# Patient Record
Sex: MALE | Race: WHITE | NOT HISPANIC OR LATINO | Employment: OTHER | URBAN - METROPOLITAN AREA
[De-identification: names, ages, dates, MRNs, and addresses within clinical notes are randomized per-mention and may not be internally consistent; named-entity substitution may affect disease eponyms.]

---

## 2022-01-19 PROCEDURE — U0004 COV-19 TEST NON-CDC HGH THRU: HCPCS | Performed by: FAMILY MEDICINE

## 2022-11-08 ENCOUNTER — HOSPITAL ENCOUNTER (INPATIENT)
Facility: HOSPITAL | Age: 77
LOS: 2 days | Discharge: HOME | End: 2022-11-12
Attending: EMERGENCY MEDICINE | Admitting: STUDENT IN AN ORGANIZED HEALTH CARE EDUCATION/TRAINING PROGRAM

## 2022-11-08 DIAGNOSIS — R42 DIZZINESS: ICD-10-CM

## 2022-11-08 DIAGNOSIS — D50.9 MICROCYTIC ANEMIA: Primary | ICD-10-CM

## 2022-11-08 LAB
ABO GROUP BLD: NORMAL
ALBUMIN SERPL-MCNC: 4.1 G/DL (ref 3.5–5.2)
ALBUMIN/GLOB SERPL: 1.3 G/DL
ALP SERPL-CCNC: 90 U/L (ref 39–117)
ALT SERPL W P-5'-P-CCNC: 16 U/L (ref 1–41)
ANION GAP SERPL CALCULATED.3IONS-SCNC: 11 MMOL/L (ref 5–15)
ANISOCYTOSIS BLD QL: NORMAL
AST SERPL-CCNC: 20 U/L (ref 1–40)
BASOPHILS # BLD AUTO: 0 10*3/MM3 (ref 0–0.2)
BASOPHILS NFR BLD AUTO: 0.6 % (ref 0–1.5)
BILIRUB SERPL-MCNC: 0.4 MG/DL (ref 0–1.2)
BLD GP AB SCN SERPL QL: NEGATIVE
BUN SERPL-MCNC: 17 MG/DL (ref 8–23)
BUN/CREAT SERPL: 20.2 (ref 7–25)
CALCIUM SPEC-SCNC: 8.6 MG/DL (ref 8.6–10.5)
CHLORIDE SERPL-SCNC: 105 MMOL/L (ref 98–107)
CO2 SERPL-SCNC: 24 MMOL/L (ref 22–29)
CREAT SERPL-MCNC: 0.84 MG/DL (ref 0.76–1.27)
DEPRECATED RDW RBC AUTO: 46.8 FL (ref 37–54)
EGFRCR SERPLBLD CKD-EPI 2021: 91.5 ML/MIN/1.73
EOSINOPHIL # BLD AUTO: 0.1 10*3/MM3 (ref 0–0.4)
EOSINOPHIL NFR BLD AUTO: 1.5 % (ref 0.3–6.2)
ERYTHROCYTE [DISTWIDTH] IN BLOOD BY AUTOMATED COUNT: 20.5 % (ref 12.3–15.4)
GLOBULIN UR ELPH-MCNC: 3.1 GM/DL
GLUCOSE SERPL-MCNC: 169 MG/DL (ref 65–99)
HCT VFR BLD AUTO: 25.1 % (ref 37.5–51)
HGB BLD-MCNC: 7.1 G/DL (ref 13–17.7)
HOLD SPECIMEN: NORMAL
HYPOCHROMIA BLD QL: NORMAL
IRON 24H UR-MRATE: 14 MCG/DL (ref 59–158)
IRON SATN MFR SERPL: 3 % (ref 20–50)
LYMPHOCYTES # BLD AUTO: 1.3 10*3/MM3 (ref 0.7–3.1)
LYMPHOCYTES NFR BLD AUTO: 23.1 % (ref 19.6–45.3)
MCH RBC QN AUTO: 17.3 PG (ref 26.6–33)
MCHC RBC AUTO-ENTMCNC: 28.4 G/DL (ref 31.5–35.7)
MCV RBC AUTO: 60.7 FL (ref 79–97)
MICROCYTES BLD QL: NORMAL
MONOCYTES # BLD AUTO: 0.6 10*3/MM3 (ref 0.1–0.9)
MONOCYTES NFR BLD AUTO: 11.4 % (ref 5–12)
NEUTROPHILS NFR BLD AUTO: 3.5 10*3/MM3 (ref 1.7–7)
NEUTROPHILS NFR BLD AUTO: 63.4 % (ref 42.7–76)
NRBC BLD AUTO-RTO: 0 /100 WBC (ref 0–0.2)
PLAT MORPH BLD: NORMAL
PLATELET # BLD AUTO: 223 10*3/MM3 (ref 140–450)
PMV BLD AUTO: 8.1 FL (ref 6–12)
POIKILOCYTOSIS BLD QL SMEAR: NORMAL
POTASSIUM SERPL-SCNC: 4.1 MMOL/L (ref 3.5–5.2)
PROT SERPL-MCNC: 7.2 G/DL (ref 6–8.5)
RBC # BLD AUTO: 4.13 10*6/MM3 (ref 4.14–5.8)
RH BLD: POSITIVE
SODIUM SERPL-SCNC: 140 MMOL/L (ref 136–145)
T&S EXPIRATION DATE: NORMAL
TIBC SERPL-MCNC: 551 MCG/DL (ref 298–536)
TRANSFERRIN SERPL-MCNC: 370 MG/DL (ref 200–360)
WBC MORPH BLD: NORMAL
WBC NRBC COR # BLD: 5.6 10*3/MM3 (ref 3.4–10.8)
WHOLE BLOOD HOLD COAG: NORMAL

## 2022-11-08 PROCEDURE — 86900 BLOOD TYPING SEROLOGIC ABO: CPT | Performed by: NURSE PRACTITIONER

## 2022-11-08 PROCEDURE — 86901 BLOOD TYPING SEROLOGIC RH(D): CPT | Performed by: NURSE PRACTITIONER

## 2022-11-08 PROCEDURE — 84466 ASSAY OF TRANSFERRIN: CPT | Performed by: NURSE PRACTITIONER

## 2022-11-08 PROCEDURE — 86901 BLOOD TYPING SEROLOGIC RH(D): CPT

## 2022-11-08 PROCEDURE — 82378 CARCINOEMBRYONIC ANTIGEN: CPT | Performed by: INTERNAL MEDICINE

## 2022-11-08 PROCEDURE — 80053 COMPREHEN METABOLIC PANEL: CPT | Performed by: NURSE PRACTITIONER

## 2022-11-08 PROCEDURE — 99285 EMERGENCY DEPT VISIT HI MDM: CPT

## 2022-11-08 PROCEDURE — 83540 ASSAY OF IRON: CPT | Performed by: NURSE PRACTITIONER

## 2022-11-08 PROCEDURE — 86850 RBC ANTIBODY SCREEN: CPT | Performed by: NURSE PRACTITIONER

## 2022-11-08 PROCEDURE — 86923 COMPATIBILITY TEST ELECTRIC: CPT

## 2022-11-08 PROCEDURE — 86900 BLOOD TYPING SEROLOGIC ABO: CPT

## 2022-11-08 PROCEDURE — 85025 COMPLETE CBC W/AUTO DIFF WBC: CPT | Performed by: NURSE PRACTITIONER

## 2022-11-08 PROCEDURE — 82728 ASSAY OF FERRITIN: CPT | Performed by: STUDENT IN AN ORGANIZED HEALTH CARE EDUCATION/TRAINING PROGRAM

## 2022-11-08 PROCEDURE — 85007 BL SMEAR W/DIFF WBC COUNT: CPT | Performed by: NURSE PRACTITIONER

## 2022-11-08 RX ORDER — SODIUM CHLORIDE 0.9 % (FLUSH) 0.9 %
10 SYRINGE (ML) INJECTION AS NEEDED
Status: DISCONTINUED | OUTPATIENT
Start: 2022-11-08 | End: 2022-11-12 | Stop reason: HOSPADM

## 2022-11-09 PROBLEM — E11.65 TYPE 2 DIABETES MELLITUS WITH HYPERGLYCEMIA, WITHOUT LONG-TERM CURRENT USE OF INSULIN: Status: ACTIVE | Noted: 2022-11-09

## 2022-11-09 LAB
ANION GAP SERPL CALCULATED.3IONS-SCNC: 11 MMOL/L (ref 5–15)
BUN SERPL-MCNC: 15 MG/DL (ref 8–23)
BUN/CREAT SERPL: 17.6 (ref 7–25)
CALCIUM SPEC-SCNC: 8.3 MG/DL (ref 8.6–10.5)
CHLORIDE SERPL-SCNC: 104 MMOL/L (ref 98–107)
CO2 SERPL-SCNC: 24 MMOL/L (ref 22–29)
CREAT SERPL-MCNC: 0.85 MG/DL (ref 0.76–1.27)
DEPRECATED RDW RBC AUTO: 49 FL (ref 37–54)
EGFRCR SERPLBLD CKD-EPI 2021: 91.2 ML/MIN/1.73
ERYTHROCYTE [DISTWIDTH] IN BLOOD BY AUTOMATED COUNT: 21.8 % (ref 12.3–15.4)
FERRITIN SERPL-MCNC: 7.92 NG/ML (ref 30–400)
GLUCOSE BLDC GLUCOMTR-MCNC: 160 MG/DL (ref 70–105)
GLUCOSE BLDC GLUCOMTR-MCNC: 164 MG/DL (ref 70–105)
GLUCOSE BLDC GLUCOMTR-MCNC: 192 MG/DL (ref 70–105)
GLUCOSE SERPL-MCNC: 185 MG/DL (ref 65–99)
HCT VFR BLD AUTO: 25.5 % (ref 37.5–51)
HGB BLD-MCNC: 7.2 G/DL (ref 13–17.7)
MCH RBC QN AUTO: 17.8 PG (ref 26.6–33)
MCHC RBC AUTO-ENTMCNC: 28.1 G/DL (ref 31.5–35.7)
MCV RBC AUTO: 63.3 FL (ref 79–97)
PLATELET # BLD AUTO: 195 10*3/MM3 (ref 140–450)
PMV BLD AUTO: 8.4 FL (ref 6–12)
POTASSIUM SERPL-SCNC: 4 MMOL/L (ref 3.5–5.2)
RBC # BLD AUTO: 4.03 10*6/MM3 (ref 4.14–5.8)
SODIUM SERPL-SCNC: 139 MMOL/L (ref 136–145)
WBC NRBC COR # BLD: 5 10*3/MM3 (ref 3.4–10.8)

## 2022-11-09 PROCEDURE — 82962 GLUCOSE BLOOD TEST: CPT

## 2022-11-09 PROCEDURE — 85027 COMPLETE CBC AUTOMATED: CPT | Performed by: STUDENT IN AN ORGANIZED HEALTH CARE EDUCATION/TRAINING PROGRAM

## 2022-11-09 PROCEDURE — 99204 OFFICE O/P NEW MOD 45 MIN: CPT | Performed by: INTERNAL MEDICINE

## 2022-11-09 PROCEDURE — 25010000002 NA FERRIC GLUC CPLX PER 12.5 MG: Performed by: STUDENT IN AN ORGANIZED HEALTH CARE EDUCATION/TRAINING PROGRAM

## 2022-11-09 PROCEDURE — G0378 HOSPITAL OBSERVATION PER HR: HCPCS

## 2022-11-09 PROCEDURE — 80048 BASIC METABOLIC PNL TOTAL CA: CPT | Performed by: STUDENT IN AN ORGANIZED HEALTH CARE EDUCATION/TRAINING PROGRAM

## 2022-11-09 PROCEDURE — 63710000001 INSULIN LISPRO (HUMAN) PER 5 UNITS: Performed by: STUDENT IN AN ORGANIZED HEALTH CARE EDUCATION/TRAINING PROGRAM

## 2022-11-09 PROCEDURE — 36415 COLL VENOUS BLD VENIPUNCTURE: CPT | Performed by: STUDENT IN AN ORGANIZED HEALTH CARE EDUCATION/TRAINING PROGRAM

## 2022-11-09 PROCEDURE — P9016 RBC LEUKOCYTES REDUCED: HCPCS

## 2022-11-09 PROCEDURE — 36430 TRANSFUSION BLD/BLD COMPNT: CPT

## 2022-11-09 PROCEDURE — 86900 BLOOD TYPING SEROLOGIC ABO: CPT

## 2022-11-09 RX ORDER — PIOGLITAZONEHYDROCHLORIDE 45 MG/1
45 TABLET ORAL DAILY
COMMUNITY
Start: 2022-07-08

## 2022-11-09 RX ORDER — ONDANSETRON 4 MG/1
4 TABLET, FILM COATED ORAL EVERY 6 HOURS PRN
Status: DISCONTINUED | OUTPATIENT
Start: 2022-11-09 | End: 2022-11-12 | Stop reason: HOSPADM

## 2022-11-09 RX ORDER — OXYMETAZOLINE HYDROCHLORIDE 0.05 G/100ML
2 SPRAY NASAL 2 TIMES DAILY
Status: DISPENSED | OUTPATIENT
Start: 2022-11-09 | End: 2022-11-12

## 2022-11-09 RX ORDER — DEXTROSE MONOHYDRATE 25 G/50ML
25 INJECTION, SOLUTION INTRAVENOUS
Status: DISCONTINUED | OUTPATIENT
Start: 2022-11-09 | End: 2022-11-12 | Stop reason: HOSPADM

## 2022-11-09 RX ORDER — ONDANSETRON 2 MG/ML
4 INJECTION INTRAMUSCULAR; INTRAVENOUS EVERY 6 HOURS PRN
Status: DISCONTINUED | OUTPATIENT
Start: 2022-11-09 | End: 2022-11-12 | Stop reason: HOSPADM

## 2022-11-09 RX ORDER — OLANZAPINE 10 MG/2ML
1 INJECTION, POWDER, LYOPHILIZED, FOR SOLUTION INTRAMUSCULAR
Status: DISCONTINUED | OUTPATIENT
Start: 2022-11-09 | End: 2022-11-12 | Stop reason: HOSPADM

## 2022-11-09 RX ORDER — NITROGLYCERIN 0.4 MG/1
0.4 TABLET SUBLINGUAL
Status: DISCONTINUED | OUTPATIENT
Start: 2022-11-09 | End: 2022-11-12 | Stop reason: HOSPADM

## 2022-11-09 RX ORDER — PRAVASTATIN SODIUM 40 MG
1 TABLET ORAL NIGHTLY
COMMUNITY
Start: 2022-07-08 | End: 2023-03-13 | Stop reason: HOSPADM

## 2022-11-09 RX ORDER — INSULIN LISPRO 100 [IU]/ML
2-7 INJECTION, SOLUTION INTRAVENOUS; SUBCUTANEOUS
Status: DISCONTINUED | OUTPATIENT
Start: 2022-11-09 | End: 2022-11-12 | Stop reason: HOSPADM

## 2022-11-09 RX ORDER — SODIUM CHLORIDE 0.9 % (FLUSH) 0.9 %
10 SYRINGE (ML) INJECTION EVERY 12 HOURS SCHEDULED
Status: DISCONTINUED | OUTPATIENT
Start: 2022-11-09 | End: 2022-11-12 | Stop reason: HOSPADM

## 2022-11-09 RX ORDER — NICOTINE POLACRILEX 4 MG
15 LOZENGE BUCCAL
Status: DISCONTINUED | OUTPATIENT
Start: 2022-11-09 | End: 2022-11-12 | Stop reason: HOSPADM

## 2022-11-09 RX ORDER — SODIUM CHLORIDE 0.9 % (FLUSH) 0.9 %
10 SYRINGE (ML) INJECTION AS NEEDED
Status: DISCONTINUED | OUTPATIENT
Start: 2022-11-09 | End: 2022-11-12 | Stop reason: HOSPADM

## 2022-11-09 RX ORDER — GLIMEPIRIDE 4 MG/1
1 TABLET ORAL 2 TIMES DAILY
COMMUNITY
Start: 2022-07-08

## 2022-11-09 RX ORDER — LOSARTAN POTASSIUM 100 MG/1
100 TABLET ORAL DAILY
COMMUNITY
Start: 2022-07-08 | End: 2023-03-13 | Stop reason: HOSPADM

## 2022-11-09 RX ADMIN — Medication 2 SPRAY: at 09:43

## 2022-11-09 RX ADMIN — INSULIN LISPRO 2 UNITS: 100 INJECTION, SOLUTION INTRAVENOUS; SUBCUTANEOUS at 12:02

## 2022-11-09 RX ADMIN — INSULIN LISPRO 2 UNITS: 100 INJECTION, SOLUTION INTRAVENOUS; SUBCUTANEOUS at 16:15

## 2022-11-09 RX ADMIN — Medication 2 SPRAY: at 06:55

## 2022-11-09 RX ADMIN — SODIUM CHLORIDE 125 MG: 9 INJECTION, SOLUTION INTRAVENOUS at 09:42

## 2022-11-09 RX ADMIN — Medication 10 ML: at 22:56

## 2022-11-09 RX ADMIN — Medication 10 ML: at 09:43

## 2022-11-09 RX ADMIN — INSULIN LISPRO 2 UNITS: 100 INJECTION, SOLUTION INTRAVENOUS; SUBCUTANEOUS at 07:39

## 2022-11-09 NOTE — H&P
[facility] Medicine Services      Patient Name: Vlad Guerrero  : 1945  MRN: [id number]  Primary Care Physician:  Provider, No Known  Date of admission: 2022      Subjective      Chief Complaint: Abnormal labs    History of Present Illness: Vlad Guerrero is a 76 y.o. male who presented to [facility] on 2022 complaining of abnormal labs.  Patient was sent in by PCP when labs obtained due to dizziness showed a hgb of <7.  Patient denies chest pain, dyspnea, melena, hematochezia,  Hematemesis.  He has never had a colonoscopy.  Presented to [facility] ED due to PCP's instructions.    In the ER, vitals 98.2, HR 80, RR 16, /62, 95 RA.  Labs notable for glucose 169, a1c 7.2, hgb 7.1, iron 14.        Review of Systems   Constitutional: Negative for chills, fever and malaise/fatigue.   HENT: Negative for ear discharge, ear pain and hearing loss.    Eyes: Negative for discharge, double vision and pain.   Cardiovascular: Negative for chest pain, dyspnea on exertion and leg swelling.   Respiratory: Negative for cough and shortness of breath.    Endocrine: Negative for polydipsia, polyphagia and polyuria.   Skin: Negative for dry skin, flushing and itching.   Musculoskeletal: Negative for arthritis, back pain and falls.   Gastrointestinal: Negative for abdominal pain, constipation and diarrhea.   Genitourinary: Negative for dysuria, flank pain and frequency.   Neurological: Positive for light-headedness. Negative for headaches and weakness.   Psychiatric/Behavioral: Negative for altered mental status, depression and hallucinations.        Personal History     PMHx: DM  PSHx: Denies  All: penicillins  Meds: denies  Social: denies illicit drug use  Family History: family history is not on file. Otherwise pertinent FHx was reviewed and not pertinent to current issue.    Social History:  reports that he has never smoked. He has never used smokeless tobacco. He reports that he does not  currently use alcohol. He reports that he does not use drugs.    Home Medications:  Prior to Admission Medications     None            Allergies:  Allergies   Allergen Reactions   • Penicillins Rash       Objective      Vitals:   Temp:  [98.2 °F (36.8 °C)] 98.2 °F (36.8 °C)  Heart Rate:  [80-90] 80  Resp:  [16] 16  BP: (120-150)/(52-77) 120/62    Physical Exam  Constitutional:       General: He is not in acute distress.     Appearance: Normal appearance. He is not toxic-appearing.   HENT:      Head: Normocephalic and atraumatic.      Nose: Nose normal. No congestion.      Mouth/Throat:      Pharynx: Oropharynx is clear. No oropharyngeal exudate.   Eyes:      General: No scleral icterus.  Cardiovascular:      Rate and Rhythm: Normal rate and regular rhythm.      Heart sounds: No murmur heard.    No friction rub. No gallop.   Pulmonary:      Effort: No respiratory distress.      Breath sounds: No wheezing or rales.   Abdominal:      General: There is no distension.      Tenderness: There is no abdominal tenderness. There is no guarding.   Musculoskeletal:         General: No swelling or deformity.      Cervical back: Normal range of motion. No rigidity.      Right lower leg: No edema.      Left lower leg: No edema.   Skin:     Coloration: Skin is not jaundiced or pale.      Findings: No bruising or lesion.   Neurological:      General: No focal deficit present.      Mental Status: He is alert and oriented to person, place, and time.      Motor: No weakness.   Psychiatric:         Mood and Affect: Mood normal.         Behavior: Behavior normal.         Thought Content: Thought content normal.         Judgment: Judgment normal.          Result Review    Result Review:  I have personally reviewed the results from the time of this admission to 11/8/2022 23:34 EST and agree with these findings:  [x]  Laboratory  []  Microbiology  []  Radiology  []  EKG/Telemetry   []  Cardiology/Vascular   []  Pathology  []  Old  records  []  Other:        Assessment & Plan        Active Hospital Problems:  There are no active hospital problems to display for this patient.    Plan:     #Iron deficiency anemia    - Due to symptoms of dizzines, PCP obtain labs which showed hgb <7.0    - at [facility], hgb was 6.8 and 7.1    - FOBT negative    - type and screen    - iron low, ferritin pending    - no overt bleeding    - As no overt bleeding, will refrain from GI consult as no emergent scope needed    - heme/onc to establish care    - 1u prbc ordered    - IV iron ordered    - ppi daily    #DM    - a1c 7.1    - iss    #DVT prophylaxis    -SCDs    CODE STATUS:       Admission Status:  I believe this patient meets observation status.    I discussed the patient's findings and my recommendations with patient.    This patient has been examined wearing appropriate Personal Protective Equipment and discussed with Patient. 11/08/22      Signature: Electronically signed by Moreno Pimentel DO, 11/09/22, 12:12 AM EST.

## 2022-11-09 NOTE — CASE MANAGEMENT/SOCIAL WORK
Discharge Planning Assessment   Az     Patient Name: Vlad Guerrero  MRN: [id number]  Today's Date: 11/9/2022    Admit Date: 11/8/2022    Plan: Home   Discharge Needs Assessment     Row Name 11/09/22 1831       Living Environment    People in Home other relative(s)    Name(s) of People in Home Pt states he lives in an apartment,and his Aunt Felicity Huddleston lives below him    Current Living Arrangements apartment    Primary Care Provided by self    Provides Primary Care For no one    Family Caregiver if Needed other relative(s)    Family Caregiver Names Aunt Felicity Huddleston    Quality of Family Relationships supportive    Able to Return to Prior Arrangements yes       Resource/Environmental Concerns    Resource/Environmental Concerns none    Transportation Concerns none       Transition Planning    Patient/Family Anticipates Transition to home    Patient/Family Anticipated Services at Transition none    Transportation Anticipated family or friend will provide  Pt states Josephine Huddleston will pick him up at dc       Discharge Needs Assessment    Equipment Currently Used at Home other (see comments);glucometer  Colostomy supplies via mail order    Concerns to be Addressed denies needs/concerns at this time    Anticipated Changes Related to Illness none    Equipment Needed After Discharge none               Discharge Plan     Row Name 11/09/22 6814       Plan    Plan Home    Patient/Family in Agreement with Plan yes    Plan Comments  spoke with patient. He reports he sees Batool Lin NP in [place], [place] (updated in [facility]) and confirms pharmacy.He denies problems affording medications. He states he rents from his Aunt, and she lives below him.He reports she is able to help him at dc if needed and will pick him up at dc. He currently denies dc needs. DC Barriers: IV iron, monitoring hgb.              Continued Care and Services - Admitted Since 11/8/2022    Coordination has not been started for this  encounter.       Expected Discharge Date and Time     Expected Discharge Date Expected Discharge Time    Nov 10, 2022          Demographic Summary     Row Name 11/09/22 1831       General Information    Admission Type observation    Arrived From home    Required Notices Provided Observation Status Notice    Referral Source admission list    Reason for Consult discharge planning    Preferred Language English       Contact Information    Permission Granted to Share Info With                Functional Status     Row Name 11/09/22 1831       Functional Status    Usual Activity Tolerance good    Current Activity Tolerance moderate       Functional Status, IADL    Medications independent    Meal Preparation independent    Housekeeping independent    Laundry independent    Shopping independent                   Patient Forms     Row Name 11/09/22 1838       Patient Forms    Important Message from Medicare ([facility]) --  ALEXANDER 11/9/22 per registration    Patient Observation Letter Delivered  ALEXANDER 11/9/22 per registration              Leisa Agrawal RN, [facility]  Office: [phone]  Fax: [phone]  [email or web address]      I met with patient in room wearing PPE: mask and goggles.     Maintained distance greater than six feet and spent </=15 minutes in the room      Leisa Agrawal RN

## 2022-11-09 NOTE — ED PROVIDER NOTES
Subjective   History of Present Illness  Patient is a 76-year-old gentleman who states he went to see his primary care provider today and had blood work drawn because he had had some episodes of dizziness.  He states that his doctor called him at 9:00 tonight and told him that his hemoglobin was low and that he should come directly to the hospital.  Patient is alert oriented nontoxic in no acute distress he denies pain.  He states that he is not always dizzy he just has episodes of dizziness he denies any melena or hematochezia.  He states he has no history of anemia        Review of Systems   Constitutional: Negative for chills, fatigue and fever.   HENT: Negative for congestion, tinnitus and trouble swallowing.    Eyes: Negative for photophobia, discharge and redness.   Respiratory: Negative for cough and shortness of breath.    Cardiovascular: Negative for chest pain and palpitations.   Gastrointestinal: Negative for abdominal pain, diarrhea, nausea and vomiting.   Genitourinary: Negative for dysuria, frequency and urgency.   Musculoskeletal: Negative for back pain, joint swelling and myalgias.   Skin: Negative for rash.   Neurological: Negative for dizziness and headaches.   Psychiatric/Behavioral: Negative for confusion.   All other systems reviewed and are negative.      History reviewed. No pertinent past medical history.    Allergies   Allergen Reactions   • Penicillins Rash       History reviewed. No pertinent surgical history.    History reviewed. No pertinent family history.    Social History     Socioeconomic History   • Marital status: Single   Tobacco Use   • Smoking status: Never   • Smokeless tobacco: Never   Substance and Sexual Activity   • Alcohol use: Not Currently   • Drug use: Never   • Sexual activity: Defer           Objective   Physical Exam  Vitals reviewed.   Constitutional:       Appearance: He is well-developed.   HENT:      Head: Normocephalic and atraumatic.      Right Ear: External  "ear normal.      Left Ear: External ear normal.      Nose: Nose normal.      Mouth/Throat:      Mouth: Mucous membranes are moist.   Eyes:      Conjunctiva/sclera: Conjunctivae normal.      Pupils: Pupils are equal, round, and reactive to light.   Cardiovascular:      Rate and Rhythm: Normal rate and regular rhythm.      Heart sounds: Normal heart sounds.   Pulmonary:      Effort: Pulmonary effort is normal.      Breath sounds: Normal breath sounds.   Abdominal:      General: Bowel sounds are normal.      Palpations: Abdomen is soft.   Musculoskeletal:         General: Normal range of motion.      Cervical back: Normal range of motion and neck supple.   Skin:     General: Skin is warm and dry.      Capillary Refill: Capillary refill takes 2 to 3 seconds.      Coloration: Skin is pale.      Findings: Wound: .vs.   Neurological:      General: No focal deficit present.      Mental Status: He is alert and oriented to person, place, and time.      GCS: GCS eye subscore is 4. GCS verbal subscore is 5. GCS motor subscore is 6.   Psychiatric:         Mood and Affect: Mood normal.         Behavior: Behavior normal.         Procedures           ED Course            /62   Pulse 80   Temp 98.2 °F (36.8 °C) (Oral)   Resp 16   Ht 182.9 cm (72\")   Wt 90.6 kg (199 lb 11.8 oz)   SpO2 95%   BMI 27.09 kg/m²   Labs Reviewed   COMPREHENSIVE METABOLIC PANEL - Abnormal; Notable for the following components:       Result Value    Glucose 169 (*)     All other components within normal limits    Narrative:     GFR Normal >60  Chronic Kidney Disease <60  Kidney Failure <15    The GFR formula is only valid for adults with stable renal function between ages 18 and 70.   CBC WITH AUTO DIFFERENTIAL - Abnormal; Notable for the following components:    RBC 4.13 (*)     Hemoglobin 7.1 (*)     Hematocrit 25.1 (*)     MCV 60.7 (*)     MCH 17.3 (*)     MCHC 28.4 (*)     RDW 20.5 (*)     All other components within normal limits    " Narrative:     Appended report. These results have been appended to a previously verified report.   IRON PROFILE - Abnormal; Notable for the following components:    Iron 14 (*)     Iron Saturation 3 (*)     Transferrin 370 (*)     TIBC 551 (*)     All other components within normal limits   SCAN SLIDE   TYPE AND SCREEN   BB ARMBAND CHECK   PREPARE RBC   CBC AND DIFFERENTIAL    Narrative:     The following orders were created for panel order CBC & Differential.  Procedure                               Abnormality         Status                     ---------                               -----------         ------                     CBC Auto Differential[[id number]]        Abnormal            Final result               Scan Slide[[id number]]                                       Final result                 Please view results for these tests on the individual orders.   EXTRA TUBES    Narrative:     The following orders were created for panel order Extra Tubes.  Procedure                               Abnormality         Status                     ---------                               -----------         ------                     Gold Top - SST[[id number]]                                   In process                 Light Blue Top[[id number]]                                   In process                   Please view results for these tests on the individual orders.   GOLD TOP - SST   LIGHT BLUE TOP     Medications   sodium chloride 0.9 % flush 10 mL (has no administration in time range)     No radiology results for the last day                                  MDM  Number of Diagnoses or Management Options  Dizziness  Microcytic anemia  Diagnosis management comments: Patient had IV established and work was obtained.  Patient was found to have a hemoglobin of 7.1 was found to be microcytic iron panel was drawn as well.  Patient was typed and screened and given 1 unit of blood.  Patient was discussed with Dr. Pimentel  agreed to admit the patient patient remained stable he was agreeable to admission           Amount and/or Complexity of Data Reviewed  Clinical lab tests: reviewed    Risk of Complications, Morbidity, and/or Mortality  Presenting problems: high  Diagnostic procedures: high  Management options: high    Patient Progress  Patient progress: improved      Final diagnoses:   Microcytic anemia   Dizziness       ED Disposition  ED Disposition     ED Disposition   Decision to Admit    Condition   --    Comment   Level of Care: Telemetry [5]   Admitting Physician: WHITNEY YA [[id number]]   Attending Physician: WHITNEY YA [[id number]]               No follow-up provider specified.       Medication List      No changes were made to your prescriptions during this visit.          Emma Alegre, APRN  11/08/22 4616

## 2022-11-09 NOTE — PROGRESS NOTES
[facility] Medicine Services Daily Progress Note    Patient Name: Vlad Guerrero  : 1945  MRN: [id number]  Primary Care Physician:  Provider, No Known  Date of admission: 2022      Subjective      Chief Complaint: Abnormal labs.      Patient Reports:      2022.  Patient was seen and examined.  Patient complained of weakness.    Review of Systems   Constitutional: Positive for malaise/fatigue.   HENT: Negative.    Eyes: Negative.    Cardiovascular: Negative.    Respiratory: Negative.    Endocrine: Negative.    Hematologic/Lymphatic: Negative.    Skin: Negative.    Musculoskeletal: Negative.    Gastrointestinal: Negative.    Genitourinary: Negative.    Neurological: Positive for weakness.   Psychiatric/Behavioral: Negative.    Allergic/Immunologic: Negative.             Objective      Vitals:   Temp:  [97.6 °F (36.4 °C)-98.2 °F (36.8 °C)] 97.7 °F (36.5 °C)  Heart Rate:  [75-90] 87  Resp:  [14-20] 14  BP: (103-150)/(52-83) 130/62    Physical Exam  Vitals reviewed.   Constitutional:       General: He is not in acute distress.  HENT:      Head: Normocephalic.      Nose: Nose normal.      Mouth/Throat:      Mouth: Mucous membranes are dry.      Pharynx: Oropharynx is clear.   Eyes:      Extraocular Movements: Extraocular movements intact.      Conjunctiva/sclera: Conjunctivae normal.      Pupils: Pupils are equal, round, and reactive to light.   Cardiovascular:      Pulses: Normal pulses.      Heart sounds: No murmur heard.    No friction rub. No gallop.      Comments: S1 and S2 present.  No tachycardia.  Pulmonary:      Effort: Pulmonary effort is normal.      Breath sounds: No stridor. No wheezing or rales.   Chest:      Chest wall: No tenderness.   Abdominal:      General: Bowel sounds are normal. There is no distension.      Palpations: Abdomen is soft.      Tenderness: There is no abdominal tenderness. There is no right CVA tenderness or guarding.      Comments: Colostomy bag  in place.   Musculoskeletal:         General: No swelling, tenderness, deformity or signs of injury.      Cervical back: Normal range of motion. No rigidity.      Right lower leg: No edema.      Left lower leg: No edema.   Skin:     General: Skin is warm and dry.      Capillary Refill: Capillary refill takes less than 2 seconds.      Coloration: Skin is not jaundiced.      Findings: No bruising, erythema, lesion or rash.   Neurological:      Mental Status: He is alert.      Comments: No facial asymmetry noted.  Gait and station not tested.   Psychiatric:      Comments: No agitation.                 Result Review    Result Review:  I have personally reviewed the results from the time of this admission to 11/9/2022 17:46 EST and agree with these findings:  []  Laboratory  []  Microbiology  []  Radiology  []  EKG/Telemetry   []  Cardiology/Vascular   []  Pathology  []  Old records  []  Other:  Most notable findings include:           Assessment & Plan    From previous notes and with minor updates.      Brief Patient Summary:    Patient is a 76 y.o. male who presented to [facility] on 11/8/2022 complaining of abnormal labs.  Patient was sent in by PCP when labs obtained due to dizziness showed a hgb of <7.  Patient denies chest pain, dyspnea, melena, hematochezia,  Hematemesis.  He has never had a colonoscopy.  Presented to [facility] ED due to PCP's instructions.  Patient was seen in the emergency room and in the ER, vitals 98.2, HR 80, RR 16, /62, 95 RA.  Labs notable for glucose 169, a1c 7.2, hgb 7.1, iron 14.           ferric gluconate, 125 mg, Intravenous, Daily  insulin lispro, 2-7 Units, Subcutaneous, TID With Meals  oxymetazoline, 2 spray, Each Nare, BID  sodium chloride, 10 mL, Intravenous, Q12H             Active Hospital Problems:  Active Hospital Problems    Diagnosis    • **Microcytic anemia    • Type 2 diabetes mellitus with hyperglycemia, without long-term current use of insulin (HCC)      Plan:        -Continue appropriate patient's home medications for other chronic medical conditions.  -Continue the present level of care.  -Patient and family agreed with the plan of care.  -Follow hematology/oncology recommendations for anemia.  -Complete a GI referral as outpatient upon discharge.  -Treated diabetes mellitus with insulin therapy.  -Monitor H&H.  -Treat anemia with transfusion of packed red blood cells.        DVT prophylaxis:  Mechanical DVT prophylaxis orders are present.    CODE STATUS:    Code Status (Patient has no pulse and is not breathing): CPR (Attempt to Resuscitate)  Medical Interventions (Patient has pulse or is breathing): Full Support      Disposition: This wonderful patient can discharge in the next 24 to 48 hours.    This patient has been examined wearing appropriate Personal Protective Equipment and discussed with hospital infection control department, [facility], infectious disease specialist and pulmonologist. 11/09/22      Electronically signed by Praveen Oates MD, FACP, 11/09/22, 17:46 EST.      [facility] Hospitalist Team

## 2022-11-09 NOTE — CONSULTS
Hematology/Oncology Inpatient Consultation    Patient name: Vlad Guerrero  : 1945  MRN: [id number]  Referring Provider: Dr. Pimentel  Reason for Consultation: Microcytic anemia    Chief complaint: Abnormal labs    History of present illness:    Vlad Guerrero is a 76 y.o. male who presented to [facility] on 2022 with complaints of abnormal labs at the instruction of his PCP.  Patient had lab work obtained by his PCP due to complaints of dizziness that resulted in a hemoglobin of less than 7.  The patient denied chest pain, dyspnea, melena, hematochezia, hematic emesis.  Patient denied any prior colonoscopy.  The patient denied any signs of overt bleeding.  Patient denied any history of anemia.    Evaluation in the ED: CMP unremarkable with the exception of a glucose of 169, WBC 5.60, hemoglobin 7.1, MCV 60.7, platelets 223,000.    Iron studies showed: Iron 14, iron sat 3, TIBC 551, ferritin 7.92.    22  Hematology/Oncology was consulted for microcytic anemia.  Iron studies consistent with iron deficiency anemia.  FOBT negative.  Patient received 1 unit packed red blood cells for hemoglobin of 7.1.    He/She  has no past medical history on file.    PCP: Provider, No Known    History:  History reviewed. No pertinent past medical history., History reviewed. No pertinent surgical history., History reviewed. No pertinent family history.,   Social History     Tobacco Use   • Smoking status: Never   • Smokeless tobacco: Never   Substance Use Topics   • Alcohol use: Not Currently   • Drug use: Never   , (Not in a hospital admission)  , Scheduled Meds:  ferric gluconate, 125 mg, Intravenous, Daily  insulin lispro, 2-7 Units, Subcutaneous, TID With Meals  oxymetazoline, 2 spray, Each Nare, BID  sodium chloride, 10 mL, Intravenous, Q12H    , Continuous Infusions:   , PRN Meds:  •  dextrose  •  dextrose  •  glucagon (human recombinant)  •  nitroglycerin  •  ondansetron **OR** ondansetron  •   "[COMPLETED] Insert peripheral IV **AND** sodium chloride  •  sodium chloride   Allergies:  Penicillins    Subjective     ROS:  Review of Systems   Constitutional: Positive for fatigue. Negative for fever.   HENT: Negative for congestion and nosebleeds.    Eyes: Negative for pain.   Respiratory: Negative for cough and shortness of breath.    Cardiovascular: Negative for chest pain.   Gastrointestinal: Negative for abdominal pain, blood in stool, diarrhea, nausea and vomiting.   Endocrine: Negative for cold intolerance and heat intolerance.   Genitourinary: Negative for difficulty urinating.   Musculoskeletal: Negative for arthralgias.   Skin: Negative for rash.   Neurological: Negative for dizziness and headaches.   Hematological: Does not bruise/bleed easily.   Psychiatric/Behavioral: Negative for behavioral problems.        Objective   Vital Signs:   /74   Pulse 77   Temp 97.6 °F (36.4 °C) (Oral)   Resp 16   Ht 182.9 cm (72\")   Wt 90.6 kg (199 lb 11.8 oz)   SpO2 98%   BMI 27.09 kg/m²     Physical Exam: (performed by MD)  Physical Exam  Constitutional:       Appearance: Normal appearance.   HENT:      Head: Normocephalic and atraumatic.   Eyes:      Pupils: Pupils are equal, round, and reactive to light.   Cardiovascular:      Rate and Rhythm: Normal rate and regular rhythm.      Pulses: Normal pulses.      Heart sounds: No murmur heard.  Pulmonary:      Effort: Pulmonary effort is normal.      Breath sounds: Normal breath sounds.   Abdominal:      General: There is no distension.      Palpations: Abdomen is soft. There is no mass.      Tenderness: There is no abdominal tenderness.   Musculoskeletal:         General: Normal range of motion.      Cervical back: Normal range of motion.   Skin:     General: Skin is warm.   Neurological:      General: No focal deficit present.      Mental Status: He is alert.   Psychiatric:         Mood and Affect: Mood normal.         Results Review:  Lab Results (last 48 " hours)     Procedure Component Value Units Date/Time    POC Glucose Once [[id number]]  (Abnormal) Collected: 11/09/22 0732    Specimen: Blood Updated: 11/09/22 0733     Glucose 192 mg/dL      Comment: Serial Number: [id number]:  [id number]       Basic Metabolic Panel [[id number]]  (Abnormal) Collected: 11/09/22 0604    Specimen: Blood Updated: 11/09/22 0720     Glucose 185 mg/dL      BUN 15 mg/dL      Creatinine 0.85 mg/dL      Sodium 139 mmol/L      Potassium 4.0 mmol/L      Chloride 104 mmol/L      CO2 24.0 mmol/L      Calcium 8.3 mg/dL      BUN/Creatinine Ratio 17.6     Anion Gap 11.0 mmol/L      eGFR 91.2 mL/min/1.73      Comment: National Kidney Foundation and American Society of Nephrology (ASN) Task Force recommended calculation based on the Chronic Kidney Disease Epidemiology Collaboration (CKD-EPI) equation refit without adjustment for race.       Narrative:      GFR Normal >60  Chronic Kidney Disease <60  Kidney Failure <15    The GFR formula is only valid for adults with stable renal function between ages 18 and 70.    CBC (No Diff) [[id number]]  (Abnormal) Collected: 11/09/22 0604    Specimen: Blood Updated: 11/09/22 0708     WBC 5.00 10*3/mm3      RBC 4.03 10*6/mm3      Hemoglobin 7.2 g/dL      Hematocrit 25.5 %      MCV 63.3 fL      MCH 17.8 pg      MCHC 28.1 g/dL      RDW 21.8 %      RDW-SD 49.0 fl      MPV 8.4 fL      Platelets 195 10*3/mm3     Ferritin [[id number]]  (Abnormal) Collected: 11/08/22 2233    Specimen: Blood Updated: 11/09/22 0037     Ferritin 7.92 ng/mL     Narrative:      Results may be falsely decreased if patient taking Biotin.      Extra Tubes [[id number]] Collected: 11/08/22 2233    Specimen: Blood, Venous Line Updated: 11/08/22 2346    Narrative:      The following orders were created for panel order Extra Tubes.  Procedure                               Abnormality         Status                     ---------                               -----------         ------                      Gold Top - SST[[id number]]                                   Final result               Light Blue Top[[id number]]                                   Final result                 Please view results for these tests on the individual orders.    Gold Top - SST [[id number]] Collected: 11/08/22 2233    Specimen: Blood Updated: 11/08/22 2346     Extra Tube Hold for add-ons.     Comment: Auto resulted.       Light Blue Top [[id number]] Collected: 11/08/22 2233    Specimen: Blood Updated: 11/08/22 2346     Extra Tube Hold for add-ons.     Comment: Auto resulted       CBC & Differential [[id number]]  (Abnormal) Collected: 11/08/22 2233    Specimen: Blood Updated: 11/08/22 2331    Narrative:      The following orders were created for panel order CBC & Differential.  Procedure                               Abnormality         Status                     ---------                               -----------         ------                     CBC Auto Differential[[id number]]        Abnormal            Final result               Scan Slide[[id number]]                                       Final result                 Please view results for these tests on the individual orders.    CBC Auto Differential [[id number]]  (Abnormal) Collected: 11/08/22 2233    Specimen: Blood Updated: 11/08/22 2331     WBC 5.60 10*3/mm3      RBC 4.13 10*6/mm3      Hemoglobin 7.1 g/dL      Hematocrit 25.1 %      MCV 60.7 fL      MCH 17.3 pg      MCHC 28.4 g/dL      RDW 20.5 %      RDW-SD 46.8 fl      MPV 8.1 fL      Platelets 223 10*3/mm3      Neutrophil % 63.4 %      Lymphocyte % 23.1 %      Monocyte % 11.4 %      Eosinophil % 1.5 %      Basophil % 0.6 %      Neutrophils, Absolute 3.50 10*3/mm3      Lymphocytes, Absolute 1.30 10*3/mm3      Monocytes, Absolute 0.60 10*3/mm3      Eosinophils, Absolute 0.10 10*3/mm3      Basophils, Absolute 0.00 10*3/mm3      nRBC 0.0 /100 WBC     Narrative:      Appended report. These results have been appended to a  previously verified report.    Scan Slide [[id number]] Collected: 11/08/22 2233    Specimen: Blood Updated: 11/08/22 2331     Anisocytosis Slight/1+     Hypochromia Slight/1+     Microcytes Slight/1+     Poikilocytes Slight/1+     WBC Morphology Normal     Platelet Morphology Normal    Iron Profile [[id number]]  (Abnormal) Collected: 11/08/22 2233    Specimen: Blood Updated: 11/08/22 2326     Iron 14 mcg/dL      Iron Saturation 3 %      Transferrin 370 mg/dL      TIBC 551 mcg/dL     Comprehensive Metabolic Panel [[id number]]  (Abnormal) Collected: 11/08/22 2233    Specimen: Blood Updated: 11/08/22 2305     Glucose 169 mg/dL      BUN 17 mg/dL      Creatinine 0.84 mg/dL      Sodium 140 mmol/L      Potassium 4.1 mmol/L      Chloride 105 mmol/L      CO2 24.0 mmol/L      Calcium 8.6 mg/dL      Total Protein 7.2 g/dL      Albumin 4.10 g/dL      ALT (SGPT) 16 U/L      AST (SGOT) 20 U/L      Alkaline Phosphatase 90 U/L      Total Bilirubin 0.4 mg/dL      Globulin 3.1 gm/dL      A/G Ratio 1.3 g/dL      BUN/Creatinine Ratio 20.2     Anion Gap 11.0 mmol/L      eGFR 91.5 mL/min/1.73      Comment: National Kidney Foundation and American Society of Nephrology (ASN) Task Force recommended calculation based on the Chronic Kidney Disease Epidemiology Collaboration (CKD-EPI) equation refit without adjustment for race.       Narrative:      GFR Normal >60  Chronic Kidney Disease <60  Kidney Failure <15    The GFR formula is only valid for adults with stable renal function between ages 18 and 70.           Pending Results: none    Imaging Reviewed:   No radiology results for the last 7 days         Assessment & Plan   ASSESSMENT    Microcytic anemia: Iron deficiency.    No history of anemia  No overt signs of bleeding and FOBT negative.    Patient initiated on PPI and IV iron by primary team.    Has received 1 unit packed red blood cells.    PLAN  1. Continue to monitor CBC and transfuse PRBC as needed for hemoglobin 7.0 or  below  2. Continue IV iron replacement, Ferrlecit 125 mg IV has been ordered for 3 doses  3. Will need outpatient GI referral and endoscopy-suspect slow GI blood loss  4. When discharged can f/u in office in 2-3 weeks for repeat iron studies/cbc    Electronically signed by ANEUDY Esteban, 11/09/22, 7:51 AM EST.    Patient seen and examined agree with assessment plan    76-year-old male with severe iron deficiency anemia.  He has microcytic anemia with elevated TIBC, low iron saturation and low ferritin which goes along with iron deficiency.  No clear source of bleeding.  No signs and symptoms of active bleeding.  This could be a chronic GI blood loss.  FOBT has been negative.  Patient will benefit from EGD and colonoscopy.  This can be done outpatient.  Patient is getting IV Ferrlecit.  Can be discharged if CBC is stable by tomorrow.  I will recheck his iron studies in the next few weeks to see if he needs any more iron infusion.  He can be started on oral iron for discharge.    Thank you for this consult. We will be happy to follow along with you.       Consulting MD below provided more than 50% of the total visit time for this encounter    Electronically signed by Winston Staples MD, 11/09/22, 3:28 PM EST.

## 2022-11-10 ENCOUNTER — INPATIENT HOSPITAL (OUTPATIENT)
Dept: URBAN - METROPOLITAN AREA HOSPITAL 84 | Facility: HOSPITAL | Age: 77
End: 2022-11-10
Payer: MEDICARE

## 2022-11-10 DIAGNOSIS — D50.9 IRON DEFICIENCY ANEMIA, UNSPECIFIED: ICD-10-CM

## 2022-11-10 DIAGNOSIS — R63.4 ABNORMAL WEIGHT LOSS: ICD-10-CM

## 2022-11-10 DIAGNOSIS — E11.9 TYPE 2 DIABETES MELLITUS WITHOUT COMPLICATIONS: ICD-10-CM

## 2022-11-10 LAB
BASOPHILS # BLD AUTO: 0 10*3/MM3 (ref 0–0.2)
BASOPHILS NFR BLD AUTO: 0.4 % (ref 0–1.5)
BH BB BLOOD EXPIRATION DATE: NORMAL
BH BB BLOOD TYPE BARCODE: 6200
BH BB DISPENSE STATUS: NORMAL
BH BB PRODUCT CODE: NORMAL
BH BB UNIT NUMBER: NORMAL
CROSSMATCH INTERPRETATION: NORMAL
DEPRECATED RDW RBC AUTO: 46.4 FL (ref 37–54)
EOSINOPHIL # BLD AUTO: 0.1 10*3/MM3 (ref 0–0.4)
EOSINOPHIL NFR BLD AUTO: 2.3 % (ref 0.3–6.2)
ERYTHROCYTE [DISTWIDTH] IN BLOOD BY AUTOMATED COUNT: 20.9 % (ref 12.3–15.4)
GLUCOSE BLDC GLUCOMTR-MCNC: 161 MG/DL (ref 70–105)
GLUCOSE BLDC GLUCOMTR-MCNC: 189 MG/DL (ref 70–105)
HCT VFR BLD AUTO: 25.6 % (ref 37.5–51)
HGB BLD-MCNC: 7.2 G/DL (ref 13–17.7)
LYMPHOCYTES # BLD AUTO: 1.2 10*3/MM3 (ref 0.7–3.1)
LYMPHOCYTES NFR BLD AUTO: 22.3 % (ref 19.6–45.3)
MCH RBC QN AUTO: 17.9 PG (ref 26.6–33)
MCHC RBC AUTO-ENTMCNC: 28.3 G/DL (ref 31.5–35.7)
MCV RBC AUTO: 63.1 FL (ref 79–97)
MONOCYTES # BLD AUTO: 0.6 10*3/MM3 (ref 0.1–0.9)
MONOCYTES NFR BLD AUTO: 11 % (ref 5–12)
NEUTROPHILS NFR BLD AUTO: 3.3 10*3/MM3 (ref 1.7–7)
NEUTROPHILS NFR BLD AUTO: 64 % (ref 42.7–76)
NRBC BLD AUTO-RTO: 0.1 /100 WBC (ref 0–0.2)
PLATELET # BLD AUTO: 194 10*3/MM3 (ref 140–450)
PMV BLD AUTO: 8.3 FL (ref 6–12)
RBC # BLD AUTO: 4.05 10*6/MM3 (ref 4.14–5.8)
UNIT  ABO: NORMAL
UNIT  RH: NORMAL
WBC NRBC COR # BLD: 5.2 10*3/MM3 (ref 3.4–10.8)

## 2022-11-10 PROCEDURE — 99222 1ST HOSP IP/OBS MODERATE 55: CPT | Performed by: NURSE PRACTITIONER

## 2022-11-10 PROCEDURE — 82962 GLUCOSE BLOOD TEST: CPT

## 2022-11-10 PROCEDURE — G0378 HOSPITAL OBSERVATION PER HR: HCPCS

## 2022-11-10 PROCEDURE — 63710000001 INSULIN LISPRO (HUMAN) PER 5 UNITS: Performed by: STUDENT IN AN ORGANIZED HEALTH CARE EDUCATION/TRAINING PROGRAM

## 2022-11-10 PROCEDURE — 99214 OFFICE O/P EST MOD 30 MIN: CPT | Performed by: INTERNAL MEDICINE

## 2022-11-10 PROCEDURE — 85025 COMPLETE CBC W/AUTO DIFF WBC: CPT | Performed by: NURSE PRACTITIONER

## 2022-11-10 PROCEDURE — 25010000002 NA FERRIC GLUC CPLX PER 12.5 MG: Performed by: STUDENT IN AN ORGANIZED HEALTH CARE EDUCATION/TRAINING PROGRAM

## 2022-11-10 RX ORDER — SODIUM, POTASSIUM,MAG SULFATES 17.5-3.13G
1 SOLUTION, RECONSTITUTED, ORAL ORAL EVERY 12 HOURS
Status: COMPLETED | OUTPATIENT
Start: 2022-11-10 | End: 2022-11-11

## 2022-11-10 RX ORDER — BISACODYL 5 MG/1
20 TABLET, DELAYED RELEASE ORAL ONCE
Status: COMPLETED | OUTPATIENT
Start: 2022-11-10 | End: 2022-11-10

## 2022-11-10 RX ADMIN — SODIUM CHLORIDE 125 MG: 9 INJECTION, SOLUTION INTRAVENOUS at 12:15

## 2022-11-10 RX ADMIN — INSULIN LISPRO 2 UNITS: 100 INJECTION, SOLUTION INTRAVENOUS; SUBCUTANEOUS at 17:48

## 2022-11-10 RX ADMIN — Medication 10 ML: at 20:54

## 2022-11-10 RX ADMIN — Medication 10 ML: at 08:25

## 2022-11-10 RX ADMIN — BISACODYL 20 MG: 5 TABLET, COATED ORAL at 20:54

## 2022-11-10 RX ADMIN — INSULIN LISPRO 2 UNITS: 100 INJECTION, SOLUTION INTRAVENOUS; SUBCUTANEOUS at 12:15

## 2022-11-10 RX ADMIN — INSULIN LISPRO 2 UNITS: 100 INJECTION, SOLUTION INTRAVENOUS; SUBCUTANEOUS at 08:25

## 2022-11-10 RX ADMIN — SODIUM SULFATE, POTASSIUM SULFATE, MAGNESIUM SULFATE 1 BOTTLE: 17.5; 3.13; 1.6 SOLUTION, CONCENTRATE ORAL at 15:50

## 2022-11-10 RX ADMIN — Medication 2 SPRAY: at 08:25

## 2022-11-10 NOTE — PROGRESS NOTES
Hematology/Oncology Inpatient Progress Note    PATIENT NAME: Vlad Guerrero  : 1945  MRN: [id number]    CHIEF COMPLAINT: Abnormal labs    HISTORY OF PRESENT ILLNESS:    Vlad Guerrero is a 76 y.o. male who presented to [facility] on 2022 with complaints of abnormal labs at the instruction of his PCP.  Patient had lab work obtained by his PCP due to complaints of dizziness that resulted in a hemoglobin of less than 7.  The patient denied chest pain, dyspnea, melena, hematochezia, hematic emesis.  Patient denied any prior colonoscopy.  The patient denied any signs of overt bleeding.  Patient denied any history of anemia.     Evaluation in the ED: CMP unremarkable with the exception of a glucose of 169, WBC 5.60, hemoglobin 7.1, MCV 60.7, platelets 223,000.    Iron studies showed: Iron 14, iron sat 3, TIBC 551, ferritin 7.92.     22  Hematology/Oncology was consulted for microcytic anemia.  Iron studies consistent with iron deficiency anemia.  FOBT negative.  Patient received 1 unit packed red blood cells for hemoglobin of 7.1.     He/She  has no past medical history on file.     PCP: Provider, No Known    Subjective   Patient is doing well no new symptoms or concerns.  No bleeding      MEDICATIONS:    Scheduled Meds:  bisacodyl, 20 mg, Oral, Once  ferric gluconate, 125 mg, Intravenous, Daily  insulin lispro, 2-7 Units, Subcutaneous, TID With Meals  oxymetazoline, 2 spray, Each Nare, BID  sodium chloride, 10 mL, Intravenous, Q12H  sodium-potassium-magnesium sulfates, 1 bottle, Oral, Q12H       Continuous Infusions:      PRN Meds:  •  dextrose  •  dextrose  •  glucagon (human recombinant)  •  nitroglycerin  •  ondansetron **OR** ondansetron  •  [COMPLETED] Insert peripheral IV **AND** sodium chloride  •  sodium chloride     ALLERGIES:    Allergies   Allergen Reactions   • Penicillins Rash       Objective    VITALS:   /78   Pulse 81   Temp 98.5 °F (36.9 °C) (Oral)   Resp 16   Ht  "182.9 cm (72\")   Wt 83 kg (182 lb 15.7 oz)   SpO2 96%   BMI 24.82 kg/m²     PHYSICAL EXAM: (performed by MD)  Physical Exam  Constitutional:       Appearance: Normal appearance.   HENT:      Head: Normocephalic and atraumatic.   Eyes:      Pupils: Pupils are equal, round, and reactive to light.   Cardiovascular:      Rate and Rhythm: Normal rate and regular rhythm.      Pulses: Normal pulses.      Heart sounds: No murmur heard.  Pulmonary:      Effort: Pulmonary effort is normal.      Breath sounds: Normal breath sounds.   Abdominal:      General: There is no distension.      Palpations: Abdomen is soft. There is no mass.      Tenderness: There is no abdominal tenderness.   Musculoskeletal:         General: Normal range of motion.      Cervical back: Normal range of motion and neck supple.   Skin:     General: Skin is warm.   Neurological:      General: No focal deficit present.      Mental Status: He is alert.   Psychiatric:         Mood and Affect: Mood normal.           RECENT LABS:  Lab Results (last 24 hours)     Procedure Component Value Units Date/Time    POC Glucose Once [[id number]]  (Abnormal) Collected: 11/10/22 1054    Specimen: Blood Updated: 11/10/22 1055     Glucose 189 mg/dL      Comment: Serial Number: [id number]:  [id number]       POC Glucose Once [[id number]]  (Abnormal) Collected: 11/10/22 0724    Specimen: Blood Updated: 11/10/22 0726     Glucose 161 mg/dL      Comment: Serial Number: [id number]:  [id number]       CBC & Differential [[id number]]  (Abnormal) Collected: 11/10/22 0423    Specimen: Blood Updated: 11/10/22 0511    Narrative:      The following orders were created for panel order CBC & Differential.  Procedure                               Abnormality         Status                     ---------                               -----------         ------                     CBC Auto Differential[[id number]]        Abnormal            Final result               Scan " Slide[[id number]]                                                                    Please view results for these tests on the individual orders.    CBC Auto Differential [[id number]]  (Abnormal) Collected: 11/10/22 0423    Specimen: Blood Updated: 11/10/22 0511     WBC 5.20 10*3/mm3      RBC 4.05 10*6/mm3      Hemoglobin 7.2 g/dL      Hematocrit 25.6 %      MCV 63.1 fL      Comment: Parameter reviewed in the past 30 days on 11.8.22 .         MCH 17.9 pg      MCHC 28.3 g/dL      RDW 20.9 %      RDW-SD 46.4 fl      MPV 8.3 fL      Platelets 194 10*3/mm3      Neutrophil % 64.0 %      Lymphocyte % 22.3 %      Monocyte % 11.0 %      Eosinophil % 2.3 %      Basophil % 0.4 %      Neutrophils, Absolute 3.30 10*3/mm3      Lymphocytes, Absolute 1.20 10*3/mm3      Monocytes, Absolute 0.60 10*3/mm3      Eosinophils, Absolute 0.10 10*3/mm3      Basophils, Absolute 0.00 10*3/mm3      nRBC 0.1 /100 WBC           PENDING RESULTS:     IMAGING REVIEWED:  No radiology results for the last day    Assessment & Plan   ASSESSMENT:  Microcytic anemia: Iron deficiency.    No history of anemia  No overt signs of bleeding and FOBT negative.    Patient initiated on PPI and IV iron by primary team.    Has received 1 unit packed red blood cells.  GI consult with plan for EGD and colonoscopy tomorrow    PLAN:  1. Continue to monitor CBC and transfuse packed red blood cells as needed for hemoglobin 7.0 or below  2. Continue IV iron replacement, Ferrlecit 125 mg IV has been ordered for 3 doses  3. When discharged can follow-up in the office in 2 to 3 weeks for repeat iron studies to determine if any further supplementation is needed  4. Can transition to oral iron at discharge          Patient seen and examined agree with assessment and plan    77-year-old male with severe iron deficiency anemia getting IV iron infusion with Ferrlecit  Symptomatically doing better, GI has been consulted plan for endoscopy colonoscopy tomorrow.  Will need to be on  oral iron when ready for discharge.  No transfusion requirement right now, consider transfusion for hemoglobin less than 7 no active signs of bleeding  Follow-up for EGD colonoscopy results      Consulting MD below provided more than 50% of the total visit time for this encounter    Electronically signed by Winston Staples MD, 11/10/22, 5:39 PM EST.

## 2022-11-10 NOTE — CONSULTS
GI CONSULT  NOTE:    Referring Provider:  Dr. Avilez    Chief complaint: Anemia    Subjective .     History of present illness: Vlad Guerrero is a 77 y.o. male with history of DMII who presents with complaints of abnormal labs.  The patient reportedly had labs at his primary care provider's office earlier this week that showed a hemoglobin less than 7.  He was instructed to present to the ER for further evaluation and treatment.  The patient reports that he moves his bowels regularly every other day.  Has not seen any bright red blood per rectum or melena.  He denies any abdominal pain.  No nausea/vomiting, heartburn, or dysphagia.  Denies any NSAID use.  He does report a weight loss of approximately 40 pounds over the past 5 months.      Endo History:  No previous endoscopy.    Past Medical History:  History reviewed. No pertinent past medical history.    Past Surgical History:  History reviewed. No pertinent surgical history.    Social History:  Social History     Tobacco Use   • Smoking status: Never   • Smokeless tobacco: Never   Substance Use Topics   • Alcohol use: Not Currently   • Drug use: Never       Family History:  History reviewed. No pertinent family history.    Medications:  Medications Prior to Admission   Medication Sig Dispense Refill Last Dose   • empagliflozin (Jardiance) 25 MG tablet tablet Take 1 tablet by mouth Daily.      • glimepiride (AMARYL) 4 MG tablet Take 1 tablet by mouth 2 (Two) Times a Day.      • losartan (COZAAR) 100 MG tablet Take 1 tablet by mouth Daily.      • metFORMIN (GLUCOPHAGE) 1000 MG tablet Take 2 tablets by mouth Daily With Dinner.      • pioglitazone (ACTOS) 45 MG tablet Take 1 tablet by mouth Daily.      • pravastatin (PRAVACHOL) 40 MG tablet Take 1 tablet by mouth Every Night.      • UNKNOWN TO PATIENT Inject  under the skin into the appropriate area as directed Every 7 (Seven) Days. Per patient - GLP-1 RA - like medication that he injects SQ once weekly on Friday     "      Scheduled Meds:bisacodyl, 20 mg, Oral, Once  ferric gluconate, 125 mg, Intravenous, Daily  insulin lispro, 2-7 Units, Subcutaneous, TID With Meals  oxymetazoline, 2 spray, Each Nare, BID  sodium chloride, 10 mL, Intravenous, Q12H  sodium-potassium-magnesium sulfates, 1 bottle, Oral, Q12H      Continuous Infusions:   PRN Meds:.•  dextrose  •  dextrose  •  glucagon (human recombinant)  •  nitroglycerin  •  ondansetron **OR** ondansetron  •  [COMPLETED] Insert peripheral IV **AND** sodium chloride  •  sodium chloride    ALLERGIES:  Penicillins    ROS:  The following systems were reviewed and negative;   Constitution:  No fevers, chills, no unintentional weight loss  Skin: no rash, no jaundice  Eyes:  No blurry vision, no eye pain  HENT:  No change in hearing or smell  Resp:  No dyspnea or cough  CV:  No chest pain or palpitations  :  No dysuria, hematuria  Musculoskeletal:  No leg cramps or arthralgias  Neuro:  No tremor, no numbness  Psych:  No depression or confusion    Objective     Vital Signs:   Vitals:    11/09/22 2036 11/09/22 2300 11/10/22 0447 11/10/22 0725   BP: 151/76 147/84 115/61 132/74   BP Location: Left arm Left arm Left arm Left arm   Patient Position: Lying Lying Lying Lying   Pulse: 77 71 78 74   Resp: 16 16 16 16   Temp: 98.2 °F (36.8 °C) 98.1 °F (36.7 °C) 98.4 °F (36.9 °C) 98.5 °F (36.9 °C)   TempSrc: Oral Oral Oral Oral   SpO2: 99% 96% 98% 97%   Weight: 83 kg (182 lb 15.7 oz)      Height: 182.9 cm (72\")          Physical Exam:       General Appearance:    Awake and alert, in no acute distress   Head:    Normocephalic, without obvious abnormality, atraumatic   Throat:   No oral lesions, no thrush, oral mucosa moist   Lungs:     Respirations regular, even and unlabored   Chest Wall:    No abnormalities observed   Abdomen:     Soft, non-tender, no rebound or guarding, non-distended   Rectal:     Deferred   Extremities:   Moves all extremities, no edema, no cyanosis   Pulses:   Pulses palpable " and equal bilaterally   Skin:   No rash, no jaundice, normal palpation   Lymph nodes:   No cervical, supraclavicular or submandibular palpable adenopathy   Neurologic:   Cranial nerves 2 - 12 grossly intact, no asterixis       Results Review:   I reviewed the patient's labs and imaging.  CBC    Results from last 7 days   Lab Units 11/10/22  0423 11/09/22  0604 11/08/22  2233 11/08/22  1428   WBC 10*3/mm3 5.20 5.00 5.60 5.6   HEMOGLOBIN g/dL 7.2* 7.2* 7.1* 6.8*   PLATELETS 10*3/mm3 194 195 223 217     CMP   Results from last 7 days   Lab Units 11/09/22  0604 11/08/22  2233   SODIUM mmol/L 139 140   POTASSIUM mmol/L 4.0 4.1   CHLORIDE mmol/L 104 105   CO2 mmol/L 24.0 24.0   BUN mg/dL 15 17   CREATININE mg/dL 0.85 0.84   GLUCOSE mg/dL 185* 169*   ALBUMIN g/dL  --  4.10   BILIRUBIN mg/dL  --  0.4   ALK PHOS U/L  --  90   AST (SGOT) U/L  --  20   ALT (SGPT) U/L  --  16     Cr Clearance Estimated Creatinine Clearance: 88.2 mL/min (by C-G formula based on SCr of 0.85 mg/dL).  Coag     HbA1C   Lab Results   Component Value Date    HGBA1C 7.2 (H) 11/08/2022    HGBA1C 7.9 (H) 07/08/2022    HGBA1C 7.8 (H) 11/15/2021     Blood Glucose   Glucose   Date/Time Value Ref Range Status   11/10/2022 1054 189 (H) 70 - 105 mg/dL Final     Comment:     Serial Number: [id number]:  [id number]   11/10/2022 0724 161 (H) 70 - 105 mg/dL Final     Comment:     Serial Number: [id number]:  [id number]   11/09/2022 1613 160 (H) 70 - 105 mg/dL Final     Comment:     Serial Number: [id number]:  [id number]   11/09/2022 1154 164 (H) 70 - 105 mg/dL Final     Comment:     Serial Number: [id number]:  [id number]   11/09/2022 0732 192 (H) 70 - 105 mg/dL Final     Comment:     Serial Number: [id number]:  [id number]     Infection     UA      Radiology(recent) No radiology results for the last day       ASSESSMENT:  -Iron deficiency anemia  -Unintentional weight loss  -DMII    PLAN:  Patient is a 77-year-old male with history of  DMII who presents with complaints of abnormal labs.  Labs were reportedly drawn at his primary care provider's office earlier this week and showed a hemoglobin less than 7.    Patient denies any overt GI bleeding.  We will proceed with EGD/colonoscopy for further evaluation.  Clear liquid diet.  N.p.o. following completion of bowel prep.  Agree with IV iron.  Start PPI.  Further recommendations to follow endoscopy.  Supportive care      I discussed the patients findings and my recommendations with the patient.  I will discuss case with Dr. Julien and change the plan accordingly    We appreciate the referral.    Electronically signed by ANEUDY Espinoza, 11/10/22, 11:13 AM EST.

## 2022-11-10 NOTE — PLAN OF CARE
Problem: Adult Inpatient Plan of Care  Goal: Plan of Care Review  Outcome: Ongoing, Progressing  Flowsheets (Taken 11/10/2022 0236)  Outcome Evaluation: Pt admitted to unit from ED during this shift. Pt admitted for microcytic anemia. Hematology consulted. Pt received 1 U PRBC in ED. Pt on tele with no abnormalities noted at this time. Pt currently resting abed. No C/O voiced during this shift. Will continue to monitor.  Goal: Patient-Specific Goal (Individualized)  Outcome: Ongoing, Progressing  Goal: Absence of Hospital-Acquired Illness or Injury  Outcome: Ongoing, Progressing  Intervention: Identify and Manage Fall Risk  Recent Flowsheet Documentation  Taken 11/10/2022 0216 by Tucker Purdy RN  Safety Promotion/Fall Prevention: safety round/check completed  Taken 11/10/2022 0004 by Tucker Purdy RN  Safety Promotion/Fall Prevention: safety round/check completed  Taken 11/9/2022 2229 by Tucker Purdy RN  Safety Promotion/Fall Prevention: safety round/check completed  Taken 11/9/2022 2110 by Tucker Purdy RN  Safety Promotion/Fall Prevention:   safety round/check completed   nonskid shoes/slippers when out of bed   fall prevention program maintained   clutter free environment maintained   assistive device/personal items within reach  Intervention: Prevent Skin Injury  Recent Flowsheet Documentation  Taken 11/9/2022 2110 by Tucker Purdy RN  Body Position: supine  Intervention: Prevent and Manage VTE (Venous Thromboembolism) Risk  Recent Flowsheet Documentation  Taken 11/9/2022 2110 by Tucker Purdy RN  Activity Management: activity adjusted per tolerance  Intervention: Prevent Infection  Recent Flowsheet Documentation  Taken 11/10/2022 0216 by Tucker Purdy RN  Infection Prevention:   hand hygiene promoted   personal protective equipment utilized   rest/sleep promoted   single patient room provided  Taken 11/10/2022 0004 by Tucker Purdy RN  Infection Prevention:   hand hygiene promoted    personal protective equipment utilized   rest/sleep promoted   single patient room provided  Taken 11/9/2022 2229 by Tucker Purdy RN  Infection Prevention:   hand hygiene promoted   personal protective equipment utilized   rest/sleep promoted   single patient room provided  Taken 11/9/2022 2110 by Tucker Purdy RN  Infection Prevention:   hand hygiene promoted   personal protective equipment utilized   rest/sleep promoted   single patient room provided  Goal: Optimal Comfort and Wellbeing  Outcome: Ongoing, Progressing  Intervention: Provide Person-Centered Care  Recent Flowsheet Documentation  Taken 11/9/2022 2110 by Tucker Purdy RN  Trust Relationship/Rapport:   care explained   choices provided   questions answered   thoughts/feelings acknowledged   questions encouraged   reassurance provided  Goal: Readiness for Transition of Care  Outcome: Ongoing, Progressing  Intervention: Mutually Develop Transition Plan  Recent Flowsheet Documentation  Taken 11/9/2022 2110 by Tucker Purdy RN  Equipment Currently Used at Home: none     Problem: Fall Injury Risk  Goal: Absence of Fall and Fall-Related Injury  Outcome: Ongoing, Progressing  Intervention: Identify and Manage Contributors  Recent Flowsheet Documentation  Taken 11/10/2022 0216 by Tucker Purdy RN  Medication Review/Management: medications reviewed  Taken 11/10/2022 0004 by Tucker Purdy RN  Medication Review/Management: medications reviewed  Taken 11/9/2022 2229 by Tucker Purdy RN  Medication Review/Management: medications reviewed  Taken 11/9/2022 2110 by Tucker Purdy RN  Medication Review/Management: medications reviewed  Intervention: Promote Injury-Free Environment  Recent Flowsheet Documentation  Taken 11/10/2022 0216 by Tucker Purdy RN  Safety Promotion/Fall Prevention: safety round/check completed  Taken 11/10/2022 0004 by Tucker Purdy RN  Safety Promotion/Fall Prevention: safety round/check completed  Taken 11/9/2022  2229 by Tucker Purdy, RN  Safety Promotion/Fall Prevention: safety round/check completed  Taken 11/9/2022 2110 by Tucker Purdy, RN  Safety Promotion/Fall Prevention:   safety round/check completed   nonskid shoes/slippers when out of bed   fall prevention program maintained   clutter free environment maintained   assistive device/personal items within reach   Goal Outcome Evaluation:              Outcome Evaluation: Pt admitted to unit from ED during this shift. Pt admitted for microcytic anemia. Hematology consulted. Pt received 1 U PRBC in ED. Pt on tele with no abnormalities noted at this time. Pt currently resting abed. No C/O voiced during this shift. Will continue to monitor.

## 2022-11-10 NOTE — CASE MANAGEMENT/SOCIAL WORK
Continued Stay Note  KRISHAN Bernardo     Patient Name: Vlad Guerrero  MRN: [id number]  Today's Date: 11/10/2022    Admit Date: 11/8/2022    Plan: D/C plan: Anticipate routine home   Discharge Plan     Row Name 11/10/22 1352       Plan    Plan D/C plan: Anticipate routine home    Patient/Family in Agreement with Plan yes    Plan Comments Anticipate home when medically ready, possibly tomorrow per MD. Barrier to d/c: GI consult, Hgb 7.2                   Expected Discharge Date and Time     Expected Discharge Date Expected Discharge Time    Nov 11, 2022         Phone communication or documentation only - no physical contact with patient or family.      Eder Gannon RN

## 2022-11-10 NOTE — NURSING NOTE
Nursing report ED to floor  Vlad Guerrero  76 y.o.  male    HPI:   Chief Complaint   Patient presents with   • Abnormal Lab     Pt reports he was sent by Dr. Holloway for low hemoglobin.  Pt reports his Hbg level was 6.5 that was drawn today. Pt reports some episodes of dizziness but denies any at this time.  Pt reports no obvious bleeding from anywhere and denies any blood thinner use.        Admitting doctor:   Moreno Pimentel DO    Admitting diagnosis:   The primary encounter diagnosis was Microcytic anemia. A diagnosis of Dizziness was also pertinent to this visit.    Code status:   Current Code Status     Date Active Code Status Order ID Comments User Context       11/8/2022 2348 CPR (Attempt to Resuscitate) [id number]  Moreno Pimentel DO ED      Question Answer    Code Status (Patient has no pulse and is not breathing) CPR (Attempt to Resuscitate)    Medical Interventions (Patient has pulse or is breathing) Full Support                Allergies:   Penicillins    Isolation:  No active isolations     Fall Risk:  Fall Risk Assessment was completed, and patient is at high risk for falls.   Predictive Model Details         22 (Low) Factor Value    Calculated 11/9/2022 18:06 Age 76    Risk of Fall Model Musculoskeletal Assessment WDL     Active Peripheral IV Present     Skin Assessment WDL     Financial Class Other     Hemoglobin A1c 7.2 %     Magnesium not on file     Number of Distinct Medication Classes administered 4     Diastolic BP 62     Calcium 8.3 mg/dL     Drug Use No     Peripheral Vascular Assessment WDL     Days after Admission 0.826     Sex Male     Gastrointestinal Assessment WDL     Isaiah Scale 22     Cardiac Assessment WDL     Albumin 4.1 g/dL     Respiratory Rate 14     Creatinine 0.85 mg/dL     Total Bilirubin 0.4 mg/dL     Potassium 4 mmol/L     Chloride 104 mmol/L     ALT 16 U/L         Weight:       11/08/22  2200   Weight: 90.6 kg (199 lb 11.8 oz)       Intake and Output    Intake/Output  Summary (Last 24 hours) at 11/9/2022 1903  Last data filed at 11/9/2022 1600  Gross per 24 hour   Intake 970 ml   Output 400 ml   Net 570 ml       Diet:   Dietary Orders (From admission, onward)     Start     Ordered    11/09/22 0011  Diet Regular  Diet Effective Now        Question:  Diet / Texture / Consistency  Answer:  Regular    11/09/22 0010                 Most recent vitals:   Vitals:    11/09/22 1258 11/09/22 1334 11/09/22 1342 11/09/22 1600   BP:    130/62   BP Location:       Patient Position:       Pulse: 80 76 80 87   Resp:    14   Temp:    97.7 °F (36.5 °C)   TempSrc:    Oral   SpO2: 100% 100% 100% 100%   Weight:       Height:           Active LDAs/IV Access:   Lines, Drains & Airways     Active LDAs     Name Placement date Placement time Site Days    Peripheral IV 11/08/22 2233 Right Antecubital 11/08/22 2233  Antecubital  less than 1                Skin Condition:   Skin Assessments (last day)     Date/Time Skin WDL Sensory Perception Moisture Activity Mobility Nutrition Friction and Shear Isaiah Score    11/09/22 1600 WDL 4-->no impairment 4-->rarely moist 3-->walks occasionally 4-->no limitation 4-->excellent 3-->no apparent problem 22           Labs (abnormal labs have a star):   Labs Reviewed   COMPREHENSIVE METABOLIC PANEL - Abnormal; Notable for the following components:       Result Value    Glucose 169 (*)     All other components within normal limits    Narrative:     GFR Normal >60  Chronic Kidney Disease <60  Kidney Failure <15    The GFR formula is only valid for adults with stable renal function between ages 18 and 70.   CBC WITH AUTO DIFFERENTIAL - Abnormal; Notable for the following components:    RBC 4.13 (*)     Hemoglobin 7.1 (*)     Hematocrit 25.1 (*)     MCV 60.7 (*)     MCH 17.3 (*)     MCHC 28.4 (*)     RDW 20.5 (*)     All other components within normal limits    Narrative:     Appended report. These results have been appended to a previously verified report.   IRON PROFILE -  Abnormal; Notable for the following components:    Iron 14 (*)     Iron Saturation 3 (*)     Transferrin 370 (*)     TIBC 551 (*)     All other components within normal limits   FERRITIN - Abnormal; Notable for the following components:    Ferritin 7.92 (*)     All other components within normal limits    Narrative:     Results may be falsely decreased if patient taking Biotin.     CBC (NO DIFF) - Abnormal; Notable for the following components:    RBC 4.03 (*)     Hemoglobin 7.2 (*)     Hematocrit 25.5 (*)     MCV 63.3 (*)     MCH 17.8 (*)     MCHC 28.1 (*)     RDW 21.8 (*)     All other components within normal limits   BASIC METABOLIC PANEL - Abnormal; Notable for the following components:    Glucose 185 (*)     Calcium 8.3 (*)     All other components within normal limits    Narrative:     GFR Normal >60  Chronic Kidney Disease <60  Kidney Failure <15    The GFR formula is only valid for adults with stable renal function between ages 18 and 70.   POCT GLUCOSE FINGERSTICK - Abnormal; Notable for the following components:    Glucose 192 (*)     All other components within normal limits   POCT GLUCOSE FINGERSTICK - Abnormal; Notable for the following components:    Glucose 164 (*)     All other components within normal limits   POCT GLUCOSE FINGERSTICK - Abnormal; Notable for the following components:    Glucose 160 (*)     All other components within normal limits   SCAN SLIDE   POCT GLUCOSE FINGERSTICK   POCT GLUCOSE FINGERSTICK   POCT GLUCOSE FINGERSTICK   POCT GLUCOSE FINGERSTICK   POCT GLUCOSE FINGERSTICK   POCT GLUCOSE FINGERSTICK   TYPE AND SCREEN   BB ARMBAND CHECK   PREPARE RBC   CBC AND DIFFERENTIAL    Narrative:     The following orders were created for panel order CBC & Differential.  Procedure                               Abnormality         Status                     ---------                               -----------         ------                     CBC Auto Differential[[id number]]        Abnormal             Final result               Scan Slide[[id number]]                                       Final result                 Please view results for these tests on the individual orders.   EXTRA TUBES    Narrative:     The following orders were created for panel order Extra Tubes.  Procedure                               Abnormality         Status                     ---------                               -----------         ------                     Gold Top - [facility] [[email or web address][id number]]                                   Final result               Light Blue Top[[id number]]                                   Final result                 Please view results for these tests on the individual orders.   [facility] - [facility] [[email or web address]]   LIGHT BLUE TOP       LOC: Person, Place, Time and Situation    Telemetry:  Med/Surg    Cardiac Monitoring Ordered: yes    EKG:   No orders to display       Medications Given in the ED:   Medications   sodium chloride 0.9 % flush 10 mL (has no administration in time range)   sodium chloride 0.9 % flush 10 mL (10 mL Intravenous Given 11/9/22 0943)   sodium chloride 0.9 % flush 10 mL (has no administration in time range)   ondansetron (ZOFRAN) tablet 4 mg (has no administration in time range)     Or   ondansetron (ZOFRAN) injection 4 mg (has no administration in time range)   ferric gluconate (FERRLECIT)125 MG in sodium chloride 0.9 % 100 mL IVPB (0 mg Intravenous Stopped 11/9/22 1159)   nitroglycerin (NITROSTAT) SL tablet 0.4 mg (has no administration in time range)   dextrose (GLUTOSE) oral gel 15 g (has no administration in time range)   dextrose (D50W) (25 g/50 mL) IV injection 25 g (has no administration in time range)   glucagon (human recombinant) (GLUCAGEN DIAGNOSTIC) 1 mg in sterile water (preservative free) 1 mL injection (has no administration in time range)   insulin lispro (ADMELOG) injection 2-7 Units (2 Units Subcutaneous Given 11/9/22 1615)   oxymetazoline (AFRIN) nasal spray 2 spray (2 sprays Each  Davie Given 11/9/22 0943)       Imaging results:  No radiology results for the last day    Social issues:   Social History     Socioeconomic History   • Marital status: Single   Tobacco Use   • Smoking status: Never   • Smokeless tobacco: Never   Substance and Sexual Activity   • Alcohol use: Not Currently   • Drug use: Never   • Sexual activity: Defer       NIH Stroke Scale:  Interval: (not recorded)  1a. Level of Consciousness: (not recorded)  1b. LOC Questions: (not recorded)  1c. LOC Commands: (not recorded)  2. Best Gaze: (not recorded)  3. Visual: (not recorded)  4. Facial Palsy: (not recorded)  5a. Motor Arm, Left: (not recorded)  5b. Motor Arm, Right: (not recorded)  6a. Motor Leg, Left: (not recorded)  6b. Motor Leg, Right: (not recorded)  7. Limb Ataxia: (not recorded)  8. Sensory: (not recorded)  9. Best Language: (not recorded)  10. Dysarthria: (not recorded)  11. Extinction and Inattention (formerly Neglect): (not recorded)    Total (NIH Stroke Scale): (not recorded)     Additional notable assessment information:     Nursing report ED to floor:  Jessica Baez RN   11/09/22 19:03 EST

## 2022-11-10 NOTE — PROGRESS NOTES
[facility] Medicine Services Daily Progress Note    Patient Name: Vlad Guerrero  : 1945  MRN: [id number]  Primary Care Physician:  Batool Lin APRN  Date of admission: 2022      Subjective      Chief Complaint:  weakness      Seen examined bedside patient doing better.  No overt bleeding per any orifice.    ROS   Cardiac no chest pain or palpitations  GI no nausea no vomiting some pulmonary no shortness of breath no cough      Objective      Vitals:   Temp:  [97.7 °F (36.5 °C)-98.5 °F (36.9 °C)] 98.5 °F (36.9 °C)  Heart Rate:  [71-87] 74  Resp:  [14-16] 16  BP: (115-151)/(61-84) 132/74    Physical Exam   Physical Exam   Constitutional:  oriented to person, place, and time. No distress.   HENT:   Head: Normocephalic and atraumatic.   Eyes: Conjunctivae and EOM are normal. Pupils are equal, round, and reactive to light.   Neck: No JVD present. No thyromegaly present.   Cardiovascular: Normal rate, regular rhythm, normal heart sounds and intact distal pulses. Exam reveals no gallop and no friction rub.   No murmur heard.  Pulmonary/Chest: Effort normal and breath sounds normal. No stridor. No respiratory distress.  has no wheezes.  has no rales.  exhibits no tenderness.   Abdominal: Soft. Bowel sounds are normal.  no distension and no mass. There is no tenderness. There is no rebound and no guarding. No hernia.   Musculoskeletal: Normal range of motion.   Lymphadenopathy:     no cervical adenopathy.   Neurological:  alert and oriented to person, place, and time. No cranial nerve deficit or sensory deficit. exhibits normal muscle tone.   Skin: No rash noted.  not diaphoretic.   Psychiatric:  normal mood and affect.   Vitals reviewed.         Result Review    Result Review:  I have personally reviewed the results from the time of this admission to 11/10/2022 12:09 EST and agree with these findings:  [x]  Laboratory  []  Microbiology  [x]  Radiology  [x]  EKG/Telemetry   []   Cardiology/Vascular   []  Pathology  []  Old records          Assessment & Plan      Brief Patient Summary:  Vlad Guerrero is a 77 y.o. male who presents with acute anemia      bisacodyl, 20 mg, Oral, Once  ferric gluconate, 125 mg, Intravenous, Daily  insulin lispro, 2-7 Units, Subcutaneous, TID With Meals  oxymetazoline, 2 spray, Each Nare, BID  sodium chloride, 10 mL, Intravenous, Q12H  sodium-potassium-magnesium sulfates, 1 bottle, Oral, Q12H             Active Hospital Problems:  Active Hospital Problems    Diagnosis    • **Microcytic anemia    • Type 2 diabetes mellitus with hyperglycemia, without long-term current use of insulin ([facility])      Plan:   Anemia  Microcytic hypochromic  Iron deficiency on anemia work-up  Start on IV iron  GI consulted for colonoscopy EGD evaluation  Serial H&H transfuse as needed  Continue PPI    Diabetes  Insulin sliding scale Accu-Cheks  Hold oral home meds for now    Hypertension  Continue Cozaar    Dyslipidemia  Continue pravastatin    DVT PUD prophylaxis    Plan as above    Awaiting med review by pharmacy to complete reconciliation  DVT prophylaxis:  Mechanical DVT prophylaxis orders are present.    CODE STATUS:    Code Status (Patient has no pulse and is not breathing): CPR (Attempt to Resuscitate)  Medical Interventions (Patient has pulse or is breathing): Full Support      Disposition:  I expect patient to be discharged when clinically stable.      Electronically signed by Nadia Avilez MD, 11/10/22, 12:09 EST.  [facility] Hospitalist Team

## 2022-11-10 NOTE — PLAN OF CARE
Problem: Adult Inpatient Plan of Care  Goal: Plan of Care Review  Outcome: Ongoing, Progressing  Flowsheets (Taken 11/10/2022 1804)  Progress: no change  Plan of Care Reviewed With: patient  Goal: Patient-Specific Goal (Individualized)  Outcome: Ongoing, Progressing  Goal: Absence of Hospital-Acquired Illness or Injury  Outcome: Ongoing, Progressing  Intervention: Identify and Manage Fall Risk  Recent Flowsheet Documentation  Taken 11/10/2022 1402 by Coby Arriola LPN  Safety Promotion/Fall Prevention: safety round/check completed  Taken 11/10/2022 1240 by Coby Arriola LPN  Safety Promotion/Fall Prevention: safety round/check completed  Taken 11/10/2022 1040 by Coby Arriola LPN  Safety Promotion/Fall Prevention: safety round/check completed  Taken 11/10/2022 0825 by Coby Arriola LPN  Safety Promotion/Fall Prevention: safety round/check completed  Goal: Optimal Comfort and Wellbeing  Outcome: Ongoing, Progressing  Intervention: Provide Person-Centered Care  Recent Flowsheet Documentation  Taken 11/10/2022 0825 by Coby Arriola LPN  Trust Relationship/Rapport:   care explained   choices provided  Goal: Readiness for Transition of Care  Outcome: Ongoing, Progressing   Goal Outcome Evaluation:  Plan of Care Reviewed With: patient        Progress: no change   Pt started his first does of bowel prep. Pt has rested in bed, pt has had no complaints, will cont to monitor.

## 2022-11-11 ENCOUNTER — INPATIENT HOSPITAL (OUTPATIENT)
Dept: URBAN - METROPOLITAN AREA HOSPITAL 84 | Facility: HOSPITAL | Age: 77
End: 2022-11-11
Payer: MEDICARE

## 2022-11-11 ENCOUNTER — APPOINTMENT (OUTPATIENT)
Dept: CT IMAGING | Facility: HOSPITAL | Age: 77
End: 2022-11-11

## 2022-11-11 ENCOUNTER — ANESTHESIA EVENT (OUTPATIENT)
Dept: GASTROENTEROLOGY | Facility: HOSPITAL | Age: 77
End: 2022-11-11

## 2022-11-11 ENCOUNTER — ANESTHESIA (OUTPATIENT)
Dept: GASTROENTEROLOGY | Facility: HOSPITAL | Age: 77
End: 2022-11-11

## 2022-11-11 DIAGNOSIS — K57.30 DIVERTICULOSIS OF LARGE INTESTINE WITHOUT PERFORATION OR ABS: ICD-10-CM

## 2022-11-11 DIAGNOSIS — D50.9 IRON DEFICIENCY ANEMIA, UNSPECIFIED: ICD-10-CM

## 2022-11-11 DIAGNOSIS — K31.89 OTHER DISEASES OF STOMACH AND DUODENUM: ICD-10-CM

## 2022-11-11 DIAGNOSIS — C18.2 MALIGNANT NEOPLASM OF ASCENDING COLON: ICD-10-CM

## 2022-11-11 DIAGNOSIS — D12.4 BENIGN NEOPLASM OF DESCENDING COLON: ICD-10-CM

## 2022-11-11 DIAGNOSIS — K64.8 OTHER HEMORRHOIDS: ICD-10-CM

## 2022-11-11 DIAGNOSIS — K63.89 OTHER SPECIFIED DISEASES OF INTESTINE: ICD-10-CM

## 2022-11-11 DIAGNOSIS — K63.3 ULCER OF INTESTINE: ICD-10-CM

## 2022-11-11 LAB
ALBUMIN SERPL-MCNC: 3.6 G/DL (ref 3.5–5.2)
ALBUMIN/GLOB SERPL: 1.3 G/DL
ALP SERPL-CCNC: 81 U/L (ref 39–117)
ALT SERPL W P-5'-P-CCNC: 12 U/L (ref 1–41)
ANION GAP SERPL CALCULATED.3IONS-SCNC: 9 MMOL/L (ref 5–15)
ANISOCYTOSIS BLD QL: ABNORMAL
AST SERPL-CCNC: 14 U/L (ref 1–40)
BILIRUB SERPL-MCNC: 0.4 MG/DL (ref 0–1.2)
BUN SERPL-MCNC: 13 MG/DL (ref 8–23)
BUN/CREAT SERPL: 17.6 (ref 7–25)
CALCIUM SPEC-SCNC: 8.5 MG/DL (ref 8.6–10.5)
CEA SERPL-MCNC: 4.27 NG/ML
CHLORIDE SERPL-SCNC: 106 MMOL/L (ref 98–107)
CO2 SERPL-SCNC: 25 MMOL/L (ref 22–29)
CREAT SERPL-MCNC: 0.74 MG/DL (ref 0.76–1.27)
DACRYOCYTES BLD QL SMEAR: ABNORMAL
DEPRECATED RDW RBC AUTO: 49 FL (ref 37–54)
EGFRCR SERPLBLD CKD-EPI 2021: 94.5 ML/MIN/1.73
EOSINOPHIL # BLD MANUAL: 0.11 10*3/MM3 (ref 0–0.4)
EOSINOPHIL NFR BLD MANUAL: 2 % (ref 0.3–6.2)
ERYTHROCYTE [DISTWIDTH] IN BLOOD BY AUTOMATED COUNT: 21.9 % (ref 12.3–15.4)
GIANT PLATELETS: ABNORMAL
GLOBULIN UR ELPH-MCNC: 2.7 GM/DL
GLUCOSE BLDC GLUCOMTR-MCNC: 122 MG/DL (ref 70–105)
GLUCOSE BLDC GLUCOMTR-MCNC: 126 MG/DL (ref 70–105)
GLUCOSE BLDC GLUCOMTR-MCNC: 217 MG/DL (ref 70–105)
GLUCOSE SERPL-MCNC: 101 MG/DL (ref 65–99)
HCT VFR BLD AUTO: 28.9 % (ref 37.5–51)
HGB BLD-MCNC: 8.1 G/DL (ref 13–17.7)
LARGE PLATELETS: ABNORMAL
LYMPHOCYTES # BLD MANUAL: 1.48 10*3/MM3 (ref 0.7–3.1)
LYMPHOCYTES NFR BLD MANUAL: 4 % (ref 5–12)
MCH RBC QN AUTO: 17.9 PG (ref 26.6–33)
MCHC RBC AUTO-ENTMCNC: 28.1 G/DL (ref 31.5–35.7)
MCV RBC AUTO: 63.6 FL (ref 79–97)
METAMYELOCYTES NFR BLD MANUAL: 2 % (ref 0–0)
MICROCYTES BLD QL: ABNORMAL
MONOCYTES # BLD: 0.21 10*3/MM3 (ref 0.1–0.9)
NEUTROPHILS # BLD AUTO: 3.39 10*3/MM3 (ref 1.7–7)
NEUTROPHILS NFR BLD MANUAL: 63 % (ref 42.7–76)
NEUTS BAND NFR BLD MANUAL: 1 % (ref 0–5)
PLATELET # BLD AUTO: 219 10*3/MM3 (ref 140–450)
PMV BLD AUTO: 8.4 FL (ref 6–12)
POIKILOCYTOSIS BLD QL SMEAR: ABNORMAL
POTASSIUM SERPL-SCNC: 3.6 MMOL/L (ref 3.5–5.2)
PROT SERPL-MCNC: 6.3 G/DL (ref 6–8.5)
RBC # BLD AUTO: 4.54 10*6/MM3 (ref 4.14–5.8)
SCAN SLIDE: NORMAL
SMALL PLATELETS BLD QL SMEAR: ADEQUATE
SODIUM SERPL-SCNC: 140 MMOL/L (ref 136–145)
TARGETS BLD QL SMEAR: ABNORMAL
VARIANT LYMPHS NFR BLD MANUAL: 28 % (ref 19.6–45.3)
WBC MORPH BLD: NORMAL
WBC NRBC COR # BLD: 5.3 10*3/MM3 (ref 3.4–10.8)

## 2022-11-11 PROCEDURE — 25010000002 NA FERRIC GLUC CPLX PER 12.5 MG: Performed by: STUDENT IN AN ORGANIZED HEALTH CARE EDUCATION/TRAINING PROGRAM

## 2022-11-11 PROCEDURE — 88342 IMHCHEM/IMCYTCHM 1ST ANTB: CPT | Performed by: INTERNAL MEDICINE

## 2022-11-11 PROCEDURE — 25010000002 PROPOFOL 200 MG/20ML EMULSION: Performed by: ANESTHESIOLOGY

## 2022-11-11 PROCEDURE — 99233 SBSQ HOSP IP/OBS HIGH 50: CPT | Performed by: INTERNAL MEDICINE

## 2022-11-11 PROCEDURE — 74177 CT ABD & PELVIS W/CONTRAST: CPT

## 2022-11-11 PROCEDURE — 45385 COLONOSCOPY W/LESION REMOVAL: CPT | Performed by: INTERNAL MEDICINE

## 2022-11-11 PROCEDURE — 85025 COMPLETE CBC W/AUTO DIFF WBC: CPT | Performed by: NURSE PRACTITIONER

## 2022-11-11 PROCEDURE — 88305 TISSUE EXAM BY PATHOLOGIST: CPT | Performed by: INTERNAL MEDICINE

## 2022-11-11 PROCEDURE — 0 IOPAMIDOL PER 1 ML: Performed by: HOSPITALIST

## 2022-11-11 PROCEDURE — 71260 CT THORAX DX C+: CPT

## 2022-11-11 PROCEDURE — 0DBK8ZX EXCISION OF ASCENDING COLON, VIA NATURAL OR ARTIFICIAL OPENING ENDOSCOPIC, DIAGNOSTIC: ICD-10-PCS | Performed by: INTERNAL MEDICINE

## 2022-11-11 PROCEDURE — 0DBM8ZZ EXCISION OF DESCENDING COLON, VIA NATURAL OR ARTIFICIAL OPENING ENDOSCOPIC: ICD-10-PCS | Performed by: INTERNAL MEDICINE

## 2022-11-11 PROCEDURE — 0DB88ZX EXCISION OF SMALL INTESTINE, VIA NATURAL OR ARTIFICIAL OPENING ENDOSCOPIC, DIAGNOSTIC: ICD-10-PCS | Performed by: INTERNAL MEDICINE

## 2022-11-11 PROCEDURE — 85007 BL SMEAR W/DIFF WBC COUNT: CPT | Performed by: NURSE PRACTITIONER

## 2022-11-11 PROCEDURE — 99222 1ST HOSP IP/OBS MODERATE 55: CPT | Performed by: SURGERY

## 2022-11-11 PROCEDURE — 43239 EGD BIOPSY SINGLE/MULTIPLE: CPT | Performed by: INTERNAL MEDICINE

## 2022-11-11 PROCEDURE — 63710000001 INSULIN LISPRO (HUMAN) PER 5 UNITS: Performed by: STUDENT IN AN ORGANIZED HEALTH CARE EDUCATION/TRAINING PROGRAM

## 2022-11-11 PROCEDURE — 80053 COMPREHEN METABOLIC PANEL: CPT | Performed by: NURSE PRACTITIONER

## 2022-11-11 PROCEDURE — 82962 GLUCOSE BLOOD TEST: CPT

## 2022-11-11 PROCEDURE — 45380 COLONOSCOPY AND BIOPSY: CPT | Mod: 59 | Performed by: INTERNAL MEDICINE

## 2022-11-11 PROCEDURE — 0DBP8ZZ EXCISION OF RECTUM, VIA NATURAL OR ARTIFICIAL OPENING ENDOSCOPIC: ICD-10-PCS | Performed by: INTERNAL MEDICINE

## 2022-11-11 RX ORDER — SODIUM CHLORIDE 0.9 % (FLUSH) 0.9 %
3-10 SYRINGE (ML) INJECTION AS NEEDED
Status: DISCONTINUED | OUTPATIENT
Start: 2022-11-11 | End: 2022-11-12 | Stop reason: HOSPADM

## 2022-11-11 RX ORDER — MEPERIDINE HYDROCHLORIDE 25 MG/ML
12.5 INJECTION INTRAMUSCULAR; INTRAVENOUS; SUBCUTANEOUS
Status: DISCONTINUED | OUTPATIENT
Start: 2022-11-11 | End: 2022-11-11 | Stop reason: HOSPADM

## 2022-11-11 RX ORDER — SODIUM CHLORIDE 9 MG/ML
INJECTION, SOLUTION INTRAVENOUS CONTINUOUS PRN
Status: DISCONTINUED | OUTPATIENT
Start: 2022-11-11 | End: 2022-11-11 | Stop reason: SURG

## 2022-11-11 RX ORDER — LABETALOL HYDROCHLORIDE 5 MG/ML
5 INJECTION, SOLUTION INTRAVENOUS
Status: DISCONTINUED | OUTPATIENT
Start: 2022-11-11 | End: 2022-11-11 | Stop reason: HOSPADM

## 2022-11-11 RX ORDER — SODIUM CHLORIDE 0.9 % (FLUSH) 0.9 %
3 SYRINGE (ML) INJECTION EVERY 12 HOURS SCHEDULED
Status: DISCONTINUED | OUTPATIENT
Start: 2022-11-11 | End: 2022-11-12 | Stop reason: HOSPADM

## 2022-11-11 RX ORDER — IPRATROPIUM BROMIDE AND ALBUTEROL SULFATE 2.5; .5 MG/3ML; MG/3ML
3 SOLUTION RESPIRATORY (INHALATION) ONCE AS NEEDED
Status: DISCONTINUED | OUTPATIENT
Start: 2022-11-11 | End: 2022-11-11 | Stop reason: HOSPADM

## 2022-11-11 RX ORDER — ONDANSETRON 2 MG/ML
4 INJECTION INTRAMUSCULAR; INTRAVENOUS ONCE AS NEEDED
Status: DISCONTINUED | OUTPATIENT
Start: 2022-11-11 | End: 2022-11-11 | Stop reason: HOSPADM

## 2022-11-11 RX ORDER — PROPOFOL 10 MG/ML
INJECTION, EMULSION INTRAVENOUS AS NEEDED
Status: DISCONTINUED | OUTPATIENT
Start: 2022-11-11 | End: 2022-11-11 | Stop reason: SURG

## 2022-11-11 RX ORDER — DIPHENHYDRAMINE HYDROCHLORIDE 50 MG/ML
12.5 INJECTION INTRAMUSCULAR; INTRAVENOUS
Status: DISCONTINUED | OUTPATIENT
Start: 2022-11-11 | End: 2022-11-11 | Stop reason: HOSPADM

## 2022-11-11 RX ORDER — EPHEDRINE SULFATE 5 MG/ML
5 INJECTION INTRAVENOUS ONCE AS NEEDED
Status: DISCONTINUED | OUTPATIENT
Start: 2022-11-11 | End: 2022-11-11 | Stop reason: HOSPADM

## 2022-11-11 RX ADMIN — SODIUM CHLORIDE: 0.9 INJECTION, SOLUTION INTRAVENOUS at 08:49

## 2022-11-11 RX ADMIN — PROPOFOL 50 MG: 10 INJECTION, EMULSION INTRAVENOUS at 09:10

## 2022-11-11 RX ADMIN — PROPOFOL 50 MG: 10 INJECTION, EMULSION INTRAVENOUS at 09:03

## 2022-11-11 RX ADMIN — Medication 2 SPRAY: at 11:56

## 2022-11-11 RX ADMIN — PROPOFOL 100 MG: 10 INJECTION, EMULSION INTRAVENOUS at 08:54

## 2022-11-11 RX ADMIN — Medication 10 ML: at 21:11

## 2022-11-11 RX ADMIN — Medication 3 ML: at 21:11

## 2022-11-11 RX ADMIN — SODIUM CHLORIDE 125 MG: 9 INJECTION, SOLUTION INTRAVENOUS at 11:56

## 2022-11-11 RX ADMIN — Medication 3 ML: at 12:30

## 2022-11-11 RX ADMIN — PROPOFOL 50 MG: 10 INJECTION, EMULSION INTRAVENOUS at 09:01

## 2022-11-11 RX ADMIN — PROPOFOL 50 MG: 10 INJECTION, EMULSION INTRAVENOUS at 09:06

## 2022-11-11 RX ADMIN — SODIUM SULFATE, POTASSIUM SULFATE, MAGNESIUM SULFATE 1 BOTTLE: 17.5; 3.13; 1.6 SOLUTION, CONCENTRATE ORAL at 04:00

## 2022-11-11 RX ADMIN — PROPOFOL 50 MG: 10 INJECTION, EMULSION INTRAVENOUS at 09:09

## 2022-11-11 RX ADMIN — PROPOFOL 50 MG: 10 INJECTION, EMULSION INTRAVENOUS at 08:58

## 2022-11-11 RX ADMIN — Medication 10 ML: at 11:56

## 2022-11-11 RX ADMIN — PROPOFOL 50 MG: 10 INJECTION, EMULSION INTRAVENOUS at 09:16

## 2022-11-11 RX ADMIN — INSULIN LISPRO 3 UNITS: 100 INJECTION, SOLUTION INTRAVENOUS; SUBCUTANEOUS at 18:38

## 2022-11-11 RX ADMIN — IOPAMIDOL 100 ML: 755 INJECTION, SOLUTION INTRAVENOUS at 18:21

## 2022-11-11 NOTE — ANESTHESIA POSTPROCEDURE EVALUATION
Patient: Vlad Guerrero    Procedure Summary     Date: 11/11/22 Room / Location: [facility]    Anesthesia Start: 0849 Anesthesia Stop: 0922    Procedures:       COLONOSCOPY WITH BIOPSY AND POLYPECTOMY (Rectum)      ESOPHAGOGASTRODUODENOSCOPY with biopsy X1 Diagnosis:       Microcytic anemia      (Microcytic anemia [D50.9])    Surgeons: Billy Julien MD Provider: Michael Franco MD    Anesthesia Type: MAC ASA Status: 2          Anesthesia Type: MAC    Vitals  Vitals Value Taken Time   /65 11/11/22 0951   Temp     Pulse 69 11/11/22 0956   Resp 19 11/11/22 0935   SpO2 96 % 11/11/22 0956   Vitals shown include unvalidated device data.        Post Anesthesia Care and Evaluation    Patient location during evaluation: PACU  Patient participation: complete - patient participated  Level of consciousness: awake  Pain scale: See nurse's notes for pain score.  Pain management: adequate    Airway patency: patent  Anesthetic complications: No anesthetic complications  PONV Status: none  Cardiovascular status: acceptable  Respiratory status: acceptable  Hydration status: acceptable    Comments: Patient seen and examined postoperatively; vital signs stable; SpO2 greater than or equal to 90%; cardiopulmonary status stable; nausea/vomiting adequately controlled; pain adequately controlled; no apparent anesthesia complications; patient discharged from anesthesia care when discharge criteria were met

## 2022-11-11 NOTE — OP NOTE
COLONOSCOPY, ESOPHAGOGASTRODUODENOSCOPY Procedure Report    Patient Name:  Vlad Guerrero  YOB: 1945    Date of Surgery:  11/11/2022     Pre-Op Diagnosis:  Microcytic anemia [D50.9]         Procedure/CPT® Codes:      Procedure(s):  COLONOSCOPY  ESOPHAGOGASTRODUODENOSCOPY with biopsy    Staff:  Surgeon(s):  Billy Julien MD      Anesthesia: Monitored Anesthesia Care    Description of Procedure:  A description of the procedure as well as risks, benefits and alternative methods were explained to the patient who voiced understanding and signed the corresponding consent form. A physical exam was performed and vital signs were monitored throughout the procedure.     scope advanced regularization from orifice, esophagus stomach and the second part duodenum.  A rectal exam was performed which was normal. An Olympus colonoscope was placed into the rectum and proceeded under direct visualization through the colon until the cecum and appendiceal orifice were identified. Careful visualization occurred upon slow withdraw of the scope. The scope was then retroflexed and the distal rectum was visualized. The quality of the prep was good. The procedure was not difficult and there were no immediate complications.    Findings:   Esophageal mucosa looks normal upper middle lobe of the esophagus gastric mucosa looks normal fundus body antrum area minimal gastritis was noticed duodenal mucosa looks normal for second part of duodenum.  Small bowel biopsy was performed for iron deficiency anemia.  Just around the ileocecal valve and very proximal part of the ascending colon close to 4 to 5 cm ulcerated fulgurating near circumferential mass was noticed highly concerning for malignancy multiple biopsy was performed.  2 polyp was removed from the descending and rectum area with a cold snare Mehringer 5 and 6 mm.  Prep was suboptimal.  Diverticulosis a moderate-sized hemorrhoid was seen.    Impression:  1.  Large 4-5cm  fulgurating circumferential ulcerated mass in the very proximal part of the ascending colon next to ileocecal valve multiple biopsies were performed.  This is highly concerning for colon malignancy.  2.  2 polyp removed.  3.  Moderate-sized internal/external hemorrhoid with diverticulosis.  4.  Prep was poor.  5.  Upper endoscopy lamination unremarkable.    Recommendations:  Follow the biopsy result.  Dr. Staples is already following the patient we will let him know about the diagnosis.  Surgical consultation will be obtained.  Follow up with GI clinic as needed  Follow up with PCP as scheduled  Follow up with biopsy report  Repeat colonoscopy in 1 year  Benefiber 1 scoop 2x/day   CT scan of the chest abdomen with contrast will be performed for staging of this lesion      Billy Julien MD     Date: 11/11/2022    Time: 09:23 EST

## 2022-11-11 NOTE — CASE MANAGEMENT/SOCIAL WORK
Continued Stay Note  KRISHAN Bernardo     Patient Name: Vlad Guerrero  MRN: [id number]  Today's Date: 11/11/2022    Admit Date: 11/8/2022    Plan: D/C plan: Anticipate routine home   Discharge Plan     Row Name 11/11/22 1136       Plan    Plan D/C plan: Anticipate routine home    Patient/Family in Agreement with Plan yes    Plan Comments Anticipate home when medically ready. Barrier to d/c: New general surgery and hem/onc consults.                   Expected Discharge Date and Time     Expected Discharge Date Expected Discharge Time    Nov 14, 2022         Phone communication or documentation only - no physical contact with patient or family.      Eder Gannon RN

## 2022-11-11 NOTE — PLAN OF CARE
Problem: Adult Inpatient Plan of Care  Goal: Plan of Care Review  Outcome: Ongoing, Progressing  Flowsheets (Taken 11/11/2022 1831)  Progress: no change  Plan of Care Reviewed With: patient  Goal: Patient-Specific Goal (Individualized)  Outcome: Ongoing, Progressing  Goal: Absence of Hospital-Acquired Illness or Injury  Outcome: Ongoing, Progressing  Intervention: Identify and Manage Fall Risk  Recent Flowsheet Documentation  Taken 11/11/2022 1640 by Coby Arriola LPN  Safety Promotion/Fall Prevention: safety round/check completed  Taken 11/11/2022 1440 by Coby Arriola LPN  Safety Promotion/Fall Prevention: safety round/check completed  Taken 11/11/2022 1240 by Coby Arriola LPN  Safety Promotion/Fall Prevention: safety round/check completed  Taken 11/11/2022 1022 by Coby Arriola LPN  Safety Promotion/Fall Prevention: patient off unit  Taken 11/11/2022 0814 by Coby Arriola LPN  Safety Promotion/Fall Prevention: patient off unit  Goal: Optimal Comfort and Wellbeing  Outcome: Ongoing, Progressing  Intervention: Provide Person-Centered Care  Recent Flowsheet Documentation  Taken 11/11/2022 1240 by Coby Arriola LPN  Trust Relationship/Rapport:   care explained   choices provided  Goal: Readiness for Transition of Care  Outcome: Ongoing, Progressing   Goal Outcome Evaluation:  Plan of Care Reviewed With: patient        Progress: no change  Pt down for CT, pt has had no complaints, will cont to monitor.

## 2022-11-11 NOTE — DISCHARGE INSTRUCTIONS
A responsible adult should stay with you and you should rest quietly for the rest of the day.    Do not drink alcohol, drive, operate any heavy machinery or power tools or make any legal/important decisions for the next 24 hours.     Progress your diet as tolerated.  If you begin to experience severe pain, increased shortness of breath, racing heartbeat or a fever above 101 F, seek immediate medical attention.     Follow up with MD as instructed. Call office for results in 3 to 5 days if needed.    Impression:  1.  Large 4-5cm fulgurating circumferential ulcerated mass in the very proximal part of the ascending colon next to ileocecal valve multiple biopsies were performed.  This is highly concerning for colon malignancy.  2.  2 polyp removed.  3.  Moderate-sized internal/external hemorrhoid with diverticulosis.  4.  Prep was poor.  5.  Upper endoscopy lamination unremarkable.     Recommendations:  Follow the biopsy result.  Dr. Staples is already following the patient we will let him know about the diagnosis.  Surgical consultation will be obtained.  Follow up with GI clinic as needed  Follow up with PCP as scheduled  Follow up with biopsy report  Repeat colonoscopy in 1 year  Benefiber 1 scoop 2x/day   CT scan of the chest abdomen with contrast will be performed for staging of this lesion

## 2022-11-11 NOTE — PROGRESS NOTES
[facility] Medicine Services Daily Progress Note    Patient Name: Vlad Guerrero  : 1945  MRN: [id number]  Primary Care Physician:  Batool Lin APRN  Date of admission: 2022      Subjective      Chief Complaint:  weakness      For endoscopy today.    ROS   Cardiac no chest pain or palpitations  GI no nausea no vomiting some pulmonary no shortness of breath no cough      Objective      Vitals:   Temp:  [97.7 °F (36.5 °C)-98.6 °F (37 °C)] 98.1 °F (36.7 °C)  Heart Rate:  [73-84] 74  Resp:  [14-19] 19  BP: (113-159)/(54-78) 128/66    Physical Exam   Physical Exam   Constitutional:  oriented to person, place, and time. No distress.   HENT:   Head: Normocephalic and atraumatic.   Eyes: Conjunctivae and EOM are normal. Pupils are equal, round, and reactive to light.   Neck: No JVD present. No thyromegaly present.   Cardiovascular: Normal rate, regular rhythm, normal heart sounds and intact distal pulses. Exam reveals no gallop and no friction rub.   No murmur heard.  Pulmonary/Chest: Effort normal and breath sounds normal. No stridor. No respiratory distress.  has no wheezes.  has no rales.  exhibits no tenderness.   Abdominal: Soft. Bowel sounds are normal.  no distension and no mass. There is no tenderness. There is no rebound and no guarding. No hernia.   Musculoskeletal: Normal range of motion.   Lymphadenopathy:     no cervical adenopathy.   Neurological:  alert and oriented to person, place, and time. No cranial nerve deficit or sensory deficit. exhibits normal muscle tone.   Skin: No rash noted.  not diaphoretic.   Psychiatric:  normal mood and affect.   Vitals reviewed.         Result Review    Result Review:  I have personally reviewed the results from the time of this admission to 2022 11:26 EST and agree with these findings:  [x]  Laboratory  []  Microbiology  [x]  Radiology  [x]  EKG/Telemetry   []  Cardiology/Vascular   []  Pathology  []  Old  records          Assessment & Plan      Brief Patient Summary:  Vlad Guerrero is a 77 y.o. male who presents with acute anemia      ferric gluconate, 125 mg, Intravenous, Daily  insulin lispro, 2-7 Units, Subcutaneous, TID With Meals  oxymetazoline, 2 spray, Each Nare, BID  sodium chloride, 10 mL, Intravenous, Q12H  sodium chloride, 3 mL, Intravenous, Q12H             Active Hospital Problems:  Active Hospital Problems    Diagnosis    • **Microcytic anemia    • Type 2 diabetes mellitus with hyperglycemia, without long-term current use of insulin (HCC)      Plan:   Anemia  Microcytic hypochromic  Iron deficiency on anemia work-up  Continue IV iron replacement  GI on board patient underwent colonoscopy and EGD and found to have a colon mass  Serial H&H transfuse as needed  Continue PPI    Colon mass  General surgery consult  Heme-onc on board    Diabetes  Insulin sliding scale Accu-Cheks  Hold oral home meds for now    Hypertension  Continue Cozaar    Dyslipidemia  Continue pravastatin    DVT PUD prophylaxis    Plan as above    Awaiting med review by pharmacy to complete reconciliation  DVT prophylaxis:  Mechanical DVT prophylaxis orders are present.    CODE STATUS:    Code Status (Patient has no pulse and is not breathing): CPR (Attempt to Resuscitate)  Medical Interventions (Patient has pulse or is breathing): Full Support      Disposition:  I expect patient to be discharged when clinically stable.      Electronically signed by Nadia Avilez MD, 11/11/22, 11:26 EST.  [facility] Hospitalist Team

## 2022-11-11 NOTE — PROGRESS NOTES
Hematology/Oncology Inpatient Progress Note    PATIENT NAME: Vlad Guerrero  : 1945  MRN: [id number]    CHIEF COMPLAINT: Abnormal labs    HISTORY OF PRESENT ILLNESS:    Vlad Guerrero is a 76 y.o. male who presented to [facility] on 2022 with complaints of abnormal labs at the instruction of his PCP.  Patient had lab work obtained by his PCP due to complaints of dizziness that resulted in a hemoglobin of less than 7.  The patient denied chest pain, dyspnea, melena, hematochezia, hematic emesis.  Patient denied any prior colonoscopy.  The patient denied any signs of overt bleeding.  Patient denied any history of anemia.     Evaluation in the ED: CMP unremarkable with the exception of a glucose of 169, WBC 5.60, hemoglobin 7.1, MCV 60.7, platelets 223,000.    Iron studies showed: Iron 14, iron sat 3, TIBC 551, ferritin 7.92.     22  Hematology/Oncology was consulted for microcytic anemia.  Iron studies consistent with iron deficiency anemia.  FOBT negative.  Patient received 1 unit packed red blood cells for hemoglobin of 7.1.    22 - colonoscopy with large 4-5 cm fulgurating circumferential ulcerated mass in ascending colon.     He/She  has no past medical history on file.     PCP: Provider, No Known    Subjective   S/p endoscopy.       MEDICATIONS:    Scheduled Meds:  insulin lispro, 2-7 Units, Subcutaneous, TID With Meals  oxymetazoline, 2 spray, Each Nare, BID  sodium chloride, 10 mL, Intravenous, Q12H  sodium chloride, 3 mL, Intravenous, Q12H       Continuous Infusions:      PRN Meds:  •  dextrose  •  dextrose  •  glucagon (human recombinant)  •  nitroglycerin  •  ondansetron **OR** ondansetron  •  [COMPLETED] Insert peripheral IV **AND** sodium chloride  •  sodium chloride  •  sodium chloride     ALLERGIES:    Allergies   Allergen Reactions   • Penicillins Rash       Objective    VITALS:   /73 (BP Location: Left arm, Patient Position: Lying)   Pulse 73   Temp 97.9 °F  "(36.6 °C) (Oral)   Resp 20   Ht 182.9 cm (72\")   Wt 83.5 kg (184 lb 1.4 oz)   SpO2 97%   BMI 24.97 kg/m²     PHYSICAL EXAM: (performed by MD)  Physical Exam  Constitutional:       Appearance: Normal appearance.   HENT:      Head: Normocephalic and atraumatic.   Eyes:      Pupils: Pupils are equal, round, and reactive to light.   Cardiovascular:      Rate and Rhythm: Normal rate and regular rhythm.      Pulses: Normal pulses.      Heart sounds: No murmur heard.  Pulmonary:      Effort: Pulmonary effort is normal.      Breath sounds: Normal breath sounds.   Abdominal:      General: There is no distension.      Palpations: Abdomen is soft. There is no mass.      Tenderness: no tenderness   Musculoskeletal:         General: Normal range of motion.      Cervical back: Normal range of motion and neck supple.   Skin:     General: Skin is warm.   Neurological:      General: No focal deficit present.      Mental Status: He is alert.   Psychiatric:         Mood and Affect: Mood normal.           RECENT LABS:  Lab Results (last 24 hours)     Procedure Component Value Units Date/Time    POC Glucose Once [[id number]]  (Abnormal) Collected: 11/11/22 1643    Specimen: Blood Updated: 11/11/22 1644     Glucose 217 mg/dL      Comment: Serial Number: [id number]:  [id number]       CEA [[id number]] Collected: 11/08/22 2233    Specimen: Blood Updated: 11/11/22 1407    Tissue Pathology Exam [[id number]] Collected: 11/11/22 0914    Specimen: Tissue from Small Intestine; Tissue from Large Intestine, Right / Ascending Colon; Tissue from Large Intestine, Left / Descending Colon Updated: 11/11/22 1137    POC Glucose Once [[id number]]  (Abnormal) Collected: 11/11/22 1123    Specimen: Blood Updated: 11/11/22 1125     Glucose 122 mg/dL      Comment: Serial Number: [id number]:  [id number]       POC Glucose Once [[id number]]  (Abnormal) Collected: 11/11/22 0701    Specimen: Blood Updated: 11/11/22 0706     Glucose 126 mg/dL      " Comment: Serial Number: [id number]:  [id number]       Manual Differential [[id number]]  (Abnormal) Collected: 11/11/22 0214    Specimen: Blood from Arm, Left Updated: 11/11/22 0411     Neutrophil % 63.0 %      Lymphocyte % 28.0 %      Monocyte % 4.0 %      Eosinophil % 2.0 %      Bands %  1.0 %      Metamyelocyte % 2.0 %      Neutrophils Absolute 3.39 10*3/mm3      Lymphocytes Absolute 1.48 10*3/mm3      Monocytes Absolute 0.21 10*3/mm3      Eosinophils Absolute 0.11 10*3/mm3      Anisocytosis Mod/2+     Dacrocytes Slight/1+     Microcytes Mod/2+     Poikilocytes Slight/1+     Target Cells Slight/1+     WBC Morphology Normal     Platelet Estimate Adequate     Large Platelets Slight/1+     Giant Platelets Slight/1+    CBC & Differential [[id number]]  (Abnormal) Collected: 11/11/22 0214    Specimen: Blood from Arm, Left Updated: 11/11/22 0411    Narrative:      The following orders were created for panel order CBC & Differential.  Procedure                               Abnormality         Status                     ---------                               -----------         ------                     CBC Auto Differential[[id number]]        Abnormal            Final result               Scan Slide[[id number]]                                       Final result                 Please view results for these tests on the individual orders.    Scan Slide [[id number]] Collected: 11/11/22 0214    Specimen: Blood from Arm, Left Updated: 11/11/22 0411     Scan Slide --     Comment: See Manual Differential Results       CBC Auto Differential [[id number]]  (Abnormal) Collected: 11/11/22 0214    Specimen: Blood from Arm, Left Updated: 11/11/22 0411     WBC 5.30 10*3/mm3      RBC 4.54 10*6/mm3      Hemoglobin 8.1 g/dL      Hematocrit 28.9 %      MCV 63.6 fL      MCH 17.9 pg      MCHC 28.1 g/dL      RDW 21.9 %      RDW-SD 49.0 fl      MPV 8.4 fL      Platelets 219 10*3/mm3     Narrative:      Modified report. Previous result was  Hemogram on 11/11/2022 at 0241 EST.  The previously reported component NRBC is no longer being reported. Previous result was 0.0 /100 WBC (Reference Range: 0.0-0.2 /100 WBC) on 11/11/2022 at 0241 EST.    Comprehensive Metabolic Panel [[id number]]  (Abnormal) Collected: 11/11/22 0214    Specimen: Blood from Arm, Left Updated: 11/11/22 0252     Glucose 101 mg/dL      BUN 13 mg/dL      Creatinine 0.74 mg/dL      Sodium 140 mmol/L      Potassium 3.6 mmol/L      Chloride 106 mmol/L      CO2 25.0 mmol/L      Calcium 8.5 mg/dL      Total Protein 6.3 g/dL      Albumin 3.60 g/dL      ALT (SGPT) 12 U/L      AST (SGOT) 14 U/L      Alkaline Phosphatase 81 U/L      Total Bilirubin 0.4 mg/dL      Globulin 2.7 gm/dL      A/G Ratio 1.3 g/dL      BUN/Creatinine Ratio 17.6     Anion Gap 9.0 mmol/L      eGFR 94.5 mL/min/1.73      Comment: National Kidney Foundation and American Society of Nephrology (ASN) Task Force recommended calculation based on the Chronic Kidney Disease Epidemiology Collaboration (CKD-EPI) equation refit without adjustment for race.       Narrative:      GFR Normal >60  Chronic Kidney Disease <60  Kidney Failure <15    The GFR formula is only valid for adults with stable renal function between ages 18 and 70.          PENDING RESULTS:     IMAGING REVIEWED:  No radiology results for the last day    Assessment & Plan   ASSESSMENT:    77-year-old male with severe iron deficiency anemia getting IV iron infusion with Ferrlecit. EGD findings concerning for ascending colon mass. Biopsy pending    Ascending colon mass  Likely adenoca, biopsy pending   Staging with CT CAP  CEA ordered  Surgery consult likely plan for elective surgery    Microcytic anemia: Iron deficiency.    Secondary to colon cancer  Patient initiated on PPI and IV iron by primary team.     Has received 1 unit packed red blood cells.

## 2022-11-11 NOTE — ANESTHESIA PREPROCEDURE EVALUATION
Anesthesia Evaluation     Patient summary reviewed and Nursing notes reviewed   no history of anesthetic complications:  NPO Solid Status: > 8 hours  NPO Liquid Status: > 8 hours           Airway   Mallampati: II  TM distance: >3 FB  Neck ROM: full  No difficulty expected  Dental    (+) edentulous    Pulmonary - negative pulmonary ROS and normal exam   Cardiovascular - normal exam    (+) hypertension,       Neuro/Psych- negative ROS  GI/Hepatic/Renal/Endo    (+)   diabetes mellitus type 2,     Musculoskeletal (-) negative ROS    Abdominal  - normal exam   Substance History - negative use     OB/GYN negative ob/gyn ROS         Other                        Anesthesia Plan    ASA 2     MAC     intravenous induction     Anesthetic plan, risks, benefits, and alternatives have been provided, discussed and informed consent has been obtained with: patient.        CODE STATUS:    Code Status (Patient has no pulse and is not breathing): CPR (Attempt to Resuscitate)  Medical Interventions (Patient has pulse or is breathing): Full Support

## 2022-11-11 NOTE — CONSULTS
"GENERAL SURGERY CONSULTATION NOTE    Consult requested by: Dr. Julien    Patient Care Team:  Batool Lin APRN as PCP - General (Family Medicine)    Reason for consult: colon mass    Subjective     Patient is a 77 y.o. male presents with symptomatic anemia.  The patient was sent to the hospital by his primary care physician for work-up and treatment of a symptomatic anemia which has been progressively getting worse over the last month or so.  He reports dizziness with \"falling out\".  Upon arrival in the ER, the patient was found to have a hemoglobin of 7.1.  Hematology oncology was consulted for microcytic anemia, and iron studies demonstrated iron deficiency anemia.  His fecal occult blood test was negative, the patient was transfused 1 unit of packed red blood cells and started on iron infusions.  He denies having any abdominal pain, nausea, vomiting, difficulty passing bowel movements, or blood per rectum.  He has had an upper endoscopy in the past with esophageal dilation, he has had an appendectomy as a child, and he has never had a lower endoscopy.  The patient's family history is significant for colon cancer in his father.  The patient was felt to possibly have chronic GI blood loss, and was recommended to undergo EGD and colonoscopy by gastroenterology.  Gastroenterology performed a bowel prep, and this morning performed a EGD and colonoscopy which demonstrated a circumferential 4.5 cm mass in the cecum concerning for colon cancer.  Multiple biopsies were taken.  The patient also had a 2 polyps removed from his descending and rectum area.  General surgery was consulted for possible colonic resection and oncology was informed of the findings as well.  The patient had a CEA drawn, but has not returned yet.  He has not yet had any staging scans performed.     Review of Systems   Constitutional: Negative for appetite change, chills and fever.   HENT: Negative for congestion and sore throat.    Respiratory: " Negative for cough and shortness of breath.    Cardiovascular: Negative for chest pain and palpitations.   Gastrointestinal: Negative for abdominal pain, constipation, diarrhea, nausea, vomiting and GERD.   Genitourinary: Negative for difficulty urinating, dysuria and frequency.   Musculoskeletal: Negative for arthralgias and back pain.   Skin: Negative for rash and skin lesions.   Neurological: Positive for dizziness. Negative for seizures and memory problem.   Hematological: Negative for adenopathy. Does not bruise/bleed easily.   Psychiatric/Behavioral: Negative for sleep disturbance and depressed mood.        History  Past Medical History:   Diagnosis Date   • Diabetes mellitus (HCC)    • Hypertension      Past Surgical History:   Procedure Laterality Date   • CARDIAC CATHETERIZATION       History reviewed. No pertinent family history.  Social History     Tobacco Use   • Smoking status: Never   • Smokeless tobacco: Never   Substance Use Topics   • Alcohol use: Not Currently   • Drug use: Never     Medications Prior to Admission   Medication Sig Dispense Refill Last Dose   • empagliflozin (Jardiance) 25 MG tablet tablet Take 1 tablet by mouth Daily.      • glimepiride (AMARYL) 4 MG tablet Take 1 tablet by mouth 2 (Two) Times a Day.      • losartan (COZAAR) 100 MG tablet Take 1 tablet by mouth Daily.      • metFORMIN (GLUCOPHAGE) 1000 MG tablet Take 2 tablets by mouth Daily With Dinner.      • pioglitazone (ACTOS) 45 MG tablet Take 1 tablet by mouth Daily.      • pravastatin (PRAVACHOL) 40 MG tablet Take 1 tablet by mouth Every Night.      • UNKNOWN TO PATIENT Inject  under the skin into the appropriate area as directed Every 7 (Seven) Days. Per patient - GLP-1 RA - like medication that he injects SQ once weekly on Friday        Allergies:  Penicillins    Objective     Vital Signs  Temp:  [97.7 °F (36.5 °C)-98.6 °F (37 °C)] 97.9 °F (36.6 °C)  Heart Rate:  [73-84] 73  Resp:  [14-20] 20  BP: (113-159)/(54-78)  135/73    Physical Exam  Vitals reviewed.   Constitutional:       General: He is not in acute distress.     Appearance: He is well-developed. He is not ill-appearing.   HENT:      Head: Normocephalic and atraumatic.   Eyes:      Pupils: Pupils are equal, round, and reactive to light.   Cardiovascular:      Rate and Rhythm: Normal rate and regular rhythm.   Pulmonary:      Effort: Pulmonary effort is normal.      Breath sounds: Normal breath sounds.   Abdominal:      General: There is no distension.      Palpations: Abdomen is soft.      Tenderness: There is no abdominal tenderness.      Hernia: No hernia is present.   Musculoskeletal:         General: Normal range of motion.      Cervical back: Normal range of motion.   Lymphadenopathy:      Cervical: No cervical adenopathy.   Skin:     General: Skin is warm and dry.      Findings: No rash.   Neurological:      Mental Status: He is alert and oriented to person, place, and time.         Results Review:   Lab Results (last 24 hours)     Procedure Component Value Units Date/Time    POC Glucose Once [[id number]]  (Abnormal) Collected: 11/11/22 1643    Specimen: Blood Updated: 11/11/22 1644     Glucose 217 mg/dL      Comment: Serial Number: [id number]:  [id number]       CEA [[id number]] Collected: 11/08/22 2233    Specimen: Blood Updated: 11/11/22 1407    Tissue Pathology Exam [[id number]] Collected: 11/11/22 0914    Specimen: Tissue from Small Intestine; Tissue from Large Intestine, Right / Ascending Colon; Tissue from Large Intestine, Left / Descending Colon Updated: 11/11/22 1137    POC Glucose Once [[id number]]  (Abnormal) Collected: 11/11/22 1123    Specimen: Blood Updated: 11/11/22 1125     Glucose 122 mg/dL      Comment: Serial Number: [id number]:  [id number]       POC Glucose Once [[id number]]  (Abnormal) Collected: 11/11/22 0701    Specimen: Blood Updated: 11/11/22 0706     Glucose 126 mg/dL      Comment: Serial Number: [id number]:  [id number]        Manual Differential [[id number]]  (Abnormal) Collected: 11/11/22 0214    Specimen: Blood from Arm, Left Updated: 11/11/22 0411     Neutrophil % 63.0 %      Lymphocyte % 28.0 %      Monocyte % 4.0 %      Eosinophil % 2.0 %      Bands %  1.0 %      Metamyelocyte % 2.0 %      Neutrophils Absolute 3.39 10*3/mm3      Lymphocytes Absolute 1.48 10*3/mm3      Monocytes Absolute 0.21 10*3/mm3      Eosinophils Absolute 0.11 10*3/mm3      Anisocytosis Mod/2+     Dacrocytes Slight/1+     Microcytes Mod/2+     Poikilocytes Slight/1+     Target Cells Slight/1+     WBC Morphology Normal     Platelet Estimate Adequate     Large Platelets Slight/1+     Giant Platelets Slight/1+    CBC & Differential [[id number]]  (Abnormal) Collected: 11/11/22 0214    Specimen: Blood from Arm, Left Updated: 11/11/22 0411    Narrative:      The following orders were created for panel order CBC & Differential.  Procedure                               Abnormality         Status                     ---------                               -----------         ------                     CBC Auto Differential[[id number]]        Abnormal            Final result               Scan Slide[[id number]]                                       Final result                 Please view results for these tests on the individual orders.    Scan Slide [[id number]] Collected: 11/11/22 0214    Specimen: Blood from Arm, Left Updated: 11/11/22 0411     Scan Slide --     Comment: See Manual Differential Results       CBC Auto Differential [[id number]]  (Abnormal) Collected: 11/11/22 0214    Specimen: Blood from Arm, Left Updated: 11/11/22 0411     WBC 5.30 10*3/mm3      RBC 4.54 10*6/mm3      Hemoglobin 8.1 g/dL      Hematocrit 28.9 %      MCV 63.6 fL      MCH 17.9 pg      MCHC 28.1 g/dL      RDW 21.9 %      RDW-SD 49.0 fl      MPV 8.4 fL      Platelets 219 10*3/mm3     Narrative:      Modified report. Previous result was Hemogram on 11/11/2022 at 0241 EST.  The previously reported  component NRBC is no longer being reported. Previous result was 0.0 /100 WBC (Reference Range: 0.0-0.2 /100 WBC) on 11/11/2022 at 0241 EST.    Comprehensive Metabolic Panel [[id number]]  (Abnormal) Collected: 11/11/22 0214    Specimen: Blood from Arm, Left Updated: 11/11/22 0252     Glucose 101 mg/dL      BUN 13 mg/dL      Creatinine 0.74 mg/dL      Sodium 140 mmol/L      Potassium 3.6 mmol/L      Chloride 106 mmol/L      CO2 25.0 mmol/L      Calcium 8.5 mg/dL      Total Protein 6.3 g/dL      Albumin 3.60 g/dL      ALT (SGPT) 12 U/L      AST (SGOT) 14 U/L      Alkaline Phosphatase 81 U/L      Total Bilirubin 0.4 mg/dL      Globulin 2.7 gm/dL      A/G Ratio 1.3 g/dL      BUN/Creatinine Ratio 17.6     Anion Gap 9.0 mmol/L      eGFR 94.5 mL/min/1.73      Comment: National Kidney Foundation and American Society of Nephrology (ASN) Task Force recommended calculation based on the Chronic Kidney Disease Epidemiology Collaboration (CKD-EPI) equation refit without adjustment for race.       Narrative:      GFR Normal >60  Chronic Kidney Disease <60  Kidney Failure <15    The GFR formula is only valid for adults with stable renal function between ages 18 and 70.        No radiology results for the last day      I reviewed the patient's new imaging results and agree with the interpretation.  I reviewed the patient's other test results and agree with the interpretation    Assessment & Plan     Principal Problem:    Microcytic anemia  Active Problems:    Type 2 diabetes mellitus with hyperglycemia, without long-term current use of insulin (HCC)  Colon mass    Discussed with patient that the findings on endoscopy are concerning for colon cancer.  We will await the biopsy results  If his hemoglobin remains stable tomorrow, can consider discharge home with follow-up in the near future for elective colonic resection as the patient has not exhibiting any signs of obstruction.  CT scan of the chest, abdomen, and pelvis were ordered for  staging purposes.  Follow-up on these results.  We discussed the risk, benefits, and alternatives to surgery, the patient understands and agrees to proceed with open right hemicolectomy whenever we can provide this for him.  He understands that he will need to have a bowel prep the day prior to his colon surgery    I discussed the patients findings and my recommendations with the patient.     Percy Davis MD  11/11/22  17:08 EST

## 2022-11-12 ENCOUNTER — READMISSION MANAGEMENT (OUTPATIENT)
Dept: CALL CENTER | Facility: HOSPITAL | Age: 77
End: 2022-11-12

## 2022-11-12 VITALS
HEIGHT: 72 IN | OXYGEN SATURATION: 98 % | SYSTOLIC BLOOD PRESSURE: 131 MMHG | WEIGHT: 184.08 LBS | DIASTOLIC BLOOD PRESSURE: 70 MMHG | TEMPERATURE: 98.1 F | HEART RATE: 75 BPM | BODY MASS INDEX: 24.93 KG/M2 | RESPIRATION RATE: 18 BRPM

## 2022-11-12 LAB
ALBUMIN SERPL-MCNC: 3.5 G/DL (ref 3.5–5.2)
ALBUMIN/GLOB SERPL: 1.3 G/DL
ALP SERPL-CCNC: 85 U/L (ref 39–117)
ALT SERPL W P-5'-P-CCNC: 13 U/L (ref 1–41)
ANION GAP SERPL CALCULATED.3IONS-SCNC: 9 MMOL/L (ref 5–15)
AST SERPL-CCNC: 14 U/L (ref 1–40)
BILIRUB SERPL-MCNC: 0.4 MG/DL (ref 0–1.2)
BUN SERPL-MCNC: 15 MG/DL (ref 8–23)
BUN/CREAT SERPL: 15.5 (ref 7–25)
CALCIUM SPEC-SCNC: 8.4 MG/DL (ref 8.6–10.5)
CHLORIDE SERPL-SCNC: 102 MMOL/L (ref 98–107)
CO2 SERPL-SCNC: 26 MMOL/L (ref 22–29)
CREAT SERPL-MCNC: 0.97 MG/DL (ref 0.76–1.27)
DEPRECATED RDW RBC AUTO: 48.6 FL (ref 37–54)
EGFRCR SERPLBLD CKD-EPI 2021: 81.4 ML/MIN/1.73
ERYTHROCYTE [DISTWIDTH] IN BLOOD BY AUTOMATED COUNT: 22 % (ref 12.3–15.4)
GLOBULIN UR ELPH-MCNC: 2.6 GM/DL
GLUCOSE BLDC GLUCOMTR-MCNC: 197 MG/DL (ref 70–105)
GLUCOSE BLDC GLUCOMTR-MCNC: 244 MG/DL (ref 70–105)
GLUCOSE SERPL-MCNC: 170 MG/DL (ref 65–99)
HCT VFR BLD AUTO: 27.1 % (ref 37.5–51)
HGB BLD-MCNC: 7.7 G/DL (ref 13–17.7)
MCH RBC QN AUTO: 17.9 PG (ref 26.6–33)
MCHC RBC AUTO-ENTMCNC: 28.3 G/DL (ref 31.5–35.7)
MCV RBC AUTO: 63.4 FL (ref 79–97)
PLATELET # BLD AUTO: 224 10*3/MM3 (ref 140–450)
PMV BLD AUTO: 8.8 FL (ref 6–12)
POTASSIUM SERPL-SCNC: 3.6 MMOL/L (ref 3.5–5.2)
PROT SERPL-MCNC: 6.1 G/DL (ref 6–8.5)
RBC # BLD AUTO: 4.27 10*6/MM3 (ref 4.14–5.8)
SODIUM SERPL-SCNC: 137 MMOL/L (ref 136–145)
WBC NRBC COR # BLD: 5.7 10*3/MM3 (ref 3.4–10.8)

## 2022-11-12 PROCEDURE — 80053 COMPREHEN METABOLIC PANEL: CPT | Performed by: HOSPITALIST

## 2022-11-12 PROCEDURE — 85027 COMPLETE CBC AUTOMATED: CPT | Performed by: HOSPITALIST

## 2022-11-12 PROCEDURE — 99232 SBSQ HOSP IP/OBS MODERATE 35: CPT | Performed by: INTERNAL MEDICINE

## 2022-11-12 PROCEDURE — 63710000001 INSULIN LISPRO (HUMAN) PER 5 UNITS: Performed by: STUDENT IN AN ORGANIZED HEALTH CARE EDUCATION/TRAINING PROGRAM

## 2022-11-12 PROCEDURE — 82962 GLUCOSE BLOOD TEST: CPT

## 2022-11-12 RX ORDER — FERROUS SULFATE 325(65) MG
325 TABLET ORAL 2 TIMES DAILY
Qty: 30 TABLET | Refills: 0 | Status: ON HOLD | OUTPATIENT
Start: 2022-11-12 | End: 2022-12-15

## 2022-11-12 RX ADMIN — INSULIN LISPRO 2 UNITS: 100 INJECTION, SOLUTION INTRAVENOUS; SUBCUTANEOUS at 08:39

## 2022-11-12 RX ADMIN — Medication 10 ML: at 08:39

## 2022-11-12 RX ADMIN — INSULIN LISPRO 3 UNITS: 100 INJECTION, SOLUTION INTRAVENOUS; SUBCUTANEOUS at 11:52

## 2022-11-12 RX ADMIN — Medication 3 ML: at 08:39

## 2022-11-12 NOTE — PLAN OF CARE
Goal Outcome Evaluation:            Patient to discharge home and follow up with oncology and surgery for post EGD/Colonoscopy biopsy results

## 2022-11-12 NOTE — PROGRESS NOTES
Hematology/Oncology Inpatient Progress Note    PATIENT NAME: Vlad Guerrero  : 1945  MRN: [id number]    CHIEF COMPLAINT: Abnormal labs    HISTORY OF PRESENT ILLNESS:    Vlad Guerrero is a 76 y.o. male who presented to [facility] on 2022 with complaints of abnormal labs at the instruction of his PCP.  Patient had lab work obtained by his PCP due to complaints of dizziness that resulted in a hemoglobin of less than 7.  The patient denied chest pain, dyspnea, melena, hematochezia, hematic emesis.  Patient denied any prior colonoscopy.  The patient denied any signs of overt bleeding.  Patient denied any history of anemia.     Evaluation in the ED: CMP unremarkable with the exception of a glucose of 169, WBC 5.60, hemoglobin 7.1, MCV 60.7, platelets 223,000.    Iron studies showed: Iron 14, iron sat 3, TIBC 551, ferritin 7.92.     22  Hematology/Oncology was consulted for microcytic anemia.  Iron studies consistent with iron deficiency anemia.  FOBT negative.  Patient received 1 unit packed red blood cells for hemoglobin of 7.1.    22 - colonoscopy with large 4-5 cm fulgurating circumferential ulcerated mass in ascending colon.    2022 -CT chest abdomen pelvis with nonspecific subcarinal lymph node and right hilar lymphoid tissue questionable significance.  No other concerning findings.     He/She  has no past medical history on file.     PCP: Provider, No Known    Subjective   Patient doing well this morning no significant symptoms.      MEDICATIONS:    Scheduled Meds:  insulin lispro, 2-7 Units, Subcutaneous, TID With Meals  sodium chloride, 10 mL, Intravenous, Q12H  sodium chloride, 3 mL, Intravenous, Q12H       Continuous Infusions:      PRN Meds:  •  dextrose  •  dextrose  •  glucagon (human recombinant)  •  nitroglycerin  •  ondansetron **OR** ondansetron  •  [COMPLETED] Insert peripheral IV **AND** sodium chloride  •  sodium chloride  •  sodium chloride     ALLERGIES:   "  Allergies   Allergen Reactions   • Penicillins Rash       Objective    VITALS:   /70 (BP Location: Right arm, Patient Position: Lying)   Pulse 75   Temp 98.1 °F (36.7 °C) (Oral)   Resp 18   Ht 182.9 cm (72\")   Wt 83.5 kg (184 lb 1.4 oz)   SpO2 98%   BMI 24.97 kg/m²     PHYSICAL EXAM: (performed by MD)  Physical Exam  Constitutional:       Appearance: Normal appearance.   HENT:      Head: Normocephalic and atraumatic.   Eyes:      Pupils: Pupils are equal, round, and reactive to light.   Cardiovascular:      Rate and Rhythm: Normal rate and regular rhythm.      Pulses: Normal pulses.      Heart sounds: No murmur heard.  Pulmonary:      Effort: Pulmonary effort is normal.      Breath sounds: Normal breath sounds.   Abdominal:      General: There is no distension.      Palpations: Abdomen is soft. There is no mass.      Tenderness: no tenderness   Musculoskeletal:         General: Normal range of motion.      Cervical back: Normal range of motion and neck supple.   Skin:     General: Skin is warm.   Neurological:      General: No focal deficit present.      Mental Status: He is alert.   Psychiatric:         Mood and Affect: Mood normal.           RECENT LABS:  Lab Results (last 24 hours)     Procedure Component Value Units Date/Time    POC Glucose Once [[id number]]  (Abnormal) Collected: 11/12/22 0747    Specimen: Blood Updated: 11/12/22 0748     Glucose 197 mg/dL      Comment: Serial Number: [id number]:  [id number]       Comprehensive Metabolic Panel [[id number]]  (Abnormal) Collected: 11/12/22 0125    Specimen: Blood Updated: 11/12/22 0148     Glucose 170 mg/dL      BUN 15 mg/dL      Creatinine 0.97 mg/dL      Sodium 137 mmol/L      Potassium 3.6 mmol/L      Chloride 102 mmol/L      CO2 26.0 mmol/L      Calcium 8.4 mg/dL      Total Protein 6.1 g/dL      Albumin 3.50 g/dL      ALT (SGPT) 13 U/L      AST (SGOT) 14 U/L      Alkaline Phosphatase 85 U/L      Total Bilirubin 0.4 mg/dL      Globulin 2.6 " gm/dL      A/G Ratio 1.3 g/dL      BUN/Creatinine Ratio 15.5     Anion Gap 9.0 mmol/L      eGFR 81.4 mL/min/1.73      Comment: National Kidney Foundation and American Society of Nephrology (ASN) Task Force recommended calculation based on the Chronic Kidney Disease Epidemiology Collaboration (CKD-EPI) equation refit without adjustment for race.       Narrative:      GFR Normal >60  Chronic Kidney Disease <60  Kidney Failure <15    The GFR formula is only valid for adults with stable renal function between ages 18 and 70.    CBC (No Diff) [[id number]]  (Abnormal) Collected: 11/12/22 0125    Specimen: Blood Updated: 11/12/22 0145     WBC 5.70 10*3/mm3      RBC 4.27 10*6/mm3      Hemoglobin 7.7 g/dL      Hematocrit 27.1 %      MCV 63.4 fL      Comment: Parameter reviewed in the past 30 days on 111122.          MCH 17.9 pg      MCHC 28.3 g/dL      RDW 22.0 %      RDW-SD 48.6 fl      MPV 8.8 fL      Platelets 224 10*3/mm3     CEA [[id number]] Collected: 11/08/22 2233    Specimen: Blood Updated: 11/11/22 2008     CEA 4.27 ng/mL     Narrative:      CEA Reference Range:    Non Smokers:   Less than 3 ng/mL  Smokers:       Less than 5 ng/mL  Results may be falsely decreased if patient taking Biotin.      POC Glucose Once [[id number]]  (Abnormal) Collected: 11/11/22 1643    Specimen: Blood Updated: 11/11/22 1644     Glucose 217 mg/dL      Comment: Serial Number: [id number]:  [id number]       Tissue Pathology Exam [[id number]] Collected: 11/11/22 0914    Specimen: Tissue from Small Intestine; Tissue from Large Intestine, Right / Ascending Colon; Tissue from Large Intestine, Left / Descending Colon Updated: 11/11/22 1137    POC Glucose Once [[id number]]  (Abnormal) Collected: 11/11/22 1123    Specimen: Blood Updated: 11/11/22 1125     Glucose 122 mg/dL      Comment: Serial Number: [id number]:  [id number]             PENDING RESULTS:     IMAGING REVIEWED:  CT Chest With Contrast Diagnostic    Result Date: 11/11/2022  1.      Nonspecific subcarinal lymph node and some right hilar lymphoid tissue of questionable significance. 2.     Slight prominence of interstitial markings in the basilar areas that could reflect dependent changes or some chronic interstitial lung disease. 3.     Suggested air within biliary system which may relate to recent EGD. 4.     Bilateral parapelvic and renal cysts. There is some perinephric stranding that may relate to the patient's renal function. 5.     Cystic like area involving the head of the pancreas. At some point MR could be helpful to further evaluate this. 6.     Fungating mass within the ascending colon is not well imaged on this exam. 7.     Enlarged prostate. 8.     Degenerative change lumbar spine and sacroiliac joints.  Electronically Signed By-Saad Lerma MD On:11/11/2022 9:26 PM This report was finalized on [id number] by  Saad Lerma MD.    CT Abdomen Pelvis With Contrast    Result Date: 11/11/2022  1.     Nonspecific subcarinal lymph node and some right hilar lymphoid tissue of questionable significance. 2.     Slight prominence of interstitial markings in the basilar areas that could reflect dependent changes or some chronic interstitial lung disease. 3.     Suggested air within biliary system which may relate to recent EGD. 4.     Bilateral parapelvic and renal cysts. There is some perinephric stranding that may relate to the patient's renal function. 5.     Cystic like area involving the head of the pancreas. At some point MR could be helpful to further evaluate this. 6.     Fungating mass within the ascending colon is not well imaged on this exam. 7.     Enlarged prostate. 8.     Degenerative change lumbar spine and sacroiliac joints.  Electronically Signed By-Saad Lerma MD On:11/11/2022 9:26 PM This report was finalized on [id number] by  Saad Lerma MD.      Assessment & Plan   ASSESSMENT:    77-year-old male with severe iron deficiency anemia getting IV iron infusion with  Ferrlecit. EGD findings concerning for ascending colon mass. 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  Does not show any concerning areas for metastatic disease.  Hilar lymph node is likely reactive.  Uncommon site for metastases from colon cancer.  We will discuss on tumor board conference.  If concerning can consider PET/CT.  CEA ordered results pending.  Surgery consult plan for elective surgery      Microcytic anemia: Iron deficiency.    Secondary to colon cancer  Patient initiated on PPI and IV iron by primary team.     Has received 1 unit packed red blood cells.      Patient is okay to be discharged from my standpoint rest of the work-up and treatment will be outpatient.

## 2022-11-12 NOTE — DISCHARGE SUMMARY
[facility] Medicine Services  DISCHARGE SUMMARY    Patient Name: Vlad Guerrero  : 1945  MRN: [id number]    Date of Admission: 2022  Discharge Diagnosis: Anemia  Date of Discharge: 2022  Primary Care Physician: Batool Lin APRN      Presenting Problem:   Dizziness [R42]  Microcytic anemia [D50.9]    Active and Resolved Hospital Problems:  Active Hospital Problems    Diagnosis POA   • **Microcytic anemia [D50.9] Yes   • Type 2 diabetes mellitus with hyperglycemia, without long-term current use of insulin (HCC) [E11.65] Yes      Resolved Hospital Problems   No resolved problems to display.         Hospital Course     Hospital Course:  Chief Complaint: Abnormal labs     History of Present Illness: Vlad Guerrero is a 76 y.o. male who presented to [facility] on 2022 complaining of abnormal labs.  Patient was sent in by PCP when labs obtained due to dizziness showed a hgb of <7.  Patient denies chest pain, dyspnea, melena, hematochezia,  Hematemesis.  He has never had a colonoscopy.  Presented to [facility] ED due to PCP's instructions.     In the ER, vitals 98.2, HR 80, RR 16, /62, 95 RA.  Labs notable for glucose 169, a1c 7.2, hgb 7.1, iron 14.      Plan:     Anemia  Microcytic hypochromic  Iron deficiency on anemia work-up  Patient received IV replacement while here and be discharged on p.o. iron  GI on board patient underwent colonoscopy and EGD and found to have a colon mass  Evaluated by general surgery, who recommended that if hemoglobin stable as it is today patient can be discharged home with outpatient follow-up.     Colon mass  General surgery consult to follow-up outpatient  Heme-onc on board     Diabetes  Insulin sliding scale Accu-Cheks  Restart home meds on discharge    Hypertension  Continue Cozaar     Dyslipidemia  Continue pravastatin     DVT PUD prophylaxis     Plan discharged home stable condition follow-up outpatient with PCP GI and  heme-onc as well as general surgery.      Reasons For Change In Medications and Indications for New Medications:      Day of Discharge     Vital Signs:  Temp:  [97.9 °F (36.6 °C)-98.3 °F (36.8 °C)] 98.1 °F (36.7 °C)  Heart Rate:  [73-80] 75  Resp:  [18-20] 18  BP: (121-142)/(67-73) 131/70    Physical Exam:  Physical Exam   Physical Exam   Constitutional:  oriented to person, place, and time. No distress.   HENT:   Head: Normocephalic and atraumatic.   Eyes: Conjunctivae and EOM are normal. Pupils are equal, round, and reactive to light.   Neck: No JVD present. No thyromegaly present.   Cardiovascular: Normal rate, regular rhythm, normal heart sounds and intact distal pulses. Exam reveals no gallop and no friction rub.   No murmur heard.  Pulmonary/Chest: Effort normal and breath sounds normal. No stridor. No respiratory distress.  has no wheezes.  has no rales.  exhibits no tenderness.   Abdominal: Soft. Bowel sounds are normal.  no distension and no mass. There is no tenderness. There is no rebound and no guarding. No hernia.   Musculoskeletal: Normal range of motion.   Lymphadenopathy:     no cervical adenopathy.   Neurological:  alert and oriented to person, place, and time. No cranial nerve deficit or sensory deficit. exhibits normal muscle tone.   Skin: No rash noted.  not diaphoretic.   Psychiatric:  normal mood and affect.   Vitals reviewed.        Pertinent  and/or Most Recent Results     LAB RESULTS:      Lab 11/12/22  0125 11/11/22  0214 11/10/22  0423 11/09/22  0604 11/08/22  2233   WBC 5.70 5.30 5.20 5.00 5.60   HEMOGLOBIN 7.7* 8.1* 7.2* 7.2* 7.1*   HEMATOCRIT 27.1* 28.9* 25.6* 25.5* 25.1*   PLATELETS 224 219 194 195 223   NEUTROS ABS  --  3.39 3.30  --  3.50   LYMPHS ABS  --   --  1.20  --  1.30   MONOS ABS  --   --  0.60  --  0.60   EOS ABS  --  0.11 0.10  --  0.10   MCV 63.4* 63.6* 63.1* 63.3* 60.7*         Lab 11/12/22  0125 11/11/22  0214 11/09/22  0604 11/08/22  2233 11/08/22  1428   SODIUM 137 140  139 140  --    POTASSIUM 3.6 3.6 4.0 4.1  --    CHLORIDE 102 106 104 105  --    CO2 26.0 25.0 24.0 24.0  --    ANION GAP 9.0 9.0 11.0 11.0  --    BUN 15 13 15 17  --    CREATININE 0.97 0.74* 0.85 0.84  --    EGFR 81.4 94.5 91.2 91.5  --    GLUCOSE 170* 101* 185* 169*  --    CALCIUM 8.4* 8.5* 8.3* 8.6  --    HEMOGLOBIN A1C  --   --   --   --  7.2*         Lab 11/12/22  0125 11/11/22  0214 11/08/22  2233   TOTAL PROTEIN 6.1 6.3 7.2   ALBUMIN 3.50 3.60 4.10   GLOBULIN 2.6 2.7 3.1   ALT (SGPT) 13 12 16   AST (SGOT) 14 14 20   BILIRUBIN 0.4 0.4 0.4   ALK PHOS 85 81 90                 Lab 11/08/22  2233   IRON 14*   IRON SATURATION 3*   TIBC 551*   TRANSFERRIN 370*   FERRITIN 7.92*   ABO TYPING A   RH TYPING Positive   ANTIBODY SCREEN Negative         Brief Urine Lab Results  (Last result in the past 365 days)      Color   Clarity   Blood   Leuk Est   Nitrite   Protein   CREAT   Urine HCG        07/08/22 0826             50             Microbiology Results (last 10 days)     ** No results found for the last 240 hours. **          CT Chest With Contrast Diagnostic    Result Date: 11/11/2022  Impression: 1.     Nonspecific subcarinal lymph node and some right hilar lymphoid tissue of questionable significance. 2.     Slight prominence of interstitial markings in the basilar areas that could reflect dependent changes or some chronic interstitial lung disease. 3.     Suggested air within biliary system which may relate to recent EGD. 4.     Bilateral parapelvic and renal cysts. There is some perinephric stranding that may relate to the patient's renal function. 5.     Cystic like area involving the head of the pancreas. At some point MR could be helpful to further evaluate this. 6.     Fungating mass within the ascending colon is not well imaged on this exam. 7.     Enlarged prostate. 8.     Degenerative change lumbar spine and sacroiliac joints.  Electronically Signed By-Saad Lerma MD On:11/11/2022 9:26 PM This report was  finalized on [id number] by  Saad Lerma MD.    CT Abdomen Pelvis With Contrast    Result Date: 11/11/2022  Impression: 1.     Nonspecific subcarinal lymph node and some right hilar lymphoid tissue of questionable significance. 2.     Slight prominence of interstitial markings in the basilar areas that could reflect dependent changes or some chronic interstitial lung disease. 3.     Suggested air within biliary system which may relate to recent EGD. 4.     Bilateral parapelvic and renal cysts. There is some perinephric stranding that may relate to the patient's renal function. 5.     Cystic like area involving the head of the pancreas. At some point MR could be helpful to further evaluate this. 6.     Fungating mass within the ascending colon is not well imaged on this exam. 7.     Enlarged prostate. 8.     Degenerative change lumbar spine and sacroiliac joints.  Electronically Signed By-Saad Lerma MD On:11/11/2022 9:26 PM This report was finalized on [id number] by  Saad Lerma MD.                  Labs Pending at Discharge:  Pending Labs     Order Current Status    Tissue Pathology Exam In process          Procedures Performed  Procedure(s):  COLONOSCOPY WITH BIOPSY AND POLYPECTOMY  ESOPHAGOGASTRODUODENOSCOPY with biopsy X1  11/11 0902 COLONOSCOPY      Consults:   Consults     Date and Time Order Name Status Description    11/11/2022 12:41 PM Hematology & Oncology Inpatient Consult      11/11/2022 11:06 AM Inpatient General Surgery Consult Completed     11/10/2022  7:57 AM Inpatient Gastroenterology Consult Completed     11/9/2022 12:10 AM Hematology & Oncology Inpatient Consult Completed     11/8/2022 11:13 PM Hospitalist (on-call MD unless specified)              Discharge Details        Discharge Medications      New Medications      Instructions Start Date   ferrous sulfate 325 (65 FE) MG tablet  Commonly known as: FerrouSul   325 mg, Oral, 2 Times Daily         Continue These Medications       Instructions Start Date   glimepiride 4 MG tablet  Commonly known as: AMARYL   1 tablet, Oral, 2 Times Daily      Jardiance 25 MG tablet tablet  Generic drug: empagliflozin   25 mg, Oral, Daily      losartan 100 MG tablet  Commonly known as: COZAAR   100 mg, Oral, Daily      metFORMIN 1000 MG tablet  Commonly known as: GLUCOPHAGE   2,000 mg, Oral, Daily With Dinner      pioglitazone 45 MG tablet  Commonly known as: ACTOS   45 mg, Oral, Daily      pravastatin 40 MG tablet  Commonly known as: PRAVACHOL   1 tablet, Oral, Nightly      UNKNOWN TO PATIENT   Subcutaneous, Every 7 Days, Per patient - GLP-1 RA - like medication that he injects SQ once weekly on Friday             Allergies   Allergen Reactions   • Penicillins Rash         Discharge Disposition: Discharged home stable condition  Home or Self Care    Diet:  Hospital:  Diet Order   Procedures   • Diet Texture; Soft to Chew         Discharge Activity:   Activity Instructions     Activity as Tolerated              CODE STATUS:  Code Status and Medical Interventions:   Ordered at: 11/08/22 5287     Code Status (Patient has no pulse and is not breathing):    CPR (Attempt to Resuscitate)     Medical Interventions (Patient has pulse or is breathing):    Full Support         No future appointments.    Additional Instructions for the Follow-ups that You Need to Schedule     Discharge Follow-up with PCP   As directed       Currently Documented PCP:    Batool Lin APRN    PCP Phone Number:    [phone]     Follow Up Details: one week               Time spent on Discharge including face to face service: 38 minutes      Signature:   Electronically signed by Nadia Avilez MD, 11/12/22, 11:35 AM EST.

## 2022-11-13 DIAGNOSIS — K63.89 MASS OF COLON: Primary | ICD-10-CM

## 2022-11-13 RX ORDER — GABAPENTIN 100 MG/1
600 CAPSULE ORAL 3 TIMES DAILY
Status: CANCELLED | OUTPATIENT
Start: 2022-11-13

## 2022-11-13 RX ORDER — CELECOXIB 100 MG/1
200 CAPSULE ORAL EVERY 12 HOURS SCHEDULED
Status: CANCELLED | OUTPATIENT
Start: 2022-11-13

## 2022-11-13 RX ORDER — ALVIMOPAN 12 MG/1
12 CAPSULE ORAL ONCE
Status: CANCELLED | OUTPATIENT
Start: 2022-11-13 | End: 2022-11-13

## 2022-11-13 RX ORDER — CHLORHEXIDINE GLUCONATE 0.12 MG/ML
15 RINSE ORAL EVERY 12 HOURS SCHEDULED
Status: CANCELLED | OUTPATIENT
Start: 2022-11-13

## 2022-11-13 RX ORDER — METRONIDAZOLE 500 MG/1
500 TABLET ORAL SEE ADMIN INSTRUCTIONS
Qty: 4 TABLET | Refills: 0 | Status: SHIPPED | OUTPATIENT
Start: 2022-11-13 | End: 2022-11-14

## 2022-11-13 RX ORDER — ACETAMINOPHEN 500 MG
1000 TABLET ORAL EVERY 6 HOURS
Status: CANCELLED | OUTPATIENT
Start: 2022-11-13

## 2022-11-13 RX ORDER — ERYTHROMYCIN 500 MG/1
1000 TABLET, COATED ORAL 3 TIMES DAILY
Qty: 6 TABLET | Refills: 0 | Status: ON HOLD | OUTPATIENT
Start: 2022-11-13 | End: 2022-12-15

## 2022-11-13 RX ORDER — SODIUM CHLORIDE 9 MG/ML
100 INJECTION, SOLUTION INTRAVENOUS CONTINUOUS
Status: CANCELLED | OUTPATIENT
Start: 2022-11-13

## 2022-11-13 NOTE — OUTREACH NOTE
Prep Survey    Flowsheet Row Responses   [facility] facility patient discharged from? [facility]   Is LACE score < 7 ? No   Emergency Room discharge w/ pulse ox? No   Eligibility Readm Mgmt   Discharge diagnosis Microcytic anemia    Does the patient have one of the following disease processes/diagnoses(primary or secondary)? Other   Does the patient have Home health ordered? No   Is there a DME ordered? No   Prep survey completed? Yes          ENRIQUE ACEVEDO - Registered Nurse

## 2022-11-14 NOTE — CASE MANAGEMENT/SOCIAL WORK
Case Management Discharge Note      Final Note: Home         Selected Continued Care - Discharged on 11/12/2022 Admission date: 11/8/2022 - Discharge disposition: Home or Self Care            Transportation Services  Private: Car    Final Discharge Disposition Code: 01 - home or self-care

## 2022-11-15 PROBLEM — K63.89 MASS OF COLON: Status: ACTIVE | Noted: 2022-11-15

## 2022-11-15 LAB
LAB AP CASE REPORT: NORMAL
LAB AP DIAGNOSIS COMMENT: NORMAL
PATH REPORT.ADDENDUM SPEC: NORMAL
PATH REPORT.FINAL DX SPEC: NORMAL
PATH REPORT.GROSS SPEC: NORMAL

## 2022-11-16 ENCOUNTER — READMISSION MANAGEMENT (OUTPATIENT)
Dept: CALL CENTER | Facility: HOSPITAL | Age: 77
End: 2022-11-16

## 2022-11-16 NOTE — OUTREACH NOTE
Medical Week 1 Survey    Flowsheet Row Responses   [facility] [place] patient discharged from? [facility]   Does the patient have one of the following disease processes/diagnoses(primary or secondary)? Other   Week 1 attempt successful? Yes   Call start time 1339   Call end time 1341   Discharge diagnosis Microcytic anemia    Meds reviewed with patient/caregiver? Yes   Is the patient having any side effects they believe may be caused by any medication additions or changes? No   Does the patient have all medications ordered at discharge? Yes   Is the patient taking all medications as directed (includes completed medication regime)? Yes   Does the patient have a primary care provider?  Yes   Does the patient have an appointment with their PCP within 7 days of discharge? No   What is preventing the patient from scheduling follow up appointments within 7 days of discharge? Haven't had time   Nursing Interventions Advised patient to make appointment   Has the patient kept scheduled appointments due by today? N/A   Psychosocial issues? No   Did the patient receive a copy of their discharge instructions? Yes   Nursing interventions Reviewed instructions with patient   What is the patient's perception of their health status since discharge? Improving   Is the patient/caregiver able to teach back signs and symptoms related to disease process for when to call PCP? Yes   Is the patient/caregiver able to teach back signs and symptoms related to disease process for when to call 911? Yes   Is the patient/caregiver able to teach back the hierarchy of who to call/visit for symptoms/problems? PCP, Specialist, Home health nurse, Urgent Care, ED, 911 Yes   If the patient is a current smoker, are they able to teach back resources for cessation? Not a smoker   Week 1 call completed? Yes   Graduated/Revoked comments Pt returned to work. He's working 4 hours a day.           ALBERTO MARIO - Registered Nurse

## 2022-12-06 ENCOUNTER — LAB (OUTPATIENT)
Dept: LAB | Facility: HOSPITAL | Age: 77
End: 2022-12-06

## 2022-12-06 ENCOUNTER — HOSPITAL ENCOUNTER (OUTPATIENT)
Dept: CARDIOLOGY | Facility: HOSPITAL | Age: 77
Discharge: HOME | End: 2022-12-06

## 2022-12-06 PROCEDURE — 93010 ELECTROCARDIOGRAM REPORT: CPT | Performed by: INTERNAL MEDICINE

## 2022-12-06 PROCEDURE — 93005 ELECTROCARDIOGRAM TRACING: CPT | Performed by: SURGERY

## 2022-12-06 RX ORDER — SEMAGLUTIDE 1.34 MG/ML
0.25 INJECTION, SOLUTION SUBCUTANEOUS AS NEEDED
COMMUNITY

## 2022-12-06 RX ORDER — POLYETHYLENE GLYCOL 3350 17 G/17G
17 POWDER, FOR SOLUTION ORAL AS NEEDED
COMMUNITY
End: 2023-01-16

## 2022-12-07 DIAGNOSIS — K63.89 MASS OF COLON: Primary | ICD-10-CM

## 2022-12-07 LAB — QT INTERVAL: 382 MS

## 2022-12-07 RX ORDER — CHLORHEXIDINE GLUCONATE 4 G/100ML
SOLUTION TOPICAL
Qty: 1 ML | Refills: 0 | Status: ON HOLD | OUTPATIENT
Start: 2022-12-07 | End: 2022-12-15

## 2022-12-07 RX ORDER — POLYETHYLENE GLYCOL 3350, SODIUM SULFATE ANHYDROUS, SODIUM BICARBONATE, SODIUM CHLORIDE, POTASSIUM CHLORIDE 236; 22.74; 6.74; 5.86; 2.97 G/4L; G/4L; G/4L; G/4L; G/4L
4 POWDER, FOR SOLUTION ORAL ONCE
Qty: 1 ML | Refills: 0 | Status: SHIPPED | OUTPATIENT
Start: 2022-12-07 | End: 2022-12-07

## 2022-12-07 RX ORDER — METRONIDAZOLE 500 MG/1
TABLET ORAL
Qty: 6 TABLET | Refills: 0 | Status: ON HOLD | OUTPATIENT
Start: 2022-12-07 | End: 2022-12-15

## 2022-12-12 ENCOUNTER — LAB (OUTPATIENT)
Dept: LAB | Facility: HOSPITAL | Age: 77
End: 2022-12-12

## 2022-12-12 LAB
ABO GROUP BLD: NORMAL
ALBUMIN SERPL-MCNC: 3.6 G/DL (ref 3.5–5.2)
ALBUMIN/GLOB SERPL: 1.3 G/DL
ALP SERPL-CCNC: 81 U/L (ref 39–117)
ALT SERPL W P-5'-P-CCNC: 12 U/L (ref 1–41)
ANION GAP SERPL CALCULATED.3IONS-SCNC: 8.6 MMOL/L (ref 5–15)
AST SERPL-CCNC: 18 U/L (ref 1–40)
BASOPHILS # BLD AUTO: 0.02 10*3/MM3 (ref 0–0.2)
BASOPHILS NFR BLD AUTO: 0.4 % (ref 0–1.5)
BILIRUB SERPL-MCNC: 0.2 MG/DL (ref 0–1.2)
BLD GP AB SCN SERPL QL: NEGATIVE
BUN SERPL-MCNC: 24 MG/DL (ref 8–23)
BUN/CREAT SERPL: 26.4 (ref 7–25)
CALCIUM SPEC-SCNC: 8.7 MG/DL (ref 8.6–10.5)
CHLORIDE SERPL-SCNC: 105 MMOL/L (ref 98–107)
CO2 SERPL-SCNC: 25.4 MMOL/L (ref 22–29)
CREAT SERPL-MCNC: 0.91 MG/DL (ref 0.76–1.27)
DEPRECATED RDW RBC AUTO: 58.7 FL (ref 37–54)
EGFRCR SERPLBLD CKD-EPI 2021: 87.9 ML/MIN/1.73
EOSINOPHIL # BLD AUTO: 0.12 10*3/MM3 (ref 0–0.4)
EOSINOPHIL NFR BLD AUTO: 2.5 % (ref 0.3–6.2)
ERYTHROCYTE [DISTWIDTH] IN BLOOD BY AUTOMATED COUNT: 22.9 % (ref 12.3–15.4)
GLOBULIN UR ELPH-MCNC: 2.8 GM/DL
GLUCOSE SERPL-MCNC: 156 MG/DL (ref 65–99)
HCT VFR BLD AUTO: 29.1 % (ref 37.5–51)
HGB BLD-MCNC: 7.9 G/DL (ref 13–17.7)
LYMPHOCYTES # BLD AUTO: 1.24 10*3/MM3 (ref 0.7–3.1)
LYMPHOCYTES NFR BLD AUTO: 26 % (ref 19.6–45.3)
MCH RBC QN AUTO: 19.6 PG (ref 26.6–33)
MCHC RBC AUTO-ENTMCNC: 27.1 G/DL (ref 31.5–35.7)
MCV RBC AUTO: 72 FL (ref 79–97)
MONOCYTES # BLD AUTO: 0.55 10*3/MM3 (ref 0.1–0.9)
MONOCYTES NFR BLD AUTO: 11.5 % (ref 5–12)
NEUTROPHILS NFR BLD AUTO: 2.83 10*3/MM3 (ref 1.7–7)
NEUTROPHILS NFR BLD AUTO: 59.4 % (ref 42.7–76)
PLATELET # BLD AUTO: 198 10*3/MM3 (ref 140–450)
PMV BLD AUTO: 9.6 FL (ref 6–12)
POTASSIUM SERPL-SCNC: 4.2 MMOL/L (ref 3.5–5.2)
PROT SERPL-MCNC: 6.4 G/DL (ref 6–8.5)
RBC # BLD AUTO: 4.04 10*6/MM3 (ref 4.14–5.8)
RH BLD: POSITIVE
SODIUM SERPL-SCNC: 139 MMOL/L (ref 136–145)
T&S EXPIRATION DATE: NORMAL
WBC NRBC COR # BLD: 4.77 10*3/MM3 (ref 3.4–10.8)

## 2022-12-12 PROCEDURE — 86900 BLOOD TYPING SEROLOGIC ABO: CPT

## 2022-12-12 PROCEDURE — 86850 RBC ANTIBODY SCREEN: CPT

## 2022-12-12 PROCEDURE — 85025 COMPLETE CBC W/AUTO DIFF WBC: CPT

## 2022-12-12 PROCEDURE — 80053 COMPREHEN METABOLIC PANEL: CPT

## 2022-12-12 PROCEDURE — 86901 BLOOD TYPING SEROLOGIC RH(D): CPT

## 2022-12-15 ENCOUNTER — ANESTHESIA EVENT (OUTPATIENT)
Dept: PERIOP | Facility: HOSPITAL | Age: 77
End: 2022-12-15

## 2022-12-15 ENCOUNTER — HOSPITAL ENCOUNTER (INPATIENT)
Facility: HOSPITAL | Age: 77
LOS: 4 days | Discharge: HOME | End: 2022-12-19
Attending: SURGERY | Admitting: SURGERY

## 2022-12-15 ENCOUNTER — ANESTHESIA (OUTPATIENT)
Dept: PERIOP | Facility: HOSPITAL | Age: 77
End: 2022-12-15

## 2022-12-15 DIAGNOSIS — K63.89 MASS OF COLON: ICD-10-CM

## 2022-12-15 LAB
ALBUMIN SERPL-MCNC: 3.8 G/DL (ref 3.5–5.2)
ALBUMIN/GLOB SERPL: 1.4 G/DL
ALP SERPL-CCNC: 82 U/L (ref 39–117)
ALT SERPL W P-5'-P-CCNC: 17 U/L (ref 1–41)
ANION GAP SERPL CALCULATED.3IONS-SCNC: 10 MMOL/L (ref 5–15)
AST SERPL-CCNC: 23 U/L (ref 1–40)
BASOPHILS # BLD AUTO: 0 10*3/MM3 (ref 0–0.2)
BASOPHILS NFR BLD AUTO: 0.6 % (ref 0–1.5)
BH BB BLOOD EXPIRATION DATE: NORMAL
BH BB BLOOD EXPIRATION DATE: NORMAL
BH BB BLOOD TYPE BARCODE: 6200
BH BB BLOOD TYPE BARCODE: 6200
BH BB DISPENSE STATUS: NORMAL
BH BB DISPENSE STATUS: NORMAL
BH BB PRODUCT CODE: NORMAL
BH BB PRODUCT CODE: NORMAL
BH BB UNIT NUMBER: NORMAL
BH BB UNIT NUMBER: NORMAL
BILIRUB SERPL-MCNC: 0.4 MG/DL (ref 0–1.2)
BUN SERPL-MCNC: 16 MG/DL (ref 8–23)
BUN/CREAT SERPL: 16.7 (ref 7–25)
CALCIUM SPEC-SCNC: 8.8 MG/DL (ref 8.6–10.5)
CHLORIDE SERPL-SCNC: 107 MMOL/L (ref 98–107)
CO2 SERPL-SCNC: 25 MMOL/L (ref 22–29)
CREAT SERPL-MCNC: 0.96 MG/DL (ref 0.76–1.27)
CROSSMATCH INTERPRETATION: NORMAL
CROSSMATCH INTERPRETATION: NORMAL
DEPRECATED RDW RBC AUTO: 69.1 FL (ref 37–54)
EGFRCR SERPLBLD CKD-EPI 2021: 82.4 ML/MIN/1.73
EOSINOPHIL # BLD AUTO: 0.1 10*3/MM3 (ref 0–0.4)
EOSINOPHIL NFR BLD AUTO: 1.7 % (ref 0.3–6.2)
ERYTHROCYTE [DISTWIDTH] IN BLOOD BY AUTOMATED COUNT: 28.2 % (ref 12.3–15.4)
GLOBULIN UR ELPH-MCNC: 2.8 GM/DL
GLUCOSE BLDC GLUCOMTR-MCNC: 197 MG/DL (ref 70–105)
GLUCOSE BLDC GLUCOMTR-MCNC: 224 MG/DL (ref 70–105)
GLUCOSE BLDC GLUCOMTR-MCNC: 238 MG/DL (ref 70–105)
GLUCOSE BLDC GLUCOMTR-MCNC: 243 MG/DL (ref 70–105)
GLUCOSE SERPL-MCNC: 138 MG/DL (ref 65–99)
HCT VFR BLD AUTO: 25.5 % (ref 37.5–51)
HGB BLD-MCNC: 7.8 G/DL (ref 13–17.7)
LYMPHOCYTES # BLD AUTO: 1.2 10*3/MM3 (ref 0.7–3.1)
LYMPHOCYTES NFR BLD AUTO: 27.1 % (ref 19.6–45.3)
MCH RBC QN AUTO: 20.7 PG (ref 26.6–33)
MCHC RBC AUTO-ENTMCNC: 30.5 G/DL (ref 31.5–35.7)
MCV RBC AUTO: 67.9 FL (ref 79–97)
MONOCYTES # BLD AUTO: 0.5 10*3/MM3 (ref 0.1–0.9)
MONOCYTES NFR BLD AUTO: 12.3 % (ref 5–12)
NEUTROPHILS NFR BLD AUTO: 2.5 10*3/MM3 (ref 1.7–7)
NEUTROPHILS NFR BLD AUTO: 58.3 % (ref 42.7–76)
NRBC BLD AUTO-RTO: 0 /100 WBC (ref 0–0.2)
PLATELET # BLD AUTO: 201 10*3/MM3 (ref 140–450)
PMV BLD AUTO: 8.1 FL (ref 6–12)
POTASSIUM SERPL-SCNC: 3.9 MMOL/L (ref 3.5–5.2)
PROT SERPL-MCNC: 6.6 G/DL (ref 6–8.5)
RBC # BLD AUTO: 3.76 10*6/MM3 (ref 4.14–5.8)
SODIUM SERPL-SCNC: 142 MMOL/L (ref 136–145)
UNIT  ABO: NORMAL
UNIT  ABO: NORMAL
UNIT  RH: NORMAL
UNIT  RH: NORMAL
WBC NRBC COR # BLD: 4.3 10*3/MM3 (ref 3.4–10.8)

## 2022-12-15 PROCEDURE — 25010000002 FENTANYL CITRATE (PF) 50 MCG/ML SOLUTION: Performed by: NURSE ANESTHETIST, CERTIFIED REGISTERED

## 2022-12-15 PROCEDURE — 25010000002 HYDROMORPHONE 1 MG/ML SOLUTION: Performed by: NURSE ANESTHETIST, CERTIFIED REGISTERED

## 2022-12-15 PROCEDURE — S0260 H&P FOR SURGERY: HCPCS | Performed by: SURGERY

## 2022-12-15 PROCEDURE — 85025 COMPLETE CBC W/AUTO DIFF WBC: CPT | Performed by: SURGERY

## 2022-12-15 PROCEDURE — 25010000002 PROPOFOL 10 MG/ML EMULSION: Performed by: NURSE ANESTHETIST, CERTIFIED REGISTERED

## 2022-12-15 PROCEDURE — 86923 COMPATIBILITY TEST ELECTRIC: CPT

## 2022-12-15 PROCEDURE — 25010000002 ONDANSETRON PER 1 MG: Performed by: SURGERY

## 2022-12-15 PROCEDURE — 85007 BL SMEAR W/DIFF WBC COUNT: CPT | Performed by: SURGERY

## 2022-12-15 PROCEDURE — 44160 REMOVAL OF COLON: CPT | Performed by: SURGERY

## 2022-12-15 PROCEDURE — 88309 TISSUE EXAM BY PATHOLOGIST: CPT | Performed by: SURGERY

## 2022-12-15 PROCEDURE — 63710000001 INSULIN LISPRO (HUMAN) PER 5 UNITS: Performed by: PHYSICIAN ASSISTANT

## 2022-12-15 PROCEDURE — 83735 ASSAY OF MAGNESIUM: CPT | Performed by: PHYSICIAN ASSISTANT

## 2022-12-15 PROCEDURE — 25010000002 ONDANSETRON PER 1 MG: Performed by: NURSE ANESTHETIST, CERTIFIED REGISTERED

## 2022-12-15 PROCEDURE — 80053 COMPREHEN METABOLIC PANEL: CPT | Performed by: SURGERY

## 2022-12-15 PROCEDURE — 0DTF0ZZ RESECTION OF RIGHT LARGE INTESTINE, OPEN APPROACH: ICD-10-PCS | Performed by: SURGERY

## 2022-12-15 PROCEDURE — 25010000002 ROPIVACAINE PER 1 MG: Performed by: ANESTHESIOLOGY

## 2022-12-15 PROCEDURE — 25010000002 DEXAMETHASONE PER 1 MG: Performed by: NURSE ANESTHETIST, CERTIFIED REGISTERED

## 2022-12-15 PROCEDURE — 82962 GLUCOSE BLOOD TEST: CPT

## 2022-12-15 PROCEDURE — 25010000002 CEFOXITIN PER 1 G: Performed by: SURGERY

## 2022-12-15 DEVICE — PROXIMATE RELOADABLE LINEAR CUTTER WITH SAFETY LOCK-OUT, 75MM
Type: IMPLANTABLE DEVICE | Site: ASCENDING COLON | Status: FUNCTIONAL
Brand: PROXIMATE

## 2022-12-15 DEVICE — PROXIMATE LINEAR CUTTER RELOAD, BLUE, 75MM
Type: IMPLANTABLE DEVICE | Site: ASCENDING COLON | Status: FUNCTIONAL
Brand: PROXIMATE

## 2022-12-15 RX ORDER — PROMETHAZINE HYDROCHLORIDE 12.5 MG/1
12.5 SUPPOSITORY RECTAL EVERY 6 HOURS PRN
Status: DISCONTINUED | OUTPATIENT
Start: 2022-12-15 | End: 2022-12-19 | Stop reason: HOSPADM

## 2022-12-15 RX ORDER — CELECOXIB 200 MG/1
200 CAPSULE ORAL EVERY 12 HOURS SCHEDULED
Status: DISCONTINUED | OUTPATIENT
Start: 2022-12-15 | End: 2022-12-15 | Stop reason: HOSPADM

## 2022-12-15 RX ORDER — POTASSIUM CHLORIDE 20 MEQ/1
40 TABLET, EXTENDED RELEASE ORAL AS NEEDED
Status: DISCONTINUED | OUTPATIENT
Start: 2022-12-15 | End: 2022-12-19 | Stop reason: HOSPADM

## 2022-12-15 RX ORDER — GABAPENTIN 300 MG/1
600 CAPSULE ORAL 3 TIMES DAILY
Status: DISCONTINUED | OUTPATIENT
Start: 2022-12-15 | End: 2022-12-15 | Stop reason: HOSPADM

## 2022-12-15 RX ORDER — DEXTROSE MONOHYDRATE 25 G/50ML
25 INJECTION, SOLUTION INTRAVENOUS
Status: DISCONTINUED | OUTPATIENT
Start: 2022-12-15 | End: 2022-12-19 | Stop reason: HOSPADM

## 2022-12-15 RX ORDER — HYDROCODONE BITARTRATE AND ACETAMINOPHEN 7.5; 325 MG/1; MG/1
1 TABLET ORAL ONCE AS NEEDED
Status: DISCONTINUED | OUTPATIENT
Start: 2022-12-15 | End: 2022-12-15 | Stop reason: HOSPADM

## 2022-12-15 RX ORDER — NICOTINE POLACRILEX 4 MG
15 LOZENGE BUCCAL
Status: DISCONTINUED | OUTPATIENT
Start: 2022-12-15 | End: 2022-12-19 | Stop reason: HOSPADM

## 2022-12-15 RX ORDER — POTASSIUM CHLORIDE 1.5 G/1.77G
40 POWDER, FOR SOLUTION ORAL AS NEEDED
Status: DISCONTINUED | OUTPATIENT
Start: 2022-12-15 | End: 2022-12-19 | Stop reason: HOSPADM

## 2022-12-15 RX ORDER — ACETAMINOPHEN 160 MG/5ML
650 SOLUTION ORAL EVERY 4 HOURS PRN
Status: DISCONTINUED | OUTPATIENT
Start: 2022-12-15 | End: 2022-12-19 | Stop reason: HOSPADM

## 2022-12-15 RX ORDER — HYDROCODONE BITARTRATE AND ACETAMINOPHEN 10; 325 MG/1; MG/1
1 TABLET ORAL EVERY 4 HOURS PRN
Status: DISCONTINUED | OUTPATIENT
Start: 2022-12-15 | End: 2022-12-15 | Stop reason: HOSPADM

## 2022-12-15 RX ORDER — BISACODYL 5 MG/1
5 TABLET, DELAYED RELEASE ORAL DAILY PRN
Status: DISCONTINUED | OUTPATIENT
Start: 2022-12-15 | End: 2022-12-19 | Stop reason: HOSPADM

## 2022-12-15 RX ORDER — OXYCODONE HYDROCHLORIDE 5 MG/1
10 TABLET ORAL EVERY 4 HOURS PRN
Status: DISCONTINUED | OUTPATIENT
Start: 2022-12-15 | End: 2022-12-19 | Stop reason: HOSPADM

## 2022-12-15 RX ORDER — ACETAMINOPHEN 325 MG/1
650 TABLET ORAL EVERY 4 HOURS PRN
Status: DISCONTINUED | OUTPATIENT
Start: 2022-12-15 | End: 2022-12-19 | Stop reason: HOSPADM

## 2022-12-15 RX ORDER — ONDANSETRON 2 MG/ML
INJECTION INTRAMUSCULAR; INTRAVENOUS AS NEEDED
Status: DISCONTINUED | OUTPATIENT
Start: 2022-12-15 | End: 2022-12-15 | Stop reason: SURG

## 2022-12-15 RX ORDER — ACETAMINOPHEN 500 MG
1000 TABLET ORAL EVERY 6 HOURS
Status: COMPLETED | OUTPATIENT
Start: 2022-12-15 | End: 2022-12-17

## 2022-12-15 RX ORDER — LIDOCAINE HYDROCHLORIDE 10 MG/ML
0.5 INJECTION, SOLUTION INFILTRATION; PERINEURAL ONCE AS NEEDED
Status: DISCONTINUED | OUTPATIENT
Start: 2022-12-15 | End: 2022-12-15 | Stop reason: HOSPADM

## 2022-12-15 RX ORDER — DEXTROSE, SODIUM CHLORIDE, AND POTASSIUM CHLORIDE 5; .45; .15 G/100ML; G/100ML; G/100ML
100 INJECTION INTRAVENOUS CONTINUOUS
Status: DISCONTINUED | OUTPATIENT
Start: 2022-12-15 | End: 2022-12-16

## 2022-12-15 RX ORDER — ACETAMINOPHEN 500 MG
1000 TABLET ORAL EVERY 6 HOURS
Status: DISCONTINUED | OUTPATIENT
Start: 2022-12-15 | End: 2022-12-15 | Stop reason: HOSPADM

## 2022-12-15 RX ORDER — FENTANYL CITRATE 50 UG/ML
INJECTION, SOLUTION INTRAMUSCULAR; INTRAVENOUS AS NEEDED
Status: DISCONTINUED | OUTPATIENT
Start: 2022-12-15 | End: 2022-12-15 | Stop reason: SURG

## 2022-12-15 RX ORDER — INSULIN LISPRO 100 [IU]/ML
2-7 INJECTION, SOLUTION INTRAVENOUS; SUBCUTANEOUS
Status: DISCONTINUED | OUTPATIENT
Start: 2022-12-15 | End: 2022-12-19 | Stop reason: HOSPADM

## 2022-12-15 RX ORDER — CHLORHEXIDINE GLUCONATE 0.12 MG/ML
15 RINSE ORAL EVERY 12 HOURS SCHEDULED
Status: DISCONTINUED | OUTPATIENT
Start: 2022-12-15 | End: 2022-12-15 | Stop reason: HOSPADM

## 2022-12-15 RX ORDER — OLANZAPINE 10 MG/2ML
1 INJECTION, POWDER, LYOPHILIZED, FOR SOLUTION INTRAMUSCULAR
Status: DISCONTINUED | OUTPATIENT
Start: 2022-12-15 | End: 2022-12-19 | Stop reason: HOSPADM

## 2022-12-15 RX ORDER — CHOLECALCIFEROL (VITAMIN D3) 125 MCG
5 CAPSULE ORAL NIGHTLY PRN
Status: DISCONTINUED | OUTPATIENT
Start: 2022-12-15 | End: 2022-12-19 | Stop reason: HOSPADM

## 2022-12-15 RX ORDER — PIOGLITAZONEHYDROCHLORIDE 45 MG/1
45 TABLET ORAL DAILY
Status: DISCONTINUED | OUTPATIENT
Start: 2022-12-15 | End: 2022-12-16

## 2022-12-15 RX ORDER — BISACODYL 10 MG
10 SUPPOSITORY, RECTAL RECTAL DAILY PRN
Status: DISCONTINUED | OUTPATIENT
Start: 2022-12-15 | End: 2022-12-19 | Stop reason: HOSPADM

## 2022-12-15 RX ORDER — SODIUM CHLORIDE 9 MG/ML
40 INJECTION, SOLUTION INTRAVENOUS AS NEEDED
Status: DISCONTINUED | OUTPATIENT
Start: 2022-12-15 | End: 2022-12-19 | Stop reason: HOSPADM

## 2022-12-15 RX ORDER — SODIUM CHLORIDE 0.9 % (FLUSH) 0.9 %
10 SYRINGE (ML) INJECTION AS NEEDED
Status: DISCONTINUED | OUTPATIENT
Start: 2022-12-15 | End: 2022-12-19 | Stop reason: HOSPADM

## 2022-12-15 RX ORDER — ALVIMOPAN 12 MG/1
12 CAPSULE ORAL ONCE
Status: COMPLETED | OUTPATIENT
Start: 2022-12-15 | End: 2022-12-15

## 2022-12-15 RX ORDER — PANTOPRAZOLE SODIUM 40 MG/1
40 TABLET, DELAYED RELEASE ORAL
Status: DISCONTINUED | OUTPATIENT
Start: 2022-12-15 | End: 2022-12-19 | Stop reason: HOSPADM

## 2022-12-15 RX ORDER — SODIUM CHLORIDE, SODIUM LACTATE, POTASSIUM CHLORIDE, CALCIUM CHLORIDE 600; 310; 30; 20 MG/100ML; MG/100ML; MG/100ML; MG/100ML
1000 INJECTION, SOLUTION INTRAVENOUS CONTINUOUS
Status: DISCONTINUED | OUTPATIENT
Start: 2022-12-15 | End: 2022-12-15

## 2022-12-15 RX ORDER — LOSARTAN POTASSIUM 50 MG/1
100 TABLET ORAL DAILY
Status: DISCONTINUED | OUTPATIENT
Start: 2022-12-16 | End: 2022-12-19 | Stop reason: HOSPADM

## 2022-12-15 RX ORDER — SODIUM CHLORIDE 0.9 % (FLUSH) 0.9 %
10 SYRINGE (ML) INJECTION AS NEEDED
Status: DISCONTINUED | OUTPATIENT
Start: 2022-12-15 | End: 2022-12-15 | Stop reason: HOSPADM

## 2022-12-15 RX ORDER — SODIUM CHLORIDE 0.9 % (FLUSH) 0.9 %
10 SYRINGE (ML) INJECTION EVERY 12 HOURS SCHEDULED
Status: DISCONTINUED | OUTPATIENT
Start: 2022-12-15 | End: 2022-12-19 | Stop reason: HOSPADM

## 2022-12-15 RX ORDER — POLYETHYLENE GLYCOL 3350 17 G/17G
17 POWDER, FOR SOLUTION ORAL DAILY PRN
Status: DISCONTINUED | OUTPATIENT
Start: 2022-12-15 | End: 2022-12-19 | Stop reason: HOSPADM

## 2022-12-15 RX ORDER — AMOXICILLIN 250 MG
2 CAPSULE ORAL 2 TIMES DAILY
Status: DISCONTINUED | OUTPATIENT
Start: 2022-12-15 | End: 2022-12-19 | Stop reason: HOSPADM

## 2022-12-15 RX ORDER — SODIUM CHLORIDE 9 MG/ML
100 INJECTION, SOLUTION INTRAVENOUS CONTINUOUS
Status: DISCONTINUED | OUTPATIENT
Start: 2022-12-15 | End: 2022-12-15

## 2022-12-15 RX ORDER — OXYCODONE HYDROCHLORIDE 5 MG/1
5 TABLET ORAL EVERY 4 HOURS PRN
Status: DISCONTINUED | OUTPATIENT
Start: 2022-12-15 | End: 2022-12-19 | Stop reason: HOSPADM

## 2022-12-15 RX ORDER — MAGNESIUM SULFATE HEPTAHYDRATE 40 MG/ML
2 INJECTION, SOLUTION INTRAVENOUS AS NEEDED
Status: DISCONTINUED | OUTPATIENT
Start: 2022-12-15 | End: 2022-12-19 | Stop reason: HOSPADM

## 2022-12-15 RX ORDER — ALVIMOPAN 12 MG/1
12 CAPSULE ORAL 2 TIMES DAILY
Status: DISCONTINUED | OUTPATIENT
Start: 2022-12-15 | End: 2022-12-16

## 2022-12-15 RX ORDER — MAGNESIUM SULFATE 1 G/100ML
1 INJECTION INTRAVENOUS AS NEEDED
Status: DISCONTINUED | OUTPATIENT
Start: 2022-12-15 | End: 2022-12-19 | Stop reason: HOSPADM

## 2022-12-15 RX ORDER — GLIPIZIDE 5 MG/1
10 TABLET ORAL
Status: DISCONTINUED | OUTPATIENT
Start: 2022-12-15 | End: 2022-12-19 | Stop reason: HOSPADM

## 2022-12-15 RX ORDER — ROPIVACAINE HYDROCHLORIDE 5 MG/ML
INJECTION, SOLUTION EPIDURAL; INFILTRATION; PERINEURAL
Status: COMPLETED | OUTPATIENT
Start: 2022-12-15 | End: 2022-12-15

## 2022-12-15 RX ORDER — ALUMINA, MAGNESIA, AND SIMETHICONE 2400; 2400; 240 MG/30ML; MG/30ML; MG/30ML
15 SUSPENSION ORAL EVERY 6 HOURS PRN
Status: DISCONTINUED | OUTPATIENT
Start: 2022-12-15 | End: 2022-12-19 | Stop reason: HOSPADM

## 2022-12-15 RX ORDER — ONDANSETRON 4 MG/1
4 TABLET, FILM COATED ORAL EVERY 6 HOURS PRN
Status: DISCONTINUED | OUTPATIENT
Start: 2022-12-15 | End: 2022-12-19 | Stop reason: HOSPADM

## 2022-12-15 RX ORDER — NITROGLYCERIN 0.4 MG/1
0.4 TABLET SUBLINGUAL
Status: DISCONTINUED | OUTPATIENT
Start: 2022-12-15 | End: 2022-12-19 | Stop reason: HOSPADM

## 2022-12-15 RX ORDER — SODIUM CHLORIDE, SODIUM LACTATE, POTASSIUM CHLORIDE, CALCIUM CHLORIDE 600; 310; 30; 20 MG/100ML; MG/100ML; MG/100ML; MG/100ML
INJECTION, SOLUTION INTRAVENOUS CONTINUOUS PRN
Status: DISCONTINUED | OUTPATIENT
Start: 2022-12-15 | End: 2022-12-15 | Stop reason: SURG

## 2022-12-15 RX ORDER — ACETAMINOPHEN 650 MG/1
650 SUPPOSITORY RECTAL EVERY 4 HOURS PRN
Status: DISCONTINUED | OUTPATIENT
Start: 2022-12-15 | End: 2022-12-19 | Stop reason: HOSPADM

## 2022-12-15 RX ORDER — ATORVASTATIN CALCIUM 10 MG/1
10 TABLET, FILM COATED ORAL NIGHTLY
Status: DISCONTINUED | OUTPATIENT
Start: 2022-12-15 | End: 2022-12-19 | Stop reason: HOSPADM

## 2022-12-15 RX ORDER — ONDANSETRON 2 MG/ML
4 INJECTION INTRAMUSCULAR; INTRAVENOUS EVERY 6 HOURS PRN
Status: DISCONTINUED | OUTPATIENT
Start: 2022-12-15 | End: 2022-12-19 | Stop reason: HOSPADM

## 2022-12-15 RX ORDER — DEXAMETHASONE SODIUM PHOSPHATE 4 MG/ML
INJECTION, SOLUTION INTRA-ARTICULAR; INTRALESIONAL; INTRAMUSCULAR; INTRAVENOUS; SOFT TISSUE AS NEEDED
Status: DISCONTINUED | OUTPATIENT
Start: 2022-12-15 | End: 2022-12-15 | Stop reason: SURG

## 2022-12-15 RX ORDER — PROPOFOL 10 MG/ML
VIAL (ML) INTRAVENOUS AS NEEDED
Status: DISCONTINUED | OUTPATIENT
Start: 2022-12-15 | End: 2022-12-15 | Stop reason: SURG

## 2022-12-15 RX ORDER — ROCURONIUM BROMIDE 10 MG/ML
INJECTION, SOLUTION INTRAVENOUS AS NEEDED
Status: DISCONTINUED | OUTPATIENT
Start: 2022-12-15 | End: 2022-12-15 | Stop reason: SURG

## 2022-12-15 RX ORDER — PROMETHAZINE HYDROCHLORIDE 12.5 MG/1
12.5 TABLET ORAL EVERY 6 HOURS PRN
Status: DISCONTINUED | OUTPATIENT
Start: 2022-12-15 | End: 2022-12-19 | Stop reason: HOSPADM

## 2022-12-15 RX ADMIN — ALVIMOPAN 12 MG: 12 CAPSULE ORAL at 06:35

## 2022-12-15 RX ADMIN — ACETAMINOPHEN 1000 MG: 500 TABLET, FILM COATED ORAL at 06:34

## 2022-12-15 RX ADMIN — HYDROMORPHONE HYDROCHLORIDE 0.5 MG: 1 INJECTION, SOLUTION INTRAMUSCULAR; INTRAVENOUS; SUBCUTANEOUS at 13:09

## 2022-12-15 RX ADMIN — FENTANYL CITRATE 50 MCG: 50 INJECTION, SOLUTION INTRAMUSCULAR; INTRAVENOUS at 07:41

## 2022-12-15 RX ADMIN — SODIUM CHLORIDE, POTASSIUM CHLORIDE, SODIUM LACTATE AND CALCIUM CHLORIDE 1000 ML: 600; 310; 30; 20 INJECTION, SOLUTION INTRAVENOUS at 06:33

## 2022-12-15 RX ADMIN — SENNOSIDES AND DOCUSATE SODIUM 2 TABLET: 50; 8.6 TABLET ORAL at 20:06

## 2022-12-15 RX ADMIN — PANTOPRAZOLE SODIUM 40 MG: 40 TABLET, DELAYED RELEASE ORAL at 17:17

## 2022-12-15 RX ADMIN — SODIUM CHLORIDE, SODIUM LACTATE, POTASSIUM CHLORIDE, AND CALCIUM CHLORIDE: .6; .31; .03; .02 INJECTION, SOLUTION INTRAVENOUS at 07:42

## 2022-12-15 RX ADMIN — ROPIVACAINE HYDROCHLORIDE 30 ML: 5 INJECTION EPIDURAL; INFILTRATION; PERINEURAL at 07:41

## 2022-12-15 RX ADMIN — DEXAMETHASONE SODIUM PHOSPHATE 4 MG: 4 INJECTION, SOLUTION INTRAMUSCULAR; INTRAVENOUS at 07:53

## 2022-12-15 RX ADMIN — METFORMIN HYDROCHLORIDE 2000 MG: 500 TABLET ORAL at 17:17

## 2022-12-15 RX ADMIN — ACETAMINOPHEN 1000 MG: 500 TABLET ORAL at 17:20

## 2022-12-15 RX ADMIN — CEFOXITIN 2 G: 2 INJECTION, POWDER, FOR SOLUTION INTRAVENOUS at 07:39

## 2022-12-15 RX ADMIN — SUGAMMADEX 250 MG: 100 INJECTION, SOLUTION INTRAVENOUS at 09:47

## 2022-12-15 RX ADMIN — FENTANYL CITRATE 50 MCG: 50 INJECTION, SOLUTION INTRAMUSCULAR; INTRAVENOUS at 08:07

## 2022-12-15 RX ADMIN — PIOGLITAZONE 45 MG: 45 TABLET ORAL at 17:17

## 2022-12-15 RX ADMIN — LIDOCAINE HYDROCHLORIDE 60 MG: 20 INJECTION, SOLUTION INTRAVENOUS at 07:41

## 2022-12-15 RX ADMIN — PROPOFOL 200 MG: 10 INJECTION, EMULSION INTRAVENOUS at 07:41

## 2022-12-15 RX ADMIN — INSULIN LISPRO 3 UNITS: 100 INJECTION, SOLUTION INTRAVENOUS; SUBCUTANEOUS at 13:09

## 2022-12-15 RX ADMIN — ROCURONIUM BROMIDE 50 MG: 50 INJECTION INTRAVENOUS at 07:41

## 2022-12-15 RX ADMIN — POTASSIUM CHLORIDE, DEXTROSE MONOHYDRATE AND SODIUM CHLORIDE 100 ML/HR: 150; 5; 450 INJECTION, SOLUTION INTRAVENOUS at 12:31

## 2022-12-15 RX ADMIN — SODIUM CHLORIDE, SODIUM LACTATE, POTASSIUM CHLORIDE, AND CALCIUM CHLORIDE: .6; .31; .03; .02 INJECTION, SOLUTION INTRAVENOUS at 08:44

## 2022-12-15 RX ADMIN — CHLORHEXIDINE GLUCONATE 15 ML: 1.2 SOLUTION ORAL at 06:39

## 2022-12-15 RX ADMIN — ONDANSETRON 8 MG: 2 INJECTION INTRAMUSCULAR; INTRAVENOUS at 07:53

## 2022-12-15 RX ADMIN — HYDROMORPHONE HYDROCHLORIDE 0.5 MG: 1 INJECTION, SOLUTION INTRAMUSCULAR; INTRAVENOUS; SUBCUTANEOUS at 11:32

## 2022-12-15 RX ADMIN — POTASSIUM CHLORIDE, DEXTROSE MONOHYDRATE AND SODIUM CHLORIDE 100 ML/HR: 150; 5; 450 INJECTION, SOLUTION INTRAVENOUS at 23:47

## 2022-12-15 RX ADMIN — ALVIMOPAN 12 MG: 12 CAPSULE ORAL at 20:05

## 2022-12-15 RX ADMIN — EMPAGLIFLOZIN 25 MG: 25 TABLET, FILM COATED ORAL at 17:17

## 2022-12-15 RX ADMIN — ROCURONIUM BROMIDE 10 MG: 50 INJECTION INTRAVENOUS at 08:54

## 2022-12-15 RX ADMIN — CELECOXIB 200 MG: 200 CAPSULE ORAL at 06:36

## 2022-12-15 RX ADMIN — ACETAMINOPHEN 1000 MG: 500 TABLET ORAL at 23:47

## 2022-12-15 RX ADMIN — ACETAMINOPHEN 1000 MG: 500 TABLET ORAL at 13:10

## 2022-12-15 RX ADMIN — Medication 10 ML: at 17:25

## 2022-12-15 RX ADMIN — FENTANYL CITRATE 50 MCG: 50 INJECTION, SOLUTION INTRAMUSCULAR; INTRAVENOUS at 07:46

## 2022-12-15 RX ADMIN — ONDANSETRON 4 MG: 2 INJECTION INTRAMUSCULAR; INTRAVENOUS at 20:18

## 2022-12-15 RX ADMIN — MUPIROCIN 2 APPLICATION: 20 OINTMENT TOPICAL at 06:40

## 2022-12-15 RX ADMIN — GABAPENTIN 600 MG: 300 CAPSULE ORAL at 06:37

## 2022-12-15 RX ADMIN — ATORVASTATIN CALCIUM 10 MG: 10 TABLET, FILM COATED ORAL at 20:05

## 2022-12-15 NOTE — ANESTHESIA PROCEDURE NOTES
Peripheral Block      Patient reassessed immediately prior to procedure    Patient location during procedure: OR  Start time: 12/15/2022 7:41 AM  Stop time: 12/15/2022 7:51 AM  Reason for block: procedure for pain, at surgeon's request and post-op pain management  Performed by  Anesthesiologist: Dudley Steven MD  Assisted by: Jim An RN  Preanesthetic Checklist  Completed: patient identified, IV checked, site marked, risks and benefits discussed, surgical consent, monitors and equipment checked, pre-op evaluation and timeout performed  Prep:  Pt Position: supine  Sterile barriers:cap, gloves, sterile barriers, mask and gown  Prep: ChloraPrep  Patient monitoring: blood pressure monitoring, continuous pulse oximetry and EKG  Procedure    Sedation: yes  Performed under: general  Guidance:ultrasound guided    ULTRASOUND INTERPRETATION.  Using ultrasound guidance a 20 G gauge needle was placed in close proximity to the nerve, at which point, under ultrasound guidance anesthetic was injected in the area of the nerve and spread of the anesthesia was seen on ultrasound in close proximity thereto.  There were no abnormalities seen on ultrasound; a digital image was taken; and the patient tolerated the procedure with no complications. Images:still images obtained, printed/placed on chart    Laterality:Bilateral  Block Type:TAP  Injection Technique:single-shot  Needle Type:echogenic  Needle Gauge:20 G  Resistance on Injection: less than 15 psi    Medications Used: ropivacaine (NAROPIN) 0.5 % injection - Injection   30 mL - 12/15/2022 7:41:00 AM      Medications  Preservative Free Saline:30ml  Comment:30 cc 0.25% per side.  Well visualized    Post Assessment  Injection Assessment: negative aspiration for heme, no paresthesia on injection and incremental injection  Patient Tolerance:comfortable throughout block  Complications:no

## 2022-12-15 NOTE — ANESTHESIA PROCEDURE NOTES
Airway  Urgency: elective    Date/Time: 12/15/2022 7:41 AM  Airway not difficult    General Information and Staff    Patient location during procedure: OR    Indications and Patient Condition  Indications for airway management: airway protection    Preoxygenated: yes  MILS maintained throughout  Mask difficulty assessment: 1 - vent by mask    Final Airway Details  Final airway type: endotracheal airway      Successful airway: ETT  Cuffed: yes   Successful intubation technique: direct laryngoscopy  Facilitating devices/methods: intubating stylet  Endotracheal tube insertion site: oral  Blade: Melissa  Blade size: 3  ETT size (mm): 7.5  Cormack-Lehane Classification: grade I - full view of glottis  Placement verified by: chest auscultation   Cuff volume (mL): 5  Measured from: lips  Number of attempts at approach: 1  Assessment: lips, teeth, and gum same as pre-op

## 2022-12-15 NOTE — CONSULTS
"    [facility] Medicine Services - Consult Note    Patient Name: Vlad Guerrero  : 1945  MRN: [id number]  Primary Care Physician:  Batool Lin APRN  Referring Physician: Percy Davis,*  Date of admission: 12/15/2022    Consults    Subjective      Reason for Consult/ Chief Complaint: Medical management    History of Present Illness: Vlad Guerrero is a 77 y.o. 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  Patient is in his room but unable to participate in HPI due to sleeping from anesthesia.  HPI taken from review of records and edited as appropriate.      Patient is a 77 y.o. male presents with symptomatic anemia.  The patient was sent to the hospital by his primary care physician for work-up and treatment of a symptomatic anemia which has been progressively getting worse over the last month or so.  He reports dizziness with \"falling out\".  Upon arrival in the ER, the patient was found to have a hemoglobin of 7.1.  Hematology oncology was consulted for microcytic anemia, and iron studies demonstrated iron deficiency anemia.  His fecal occult blood test was negative, the patient was transfused 1 unit of packed red blood cells and started on iron infusions.  He denies having any abdominal pain, nausea, vomiting, difficulty passing bowel movements, or blood per rectum.  He has had an upper endoscopy in the past with esophageal dilation, he has had an appendectomy as a child, and he has never had a lower endoscopy.  The patient's family history is significant for colon cancer in his father.    The patient was felt to possibly have chronic GI blood loss, and was recommended to undergo EGD and colonoscopy by gastroenterology.  Gastroenterology performed a bowel prep, and this morning performed a EGD and colonoscopy which demonstrated a circumferential 4.5 cm " mass in the cecum concerning for colon cancer.  Multiple biopsies were taken.  The patient also had a 2 polyps removed from his descending and rectum area.  General surgery was consulted for possible colonic resection and oncology was informed of the findings as well.  The patient had a CEA drawn, but has not returned yet.  He has not yet had any staging scans performed.      Review of Systems   Unable to perform ROS: other          Personal History     Past Medical History:   Diagnosis Date   • Anemia    • Cancer (HCC)     colon   • Diabetes mellitus (HCC)    • Hyperlipidemia    • Hypertension        Past Surgical History:   Procedure Laterality Date   • APPENDECTOMY     • CARDIAC CATHETERIZATION     • COLONOSCOPY N/A 11/11/2022    Procedure: COLONOSCOPY WITH BIOPSY AND POLYPECTOMY;  Surgeon: Billy Julien MD;  Location: [facility] ENDOSCOPY;  Service: Gastroenterology;  Laterality: N/A;  Impression:  1.  Large 4-5cm fulgurating circumferential ulcerated mass in the very proximal part of the ascending colon next to ileocecal valve multiple biopsies were performed.  This is highly concerning for colon malignancy.  2.  2 polyp rem   • ENDOSCOPY N/A 11/11/2022    Procedure: ESOPHAGOGASTRODUODENOSCOPY with biopsy X1;  Surgeon: Billy Julien MD;  Location: [facility] ENDOSCOPY;  Service: Gastroenterology;  Laterality: N/A;  5.  Upper endoscopy lamination unremarkable.      • TONSILLECTOMY         Family History: family history is not on file. Otherwise pertinent FHx was reviewed and not pertinent to current issue.    Social History:  reports that he has never smoked. He has never used smokeless tobacco. He reports that he does not currently use alcohol. He reports that he does not use drugs.    Home Medications:   Semaglutide(0.25 or 0.5MG/DOS), UNKNOWN TO PATIENT, empagliflozin, glimepiride, losartan, metFORMIN, pioglitazone, polyethylene glycol, and pravastatin    Allergies:  Allergies   Allergen Reactions   • Penicillins  Rash         Objective      Vitals:  Temp:  [97.9 °F (36.6 °C)-98.5 °F (36.9 °C)] 97.9 °F (36.6 °C)  Heart Rate:  [79-83] 83  Resp:  [11-26] 17  BP: (125-131)/(54-63) 129/59  Flow (L/min):  [6] 6    Physical Exam  Constitutional:       Comments: Patient sleeping well status post anesthesia   HENT:      Head: Normocephalic and atraumatic.   Cardiovascular:      Rate and Rhythm: Normal rate.      Pulses: Normal pulses.   Pulmonary:      Effort: Pulmonary effort is normal. No respiratory distress.      Breath sounds: Normal breath sounds.   Musculoskeletal:      Cervical back: Neck supple.   Skin:     General: Skin is warm and dry.            Result Review    Result Review:  I have personally reviewed the results from the time of this admission to 12/15/2022 10:41 EST and agree with these findings:  [x]  Laboratory  [x]  Microbiology  [x]  Radiology  [x]  EKG/Telemetry   []  Cardiology/Vascular   []  Pathology  []  Old records  []  Other:        Assessment & Plan        Active Hospital Problems:  Active Hospital Problems    Diagnosis    • **Mass of colon      Plan:     Status post right colon resection for cancer (cecum)  -Incisions, drains and pain management per surgery  -hgb 7.8 monitor and transfuse <7.    DM2   -Current glucose 138  -Hold home DM2 meds  -SSI  -Monitor glucose before meals and at bedtime    Hypertension  -Current /59  -Continue home meds  -Monitor BP    Hyperlipidemia  -Continue home statin      Signature: Electronically signed by Zamzam Perkins PA-C, 12/15/22, 3:34 PM EST.

## 2022-12-15 NOTE — H&P
"GENERAL SURGERY CONSULTATION NOTE    Consult requested by: Dr. Julien    Patient Care Team:  Batool Lin APRN as PCP - General (Family Medicine)    Reason for consult: colon mass    Subjective     Patient is a 77 y.o. male presents with symptomatic anemia.  The patient was sent to the hospital by his primary care physician for work-up and treatment of a symptomatic anemia which has been progressively getting worse over the last month or so.  He reports dizziness with \"falling out\".  Upon arrival in the ER, the patient was found to have a hemoglobin of 7.1.  Hematology oncology was consulted for microcytic anemia, and iron studies demonstrated iron deficiency anemia.  His fecal occult blood test was negative, the patient was transfused 1 unit of packed red blood cells and started on iron infusions.  He denies having any abdominal pain, nausea, vomiting, difficulty passing bowel movements, or blood per rectum.  He has had an upper endoscopy in the past with esophageal dilation, he has had an appendectomy as a child, and he has never had a lower endoscopy.  The patient's family history is significant for colon cancer in his father.  The patient was felt to possibly have chronic GI blood loss, and was recommended to undergo EGD and colonoscopy by gastroenterology.  Gastroenterology performed a bowel prep, and this morning performed a EGD and colonoscopy which demonstrated a circumferential 4.5 cm mass in the cecum concerning for colon cancer.  Multiple biopsies were taken.  The patient also had a 2 polyps removed from his descending and rectum area.  General surgery was consulted for possible colonic resection and oncology was informed of the findings as well.  The patient had a CEA drawn, but has not returned yet.  He has not yet had any staging scans performed. .     Review of Systems   Constitutional: Negative for appetite change, chills and fever.   HENT: Negative for congestion and sore throat.    Respiratory: " Negative for cough and shortness of breath.    Cardiovascular: Negative for chest pain and palpitations.   Gastrointestinal: Negative for abdominal pain, constipation, diarrhea, nausea, vomiting and GERD.   Genitourinary: Negative for difficulty urinating, dysuria and frequency.   Musculoskeletal: Negative for arthralgias and back pain.   Skin: Negative for rash and skin lesions.   Neurological: Negative for dizziness, seizures and memory problem.   Hematological: Negative for adenopathy. Does not bruise/bleed easily.   Psychiatric/Behavioral: Negative for sleep disturbance and depressed mood.        History  Past Medical History:   Diagnosis Date   • Anemia    • Cancer (HCC)     colon   • Diabetes mellitus (HCC)    • Hyperlipidemia    • Hypertension      Past Surgical History:   Procedure Laterality Date   • APPENDECTOMY     • CARDIAC CATHETERIZATION     • COLONOSCOPY N/A 11/11/2022    Procedure: COLONOSCOPY WITH BIOPSY AND POLYPECTOMY;  Surgeon: Billy Julien MD;  Location: [facility] ENDOSCOPY;  Service: Gastroenterology;  Laterality: N/A;  Impression:  1.  Large 4-5cm fulgurating circumferential ulcerated mass in the very proximal part of the ascending colon next to ileocecal valve multiple biopsies were performed.  This is highly concerning for colon malignancy.  2.  2 polyp rem   • ENDOSCOPY N/A 11/11/2022    Procedure: ESOPHAGOGASTRODUODENOSCOPY with biopsy X1;  Surgeon: Billy Julien MD;  Location: [facility] ENDOSCOPY;  Service: Gastroenterology;  Laterality: N/A;  5.  Upper endoscopy lamination unremarkable.      • TONSILLECTOMY       History reviewed. No pertinent family history.  Social History     Tobacco Use   • Smoking status: Never   • Smokeless tobacco: Never   Vaping Use   • Vaping Use: Never used   Substance Use Topics   • Alcohol use: Not Currently   • Drug use: Never     Medications Prior to Admission   Medication Sig Dispense Refill Last Dose   • chlorhexidine (HIBICLENS) 4 % external liquid Use as  "directed 1 mL 0 12/15/2022   • empagliflozin (JARDIANCE) 25 MG tablet tablet Take 25 mg by mouth Daily. Dont take preop   12/13/2022   • erythromycin base (E-MYCIN) 500 MG tablet Take 2 tablets by mouth 3 (Three) Times a Day. The day prior to surgery 6 tablet 0 12/14/2022   • ferrous sulfate (FerrouSul) 325 (65 FE) MG tablet Take 1 tablet by mouth 2 (Two) Times a Day. (Patient taking differently: Take 325 mg by mouth 2 (Two) Times a Day. None preop) 30 tablet 0 12/13/2022   • glimepiride (AMARYL) 4 MG tablet Take 1 tablet by mouth 2 (Two) Times a Day. None preop   12/13/2022   • losartan (COZAAR) 100 MG tablet Take 100 mg by mouth Daily. Last dose 12/14 by 0730   12/13/2022   • metFORMIN (GLUCOPHAGE) 1000 MG tablet Take 2,000 mg by mouth Daily With Dinner. Last dose 12/12 12/13/2022   • metroNIDAZOLE (FLAGYL) 500 MG tablet Take 2pills @ 1:00, 2pills @ 2:00, and 2pills @ 10:00 the day before surgery 6 tablet 0 12/14/2022   • pioglitazone (ACTOS) 45 MG tablet Take 45 mg by mouth Daily. None preop   12/13/2022   • polyethylene glycol (MIRALAX) 17 GM/SCOOP powder Take 17 g by mouth As Needed. None preop   12/14/2022   • pravastatin (PRAVACHOL) 40 MG tablet Take 1 tablet by mouth Every Night.   12/13/2022   • Semaglutide,0.25 or 0.5MG/DOS, (Ozempic, 0.25 or 0.5 MG/DOSE,) 2 MG/1.5ML solution pen-injector Inject 0.25 mg under the skin into the appropriate area as directed 1 (One) Time Per Week. friday      • UNKNOWN TO PATIENT Inject  under the skin into the appropriate area as directed Every 7 (Seven) Days. Per patient - GLP-1 RA - like medication that he injects SQ once weekly on Friday        Allergies:  Penicillins    Objective     Vital Signs  /63   Pulse 79   Temp 98.5 °F (36.9 °C) (Oral)   Resp 11   Ht 182.9 cm (72\")   Wt 85.5 kg (188 lb 9.6 oz)   SpO2 98%   BMI 25.58 kg/m²      Physical Exam  Vitals reviewed.   Constitutional:       Appearance: He is well-developed.   HENT:      Head: Normocephalic " and atraumatic.   Eyes:      Pupils: Pupils are equal, round, and reactive to light.   Cardiovascular:      Rate and Rhythm: Normal rate and regular rhythm.   Pulmonary:      Effort: Pulmonary effort is normal.      Breath sounds: Normal breath sounds.   Abdominal:      General: There is no distension.      Palpations: Abdomen is soft.      Tenderness: There is no abdominal tenderness.      Hernia: No hernia is present.   Musculoskeletal:         General: Normal range of motion.      Cervical back: Normal range of motion.   Lymphadenopathy:      Cervical: No cervical adenopathy.   Skin:     General: Skin is warm and dry.      Findings: No rash.   Neurological:      Mental Status: He is alert and oriented to person, place, and time.         Results Review:   Lab Results (last 24 hours)     Procedure Component Value Units Date/Time    Comprehensive Metabolic Panel [[id number]]  (Abnormal) Collected: 12/15/22 0621    Specimen: Blood Updated: 12/15/22 0653     Glucose 138 mg/dL      BUN 16 mg/dL      Creatinine 0.96 mg/dL      Sodium 142 mmol/L      Potassium 3.9 mmol/L      Chloride 107 mmol/L      CO2 25.0 mmol/L      Calcium 8.8 mg/dL      Total Protein 6.6 g/dL      Albumin 3.80 g/dL      ALT (SGPT) 17 U/L      AST (SGOT) 23 U/L      Alkaline Phosphatase 82 U/L      Total Bilirubin 0.4 mg/dL      Globulin 2.8 gm/dL      A/G Ratio 1.4 g/dL      BUN/Creatinine Ratio 16.7     Anion Gap 10.0 mmol/L      eGFR 82.4 mL/min/1.73      Comment: National Kidney Foundation and American Society of Nephrology (ASN) Task Force recommended calculation based on the Chronic Kidney Disease Epidemiology Collaboration (CKD-EPI) equation refit without adjustment for race.       Narrative:      GFR Normal >60  Chronic Kidney Disease <60  Kidney Failure <15    The GFR formula is only valid for adults with stable renal function between ages 18 and 70.    CBC & Differential [[id number]]  (Abnormal) Collected: 12/15/22 0622    Specimen: Blood  Updated: 12/15/22 0649    Narrative:      The following orders were created for panel order CBC & Differential.  Procedure                               Abnormality         Status                     ---------                               -----------         ------                     CBC Auto Differential[[id number]]        Abnormal            Final result               Scan Slide[[id number]]                                                                    Please view results for these tests on the individual orders.    CBC Auto Differential [[id number]]  (Abnormal) Collected: 12/15/22 0622    Specimen: Blood Updated: 12/15/22 0633     WBC 4.30 10*3/mm3      RBC 3.76 10*6/mm3      Hemoglobin 7.8 g/dL      Hematocrit 25.5 %      MCV 67.9 fL      MCH 20.7 pg      MCHC 30.5 g/dL      RDW 28.2 %      RDW-SD 69.1 fl      MPV 8.1 fL      Platelets 201 10*3/mm3      Neutrophil % 58.3 %      Lymphocyte % 27.1 %      Monocyte % 12.3 %      Eosinophil % 1.7 %      Basophil % 0.6 %      Neutrophils, Absolute 2.50 10*3/mm3      Lymphocytes, Absolute 1.20 10*3/mm3      Monocytes, Absolute 0.50 10*3/mm3      Eosinophils, Absolute 0.10 10*3/mm3      Basophils, Absolute 0.00 10*3/mm3      nRBC 0.0 /100 WBC         No radiology results for the last day      I reviewed the patient's new imaging results and agree with the interpretation.  I reviewed the patient's other test results and agree with the interpretation    Assessment & Plan     Principal Problem:    Mass of colon  Right colon cancer  Symptomatic anemia of blood loss due to colon mass    Recommend open right hemicolectomy.  Risk, benefits, and alternatives to surgery were discussed with the patient who understands and agrees to proceed.  Work-up negative for metastatic disease  Patient will be admitted to the hospital postoperatively    I discussed the patients findings and my recommendations with the patient.     Percy Davis MD  12/15/22  07:27 EST

## 2022-12-15 NOTE — PLAN OF CARE
Goal Outcome Evaluation:           Progress: improving  Outcome Evaluation: Patient arrived to unit from PACU. No complaints of pain. abdominal binder in place and continued rodriguez catheter until AM. Safety measures in place. Call light within reach. Will continue to monitor.

## 2022-12-15 NOTE — SIGNIFICANT NOTE
12/15/22 1543   OTHER   Discipline physical therapist   Rehab Time/Intention   Session Not Performed unable to evaluate, medical status change;other (see comments)  (Pt still too lethargic from surgery for PT Eval; will follow-up with pt tomorrow)   Therapy Assessment/Plan (PT)   Criteria for Skilled Interventions Met (PT) yes   Recommendation   PT - Next Appointment 12/16/22

## 2022-12-15 NOTE — OP NOTE
COLON RESECTION RIGHT  Operative Note    Patient Name:  Vlad Guerrero  YOB: 1945    Date of Surgery:  12/15/2022     Indications: Patient is a 77-year-old gentleman with a bleeding mass of the cecum which was found to be cancer.  We discussed the risk, benefits, and alternatives to surgery, the patient understands, and agrees to proceed to the operating room for right hemicolectomy for adenocarcinoma of the colon.    Pre-op Diagnosis:   Adenocarcinoma of the cecum    Post-op Diagnosis:  Same    Procedure/CPT® Codes:      Procedure(s):  COLON RESECTION RIGHT    Staff:  Surgeon(s):  Percy Davis MD    Assistant: Sunshine Acosta was responsible for performing the following activities: Retraction, Suction, Irrigation and Closing and their skilled assistance was necessary for the success of this case.     Anesthesia: General with Block    Estimated Blood Loss: minimal    Implants:    Implant Name Type Inv. Item Serial No.  Lot No. LRB No. Used Action   STPLR LNR CUT PROX 75MM LUCRETIA [id number] - [id number] Implant STPLR LNR CUT PROX 75MM LUCRETIA [id number]  [employer]  DIV OF J [employer] J [id number] N/A 1 Implanted   RELOAD STPLR LNR CUT PROX 75MM LUCRETIA [id number] - [id number] Implant RELOAD STPLR LNR CUT PROX 75MM LUCRETIA [id number]  [employer]  DIV OF J [employer] J [id number] N/A 3 Implanted       Specimen:          Specimens     ID Source Type Tests Collected By Collected At Frozen?    A Large Intestine, Right / Ascending Colon Tissue · TISSUE PATHOLOGY EXAM   Percy Davis MD 12/15/22 6368           Findings: Apple core lesion within the cecum.  No evidence of metastatic disease throughout the abdomen    Complications: None, immediately    Description of Procedure: After obtaining informed consent the preop holding area, patient was brought to the operating room placed in supine position.  SCDs were applied, preoperative antibiotics were administered.  Patient then underwent uncomplicated duction  of general endotracheal anesthesia.  A Tavares catheter was inserted, and the abdomen was prepped and draped in the usual sterile fashion.  After a brief timeout the procedure began.  I began by making a generous vertical midline incision the patient's abdomen opening the abdomen widely along the midline.  I then examined the abdomen in its entirety, and there was no evidence of any palpable metastatic disease within the liver, the small bowel appeared normal caliber without any obstructions, or evidence of metastatic disease within the mesentery.  The cecum did have a firm mass consistent with what was seen on endoscopy.  The remainder of the colon was unremarkable.  I then began by mobilizing the right colon along the white line of Toldt and medialized the colon until the duodenum could be seen at the deep portion of my dissection near the hepatic flexure.  The hepatic flexure was dissected using electrocautery in order to completely mobilize the right colon.  Once this was done I was able to locate the vascular pedicle of the ileocolic vessels.  I selected this is my area of transection given the fact that this was cancer in order to obtain adequate resection.  I verify that there was good blood flow to the small bowel proximal and was able to locate the middle colic artery.  I selected a area of the transverse colon just to the right of the middle colic artery, and selected this to be my distal transection point.  I selected an area of small bowel with an adequate blood supply and selected this is my proximal transection point.  I used 75 mm FABIAN staplers to transect both of these areas.  I then used an Enseal device to perform a high ligation of the ileocolic vessels and divided the remainder of the mesocolon to the right colon taking the lymph nodes and blood vessels with this.  I then passed the specimen off for pathologic review.  After doing this, I turned my attention towards creation of a side-to-side,  functional end-to-end stapled anastomosis of the terminal ileum to the transverse colon.  This went well without any twisting I then closed the mesenteric defect to avoid any internal hernias.  I then returned the anastomosis into the abdomen, in the right upper quadrant.  I then irrigated the abdomen with copious amounts of Irrisept.  We then followed this up with warm normal saline.  After doing this, I then turned my attention towards a clean closure of the abdomen.  We reprepped and draped the patient's abdomen and changed our gowns and gloves.  I then closed the incision using 2 #1 looped PDS sutures to reapproximate the fascia.  The skin was reapproximated using skin staples.  The patient tolerated the procedure well, was awoken, and taken to PACU in satisfactory condition.    Percy Davis MD     Date: 12/15/2022  Time: 09:53 EST    Colon Resection  Operation performed with curative intent Yes   Tumor Location Cecum   Extent of colon and vascular resection Right hemicolectomy - ileocolic, right colic (if present)

## 2022-12-15 NOTE — ANESTHESIA POSTPROCEDURE EVALUATION
Patient: Vlad Guerrero    Procedure Summary     Date: 12/15/22 Room / Location: [facility] OR 02 / [facility] MAIN OR    Anesthesia Start: 0729 Anesthesia Stop: 1000    Procedure: COLON RESECTION RIGHT (Abdomen) Diagnosis:       Mass of colon      (Mass of colon [K63.89])    Surgeons: Percy Davis MD Provider: Dudley Steven MD    Anesthesia Type: general, general with block, ERAS Protocol ASA Status: 3          Anesthesia Type: general, general with block, ERAS Protocol    Vitals  Vitals Value Taken Time   /67 12/15/22 1119   Temp 97.9 °F (36.6 °C) 12/15/22 0953   Pulse 84 12/15/22 1138   Resp 19 12/15/22 1053   SpO2 94 % 12/15/22 1138   Vitals shown include unvalidated device data.        Post Anesthesia Care and Evaluation    Patient location during evaluation: bedside  Patient participation: complete - patient participated  Level of consciousness: awake and alert  Pain score: 1  Pain management: adequate    Airway patency: patent  Anesthetic complications: No anesthetic complications  PONV Status: none  Cardiovascular status: acceptable  Respiratory status: acceptable  Hydration status: acceptable  Post Neuraxial Block status: Motor and sensory function returned to baseline

## 2022-12-15 NOTE — ANESTHESIA PREPROCEDURE EVALUATION
Anesthesia Evaluation     Patient summary reviewed and Nursing notes reviewed   NPO Solid Status: > 6 hours  NPO Liquid Status: > 6 hours           Airway   Mallampati: II  TM distance: >3 FB  Neck ROM: full  No difficulty expected  Dental - normal exam     Pulmonary - negative pulmonary ROS and normal exam    breath sounds clear to auscultation  Cardiovascular - normal exam    ECG reviewed  Rhythm: regular  Rate: normal    (+) hypertension, hyperlipidemia,       Neuro/Psych- negative ROS  GI/Hepatic/Renal/Endo    (+)   diabetes mellitus,     Musculoskeletal (-) negative ROS    Abdominal  - normal exam    Abdomen: soft.  Bowel sounds: normal.   Substance History - negative use     OB/GYN negative ob/gyn ROS         Other - negative ROS                       Anesthesia Plan    ASA 3     general, general with block and ERAS Protocol   total IV anesthesia  (Plan ga ett tap blocks at induction)  intravenous induction     Anesthetic plan, risks, benefits, and alternatives have been provided, discussed and informed consent has been obtained with: patient.    Use of blood products discussed with patient .   Plan discussed with CRNA.        CODE STATUS:

## 2022-12-16 PROBLEM — I10 PRIMARY HYPERTENSION: Status: ACTIVE | Noted: 2022-12-16

## 2022-12-16 PROBLEM — E78.2 MIXED HYPERLIPIDEMIA: Status: ACTIVE | Noted: 2022-12-16

## 2022-12-16 LAB
ANION GAP SERPL CALCULATED.3IONS-SCNC: 15 MMOL/L (ref 5–15)
ANISOCYTOSIS BLD QL: NORMAL
BASOPHILS # BLD AUTO: 0 10*3/MM3 (ref 0–0.2)
BASOPHILS NFR BLD AUTO: 0 % (ref 0–1.5)
BUN SERPL-MCNC: 18 MG/DL (ref 8–23)
BUN/CREAT SERPL: 21.7 (ref 7–25)
C3 FRG RBC-MCNC: NORMAL
CALCIUM SPEC-SCNC: 8.7 MG/DL (ref 8.6–10.5)
CHLORIDE SERPL-SCNC: 101 MMOL/L (ref 98–107)
CO2 SERPL-SCNC: 21 MMOL/L (ref 22–29)
CREAT SERPL-MCNC: 0.83 MG/DL (ref 0.76–1.27)
DEPRECATED RDW RBC AUTO: 70.9 FL (ref 37–54)
EGFRCR SERPLBLD CKD-EPI 2021: 91.3 ML/MIN/1.73
EOSINOPHIL # BLD AUTO: 0 10*3/MM3 (ref 0–0.4)
EOSINOPHIL NFR BLD AUTO: 0 % (ref 0.3–6.2)
ERYTHROCYTE [DISTWIDTH] IN BLOOD BY AUTOMATED COUNT: 28.4 % (ref 12.3–15.4)
GLUCOSE BLDC GLUCOMTR-MCNC: 147 MG/DL (ref 70–105)
GLUCOSE BLDC GLUCOMTR-MCNC: 197 MG/DL (ref 70–105)
GLUCOSE BLDC GLUCOMTR-MCNC: 204 MG/DL (ref 70–105)
GLUCOSE BLDC GLUCOMTR-MCNC: 230 MG/DL (ref 70–105)
GLUCOSE SERPL-MCNC: 268 MG/DL (ref 65–99)
HCT VFR BLD AUTO: 31 % (ref 37.5–51)
HGB BLD-MCNC: 9.4 G/DL (ref 13–17.7)
LAB AP CASE REPORT: NORMAL
LAB AP SYNOPTIC CHECKLIST: NORMAL
LARGE PLATELETS: NORMAL
LYMPHOCYTES # BLD AUTO: 0.4 10*3/MM3 (ref 0.7–3.1)
LYMPHOCYTES NFR BLD AUTO: 3.3 % (ref 19.6–45.3)
MAGNESIUM SERPL-MCNC: 2 MG/DL (ref 1.6–2.4)
MAGNESIUM SERPL-MCNC: 2.2 MG/DL (ref 1.6–2.4)
MCH RBC QN AUTO: 20.9 PG (ref 26.6–33)
MCHC RBC AUTO-ENTMCNC: 30.2 G/DL (ref 31.5–35.7)
MCV RBC AUTO: 69.3 FL (ref 79–97)
MONOCYTES # BLD AUTO: 0.6 10*3/MM3 (ref 0.1–0.9)
MONOCYTES NFR BLD AUTO: 4.6 % (ref 5–12)
NEUTROPHILS NFR BLD AUTO: 11.7 10*3/MM3 (ref 1.7–7)
NEUTROPHILS NFR BLD AUTO: 92.1 % (ref 42.7–76)
NRBC BLD AUTO-RTO: 0 /100 WBC (ref 0–0.2)
PATH REPORT.FINAL DX SPEC: NORMAL
PATH REPORT.GROSS SPEC: NORMAL
PLATELET # BLD AUTO: 222 10*3/MM3 (ref 140–450)
PMV BLD AUTO: 8.4 FL (ref 6–12)
POIKILOCYTOSIS BLD QL SMEAR: NORMAL
POTASSIUM SERPL-SCNC: 4.2 MMOL/L (ref 3.5–5.2)
POTASSIUM SERPL-SCNC: 4.7 MMOL/L (ref 3.5–5.2)
RBC # BLD AUTO: 4.47 10*6/MM3 (ref 4.14–5.8)
SMALL PLATELETS BLD QL SMEAR: ADEQUATE
SODIUM SERPL-SCNC: 137 MMOL/L (ref 136–145)
WBC MORPH BLD: NORMAL
WBC NRBC COR # BLD: 12.8 10*3/MM3 (ref 3.4–10.8)

## 2022-12-16 PROCEDURE — 84132 ASSAY OF SERUM POTASSIUM: CPT | Performed by: PHYSICIAN ASSISTANT

## 2022-12-16 PROCEDURE — 82962 GLUCOSE BLOOD TEST: CPT

## 2022-12-16 PROCEDURE — 99024 POSTOP FOLLOW-UP VISIT: CPT | Performed by: SURGERY

## 2022-12-16 PROCEDURE — 97166 OT EVAL MOD COMPLEX 45 MIN: CPT

## 2022-12-16 PROCEDURE — 97161 PT EVAL LOW COMPLEX 20 MIN: CPT

## 2022-12-16 PROCEDURE — 83735 ASSAY OF MAGNESIUM: CPT | Performed by: PHYSICIAN ASSISTANT

## 2022-12-16 PROCEDURE — 63710000001 INSULIN LISPRO (HUMAN) PER 5 UNITS: Performed by: PHYSICIAN ASSISTANT

## 2022-12-16 RX ORDER — ECHINACEA PURPUREA EXTRACT 125 MG
1 TABLET ORAL AS NEEDED
Status: DISCONTINUED | OUTPATIENT
Start: 2022-12-16 | End: 2022-12-19 | Stop reason: HOSPADM

## 2022-12-16 RX ADMIN — INSULIN LISPRO 2 UNITS: 100 INJECTION, SOLUTION INTRAVENOUS; SUBCUTANEOUS at 12:43

## 2022-12-16 RX ADMIN — ATORVASTATIN CALCIUM 10 MG: 10 TABLET, FILM COATED ORAL at 19:37

## 2022-12-16 RX ADMIN — ACETAMINOPHEN 1000 MG: 500 TABLET ORAL at 06:30

## 2022-12-16 RX ADMIN — POTASSIUM CHLORIDE, DEXTROSE MONOHYDRATE AND SODIUM CHLORIDE 100 ML/HR: 150; 5; 450 INJECTION, SOLUTION INTRAVENOUS at 09:20

## 2022-12-16 RX ADMIN — METFORMIN HYDROCHLORIDE 2000 MG: 500 TABLET ORAL at 19:36

## 2022-12-16 RX ADMIN — PIOGLITAZONE 45 MG: 45 TABLET ORAL at 08:32

## 2022-12-16 RX ADMIN — ACETAMINOPHEN 1000 MG: 500 TABLET ORAL at 19:36

## 2022-12-16 RX ADMIN — ACETAMINOPHEN 1000 MG: 500 TABLET ORAL at 12:43

## 2022-12-16 RX ADMIN — Medication 10 ML: at 19:37

## 2022-12-16 RX ADMIN — Medication 10 ML: at 08:34

## 2022-12-16 RX ADMIN — SENNOSIDES AND DOCUSATE SODIUM 2 TABLET: 50; 8.6 TABLET ORAL at 08:32

## 2022-12-16 RX ADMIN — ACETAMINOPHEN 1000 MG: 500 TABLET ORAL at 23:54

## 2022-12-16 RX ADMIN — INSULIN LISPRO 5 UNITS: 100 INJECTION, SOLUTION INTRAVENOUS; SUBCUTANEOUS at 08:32

## 2022-12-16 RX ADMIN — ALVIMOPAN 12 MG: 12 CAPSULE ORAL at 08:32

## 2022-12-16 RX ADMIN — PANTOPRAZOLE SODIUM 40 MG: 40 TABLET, DELAYED RELEASE ORAL at 05:07

## 2022-12-16 RX ADMIN — SENNOSIDES AND DOCUSATE SODIUM 2 TABLET: 50; 8.6 TABLET ORAL at 19:37

## 2022-12-16 RX ADMIN — LOSARTAN POTASSIUM 100 MG: 50 TABLET, FILM COATED ORAL at 08:43

## 2022-12-16 RX ADMIN — EMPAGLIFLOZIN 25 MG: 25 TABLET, FILM COATED ORAL at 08:32

## 2022-12-16 RX ADMIN — GLIPIZIDE 10 MG: 5 TABLET ORAL at 08:30

## 2022-12-16 NOTE — CASE MANAGEMENT/SOCIAL WORK
Discharge Planning Assessment   Az     Patient Name: Vlad Guerrero  MRN: [id number]  Today's Date: 12/16/2022    Admit Date: 12/15/2022    Plan: home   Discharge Needs Assessment     Row Name 12/16/22 1344       Living Environment    People in Home other relative(s)    Name(s) of People in Home Felicity    Current Living Arrangements home    Primary Care Provided by self    Provides Primary Care For no one    Family Caregiver if Needed other relative(s)    Quality of Family Relationships supportive    Able to Return to Prior Arrangements yes       Resource/Environmental Concerns    Resource/Environmental Concerns none       Transition Planning    Patient/Family Anticipates Transition to home    Patient/Family Anticipated Services at Transition none    Transportation Anticipated family or friend will provide       Discharge Needs Assessment    Readmission Within the Last 30 Days no previous admission in last 30 days    Equipment Currently Used at Home glucometer    Concerns to be Addressed denies needs/concerns at this time;discharge planning               Discharge Plan     Row Name 12/16/22 1344       Plan    Plan home    Patient/Family in Agreement with Plan yes    Plan Comments Denies dc needs at this time and is up ad mike in room. PCP and pharmacy verified.              Continued Care and Services - Admitted Since 12/15/2022    Coordination has not been started for this encounter.       Expected Discharge Date and Time     Expected Discharge Date Expected Discharge Time    Dec 17, 2022          Demographic Summary     Row Name 12/16/22 1343       General Information    Admission Type inpatient    Arrived From PACU/recovery room    Required Notices Provided Important Message from Medicare    Referral Source admission list    Reason for Consult discharge planning    Preferred Language English               Functional Status     Row Name 12/16/22 1343       Functional Status    Usual Activity Tolerance good     Current Activity Tolerance good       Functional Status, IADL    Medications independent    Meal Preparation independent    Housekeeping independent    Laundry independent    Shopping independent       Mental Status    General Appearance WDL WDL       Mental Status Summary    Recent Changes in Mental Status/Cognitive Functioning no changes                         Sunshine Mayes RN

## 2022-12-16 NOTE — PROGRESS NOTES
"    [facility] Daily Progress Note    Patient Name: Vlad Guerrero  : 1945  MRN: [id number]  Primary Care Physician:  Batool Lin APRN  Date of admission: 12/15/2022      Subjective      Chief Complaint: *Medical management     History of Present Illness: Vlad Guerrero is a 77 y.o. male with a past medical history to include DM2, hyperlipidemia and hypertension presented to [facility] for right colon resection and hemicolectomy for carcinoma.  Patient is in his room but unable to participate in HPI due to sleeping from anesthesia.  HPI taken from review of records and edited as appropriate.       Patient is a 77 y.o. male presents with symptomatic anemia.  The patient was sent to the hospital by his primary care physician for work-up and treatment of a symptomatic anemia which has been progressively getting worse over the last month or so.  He reports dizziness with \"falling out\".  Upon arrival in the ER, the patient was found to have a hemoglobin of 7.1.  Hematology oncology was consulted for microcytic anemia, and iron studies demonstrated iron deficiency anemia.  His fecal occult blood test was negative, the patient was transfused 1 unit of packed red blood cells and started on iron infusions.  He denies having any abdominal pain, nausea, vomiting, difficulty passing bowel movements, or blood per rectum.  He has had an upper endoscopy in the past with esophageal dilation, he has had an appendectomy as a child, and he has never had a lower endoscopy.  The patient's family history is significant for colon cancer in his father.     The patient was felt to possibly have chronic GI blood loss, and was recommended to undergo EGD and colonoscopy by gastroenterology.  Gastroenterology performed a bowel prep, and this morning performed a EGD and colonoscopy which demonstrated a circumferential 4.5 cm mass in the cecum concerning for colon cancer.  Multiple biopsies " were taken.  The patient also had a 2 polyps removed from his descending and rectum area.  General surgery was consulted for possible colonic resection and oncology was informed of the findings as well.  The patient had a CEA drawn, but has not returned yet.  He has not yet had any staging scans performed.     12/16/2022 patient seen and examined in bed no acute distress, vital signs stable, the presently on 2 L of oxygen, discussed with RN.  Patient is doing better.  Blood glucose 268    Review of Systems   Constitutional: Positive for malaise/fatigue.   HENT: Negative.    Eyes: Negative.    Cardiovascular: Negative.    Respiratory: Negative.    Endocrine: Negative.    Hematologic/Lymphatic: Negative.    Skin: Negative.    Musculoskeletal: Negative.    Gastrointestinal: Positive for abdominal pain.   Genitourinary: Negative.    Neurological: Positive for weakness.   Psychiatric/Behavioral: Negative.    Allergic/Immunologic: Negative.    All other systems reviewed and are negative.    Objective      Vitals:   Temp:  [97.2 °F (36.2 °C)-98.2 °F (36.8 °C)] 98.2 °F (36.8 °C)  Heart Rate:  [78-91] 81  Resp:  [14-15] 14  BP: (116-171)/(54-87) 116/54  Flow (L/min):  [2] 2    Physical Exam seen and examined in bed in bed no acute distress  HENT:      Head: Normocephalic and atraumatic.   Cardiovascular:      Rate and Rhythm: Normal rate.      Pulses: Normal pulses.   Pulmonary:      Effort: Pulmonary effort is normal. No respiratory distress.      Breath sounds: Normal breath sounds.   Musculoskeletal:      Cervical back: Neck supple.   Skin:     General: Skin is warm and dry.            Result Review    Result Review:  I have personally reviewed the results from the time of this admission to 12/16/2022 13:38 EST and agree with these findings:  []  Laboratory  []  Microbiology  []  Radiology  []  EKG/Telemetry   []  Cardiology/Vascular   []  Pathology  []  Old records  []  Other:  Most notable findings include:   CMP:       Lab 12/15/22  2341 12/15/22  0621 12/12/22  0628   SODIUM 137 142 139   POTASSIUM 4.7 3.9 4.2   CHLORIDE 101 107 105   CO2 21.0* 25.0 25.4   ANION GAP 15.0 10.0 8.6   BUN 18 16 24*   CREATININE 0.83 0.96 0.91   EGFR 91.3 82.4 87.9   GLUCOSE 268* 138* 156*   CALCIUM 8.7 8.8 8.7   MAGNESIUM 2.0  --   --    TOTAL PROTEIN  --  6.6 6.4   ALBUMIN  --  3.80 3.60   GLOBULIN  --  2.8 2.8   ALT (SGPT)  --  17 12   AST (SGOT)  --  23 18   BILIRUBIN  --  0.4 0.2   ALK PHOS  --  82 81     CBC:      Lab 12/15/22  2341 12/15/22  0622 12/12/22  0628   WBC 12.80* 4.30 4.77   HEMOGLOBIN 9.4* 7.8* 7.9*   HEMATOCRIT 31.0* 25.5* 29.1*   PLATELETS 222 201 198   NEUTROS ABS 11.70* 2.50 2.83   LYMPHS ABS 0.40* 1.20 1.24   MONOS ABS 0.60 0.50 0.55   EOS ABS 0.00 0.10 0.12   MCV 69.3* 67.9* 72.0*       Wounds (last 24 hours)     LDA Wound     Row Name 12/16/22 1035 12/16/22 0835 12/15/22 2008       Wound 12/15/22 0800 midline abdomen Incision    Wound - Properties Group Placement Date: 12/15/22  -CM Placement Time: 0800  -CM Orientation: midline  -CM Location: abdomen  -CM Primary Wound Type: Incision  -CM    Dressing Appearance -- -- intact;dried drainage  -ROSALIA    Closure -- NEY  -AH NEY  -ROSALIA    Base -- dressing in place, unable to visualize  -AH dressing in place, unable to visualize  -ROSALIA    Drainage Amount small  -AH moderate  -AH moderate  -ROSALIA    Dressing Care dressing changed  -AH -- dressing reinforced  -ROSALIA    Retired Wound - Properties Group Placement Date: 12/15/22  -CM Placement Time: 0800  -CM Orientation: midline  -CM Location: abdomen  -CM Primary Wound Type: Incision  -CM    Retired Wound - Properties Group Date first assessed: 12/15/22  -CM Time first assessed: 0800  -CM Location: abdomen  -CM Primary Wound Type: Incision  -CM    Row Name 12/15/22 1410             Wound 12/15/22 0800 midline abdomen Incision    Wound - Properties Group Placement Date: 12/15/22  -CM Placement Time: 0800  -CM Orientation: midline  -CM Location:  abdomen  -CM Primary Wound Type: Incision  -CM    Dressing Appearance intact;dried drainage  -      Closure NEY  -AH      Base dressing in place, unable to visualize  -AH      Retired Wound - Properties Group Placement Date: 12/15/22  -CM Placement Time: 0800  -CM Orientation: midline  -CM Location: abdomen  -CM Primary Wound Type: Incision  -CM    Retired Wound - Properties Group Date first assessed: 12/15/22  -CM Time first assessed: 0800  -CM Location: abdomen  -CM Primary Wound Type: Incision  -CM          User Key  (r) = Recorded By, (t) = Taken By, (c) = Cosigned By    Initials Name Provider Type    Maureen Cummins, RN Registered Nurse    Beatriz Galan LPN Licensed Nurse    Tami Mederos LPN Licensed Nurse                  Assessment & Plan      Brief Patient Summary:  Vlad Guerrero is a 77 y.o. male who       acetaminophen, 1,000 mg, Oral, Q6H  atorvastatin, 10 mg, Oral, Nightly  empagliflozin, 25 mg, Oral, Daily  glipizide, 10 mg, Oral, QAM AC  insulin lispro, 2-7 Units, Subcutaneous, TID With Meals  losartan, 100 mg, Oral, Daily  metFORMIN, 2,000 mg, Oral, Daily With Dinner  pantoprazole, 40 mg, Oral, Q AM  pioglitazone, 45 mg, Oral, Daily  senna-docusate sodium, 2 tablet, Oral, BID  sodium chloride, 10 mL, Intravenous, Q12H             Active Hospital Problems:  Active Hospital Problems    Diagnosis    • **Mass of colon      Status post right colon resection for cancer (cecum)  -Incisions, drains and pain management per surgery  -hgb 7.8>9.4 monitor and transfuse <7.     DM2  uncontrolled  -Current glucose 138>268  -Hold home DM2 meds  -SSI  -Monitor glucose before meals and at bedtime     Hypertension  -Current /59  -Continue home meds  -Monitor BP     Hyperlipidemia  -Continue home statin      DVT prophylaxis:  Mechanical DVT prophylaxis orders are present.    CODE STATUS:         Disposition:  I expect patient to be discharged *per clinical course.    This patient has been examined  wearing appropriate Personal Protective Equipment and discussed with rn. 12/16/22      Electronically signed by Troy Angel MD, 12/16/22, 13:38 EST.  [facility]ist Team

## 2022-12-16 NOTE — THERAPY EVALUATION
Patient Name: Vlad Guerrero  : 1945    MRN: [id number]                              Today's Date: 2022       Admit Date: 12/15/2022    Visit Dx:     ICD-10-CM ICD-9-CM   1. Mass of colon  K63.89 569.89     Patient Active Problem List   Diagnosis   • Microcytic anemia   • Type 2 diabetes mellitus with hyperglycemia, without long-term current use of insulin (HCC)   • Mass of colon     Past Medical History:   Diagnosis Date   • Anemia    • Cancer (HCC)     colon   • Diabetes mellitus (HCC)    • Hyperlipidemia    • Hypertension      Past Surgical History:   Procedure Laterality Date   • APPENDECTOMY     • CARDIAC CATHETERIZATION     • COLON RESECTION N/A 12/15/2022    Procedure: COLON RESECTION RIGHT;  Surgeon: Percy Davis MD;  Location: [facility] MAIN OR;  Service: General;  Laterality: N/A;   • COLONOSCOPY N/A 2022    Procedure: COLONOSCOPY WITH BIOPSY AND POLYPECTOMY;  Surgeon: Billy Julien MD;  Location: [facility] ENDOSCOPY;  Service: Gastroenterology;  Laterality: N/A;  Impression:  1.  Large 4-5cm fulgurating circumferential ulcerated mass in the very proximal part of the ascending colon next to ileocecal valve multiple biopsies were performed.  This is highly concerning for colon malignancy.  2.  2 polyp rem   • ENDOSCOPY N/A 2022    Procedure: ESOPHAGOGASTRODUODENOSCOPY with biopsy X1;  Surgeon: Billy Julien MD;  Location: [facility] ENDOSCOPY;  Service: Gastroenterology;  Laterality: N/A;  5.  Upper endoscopy lamination unremarkable.      • TONSILLECTOMY        General Information     Row Name 22 1341          General Information    Prior Level of Function independent:;work;ADL's;home management;driving;shopping;using stairs  pt started work at [employer] 2 mos ago.  -     Existing Precautions/Restrictions lifting;other (see comments)  ABD  -     Barriers to Rehab none identified  -     Row Name 22 6790          Living Environment    People in Home other (see  comments)  rents downstairs of his aunt's house.  -     Row Name 12/16/22 1341          Home Main Entrance    Number of Stairs, Main Entrance none  -     Row Name 12/16/22 1341          Stairs Within Home, Primary    Number of Stairs, Within Home, Primary none  -     Row Name 12/16/22 1341          Cognition    Orientation Status (Cognition) oriented x 4  -     Row Name 12/16/22 1341          Safety Issues, Functional Mobility    Safety Issues Affecting Function (Mobility) safety precaution awareness  -     Impairments Affecting Function (Mobility) balance;pain  -           User Key  (r) = Recorded By, (t) = Taken By, (c) = Cosigned By    Initials Name Provider Type     Mary Bautista, OT Occupational Therapist                 Mobility/ADL's     Row Name 12/16/22 1347          Bed Mobility    Bed Mobility supine-sit  -     Supine-Sit [place] (Bed Mobility) verbal cues  -     Comment, (Bed Mobility) cues for logroll  -     Row Name 12/16/22 1347          Transfers    Transfers sit-stand transfer;toilet transfer;stand-sit transfer  -     Row Name 12/16/22 1347          Sit-Stand Transfer    Sit-Stand [place] (Transfers) independent  -     Row Name 12/16/22 1347          Stand-Sit Transfer    Stand-Sit [place] (Transfers) independent  -     Row Name 12/16/22 1347          Toilet Transfer    [place] Level (Toilet Transfer) set up  -     Row Name 12/16/22 1347          Functional Mobility    Functional Mobility- Comment Pt walked long distance around unit. 300'. One stumble, self-corrected.  -     Row Name 12/16/22 1357          Activities of Daily Living    BADL Assessment/Intervention lower body dressing;toileting;grooming  -     Row Name 12/16/22 1357          Lower Body Dressing Assessment/Training    [place] Level (Lower Body Dressing) don;pants/bottoms;set up  -     Row Name 12/16/22 1357          Toileting Assessment/Training    [place] Level  (Toileting) toileting skills;set up  -[facility] Name 12/16/22 1357          Grooming Assessment/Training    [place] Level (Grooming) grooming skills;modified independence  -           User Key  (r) = Recorded By, (t) = Taken By, (c) = Cosigned By    Initials Name Provider Type    Mary Arias OT Occupational Therapist               Obj/Interventions     [facility] Name 12/16/22 1349          Vision Assessment/Intervention    Visual Impairment/Limitations corrective lenses full-time  -[facility] Name 12/16/22 1349          Range of Motion Comprehensive    General Range of Motion no range of motion deficits identified  -[facility] Name 12/16/22 1349          Strength Comprehensive (MMT)    General Manual Muscle Testing (MMT) Assessment no strength deficits identified  -[facility] Name 12/16/22 1343          Balance    Balance Assessment sitting static balance;sitting dynamic balance;standing static balance;standing dynamic balance  -     Static Sitting Balance independent  -     Dynamic Sitting Balance modified independence  -     Static Standing Balance independent  -     Dynamic Standing Balance standby assist  -           User Key  (r) = Recorded By, (t) = Taken By, (c) = Cosigned By    Initials Name Provider Type     Mary Bautista, CELE Occupational Therapist               Goals/Plan    No documentation.                Clinical Impression     [facility] Name 12/16/22 1357          Pain Assessment    Additional Documentation Pain Scale: FACES Pre/Post-Treatment (Group)  -MH     Row Name 12/16/22 5177          Pain Scale: FACES Pre/Post-Treatment    Pain: FACES Scale, Pretreatment 2-->hurts little bit  -     Posttreatment Pain Rating 2-->hurts little bit  -     Pain Location - abdomen  -[facility] Name 12/16/22 1358          Plan of Care Review    Plan of Care Reviewed With patient  -     Progress improving  -     Outcome Evaluation Pt is 78 y/o M with colon mass who is s/p laprotomy to resect his  colon and take biposies. Pt is very motivated and doing well. He is advised to limit activity for several days and restrict lifting to 10 lbs for two weeks or until cleared by his surgeon. Pt states he is eager to return home and to work. Advised pt to seek return to work clearance form his surgeon. Advised pt on wound care and bowel mgmt. as well as logroll for comfortable bed ingress/egress. No further OT needs identified. Pt is up ad mike to the bathroom, able to toilet & dress himself. Recommend home assist for several days at d/c.  -     Row Name 12/16/22 6230          Therapy Assessment/Plan (OT)    Criteria for Skilled Therapeutic Interventions Met (OT) no  -     Therapy Frequency (OT) evaluation only  -     Row Name 12/16/22 6244          Therapy Plan Review/Discharge Plan (OT)    Anticipated Discharge Disposition (OT) home with assist  -     Row Name 12/16/22 0726          Vital Signs    O2 Delivery Pre Treatment room air  -     O2 Delivery Intra Treatment room air  -     O2 Delivery Post Treatment room air  -     Pre Patient Position Supine  -     Intra Patient Position Standing  -     Post Patient Position Sitting  -     Row Name 12/16/22 4482          Positioning and Restraints    Pre-Treatment Position in bed  -     Post Treatment Position chair  -     In Chair sitting;call light within reach  -           User Key  (r) = Recorded By, (t) = Taken By, (c) = Cosigned By    Initials Name Provider Type     Mary Bautista, OT Occupational Therapist               Outcome Measures     Row Name 12/16/22 2787          How much help from another person do you currently need...    Turning from your back to your side while in flat bed without using bedrails? 4  -AH     Moving from lying on back to sitting on the side of a flat bed without bedrails? 4  -AH     Moving to and from a bed to a chair (including a wheelchair)? 4  -AH     Standing up from a chair using your arms (e.g., wheelchair,  bedside chair)? 4  -     Climbing 3-5 steps with a railing? 4  -     To walk in hospital room? 4  -AH     AM-PAC 6 Clicks Score (PT) 24  -     Highest level of mobility 8 --> Walked 250 feet or more  -           User Key  (r) = Recorded By, (t) = Taken By, (c) = Cosigned By    Initials Name Provider Type     Beatriz Ch LPN Licensed Nurse                Occupational Therapy Education     Title: PT OT SLP Therapies (In Progress)     Topic: Occupational Therapy (In Progress)     Point: ADL training (Done)     Description:   Instruct learner(s) on proper safety adaptation and remediation techniques during self care or transfers.   Instruct in proper use of assistive devices.              Learning Progress Summary           Patient Acceptance, E,TB,D, VU,DU by  at 12/16/2022 1357                   Point: Home exercise program (Not Started)     Description:   Instruct learner(s) on appropriate technique for monitoring, assisting and/or progressing therapeutic exercises/activities.              Learner Progress:  Not documented in this visit.          Point: Precautions (Done)     Description:   Instruct learner(s) on prescribed precautions during self-care and functional transfers.              Learning Progress Summary           Patient Acceptance, E,TB,D, VU,DU by  at 12/16/2022 1357                   Point: Body mechanics (Done)     Description:   Instruct learner(s) on proper positioning and spine alignment during self-care, functional mobility activities and/or exercises.              Learning Progress Summary           Patient Acceptance, E,TB,D, VU,DU by  at 12/16/2022 1357                               User Key     Initials Effective Dates Name Provider Type [facility] 06/16/21 -  Mary Bautista OT Occupational Therapist OT              OT Recommendation and Plan  Therapy Frequency (OT): evaluation only  Plan of Care Review  Plan of Care Reviewed With: patient  Progress:  improving  Outcome Evaluation: Pt is 78 y/o M with colon mass who is s/p laprotomy to resect his colon and take biposies. Pt is very motivated and doing well. He is advised to limit activity for several days and restrict lifting to 10 lbs for two weeks or until cleared by his surgeon. Pt states he is eager to return home and to work. Advised pt to seek return to work clearance form his surgeon. Advised pt on wound care and bowel mgmt. as well as logroll for comfortable bed ingress/egress. No further OT needs identified. Pt is up ad mike to the bathroom, able to toilet & dress himself. Recommend home assist for several days at d/c.     Time Calculation:    Time Calculation- OT     Row Name 12/16/22 1358             Time Calculation-     OT Start Time 1030  -      OT Stop Time 1051  -      OT Time Calculation (min) 21 min  -      Total Timed Code Minutes- OT 0 minute(s)  -      OT Received On 12/16/22  -            User Key  (r) = Recorded By, (t) = Taken By, (c) = Cosigned By    Initials Name Provider Type     Mary Bautista OT Occupational Therapist              Therapy Charges for Today     Code Description Service Date Service Provider Modifiers Qty    [id number]  OT EVAL MOD COMPLEXITY 3 12/16/2022 Mary Bautista OT GO 1               Mary Bautista OT  12/16/2022

## 2022-12-16 NOTE — PLAN OF CARE
Goal Outcome Evaluation:  Plan of Care Reviewed With: patient        Progress: improving  Outcome Evaluation: Patient is stable and no complaints of pain. Patient has ambulated a lot this shift. Passing flatus and had a BM. Urination WDL. Patient's diet has been increased and tolerating well. Patient will likely discharge this weekend, per MD. Safety measures in place. call light within reach and will continue to monitor.

## 2022-12-16 NOTE — PLAN OF CARE
Goal Outcome Evaluation:      Pt has been ambulating around room with minimal assist. FC will be taken out before the end of this shift. VSS and alertx4. Minimal pain this shift. Safety measures in place. Plan of care ongoing.

## 2022-12-16 NOTE — PLAN OF CARE
Goal Outcome Evaluation:  Plan of Care Reviewed With: patient        Progress: improving  Outcome Evaluation: Pt is 78 y/o M with colon mass who is s/p laprotomy to resect his colon and take biposies. Pt is very motivated and doing well. He is advised to limit activity for several days and restrict lifting to 10 lbs for two weeks or until cleared by his surgeon. Pt states he is eager to return home and to work. Advised pt to seek return to work clearance form his surgeon. Advised pt on wound care and bowel mgmt. as well as logroll for comfortable bed ingress/egress. No further OT needs identified. Pt is up ad mike to the bathroom, able to toilet & dress himself. Recommend home assist for several days at d/c.

## 2022-12-16 NOTE — THERAPY EVALUATION
Patient Name: Vlad Guerrero  : 1945    MRN: [id number]                              Today's Date: 2022       Admit Date: 12/15/2022    Visit Dx:     ICD-10-CM ICD-9-CM   1. Mass of colon  K63.89 569.89     Patient Active Problem List   Diagnosis   • Microcytic anemia   • Type 2 diabetes mellitus with hyperglycemia, without long-term current use of insulin (HCC)   • Mass of colon     Past Medical History:   Diagnosis Date   • Anemia    • Cancer (HCC)     colon   • Diabetes mellitus (HCC)    • Hyperlipidemia    • Hypertension      Past Surgical History:   Procedure Laterality Date   • APPENDECTOMY     • CARDIAC CATHETERIZATION     • COLON RESECTION N/A 12/15/2022    Procedure: COLON RESECTION RIGHT;  Surgeon: Percy Davis MD;  Location: [facility] MAIN OR;  Service: General;  Laterality: N/A;   • COLONOSCOPY N/A 2022    Procedure: COLONOSCOPY WITH BIOPSY AND POLYPECTOMY;  Surgeon: Billy Julien MD;  Location: [facility] ENDOSCOPY;  Service: Gastroenterology;  Laterality: N/A;  Impression:  1.  Large 4-5cm fulgurating circumferential ulcerated mass in the very proximal part of the ascending colon next to ileocecal valve multiple biopsies were performed.  This is highly concerning for colon malignancy.  2.  2 polyp rem   • ENDOSCOPY N/A 2022    Procedure: ESOPHAGOGASTRODUODENOSCOPY with biopsy X1;  Surgeon: Billy Julien MD;  Location: [facility] ENDOSCOPY;  Service: Gastroenterology;  Laterality: N/A;  5.  Upper endoscopy lamination unremarkable.      • TONSILLECTOMY        General Information     Row Name 22 1455          Physical Therapy Time and Intention    Document Type evaluation  -BR     Mode of Treatment physical therapy  -BR     Row Name 22 1457          General Information    Patient Profile Reviewed yes  -BR     Prior Level of Function independent:;driving;community mobility;work  -BR     Barriers to Rehab none identified  -BR     Row Name 22 1454          Living  Environment    People in Home other relative(s)  Pt's aunt lives separately from pt on upper level of home.  -BR     Row Name 12/16/22 1455          Home Main Entrance    Number of Stairs, Main Entrance none  -BR     Row Name 12/16/22 1455          Cognition    Orientation Status (Cognition) oriented x 4  -BR     Row Name 12/16/22 1455          Safety Issues, Functional Mobility    Impairments Affecting Function (Mobility) pain  -BR           User Key  (r) = Recorded By, (t) = Taken By, (c) = Cosigned By    Initials Name Provider Type    Cheryl Cheney PT Physical Therapist               Mobility     Row Name 12/16/22 1500          Bed Mobility    Bed Mobility supine-sit  -BR     Supine-Sit [place] (Bed Mobility) supervision  -BR     Comment, (Bed Mobility) cues for logroll technique  -BR     Row Name 12/16/22 1500          Sit-Stand Transfer    Sit-Stand [place] (Transfers) independent  -BR     Row Name 12/16/22 1500          Gait/Stairs (Locomotion)    [place] Level (Gait) contact guard  -BR     Distance in Feet (Gait) 390 feet ( one lap around SIPS)  -BR     Bilateral Gait Deviations forward flexed posture;heel strike decreased  -BR           User Key  (r) = Recorded By, (t) = Taken By, (c) = Cosigned By    Initials Name Provider Type    Cheryl Cheney PT Physical Therapist               Obj/Interventions     Row Name 12/16/22 1501          Range of Motion Comprehensive    General Range of Motion no range of motion deficits identified  -BR     Row Name 12/16/22 1501          Strength Comprehensive (MMT)    General Manual Muscle Testing (MMT) Assessment no strength deficits identified  -BR     Row Name 12/16/22 1501          Balance    Balance Assessment standing static balance  -BR     Static Standing Balance standby assist  -BR     Position/Device Used, Standing Balance unsupported  -BR     Row Name 12/16/22 1501          Sensory Assessment (Somatosensory)    Sensory Assessment  (Somatosensory) sensation intact  -BR           User Key  (r) = Recorded By, (t) = Taken By, (c) = Cosigned By    Initials Name Provider Type    Cheryl Cheney PT Physical Therapist               Goals/Plan    No documentation.                Clinical Impression     Row Name 12/16/22 1502          Pain Scale: FACES Pre/Post-Treatment    Pain: FACES Scale, Pretreatment 2-->hurts little bit  -BR     Posttreatment Pain Rating 2-->hurts little bit  -BR     Pain Location - abdomen  -BR     Row Name 12/16/22 1502          Plan of Care Review    Plan of Care Reviewed With patient  -BR     Outcome Evaluation Plan of Care Reviewed With: patient  Pt presents as a 76 y/o M s/p colon resection on 12/15/22. Procedure was tolerated well. Past medical Hx: DM, anemia. Pt A and O x 4. At baseline, pt works at a [employer] store and used no AD for community mobility. He lives separately from an aunt who lives on upper level of home. Per PT Eval, pt ambulated 390 feet without AD with SBA and was independent with transfers.  PT will complete orders. PT recommendation is Home.  -BR     Row Name 12/16/22 1502          Therapy Assessment/Plan (PT)    Criteria for Skilled Interventions Met (PT) no;does not meet criteria for skilled intervention  -BR     Therapy Frequency (PT) evaluation only  -BR     Row Name 12/16/22 1502          Vital Signs    O2 Delivery Pre Treatment room air  -BR     Row Name 12/16/22 1502          Positioning and Restraints    Pre-Treatment Position in bed  -BR     Post Treatment Position chair  -BR     In Chair notified nsg;call light within reach  -BR           User Key  (r) = Recorded By, (t) = Taken By, (c) = Cosigned By    Initials Name Provider Type    Cheryl Cheney PT Physical Therapist               Outcome Measures     Row Name 12/16/22 1506 12/16/22 0835       How much help from another person do you currently need...    Turning from your back to your side while in flat bed without using  bedrails? 4  -BR 4  -AH    Moving from lying on back to sitting on the side of a flat bed without bedrails? 4  -BR 4  -AH    Moving to and from a bed to a chair (including a wheelchair)? 4  -BR 4  -AH    Standing up from a chair using your arms (e.g., wheelchair, bedside chair)? 4  -BR 4  -AH    Climbing 3-5 steps with a railing? 4  -BR 4  -AH    To walk in hospital room? 4  -BR 4  -AH    AM-PAC 6 Clicks Score (PT) 24  -BR 24  -AH    Highest level of mobility 8 --> Walked 250 feet or more  -BR 8 --> Walked 250 feet or more  -    Row Name 12/16/22 1506          Functional Assessment    Outcome Measure Options AM-PAC 6 Clicks Basic Mobility (PT)  -BR           User Key  (r) = Recorded By, (t) = Taken By, (c) = Cosigned By    Initials Name Provider Type     Beatriz Ch LPN Licensed Nurse     Cheryl Manrique, PT Physical Therapist                             Physical Therapy Education     Title: PT OT SLP Therapies (In Progress)     Topic: Physical Therapy (Done)     Point: Mobility training (Done)     Learning Progress Summary           Patient Acceptance, E,D, VU,DU by MALCOM at 12/16/2022 1506                   Point: Home exercise program (Done)     Learning Progress Summary           Patient Acceptance, E,D, VU,DU by BR at 12/16/2022 1506                   Point: Body mechanics (Done)     Learning Progress Summary           Patient Acceptance, E,D, VU,DU by BR at 12/16/2022 1506                   Point: Precautions (Done)     Learning Progress Summary           Patient Acceptance, E,D, VU,DU by MALCOM at 12/16/2022 1506                               User Key     Initials Effective Dates Name Provider Type [facility] 02/01/22 -  Cheryl Manrique, HÉCTOR Physical Therapist PT              PT Recommendation and Plan     Plan of Care Reviewed With: patient  Outcome Evaluation: Plan of Care Reviewed With: patient  Pt presents as a 78 y/o M s/p colon resection on 12/15/22. Procedure was tolerated well. Past  medical Hx: DM, anemia. Pt A and O x 4. 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 He lives separately from an aunt who lives on upper level of home. Per PT Eval, pt ambulated 390 feet without AD with SBA and was independent with transfers.  PT will complete orders. PT recommendation is Home.     Time Calculation:    PT Charges     Row Name 12/16/22 1507             Time Calculation    Start Time 1320  -BR      Stop Time 1345  -BR      Time Calculation (min) 25 min  -BR      PT Received On 12/16/22  -BR         Time Calculation- PT    Total Timed Code Minutes- PT 0 minute(s)  -BR            User Key  (r) = Recorded By, (t) = Taken By, (c) = Cosigned By    Initials Name Provider Type    Cheryl Cheney PT Physical Therapist              Therapy Charges for Today     Code Description Service Date Service Provider Modifiers Qty    [id number] HC PT EVAL LOW COMPLEXITY 4 12/16/2022 Cheryl Manrique, PT GP 1          PT G-Codes  Outcome Measure Options: AM-PAC 6 Clicks Basic Mobility (PT)  AM-PAC 6 Clicks Score (PT): 24  PT Discharge Summary  Anticipated Discharge Disposition (PT): home    Cheryl Manrique PT  12/16/2022

## 2022-12-16 NOTE — PLAN OF CARE
Goal Outcome Evaluation:  Plan of Care Reviewed With: patient  Pt presents as a 76 y/o M s/p colon resection on 12/15/22. Procedure was tolerated well. Past medical Hx: DM, anemia. Pt A and O x 4. 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 He lives separately from an aunt who lives on upper level of home. Per PT Eval, pt ambulated 390 feet without AD with SBA and was independent with transfers.  PT will complete orders. PT recommendation is Home.

## 2022-12-16 NOTE — PROGRESS NOTES
"Post-op Note  COLON RESECTION RIGHT  12/15/2022    Subjective   Vlad Guerrero is a 77 y.o. 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  Overall, the patient is doing quite well.  He is up and ambulating well without difficulty.  His pain seems well controlled.  He has been tolerating a clear liquid diet without nausea or vomiting.  He has had a bowel movement and is passing some flatus.      Objective   /60 (BP Location: Right arm, Patient Position: Lying)   Pulse 87   Temp 97.9 °F (36.6 °C) (Oral)   Resp 14   Ht 182.9 cm (72\")   Wt 85.5 kg (188 lb 9.6 oz)   SpO2 99%   BMI 25.58 kg/m²   Incision is well approximated with skin staples.  No surrounding erythema, induration, or drainage to suggest infection.  Abdomen is appropriately tender.  Abdominal binder returned in place with clean dressings.      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    Advance diet as tolerated  Continue ambulation  Okay for anticoagulation  No need for further antibiotics  Saline lock IV  Appreciate medicine assistance with multiple medical comorbidities  Dr. Cleveland to see over the weekend.  I would anticipate the patient will likely be able to go home at some point in the next 24 to 48 hours.  If this is the case, he should follow-up with me in my office in 2 weeks for staple removal and to review pathology    Percy Davis MD  12/16/2022  12:08 EST    "

## 2022-12-16 NOTE — CONSULTS
"Nutrition Services    Patient Name: Vlad Guerrero  YOB: 1945  MRN: [id number]  Admission date: 12/15/2022    Comment:  -- Add Boost Glucose Control BID (Provides 380 kcals, 32 g protein if consumed)       PPE Documentation        PPE Worn By Provider mask, gloves and eye protection   PPE Worn By Patient  None     CLINICAL NUTRITION ASSESSMENT      Reason for Assessment 12/16: MST of 3      H&P      Past Medical History:   Diagnosis Date   • Anemia    • Cancer (HCC)     colon   • Diabetes mellitus (HCC)    • Hyperlipidemia    • Hypertension        Past Surgical History:   Procedure Laterality Date   • APPENDECTOMY     • CARDIAC CATHETERIZATION     • COLONOSCOPY N/A 11/11/2022    Procedure: COLONOSCOPY WITH BIOPSY AND POLYPECTOMY;  Surgeon: Billy Julien MD;  Location: [facility] ENDOSCOPY;  Service: Gastroenterology;  Laterality: N/A;  Impression:  1.  Large 4-5cm fulgurating circumferential ulcerated mass in the very proximal part of the ascending colon next to ileocecal valve multiple biopsies were performed.  This is highly concerning for colon malignancy.  2.  2 polyp rem   • ENDOSCOPY N/A 11/11/2022    Procedure: ESOPHAGOGASTRODUODENOSCOPY with biopsy X1;  Surgeon: Billy Julien MD;  Location: [facility] ENDOSCOPY;  Service: Gastroenterology;  Laterality: N/A;  5.  Upper endoscopy lamination unremarkable.      • TONSILLECTOMY          Current Problems   Colon cancer  - colon mass  - S/P right colon resection 12/15    DM    HTN    HLD       Encounter Information        Trending Narrative     12/16: RD visited patient at bedside.  Patient reports appetite good, plans to have soup for lunch, advanced from clear liquids to full liquids after breakfast.  Reports no N/V, no chewing/swallowing issues noted, no questions or concerns for RD at time of RD visit.  Patient willing to get Boost during admission.       Anthropometrics        Current Height, Weight Height: 182.9 cm (72\")  Weight: 85.5 kg (188 lb 9.6 " oz) (12/15/22 0545)       Ideal Body Weight (IBW) 178#   Usual Body Weight (UBW) 220# per patient        Trending Weight Hx     This admission: 12/16: 188#             PTA: No long-term weight history to note     Wt Readings from Last 30 Encounters:   12/15/22 0545 85.5 kg (188 lb 9.6 oz)   12/06/22 1220 88.5 kg (195 lb)   11/11/22 0500 83.5 kg (184 lb 1.4 oz)   11/09/22 2036 83 kg (182 lb 15.7 oz)   11/08/22 2200 90.6 kg (199 lb 11.8 oz)      BMI kg/m2 Body mass index is 25.58 kg/m².       Labs        Pertinent Labs    Results from last 7 days   Lab Units 12/15/22  2341 12/15/22  0621 12/12/22  0628   SODIUM mmol/L 137 142 139   POTASSIUM mmol/L 4.7 3.9 4.2   CHLORIDE mmol/L 101 107 105   CO2 mmol/L 21.0* 25.0 25.4   BUN mg/dL 18 16 24*   CREATININE mg/dL 0.83 0.96 0.91   CALCIUM mg/dL 8.7 8.8 8.7   BILIRUBIN mg/dL  --  0.4 0.2   ALK PHOS U/L  --  82 81   ALT (SGPT) U/L  --  17 12   AST (SGOT) U/L  --  23 18   GLUCOSE mg/dL 268* 138* 156*     Results from last 7 days   Lab Units 12/15/22  2341   MAGNESIUM mg/dL 2.0   HEMOGLOBIN g/dL 9.4*   HEMATOCRIT % 31.0*     COVID19   Date Value Ref Range Status   01/19/2022 Detected (C) Not Detected - Ref. Range Final     Lab Results   Component Value Date    HGBA1C 7.2 (H) 11/08/2022        Medications    Scheduled Medications acetaminophen, 1,000 mg, Oral, Q6H  alvimopan, 12 mg, Oral, BID  atorvastatin, 10 mg, Oral, Nightly  empagliflozin, 25 mg, Oral, Daily  glipizide, 10 mg, Oral, QAM AC  insulin lispro, 2-7 Units, Subcutaneous, TID With Meals  losartan, 100 mg, Oral, Daily  metFORMIN, 2,000 mg, Oral, Daily With Dinner  pantoprazole, 40 mg, Oral, Q AM  pioglitazone, 45 mg, Oral, Daily  senna-docusate sodium, 2 tablet, Oral, BID  sodium chloride, 10 mL, Intravenous, Q12H        Infusions      PRN Medications •  acetaminophen **OR** acetaminophen **OR** acetaminophen  •  aluminum-magnesium hydroxide-simethicone  •  senna-docusate sodium **AND** polyethylene glycol **AND**  bisacodyl **AND** bisacodyl  •  dextrose  •  dextrose  •  glucagon (human recombinant)  •  magnesium sulfate **OR** magnesium sulfate in D5W 1g/100mL (PREMIX)  •  melatonin  •  Morphine  •  nitroglycerin  •  ondansetron **OR** ondansetron  •  oxyCODONE **OR** oxyCODONE  •  potassium chloride  •  potassium chloride  •  promethazine **OR** promethazine  •  sodium chloride  •  sodium chloride  •  sodium chloride     Physical Findings        Trending Physical   Appearance, NFPE 12/16: NFPE completed and not consistent with nutrition diagnosis of malnutrition at this time using AND/ASPEN criteria but continues to be at risk per patient reported weight loss and diet progression after surgery.     --  Edema  1+ legs, ankles      Bowel Function Last BM 12/16 (today)     Tubes No feeding tube      Chewing/Swallowing No known issues      Skin Midline abdominal incision      --  Current Nutrition Orders & Evaluation of Intake       Oral Nutrition     Food Allergies NKFA   Current PO Diet Diet: Liquid Diets, Diabetic Diets; Full Liquid; Consistent Carbohydrate; Texture: Regular Texture (IDDSI 7); Fluid Consistency: Thin (IDDSI 0)   Supplement None ordered    PO Evaluation     Trending % PO Intake 12/16: No documented PO intakes since admission    --  Nutritional Risk Screening        NRS-2002 Score          Nutrition Diagnosis         Nutrition Dx Problem 1 Inadequate energy intake related to clinical course as evidenced by full liquid diet.        Nutrition Dx Problem 2        Intervention Goal         Intervention Goal(s) Diet advancement per MD     Nutrition Intervention        RD Action Monitor for diet advancement      Nutrition Prescription          Diet Prescription Full Liquid, Consistent CHO    Supplement Prescription Boost Glucose Control    --  Monitor/Evaluation        Monitor Per protocol, I&O, PO intake, Supplement intake, Pertinent labs, Weight, Skin status, GI status, Symptoms, POC/GOC       Electronically  signed by:  Rima Razo RD  12/16/22 10:46 EST

## 2022-12-17 LAB
GLUCOSE BLDC GLUCOMTR-MCNC: 118 MG/DL (ref 70–105)
GLUCOSE BLDC GLUCOMTR-MCNC: 137 MG/DL (ref 70–105)
GLUCOSE BLDC GLUCOMTR-MCNC: 141 MG/DL (ref 70–105)
GLUCOSE BLDC GLUCOMTR-MCNC: 166 MG/DL (ref 70–105)

## 2022-12-17 PROCEDURE — 99024 POSTOP FOLLOW-UP VISIT: CPT | Performed by: SURGERY

## 2022-12-17 PROCEDURE — 83735 ASSAY OF MAGNESIUM: CPT | Performed by: PHYSICIAN ASSISTANT

## 2022-12-17 PROCEDURE — 84132 ASSAY OF SERUM POTASSIUM: CPT | Performed by: PHYSICIAN ASSISTANT

## 2022-12-17 PROCEDURE — 82962 GLUCOSE BLOOD TEST: CPT

## 2022-12-17 PROCEDURE — 63710000001 INSULIN LISPRO (HUMAN) PER 5 UNITS: Performed by: PHYSICIAN ASSISTANT

## 2022-12-17 RX ADMIN — EMPAGLIFLOZIN 25 MG: 25 TABLET, FILM COATED ORAL at 09:59

## 2022-12-17 RX ADMIN — LOSARTAN POTASSIUM 100 MG: 50 TABLET, FILM COATED ORAL at 09:59

## 2022-12-17 RX ADMIN — PANTOPRAZOLE SODIUM 40 MG: 40 TABLET, DELAYED RELEASE ORAL at 09:59

## 2022-12-17 RX ADMIN — Medication 10 ML: at 20:29

## 2022-12-17 RX ADMIN — ACETAMINOPHEN 1000 MG: 500 TABLET ORAL at 09:59

## 2022-12-17 RX ADMIN — GLIPIZIDE 10 MG: 5 TABLET ORAL at 09:59

## 2022-12-17 RX ADMIN — Medication 10 ML: at 10:00

## 2022-12-17 RX ADMIN — INSULIN LISPRO 2 UNITS: 100 INJECTION, SOLUTION INTRAVENOUS; SUBCUTANEOUS at 09:58

## 2022-12-17 RX ADMIN — METFORMIN HYDROCHLORIDE 2000 MG: 500 TABLET ORAL at 17:43

## 2022-12-17 NOTE — PLAN OF CARE
Goal Outcome Evaluation:      Patient able to make needs known. Vss. Room air. No complaints this shift. Patient ambulates independently in room.  Personal items and call light with in reach. Plan of care on going.

## 2022-12-17 NOTE — PROGRESS NOTES
"Post-op Note  COLON RESECTION RIGHT  12/15/2022    Subjective   Vlad Guerrero is a 77 y.o. 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      Seems to have had a loose bowel movement.  Pain is controlled.  Tolerating a diet      Objective   /94 (BP Location: Right arm, Patient Position: Lying)   Pulse 88   Temp 97.7 °F (36.5 °C) (Oral)   Resp 14   Ht 182.9 cm (72\")   Wt 85.5 kg (188 lb 9.6 oz)   SpO2 97%   BMI 25.58 kg/m²    abdomen is soft appropriately tender to palpation incision healing without erythema or drainage.      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    Low residue diet  Continue ambulation  Okay for anticoagulation  No need for further antibiotics  Saline lock IV  Encourage a diet and ambulation today.  Possibly home tomorrow    Manuel Cleveland MD  12/17/2022  12:32 EST    "

## 2022-12-17 NOTE — PLAN OF CARE
Goal Outcome Evaluation:  Plan of Care Reviewed With: patient           Outcome Evaluation: Pt. up ad mike and able to make needs known. Denies pain this shift. Personal items and call light within reach. Plan of care ongoing.

## 2022-12-18 LAB
ANION GAP SERPL CALCULATED.3IONS-SCNC: 13 MMOL/L (ref 5–15)
ANISOCYTOSIS BLD QL: NORMAL
BASOPHILS # BLD AUTO: 0 10*3/MM3 (ref 0–0.2)
BASOPHILS NFR BLD AUTO: 0.1 % (ref 0–1.5)
BUN SERPL-MCNC: 37 MG/DL (ref 8–23)
BUN/CREAT SERPL: 37.4 (ref 7–25)
CALCIUM SPEC-SCNC: 8.7 MG/DL (ref 8.6–10.5)
CHLORIDE SERPL-SCNC: 103 MMOL/L (ref 98–107)
CO2 SERPL-SCNC: 25 MMOL/L (ref 22–29)
CREAT SERPL-MCNC: 0.99 MG/DL (ref 0.76–1.27)
DEPRECATED RDW RBC AUTO: 71.8 FL (ref 37–54)
EGFRCR SERPLBLD CKD-EPI 2021: 79.4 ML/MIN/1.73
EOSINOPHIL # BLD AUTO: 0 10*3/MM3 (ref 0–0.4)
EOSINOPHIL NFR BLD AUTO: 0.2 % (ref 0.3–6.2)
ERYTHROCYTE [DISTWIDTH] IN BLOOD BY AUTOMATED COUNT: 28.7 % (ref 12.3–15.4)
GLUCOSE BLDC GLUCOMTR-MCNC: 113 MG/DL (ref 70–105)
GLUCOSE BLDC GLUCOMTR-MCNC: 130 MG/DL (ref 70–105)
GLUCOSE BLDC GLUCOMTR-MCNC: 181 MG/DL (ref 70–105)
GLUCOSE BLDC GLUCOMTR-MCNC: 200 MG/DL (ref 70–105)
GLUCOSE SERPL-MCNC: 182 MG/DL (ref 65–99)
HCT VFR BLD AUTO: 31.4 % (ref 37.5–51)
HGB BLD-MCNC: 9.5 G/DL (ref 13–17.7)
LARGE PLATELETS: NORMAL
LYMPHOCYTES # BLD AUTO: 0.9 10*3/MM3 (ref 0.7–3.1)
LYMPHOCYTES NFR BLD AUTO: 14.7 % (ref 19.6–45.3)
MAGNESIUM SERPL-MCNC: 2.2 MG/DL (ref 1.6–2.4)
MCH RBC QN AUTO: 20.8 PG (ref 26.6–33)
MCHC RBC AUTO-ENTMCNC: 30.4 G/DL (ref 31.5–35.7)
MCV RBC AUTO: 68.4 FL (ref 79–97)
MICROCYTES BLD QL: NORMAL
MONOCYTES # BLD AUTO: 0.6 10*3/MM3 (ref 0.1–0.9)
MONOCYTES NFR BLD AUTO: 9.1 % (ref 5–12)
NEUTROPHILS NFR BLD AUTO: 4.6 10*3/MM3 (ref 1.7–7)
NEUTROPHILS NFR BLD AUTO: 75.9 % (ref 42.7–76)
NRBC BLD AUTO-RTO: 0.1 /100 WBC (ref 0–0.2)
OVALOCYTES BLD QL SMEAR: NORMAL
PLATELET # BLD AUTO: 267 10*3/MM3 (ref 140–450)
PMV BLD AUTO: 8.4 FL (ref 6–12)
POIKILOCYTOSIS BLD QL SMEAR: NORMAL
POTASSIUM SERPL-SCNC: 3.9 MMOL/L (ref 3.5–5.2)
POTASSIUM SERPL-SCNC: 4.1 MMOL/L (ref 3.5–5.2)
RBC # BLD AUTO: 4.58 10*6/MM3 (ref 4.14–5.8)
SCHISTOCYTES BLD QL SMEAR: NORMAL
SMALL PLATELETS BLD QL SMEAR: ADEQUATE
SODIUM SERPL-SCNC: 141 MMOL/L (ref 136–145)
TOXIC GRANULATION: NORMAL
WBC NRBC COR # BLD: 6.1 10*3/MM3 (ref 3.4–10.8)

## 2022-12-18 PROCEDURE — 85007 BL SMEAR W/DIFF WBC COUNT: CPT | Performed by: SURGERY

## 2022-12-18 PROCEDURE — 99024 POSTOP FOLLOW-UP VISIT: CPT | Performed by: SURGERY

## 2022-12-18 PROCEDURE — 82962 GLUCOSE BLOOD TEST: CPT

## 2022-12-18 PROCEDURE — 80048 BASIC METABOLIC PNL TOTAL CA: CPT | Performed by: SURGERY

## 2022-12-18 PROCEDURE — 85025 COMPLETE CBC W/AUTO DIFF WBC: CPT | Performed by: SURGERY

## 2022-12-18 PROCEDURE — 63710000001 INSULIN LISPRO (HUMAN) PER 5 UNITS: Performed by: PHYSICIAN ASSISTANT

## 2022-12-18 RX ADMIN — METFORMIN HYDROCHLORIDE 2000 MG: 500 TABLET ORAL at 18:40

## 2022-12-18 RX ADMIN — LOSARTAN POTASSIUM 100 MG: 50 TABLET, FILM COATED ORAL at 09:29

## 2022-12-18 RX ADMIN — INSULIN LISPRO 2 UNITS: 100 INJECTION, SOLUTION INTRAVENOUS; SUBCUTANEOUS at 13:00

## 2022-12-18 RX ADMIN — Medication 10 ML: at 09:31

## 2022-12-18 RX ADMIN — PANTOPRAZOLE SODIUM 40 MG: 40 TABLET, DELAYED RELEASE ORAL at 09:29

## 2022-12-18 RX ADMIN — GLIPIZIDE 10 MG: 5 TABLET ORAL at 09:29

## 2022-12-18 RX ADMIN — Medication 10 ML: at 22:12

## 2022-12-18 RX ADMIN — EMPAGLIFLOZIN 25 MG: 25 TABLET, FILM COATED ORAL at 09:29

## 2022-12-18 RX ADMIN — ATORVASTATIN CALCIUM 10 MG: 10 TABLET, FILM COATED ORAL at 22:11

## 2022-12-18 NOTE — PROGRESS NOTES
"    [facility] Medicine Services Daily Progress Note    Patient Name: Vlad Guerrero  : 1945  MRN: [id number]  Primary Care Physician:  Batool Lin APRN  Date of admission: 12/15/2022      Subjective      Chief Complaint: *Medical management     History of Present Illness: Vlad Guerrero is a 77 y.o. male with a past medical history to include DM2, hyperlipidemia and hypertension presented to [facility] for right colon resection and hemicolectomy for carcinoma.  Patient is in his room but unable to participate in HPI due to sleeping from anesthesia.  HPI taken from review of records and edited as appropriate.       Patient is a 77 y.o. male presents with symptomatic anemia.  The patient was sent to the hospital by his primary care physician for work-up and treatment of a symptomatic anemia which has been progressively getting worse over the last month or so.  He reports dizziness with \"falling out\".  Upon arrival in the ER, the patient was found to have a hemoglobin of 7.1.  Hematology oncology was consulted for microcytic anemia, and iron studies demonstrated iron deficiency anemia.  His fecal occult blood test was negative, the patient was transfused 1 unit of packed red blood cells and started on iron infusions.  He denies having any abdominal pain, nausea, vomiting, difficulty passing bowel movements, or blood per rectum.  He has had an upper endoscopy in the past with esophageal dilation, he has had an appendectomy as a child, and he has never had a lower endoscopy.  The patient's family history is significant for colon cancer in his father.     The patient was felt to possibly have chronic GI blood loss, and was recommended to undergo EGD and colonoscopy by gastroenterology.  Gastroenterology performed a bowel prep, and this morning performed a EGD and colonoscopy which demonstrated a circumferential 4.5 cm mass in the cecum concerning for colon cancer.  Multiple biopsies " were taken.  The patient also had a 2 polyps removed from his descending and rectum area.  General surgery was consulted for possible colonic resection and oncology was informed of the findings as well.  The patient had a CEA drawn, but has not returned yet.  He has not yet had any staging scans performed.     12/16/2022 patient seen and examined in bed no acute distress, vital signs stable, the presently on 2 L of oxygen, discussed with RN.  Patient is doing better.  Blood glucose 268    12/17/22 seen in bed NAD, sleep most of the day, EVELYN RN, no new complaints.    Review of Systems   Constitutional: Positive for malaise/fatigue.   HENT: Negative.    Eyes: Negative.    Cardiovascular: Negative.    Respiratory: Negative.    Endocrine: Negative.    Hematologic/Lymphatic: Negative.    Skin: Negative.    Musculoskeletal: Negative.    Gastrointestinal: Positive for abdominal pain.   Genitourinary: Negative.    Neurological: Positive for weakness.   Psychiatric/Behavioral: Negative.    Allergic/Immunologic: Negative.    All other systems reviewed and are negative.    Objective      Vitals:   Temp:  [97.6 °F (36.4 °C)-98.1 °F (36.7 °C)] 97.6 °F (36.4 °C)  Heart Rate:  [] 101  Resp:  [14-16] 16  BP: (116-128)/(65-94) 116/65    Physical Exam seen and examined in bed in bed no acute distress  HENT:      Head: Normocephalic and atraumatic.   Cardiovascular:      Rate and Rhythm: Normal rate.      Pulses: Normal pulses.   Pulmonary:      Effort: Pulmonary effort is normal. No respiratory distress.      Breath sounds: Normal breath sounds.   Musculoskeletal:      Cervical back: Neck supple.   Skin:     General: Skin is warm and dry.            Result Review    Result Review:  I have personally reviewed the results from the time of this admission to 12/17/2022 23:08 EST and agree with these findings:  []  Laboratory  []  Microbiology  []  Radiology  []  EKG/Telemetry   []  Cardiology/Vascular   []  Pathology  []  Old  records  []  Other:  Most notable findings include:   CMP:        Lab 12/16/22  2225 12/15/22  2341 12/15/22  0621 12/12/22 0628   SODIUM  --  137 142 139   POTASSIUM 4.2 4.7 3.9 4.2   CHLORIDE  --  101 107 105   CO2  --  21.0* 25.0 25.4   ANION GAP  --  15.0 10.0 8.6   BUN  --  18 16 24*   CREATININE  --  0.83 0.96 0.91   EGFR  --  91.3 82.4 87.9   GLUCOSE  --  268* 138* 156*   CALCIUM  --  8.7 8.8 8.7   MAGNESIUM 2.2 2.0  --   --    TOTAL PROTEIN  --   --  6.6 6.4   ALBUMIN  --   --  3.80 3.60   GLOBULIN  --   --  2.8 2.8   ALT (SGPT)  --   --  17 12   AST (SGOT)  --   --  23 18   BILIRUBIN  --   --  0.4 0.2   ALK PHOS  --   --  82 81     CBC:      Lab 12/15/22  2341 12/15/22  0622 12/12/22  0628   WBC 12.80* 4.30 4.77   HEMOGLOBIN 9.4* 7.8* 7.9*   HEMATOCRIT 31.0* 25.5* 29.1*   PLATELETS 222 201 198   NEUTROS ABS 11.70* 2.50 2.83   LYMPHS ABS 0.40* 1.20 1.24   MONOS ABS 0.60 0.50 0.55   EOS ABS 0.00 0.10 0.12   MCV 69.3* 67.9* 72.0*       Wounds (last 24 hours)     LDA Wound     Row Name 12/17/22 2045 12/17/22 0848 12/17/22 0400       Wound 12/15/22 0800 midline abdomen Incision    Wound - Properties Group Placement Date: 12/15/22  -CM Placement Time: 0800  -CM Orientation: midline  -CM Location: abdomen  -CM Primary Wound Type: Incision  -CM    Dressing Appearance dried drainage;intact  -SM dried drainage;intact  -SC intact;dried drainage  -SM    Closure NEY  -SM NEY  -SC NEY  -SM    Base dressing in place, unable to visualize  -SM dressing in place, unable to visualize  -SC dressing in place, unable to visualize  -SM    Retired Wound - Properties Group Placement Date: 12/15/22  -CM Placement Time: 0800  -CM Orientation: midline  -CM Location: abdomen  -CM Primary Wound Type: Incision  -CM    Retired Wound - Properties Group Date first assessed: 12/15/22  -CM Time first assessed: 0800  -CM Location: abdomen  -CM Primary Wound Type: Incision  -CM    Row Name 12/17/22 0000             Wound 12/15/22 0800 midline  abdomen Incision    Wound - Properties Group Placement Date: 12/15/22  -CM Placement Time: 0800  -CM Orientation: midline  -CM Location: abdomen  -CM Primary Wound Type: Incision  -CM    Dressing Appearance intact;dried drainage  -SM      Closure NEY  -SM      Base dressing in place, unable to visualize  -SM      Retired Wound - Properties Group Placement Date: 12/15/22  -CM Placement Time: 0800  -CM Orientation: midline  -CM Location: abdomen  -CM Primary Wound Type: Incision  -CM    Retired Wound - Properties Group Date first assessed: 12/15/22  -CM Time first assessed: 0800  -CM Location: abdomen  -CM Primary Wound Type: Incision  -CM          User Key  (r) = Recorded By, (t) = Taken By, (c) = Cosigned By    Initials Name Provider Type    Maureen Cummins RN Registered Nurse    Liz Johnson LPN Licensed Nurse    Fiorella Duff LPN Licensed Nurse                  Assessment & Plan      Brief Patient Summary:  Vlad Guerrero is a 77 y.o. male who       atorvastatin, 10 mg, Oral, Nightly  empagliflozin, 25 mg, Oral, Daily  glipizide, 10 mg, Oral, QAM AC  insulin lispro, 2-7 Units, Subcutaneous, TID With Meals  losartan, 100 mg, Oral, Daily  metFORMIN, 2,000 mg, Oral, Daily With Dinner  pantoprazole, 40 mg, Oral, Q AM  senna-docusate sodium, 2 tablet, Oral, BID  sodium chloride, 10 mL, Intravenous, Q12H             Active Hospital Problems:  Active Hospital Problems    Diagnosis    • **Mass of colon    • Primary hypertension    • Mixed hyperlipidemia    • Type 2 diabetes mellitus with hyperglycemia, without long-term current use of insulin (HCC)    • Microcytic anemia      Status post right colon resection for cancer (cecum)  -Incisions, drains and pain management per surgery  -hgb 7.8>9.4 monitor and transfuse <7.     DM2  uncontrolled  -Current glucose 138>268  -Hold home DM2 meds  -SSI  -Monitor glucose before meals and at bedtime     Hypertension  -Current /59  -Continue home meds  -Monitor  BP     Hyperlipidemia  -Continue home statin      DVT prophylaxis:  Mechanical DVT prophylaxis orders are present.    CODE STATUS:         Disposition:  I expect patient to be discharged *per clinical course.    This patient has been examined wearing appropriate Personal Protective Equipment and discussed with rn. 12/17/22      Electronically signed by Troy Angel MD, 12/17/22, 23:08 EST.  [facility]ist Team

## 2022-12-18 NOTE — PLAN OF CARE
Goal Outcome Evaluation:      Patient able to make needs known. VSS, Room air. Patient refused evening medication. Patient has had several loose bms this shift. Call light with in reach. Plan of care on going.

## 2022-12-18 NOTE — PROGRESS NOTES
"Post-op Note  COLON RESECTION RIGHT  12/15/2022    Subjective   Vlad Guerrero is a 77 y.o. 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      Is concerned that yesterday he had some bloody bowel movements.  Today he continues to have bowel movements but does not think they look as bloody as yesterday.  Has been tolerating a diet.  Ambulatory.    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    Low residue diet  Continue ambulation    We will get some lab work today.  If labs look okay possibly discharge home.     Manuel Cleveland MD  12/18/2022  13:36 EST    "

## 2022-12-19 ENCOUNTER — READMISSION MANAGEMENT (OUTPATIENT)
Dept: CALL CENTER | Facility: HOSPITAL | Age: 77
End: 2022-12-19

## 2022-12-19 VITALS
TEMPERATURE: 97.3 F | BODY MASS INDEX: 25.55 KG/M2 | DIASTOLIC BLOOD PRESSURE: 63 MMHG | RESPIRATION RATE: 12 BRPM | WEIGHT: 188.6 LBS | OXYGEN SATURATION: 97 % | HEIGHT: 72 IN | HEART RATE: 100 BPM | SYSTOLIC BLOOD PRESSURE: 112 MMHG

## 2022-12-19 PROBLEM — K63.89 MASS OF COLON: Status: RESOLVED | Noted: 2022-11-15 | Resolved: 2022-12-19

## 2022-12-19 LAB
GLUCOSE BLDC GLUCOMTR-MCNC: 153 MG/DL (ref 70–105)
GLUCOSE BLDC GLUCOMTR-MCNC: 201 MG/DL (ref 70–105)
MAGNESIUM SERPL-MCNC: 2.2 MG/DL (ref 1.6–2.4)
POTASSIUM SERPL-SCNC: 4 MMOL/L (ref 3.5–5.2)

## 2022-12-19 PROCEDURE — 63710000001 INSULIN LISPRO (HUMAN) PER 5 UNITS: Performed by: PHYSICIAN ASSISTANT

## 2022-12-19 PROCEDURE — 82962 GLUCOSE BLOOD TEST: CPT

## 2022-12-19 PROCEDURE — 84132 ASSAY OF SERUM POTASSIUM: CPT | Performed by: PHYSICIAN ASSISTANT

## 2022-12-19 PROCEDURE — 99024 POSTOP FOLLOW-UP VISIT: CPT | Performed by: SURGERY

## 2022-12-19 PROCEDURE — 83735 ASSAY OF MAGNESIUM: CPT | Performed by: PHYSICIAN ASSISTANT

## 2022-12-19 RX ORDER — OXYCODONE HYDROCHLORIDE 5 MG/1
5 TABLET ORAL EVERY 4 HOURS PRN
Qty: 15 TABLET | Refills: 0 | Status: SHIPPED | OUTPATIENT
Start: 2022-12-19 | End: 2022-12-25

## 2022-12-19 RX ADMIN — SENNOSIDES AND DOCUSATE SODIUM 2 TABLET: 50; 8.6 TABLET ORAL at 10:19

## 2022-12-19 RX ADMIN — LOSARTAN POTASSIUM 100 MG: 50 TABLET, FILM COATED ORAL at 10:19

## 2022-12-19 RX ADMIN — Medication 10 ML: at 10:21

## 2022-12-19 RX ADMIN — GLIPIZIDE 10 MG: 5 TABLET ORAL at 10:19

## 2022-12-19 RX ADMIN — INSULIN LISPRO 3 UNITS: 100 INJECTION, SOLUTION INTRAVENOUS; SUBCUTANEOUS at 12:07

## 2022-12-19 RX ADMIN — INSULIN LISPRO 2 UNITS: 100 INJECTION, SOLUTION INTRAVENOUS; SUBCUTANEOUS at 10:19

## 2022-12-19 RX ADMIN — PANTOPRAZOLE SODIUM 40 MG: 40 TABLET, DELAYED RELEASE ORAL at 05:58

## 2022-12-19 RX ADMIN — EMPAGLIFLOZIN 25 MG: 25 TABLET, FILM COATED ORAL at 10:19

## 2022-12-19 NOTE — PROGRESS NOTES
"    [facility] Medicine Services Daily Progress Note    Patient Name: Vlad Guerrero  : 1945  MRN: [id number]  Primary Care Physician:  Batool Lin APRN  Date of admission: 12/15/2022      Subjective      Chief Complaint: *Medical management     History of Present Illness: Vlad Guerrero is a 77 y.o. male with a past medical history to include DM2, hyperlipidemia and hypertension presented to [facility] for right colon resection and hemicolectomy for carcinoma.  Patient is in his room but unable to participate in HPI due to sleeping from anesthesia.  HPI taken from review of records and edited as appropriate.       Patient is a 77 y.o. male presents with symptomatic anemia.  The patient was sent to the hospital by his primary care physician for work-up and treatment of a symptomatic anemia which has been progressively getting worse over the last month or so.  He reports dizziness with \"falling out\".  Upon arrival in the ER, the patient was found to have a hemoglobin of 7.1.  Hematology oncology was consulted for microcytic anemia, and iron studies demonstrated iron deficiency anemia.  His fecal occult blood test was negative, the patient was transfused 1 unit of packed red blood cells and started on iron infusions.  He denies having any abdominal pain, nausea, vomiting, difficulty passing bowel movements, or blood per rectum.  He has had an upper endoscopy in the past with esophageal dilation, he has had an appendectomy as a child, and he has never had a lower endoscopy.  The patient's family history is significant for colon cancer in his father.     The patient was felt to possibly have chronic GI blood loss, and was recommended to undergo EGD and colonoscopy by gastroenterology.  Gastroenterology performed a bowel prep, and this morning performed a EGD and colonoscopy which demonstrated a circumferential 4.5 cm mass in the cecum concerning for colon cancer.  Multiple biopsies " were taken.  The patient also had a 2 polyps removed from his descending and rectum area.  General surgery was consulted for possible colonic resection and oncology was informed of the findings as well.  The patient had a CEA drawn, but has not returned yet.  He has not yet had any staging scans performed.     12/16/2022 patient seen and examined in bed no acute distress, vital signs stable, the presently on 2 L of oxygen, discussed with RN.  Patient is doing better.  Blood glucose 268    12/17/22 seen in bed NAD, sleep most of the day, EVELYN RN, no new complaints.  12./18/22 seen and examined in bed NAD, EVELYN RN no new complaints, vss    Review of Systems   Constitutional: Positive for malaise/fatigue.   HENT: Negative.    Eyes: Negative.    Cardiovascular: Negative.    Respiratory: Negative.    Endocrine: Negative.    Hematologic/Lymphatic: Negative.    Skin: Negative.    Musculoskeletal: Negative.    Gastrointestinal: Positive for abdominal pain.   Genitourinary: Negative.    Neurological: Positive for weakness.   Psychiatric/Behavioral: Negative.    Allergic/Immunologic: Negative.    All other systems reviewed and are negative.    Objective      Vitals:   Temp:  [97.8 °F (36.6 °C)-98.2 °F (36.8 °C)] 97.8 °F (36.6 °C)  Heart Rate:  [86-92] 92  Resp:  [14-16] 16  BP: (128-146)/(60-71) 128/71    Physical Exam seen and examined in bed in bed no acute distress  HENT:      Head: Normocephalic and atraumatic.   Cardiovascular:      Rate and Rhythm: Normal rate.      Pulses: Normal pulses.   Pulmonary:      Effort: Pulmonary effort is normal. No respiratory distress.      Breath sounds: Normal breath sounds.   Musculoskeletal:      Cervical back: Neck supple.   Skin:     General: Skin is warm and dry.            Result Review    Result Review:  I have personally reviewed the results from the time of this admission to 12/18/2022 21:07 EST and agree with these findings:  []  Laboratory  []  Microbiology  []  Radiology  []   EKG/Telemetry   []  Cardiology/Vascular   []  Pathology  []  Old records  []  Other:  Most notable findings include:   CMP:        Lab 12/18/22  1259 12/17/22  2319 12/16/22  2225 12/15/22  2341 12/15/22  0621 12/12/22  0628   SODIUM 141  --   --  137 142 139   POTASSIUM 3.9 4.1 4.2 4.7 3.9 4.2   CHLORIDE 103  --   --  101 107 105   CO2 25.0  --   --  21.0* 25.0 25.4   ANION GAP 13.0  --   --  15.0 10.0 8.6   BUN 37*  --   --  18 16 24*   CREATININE 0.99  --   --  0.83 0.96 0.91   EGFR 79.4  --   --  91.3 82.4 87.9   GLUCOSE 182*  --   --  268* 138* 156*   CALCIUM 8.7  --   --  8.7 8.8 8.7   MAGNESIUM  --  2.2 2.2 2.0  --   --    TOTAL PROTEIN  --   --   --   --  6.6 6.4   ALBUMIN  --   --   --   --  3.80 3.60   GLOBULIN  --   --   --   --  2.8 2.8   ALT (SGPT)  --   --   --   --  17 12   AST (SGOT)  --   --   --   --  23 18   BILIRUBIN  --   --   --   --  0.4 0.2   ALK PHOS  --   --   --   --  82 81     CBC:      Lab 12/18/22  1259 12/15/22  2341 12/15/22  0622 12/12/22  0628   WBC 6.10 12.80* 4.30 4.77   HEMOGLOBIN 9.5* 9.4* 7.8* 7.9*   HEMATOCRIT 31.4* 31.0* 25.5* 29.1*   PLATELETS 267 222 201 198   NEUTROS ABS 4.60 11.70* 2.50 2.83   LYMPHS ABS 0.90 0.40* 1.20 1.24   MONOS ABS 0.60 0.60 0.50 0.55   EOS ABS 0.00 0.00 0.10 0.12   MCV 68.4* 69.3* 67.9* 72.0*       Wounds (last 24 hours)     LDA Wound     Row Name 12/18/22 0848 12/18/22 0429 12/18/22 0020       Wound 12/15/22 0800 midline abdomen Incision    Wound - Properties Group Placement Date: 12/15/22  -CM Placement Time: 0800  -CM Orientation: midline  -CM Location: abdomen  -CM Primary Wound Type: Incision  -CM    Dressing Appearance dried drainage;intact  -SC dried drainage;intact  -SM dried drainage;intact  -SM    Closure NEY  -SC NEY  -SM NEY  -SM    Base dressing in place, unable to visualize  -SC dressing in place, unable to visualize  -SM dressing in place, unable to visualize  -SM    Dressing Care abdominal binder utilized  -SC -- --    Retired Wound  - Properties Group Placement Date: 12/15/22  -CM Placement Time: 0800  -CM Orientation: midline  -CM Location: abdomen  -CM Primary Wound Type: Incision  -CM    Retired Wound - Properties Group Date first assessed: 12/15/22  -CM Time first assessed: 0800  -CM Location: abdomen  -CM Primary Wound Type: Incision  -CM          User Key  (r) = Recorded By, (t) = Taken By, (c) = Cosigned By    Initials Name Provider Type    Maureen Cummins RN Registered Nurse    Liz Johnson LPN Licensed Nurse    Fiorella Duff LPN Licensed Nurse                  Assessment & Plan      Brief Patient Summary:  Vlad Guerrero is a 77 y.o. male who       atorvastatin, 10 mg, Oral, Nightly  empagliflozin, 25 mg, Oral, Daily  glipizide, 10 mg, Oral, QAM AC  insulin lispro, 2-7 Units, Subcutaneous, TID With Meals  losartan, 100 mg, Oral, Daily  metFORMIN, 2,000 mg, Oral, Daily With Dinner  pantoprazole, 40 mg, Oral, Q AM  senna-docusate sodium, 2 tablet, Oral, BID  sodium chloride, 10 mL, Intravenous, Q12H             Active Hospital Problems:  Active Hospital Problems    Diagnosis    • **Mass of colon    • Primary hypertension    • Mixed hyperlipidemia    • Type 2 diabetes mellitus with hyperglycemia, without long-term current use of insulin (HCC)    • Microcytic anemia      Status post right colon resection for cancer (cecum)  -Incisions, drains and pain management per surgery  -hgb 7.8>9.4 monitor and transfuse <7.     DM2  uncontrolled  -Current glucose 138>268  -Hold home DM2 meds  -SSI  -Monitor glucose before meals and at bedtime     Hypertension  -Current /59  -Continue home meds  -Monitor BP     Hyperlipidemia  -Continue home statin      DVT prophylaxis:  Mechanical DVT prophylaxis orders are present.    CODE STATUS:         Disposition:  I expect patient to be discharged *per clinical course.    This patient has been examined wearing appropriate Personal Protective Equipment and discussed with rn.  12/18/22      Electronically signed by Troy Angel MD, 12/18/22, 21:07 EST.  [facility]

## 2022-12-19 NOTE — PROGRESS NOTES
[facility] Medicine Services Daily Progress Note    Patient Name: Vlad Guerrero  : 1945  MRN: [id number]  Primary Care Physician:  Batool Lin APRN  Date of admission: 12/15/2022      Subjective        Brief interim history: 77-year-old male with history of chronic GI blood loss/anemia, S/p EGD/colonoscopy which demonstrated a second/4.5 cm mass in the cecum concerning for colorectal cancer, T2DM, hypertension, and HLD.  He was admitted on 12/15/2022, and underwent right colon resection (12/15) by Dr. Davis.  Hospitalist was consulted for medical management.    2022: Seen and examined in follow-up.  Sitting comfortably in chair in no distress or discomfort.  No complaints or events overnight.      Objective      Vitals:   Temp:  [97.6 °F (36.4 °C)-97.8 °F (36.6 °C)] 97.6 °F (36.4 °C)  Heart Rate:  [89-92] 89  Resp:  [14-16] 14  BP: (128-129)/(70-71) 129/70    Physical Exam  Constitutional:       General: He is not in acute distress.     Appearance: Normal appearance. He is not ill-appearing.   HENT:      Head: Normocephalic.      Right Ear: Tympanic membrane normal.      Nose: Nose normal.      Mouth/Throat:      Mouth: Mucous membranes are moist.   Eyes:      Pupils: Pupils are equal, round, and reactive to light.   Cardiovascular:      Rate and Rhythm: Normal rate.      Pulses: Normal pulses.   Pulmonary:      Effort: Pulmonary effort is normal.   Abdominal:      General: Bowel sounds are normal.      Palpations: Abdomen is soft.      Comments: Abdominal binder with dressing in place   Musculoskeletal:         General: Normal range of motion.      Cervical back: Neck supple.   Skin:     General: Skin is warm.   Neurological:      Mental Status: He is alert. Mental status is at baseline.             Result Review    Result Review:  I have personally reviewed the results from the time of this admission to 2022 09:28 EST and agree with these findings:  []   Laboratory  []  Microbiology  []  Radiology  []  EKG/Telemetry   []  Cardiology/Vascular   []  Pathology  []  Old records  []  Other:  Most notable findings include:     Wounds (last 24 hours)     LDA Wound     Row Name 12/19/22 0830 12/19/22 0420 12/19/22 0010       Wound 12/15/22 0800 midline abdomen Incision    Wound - Properties Group Placement Date: 12/15/22  -CM Placement Time: 0800  -CM Orientation: midline  -CM Location: abdomen  -CM Primary Wound Type: Incision  -CM    Dressing Appearance dry;intact  -AK dry;intact  -JE dry;intact  -JE    Closure NEY  -AK Staples  -JE Staples  -JE    Base dressing in place, unable to visualize  -AK dressing in place, unable to visualize  -JE dressing in place, unable to visualize  -JE    Drainage Amount none  -AK none  -JE none  -JE    Dressing Care border dressing;abdominal binder utilized  -AK abdominal binder utilized  -JE abdominal binder utilized  -JE    Retired Wound - Properties Group Placement Date: 12/15/22  -CM Placement Time: 0800  -CM Orientation: midline  -CM Location: abdomen  -CM Primary Wound Type: Incision  -CM    Retired Wound - Properties Group Date first assessed: 12/15/22  -CM Time first assessed: 0800  -CM Location: abdomen  -CM Primary Wound Type: Incision  -CM    Row Name 12/18/22 2045             Wound 12/15/22 0800 midline abdomen Incision    Wound - Properties Group Placement Date: 12/15/22  -CM Placement Time: 0800  -CM Orientation: midline  -CM Location: abdomen  -CM Primary Wound Type: Incision  -CM    Dressing Appearance dry;intact  -JE      Closure Leah  -JE      Base clean  -JE      Drainage Amount none  -JE      Dressing Care abdominal binder utilized  -JE      Retired Wound - Properties Group Placement Date: 12/15/22  -CM Placement Time: 0800  -CM Orientation: midline  -CM Location: abdomen  -CM Primary Wound Type: Incision  -CM    Retired Wound - Properties Group Date first assessed: 12/15/22  -CM Time first assessed: 0800  -CM  Location: abdomen  -CM Primary Wound Type: Incision  -CM          User Key  (r) = Recorded By, (t) = Taken By, (c) = Cosigned By    Initials Name Provider Type    Maureen Cummins RN Registered Nurse    Nicolle Magaña RN Registered Nurse    Amelia Vences RN Registered Nurse                  Assessment & Plan          atorvastatin, 10 mg, Oral, Nightly  empagliflozin, 25 mg, Oral, Daily  glipizide, 10 mg, Oral, QAM AC  insulin lispro, 2-7 Units, Subcutaneous, TID With Meals  losartan, 100 mg, Oral, Daily  metFORMIN, 2,000 mg, Oral, Daily With Dinner  pantoprazole, 40 mg, Oral, Q AM  senna-docusate sodium, 2 tablet, Oral, BID  sodium chloride, 10 mL, Intravenous, Q12H             Active Hospital Problems:  Active Hospital Problems    Diagnosis    • **Mass of colon    • Primary hypertension    • Mixed hyperlipidemia    • Type 2 diabetes mellitus with hyperglycemia, without long-term current use of insulin (HCC)    • Microcytic anemia      Assessment/plan:    CRCA      -S/p resection, with drainage in place       -managed by general surgery, supportive care, pain control, and monitor H&H closely    T2DM      -Jardiance/metformin/glipizide/SSI    Hypertension     -Losartan    HLD:       -Lipitor      DVT prophylaxis:  Mechanical DVT prophylaxis orders are present.    CODE STATUS:         Disposition:  I expect patient to be discharged     Electronically signed by Adin Boland MD, 12/19/22, 09:28 EST.  [facility]    Assessment/plan:

## 2022-12-19 NOTE — DISCHARGE INSTRUCTIONS
Ok for discharge  Follow-up with me (Dr. Davis) in 2 weeks  Regular diet as tolerated  Ok to shower starting tomorrow. No tub baths, pools, lakes, or streams for 1 week.  Ok to apply ice to incisions  No heavy lifting (greater than 20 lbs) for 6 weeks after surgery  Call my office or present to the ED with: fevers greater than 101.5, redness around the incisions, drainage from the incisions, intractable nausea/vomiting, or pain that is getting worse instead of better

## 2022-12-19 NOTE — PLAN OF CARE
Goal Outcome Evaluation:              Outcome Evaluation: Pt's VSS. Up ad mike. Pt had taken his border dressing off of abdominal incision so this nurse replaced it. No complaints of pain this shift. Able to make needs known. Call light within reach. Plan of care ongoing. Will continue to monitor.

## 2022-12-19 NOTE — PLAN OF CARE
Goal Outcome Evaluation:         Patient doing well, will be discharged home. Patient will need to make follow up with Dr. Davis in 2 weeks.

## 2022-12-19 NOTE — DISCHARGE SUMMARY
GENERAL SURGERY DISCHARGE SUMMARY    Date of Discharge:  12/19/2022    Discharge Diagnosis: Colon cancer    Presenting Problem/History of Present Illness  Active Hospital Problems    Diagnosis  POA   • Primary hypertension [I10]  Yes   • Mixed hyperlipidemia [E78.2]  Yes   • Type 2 diabetes mellitus with hyperglycemia, without long-term current use of insulin (HCC) [E11.65]  Yes   • Microcytic anemia [D50.9]  Yes      Resolved Hospital Problems    Diagnosis Date Resolved POA   • **Mass of colon [K63.89] 12/19/2022 Yes      Hospital Course  Patient is a 77 y.o. male presented with a cecal mass which was found to be colon cancer.  He underwent an uncomplicated right colon resection.  Postoperatively, the patient was admitted to the floor where he was observed.  His pain was controlled, he was able to tolerate a regular diet, and began to have return of bowel function.  There was some concern for bloody bowel movements during his hospital stay, but his hemoglobin remained stable.  On the day of discharge, the patient was afebrile, eating, walking, and his pain was well controlled.      Procedures Performed    Procedure(s):  COLON RESECTION RIGHT  -------------------       Consults:   Consults     Date and Time Order Name Status Description    12/15/2022 11:11 AM Inpatient Hospitalist Consult            Pertinent Test Results: None    Condition on Discharge: Improved    Vital Signs  Temp:  [97.3 °F (36.3 °C)-97.8 °F (36.6 °C)] 97.3 °F (36.3 °C)  Heart Rate:  [] 100  Resp:  [12-16] 12  BP: (112-129)/(63-71) 112/63    Physical Exam:  Physical Exam  Vitals reviewed.   Constitutional:       Appearance: He is well-developed.   HENT:      Head: Normocephalic and atraumatic.   Eyes:      Pupils: Pupils are equal, round, and reactive to light.   Cardiovascular:      Rate and Rhythm: Normal rate and regular rhythm.   Pulmonary:      Effort: Pulmonary effort is normal.      Breath sounds: Normal breath sounds.   Abdominal:       General: There is no distension.      Palpations: Abdomen is soft.      Tenderness: There is no abdominal tenderness.      Hernia: No hernia is present.      Comments: Midline laparotomy incision with staples in place, appears to be well-healed   Musculoskeletal:         General: Normal range of motion.      Cervical back: Normal range of motion.   Lymphadenopathy:      Cervical: No cervical adenopathy.   Skin:     General: Skin is warm and dry.      Findings: No rash.   Neurological:      Mental Status: He is alert and oriented to person, place, and time.         Discharge Disposition  Home or Self Care    Discharge Medications     Discharge Medications      New Medications      Instructions Start Date   oxyCODONE 5 MG immediate release tablet  Commonly known as: ROXICODONE   5 mg, Oral, Every 4 Hours PRN         Continue These Medications      Instructions Start Date   empagliflozin 25 MG tablet tablet  Commonly known as: JARDIANCE   25 mg, Oral, Daily, Dont take preop      glimepiride 4 MG tablet  Commonly known as: AMARYL   1 tablet, Oral, 2 Times Daily, None preop      losartan 100 MG tablet  Commonly known as: COZAAR   100 mg, Oral, Daily, Last dose 12/14 by 0730      metFORMIN 1000 MG tablet  Commonly known as: GLUCOPHAGE   2,000 mg, Oral, Daily With Dinner, Last dose 12/12      Ozempic (0.25 or 0.5 MG/DOSE) 2 MG/1.5ML solution pen-injector  Generic drug: Semaglutide(0.25 or 0.5MG/DOS)   0.25 mg, Subcutaneous, Weekly, friday      pioglitazone 45 MG tablet  Commonly known as: ACTOS   45 mg, Oral, Daily, None preop      polyethylene glycol 17 GM/SCOOP powder  Commonly known as: MIRALAX   17 g, Oral, As Needed, None preop      pravastatin 40 MG tablet  Commonly known as: PRAVACHOL   1 tablet, Oral, Nightly             Discharge Diet:   Diet Instructions     Diet:      Diet Texture / Consistency: Regular          Activity at Discharge:   Activity Instructions     Discharge Activity      1) No driving while  taking narcotics.   2) Return to school / work in light duty after follow-up.  3) May shower today.  4) Do not lift / push / pull more then 20 lbs.          Follow-up Appointments  No future appointments.  Additional Instructions for the Follow-ups that You Need to Schedule     Discharge Follow-up with Specified Provider: Susan; 2 Weeks   As directed      To: Susan    Follow Up: 2 Weeks         Notify Physician or Go To The ED For the Following Conditions   As directed      For redness around the incisions, drainage from the incisions, or fevers greater than 101.5    Order Comments: For redness around the incisions, drainage from the incisions, or fevers greater than 101.5                Test Results Pending at Discharge       Percy Davis MD  12/19/22  17:09 EST

## 2022-12-19 NOTE — CASE MANAGEMENT/SOCIAL WORK
Continued Stay Note  KRISHAN Bernardo     Patient Name: Vlad Guerrero  MRN: [id number]  Today's Date: 12/19/2022    Admit Date: 12/15/2022    Plan: home   Discharge Plan     Row Name 12/19/22 1445       Plan    Plan home    Plan Comments Barriers to dc: potential dc today                   Expected Discharge Date and Time     Expected Discharge Date Expected Discharge Time    Dec 19, 2022             Sunshine Mayes RN

## 2022-12-19 NOTE — PLAN OF CARE
Goal Outcome Evaluation:               Patient doing well, no complaints of pain/nausea. Patient has been able to ambulate in room with no complaints. Patient has call light within reach and is able to make needs known.

## 2022-12-20 ENCOUNTER — READMISSION MANAGEMENT (OUTPATIENT)
Dept: CALL CENTER | Facility: HOSPITAL | Age: 77
End: 2022-12-20

## 2022-12-20 ENCOUNTER — TELEPHONE (OUTPATIENT)
Dept: SURGERY | Facility: CLINIC | Age: 77
End: 2022-12-20

## 2022-12-20 NOTE — OUTREACH NOTE
Prep Survey    Flowsheet Row Responses   [facility] discharged from? [facility]   Is LACE score < 7 ? No   Eligibility Readm Mgmt   Discharge diagnosis Mass of colon   Does the patient have one of the following disease processes/diagnoses(primary or secondary)? General Surgery   Does the patient have Home health ordered? No   Is there a DME ordered? No   Prep survey completed? Yes          NIALL OMER - Registered Nurse

## 2022-12-20 NOTE — CASE MANAGEMENT/SOCIAL WORK
Case Management Discharge Note      Final Note: home         Selected Continued Care - Discharged on 12/19/2022 Admission date: 12/15/2022 - Discharge disposition: Home or Self Care            Transportation Services  Private: Car    Final Discharge Disposition Code: 01 - home or self-care

## 2022-12-20 NOTE — OUTREACH NOTE
General Surgery Week 1 Survey    Flowsheet Row Responses   [facility] facility patient discharged from? [facility]   Does the patient have one of the following disease processes/diagnoses(primary or secondary)? General Surgery   Week 1 attempt successful? No   Unsuccessful attempts Attempt 1  [All numbers attempted-no answer]          TODD H - Registered Nurse

## 2022-12-23 ENCOUNTER — READMISSION MANAGEMENT (OUTPATIENT)
Dept: CALL CENTER | Facility: HOSPITAL | Age: 77
End: 2022-12-23

## 2022-12-23 NOTE — OUTREACH NOTE
General Surgery Week 1 Survey    Flowsheet Row Responses   [facility] facility patient discharged from? [facility]   Does the patient have one of the following disease processes/diagnoses(primary or secondary)? General Surgery   Week 1 attempt successful? No   Unsuccessful attempts Attempt 2          TAYE ACEVEDO - Registered Nurse

## 2022-12-27 ENCOUNTER — READMISSION MANAGEMENT (OUTPATIENT)
Dept: CALL CENTER | Facility: HOSPITAL | Age: 77
End: 2022-12-27

## 2022-12-27 NOTE — OUTREACH NOTE
General Surgery Week 1 Survey    Flowsheet Row Responses   [facility] patient discharged from? [facility]   Does the patient have one of the following disease processes/diagnoses(primary or secondary)? General Surgery   Week 1 attempt successful? Yes   Call start time 1524   Call end time 1529   Discharge diagnosis COLON RESECTION RIGHT   Is patient permission given to speak with other caregiver? Yes   List who call center can speak with CARLY SANCHEZ Son    Person spoke with today (if not patient) and relationship CARLY SANCHEZ Son    Meds reviewed with patient/caregiver? Yes   Does the patient have all medications related to this admission filled (includes all antibiotics, pain medications, etc.) Yes   Is the patient taking all medications as directed (includes completed medication regime)? Yes   Does the patient have a follow up appointment scheduled with their surgeon? Yes   Has the patient kept scheduled appointments due by today? N/A   Has home health visited the patient within 72 hours of discharge? N/A   Psychosocial issues? No   What is the patient's perception of their health status since discharge? Improving   Is the patient/caregiver able to teach back signs and symptoms of incisional infection? Increased redness, swelling or pain at the incisonal site, Increased drainage or bleeding, Pus or odor from incision, Fever   Is the patient/caregiver able to teach back steps to recovery at home? Set small, achievable goals for return to baseline health, Rest and rebuild strength, gradually increase activity, Eat a well-balance diet   If the patient is a current smoker, are they able to teach back resources for cessation? Not a smoker   Is the patient/caregiver able to teach back the hierarchy of who to call/visit for symptoms/problems? PCP, Specialist, Home health nurse, Urgent Care, ED, 911 Yes   Week 1 call completed? Yes          ALE MATIAS - Registered Nurse

## 2022-12-28 ENCOUNTER — OFFICE VISIT (OUTPATIENT)
Dept: SURGERY | Facility: CLINIC | Age: 77
End: 2022-12-28

## 2022-12-28 VITALS
BODY MASS INDEX: 24.22 KG/M2 | WEIGHT: 178.8 LBS | HEART RATE: 198 BPM | OXYGEN SATURATION: 100 % | SYSTOLIC BLOOD PRESSURE: 143 MMHG | HEIGHT: 72 IN | DIASTOLIC BLOOD PRESSURE: 94 MMHG | TEMPERATURE: 97.8 F

## 2022-12-28 DIAGNOSIS — K63.89 MASS OF COLON: Primary | ICD-10-CM

## 2022-12-28 PROCEDURE — 99024 POSTOP FOLLOW-UP VISIT: CPT | Performed by: SURGERY

## 2022-12-28 RX ORDER — DOCUSATE SODIUM 100 MG/1
100 CAPSULE, LIQUID FILLED ORAL 2 TIMES DAILY
Qty: 60 CAPSULE | Refills: 1 | Status: SHIPPED | OUTPATIENT
Start: 2022-12-28 | End: 2023-01-16

## 2022-12-30 ENCOUNTER — TELEPHONE (OUTPATIENT)
Dept: ONCOLOGY | Facility: CLINIC | Age: 77
End: 2022-12-30

## 2022-12-30 NOTE — PROGRESS NOTES
"Post-op Note    Subjective   Vlad Guerrero is a 77 y.o. 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  Overall, he is doing well.  He is tolerating a regular diet without nausea or vomiting and having some issues with constipation.  He is not having any fevers.      Objective   /94 (Cuff Size: Adult)   Pulse (!) 198   Temp 97.8 °F (36.6 °C) (Infrared)   Ht 182.9 cm (72\")   Wt 81.1 kg (178 lb 12.8 oz)   SpO2 100%   BMI 24.25 kg/m²   Incision is well approximated with skin staples and no surrounding erythema, duration, or drainage to suggest infection.  Staples removed today.      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  Overall doing well.    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  Surgical stage is pT3, pN1b, M0 - stage IIIa  Discussed the significance of this finding.  I would imagine that the patient would likely benefit from adjuvant chemotherapy.  Referrals have been made to oncology to discuss adjuvant chemotherapy.  If the patient needs this, then we will proceed to the operating room for insertion of Port-A-Cath  Discussed the risk, benefits, and alternatives to Port-A-Cath insertion.  The patient understands, and agrees to proceed to the operating room for Port-A-Cath insertion.  Risks include bleeding, infection, malposition, malfunction, need for revision, damage to surrounding structures, need for removal, and pneumothorax.  Will prescribe Colace given the fact the patient reports that he has been having some constipation  Encouraged diet that is high in protein  Otherwise, patient will follow-up with me in 1 month    Percy Davis MD  12/30/2022  12:25 EST  "

## 2023-01-04 ENCOUNTER — READMISSION MANAGEMENT (OUTPATIENT)
Dept: CALL CENTER | Facility: HOSPITAL | Age: 78
End: 2023-01-04
Payer: MEDICARE

## 2023-01-05 NOTE — PROGRESS NOTES
HEMATOLOGY ONCOLOGY OUTPATIENT CONSULTATION       Patient name: Vlad Guerrero  : 1945  MRN: [id number]  Primary Care Physician: Batool Lin APRN  Referring Physician: Percy Davis*  Reason For Consult: Stage III colon cancer    Chief Complaint   Patient presents with   • Appointment     Colon cancer     HPI:   History of Present Illness:  Vlad Guerrero is 77 y.o. male who presented to our office on 23 for consultation regarding    2023: Mr. Guerrero dated the beginning of his present illness to sometime at the end of  when he started to feel fatigued.  He was seen at the [facility] and had laboratory exams that revealed microcytic anemia.  He had evidence of iron deficiency and received intravenous iron in the hospital.  He had upper and lower gastrointestinal endoscopies that demonstrated a cecal tumor that measured 4 to 5 cm and was fungating in appearance.  It was circumferential and was next to the ileocecal valve.  The upper gastrointestinal endoscopy revealed no abnormalities.  On this basis he was on December 15, 2022 he was taken to the hospital and underwent a right hemicolectomy without complications.  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  It corresponded to a grade 2 malignancy that invaded through the muscularis propria into the pericolonic tissues.  Macroscopic tumor perforation or lymphovascular space invasion were not present.  Perineural invasion was not present either.  All margins of excision were negative.  All of 36 lymph nodes submitted to were positive for involvement with malignancy.  The disease was [place] staged as [id number].  Loss of expression of mismatch repair enzymes was not documented.  Mr. Guerrero was discharged to continue treatment as outpatient.  At the time of this visit he was recovering well from the surgery.  His incision  had healed completely and he had no drains or suture materials.  He had returned home and was eating well.  He had yet to return to work.  He had been afebrile and free of nausea.  For the most part his weight had been stable.  After reviewing the records a long conversation, of approximately 1 hour, was had with the patient in regards to options of treatment.  The nature of his disease as well as the likelihood of recurrence were described.  The use of adjuvant chemotherapy to increase the rate of cure after surgery was explained.  Side effects were described in detail.  The need for a port was expressed as well.  A treatment plan was placed.    Subjective:  • 01/06/23.  For the first time in the office with the above.    The following portions of the patient's history were reviewed and updated as appropriate: allergies, current medications, past family history, past medical history, past social history, past surgical history and problem list.    Past Medical History:   Diagnosis Date   • Anemia    • Colon cancer 2022    colon   • Diabetes mellitus (HCC)    • Hyperlipidemia    • Hypertension      Past Surgical History:   Procedure Laterality Date   • APPENDECTOMY     • CARDIAC CATHETERIZATION     • COLON RESECTION N/A 12/15/2022    Procedure: COLON RESECTION RIGHT;  Surgeon: Percy Davis MD;  Location: [facility] MAIN OR;  Service: General;  Laterality: N/A;   • COLONOSCOPY N/A 11/11/2022    Procedure: COLONOSCOPY WITH BIOPSY AND POLYPECTOMY;  Surgeon: Billy Julien MD;  Location: [facility] ENDOSCOPY;  Service: Gastroenterology;  Laterality: N/A;  Impression:  1.  Large 4-5cm fulgurating circumferential ulcerated mass in the very proximal part of the ascending colon next to ileocecal valve multiple biopsies were performed.  This is highly concerning for colon malignancy.  2.  2 polyp rem   • ENDOSCOPY N/A 11/11/2022    Procedure: ESOPHAGOGASTRODUODENOSCOPY with biopsy X1;  Surgeon: Billy Julien MD;   Location: [facility] ENDOSCOPY;  Service: Gastroenterology;  Laterality: N/A;  5.  Upper endoscopy lamination unremarkable.      • TONSILLECTOMY         Current Outpatient Medications:   •  docusate sodium (Colace) 100 MG capsule, Take 1 capsule by mouth 2 (Two) Times a Day., Disp: 60 capsule, Rfl: 1  •  empagliflozin (JARDIANCE) 25 MG tablet tablet, Take 25 mg by mouth Daily. Dont take preop, Disp: , Rfl:   •  glimepiride (AMARYL) 4 MG tablet, Take 1 tablet by mouth 2 (Two) Times a Day. None preop, Disp: , Rfl:   •  losartan (COZAAR) 100 MG tablet, Take 100 mg by mouth Daily. Last dose  by 0730, Disp: , Rfl:   •  metFORMIN (GLUCOPHAGE) 1000 MG tablet, Take 2,000 mg by mouth Daily With Dinner. Last dose , Disp: , Rfl:   •  pioglitazone (ACTOS) 45 MG tablet, Take 45 mg by mouth Daily. None preop, Disp: , Rfl:   •  polyethylene glycol (MIRALAX) 17 GM/SCOOP powder, Take 17 g by mouth As Needed. None preop, Disp: , Rfl:   •  pravastatin (PRAVACHOL) 40 MG tablet, Take 1 tablet by mouth Every Night., Disp: , Rfl:   •  Semaglutide,0.25 or 0.5MG/DOS, (Ozempic, 0.25 or 0.5 MG/DOSE,) 2 MG/1.5ML solution pen-injector, Inject 0.25 mg under the skin into the appropriate area as directed 1 (One) Time Per Week. friday, Disp: , Rfl:     Allergies   Allergen Reactions   • Penicillins Rash     Family History   Problem Relation Age of Onset   • Pneumonia Mother 94        SARS-CoV2   • Colon cancer Father 62   • Heart disease Sister      Cancer-related family history includes Colon cancer (age of onset: 62) in his father.    Social History     Tobacco Use   • Smoking status: Former     Packs/day: 1.00     Years: 2.00     Pack years: 2.00     Types: Cigarettes     Start date:      Quit date:      Years since quittin.0   • Smokeless tobacco: Never   Vaping Use   • Vaping Use: Never used   Substance Use Topics   • Alcohol use: Not Currently   • Drug use: Never     Social History     Social History Narrative   • Not on  file      ROS:     Review of Systems   Constitutional: Positive for fatigue. Negative for activity change, appetite change, chills, diaphoresis, fever and unexpected weight change.   HENT: Negative for congestion, dental problem, drooling, ear discharge, ear pain, facial swelling, hearing loss, mouth sores, nosebleeds, postnasal drip, rhinorrhea, sinus pressure, sinus pain, sneezing, sore throat, tinnitus, trouble swallowing and voice change.    Eyes: Negative for photophobia, pain, discharge, redness, itching and visual disturbance.   Respiratory: Negative for apnea, cough, choking, chest tightness, shortness of breath, wheezing and stridor.    Cardiovascular: Negative for chest pain, palpitations and leg swelling.   Gastrointestinal: Negative for abdominal distention, abdominal pain, anal bleeding, blood in stool, constipation, diarrhea, nausea, rectal pain and vomiting.   Endocrine: Negative for cold intolerance, heat intolerance, polydipsia and polyuria.   Genitourinary: Negative for decreased urine volume, difficulty urinating, dysuria, flank pain, frequency, genital sores, hematuria and urgency.   Musculoskeletal: Negative for arthralgias, back pain, gait problem, joint swelling, myalgias, neck pain and neck stiffness.   Skin: Negative for color change, pallor and rash.   Neurological: Negative for dizziness, tremors, seizures, syncope, facial asymmetry, speech difficulty, weakness, light-headedness, numbness and headaches.   Hematological: Negative for adenopathy. Does not bruise/bleed easily.   Psychiatric/Behavioral: Negative for agitation, behavioral problems, confusion, decreased concentration, hallucinations, self-injury, sleep disturbance and suicidal ideas. The patient is not nervous/anxious.      Objective:    Vitals:    01/06/23 0836   BP: 129/70   Pulse: 80   Temp: 97.7 °F (36.5 °C)   TempSrc: Oral   SpO2: 95%   Weight: 81.1 kg (178 lb 12.7 oz)   Height: 182.9 cm (72\")   PainSc: 0-No pain     Body  mass index is 24.25 kg/m².  ECOG  (0) Fully active, able to carry on all predisease performance without restriction    Physical Exam:     Physical Exam  Constitutional:       General: He is not in acute distress.     Appearance: Normal appearance. He is not ill-appearing, toxic-appearing or diaphoretic.   HENT:      Head: Normocephalic and atraumatic.      Right Ear: External ear normal.      Left Ear: External ear normal.      Nose: Nose normal.      Mouth/Throat:      Mouth: Mucous membranes are moist.      Pharynx: Oropharynx is clear.   Eyes:      General: No scleral icterus.        Right eye: No discharge.         Left eye: No discharge.      Conjunctiva/sclera: Conjunctivae normal.      Pupils: Pupils are equal, round, and reactive to light.   Cardiovascular:      Rate and Rhythm: Normal rate and regular rhythm.      Pulses: Normal pulses.      Heart sounds: Normal heart sounds. No murmur heard.    No friction rub. No gallop.   Pulmonary:      Effort: No respiratory distress.      Breath sounds: No stridor. No wheezing, rhonchi or rales.   Chest:      Chest wall: No tenderness.   Abdominal:      General: Abdomen is flat. Bowel sounds are normal. There is no distension.      Palpations: Abdomen is soft. There is no mass.      Tenderness: There is no abdominal tenderness. There is no right CVA tenderness, left CVA tenderness, guarding or rebound.      Comments: A midline surgical incision is healing well.  He no longer has suture material is in place.   Musculoskeletal:         General: No swelling, tenderness, deformity or signs of injury.      Cervical back: No rigidity.      Right lower leg: No edema.      Left lower leg: No edema.   Lymphadenopathy:      Cervical: No cervical adenopathy.   Skin:     General: Skin is warm and dry.      Coloration: Skin is not jaundiced.      Findings: No bruising or rash.   Neurological:      General: No focal deficit present.      Mental Status: He is alert and oriented to  person, place, and time.      Cranial Nerves: No cranial nerve deficit.      Gait: Gait normal.   Psychiatric:         Mood and Affect: Mood normal.         Behavior: Behavior normal.         Thought Content: Thought content normal.         Judgment: Judgment normal.     KATIE Kramer MD performed a physical exam on 1/6/2023 as documented above    Lab Results - Last 18 Months   Lab Units 12/18/22  1259 12/15/22  2341 12/15/22  0622   WBC 10*3/mm3 6.10 12.80* 4.30   HEMOGLOBIN g/dL 9.5* 9.4* 7.8*   HEMATOCRIT % 31.4* 31.0* 25.5*   PLATELETS 10*3/mm3 267 222 201   MCV fL 68.4* 69.3* 67.9*     Lab Results - Last 18 Months   Lab Units 12/19/22  0019 12/18/22  1259 12/17/22  2319 12/16/22  2225 12/15/22  2341 12/15/22  0621 12/12/22  0628 11/12/22  0125   SODIUM mmol/L  --  141  --   --  137 142 139 137   POTASSIUM mmol/L 4.0 3.9 4.1   < > 4.7 3.9 4.2 3.6   CHLORIDE mmol/L  --  103  --   --  101 107 105 102   CO2 mmol/L  --  25.0  --   --  21.0* 25.0 25.4 26.0   BUN mg/dL  --  37*  --   --  18 16 24* 15   CREATININE mg/dL  --  0.99  --   --  0.83 0.96 0.91 0.97   CALCIUM mg/dL  --  8.7  --   --  8.7 8.8 8.7 8.4*   BILIRUBIN mg/dL  --   --   --   --   --  0.4 0.2 0.4   ALK PHOS U/L  --   --   --   --   --  82 81 85   ALT (SGPT) U/L  --   --   --   --   --  17 12 13   AST (SGOT) U/L  --   --   --   --   --  23 18 14   GLUCOSE mg/dL  --  182*  --   --  268* 138* 156* 170*    < > = values in this interval not displayed.     Lab Results   Component Value Date    GLUCOSE 182 (H) 12/18/2022    BUN 37 (H) 12/18/2022    CREATININE 0.99 12/18/2022    EGFRIFNONA 76 11/15/2021    EGFRIFAFRI 87 11/15/2021    BCR 37.4 (H) 12/18/2022    K 4.0 12/19/2022    CO2 25.0 12/18/2022    CALCIUM 8.7 12/18/2022    ALBUMIN 3.80 12/15/2022    AST 23 12/15/2022    ALT 17 12/15/2022     Lab Results   Component Value Date    IRON 14 (L) 11/08/2022    TIBC 551 (H) 11/08/2022    FERRITIN 7.92 (L) 11/08/2022     Lab Results   Component Value Date     CEA 4.27 11/08/2022     Assessment & Plan     Assessment:  1. 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  Adjuvant treatment is indicated.  The duration of treatment has been debated for a long time.  Mr. Guerrero appears to be a good candidate for abbreviated duration of therapy.  He had a moderately differentiated T3 tumor with limited lymph node invasion and mismatch enzyme repair proficiency.  As well he is older than 70 making him higher risk for complications.  In this setting CAPOX  is at least as good as FOLFOX if not superior.  On this basis I discussed at length the reasons for adjuvant chemotherapy the side effects of the chemotherapy and the potential benefit.  Long-term complications of the treatment were discussed as well.  After asking questions he agreed to receive treatment.  2. He is to receive an [id number] by Dr. Davis  3. Treatment plan has been placed.  I reviewed all the records including medical notes from the most recent admissions, operative report, reports of pathology, laboratory exams including blood counts and chemistries and the images and the reports of imaging studies.  Discussed with him.  4. I spent approximately 70 minutes on this visit, reviewing records, interviewing and examining the patient and counseling.  5. He is to see me in approximately 3 weeks.    Plan:  1. As above    Don Kramer MD on 1/6/2023 at 10:05 AM

## 2023-01-05 NOTE — OUTREACH NOTE
General Surgery Week 2 Survey    Flowsheet Row Responses   [facility] patient discharged from? [place]   Does the patient have one of the following disease processes/diagnoses(primary or secondary)? General Surgery   Week 2 attempt successful? Yes   Call start time 1934   Call end time 1939   Discharge diagnosis COLON RESECTION RIGHT   Is the patient taking all medications as directed (includes completed medication regime)? Yes   Does the patient have a follow up appointment scheduled with their surgeon? Yes   Has the patient kept scheduled appointments due by today? Yes   Psychosocial issues? No   What is the patient's perception of their health status since discharge? Improving   Is the patient /caregiver able to teach back basic post-op care? Keep incision areas clean,dry and protected, Practice 'cough and deep breath', Drive as instructed by MD in discharge instructions, Lifting as instructed by MD in discharge instructions   Is the patient/caregiver able to teach back signs and symptoms of incisional infection? Increased redness, swelling or pain at the incisonal site, Increased drainage or bleeding, Incisional warmth, Pus or odor from incision, Fever   Is the patient/caregiver able to teach back steps to recovery at home? Set small, achievable goals for return to baseline health, Rest and rebuild strength, gradually increase activity, Eat a well-balance diet   If the patient is a current smoker, are they able to teach back resources for cessation? Not a smoker   Is the patient/caregiver able to teach back the hierarchy of who to call/visit for symptoms/problems? PCP, Specialist, Home health nurse, Urgent Care, ED, 911 Yes   Additional teach back comments States he is doing everything he was told to do.  Had staples out and will be seeing oncology on Friday.  He is going to start getting his care at the [facility].  Having regular bms with no issues.  Incision sites are healing well.    Week 2 call  completed? Yes   Graduated/Revoked comments Denies questions or needs at this time.          NABEEL RAMIREZ - Licensed Nurse

## 2023-01-06 ENCOUNTER — TELEPHONE (OUTPATIENT)
Dept: ONCOLOGY | Facility: CLINIC | Age: 78
End: 2023-01-06

## 2023-01-06 ENCOUNTER — TELEPHONE (OUTPATIENT)
Dept: ONCOLOGY | Facility: CLINIC | Age: 78
End: 2023-01-06
Payer: MEDICARE

## 2023-01-06 ENCOUNTER — LAB (OUTPATIENT)
Dept: LAB | Facility: HOSPITAL | Age: 78
End: 2023-01-06
Payer: MEDICARE

## 2023-01-06 ENCOUNTER — CONSULT (OUTPATIENT)
Dept: ONCOLOGY | Facility: CLINIC | Age: 78
End: 2023-01-06
Payer: MEDICARE

## 2023-01-06 VITALS
BODY MASS INDEX: 24.22 KG/M2 | TEMPERATURE: 97.7 F | WEIGHT: 178.79 LBS | OXYGEN SATURATION: 95 % | SYSTOLIC BLOOD PRESSURE: 129 MMHG | HEART RATE: 80 BPM | HEIGHT: 72 IN | DIASTOLIC BLOOD PRESSURE: 70 MMHG

## 2023-01-06 DIAGNOSIS — K63.89 MASS OF COLON: Primary | ICD-10-CM

## 2023-01-06 DIAGNOSIS — D50.9 MICROCYTIC ANEMIA: Primary | ICD-10-CM

## 2023-01-06 DIAGNOSIS — D50.9 MICROCYTIC ANEMIA: ICD-10-CM

## 2023-01-06 DIAGNOSIS — C18.2 MALIGNANT NEOPLASM OF ASCENDING COLON: ICD-10-CM

## 2023-01-06 LAB
ALBUMIN SERPL-MCNC: 4 G/DL (ref 3.5–5.2)
ALBUMIN/GLOB SERPL: 1.5 G/DL
ALP SERPL-CCNC: 83 U/L (ref 39–117)
ALT SERPL W P-5'-P-CCNC: 12 U/L (ref 1–41)
ANION GAP SERPL CALCULATED.3IONS-SCNC: 10 MMOL/L (ref 5–15)
AST SERPL-CCNC: 16 U/L (ref 1–40)
BASOPHILS # BLD AUTO: 0.02 10*3/MM3 (ref 0–0.2)
BASOPHILS NFR BLD AUTO: 0.4 % (ref 0–1.5)
BILIRUB SERPL-MCNC: 0.3 MG/DL (ref 0–1.2)
BUN SERPL-MCNC: 20 MG/DL (ref 8–23)
BUN/CREAT SERPL: 20.6 (ref 7–25)
CALCIUM SPEC-SCNC: 9 MG/DL (ref 8.6–10.5)
CEA SERPL-MCNC: 1.7 NG/ML
CHLORIDE SERPL-SCNC: 103 MMOL/L (ref 98–107)
CO2 SERPL-SCNC: 27 MMOL/L (ref 22–29)
CREAT SERPL-MCNC: 0.97 MG/DL (ref 0.76–1.27)
DEPRECATED RDW RBC AUTO: 58.3 FL (ref 37–54)
EGFRCR SERPLBLD CKD-EPI 2021: 81.4 ML/MIN/1.73
EOSINOPHIL # BLD AUTO: 0.04 10*3/MM3 (ref 0–0.4)
EOSINOPHIL NFR BLD AUTO: 0.8 % (ref 0.3–6.2)
ERYTHROCYTE [DISTWIDTH] IN BLOOD BY AUTOMATED COUNT: 22.6 % (ref 12.3–15.4)
FERRITIN SERPL-MCNC: 23.51 NG/ML (ref 30–400)
GLOBULIN UR ELPH-MCNC: 2.7 GM/DL
GLUCOSE SERPL-MCNC: 173 MG/DL (ref 65–99)
HCT VFR BLD AUTO: 31.9 % (ref 37.5–51)
HGB BLD-MCNC: 9.2 G/DL (ref 13–17.7)
IRON 24H UR-MRATE: 15 MCG/DL (ref 59–158)
IRON SATN MFR SERPL: 3 % (ref 20–50)
LYMPHOCYTES # BLD AUTO: 1.24 10*3/MM3 (ref 0.7–3.1)
LYMPHOCYTES NFR BLD AUTO: 23.7 % (ref 19.6–45.3)
MCH RBC QN AUTO: 21.2 PG (ref 26.6–33)
MCHC RBC AUTO-ENTMCNC: 28.8 G/DL (ref 31.5–35.7)
MCV RBC AUTO: 73.7 FL (ref 79–97)
MONOCYTES # BLD AUTO: 0.49 10*3/MM3 (ref 0.1–0.9)
MONOCYTES NFR BLD AUTO: 9.4 % (ref 5–12)
NEUTROPHILS NFR BLD AUTO: 3.44 10*3/MM3 (ref 1.7–7)
NEUTROPHILS NFR BLD AUTO: 65.7 % (ref 42.7–76)
PLATELET # BLD AUTO: 310 10*3/MM3 (ref 140–450)
PMV BLD AUTO: 8.5 FL (ref 6–12)
POTASSIUM SERPL-SCNC: 4.5 MMOL/L (ref 3.5–5.2)
PROT SERPL-MCNC: 6.7 G/DL (ref 6–8.5)
RBC # BLD AUTO: 4.33 10*6/MM3 (ref 4.14–5.8)
SODIUM SERPL-SCNC: 140 MMOL/L (ref 136–145)
TIBC SERPL-MCNC: 548 MCG/DL (ref 298–536)
TRANSFERRIN SERPL-MCNC: 368 MG/DL (ref 200–360)
WBC NRBC COR # BLD: 5.23 10*3/MM3 (ref 3.4–10.8)

## 2023-01-06 PROCEDURE — 82728 ASSAY OF FERRITIN: CPT

## 2023-01-06 PROCEDURE — 84466 ASSAY OF TRANSFERRIN: CPT

## 2023-01-06 PROCEDURE — 36415 COLL VENOUS BLD VENIPUNCTURE: CPT

## 2023-01-06 PROCEDURE — 85025 COMPLETE CBC W/AUTO DIFF WBC: CPT

## 2023-01-06 PROCEDURE — 82378 CARCINOEMBRYONIC ANTIGEN: CPT

## 2023-01-06 PROCEDURE — 99215 OFFICE O/P EST HI 40 MIN: CPT | Performed by: INTERNAL MEDICINE

## 2023-01-06 PROCEDURE — G2212 PROLONG OUTPT/OFFICE VIS: HCPCS | Performed by: INTERNAL MEDICINE

## 2023-01-06 PROCEDURE — 83540 ASSAY OF IRON: CPT

## 2023-01-06 PROCEDURE — 80053 COMPREHEN METABOLIC PANEL: CPT

## 2023-01-06 RX ORDER — CHLORHEXIDINE GLUCONATE 0.12 MG/ML
15 RINSE ORAL EVERY 12 HOURS SCHEDULED
Status: CANCELLED | OUTPATIENT
Start: 2023-01-06

## 2023-01-06 RX ORDER — SODIUM CHLORIDE 9 MG/ML
100 INJECTION, SOLUTION INTRAVENOUS CONTINUOUS
Status: CANCELLED | OUTPATIENT
Start: 2023-01-06

## 2023-01-06 NOTE — TELEPHONE ENCOUNTER
Caller: Vlad Guerrero    Relationship: Self    Best call back number: [phone]    What is the best time to reach you: ASAP    Who are you requesting to speak with (clinical staff, provider,  specific staff member): ANTOINE    What was the call regarding: PT WAS CALLING TO SPEAK TO ANTOINE IN SCHEDULING    Do you require a callback: YES

## 2023-01-09 ENCOUNTER — LAB (OUTPATIENT)
Dept: LAB | Facility: HOSPITAL | Age: 78
End: 2023-01-09
Payer: MEDICARE

## 2023-01-09 ENCOUNTER — OFFICE VISIT (OUTPATIENT)
Dept: ONCOLOGY | Facility: CLINIC | Age: 78
End: 2023-01-09
Payer: MEDICARE

## 2023-01-09 ENCOUNTER — SPECIALTY PHARMACY (OUTPATIENT)
Dept: PHARMACY | Facility: HOSPITAL | Age: 78
End: 2023-01-09
Payer: MEDICARE

## 2023-01-09 ENCOUNTER — DOCUMENTATION (OUTPATIENT)
Dept: ONCOLOGY | Facility: CLINIC | Age: 78
End: 2023-01-09
Payer: MEDICARE

## 2023-01-09 ENCOUNTER — NUTRITION (OUTPATIENT)
Dept: ONCOLOGY | Facility: CLINIC | Age: 78
End: 2023-01-09
Payer: MEDICARE

## 2023-01-09 ENCOUNTER — HOSPITAL ENCOUNTER (OUTPATIENT)
Dept: CARDIOLOGY | Facility: HOSPITAL | Age: 78
Discharge: HOME | End: 2023-01-09
Payer: MEDICARE

## 2023-01-09 ENCOUNTER — CLINICAL SUPPORT (OUTPATIENT)
Dept: ONCOLOGY | Facility: CLINIC | Age: 78
End: 2023-01-09
Payer: MEDICARE

## 2023-01-09 VITALS
HEART RATE: 84 BPM | BODY MASS INDEX: 24.22 KG/M2 | OXYGEN SATURATION: 99 % | DIASTOLIC BLOOD PRESSURE: 67 MMHG | HEIGHT: 72 IN | WEIGHT: 178.79 LBS | SYSTOLIC BLOOD PRESSURE: 122 MMHG | TEMPERATURE: 97.5 F

## 2023-01-09 DIAGNOSIS — Z71.9 ENCOUNTER FOR EDUCATION: ICD-10-CM

## 2023-01-09 DIAGNOSIS — C18.2 MALIGNANT NEOPLASM OF ASCENDING COLON: Primary | ICD-10-CM

## 2023-01-09 LAB
ANION GAP SERPL CALCULATED.3IONS-SCNC: 9.5 MMOL/L (ref 5–15)
BUN SERPL-MCNC: 24 MG/DL (ref 8–23)
BUN/CREAT SERPL: 26.7 (ref 7–25)
CALCIUM SPEC-SCNC: 9 MG/DL (ref 8.6–10.5)
CHLORIDE SERPL-SCNC: 104 MMOL/L (ref 98–107)
CO2 SERPL-SCNC: 25.5 MMOL/L (ref 22–29)
CREAT SERPL-MCNC: 0.9 MG/DL (ref 0.76–1.27)
DEPRECATED RDW RBC AUTO: 53.1 FL (ref 37–54)
EGFRCR SERPLBLD CKD-EPI 2021: 89.1 ML/MIN/1.73
ERYTHROCYTE [DISTWIDTH] IN BLOOD BY AUTOMATED COUNT: 20.4 % (ref 12.3–15.4)
GLUCOSE SERPL-MCNC: 314 MG/DL (ref 65–99)
HCT VFR BLD AUTO: 29.6 % (ref 37.5–51)
HGB BLD-MCNC: 8.2 G/DL (ref 13–17.7)
MCH RBC QN AUTO: 20.2 PG (ref 26.6–33)
MCHC RBC AUTO-ENTMCNC: 27.7 G/DL (ref 31.5–35.7)
MCV RBC AUTO: 73.1 FL (ref 79–97)
PLATELET # BLD AUTO: 283 10*3/MM3 (ref 140–450)
PMV BLD AUTO: 9.1 FL (ref 6–12)
POTASSIUM SERPL-SCNC: 4.5 MMOL/L (ref 3.5–5.2)
QT INTERVAL: 374 MS
RBC # BLD AUTO: 4.05 10*6/MM3 (ref 4.14–5.8)
SODIUM SERPL-SCNC: 139 MMOL/L (ref 136–145)
WBC NRBC COR # BLD: 4.58 10*3/MM3 (ref 3.4–10.8)

## 2023-01-09 PROCEDURE — 99215 OFFICE O/P EST HI 40 MIN: CPT | Performed by: NURSE PRACTITIONER

## 2023-01-09 PROCEDURE — 80048 BASIC METABOLIC PNL TOTAL CA: CPT

## 2023-01-09 PROCEDURE — 93005 ELECTROCARDIOGRAM TRACING: CPT | Performed by: SURGERY

## 2023-01-09 PROCEDURE — 85027 COMPLETE CBC AUTOMATED: CPT

## 2023-01-09 PROCEDURE — 93010 ELECTROCARDIOGRAM REPORT: CPT | Performed by: INTERNAL MEDICINE

## 2023-01-09 RX ORDER — CAPECITABINE 500 MG/1
850 TABLET, FILM COATED ORAL 2 TIMES DAILY
COMMUNITY
End: 2023-01-10 | Stop reason: SDUPTHER

## 2023-01-09 RX ORDER — DIPHENHYDRAMINE HYDROCHLORIDE 50 MG/ML
50 INJECTION INTRAMUSCULAR; INTRAVENOUS AS NEEDED
Status: CANCELLED | OUTPATIENT
Start: 2023-01-16

## 2023-01-09 RX ORDER — ONDANSETRON HYDROCHLORIDE 8 MG/1
8 TABLET, FILM COATED ORAL 3 TIMES DAILY PRN
Qty: 30 TABLET | Refills: 5 | Status: SHIPPED | OUTPATIENT
Start: 2023-01-09

## 2023-01-09 RX ORDER — LIDOCAINE AND PRILOCAINE 25; 25 MG/G; MG/G
1 CREAM TOPICAL ONCE
Qty: 30 G | Refills: 3 | Status: SHIPPED | OUTPATIENT
Start: 2023-01-09 | End: 2023-01-09

## 2023-01-09 RX ORDER — PALONOSETRON 0.05 MG/ML
0.25 INJECTION, SOLUTION INTRAVENOUS ONCE
Status: CANCELLED | OUTPATIENT
Start: 2023-01-16

## 2023-01-09 RX ORDER — DEXTROSE MONOHYDRATE 50 MG/ML
250 INJECTION, SOLUTION INTRAVENOUS ONCE
Status: CANCELLED | OUTPATIENT
Start: 2023-01-16

## 2023-01-09 RX ORDER — FAMOTIDINE 10 MG/ML
20 INJECTION, SOLUTION INTRAVENOUS AS NEEDED
Status: CANCELLED | OUTPATIENT
Start: 2023-01-16

## 2023-01-09 NOTE — PROGRESS NOTES
Addended by: RADHA DUNCAN on: 9/9/2021 10:38 AM     Modules accepted: Orders     OSW met w/ patient prior to APRN for initial infusion teaching.     OSW met w/ patient in conference room prior to his initial infusion teaching w/ APRN. Patient is a , retired , who moved to [facility] approx. 1 1/2 years ago, to care for his mother, until she passed away. Patient was living in [place], but he now resides in [place], [place], with his aunt, and works part time at [employer]. Patient reports all basic needs are met, and he enjoys going on cruises to [place] for fun.     OSW discussed Advanced Care Planning with patient. Patient at first, said he didn't need 'any of that,' but upon further discussion he has 3 sons, but one who he states 'makes all my decisions.' OSW discussed a Healthcare Representative form and discussed with patient. OSW and patient completed a Healthcare Representative appointment, naming his son Vlad \"Prashanth\" Cesar, as the representative - scanned into chart.     OSW discussed various services available here including: OSW, financial counselor, dietician, massage therapy and volunteer chaplaincy program. Also provided a packet of resources including a welcome letter, community, transportation and oncology resources, financial assistance application and advanced directives paperwork.    Patient did mention that [facility]'s [facility] or [facility] He reports he'd like to continue treatments here, but he's not sure how that works. OSW notified patient she'd let Dr. Kramer know, just in case - patient agreeable.     No other needs/concerns noted.     Jennifer Carr, KEMW, CSW, MSW  Oncology MSW  [facility]

## 2023-01-09 NOTE — PROGRESS NOTES
Education for Administration of Chemotherapy and/or Biotherapy     NAME: Vlad Guerrero  : 1945  MRN: [id number]  DATE OF SERVICE: 2023  REASON FOR VISIT: PATIENT EDUCATION    Mr. Vlad Guerrero is here today for education on his upcoming chemotherapy and/or biotherapy recommended for treatment of his disease.     I reviewed treatment options, obtained signed consent, and answered any questions he had regarding the administration of Oxaliplatin, Xeloda.     Vlad Guerrero has already consulted with Dr Kramer for the treatment of Oxaliplatin, Xeloda.  The patient's oncologist has discussed the same treatment options with the patient and answered his questions prior to today's visit.    TREATMENT GOALS:  The goal of the treatment is to:    [x] Curative intent - intent is cure; cure implies patient survival will not be restricted by current cancer diagnosis   [] Control  - intent is to extend survival but not long enough to meet definition of cure for patient with that diagnosis   [] Palliative - means treatment given in a non-curative setting to optimize symptom control, improve quality of life, and improve survival    This treatment has been explained to Vlad Guerrero. Alternative methods of treatment, if any, have been explained to Vlad Guerrero, as have the benefits and risks of each. Based on the physician's explanation of the benefits and risks of this treatment and any alternatives available, the patient agrees the potential benefits outweighs the potential risks involved. I have explained to the patient the most likely complications which might occur from this treatment. The patient understands along with the treatment additional medications may be necessary to lessen the side effects.     SIDE EFFECTS:  Possible side effects may include but are not limited to, any of the following, or a combination of the following:    [x]  Abdominal pain  []  Hypersensitivity reaction [x]  Rash   [x]  Allergic  Reaction []  Hypertension []  Secondary malignancies   [x]  Anemia []  Hypertensive crisis  []  Sexual side effects    []  Anxiety []  Hypertriglyceridemia []  Shortness of breath   [x]  Back pain []  Hypoalbuminemia []  Skin changes   []  Body pain []  Hyponatremia  []  Somnolence   [x]  Blood clots (DVT/PE) []  Immune-mediated reaction []  Sore throat   [x]  Bleeding [x]  Infection  [x]  Swelling   []  Bone pain [x]  Infusion reaction  []  Taste changes   [x]  Bruising []  Injection site reaction  []  Temperature sensitivity   [x]  Cardiovascular events  []  Injection site ulceration [x]  Thrombocytopenia   []  Central neurotoxicity []  Insomnia []  Thyroid changes   []  Chest pain [x]  Itching []  Tinnitus   []  Chills [x]  Joint pain []  Upper respiratory tract infection    []  Confusion []  Kidney damage []  Visual changes   []  Congestive heart failure [x]  Leukopenia []  Vitlligo   [x]  Constipation [x]  LFT imbalances [x]  Vomiting   [x]  Cough []  Liver damage []  Watery eyes   []  Depression [x]  Loss of appetite []  Weakness   [x]  Diarrhea []  Low blood pressure []  Weight gain   []  Dizziness []  Lung damage []  Weight loss   []  Dry skin []  Menopausal symptoms []  Wound healing complication   []  Ecchymosis []  Menstrual irregularities [x]  Numbness/Tingling in hands/feet   [x]  Electrolyte imbalances []  Metallic taste  [x]  Hand/Foot Syndrome   []  Elevated LDH []  Mood changes [x]  Dry skin   [x]  Eye irritation [x]  Mouth sores []  Other   [x]  Fatigue [x]  Muscle aches  []  Other   []  Fertility effects  []  Nephrotic syndrome []  Other   [x]  Fevers []  Nail changes []  Other   []  Fistula formation [x]  Nausea  []  Other   []  Flu-like symptoms []  Neck pain  []  Other   []  Fluid retention [x]  Neutropenia []  Other   []  Forgetfulness []  Nosebleeds []  Other   [x]  GI Motility Disorder []  Pain in arms/legs []  Other   [x]  Hand foot syndrome []  Pericardial effusion  []  Other   []  Hair  loss/discoloration [x]  Peripheral neuropathy []  Other   [x]  Headaches []  Petechiae []  Other   []  Hearing loss/change []  Pharyngitis  []  Other   []  Heart damage []  Photosensitivity  []  Other   []  Hematuria []  Pleural effusion  []  Other   []  Hemorrhage []  Proteinuria  []  Other     VASCULAR ACCESS:  The patient was educated on the possible need for vascular access/port placement.  The patient was advised although uncommon, leakage of an infused medication from the vein or venous access device (port) may lead to skin breakdown and/or other tissue damage.  The patient was advised he may have pain, bleeding, and/or bruising from the insertion of a needle in their vein or venous access device (port).  The patient was further advised despite proper technique, infection with redness and irritation may rarely occur at the site where the needle was inserted.  The patient was advised if complications occur, additional medical treatment is available.    BLOOD COUNT MONITORING:  While receiving treatment, it has been explained to the patient blood counts will be monitored.  This may include but is not limited to a complete blood count (CBC). The patient may develop neutropenia, anemia, or thrombocytopenia. This has been explained and a handout was provided to the patient.     NUTRITION:   It was explained to the patient about nutrition and its importance while undergoing chemotherapy and/or biotherapy. Certain medications will be prescribed during the treatment which may change the way foods taste or smell. These changes may cause poor or no appetite. The patient was advised food is fuel for the body, and if it does not get the fuel it needs, he may become malnourished, which can lead to severe fatigue. It was discussed with the patient about calories and how to add high-calorie foods to his diet.  Protein was also mentioned in regards to how it will help make new cells for the body. Information was given to  Vlad Guerrero regarding good protein sources.   It was also discussed with the patient the importance of  eating and drinking every 2-3 hours while awake. We discussed fluid intake of at least 6 to 8 ounce glasses of liquids per day to stay hydrated. Examples are listed below:   Water  Juice (fruit or vegetable)  Soda Sport Drinks Soup   Milk  Ensure, Boost, Glucerna Ice Cream Popsicles Jello   Milkshakes Pudding  Gatorade Sherbert Yogurt     REPRODUCTION:  Reproductive risks were discussed, including appropriate use of birth control, and protection during sexual relations. The risks of becoming pregnant while receiving chemotherapy and/or biotherapy were reviewed for females.  Males were instructed to use appropriate birth control to prevent conception during treatment.  We also discussed the importance of using reliable barrier methods while participating in intimate activities as this may expose their partners to a potentially harmful drug. The importance of pregnancy prevention was emphasized due to risks of increased chance of birth defects and miscarriages.     Vlad Guerrero was provided handouts on:   1. Home instructions: monitor temp and report 100.4 or higher  2. Complete blood counts and terminology  3. Nutrition during cancer therapy   4. Fluids and dehydration  5. Mouth care  6. Cancer-related fatigue  7. Management of constipation    8. Management of diarrhea   9. Handouts from [employer] on Oxaliplatin, Capecitabine printed, discussed and sent with patient for reference  10. Handouts from [employer] on \"Chemotherapy Safety\" and \"Watching for and Preventing Infections\" printed, discusssed and sent with patient for reference  11. Pink refrigerator sheet with side effect management and numbers to clinic sent with patient  12. Call for signs of infections discussed  13. Call for signs of bleeding discussed  14. Discussed how to contact the clinic and the provider on call after hours,  weekends and holidays    TOPICS EDUCATION PROVIDED EDUCATION REINFORCED COMMENTS   ANEMIA:  role of RBC, cause, s/s, ways to manage, role of transfusion [x] [x]    THROMBOCYTOPENIA:  role of platelet, cause, s/s, ways to prevent bleeding, things to avoid, when to seek help [x] [x]    NEUTROPENIA:  role of WBC, cause, infection precautions, s/s of infection, when to call MD [x] [x]    NUTRITION & APPETITE CHANGES:  importance of maintaining healthy diet & weight, ways to manage to improve intake, dietary consult, exercise regimen [x] [x]    DIARRHEA:  causes, s/s of dehydration, ways to manage, dietary changes, when to call MD [x] [x]    CONSTIPATION:  causes, ways to manage, dietary changes, when to call MD [x] [x]    NAUSEA & VOMITING:  causes, use of antiemetics, dietary changes, when to call MD [x] [x]    MOUTH SORES:  causes, oral care, ways to manage [x] [x]    ALOPECIA:  causes, ways to manage, resources [x] [x]    INFERTILITY & SEXUALITY:  causes, fertility preservation options, sexuality changes, ways to manage, importance of birth control [x] [x]    NERVOUS SYSTEM CHANGES:  causes, s/s, neuropathies, cognitive changes, ways to manage [x] [x]    PAIN:  causes, ways to manage [x] [x]    SKIN & NAIL CHANGES:  cause, s/s, ways to manage [x] [x]    ORGAN TOXICITIES:  cause, s/s, need for diagnostic tests, labs, when to notify MD [x] [x]    SURVIVORSHIP:  distress, distress assessment, secondary malignancies, early/late effects, follow-up, social issues, social support [] []    HOME CARE:  storing of PO chemo, how to manage bodily fluids [x] [x]    MISCELLANEOUS:  drug interactions, administration, vesicants  [x] [x]      PAST MEDICAL HISTORY:  Past Medical History:   Diagnosis Date   • Anemia    • Colon cancer 2022    colon   • Diabetes mellitus (HCC)    • Hyperlipidemia    • Hypertension        PAST SURGICAL HISTORY:  Past Surgical History:   Procedure Laterality Date   • APPENDECTOMY     • CARDIAC  CATHETERIZATION     • COLON RESECTION N/A 12/15/2022    Procedure: COLON RESECTION RIGHT;  Surgeon: Percy Davis MD;  Location: [facility] MAIN OR;  Service: General;  Laterality: N/A;   • COLONOSCOPY N/A 11/11/2022    Procedure: COLONOSCOPY WITH BIOPSY AND POLYPECTOMY;  Surgeon: Billy Julien MD;  Location: [facility] ENDOSCOPY;  Service: Gastroenterology;  Laterality: N/A;  Impression:  1.  Large 4-5cm fulgurating circumferential ulcerated mass in the very proximal part of the ascending colon next to ileocecal valve multiple biopsies were performed.  This is highly concerning for colon malignancy.  2.  2 polyp rem   • ENDOSCOPY N/A 11/11/2022    Procedure: ESOPHAGOGASTRODUODENOSCOPY with biopsy X1;  Surgeon: Billy Julien MD;  Location: [facility] ENDOSCOPY;  Service: Gastroenterology;  Laterality: N/A;  5.  Upper endoscopy lamination unremarkable.      • TONSILLECTOMY         CURRENT MEDICATIONS:    Current Outpatient Medications:   •  docusate sodium (Colace) 100 MG capsule, Take 1 capsule by mouth 2 (Two) Times a Day., Disp: 60 capsule, Rfl: 1  •  empagliflozin (JARDIANCE) 25 MG tablet tablet, Take 25 mg by mouth Daily. Dont take preop, Disp: , Rfl:   •  glimepiride (AMARYL) 4 MG tablet, Take 1 tablet by mouth 2 (Two) Times a Day. None preop, Disp: , Rfl:   •  losartan (COZAAR) 100 MG tablet, Take 100 mg by mouth Daily. Last dose 12/14 by 0730, Disp: , Rfl:   •  metFORMIN (GLUCOPHAGE) 1000 MG tablet, Take 2,000 mg by mouth Daily With Dinner. Last dose 12/12, Disp: , Rfl:   •  pioglitazone (ACTOS) 45 MG tablet, Take 45 mg by mouth Daily. None preop, Disp: , Rfl:   •  polyethylene glycol (MIRALAX) 17 GM/SCOOP powder, Take 17 g by mouth As Needed. None preop, Disp: , Rfl:   •  pravastatin (PRAVACHOL) 40 MG tablet, Take 1 tablet by mouth Every Night., Disp: , Rfl:   •  Semaglutide,0.25 or 0.5MG/DOS, (Ozempic, 0.25 or 0.5 MG/DOSE,) 2 MG/1.5ML solution pen-injector, Inject 0.25 mg under the skin into the  appropriate area as directed 1 (One) Time Per Week. friday, Disp: , Rfl:     ALLERGIES:  Allergies   Allergen Reactions   • Penicillins Rash       FAMILY HISTORY:  Family History   Problem Relation Age of Onset   • Pneumonia Mother 94        SARS-CoV2   • Colon cancer Father 62   • Heart disease Sister        ONCOLOGIC FAMILY HISTORY:  Cancer-related family history includes Colon cancer (age of onset: 62) in his father.    SOCIAL HISTORY:  Social History     Tobacco Use   • Smoking status: Former     Packs/day: 1.00     Years: 2.00     Pack years: 2.00     Types: Cigarettes     Start date:      Quit date:      Years since quittin.0   • Smokeless tobacco: Never   Vaping Use   • Vaping Use: Never used   Substance Use Topics   • Alcohol use: Not Currently   • Drug use: Never       HPI, ROS and PFSH have been reviewed and confirmed on 2023.     REVIEW OF SYSTEMS:  Review of Systems   Constitutional: Negative for chills, fatigue and fever.   HENT: Negative.    Eyes: Negative.    Respiratory: Negative for shortness of breath.    Cardiovascular: Negative for chest pain and palpitations.   Gastrointestinal: Negative for abdominal pain.   Endocrine: Negative.    Genitourinary: Negative.    Musculoskeletal: Negative.    Skin: Negative.    Neurological: Negative for dizziness.   Psychiatric/Behavioral: Negative for behavioral problems.       PHYSICAL EXAMINATION:  Physical Exam  Vitals and nursing note reviewed.   HENT:      Mouth/Throat:      Pharynx: Oropharynx is clear.   Eyes:      Pupils: Pupils are equal, round, and reactive to light.   Pulmonary:      Effort: Pulmonary effort is normal. No respiratory distress.   Musculoskeletal:         General: Normal range of motion.      Cervical back: Normal range of motion.   Skin:     General: Skin is warm and dry.      Findings: No erythema or rash.      Comments: Dry skin   Neurological:      Mental Status: He is alert and oriented to person, place, and time.    Psychiatric:         Behavior: Behavior normal.         LABS:  WBC   Date Value Ref Range Status   01/06/2023 5.23 3.40 - 10.80 10*3/mm3 Final   11/08/2022 5.6 3.7 - 10.3 x10(3)/mcL Final     RBC   Date Value Ref Range Status   01/06/2023 4.33 4.14 - 5.80 10*6/mm3 Final   11/08/2022 3.99 (L) 4.60 - 6.10 x10(6)/mcL Final     Hemoglobin   Date Value Ref Range Status   01/06/2023 9.2 (L) 13.0 - 17.7 g/dL Final   11/08/2022 6.8 (C) 13.7 - 17.5 g/dL Final     Hematocrit   Date Value Ref Range Status   01/06/2023 31.9 (L) 37.5 - 51.0 % Final   11/08/2022 26.6 (L) 40.0 - 51.0 % Final     MCV   Date Value Ref Range Status   01/06/2023 73.7 (L) 79.0 - 97.0 fL Final   11/08/2022 66.7 (L) 80.0 - 100.0 fL Final     MCH   Date Value Ref Range Status   01/06/2023 21.2 (L) 26.6 - 33.0 pg Final   11/08/2022 17.0 (L) 26.0 - 34.0 pg Final     MCHC   Date Value Ref Range Status   01/06/2023 28.8 (L) 31.5 - 35.7 g/dL Final   11/08/2022 25.6 (L) 30.7 - 35.5 g/dL Final     RDW   Date Value Ref Range Status   01/06/2023 22.6 (H) 12.3 - 15.4 % Final   11/08/2022 20.8 (H) <=14.9 % Final     RDW-SD   Date Value Ref Range Status   01/06/2023 58.3 (H) 37.0 - 54.0 fl Final     MPV   Date Value Ref Range Status   01/06/2023 8.5 6.0 - 12.0 fL Final   11/08/2022 10.3 8.8 - 12.5 fL Final     Platelets   Date Value Ref Range Status   01/06/2023 310 140 - 450 10*3/mm3 Final   11/08/2022 217 155 - 369 x10(3)/mcL Final     Neutrophil Rel %   Date Value Ref Range Status   08/10/2018 56.8 % Final     Comment:     Neutrophils equals segs plus bands     Neutrophil %   Date Value Ref Range Status   01/06/2023 65.7 42.7 - 76.0 % Final     Lymphocyte %   Date Value Ref Range Status   01/06/2023 23.7 19.6 - 45.3 % Final     Monocyte %   Date Value Ref Range Status   01/06/2023 9.4 5.0 - 12.0 % Final     Eosinophil %   Date Value Ref Range Status   01/06/2023 0.8 0.3 - 6.2 % Final   08/10/2018 1.3 % Final     Basophil Rel %   Date Value Ref Range Status    08/10/2018 0.4 % Final     Basophil %   Date Value Ref Range Status   01/06/2023 0.4 0.0 - 1.5 % Final     Neutrophils Absolute   Date Value Ref Range Status   08/10/2018 4.3 1.6 - 6.1 x10(3)/mcL Final     Comment:     Neutrophils equals segs plus bands     Neutrophils, Absolute   Date Value Ref Range Status   01/06/2023 3.44 1.70 - 7.00 10*3/mm3 Final     Lymphocytes Absolute   Date Value Ref Range Status   08/10/2018 30.1 % Final   08/10/2018 2.3 1.2 - 3.9 x10(3)/mcL Final     Lymphocytes, Absolute   Date Value Ref Range Status   01/06/2023 1.24 0.70 - 3.10 10*3/mm3 Final     Monocytes Absolute   Date Value Ref Range Status   08/10/2018 0.8 0.3 - 0.9 x10(3)/mcL Final     Monocytes, Absolute   Date Value Ref Range Status   01/06/2023 0.49 0.10 - 0.90 10*3/mm3 Final     Eosinophils Absolute   Date Value Ref Range Status   08/10/2018 0.1 0.0 - 0.5 x10(3)/mcL Final     Eosinophils, Absolute   Date Value Ref Range Status   01/06/2023 0.04 0.00 - 0.40 10*3/mm3 Final     Basophils Absolute   Date Value Ref Range Status   08/10/2018 0.0 0.0 - 0.1 x10(3)/mcL Final     Basophils, Absolute   Date Value Ref Range Status   01/06/2023 0.02 0.00 - 0.20 10*3/mm3 Final     nRBC   Date Value Ref Range Status   12/18/2022 0.1 0.0 - 0.2 /100 WBC Final     Lab Results   Component Value Date    GLUCOSE 173 (H) 01/06/2023    BUN 20 01/06/2023    CREATININE 0.97 01/06/2023    EGFRIFNONA 76 11/15/2021    EGFRIFAFRI 87 11/15/2021    BCR 20.6 01/06/2023    K 4.5 01/06/2023    CO2 27.0 01/06/2023    CALCIUM 9.0 01/06/2023    ALBUMIN 4.0 01/06/2023    AST 16 01/06/2023    ALT 12 01/06/2023       Assessment & Plan   There are no diagnoses linked to this encounter.  ASSESSMENT   1. Encounter for medication management and education of Oxaliplatin, Xeloda  2. Malignant Neoplasm of Ascending Colon    PLAN  • Start Capecitabine, Oxaliplatin 1/16/23  • Zofran 8 mg TID PRN nausea  • EMLA cream to port site 1 hour prior to access  • Notified social  worker, and dietician patient starting new treatment   • RTC Dr Kramer 1/30/23      A total of 60  minutes were spent in education in education of Xeloda and Oxaliplatin, side effects of each drug, management of side effects, all the information listed above discussed in detail, preparation of teaching packet, calendars with treatment plan mapped out so patient will be compliant with Xeloda x 14 days, discussion of adequate hydration, discussion of application of lotion to hands and feet multiple times daily and after bathing, ordering, and documentation.    Electronically signed by ANEUDY Robin, 01/09/23, 1:22 PM EST.

## 2023-01-09 NOTE — PROGRESS NOTES
Nutrition Assessment  Vlad Guerrero    Anthropometrics  Height: 6'  Weight: 178.7#  BMI: 24.2  Weight Hx:   Wt Readings from Last 20 Encounters:   01/09/23 81.1 kg (178 lb 12.7 oz)   01/06/23 81.1 kg (178 lb 12.7 oz)   12/28/22 81.1 kg (178 lb 12.8 oz)   12/15/22 85.5 kg (188 lb 9.6 oz)   11/11/22 83.5 kg (184 lb 1.4 oz)     IBW: 178# (100.3%)  UBW: 200# (89.3%)    Nutrition Questionnaire    Food Allergies: Pt denied allergies at this time    Chewing Problems: Pt denied chewing problems at this time.    Swallowing Problems: Pt denied problems swallowing at this time.    Who does the cooking & shopping: Pt's aunt does most of the cooking and shopping. Pt lives in the basement of his aunt's home. Pt moved in when his mother was alive and he helped to take care of her, she has since passed away. Pt's aunt is a vegetarian and the pt enjoys and appreciates her cooking.    Nausea/Vomiting/Diarrhea: Pt denies n/v/d/c    Appetite: (poor) 1 2 3 4 5 6 7 8 9 10 (excellent): 8    24-Hour Diet Recall:   Breakfast - [employer] in [place]: \"Big Breakfast\" eggs, \"tators\", ham, biscuit, and water  Lunch - hamburger, fries, water (restaurant w/ his son)  Dinner - [employer]: super burger and fries, chocolate dessert  Snacks - nothing  Water > 64 oz/day    Discussion: Met the pt to provide new-pt diet education. We reviewed possible side effects of his treatment and how it may impact his ability to eat and tolerate food. We discussed the importance of weight maintenance, consuming adequate calories and protein, even when appetite is low, taste changes occur, and energy is low, pt verbalized understanding. We reviewed ways to manage side effects with diet, pt verbalized understanding.    Pt w/ type two DM, checks his BG every morning, and said his A1C is ~7%. Pt said he used to drink three large regular Pepsis/day, but has switched to Coke Zero to help his blood sugar. Pt said Diet Pepsi raised his blood sugar, but Coke Zero  does not have the same effect.    All questions and concerns were addressed and answered. I provided my contact information and encouraged him to call or schedule an appointment as needed.    Kaelyn Junior, MS,RD,LD-[place],CD-[place]  Registered Dietitian

## 2023-01-10 RX ORDER — CAPECITABINE 150 MG/1
750 TABLET, FILM COATED ORAL 2 TIMES DAILY
Qty: 140 TABLET | Refills: 4 | Status: SHIPPED | OUTPATIENT
Start: 2023-01-12

## 2023-01-10 RX ORDER — CAPECITABINE 500 MG/1
1000 TABLET, FILM COATED ORAL 2 TIMES DAILY
Qty: 56 TABLET | Refills: 4 | Status: SHIPPED | OUTPATIENT
Start: 2023-01-12

## 2023-01-12 ENCOUNTER — APPOINTMENT (OUTPATIENT)
Dept: GENERAL RADIOLOGY | Facility: HOSPITAL | Age: 78
End: 2023-01-12
Payer: MEDICARE

## 2023-01-12 ENCOUNTER — ANESTHESIA (OUTPATIENT)
Dept: PERIOP | Facility: HOSPITAL | Age: 78
End: 2023-01-12
Payer: MEDICARE

## 2023-01-12 ENCOUNTER — ANESTHESIA EVENT (OUTPATIENT)
Dept: PERIOP | Facility: HOSPITAL | Age: 78
End: 2023-01-12
Payer: MEDICARE

## 2023-01-12 ENCOUNTER — HOSPITAL ENCOUNTER (OUTPATIENT)
Facility: HOSPITAL | Age: 78
Setting detail: HOSPITAL OUTPATIENT SURGERY
Discharge: HOME | End: 2023-01-12
Attending: SURGERY | Admitting: SURGERY
Payer: MEDICARE

## 2023-01-12 ENCOUNTER — SPECIALTY PHARMACY (OUTPATIENT)
Dept: PHARMACY | Facility: HOSPITAL | Age: 78
End: 2023-01-12
Payer: OTHER GOVERNMENT

## 2023-01-12 VITALS
DIASTOLIC BLOOD PRESSURE: 65 MMHG | OXYGEN SATURATION: 100 % | BODY MASS INDEX: 24.54 KG/M2 | SYSTOLIC BLOOD PRESSURE: 118 MMHG | HEIGHT: 72 IN | TEMPERATURE: 97 F | WEIGHT: 181.2 LBS | HEART RATE: 75 BPM | RESPIRATION RATE: 16 BRPM

## 2023-01-12 DIAGNOSIS — C18.2 MALIGNANT NEOPLASM OF ASCENDING COLON: ICD-10-CM

## 2023-01-12 LAB
GLUCOSE BLDC GLUCOMTR-MCNC: 117 MG/DL (ref 70–105)
GLUCOSE BLDC GLUCOMTR-MCNC: 165 MG/DL (ref 70–105)

## 2023-01-12 PROCEDURE — 77001 FLUOROGUIDE FOR VEIN DEVICE: CPT | Performed by: SURGERY

## 2023-01-12 PROCEDURE — 77001 FLUOROGUIDE FOR VEIN DEVICE: CPT

## 2023-01-12 PROCEDURE — 71045 X-RAY EXAM CHEST 1 VIEW: CPT

## 2023-01-12 PROCEDURE — 76000 FLUOROSCOPY <1 HR PHYS/QHP: CPT

## 2023-01-12 PROCEDURE — 25010000002 DEXAMETHASONE PER 1 MG: Performed by: ANESTHESIOLOGY

## 2023-01-12 PROCEDURE — 25010000002 CEFAZOLIN PER 500 MG: Performed by: SURGERY

## 2023-01-12 PROCEDURE — C1788 PORT, INDWELLING, IMP: HCPCS | Performed by: SURGERY

## 2023-01-12 PROCEDURE — 25010000002 HEPARIN (PORCINE) PER 1000 UNITS: Performed by: SURGERY

## 2023-01-12 PROCEDURE — S0260 H&P FOR SURGERY: HCPCS | Performed by: SURGERY

## 2023-01-12 PROCEDURE — 82962 GLUCOSE BLOOD TEST: CPT

## 2023-01-12 PROCEDURE — 25010000002 ONDANSETRON PER 1 MG: Performed by: ANESTHESIOLOGY

## 2023-01-12 PROCEDURE — 76937 US GUIDE VASCULAR ACCESS: CPT | Performed by: SURGERY

## 2023-01-12 PROCEDURE — 25010000002 FENTANYL CITRATE (PF) 100 MCG/2ML SOLUTION: Performed by: ANESTHESIOLOGY

## 2023-01-12 PROCEDURE — 25010000002 PROPOFOL 10 MG/ML EMULSION: Performed by: ANESTHESIOLOGY

## 2023-01-12 PROCEDURE — 36561 INSERT TUNNELED CV CATH: CPT | Performed by: SURGERY

## 2023-01-12 DEVICE — PRT INTRO VASC/INTERV VORTEX FILL/HL DETACH/POLYURET/CATH 8F: Type: IMPLANTABLE DEVICE | Site: SUBCLAVIAN | Status: FUNCTIONAL

## 2023-01-12 RX ORDER — DIPHENHYDRAMINE HYDROCHLORIDE 50 MG/ML
12.5 INJECTION INTRAMUSCULAR; INTRAVENOUS
Status: DISCONTINUED | OUTPATIENT
Start: 2023-01-12 | End: 2023-01-12 | Stop reason: HOSPADM

## 2023-01-12 RX ORDER — FENTANYL CITRATE 50 UG/ML
INJECTION, SOLUTION INTRAMUSCULAR; INTRAVENOUS AS NEEDED
Status: DISCONTINUED | OUTPATIENT
Start: 2023-01-12 | End: 2023-01-12 | Stop reason: SURG

## 2023-01-12 RX ORDER — DEXAMETHASONE SODIUM PHOSPHATE 4 MG/ML
INJECTION, SOLUTION INTRA-ARTICULAR; INTRALESIONAL; INTRAMUSCULAR; INTRAVENOUS; SOFT TISSUE AS NEEDED
Status: DISCONTINUED | OUTPATIENT
Start: 2023-01-12 | End: 2023-01-12 | Stop reason: SURG

## 2023-01-12 RX ORDER — DROPERIDOL 2.5 MG/ML
0.62 INJECTION, SOLUTION INTRAMUSCULAR; INTRAVENOUS ONCE AS NEEDED
Status: DISCONTINUED | OUTPATIENT
Start: 2023-01-12 | End: 2023-01-12 | Stop reason: HOSPADM

## 2023-01-12 RX ORDER — HYDROCODONE BITARTRATE AND ACETAMINOPHEN 10; 325 MG/1; MG/1
1 TABLET ORAL EVERY 4 HOURS PRN
Status: DISCONTINUED | OUTPATIENT
Start: 2023-01-12 | End: 2023-01-12 | Stop reason: HOSPADM

## 2023-01-12 RX ORDER — CHLORHEXIDINE GLUCONATE 0.12 MG/ML
15 RINSE ORAL EVERY 12 HOURS SCHEDULED
Status: DISCONTINUED | OUTPATIENT
Start: 2023-01-12 | End: 2023-01-12 | Stop reason: HOSPADM

## 2023-01-12 RX ORDER — SODIUM CHLORIDE 9 MG/ML
100 INJECTION, SOLUTION INTRAVENOUS CONTINUOUS
Status: DISCONTINUED | OUTPATIENT
Start: 2023-01-12 | End: 2023-01-12 | Stop reason: HOSPADM

## 2023-01-12 RX ORDER — HYDROCODONE BITARTRATE AND ACETAMINOPHEN 5; 325 MG/1; MG/1
1 TABLET ORAL ONCE AS NEEDED
Status: DISCONTINUED | OUTPATIENT
Start: 2023-01-12 | End: 2023-01-12 | Stop reason: HOSPADM

## 2023-01-12 RX ORDER — SODIUM CHLORIDE, SODIUM LACTATE, POTASSIUM CHLORIDE, CALCIUM CHLORIDE 600; 310; 30; 20 MG/100ML; MG/100ML; MG/100ML; MG/100ML
INJECTION, SOLUTION INTRAVENOUS CONTINUOUS PRN
Status: DISCONTINUED | OUTPATIENT
Start: 2023-01-12 | End: 2023-01-12 | Stop reason: SURG

## 2023-01-12 RX ORDER — BUPIVACAINE HYDROCHLORIDE AND EPINEPHRINE 5; 5 MG/ML; UG/ML
INJECTION, SOLUTION EPIDURAL; INTRACAUDAL; PERINEURAL AS NEEDED
Status: DISCONTINUED | OUTPATIENT
Start: 2023-01-12 | End: 2023-01-12 | Stop reason: HOSPADM

## 2023-01-12 RX ORDER — ONDANSETRON 2 MG/ML
4 INJECTION INTRAMUSCULAR; INTRAVENOUS ONCE AS NEEDED
Status: DISCONTINUED | OUTPATIENT
Start: 2023-01-12 | End: 2023-01-12 | Stop reason: HOSPADM

## 2023-01-12 RX ORDER — HYDRALAZINE HYDROCHLORIDE 20 MG/ML
5 INJECTION INTRAMUSCULAR; INTRAVENOUS
Status: DISCONTINUED | OUTPATIENT
Start: 2023-01-12 | End: 2023-01-12 | Stop reason: HOSPADM

## 2023-01-12 RX ORDER — LABETALOL HYDROCHLORIDE 5 MG/ML
5 INJECTION, SOLUTION INTRAVENOUS
Status: DISCONTINUED | OUTPATIENT
Start: 2023-01-12 | End: 2023-01-12 | Stop reason: HOSPADM

## 2023-01-12 RX ORDER — HYDROCODONE BITARTRATE AND ACETAMINOPHEN 5; 325 MG/1; MG/1
1-2 TABLET ORAL EVERY 4 HOURS PRN
Qty: 15 TABLET | Refills: 0 | Status: SHIPPED | OUTPATIENT
Start: 2023-01-12 | End: 2023-01-16

## 2023-01-12 RX ORDER — ONDANSETRON 2 MG/ML
INJECTION INTRAMUSCULAR; INTRAVENOUS AS NEEDED
Status: DISCONTINUED | OUTPATIENT
Start: 2023-01-12 | End: 2023-01-12 | Stop reason: SURG

## 2023-01-12 RX ORDER — ALBUTEROL SULFATE 2.5 MG/3ML
2.5 SOLUTION RESPIRATORY (INHALATION) ONCE AS NEEDED
Status: DISCONTINUED | OUTPATIENT
Start: 2023-01-12 | End: 2023-01-12 | Stop reason: HOSPADM

## 2023-01-12 RX ORDER — PROPOFOL 10 MG/ML
VIAL (ML) INTRAVENOUS AS NEEDED
Status: DISCONTINUED | OUTPATIENT
Start: 2023-01-12 | End: 2023-01-12 | Stop reason: SURG

## 2023-01-12 RX ORDER — LIDOCAINE HYDROCHLORIDE 10 MG/ML
INJECTION, SOLUTION EPIDURAL; INFILTRATION; INTRACAUDAL; PERINEURAL AS NEEDED
Status: DISCONTINUED | OUTPATIENT
Start: 2023-01-12 | End: 2023-01-12 | Stop reason: SURG

## 2023-01-12 RX ORDER — NALOXONE HCL 0.4 MG/ML
0.4 VIAL (ML) INJECTION AS NEEDED
Status: DISCONTINUED | OUTPATIENT
Start: 2023-01-12 | End: 2023-01-12 | Stop reason: HOSPADM

## 2023-01-12 RX ADMIN — FENTANYL CITRATE 50 MCG: 50 INJECTION, SOLUTION INTRAMUSCULAR; INTRAVENOUS at 15:03

## 2023-01-12 RX ADMIN — CEFAZOLIN 2 G: 2 INJECTION, POWDER, FOR SOLUTION INTRAMUSCULAR; INTRAVENOUS at 14:38

## 2023-01-12 RX ADMIN — PROPOFOL 150 MG: 10 INJECTION, EMULSION INTRAVENOUS at 14:39

## 2023-01-12 RX ADMIN — LIDOCAINE HYDROCHLORIDE 50 MG: 10 INJECTION, SOLUTION EPIDURAL; INFILTRATION; INTRACAUDAL; PERINEURAL at 14:39

## 2023-01-12 RX ADMIN — DEXAMETHASONE SODIUM PHOSPHATE 4 MG: 4 INJECTION, SOLUTION INTRAMUSCULAR; INTRAVENOUS at 14:48

## 2023-01-12 RX ADMIN — PROPOFOL 30 MG: 10 INJECTION, EMULSION INTRAVENOUS at 14:56

## 2023-01-12 RX ADMIN — FENTANYL CITRATE 50 MCG: 50 INJECTION, SOLUTION INTRAMUSCULAR; INTRAVENOUS at 14:39

## 2023-01-12 RX ADMIN — SODIUM CHLORIDE, SODIUM LACTATE, POTASSIUM CHLORIDE, AND CALCIUM CHLORIDE: .6; .31; .03; .02 INJECTION, SOLUTION INTRAVENOUS at 14:34

## 2023-01-12 RX ADMIN — ONDANSETRON 4 MG: 2 INJECTION INTRAMUSCULAR; INTRAVENOUS at 15:07

## 2023-01-12 NOTE — PROGRESS NOTES
Specialty Pharmacy Initial Fill Coordination Note     Vlad is a 77 y.o. male contacted today regarding initial fills of capecitabine specialty medication(s).    Reviewed and verified with patient:       Specialty medication(s) and dose(s) confirmed: yes        Delivery Questions    Flowsheet Row Most Recent Value   Delivery method Other (Comment)  [[facility]eline to [facility] on Friday 1/13/23.]   Delivery address correct? Yes   Delivery phone number [phone]   Number of medications in delivery 2   Medication being filled and delivered capecitabine 150mg and 500mg   Doses left of specialty medications new start   Is there any medication that is due not being filled? No   Supplies needed? No supplies needed   Cooler needed? No   Do any medications need mixed or dated? No   Copay form of payment Payment plan already set up   Additional comments NA   Questions or concerns for the pharmacist? No   Explain any questions or concerns for the pharmacist NA   Are any medications first time fills? Yes            Beeline to [facility] on Friday 1/13/23. Will be given medication Monday 1/16/22 during appointment at clinic.     Follow-up: 3 week(s)     Robert Porras, Pharmacy Technician  Specialty Pharmacy Technician

## 2023-01-12 NOTE — ANESTHESIA PREPROCEDURE EVALUATION
Anesthesia Evaluation     Patient summary reviewed and Nursing notes reviewed   NPO Solid Status: > 8 hours  NPO Liquid Status: > 8 hours           Airway   Mallampati: II  TM distance: >3 FB  Neck ROM: full  No difficulty expected  Dental - normal exam     Pulmonary - negative pulmonary ROS and normal exam    breath sounds clear to auscultation  Cardiovascular - normal exam  Exercise tolerance: unable to assess    ECG reviewed  Rhythm: regular  Rate: normal    (+) hypertension, hyperlipidemia,       Neuro/Psych- negative ROS  GI/Hepatic/Renal/Endo    (+)   diabetes mellitus,     Musculoskeletal (-) negative ROS    Abdominal  - normal exam   Substance History - negative use     OB/GYN negative ob/gyn ROS         Other      history of cancer    ROS/Med Hx Other: Colon Ca                  Anesthesia Plan    ASA 3     general     (LMA)  intravenous induction     Anesthetic plan, risks, benefits, and alternatives have been provided, discussed and informed consent has been obtained with: patient.        CODE STATUS:

## 2023-01-12 NOTE — OP NOTE
INSERTION OF PORTACATH  Operative Note    Patient Name:  Vlad Guerrero  YOB: 1945    Date of Surgery:  1/12/2023     Indications:  The patient is a 77 y.o. male who presents for port-a-cath insertion for initiation of chemotherpay. The risks, benefits, and alternative to surgery were discussed with the patient prior to proceeding to the operating room.    Pre-op Diagnosis:   Malignant neoplasm of ascending colon (HCC) [C18.2]    Post-op Diagnosis:  Post-Op Diagnosis Codes:     * Malignant neoplasm of ascending colon (HCC) [C18.2]    Procedure/CPT® Codes:      Procedure(s):  INSERTION OF PORTACATH    Staff:  Surgeon(s):  Percy Davis MD         Anesthesia: General    Estimated Blood Loss: minimal    Implants:    Implant Name Type Inv. Item Serial No.  Lot No. LRB No. Used Action   PRT INTRO VASC/INTERV VORTEX FILL/HL DETACH/POLYURET/CATH 8F - [id number] Implant PRT INTRO VASC/INTERV VORTEX FILL/HL DETACH/POLYURET/CATH 8F  [employer] [id number] N/A 1 Implanted       Specimen:          None    Findings: Normal anatomy, right internal jugular vein approach    Complications: none, immediately    Description of Procedure: After obtaining informed consent in the preop holding area the patient was brought to the operating room and placed in the supine position.  SCDs were applied, and preoperative antibiotics were administered.  The patient then underwent uncomplicated induction of general endotracheal anesthesia.  The arms were tucked in the right neck and chest were prepped and draped in the usual sterile fashion.  After a brief timeout the procedure began.  Using ultrasound guidance the right internal jugular vein was accessed using a finder needle and a wire was passed through the right internal jugular vein towards the heart.  Fluoroscopy was used to demonstrate a wire passing from the right neck down towards the right heart and through to the inferior vena cava.  We then turned  our attention to creation of a subcutaneous pocket for the port to sit.  An incision was made in the right chest and carried down through the subcutaneous tissues using electrocautery.  Subcutaneous fat was then divided using electrocautery to maintain hemostasis.  Once the pocket was of adequate size we turned our attention towards passing her catheter through the internal jugular vein.  Once again prior to dilating, ultrasound guidance was used to confirm location of the wire within the right internal jugular vein.  We then used a dilator and sheath over the wire and removed the dilator and wire leaving the sheath in place.  A tunneling device was used to tunnel the catheter from the neck to the port site.  The catheter was cut at 23 cm, and the catheter was attached to the port using the clear plastic piece.  There was a snap confirming adequate seating of the catheter and clear plastic securing piece on the port.  We then inserted the catheter through the sheath which was then broken away leaving the catheter in the right internal jugular vein.  The entire sheath was removed.  We then aspirated and flushed the catheter without any difficulty. A second view was obtained with fluoroscopy to confirm adequate positioning of the catheter. The port site was then closed in 2 layers, using interrupted 4-0 Vicryl stitches to reapproximate the dermis and 4-0 running Monocryl to reapproximate the epidermis.  The wound was dressed using skin glue.  A single 4-0 Monocryl stitch was applied in the right neck in a subcuticular fashion, and the skin was dressed using skin glue.  Prior to closing these incisions they were infiltrated with local anesthesia.  The patient tolerated the procedure well, was extubated, and taken to PACU in satisfactory condition.  She will have a chest x-ray performed prior to being discharged home to confirm proper placement of her port as well as to look for pneumothorax.    Percy Davis,  MD     Date: 1/12/2023  Time: 15:49 EST

## 2023-01-12 NOTE — ANESTHESIA PROCEDURE NOTES
Airway  Urgency: elective    Date/Time: 1/12/2023 2:40 PM  Airway not difficult    General Information and Staff    Patient location during procedure: OR  Anesthesiologist: Kareem Martinez MD  CRNA/CAA: Tulio Ng CAA    Indications and Patient Condition  Indications for airway management: airway protection    Preoxygenated: yes  MILS not maintained throughout  Mask difficulty assessment: 0 - not attempted    Final Airway Details  Final airway type: supraglottic airway      Successful airway: unique and LMA  Size 4     Number of attempts at approach: 1  Assessment: lips, teeth, and gum same as pre-op and atraumatic intubation

## 2023-01-12 NOTE — ANESTHESIA POSTPROCEDURE EVALUATION
Patient: Vlad Guerrero    Procedure Summary     Date: 01/12/23 Room / Location: [facility] OR 08 / [facility] MAIN OR    Anesthesia Start: 1434 Anesthesia Stop: 1523    Procedure: INSERTION OF PORTACATH (Right) Diagnosis:       Malignant neoplasm of ascending colon (HCC)      (Malignant neoplasm of ascending colon (HCC) [C18.2])    Surgeons: Percy Davis MD Provider: Kareem Martinez MD    Anesthesia Type: general ASA Status: 3          Anesthesia Type: general    Vitals  Vitals Value Taken Time   /61 01/12/23 1624   Temp 98 °F (36.7 °C) 01/12/23 1622   Pulse 74 01/12/23 1625   Resp 11 01/12/23 1622   SpO2 100 % 01/12/23 1625   Vitals shown include unvalidated device data.        Post Anesthesia Care and Evaluation    Patient location during evaluation: PACU  Patient participation: complete - patient participated  Level of consciousness: awake  Pain scale: See nurse's notes for pain score.  Pain management: adequate    Airway patency: patent  Anesthetic complications: No anesthetic complications  PONV Status: none  Cardiovascular status: acceptable  Respiratory status: acceptable  Hydration status: acceptable    Comments: Patient seen and examined postoperatively; vital signs stable; SpO2 greater than or equal to 90%; cardiopulmonary status stable; nausea/vomiting adequately controlled; pain adequately controlled; no apparent anesthesia complications; patient discharged from anesthesia care when discharge criteria were met

## 2023-01-12 NOTE — H&P
GENERAL SURGERY ESTABLISHED PATIENT NOTE    Patient Care Team:  Batool Lin APRN as PCP - General (Family Medicine)  Percy Davis MD as Surgeon (General Surgery)  Don Kramer MD as Consulting Physician (Hematology and Oncology)    Reason for follow-up: Patient needs vascular access    Subjective     Patient is a 77 y.o. male presents with need of more durable vascular access since he is undergoing chemotherapy in the adjuvant setting for colon cancer status post a right hemicolectomy performed on 12/15/2022.  He does have some increased granulation tissue on his midline incision..  He is not having any fevers, redness, drainage, or difficulty eating or stooling.    Review of Systems   Gastrointestinal: Negative for abdominal pain, constipation, nausea and vomiting.   Hematological: Negative for adenopathy. Does not bruise/bleed easily.        History  Past Medical History:   Diagnosis Date   • Anemia    • Colon cancer 2022    colon   • Diabetes mellitus (HCC)    • Hyperlipidemia    • Hypertension      Past Surgical History:   Procedure Laterality Date   • APPENDECTOMY     • CARDIAC CATHETERIZATION     • COLON RESECTION N/A 12/15/2022    Procedure: COLON RESECTION RIGHT;  Surgeon: Percy Davis MD;  Location: [facility] MAIN OR;  Service: General;  Laterality: N/A;   • COLONOSCOPY N/A 11/11/2022    Procedure: COLONOSCOPY WITH BIOPSY AND POLYPECTOMY;  Surgeon: Billy Julien MD;  Location: [facility] ENDOSCOPY;  Service: Gastroenterology;  Laterality: N/A;  Impression:  1.  Large 4-5cm fulgurating circumferential ulcerated mass in the very proximal part of the ascending colon next to ileocecal valve multiple biopsies were performed.  This is highly concerning for colon malignancy.  2.  2 polyp rem   • ENDOSCOPY N/A 11/11/2022    Procedure: ESOPHAGOGASTRODUODENOSCOPY with biopsy X1;  Surgeon: Billy Julien MD;  Location: [facility] ENDOSCOPY;  Service: Gastroenterology;  Laterality: N/A;  5.  Upper  endoscopy lamination unremarkable.      • TONSILLECTOMY       Family History   Problem Relation Age of Onset   • Pneumonia Mother 94        SARS-CoV2   • Colon cancer Father 62   • Heart disease Sister      Social History     Tobacco Use   • Smoking status: Former     Packs/day: 1.00     Years: 2.00     Pack years: 2.00     Types: Cigarettes     Start date:      Quit date:      Years since quittin.0   • Smokeless tobacco: Never   Vaping Use   • Vaping Use: Never used   Substance Use Topics   • Alcohol use: Not Currently   • Drug use: Never     Medications Prior to Admission   Medication Sig Dispense Refill Last Dose   • empagliflozin (JARDIANCE) 25 MG tablet tablet Take 25 mg by mouth Daily. Dont take preop   1/10/2023   • glimepiride (AMARYL) 4 MG tablet Take 1 tablet by mouth 2 (Two) Times a Day. None preop   1/10/2023   • losartan (COZAAR) 100 MG tablet Take 100 mg by mouth Daily.   1/10/2023   • metFORMIN (GLUCOPHAGE) 1000 MG tablet Take 2,000 mg by mouth Daily With Dinner. Last dose 12/12   1/10/2023   • pioglitazone (ACTOS) 45 MG tablet Take 45 mg by mouth Daily. None preop   1/10/2023   • polyethylene glycol (MIRALAX) 17 GM/SCOOP powder Take 17 g by mouth As Needed. None preop   2023   • pravastatin (PRAVACHOL) 40 MG tablet Take 1 tablet by mouth Every Night.   1/10/2023   • Semaglutide,0.25 or 0.5MG/DOS, (Ozempic, 0.25 or 0.5 MG/DOSE,) 2 MG/1.5ML solution pen-injector Inject 0.25 mg under the skin into the appropriate area as directed As Needed. 2022   • capecitabine (XELODA) 150 MG chemo tablet Take 5 tablets by mouth (with 1 other capecitabine Rx) for 1,750 mg total 2 (Two) Times a Day on Days 1-14, then off for 7 days. Total dose is 1750mg in the AM & 1750mg in the PM. 140 tablet 4    • capecitabine (XELODA) 500 MG chemo tablet Take 2 tablets by mouth (with 1 other capecitabine prescription) for 1,750 mg total 2 (Two) Times a Day on Days 1-14, then off for 7 days.  Total  dose is 1750mg in the AM & 1750mg in the PM. 56 tablet 4    • docusate sodium (Colace) 100 MG capsule Take 1 capsule by mouth 2 (Two) Times a Day. 60 capsule 1 1/10/2023   • ondansetron (ZOFRAN) 8 MG tablet Take 1 tablet by mouth 3 (Three) Times a Day As Needed for Nausea or Vomiting. 30 tablet 5 1/10/2023     Allergies:  Penicillins    Objective     Vital Signs  Temp:  [98.7 °F (37.1 °C)] 98.7 °F (37.1 °C)  Heart Rate:  [72] 72  Resp:  [16] 16  BP: (113)/(58) 113/58    Physical Exam  Vitals reviewed.   Constitutional:       Appearance: He is well-developed.   HENT:      Head: Normocephalic and atraumatic.   Eyes:      Pupils: Pupils are equal, round, and reactive to light.   Cardiovascular:      Rate and Rhythm: Normal rate and regular rhythm.   Pulmonary:      Effort: Pulmonary effort is normal.      Breath sounds: Normal breath sounds.   Abdominal:      General: There is no distension.      Palpations: Abdomen is soft.      Tenderness: There is no abdominal tenderness.      Hernia: No hernia is present.      Comments: Midline wound with some heaping granulation tissue.   Musculoskeletal:         General: Normal range of motion.      Cervical back: Normal range of motion.   Lymphadenopathy:      Cervical: No cervical adenopathy.   Skin:     General: Skin is warm and dry.      Findings: No rash.   Neurological:      Mental Status: He is alert and oriented to person, place, and time.         Results Review:   Lab Results (last 24 hours)     Procedure Component Value Units Date/Time    POC Glucose Once [[id number]]  (Abnormal) Collected: 01/12/23 1158    Specimen: Blood Updated: 01/12/23 1203     Glucose 165 mg/dL      Comment: Serial Number: [id number]:  [id number]           No radiology results for the last day      I reviewed the patient's new imaging results and agree with the interpretation.  I reviewed the patient's other test results and agree with the interpretation    Assessment & Plan     Principal  Problem:    Malignant neoplasm of ascending colon (HCC)      Discussed the risk, benefits, and alternatives to Port-A-Cath insertion.  The patient understands, and agrees to proceed to the operating room for Port-A-Cath insertion.  Risks include bleeding, infection, malposition, malfunction, need for revision, damage to surrounding structures, need for removal, and pneumothorax.    I discussed the patients findings and my recommendations with the patient.     Percy Davis MD  01/12/23  14:21 EST

## 2023-01-12 NOTE — DISCHARGE INSTRUCTIONS
Ok for discharge  Follow-up with me for port removal or issues related to the port  Regular diet as tolerated  Ok to shower starting tomorrow. No tub baths, pools, lakes, or streams for 1 week.  Ok to apply ice to incisions  Call my office or present to the ED with: fevers greater than 101.5, redness around the incisions, drainage from the incisions, intractable nausea/vomiting, or pain that is getting worse instead of better

## 2023-01-16 ENCOUNTER — HOSPITAL ENCOUNTER (OUTPATIENT)
Dept: ONCOLOGY | Facility: HOSPITAL | Age: 78
Setting detail: INFUSION SERIES
Discharge: HOME | End: 2023-01-16
Payer: MEDICARE

## 2023-01-16 ENCOUNTER — SPECIALTY PHARMACY (OUTPATIENT)
Dept: PHARMACY | Facility: HOSPITAL | Age: 78
End: 2023-01-16
Payer: MEDICARE

## 2023-01-16 VITALS
BODY MASS INDEX: 24.65 KG/M2 | SYSTOLIC BLOOD PRESSURE: 123 MMHG | DIASTOLIC BLOOD PRESSURE: 70 MMHG | WEIGHT: 182 LBS | OXYGEN SATURATION: 99 % | RESPIRATION RATE: 18 BRPM | TEMPERATURE: 97.7 F | HEIGHT: 72 IN | HEART RATE: 92 BPM

## 2023-01-16 DIAGNOSIS — C18.2 MALIGNANT NEOPLASM OF ASCENDING COLON: Primary | ICD-10-CM

## 2023-01-16 LAB
ALBUMIN SERPL-MCNC: 3.7 G/DL (ref 3.5–5.2)
ALBUMIN/GLOB SERPL: 1.5 G/DL
ALP SERPL-CCNC: 80 U/L (ref 39–117)
ALT SERPL W P-5'-P-CCNC: 12 U/L (ref 1–41)
ANION GAP SERPL CALCULATED.3IONS-SCNC: 9 MMOL/L (ref 5–15)
AST SERPL-CCNC: 14 U/L (ref 1–40)
BASOPHILS # BLD AUTO: 0.01 10*3/MM3 (ref 0–0.2)
BASOPHILS NFR BLD AUTO: 0.2 % (ref 0–1.5)
BILIRUB SERPL-MCNC: 0.3 MG/DL (ref 0–1.2)
BUN SERPL-MCNC: 20 MG/DL (ref 8–23)
BUN/CREAT SERPL: 26.7 (ref 7–25)
CALCIUM SPEC-SCNC: 8.6 MG/DL (ref 8.6–10.5)
CHLORIDE SERPL-SCNC: 103 MMOL/L (ref 98–107)
CO2 SERPL-SCNC: 26 MMOL/L (ref 22–29)
CREAT SERPL-MCNC: 0.75 MG/DL (ref 0.76–1.27)
DEPRECATED RDW RBC AUTO: 56.3 FL (ref 37–54)
EGFRCR SERPLBLD CKD-EPI 2021: 94.1 ML/MIN/1.73
EOSINOPHIL # BLD AUTO: 0.07 10*3/MM3 (ref 0–0.4)
EOSINOPHIL NFR BLD AUTO: 1.6 % (ref 0.3–6.2)
ERYTHROCYTE [DISTWIDTH] IN BLOOD BY AUTOMATED COUNT: 21.7 % (ref 12.3–15.4)
GLOBULIN UR ELPH-MCNC: 2.5 GM/DL
GLUCOSE SERPL-MCNC: 199 MG/DL (ref 65–99)
HCT VFR BLD AUTO: 28.1 % (ref 37.5–51)
HGB BLD-MCNC: 8.2 G/DL (ref 13–17.7)
LYMPHOCYTES # BLD AUTO: 1.01 10*3/MM3 (ref 0.7–3.1)
LYMPHOCYTES NFR BLD AUTO: 23.3 % (ref 19.6–45.3)
MCH RBC QN AUTO: 21.4 PG (ref 26.6–33)
MCHC RBC AUTO-ENTMCNC: 29.2 G/DL (ref 31.5–35.7)
MCV RBC AUTO: 73.4 FL (ref 79–97)
MONOCYTES # BLD AUTO: 0.56 10*3/MM3 (ref 0.1–0.9)
MONOCYTES NFR BLD AUTO: 12.9 % (ref 5–12)
NEUTROPHILS NFR BLD AUTO: 2.68 10*3/MM3 (ref 1.7–7)
NEUTROPHILS NFR BLD AUTO: 62 % (ref 42.7–76)
PLATELET # BLD AUTO: 239 10*3/MM3 (ref 140–450)
PMV BLD AUTO: 8.8 FL (ref 6–12)
POTASSIUM SERPL-SCNC: 4.3 MMOL/L (ref 3.5–5.2)
PROT SERPL-MCNC: 6.2 G/DL (ref 6–8.5)
RBC # BLD AUTO: 3.83 10*6/MM3 (ref 4.14–5.8)
SODIUM SERPL-SCNC: 138 MMOL/L (ref 136–145)
WBC NRBC COR # BLD: 4.33 10*3/MM3 (ref 3.4–10.8)

## 2023-01-16 PROCEDURE — 25010000002 OXALIPLATIN PER 0.5 MG: Performed by: INTERNAL MEDICINE

## 2023-01-16 PROCEDURE — 80053 COMPREHEN METABOLIC PANEL: CPT | Performed by: INTERNAL MEDICINE

## 2023-01-16 PROCEDURE — 25010000002 DEXAMETHASONE SODIUM PHOSPHATE 120 MG/30ML SOLUTION: Performed by: INTERNAL MEDICINE

## 2023-01-16 PROCEDURE — 96415 CHEMO IV INFUSION ADDL HR: CPT

## 2023-01-16 PROCEDURE — 96375 TX/PRO/DX INJ NEW DRUG ADDON: CPT

## 2023-01-16 PROCEDURE — 96413 CHEMO IV INFUSION 1 HR: CPT

## 2023-01-16 PROCEDURE — 25010000002 PALONOSETRON 0.25 MG/5ML SOLUTION PREFILLED SYRINGE: Performed by: INTERNAL MEDICINE

## 2023-01-16 PROCEDURE — 25010000002 HEPARIN LOCK FLUSH PER 10 UNITS: Performed by: INTERNAL MEDICINE

## 2023-01-16 PROCEDURE — 96367 TX/PROPH/DG ADDL SEQ IV INF: CPT

## 2023-01-16 PROCEDURE — 85025 COMPLETE CBC W/AUTO DIFF WBC: CPT | Performed by: INTERNAL MEDICINE

## 2023-01-16 RX ORDER — PALONOSETRON 0.05 MG/ML
0.25 INJECTION, SOLUTION INTRAVENOUS ONCE
Status: COMPLETED | OUTPATIENT
Start: 2023-01-16 | End: 2023-01-16

## 2023-01-16 RX ORDER — SODIUM CHLORIDE 0.9 % (FLUSH) 0.9 %
20 SYRINGE (ML) INJECTION AS NEEDED
Status: DISCONTINUED | OUTPATIENT
Start: 2023-01-16 | End: 2023-01-17 | Stop reason: HOSPADM

## 2023-01-16 RX ORDER — DEXTROSE MONOHYDRATE 50 MG/ML
250 INJECTION, SOLUTION INTRAVENOUS ONCE
Status: COMPLETED | OUTPATIENT
Start: 2023-01-16 | End: 2023-01-16

## 2023-01-16 RX ORDER — HEPARIN SODIUM (PORCINE) LOCK FLUSH IV SOLN 100 UNIT/ML 100 UNIT/ML
500 SOLUTION INTRAVENOUS AS NEEDED
Status: CANCELLED | OUTPATIENT
Start: 2023-01-16

## 2023-01-16 RX ORDER — SODIUM CHLORIDE 0.9 % (FLUSH) 0.9 %
20 SYRINGE (ML) INJECTION AS NEEDED
Status: CANCELLED | OUTPATIENT
Start: 2023-01-16

## 2023-01-16 RX ORDER — HEPARIN SODIUM (PORCINE) LOCK FLUSH IV SOLN 100 UNIT/ML 100 UNIT/ML
500 SOLUTION INTRAVENOUS AS NEEDED
Status: DISCONTINUED | OUTPATIENT
Start: 2023-01-16 | End: 2023-01-17 | Stop reason: HOSPADM

## 2023-01-16 RX ADMIN — OXALIPLATIN 265 MG: 5 INJECTION, SOLUTION INTRAVENOUS at 11:52

## 2023-01-16 RX ADMIN — DEXAMETHASONE SODIUM PHOSPHATE 12 MG: 4 INJECTION, SOLUTION INTRA-ARTICULAR; INTRALESIONAL; INTRAMUSCULAR; INTRAVENOUS; SOFT TISSUE at 11:27

## 2023-01-16 RX ADMIN — PALONOSETRON 0.25 MG: 0.25 INJECTION, SOLUTION INTRAVENOUS at 11:18

## 2023-01-16 RX ADMIN — Medication 10 ML: at 13:58

## 2023-01-16 RX ADMIN — DEXTROSE MONOHYDRATE 250 ML: 50 INJECTION, SOLUTION INTRAVENOUS at 11:16

## 2023-01-16 RX ADMIN — Medication 500 UNITS: at 13:58

## 2023-01-16 NOTE — PROGRESS NOTES
MTM Oral Chemo Education Appointment:     Patient Name/:  Vlad Guerrero  1945    Medication Regimen (including dosing/administration):  Xeloda 1750mg PO BID on days 1-14 then off 7 days  Date to Start Medication: 23    Diagnosis/Indication: colon cancer  Expected duration of therapy: 4 months  Side effects reviewed with patient including: decreased WBC/increased risk for infection , decreased platelets/increased risk for bleed, decreased hemoglobin, fatigue, nausea/vomiting, diarrhea, skin rash/itchy skin, mouth sores/irritation, hand-foot syndrome , changes in kidney function, changes in liver function, peripheral neuropathy and cardiotoxicity, dehydration, DPD deficiency is rare  Preventative/supportive medications: ondansetron, loperamide       Additional counseling:   - Reviewed proper administration: Discussed if the patient is handling their own medications, then they need to wash their hands properly after touching the medication.  If a caregiver is assisting with handling medication, need to wear disposal gloves and wash hands properly afterwards. Patient was counseled to take 30 minutes after a meal two times a day on days 1-14 then off 7 days.   - Reviewed prior storage of medication: Store medication safe away from children and pets, away from light, and at room temperature. If you use a pill box, use a separate pill box from other medication.   - Counseled patient on safe handling of soiled linen and proper flush technique and reviewed proper disposal of medication.   - Reviewed what to do in the event of a missed dose: Do not take an extra dose or two doses at one time. Simply take your next dose at the regularly scheduled time.  - Reviewed expected goals/outcomes, contraindications and safety precautions, including when to seek medical care.  - Counseled that women should not become pregnant and men should not get a partner pregnant while taking; men and women of childbearing age and  potential should use effective contraception during and after therapy.    Provided patient with:   Chemo calendar to help improve adherence., Education sheets about the medication, 24-hour clinic phone number and my contact information and instructions to call should additional questions arise. , Medication action plan    Completed medication reconciliation today to assess for drug interactions.   Reviewed concomitant medications, allergies, labs, comorbidities/medical history, quality of life, and immunization history.   Drug-drug interactions noted: no significant drug interactions noted.   Advised pt to call the clinic if any new medications are started so we can assess for drug-drug interactions     Wrap-up:  Discussed aforementioned material with patient in person, face-to-face, in clinic.   Chemo consents/CCA were signed at today's visit.   Medication availability: patient picked up medication at the end of today's visit  Patient expressed understanding.   Patient demonstrates ability to self-administer medication. No barriers to adherence identified. . All questions and concerns addressed.     Meenakshi Chiu  1/16/2023  12:06 EST

## 2023-01-17 ENCOUNTER — SPECIALTY PHARMACY (OUTPATIENT)
Dept: PHARMACY | Facility: HOSPITAL | Age: 78
End: 2023-01-17
Payer: OTHER GOVERNMENT

## 2023-01-17 NOTE — PROGRESS NOTES
Specialty Pharmacy Note: Capecitabine (baseline labs)        1/16/2023  Day 1   WBC 3.40 - 10.80 10*3/mm3 4.33   Neutrophils Absolute 1.70 - 7.00 10*3/mm3 2.68   Hemoglobin 13.0 - 17.7 g/dL 8.2 (A)   Hematocrit 37.5 - 51.0 % 28.1 (A)   Platelets 140 - 450 10*3/mm3 239   Creatinine 0.76 - 1.27 mg/dL 0.75 (A)   BUN 8 - 23 mg/dL 20   Sodium 136 - 145 mmol/L 138   Potassium 3.5 - 5.2 mmol/L 4.3   Glucose 65 - 99 mg/dL 199 (A)   Calcium 8.6 - 10.5 mg/dL 8.6   Albumin 3.5 - 5.2 g/dL 3.7   Total Protein 6.0 - 8.5 g/dL 6.2   AST (SGOT) 1 - 40 U/L 14   ALT (SGPT) 1 - 41 U/L 12   Alkaline Phosphatase 39 - 117 U/L 80   Total Bilirubin 0.0 - 1.2 mg/dL 0.3       Pharmacy reviewed labs and will continue to follow.      Thank you,  Mary Cervantes, Pharm.D.

## 2023-01-23 ENCOUNTER — LAB (OUTPATIENT)
Dept: LAB | Facility: HOSPITAL | Age: 78
End: 2023-01-23
Payer: MEDICARE

## 2023-01-23 ENCOUNTER — APPOINTMENT (OUTPATIENT)
Dept: ONCOLOGY | Facility: HOSPITAL | Age: 78
End: 2023-01-23
Payer: MEDICARE

## 2023-01-23 DIAGNOSIS — C18.2 MALIGNANT NEOPLASM OF ASCENDING COLON: ICD-10-CM

## 2023-01-23 DIAGNOSIS — C18.2 MALIGNANT NEOPLASM OF ASCENDING COLON: Primary | ICD-10-CM

## 2023-01-23 LAB
BASOPHILS # BLD AUTO: 0.01 10*3/MM3 (ref 0–0.2)
BASOPHILS NFR BLD AUTO: 0.2 % (ref 0–1.5)
DEPRECATED RDW RBC AUTO: 56.1 FL (ref 37–54)
EOSINOPHIL # BLD AUTO: 0.1 10*3/MM3 (ref 0–0.4)
EOSINOPHIL NFR BLD AUTO: 2.1 % (ref 0.3–6.2)
ERYTHROCYTE [DISTWIDTH] IN BLOOD BY AUTOMATED COUNT: 21.5 % (ref 12.3–15.4)
HCT VFR BLD AUTO: 28.6 % (ref 37.5–51)
HGB BLD-MCNC: 8.2 G/DL (ref 13–17.7)
LYMPHOCYTES # BLD AUTO: 1.59 10*3/MM3 (ref 0.7–3.1)
LYMPHOCYTES NFR BLD AUTO: 32.7 % (ref 19.6–45.3)
MCH RBC QN AUTO: 21.4 PG (ref 26.6–33)
MCHC RBC AUTO-ENTMCNC: 28.7 G/DL (ref 31.5–35.7)
MCV RBC AUTO: 74.5 FL (ref 79–97)
MONOCYTES # BLD AUTO: 0.59 10*3/MM3 (ref 0.1–0.9)
MONOCYTES NFR BLD AUTO: 12.1 % (ref 5–12)
NEUTROPHILS NFR BLD AUTO: 2.57 10*3/MM3 (ref 1.7–7)
NEUTROPHILS NFR BLD AUTO: 52.9 % (ref 42.7–76)
PLATELET # BLD AUTO: 208 10*3/MM3 (ref 140–450)
PMV BLD AUTO: 8.3 FL (ref 6–12)
RBC # BLD AUTO: 3.84 10*6/MM3 (ref 4.14–5.8)
WBC NRBC COR # BLD: 4.86 10*3/MM3 (ref 3.4–10.8)

## 2023-01-23 PROCEDURE — 36415 COLL VENOUS BLD VENIPUNCTURE: CPT

## 2023-01-23 PROCEDURE — 85025 COMPLETE CBC W/AUTO DIFF WBC: CPT

## 2023-01-24 ENCOUNTER — SPECIALTY PHARMACY (OUTPATIENT)
Dept: PHARMACY | Facility: HOSPITAL | Age: 78
End: 2023-01-24
Payer: OTHER GOVERNMENT

## 2023-01-24 NOTE — PROGRESS NOTES
MTM Encounter-Re: Adherence and side effects (Capecitabine)    Today's encounter was conducted via telephone.    Medication:  Capecitabine 1750 mg by mouth twice daily with food for days 1-14, then 7 days off  - Reason for outreach: Routine medication check-in , 1 week toxicity check-in.  - Administration: Patient is taking every day at the same time , with food , twice daily and as prescribed .  - Missed doses: Patient reports missing 0 doses since starting medication.   - Self-administration: Patient demonstrates ability to self-administer medication. No barriers to adherence identified.   0 - Diagnosis/Indication: Malignant neoplasm of ascending colon. Progress toward achieving therapeutic goals reviewed.   - Patient denies side effects.    - Medication availability/affordability: Patient has had no issues obtaining medication from pharmacy.   - Questions/concerns about medications: Pt plans on going back to work at [employer] and wanted to know if there were any restrictions when returning to work. Asked Dr. Kramer who said he wanted pt to avoid crowded areas and refrigerated sections given his current treatment of [place]. However, other than that, pt had no restrictions. Attempted to recall pt to relay this information; however, I was unable to leave a voicemail. Of note, Dr. Kramer noted he was happy to write a letter to his employer if needed.          Patient denies starting or stopping any medications.  Assessed medication list for interactions, no significant drug interactions noted.   Advised pt to call the clinic if any new medications are started so we can assess for drug-drug interactions.     All questions addressed. Patient had no additional concerns for MTM office.     Mary Cervantes RPH  1/24/2023  13:26 EST

## 2023-01-25 ENCOUNTER — SPECIALTY PHARMACY (OUTPATIENT)
Dept: PHARMACY | Facility: HOSPITAL | Age: 78
End: 2023-01-25
Payer: OTHER GOVERNMENT

## 2023-01-25 ENCOUNTER — OFFICE VISIT (OUTPATIENT)
Dept: SURGERY | Facility: CLINIC | Age: 78
End: 2023-01-25
Payer: MEDICARE

## 2023-01-25 VITALS
HEIGHT: 72 IN | HEART RATE: 77 BPM | RESPIRATION RATE: 18 BRPM | WEIGHT: 190.6 LBS | OXYGEN SATURATION: 98 % | TEMPERATURE: 98.2 F | SYSTOLIC BLOOD PRESSURE: 137 MMHG | DIASTOLIC BLOOD PRESSURE: 75 MMHG | BODY MASS INDEX: 25.81 KG/M2

## 2023-01-25 DIAGNOSIS — C18.2 MALIGNANT NEOPLASM OF ASCENDING COLON: Primary | ICD-10-CM

## 2023-01-25 PROCEDURE — 99024 POSTOP FOLLOW-UP VISIT: CPT | Performed by: SURGERY

## 2023-01-25 NOTE — PROGRESS NOTES
"Post-op Note    Subjective   Vlad Guerrero is a 77 y.o. 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  Overall, he is doing well.  He has gained some weight since his last visit.  He reports that he is eating well and having normal bowel function.  He is started chemotherapy and is tolerating this well without any difficulties.      Objective   /75 (BP Location: Right arm, Patient Position: Sitting, Cuff Size: Adult)   Pulse 77   Temp 98.2 °F (36.8 °C) (Infrared)   Resp 18   Ht 182.9 cm (72\")   Wt 86.5 kg (190 lb 9.6 oz)   SpO2 98%   BMI 25.85 kg/m²   Incision is well approximated with skin staples and no surrounding erythema, duration, or drainage to suggest infection.  Some hypertrophied granulation tissue at the midportion of his incision which was treated with silver nitrate.      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  Overall doing well.    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  Surgical stage is pT3, pN1b, M0 - stage IIIa  Continue chemotherapy per medical oncology  Follow-up with me for port removal or issues going forward  Okay to resume activities without restriction    Percy Davis MD  1/25/2023  08:59 EST  "

## 2023-01-30 ENCOUNTER — SPECIALTY PHARMACY (OUTPATIENT)
Dept: PHARMACY | Facility: HOSPITAL | Age: 78
End: 2023-01-30
Payer: OTHER GOVERNMENT

## 2023-01-30 ENCOUNTER — LAB (OUTPATIENT)
Dept: LAB | Facility: HOSPITAL | Age: 78
End: 2023-01-30
Payer: MEDICARE

## 2023-01-30 DIAGNOSIS — C18.2 MALIGNANT NEOPLASM OF ASCENDING COLON: ICD-10-CM

## 2023-01-30 LAB
BASOPHILS # BLD AUTO: 0.01 10*3/MM3 (ref 0–0.2)
BASOPHILS NFR BLD AUTO: 0.2 % (ref 0–1.5)
DEPRECATED RDW RBC AUTO: 55.5 FL (ref 37–54)
EOSINOPHIL # BLD AUTO: 0.05 10*3/MM3 (ref 0–0.4)
EOSINOPHIL NFR BLD AUTO: 1 % (ref 0.3–6.2)
ERYTHROCYTE [DISTWIDTH] IN BLOOD BY AUTOMATED COUNT: 22.9 % (ref 12.3–15.4)
HCT VFR BLD AUTO: 28.4 % (ref 37.5–51)
HGB BLD-MCNC: 8.3 G/DL (ref 13–17.7)
LYMPHOCYTES # BLD AUTO: 1.37 10*3/MM3 (ref 0.7–3.1)
LYMPHOCYTES NFR BLD AUTO: 28.7 % (ref 19.6–45.3)
MCH RBC QN AUTO: 22.1 PG (ref 26.6–33)
MCHC RBC AUTO-ENTMCNC: 29.2 G/DL (ref 31.5–35.7)
MCV RBC AUTO: 75.5 FL (ref 79–97)
MONOCYTES # BLD AUTO: 0.42 10*3/MM3 (ref 0.1–0.9)
MONOCYTES NFR BLD AUTO: 8.8 % (ref 5–12)
NEUTROPHILS NFR BLD AUTO: 2.92 10*3/MM3 (ref 1.7–7)
NEUTROPHILS NFR BLD AUTO: 61.3 % (ref 42.7–76)
PLATELET # BLD AUTO: 175 10*3/MM3 (ref 140–450)
PMV BLD AUTO: 8.8 FL (ref 6–12)
RBC # BLD AUTO: 3.76 10*6/MM3 (ref 4.14–5.8)
WBC NRBC COR # BLD: 4.77 10*3/MM3 (ref 3.4–10.8)

## 2023-01-30 PROCEDURE — 36415 COLL VENOUS BLD VENIPUNCTURE: CPT

## 2023-01-30 PROCEDURE — 85025 COMPLETE CBC W/AUTO DIFF WBC: CPT

## 2023-01-30 NOTE — PROGRESS NOTES
MTM Encounter-Re: Adherence and side effects (Xeloda)    Today's encounter was conducted in person, face-to-face.     Medication:  1750mg PO BID on days 1-14 then off 7 days  - Reason for outreach: Routine medication check-in  and two week toxicity and adherence check.  - Administration: Patient is taking every day at the same time , with food  and twice daily.  - Missed doses: Patient reports missing 0 doses since starting medication.   - Self-administration: Patient demonstrates ability to self-administer medication. No barriers to adherence identified.   - Diagnosis/Indication: colon cancer. Progress toward achieving therapeutic goals reviewed.   - Patient denies side effects.    - Medication availability/affordability: Patient has had no issues obtaining medication from pharmacy.   - Questions/concerns about medications: Fredi has no questions or concerns in the clinic this morning.  He said he is tolerating the medication very well and has no issues with it.       Completed medication reconciliation today to assess for drug interactions.   Reviewed allergies, medical history, labs, quality of life, and medication history with patient.   Patient denies starting or stopping any medications.  Assessed medication list for interactions, no significant drug interactions noted.   Advised pt to call the clinic if any new medications are started so we can assess for drug-drug interactions.     Fredi also had labs this am and labs as follow:      1/30/2023   WBC 3.40 - 10.80 10*3/mm3 4.77   Neutrophils Absolute 1.70 - 7.00 10*3/mm3 2.92   Hemoglobin 13.0 - 17.7 g/dL 8.3 (A)   Hematocrit 37.5 - 51.0 % 28.4 (A)   Platelets 140 - 450 10*3/mm3 175     All questions addressed. Patient had no additional concerns for MTM office.     Meenakshi Chiu  1/30/2023  12:22 EST

## 2023-01-30 NOTE — PROGRESS NOTES
Specialty Pharmacy Refill Coordination Note     Vlad is a 77 y.o. male contacted today regarding refills of  Xeloda 150mg and 500mg tablets that are specialty medication(s).    Reviewed and verified with patient:       Specialty medication(s) and dose(s) confirmed: yes    Refill Questions    Flowsheet Row Most Recent Value   Changes to allergies? No   Changes to medications? No   New conditions since last clinic visit No   Unplanned office visit, urgent care, ED, or hospital admission in the last 4 weeks  No   How does patient/caregiver feel medication is working? Excellent   Financial problems or insurance changes  No   Since the previous refill, were any specialty medication doses or scheduled injections missed or delayed?  No   Does this patient require a clinical escalation to a pharmacist? No          Delivery Questions    Flowsheet Row Most Recent Value   Delivery method Other (Comment)  [bee line to [facility]]   Delivery address correct? Yes   Delivery phone number [phone]   Number of medications in delivery 2   Medication being filled and delivered Xeloda   Doses left of specialty medications none--started 7 days off   Is there any medication that is due not being filled? No   Supplies needed? No supplies needed   Do any medications need mixed or dated? No   Copay form of payment Credit card on file   Questions or concerns for the pharmacist? No   Are any medications first time fills? No          Beeline to [facility] on 2/2/23 and patient will  at his next appointment.       Follow-up: 3  week(s)     Meenakshi Chiu

## 2023-02-03 DIAGNOSIS — C18.2 MALIGNANT NEOPLASM OF ASCENDING COLON: Primary | ICD-10-CM

## 2023-02-03 NOTE — PROGRESS NOTES
HEMATOLOGY ONCOLOGY OUTPATIENT CONSULTATION       Patient name: Vlad Guerrero  : 1945  MRN: [id number]  Primary Care Physician: Batool Lin APRN  Referring Physician: Batool Lin APRN  Reason For Consult: Stage III colon cancer    No chief complaint on file.    HPI:   History of Present Illness:  Vlad Guerrero is 77 y.o. male who presented to our office on 23 for consultation regarding    2023: Mr. Guerrero dated the beginning of his present illness to sometime at the end of  when he started to feel fatigued.  He was seen at the [facility] and had laboratory exams that revealed microcytic anemia.  He had evidence of iron deficiency and received intravenous iron in the hospital.  He had upper and lower gastrointestinal endoscopies that demonstrated a cecal tumor that measured 4 to 5 cm and was fungating in appearance.  It was circumferential and was next to the ileocecal valve.  The upper gastrointestinal endoscopy revealed no abnormalities.  On this basis he was on December 15, 2022 he was taken to the hospital and underwent a right hemicolectomy without complications.  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  It corresponded to a grade 2 malignancy that invaded through the muscularis propria into the pericolonic tissues.  Macroscopic tumor perforation or lymphovascular space invasion were not present.  Perineural invasion was not present either.  All margins of excision were negative.  All of 36 lymph nodes submitted to were positive for involvement with malignancy.  The disease was Elzbieta staged as [id number].  Loss of expression of mismatch repair enzymes was not documented.  Mr. Guerrero was discharged to continue treatment as outpatient.  At the time of this visit he was recovering well from the surgery.  His incision had healed completely and he had no drains or suture  materials.  He had returned home and was eating well.  He had yet to return to work.  He had been afebrile and free of nausea.  For the most part his weight had been stable.  After reviewing the records a long conversation, of approximately 1 hour, was had with the patient in regards to options of treatment.  The nature of his disease as well as the likelihood of recurrence were described.  The use of adjuvant chemotherapy to increase the rate of cure after surgery was explained.  Side effects were described in detail.  The need for a port was expressed as well.  A treatment plan was placed.    2/6/2023: Received the first cycle of adjuvant chemotherapy without any side effects. On the day of this visit feeling well and without any new symptoms, except a very mild rash, especially on the forearms, but no oral pain. Had stools of diminished consistency for a few days but now resolved. The exam was rather unremarkable, except for a few very light erythematous macules on the forearms.     Subjective:  • 2/6/2023: Feeling well. Active and without difficulties returning to work. Eating well and no nausea or vomiting. No chest pain or cough and no abdominal pain. Did have some reduction in the consistency of the stools, but now resolved. No edema. Has had some skin rash as above.     The following portions of the patient's history were reviewed and updated as appropriate: allergies, current medications, past family history, past medical history, past social history, past surgical history and problem list.    Past Medical History:   Diagnosis Date   • Anemia    • Colon cancer 2022    colon   • Diabetes mellitus (HCC)    • Hyperlipidemia    • Hypertension      Past Surgical History:   Procedure Laterality Date   • APPENDECTOMY     • CARDIAC CATHETERIZATION     • COLON RESECTION N/A 12/15/2022    Procedure: COLON RESECTION RIGHT;  Surgeon: Percy Davis MD;  Location: [facility] MAIN OR;  Service: General;   Laterality: N/A;   • COLONOSCOPY N/A 11/11/2022    Procedure: COLONOSCOPY WITH BIOPSY AND POLYPECTOMY;  Surgeon: Billy Julien MD;  Location: [facility] ENDOSCOPY;  Service: Gastroenterology;  Laterality: N/A;  Impression:  1.  Large 4-5cm fulgurating circumferential ulcerated mass in the very proximal part of the ascending colon next to ileocecal valve multiple biopsies were performed.  This is highly concerning for colon malignancy.  2.  2 polyp rem   • ENDOSCOPY N/A 11/11/2022    Procedure: ESOPHAGOGASTRODUODENOSCOPY with biopsy X1;  Surgeon: Billy Julien MD;  Location: [facility] ENDOSCOPY;  Service: Gastroenterology;  Laterality: N/A;  5.  Upper endoscopy lamination unremarkable.      • PORTACATH PLACEMENT Right 1/12/2023    Procedure: INSERTION OF PORTACATH;  Surgeon: Percy Davis MD;  Location: [facility] MAIN OR;  Service: General;  Laterality: Right;   • TONSILLECTOMY         Current Outpatient Medications:   •  capecitabine (XELODA) 150 MG chemo tablet, Take 5 tablets by mouth (with 1 other capecitabine Rx) for 1,750 mg total 2 (Two) Times a Day on Days 1-14, then off for 7 days. Total dose is 1750mg in the AM & 1750mg in the PM., Disp: 140 tablet, Rfl: 4  •  capecitabine (XELODA) 500 MG chemo tablet, Take 2 tablets by mouth (with 1 other capecitabine prescription) for 1,750 mg total 2 (Two) Times a Day on Days 1-14, then off for 7 days.  Total dose is 1750mg in the AM & 1750mg in the PM., Disp: 56 tablet, Rfl: 4  •  empagliflozin (JARDIANCE) 25 MG tablet tablet, Take 25 mg by mouth Daily. Dont take preop, Disp: , Rfl:   •  glimepiride (AMARYL) 4 MG tablet, Take 1 tablet by mouth 2 (Two) Times a Day. None preop, Disp: , Rfl:   •  losartan (COZAAR) 100 MG tablet, Take 100 mg by mouth Daily., Disp: , Rfl:   •  metFORMIN (GLUCOPHAGE) 1000 MG tablet, Take 2,000 mg by mouth Daily With Dinner. Last dose 12/12, Disp: , Rfl:   •  ondansetron (ZOFRAN) 8 MG tablet, Take 1 tablet by mouth 3 (Three) Times a Day As  Needed for Nausea or Vomiting., Disp: 30 tablet, Rfl: 5  •  pioglitazone (ACTOS) 45 MG tablet, Take 45 mg by mouth Daily. None preop, Disp: , Rfl:   •  pravastatin (PRAVACHOL) 40 MG tablet, Take 1 tablet by mouth Every Night., Disp: , Rfl:   •  Semaglutide,0.25 or 0.5MG/DOS, (Ozempic, 0.25 or 0.5 MG/DOSE,) 2 MG/1.5ML solution pen-injector, Inject 0.25 mg under the skin into the appropriate area as directed As Needed. friday, Disp: , Rfl:   No current facility-administered medications for this visit.    Facility-Administered Medications Ordered in Other Visits:   •  heparin injection 500 Units, 500 Units, Intravenous, PRN, Don Kramer MD  •  OXALIplatin (ELOXATIN) 265 mg in dextrose (D5W) 5 % 303 mL chemo IVPB, 130 mg/m2 (Treatment Plan Recorded), Intravenous, Once, Don Kramer MD, Last Rate: 160 mL/hr at 23 1146, 265 mg at 23 1146  •  sodium chloride 0.9 % flush 20 mL, 20 mL, Intravenous, PRN, Don Kramer MD    Allergies   Allergen Reactions   • Penicillins Rash     Family History   Problem Relation Age of Onset   • Pneumonia Mother 94        SARS-CoV2   • Colon cancer Father 62   • Heart disease Sister      Cancer-related family history includes Colon cancer (age of onset: 62) in his father.    Social History     Tobacco Use   • Smoking status: Former     Packs/day: 1.00     Years: 2.00     Pack years: 2.00     Types: Cigarettes     Start date:      Quit date:      Years since quittin.1   • Smokeless tobacco: Never   Vaping Use   • Vaping Use: Never used   Substance Use Topics   • Alcohol use: Not Currently   • Drug use: Never     Social History     Social History Narrative   • Not on file      ROS:     Review of Systems   Constitutional: Positive for fatigue. Negative for activity change, appetite change, chills, diaphoresis, fever and unexpected weight change.   HENT: Negative for congestion, dental problem, drooling, ear discharge, ear pain, facial swelling, hearing  "loss, mouth sores, nosebleeds, postnasal drip, rhinorrhea, sinus pressure, sinus pain, sneezing, sore throat, tinnitus, trouble swallowing and voice change.    Eyes: Negative for photophobia, pain, discharge, redness, itching and visual disturbance.   Respiratory: Negative for apnea, cough, choking, chest tightness, shortness of breath, wheezing and stridor.    Cardiovascular: Negative for chest pain, palpitations and leg swelling.   Gastrointestinal: Negative for abdominal distention, abdominal pain, anal bleeding, blood in stool, constipation, diarrhea, nausea, rectal pain and vomiting.   Endocrine: Negative for cold intolerance, heat intolerance, polydipsia and polyuria.   Genitourinary: Negative for decreased urine volume, difficulty urinating, dysuria, flank pain, frequency, genital sores, hematuria and urgency.   Musculoskeletal: Negative for arthralgias, back pain, gait problem, joint swelling, myalgias, neck pain and neck stiffness.   Skin: Negative for color change, pallor and rash.   Neurological: Negative for dizziness, tremors, seizures, syncope, facial asymmetry, speech difficulty, weakness, light-headedness, numbness and headaches.   Hematological: Negative for adenopathy. Does not bruise/bleed easily.   Psychiatric/Behavioral: Negative for agitation, behavioral problems, confusion, decreased concentration, hallucinations, self-injury, sleep disturbance and suicidal ideas. The patient is not nervous/anxious.      Objective:    Vitals:    02/06/23 1027   BP: 132/57   Pulse: 79   Temp: 98.1 °F (36.7 °C)   TempSrc: Oral   SpO2: 96%   Weight: 87.1 kg (192 lb)   Height: 182.9 cm (72\")   PainSc: 0-No pain     Body mass index is 26.04 kg/m².  ECOG  (0) Fully active, able to carry on all predisease performance without restriction    Physical Exam:     Physical Exam  Constitutional:       General: He is not in acute distress.     Appearance: Normal appearance. He is not ill-appearing, toxic-appearing or " diaphoretic.   HENT:      Head: Normocephalic and atraumatic.      Right Ear: External ear normal.      Left Ear: External ear normal.      Nose: Nose normal.      Mouth/Throat:      Mouth: Mucous membranes are moist.      Pharynx: Oropharynx is clear.   Eyes:      General: No scleral icterus.        Right eye: No discharge.         Left eye: No discharge.      Conjunctiva/sclera: Conjunctivae normal.      Pupils: Pupils are equal, round, and reactive to light.   Cardiovascular:      Rate and Rhythm: Normal rate and regular rhythm.      Pulses: Normal pulses.      Heart sounds: Normal heart sounds. No murmur heard.    No friction rub. No gallop.   Pulmonary:      Effort: No respiratory distress.      Breath sounds: No stridor. No wheezing, rhonchi or rales.   Chest:      Chest wall: No tenderness.   Abdominal:      General: Abdomen is flat. Bowel sounds are normal. There is no distension.      Palpations: Abdomen is soft. There is no mass.      Tenderness: There is no abdominal tenderness. There is no right CVA tenderness, left CVA tenderness, guarding or rebound.   Musculoskeletal:         General: No swelling, tenderness, deformity or signs of injury.      Cervical back: No rigidity.      Right lower leg: No edema.      Left lower leg: No edema.   Lymphadenopathy:      Cervical: No cervical adenopathy.   Skin:     General: Skin is warm and dry.      Coloration: Skin is not jaundiced.      Findings: No bruising or rash.   Neurological:      General: No focal deficit present.      Mental Status: He is alert and oriented to person, place, and time.      Cranial Nerves: No cranial nerve deficit.      Gait: Gait normal.   Psychiatric:         Mood and Affect: Mood normal.         Behavior: Behavior normal.         Thought Content: Thought content normal.         Judgment: Judgment normal.     KATIE Kramer MD performed a physical exam on 2/6/2023 as documented above.     Lab Results - Last 18 Months   Lab Units  02/06/23  1035 01/30/23  0801 01/23/23  0855   WBC 10*3/mm3 3.32* 4.77 4.86   HEMOGLOBIN g/dL 8.2* 8.3* 8.2*   HEMATOCRIT % 28.2* 28.4* 28.6*   PLATELETS 10*3/mm3 167 175 208   MCV fL 76.2* 75.5* 74.5*     Lab Results - Last 18 Months   Lab Units 01/16/23  1029 01/09/23  1306 01/06/23  1006 12/15/22  2341 12/15/22  0621   SODIUM mmol/L 138 139 140   < > 142   POTASSIUM mmol/L 4.3 4.5 4.5   < > 3.9   CHLORIDE mmol/L 103 104 103   < > 107   CO2 mmol/L 26.0 25.5 27.0   < > 25.0   BUN mg/dL 20 24* 20   < > 16   CREATININE mg/dL 0.75* 0.90 0.97   < > 0.96   CALCIUM mg/dL 8.6 9.0 9.0   < > 8.8   BILIRUBIN mg/dL 0.3  --  0.3  --  0.4   ALK PHOS U/L 80  --  83  --  82   ALT (SGPT) U/L 12  --  12  --  17   AST (SGOT) U/L 14  --  16  --  23   GLUCOSE mg/dL 199* 314* 173*   < > 138*    < > = values in this interval not displayed.     Lab Results   Component Value Date    GLUCOSE 199 (H) 01/16/2023    BUN 20 01/16/2023    CREATININE 0.75 (L) 01/16/2023    EGFRIFNONA 76 11/15/2021    EGFRIFAFRI 87 11/15/2021    BCR 26.7 (H) 01/16/2023    K 4.3 01/16/2023    CO2 26.0 01/16/2023    CALCIUM 8.6 01/16/2023    ALBUMIN 3.7 01/16/2023    AST 14 01/16/2023    ALT 12 01/16/2023     Lab Results   Component Value Date    IRON 15 (L) 01/06/2023    TIBC 548 (H) 01/06/2023    FERRITIN 23.51 (L) 01/06/2023     Lab Results   Component Value Date    CEA 1.70 01/06/2023     Assessment & Plan     Assessment:  1. 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  Tolerating the treatment with the expected side effects. No complications. Tod continue the same. Discussed with him the plans.   2. Reviewed the laboratory exams including chemistries and blood counts. Discussed with him the results.   3. He will return to see me in approximately 3 weeks.     Plan:  1. As above.     Don Kramer MD on 2/6/2023 at 13:16

## 2023-02-06 ENCOUNTER — OFFICE VISIT (OUTPATIENT)
Dept: ONCOLOGY | Facility: CLINIC | Age: 78
End: 2023-02-06
Payer: MEDICARE

## 2023-02-06 ENCOUNTER — HOSPITAL ENCOUNTER (OUTPATIENT)
Dept: ONCOLOGY | Facility: HOSPITAL | Age: 78
Discharge: HOME | End: 2023-02-06
Admitting: INTERNAL MEDICINE
Payer: MEDICARE

## 2023-02-06 VITALS
DIASTOLIC BLOOD PRESSURE: 57 MMHG | SYSTOLIC BLOOD PRESSURE: 132 MMHG | HEIGHT: 72 IN | OXYGEN SATURATION: 96 % | BODY MASS INDEX: 26.01 KG/M2 | HEART RATE: 79 BPM | WEIGHT: 192 LBS | RESPIRATION RATE: 18 BRPM | TEMPERATURE: 98.1 F

## 2023-02-06 VITALS
TEMPERATURE: 98.1 F | WEIGHT: 192 LBS | BODY MASS INDEX: 26.01 KG/M2 | DIASTOLIC BLOOD PRESSURE: 57 MMHG | HEIGHT: 72 IN | SYSTOLIC BLOOD PRESSURE: 132 MMHG | OXYGEN SATURATION: 96 % | HEART RATE: 79 BPM

## 2023-02-06 DIAGNOSIS — C18.2 MALIGNANT NEOPLASM OF ASCENDING COLON: Primary | ICD-10-CM

## 2023-02-06 LAB
ALBUMIN SERPL-MCNC: 3.7 G/DL (ref 3.5–5.2)
ALBUMIN/GLOB SERPL: 1.5 G/DL
ALP SERPL-CCNC: 76 U/L (ref 39–117)
ALT SERPL W P-5'-P-CCNC: 9 U/L (ref 1–41)
ANION GAP SERPL CALCULATED.3IONS-SCNC: 8 MMOL/L (ref 5–15)
AST SERPL-CCNC: 16 U/L (ref 1–40)
BASOPHILS # BLD AUTO: 0.01 10*3/MM3 (ref 0–0.2)
BASOPHILS NFR BLD AUTO: 0.3 % (ref 0–1.5)
BILIRUB SERPL-MCNC: 0.4 MG/DL (ref 0–1.2)
BUN SERPL-MCNC: 16 MG/DL (ref 8–23)
BUN/CREAT SERPL: 21.1 (ref 7–25)
CALCIUM SPEC-SCNC: 8.6 MG/DL (ref 8.6–10.5)
CHLORIDE SERPL-SCNC: 108 MMOL/L (ref 98–107)
CO2 SERPL-SCNC: 25 MMOL/L (ref 22–29)
CREAT SERPL-MCNC: 0.76 MG/DL (ref 0.76–1.27)
DEPRECATED RDW RBC AUTO: 55.6 FL (ref 37–54)
EGFRCR SERPLBLD CKD-EPI 2021: 93.7 ML/MIN/1.73
EOSINOPHIL # BLD AUTO: 0.05 10*3/MM3 (ref 0–0.4)
EOSINOPHIL NFR BLD AUTO: 1.5 % (ref 0.3–6.2)
ERYTHROCYTE [DISTWIDTH] IN BLOOD BY AUTOMATED COUNT: 24.4 % (ref 12.3–15.4)
GLOBULIN UR ELPH-MCNC: 2.5 GM/DL
GLUCOSE SERPL-MCNC: 89 MG/DL (ref 65–99)
HCT VFR BLD AUTO: 28.2 % (ref 37.5–51)
HGB BLD-MCNC: 8.2 G/DL (ref 13–17.7)
LYMPHOCYTES # BLD AUTO: 1.39 10*3/MM3 (ref 0.7–3.1)
LYMPHOCYTES NFR BLD AUTO: 41.9 % (ref 19.6–45.3)
MCH RBC QN AUTO: 22.2 PG (ref 26.6–33)
MCHC RBC AUTO-ENTMCNC: 29.1 G/DL (ref 31.5–35.7)
MCV RBC AUTO: 76.2 FL (ref 79–97)
MONOCYTES # BLD AUTO: 0.6 10*3/MM3 (ref 0.1–0.9)
MONOCYTES NFR BLD AUTO: 18.1 % (ref 5–12)
NEUTROPHILS NFR BLD AUTO: 1.27 10*3/MM3 (ref 1.7–7)
NEUTROPHILS NFR BLD AUTO: 38.2 % (ref 42.7–76)
PLATELET # BLD AUTO: 167 10*3/MM3 (ref 140–450)
PMV BLD AUTO: 8.6 FL (ref 6–12)
POTASSIUM SERPL-SCNC: 4 MMOL/L (ref 3.5–5.2)
PROT SERPL-MCNC: 6.2 G/DL (ref 6–8.5)
RBC # BLD AUTO: 3.7 10*6/MM3 (ref 4.14–5.8)
SODIUM SERPL-SCNC: 141 MMOL/L (ref 136–145)
WBC NRBC COR # BLD: 3.32 10*3/MM3 (ref 3.4–10.8)

## 2023-02-06 PROCEDURE — 25010000002 PALONOSETRON 0.25 MG/5ML SOLUTION PREFILLED SYRINGE: Performed by: INTERNAL MEDICINE

## 2023-02-06 PROCEDURE — 99214 OFFICE O/P EST MOD 30 MIN: CPT | Performed by: INTERNAL MEDICINE

## 2023-02-06 PROCEDURE — 25010000002 HEPARIN LOCK FLUSH PER 10 UNITS: Performed by: INTERNAL MEDICINE

## 2023-02-06 PROCEDURE — 85025 COMPLETE CBC W/AUTO DIFF WBC: CPT | Performed by: INTERNAL MEDICINE

## 2023-02-06 PROCEDURE — 25010000002 OXALIPLATIN PER 0.5 MG: Performed by: INTERNAL MEDICINE

## 2023-02-06 PROCEDURE — 80053 COMPREHEN METABOLIC PANEL: CPT | Performed by: INTERNAL MEDICINE

## 2023-02-06 PROCEDURE — 96413 CHEMO IV INFUSION 1 HR: CPT

## 2023-02-06 PROCEDURE — 96367 TX/PROPH/DG ADDL SEQ IV INF: CPT

## 2023-02-06 PROCEDURE — 96415 CHEMO IV INFUSION ADDL HR: CPT

## 2023-02-06 PROCEDURE — 96375 TX/PRO/DX INJ NEW DRUG ADDON: CPT

## 2023-02-06 PROCEDURE — 25010000002 DEXAMETHASONE SODIUM PHOSPHATE 120 MG/30ML SOLUTION: Performed by: INTERNAL MEDICINE

## 2023-02-06 RX ORDER — SODIUM CHLORIDE 0.9 % (FLUSH) 0.9 %
20 SYRINGE (ML) INJECTION AS NEEDED
Status: CANCELLED | OUTPATIENT
Start: 2023-02-06

## 2023-02-06 RX ORDER — DEXTROSE MONOHYDRATE 50 MG/ML
250 INJECTION, SOLUTION INTRAVENOUS ONCE
Status: CANCELLED | OUTPATIENT
Start: 2023-02-06

## 2023-02-06 RX ORDER — DEXTROSE MONOHYDRATE 50 MG/ML
250 INJECTION, SOLUTION INTRAVENOUS ONCE
Status: COMPLETED | OUTPATIENT
Start: 2023-02-06 | End: 2023-02-06

## 2023-02-06 RX ORDER — PALONOSETRON 0.05 MG/ML
0.25 INJECTION, SOLUTION INTRAVENOUS ONCE
Status: COMPLETED | OUTPATIENT
Start: 2023-02-06 | End: 2023-02-06

## 2023-02-06 RX ORDER — FAMOTIDINE 10 MG/ML
20 INJECTION, SOLUTION INTRAVENOUS AS NEEDED
Status: CANCELLED | OUTPATIENT
Start: 2023-02-06

## 2023-02-06 RX ORDER — SODIUM CHLORIDE 0.9 % (FLUSH) 0.9 %
20 SYRINGE (ML) INJECTION AS NEEDED
Status: DISCONTINUED | OUTPATIENT
Start: 2023-02-06 | End: 2023-02-07 | Stop reason: HOSPADM

## 2023-02-06 RX ORDER — PALONOSETRON 0.05 MG/ML
0.25 INJECTION, SOLUTION INTRAVENOUS ONCE
Status: CANCELLED | OUTPATIENT
Start: 2023-02-06

## 2023-02-06 RX ORDER — HEPARIN SODIUM (PORCINE) LOCK FLUSH IV SOLN 100 UNIT/ML 100 UNIT/ML
500 SOLUTION INTRAVENOUS AS NEEDED
Status: DISCONTINUED | OUTPATIENT
Start: 2023-02-06 | End: 2023-02-07 | Stop reason: HOSPADM

## 2023-02-06 RX ORDER — DIPHENHYDRAMINE HYDROCHLORIDE 50 MG/ML
50 INJECTION INTRAMUSCULAR; INTRAVENOUS AS NEEDED
Status: CANCELLED | OUTPATIENT
Start: 2023-02-06

## 2023-02-06 RX ORDER — HEPARIN SODIUM (PORCINE) LOCK FLUSH IV SOLN 100 UNIT/ML 100 UNIT/ML
500 SOLUTION INTRAVENOUS AS NEEDED
Status: CANCELLED | OUTPATIENT
Start: 2023-02-06

## 2023-02-06 RX ADMIN — DEXAMETHASONE SODIUM PHOSPHATE 12 MG: 4 INJECTION, SOLUTION INTRA-ARTICULAR; INTRALESIONAL; INTRAMUSCULAR; INTRAVENOUS; SOFT TISSUE at 11:22

## 2023-02-06 RX ADMIN — Medication 500 UNITS: at 13:57

## 2023-02-06 RX ADMIN — OXALIPLATIN 265 MG: 5 INJECTION, SOLUTION INTRAVENOUS at 11:46

## 2023-02-06 RX ADMIN — Medication 20 ML: at 13:57

## 2023-02-06 RX ADMIN — PALONOSETRON 0.25 MG: 0.25 INJECTION, SOLUTION INTRAVENOUS at 11:18

## 2023-02-06 RX ADMIN — DEXTROSE MONOHYDRATE 250 ML: 50 INJECTION, SOLUTION INTRAVENOUS at 11:18

## 2023-02-06 NOTE — PROGRESS NOTES
Pt here for Dr. Kramer f/u appt and C2 chemo.  Pt also on Xeloda and Meenakshi, pharmacist notified that pt here.  Port accessed and labs drawn.  CBC resulted and ANC 1.27.  OK to treat received from Dr. Kramer. Meenakshi, Pharmacist at chairside for xeloda assessment.  Dr. Kramer at chairside for f/u appt.  Tx given per MAR.  Pt d/c home with AVS given.

## 2023-02-07 ENCOUNTER — SPECIALTY PHARMACY (OUTPATIENT)
Dept: PHARMACY | Facility: HOSPITAL | Age: 78
End: 2023-02-07
Payer: OTHER GOVERNMENT

## 2023-02-07 NOTE — PROGRESS NOTES
Specialty Pharmacy Note: Capecitabine        2/6/2023  Day 1   WBC 3.40 - 10.80 10*3/mm3 3.32 (A)   Neutrophils Absolute 1.70 - 7.00 10*3/mm3 1.27 (A)   Hemoglobin 13.0 - 17.7 g/dL 8.2 (A)   Hematocrit 37.5 - 51.0 % 28.2 (A)   Platelets 140 - 450 10*3/mm3 167   Creatinine 0.76 - 1.27 mg/dL 0.76   BUN 8 - 23 mg/dL 16   Sodium 136 - 145 mmol/L 141   Potassium 3.5 - 5.2 mmol/L 4.0   Glucose 65 - 99 mg/dL 89   Calcium 8.6 - 10.5 mg/dL 8.6   Albumin 3.5 - 5.2 g/dL 3.7   Total Protein 6.0 - 8.5 g/dL 6.2   AST (SGOT) 1 - 40 U/L 16   ALT (SGPT) 1 - 41 U/L 9   Alkaline Phosphatase 39 - 117 U/L 76   Total Bilirubin 0.0 - 1.2 mg/dL 0.4       Per provider note, pt to continue treatment. Pharmacy will continue to follow.      Thank you,  Mary Cervantes, Pharm.D.

## 2023-02-13 ENCOUNTER — LAB (OUTPATIENT)
Dept: LAB | Facility: HOSPITAL | Age: 78
End: 2023-02-13
Payer: MEDICARE

## 2023-02-13 DIAGNOSIS — C18.2 MALIGNANT NEOPLASM OF ASCENDING COLON: ICD-10-CM

## 2023-02-13 LAB
BASOPHILS # BLD AUTO: 0.01 10*3/MM3 (ref 0–0.2)
BASOPHILS NFR BLD AUTO: 0.3 % (ref 0–1.5)
DEPRECATED RDW RBC AUTO: 54 FL (ref 37–54)
EOSINOPHIL # BLD AUTO: 0.06 10*3/MM3 (ref 0–0.4)
EOSINOPHIL NFR BLD AUTO: 1.8 % (ref 0.3–6.2)
ERYTHROCYTE [DISTWIDTH] IN BLOOD BY AUTOMATED COUNT: 23.4 % (ref 12.3–15.4)
HCT VFR BLD AUTO: 29.2 % (ref 37.5–51)
HGB BLD-MCNC: 8.7 G/DL (ref 13–17.7)
LYMPHOCYTES # BLD AUTO: 1.48 10*3/MM3 (ref 0.7–3.1)
LYMPHOCYTES NFR BLD AUTO: 43.7 % (ref 19.6–45.3)
MCH RBC QN AUTO: 22.9 PG (ref 26.6–33)
MCHC RBC AUTO-ENTMCNC: 29.8 G/DL (ref 31.5–35.7)
MCV RBC AUTO: 76.8 FL (ref 79–97)
MONOCYTES # BLD AUTO: 0.46 10*3/MM3 (ref 0.1–0.9)
MONOCYTES NFR BLD AUTO: 13.6 % (ref 5–12)
NEUTROPHILS NFR BLD AUTO: 1.38 10*3/MM3 (ref 1.7–7)
NEUTROPHILS NFR BLD AUTO: 40.6 % (ref 42.7–76)
PLATELET # BLD AUTO: 86 10*3/MM3 (ref 140–450)
PMV BLD AUTO: 8.9 FL (ref 6–12)
RBC # BLD AUTO: 3.8 10*6/MM3 (ref 4.14–5.8)
WBC NRBC COR # BLD: 3.39 10*3/MM3 (ref 3.4–10.8)

## 2023-02-13 PROCEDURE — 85025 COMPLETE CBC W/AUTO DIFF WBC: CPT

## 2023-02-13 PROCEDURE — 36415 COLL VENOUS BLD VENIPUNCTURE: CPT

## 2023-02-15 ENCOUNTER — SPECIALTY PHARMACY (OUTPATIENT)
Dept: PHARMACY | Facility: HOSPITAL | Age: 78
End: 2023-02-15
Payer: OTHER GOVERNMENT

## 2023-02-15 ENCOUNTER — TELEPHONE (OUTPATIENT)
Dept: ONCOLOGY | Facility: HOSPITAL | Age: 78
End: 2023-02-15
Payer: OTHER GOVERNMENT

## 2023-02-15 DIAGNOSIS — C18.2 MALIGNANT NEOPLASM OF ASCENDING COLON: Primary | ICD-10-CM

## 2023-02-15 NOTE — TELEPHONE ENCOUNTER
Pt called asking it there is anything he can take for fatigue.  Denies any SOA, nausea, vomiting, diarrhea. Only has fatigue and wants to be able to work. Wants to speak to the physician.

## 2023-02-15 NOTE — PROGRESS NOTES
Specialty Pharmacy Note: Xeloda    Lab review:        2/13/2023   WBC 3.40 - 10.80 10*3/mm3 3.39 (A)   Neutrophils Absolute 1.70 - 7.00 10*3/mm3 1.38 (A)   Hemoglobin 13.0 - 17.7 g/dL 8.7 (A)   Hematocrit 37.5 - 51.0 % 29.2 (A)   Platelets 140 - 450 10*3/mm3 86 (A)   Reviewed labs with ANEUDY Alexander and no chanages to treatment at this time. Labs check on 2/20/23.  He started cycle 2 on 2/6/23.  Pharmacy will follow up after next labs.      Thanks,    Meenakshi MARTEL, PharmD

## 2023-02-16 ENCOUNTER — SPECIALTY PHARMACY (OUTPATIENT)
Dept: PHARMACY | Facility: HOSPITAL | Age: 78
End: 2023-02-16
Payer: OTHER GOVERNMENT

## 2023-02-16 ENCOUNTER — DOCUMENTATION (OUTPATIENT)
Dept: ONCOLOGY | Facility: CLINIC | Age: 78
End: 2023-02-16
Payer: OTHER GOVERNMENT

## 2023-02-16 RX ORDER — DOXYCYCLINE HYCLATE 50 MG/1
324 CAPSULE, GELATIN COATED ORAL
Qty: 30 TABLET | Refills: 3 | Status: SHIPPED | OUTPATIENT
Start: 2023-02-16

## 2023-02-16 NOTE — PROGRESS NOTES
Iron Deficiency    Patient called in and complained of extreme fatigue and wants to know if there is anything he can take for this.     In review of labs, the patient was found to be low iron levels with elevated TIBC in Jan 2023. However, he has not taken any iron.      Start on Ferrous gluconate 324 mg po daily, called to patient pharmacy.    Will check Vitamin B12 and folate levels at appt on Monday.      Electronically signed by ANEUDY Robin, 02/16/23, 11:58 AM EST.

## 2023-02-16 NOTE — TELEPHONE ENCOUNTER
Per ANEUDY White, I contacted Mr. Guerrero regarding his concerns with fatigue. I informed Mr. Guerrero that the fatigue can be caused by his cancer and the current chemotherapy that he is receiving. I advised him to stay hydrated and explained that dehydration can also cause him to experience fatigue. I informed him that ANEUDY White stated that he can take Vitamin B12 or a vitamin B complex to help with the fatigue but we do not want him starting the vitamin B supplements until he has his vitamin B12 and folate level checked. Patient stated that he will be in the office on Monday 2/20/23 for labs; I confirmed and stated that we will check the lab levels at that time. He confirmed. I also informed him that ANEUDY White noticed that his iron levels were low and asked if he is currently or if he has previously taken oral iron. He denied taking oral iron currently nor in the past. I confirmed and stated that Cindy would like for him to start taking oral iron and this should help with his fatigue as well. I informed the patient that he can start taking the oral iron supplement when he picks it up from the pharmacy. I stated that he does not have to wait until after he has labs drawn on Monday. Patient voiced understanding. I advised him to contact our office if he needs anything or if he has any additional questions. He confirmed.

## 2023-02-16 NOTE — PROGRESS NOTES
Specialty Pharmacy Refill Coordination Note     Vlad is a 77 y.o. male contacted today regarding refills of capecitabine specialty medication(s).    Reviewed and verified with patient:       Specialty medication(s) and dose(s) confirmed: yes    Refill Questions    Flowsheet Row Most Recent Value   Changes to allergies? No   Changes to medications? No   New conditions since last clinic visit No   Unplanned office visit, urgent care, ED, or hospital admission in the last 4 weeks  No   How does patient/caregiver feel medication is working? Good   Financial problems or insurance changes  No   Since the previous refill, were any specialty medication doses or scheduled injections missed or delayed?  No   Does this patient require a clinical escalation to a pharmacist? No          Delivery Questions    Flowsheet Row Most Recent Value   Delivery method Other (Comment)  [Beeline to [facility] on Friday 2/17/23.]   Delivery address correct? Yes   Delivery phone number [phone]   Number of medications in delivery 2   Medication being filled and delivered capecitabine 500mg and 150mg   Doses left of specialty medications around 4   Is there any medication that is due not being filled? No   Supplies needed? No supplies needed   Cooler needed? No   Do any medications need mixed or dated? No   Copay form of payment Payment plan already set up   Additional comments NA   Questions or concerns for the pharmacist? No   Explain any questions or concerns for the pharmacist NA   Are any medications first time fills? No          Beeline to [facility] on Friday 2/17/23. Will  medication on Monday 2/20.       Follow-up: 3 week(s)     Robert Porras, Pharmacy Technician  Specialty Pharmacy Technician

## 2023-02-17 ENCOUNTER — SPECIALTY PHARMACY (OUTPATIENT)
Dept: PHARMACY | Facility: HOSPITAL | Age: 78
End: 2023-02-17
Payer: OTHER GOVERNMENT

## 2023-02-17 NOTE — PROGRESS NOTES
Specialty Pharmacy Note: Carol Chu called [facility] office requesting to know if iron was sent in and when he could start vitamin B.  Per documented telephone call to patient yesterday, iron was sent in and he can start now and he should start vitamin B after labs on Monday.  Reviewed this information with Vlad via telephone.  He reports that he is fatigued and that is his only side effect.  He has taken off work for the week because working a 3 hour stretch is very draining.  He would like to get back to work if possible.  I let him know that fatigue is a side effect of his Xeloda and he may notice an improvement during his week off medication.  He plans to come Monday 2/20/23 for labs and also wants to bring in some vitamins he is considering taking to help with fatigue.  Advised to not take supplements until they are reviewed and found to be safe with his treatment.    Thanks,    Meenakshi MARTEL, PharmD

## 2023-02-20 ENCOUNTER — LAB (OUTPATIENT)
Dept: LAB | Facility: HOSPITAL | Age: 78
End: 2023-02-20
Payer: MEDICARE

## 2023-02-20 ENCOUNTER — SPECIALTY PHARMACY (OUTPATIENT)
Dept: PHARMACY | Facility: HOSPITAL | Age: 78
End: 2023-02-20
Payer: OTHER GOVERNMENT

## 2023-02-20 DIAGNOSIS — C18.2 MALIGNANT NEOPLASM OF ASCENDING COLON: ICD-10-CM

## 2023-02-20 LAB
BASOPHILS # BLD AUTO: 0.01 10*3/MM3 (ref 0–0.2)
BASOPHILS NFR BLD AUTO: 0.3 % (ref 0–1.5)
DEPRECATED RDW RBC AUTO: 51.6 FL (ref 37–54)
EOSINOPHIL # BLD AUTO: 0.06 10*3/MM3 (ref 0–0.4)
EOSINOPHIL NFR BLD AUTO: 1.9 % (ref 0.3–6.2)
ERYTHROCYTE [DISTWIDTH] IN BLOOD BY AUTOMATED COUNT: 23.4 % (ref 12.3–15.4)
FOLATE SERPL-MCNC: 14.1 NG/ML (ref 4.78–24.2)
HCT VFR BLD AUTO: 27.3 % (ref 37.5–51)
HGB BLD-MCNC: 8.1 G/DL (ref 13–17.7)
LYMPHOCYTES # BLD AUTO: 1.37 10*3/MM3 (ref 0.7–3.1)
LYMPHOCYTES NFR BLD AUTO: 44.5 % (ref 19.6–45.3)
MCH RBC QN AUTO: 22.9 PG (ref 26.6–33)
MCHC RBC AUTO-ENTMCNC: 29.7 G/DL (ref 31.5–35.7)
MCV RBC AUTO: 77.1 FL (ref 79–97)
MONOCYTES # BLD AUTO: 0.34 10*3/MM3 (ref 0.1–0.9)
MONOCYTES NFR BLD AUTO: 11 % (ref 5–12)
NEUTROPHILS NFR BLD AUTO: 1.3 10*3/MM3 (ref 1.7–7)
NEUTROPHILS NFR BLD AUTO: 42.3 % (ref 42.7–76)
PLATELET # BLD AUTO: 66 10*3/MM3 (ref 140–450)
PMV BLD AUTO: 8.7 FL (ref 6–12)
RBC # BLD AUTO: 3.54 10*6/MM3 (ref 4.14–5.8)
VIT B12 BLD-MCNC: 178 PG/ML (ref 211–946)
WBC NRBC COR # BLD: 3.08 10*3/MM3 (ref 3.4–10.8)

## 2023-02-20 PROCEDURE — 82607 VITAMIN B-12: CPT | Performed by: NURSE PRACTITIONER

## 2023-02-20 PROCEDURE — 82746 ASSAY OF FOLIC ACID SERUM: CPT | Performed by: NURSE PRACTITIONER

## 2023-02-20 PROCEDURE — 85025 COMPLETE CBC W/AUTO DIFF WBC: CPT

## 2023-02-20 PROCEDURE — 36415 COLL VENOUS BLD VENIPUNCTURE: CPT

## 2023-02-20 NOTE — PROGRESS NOTES
MTM Encounter-Re: Adherence and side effects (Capecitabine)    Today's encounter was conducted in person, face-to-face.     Medication:  Capecitabine 1750 mg by mouth two times per day for 14 days, then 7 days off  - Reason for outreach: Routine medication check-in .  - Administration: Patient is taking every day at the same time , with food , twice daily and as prescribed .  - Missed doses: Patient reports missing 0 doses in the last 30 days. (pt did note he forgot to take one, 150 mg tablet with one dose and asked if he could add to another day. Advised pt to not take any additional tablets and reviewed his current dose and directions. Pt verbalized understanding.  - Self-administration: Patient demonstrates ability to self-administer medication. No barriers to adherence identified.   0 - Diagnosis/IndicationMalignant neoplasm of ascending colon: . Progress toward achieving therapeutic goals reviewed.   - Patient denies side effects, aside from fatigue, which is manageable and not bothersome to patient. Advised patient to alert office if this changes. Pt started on ferrous gluconate and oral vitamin b12 per APRN. Reviewed fatigue management with pt.    - Medication availability/affordability: Patient has had no issues obtaining medication from pharmacy.   - Questions/concerns about medications: Pt noticed fatigue this past week. He continues to work and perform ADLs. Discussed resting when needed and how he will likely need to rest more often than before he started medication, but encouraged pt to continue doing his daily activities as this helps combat fatigue. He verbalized understanding. Pt brought in several vitamins he had bought months ago and wanted to know if he should start taking. Reviewed with pt that he is advised to take iron and vitamin b12 supplement, but additional vitamins (multivitamin and glucosamine/chondroitin) was not necessary given he reports nausea associated with vitamins he has taken in  the past.        Completed medication reconciliation today to assess for drug interactions.   Reviewed allergies, medical history, labs, quality of life, and medication history with patient.   Patient has had the following changes to medication list: vitamin b12 and ferrous gluconate. ; patient's chart has been updated to reflect changes. Assessed medication list for interactions, no significant drug interactions noted.   Advised pt to call the clinic if any new medications are started so we can assess for drug-drug interactions.     All questions addressed. Patient had no additional concerns for MTM office.           2/20/2023   WBC 3.40 - 10.80 10*3/mm3 3.08 (A)   Neutrophils Absolute 1.70 - 7.00 10*3/mm3 1.30 (A)   Hemoglobin 13.0 - 17.7 g/dL 8.1 (A)   Hematocrit 37.5 - 51.0 % 27.3 (A)   Platelets 140 - 450 10*3/mm3 66 (A)     Pt platelets continue to decrease. Last week, 86k, this week 66k. Reviewed with Dr. Kramer and pt to hold capecitabine for now until platelet count recovers. Pt currently on week off of medication and starts new cycle on 2/27. Advised pt to not start cycle 3 on 2/27 until after he comes to appt on 2/27 to see if his platelet count has recovered. Pt verbalized understanding.       Mary Cervantes RPH  2/20/2023  13:20 EST

## 2023-02-21 ENCOUNTER — DOCUMENTATION (OUTPATIENT)
Dept: ONCOLOGY | Facility: CLINIC | Age: 78
End: 2023-02-21
Payer: OTHER GOVERNMENT

## 2023-02-21 NOTE — PROGRESS NOTES
Vitamin B12 Deficiency    Patient called in complaining about extreme fatigue.  Labs were checked and he was found to have iron deficiency.  Oral iron started    Also, a vitamin B12 and folate were checked for deficiency.  The patient is found to have a vitamin B12 deficiency with level of 178.        Electronically signed by ANEUDY Robin, 02/21/23, 8:25 AM EST.

## 2023-02-24 NOTE — PROGRESS NOTES
HEMATOLOGY ONCOLOGY OUTPATIENT CONSULTATION       Patient name: Vlad Guerrero  : 1945  MRN: [id number]  Primary Care Physician: Batool Lin APRN  Referring Physician: No ref. provider found  Reason For Consult: Stage III colon cancer    No chief complaint on file.    HPI:   History of Present Illness:  Vlad Guerrero is 77 y.o. male who presented to our office on 23 for consultation regarding    2023: Mr. Guerrero dated the beginning of his present illness to sometime at the end of  when he started to feel fatigued.  He was seen at the [facility] and had laboratory exams that revealed microcytic anemia.  He had evidence of iron deficiency and received intravenous iron in the hospital.  He had upper and lower gastrointestinal endoscopies that demonstrated a cecal tumor that measured 4 to 5 cm and was fungating in appearance.  It was circumferential and was next to the ileocecal valve.  The upper gastrointestinal endoscopy revealed no abnormalities.  On this basis he was on December 15, 2022 he was taken to the hospital and underwent a right hemicolectomy without complications.  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  It corresponded to a grade 2 malignancy that invaded through the muscularis propria into the pericolonic tissues.  Macroscopic tumor perforation or lymphovascular space invasion were not present.  Perineural invasion was not present either.  All margins of excision were negative.  All of 36 lymph nodes submitted to were positive for involvement with malignancy.  The disease was Elzbieta staged as [id number].  Loss of expression of mismatch repair enzymes was not documented.  Mr. Guerrero was discharged to continue treatment as outpatient.  At the time of this visit he was recovering well from the surgery.  His incision had healed completely and he had no drains or  suture materials.  He had returned home and was eating well.  He had yet to return to work.  He had been afebrile and free of nausea.  For the most part his weight had been stable.  After reviewing the records a long conversation, of approximately 1 hour, was had with the patient in regards to options of treatment.  The nature of his disease as well as the likelihood of recurrence were described.  The use of adjuvant chemotherapy to increase the rate of cure after surgery was explained.  Side effects were described in detail.  The need for a port was expressed as well.  A treatment plan was placed.    2/6/2023: Received the first cycle of adjuvant chemotherapy without any side effects. On the day of this visit feeling well and without any new symptoms, except a very mild rash, especially on the forearms, but no oral pain. Had stools of diminished consistency for a few days but now resolved. The exam was rather unremarkable, except for a few very light erythematous macules on the forearms.     2/27/2023: Feeling well and without new symptoms.  As active as before.  Eating well and without unintended weight loss.  Stronger and more energetic since the institution of vitamin B12 and iron.  No chest pains and cough.  No abdominal pain or diarrhea.  On exam no changes.  A decision was made to continue with the same treatment.  Laboratory exams were reviewed.  He was to see me again in approximately 4 weeks from this date with new scans.    Subjective:  • 2/27/2023: Feeling well.  Active.  No fevers.  No nausea or vomiting.  Remained without weight loss.  No chest pains or cough.  No abdominal pain or diarrhea and no dysuria.  No peripheral edema.  No skin rash.    The following portions of the patient's history were reviewed and updated as appropriate: allergies, current medications, past family history, past medical history, past social history, past surgical history and problem list.    Past Medical History:    Diagnosis Date   • Anemia    • Colon cancer 2022    colon   • Diabetes mellitus (HCC)    • Hyperlipidemia    • Hypertension      Past Surgical History:   Procedure Laterality Date   • APPENDECTOMY     • CARDIAC CATHETERIZATION     • COLON RESECTION N/A 12/15/2022    Procedure: COLON RESECTION RIGHT;  Surgeon: Percy Davis MD;  Location: [facility] MAIN OR;  Service: General;  Laterality: N/A;   • COLONOSCOPY N/A 11/11/2022    Procedure: COLONOSCOPY WITH BIOPSY AND POLYPECTOMY;  Surgeon: Billy Julien MD;  Location: [facility] ENDOSCOPY;  Service: Gastroenterology;  Laterality: N/A;  Impression:  1.  Large 4-5cm fulgurating circumferential ulcerated mass in the very proximal part of the ascending colon next to ileocecal valve multiple biopsies were performed.  This is highly concerning for colon malignancy.  2.  2 polyp rem   • ENDOSCOPY N/A 11/11/2022    Procedure: ESOPHAGOGASTRODUODENOSCOPY with biopsy X1;  Surgeon: Billy Julien MD;  Location: [facility] ENDOSCOPY;  Service: Gastroenterology;  Laterality: N/A;  5.  Upper endoscopy lamination unremarkable.      • PORTACATH PLACEMENT Right 1/12/2023    Procedure: INSERTION OF PORTACATH;  Surgeon: Percy Davis MD;  Location: [facility] MAIN OR;  Service: General;  Laterality: Right;   • TONSILLECTOMY         Current Outpatient Medications:   •  capecitabine (XELODA) 150 MG chemo tablet, Take 5 tablets by mouth (with 1 other capecitabine Rx) for 1,750 mg total 2 (Two) Times a Day on Days 1-14, then off for 7 days. Total dose is 1750mg in the AM & 1750mg in the PM., Disp: 140 tablet, Rfl: 4  •  capecitabine (XELODA) 500 MG chemo tablet, Take 2 tablets by mouth (with 1 other capecitabine prescription) for 1,750 mg total 2 (Two) Times a Day on Days 1-14, then off for 7 days.  Total dose is 1750mg in the AM & 1750mg in the PM., Disp: 56 tablet, Rfl: 4  •  empagliflozin (JARDIANCE) 25 MG tablet tablet, Take 25 mg by mouth Daily. Dont take preop, Disp: , Rfl:    •  ferrous gluconate (FERGON) 324 MG tablet, Take 1 tablet by mouth Daily With Breakfast., Disp: 30 tablet, Rfl: 3  •  glimepiride (AMARYL) 4 MG tablet, Take 1 tablet by mouth 2 (Two) Times a Day. None preop, Disp: , Rfl:   •  losartan (COZAAR) 100 MG tablet, Take 100 mg by mouth Daily., Disp: , Rfl:   •  metFORMIN (GLUCOPHAGE) 1000 MG tablet, Take 2,000 mg by mouth Daily With Dinner. Last dose 12/12, Disp: , Rfl:   •  ondansetron (ZOFRAN) 8 MG tablet, Take 1 tablet by mouth 3 (Three) Times a Day As Needed for Nausea or Vomiting., Disp: 30 tablet, Rfl: 5  •  pioglitazone (ACTOS) 45 MG tablet, Take 45 mg by mouth Daily. None preop, Disp: , Rfl:   •  pravastatin (PRAVACHOL) 40 MG tablet, Take 1 tablet by mouth Every Night., Disp: , Rfl:   •  Semaglutide,0.25 or 0.5MG/DOS, (Ozempic, 0.25 or 0.5 MG/DOSE,) 2 MG/1.5ML solution pen-injector, Inject 0.25 mg under the skin into the appropriate area as directed As Needed. friday, Disp: , Rfl:   No current facility-administered medications for this visit.    Facility-Administered Medications Ordered in Other Visits:   •  heparin injection 500 Units, 500 Units, Intravenous, PRN, Don Kramer MD  •  OXALIplatin (ELOXATIN) 265 mg in dextrose (D5W) 5 % 303 mL chemo IVPB, 130 mg/m2 (Treatment Plan Recorded), Intravenous, Once, Don Kramer MD, Last Rate: 160 mL/hr at 02/27/23 1013, 265 mg at 02/27/23 1013  •  sodium chloride 0.9 % flush 20 mL, 20 mL, Intravenous, PRN, Don Kramer MD    Allergies   Allergen Reactions   • Penicillins Rash     Family History   Problem Relation Age of Onset   • Pneumonia Mother 94        SARS-CoV2   • Colon cancer Father 62   • Heart disease Sister      Cancer-related family history includes Colon cancer (age of onset: 62) in his father.    Social History     Tobacco Use   • Smoking status: Former     Packs/day: 1.00     Years: 2.00     Pack years: 2.00     Types: Cigarettes     Start date: 1964     Quit date: 1966     Years since  quittin.1   • Smokeless tobacco: Never   Vaping Use   • Vaping Use: Never used   Substance Use Topics   • Alcohol use: Not Currently   • Drug use: Never     Social History     Social History Narrative   • Not on file      ROS:     Review of Systems   Constitutional: Negative for activity change, appetite change, chills, diaphoresis, fatigue, fever and unexpected weight change.   HENT: Negative for congestion, dental problem, drooling, ear discharge, ear pain, facial swelling, hearing loss, mouth sores, nosebleeds, postnasal drip, rhinorrhea, sinus pressure, sinus pain, sneezing, sore throat, tinnitus, trouble swallowing and voice change.    Eyes: Negative for photophobia, pain, discharge, redness, itching and visual disturbance.   Respiratory: Negative for apnea, cough, choking, chest tightness, shortness of breath, wheezing and stridor.    Cardiovascular: Negative for chest pain, palpitations and leg swelling.   Gastrointestinal: Negative for abdominal distention, abdominal pain, anal bleeding, blood in stool, constipation, diarrhea, nausea, rectal pain and vomiting.   Endocrine: Negative for cold intolerance, heat intolerance, polydipsia and polyuria.   Genitourinary: Negative for decreased urine volume, difficulty urinating, dysuria, flank pain, frequency, genital sores, hematuria and urgency.   Musculoskeletal: Negative for arthralgias, back pain, gait problem, joint swelling, myalgias, neck pain and neck stiffness.   Skin: Negative for color change, pallor and rash.   Neurological: Negative for dizziness, tremors, seizures, syncope, facial asymmetry, speech difficulty, weakness, light-headedness, numbness and headaches.   Hematological: Negative for adenopathy. Does not bruise/bleed easily.   Psychiatric/Behavioral: Negative for agitation, behavioral problems, confusion, decreased concentration, hallucinations, self-injury, sleep disturbance and suicidal ideas. The patient is not nervous/anxious.   "    Objective:    Vitals:    02/27/23 0909   BP: 120/66   Pulse: 85   Temp: 97.7 °F (36.5 °C)   TempSrc: Oral   SpO2: 97%   Weight: 85.3 kg (188 lb)   Height: 182.9 cm (72\")   PainSc: 0-No pain     Body mass index is 25.5 kg/m².  ECOG  (0) Fully active, able to carry on all predisease performance without restriction    Physical Exam:     Physical Exam  Constitutional:       General: He is not in acute distress.     Appearance: Normal appearance. He is not ill-appearing, toxic-appearing or diaphoretic.   HENT:      Head: Normocephalic and atraumatic.      Right Ear: External ear normal.      Left Ear: External ear normal.      Nose: Nose normal.      Mouth/Throat:      Mouth: Mucous membranes are moist.      Pharynx: Oropharynx is clear.   Eyes:      General: No scleral icterus.        Right eye: No discharge.         Left eye: No discharge.      Conjunctiva/sclera: Conjunctivae normal.      Pupils: Pupils are equal, round, and reactive to light.   Cardiovascular:      Rate and Rhythm: Normal rate and regular rhythm.      Pulses: Normal pulses.      Heart sounds: Normal heart sounds. No murmur heard.    No friction rub. No gallop.   Pulmonary:      Effort: No respiratory distress.      Breath sounds: No stridor. No wheezing, rhonchi or rales.   Chest:      Chest wall: No tenderness.   Abdominal:      General: Abdomen is flat. Bowel sounds are normal. There is no distension.      Palpations: Abdomen is soft. There is no mass.      Tenderness: There is no abdominal tenderness. There is no right CVA tenderness, left CVA tenderness, guarding or rebound.   Musculoskeletal:         General: No swelling, tenderness, deformity or signs of injury.      Cervical back: No rigidity.      Right lower leg: No edema.      Left lower leg: No edema.   Lymphadenopathy:      Cervical: No cervical adenopathy.   Skin:     General: Skin is warm and dry.      Coloration: Skin is not jaundiced.      Findings: No bruising or rash. "   Neurological:      General: No focal deficit present.      Mental Status: He is alert and oriented to person, place, and time.      Cranial Nerves: No cranial nerve deficit.      Gait: Gait normal.   Psychiatric:         Mood and Affect: Mood normal.         Behavior: Behavior normal.         Thought Content: Thought content normal.         Judgment: Judgment normal.     KATIE Kramer MD performed a physical exam on 2/27/2023 as documented above.    Lab Results - Last 18 Months   Lab Units 02/27/23  0923 02/20/23  0754 02/13/23  0755   WBC 10*3/mm3 2.73* 3.08* 3.39*   HEMOGLOBIN g/dL 7.9* 8.1* 8.7*   HEMATOCRIT % 26.7* 27.3* 29.2*   PLATELETS 10*3/mm3 122* 66* 86*   MCV fL 79.2 77.1* 76.8*     Lab Results - Last 18 Months   Lab Units 02/27/23  0923 02/06/23  1035 01/16/23  1029   SODIUM mmol/L 140 141 138   POTASSIUM mmol/L 4.1 4.0 4.3   CHLORIDE mmol/L 106 108* 103   CO2 mmol/L 25.0 25.0 26.0   BUN mg/dL 24* 16 20   CREATININE mg/dL 0.98 0.76 0.75*   CALCIUM mg/dL 8.6 8.6 8.6   BILIRUBIN mg/dL 0.4 0.4 0.3   ALK PHOS U/L 77 76 80   ALT (SGPT) U/L 11 9 12   AST (SGOT) U/L 13 16 14   GLUCOSE mg/dL 247* 89 199*     Lab Results   Component Value Date    GLUCOSE 247 (H) 02/27/2023    BUN 24 (H) 02/27/2023    CREATININE 0.98 02/27/2023    EGFRIFNONA 76 11/15/2021    EGFRIFAFRI 87 11/15/2021    BCR 24.5 02/27/2023    K 4.1 02/27/2023    CO2 25.0 02/27/2023    CALCIUM 8.6 02/27/2023    ALBUMIN 3.7 02/27/2023    AST 13 02/27/2023    ALT 11 02/27/2023     Lab Results   Component Value Date    IRON 15 (L) 01/06/2023    TIBC 548 (H) 01/06/2023    FERRITIN 23.51 (L) 01/06/2023     Lab Results   Component Value Date    CEA 1.70 01/06/2023     Assessment & Plan     Assessment:  1. 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  Tolerating adjuvant treatment with CapeOx without any difficulties.  To complete 3 months of treatment.  Discussed with him.  2. Reviewed the results of the  blood counts and chemistry.  Reviewed the results of the previous scans.  Discussed with him.  3. He will return to see me approximately 4 weeks from now with new scans and laboratory exams.    Plan:  1. As above.    Don Kramer MD on 2/27/2023 at 11:05 AM

## 2023-02-27 ENCOUNTER — HOSPITAL ENCOUNTER (OUTPATIENT)
Dept: ONCOLOGY | Facility: HOSPITAL | Age: 78
Discharge: HOME | End: 2023-02-27
Admitting: INTERNAL MEDICINE
Payer: MEDICARE

## 2023-02-27 ENCOUNTER — SPECIALTY PHARMACY (OUTPATIENT)
Dept: PHARMACY | Facility: HOSPITAL | Age: 78
End: 2023-02-27
Payer: OTHER GOVERNMENT

## 2023-02-27 ENCOUNTER — OFFICE VISIT (OUTPATIENT)
Dept: ONCOLOGY | Facility: CLINIC | Age: 78
End: 2023-02-27
Payer: MEDICARE

## 2023-02-27 VITALS
OXYGEN SATURATION: 97 % | HEIGHT: 72 IN | RESPIRATION RATE: 20 BRPM | BODY MASS INDEX: 25.47 KG/M2 | TEMPERATURE: 97.7 F | HEART RATE: 85 BPM | WEIGHT: 188 LBS | DIASTOLIC BLOOD PRESSURE: 66 MMHG | SYSTOLIC BLOOD PRESSURE: 120 MMHG

## 2023-02-27 VITALS
SYSTOLIC BLOOD PRESSURE: 120 MMHG | HEIGHT: 72 IN | OXYGEN SATURATION: 97 % | TEMPERATURE: 97.7 F | WEIGHT: 188 LBS | BODY MASS INDEX: 25.47 KG/M2 | DIASTOLIC BLOOD PRESSURE: 66 MMHG | HEART RATE: 85 BPM

## 2023-02-27 DIAGNOSIS — C18.2 MALIGNANT NEOPLASM OF ASCENDING COLON: Primary | ICD-10-CM

## 2023-02-27 LAB
ALBUMIN SERPL-MCNC: 3.7 G/DL (ref 3.5–5.2)
ALBUMIN/GLOB SERPL: 1.5 G/DL
ALP SERPL-CCNC: 77 U/L (ref 39–117)
ALT SERPL W P-5'-P-CCNC: 11 U/L (ref 1–41)
ANION GAP SERPL CALCULATED.3IONS-SCNC: 9 MMOL/L (ref 5–15)
AST SERPL-CCNC: 13 U/L (ref 1–40)
BASOPHILS # BLD AUTO: 0.01 10*3/MM3 (ref 0–0.2)
BASOPHILS NFR BLD AUTO: 0.4 % (ref 0–1.5)
BILIRUB SERPL-MCNC: 0.4 MG/DL (ref 0–1.2)
BUN SERPL-MCNC: 24 MG/DL (ref 8–23)
BUN/CREAT SERPL: 24.5 (ref 7–25)
CALCIUM SPEC-SCNC: 8.6 MG/DL (ref 8.6–10.5)
CHLORIDE SERPL-SCNC: 106 MMOL/L (ref 98–107)
CO2 SERPL-SCNC: 25 MMOL/L (ref 22–29)
CREAT SERPL-MCNC: 0.98 MG/DL (ref 0.76–1.27)
DEPRECATED RDW RBC AUTO: 52.5 FL (ref 37–54)
EGFRCR SERPLBLD CKD-EPI 2021: 80.4 ML/MIN/1.73
EOSINOPHIL # BLD AUTO: 0.05 10*3/MM3 (ref 0–0.4)
EOSINOPHIL NFR BLD AUTO: 1.8 % (ref 0.3–6.2)
ERYTHROCYTE [DISTWIDTH] IN BLOOD BY AUTOMATED COUNT: 25.9 % (ref 12.3–15.4)
GLOBULIN UR ELPH-MCNC: 2.5 GM/DL
GLUCOSE SERPL-MCNC: 247 MG/DL (ref 65–99)
HCT VFR BLD AUTO: 26.7 % (ref 37.5–51)
HGB BLD-MCNC: 7.9 G/DL (ref 13–17.7)
LYMPHOCYTES # BLD AUTO: 0.86 10*3/MM3 (ref 0.7–3.1)
LYMPHOCYTES NFR BLD AUTO: 31.5 % (ref 19.6–45.3)
MCH RBC QN AUTO: 23.4 PG (ref 26.6–33)
MCHC RBC AUTO-ENTMCNC: 29.6 G/DL (ref 31.5–35.7)
MCV RBC AUTO: 79.2 FL (ref 79–97)
MONOCYTES # BLD AUTO: 0.51 10*3/MM3 (ref 0.1–0.9)
MONOCYTES NFR BLD AUTO: 18.7 % (ref 5–12)
NEUTROPHILS NFR BLD AUTO: 1.3 10*3/MM3 (ref 1.7–7)
NEUTROPHILS NFR BLD AUTO: 47.6 % (ref 42.7–76)
PLATELET # BLD AUTO: 122 10*3/MM3 (ref 140–450)
PMV BLD AUTO: 9 FL (ref 6–12)
POTASSIUM SERPL-SCNC: 4.1 MMOL/L (ref 3.5–5.2)
PROT SERPL-MCNC: 6.2 G/DL (ref 6–8.5)
RBC # BLD AUTO: 3.37 10*6/MM3 (ref 4.14–5.8)
SODIUM SERPL-SCNC: 140 MMOL/L (ref 136–145)
WBC NRBC COR # BLD: 2.73 10*3/MM3 (ref 3.4–10.8)

## 2023-02-27 PROCEDURE — 80053 COMPREHEN METABOLIC PANEL: CPT | Performed by: INTERNAL MEDICINE

## 2023-02-27 PROCEDURE — 25010000002 HEPARIN LOCK FLUSH PER 10 UNITS: Performed by: INTERNAL MEDICINE

## 2023-02-27 PROCEDURE — 96413 CHEMO IV INFUSION 1 HR: CPT

## 2023-02-27 PROCEDURE — 96375 TX/PRO/DX INJ NEW DRUG ADDON: CPT

## 2023-02-27 PROCEDURE — 85025 COMPLETE CBC W/AUTO DIFF WBC: CPT | Performed by: INTERNAL MEDICINE

## 2023-02-27 PROCEDURE — 25010000002 PALONOSETRON 0.25 MG/5ML SOLUTION PREFILLED SYRINGE: Performed by: INTERNAL MEDICINE

## 2023-02-27 PROCEDURE — 25010000002 OXALIPLATIN PER 0.5 MG: Performed by: INTERNAL MEDICINE

## 2023-02-27 PROCEDURE — 96415 CHEMO IV INFUSION ADDL HR: CPT

## 2023-02-27 PROCEDURE — 99214 OFFICE O/P EST MOD 30 MIN: CPT | Performed by: INTERNAL MEDICINE

## 2023-02-27 PROCEDURE — 96367 TX/PROPH/DG ADDL SEQ IV INF: CPT

## 2023-02-27 PROCEDURE — 25010000002 DEXAMETHASONE SODIUM PHOSPHATE 120 MG/30ML SOLUTION: Performed by: INTERNAL MEDICINE

## 2023-02-27 RX ORDER — DIPHENHYDRAMINE HYDROCHLORIDE 50 MG/ML
50 INJECTION INTRAMUSCULAR; INTRAVENOUS AS NEEDED
Status: CANCELLED | OUTPATIENT
Start: 2023-02-27

## 2023-02-27 RX ORDER — HEPARIN SODIUM (PORCINE) LOCK FLUSH IV SOLN 100 UNIT/ML 100 UNIT/ML
500 SOLUTION INTRAVENOUS AS NEEDED
Status: DISCONTINUED | OUTPATIENT
Start: 2023-02-27 | End: 2023-02-28 | Stop reason: HOSPADM

## 2023-02-27 RX ORDER — DEXTROSE MONOHYDRATE 50 MG/ML
250 INJECTION, SOLUTION INTRAVENOUS ONCE
Status: CANCELLED | OUTPATIENT
Start: 2023-02-27

## 2023-02-27 RX ORDER — SODIUM CHLORIDE 0.9 % (FLUSH) 0.9 %
20 SYRINGE (ML) INJECTION AS NEEDED
Status: CANCELLED | OUTPATIENT
Start: 2023-02-27

## 2023-02-27 RX ORDER — PALONOSETRON 0.05 MG/ML
0.25 INJECTION, SOLUTION INTRAVENOUS ONCE
Status: CANCELLED | OUTPATIENT
Start: 2023-02-27

## 2023-02-27 RX ORDER — PALONOSETRON 0.05 MG/ML
0.25 INJECTION, SOLUTION INTRAVENOUS ONCE
Status: COMPLETED | OUTPATIENT
Start: 2023-02-27 | End: 2023-02-27

## 2023-02-27 RX ORDER — HEPARIN SODIUM (PORCINE) LOCK FLUSH IV SOLN 100 UNIT/ML 100 UNIT/ML
500 SOLUTION INTRAVENOUS AS NEEDED
Status: CANCELLED | OUTPATIENT
Start: 2023-02-27

## 2023-02-27 RX ORDER — SODIUM CHLORIDE 0.9 % (FLUSH) 0.9 %
20 SYRINGE (ML) INJECTION AS NEEDED
Status: DISCONTINUED | OUTPATIENT
Start: 2023-02-27 | End: 2023-02-28 | Stop reason: HOSPADM

## 2023-02-27 RX ORDER — FAMOTIDINE 10 MG/ML
20 INJECTION, SOLUTION INTRAVENOUS AS NEEDED
Status: CANCELLED | OUTPATIENT
Start: 2023-02-27

## 2023-02-27 RX ORDER — DEXTROSE MONOHYDRATE 50 MG/ML
250 INJECTION, SOLUTION INTRAVENOUS ONCE
Status: COMPLETED | OUTPATIENT
Start: 2023-02-27 | End: 2023-02-27

## 2023-02-27 RX ADMIN — Medication 20 ML: at 12:19

## 2023-02-27 RX ADMIN — Medication 500 UNITS: at 12:19

## 2023-02-27 RX ADMIN — DEXTROSE MONOHYDRATE 250 ML: 50 INJECTION, SOLUTION INTRAVENOUS at 09:47

## 2023-02-27 RX ADMIN — OXALIPLATIN 265 MG: 5 INJECTION, SOLUTION INTRAVENOUS at 10:13

## 2023-02-27 RX ADMIN — PALONOSETRON 0.25 MG: 0.25 INJECTION, SOLUTION INTRAVENOUS at 09:51

## 2023-02-27 RX ADMIN — DEXAMETHASONE SODIUM PHOSPHATE 12 MG: 4 INJECTION, SOLUTION INTRA-ARTICULAR; INTRALESIONAL; INTRAMUSCULAR; INTRAVENOUS; SOFT TISSUE at 09:54

## 2023-02-27 NOTE — PROGRESS NOTES
Pt here for C3 Oxaliplatin.  Port accessed and labs drawn.  Dr. Kramer at chairside.  Ok to treat received with CBC results for today.  Mary, Pharmacist notified that pt was here for any xeloda needs.  Mary at chairside.  Tx given per MAR.  Pt tolerated well.  Pt d/c home with AVS given.

## 2023-02-27 NOTE — PROGRESS NOTES
Specialty Pharmacy Note: Capecitabine        2/27/2023  Day 1   WBC 3.40 - 10.80 10*3/mm3 2.73 (A)   Neutrophils Absolute 1.70 - 7.00 10*3/mm3 1.30 (A)   Hemoglobin 13.0 - 17.7 g/dL 7.9 (A)   Hematocrit 37.5 - 51.0 % 26.7 (A)   Platelets 140 - 450 10*3/mm3 122 (A)     Platelets have increased (66k on 2/20/23). Hemoglobin decreased (8.1 on 2/20/23). Discussed CBC with Dr. Kramer; pt to start cycle 3 of capecitabine and oxaliplatin with follow-up labs in 1 week (3/6/23). Discussed with pt at infusion appointment. Advised pt to contact clinic or go to emergency room if pt has severe fatigue, weakness, or SOB. Pt verbalized understanding. Provided pt with pharmacy clinic office phone number.    Per PI no dose adjustments needed unless grade 3 or 4 hematologic toxicity. Pharmacy will continue to follow.      Thank you,  Mary Cervantes, Pharm.D.

## 2023-03-01 ENCOUNTER — SPECIALTY PHARMACY (OUTPATIENT)
Dept: PHARMACY | Facility: HOSPITAL | Age: 78
End: 2023-03-01
Payer: OTHER GOVERNMENT

## 2023-03-01 NOTE — PROGRESS NOTES
Specialty Pharmacy Note: Carlo Rai left a voicemail on MTM phone on 2/28/23 at 8pm that he had a nosebleed and requesting call back.  Returned his call and he did have a nose bleed.  This happened one time and the blood was minimally coming out and resolved without any pressure or other intervention in less than 5 minutes.  He has had no other issues with bleeding or bruising.  Asked him to continue to monitor and call the office for any signs or symptoms of bleeding such as unusual bruising, nosebleed that won't stop in less than 5 minutes, or other bleeding.     Thanks,    Meenakshi MARTEL, PharmD

## 2023-03-06 ENCOUNTER — LAB (OUTPATIENT)
Dept: LAB | Facility: HOSPITAL | Age: 78
End: 2023-03-06
Payer: MEDICARE

## 2023-03-06 ENCOUNTER — SPECIALTY PHARMACY (OUTPATIENT)
Dept: PHARMACY | Facility: HOSPITAL | Age: 78
End: 2023-03-06
Payer: OTHER GOVERNMENT

## 2023-03-06 DIAGNOSIS — C18.2 MALIGNANT NEOPLASM OF ASCENDING COLON: ICD-10-CM

## 2023-03-06 LAB
BASOPHILS # BLD AUTO: 0.02 10*3/MM3 (ref 0–0.2)
BASOPHILS NFR BLD AUTO: 0.6 % (ref 0–1.5)
DEPRECATED RDW RBC AUTO: 72.9 FL (ref 37–54)
EOSINOPHIL # BLD AUTO: 0.1 10*3/MM3 (ref 0–0.4)
EOSINOPHIL NFR BLD AUTO: 3.1 % (ref 0.3–6.2)
ERYTHROCYTE [DISTWIDTH] IN BLOOD BY AUTOMATED COUNT: 26.5 % (ref 12.3–15.4)
HCT VFR BLD AUTO: 27.9 % (ref 37.5–51)
HGB BLD-MCNC: 8.4 G/DL (ref 13–17.7)
LYMPHOCYTES # BLD AUTO: 1.1 10*3/MM3 (ref 0.7–3.1)
LYMPHOCYTES NFR BLD AUTO: 34 % (ref 19.6–45.3)
MCH RBC QN AUTO: 24.1 PG (ref 26.6–33)
MCHC RBC AUTO-ENTMCNC: 30.1 G/DL (ref 31.5–35.7)
MCV RBC AUTO: 80.2 FL (ref 79–97)
MONOCYTES # BLD AUTO: 0.5 10*3/MM3 (ref 0.1–0.9)
MONOCYTES NFR BLD AUTO: 15.4 % (ref 5–12)
NEUTROPHILS NFR BLD AUTO: 1.52 10*3/MM3 (ref 1.7–7)
NEUTROPHILS NFR BLD AUTO: 46.9 % (ref 42.7–76)
PLATELET # BLD AUTO: 113 10*3/MM3 (ref 140–450)
PMV BLD AUTO: 9.3 FL (ref 6–12)
RBC # BLD AUTO: 3.48 10*6/MM3 (ref 4.14–5.8)
WBC NRBC COR # BLD: 3.24 10*3/MM3 (ref 3.4–10.8)

## 2023-03-06 PROCEDURE — 36415 COLL VENOUS BLD VENIPUNCTURE: CPT

## 2023-03-06 PROCEDURE — 85025 COMPLETE CBC W/AUTO DIFF WBC: CPT

## 2023-03-10 ENCOUNTER — SPECIALTY PHARMACY (OUTPATIENT)
Dept: PHARMACY | Facility: HOSPITAL | Age: 78
End: 2023-03-10
Payer: OTHER GOVERNMENT

## 2023-03-10 NOTE — PROGRESS NOTES
Specialty Pharmacy Note: Carlo Rai called due to having elevated BS of 360 after taking his Ozempic and metformin today.  He has not taken his Ozempic for awhile because he hasn't needed it.  Patient is going to go to urgent care now due to not feeling well from elevated blood glucose per his home reader.      Thanks,    Meenakshi MARTEL, PharmD

## 2023-03-11 ENCOUNTER — HOSPITAL ENCOUNTER (OUTPATIENT)
Facility: HOSPITAL | Age: 78
Setting detail: OBSERVATION
Discharge: HOME | End: 2023-03-13
Attending: EMERGENCY MEDICINE | Admitting: INTERNAL MEDICINE
Payer: OTHER GOVERNMENT

## 2023-03-11 ENCOUNTER — APPOINTMENT (OUTPATIENT)
Dept: MRI IMAGING | Facility: HOSPITAL | Age: 78
End: 2023-03-11
Payer: OTHER GOVERNMENT

## 2023-03-11 DIAGNOSIS — I63.9 ACUTE CVA (CEREBROVASCULAR ACCIDENT): Primary | ICD-10-CM

## 2023-03-11 LAB
ALBUMIN SERPL-MCNC: 3.9 G/DL (ref 3.5–5.2)
ALBUMIN/GLOB SERPL: 1.5 G/DL
ALP SERPL-CCNC: 79 U/L (ref 39–117)
ALT SERPL W P-5'-P-CCNC: 14 U/L (ref 1–41)
ANION GAP SERPL CALCULATED.3IONS-SCNC: 11 MMOL/L (ref 5–15)
APTT PPP: <20 SECONDS (ref 61–76.5)
AST SERPL-CCNC: 16 U/L (ref 1–40)
BASOPHILS # BLD AUTO: 0 10*3/MM3 (ref 0–0.2)
BASOPHILS NFR BLD AUTO: 0.3 % (ref 0–1.5)
BILIRUB SERPL-MCNC: 1.1 MG/DL (ref 0–1.2)
BUN SERPL-MCNC: 27 MG/DL (ref 8–23)
BUN/CREAT SERPL: 24.3 (ref 7–25)
CALCIUM SPEC-SCNC: 9.2 MG/DL (ref 8.6–10.5)
CHLORIDE SERPL-SCNC: 103 MMOL/L (ref 98–107)
CHOLEST SERPL-MCNC: 119 MG/DL (ref 0–200)
CO2 SERPL-SCNC: 25 MMOL/L (ref 22–29)
CREAT SERPL-MCNC: 1.11 MG/DL (ref 0.76–1.27)
DEPRECATED RDW RBC AUTO: 69.6 FL (ref 37–54)
EGFRCR SERPLBLD CKD-EPI 2021: 69.2 ML/MIN/1.73
EOSINOPHIL # BLD AUTO: 0 10*3/MM3 (ref 0–0.4)
EOSINOPHIL NFR BLD AUTO: 0.7 % (ref 0.3–6.2)
ERYTHROCYTE [DISTWIDTH] IN BLOOD BY AUTOMATED COUNT: 27.7 % (ref 12.3–15.4)
GLOBULIN UR ELPH-MCNC: 2.6 GM/DL
GLUCOSE BLDC GLUCOMTR-MCNC: 193 MG/DL (ref 70–105)
GLUCOSE SERPL-MCNC: 183 MG/DL (ref 65–99)
HBA1C MFR BLD: 11 % (ref 4.8–5.6)
HCT VFR BLD AUTO: 28.8 % (ref 37.5–51)
HDLC SERPL-MCNC: 53 MG/DL (ref 40–60)
HGB BLD-MCNC: 8.9 G/DL (ref 13–17.7)
INR PPP: 1.38 (ref 0.93–1.1)
LDLC SERPL CALC-MCNC: 46 MG/DL (ref 0–100)
LDLC/HDLC SERPL: 0.84 {RATIO}
LYMPHOCYTES # BLD AUTO: 0.8 10*3/MM3 (ref 0.7–3.1)
LYMPHOCYTES NFR BLD AUTO: 25.5 % (ref 19.6–45.3)
MAGNESIUM SERPL-MCNC: 2 MG/DL (ref 1.6–2.4)
MCH RBC QN AUTO: 24 PG (ref 26.6–33)
MCHC RBC AUTO-ENTMCNC: 30.8 G/DL (ref 31.5–35.7)
MCV RBC AUTO: 77.9 FL (ref 79–97)
MONOCYTES # BLD AUTO: 0.3 10*3/MM3 (ref 0.1–0.9)
MONOCYTES NFR BLD AUTO: 7.8 % (ref 5–12)
NEUTROPHILS NFR BLD AUTO: 2.1 10*3/MM3 (ref 1.7–7)
NEUTROPHILS NFR BLD AUTO: 65.7 % (ref 42.7–76)
NRBC BLD AUTO-RTO: 0 /100 WBC (ref 0–0.2)
PLATELET # BLD AUTO: 103 10*3/MM3 (ref 140–450)
PMV BLD AUTO: 7.3 FL (ref 6–12)
POTASSIUM SERPL-SCNC: 4.5 MMOL/L (ref 3.5–5.2)
PROT SERPL-MCNC: 6.5 G/DL (ref 6–8.5)
PROTHROMBIN TIME: 14 SECONDS (ref 9.6–11.7)
RBC # BLD AUTO: 3.7 10*6/MM3 (ref 4.14–5.8)
SODIUM SERPL-SCNC: 139 MMOL/L (ref 136–145)
TRIGL SERPL-MCNC: 107 MG/DL (ref 0–150)
TROPONIN T SERPL HS-MCNC: 27 NG/L
TSH SERPL DL<=0.05 MIU/L-ACNC: 1.26 UIU/ML (ref 0.27–4.2)
VLDLC SERPL-MCNC: 20 MG/DL (ref 5–40)
WBC NRBC COR # BLD: 3.3 10*3/MM3 (ref 3.4–10.8)

## 2023-03-11 PROCEDURE — G0378 HOSPITAL OBSERVATION PER HR: HCPCS

## 2023-03-11 PROCEDURE — 70553 MRI BRAIN STEM W/O & W/DYE: CPT

## 2023-03-11 PROCEDURE — 80053 COMPREHEN METABOLIC PANEL: CPT | Performed by: EMERGENCY MEDICINE

## 2023-03-11 PROCEDURE — 84484 ASSAY OF TROPONIN QUANT: CPT | Performed by: NURSE PRACTITIONER

## 2023-03-11 PROCEDURE — 25010000002 GADOTERIDOL PER 1 ML: Performed by: EMERGENCY MEDICINE

## 2023-03-11 PROCEDURE — 82962 GLUCOSE BLOOD TEST: CPT

## 2023-03-11 PROCEDURE — 99285 EMERGENCY DEPT VISIT HI MDM: CPT

## 2023-03-11 PROCEDURE — 85610 PROTHROMBIN TIME: CPT | Performed by: EMERGENCY MEDICINE

## 2023-03-11 PROCEDURE — 85730 THROMBOPLASTIN TIME PARTIAL: CPT | Performed by: EMERGENCY MEDICINE

## 2023-03-11 PROCEDURE — 83036 HEMOGLOBIN GLYCOSYLATED A1C: CPT | Performed by: NURSE PRACTITIONER

## 2023-03-11 PROCEDURE — 83735 ASSAY OF MAGNESIUM: CPT | Performed by: EMERGENCY MEDICINE

## 2023-03-11 PROCEDURE — 85025 COMPLETE CBC W/AUTO DIFF WBC: CPT | Performed by: EMERGENCY MEDICINE

## 2023-03-11 PROCEDURE — A9579 GAD-BASE MR CONTRAST NOS,1ML: HCPCS | Performed by: EMERGENCY MEDICINE

## 2023-03-11 PROCEDURE — 84443 ASSAY THYROID STIM HORMONE: CPT | Performed by: NURSE PRACTITIONER

## 2023-03-11 PROCEDURE — 80061 LIPID PANEL: CPT | Performed by: NURSE PRACTITIONER

## 2023-03-11 RX ORDER — SODIUM CHLORIDE 9 MG/ML
40 INJECTION, SOLUTION INTRAVENOUS AS NEEDED
Status: DISCONTINUED | OUTPATIENT
Start: 2023-03-11 | End: 2023-03-13 | Stop reason: HOSPADM

## 2023-03-11 RX ORDER — ACETAMINOPHEN 325 MG/1
650 TABLET ORAL EVERY 4 HOURS PRN
Status: DISCONTINUED | OUTPATIENT
Start: 2023-03-11 | End: 2023-03-13 | Stop reason: HOSPADM

## 2023-03-11 RX ORDER — NITROGLYCERIN 0.4 MG/1
0.4 TABLET SUBLINGUAL
Status: DISCONTINUED | OUTPATIENT
Start: 2023-03-11 | End: 2023-03-13 | Stop reason: HOSPADM

## 2023-03-11 RX ORDER — ONDANSETRON 2 MG/ML
4 INJECTION INTRAMUSCULAR; INTRAVENOUS EVERY 6 HOURS PRN
Status: DISCONTINUED | OUTPATIENT
Start: 2023-03-11 | End: 2023-03-13 | Stop reason: HOSPADM

## 2023-03-11 RX ORDER — CALCIUM CARBONATE 200(500)MG
2 TABLET,CHEWABLE ORAL 2 TIMES DAILY PRN
Status: DISCONTINUED | OUTPATIENT
Start: 2023-03-11 | End: 2023-03-13 | Stop reason: HOSPADM

## 2023-03-11 RX ORDER — POTASSIUM CHLORIDE 20 MEQ/1
40 TABLET, EXTENDED RELEASE ORAL AS NEEDED
Status: DISCONTINUED | OUTPATIENT
Start: 2023-03-11 | End: 2023-03-13 | Stop reason: HOSPADM

## 2023-03-11 RX ORDER — ASPIRIN 300 MG/1
300 SUPPOSITORY RECTAL DAILY
Status: DISCONTINUED | OUTPATIENT
Start: 2023-03-12 | End: 2023-03-13 | Stop reason: HOSPADM

## 2023-03-11 RX ORDER — SODIUM CHLORIDE 0.9 % (FLUSH) 0.9 %
10 SYRINGE (ML) INJECTION AS NEEDED
Status: DISCONTINUED | OUTPATIENT
Start: 2023-03-11 | End: 2023-03-13 | Stop reason: HOSPADM

## 2023-03-11 RX ORDER — MAGNESIUM SULFATE HEPTAHYDRATE 40 MG/ML
4 INJECTION, SOLUTION INTRAVENOUS AS NEEDED
Status: DISCONTINUED | OUTPATIENT
Start: 2023-03-11 | End: 2023-03-13 | Stop reason: HOSPADM

## 2023-03-11 RX ORDER — AMOXICILLIN 250 MG
1 CAPSULE ORAL NIGHTLY PRN
Status: DISCONTINUED | OUTPATIENT
Start: 2023-03-11 | End: 2023-03-13 | Stop reason: HOSPADM

## 2023-03-11 RX ORDER — ASPIRIN 325 MG
325 TABLET ORAL DAILY
Status: DISCONTINUED | OUTPATIENT
Start: 2023-03-12 | End: 2023-03-13 | Stop reason: HOSPADM

## 2023-03-11 RX ORDER — SODIUM CHLORIDE 0.9 % (FLUSH) 0.9 %
10 SYRINGE (ML) INJECTION EVERY 12 HOURS SCHEDULED
Status: DISCONTINUED | OUTPATIENT
Start: 2023-03-11 | End: 2023-03-13 | Stop reason: HOSPADM

## 2023-03-11 RX ORDER — ASPIRIN 81 MG/1
324 TABLET, CHEWABLE ORAL ONCE
Status: COMPLETED | OUTPATIENT
Start: 2023-03-11 | End: 2023-03-11

## 2023-03-11 RX ORDER — POTASSIUM CHLORIDE 1.5 G/1.77G
40 POWDER, FOR SOLUTION ORAL AS NEEDED
Status: DISCONTINUED | OUTPATIENT
Start: 2023-03-11 | End: 2023-03-13 | Stop reason: HOSPADM

## 2023-03-11 RX ORDER — ACETAMINOPHEN 160 MG/5ML
650 SOLUTION ORAL EVERY 4 HOURS PRN
Status: DISCONTINUED | OUTPATIENT
Start: 2023-03-11 | End: 2023-03-13 | Stop reason: HOSPADM

## 2023-03-11 RX ORDER — ACETAMINOPHEN 650 MG/1
650 SUPPOSITORY RECTAL EVERY 4 HOURS PRN
Status: DISCONTINUED | OUTPATIENT
Start: 2023-03-11 | End: 2023-03-13 | Stop reason: HOSPADM

## 2023-03-11 RX ORDER — MAGNESIUM SULFATE HEPTAHYDRATE 40 MG/ML
2 INJECTION, SOLUTION INTRAVENOUS AS NEEDED
Status: DISCONTINUED | OUTPATIENT
Start: 2023-03-11 | End: 2023-03-13 | Stop reason: HOSPADM

## 2023-03-11 RX ORDER — DEXTROSE MONOHYDRATE 25 G/50ML
25 INJECTION, SOLUTION INTRAVENOUS
Status: DISCONTINUED | OUTPATIENT
Start: 2023-03-11 | End: 2023-03-13 | Stop reason: HOSPADM

## 2023-03-11 RX ORDER — INSULIN LISPRO 100 [IU]/ML
2-9 INJECTION, SOLUTION INTRAVENOUS; SUBCUTANEOUS
Status: DISCONTINUED | OUTPATIENT
Start: 2023-03-11 | End: 2023-03-13 | Stop reason: HOSPADM

## 2023-03-11 RX ORDER — ONDANSETRON 4 MG/1
4 TABLET, FILM COATED ORAL EVERY 6 HOURS PRN
Status: DISCONTINUED | OUTPATIENT
Start: 2023-03-11 | End: 2023-03-13 | Stop reason: HOSPADM

## 2023-03-11 RX ORDER — ALUMINA, MAGNESIA, AND SIMETHICONE 2400; 2400; 240 MG/30ML; MG/30ML; MG/30ML
15 SUSPENSION ORAL EVERY 6 HOURS PRN
Status: DISCONTINUED | OUTPATIENT
Start: 2023-03-11 | End: 2023-03-13 | Stop reason: HOSPADM

## 2023-03-11 RX ORDER — OLANZAPINE 10 MG/2ML
1 INJECTION, POWDER, LYOPHILIZED, FOR SOLUTION INTRAMUSCULAR
Status: DISCONTINUED | OUTPATIENT
Start: 2023-03-11 | End: 2023-03-13 | Stop reason: HOSPADM

## 2023-03-11 RX ORDER — NICOTINE POLACRILEX 4 MG
15 LOZENGE BUCCAL
Status: DISCONTINUED | OUTPATIENT
Start: 2023-03-11 | End: 2023-03-13 | Stop reason: HOSPADM

## 2023-03-11 RX ADMIN — SODIUM CHLORIDE 1000 ML: 9 INJECTION, SOLUTION INTRAVENOUS at 19:55

## 2023-03-11 RX ADMIN — ASPIRIN 81 MG CHEWABLE TABLET 324 MG: 81 TABLET CHEWABLE at 20:55

## 2023-03-11 RX ADMIN — GADOTERIDOL 16 ML: 279.3 INJECTION, SOLUTION INTRAVENOUS at 19:35

## 2023-03-11 NOTE — ED PROVIDER NOTES
Subjective   History of Present Illness  Chief complaint: Patient is a pleasant 77-year-old.  He states that he has had a high blood sugar.  Its been between 3 and 500 since he started his insulin infusion 2 months ago.  Came in for evaluation today as its been persistent.  He has colon cancer.  He had surgery a few months ago and started his chemotherapy.  He is to have a follow-up scan on this coming Friday.  He came in today because of the persisting elevated blood glucose.  He vomited x2 last night.  States he has been off balance since yesterday.  Not really dizzy.  He states he is having balance problems with his gait.  That has not been anything he has had in the past.  He does have a history of diabetes.  But he was well controlled prior to the beginning of chemotherapy 2 months ago    Context:    Duration: His blood sugar has been persistently elevated.  However his vomiting started yesterday.  As well as his balance issues.    Timing: As above    Severity: Severe    Associated Symptoms:        PCP:          Review of Systems   Constitutional: Negative for fever.   Respiratory: Negative for shortness of breath.    Cardiovascular: Negative for chest pain.   Gastrointestinal: Positive for nausea and vomiting.   Genitourinary: Negative.    Neurological: Positive for dizziness.   Psychiatric/Behavioral: Negative for confusion.       Past Medical History:   Diagnosis Date   • Anemia    • Colon cancer 2022    colon   • Diabetes mellitus (HCC)    • Hyperlipidemia    • Hypertension        Allergies   Allergen Reactions   • Penicillins Rash       Past Surgical History:   Procedure Laterality Date   • APPENDECTOMY     • CARDIAC CATHETERIZATION     • COLON RESECTION N/A 12/15/2022    Procedure: COLON RESECTION RIGHT;  Surgeon: Percy Davis MD;  Location: [facility] MAIN OR;  Service: General;  Laterality: N/A;   • COLONOSCOPY N/A 11/11/2022    Procedure: COLONOSCOPY WITH BIOPSY AND POLYPECTOMY;  Surgeon:  Billy Julien MD;  [facility];  Service: Gastroenterology;  Laterality: N/A;  Impression:  1.  Large 4-5cm fulgurating circumferential ulcerated mass in the very proximal part of the ascending colon next to ileocecal valve multiple biopsies were performed.  This is highly concerning for colon malignancy.  2.  2 polyp rem   • ENDOSCOPY N/A 2022    Procedure: ESOPHAGOGASTRODUODENOSCOPY with biopsy X1;  Surgeon: Billy Julien MD;  [facility];  Service: Gastroenterology;  Laterality: N/A;  5.  Upper endoscopy lamination unremarkable.      • PORTACATH PLACEMENT Right 2023    Procedure: INSERTION OF PORTACATH;  Surgeon: Percy Davis MD;  [facility];  Service: General;  Laterality: Right;   • TONSILLECTOMY         Family History   Problem Relation Age of Onset   • Pneumonia Mother 94        SARS-CoV2   • Colon cancer Father 62   • Heart disease Sister        Social History     Socioeconomic History   • Marital status: Single   Tobacco Use   • Smoking status: Former     Packs/day: 1.00     Years: 2.00     Pack years: 2.00     Types: Cigarettes     Start date:      Quit date:      Years since quittin.2   • Smokeless tobacco: Never   Vaping Use   • Vaping Use: Never used   Substance and Sexual Activity   • Alcohol use: Not Currently   • Drug use: Never   • Sexual activity: Defer           Objective   Physical Exam  Vitals and nursing note reviewed.   Constitutional:       Appearance: Normal appearance.   HENT:      Head: Normocephalic and atraumatic.   Eyes:      Extraocular Movements: Extraocular movements intact.      Pupils: Pupils are equal, round, and reactive to light.   Cardiovascular:      Rate and Rhythm: Normal rate and regular rhythm.      Pulses: Normal pulses.      Heart sounds: Normal heart sounds.   Pulmonary:      Effort: Pulmonary effort is normal.      Breath sounds: Normal breath sounds.   Abdominal:      Tenderness: There  is no abdominal tenderness.   Musculoskeletal:         General: Normal range of motion.      Cervical back: Normal range of motion.   Skin:     General: Skin is warm and dry.   Neurological:      General: No focal deficit present.      Mental Status: He is alert and oriented to person, place, and time.      Cranial Nerves: No cranial nerve deficit.      Sensory: No sensory deficit.      Motor: No weakness.      Coordination: Coordination normal.   Psychiatric:         Mood and Affect: Mood normal.         Thought Content: Thought content normal.         Procedures           ED Course  ED Course as of 03/11/23 2138   Sat Mar 11, 2023   2052 Spoke with Dr. Brunson who states aspirin is okay at this point time.  We will see the patient in the morning.  Patient is outside of the window for emergent stroke treatment as his symptoms started over 24 hours ago [LH]      ED Course User Index  [LH] Rudy Gamez DO            Results for orders placed or performed during the hospital encounter of 03/11/23   Comprehensive Metabolic Panel    Specimen: Blood   Result Value Ref Range    Glucose 183 (H) 65 - 99 mg/dL    BUN 27 (H) 8 - 23 mg/dL    Creatinine 1.11 0.76 - 1.27 mg/dL    Sodium 139 136 - 145 mmol/L    Potassium 4.5 3.5 - 5.2 mmol/L    Chloride 103 98 - 107 mmol/L    CO2 25.0 22.0 - 29.0 mmol/L    Calcium 9.2 8.6 - 10.5 mg/dL    Total Protein 6.5 6.0 - 8.5 g/dL    Albumin 3.9 3.5 - 5.2 g/dL    ALT (SGPT) 14 1 - 41 U/L    AST (SGOT) 16 1 - 40 U/L    Alkaline Phosphatase 79 39 - 117 U/L    Total Bilirubin 1.1 0.0 - 1.2 mg/dL    Globulin 2.6 gm/dL    A/G Ratio 1.5 g/dL    BUN/Creatinine Ratio 24.3 7.0 - 25.0    Anion Gap 11.0 5.0 - 15.0 mmol/L    eGFR 69.2 >60.0 mL/min/1.73   Protime-INR    Specimen: Blood   Result Value Ref Range    Protime 14.0 (H) 9.6 - 11.7 Seconds    INR 1.38 (H) 0.93 - 1.10   aPTT    Specimen: Blood   Result Value Ref Range    PTT <20.0 (L) 61.0 - 76.5 seconds   Magnesium    Specimen: Blood    Result Value Ref Range    Magnesium 2.0 1.6 - 2.4 mg/dL   CBC Auto Differential    Specimen: Blood   Result Value Ref Range    WBC 3.30 (L) 3.40 - 10.80 10*3/mm3    RBC 3.70 (L) 4.14 - 5.80 10*6/mm3    Hemoglobin 8.9 (L) 13.0 - 17.7 g/dL    Hematocrit 28.8 (L) 37.5 - 51.0 %    MCV 77.9 (L) 79.0 - 97.0 fL    MCH 24.0 (L) 26.6 - 33.0 pg    MCHC 30.8 (L) 31.5 - 35.7 g/dL    RDW 27.7 (H) 12.3 - 15.4 %    RDW-SD 69.6 (H) 37.0 - 54.0 fl    MPV 7.3 6.0 - 12.0 fL    Platelets 103 (L) 140 - 450 10*3/mm3    Neutrophil % 65.7 42.7 - 76.0 %    Lymphocyte % 25.5 19.6 - 45.3 %    Monocyte % 7.8 5.0 - 12.0 %    Eosinophil % 0.7 0.3 - 6.2 %    Basophil % 0.3 0.0 - 1.5 %    Neutrophils, Absolute 2.10 1.70 - 7.00 10*3/mm3    Lymphocytes, Absolute 0.80 0.70 - 3.10 10*3/mm3    Monocytes, Absolute 0.30 0.10 - 0.90 10*3/mm3    Eosinophils, Absolute 0.00 0.00 - 0.40 10*3/mm3    Basophils, Absolute 0.00 0.00 - 0.20 10*3/mm3    nRBC 0.0 0.0 - 0.2 /100 WBC   POC Glucose Once    Specimen: Blood   Result Value Ref Range    Glucose 193 (H) 70 - 105 mg/dL     MRI Brain With & Without Contrast    Result Date: 3/11/2023  Impression: 1.7 mm focus of restricted diffusion in the left periventricular white matter of the posterior left frontal lobe, suspicious for an acute/subacute infarct. 2.Voiding loss and suspected chronic small vessel ischemic changes with probable small remote lacunar infarcts in the periventricular white matter, basal ganglia, and left cerebellum. 3.Several small foci of susceptibility artifact suggesting chronic microhemorrhages which may be seen with hypertension. 4.No definite enhancing mass is identified. Electronically Signed: Andrea Kirk  3/11/2023 8:30 PM [facility]  Workstation ID: [id number] [place]                                      Medical Decision Making  Patient was seen for his unsteadiness and increasing weakness generalized    Differential diagnosis includes but is not limited to volume depletion, hyperglycemia,  metabolic abnormality, stroke, sepsis    Patient with no fever on arrival.  I do not think he is septic.  His symptoms seem to be unsteadiness and some difficulty with mobility.  His finger-to-nose was delayed and off bilaterally.  I did obtain stat MRI of his brain to rule out metastatic lesion as he does have colon cancer.  He is currently on chemotherapy.  His MRI does show acute/subacute infarct.  His timing of onset is over 24 hours ago.  So is not in any emergent window for tPA or any emergent stroke treatment.  I discussed the case with Dr. Brunson.  There are some chronic microhemorrhages related to hypertension.  He feels that he can still obtain his aspirin which was ordered here.  He will see him in the morning.  But at this point time patient will be admitted for his further stroke work-up.  His glucose was 186.  His was mildly elevated but not severely at this point time.  His pancytopenia is persistently in that range on his chemotherapy.  Volume depletion with elevated blood glucose of 300-500 range as he was stating over a span of the last couple of months is a possibility to cause some of symptoms as well.  However here it was not that severe with his glucose level.    Patient's comorbidities contributing are colon cancer and hypertension    Acute CVA (cerebrovascular accident) (HCC): acute illness or injury  Amount and/or Complexity of Data Reviewed  Labs: ordered. Decision-making details documented in ED Course.     Details: Glucose 186, white count 3.3, hemoglobin 8.9, was 103, creatinine 1.1  Radiology: ordered and independent interpretation performed.     Details: Did review the MRI to show acute/subacute CVA in posterior left frontal region.  Discussion of management or test interpretation with external provider(s): Discussion with the on-call stroke physician, Dr. Brunson.  I spoke with Barbara on-call for Dr. Pimentel who agrees to admit to the [facility] for further neurologic monitoring and stroke  work-up.    Risk  OTC drugs.  Prescription drug management.  Decision regarding hospitalization.          Final diagnoses:   None   Acute CVA    ED Disposition  ED Disposition     None          No follow-up provider specified.       Medication List      No changes were made to your prescriptions during this visit.          Rudy Gamez,   03/11/23 2157

## 2023-03-12 ENCOUNTER — APPOINTMENT (OUTPATIENT)
Dept: CT IMAGING | Facility: HOSPITAL | Age: 78
End: 2023-03-12
Payer: OTHER GOVERNMENT

## 2023-03-12 ENCOUNTER — APPOINTMENT (OUTPATIENT)
Dept: CARDIOLOGY | Facility: HOSPITAL | Age: 78
End: 2023-03-12
Payer: OTHER GOVERNMENT

## 2023-03-12 PROBLEM — D69.6 THROMBOCYTOPENIA: Status: ACTIVE | Noted: 2023-03-12

## 2023-03-12 PROBLEM — E11.9 TYPE 2 DIABETES MELLITUS: Status: ACTIVE | Noted: 2019-03-29

## 2023-03-12 LAB
ANION GAP SERPL CALCULATED.3IONS-SCNC: 11 MMOL/L (ref 5–15)
BASOPHILS # BLD AUTO: 0 10*3/MM3 (ref 0–0.2)
BASOPHILS NFR BLD AUTO: 0.4 % (ref 0–1.5)
BH CV ECHO MEAS - ACS: 1.53 CM
BH CV ECHO MEAS - AO MAX PG: 6.1 MMHG
BH CV ECHO MEAS - AO MEAN PG: 3.8 MMHG
BH CV ECHO MEAS - AO ROOT DIAM: 3.5 CM
BH CV ECHO MEAS - AO V2 MAX: 123.3 CM/SEC
BH CV ECHO MEAS - AO V2 VTI: 22 CM
BH CV ECHO MEAS - AVA(I,D): 2.4 CM2
BH CV ECHO MEAS - EDV(CUBED): 87.6 ML
BH CV ECHO MEAS - EDV(MOD-SP4): 166.8 ML
BH CV ECHO MEAS - EF(MOD-BP): 43 %
BH CV ECHO MEAS - EF(MOD-SP4): 42.7 %
BH CV ECHO MEAS - ESV(CUBED): 39.9 ML
BH CV ECHO MEAS - ESV(MOD-SP4): 95.5 ML
BH CV ECHO MEAS - FS: 23.1 %
BH CV ECHO MEAS - IVS/LVPW: 0.8 CM
BH CV ECHO MEAS - IVSD: 1 CM
BH CV ECHO MEAS - LA A2CS (ATRIAL LENGTH): 3.5 CM
BH CV ECHO MEAS - LV DIASTOLIC VOL/BSA (35-75): 83.3 CM2
BH CV ECHO MEAS - LV MASS(C)D: 176.9 GRAMS
BH CV ECHO MEAS - LV MAX PG: 2.8 MMHG
BH CV ECHO MEAS - LV MEAN PG: 1.57 MMHG
BH CV ECHO MEAS - LV SYSTOLIC VOL/BSA (12-30): 47.7 CM2
BH CV ECHO MEAS - LV V1 MAX: 84 CM/SEC
BH CV ECHO MEAS - LV V1 VTI: 15.2 CM
BH CV ECHO MEAS - LVIDD: 4.4 CM
BH CV ECHO MEAS - LVIDS: 3.4 CM
BH CV ECHO MEAS - LVOT AREA: 3.5 CM2
BH CV ECHO MEAS - LVOT DIAM: 2.1 CM
BH CV ECHO MEAS - LVPWD: 1.25 CM
BH CV ECHO MEAS - MV A MAX VEL: 88 CM/SEC
BH CV ECHO MEAS - MV DEC SLOPE: 369.6 CM/SEC2
BH CV ECHO MEAS - MV DEC TIME: 0.17 MSEC
BH CV ECHO MEAS - MV E MAX VEL: 63.1 CM/SEC
BH CV ECHO MEAS - MV E/A: 0.72
BH CV ECHO MEAS - MV MAX PG: 3.3 MMHG
BH CV ECHO MEAS - MV MEAN PG: 1.5 MMHG
BH CV ECHO MEAS - MV V2 VTI: 16.9 CM
BH CV ECHO MEAS - MVA(VTI): 3.1 CM2
BH CV ECHO MEAS - PA V2 MAX: 115.6 CM/SEC
BH CV ECHO MEAS - QP/QS: 1.02
BH CV ECHO MEAS - RV MAX PG: 2.8 MMHG
BH CV ECHO MEAS - RV V1 MAX: 83.4 CM/SEC
BH CV ECHO MEAS - RV V1 VTI: 17.1 CM
BH CV ECHO MEAS - RVDD: 2.9 CM
BH CV ECHO MEAS - RVOT DIAM: 2 CM
BH CV ECHO MEAS - SI(MOD-SP4): 35.6 ML/M2
BH CV ECHO MEAS - SV(LVOT): 52.7 ML
BH CV ECHO MEAS - SV(MOD-SP4): 71.2 ML
BH CV ECHO MEAS - SV(RVOT): 53.9 ML
BH CV ECHO MEAS - TR MAX PG: 20.5 MMHG
BH CV ECHO MEAS - TR MAX VEL: 226.6 CM/SEC
BH CV ECHO SHUNT ASSESSMENT PERFORMED (HIDDEN SCRIPTING): 1
BUN SERPL-MCNC: 27 MG/DL (ref 8–23)
BUN/CREAT SERPL: 24.1 (ref 7–25)
CALCIUM SPEC-SCNC: 8.9 MG/DL (ref 8.6–10.5)
CHLORIDE SERPL-SCNC: 105 MMOL/L (ref 98–107)
CO2 SERPL-SCNC: 25 MMOL/L (ref 22–29)
CREAT SERPL-MCNC: 1.12 MG/DL (ref 0.76–1.27)
DEPRECATED RDW RBC AUTO: 60.4 FL (ref 37–54)
EGFRCR SERPLBLD CKD-EPI 2021: 68.5 ML/MIN/1.73
EOSINOPHIL # BLD AUTO: 0 10*3/MM3 (ref 0–0.4)
EOSINOPHIL NFR BLD AUTO: 1.2 % (ref 0.3–6.2)
ERYTHROCYTE [DISTWIDTH] IN BLOOD BY AUTOMATED COUNT: 26.4 % (ref 12.3–15.4)
GEN 5 2HR TROPONIN T REFLEX: 30 NG/L
GLUCOSE BLDC GLUCOMTR-MCNC: 145 MG/DL (ref 70–105)
GLUCOSE BLDC GLUCOMTR-MCNC: 148 MG/DL (ref 70–105)
GLUCOSE BLDC GLUCOMTR-MCNC: 152 MG/DL (ref 70–105)
GLUCOSE BLDC GLUCOMTR-MCNC: 153 MG/DL (ref 70–105)
GLUCOSE BLDC GLUCOMTR-MCNC: 168 MG/DL (ref 70–105)
GLUCOSE SERPL-MCNC: 152 MG/DL (ref 65–99)
HCT VFR BLD AUTO: 29.4 % (ref 37.5–51)
HGB BLD-MCNC: 9.3 G/DL (ref 13–17.7)
LYMPHOCYTES # BLD AUTO: 1.2 10*3/MM3 (ref 0.7–3.1)
LYMPHOCYTES NFR BLD AUTO: 31.8 % (ref 19.6–45.3)
MAGNESIUM SERPL-MCNC: 2 MG/DL (ref 1.6–2.4)
MAXIMAL PREDICTED HEART RATE: 145 BPM
MCH RBC QN AUTO: 23.7 PG (ref 26.6–33)
MCHC RBC AUTO-ENTMCNC: 31.5 G/DL (ref 31.5–35.7)
MCV RBC AUTO: 75.4 FL (ref 79–97)
MONOCYTES # BLD AUTO: 0.4 10*3/MM3 (ref 0.1–0.9)
MONOCYTES NFR BLD AUTO: 10.5 % (ref 5–12)
NEUTROPHILS NFR BLD AUTO: 2.1 10*3/MM3 (ref 1.7–7)
NEUTROPHILS NFR BLD AUTO: 56.1 % (ref 42.7–76)
NRBC BLD AUTO-RTO: 0.1 /100 WBC (ref 0–0.2)
PLATELET # BLD AUTO: 114 10*3/MM3 (ref 140–450)
PMV BLD AUTO: 7.7 FL (ref 6–12)
POTASSIUM SERPL-SCNC: 3.9 MMOL/L (ref 3.5–5.2)
RBC # BLD AUTO: 3.9 10*6/MM3 (ref 4.14–5.8)
SODIUM SERPL-SCNC: 141 MMOL/L (ref 136–145)
STRESS TARGET HR: 123 BPM
TROPONIN T DELTA: 3 NG/L
VIT B12 BLD-MCNC: 1120 PG/ML (ref 211–946)
WBC NRBC COR # BLD: 3.8 10*3/MM3 (ref 3.4–10.8)

## 2023-03-12 PROCEDURE — 84484 ASSAY OF TROPONIN QUANT: CPT | Performed by: NURSE PRACTITIONER

## 2023-03-12 PROCEDURE — 25010000002 ENOXAPARIN PER 10 MG: Performed by: INTERNAL MEDICINE

## 2023-03-12 PROCEDURE — 63710000001 INSULIN LISPRO (HUMAN) PER 5 UNITS: Performed by: NURSE PRACTITIONER

## 2023-03-12 PROCEDURE — G0378 HOSPITAL OBSERVATION PER HR: HCPCS

## 2023-03-12 PROCEDURE — 82962 GLUCOSE BLOOD TEST: CPT

## 2023-03-12 PROCEDURE — 99221 1ST HOSP IP/OBS SF/LOW 40: CPT | Performed by: PSYCHIATRY & NEUROLOGY

## 2023-03-12 PROCEDURE — 80048 BASIC METABOLIC PNL TOTAL CA: CPT | Performed by: NURSE PRACTITIONER

## 2023-03-12 PROCEDURE — 36415 COLL VENOUS BLD VENIPUNCTURE: CPT | Performed by: NURSE PRACTITIONER

## 2023-03-12 PROCEDURE — 70498 CT ANGIOGRAPHY NECK: CPT

## 2023-03-12 PROCEDURE — 25510000001 IOPAMIDOL PER 1 ML: Performed by: INTERNAL MEDICINE

## 2023-03-12 PROCEDURE — 96372 THER/PROPH/DIAG INJ SC/IM: CPT

## 2023-03-12 PROCEDURE — 82607 VITAMIN B-12: CPT | Performed by: NURSE PRACTITIONER

## 2023-03-12 PROCEDURE — 70496 CT ANGIOGRAPHY HEAD: CPT

## 2023-03-12 PROCEDURE — 93306 TTE W/DOPPLER COMPLETE: CPT | Performed by: INTERNAL MEDICINE

## 2023-03-12 PROCEDURE — 85025 COMPLETE CBC W/AUTO DIFF WBC: CPT | Performed by: NURSE PRACTITIONER

## 2023-03-12 PROCEDURE — 63710000001 INSULIN GLARGINE PER 5 UNITS: Performed by: INTERNAL MEDICINE

## 2023-03-12 PROCEDURE — 25010000002 CAPECITABINE PER 500 MG: Performed by: INTERNAL MEDICINE

## 2023-03-12 PROCEDURE — 25010000002 CAPECITABINE PER 150 MG: Performed by: INTERNAL MEDICINE

## 2023-03-12 PROCEDURE — 83735 ASSAY OF MAGNESIUM: CPT | Performed by: NURSE PRACTITIONER

## 2023-03-12 PROCEDURE — 97166 OT EVAL MOD COMPLEX 45 MIN: CPT

## 2023-03-12 PROCEDURE — 93306 TTE W/DOPPLER COMPLETE: CPT

## 2023-03-12 RX ORDER — GLIPIZIDE 5 MG/1
10 TABLET ORAL
Status: DISCONTINUED | OUTPATIENT
Start: 2023-03-12 | End: 2023-03-13 | Stop reason: HOSPADM

## 2023-03-12 RX ORDER — CAPECITABINE 150 MG/1
750 TABLET, FILM COATED ORAL 2 TIMES DAILY
Status: COMPLETED | OUTPATIENT
Start: 2023-03-12 | End: 2023-03-12

## 2023-03-12 RX ORDER — LOSARTAN POTASSIUM 50 MG/1
100 TABLET ORAL DAILY
Status: DISCONTINUED | OUTPATIENT
Start: 2023-03-12 | End: 2023-03-13

## 2023-03-12 RX ORDER — ATORVASTATIN CALCIUM 40 MG/1
40 TABLET, FILM COATED ORAL NIGHTLY
Status: DISCONTINUED | OUTPATIENT
Start: 2023-03-12 | End: 2023-03-13 | Stop reason: HOSPADM

## 2023-03-12 RX ORDER — ENOXAPARIN SODIUM 100 MG/ML
40 INJECTION SUBCUTANEOUS DAILY
Status: DISCONTINUED | OUTPATIENT
Start: 2023-03-12 | End: 2023-03-13 | Stop reason: HOSPADM

## 2023-03-12 RX ORDER — PIOGLITAZONEHYDROCHLORIDE 45 MG/1
45 TABLET ORAL DAILY
Status: DISCONTINUED | OUTPATIENT
Start: 2023-03-12 | End: 2023-03-13 | Stop reason: HOSPADM

## 2023-03-12 RX ORDER — CAPECITABINE 500 MG/1
1000 TABLET, FILM COATED ORAL 2 TIMES DAILY
Status: COMPLETED | OUTPATIENT
Start: 2023-03-12 | End: 2023-03-12

## 2023-03-12 RX ADMIN — CAPECITABINE 1000 MG: 500 TABLET, FILM COATED ORAL at 20:00

## 2023-03-12 RX ADMIN — Medication 10 ML: at 20:01

## 2023-03-12 RX ADMIN — CAPECITABINE 750 MG: 150 TABLET, FILM COATED ORAL at 20:13

## 2023-03-12 RX ADMIN — EMPAGLIFLOZIN 25 MG: 25 TABLET, FILM COATED ORAL at 09:27

## 2023-03-12 RX ADMIN — ENOXAPARIN SODIUM 40 MG: 100 INJECTION SUBCUTANEOUS at 20:00

## 2023-03-12 RX ADMIN — ASPIRIN 325 MG: 325 TABLET ORAL at 09:27

## 2023-03-12 RX ADMIN — IOPAMIDOL 100 ML: 755 INJECTION, SOLUTION INTRAVENOUS at 16:52

## 2023-03-12 RX ADMIN — CAPECITABINE 1000 MG: 500 TABLET, FILM COATED ORAL at 10:29

## 2023-03-12 RX ADMIN — Medication 10 ML: at 09:27

## 2023-03-12 RX ADMIN — INSULIN LISPRO 2 UNITS: 100 INJECTION, SOLUTION INTRAVENOUS; SUBCUTANEOUS at 13:33

## 2023-03-12 RX ADMIN — GLIPIZIDE 10 MG: 5 TABLET ORAL at 09:27

## 2023-03-12 RX ADMIN — LOSARTAN POTASSIUM 100 MG: 50 TABLET, FILM COATED ORAL at 09:27

## 2023-03-12 RX ADMIN — ATORVASTATIN CALCIUM 40 MG: 40 TABLET, FILM COATED ORAL at 20:00

## 2023-03-12 RX ADMIN — INSULIN GLARGINE 10 UNITS: 100 INJECTION, SOLUTION SUBCUTANEOUS at 09:26

## 2023-03-12 RX ADMIN — SENNOSIDES AND DOCUSATE SODIUM 1 TABLET: 50; 8.6 TABLET ORAL at 10:37

## 2023-03-12 RX ADMIN — CAPECITABINE 750 MG: 150 TABLET, FILM COATED ORAL at 10:29

## 2023-03-12 RX ADMIN — PIOGLITAZONE 45 MG: 45 TABLET ORAL at 09:27

## 2023-03-12 NOTE — PLAN OF CARE
Goal Outcome Evaluation:  Plan of Care Reviewed With: patient        Progress: improving  Outcome Evaluation:     77 y.o. male with past medical history of diabetes mellitus, colon cancer, hyperlipidemia, hypertension who presented to [facility] on 3/11/2023 complaining of high blood sugar. Patient stated it has been 300-500 for the past 2 months. Patient has history of colon cancer, and had a colon resection on 12/15/2022. Patient stated he has been having gait problems since he fell on the night of 3/10/2023 while going to the bathroom. MRI was done right away upon admission & the results showed 7 mm focus of restricted diffusion in the left periventricular white matter of the posterior left frontal lobe, suspicious for an acute/subacute infarct, voiding loss and suspected chronic small vessel ischemic changes with probable small remote lacunar infarcts in the periventricular white matter, basal ganglia, and left cerebellum, and several small foci of susceptibility artifact suggesting chronic microhemorrhages in the setting of hypertension. Pt deficits are mild, and pt attributes his one fal lto his hyperglycemia. He is planning to start taking insulin daily now and will need to modify a few other habits at home for safety. May need to hold off returning to work until he recovers some more of his activity tolerance. He may benefit from use of a RW at home at times, and [facility] would benefit him at this time to ensure safe transition to home & to improve balance & medication mgmt. He has someone home almost all the time & other family state they are going to be checking on him very frequently.

## 2023-03-12 NOTE — PROGRESS NOTES
[facility] Medicine Services   Daily Progress Note      Patient Name: Vlad Guerrero  : 1945  MRN: [id number]  Primary Care Physician:  Batool Lin APRN  Date of admission: 3/11/2023      Subjective      Chief Complaint: High blood glucose    Patient seen and examined this morning.  States he has been having high blood glucose for past 2 months now, was [facility] and have his meds adjusted.  Does not take insulin at home but does take Trulicity once a week.  Treatment plan discussed regarding diabetes with start of insulin, he is in agreement.  MRI findings also discussed with him, awaiting neurology evaluation today.    Pertinent positives as noted in HPI/subjective.  All other systems were reviewed and are negative.      Objective      Vitals:   Temp:  [97.3 °F (36.3 °C)-99.3 °F (37.4 °C)] 97.3 °F (36.3 °C)  Heart Rate:  [74-95] 84  Resp:  [16-18] 16  BP: (107-143)/(51-78) 113/51    Physical Exam:    General: Awake, alert, NAD  Eyes: PERRL, EOMI, conjunctivae are clear  Cardiovascular: Regular rate and rhythm, no murmurs  Respiratory: Clear to auscultation bilaterally, no wheezing or rales, unlabored breathing  Abdomen: Soft, nontender, positive bowel sounds, no guarding  Neurologic: A&O, CN grossly intact, moves all extremities spontaneously  Musculoskeletal: Normal range of motion, no deformities  Skin: Warm, dry, intact         Result Review    Result Review:  I have personally reviewed the results from the time of this admission to 3/12/2023 14:20 EDT and agree with these findings:  [x]  Laboratory  [x]  Microbiology  [x]  Radiology  [x]  EKG/Telemetry   [x]  Cardiology/Vascular   []  Pathology  [x]  Old records  []  Other:          Assessment & Plan      Brief Patient Summary:  Vlad Guerrero is a 77 y.o. male with past medical history of diabetes mellitus, colon cancer, hyperlipidemia, hypertension who presented to [facility] on 3/11/2023 complaining of  high blood sugar.  Patient stated it has been 300-500 for the past 2 months.  Patient has history of colon cancer, and had a colon resection on 12/15/2022 by Dr. Davis, general surgery.  He is undergoing chemotherapy currently with Dr Kramer, oncology.  Patient is to have a follow-up scan on 3/17/2023.  Patient had some associated vomiting x2 and was having some gait problems since he fell on the night of 3/10/2023 while going to the bathroom.  Imaging findings in the ER with MRI brain noted to have 7 mm focus of restricted diffusion in the left periventricular white matter of the posterior left frontal lobe, suspicious for an acute/subacute infarct.  Started on stroke work-up with neurology consulted.      aspirin, 325 mg, Oral, Daily   Or  aspirin, 300 mg, Rectal, Daily  atorvastatin, 40 mg, Oral, Nightly  capecitabine, 1,000 mg, Oral, BID  capecitabine, 750 mg, Oral, BID  empagliflozin, 25 mg, Oral, Daily  glipizide, 10 mg, Oral, QAM AC  insulin glargine, 10 Units, Subcutaneous, QAM  insulin lispro, 2-9 Units, Subcutaneous, 4x Daily With Meals & Nightly  losartan, 100 mg, Oral, Daily  pioglitazone, 45 mg, Oral, Daily  sodium chloride, 10 mL, Intravenous, Q12H             I have utilized all available, immediate resources to obtain, update, or review the patient's current medications including all prescriptions, over-the-counter products, herbals, cannabis/cannabidiol products, and vitamin.mineral/dietary (nutritional) supplements.    Active Hospital Problems:  Active Hospital Problems    Diagnosis    • **Acute CVA (cerebrovascular accident) (HCC)    • Thrombocytopenia (HCC)    • Malignant neoplasm of ascending colon (HCC)    • Primary hypertension    • Mixed hyperlipidemia    • Microcytic anemia    • Type 2 diabetes mellitus (HCC)    • Benign essential tremor      Plan:     Acute CVA  -MRI brain findings noted  -Patient relatively asymptomatic, NIHSS 0  -Not anticoagulated previously  -Continue on aspirin  and statin  -Echo findings noted  -PT/OT  -Neurology consult    DM2, uncontrolled  -A1c of 11 noted, previously around 7  -Continue home p.o. meds  -Basal insulin with sliding scale added  -Monitor blood glucose, adjust if needed  -Diabetic educator for insulin teaching    Abnormal echo  -Echo shows EF of 40 to 45% with grade 1 diastolic dysfunction  -Patient does not appear to be decompensated at this time  -Continue outpatient follow-up with cardiology    Hypertension  -Continue home meds, monitor    Colon cancer  -s/p resection, under chemotherapy currently  -Continue home meds, continue outpatient follow-up with oncology    DVT prophylaxis  -Lovenox    CODE STATUS:    Level Of Support Discussed With: Patient  Code Status (Patient has no pulse and is not breathing): CPR (Attempt to Resuscitate)  Medical Interventions (Patient has pulse or is breathing): Full Support    Next of kin of Power of :   I confirmed that the patient's Advanced Care Plan is present, code status is documented, or surrogate decision maker is listed in the patient's medical record: YES    Disposition: Home once cleared by neurology    Electronically signed by Jaime Smith DO, 03/12/23, 14:20 EDT.  [facility] Hospitalist Team      Part of this note may be an electronic transcription/translation of spoken language to printed text using the Dragon Dictation System.

## 2023-03-12 NOTE — H&P
[facility] Medicine Services  History & Physical    Patient Name: Vlad Guerrero  : 1945  MRN: [id number]  Primary Care Physician:  Batool Lin APRN  Date of admission: 3/11/2023  Date and Time of Service: 3/11/2023 at 2238    Subjective      Chief Complaint: High blood sugar    History of Present Illness: Vlad Guerrero is a 77 y.o. male with past medical history of diabetes mellitus, colon cancer, hyperlipidemia, hypertension who presented to [facility] on 3/11/2023 complaining of high blood sugar.  Patient stated it has been 300-500 for the past 2 months.  Patient has history of colon cancer, and had a colon resection on 12/15/2022 by Dr. Davis, general surgery.  He is undergoing chemotherapy currently with Dr Kramer, oncology.  Patient is to have a follow-up scan on 3/17/2023. He came in today because of the persisting elevated blood glucose.  He vomited x2 last night.  Patient denies hematemesis, diarrhea, fever, chills, chest pain, shortness of air, headache, speech problems, focal weakness.  Patient stated he has been having gait problems since he fell on the night of 3/10/2023 while going to the bathroom.  Patient denies injury, hitting head, loss of consciousness.  Patient stated his neck and shoulders felt heavy before he fell.    In the ED, MRI brain showed 7 mm focus of restricted diffusion in the left periventricular white matter of the posterior left frontal lobe, suspicious for an acute/subacute infarct; voiding loss and suspected chronic small vessel ischemic changes with probable small remote lacunar infarcts in the periventricular white matter, basal ganglia, and left cerebellum; several small foci of susceptibility artifact suggesting chronic microhemorrhages which may be seen with hypertension; no definite enhancing mass is identified.  All labs unremarkable except INR 1.38, hemoglobin 8.9, hematocrit 28.8, platelets 103, glucose 183.  All vital signs  unremarkable.  Patient received aspirin, IV fluid bolus in the ED.  Patient admitted to hospitalist service for further evaluation and treatment.    Review of Systems   Constitutional: Positive for malaise/fatigue.   HENT: Negative.    Eyes: Negative.    Cardiovascular: Negative.    Respiratory: Negative.    Endocrine: Negative.         Elevated blood glucose   Skin: Negative.    Musculoskeletal: Positive for falls.   Gastrointestinal: Positive for vomiting. Negative for abdominal pain, diarrhea and nausea.   Genitourinary: Negative.    Neurological: Positive for loss of balance and weakness. Negative for dizziness, focal weakness and headaches.   Psychiatric/Behavioral: Negative.    Allergic/Immunologic: Negative.         Personal History     Past Medical History:   Diagnosis Date   • Anemia    • Colon cancer 2022    colon   • Diabetes mellitus (HCC)    • Hyperlipidemia    • Hypertension        Past Surgical History:   Procedure Laterality Date   • APPENDECTOMY     • CARDIAC CATHETERIZATION     • COLON RESECTION N/A 12/15/2022    Procedure: COLON RESECTION RIGHT;  Surgeon: Percy Davis MD;  Location: [facility] MAIN OR;  Service: General;  Laterality: N/A;   • COLONOSCOPY N/A 11/11/2022    Procedure: COLONOSCOPY WITH BIOPSY AND POLYPECTOMY;  Surgeon: Billy Julien MD;  Location: [facility] ENDOSCOPY;  Service: Gastroenterology;  Laterality: N/A;  Impression:  1.  Large 4-5cm fulgurating circumferential ulcerated mass in the very proximal part of the ascending colon next to ileocecal valve multiple biopsies were performed.  This is highly concerning for colon malignancy.  2.  2 polyp rem   • ENDOSCOPY N/A 11/11/2022    Procedure: ESOPHAGOGASTRODUODENOSCOPY with biopsy X1;  Surgeon: Billy Julien MD;  Location: [facility] ENDOSCOPY;  Service: Gastroenterology;  Laterality: N/A;  5.  Upper endoscopy lamination unremarkable.      • PORTACATH PLACEMENT Right 1/12/2023    Procedure: INSERTION OF PORTACATH;  Surgeon:  Percy Davis MD;  Location: [facility] MAIN OR;  Service: General;  Laterality: Right;   • TONSILLECTOMY         Family History: family history includes Colon cancer (age of onset: 62) in his father; Heart disease in his sister; Pneumonia (age of onset: 94) in his mother. Otherwise pertinent FHx was reviewed and not pertinent to current issue.    Social History:  reports that he quit smoking about 57 years ago. His smoking use included cigarettes. He started smoking about 59 years ago. He has a 2.00 pack-year smoking history. He has never used smokeless tobacco. He reports that he does not currently use alcohol. He reports that he does not use drugs.    Home Medications:  Prior to Admission Medications     Prescriptions Last Dose Informant Patient Reported? Taking?    capecitabine (XELODA) 150 MG chemo tablet   No No    Take 5 tablets by mouth (with 1 other capecitabine Rx) for 1,750 mg total 2 (Two) Times a Day on Days 1-14, then off for 7 days. Total dose is 1750mg in the AM & 1750mg in the PM.    capecitabine (XELODA) 500 MG chemo tablet   No No    Take 2 tablets by mouth (with 1 other capecitabine prescription) for 1,750 mg total 2 (Two) Times a Day on Days 1-14, then off for 7 days.  Total dose is 1750mg in the AM & 1750mg in the PM.    empagliflozin (JARDIANCE) 25 MG tablet tablet   Yes No    Take 25 mg by mouth Daily. Dont take preop    ferrous gluconate (FERGON) 324 MG tablet   No No    Take 1 tablet by mouth Daily With Breakfast.    glimepiride (AMARYL) 4 MG tablet   Yes No    Take 1 tablet by mouth 2 (Two) Times a Day. None preop    losartan (COZAAR) 100 MG tablet   Yes No    Take 100 mg by mouth Daily.    metFORMIN (GLUCOPHAGE) 1000 MG tablet   Yes No    Take 2,000 mg by mouth Daily With Dinner. Last dose 12/12    ondansetron (ZOFRAN) 8 MG tablet   No No    Take 1 tablet by mouth 3 (Three) Times a Day As Needed for Nausea or Vomiting.    pioglitazone (ACTOS) 45 MG tablet   Yes No    Take 45 mg by  mouth Daily. None preop    pravastatin (PRAVACHOL) 40 MG tablet   Yes No    Take 1 tablet by mouth Every Night.    Semaglutide,0.25 or 0.5MG/DOS, (Ozempic, 0.25 or 0.5 MG/DOSE,) 2 MG/1.5ML solution pen-injector   Yes No    Inject 0.25 mg under the skin into the appropriate area as directed As Needed. friday            Allergies:  Allergies   Allergen Reactions   • Penicillins Rash       Objective      Vitals:   Temp:  [98.9 °F (37.2 °C)-99.3 °F (37.4 °C)] 98.9 °F (37.2 °C)  Heart Rate:  [74-95] 75  Resp:  [16-18] 16  BP: (107-139)/(62-78) 124/65    Physical Exam  Vitals and nursing note reviewed.   Constitutional:       Appearance: Normal appearance.   HENT:      Head: Normocephalic.      Right Ear: External ear normal.      Left Ear: External ear normal.      Nose: Nose normal.      Mouth/Throat:      Pharynx: Oropharynx is clear.   Eyes:      Extraocular Movements: Extraocular movements intact.   Cardiovascular:      Rate and Rhythm: Normal rate and regular rhythm.      Pulses: Normal pulses.      Heart sounds: Normal heart sounds.   Pulmonary:      Effort: Pulmonary effort is normal.      Breath sounds: Normal breath sounds.   Abdominal:      General: Bowel sounds are normal.      Palpations: Abdomen is soft.   Musculoskeletal:         General: Normal range of motion.      Cervical back: Normal range of motion.   Skin:     General: Skin is warm and dry.      Findings: Lesion present.      Comments: Scab to right knee   Neurological:      Mental Status: He is alert and oriented to person, place, and time.      Comments: Left hand tremors (chronic)   Psychiatric:         Mood and Affect: Mood normal.         Behavior: Behavior normal.         Result Review    Result Review:  I have personally reviewed the results from the time of this admission to 3/12/2023 00:43 EST and agree with these findings:  [x]  Laboratory  []  Microbiology  [x]  Radiology  [x]  EKG/Telemetry   []  Cardiology/Vascular   []  Pathology  []   Old records  []  Other:  Most notable findings include: as above      Assessment & Plan        Active Hospital Problems:  Active Hospital Problems    Diagnosis    • **Acute CVA (cerebrovascular accident) (HCC)    • Thrombocytopenia (HCC)    • Malignant neoplasm of ascending colon (HCC)    • Primary hypertension    • Mixed hyperlipidemia    • Microcytic anemia    • Type 2 diabetes mellitus (HCC)    • Benign essential tremor      Plan:     Acute CVA (cerebrovascular accident)   -MRI brain showed 7 mm focus of restricted diffusion in the left periventricular white matter of the posterior left frontal lobe, suspicious for an acute/subacute infarct; voiding loss and suspected chronic small vessel ischemic changes with probable small remote lacunar infarcts in the periventricular white matter, basal ganglia, and left cerebellum; several small foci of susceptibility artifact suggesting chronic microhemorrhages which may be seen with hypertension; no definite enhancing mass is identified  -EKG ordered  -Neurochecks  -NIHSS assessment  -PT/OT/SLP consult  -Echo ordered  -Aspirin given in the ED, continue  -Neurology consult    Primary hypertension  Mixed hyperlipidemia  -BP controlled    Type 2 diabetes mellitus  -Accu-Cheks and sliding scale insulin    Microcytic anemia  -hemoglobin 8.9, baseline  -hematocrit 28.8, baseline    Thrombocytopenia  -platelets 103, baseline    Malignant neoplasm of ascending colon  -Currently undergoing chemotherapy  -Managed by Dr. Kramer     Benign essential tremor      DVT prophylaxis:  Mechanical DVT prophylaxis orders are present.    CODE STATUS:    Level Of Support Discussed With: Patient  Code Status (Patient has no pulse and is not breathing): CPR (Attempt to Resuscitate)  Medical Interventions (Patient has pulse or is breathing): Full Support    Admission Status:  I believe this patient meets inpatient status.    I discussed the patient's findings and my recommendations with  patient.    This patient has been examined wearing appropriate Personal Protective Equipment. 03/12/23      Signature: Electronically signed by ANEUDY Mata, 03/12/23, 00:43 EST.  [facility] Hospitalist Team

## 2023-03-12 NOTE — PLAN OF CARE
Goal Outcome Evaluation:            Pt admitted with falls, weakness, and complaint of vomiting and hyperglycemia. Mri showed acute infarct.  Pt aaox4, vss, no complaints of pain or discomfort, NIH is 0.  Will have echo today.

## 2023-03-12 NOTE — CONSULTS
"Primary Care Provider: Batool Lin APRN     Consult requested by: Admitting team    Reason for Consultation: Neurological evaluation/abnormal MRI brain  History taken from: patient chart RN    Chief complaint: Hyperglycemia       SUBJECTIVE:    History of present illness: Background per H&P: Vlad Guerrero is a 77 y.o. year old male who was evaluated in room 259 at [facility]    Source of information is the patient and the medical records    This gentleman presented with hypoglycemia but he reported that he had fallen, nothing suggesting focal weakness or loss of consciousness etc. and because of all that he ended up with full work-up obviously, and his MRI demonstrated,    \"1.7 mm focus of restricted diffusion in the left periventricular white matter of the posterior left frontal lobe, suspicious for an acute/subacute infarct.   2.Voiding loss and suspected chronic small vessel ischemic changes with probable small remote lacunar infarcts in the periventricular white matter, basal ganglia, and left cerebellum.   3.Several small foci of susceptibility artifact suggesting chronic microhemorrhages which may be seen with hypertension.   4.No definite enhancing mass is identified.  \"    He is asymptomatic    He does have risk factors including hypertension and diabetes  He has very low B12 in the past    This is acute stroke with that means it must be acute to the last several weeks and obviously is not alteplase or intervention candidate    He does have significant chronic small vessel type changes and this is a single acute lesion and not classic embolic type disease    He is clinically asymptomatic    As per admitting,  Vlad Guerrero is a 77 y.o. male with past medical history of diabetes mellitus, colon cancer, hyperlipidemia, hypertension who presented to [facility] on 3/11/2023 complaining of high blood sugar.  Patient stated it has been 300-500 for the past 2 months.  Patient has history of " colon cancer, and had a colon resection on 12/15/2022 by Dr. Davis, general surgery.  He is undergoing chemotherapy currently with Dr Kramer, oncology.  Patient is to have a follow-up scan on 3/17/2023. He came in today because of the persisting elevated blood glucose.  He vomited x2 last night.  Patient denies hematemesis, diarrhea, fever, chills, chest pain, shortness of air, headache, speech problems, focal weakness.  Patient stated he has been having gait problems since he fell on the night of 3/10/2023 while going to the bathroom.  Patient denies injury, hitting head, loss of consciousness.  Patient stated his neck and shoulders felt heavy before he fell.     In the ED, MRI brain showed 7 mm focus of restricted diffusion in the left periventricular white matter of the posterior left frontal lobe, suspicious for an acute/subacute infarct; voiding loss and suspected chronic small vessel ischemic changes with probable small remote lacunar infarcts in the periventricular white matter, basal ganglia, and left cerebellum; several small foci of susceptibility artifact suggesting chronic microhemorrhages which may be seen with hypertension; no definite enhancing mass is identified.  All labs unremarkable except INR 1.38, hemoglobin 8.9, hematocrit 28.8, platelets 103, glucose 183.  All vital signs unremarkable.  Patient received aspirin, IV fluid bolus in the ED.  Patient admitted to hospitalist service for further evaluation and treatment.           - Portions of the above HPI were copied from previous encounters and edited as appropriate. PMH as detailed below.     Review of Systems   No fever chills rigors or sweats  No weight issues  No sleep problems  HEENT:  No speech problem, vision changes, facial asymmetry or pain, or neck problem  Chest: No chest pain, clubbing, cyanosis, orthopnea palpitations  Pulmonary:  No shortness of air, cough or expectoration  Abdomen:  No swelling/tension, constipation,diarrhea  or pain, has colon cancer though  No genitourinary symptoms  Extremity problems as discussed  No back problem  No psychotic issues  Neurologic issues as discussed  No hematologic, dermatologic or endocrine problems has colon cancer and is undergoing chemotherapy, also has diabetic        PATIENT HISTORY:  Past Medical History:   Diagnosis Date   • Anemia    • Colon cancer 2022    colon   • Diabetes mellitus (HCC)    • Hyperlipidemia    • Hypertension    ,   Past Surgical History:   Procedure Laterality Date   • APPENDECTOMY     • CARDIAC CATHETERIZATION     • COLON RESECTION N/A 12/15/2022    Procedure: COLON RESECTION RIGHT;  Surgeon: Percy Davis MD;  Location: [facility] MAIN OR;  Service: General;  Laterality: N/A;   • COLONOSCOPY N/A 11/11/2022    Procedure: COLONOSCOPY WITH BIOPSY AND POLYPECTOMY;  Surgeon: Billy Julien MD;  Location: [facility] ENDOSCOPY;  Service: Gastroenterology;  Laterality: N/A;  Impression:  1.  Large 4-5cm fulgurating circumferential ulcerated mass in the very proximal part of the ascending colon next to ileocecal valve multiple biopsies were performed.  This is highly concerning for colon malignancy.  2.  2 polyp rem   • ENDOSCOPY N/A 11/11/2022    Procedure: ESOPHAGOGASTRODUODENOSCOPY with biopsy X1;  Surgeon: Billy Julien MD;  Location: [facility] ENDOSCOPY;  Service: Gastroenterology;  Laterality: N/A;  5.  Upper endoscopy lamination unremarkable.      • PORTACATH PLACEMENT Right 1/12/2023    Procedure: INSERTION OF PORTACATH;  Surgeon: Percy Davis MD;  Location: [facility] MAIN OR;  Service: General;  Laterality: Right;   • TONSILLECTOMY     ,   Family History   Problem Relation Age of Onset   • Pneumonia Mother 94        SARS-CoV2   • Colon cancer Father 62   • Heart disease Sister    ,   Social History     Tobacco Use   • Smoking status: Former     Packs/day: 1.00     Years: 2.00     Pack years: 2.00     Types: Cigarettes     Start date: 1964     Quit date: 1966      Years since quittin.2   • Smokeless tobacco: Never   Vaping Use   • Vaping Use: Never used   Substance Use Topics   • Alcohol use: Not Currently   • Drug use: Never   ,   Prior to Admission medications    Medication Sig Start Date End Date Taking? Authorizing Provider   capecitabine (XELODA) 150 MG chemo tablet Take 5 tablets by mouth (with 1 other capecitabine Rx) for 1,750 mg total 2 (Two) Times a Day on Days 1-14, then off for 7 days. Total dose is 1750mg in the AM & 1750mg in the PM. 23   Don Kramer MD   capecitabine (XELODA) 500 MG chemo tablet Take 2 tablets by mouth (with 1 other capecitabine prescription) for 1,750 mg total 2 (Two) Times a Day on Days 1-14, then off for 7 days.  Total dose is 1750mg in the AM & 1750mg in the PM. 23   Don Kramer MD   empagliflozin (JARDIANCE) 25 MG tablet tablet Take 25 mg by mouth Daily. Dont take preop 22   Rolly Jason MD   ferrous gluconate (FERGON) 324 MG tablet Take 1 tablet by mouth Daily With Breakfast. 23   Cindy Elmore APRN   glimepiride (AMARYL) 4 MG tablet Take 1 tablet by mouth 2 (Two) Times a Day. None preop 22   Rolly Jason MD   losartan (COZAAR) 100 MG tablet Take 100 mg by mouth Daily. 22   Rolly Jason MD   metFORMIN (GLUCOPHAGE) 1000 MG tablet Take 2,000 mg by mouth Daily With Dinner. Last dose 22   Rolly Jason MD   ondansetron (ZOFRAN) 8 MG tablet Take 1 tablet by mouth 3 (Three) Times a Day As Needed for Nausea or Vomiting. 23   Cindy Elmore APRN   pioglitazone (ACTOS) 45 MG tablet Take 45 mg by mouth Daily. None preop 22   Rolly Jason MD   pravastatin (PRAVACHOL) 40 MG tablet Take 1 tablet by mouth Every Night. 22   Rolly Jason MD   Semaglutide,0.25 or 0.5MG/DOS, (Ozempic, 0.25 or 0.5 MG/DOSE,) 2 MG/1.5ML solution pen-injector Inject 0.25 mg under the skin into the appropriate area as directed As Needed. friday     Provider, Historical, MD    Allergies:  Penicillins    Current Facility-Administered Medications   Medication Dose Route Frequency Provider Last Rate Last Admin   • acetaminophen (TYLENOL) tablet 650 mg  650 mg Oral Q4H PRN Alsop, Wen L, APRN        Or   • acetaminophen (TYLENOL) 160 MG/5ML solution 650 mg  650 mg Oral Q4H PRN Alsop, Wen L, APRN        Or   • acetaminophen (TYLENOL) suppository 650 mg  650 mg Rectal Q4H PRN Alsop, Wen L, APRN       • aluminum-magnesium hydroxide-simethicone (MAALOX MAX) 400-400-40 MG/5ML suspension 15 mL  15 mL Oral Q6H PRN Alsop, Wen L, APRN       • aspirin tablet 325 mg  325 mg Oral Daily Alsop, Wen L, APRN   325 mg at 03/12/23 0927    Or   • aspirin suppository 300 mg  300 mg Rectal Daily Alsop, Wen L, APRN       • atorvastatin (LIPITOR) tablet 40 mg  40 mg Oral Nightly Jaime Smith, DO       • calcium carbonate (TUMS) chewable tablet 500 mg (200 mg elemental)  2 tablet Oral BID PRN Alsop, Wen L, APRN       • capecitabine (XELODA) chemo tablet 1,000 mg  1,000 mg Oral BID Sudeep Smithlaram, DO   1,000 mg at 03/12/23 1029   • capecitabine (XELODA) chemo tablet 750 mg  750 mg Oral BID Sudeep Smithlaram, DO   750 mg at 03/12/23 1029   • dextrose (D50W) (25 g/50 mL) IV injection 25 g  25 g Intravenous Q15 Min PRN Alsop, Wen L, APRN       • dextrose (GLUTOSE) oral gel 15 g  15 g Oral Q15 Min PRN Alsop, Wen L, APRN       • empagliflozin (JARDIANCE) tablet 25 mg  25 mg Oral Daily Sudeep Smithlaram, DO   25 mg at 03/12/23 0927   • Enoxaparin Sodium (LOVENOX) syringe 40 mg  40 mg Subcutaneous Daily Vicki Smithram, DO       • glipizide (GLUCOTROL) tablet 10 mg  10 mg Oral QAM AC Vicki Smithram DO   10 mg at 03/12/23 0927   • glucagon (human recombinant) (GLUCAGEN DIAGNOSTIC) 1 mg in sterile water (preservative free) 1 mL injection  1 mg Intramuscular Q15 Min PRN Alsop, Wen L, APRN       • insulin glargine (LANTUS, SEMGLEE) injection 10 Units  10 Units Subcutaneous REJI Smith  Jalaram, DO   10 Units at 03/12/23 0926   • insulin lispro (ADMELOG) injection 2-9 Units  2-9 Units Subcutaneous 4x Daily With Meals & Nightly Alsop, Wen L, APRN   2 Units at 03/12/23 1333   • losartan (COZAAR) tablet 100 mg  100 mg Oral Daily Jaime Smith, DO   100 mg at 03/12/23 0927   • Magnesium Sulfate - Total Dose 10 grams - Magnesium 1 or Less  2 g Intravenous PRN Alsop, Wen L, APRN        Or   • Magnesium Sulfate - Total Dose 6 grams - Magnesium 1.1 - 1.5  2 g Intravenous PRN Alsop, Wen L, APRN        Or   • Magnesium Sulfate - Total Dose 4 grams - Magnesium 1.6 - 1.9  4 g Intravenous PRN Alsop, Wen L, APRN       • nitroglycerin (NITROSTAT) SL tablet 0.4 mg  0.4 mg Sublingual Q5 Min PRN Alsop, Wen L, APRN       • ondansetron (ZOFRAN) tablet 4 mg  4 mg Oral Q6H PRN Alsop, Wen L, APRN        Or   • ondansetron (ZOFRAN) injection 4 mg  4 mg Intravenous Q6H PRN Alsop, Wen L, APRN       • ondansetron (ZOFRAN) injection 4 mg  4 mg Intravenous Q6H PRN Alsop, Wen L, APRN       • pioglitazone (ACTOS) tablet 45 mg  45 mg Oral Daily Jaime Smith, DO   45 mg at 03/12/23 0927   • potassium chloride (K-DUR,KLOR-CON) CR tablet 40 mEq  40 mEq Oral PRN Alsop, Wen L, APRN       • potassium chloride (KLOR-CON) packet 40 mEq  40 mEq Oral PRN Alsop, Wen L, APRN       • sennosides-docusate (PERICOLACE) 8.6-50 MG per tablet 1 tablet  1 tablet Oral Nightly PRN Alsop, Wen L, APRN   1 tablet at 03/12/23 1037   • sodium chloride 0.9 % flush 10 mL  10 mL Intravenous PRN Rudy Gamez, DO       • sodium chloride 0.9 % flush 10 mL  10 mL Intravenous Q12H Alsop, Wen L, APRN   10 mL at 03/12/23 0927   • sodium chloride 0.9 % flush 10 mL  10 mL Intravenous PRN Wen Perkins APRN       • sodium chloride 0.9 % infusion 40 mL  40 mL Intravenous PRN Wen Perkins APRN            ________________________________________________________        OBJECTIVE:  Upon today's exam, the gentleman is resting  comfortably in bed in no acute distress     Neurologic Exam    The patient is awake and alert and oriented x3     Cranial nerve examination demonstrate:  Full fields of vision to confrontation  Pupils are round, reactive to light and accommodation and size of about 3 mm  No ptosis or nystagmus  Funduscopic examination was not successful  Eye movements are conjugate     Sensation on the face and scalp are normal  Muscles of mastication are normal and symmetric  Muscles of  facial expression are normal and symmetric  Hearing is intact bilaterally  Head turning and shoulder shrugs were unremarkable  Tongue was midline  I could not visualize her oropharynx or uvula     Motor examination:  Normal bulk, tone and strength was 5/5  No fasciculations     Sensory examination:  Intact for soft touch, pain and position sensation  Romberg was not evaluated     Reflexes:  0/4     Coordination:  Normal finger-to-nose to finger, rapid alternating movements and toe to finger     Gait:  Deferred     Toe signs:  M[place]    ________________________________________________________   RESULTS REVIEW:    VITAL SIGNS:   Temp:  [97.3 °F (36.3 °C)-99.3 °F (37.4 °C)] 97.3 °F (36.3 °C)  Heart Rate:  [74-95] 84  Resp:  [16-18] 16  BP: (107-143)/(51-78) 113/51     LABS:      Lab 03/12/23 0325 03/11/23 1844 03/11/23 1755 03/06/23  0802   WBC 3.80  --  3.30* 3.24*   HEMOGLOBIN 9.3*  --  8.9* 8.4*   HEMATOCRIT 29.4*  --  28.8* 27.9*   PLATELETS 114*  --  103* 113*   NEUTROS ABS 2.10  --  2.10 1.52*   LYMPHS ABS 1.20  --  0.80 1.10   MONOS ABS 0.40  --  0.30 0.50   EOS ABS 0.00  --  0.00 0.10   MCV 75.4*  --  77.9* 80.2   PROTIME  --  14.0*  --   --    APTT  --  <20.0*  --   --          Lab 03/12/23 0325 03/11/23  1844 03/11/23  1755   SODIUM 141  --  139   POTASSIUM 3.9  --  4.5   CHLORIDE 105  --  103   CO2 25.0  --  25.0   ANION GAP 11.0  --  11.0   BUN 27*  --  27*   CREATININE 1.12  --  1.11   EGFR 68.5  --  69.2   GLUCOSE 152*  --  183*    CALCIUM 8.9  --  9.2   MAGNESIUM 2.0 2.0  --    HEMOGLOBIN A1C  --   --  11.00*   TSH  --   --  1.260         Lab 03/11/23  1755   TOTAL PROTEIN 6.5   ALBUMIN 3.9   GLOBULIN 2.6   ALT (SGPT) 14   AST (SGOT) 16   BILIRUBIN 1.1   ALK PHOS 79         Lab 03/12/23  0325 03/11/23  1844 03/11/23  1755   HSTROP T 30*  --  27*   PROTIME  --  14.0*  --    INR  --  1.38*  --          Lab 03/11/23  1755   CHOLESTEROL 119   LDL CHOL 46   HDL CHOL 53   TRIGLYCERIDES 107                 Lab Results   Component Value Date    TSH 1.260 03/11/2023    LDL 46 03/11/2023    HGBA1C 11.00 (H) 03/11/2023    [id number] 178 (L) 02/20/2023       IMAGING STUDIES:  MRI Brain With & Without Contrast    Result Date: 3/11/2023  Impression: 1.7 mm focus of restricted diffusion in the left periventricular white matter of the posterior left frontal lobe, suspicious for an acute/subacute infarct. 2.Voiding loss and suspected chronic small vessel ischemic changes with probable small remote lacunar infarcts in the periventricular white matter, basal ganglia, and left cerebellum. 3.Several small foci of susceptibility artifact suggesting chronic microhemorrhages which may be seen with hypertension. 4.No definite enhancing mass is identified. Electronically Signed: Andrea Kirk  3/11/2023 8:30 PM EST  Workstation ID: [id number] [employer]      I reviewed the patient's new clinical results.    ________________________________________________________     PROBLEM LIST:    Acute CVA (cerebrovascular accident) (HCC)    Microcytic anemia    Primary hypertension    Mixed hyperlipidemia    Malignant neoplasm of ascending colon (HCC)    Benign essential tremor    Type 2 diabetes mellitus (HCC)    Thrombocytopenia (HCC)            ASSESSMENT/PLAN:  This is very interesting 77-year-old gentleman who came here for hyperglycemia but he had incidental small stroke, deep white matter single small vessel type acute infarct with no hemorrhage which is asymptomatic    I  will have to do stroke work-up    I agree with aspirin and Lipitor    Not does not look like intervention candidate and of course not tPA candidate    Modification of stroke risk factors:   - Blood pressure should be less than 130/80 outpatient, HbA1c less than 6.5, LDL less than 70; b12>500 and smoking cessation if applicable. We would be grateful if the primary team / primary care physician would keep a close watch on the above targets.  - Stroke education  - Follow up with neurologist of choice      I discussed the patient's findings and my recommendations with patient and nursing staff    Brayan Brunson MD  03/12/23  14:40 EDT

## 2023-03-12 NOTE — THERAPY EVALUATION
Patient Name: Vlad Guerrero  : 1945    MRN: [id number]                              Today's Date: 3/12/2023       Admit Date: 3/11/2023    Visit Dx:     ICD-10-CM ICD-9-CM   1. Acute CVA (cerebrovascular accident) (HCC)  I63.9 434.91     Patient Active Problem List   Diagnosis   • Microcytic anemia   • Primary hypertension   • Mixed hyperlipidemia   • Malignant neoplasm of ascending colon (HCC)   • Acute CVA (cerebrovascular accident) (HCC)   • Benign essential tremor   • Type 2 diabetes mellitus (HCC)   • Thrombocytopenia (HCC)     Past Medical History:   Diagnosis Date   • Anemia    • Colon cancer     colon   • Diabetes mellitus (HCC)    • Hyperlipidemia    • Hypertension      Past Surgical History:   Procedure Laterality Date   • APPENDECTOMY     • CARDIAC CATHETERIZATION     • COLON RESECTION N/A 12/15/2022    Procedure: COLON RESECTION RIGHT;  Surgeon: Percy Davis MD;  Location: [facility] MAIN OR;  Service: General;  Laterality: N/A;   • COLONOSCOPY N/A 2022    Procedure: COLONOSCOPY WITH BIOPSY AND POLYPECTOMY;  Surgeon: Billy Julien MD;  Location: [facility] ENDOSCOPY;  Service: Gastroenterology;  Laterality: N/A;  Impression:  1.  Large 4-5cm fulgurating circumferential ulcerated mass in the very proximal part of the ascending colon next to ileocecal valve multiple biopsies were performed.  This is highly concerning for colon malignancy.  2.  2 polyp rem   • ENDOSCOPY N/A 2022    Procedure: ESOPHAGOGASTRODUODENOSCOPY with biopsy X1;  Surgeon: Billy Julien MD;  Location: [facility] ENDOSCOPY;  Service: Gastroenterology;  Laterality: N/A;  5.  Upper endoscopy lamination unremarkable.      • PORTACATH PLACEMENT Right 2023    Procedure: INSERTION OF PORTACATH;  Surgeon: Percy Davis MD;  Location: [facility] MAIN OR;  Service: General;  Laterality: Right;   • TONSILLECTOMY        General Information     Row Name 23 1106          General Information    Prior Level  of Function independent:;ADL's;driving;work;community mobility  Pt had gone back to work since colon resection in Dec but had not had good activity tolerance for it. Son via phone was unsure if pt had cut back or taken off entirely from work, but he does plan to return to work when able.  -     Existing Precautions/Restrictions fall  -     Barriers to Rehab medically complex  -     Row Name 03/12/23 1106          Living Environment    People in Home other (see comments)  chart review reports either aunt or adult child living in home, upstairs, while pt resides downstairs. OT spoke w/ son via phone call who reports aunt there all the time & dtr staying there a lot.  -     Row Name 03/12/23 1106          Home Main Entrance    Number of Stairs, Main Entrance one  -     Row Name 03/12/23 1106          Stairs Within Home, Primary    Number of Stairs, Within Home, Primary none  does not need to go upstairs  -     Row Name 03/12/23 1106          Cognition    Orientation Status (Cognition) oriented x 4  Pt scores 14/15 on the BIMS indicating grossly normal cognition.  -     Row Name 03/12/23 1106          Safety Issues, Functional Mobility    Impairments Affecting Function (Mobility) balance;endurance/activity tolerance  -           User Key  (r) = Recorded By, (t) = Taken By, (c) = Cosigned By    Initials Name Provider Type     Mary Bautista, OT Occupational Therapist                 Mobility/ADL's     Row Name 03/12/23 1118          Bed Mobility    Bed Mobility bed mobility (all) activities  -     All Activities, [place] (Bed Mobility) modified independence  -     Assistive Device (Bed Mobility) head of bed elevated  -     Row Name 03/12/23 1118          Transfers    Transfers sit-stand transfer;stand-sit transfer  -     Row Name 03/12/23 1118          Sit-Stand Transfer    Sit-Stand [place] (Transfers) independent  -     Row Name 03/12/23 1118          Stand-Sit Transfer     Stand-Sit [place] (Transfers) independent  -[facility] Name 03/12/23 1118          Functional Mobility    Functional Mobility- Ind. Level independent  -     Functional Mobility- Comment Pt walked back & forth across the room with no LOB but with some increased lateral sway and trunk stiffness and shortened step length. Completed 7 sit<>stands EOB in 30 seconds, indicating low fall risk. May benefit from use of RW though pt appears unlikely to use one. Pt had one fall at home which he is attributing to lightheadedness in the conext of very high blood sugar.  -[facility] Name 03/12/23 1118          Activities of Daily Living    BADL Assessment/Intervention lower body dressing;grooming  -MH     Row Name 03/12/23 1118          Lower Body Dressing Assessment/Training    [place] Level (Lower Body Dressing) don;pants/bottoms;modified independence  -     Position (Lower Body Dressing) sitting up in bed  -MH     Row Name 03/12/23 1118          Grooming Assessment/Training    [place] Level (Grooming) wash face, hands  -     Position (Grooming) sitting up in bed  -           User Key  (r) = Recorded By, (t) = Taken By, (c) = Cosigned By    Initials Name Provider Type     Mary Bautista OT Occupational Therapist               Obj/Interventions     [facility] Name 03/12/23 1122          Sensory Assessment (Somatosensory)    Sensory Assessment (Somatosensory) sensation intact  -MH     Row Name 03/12/23 1122          Vision Assessment/Intervention    Visual Impairment/Limitations corrective lenses full-time  -MH     Row Name 03/12/23 1122          Range of Motion Comprehensive    Comment, General Range of Motion shld flex 75%, stiff.  -MH     Row Name 03/12/23 1122          Strength Comprehensive (MMT)    General Manual Muscle Testing (MMT) Assessment no strength deficits identified  -MH     Row Name 03/12/23 1122          Balance    Balance Assessment sitting static balance;sitting dynamic balance;standing  static balance;standing dynamic balance  -MH     Static Sitting Balance independent  -MH     Dynamic Sitting Balance independent  -MH     Static Standing Balance independent  -MH     Dynamic Standing Balance modified independence  -           User Key  (r) = Recorded By, (t) = Taken By, (c) = Cosigned By    Initials Name Provider Type    Mary Arias OT Occupational Therapist               Goals/Plan     Row Name 03/12/23 1129          Bed Mobility Goal 1 (OT)    Activity/Assistive Device (Bed Mobility Goal 1, OT) bed mobility activities, all  -MH     [place] Level/Cues Needed (Bed Mobility Goal 1, OT) independent  -MH     Time Frame (Bed Mobility Goal 1, OT) 1 week  -     Row Name 03/12/23 1129          Bathing Goal 1 (OT)    Activity/Device (Bathing Goal 1, OT) bathing skills, all  -MH     [place] Level/Cues Needed (Bathing Goal 1, OT) independent  -MH     Time Frame (Bathing Goal 1, OT) 2 weeks  -     Row Name 03/12/23 1129          Dressing Goal 1 (OT)    Activity/Device (Dressing Goal 1, OT) dressing skills, all  -MH     [place]/Cues Needed (Dressing Goal 1, OT) independent  -MH     Time Frame (Dressing Goal 1, OT) 2 weeks  -     Row Name 03/12/23 1129          Grooming Goal 1 (OT)    Activity/Device (Grooming Goal 1, OT) grooming skills, all  standing at sink w/o rest break  -MH     Time Frame (Grooming Goal 1, OT) 1 week  -     Row Name 03/12/23 1129          Therapy Assessment/Plan (OT)    Planned Therapy Interventions (OT) activity tolerance training;adaptive equipment training;BADL retraining;occupation/activity based interventions;functional balance retraining;patient/caregiver education/training;transfer/mobility retraining;ROM/therapeutic exercise  -           User Key  (r) = Recorded By, (t) = Taken By, (c) = Cosigned By    Initials Name Provider Type    Mary Arias OT Occupational Therapist               Clinical Impression     Row Name 03/12/23 1123           Pain Assessment    Pretreatment Pain Rating 0/10 - no pain  -     Posttreatment Pain Rating 0/10 - no pain  -MH     Row Name 03/12/23 1123          Plan of Care Review    Plan of Care Reviewed With patient  -     Progress improving  -     Outcome Evaluation 77 y.o. male with past medical history of diabetes mellitus, colon cancer, hyperlipidemia, hypertension who presented to [facility] on 3/11/2023 complaining of high blood sugar.  Patient stated it has been 300-500 for the past 2 months.  Patient has history of colon cancer, and had a colon resection on 12/15/2022. Patient stated he has been having gait problems since he fell on the night of 3/10/2023 while going to the bathroom. MRI was done right away upon admission & the results showed 7 mm focus of restricted diffusion in the left periventricular white matter of the posterior left frontal lobe, suspicious for an acute/subacute infarct, voiding loss and suspected chronic small vessel ischemic changes with probable small remote lacunar infarcts in the periventricular white matter, basal ganglia, and left cerebellum, and several small foci of susceptibility artifact suggesting chronic microhemorrhages in the setting of hypertension. Pt deficits are mild, and pt attributes his one fal lto his hyperglycemia. He is planning to start taking insulin daily now and will need to modify a few other habits at home for safety. May need to hold off returning to work until he recovers some more of his activity tolerance. He may benefit from use of a RW at home at times, and [facility] would benefit him at this time to ensure safe transition to home & to improve balance & medication mgmt. He has someone home almost all the time & other family state they are going to be checking on him very frequently.  -     Row Name 03/12/23 1123          Therapy Assessment/Plan (OT)    Rehab Potential (OT) good, to achieve stated therapy goals  -     Criteria for Skilled  Therapeutic Interventions Met (OT) skilled treatment is necessary  -     Therapy Frequency (OT) 5 times/wk  -     Predicted Duration of Therapy Intervention (OT) until d/c  -     Row Name 03/12/23 1123          Therapy Plan Review/Discharge Plan (OT)    Equipment Needs Upon Discharge (OT) walker, rolling  pt has SC  -MH     Anticipated Discharge Disposition (OT) home with home health  -     Row Name 03/12/23 1123          Vital Signs    O2 Delivery Pre Treatment room air  -     Pre Patient Position Supine  -     Intra Patient Position Standing  -     Post Patient Position Supine  -     Row Name 03/12/23 1123          Positioning and Restraints    Pre-Treatment Position in bed  -MH     Post Treatment Position bed  -MH     In Bed notified nsg;fowlers;encouraged to call for assist;call light within reach  pt reports bed marco not in use and allowed ot get up to bathroom. OT agrees but encourages pt to call for assist if he feels unsafe.  -           User Key  (r) = Recorded By, (t) = Taken By, (c) = Cosigned By    Initials Name Provider Type    Mary Arias OT Occupational Therapist               Outcome Measures     Row Name 03/12/23 1131          Modified [place] Scale    Pre-Stroke Modified [place] Scale 0 - No Symptoms at all.  -     Modified [place] Scale 2 - Slight disability.  Unable to carry out all previous activities but able to look after own affairs without assistance.  -     Row Name 03/12/23 1131          Functional Assessment    Outcome Measure Options Modified [place]  -           User Key  (r) = Recorded By, (t) = Taken By, (c) = Cosigned By    Initials Name Provider Type    Mary Arias OT Occupational Therapist                Occupational Therapy Education     Title: PT OT SLP Therapies (In Progress)     Topic: Occupational Therapy (In Progress)     Point: ADL training (Done)     Description:   Instruct learner(s) on proper safety adaptation and remediation techniques  during self care or transfers.   Instruct in proper use of assistive devices.              Learning Progress Summary           Patient Acceptance, E,TB, VU by  at 3/12/2023 1131                   Point: Home exercise program (Not Started)     Description:   Instruct learner(s) on appropriate technique for monitoring, assisting and/or progressing therapeutic exercises/activities.              Learner Progress:  Not documented in this visit.          Point: Precautions (Done)     Description:   Instruct learner(s) on prescribed precautions during self-care and functional transfers.              Learning Progress Summary           Patient Acceptance, E,TB, VU by  at 3/12/2023 1131                   Point: Body mechanics (Done)     Description:   Instruct learner(s) on proper positioning and spine alignment during self-care, functional mobility activities and/or exercises.              Learning Progress Summary           Patient Acceptance, E,TB, VU by  at 3/12/2023 1131                               User Key     Initials Effective Dates Name Provider Type Discipline     06/16/21 -  Mary Bautista, OT Occupational Therapist OT              OT Recommendation and Plan  Planned Therapy Interventions (OT): activity tolerance training, adaptive equipment training, BADL retraining, occupation/activity based interventions, functional balance retraining, patient/caregiver education/training, transfer/mobility retraining, ROM/therapeutic exercise  Therapy Frequency (OT): 5 times/wk  Plan of Care Review  Plan of Care Reviewed With: patient  Progress: improving  Outcome Evaluation: 77 y.o. male with past medical history of diabetes mellitus, colon cancer, hyperlipidemia, hypertension who presented to [facility] on 3/11/2023 complaining of high blood sugar.  Patient stated it has been 300-500 for the past 2 months.  Patient has history of colon cancer, and had a colon resection on 12/15/2022. Patient stated he has  been having gait problems since he fell on the night of 3/10/2023 while going to the bathroom. MRI was done right away upon admission & the results showed 7 mm focus of restricted diffusion in the left periventricular white matter of the posterior left frontal lobe, suspicious for an acute/subacute infarct, voiding loss and suspected chronic small vessel ischemic changes with probable small remote lacunar infarcts in the periventricular white matter, basal ganglia, and left cerebellum, and several small foci of susceptibility artifact suggesting chronic microhemorrhages in the setting of hypertension. Pt deficits are mild, and pt attributes his one fal lto his hyperglycemia. He is planning to start taking insulin daily now and will need to modify a few other habits at home for safety. May need to hold off returning to work until he recovers some more of his activity tolerance. He may benefit from use of a RW at home at times, and [facility] would benefit him at this time to ensure safe transition to home & to improve balance & medication mgmt. He has someone home almost all the time & other family state they are going to be checking on him very frequently.     Time Calculation:    Time Calculation- OT     Row Name 03/12/23 1135             Time Calculation-     OT Start Time 0921  -      OT Stop Time 0936  -      OT Time Calculation (min) 15 min  -      Total Timed Code Minutes- OT 0 minute(s)  -      OT Received On 03/12/23  -      OT - Next Appointment 03/13/23  -      OT Goal Re-Cert Due Date 03/26/23  -            User Key  (r) = Recorded By, (t) = Taken By, (c) = Cosigned By    Initials Name Provider Type     Mary Bautista OT Occupational Therapist              Therapy Charges for Today     Code Description Service Date Service Provider Modifiers Qty    [id number]  OT EVAL MOD COMPLEXITY 3 3/12/2023 Mary Bautista OT GO 1               Mary Bautista OT  3/12/2023

## 2023-03-13 ENCOUNTER — READMISSION MANAGEMENT (OUTPATIENT)
Dept: CALL CENTER | Facility: HOSPITAL | Age: 78
End: 2023-03-13
Payer: OTHER GOVERNMENT

## 2023-03-13 VITALS
WEIGHT: 173.06 LBS | BODY MASS INDEX: 23.44 KG/M2 | SYSTOLIC BLOOD PRESSURE: 125 MMHG | HEIGHT: 72 IN | RESPIRATION RATE: 15 BRPM | OXYGEN SATURATION: 90 % | DIASTOLIC BLOOD PRESSURE: 62 MMHG | HEART RATE: 77 BPM | TEMPERATURE: 98.3 F

## 2023-03-13 LAB
ANION GAP SERPL CALCULATED.3IONS-SCNC: 9 MMOL/L (ref 5–15)
BASOPHILS # BLD AUTO: 0 10*3/MM3 (ref 0–0.2)
BASOPHILS NFR BLD AUTO: 0.8 % (ref 0–1.5)
BUN SERPL-MCNC: 21 MG/DL (ref 8–23)
BUN/CREAT SERPL: 23.9 (ref 7–25)
CALCIUM SPEC-SCNC: 8.6 MG/DL (ref 8.6–10.5)
CHLORIDE SERPL-SCNC: 106 MMOL/L (ref 98–107)
CO2 SERPL-SCNC: 26 MMOL/L (ref 22–29)
CREAT SERPL-MCNC: 0.88 MG/DL (ref 0.76–1.27)
DEPRECATED RDW RBC AUTO: 68.3 FL (ref 37–54)
EGFRCR SERPLBLD CKD-EPI 2021: 89.7 ML/MIN/1.73
EOSINOPHIL # BLD AUTO: 0 10*3/MM3 (ref 0–0.4)
EOSINOPHIL NFR BLD AUTO: 1 % (ref 0.3–6.2)
ERYTHROCYTE [DISTWIDTH] IN BLOOD BY AUTOMATED COUNT: 27.6 % (ref 12.3–15.4)
FOLATE SERPL-MCNC: >20 NG/ML (ref 4.78–24.2)
GLUCOSE BLDC GLUCOMTR-MCNC: 167 MG/DL (ref 70–105)
GLUCOSE BLDC GLUCOMTR-MCNC: 201 MG/DL (ref 70–105)
GLUCOSE BLDC GLUCOMTR-MCNC: 90 MG/DL (ref 70–105)
GLUCOSE BLDC GLUCOMTR-MCNC: 91 MG/DL (ref 70–105)
GLUCOSE SERPL-MCNC: 95 MG/DL (ref 65–99)
HCT VFR BLD AUTO: 30.6 % (ref 37.5–51)
HGB BLD-MCNC: 9.3 G/DL (ref 13–17.7)
LYMPHOCYTES # BLD AUTO: 1 10*3/MM3 (ref 0.7–3.1)
LYMPHOCYTES NFR BLD AUTO: 36.4 % (ref 19.6–45.3)
MAGNESIUM SERPL-MCNC: 2 MG/DL (ref 1.6–2.4)
MCH RBC QN AUTO: 23.5 PG (ref 26.6–33)
MCHC RBC AUTO-ENTMCNC: 30.5 G/DL (ref 31.5–35.7)
MCV RBC AUTO: 77 FL (ref 79–97)
MONOCYTES # BLD AUTO: 0.4 10*3/MM3 (ref 0.1–0.9)
MONOCYTES NFR BLD AUTO: 13.1 % (ref 5–12)
NEUTROPHILS NFR BLD AUTO: 1.3 10*3/MM3 (ref 1.7–7)
NEUTROPHILS NFR BLD AUTO: 48.7 % (ref 42.7–76)
NRBC BLD AUTO-RTO: 0.1 /100 WBC (ref 0–0.2)
PLATELET # BLD AUTO: 103 10*3/MM3 (ref 140–450)
PMV BLD AUTO: 8.4 FL (ref 6–12)
POTASSIUM SERPL-SCNC: 3.9 MMOL/L (ref 3.5–5.2)
RBC # BLD AUTO: 3.98 10*6/MM3 (ref 4.14–5.8)
SODIUM SERPL-SCNC: 141 MMOL/L (ref 136–145)
WBC NRBC COR # BLD: 2.7 10*3/MM3 (ref 3.4–10.8)

## 2023-03-13 PROCEDURE — 80048 BASIC METABOLIC PNL TOTAL CA: CPT | Performed by: NURSE PRACTITIONER

## 2023-03-13 PROCEDURE — 97161 PT EVAL LOW COMPLEX 20 MIN: CPT

## 2023-03-13 PROCEDURE — G0378 HOSPITAL OBSERVATION PER HR: HCPCS

## 2023-03-13 PROCEDURE — 63710000001 INSULIN LISPRO (HUMAN) PER 5 UNITS: Performed by: NURSE PRACTITIONER

## 2023-03-13 PROCEDURE — 36415 COLL VENOUS BLD VENIPUNCTURE: CPT | Performed by: NURSE PRACTITIONER

## 2023-03-13 PROCEDURE — 82962 GLUCOSE BLOOD TEST: CPT

## 2023-03-13 PROCEDURE — 92523 SPEECH SOUND LANG COMPREHEN: CPT

## 2023-03-13 PROCEDURE — 85025 COMPLETE CBC W/AUTO DIFF WBC: CPT | Performed by: NURSE PRACTITIONER

## 2023-03-13 PROCEDURE — 83735 ASSAY OF MAGNESIUM: CPT | Performed by: NURSE PRACTITIONER

## 2023-03-13 PROCEDURE — 82746 ASSAY OF FOLIC ACID SERUM: CPT | Performed by: PSYCHIATRY & NEUROLOGY

## 2023-03-13 RX ORDER — PEN NEEDLE, DIABETIC 30 GX3/16"
1 NEEDLE, DISPOSABLE MISCELLANEOUS DAILY
Qty: 100 EACH | Refills: 2 | Status: SHIPPED | OUTPATIENT
Start: 2023-03-13

## 2023-03-13 RX ORDER — ATORVASTATIN CALCIUM 40 MG/1
40 TABLET, FILM COATED ORAL NIGHTLY
Qty: 90 TABLET | Refills: 0 | Status: SHIPPED | OUTPATIENT
Start: 2023-03-13

## 2023-03-13 RX ORDER — ASPIRIN 81 MG/1
81 TABLET ORAL DAILY
Qty: 30 TABLET | Refills: 0 | Status: SHIPPED | OUTPATIENT
Start: 2023-03-13 | End: 2023-03-13 | Stop reason: HOSPADM

## 2023-03-13 RX ORDER — ASPIRIN 325 MG
325 TABLET ORAL DAILY
Qty: 30 TABLET | Refills: 0 | Status: SHIPPED | OUTPATIENT
Start: 2023-03-14

## 2023-03-13 RX ADMIN — INSULIN LISPRO 4 UNITS: 100 INJECTION, SOLUTION INTRAVENOUS; SUBCUTANEOUS at 12:27

## 2023-03-13 RX ADMIN — SENNOSIDES AND DOCUSATE SODIUM 1 TABLET: 50; 8.6 TABLET ORAL at 12:27

## 2023-03-13 RX ADMIN — ASPIRIN 325 MG: 325 TABLET ORAL at 10:03

## 2023-03-13 RX ADMIN — EMPAGLIFLOZIN 25 MG: 25 TABLET, FILM COATED ORAL at 10:02

## 2023-03-13 RX ADMIN — Medication 10 ML: at 10:03

## 2023-03-13 RX ADMIN — SODIUM CHLORIDE 1000 ML: 9 INJECTION, SOLUTION INTRAVENOUS at 11:32

## 2023-03-13 RX ADMIN — GLIPIZIDE 10 MG: 5 TABLET ORAL at 10:02

## 2023-03-13 RX ADMIN — PIOGLITAZONE 45 MG: 45 TABLET ORAL at 10:02

## 2023-03-13 NOTE — PLAN OF CARE
Goal Outcome Evaluation:      SLUMS assessment was completed. Patient scored 18/30 which indicates moderate deficits. Patient reports that he manages his own finances at baseline. Patient was oriented x 4. Patient completed functional word problem involving money concept with 50% accuracy. Patient recalled words given an imposed delay with 2/5 correctly. One error was a close appoximation to actual item pencil vs pen. Patient recalled immediate information following a short to medium paragraph with 50% accuracy. Patient was able to divert 13 items given a 1 minute duration. Expectation for this task would be at least 15 items in a 1 minute duration. Patient was able to follow directives with 2/2x correctly. Given the clock face task, patient was able to execute placement of hour numbers on the clock but produced the wrong time. Patient exhibited adequate mental flexibility to recite numbers backwards given 2, 3 and 4 digits correctly Overall, patient demonstrated moderate cognitive deficits. It is recommended that patient receive cognitive therapy to address deficits noted in assessment.  However, patient will be an evaluation only at this time since patient is expected to be discharged later today.

## 2023-03-13 NOTE — THERAPY EVALUATION
Acute Care - Speech Language Pathology Initial Evaluation  [facility]     Patient Name: Vlad Guerrero  : 1945  MRN: [id number]  Today's Date: 3/13/2023               Admit Date: 3/11/2023   Patient was not wearing a face mask during this therapy encounter. Therapist used appropriate personal protective equipment including mask, eye protection and gloves.  Mask used was standard procedure mask. Appropriate PPE was worn during the entire therapy session. Hand hygiene was completed before and after therapy session. Patient is not in enhanced droplet precautions.             Visit Dx:    ICD-10-CM ICD-9-CM   1. Acute CVA (cerebrovascular accident) (HCC)  I63.9 434.91     Patient Active Problem List   Diagnosis   • Microcytic anemia   • Primary hypertension   • Mixed hyperlipidemia   • Malignant neoplasm of ascending colon (HCC)   • Acute CVA (cerebrovascular accident) (HCC)   • Benign essential tremor   • Type 2 diabetes mellitus (HCC)   • Thrombocytopenia (HCC)     Past Medical History:   Diagnosis Date   • Anemia    • Colon cancer     colon   • Diabetes mellitus (HCC)    • Hyperlipidemia    • Hypertension      Past Surgical History:   Procedure Laterality Date   • APPENDECTOMY     • CARDIAC CATHETERIZATION     • COLON RESECTION N/A 12/15/2022    Procedure: COLON RESECTION RIGHT;  Surgeon: Percy Davis MD;  Location: [facility] MAIN OR;  Service: General;  Laterality: N/A;   • COLONOSCOPY N/A 2022    Procedure: COLONOSCOPY WITH BIOPSY AND POLYPECTOMY;  Surgeon: Billy Julien MD;  Location: [facility] ENDOSCOPY;  Service: Gastroenterology;  Laterality: N/A;  Impression:  1.  Large 4-5cm fulgurating circumferential ulcerated mass in the very proximal part of the ascending colon next to ileocecal valve multiple biopsies were performed.  This is highly concerning for colon malignancy.  2.  2 polyp rem   • ENDOSCOPY N/A 2022    Procedure: ESOPHAGOGASTRODUODENOSCOPY with biopsy X1;  Surgeon:  Billy Julien MD;  Location: [facility] ENDOSCOPY;  Service: Gastroenterology;  Laterality: N/A;  5.  Upper endoscopy lamination unremarkable.      • PORTACATH PLACEMENT Right 1/12/2023    Procedure: INSERTION OF PORTACATH;  Surgeon: Percy Davis MD;  Location: [facility] MAIN OR;  Service: General;  Laterality: Right;   • TONSILLECTOMY         SLP Recommendation and Plan  SLP Diagnosis: moderate, cognitive-linguistic disorder (03/13/23 1200)           SLC Criteria for Skilled Therapy Interventions Met: yes (03/13/23 1200)     SLP EVALUATION (last 72 hours)     SLP SLC Evaluation     Row Name 03/13/23 1200          Document Type evaluation  -CB    Subjective Information no complaints  -CB    Patient Observations alert;cooperative  -CB    Patient/Family/Caregiver Comments/Observations Patient able to follow directives. Patient able to express wants and needs effectively.  -CB    Patient Effort good  -CB          Patient Profile Reviewed yes  -CB    Pertinent History Of Current Problem Vlad Guerrero is a 77 y.o. male with past medical history of diabetes mellitus, colon cancer, hyperlipidemia, hypertension who presented to [facility] on 3/11/2023 complaining of high blood sugar.  Patient stated it has been 300-500 for the past 2 months.  Patient has history of colon cancer, and had a colon resection on 12/15/2022 by Dr. Davis, general surgery.  He is undergoing chemotherapy currently with Dr Kramer, oncology.  Patient is to have a follow-up scan on 3/17/2023.  Patient had some associated vomiting x2 and was having some gait problems since he fell on the night of 3/10/2023 while going to the bathroom.  Imaging findings in the ER with MRI brain noted to have 7 mm focus of restricted diffusion in the left periventricular white matter of the posterior left frontal lobe, suspicious for an acute/subacute infarct.  Started on stroke work-up with neurology consulted.  CTA head and neck findings as noted  below.  Medical management recommended per neurology.  Started on aspirin and statin changed to Lipitor.  No further inpatient work-up required.  Patient feels better and wants to go home today.  Discussed findings of echo with him as well and need for cardiology follow-up outpatient.  [facility] and will let his PCP know about this as well.  Some orthostatic hypotension noted likely from dehydration, losartan on hold.  Blood pressure improved with NS bolus.  Patient is medically stable to discharge home today with follow-up with PCP and cardiology as an outpatient.  -CB          Additional Documentation Pain Scale: FACES Pre/Post-Treatment (Group)  -CB          Pain: FACES Scale, Pretreatment 0-->no hurt  -CB    Posttreatment Pain Rating 0-->no hurt  -CB          Cognitive Function (Cognition) moderate impairment  -CB    Orientation Status (Cognition) person;place;time;situation  -CB    Memory (Cognitive) simple;immediate;mental manipulation;moderate impairment  -CB    Attention (Cognitive) sustained;mild impairment  -CB    Thought Organization (Cognitive) concrete divergent;mild impairment  -CB    Functional Math (Cognitive) moderate impairment  -CB    Cognition, Comment SLUMS assessment was completed. Patient scored 18/30 which indicates moderate deficits. Patient reports that he manages his own finances at baseline. Patient was oriented x 4. Patient completed functional word problem involving money concept with 50% accuracy. Patient recalled words given an imposed delay with 2/5 correctly. One error was a close appoximation to actual item pencil vs pen. Patient recalled immediate information following a short to medium paragraph with 50% accuracy. Patient was able to divert 13 items given a 1 minute duration. Expectation for this task would be at least 15 items in a 1 minute duration. Patient was able to follow directives with 2/2x correctly. Given the  clock face task, patient was able to execute placement of hour numbers on the clock but produced the wrong time. Patient exhibited adequate mental flexibility to recite numbers backwards given 2, 3 and 4 digits correctly Overall, patient demonstrated moderate cognitive deficits. It is recommended that patient receive cognitive therapy to address deficits noted in assessment.  However, patient will be an evaluation only at this time since patient is expected to be discharged later today.  -CB          SLP Diagnosis moderate;cognitive-linguistic disorder  -CB    Rehab Potential/Prognosis good  -CB    SLC Criteria for Skilled Therapy Interventions Met yes  -CB    Functional Impact functional impact in ADLs  -CB          Therapy Frequency (SLP SLC) evaluation only  as patient is expected to be discharged from hospital today.  -CB          User Key  (r) = Recorded By, (t) = Taken By, (c) = Cosigned By    Initials Name Effective Dates    Beena Brunner, SLP 09/21/21 -                    EDUCATION  The patient has been educated in the following areas:     Cognitive Impairment.                      Time Calculation:                        KERRIE Campoverde  3/13/2023

## 2023-03-13 NOTE — DISCHARGE INSTRUCTIONS
[facility] this wednesday. Hold taking your Losartan for now until you follow up. [facility] for [facility] up.

## 2023-03-13 NOTE — CASE MANAGEMENT/SOCIAL WORK
Discharge Planning Assessment  [facility]     Patient Name: Vlad Guerrero  MRN: [id number]  Today's Date: 3/13/2023    Admit Date: 3/11/2023    Plan: D/C Plan: Home no services/therapy needed (Per therapy notes); Home by family car   Discharge Needs Assessment     Row Name 03/13/23 1430       Living Environment    People in Home other relative(s)    Name(s) of People in Home Lives with Aunt    Current Living Arrangements home    Potentially Unsafe Housing Conditions none    Primary Care Provided by self    Provides Primary Care For no one    Family Caregiver if Needed other relative(s);child(avel), adult    Able to Return to Prior Arrangements yes       Resource/Environmental Concerns    Resource/Environmental Concerns none    Transportation Concerns none       Transition Planning    Patient/Family Anticipates Transition to home with family    Patient/Family Anticipated Services at Transition none    Transportation Anticipated family or friend will provide       Discharge Needs Assessment    Readmission Within the Last 30 Days no previous admission in last 30 days    Equipment Currently Used at Home glucometer    Concerns to be Addressed no discharge needs identified;denies needs/concerns at this time    Anticipated Changes Related to Illness none    Equipment Needed After Discharge none    Provided Post Acute Provider List? N/A    N/A Provider List Comment Not needed    Provided Post Acute Provider Quality & Resource List? N/A               Discharge Plan     Row Name 03/13/23 7233       Plan    Plan D/C Plan: Home no services/therapy needed (Per therapy notes); Home by family car    Plan Comments Met with Mr Guerrero at bedside follwing appropriate PPE protocol; [facility], and [facility]. Has appt on Wedl 3/15/23 to see PCP. No concerns paying for meds. Lives in home with Aunt at an Ind.level. They share household task. Therapy has cleared him for up ad mike. No other needs expressed or identified. Will d/c  today.              Continued Care and Services - Admitted Since 3/11/2023    Coordination has not been started for this encounter.     Selected Continued Care - Episodes Includes continued care and service providers with selected services from the active episodes listed below    Oncology Episode start date: 1/9/2023   There are no active outsourced providers for this episode.               Expected Discharge Date and Time     Expected Discharge Date Expected Discharge Time    Mar 13, 2023          Demographic Summary     Row Name 03/13/23 1427       General Information    Admission Type inpatient    Arrived From emergency department    Required Notices Provided Important Message from Medicare    Referral Source admission list    Reason for Consult discharge planning    Preferred Language English               Functional Status     Row Name 03/13/23 1429       Functional Status    Usual Activity Tolerance good    Current Activity Tolerance good       Functional Status, IADL    Medications independent    Meal Preparation assistive person    Housekeeping assistive person    Laundry assistive person    Shopping assistive person    IADL Comments Lives with Aunt.  Share  household task.       Mental Status    General Appearance WDL WDL       Mental Status Summary    Recent Changes in Mental Status/Cognitive Functioning no changes       Employment/    Employment Status retired    Current or Previous Occupation not applicable                    Skye Royal RN

## 2023-03-13 NOTE — THERAPY EVALUATION
Patient Name: Vlad Guerrero  : 1945    MRN: [id number]                              Today's Date: 3/13/2023       Admit Date: 3/11/2023    Visit Dx:     ICD-10-CM ICD-9-CM   1. Acute CVA (cerebrovascular accident) (HCC)  I63.9 434.91     Patient Active Problem List   Diagnosis   • Microcytic anemia   • Primary hypertension   • Mixed hyperlipidemia   • Malignant neoplasm of ascending colon (HCC)   • Acute CVA (cerebrovascular accident) (HCC)   • Benign essential tremor   • Type 2 diabetes mellitus (HCC)   • Thrombocytopenia (HCC)     Past Medical History:   Diagnosis Date   • Anemia    • Colon cancer     colon   • Diabetes mellitus (HCC)    • Hyperlipidemia    • Hypertension      Past Surgical History:   Procedure Laterality Date   • APPENDECTOMY     • CARDIAC CATHETERIZATION     • COLON RESECTION N/A 12/15/2022    Procedure: COLON RESECTION RIGHT;  Surgeon: Percy Davis MD;  Location: [facility] MAIN OR;  Service: General;  Laterality: N/A;   • COLONOSCOPY N/A 2022    Procedure: COLONOSCOPY WITH BIOPSY AND POLYPECTOMY;  Surgeon: Billy Julien MD;  Location: [facility] ENDOSCOPY;  Service: Gastroenterology;  Laterality: N/A;  Impression:  1.  Large 4-5cm fulgurating circumferential ulcerated mass in the very proximal part of the ascending colon next to ileocecal valve multiple biopsies were performed.  This is highly concerning for colon malignancy.  2.  2 polyp rem   • ENDOSCOPY N/A 2022    Procedure: ESOPHAGOGASTRODUODENOSCOPY with biopsy X1;  Surgeon: Billy Julien MD;  Location: [facility] ENDOSCOPY;  Service: Gastroenterology;  Laterality: N/A;  5.  Upper endoscopy lamination unremarkable.      • PORTACATH PLACEMENT Right 2023    Procedure: INSERTION OF PORTACATH;  Surgeon: Percy Davis MD;  Location: [facility] MAIN OR;  Service: General;  Laterality: Right;   • TONSILLECTOMY        General Information     Row Name 23 0590          Physical Therapy Time and Intention     Document Type evaluation  -CM     Mode of Treatment physical therapy  -CM     Row Name 03/13/23 1310          General Information    Patient Profile Reviewed yes  -CM     Prior Level of Function independent:;community mobility;gait;driving;ADL's  -CM     Existing Precautions/Restrictions fall  -CM     Barriers to Rehab medically complex  -CM     Row Name 03/13/23 1310          Living Environment    People in Home other relative(s)  pt/s 81 yo aunt lives on main level; pt lives in apartment in basement  -CM     Row Name 03/13/23 1310          Home Main Entrance    Number of Stairs, Main Entrance none  -CM     Stair Railings, Main Entrance none  -CM     Row Name 03/13/23 1310          Stairs Within Home, Primary    Number of Stairs, Within Home, Primary other (see comments);six;seven  goes up/down stairs to eat all meals w/ aunt  -CM     Stair Railings, Within Home, Primary railings safe and in good condition;railings on both sides of stairs  -CM     Row Name 03/13/23 1310          Cognition    Orientation Status (Cognition) oriented x 4  -CM           User Key  (r) = Recorded By, (t) = Taken By, (c) = Cosigned By    Initials Name Provider Type    CM Lydia Garsia, PT Physical Therapist               Mobility     Row Name 03/13/23 1312          Bed Mobility    Bed Mobility bed mobility (all) activities  -CM     All Activities, [place] (Bed Mobility) independent  -CM     Row Name 03/13/23 1312          Sit-Stand Transfer    Sit-Stand [place] (Transfers) independent  -CM     Row Name 03/13/23 1312          Gait/Stairs (Locomotion)    [place] Level (Gait) independent  -CM     Assistive Device (Gait) other (see comments)  gait belt, non skid socks  -CM     Distance in Feet (Gait) 40 ft in room; initially unable to ambulate w/ PT due to severe orthostatic hypotension; MD ordered fluid bolus; after bolus, pt able to amb w/o significant gait deviations w/o drop in BP  -CM           User Key  (r) =  Recorded By, (t) = Taken By, (c) = Cosigned By    Initials Name Provider Type    Lydia Ramsey, HÉCTOR Physical Therapist               Obj/Interventions     Row Name 03/13/23 1314          Range of Motion Comprehensive    General Range of Motion no range of motion deficits identified  -CM     Row Name 03/13/23 1314          Strength Comprehensive (MMT)    General Manual Muscle Testing (MMT) Assessment no strength deficits identified  -CM     Row Name 03/13/23 1314          Balance    Balance Assessment sitting static balance;sitting dynamic balance;standing static balance;standing dynamic balance  -CM     Static Sitting Balance independent  -CM     Dynamic Sitting Balance independent  -CM     Position, Sitting Balance unsupported;sitting edge of bed  -CM     Static Standing Balance independent  -CM     Dynamic Standing Balance independent  -CM     Position/Device Used, Standing Balance unsupported  -CM     Row Name 03/13/23 1314          Sensory Assessment (Somatosensory)    Sensory Assessment (Somatosensory) sensation intact  -CM           User Key  (r) = Recorded By, (t) = Taken By, (c) = Cosigned By    Initials Name Provider Type    Lydia Ramsey, HÉCTOR Physical Therapist               Goals/Plan    No documentation.                Clinical Impression     [facility] Name 03/13/23 1314          Pain    Pretreatment Pain Rating 0/10 - no pain  -CM     Posttreatment Pain Rating 0/10 - no pain  -CM     Pain Intervention(s) Repositioned;Emotional support;Ambulation/increased activity  -CM     Row Name 03/13/23 1314          Plan of Care Review    Plan of Care Reviewed With patient  -CM     Progress improving  -CM     Outcome Evaluation 76 yo male adm 3/11/23 for acute hyperglycemia, fall at home. MRI (+) for acute L frontal infarct, 7 mm in size. CVA was incidental finding in workup. Pt was asymptomatic. Hx of previous cva's in basal ganglia and cerebellum also found on MRI. PMH includes colon ca, colon resection,  essential tremor, htn, dm. At baseline, pt lives at home w/ his 81 yo aunt living on upstairs/main level. Pt has single bedroom apartment on basement level, but he does ascend/descend 6-7 stairs w/ B handrails to join his aunt for meals. He is otherwise completely independent. Today, pt shows normal strength and no focal deficits in regard to the cva. However, he has significant orthostatic hypotension w/ coming to sit and stand. He received a bolus of fluids, which did correct this. He then was able to amb independently x 40 ft w/o significant gait deviations. His BP remained stable after the bolus of fluids. No need for skilled PT at this time. Recommend home upon d/c. Will sign off.  Gave pt handout and reviewed cva signs/symptoms/management w/ pt.  -     Row Name 03/13/23 1321          Therapy Assessment/Plan (PT)    Criteria for Skilled Interventions Met (PT) no;no problems identified which require skilled intervention  -     Therapy Frequency (PT) evaluation only  -     Row Name 03/13/23 1321          Vital Signs    Recovery Time First rx: sittin geob, /56(73), ; standing BP 69/42(51), ; sitting BP 81/51(61), ; supine /68(98), HR 88.  Second rx: BP in sitting eob 119/60(97); after amb x 40 ft, /62(83)  -CM     Row Name 03/13/23 1321          Positioning and Restraints    Pre-Treatment Position in bed  -     Post Treatment Position bathroom  -CM     Bathroom notified nsg;encouraged to call for assist  amb ad mike in bathroom  -           User Key  (r) = Recorded By, (t) = Taken By, (c) = Cosigned By    Initials Name Provider Type    Lydia Ramsey, PT Physical Therapist               Outcome Measures     Row Name 03/13/23 3189          How much help from another person do you currently need...    Turning from your back to your side while in flat bed without using bedrails? 4  -CM     Moving from lying on back to sitting on the side of a flat bed without  bedrails? 4  -CM     Moving to and from a bed to a chair (including a wheelchair)? 4  -CM     Standing up from a chair using your arms (e.g., wheelchair, bedside chair)? 4  -CM     Climbing 3-5 steps with a railing? 4  -CM     To walk in hospital room? 4  -CM     AM-PAC 6 Clicks Score (PT) 24  -CM     Highest level of mobility 8 --> Walked 250 feet or more  -CM     Row Name 03/13/23 1323          Modified Ethel Scale    Pre-Stroke Modified Neeta Scale 0 - No Symptoms at all.  -CM     Modified Ethel Scale 0 - No Symptoms at all.  -CM     Row Name 03/13/23 1323          Functional Assessment    Outcome Measure Options AM-PAC 6 Clicks Basic Mobility (PT);Modified [place]  -CM           User Key  (r) = Recorded By, (t) = Taken By, (c) = Cosigned By    Initials Name Provider Type    Lydia Ramsey, PT Physical Therapist                             Physical Therapy Education     Title: PT OT SLP Therapies (Done)     Topic: Physical Therapy (Done)     Point: Mobility training (Done)     Learning Progress Summary           Patient Acceptance, E,TB,H, VU,DU by  at 3/13/2023 1324                               User Key     Initials Effective Dates Name Provider Type Discipline     06/16/21 -  Lydia Garsia, PT Physical Therapist PT              PT Recommendation and Plan     Plan of Care Reviewed With: patient  Progress: improving  Outcome Evaluation: 76 yo male adm 3/11/23 for acute hyperglycemia, fall at home. MRI (+) for acute L frontal infarct, 7 mm in size. CVA was incidental finding in workup. Pt was asymptomatic. Hx of previous cva's in basal ganglia and cerebellum also found on MRI. PMH includes colon ca, colon resection, essential tremor, htn, dm. At baseline, pt lives at home w/ his 79 yo aunt living on upstairs/main level. Pt has single bedroom apartment on basement level, but he does ascend/descend 6-7 stairs w/ B handrails to join his aunt for meals. He is otherwise completely independent. Today,  pt shows normal strength and no focal deficits in regard to the cva. However, he has significant orthostatic hypotension w/ coming to sit and stand. He received a bolus of fluids, which did correct this. He then was able to amb independently x 40 ft w/o significant gait deviations. His BP remained stable after the bolus of fluids. No need for skilled PT at this time. Recommend home upon d/c. Will sign off.  Gave pt handout and reviewed cva signs/symptoms/management w/ pt.     Time Calculation:    PT Charges     Row Name 03/13/23 1324             Time Calculation    Start Time 1011  actual times: 10:11-10:33, 12:15-12:25  -CM      Stop Time 1043  -CM      Time Calculation (min) 32 min  -CM      PT Received On 03/13/23  -CM         Time Calculation- PT    Total Timed Code Minutes- PT 0 minute(s)  -CM            User Key  (r) = Recorded By, (t) = Taken By, (c) = Cosigned By    Initials Name Provider Type     Lydia Garsia, PT Physical Therapist              Therapy Charges for Today     Code Description Service Date Service Provider Modifiers Qty    [id number]  PT EVAL LOW COMPLEXITY 4 3/13/2023 Lydia Garsia, PT GP 1          PT G-Codes  Outcome Measure Options: AM-PAC 6 Clicks Basic Mobility (PT), Modified [place]  AM-PAC 6 Clicks Score (PT): 24  Modified Neeta Scale: 0 - No Symptoms at all.  PT Discharge Summary  Anticipated Discharge Disposition (PT): home    Lydia Garsia, PT  3/13/2023

## 2023-03-13 NOTE — PROGRESS NOTES
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 This is suspected to be chronic given the absence of large infarct on MRI and the presence of collateral vessels in the expected location of the left MCA, including from the left anterior cerebral   artery with some diminished enhancement of the distal right middle cerebral artery branches.   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   3.Mild to moderate stenosis of the proximal right posterior cerebral artery.   4.Bilateral internal carotid artery stenosis with estimated 60% stenosis at the right internal carotid artery origin. No stenosis of greater than 50% on the left.  \"    Other labs noted  Hemoglobin A1c 11  Echo pending    He has small stroke, incidental carotid occlusion, asymptomatic and he has not failed aspirin    So we will continue aspirin 325 mg    Unless cardiology needs, no anticoagulation otherwise    Once cleared he may be discharged per neurology and he needs good diabetes control      Modification of stroke risk factors:   - Blood pressure should be less than 130/80 outpatient, HbA1c less than 6.5, LDL less than 70; b12>500 and smoking cessation if applicable. We would be grateful if the primary team / primary care physician keep a close watch on this above targets.  - Stroke education  - Follow up with neurologist of choice    "

## 2023-03-13 NOTE — CONSULTS
"Diabetes Education  Assessment/Teaching    Patient Name:  Vlad Guerrero  YOB: 1945  MRN: [id number]  Admit Date:  3/11/2023      Assessment Date:  3/13/2023  Flowsheet Row Most Recent Value   General Information     Referral From: MD Buffy order  [MD consult per stroke protocol and on 3/11/2023 A1c was 11.0%.]   Height 182.9 cm (72\")   Height Method Stated   Weight 78.5 kg (173 lb 1 oz)   Weight Method Bed scale   Pregnancy Assessment    Diabetes History    What type of diabetes do you have? Type 2   Current DM knowledge fair   Do you test your blood sugar at home? yes   Frequency of checks daily   Meter type unsure of name but about a year old   Who performs the test? patient   Typical readings 300-400 since started chemo   Have you had high blood sugar? (>140mg/dl) yes   How often do you have high blood sugar? frequently   When was your last high blood sugar? Admission blood sugar 183   Education Preferences    What areas of diabetes would you like to learn about? avoiding high blood sugar, avoiding low blood sugar, diabetes complications, medications for diabetes, testing my blood sugar at home   Nutrition Information    Assessment Topics    Taking Medication - Assessment Needs education   Problem Solving - Assessment Needs education   Reducing Risk - Assessment Needs education   Monitoring - Assessment Needs education   DM Goals    Taking Medication - Goal Today   Problem Solving - Goal Today   Reducing Risk - Goal Today   Monitoring - Goal Today          Flowsheet Row Most Recent Value   DM Education Needs    Meter Has own   Frequency of Testing AC meals  [Discussed with patient that with being on insulin he should check his blood sugar 3X a day before meals.]   Medication Insulin, Actions, Side effects, Administration, Pen, Oral  [At home patient stated that he has been taking Jardiance 25 mg daily, Glimepiride 4 mg BID, Ozempic 0.25 mg weekly on Fridays, Actos 45 mg daily, and Metformin 2000 mg " with supper.]   Problem Solving Hypoglycemia, Hyperglycemia, Signs, Symptoms, Treatment   Reducing Risks A1C testing  [On 3/11/2023 A1c was 11.0%.]   Discharge Plan Home   Motivation Moderate   Teaching Method Explanation, Discussion, Demonstration, Handouts   Patient Response Demonstrates adequately, Verbalized understanding            Other Comments:  A1c info sheet given with discussion on A1c target and healthy blood sugar range. Patient stated his blood sugars have been high since he started chemo treatments. Discharge orders are in and patient being discharged on insulin. Handouts given with discussion on types of insulin, storage of insulin, injection sites, disposal of needles, rotation of sites, and how to use an insulin pen. Patient did return demonstration of applying the pen needle to the insulin pen, priming the pen, administering the shot into the foam pad,and holding the pen for 10 seconds after administration. Handouts given with discussion on hypoglycemia and hyperglycemia signs, symptoms, and treatment. Sample pack of pen needles given to patient. Patient comfortable with administering shots with already giving self weekly injections of Ozempic. Patient stated he has lost a lot of weight and has been drinking protein drinks. Discussed with patient to choose the low sugar/carbohydrate protein drinks. Patient has no further questions or concerns related to diabetes at this time.        Electronically signed by:  Nikki Royal RN  03/13/23 16:22 EDT

## 2023-03-13 NOTE — CONSULTS
" visited with patient at bedside.    Patient reviewed current hospital stay and his experiences of treatment of cancer before hospitalization. Patient reports that he moved here to care for his mother, who passed this past year. Currently lives with aunt and believes that current living circumstances is beneficial for him and aunt. Patient reports that recent stroke is leading him to fully retire (was working part time). Patient spirituality is \"believing in the [place] upstairs\". Reports spiritually supported when getting treatment at cancer center. Requested spiritual support by prayer. Prayed over topics discussed.    General follow up.     Chaplain Demarcus Mckeon  "

## 2023-03-13 NOTE — DISCHARGE SUMMARY
[facility]   DISCHARGE SUMMARY    Patient Name: Vlad Guerrero  : 1945  MRN: [id number]    Date of Admission: 3/11/2023  Date of Discharge:  23    Primary Care Physician: Batool Lin APRN      Presenting Problem:   Acute CVA (cerebrovascular accident) (HCC) [I63.9]    Active and Resolved Hospital Problems:  Active Hospital Problems    Diagnosis POA   • **Acute CVA (cerebrovascular accident) (HCC) [I63.9] Yes   • Thrombocytopenia (HCC) [D69.6] Unknown   • Malignant neoplasm of ascending colon (HCC) [C18.2] Yes   • Primary hypertension [I10] Yes   • Mixed hyperlipidemia [E78.2] Yes   • Microcytic anemia [D50.9] Yes   • Type 2 diabetes mellitus (HCC) [E11.9] Yes   • Benign essential tremor [G25.0] Yes      Resolved Hospital Problems   No resolved problems to display.         Hospital Course     Hospital Course:  Vlad Guerrero is a 77 y.o. male with past medical history of diabetes mellitus, colon cancer, hyperlipidemia, hypertension who presented to [facility] on 3/11/2023 complaining of high blood sugar.  Patient stated it has been 300-500 for the past 2 months.  Patient has history of colon cancer, and had a colon resection on 12/15/2022 by Dr. Davis, general surgery.  He is undergoing chemotherapy currently with Dr Kramer, oncology.  Patient is to have a follow-up scan on 3/17/2023.  Patient had some associated vomiting x2 and was having some gait problems since he fell on the night of 3/10/2023 while going to the bathroom.  Imaging findings in the ER with MRI brain noted to have 7 mm focus of restricted diffusion in the left periventricular white matter of the posterior left frontal lobe, suspicious for an acute/subacute infarct.  Started on stroke work-up with neurology consulted.  CTA head and neck findings as noted below.  Medical management recommended per neurology.  Started on aspirin and statin changed to Lipitor.  No further inpatient work-up  required.  Patient feels better and wants to go home today.  Discussed findings of echo with him as well and need for cardiology follow-up outpatient.  [facility] and will let his PCP know about this as well.  Some orthostatic hypotension noted likely from dehydration, losartan on hold.  Blood pressure improved with NS bolus.  Patient is medically stable to discharge home today with follow-up with PCP and cardiology as an outpatient.    A/P:    Acute CVA  -MRI brain findings noted  -CTA findings noted, Neuro recs medical management   -Patient relatively asymptomatic, NIHSS 0  -Not anticoagulated previously  -Continue on aspirin and statin  -Echo findings noted  -PT/OT  -Neurology following and ok with d/c     DM2, uncontrolled  -A1c of 11 noted, previously around 7  -Continue home p.o. meds  -Basal insulin with sliding scale added  -Monitor blood glucose, adjust if needed  -Diabetic educator for insulin teaching     Abnormal echo  Likely Chronic HFpEF  -Echo shows EF of 40 to 45% with grade 1 diastolic dysfunction  -Patient does not appear to be decompensated at this time  -Continue outpatient follow-up with cardiology, d/w pt and he verbalized understanding to f[facility]     Hypertension  -some orthostatic hypotension noted, IV NS bolus given  -BP has been controlled here otherwise  -Hold home lostartan on d/c until outpt f/u with PCP     Colon cancer  -s/p resection, under chemotherapy currently  -Continue home meds, continue outpatient follow-up with oncology    DISCHARGE Follow Up Recommendations for labs and diagnostics:     [facility] week.    Reasons For Change In Medications and Indications for New Medications:  As below    Day of Discharge     Vital Signs:  Temp:  [97.3 °F (36.3 °C)-98.3 °F (36.8 °C)] 98.3 °F (36.8 °C)  Heart Rate:  [] 77  Resp:  [12-16] 15  BP: ()/(51-70) 125/62    Physical  Exam:  General: Awake, alert, NAD  Eyes: PERRL, EOMI, conjunctivae are clear  Cardiovascular: Regular rate and rhythm, no murmurs  Respiratory: Clear to auscultation bilaterally, no wheezing or rales, unlabored breathing  Abdomen: Soft, nontender, positive bowel sounds, no guarding  Neurologic: A&O, CN grossly intact, moves all extremities spontaneously  Musculoskeletal: Normal range of motion, no deformities  Skin: Warm, dry, intact        Pertinent  and/or Most Recent Results     LAB RESULTS:      Lab 03/13/23  0639 03/12/23 0325 03/11/23 1844 03/11/23 1755   WBC 2.70* 3.80  --  3.30*   HEMOGLOBIN 9.3* 9.3*  --  8.9*   HEMATOCRIT 30.6* 29.4*  --  28.8*   PLATELETS 103* 114*  --  103*   NEUTROS ABS 1.30* 2.10  --  2.10   LYMPHS ABS 1.00 1.20  --  0.80   MONOS ABS 0.40 0.40  --  0.30   EOS ABS 0.00 0.00  --  0.00   MCV 77.0* 75.4*  --  77.9*   PROTIME  --   --  14.0*  --    APTT  --   --  <20.0*  --          Lab 03/13/23  0639 03/12/23 0325 03/11/23 1844 03/11/23 1755   SODIUM 141 141  --  139   POTASSIUM 3.9 3.9  --  4.5   CHLORIDE 106 105  --  103   CO2 26.0 25.0  --  25.0   ANION GAP 9.0 11.0  --  11.0   BUN 21 27*  --  27*   CREATININE 0.88 1.12  --  1.11   EGFR 89.7 68.5  --  69.2   GLUCOSE 95 152*  --  183*   CALCIUM 8.6 8.9  --  9.2   MAGNESIUM 2.0 2.0 2.0  --    HEMOGLOBIN A1C  --   --   --  11.00*   TSH  --   --   --  1.260         Lab 03/11/23 1755   TOTAL PROTEIN 6.5   ALBUMIN 3.9   GLOBULIN 2.6   ALT (SGPT) 14   AST (SGOT) 16   BILIRUBIN 1.1   ALK PHOS 79         Lab 03/12/23  0325 03/11/23  1844 03/11/23  1755   HSTROP T 30*  --  27*   PROTIME  --  14.0*  --    INR  --  1.38*  --          Lab 03/11/23  1755   CHOLESTEROL 119   LDL CHOL 46   HDL CHOL 53   TRIGLYCERIDES 107         Lab 03/13/23  0639 03/12/23  0325   FOLATE >20.00  --    VITAMIN B 12  --  1,120*         Brief Urine Lab Results  (Last result in the past 365 days)      Color   Clarity   Blood   Leuk Est   Nitrite   Protein   CREAT    Urine HCG        07/08/22 0826             50             Microbiology Results (last 10 days)     ** No results found for the last 240 hours. **          MRI Brain With & Without Contrast    Result Date: 3/11/2023  Impression: Impression: 1.7 mm focus of restricted diffusion in the left periventricular white matter of the posterior left frontal lobe, suspicious for an acute/subacute infarct. 2.Voiding loss and suspected chronic small vessel ischemic changes with probable small remote lacunar infarcts in the periventricular white matter, basal ganglia, and left cerebellum. 3.Several small foci of susceptibility artifact suggesting chronic microhemorrhages which may be seen with hypertension. 4.No definite enhancing mass is identified. 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 This is suspected to be chronic given the absence of large infarct on MRI and the presence of collateral vessels in the expected location of the left MCA, including from the left anterior cerebral artery with some diminished enhancement of the distal right middle cerebral artery branches. 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 3.Mild to moderate stenosis of the proximal right posterior cerebral artery. 4.Bilateral internal carotid artery stenosis with estimated 60% stenosis at the right internal carotid artery origin. No stenosis of greater than 50% on the left. Results were called to the ordering provider and to patient's nurse by Dr. Kirk at 3/12/2023 9:15 PM EDT. Electronically Signed: Andrea Kirk  3/12/2023 9:32 PM EDT  Workstation ID: [id number]    CT Angiogram Head    Result Date: 3/12/2023  Impression: Impression: 1.Occlusion of the proximal  left middle cerebral artery. This is suspected to be chronic given the absence of large infarct on MRI and the presence of collateral vessels in the expected location of the left MCA, including from the left anterior cerebral artery with some diminished enhancement of the distal right middle cerebral artery branches. 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 3.Mild to moderate stenosis of the proximal right posterior cerebral artery. 4.Bilateral internal carotid artery stenosis with estimated 60% stenosis at the right internal carotid artery origin. No stenosis of greater than 50% on the left. Results were called to the ordering provider and to patient's nurse by Dr. Kirk at 3/12/2023 9:15 PM EDT. Electronically Signed: Andrea Kirk  3/12/2023 9:32 PM EDT  Workstation ID: [id number]              Results for orders placed during the hospital encounter of 03/11/23    Adult Transthoracic Echo Complete W/ Cont if Necessary Per Protocol (With Agitated Saline)    Interpretation Summary  •  Left ventricular systolic function is mildly decreased. Calculated left ventricular EF = 43% Left ventricular ejection fraction appears to be 41 - 45%.  •  Left ventricular diastolic function is consistent with (grade I) impaired relaxation.  •  There is moderate calcification of the aortic valve mainly affecting the non-coronary and left coronary cusp(s).      Labs Pending at Discharge:      Procedures Performed           Consults:   Consults     Date and Time Order Name Status Description    3/11/2023  8:53 PM Hospitalist (on-call MD unless specified)      3/11/2023  8:47 PM Inpatient Neurology Consult Stroke Completed             Discharge Details        Discharge Medications      New Medications      Instructions Start Date   aspirin 325 MG tablet   325 mg, Oral, Daily   Start Date: March 14, 2023     atorvastatin 40 MG  tablet  Commonly known as: LIPITOR  Replaces: pravastatin 40 MG tablet   40 mg, Oral, Nightly      Insulin Glargine 100 UNIT/ML injection pen  Commonly known as: LANTUS SOLOSTAR   7 Units, Subcutaneous, Nightly         Continue These Medications      Instructions Start Date   capecitabine 150 MG chemo tablet  Commonly known as: XELODA   Take 5 tablets by mouth (with 1 other capecitabine Rx) for 1,750 mg total 2 (Two) Times a Day on Days 1-14, then off for 7 days. Total dose is 1750mg in the AM & 1750mg in the PM.      capecitabine 500 MG chemo tablet  Commonly known as: XELODA   Take 2 tablets by mouth (with 1 other capecitabine prescription) for 1,750 mg total 2 (Two) Times a Day on Days 1-14, then off for 7 days.  Total dose is 1750mg in the AM & 1750mg in the PM.      empagliflozin 25 MG tablet tablet  Commonly known as: JARDIANCE   25 mg, Oral, Daily, Dont take preop      ferrous gluconate 324 MG tablet  Commonly known as: FERGON   324 mg, Oral, Daily With Breakfast      glimepiride 4 MG tablet  Commonly known as: AMARYL   1 tablet, Oral, 2 Times Daily, None preop      metFORMIN 1000 MG tablet  Commonly known as: GLUCOPHAGE   2,000 mg, Oral, Daily With Dinner, Last dose 12/12      ondansetron 8 MG tablet  Commonly known as: ZOFRAN   8 mg, Oral, 3 Times Daily PRN      Ozempic (0.25 or 0.5 MG/DOSE) 2 MG/1.5ML solution pen-injector  Generic drug: Semaglutide(0.25 or 0.5MG/DOS)   0.25 mg, Subcutaneous, As Needed, friday      pioglitazone 45 MG tablet  Commonly known as: ACTOS   45 mg, Oral, Daily, None preop         Stop These Medications    losartan 100 MG tablet  Commonly known as: COZAAR     pravastatin 40 MG tablet  Commonly known as: PRAVACHOL  Replaced by: atorvastatin 40 MG tablet            Allergies   Allergen Reactions   • Penicillins Rash         Discharge Disposition:  Home or Self Care    Diet:  Hospital:  Diet Order   Procedures   • Diet: Cardiac Diets, Diabetic Diets; Healthy Heart (2-3 Na+);  Consistent Carbohydrate; Texture: Regular Texture (IDDSI 7); Fluid Consistency: Thin (IDDSI 0)         Discharge Activity:         CODE STATUS:  Code Status and Medical Interventions:   Ordered at: 03/11/23 2322     Level Of Support Discussed With:    Patient     Code Status (Patient has no pulse and is not breathing):    CPR (Attempt to Resuscitate)     Medical Interventions (Patient has pulse or is breathing):    Full Support         Future Appointments   Date Time Provider Department Center   3/17/2023  1:30 PM KG CC PET BH KG PET KG   3/20/2023 10:30 AM INF ROOM 29 - CHAIR/BED KG BH LAG CC NA LAG   3/27/2023  8:30 AM LAB MD BH LAG ONC LAB NA BH LAG ONAL KG   3/27/2023  8:45 AM Don Kramer MD [facility] ONC NA KG   4/3/2023  8:15 AM LAB BH LAG ONC LAB NA BH LAG ONAL KG   4/10/2023  8:15 AM INF ROOM 20 - CHAIR KG BH LAG CC NA LAG   4/17/2023  8:30 AM LAB BH LAG ONC LAB NA BH LAG ONAL KG   4/24/2023  8:15 AM LAB BH LAG ONC LAB NA BH LAG ONAL KG   5/1/2023  8:15 AM INF ROOM 20 - CHAIR KG BH LAG CC NA LAG   5/8/2023  8:30 AM LAB BH LAG ONC LAB NA BH LAG ONAL KG   5/15/2023  8:30 AM LAB BH LAG ONC LAB NA BH LAG ONAL KG   5/22/2023  8:15 AM INF ROOM 20 - CHAIR KG BH LAG CC NA LAG   5/29/2023  8:15 AM LAB BH LAG ONC LAB NA BH LAG ONAL KG   6/5/2023  8:15 AM LAB BH LAG ONC LAB NA BH LAG ONAL KG   6/12/2023  8:15 AM INF ROOM 20 - CHAIR KG BH LAG CC NA LAG           Time spent on Discharge including face to face service:  35 minutes    Signature:    Electronically signed by Jaime Smith DO, 03/13/23, 11:26 AM EDT.      Part of this note may be an electronic transcription/translation of spoken language to printed text using the Dragon Dictation System.

## 2023-03-14 ENCOUNTER — TELEPHONE (OUTPATIENT)
Dept: PHARMACY | Facility: HOSPITAL | Age: 78
End: 2023-03-14
Payer: OTHER GOVERNMENT

## 2023-03-14 ENCOUNTER — TELEPHONE (OUTPATIENT)
Dept: DIABETES SERVICES | Facility: HOSPITAL | Age: 78
End: 2023-03-14
Payer: OTHER GOVERNMENT

## 2023-03-14 NOTE — TELEPHONE ENCOUNTER
Pt called and left message with [facility] yesterday evening, 3/13/23 requesting a return call regarding his medication.     I attempted to call pt at 8:23AM this morning and unable to leave a message. I tried again at 2:41PM and unable to leave a message. If pt calls back, pt can call [facility] office at [phone].    Thank you,  Mary Cervantes, Pharm.D.

## 2023-03-14 NOTE — TELEPHONE ENCOUNTER
Pt states he has not picked up the rxs for the lantus pen and pen needles yet. He states he is going to the pharmacy today to  the rxs. Educator encouraged pt to call back to educator phone number if pt has issues with getting his insulin. Discussed importance of follow up with PCP. Pt verbalized understanding of info. Pt with no additional questions.

## 2023-03-14 NOTE — OUTREACH NOTE
Prep Survey    Flowsheet Row Responses   [facility] facility patient discharged from? [place]   Is LACE score < 7 ? No   Eligibility Readm Mgmt   Discharge diagnosis Acute CVA    Does the patient have one of the following disease processes/diagnoses(primary or secondary)? Stroke   Does the patient have Home health ordered? No   Is there a DME ordered? No   Prep survey completed? Yes          Malu RAMIREZ - Registered Nurse

## 2023-03-14 NOTE — CASE MANAGEMENT/SOCIAL WORK
Case Management Discharge Note      Final Note: Home no services    Provided Post Acute Provider List?: N/A  N/A Provider List Comment: Not needed  Provided Post Acute Provider Quality & Resource List?: N/A         Transportation Services  Private: Car    Final Discharge Disposition Code: 01 - home or self-care

## 2023-03-17 ENCOUNTER — HOSPITAL ENCOUNTER (OUTPATIENT)
Dept: PET IMAGING | Facility: HOSPITAL | Age: 78
Discharge: HOME | End: 2023-03-17
Admitting: INTERNAL MEDICINE
Payer: MEDICARE

## 2023-03-17 ENCOUNTER — TELEPHONE (OUTPATIENT)
Dept: ONCOLOGY | Facility: CLINIC | Age: 78
End: 2023-03-17
Payer: OTHER GOVERNMENT

## 2023-03-17 ENCOUNTER — SPECIALTY PHARMACY (OUTPATIENT)
Dept: PHARMACY | Facility: HOSPITAL | Age: 78
End: 2023-03-17
Payer: OTHER GOVERNMENT

## 2023-03-17 DIAGNOSIS — C18.2 MALIGNANT NEOPLASM OF ASCENDING COLON: ICD-10-CM

## 2023-03-17 PROCEDURE — 74177 CT ABD & PELVIS W/CONTRAST: CPT

## 2023-03-17 PROCEDURE — 71260 CT THORAX DX C+: CPT

## 2023-03-17 PROCEDURE — 25510000001 IOPAMIDOL PER 1 ML: Performed by: INTERNAL MEDICINE

## 2023-03-17 RX ADMIN — IOPAMIDOL 100 ML: 755 INJECTION, SOLUTION INTRAVENOUS at 13:15

## 2023-03-17 NOTE — PROGRESS NOTES
Specialty Pharmacy Note: Xeloda-On Hold    Per v/o Dr. Kramer, hold chemo for one week to include CapeOx treatment.  Cycle 4 initially scheduled for 3/20/23 but due to recent hospitalization, provider wants to wait until after his 3/27/23 appointment to resume treatment.  Attempted to reach Fredi by phone but was unable to leave a VM due to VM being full.  If he returns call, please direct to Specialty Pharmacy at [phone]      Thanks,    Meenakshi MARTEL, PharmD

## 2023-03-17 NOTE — TELEPHONE ENCOUNTER
ATTEMPTED TO CONTACT PATIENT TO LET HIM KNOW THAT DR LEBRON WANTS TO HOLD HIS CHEMO FOR Monday UNABLE TO LM VM BOX FULL.

## 2023-03-20 ENCOUNTER — HOSPITAL ENCOUNTER (OUTPATIENT)
Dept: ONCOLOGY | Facility: HOSPITAL | Age: 78
Discharge: HOME | End: 2023-03-20
Payer: MEDICARE

## 2023-03-21 NOTE — PROGRESS NOTES
Specialty Pharmacy Note: Xeloda (capecitabine) - ON HOLD    Called and spoke with pt via phone. Pt wanted to know reasons on why treatment was delayed. Discussed with pt that Dr. Kramer wanted to see pt at his 3/27/23 prior to restarting due to his recent hospitalization. Pt also asked about adjusting his home insulin dose due to his high morning BG readings. Discussed with pt that I am not able to adjust his insulin dose over the phone and Dr. Kramer would likely not adjust his dose. Encouraged pt to contact his PCP regarding his insulin. Pt states he has a appointment with PCP on Friday and discussed with pt that his dose would likely be adjusted at that upcoming appt, but he could also contact PCP office via phone as well if needed.           Thank you,  Mary Cervantes, Pharm.D.

## 2023-03-23 ENCOUNTER — READMISSION MANAGEMENT (OUTPATIENT)
Dept: CALL CENTER | Facility: HOSPITAL | Age: 78
End: 2023-03-23
Payer: OTHER GOVERNMENT

## 2023-03-23 NOTE — OUTREACH NOTE
Stroke Week 2 Survey    Flowsheet Row Responses   [facility] facility patient discharged from? [place]   Does the patient have one of the following disease processes/diagnoses(primary or secondary)? Stroke   Week 2 attempt successful? No   Unsuccessful attempts Attempt 1          Dallin MARIO - Registered Nurse

## 2023-03-24 NOTE — PROGRESS NOTES
HEMATOLOGY ONCOLOGY OUTPATIENT FOLLOW-UP       Patient name: Vlad Guerrero  : 1945  MRN: [id number]  Primary Care Physician: Batool Lin APRN  Referring Physician: Batool Lin APRN  Reason For Consult: Stage III colon cancer    No chief complaint on file.    HPI:   History of Present Illness:  Vlad Guerrero is 77 y.o. male who presented to our office on 23 for consultation regarding    2023: Mr. Guerrero dated the beginning of his present illness to sometime at the end of  when he started to feel fatigued.  He was seen at the [facility] and had laboratory exams that revealed microcytic anemia.  He had evidence of iron deficiency and received intravenous iron in the hospital.  He had upper and lower gastrointestinal endoscopies that demonstrated a cecal tumor that measured 4 to 5 cm and was fungating in appearance.  It was circumferential and was next to the ileocecal valve.  The upper gastrointestinal endoscopy revealed no abnormalities.  On this basis he was on December 15, 2022 he was taken to the hospital and underwent a right hemicolectomy without complications.  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  It corresponded to a grade 2 malignancy that invaded through the muscularis propria into the pericolonic tissues.  Macroscopic tumor perforation or lymphovascular space invasion were not present.  Perineural invasion was not present either.  All margins of excision were negative.  All of 36 lymph nodes submitted to were positive for involvement with malignancy.  The disease was [place] staged as [id number].  Loss of expression of mismatch repair enzymes was not documented.  Mr. Guerrero was discharged to continue treatment as outpatient.  At the time of this visit he was recovering well from the surgery.  His incision had healed completely and he had no drains or suture  materials.  He had returned home and was eating well.  He had yet to return to work.  He had been afebrile and free of nausea.  For the most part his weight had been stable.  After reviewing the records a long conversation, of approximately 1 hour, was had with the patient in regards to options of treatment.  The nature of his disease as well as the likelihood of recurrence were described.  The use of adjuvant chemotherapy to increase the rate of cure after surgery was explained.  Side effects were described in detail.  The need for a port was expressed as well.  A treatment plan was placed.    2/6/2023: Received the first cycle of adjuvant chemotherapy without any side effects. On the day of this visit feeling well and without any new symptoms, except a very mild rash, especially on the forearms, but no oral pain. Had stools of diminished consistency for a few days but now resolved. The exam was rather unremarkable, except for a few very light erythematous macules on the forearms.     2/27/2023: Feeling well and without new symptoms.  As active as before.  Eating well and without unintended weight loss.  Stronger and more energetic since the institution of vitamin B12 and iron.  No chest pains and cough.  No abdominal pain or diarrhea.  On exam no changes.  A decision was made to continue with the same treatment.  Laboratory exams were reviewed.  He was to see me again in approximately 4 weeks from this date with new scans.    3/27/2023: Suffered an ischemic stroke.  MRI on March 10, 2023 reported a 7 mm focus of restricted diffusion in the left periventricular white matter of the posterior left frontal lobe.  This was felt to be suspicious for an acute/subacute infarct.  There was also evidence of previous lacunar strokes.  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  There was bilateral internal carotid artery stenosis with 60% stenosis estimated at the right and not greater than 50% at the left.  Chemotherapy was held following discharge.  He was left without any sequela.  At the time of this visit he was entirely asymptomatic.  He was convinced a large part of his problem was that he had suffered from hyperglycemia, consistently for some time.  Indeed his glucose was between 152 mg/dL and 193 mg/dL in the preceding days.  However, he reported at home glucose readings of greater than 400 mg/dL..  On exam there were no changes.  The laboratory exams reported a blood count with normocytic anemia and mild thrombocytopenia.  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    Subjective:  • 3/27/2023: Feeling reasonably well.  No new symptoms.  Active and without new limitations.  Eating better.  Maintaining a much better control of his blood glucose.  No chest pains or cough.  Denied abdominal pain.  Had been free of diarrhea or dysuria.  No peripheral edema.    The following portions of the patient's history were reviewed and updated as appropriate: allergies, current medications, past family history, past medical history, past social history, past surgical history and problem list.    Past Medical History:   Diagnosis Date   • Anemia    • Colon cancer 2022    colon   • Diabetes mellitus (HCC)    • Hyperlipidemia    • Hypertension      Past Surgical History:   Procedure Laterality Date   • APPENDECTOMY     • CARDIAC CATHETERIZATION     • COLON RESECTION N/A 12/15/2022    Procedure: COLON RESECTION RIGHT;  Surgeon: Percy Davis MD;  Location: [facility] MAIN OR;  Service: General;  Laterality: N/A;   •  COLONOSCOPY N/A 11/11/2022    Procedure: COLONOSCOPY WITH BIOPSY AND POLYPECTOMY;  Surgeon: Billy Julien MD;  Location: [facility] ENDOSCOPY;  Service: Gastroenterology;  Laterality: N/A;  Impression:  1.  Large 4-5cm fulgurating circumferential ulcerated mass in the very proximal part of the ascending colon next to ileocecal valve multiple biopsies were performed.  This is highly concerning for colon malignancy.  2.  2 polyp rem   • ENDOSCOPY N/A 11/11/2022    Procedure: ESOPHAGOGASTRODUODENOSCOPY with biopsy X1;  Surgeon: Billy Julien MD;  Location: [facility] ENDOSCOPY;  Service: Gastroenterology;  Laterality: N/A;  5.  Upper endoscopy lamination unremarkable.      • PORTACATH PLACEMENT Right 1/12/2023    Procedure: INSERTION OF PORTACATH;  Surgeon: Percy Davis MD;  Location: [facility] MAIN OR;  Service: General;  Laterality: Right;   • TONSILLECTOMY         Current Outpatient Medications:   •  aspirin 325 MG tablet, Take 1 tablet by mouth Daily., Disp: 30 tablet, Rfl: 0  •  atorvastatin (LIPITOR) 40 MG tablet, Take 1 tablet by mouth Every Night., Disp: 90 tablet, Rfl: 0  •  capecitabine (XELODA) 150 MG chemo tablet, Take 5 tablets by mouth (with 1 other capecitabine Rx) for 1,750 mg total 2 (Two) Times a Day on Days 1-14, then off for 7 days. Total dose is 1750mg in the AM & 1750mg in the PM., Disp: 140 tablet, Rfl: 4  •  capecitabine (XELODA) 500 MG chemo tablet, Take 2 tablets by mouth (with 1 other capecitabine prescription) for 1,750 mg total 2 (Two) Times a Day on Days 1-14, then off for 7 days.  Total dose is 1750mg in the AM & 1750mg in the PM., Disp: 56 tablet, Rfl: 4  •  empagliflozin (JARDIANCE) 25 MG tablet tablet, Take 25 mg by mouth Daily. Dont take preop, Disp: , Rfl:   •  ferrous gluconate (FERGON) 324 MG tablet, Take 1 tablet by mouth Daily With Breakfast., Disp: 30 tablet, Rfl: 3  •  glimepiride (AMARYL) 4 MG tablet, Take 1 tablet by mouth 2 (Two) Times a Day. None preop, Disp: , Rfl:   •   Insulin Glargine (LANTUS SOLOSTAR) 100 UNIT/ML injection pen, Inject 7 Units under the skin into the appropriate area as directed Every Night., Disp: 15 mL, Rfl: 0  •  Insulin Pen Needle (Pen Needles) 32G X 4 MM misc, 1 each Daily. Dx code: E11.65, Disp: 100 each, Rfl: 2  •  metFORMIN (GLUCOPHAGE) 1000 MG tablet, Take 2,000 mg by mouth Daily With Dinner. Last dose , Disp: , Rfl:   •  ondansetron (ZOFRAN) 8 MG tablet, Take 1 tablet by mouth 3 (Three) Times a Day As Needed for Nausea or Vomiting., Disp: 30 tablet, Rfl: 5  •  pioglitazone (ACTOS) 45 MG tablet, Take 45 mg by mouth Daily. None preop, Disp: , Rfl:   •  Semaglutide,0.25 or 0.5MG/DOS, (Ozempic, 0.25 or 0.5 MG/DOSE,) 2 MG/1.5ML solution pen-injector, Inject 0.25 mg under the skin into the appropriate area as directed As Needed. friday, Disp: , Rfl:     Allergies   Allergen Reactions   • Penicillins Rash     Family History   Problem Relation Age of Onset   • Pneumonia Mother 94        SARS-CoV2   • Colon cancer Father 62   • Heart disease Sister      Cancer-related family history includes Colon cancer (age of onset: 62) in his father.    Social History     Tobacco Use   • Smoking status: Former     Packs/day: 1.00     Years: 2.00     Pack years: 2.00     Types: Cigarettes     Start date:      Quit date:      Years since quittin.2   • Smokeless tobacco: Never   Vaping Use   • Vaping Use: Never used   Substance Use Topics   • Alcohol use: Not Currently   • Drug use: Never     Social History     Social History Narrative   • Not on file      ROS:     Review of Systems   Constitutional: Negative for activity change, appetite change, chills, diaphoresis, fatigue, fever and unexpected weight change.   HENT: Negative for congestion, dental problem, drooling, ear discharge, ear pain, facial swelling, hearing loss, mouth sores, nosebleeds, postnasal drip, rhinorrhea, sinus pressure, sinus pain, sneezing, sore throat, tinnitus, trouble swallowing and  voice change.    Eyes: Negative for photophobia, pain, discharge, redness, itching and visual disturbance.   Respiratory: Negative for apnea, cough, choking, chest tightness, shortness of breath, wheezing and stridor.    Cardiovascular: Negative for chest pain, palpitations and leg swelling.   Gastrointestinal: Negative for abdominal distention, abdominal pain, anal bleeding, blood in stool, constipation, diarrhea, nausea, rectal pain and vomiting.   Endocrine: Negative for cold intolerance, heat intolerance, polydipsia and polyuria.   Genitourinary: Negative for decreased urine volume, difficulty urinating, dysuria, flank pain, frequency, genital sores, hematuria and urgency.   Musculoskeletal: Negative for arthralgias, back pain, gait problem, joint swelling, myalgias, neck pain and neck stiffness.   Skin: Negative for color change, pallor and rash.   Neurological: Negative for dizziness, tremors, seizures, syncope, facial asymmetry, speech difficulty, weakness, light-headedness, numbness and headaches.   Hematological: Negative for adenopathy. Does not bruise/bleed easily.   Psychiatric/Behavioral: Negative for agitation, behavioral problems, confusion, decreased concentration, hallucinations, self-injury, sleep disturbance and suicidal ideas. The patient is not nervous/anxious.      Objective:    There were no vitals filed for this visit.  There is no height or weight on file to calculate BMI.  ECOG  (0) Fully active, able to carry on all predisease performance without restriction    Physical Exam:     Physical Exam  Constitutional:       General: He is not in acute distress.     Appearance: Normal appearance. He is not ill-appearing, toxic-appearing or diaphoretic.   HENT:      Head: Normocephalic and atraumatic.      Right Ear: External ear normal.      Left Ear: External ear normal.      Nose: Nose normal.      Mouth/Throat:      Mouth: Mucous membranes are moist.      Pharynx: Oropharynx is clear.   Eyes:       General: No scleral icterus.        Right eye: No discharge.         Left eye: No discharge.      Conjunctiva/sclera: Conjunctivae normal.      Pupils: Pupils are equal, round, and reactive to light.   Cardiovascular:      Rate and Rhythm: Normal rate and regular rhythm.      Pulses: Normal pulses.      Heart sounds: Normal heart sounds. No murmur heard.    No friction rub. No gallop.   Pulmonary:      Effort: No respiratory distress.      Breath sounds: No stridor. No wheezing, rhonchi or rales.   Chest:      Chest wall: No tenderness.   Abdominal:      General: Abdomen is flat. Bowel sounds are normal. There is no distension.      Palpations: Abdomen is soft. There is no mass.      Tenderness: There is no abdominal tenderness. There is no right CVA tenderness, left CVA tenderness, guarding or rebound.   Musculoskeletal:         General: No swelling, tenderness, deformity or signs of injury.      Cervical back: No rigidity.      Right lower leg: No edema.      Left lower leg: No edema.   Lymphadenopathy:      Cervical: No cervical adenopathy.   Skin:     General: Skin is warm and dry.      Coloration: Skin is not jaundiced.      Findings: No bruising or rash.   Neurological:      General: No focal deficit present.      Mental Status: He is alert and oriented to person, place, and time.      Cranial Nerves: No cranial nerve deficit.      Gait: Gait normal.   Psychiatric:         Mood and Affect: Mood normal.         Behavior: Behavior normal.         Thought Content: Thought content normal.         Judgment: Judgment normal.     KATIE Kramer MD performed a physical exam on 3/27/2023 as documented above..    Lab Results - Last 18 Months   Lab Units 03/13/23  0639 03/12/23  0325 03/11/23  1755   WBC 10*3/mm3 2.70* 3.80 3.30*   HEMOGLOBIN g/dL 9.3* 9.3* 8.9*   HEMATOCRIT % 30.6* 29.4* 28.8*   PLATELETS 10*3/mm3 103* 114* 103*   MCV fL 77.0* 75.4* 77.9*     Lab Results - Last 18 Months   Lab Units 03/13/23  0639  03/12/23  0325 03/11/23  1755 02/27/23  0923 02/06/23  1035   SODIUM mmol/L 141 141 139 140 141   POTASSIUM mmol/L 3.9 3.9 4.5 4.1 4.0   CHLORIDE mmol/L 106 105 103 106 108*   CO2 mmol/L 26.0 25.0 25.0 25.0 25.0   BUN mg/dL 21 27* 27* 24* 16   CREATININE mg/dL 0.88 1.12 1.11 0.98 0.76   CALCIUM mg/dL 8.6 8.9 9.2 8.6 8.6   BILIRUBIN mg/dL  --   --  1.1 0.4 0.4   ALK PHOS U/L  --   --  79 77 76   ALT (SGPT) U/L  --   --  14 11 9   AST (SGOT) U/L  --   --  16 13 16   GLUCOSE mg/dL 95 152* 183* 247* 89     Lab Results   Component Value Date    GLUCOSE 95 03/13/2023    BUN 21 03/13/2023    CREATININE 0.88 03/13/2023    EGFRIFNONA 76 11/15/2021    EGFRIFAFRI 87 11/15/2021    BCR 23.9 03/13/2023    K 3.9 03/13/2023    CO2 26.0 03/13/2023    CALCIUM 8.6 03/13/2023    ALBUMIN 3.9 03/11/2023    AST 16 03/11/2023    ALT 14 03/11/2023     Lab Results   Component Value Date    IRON 15 (L) 01/06/2023    TIBC 548 (H) 01/06/2023    FERRITIN 23.51 (L) 01/06/2023     Lab Results   Component Value Date    CEA 1.70 01/06/2023     Assessment & Plan     Assessment:  1. 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  Continues to tolerate treatment with chemotherapy.  Plans for him to receive 3 months of treatment had been made and I believe are still appropriate.  I am not entirely clear on the problems he suffered during the admission to the hospital.  Indeed the possibility of a very small stroke is somewhat surprising.  2. Diabetes mellitus  3. Normocytic anemia: Very likely related to the chemotherapy.  He continues to take iron and I have instructed him to continue with the same.  4. Thrombocytopenia secondary to chemotherapy.  He will return to see me in approximately 3 months.  Plan:  1. As above.    Don Kramer MD on 3/27/2023 at 9:19 AM

## 2023-03-27 ENCOUNTER — SPECIALTY PHARMACY (OUTPATIENT)
Dept: PHARMACY | Facility: HOSPITAL | Age: 78
End: 2023-03-27
Payer: OTHER GOVERNMENT

## 2023-03-27 ENCOUNTER — LAB (OUTPATIENT)
Dept: LAB | Facility: HOSPITAL | Age: 78
End: 2023-03-27
Payer: MEDICARE

## 2023-03-27 ENCOUNTER — READMISSION MANAGEMENT (OUTPATIENT)
Dept: CALL CENTER | Facility: HOSPITAL | Age: 78
End: 2023-03-27
Payer: OTHER GOVERNMENT

## 2023-03-27 ENCOUNTER — OFFICE VISIT (OUTPATIENT)
Dept: ONCOLOGY | Facility: CLINIC | Age: 78
End: 2023-03-27
Payer: OTHER GOVERNMENT

## 2023-03-27 VITALS
DIASTOLIC BLOOD PRESSURE: 73 MMHG | HEART RATE: 76 BPM | TEMPERATURE: 97.9 F | HEIGHT: 72 IN | SYSTOLIC BLOOD PRESSURE: 137 MMHG | BODY MASS INDEX: 24.73 KG/M2 | OXYGEN SATURATION: 99 % | WEIGHT: 182.6 LBS

## 2023-03-27 DIAGNOSIS — C18.2 MALIGNANT NEOPLASM OF ASCENDING COLON: ICD-10-CM

## 2023-03-27 DIAGNOSIS — C18.2 MALIGNANT NEOPLASM OF ASCENDING COLON: Primary | ICD-10-CM

## 2023-03-27 LAB
ALBUMIN SERPL-MCNC: 3.5 G/DL (ref 3.5–5.2)
ALBUMIN/GLOB SERPL: 1.3 G/DL
ALP SERPL-CCNC: 68 U/L (ref 39–117)
ALT SERPL W P-5'-P-CCNC: 20 U/L (ref 1–41)
ANION GAP SERPL CALCULATED.3IONS-SCNC: 10 MMOL/L (ref 5–15)
AST SERPL-CCNC: 24 U/L (ref 1–40)
BASOPHILS # BLD AUTO: 0.03 10*3/MM3 (ref 0–0.2)
BASOPHILS NFR BLD AUTO: 0.7 % (ref 0–1.5)
BILIRUB SERPL-MCNC: 0.5 MG/DL (ref 0–1.2)
BUN SERPL-MCNC: 17 MG/DL (ref 8–23)
BUN/CREAT SERPL: 19.5 (ref 7–25)
CALCIUM SPEC-SCNC: 8.9 MG/DL (ref 8.6–10.5)
CEA SERPL-MCNC: 4.6 NG/ML
CHLORIDE SERPL-SCNC: 104 MMOL/L (ref 98–107)
CO2 SERPL-SCNC: 26 MMOL/L (ref 22–29)
CREAT SERPL-MCNC: 0.87 MG/DL (ref 0.76–1.27)
DEPRECATED RDW RBC AUTO: 89.9 FL (ref 37–54)
EGFRCR SERPLBLD CKD-EPI 2021: 90 ML/MIN/1.73
EOSINOPHIL # BLD AUTO: 0.12 10*3/MM3 (ref 0–0.4)
EOSINOPHIL NFR BLD AUTO: 2.9 % (ref 0.3–6.2)
ERYTHROCYTE [DISTWIDTH] IN BLOOD BY AUTOMATED COUNT: 31.7 % (ref 12.3–15.4)
GLOBULIN UR ELPH-MCNC: 2.7 GM/DL
GLUCOSE SERPL-MCNC: 160 MG/DL (ref 65–99)
HCT VFR BLD AUTO: 30.2 % (ref 37.5–51)
HGB BLD-MCNC: 9 G/DL (ref 13–17.7)
LYMPHOCYTES # BLD AUTO: 1.14 10*3/MM3 (ref 0.7–3.1)
LYMPHOCYTES NFR BLD AUTO: 27.7 % (ref 19.6–45.3)
MCH RBC QN AUTO: 26 PG (ref 26.6–33)
MCHC RBC AUTO-ENTMCNC: 29.8 G/DL (ref 31.5–35.7)
MCV RBC AUTO: 87.3 FL (ref 79–97)
MONOCYTES # BLD AUTO: 0.87 10*3/MM3 (ref 0.1–0.9)
MONOCYTES NFR BLD AUTO: 21.2 % (ref 5–12)
NEUTROPHILS NFR BLD AUTO: 1.95 10*3/MM3 (ref 1.7–7)
NEUTROPHILS NFR BLD AUTO: 47.5 % (ref 42.7–76)
PLATELET # BLD AUTO: 124 10*3/MM3 (ref 140–450)
PMV BLD AUTO: 9.1 FL (ref 6–12)
POTASSIUM SERPL-SCNC: 4.3 MMOL/L (ref 3.5–5.2)
PROT SERPL-MCNC: 6.2 G/DL (ref 6–8.5)
RBC # BLD AUTO: 3.46 10*6/MM3 (ref 4.14–5.8)
SODIUM SERPL-SCNC: 140 MMOL/L (ref 136–145)
WBC NRBC COR # BLD: 4.11 10*3/MM3 (ref 3.4–10.8)

## 2023-03-27 PROCEDURE — 99213 OFFICE O/P EST LOW 20 MIN: CPT | Performed by: INTERNAL MEDICINE

## 2023-03-27 PROCEDURE — 80053 COMPREHEN METABOLIC PANEL: CPT | Performed by: INTERNAL MEDICINE

## 2023-03-27 PROCEDURE — 85025 COMPLETE CBC W/AUTO DIFF WBC: CPT

## 2023-03-27 PROCEDURE — 36415 COLL VENOUS BLD VENIPUNCTURE: CPT

## 2023-03-27 PROCEDURE — 82378 CARCINOEMBRYONIC ANTIGEN: CPT | Performed by: INTERNAL MEDICINE

## 2023-03-27 NOTE — PROGRESS NOTES
"Specialty Pharmacy Refill Coordination Note     Vlad \"Fredi\" is a 77 y.o. male contacted today regarding refills of capecitabine specialty medication(s).    Reviewed and verified with patient:       Specialty medication(s) and dose(s) confirmed: yes    Refill Questions    Flowsheet Row Most Recent Value   Changes to allergies? No   Changes to medications? No   New conditions since last clinic visit No   Unplanned office visit, urgent care, ED, or hospital admission in the last 4 weeks  No   How does patient/caregiver feel medication is working? Good   Financial problems or insurance changes  No   Since the previous refill, were any specialty medication doses or scheduled injections missed or delayed?  No   Does this patient require a clinical escalation to a pharmacist? No          Delivery Questions    Flowsheet Row Most Recent Value   Delivery method Other (Comment)  [Beeline to [facility] on 3/27/23.]   Delivery address correct? Yes   Delivery phone number [phone]   Number of medications in delivery 2   Medication being filled and delivered capecitabine 500mg and 150mg   Doses left of specialty medications none   Is there any medication that is due not being filled? No   Supplies needed? No supplies needed   Cooler needed? No   Do any medications need mixed or dated? No   Copay form of payment Payment plan already set up   Additional comments NA   Questions or concerns for the pharmacist? No   Explain any questions or concerns for the pharmacist NA   Are any medications first time fills? No          Beeline to [facility] today 3/27/23.       Follow-up: 2 week(s)     Robert Porras, Pharmacy Technician  Specialty Pharmacy Technician      " Pt's caregiver at bedside     Sandra DarlingJefferson Lansdale Hospital  10/17/20 1520

## 2023-03-27 NOTE — OUTREACH NOTE
Stroke Week 2 Survey    Flowsheet Row Responses   [facility] patient discharged from? [place]   Does the patient have one of the following disease processes/diagnoses(primary or secondary)? Stroke   Week 2 attempt successful? Yes   Call start time 1203   Call end time 1206   Discharge diagnosis Acute CVA    Medication alerts for this patient Now taking Lantus at 10unit and added Ozempic   Meds reviewed with patient/caregiver? Yes   Is the patient taking all medications as directed (includes completed medication regime)? Yes   Does the patient have a primary care provider?  Yes   Has the patient kept scheduled appointments due by today? Yes   Psychosocial issues? Yes   Psychosocial comments chemo x1 more week he reports.   Does the patient require any assistance with activities of daily living such as eating, bathing, dressing, walking, etc.? No   Does the patient have any residual symptoms from stroke/TIA? No   Comments Pt tolerating PO intake, no further N/V   What is the patient's perception of their health status since discharge? Improving   Nursing interventions Nurse provided patient education   Is the patient/caregiver able to teach back the risk factors for a stroke? High blood pressure-goal below 120/80   Is the patient/caregiver able to teach back the hierarchy of who to call/visit for symptoms/problems? PCP, Specialist, Home health nurse, Urgent Care, ED, 911 Yes   Week 2 call completed? Yes   Revoked No further contact(revokes)-requires comment   Is the patient interested in additional calls from an ambulatory ?  NOTE:  applies to high risk patients requiring additional follow-up. No   Graduated/Revoked comments devan OMER - Registered Nurse

## 2023-03-28 ENCOUNTER — HOSPITAL ENCOUNTER (OUTPATIENT)
Dept: ONCOLOGY | Facility: HOSPITAL | Age: 78
Discharge: HOME | End: 2023-03-28
Admitting: INTERNAL MEDICINE
Payer: MEDICARE

## 2023-03-28 ENCOUNTER — SPECIALTY PHARMACY (OUTPATIENT)
Dept: PHARMACY | Facility: HOSPITAL | Age: 78
End: 2023-03-28
Payer: OTHER GOVERNMENT

## 2023-03-28 VITALS
OXYGEN SATURATION: 96 % | HEART RATE: 78 BPM | DIASTOLIC BLOOD PRESSURE: 70 MMHG | SYSTOLIC BLOOD PRESSURE: 130 MMHG | BODY MASS INDEX: 25.09 KG/M2 | RESPIRATION RATE: 18 BRPM | TEMPERATURE: 97.5 F | WEIGHT: 185 LBS

## 2023-03-28 DIAGNOSIS — C18.2 MALIGNANT NEOPLASM OF ASCENDING COLON: Primary | ICD-10-CM

## 2023-03-28 PROCEDURE — 96413 CHEMO IV INFUSION 1 HR: CPT

## 2023-03-28 PROCEDURE — 96361 HYDRATE IV INFUSION ADD-ON: CPT

## 2023-03-28 PROCEDURE — 96360 HYDRATION IV INFUSION INIT: CPT

## 2023-03-28 PROCEDURE — 25010000002 DEXAMETHASONE SODIUM PHOSPHATE 120 MG/30ML SOLUTION: Performed by: INTERNAL MEDICINE

## 2023-03-28 PROCEDURE — 96375 TX/PRO/DX INJ NEW DRUG ADDON: CPT

## 2023-03-28 PROCEDURE — 96367 TX/PROPH/DG ADDL SEQ IV INF: CPT

## 2023-03-28 PROCEDURE — 25010000002 PALONOSETRON 0.25 MG/5ML SOLUTION PREFILLED SYRINGE: Performed by: INTERNAL MEDICINE

## 2023-03-28 PROCEDURE — 25010000002 OXALIPLATIN PER 0.5 MG: Performed by: INTERNAL MEDICINE

## 2023-03-28 PROCEDURE — 25010000002 HEPARIN LOCK FLUSH PER 10 UNITS: Performed by: INTERNAL MEDICINE

## 2023-03-28 PROCEDURE — 96415 CHEMO IV INFUSION ADDL HR: CPT

## 2023-03-28 RX ORDER — HEPARIN SODIUM (PORCINE) LOCK FLUSH IV SOLN 100 UNIT/ML 100 UNIT/ML
500 SOLUTION INTRAVENOUS AS NEEDED
OUTPATIENT
Start: 2023-03-28

## 2023-03-28 RX ORDER — PALONOSETRON 0.05 MG/ML
0.25 INJECTION, SOLUTION INTRAVENOUS ONCE
Status: COMPLETED | OUTPATIENT
Start: 2023-03-28 | End: 2023-03-28

## 2023-03-28 RX ORDER — HEPARIN SODIUM (PORCINE) LOCK FLUSH IV SOLN 100 UNIT/ML 100 UNIT/ML
500 SOLUTION INTRAVENOUS AS NEEDED
Status: DISCONTINUED | OUTPATIENT
Start: 2023-03-28 | End: 2023-03-29 | Stop reason: HOSPADM

## 2023-03-28 RX ORDER — DIPHENHYDRAMINE HYDROCHLORIDE 50 MG/ML
50 INJECTION INTRAMUSCULAR; INTRAVENOUS AS NEEDED
Status: CANCELLED | OUTPATIENT
Start: 2023-03-28

## 2023-03-28 RX ORDER — PALONOSETRON 0.05 MG/ML
0.25 INJECTION, SOLUTION INTRAVENOUS ONCE
Status: CANCELLED | OUTPATIENT
Start: 2023-03-28

## 2023-03-28 RX ORDER — FAMOTIDINE 10 MG/ML
20 INJECTION, SOLUTION INTRAVENOUS AS NEEDED
Status: CANCELLED | OUTPATIENT
Start: 2023-03-28

## 2023-03-28 RX ORDER — SODIUM CHLORIDE 0.9 % (FLUSH) 0.9 %
20 SYRINGE (ML) INJECTION AS NEEDED
OUTPATIENT
Start: 2023-03-28

## 2023-03-28 RX ORDER — SODIUM CHLORIDE 0.9 % (FLUSH) 0.9 %
20 SYRINGE (ML) INJECTION AS NEEDED
Status: DISCONTINUED | OUTPATIENT
Start: 2023-03-28 | End: 2023-03-29 | Stop reason: HOSPADM

## 2023-03-28 RX ORDER — DEXTROSE MONOHYDRATE 50 MG/ML
250 INJECTION, SOLUTION INTRAVENOUS ONCE
Status: COMPLETED | OUTPATIENT
Start: 2023-03-28 | End: 2023-03-28

## 2023-03-28 RX ORDER — DEXTROSE MONOHYDRATE 50 MG/ML
250 INJECTION, SOLUTION INTRAVENOUS ONCE
Status: CANCELLED | OUTPATIENT
Start: 2023-03-28

## 2023-03-28 RX ADMIN — HEPARIN 500 UNITS: 100 SYRINGE at 11:42

## 2023-03-28 RX ADMIN — PALONOSETRON 0.25 MG: 0.25 INJECTION, SOLUTION INTRAVENOUS at 09:13

## 2023-03-28 RX ADMIN — Medication 20 ML: at 11:42

## 2023-03-28 RX ADMIN — DEXTROSE MONOHYDRATE 250 ML: 50 INJECTION, SOLUTION INTRAVENOUS at 09:13

## 2023-03-28 RX ADMIN — OXALIPLATIN 265 MG: 5 INJECTION, SOLUTION INTRAVENOUS at 09:35

## 2023-03-28 RX ADMIN — DEXAMETHASONE SODIUM PHOSPHATE 12 MG: 4 INJECTION, SOLUTION INTRA-ARTICULAR; INTRALESIONAL; INTRAMUSCULAR; INTRAVENOUS; SOFT TISSUE at 09:13

## 2023-04-03 ENCOUNTER — LAB (OUTPATIENT)
Dept: LAB | Facility: HOSPITAL | Age: 78
End: 2023-04-03
Payer: MEDICARE

## 2023-04-03 DIAGNOSIS — C18.2 MALIGNANT NEOPLASM OF ASCENDING COLON: ICD-10-CM

## 2023-04-03 LAB
BASOPHILS # BLD AUTO: 0.01 10*3/MM3 (ref 0–0.2)
BASOPHILS NFR BLD AUTO: 0.2 % (ref 0–1.5)
EOSINOPHIL # BLD AUTO: 0.06 10*3/MM3 (ref 0–0.4)
EOSINOPHIL NFR BLD AUTO: 1.3 % (ref 0.3–6.2)
ERYTHROCYTE [DISTWIDTH] IN BLOOD BY AUTOMATED COUNT: ABNORMAL %
HCT VFR BLD AUTO: 31.2 % (ref 37.5–51)
HGB BLD-MCNC: 9.5 G/DL (ref 13–17.7)
LYMPHOCYTES # BLD AUTO: 0.83 10*3/MM3 (ref 0.7–3.1)
LYMPHOCYTES NFR BLD AUTO: 18.4 % (ref 19.6–45.3)
MCH RBC QN AUTO: 26.9 PG (ref 26.6–33)
MCHC RBC AUTO-ENTMCNC: 30.4 G/DL (ref 31.5–35.7)
MCV RBC AUTO: 88.4 FL (ref 79–97)
MONOCYTES # BLD AUTO: 0.55 10*3/MM3 (ref 0.1–0.9)
MONOCYTES NFR BLD AUTO: 12.2 % (ref 5–12)
NEUTROPHILS NFR BLD AUTO: 3.05 10*3/MM3 (ref 1.7–7)
NEUTROPHILS NFR BLD AUTO: 67.9 % (ref 42.7–76)
PLATELET # BLD AUTO: 107 10*3/MM3 (ref 140–450)
PMV BLD AUTO: 9 FL (ref 6–12)
RBC # BLD AUTO: 3.53 10*6/MM3 (ref 4.14–5.8)
WBC NRBC COR # BLD: 4.5 10*3/MM3 (ref 3.4–10.8)

## 2023-04-03 PROCEDURE — 36415 COLL VENOUS BLD VENIPUNCTURE: CPT

## 2023-04-03 PROCEDURE — 85025 COMPLETE CBC W/AUTO DIFF WBC: CPT

## 2023-04-04 ENCOUNTER — SPECIALTY PHARMACY (OUTPATIENT)
Dept: PHARMACY | Facility: HOSPITAL | Age: 78
End: 2023-04-04
Payer: MEDICARE

## 2023-04-10 ENCOUNTER — HOSPITAL ENCOUNTER (OUTPATIENT)
Dept: ONCOLOGY | Facility: HOSPITAL | Age: 78
Discharge: HOME | End: 2023-04-10
Payer: MEDICARE

## 2023-04-10 DIAGNOSIS — C18.2 MALIGNANT NEOPLASM OF ASCENDING COLON: Primary | ICD-10-CM

## 2023-04-11 ENCOUNTER — LAB (OUTPATIENT)
Dept: LAB | Facility: HOSPITAL | Age: 78
End: 2023-04-11
Payer: MEDICARE

## 2023-04-11 ENCOUNTER — SPECIALTY PHARMACY (OUTPATIENT)
Dept: PHARMACY | Facility: HOSPITAL | Age: 78
End: 2023-04-11
Payer: MEDICARE

## 2023-04-11 DIAGNOSIS — C18.2 MALIGNANT NEOPLASM OF ASCENDING COLON: Primary | ICD-10-CM

## 2023-04-11 LAB
BASOPHILS # BLD AUTO: 0.01 10*3/MM3 (ref 0–0.2)
BASOPHILS NFR BLD AUTO: 0.3 % (ref 0–1.5)
EOSINOPHIL # BLD AUTO: 0.05 10*3/MM3 (ref 0–0.4)
EOSINOPHIL NFR BLD AUTO: 1.3 % (ref 0.3–6.2)
ERYTHROCYTE [DISTWIDTH] IN BLOOD BY AUTOMATED COUNT: ABNORMAL %
HCT VFR BLD AUTO: 30.5 % (ref 37.5–51)
HGB BLD-MCNC: 9.3 G/DL (ref 13–17.7)
HOLD SPECIMEN: NORMAL
HOLD SPECIMEN: NORMAL
LYMPHOCYTES # BLD AUTO: 0.68 10*3/MM3 (ref 0.7–3.1)
LYMPHOCYTES NFR BLD AUTO: 17.7 % (ref 19.6–45.3)
MCH RBC QN AUTO: 27.3 PG (ref 26.6–33)
MCHC RBC AUTO-ENTMCNC: 30.5 G/DL (ref 31.5–35.7)
MCV RBC AUTO: 89.4 FL (ref 79–97)
MONOCYTES # BLD AUTO: 0.34 10*3/MM3 (ref 0.1–0.9)
MONOCYTES NFR BLD AUTO: 8.9 % (ref 5–12)
NEUTROPHILS NFR BLD AUTO: 2.76 10*3/MM3 (ref 1.7–7)
NEUTROPHILS NFR BLD AUTO: 71.8 % (ref 42.7–76)
PLATELET # BLD AUTO: 134 10*3/MM3 (ref 140–450)
PMV BLD AUTO: 9.2 FL (ref 6–12)
RBC # BLD AUTO: 3.41 10*6/MM3 (ref 4.14–5.8)
WBC NRBC COR # BLD: 3.84 10*3/MM3 (ref 3.4–10.8)

## 2023-04-11 PROCEDURE — 85025 COMPLETE CBC W/AUTO DIFF WBC: CPT

## 2023-04-11 PROCEDURE — 36415 COLL VENOUS BLD VENIPUNCTURE: CPT

## 2023-04-11 NOTE — PROGRESS NOTES
HEMATOLOGY ONCOLOGY OUTPATIENT FOLLOW-UP       Patient name: Vlad Guerrero  : 1945  MRN: [id number]  Primary Care Physician: Batool Lin APRN  Referring Physician: Batool Lin APRN  Reason For Consult: Stage III colon cancer    Chief Complaint   Patient presents with   • Follow-up     Malignant neoplasm of ascending colon     HPI:   History of Present Illness:  Vlad Guerrero is 77 y.o. male who presented to our office on 23 for consultation regarding    2023: Mr. Guerrero dated the beginning of his present illness to sometime at the end of  when he started to feel fatigued.  He was seen at the [facility] and had laboratory exams that revealed microcytic anemia.  He had evidence of iron deficiency and received intravenous iron in the hospital.  He had upper and lower gastrointestinal endoscopies that demonstrated a cecal tumor that measured 4 to 5 cm and was fungating in appearance.  It was circumferential and was next to the ileocecal valve.  The upper gastrointestinal endoscopy revealed no abnormalities.  On this basis he was on December 15, 2022 he was taken to the hospital and underwent a right hemicolectomy without complications.  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  It corresponded to a grade 2 malignancy that invaded through the muscularis propria into the pericolonic tissues.  Macroscopic tumor perforation or lymphovascular space invasion were not present.  Perineural invasion was not present either.  All margins of excision were negative.  All of 36 lymph nodes submitted to were positive for involvement with malignancy.  The disease was Rice staged as [id number].  Loss of expression of mismatch repair enzymes was not documented.  Mr. Guerrero was discharged to continue treatment as outpatient.  At the time of this visit he was recovering well from the surgery.   His incision had healed completely and he had no drains or suture materials.  He had returned home and was eating well.  He had yet to return to work.  He had been afebrile and free of nausea.  For the most part his weight had been stable.  After reviewing the records a long conversation, of approximately 1 hour, was had with the patient in regards to options of treatment.  The nature of his disease as well as the likelihood of recurrence were described.  The use of adjuvant chemotherapy to increase the rate of cure after surgery was explained.  Side effects were described in detail.  The need for a port was expressed as well.  A treatment plan was placed.    2/6/2023: Received the first cycle of adjuvant chemotherapy without any side effects. On the day of this visit feeling well and without any new symptoms, except a very mild rash, especially on the forearms, but no oral pain. Had stools of diminished consistency for a few days but now resolved. The exam was rather unremarkable, except for a few very light erythematous macules on the forearms.     2/27/2023: Feeling well and without new symptoms.  As active as before.  Eating well and without unintended weight loss.  Stronger and more energetic since the institution of vitamin B12 and iron.  No chest pains and cough.  No abdominal pain or diarrhea.  On exam no changes.  A decision was made to continue with the same treatment.  Laboratory exams were reviewed.  He was to see me again in approximately 4 weeks from this date with new scans.    3/27/2023: Suffered an ischemic stroke.  MRI on March 10, 2023 reported a 7 mm focus of restricted diffusion in the left periventricular white matter of the posterior left frontal lobe.  This was felt to be suspicious for an acute/subacute infarct.  There was also evidence of previous lacunar strokes.  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  There was bilateral internal carotid artery stenosis with 60% stenosis estimated at the right and not greater than 50% at the left.  Chemotherapy was held following discharge.  He was left without any sequela.  At the time of this visit he was entirely asymptomatic.  He was convinced a large part of his problem was that he had suffered from hyperglycemia, consistently for some time.  Indeed his glucose was between 152 mg/dL and 193 mg/dL in the preceding days.  However, he reported at home glucose readings of greater than 400 mg/dL..  On exam there were no changes.  The laboratory exams reported a blood count with normocytic anemia and mild thrombocytopenia.  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    4/14/2023: Completed 3 months of treatment.  On the day of this visit not feeling very well.  Weak and tired.  Not very good appetite although eating well.  Having some dysgeusia.  Afebrile.  No chest pains or cough and no abdominal pain.  Had maintain regular bowel activity.  No edema.  On exam no changes.  A decision was made to stop the chemotherapy.  He was to get intravenous fluids on the day of this visit.  To return to see me in 3 weeks.  Tentatively to have scans in early June 2023.    Subjective:  • 4/14/2023: Feeling weak and tired.  Not drinking much.  Blood pressure 100/59.  Reported eating well.  Had not had any nausea or vomiting.  He had however lost his appetite and he was experiencing dysgeusia.  No chest pains or cough.  No dysphagia.  Was also free of abdominal pain.  Had not had diarrhea or dysuria and had been free of melena or hematochezia.  No edema.  No  skin rash.    The following portions of the patient's history were reviewed and updated as appropriate: allergies, current medications, past family history, past medical history, past social history, past surgical history and problem list.    Past Medical History:   Diagnosis Date   • Anemia    • Colon cancer 2022    colon   • Diabetes mellitus    • Hyperlipidemia    • Hypertension      Past Surgical History:   Procedure Laterality Date   • APPENDECTOMY     • CARDIAC CATHETERIZATION     • COLON RESECTION N/A 12/15/2022    Procedure: COLON RESECTION RIGHT;  Surgeon: Percy Davis MD;  Location: [facility] MAIN OR;  Service: General;  Laterality: N/A;   • COLONOSCOPY N/A 11/11/2022    Procedure: COLONOSCOPY WITH BIOPSY AND POLYPECTOMY;  Surgeon: Billy Julien MD;  Location: [facility] ENDOSCOPY;  Service: Gastroenterology;  Laterality: N/A;  Impression:  1.  Large 4-5cm fulgurating circumferential ulcerated mass in the very proximal part of the ascending colon next to ileocecal valve multiple biopsies were performed.  This is highly concerning for colon malignancy.  2.  2 polyp rem   • ENDOSCOPY N/A 11/11/2022    Procedure: ESOPHAGOGASTRODUODENOSCOPY with biopsy X1;  Surgeon: Billy Julien MD;  Location: [facility] ENDOSCOPY;  Service: Gastroenterology;  Laterality: N/A;  5.  Upper endoscopy lamination unremarkable.      • PORTACATH PLACEMENT Right 1/12/2023    Procedure: INSERTION OF PORTACATH;  Surgeon: Percy Davis MD;  Location: [facility] MAIN OR;  Service: General;  Laterality: Right;   • TONSILLECTOMY         Current Outpatient Medications:   •  aspirin 325 MG tablet, Take 1 tablet by mouth Daily., Disp: 30 tablet, Rfl: 0  •  atorvastatin (LIPITOR) 40 MG tablet, Take 1 tablet by mouth Every Night., Disp: 90 tablet, Rfl: 0  •  capecitabine (XELODA) 150 MG chemo tablet, Take 5 tablets by mouth (with 1 other capecitabine Rx) for 1,750 mg total 2 (Two) Times a Day on Days 1-14, then off for 7 days. Total  dose is 1750mg in the AM & 1750mg in the PM., Disp: 140 tablet, Rfl: 4  •  capecitabine (XELODA) 500 MG chemo tablet, Take 2 tablets by mouth (with 1 other capecitabine prescription) for 1,750 mg total 2 (Two) Times a Day on Days 1-14, then off for 7 days.  Total dose is 1750mg in the AM & 1750mg in the PM., Disp: 56 tablet, Rfl: 4  •  empagliflozin (JARDIANCE) 25 MG tablet tablet, Take 1 tablet by mouth Daily. Dont take preop, Disp: , Rfl:   •  ferrous gluconate (FERGON) 324 MG tablet, Take 1 tablet by mouth Daily With Breakfast., Disp: 30 tablet, Rfl: 3  •  glimepiride (AMARYL) 4 MG tablet, Take 1 tablet by mouth 2 (Two) Times a Day. None preop, Disp: , Rfl:   •  Insulin Glargine (LANTUS SOLOSTAR) 100 UNIT/ML injection pen, Inject 7 Units under the skin into the appropriate area as directed Every Night., Disp: 15 mL, Rfl: 0  •  Insulin Pen Needle (Pen Needles) 32G X 4 MM misc, 1 each Daily. Dx code: E11.65, Disp: 100 each, Rfl: 2  •  metFORMIN (GLUCOPHAGE) 1000 MG tablet, Take 2 tablets by mouth Daily With Dinner. Last dose 12/12, Disp: , Rfl:   •  ondansetron (ZOFRAN) 8 MG tablet, Take 1 tablet by mouth 3 (Three) Times a Day As Needed for Nausea or Vomiting., Disp: 30 tablet, Rfl: 5  •  pioglitazone (ACTOS) 45 MG tablet, Take 1 tablet by mouth Daily. None preop, Disp: , Rfl:   •  Semaglutide,0.25 or 0.5MG/DOS, (Ozempic, 0.25 or 0.5 MG/DOSE,) 2 MG/1.5ML solution pen-injector, Inject 0.25 mg under the skin into the appropriate area as directed As Needed. friday, Disp: , Rfl:     Allergies   Allergen Reactions   • Penicillins Rash     Family History   Problem Relation Age of Onset   • Pneumonia Mother 94        SARS-CoV2   • Colon cancer Father 62   • Heart disease Sister      Cancer-related family history includes Colon cancer (age of onset: 62) in his father.    Social History     Tobacco Use   • Smoking status: Former     Packs/day: 1.00     Years: 2.00     Pack years: 2.00     Types: Cigarettes     Start date:       Quit date:      Years since quittin.3   • Smokeless tobacco: Never   Vaping Use   • Vaping Use: Never used   Substance Use Topics   • Alcohol use: Not Currently   • Drug use: Never     Social History     Social History Narrative   • Not on file      ROS:     Review of Systems   Constitutional: Positive for activity change, appetite change and fatigue. Negative for chills, diaphoresis, fever and unexpected weight change.   HENT: Negative for congestion, dental problem, drooling, ear discharge, ear pain, facial swelling, hearing loss, mouth sores, nosebleeds, postnasal drip, rhinorrhea, sinus pressure, sinus pain, sneezing, sore throat, tinnitus, trouble swallowing and voice change.    Eyes: Negative for photophobia, pain, discharge, redness, itching and visual disturbance.   Respiratory: Negative for apnea, cough, choking, chest tightness, shortness of breath, wheezing and stridor.    Cardiovascular: Negative for chest pain, palpitations and leg swelling.   Gastrointestinal: Negative for abdominal distention, abdominal pain, anal bleeding, blood in stool, constipation, diarrhea, nausea, rectal pain and vomiting.   Endocrine: Negative for cold intolerance, heat intolerance, polydipsia and polyuria.   Genitourinary: Negative for decreased urine volume, difficulty urinating, dysuria, flank pain, frequency, genital sores, hematuria and urgency.   Musculoskeletal: Negative for arthralgias, back pain, gait problem, joint swelling, myalgias, neck pain and neck stiffness.   Skin: Negative for color change, pallor and rash.   Neurological: Negative for dizziness, tremors, seizures, syncope, facial asymmetry, speech difficulty, weakness, light-headedness, numbness and headaches.   Hematological: Negative for adenopathy. Does not bruise/bleed easily.   Psychiatric/Behavioral: Negative for agitation, behavioral problems, confusion, decreased concentration, hallucinations, self-injury, sleep disturbance and  "suicidal ideas. The patient is not nervous/anxious.      Objective:    Vitals:    04/14/23 0936   BP: 100/59   Pulse: 96   Temp: 97.5 °F (36.4 °C)   TempSrc: Oral   SpO2: 99%   Weight: 83.9 kg (185 lb)  Comment: carried over - not stable to stand on scale   Height: 182.9 cm (72\")   PainSc: 0-No pain     Body mass index is 25.09 kg/m².  ECOG  (0) Fully active, able to carry on all predisease performance without restriction    Physical Exam:     Physical Exam  Constitutional:       General: He is not in acute distress.     Appearance: Normal appearance. He is not ill-appearing, toxic-appearing or diaphoretic.   HENT:      Head: Normocephalic and atraumatic.      Right Ear: External ear normal.      Left Ear: External ear normal.      Nose: Nose normal.      Mouth/Throat:      Mouth: Mucous membranes are moist.      Pharynx: Oropharynx is clear.   Eyes:      General: No scleral icterus.        Right eye: No discharge.         Left eye: No discharge.      Conjunctiva/sclera: Conjunctivae normal.      Pupils: Pupils are equal, round, and reactive to light.   Cardiovascular:      Rate and Rhythm: Normal rate and regular rhythm.      Pulses: Normal pulses.      Heart sounds: Normal heart sounds. No murmur heard.    No friction rub. No gallop.   Pulmonary:      Effort: No respiratory distress.      Breath sounds: No stridor. No wheezing, rhonchi or rales.   Chest:      Chest wall: No tenderness.   Abdominal:      General: Abdomen is flat. Bowel sounds are normal. There is no distension.      Palpations: Abdomen is soft. There is no mass.      Tenderness: There is no abdominal tenderness. There is no right CVA tenderness, left CVA tenderness, guarding or rebound.   Musculoskeletal:         General: No swelling, tenderness, deformity or signs of injury.      Cervical back: No rigidity.      Right lower leg: No edema.      Left lower leg: No edema.   Lymphadenopathy:      Cervical: No cervical adenopathy.   Skin:     General: " Skin is warm and dry.      Coloration: Skin is not jaundiced.      Findings: No bruising or rash.   Neurological:      General: No focal deficit present.      Mental Status: He is alert and oriented to person, place, and time.      Cranial Nerves: No cranial nerve deficit.      Gait: Gait normal.   Psychiatric:         Mood and Affect: Mood normal.         Behavior: Behavior normal.         Thought Content: Thought content normal.         Judgment: Judgment normal.     KATIE Kramer MD performed a physical exam on 4/14/2023 as documented above.    Lab Results - Last 18 Months   Lab Units 04/11/23  0756 04/03/23  0802 03/27/23  0800   WBC 10*3/mm3 3.84 4.50 4.11   HEMOGLOBIN g/dL 9.3* 9.5* 9.0*   HEMATOCRIT % 30.5* 31.2* 30.2*   PLATELETS 10*3/mm3 134* 107* 124*   MCV fL 89.4 88.4 87.3     Lab Results - Last 18 Months   Lab Units 03/27/23  0800 03/13/23  0639 03/12/23  0325 03/11/23  1755 02/27/23  0923   SODIUM mmol/L 140 141 141 139 140   POTASSIUM mmol/L 4.3 3.9 3.9 4.5 4.1   CHLORIDE mmol/L 104 106 105 103 106   CO2 mmol/L 26.0 26.0 25.0 25.0 25.0   BUN mg/dL 17 21 27* 27* 24*   CREATININE mg/dL 0.87 0.88 1.12 1.11 0.98   CALCIUM mg/dL 8.9 8.6 8.9 9.2 8.6   BILIRUBIN mg/dL 0.5  --   --  1.1 0.4   ALK PHOS U/L 68  --   --  79 77   ALT (SGPT) U/L 20  --   --  14 11   AST (SGOT) U/L 24  --   --  16 13   GLUCOSE mg/dL 160* 95 152* 183* 247*     Lab Results   Component Value Date    GLUCOSE 160 (H) 03/27/2023    BUN 17 03/27/2023    CREATININE 0.87 03/27/2023    EGFRIFNONA 76 11/15/2021    EGFRIFAFRI 87 11/15/2021    BCR 19.5 03/27/2023    K 4.3 03/27/2023    CO2 26.0 03/27/2023    CALCIUM 8.9 03/27/2023    ALBUMIN 3.5 03/27/2023    AST 24 03/27/2023    ALT 20 03/27/2023     Lab Results   Component Value Date    IRON 15 (L) 01/06/2023    TIBC 548 (H) 01/06/2023    FERRITIN 23.51 (L) 01/06/2023     Lab Results   Component Value Date    CEA 4.60 03/27/2023     Assessment & Plan     Assessment:  1. Moderately  differentiated adenocarcinoma of the ascending colon [id number] MMR proficient.  Completed 3 months of chemotherapy with CapeOx.  He is felt to be a good candidate for abbreviated therapy both because of his age and comorbidities and also because of the stage and characteristics of his malignancy.  2. Diabetes mellitus  3. Ischemic stroke.  This was somewhat surprising and stronger justification to abbreviate the adjuvant therapy.  4. Myelosuppression secondary to chemotherapy resolving.  5.  He is to receive intravenous fluids today with the hope that this will help him feel better.  He has not been drinking fluids his blood pressure is marginal.  He will see me in approximately 3 weeks.  I have encouraged him to call me if any new problems arise.    Plan:  1. As above    Don Kramer MD on 4/14/2023 at 10:12 AM

## 2023-04-14 ENCOUNTER — SPECIALTY PHARMACY (OUTPATIENT)
Dept: PHARMACY | Facility: HOSPITAL | Age: 78
End: 2023-04-14
Payer: MEDICARE

## 2023-04-14 ENCOUNTER — HOSPITAL ENCOUNTER (OUTPATIENT)
Dept: ONCOLOGY | Facility: HOSPITAL | Age: 78
Discharge: HOME | End: 2023-04-14
Admitting: INTERNAL MEDICINE
Payer: MEDICARE

## 2023-04-14 ENCOUNTER — OFFICE VISIT (OUTPATIENT)
Dept: ONCOLOGY | Facility: CLINIC | Age: 78
End: 2023-04-14
Payer: MEDICARE

## 2023-04-14 VITALS
DIASTOLIC BLOOD PRESSURE: 59 MMHG | WEIGHT: 185 LBS | TEMPERATURE: 97.5 F | HEART RATE: 96 BPM | HEIGHT: 72 IN | BODY MASS INDEX: 25.06 KG/M2 | OXYGEN SATURATION: 99 % | SYSTOLIC BLOOD PRESSURE: 100 MMHG

## 2023-04-14 VITALS — HEART RATE: 72 BPM | DIASTOLIC BLOOD PRESSURE: 75 MMHG | SYSTOLIC BLOOD PRESSURE: 134 MMHG

## 2023-04-14 DIAGNOSIS — C18.2 MALIGNANT NEOPLASM OF ASCENDING COLON: Primary | ICD-10-CM

## 2023-04-14 PROCEDURE — 25010000002 HEPARIN LOCK FLUSH PER 10 UNITS: Performed by: INTERNAL MEDICINE

## 2023-04-14 PROCEDURE — 96361 HYDRATE IV INFUSION ADD-ON: CPT

## 2023-04-14 PROCEDURE — 96366 THER/PROPH/DIAG IV INF ADDON: CPT

## 2023-04-14 PROCEDURE — 96365 THER/PROPH/DIAG IV INF INIT: CPT

## 2023-04-14 PROCEDURE — 96360 HYDRATION IV INFUSION INIT: CPT

## 2023-04-14 RX ORDER — HEPARIN SODIUM (PORCINE) LOCK FLUSH IV SOLN 100 UNIT/ML 100 UNIT/ML
500 SOLUTION INTRAVENOUS AS NEEDED
Status: DISCONTINUED | OUTPATIENT
Start: 2023-04-14 | End: 2023-04-15 | Stop reason: HOSPADM

## 2023-04-14 RX ORDER — SODIUM CHLORIDE 0.9 % (FLUSH) 0.9 %
20 SYRINGE (ML) INJECTION AS NEEDED
Status: DISCONTINUED | OUTPATIENT
Start: 2023-04-14 | End: 2023-04-15 | Stop reason: HOSPADM

## 2023-04-14 RX ORDER — SODIUM CHLORIDE 0.9 % (FLUSH) 0.9 %
20 SYRINGE (ML) INJECTION AS NEEDED
OUTPATIENT
Start: 2023-04-14

## 2023-04-14 RX ORDER — HEPARIN SODIUM (PORCINE) LOCK FLUSH IV SOLN 100 UNIT/ML 100 UNIT/ML
500 SOLUTION INTRAVENOUS AS NEEDED
OUTPATIENT
Start: 2023-04-14

## 2023-04-14 RX ADMIN — Medication 20 ML: at 12:43

## 2023-04-14 RX ADMIN — SODIUM CHLORIDE 1000 ML: 9 INJECTION, SOLUTION INTRAVENOUS at 10:40

## 2023-04-14 RX ADMIN — HEPARIN 500 UNITS: 100 SYRINGE at 12:43

## 2023-04-14 NOTE — PROGRESS NOTES
Specialty Pharmacy Note: Capecitabine - Therapy completed    Per Dr. Kramer, pt has completed capecitabine treatment. Pt does not need oral oncology specialty pharmacy services at this time; however, we are happy to assist pt in the future if needed.      Thank you,  Mary Cervantes, Pharm.D.

## 2023-04-18 ENCOUNTER — HOSPITAL ENCOUNTER (OUTPATIENT)
Dept: ONCOLOGY | Facility: HOSPITAL | Age: 78
Discharge: HOME | End: 2023-04-18
Payer: MEDICARE

## 2023-05-02 NOTE — PROGRESS NOTES
HEMATOLOGY ONCOLOGY OUTPATIENT FOLLOW-UP       Patient name: Vlad Guerrero  : 1945  MRN: [id number]  Primary Care Physician: Batool Lin APRN  Referring Physician: Batool Lin APRN  Reason For Consult: Stage III colon cancer    Chief Complaint   Patient presents with   • Follow-up     Malignant neoplasm of ascending colon     HPI:   History of Present Illness:  Vlad Guerrero is 77 y.o. male who presented to our office on 23 for consultation regarding    2023: Mr. Guerrero dated the beginning of his present illness to sometime at the end of  when he started to feel fatigued.  He was seen at the [facility] and had laboratory exams that revealed microcytic anemia.  He had evidence of iron deficiency and received intravenous iron in the hospital.  He had upper and lower gastrointestinal endoscopies that demonstrated a cecal tumor that measured 4 to 5 cm and was fungating in appearance.  It was circumferential and was next to the ileocecal valve.  The upper gastrointestinal endoscopy revealed no abnormalities.  On this basis he was on December 15, 2022 he was taken to the hospital and underwent a right hemicolectomy without complications.  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  It corresponded to a grade 2 malignancy that invaded through the muscularis propria into the pericolonic tissues.  Macroscopic tumor perforation or lymphovascular space invasion were not present.  Perineural invasion was not present either.  All margins of excision were negative.  All of 36 lymph nodes submitted to were positive for involvement with malignancy.  The disease was Casey staged as [id number].  Loss of expression of mismatch repair enzymes was not documented.  Mr. Guerrero was discharged to continue treatment as outpatient.  At the time of this visit he was recovering well from the surgery.   His incision had healed completely and he had no drains or suture materials.  He had returned home and was eating well.  He had yet to return to work.  He had been afebrile and free of nausea.  For the most part his weight had been stable.  After reviewing the records a long conversation, of approximately 1 hour, was had with the patient in regards to options of treatment.  The nature of his disease as well as the likelihood of recurrence were described.  The use of adjuvant chemotherapy to increase the rate of cure after surgery was explained.  Side effects were described in detail.  The need for a port was expressed as well.  A treatment plan was placed.    2/6/2023: Received the first cycle of adjuvant chemotherapy without any side effects. On the day of this visit feeling well and without any new symptoms, except a very mild rash, especially on the forearms, but no oral pain. Had stools of diminished consistency for a few days but now resolved. The exam was rather unremarkable, except for a few very light erythematous macules on the forearms.     2/27/2023: Feeling well and without new symptoms.  As active as before.  Eating well and without unintended weight loss.  Stronger and more energetic since the institution of vitamin B12 and iron.  No chest pains and cough.  No abdominal pain or diarrhea.  On exam no changes.  A decision was made to continue with the same treatment.  Laboratory exams were reviewed.  He was to see me again in approximately 4 weeks from this date with new scans.    3/27/2023: Suffered an ischemic stroke.  MRI on March 10, 2023 reported a 7 mm focus of restricted diffusion in the left periventricular white matter of the posterior left frontal lobe.  This was felt to be suspicious for an acute/subacute infarct.  There was also evidence of previous lacunar strokes.  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  There was bilateral internal carotid artery stenosis with 60% stenosis estimated at the right and not greater than 50% at the left.  Chemotherapy was held following discharge.  He was left without any sequela.  At the time of this visit he was entirely asymptomatic.  He was convinced a large part of his problem was that he had suffered from hyperglycemia, consistently for some time.  Indeed his glucose was between 152 mg/dL and 193 mg/dL in the preceding days.  However, he reported at home glucose readings of greater than 400 mg/dL..  On exam there were no changes.  The laboratory exams reported a blood count with normocytic anemia and mild thrombocytopenia.  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    4/14/2023: Completed 3 months of treatment.  On the day of this visit not feeling very well.  Weak and tired.  Not very good appetite although eating well.  Having some dysgeusia.  Afebrile.  No chest pains or cough and no abdominal pain.  Had maintain regular bowel activity.  No edema.  On exam no changes.  A decision was made to stop the chemotherapy.  He was to get intravenous fluids on the day of this visit.  To return to see me in 3 weeks.  Tentatively to have scans in early June 2023.    5/5/2023: Feeling progressively stronger. Eating better and slowly regaining weight. Afebrile. No more nausea. No diarrhea and now with regular bowel activity. On exam alert, conversant and well oriented. No pale or jaundice. No oral lesions and no palpable lymph nodes. Lungs clear and abdomen soft. Minimal edema of the right lower extremity. The laboratory exams revealed persistent anemia  with a tendency to macrocytosis. Hemoglobin and platelets within normal ranges. A decision was made to continue to observe and I asked him to see me in approximately 3 months with new scans.     Subjective:  • 5/5/2023: Feeling progressively better. Eating well and no nausea or vomiting. No chest pain or cough and no abdominal pain or diarrhea. No dysuria. No edema. No skin rash.     The following portions of the patient's history were reviewed and updated as appropriate: allergies, current medications, past family history, past medical history, past social history, past surgical history and problem list.    Past Medical History:   Diagnosis Date   • Anemia    • Colon cancer 2022    colon   • Diabetes mellitus    • Hyperlipidemia    • Hypertension      Past Surgical History:   Procedure Laterality Date   • APPENDECTOMY     • CARDIAC CATHETERIZATION     • COLON RESECTION N/A 12/15/2022    Procedure: COLON RESECTION RIGHT;  Surgeon: Percy Davis MD;  Location: [facility] MAIN OR;  Service: General;  Laterality: N/A;   • COLONOSCOPY N/A 11/11/2022    Procedure: COLONOSCOPY WITH BIOPSY AND POLYPECTOMY;  Surgeon: Billy Julien MD;  Location: [facility] ENDOSCOPY;  Service: Gastroenterology;  Laterality: N/A;  Impression:  1.  Large 4-5cm fulgurating circumferential ulcerated mass in the very proximal part of the ascending colon next to ileocecal valve multiple biopsies were performed.  This is highly concerning for colon malignancy.  2.  2 polyp rem   • ENDOSCOPY N/A 11/11/2022    Procedure: ESOPHAGOGASTRODUODENOSCOPY with biopsy X1;  Surgeon: Billy Julien MD;  Location: [facility] ENDOSCOPY;  Service: Gastroenterology;  Laterality: N/A;  5.  Upper endoscopy lamination unremarkable.      • PORTACATH PLACEMENT Right 1/12/2023    Procedure: INSERTION OF PORTACATH;  Surgeon: Percy Davis MD;  Location: [facility] MAIN OR;  Service: General;  Laterality: Right;   • TONSILLECTOMY         Current Outpatient  Medications:   •  aspirin 325 MG tablet, Take 1 tablet by mouth Daily., Disp: 30 tablet, Rfl: 0  •  atorvastatin (LIPITOR) 40 MG tablet, Take 1 tablet by mouth Every Night., Disp: 90 tablet, Rfl: 0  •  capecitabine (XELODA) 150 MG chemo tablet, Take 5 tablets by mouth (with 1 other capecitabine Rx) for 1,750 mg total 2 (Two) Times a Day on Days 1-14, then off for 7 days. Total dose is 1750mg in the AM & 1750mg in the PM., Disp: 140 tablet, Rfl: 4  •  capecitabine (XELODA) 500 MG chemo tablet, Take 2 tablets by mouth (with 1 other capecitabine prescription) for 1,750 mg total 2 (Two) Times a Day on Days 1-14, then off for 7 days.  Total dose is 1750mg in the AM & 1750mg in the PM., Disp: 56 tablet, Rfl: 4  •  empagliflozin (JARDIANCE) 25 MG tablet tablet, Take 1 tablet by mouth Daily. Dont take preop, Disp: , Rfl:   •  ferrous gluconate (FERGON) 324 MG tablet, Take 1 tablet by mouth Daily With Breakfast., Disp: 30 tablet, Rfl: 3  •  glimepiride (AMARYL) 4 MG tablet, Take 1 tablet by mouth 2 (Two) Times a Day. None preop, Disp: , Rfl:   •  Insulin Glargine (LANTUS SOLOSTAR) 100 UNIT/ML injection pen, Inject 7 Units under the skin into the appropriate area as directed Every Night., Disp: 15 mL, Rfl: 0  •  Insulin Pen Needle (Pen Needles) 32G X 4 MM misc, 1 each Daily. Dx code: E11.65, Disp: 100 each, Rfl: 2  •  metFORMIN (GLUCOPHAGE) 1000 MG tablet, Take 2 tablets by mouth Daily With Dinner. Last dose 12/12, Disp: , Rfl:   •  ondansetron (ZOFRAN) 8 MG tablet, Take 1 tablet by mouth 3 (Three) Times a Day As Needed for Nausea or Vomiting., Disp: 30 tablet, Rfl: 5  •  pioglitazone (ACTOS) 45 MG tablet, Take 1 tablet by mouth Daily. None preop, Disp: , Rfl:   •  Semaglutide,0.25 or 0.5MG/DOS, (Ozempic, 0.25 or 0.5 MG/DOSE,) 2 MG/1.5ML solution pen-injector, Inject 0.25 mg under the skin into the appropriate area as directed As Needed. friday, Disp: , Rfl:     Allergies   Allergen Reactions   • Penicillins Rash     Family  History   Problem Relation Age of Onset   • Pneumonia Mother 94        SARS-CoV2   • Colon cancer Father 62   • Heart disease Sister      Cancer-related family history includes Colon cancer (age of onset: 62) in his father.    Social History     Tobacco Use   • Smoking status: Former     Packs/day: 1.00     Years: 2.00     Pack years: 2.00     Types: Cigarettes     Start date:      Quit date:      Years since quittin.3   • Smokeless tobacco: Never   Vaping Use   • Vaping Use: Never used   Substance Use Topics   • Alcohol use: Not Currently   • Drug use: Never     Social History     Social History Narrative   • Not on file      ROS:     Review of Systems   Constitutional: Positive for activity change, appetite change and fatigue. Negative for chills, diaphoresis, fever and unexpected weight change.   HENT: Negative for congestion, dental problem, drooling, ear discharge, ear pain, facial swelling, hearing loss, mouth sores, nosebleeds, postnasal drip, rhinorrhea, sinus pressure, sinus pain, sneezing, sore throat, tinnitus, trouble swallowing and voice change.    Eyes: Negative for photophobia, pain, discharge, redness, itching and visual disturbance.   Respiratory: Negative for apnea, cough, choking, chest tightness, shortness of breath, wheezing and stridor.    Cardiovascular: Negative for chest pain, palpitations and leg swelling.   Gastrointestinal: Negative for abdominal distention, abdominal pain, anal bleeding, blood in stool, constipation, diarrhea, nausea, rectal pain and vomiting.   Endocrine: Negative for cold intolerance, heat intolerance, polydipsia and polyuria.   Genitourinary: Negative for decreased urine volume, difficulty urinating, dysuria, flank pain, frequency, genital sores, hematuria and urgency.   Musculoskeletal: Negative for arthralgias, back pain, gait problem, joint swelling, myalgias, neck pain and neck stiffness.   Skin: Negative for color change, pallor and rash.  "  Neurological: Negative for dizziness, tremors, seizures, syncope, facial asymmetry, speech difficulty, weakness, light-headedness, numbness and headaches.   Hematological: Negative for adenopathy. Does not bruise/bleed easily.   Psychiatric/Behavioral: Negative for agitation, behavioral problems, confusion, decreased concentration, hallucinations, self-injury, sleep disturbance and suicidal ideas. The patient is not nervous/anxious.      Objective:    Vitals:    05/05/23 0755   BP: 128/79   Pulse: 96   Temp: 97.9 °F (36.6 °C)   TempSrc: Oral   SpO2: 91%   Weight: 81.6 kg (180 lb)   Height: 182.9 cm (72\")   PainSc: 0-No pain     Body mass index is 24.41 kg/m².  ECOG  (0) Fully active, able to carry on all predisease performance without restriction    Physical Exam:     Physical Exam  Constitutional:       General: He is not in acute distress.     Appearance: Normal appearance. He is not ill-appearing, toxic-appearing or diaphoretic.   HENT:      Head: Normocephalic and atraumatic.      Right Ear: External ear normal.      Left Ear: External ear normal.      Nose: Nose normal.      Mouth/Throat:      Mouth: Mucous membranes are moist.      Pharynx: Oropharynx is clear.   Eyes:      General: No scleral icterus.        Right eye: No discharge.         Left eye: No discharge.      Conjunctiva/sclera: Conjunctivae normal.      Pupils: Pupils are equal, round, and reactive to light.   Cardiovascular:      Rate and Rhythm: Normal rate and regular rhythm.      Pulses: Normal pulses.      Heart sounds: Normal heart sounds. No murmur heard.    No friction rub. No gallop.   Pulmonary:      Effort: No respiratory distress.      Breath sounds: No stridor. No wheezing, rhonchi or rales.   Chest:      Chest wall: No tenderness.   Abdominal:      General: Abdomen is flat. Bowel sounds are normal. There is no distension.      Palpations: Abdomen is soft. There is no mass.      Tenderness: There is no abdominal tenderness. There is " no right CVA tenderness, left CVA tenderness, guarding or rebound.   Musculoskeletal:         General: No swelling, tenderness, deformity or signs of injury.      Cervical back: No rigidity.      Right lower leg: No edema.      Left lower leg: No edema.   Lymphadenopathy:      Cervical: No cervical adenopathy.   Skin:     General: Skin is warm and dry.      Coloration: Skin is not jaundiced.      Findings: No bruising or rash.   Neurological:      General: No focal deficit present.      Mental Status: He is alert and oriented to person, place, and time.      Cranial Nerves: No cranial nerve deficit.      Gait: Gait normal.   Psychiatric:         Mood and Affect: Mood normal.         Behavior: Behavior normal.         Thought Content: Thought content normal.         Judgment: Judgment normal.     KATIE Kramer MD performed a physical exam on 5/5/2023 as documented above.     Lab Results - Last 18 Months   Lab Units 05/05/23  0735 04/11/23  0756 04/03/23  0802   WBC 10*3/mm3 4.21 3.84 4.50   HEMOGLOBIN g/dL 9.3* 9.3* 9.5*   HEMATOCRIT % 30.1* 30.5* 31.2*   PLATELETS 10*3/mm3 202 134* 107*   MCV fL 97.1* 89.4 88.4     Lab Results - Last 18 Months   Lab Units 03/27/23  0800 03/13/23  0639 03/12/23  0325 03/11/23  1755 02/27/23  0923   SODIUM mmol/L 140 141 141 139 140   POTASSIUM mmol/L 4.3 3.9 3.9 4.5 4.1   CHLORIDE mmol/L 104 106 105 103 106   CO2 mmol/L 26.0 26.0 25.0 25.0 25.0   BUN mg/dL 17 21 27* 27* 24*   CREATININE mg/dL 0.87 0.88 1.12 1.11 0.98   CALCIUM mg/dL 8.9 8.6 8.9 9.2 8.6   BILIRUBIN mg/dL 0.5  --   --  1.1 0.4   ALK PHOS U/L 68  --   --  79 77   ALT (SGPT) U/L 20  --   --  14 11   AST (SGOT) U/L 24  --   --  16 13   GLUCOSE mg/dL 160* 95 152* 183* 247*     Lab Results   Component Value Date    GLUCOSE 160 (H) 03/27/2023    BUN 17 03/27/2023    CREATININE 0.87 03/27/2023    EGFRIFNONA 76 11/15/2021    EGFRIFAFRI 87 11/15/2021    BCR 19.5 03/27/2023    K 4.3 03/27/2023    CO2 26.0 03/27/2023     CALCIUM 8.9 03/27/2023    ALBUMIN 3.5 03/27/2023    AST 24 03/27/2023    ALT 20 03/27/2023     Lab Results   Component Value Date    IRON 15 (L) 01/06/2023    TIBC 548 (H) 01/06/2023    FERRITIN 23.51 (L) 01/06/2023     Lab Results   Component Value Date    CEA 4.60 03/27/2023     Assessment & Plan     Assessment:  1. Moderately differentiated adenocarcinoma of the ascending colon [id number] MMR proficient.  Completed 3 months of chemotherapy with CapeOx.  He was felt to be a good candidate for short adjuvant treatment given his age and co-morbidities, but especially because of the stage and the histologic characteristics of his malignancy.   2. Diabetes mellitus  3. Ischemic stroke.  This was somewhat surprising and stronger justification to abbreviate the adjuvant therapy.  4. 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   5.  He will see me in approximately 3 months with new scans.     Plan:  1. As above.     Don Kramer MD on 5/5/2023 at 9:25 AM.

## 2023-05-04 NOTE — PROGRESS NOTES
NOTE IS LOCATED IN A DOCUMENTATION OR NUTRITIONAL COUNSELING ENCOUNTER, FOR THIS DATE OF SERVICE.

## 2023-05-05 ENCOUNTER — OFFICE VISIT (OUTPATIENT)
Dept: ONCOLOGY | Facility: CLINIC | Age: 78
End: 2023-05-05
Payer: MEDICARE

## 2023-05-05 ENCOUNTER — LAB (OUTPATIENT)
Dept: LAB | Facility: HOSPITAL | Age: 78
End: 2023-05-05
Payer: MEDICARE

## 2023-05-05 ENCOUNTER — TELEPHONE (OUTPATIENT)
Dept: ONCOLOGY | Facility: CLINIC | Age: 78
End: 2023-05-05
Payer: MEDICARE

## 2023-05-05 VITALS
TEMPERATURE: 97.9 F | WEIGHT: 180 LBS | DIASTOLIC BLOOD PRESSURE: 79 MMHG | HEART RATE: 96 BPM | SYSTOLIC BLOOD PRESSURE: 128 MMHG | HEIGHT: 72 IN | BODY MASS INDEX: 24.38 KG/M2 | OXYGEN SATURATION: 91 %

## 2023-05-05 DIAGNOSIS — C18.2 MALIGNANT NEOPLASM OF ASCENDING COLON: Primary | ICD-10-CM

## 2023-05-05 LAB
ALBUMIN SERPL-MCNC: 3.2 G/DL (ref 3.5–5.2)
ALBUMIN/GLOB SERPL: 1 G/DL
ALP SERPL-CCNC: 114 U/L (ref 39–117)
ALT SERPL W P-5'-P-CCNC: 12 U/L (ref 1–41)
ANION GAP SERPL CALCULATED.3IONS-SCNC: 10 MMOL/L (ref 5–15)
AST SERPL-CCNC: 13 U/L (ref 1–40)
BASOPHILS # BLD AUTO: 0.01 10*3/MM3 (ref 0–0.2)
BASOPHILS NFR BLD AUTO: 0.2 % (ref 0–1.5)
BILIRUB SERPL-MCNC: 0.4 MG/DL (ref 0–1.2)
BUN SERPL-MCNC: 17 MG/DL (ref 8–23)
BUN/CREAT SERPL: 16.7 (ref 7–25)
CALCIUM SPEC-SCNC: 8.6 MG/DL (ref 8.6–10.5)
CHLORIDE SERPL-SCNC: 99 MMOL/L (ref 98–107)
CO2 SERPL-SCNC: 26 MMOL/L (ref 22–29)
CREAT SERPL-MCNC: 1.02 MG/DL (ref 0.76–1.27)
DEPRECATED RDW RBC AUTO: 81 FL (ref 37–54)
EGFRCR SERPLBLD CKD-EPI 2021: 76.6 ML/MIN/1.73
EOSINOPHIL # BLD AUTO: 0.05 10*3/MM3 (ref 0–0.4)
EOSINOPHIL NFR BLD AUTO: 1.2 % (ref 0.3–6.2)
ERYTHROCYTE [DISTWIDTH] IN BLOOD BY AUTOMATED COUNT: 23.4 % (ref 12.3–15.4)
GLOBULIN UR ELPH-MCNC: 3.1 GM/DL
GLUCOSE SERPL-MCNC: 401 MG/DL (ref 65–99)
HCT VFR BLD AUTO: 30.1 % (ref 37.5–51)
HGB BLD-MCNC: 9.3 G/DL (ref 13–17.7)
HOLD SPECIMEN: NORMAL
LYMPHOCYTES # BLD AUTO: 1.31 10*3/MM3 (ref 0.7–3.1)
LYMPHOCYTES NFR BLD AUTO: 31.1 % (ref 19.6–45.3)
MCH RBC QN AUTO: 30 PG (ref 26.6–33)
MCHC RBC AUTO-ENTMCNC: 30.9 G/DL (ref 31.5–35.7)
MCV RBC AUTO: 97.1 FL (ref 79–97)
MONOCYTES # BLD AUTO: 0.63 10*3/MM3 (ref 0.1–0.9)
MONOCYTES NFR BLD AUTO: 15 % (ref 5–12)
NEUTROPHILS NFR BLD AUTO: 2.21 10*3/MM3 (ref 1.7–7)
NEUTROPHILS NFR BLD AUTO: 52.5 % (ref 42.7–76)
PLATELET # BLD AUTO: 202 10*3/MM3 (ref 140–450)
PMV BLD AUTO: 8.4 FL (ref 6–12)
POTASSIUM SERPL-SCNC: 4.6 MMOL/L (ref 3.5–5.2)
PROT SERPL-MCNC: 6.3 G/DL (ref 6–8.5)
RBC # BLD AUTO: 3.1 10*6/MM3 (ref 4.14–5.8)
SODIUM SERPL-SCNC: 135 MMOL/L (ref 136–145)
WBC NRBC COR # BLD: 4.21 10*3/MM3 (ref 3.4–10.8)

## 2023-05-05 PROCEDURE — 36415 COLL VENOUS BLD VENIPUNCTURE: CPT

## 2023-05-05 PROCEDURE — 80053 COMPREHEN METABOLIC PANEL: CPT | Performed by: INTERNAL MEDICINE

## 2023-05-05 PROCEDURE — 85025 COMPLETE CBC W/AUTO DIFF WBC: CPT

## 2023-05-05 NOTE — TELEPHONE ENCOUNTER
AFTER SPEAKING WITH DR. LEBRON, I CONTACTED MR. SANCHEZ. I INFORMED MR. SANCHEZ THAT HIS BLOOD GLUCOSE LEVEL  THIS MORNING THEREFORE DR. LEBRON WANTED TO ENSURE THAT HE IS TAKING HIS DIABETES MEDICATION AS PRESCRIBED. PATIENT CONFIRMED THAT HE IS. ADVISED HIM THAT PER DR. LEBRON, WE RECOMMEND THAT HE CONTINUE TAKING HIS DIABETES REGIMEN AS PRESCRIBED. HE CONFIRMED. I ADVISED HIM TO CONTACT OUR OFFICE IF HE NEEDS ANYTHING OR IF HE HAS ANY QUESTIONS. PATIENT VOICED UNDERSTANDING.

## 2023-05-19 ENCOUNTER — TELEPHONE (OUTPATIENT)
Dept: ONCOLOGY | Facility: CLINIC | Age: 78
End: 2023-05-19
Payer: MEDICARE

## 2023-05-19 NOTE — TELEPHONE ENCOUNTER
"  Caller: Vlad Guerrero \"Fredi\"    Relationship: Self    Best call back number: [phone]        Who are you requesting to speak with (clinical staff, provider,  specific staff member): DR LEBRON  AND NURSE         What was the call regarding:     NEEDING INFORMATION ON CT SCAN ORDERED BY DR LEBRON TO [facility] TO [facility] SO [facility] THIS [facility] .    THEY ARE NEEDING TO KNOW     WHAT'S BEING DONE    NEEDING TO KNOW WHAT SCAN IS FOR    AND WHEN IT NEEDS TO BE DONE     FAX NUMBER TO SEND [phone]    FELIPE LENZ.       Do you require a callback: NO            "

## 2023-05-22 ENCOUNTER — HOSPITAL ENCOUNTER (EMERGENCY)
Facility: HOSPITAL | Age: 78
Discharge: HOME | End: 2023-05-22
Attending: EMERGENCY MEDICINE | Admitting: EMERGENCY MEDICINE
Payer: OTHER GOVERNMENT

## 2023-05-22 ENCOUNTER — APPOINTMENT (OUTPATIENT)
Dept: GENERAL RADIOLOGY | Facility: HOSPITAL | Age: 78
End: 2023-05-22
Payer: OTHER GOVERNMENT

## 2023-05-22 VITALS
HEART RATE: 88 BPM | RESPIRATION RATE: 15 BRPM | SYSTOLIC BLOOD PRESSURE: 159 MMHG | BODY MASS INDEX: 24.52 KG/M2 | TEMPERATURE: 99 F | WEIGHT: 185 LBS | OXYGEN SATURATION: 99 % | HEIGHT: 73 IN | DIASTOLIC BLOOD PRESSURE: 96 MMHG

## 2023-05-22 DIAGNOSIS — R53.1 WEAKNESS: Primary | ICD-10-CM

## 2023-05-22 DIAGNOSIS — R73.9 HYPERGLYCEMIA: ICD-10-CM

## 2023-05-22 LAB
ALBUMIN SERPL-MCNC: 3.4 G/DL (ref 3.5–5.2)
ALBUMIN/GLOB SERPL: 0.9 G/DL
ALP SERPL-CCNC: 120 U/L (ref 39–117)
ALT SERPL W P-5'-P-CCNC: 9 U/L (ref 1–41)
ANION GAP SERPL CALCULATED.3IONS-SCNC: 9 MMOL/L (ref 5–15)
ANISOCYTOSIS BLD QL: NORMAL
AST SERPL-CCNC: 14 U/L (ref 1–40)
BASOPHILS # BLD AUTO: 0 10*3/MM3 (ref 0–0.2)
BASOPHILS NFR BLD AUTO: 0.7 % (ref 0–1.5)
BILIRUB SERPL-MCNC: 0.3 MG/DL (ref 0–1.2)
BILIRUB UR QL STRIP: NEGATIVE
BUN SERPL-MCNC: 20 MG/DL (ref 8–23)
BUN/CREAT SERPL: 25.3 (ref 7–25)
C3 FRG RBC-MCNC: NORMAL
CALCIUM SPEC-SCNC: 8.7 MG/DL (ref 8.6–10.5)
CHLORIDE SERPL-SCNC: 102 MMOL/L (ref 98–107)
CLARITY UR: CLEAR
CO2 SERPL-SCNC: 27 MMOL/L (ref 22–29)
COLOR UR: YELLOW
CREAT SERPL-MCNC: 0.79 MG/DL (ref 0.76–1.27)
DEPRECATED RDW RBC AUTO: 59.9 FL (ref 37–54)
EGFRCR SERPLBLD CKD-EPI 2021: 92.6 ML/MIN/1.73
EOSINOPHIL # BLD AUTO: 0.1 10*3/MM3 (ref 0–0.4)
EOSINOPHIL NFR BLD AUTO: 1.9 % (ref 0.3–6.2)
ERYTHROCYTE [DISTWIDTH] IN BLOOD BY AUTOMATED COUNT: 17.9 % (ref 12.3–15.4)
GLOBULIN UR ELPH-MCNC: 3.7 GM/DL
GLUCOSE BLDC GLUCOMTR-MCNC: 304 MG/DL (ref 70–105)
GLUCOSE BLDC GLUCOMTR-MCNC: 352 MG/DL (ref 70–105)
GLUCOSE SERPL-MCNC: 326 MG/DL (ref 65–99)
GLUCOSE UR STRIP-MCNC: ABNORMAL MG/DL
HCT VFR BLD AUTO: 31.7 % (ref 37.5–51)
HGB BLD-MCNC: 9.8 G/DL (ref 13–17.7)
HGB UR QL STRIP.AUTO: NEGATIVE
KETONES UR QL STRIP: NEGATIVE
LEUKOCYTE ESTERASE UR QL STRIP.AUTO: NEGATIVE
LYMPHOCYTES # BLD AUTO: 1.2 10*3/MM3 (ref 0.7–3.1)
LYMPHOCYTES NFR BLD AUTO: 27.8 % (ref 19.6–45.3)
MCH RBC QN AUTO: 28.8 PG (ref 26.6–33)
MCHC RBC AUTO-ENTMCNC: 30.9 G/DL (ref 31.5–35.7)
MCV RBC AUTO: 93.1 FL (ref 79–97)
MONOCYTES # BLD AUTO: 0.5 10*3/MM3 (ref 0.1–0.9)
MONOCYTES NFR BLD AUTO: 12.3 % (ref 5–12)
NEUTROPHILS NFR BLD AUTO: 2.5 10*3/MM3 (ref 1.7–7)
NEUTROPHILS NFR BLD AUTO: 57.3 % (ref 42.7–76)
NITRITE UR QL STRIP: NEGATIVE
NRBC BLD AUTO-RTO: 0 /100 WBC (ref 0–0.2)
PH UR STRIP.AUTO: 5.5 [PH] (ref 5–8)
PLAT MORPH BLD: NORMAL
PLATELET # BLD AUTO: 217 10*3/MM3 (ref 140–450)
PMV BLD AUTO: 6.9 FL (ref 6–12)
POTASSIUM SERPL-SCNC: 4.2 MMOL/L (ref 3.5–5.2)
PROT SERPL-MCNC: 7.1 G/DL (ref 6–8.5)
PROT UR QL STRIP: NEGATIVE
RBC # BLD AUTO: 3.41 10*6/MM3 (ref 4.14–5.8)
SODIUM SERPL-SCNC: 138 MMOL/L (ref 136–145)
SP GR UR STRIP: 1.03 (ref 1–1.03)
UROBILINOGEN UR QL STRIP: ABNORMAL
WBC MORPH BLD: NORMAL
WBC NRBC COR # BLD: 4.3 10*3/MM3 (ref 3.4–10.8)

## 2023-05-22 PROCEDURE — 71045 X-RAY EXAM CHEST 1 VIEW: CPT

## 2023-05-22 PROCEDURE — 82948 REAGENT STRIP/BLOOD GLUCOSE: CPT

## 2023-05-22 PROCEDURE — 85007 BL SMEAR W/DIFF WBC COUNT: CPT

## 2023-05-22 PROCEDURE — 63710000001 INSULIN REGULAR HUMAN PER 5 UNITS: Performed by: EMERGENCY MEDICINE

## 2023-05-22 PROCEDURE — 80053 COMPREHEN METABOLIC PANEL: CPT

## 2023-05-22 PROCEDURE — 85025 COMPLETE CBC W/AUTO DIFF WBC: CPT

## 2023-05-22 PROCEDURE — 81003 URINALYSIS AUTO W/O SCOPE: CPT

## 2023-05-22 PROCEDURE — 99283 EMERGENCY DEPT VISIT LOW MDM: CPT

## 2023-05-22 RX ORDER — SODIUM CHLORIDE 0.9 % (FLUSH) 0.9 %
10 SYRINGE (ML) INJECTION AS NEEDED
Status: DISCONTINUED | OUTPATIENT
Start: 2023-05-22 | End: 2023-05-23 | Stop reason: HOSPADM

## 2023-05-22 RX ADMIN — INSULIN HUMAN 6 UNITS: 100 INJECTION, SOLUTION PARENTERAL at 23:39

## 2023-05-23 NOTE — ED PROVIDER NOTES
"Subjective      Provider in Triage Note  Patient presented to the emergency room stating that he has felt \"off\" when attempting to ambulate for the last 5 days.  He has had no headache, no a facial asymmetry or asymmetrical weakness.  He does have a history of type 2 diabetes and takes insulin, states that his sugar today was reading at over 570 he administered 12 units of insulin and improved to 540.  He is having no chest pain or shortness of breath he denies nausea or vomiting.    History of Present Illness  I interviewed the patient for HPI and agree with the nurse practitioner providing triage note as noted above  Review of Systems    Past Medical History:   Diagnosis Date   • Anemia    • Colon cancer 2022    colon   • Diabetes mellitus    • Hyperlipidemia    • Hypertension        Allergies   Allergen Reactions   • Penicillins Rash       Past Surgical History:   Procedure Laterality Date   • APPENDECTOMY     • CARDIAC CATHETERIZATION     • COLON RESECTION N/A 12/15/2022    Procedure: COLON RESECTION RIGHT;  Surgeon: Percy Davis MD;  Location: [facility] MAIN OR;  Service: General;  Laterality: N/A;   • COLONOSCOPY N/A 11/11/2022    Procedure: COLONOSCOPY WITH BIOPSY AND POLYPECTOMY;  Surgeon: Billy Julien MD;  Location: [facility] ENDOSCOPY;  Service: Gastroenterology;  Laterality: N/A;  Impression:  1.  Large 4-5cm fulgurating circumferential ulcerated mass in the very proximal part of the ascending colon next to ileocecal valve multiple biopsies were performed.  This is highly concerning for colon malignancy.  2.  2 polyp rem   • ENDOSCOPY N/A 11/11/2022    Procedure: ESOPHAGOGASTRODUODENOSCOPY with biopsy X1;  Surgeon: Billy Julien MD;  Location: [facility] ENDOSCOPY;  Service: Gastroenterology;  Laterality: N/A;  5.  Upper endoscopy lamination unremarkable.      • PORTACATH PLACEMENT Right 1/12/2023    Procedure: INSERTION OF PORTACATH;  Surgeon: Percy Davis MD;  Location: [facility] MAIN OR;  " Service: General;  Laterality: Right;   • TONSILLECTOMY         Family History   Problem Relation Age of Onset   • Pneumonia Mother 94        SARS-CoV2   • Colon cancer Father 62   • Heart disease Sister        Social History     Socioeconomic History   • Marital status: Single   Tobacco Use   • Smoking status: Former     Packs/day: 1.00     Years: 2.00     Pack years: 2.00     Types: Cigarettes     Start date:      Quit date:      Years since quittin.4   • Smokeless tobacco: Never   Vaping Use   • Vaping Use: Never used   Substance and Sexual Activity   • Alcohol use: Not Currently   • Drug use: Never   • Sexual activity: Defer           Objective   Physical Exam  Neurologic exam is nonfocal.  Neck has no adenopathy JVD or bruits.  Lungs are clear.  Heart is regular rhythm without murmur.  Chest nontender.  Abdomen soft nontender.  Extremity exam unremarkable.  Procedures           ED Course      Results for orders placed or performed during the hospital encounter of 23   Comprehensive Metabolic Panel    Specimen: Blood   Result Value Ref Range    Glucose 326 (H) 65 - 99 mg/dL    BUN 20 8 - 23 mg/dL    Creatinine 0.79 0.76 - 1.27 mg/dL    Sodium 138 136 - 145 mmol/L    Potassium 4.2 3.5 - 5.2 mmol/L    Chloride 102 98 - 107 mmol/L    CO2 27.0 22.0 - 29.0 mmol/L    Calcium 8.7 8.6 - 10.5 mg/dL    Total Protein 7.1 6.0 - 8.5 g/dL    Albumin 3.4 (L) 3.5 - 5.2 g/dL    ALT (SGPT) 9 1 - 41 U/L    AST (SGOT) 14 1 - 40 U/L    Alkaline Phosphatase 120 (H) 39 - 117 U/L    Total Bilirubin 0.3 0.0 - 1.2 mg/dL    Globulin 3.7 gm/dL    A/G Ratio 0.9 g/dL    BUN/Creatinine Ratio 25.3 (H) 7.0 - 25.0    Anion Gap 9.0 5.0 - 15.0 mmol/L    eGFR 92.6 >60.0 mL/min/1.73   Urinalysis With Microscopic If Indicated (No Culture) - Urine, Clean Catch    Specimen: Urine, Clean Catch   Result Value Ref Range    Color, UA Yellow Yellow, Straw    Appearance, UA Clear Clear    pH, UA 5.5 5.0 - 8.0    Specific Gravity, UA 1.034  (H) 1.005 - 1.030    Glucose, UA >=1000 mg/dL (3+) (A) Negative    Ketones, UA Negative Negative    Bilirubin, UA Negative Negative    Blood, UA Negative Negative    Protein, UA Negative Negative    Leuk Esterase, UA Negative Negative    Nitrite, UA Negative Negative    Urobilinogen, UA 1.0 E.U./dL 0.2 - 1.0 E.U./dL   CBC Auto Differential    Specimen: Blood   Result Value Ref Range    WBC 4.30 3.40 - 10.80 10*3/mm3    RBC 3.41 (L) 4.14 - 5.80 10*6/mm3    Hemoglobin 9.8 (L) 13.0 - 17.7 g/dL    Hematocrit 31.7 (L) 37.5 - 51.0 %    MCV 93.1 79.0 - 97.0 fL    MCH 28.8 26.6 - 33.0 pg    MCHC 30.9 (L) 31.5 - 35.7 g/dL    RDW 17.9 (H) 12.3 - 15.4 %    RDW-SD 59.9 (H) 37.0 - 54.0 fl    MPV 6.9 6.0 - 12.0 fL    Platelets 217 140 - 450 10*3/mm3    Neutrophil % 57.3 42.7 - 76.0 %    Lymphocyte % 27.8 19.6 - 45.3 %    Monocyte % 12.3 (H) 5.0 - 12.0 %    Eosinophil % 1.9 0.3 - 6.2 %    Basophil % 0.7 0.0 - 1.5 %    Neutrophils, Absolute 2.50 1.70 - 7.00 10*3/mm3    Lymphocytes, Absolute 1.20 0.70 - 3.10 10*3/mm3    Monocytes, Absolute 0.50 0.10 - 0.90 10*3/mm3    Eosinophils, Absolute 0.10 0.00 - 0.40 10*3/mm3    Basophils, Absolute 0.00 0.00 - 0.20 10*3/mm3    nRBC 0.0 0.0 - 0.2 /100 WBC   Scan Slide    Specimen: Blood   Result Value Ref Range    Anisocytosis Slight/1+ None Seen    RBC Fragments Slight/1+ None Seen    WBC Morphology Normal Normal    Platelet Morphology Normal Normal   POC Glucose Once    Specimen: Blood   Result Value Ref Range    Glucose 352 (H) 70 - 105 mg/dL   POC Glucose Once    Specimen: Blood   Result Value Ref Range    Glucose 304 (H) 70 - 105 mg/dL     XR Chest 1 View    Result Date: 5/22/2023  Impression: Right-sided central line tip in SVC. Left basilar opacities may represent atelectasis or infiltrates. Heart size normal. No pneumothorax. Electronically Signed: Jeremiah Fischer  5/22/2023 10:11 PM EDT  Workstation ID: [id number]                                         Medical Decision Making  My chest  interpretation shows no cardiomegaly fusion or infiltrate.  Metabolic panel is normal other than glucose greater than 300.  Is no renal insufficiency.  There is no evidence acute infectious process.  Patient is able to ambulate without difficulty.  Patient will be discharged after he was given IV insulin.  He will follow with his MD for further outpatient evaluation as needed.    Amount and/or Complexity of Data Reviewed  Labs: ordered. Decision-making details documented in ED Course.  Radiology: ordered and independent interpretation performed.      Risk  Prescription drug management.          Final diagnoses:   Weakness   Hyperglycemia       ED Disposition  ED Disposition     ED Disposition   Discharge    Condition   Stable    Comment   --             No follow-up provider specified.       Medication List      No changes were made to your prescriptions during this visit.          Rodrigo Malhotra MD  05/22/23 [id number]

## 2023-05-25 ENCOUNTER — TELEPHONE (OUTPATIENT)
Dept: ONCOLOGY | Facility: CLINIC | Age: 78
End: 2023-05-25
Payer: MEDICARE

## 2023-05-25 NOTE — TELEPHONE ENCOUNTER
CONTACTED [facility] REGARDING HER MESSAGE WE RECEIVED. SHE STATED SHE IS TRYING TO GET THE PATIENT COVERED UNDER [facility]. SHE STATED SHE HAS CONTACTED MEDICAL RECORDS AT [facility] TO OBTAIN RECORDS REGARDING MR. SANCHEZ'S TREATMENT. I INFORMED HER THAT WE FIRST SAW MR. SANCHEZ ON 1/6/23 AND HE RECEIVED HIS FIRST TREATMENT ON 1/16/23. SHE VOICED UNDERSTANDING. I ADVISED HER TO CONTACT OUR OFFICE IF SHE NEEDS ANY FURTHER INFORMATION. SHE CONFIRMED. NO FURTHER QUESTIONS AT THIS TIME.

## 2023-05-25 NOTE — TELEPHONE ENCOUNTER
[phone]  Arya @ [facility] ctr is filling out paperwork and needs to know what the 1st appt was when the pt's carcinoma of colon was discussed

## 2023-05-25 NOTE — TELEPHONE ENCOUNTER
"  Caller: Lillian Guerrero \"Fredi\"    Relationship: Self    Best call back number: [phone]    What is the best time to reach you: ANYTIME    Who are you requesting to speak with (clinical staff, provider,  specific staff member):     What was the call regarding: PT CALLED STATES HE [facility]CE[facility] AND [facility] ARE REQUESTING THE OFFICE NOTES FOR PT TO BE FAXED OVER TO DR LILLIAN MCPHERSON  [phone]    Do you require a callback: IF QUESTIONS             "

## 2023-07-28 ENCOUNTER — TELEPHONE (OUTPATIENT)
Dept: ONCOLOGY | Facility: CLINIC | Age: 78
End: 2023-07-28
Payer: OTHER GOVERNMENT

## 2023-07-31 ENCOUNTER — HOSPITAL ENCOUNTER (OUTPATIENT)
Dept: PET IMAGING | Facility: HOSPITAL | Age: 78
Discharge: HOME | End: 2023-07-31
Admitting: INTERNAL MEDICINE
Payer: OTHER GOVERNMENT

## 2023-07-31 DIAGNOSIS — C18.2 MALIGNANT NEOPLASM OF ASCENDING COLON: ICD-10-CM

## 2023-07-31 LAB
CREAT BLDA-MCNC: 0.8 MG/DL (ref 0.6–1.3)
EGFRCR SERPLBLD CKD-EPI 2021: 92.3 ML/MIN/1.73

## 2023-07-31 PROCEDURE — 74177 CT ABD & PELVIS W/CONTRAST: CPT

## 2023-07-31 PROCEDURE — 25510000001 IOPAMIDOL PER 1 ML: Performed by: INTERNAL MEDICINE

## 2023-07-31 PROCEDURE — 71260 CT THORAX DX C+: CPT

## 2023-07-31 PROCEDURE — 82565 ASSAY OF CREATININE: CPT

## 2023-07-31 RX ADMIN — IOPAMIDOL 100 ML: 755 INJECTION, SOLUTION INTRAVENOUS at 11:19

## 2023-08-03 ENCOUNTER — TELEPHONE (OUTPATIENT)
Dept: ONCOLOGY | Facility: CLINIC | Age: 78
End: 2023-08-03
Payer: OTHER GOVERNMENT

## 2023-08-04 NOTE — PROGRESS NOTES
HEMATOLOGY ONCOLOGY OUTPATIENT FOLLOW-UP       Patient name: Vlad Guerrero  : 1945  MRN: [id number]  Primary Care Physician: Vlad Moreland MD  Referring Physician: Vlad Moreland MD  Reason For Consult: Stage III colon cancer    Chief Complaint   Patient presents with    Follow-up     Malignant neoplasm of ascending colon     HPI:   History of Present Illness:  Vlad Guerrero is 77 y.o. male who presented to our office on 23 for consultation regarding    2023: Mr. Guerrero dated the beginning of his present illness to sometime at the end of  when he started to feel fatigued.  He was seen at the [facility] and had laboratory exams that revealed microcytic anemia.  He had evidence of iron deficiency and received intravenous iron in the hospital.  He had upper and lower gastrointestinal endoscopies that demonstrated a cecal tumor that measured 4 to 5 cm and was fungating in appearance.  It was circumferential and was next to the ileocecal valve.  The upper gastrointestinal endoscopy revealed no abnormalities.  On this basis he was on December 15, 2022 he was taken to the hospital and underwent a right hemicolectomy without complications.  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  It corresponded to a grade 2 malignancy that invaded through the muscularis propria into the pericolonic tissues.  Macroscopic tumor perforation or lymphovascular space invasion were not present.  Perineural invasion was not present either.  All margins of excision were negative.  All of 36 lymph nodes submitted to were positive for involvement with malignancy.  The disease was Piatt staged as [id number].  Loss of expression of mismatch repair enzymes was not documented.  Mr. Guerrero was discharged to continue treatment as outpatient.  At the time of this visit he was recovering well from the  surgery.  His incision had healed completely and he had no drains or suture materials.  He had returned home and was eating well.  He had yet to return to work.  He had been afebrile and free of nausea.  For the most part his weight had been stable.  After reviewing the records a long conversation, of approximately 1 hour, was had with the patient in regards to options of treatment.  The nature of his disease as well as the likelihood of recurrence were described.  The use of adjuvant chemotherapy to increase the rate of cure after surgery was explained.  Side effects were described in detail.  The need for a port was expressed as well.  A treatment plan was placed.    2/6/2023: Received the first cycle of adjuvant chemotherapy without any side effects. On the day of this visit feeling well and without any new symptoms, except a very mild rash, especially on the forearms, but no oral pain. Had stools of diminished consistency for a few days but now resolved. The exam was rather unremarkable, except for a few very light erythematous macules on the forearms.     2/27/2023: Feeling well and without new symptoms.  As active as before.  Eating well and without unintended weight loss.  Stronger and more energetic since the institution of vitamin B12 and iron.  No chest pains and cough.  No abdominal pain or diarrhea.  On exam no changes.  A decision was made to continue with the same treatment.  Laboratory exams were reviewed.  He was to see me again in approximately 4 weeks from this date with new scans.    3/27/2023: Suffered an ischemic stroke.  MRI on March 10, 2023 reported a 7 mm focus of restricted diffusion in the left periventricular white matter of the posterior left frontal lobe.  This was felt to be suspicious for an acute/subacute infarct.  There was also evidence of previous lacunar strokes.  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  There was bilateral internal carotid artery stenosis with 60% stenosis estimated at the right and not greater than 50% at the left.  Chemotherapy was held following discharge.  He was left without any sequela.  At the time of this visit he was entirely asymptomatic.  He was convinced a large part of his problem was that he had suffered from hyperglycemia, consistently for some time.  Indeed his glucose was between 152 mg/dL and 193 mg/dL in the preceding days.  However, he reported at home glucose readings of greater than 400 mg/dL..  On exam there were no changes.  The laboratory exams reported a blood count with normocytic anemia and mild thrombocytopenia.  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    4/14/2023: Completed 3 months of treatment.  On the day of this visit not feeling very well.  Weak and tired.  Not very good appetite although eating well.  Having some dysgeusia.  Afebrile.  No chest pains or cough and no abdominal pain.  Had maintain regular bowel activity.  No edema.  On exam no changes.  A decision was made to stop the chemotherapy.  He was to get intravenous fluids on the day of this visit.  To return to see me in 3 weeks.  Tentatively to have scans in early June 2023.    5/5/2023: Feeling progressively stronger. Eating better and slowly regaining weight. Afebrile. No more nausea. No diarrhea and now with regular bowel activity. On exam alert, conversant and well oriented. No pale or jaundice. No oral lesions and no palpable lymph nodes. Lungs clear and abdomen soft. Minimal edema of the right lower extremity. The laboratory exams revealed  persistent anemia with a tendency to macrocytosis. Hemoglobin and platelets within normal ranges. A decision was made to continue to observe and I asked him to see me in approximately 3 months with new scans.     8/7/2023: Feels as well as at the time of the last visit. Active and returned to work full time. Eating well and no nausea or vomiting. No chest pain or cough and no abdominal pain. On exam no changes, though he had lost a large amount of weight since the previous visit. The imaging studies suggested a new lesion in the liver, as well as several lesions in the spleen of unclear cause. Reviewed the images and the report of the scans. Discussed with him at length and explained the findings. Discussed with him the plans for a MRI. He will see me with results.     Subjective:  8/7/2023: Without new symptoms. Working full time and no increase in fatigue. Denied fevers or nocturnal diaphoresis. His weight has been declining for some time but had a large drop in the recent past. No chest pain, cough or dysphagia. No abdominal pain or diarrhea and no dysuria.     The following portions of the patient's history were reviewed and updated as appropriate: allergies, current medications, past family history, past medical history, past social history, past surgical history and problem list.    Past Medical History:   Diagnosis Date    Anemia     Colon cancer 2022    colon    Diabetes mellitus     Hyperlipidemia     Hypertension      Past Surgical History:   Procedure Laterality Date    APPENDECTOMY      CARDIAC CATHETERIZATION      COLON RESECTION N/A 12/15/2022    Procedure: COLON RESECTION RIGHT;  Surgeon: Percy Davis MD;  Location: [facility] MAIN OR;  Service: General;  Laterality: N/A;    COLONOSCOPY N/A 11/11/2022    Procedure: COLONOSCOPY WITH BIOPSY AND POLYPECTOMY;  Surgeon: Billy Julien MD;  Location: [facility] ENDOSCOPY;  Service: Gastroenterology;  Laterality: N/A;  Impression:  1.  Large 4-5cm  fulgurating circumferential ulcerated mass in the very proximal part of the ascending colon next to ileocecal valve multiple biopsies were performed.  This is highly concerning for colon malignancy.  2.  2 polyp rem    ENDOSCOPY N/A 11/11/2022    Procedure: ESOPHAGOGASTRODUODENOSCOPY with biopsy X1;  Surgeon: Billy Julien MD;  Location: [facility] ENDOSCOPY;  Service: Gastroenterology;  Laterality: N/A;  5.  Upper endoscopy lamination unremarkable.       PORTACATH PLACEMENT Right 1/12/2023    Procedure: INSERTION OF PORTACATH;  Surgeon: Percy Davis MD;  Location: [facility] MAIN OR;  Service: General;  Laterality: Right;    TONSILLECTOMY         Current Outpatient Medications:     aspirin 325 MG tablet, Take 1 tablet by mouth Daily., Disp: 30 tablet, Rfl: 0    atorvastatin (LIPITOR) 40 MG tablet, Take 1 tablet by mouth Every Night., Disp: 90 tablet, Rfl: 0    empagliflozin (JARDIANCE) 25 MG tablet tablet, Take 1 tablet by mouth Daily. Dont take preop, Disp: , Rfl:     ferrous gluconate (FERGON) 324 MG tablet, Take 1 tablet by mouth Daily With Breakfast., Disp: 30 tablet, Rfl: 3    glimepiride (AMARYL) 4 MG tablet, Take 1 tablet by mouth 2 (Two) Times a Day. None preop, Disp: , Rfl:     Insulin Glargine (LANTUS SOLOSTAR) 100 UNIT/ML injection pen, Inject 7 Units under the skin into the appropriate area as directed Every Night., Disp: 15 mL, Rfl: 0    Insulin Pen Needle (Pen Needles) 32G X 4 MM misc, 1 each Daily. Dx code: E11.65, Disp: 100 each, Rfl: 2    losartan (COZAAR) 100 MG tablet, , Disp: , Rfl:     metFORMIN (GLUCOPHAGE) 1000 MG tablet, Take 2 tablets by mouth Daily With Dinner. Last dose 12/12, Disp: , Rfl:     pioglitazone (ACTOS) 45 MG tablet, Take 1 tablet by mouth Daily. None preop, Disp: , Rfl:     Semaglutide,0.25 or 0.5MG/DOS, (Ozempic, 0.25 or 0.5 MG/DOSE,) 2 MG/1.5ML solution pen-injector, Inject 0.25 mg under the skin into the appropriate area as directed As Needed. friday, Disp: , Rfl:      capecitabine (XELODA) 150 MG chemo tablet, Take 5 tablets by mouth (with 1 other capecitabine Rx) for 1,750 mg total 2 (Two) Times a Day on Days 1-14, then off for 7 days. Total dose is 1750mg in the AM & 1750mg in the PM., Disp: 140 tablet, Rfl: 4    capecitabine (XELODA) 500 MG chemo tablet, Take 2 tablets by mouth (with 1 other capecitabine prescription) for 1,750 mg total 2 (Two) Times a Day on Days 1-14, then off for 7 days.  Total dose is 1750mg in the AM & 1750mg in the PM., Disp: 56 tablet, Rfl: 4    ondansetron (ZOFRAN) 8 MG tablet, Take 1 tablet by mouth 3 (Three) Times a Day As Needed for Nausea or Vomiting., Disp: 30 tablet, Rfl: 5  No current facility-administered medications for this visit.    Facility-Administered Medications Ordered in Other Visits:     heparin injection 500 Units, 500 Units, Intravenous, PRN, Don Kramer MD, 500 Units at 23 1223    sodium chloride 0.9 % flush 20 mL, 20 mL, Intravenous, PRN, Don Kramer MD, 20 mL at 23 1223    Allergies   Allergen Reactions    Penicillins Rash     Family History   Problem Relation Age of Onset    Pneumonia Mother 94        SARS-CoV2    Colon cancer Father 62    Heart disease Sister      Cancer-related family history includes Colon cancer (age of onset: 62) in his father.    Social History     Tobacco Use    Smoking status: Former     Packs/day: 1.00     Years: 2.00     Pack years: 2.00     Types: Cigarettes     Start date:      Quit date:      Years since quittin.6    Smokeless tobacco: Never   Vaping Use    Vaping Use: Never used   Substance Use Topics    Alcohol use: Not Currently    Drug use: Never     Social History     Social History Narrative    Not on file      ROS:     Review of Systems   Constitutional:  Negative for activity change, appetite change, chills, diaphoresis, fatigue, fever and unexpected weight change.   HENT:  Negative for congestion, dental problem, drooling, ear discharge, ear pain, facial  "swelling, hearing loss, mouth sores, nosebleeds, postnasal drip, rhinorrhea, sinus pressure, sinus pain, sneezing, sore throat, tinnitus, trouble swallowing and voice change.    Eyes:  Negative for photophobia, pain, discharge, redness, itching and visual disturbance.   Respiratory:  Negative for apnea, cough, choking, chest tightness, shortness of breath, wheezing and stridor.    Cardiovascular:  Negative for chest pain, palpitations and leg swelling.   Gastrointestinal:  Negative for abdominal distention, abdominal pain, anal bleeding, blood in stool, constipation, diarrhea, nausea, rectal pain and vomiting.   Endocrine: Negative for cold intolerance, heat intolerance, polydipsia and polyuria.   Genitourinary:  Negative for decreased urine volume, difficulty urinating, dysuria, flank pain, frequency, genital sores, hematuria and urgency.   Musculoskeletal:  Negative for arthralgias, back pain, gait problem, joint swelling, myalgias, neck pain and neck stiffness.   Skin:  Negative for color change, pallor and rash.   Neurological:  Negative for dizziness, tremors, seizures, syncope, facial asymmetry, speech difficulty, weakness, light-headedness, numbness and headaches.   Hematological:  Negative for adenopathy. Does not bruise/bleed easily.   Psychiatric/Behavioral:  Negative for agitation, behavioral problems, confusion, decreased concentration, hallucinations, self-injury, sleep disturbance and suicidal ideas. The patient is not nervous/anxious.      Objective:    Vitals:    08/07/23 1114   BP: 119/78   Pulse: 79   Temp: 97.5 øF (36.4 øC)   TempSrc: Oral   SpO2: 98%   Weight: 79.2 kg (174 lb 9.6 oz)   Height: 185.4 cm (73\")   PainSc: 0-No pain     Body mass index is 23.04 kg/mý.  ECOG  (0) Fully active, able to carry on all predisease performance without restriction    Physical Exam:     Physical Exam  Constitutional:       General: He is not in acute distress.     Appearance: Normal appearance. He is not " ill-appearing, toxic-appearing or diaphoretic.   HENT:      Head: Normocephalic and atraumatic.      Right Ear: External ear normal.      Left Ear: External ear normal.      Nose: Nose normal.      Mouth/Throat:      Mouth: Mucous membranes are moist.      Pharynx: Oropharynx is clear.   Eyes:      General: No scleral icterus.        Right eye: No discharge.         Left eye: No discharge.      Conjunctiva/sclera: Conjunctivae normal.      Pupils: Pupils are equal, round, and reactive to light.   Cardiovascular:      Rate and Rhythm: Normal rate and regular rhythm.      Pulses: Normal pulses.      Heart sounds: Normal heart sounds. No murmur heard.    No friction rub. No gallop.   Pulmonary:      Effort: No respiratory distress.      Breath sounds: No stridor. No wheezing, rhonchi or rales.   Chest:      Chest wall: No tenderness.   Abdominal:      General: Abdomen is flat. Bowel sounds are normal. There is no distension.      Palpations: Abdomen is soft. There is no mass.      Tenderness: There is no abdominal tenderness. There is no right CVA tenderness, left CVA tenderness, guarding or rebound.   Musculoskeletal:         General: No swelling, tenderness, deformity or signs of injury.      Cervical back: No rigidity.      Right lower leg: No edema.      Left lower leg: No edema.   Lymphadenopathy:      Cervical: No cervical adenopathy.   Skin:     General: Skin is warm and dry.      Coloration: Skin is not jaundiced.      Findings: No bruising or rash.   Neurological:      General: No focal deficit present.      Mental Status: He is alert and oriented to person, place, and time.      Cranial Nerves: No cranial nerve deficit.      Gait: Gait normal.   Psychiatric:         Mood and Affect: Mood normal.         Behavior: Behavior normal.         Thought Content: Thought content normal.         Judgment: Judgment normal.   KATIE Kramer MD performed a physical exam on 8/7/2023 as documented above.     Lab Results  - Last 18 Months   Lab Units 08/07/23  1020 05/22/23  2226 05/05/23  0735   WBC 10*3/mm3 4.83 4.30 4.21   HEMOGLOBIN g/dL 12.5* 9.8* 9.3*   HEMATOCRIT % 39.1 31.7* 30.1*   PLATELETS 10*3/mm3 155 217 202   MCV fL 88.1 93.1 97.1*     Lab Results - Last 18 Months   Lab Units 07/31/23  0959 05/22/23  2226 05/05/23  0735 03/27/23  0800   SODIUM mmol/L  --  138 135* 140   POTASSIUM mmol/L  --  4.2 4.6 4.3   CHLORIDE mmol/L  --  102 99 104   CO2 mmol/L  --  27.0 26.0 26.0   BUN mg/dL  --  20 17 17   CREATININE mg/dL 0.80 0.79 1.02 0.87   CALCIUM mg/dL  --  8.7 8.6 8.9   BILIRUBIN mg/dL  --  0.3 0.4 0.5   ALK PHOS U/L  --  120* 114 68   ALT (SGPT) U/L  --  9 12 20   AST (SGOT) U/L  --  14 13 24   GLUCOSE mg/dL  --  326* 401* 160*     Lab Results   Component Value Date    GLUCOSE 326 (H) 05/22/2023    BUN 20 05/22/2023    CREATININE 0.80 07/31/2023    EGFRIFNONA 76 11/15/2021    EGFRIFAFRI 87 11/15/2021    BCR 25.3 (H) 05/22/2023    K 4.2 05/22/2023    CO2 27.0 05/22/2023    CALCIUM 8.7 05/22/2023    ALBUMIN 3.4 (L) 05/22/2023    AST 14 05/22/2023    ALT 9 05/22/2023     Lab Results   Component Value Date    IRON 15 (L) 01/06/2023    TIBC 548 (H) 01/06/2023    FERRITIN 23.51 (L) 01/06/2023     Lab Results   Component Value Date    CEA 4.60 03/27/2023     Assessment & Plan     Assessment:  Moderately differentiated adenocarcinoma of the ascending colon [id number] MMR proficient.  Completed 3 months of chemotherapy with CapeOx.  Feeling well and without new symptoms. MRI suggests new hepatic and splenic lesions. Needs additional investigations. He's to have MRI of the abdomen and will see me with results. Explained to him the findings and the potential significance of this.   Diabetes mellitus  Ischemic stroke.    Myelosuppression secondary to chemotherapy: Resolved.   5.  He will see me in approximately 3 weeks with results.     Plan:  As above.     Don Kramer MD on 8/7/2023 at 12:50 PM.

## 2023-08-07 ENCOUNTER — LAB (OUTPATIENT)
Dept: LAB | Facility: HOSPITAL | Age: 78
End: 2023-08-07
Payer: OTHER GOVERNMENT

## 2023-08-07 ENCOUNTER — HOSPITAL ENCOUNTER (OUTPATIENT)
Dept: ONCOLOGY | Facility: HOSPITAL | Age: 78
Discharge: HOME | End: 2023-08-07
Payer: OTHER GOVERNMENT

## 2023-08-07 ENCOUNTER — OFFICE VISIT (OUTPATIENT)
Dept: ONCOLOGY | Facility: CLINIC | Age: 78
End: 2023-08-07
Payer: OTHER GOVERNMENT

## 2023-08-07 VITALS
SYSTOLIC BLOOD PRESSURE: 119 MMHG | HEIGHT: 73 IN | HEART RATE: 79 BPM | DIASTOLIC BLOOD PRESSURE: 78 MMHG | WEIGHT: 174.6 LBS | BODY MASS INDEX: 23.14 KG/M2 | OXYGEN SATURATION: 98 % | TEMPERATURE: 97.5 F

## 2023-08-07 DIAGNOSIS — C18.2 MALIGNANT NEOPLASM OF ASCENDING COLON: Primary | ICD-10-CM

## 2023-08-07 DIAGNOSIS — C18.2 MALIGNANT NEOPLASM OF ASCENDING COLON: ICD-10-CM

## 2023-08-07 LAB
ALBUMIN SERPL-MCNC: 3.8 G/DL (ref 3.5–5.2)
ALBUMIN/GLOB SERPL: 1.2 G/DL
ALP SERPL-CCNC: 115 U/L (ref 39–117)
ALT SERPL W P-5'-P-CCNC: 15 U/L (ref 1–41)
ANION GAP SERPL CALCULATED.3IONS-SCNC: 9 MMOL/L (ref 5–15)
AST SERPL-CCNC: 13 U/L (ref 1–40)
BASOPHILS # BLD AUTO: 0.01 10*3/MM3 (ref 0–0.2)
BASOPHILS NFR BLD AUTO: 0.2 % (ref 0–1.5)
BILIRUB SERPL-MCNC: 0.5 MG/DL (ref 0–1.2)
BUN SERPL-MCNC: 21 MG/DL (ref 8–23)
BUN/CREAT SERPL: 22.8 (ref 7–25)
CALCIUM SPEC-SCNC: 8.7 MG/DL (ref 8.6–10.5)
CEA SERPL-MCNC: 3.05 NG/ML
CHLORIDE SERPL-SCNC: 102 MMOL/L (ref 98–107)
CO2 SERPL-SCNC: 27 MMOL/L (ref 22–29)
CREAT SERPL-MCNC: 0.92 MG/DL (ref 0.76–1.27)
DEPRECATED RDW RBC AUTO: 47.5 FL (ref 37–54)
EGFRCR SERPLBLD CKD-EPI 2021: 86.7 ML/MIN/1.73
EOSINOPHIL # BLD AUTO: 0.1 10*3/MM3 (ref 0–0.4)
EOSINOPHIL NFR BLD AUTO: 2.1 % (ref 0.3–6.2)
ERYTHROCYTE [DISTWIDTH] IN BLOOD BY AUTOMATED COUNT: 14.9 % (ref 12.3–15.4)
GLOBULIN UR ELPH-MCNC: 3.1 GM/DL
GLUCOSE SERPL-MCNC: 281 MG/DL (ref 65–99)
HCT VFR BLD AUTO: 39.1 % (ref 37.5–51)
HGB BLD-MCNC: 12.5 G/DL (ref 13–17.7)
LYMPHOCYTES # BLD AUTO: 1.32 10*3/MM3 (ref 0.7–3.1)
LYMPHOCYTES NFR BLD AUTO: 27.3 % (ref 19.6–45.3)
MCH RBC QN AUTO: 28.2 PG (ref 26.6–33)
MCHC RBC AUTO-ENTMCNC: 32 G/DL (ref 31.5–35.7)
MCV RBC AUTO: 88.1 FL (ref 79–97)
MONOCYTES # BLD AUTO: 0.53 10*3/MM3 (ref 0.1–0.9)
MONOCYTES NFR BLD AUTO: 11 % (ref 5–12)
NEUTROPHILS NFR BLD AUTO: 2.87 10*3/MM3 (ref 1.7–7)
NEUTROPHILS NFR BLD AUTO: 59.4 % (ref 42.7–76)
PLATELET # BLD AUTO: 155 10*3/MM3 (ref 140–450)
PMV BLD AUTO: 9.6 FL (ref 6–12)
POTASSIUM SERPL-SCNC: 4.1 MMOL/L (ref 3.5–5.2)
PROT SERPL-MCNC: 6.9 G/DL (ref 6–8.5)
RBC # BLD AUTO: 4.44 10*6/MM3 (ref 4.14–5.8)
SODIUM SERPL-SCNC: 138 MMOL/L (ref 136–145)
WBC NRBC COR # BLD: 4.83 10*3/MM3 (ref 3.4–10.8)

## 2023-08-07 PROCEDURE — G0463 HOSPITAL OUTPT CLINIC VISIT: HCPCS

## 2023-08-07 PROCEDURE — 82378 CARCINOEMBRYONIC ANTIGEN: CPT | Performed by: INTERNAL MEDICINE

## 2023-08-07 PROCEDURE — 99214 OFFICE O/P EST MOD 30 MIN: CPT | Performed by: INTERNAL MEDICINE

## 2023-08-07 PROCEDURE — 36415 COLL VENOUS BLD VENIPUNCTURE: CPT

## 2023-08-07 PROCEDURE — 85025 COMPLETE CBC W/AUTO DIFF WBC: CPT

## 2023-08-07 PROCEDURE — 3078F DIAST BP <80 MM HG: CPT | Performed by: INTERNAL MEDICINE

## 2023-08-07 PROCEDURE — 80053 COMPREHEN METABOLIC PANEL: CPT | Performed by: INTERNAL MEDICINE

## 2023-08-07 PROCEDURE — 1126F AMNT PAIN NOTED NONE PRSNT: CPT | Performed by: INTERNAL MEDICINE

## 2023-08-07 PROCEDURE — 25010000002 HEPARIN LOCK FLUSH PER 10 UNITS: Performed by: INTERNAL MEDICINE

## 2023-08-07 PROCEDURE — 3074F SYST BP LT 130 MM HG: CPT | Performed by: INTERNAL MEDICINE

## 2023-08-07 PROCEDURE — 1159F MED LIST DOCD IN RCRD: CPT | Performed by: INTERNAL MEDICINE

## 2023-08-07 PROCEDURE — 1160F RVW MEDS BY RX/DR IN RCRD: CPT | Performed by: INTERNAL MEDICINE

## 2023-08-07 PROCEDURE — 96523 IRRIG DRUG DELIVERY DEVICE: CPT

## 2023-08-07 RX ORDER — HEPARIN SODIUM (PORCINE) LOCK FLUSH IV SOLN 100 UNIT/ML 100 UNIT/ML
500 SOLUTION INTRAVENOUS AS NEEDED
OUTPATIENT
Start: 2023-08-07

## 2023-08-07 RX ORDER — HEPARIN SODIUM (PORCINE) LOCK FLUSH IV SOLN 100 UNIT/ML 100 UNIT/ML
500 SOLUTION INTRAVENOUS AS NEEDED
Status: DISCONTINUED | OUTPATIENT
Start: 2023-08-07 | End: 2023-08-08 | Stop reason: HOSPADM

## 2023-08-07 RX ORDER — LOSARTAN POTASSIUM 100 MG/1
TABLET ORAL
COMMUNITY
Start: 2023-06-28

## 2023-08-07 RX ORDER — SODIUM CHLORIDE 0.9 % (FLUSH) 0.9 %
20 SYRINGE (ML) INJECTION AS NEEDED
Status: DISCONTINUED | OUTPATIENT
Start: 2023-08-07 | End: 2023-08-08 | Stop reason: HOSPADM

## 2023-08-07 RX ORDER — SODIUM CHLORIDE 0.9 % (FLUSH) 0.9 %
20 SYRINGE (ML) INJECTION AS NEEDED
OUTPATIENT
Start: 2023-08-07

## 2023-08-07 RX ADMIN — Medication 20 ML: at 12:23

## 2023-08-07 RX ADMIN — HEPARIN 500 UNITS: 100 SYRINGE at 12:23

## 2023-08-14 ENCOUNTER — HOSPITAL ENCOUNTER (OUTPATIENT)
Dept: MRI IMAGING | Facility: HOSPITAL | Age: 78
Discharge: HOME | End: 2023-08-14
Admitting: INTERNAL MEDICINE
Payer: OTHER GOVERNMENT

## 2023-08-14 DIAGNOSIS — C18.2 MALIGNANT NEOPLASM OF ASCENDING COLON: ICD-10-CM

## 2023-08-14 PROCEDURE — 74183 MRI ABD W/O CNTR FLWD CNTR: CPT

## 2023-08-14 PROCEDURE — A9579 GAD-BASE MR CONTRAST NOS,1ML: HCPCS | Performed by: INTERNAL MEDICINE

## 2023-08-14 PROCEDURE — 25010000002 GADOTERIDOL PER 1 ML: Performed by: INTERNAL MEDICINE

## 2023-08-14 RX ADMIN — GADOTERIDOL 20 ML: 279.3 INJECTION, SOLUTION INTRAVENOUS at 14:00

## 2023-08-25 NOTE — PROGRESS NOTES
HEMATOLOGY ONCOLOGY OUTPATIENT FOLLOW-UP       Patient name: Vlad Guerrero  : 1945  MRN: [id number]  Primary Care Physician: Vlad Moreland MD  Referring Physician: Vlad Moreland MD  Reason For Consult: Stage III colon cancer    Chief Complaint   Patient presents with    Follow-up     Malignant neoplasm of ascending colon     HPI:   History of Present Illness:  Vlad Guerrero is 77 y.o. male who presented to our office on 23 for consultation regarding    2023: Mr. Guerrero dated the beginning of his present illness to sometime at the end of  when he started to feel fatigued.  He was seen at the [facility] and had laboratory exams that revealed microcytic anemia.  He had evidence of iron deficiency and received intravenous iron in the hospital.  He had upper and lower gastrointestinal endoscopies that demonstrated a cecal tumor that measured 4 to 5 cm and was fungating in appearance.  It was circumferential and was next to the ileocecal valve.  The upper gastrointestinal endoscopy revealed no abnormalities.  On this basis he was on December 15, 2022 he was taken to the hospital and underwent a right hemicolectomy without complications.  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  It corresponded to a grade 2 malignancy that invaded through the muscularis propria into the pericolonic tissues.  Macroscopic tumor perforation or lymphovascular space invasion were not present.  Perineural invasion was not present either.  All margins of excision were negative.  All of 36 lymph nodes submitted to were positive for involvement with malignancy.  The disease was Garden staged as [id number].  Loss of expression of mismatch repair enzymes was not documented.  Mr. Guerrero was discharged to continue treatment as outpatient.  At the time of this visit he was recovering well from the  CHIEF COMPLAINT: Aledia Campbell is a 47 year old female   who presents here today for   Chief Complaint   Patient presents with   • Annual Exam     Well woman exam            ROS  Review of Systems   Constitutional: Negative.    HENT: Negative.    Eyes: Negative.    Respiratory: Negative.    Cardiovascular: Negative.    Gastrointestinal: Negative.    Genitourinary: Negative.    Musculoskeletal: Negative.    Skin: Negative.    Neurological: Negative.    Endo/Heme/Allergies: Negative.    Psychiatric/Behavioral: Negative.           HPI: Patient is  known to my office, presents today for:  Physical Exam   Constitutional: She is oriented to person, place, and time and well-developed, well-nourished, and in no distress.   HENT:   Head: Normocephalic.   Eyes: Pupils are equal, round, and reactive to light. Conjunctivae are normal. Right eye exhibits no discharge. Left eye exhibits no discharge. No scleral icterus.   Neck: Normal range of motion. Neck supple. No thyromegaly present.   Cardiovascular: Normal rate, regular rhythm and normal heart sounds.   Pulmonary/Chest: Effort normal and breath sounds normal. No respiratory distress. She has no wheezes. She has no rales. She exhibits no tenderness.   Abdominal: Soft. Bowel sounds are normal. She exhibits no distension and no mass. There is no abdominal tenderness. There is no rebound and no guarding.   Genitourinary:    Vagina, cervix, uterus, right adnexa and left adnexa normal.      No vaginal discharge.     Musculoskeletal: Normal range of motion.         General: No edema.   Neurological: She is alert and oriented to person, place, and time. Gait normal.   Skin: Skin is warm and dry. No rash noted. No erythema. No pallor.   Psychiatric: Mood, memory, affect and judgment normal.   Nursing note and vitals reviewed.         OB/GYN HISTORY  OB History    Para Term  AB Living   0 0 0 0 0 0   SAB TAB Ectopic Molar Multiple Live Births   0 0 0 0 0 0        Menstrual History  Patient's last menstrual period was 06/25/2020 (approximate).        HISTORY  Past Medical History:   Diagnosis Date   • IBS (irritable bowel syndrome)    • Irregular menses    • PMS (premenstrual syndrome)       Past Surgical History:   Procedure Laterality Date   • Colonoscopy       Current Outpatient Medications   Medication Sig Dispense Refill   • citalopram (CELEXA) 20 MG tablet Take 20 mg by mouth.       No current facility-administered medications for this visit.      ALLERGIES:  No Known Allergies  Family History   Problem Relation Age of Onset   • Hypertension Mother    • Cancer Father         BONE   • Cancer, Prostate Father    • Hypertension Brother    • Myocardial Infarction Maternal Grandmother    • Diabetes Maternal Grandmother         1     Social History     Tobacco Use   • Smoking status: Current Every Day Smoker   • Smokeless tobacco: Never Used   Substance Use Topics   • Alcohol use: Yes     Frequency: Never     Drinks per session: 1 or 2     Binge frequency: Never   • Drug use: Never      Histories were reviewed and updated during today's visit.      ASSESSMENT/PLAN:  Known patient  Female annual  Pap smear  mammogram     Return in about 1 year (around 7/21/2021).        Diamond Paz MD  7/21/2020   surgery.  His incision had healed completely and he had no drains or suture materials.  He had returned home and was eating well.  He had yet to return to work.  He had been afebrile and free of nausea.  For the most part his weight had been stable.  After reviewing the records a long conversation, of approximately 1 hour, was had with the patient in regards to options of treatment.  The nature of his disease as well as the likelihood of recurrence were described.  The use of adjuvant chemotherapy to increase the rate of cure after surgery was explained.  Side effects were described in detail.  The need for a port was expressed as well.  A treatment plan was placed.    2/6/2023: Received the first cycle of adjuvant chemotherapy without any side effects. On the day of this visit feeling well and without any new symptoms, except a very mild rash, especially on the forearms, but no oral pain. Had stools of diminished consistency for a few days but now resolved. The exam was rather unremarkable, except for a few very light erythematous macules on the forearms.     2/27/2023: Feeling well and without new symptoms.  As active as before.  Eating well and without unintended weight loss.  Stronger and more energetic since the institution of vitamin B12 and iron.  No chest pains and cough.  No abdominal pain or diarrhea.  On exam no changes.  A decision was made to continue with the same treatment.  Laboratory exams were reviewed.  He was to see me again in approximately 4 weeks from this date with new scans.    3/27/2023: Suffered an ischemic stroke.  MRI on March 10, 2023 reported a 7 mm focus of restricted diffusion in the left periventricular white matter of the posterior left frontal lobe.  This was felt to be suspicious for an acute/subacute infarct.  There was also evidence of previous lacunar strokes.  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  There was bilateral internal carotid artery stenosis with 60% stenosis estimated at the right and not greater than 50% at the left.  Chemotherapy was held following discharge.  He was left without any sequela.  At the time of this visit he was entirely asymptomatic.  He was convinced a large part of his problem was that he had suffered from hyperglycemia, consistently for some time.  Indeed his glucose was between 152 mg/dL and 193 mg/dL in the preceding days.  However, he reported at home glucose readings of greater than 400 mg/dL..  On exam there were no changes.  The laboratory exams reported a blood count with normocytic anemia and mild thrombocytopenia.  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    4/14/2023: Completed 3 months of treatment.  On the day of this visit not feeling very well.  Weak and tired.  Not very good appetite although eating well.  Having some dysgeusia.  Afebrile.  No chest pains or cough and no abdominal pain.  Had maintain regular bowel activity.  No edema.  On exam no changes.  A decision was made to stop the chemotherapy.  He was to get intravenous fluids on the day of this visit.  To return to see me in 3 weeks.  Tentatively to have scans in early June 2023.    5/5/2023: Feeling progressively stronger. Eating better and slowly regaining weight. Afebrile. No more nausea. No diarrhea and now with regular bowel activity. On exam alert, conversant and well oriented. No pale or jaundice. No oral lesions and no palpable lymph nodes. Lungs clear and abdomen soft. Minimal edema of the right lower extremity. The laboratory exams revealed  persistent anemia with a tendency to macrocytosis. Hemoglobin and platelets within normal ranges. A decision was made to continue to observe and I asked him to see me in approximately 3 months with new scans.     8/7/2023: Feels as well as at the time of the last visit. Active and returned to work full time. Eating well and no nausea or vomiting. No chest pain or cough and no abdominal pain. On exam no changes, though he had lost a large amount of weight since the previous visit. The imaging studies suggested a new lesion in the liver, as well as several lesions in the spleen of unclear cause. Reviewed the images and the report of the scans. Discussed with him at length and explained the findings. Discussed with him the plans for a MRI. He will see me with results.     8/28/2023: Entirely asymptomatic.  Working without limitations.  Eating well.  Weight stable.  Afebrile.  No pain.  On exam no changes.  Laboratory exams were reviewed and discussed with him.  In spite of the fact things on the scans the Carcinoembryonic antigen has not increased.  However both the CT and the MRI suggest one isolated metastatic deposit in segment 8 of the liver.  Discussed with him the options at this time.  I believe a biopsy is essential and after that he would be treated with chemotherapy following which surgery, if no new lesions have appeared, could be considered.  He may still be curable even in the setting of metastatic disease.  Discussed with him at great length.  Explained the steps.  Sent a communication to Dr. Davis, the patient's surgeon.    Subjective:  8/28/2023: Entirely asymptomatic.  Active.  Eating well.  Energetic.  Working full-time.  Afebrile and without nocturnal diaphoresis.  No chest pains or cough.  No abdominal pain or diarrhea and no peripheral edema.  No skin rash.    The following portions of the patient's history were reviewed and updated as appropriate: allergies, current medications, past family  history, past medical history, past social history, past surgical history and problem list.    Past Medical History:   Diagnosis Date    Anemia     Colon cancer 2022    colon    Diabetes mellitus     Hyperlipidemia     Hypertension      Past Surgical History:   Procedure Laterality Date    APPENDECTOMY      CARDIAC CATHETERIZATION      COLON RESECTION N/A 12/15/2022    Procedure: COLON RESECTION RIGHT;  Surgeon: Percy Davis MD;  Location: [facility] MAIN OR;  Service: General;  Laterality: N/A;    COLONOSCOPY N/A 11/11/2022    Procedure: COLONOSCOPY WITH BIOPSY AND POLYPECTOMY;  Surgeon: Billy Julien MD;  Location: [facility] ENDOSCOPY;  Service: Gastroenterology;  Laterality: N/A;  Impression:  1.  Large 4-5cm fulgurating circumferential ulcerated mass in the very proximal part of the ascending colon next to ileocecal valve multiple biopsies were performed.  This is highly concerning for colon malignancy.  2.  2 polyp rem    ENDOSCOPY N/A 11/11/2022    Procedure: ESOPHAGOGASTRODUODENOSCOPY with biopsy X1;  Surgeon: Billy Julien MD;  Location: [facility] ENDOSCOPY;  Service: Gastroenterology;  Laterality: N/A;  5.  Upper endoscopy lamination unremarkable.       PORTACATH PLACEMENT Right 1/12/2023    Procedure: INSERTION OF PORTACATH;  Surgeon: Percy Davis MD;  Location: [facility] MAIN OR;  Service: General;  Laterality: Right;    TONSILLECTOMY         Current Outpatient Medications:     aspirin 325 MG tablet, Take 1 tablet by mouth Daily., Disp: 30 tablet, Rfl: 0    atorvastatin (LIPITOR) 40 MG tablet, Take 1 tablet by mouth Every Night., Disp: 90 tablet, Rfl: 0    capecitabine (XELODA) 150 MG chemo tablet, Take 5 tablets by mouth (with 1 other capecitabine Rx) for 1,750 mg total 2 (Two) Times a Day on Days 1-14, then off for 7 days. Total dose is 1750mg in the AM & 1750mg in the PM., Disp: 140 tablet, Rfl: 4    capecitabine (XELODA) 500 MG chemo tablet, Take 2 tablets by mouth (with 1 other  capecitabine prescription) for 1,750 mg total 2 (Two) Times a Day on Days 1-14, then off for 7 days.  Total dose is 1750mg in the AM & 1750mg in the PM., Disp: 56 tablet, Rfl: 4    empagliflozin (JARDIANCE) 25 MG tablet tablet, Take 1 tablet by mouth Daily. Dont take preop, Disp: , Rfl:     ferrous gluconate (FERGON) 324 MG tablet, Take 1 tablet by mouth Daily With Breakfast., Disp: 30 tablet, Rfl: 3    glimepiride (AMARYL) 4 MG tablet, Take 1 tablet by mouth 2 (Two) Times a Day. None preop, Disp: , Rfl:     Insulin Glargine (LANTUS SOLOSTAR) 100 UNIT/ML injection pen, Inject 7 Units under the skin into the appropriate area as directed Every Night., Disp: 15 mL, Rfl: 0    Insulin Pen Needle (Pen Needles) 32G X 4 MM misc, 1 each Daily. Dx code: E11.65, Disp: 100 each, Rfl: 2    losartan (COZAAR) 100 MG tablet, , Disp: , Rfl:     metFORMIN (GLUCOPHAGE) 1000 MG tablet, Take 2 tablets by mouth Daily With Dinner. Last dose , Disp: , Rfl:     ondansetron (ZOFRAN) 8 MG tablet, Take 1 tablet by mouth 3 (Three) Times a Day As Needed for Nausea or Vomiting., Disp: 30 tablet, Rfl: 5    pioglitazone (ACTOS) 45 MG tablet, Take 1 tablet by mouth Daily. None preop, Disp: , Rfl:     Semaglutide,0.25 or 0.5MG/DOS, (Ozempic, 0.25 or 0.5 MG/DOSE,) 2 MG/1.5ML solution pen-injector, Inject 0.25 mg under the skin into the appropriate area as directed As Needed. friday, Disp: , Rfl:     Allergies   Allergen Reactions    Penicillins Rash     Family History   Problem Relation Age of Onset    Pneumonia Mother 94        SARS-CoV2    Colon cancer Father 62    Heart disease Sister      Cancer-related family history includes Colon cancer (age of onset: 62) in his father.    Social History     Tobacco Use    Smoking status: Former     Packs/day: 1.00     Years: 2.00     Pack years: 2.00     Types: Cigarettes     Start date:      Quit date:      Years since quittin.6    Smokeless tobacco: Never   Vaping Use    Vaping Use: Never  used   Substance Use Topics    Alcohol use: Not Currently    Drug use: Never     Social History     Social History Narrative    Not on file      ROS:     Review of Systems   Constitutional:  Negative for activity change, appetite change, chills, diaphoresis, fatigue, fever and unexpected weight change.   HENT:  Negative for congestion, dental problem, drooling, ear discharge, ear pain, facial swelling, hearing loss, mouth sores, nosebleeds, postnasal drip, rhinorrhea, sinus pressure, sinus pain, sneezing, sore throat, tinnitus, trouble swallowing and voice change.    Eyes:  Negative for photophobia, pain, discharge, redness, itching and visual disturbance.   Respiratory:  Negative for apnea, cough, choking, chest tightness, shortness of breath, wheezing and stridor.    Cardiovascular:  Negative for chest pain, palpitations and leg swelling.   Gastrointestinal:  Negative for abdominal distention, abdominal pain, anal bleeding, blood in stool, constipation, diarrhea, nausea, rectal pain and vomiting.   Endocrine: Negative for cold intolerance, heat intolerance, polydipsia and polyuria.   Genitourinary:  Negative for decreased urine volume, difficulty urinating, dysuria, flank pain, frequency, genital sores, hematuria and urgency.   Musculoskeletal:  Negative for arthralgias, back pain, gait problem, joint swelling, myalgias, neck pain and neck stiffness.   Skin:  Negative for color change, pallor and rash.   Neurological:  Negative for dizziness, tremors, seizures, syncope, facial asymmetry, speech difficulty, weakness, light-headedness, numbness and headaches.   Hematological:  Negative for adenopathy. Does not bruise/bleed easily.   Psychiatric/Behavioral:  Negative for agitation, behavioral problems, confusion, decreased concentration, hallucinations, self-injury, sleep disturbance and suicidal ideas. The patient is not nervous/anxious.      Objective:    Vitals:    08/28/23 0846   BP: 149/84   Pulse: 79   Temp:  "97.6 øF (36.4 øC)   TempSrc: Oral   SpO2: 98%   Weight: 79.2 kg (174 lb 9.6 oz)   Height: 185.4 cm (73\")   PainSc: 0-No pain     Body mass index is 23.04 kg/mý.  ECOG  (0) Fully active, able to carry on all predisease performance without restriction    Physical Exam:     Physical Exam  Constitutional:       General: He is not in acute distress.     Appearance: Normal appearance. He is not ill-appearing, toxic-appearing or diaphoretic.   HENT:      Head: Normocephalic and atraumatic.      Right Ear: External ear normal.      Left Ear: External ear normal.      Nose: Nose normal.      Mouth/Throat:      Mouth: Mucous membranes are moist.      Pharynx: Oropharynx is clear.   Eyes:      General: No scleral icterus.        Right eye: No discharge.         Left eye: No discharge.      Conjunctiva/sclera: Conjunctivae normal.      Pupils: Pupils are equal, round, and reactive to light.   Cardiovascular:      Rate and Rhythm: Normal rate and regular rhythm.      Pulses: Normal pulses.      Heart sounds: Normal heart sounds. No murmur heard.    No friction rub. No gallop.   Pulmonary:      Effort: No respiratory distress.      Breath sounds: No stridor. No wheezing, rhonchi or rales.   Chest:      Chest wall: No tenderness.   Abdominal:      General: Abdomen is flat. Bowel sounds are normal. There is no distension.      Palpations: Abdomen is soft. There is no mass.      Tenderness: There is no abdominal tenderness. There is no right CVA tenderness, left CVA tenderness, guarding or rebound.   Musculoskeletal:         General: No swelling, tenderness, deformity or signs of injury.      Cervical back: No rigidity.      Right lower leg: No edema.      Left lower leg: No edema.   Lymphadenopathy:      Cervical: No cervical adenopathy.   Skin:     General: Skin is warm and dry.      Coloration: Skin is not jaundiced.      Findings: No bruising or rash.   Neurological:      General: No focal deficit present.      Mental Status: He " is alert and oriented to person, place, and time.      Cranial Nerves: No cranial nerve deficit.      Gait: Gait normal.   Psychiatric:         Mood and Affect: Mood normal.         Behavior: Behavior normal.         Thought Content: Thought content normal.         Judgment: Judgment normal.   KATIE Kramer MD performed a physical exam on 8/28/2023 as documented above.    Lab Results - Last 18 Months   Lab Units 08/28/23  0822 08/07/23  1020 05/22/23  2226   WBC 10*3/mm3 5.21 4.83 4.30   HEMOGLOBIN g/dL 12.3* 12.5* 9.8*   HEMATOCRIT % 39.3 39.1 31.7*   PLATELETS 10*3/mm3 140 155 217   MCV fL 88.1 88.1 93.1     Lab Results - Last 18 Months   Lab Units 08/07/23  1020 07/31/23  0959 05/22/23 2226 05/05/23  0735   SODIUM mmol/L 138  --  138 135*   POTASSIUM mmol/L 4.1  --  4.2 4.6   CHLORIDE mmol/L 102  --  102 99   CO2 mmol/L 27.0  --  27.0 26.0   BUN mg/dL 21  --  20 17   CREATININE mg/dL 0.92 0.80 0.79 1.02   CALCIUM mg/dL 8.7  --  8.7 8.6   BILIRUBIN mg/dL 0.5  --  0.3 0.4   ALK PHOS U/L 115  --  120* 114   ALT (SGPT) U/L 15  --  9 12   AST (SGOT) U/L 13  --  14 13   GLUCOSE mg/dL 281*  --  326* 401*     Lab Results   Component Value Date    GLUCOSE 281 (H) 08/07/2023    BUN 21 08/07/2023    CREATININE 0.92 08/07/2023    EGFRIFNONA 76 11/15/2021    EGFRIFAFRI 87 11/15/2021    BCR 22.8 08/07/2023    K 4.1 08/07/2023    CO2 27.0 08/07/2023    CALCIUM 8.7 08/07/2023    ALBUMIN 3.8 08/07/2023    AST 13 08/07/2023    ALT 15 08/07/2023     Lab Results   Component Value Date    IRON 15 (L) 01/06/2023    TIBC 548 (H) 01/06/2023    FERRITIN 23.51 (L) 01/06/2023     Lab Results   Component Value Date    CEA 3.05 08/07/2023     Assessment & Plan     Assessment:  Apparent isolated metastatic lesion in segment 8 of the liver.  Reviewed independently the images of the MRI and of the scans.  The described lesion seems to be accessible by percutaneous biopsy.  I discussed with him the importance of confirming the presence of  metastatic disease.  The use of chemotherapy and surgery in this setting was discussed with him.  Even in the setting of metastatic disease this would appear to be curable.  Discussed with him explained to him the plans.  The communications with Dr. Davis.  Moderately differentiated adenocarcinoma of the ascending colon [id number] MMR proficient.  Completed 3 months of chemotherapy with CapeOx.  At this time without new digestive symptoms.  We will continue to follow.  Likely metastatic disease.  Diabetes mellitus.  History of ischemic stroke.  He will return to see me in approximately 2 weeks with results of the biopsy.    Plan:  As above.    Don Kramer MD on 8/28/2023 at 9:36 AM.

## 2023-08-28 ENCOUNTER — TELEPHONE (OUTPATIENT)
Dept: ONCOLOGY | Facility: CLINIC | Age: 78
End: 2023-08-28
Payer: OTHER GOVERNMENT

## 2023-08-28 ENCOUNTER — OFFICE VISIT (OUTPATIENT)
Dept: ONCOLOGY | Facility: CLINIC | Age: 78
End: 2023-08-28
Payer: OTHER GOVERNMENT

## 2023-08-28 ENCOUNTER — LAB (OUTPATIENT)
Dept: LAB | Facility: HOSPITAL | Age: 78
End: 2023-08-28
Payer: OTHER GOVERNMENT

## 2023-08-28 VITALS
HEART RATE: 79 BPM | WEIGHT: 174.6 LBS | DIASTOLIC BLOOD PRESSURE: 84 MMHG | OXYGEN SATURATION: 98 % | SYSTOLIC BLOOD PRESSURE: 149 MMHG | HEIGHT: 73 IN | TEMPERATURE: 97.6 F | BODY MASS INDEX: 23.14 KG/M2

## 2023-08-28 DIAGNOSIS — C18.2 MALIGNANT NEOPLASM OF ASCENDING COLON: ICD-10-CM

## 2023-08-28 DIAGNOSIS — C18.2 MALIGNANT NEOPLASM OF ASCENDING COLON: Primary | ICD-10-CM

## 2023-08-28 DIAGNOSIS — K76.89 LIVER NODULE: ICD-10-CM

## 2023-08-28 LAB
ALBUMIN SERPL-MCNC: 3.7 G/DL (ref 3.5–5.2)
ALBUMIN/GLOB SERPL: 1.2 G/DL
ALP SERPL-CCNC: 111 U/L (ref 39–117)
ALT SERPL W P-5'-P-CCNC: 8 U/L (ref 1–41)
ANION GAP SERPL CALCULATED.3IONS-SCNC: 9 MMOL/L (ref 5–15)
AST SERPL-CCNC: 17 U/L (ref 1–40)
BASOPHILS # BLD AUTO: 0.01 10*3/MM3 (ref 0–0.2)
BASOPHILS NFR BLD AUTO: 0.2 % (ref 0–1.5)
BILIRUB SERPL-MCNC: 0.5 MG/DL (ref 0–1.2)
BUN SERPL-MCNC: 16 MG/DL (ref 8–23)
BUN/CREAT SERPL: 19.3 (ref 7–25)
CALCIUM SPEC-SCNC: 9.1 MG/DL (ref 8.6–10.5)
CEA SERPL-MCNC: 2.94 NG/ML
CHLORIDE SERPL-SCNC: 102 MMOL/L (ref 98–107)
CO2 SERPL-SCNC: 25 MMOL/L (ref 22–29)
CREAT SERPL-MCNC: 0.83 MG/DL (ref 0.76–1.27)
DEPRECATED RDW RBC AUTO: 49 FL (ref 37–54)
EGFRCR SERPLBLD CKD-EPI 2021: 91.3 ML/MIN/1.73
EOSINOPHIL # BLD AUTO: 0.13 10*3/MM3 (ref 0–0.4)
EOSINOPHIL NFR BLD AUTO: 2.5 % (ref 0.3–6.2)
ERYTHROCYTE [DISTWIDTH] IN BLOOD BY AUTOMATED COUNT: 15.4 % (ref 12.3–15.4)
GLOBULIN UR ELPH-MCNC: 3.2 GM/DL
GLUCOSE SERPL-MCNC: 205 MG/DL (ref 65–99)
HCT VFR BLD AUTO: 39.3 % (ref 37.5–51)
HGB BLD-MCNC: 12.3 G/DL (ref 13–17.7)
LYMPHOCYTES # BLD AUTO: 1.43 10*3/MM3 (ref 0.7–3.1)
LYMPHOCYTES NFR BLD AUTO: 27.4 % (ref 19.6–45.3)
MCH RBC QN AUTO: 27.6 PG (ref 26.6–33)
MCHC RBC AUTO-ENTMCNC: 31.3 G/DL (ref 31.5–35.7)
MCV RBC AUTO: 88.1 FL (ref 79–97)
MONOCYTES # BLD AUTO: 0.57 10*3/MM3 (ref 0.1–0.9)
MONOCYTES NFR BLD AUTO: 10.9 % (ref 5–12)
NEUTROPHILS NFR BLD AUTO: 3.07 10*3/MM3 (ref 1.7–7)
NEUTROPHILS NFR BLD AUTO: 59 % (ref 42.7–76)
PLATELET # BLD AUTO: 140 10*3/MM3 (ref 140–450)
PMV BLD AUTO: 9.1 FL (ref 6–12)
POTASSIUM SERPL-SCNC: 4.2 MMOL/L (ref 3.5–5.2)
PROT SERPL-MCNC: 6.9 G/DL (ref 6–8.5)
RBC # BLD AUTO: 4.46 10*6/MM3 (ref 4.14–5.8)
SODIUM SERPL-SCNC: 136 MMOL/L (ref 136–145)
WBC NRBC COR # BLD: 5.21 10*3/MM3 (ref 3.4–10.8)

## 2023-08-28 PROCEDURE — 82378 CARCINOEMBRYONIC ANTIGEN: CPT | Performed by: INTERNAL MEDICINE

## 2023-08-28 PROCEDURE — 80053 COMPREHEN METABOLIC PANEL: CPT | Performed by: INTERNAL MEDICINE

## 2023-08-28 PROCEDURE — 85025 COMPLETE CBC W/AUTO DIFF WBC: CPT

## 2023-08-28 PROCEDURE — 36415 COLL VENOUS BLD VENIPUNCTURE: CPT

## 2023-08-28 NOTE — TELEPHONE ENCOUNTER
Hub is instructed to read the documentation below to patient ... S/w Pt to let him know that he will receive a call to be autumn'd for the Ct Biopsy at our IR Dept at the [facility]. The doctor there has to review fist.     Pt v/u

## 2023-08-29 ENCOUNTER — HOSPITAL ENCOUNTER (OUTPATIENT)
Dept: CT IMAGING | Facility: HOSPITAL | Age: 78
Discharge: HOME | End: 2023-08-29
Payer: OTHER GOVERNMENT

## 2023-08-29 VITALS
OXYGEN SATURATION: 98 % | BODY MASS INDEX: 23.19 KG/M2 | WEIGHT: 175 LBS | HEIGHT: 73 IN | TEMPERATURE: 97.7 F | HEART RATE: 59 BPM | SYSTOLIC BLOOD PRESSURE: 146 MMHG | DIASTOLIC BLOOD PRESSURE: 69 MMHG | RESPIRATION RATE: 11 BRPM

## 2023-08-29 DIAGNOSIS — K76.89 LIVER NODULE: ICD-10-CM

## 2023-08-29 DIAGNOSIS — C18.2 MALIGNANT NEOPLASM OF ASCENDING COLON: ICD-10-CM

## 2023-08-29 LAB
APTT PPP: 32.2 SECONDS (ref 24–31)
INR PPP: 1.12 (ref 0.93–1.1)
PROTHROMBIN TIME: 11.9 SECONDS (ref 9.6–11.7)

## 2023-08-29 PROCEDURE — 88341 IMHCHEM/IMCYTCHM EA ADD ANTB: CPT | Performed by: INTERNAL MEDICINE

## 2023-08-29 PROCEDURE — 25010000002 ONDANSETRON PER 1 MG: Performed by: RADIOLOGY

## 2023-08-29 PROCEDURE — 77012 CT SCAN FOR NEEDLE BIOPSY: CPT

## 2023-08-29 PROCEDURE — 25010000002 MIDAZOLAM PER 1 MG: Performed by: RADIOLOGY

## 2023-08-29 PROCEDURE — 88333 PATH CONSLTJ SURG CYTO XM 1: CPT | Performed by: INTERNAL MEDICINE

## 2023-08-29 PROCEDURE — 25010000002 HEPARIN LOCK FLUSH PER 10 UNITS: Performed by: RADIOLOGY

## 2023-08-29 PROCEDURE — 99153 MOD SED SAME PHYS/QHP EA: CPT

## 2023-08-29 PROCEDURE — 85730 THROMBOPLASTIN TIME PARTIAL: CPT | Performed by: RADIOLOGY

## 2023-08-29 PROCEDURE — 0 LIDOCAINE 1 % SOLUTION: Performed by: RADIOLOGY

## 2023-08-29 PROCEDURE — 85610 PROTHROMBIN TIME: CPT | Performed by: RADIOLOGY

## 2023-08-29 PROCEDURE — 88342 IMHCHEM/IMCYTCHM 1ST ANTB: CPT | Performed by: INTERNAL MEDICINE

## 2023-08-29 PROCEDURE — 88307 TISSUE EXAM BY PATHOLOGIST: CPT | Performed by: INTERNAL MEDICINE

## 2023-08-29 PROCEDURE — 25010000002 FENTANYL CITRATE (PF) 50 MCG/ML SOLUTION: Performed by: RADIOLOGY

## 2023-08-29 PROCEDURE — 99152 MOD SED SAME PHYS/QHP 5/>YRS: CPT

## 2023-08-29 RX ORDER — SODIUM CHLORIDE 9 MG/ML
75 INJECTION, SOLUTION INTRAVENOUS CONTINUOUS
Status: DISCONTINUED | OUTPATIENT
Start: 2023-08-29 | End: 2023-08-30 | Stop reason: HOSPADM

## 2023-08-29 RX ORDER — MIDAZOLAM HYDROCHLORIDE 1 MG/ML
INJECTION INTRAMUSCULAR; INTRAVENOUS AS NEEDED
Status: COMPLETED | OUTPATIENT
Start: 2023-08-29 | End: 2023-08-29

## 2023-08-29 RX ORDER — HEPARIN SODIUM (PORCINE) LOCK FLUSH IV SOLN 100 UNIT/ML 100 UNIT/ML
500 SOLUTION INTRAVENOUS AS NEEDED
Status: DISCONTINUED | OUTPATIENT
Start: 2023-08-29 | End: 2023-08-30 | Stop reason: HOSPADM

## 2023-08-29 RX ORDER — FENTANYL CITRATE 50 UG/ML
INJECTION, SOLUTION INTRAMUSCULAR; INTRAVENOUS AS NEEDED
Status: COMPLETED | OUTPATIENT
Start: 2023-08-29 | End: 2023-08-29

## 2023-08-29 RX ORDER — LIDOCAINE HYDROCHLORIDE 10 MG/ML
INJECTION, SOLUTION INFILTRATION; PERINEURAL AS NEEDED
Status: COMPLETED | OUTPATIENT
Start: 2023-08-29 | End: 2023-08-29

## 2023-08-29 RX ORDER — ONDANSETRON 2 MG/ML
INJECTION INTRAMUSCULAR; INTRAVENOUS AS NEEDED
Status: COMPLETED | OUTPATIENT
Start: 2023-08-29 | End: 2023-08-29

## 2023-08-29 RX ADMIN — FENTANYL CITRATE 100 MCG: 50 INJECTION, SOLUTION INTRAMUSCULAR; INTRAVENOUS at 10:41

## 2023-08-29 RX ADMIN — ONDANSETRON 4 MG: 2 INJECTION INTRAMUSCULAR; INTRAVENOUS at 10:39

## 2023-08-29 RX ADMIN — MIDAZOLAM 1 MG: 1 INJECTION INTRAMUSCULAR; INTRAVENOUS at 10:45

## 2023-08-29 RX ADMIN — SODIUM CHLORIDE 75 ML/HR: 9 INJECTION, SOLUTION INTRAVENOUS at 08:32

## 2023-08-29 RX ADMIN — LIDOCAINE HYDROCHLORIDE 4 ML: 10 INJECTION, SOLUTION INFILTRATION; PERINEURAL at 10:48

## 2023-08-29 RX ADMIN — Medication 500 UNITS: at 15:10

## 2023-08-29 RX ADMIN — GELATIN ABSORBABLE SPONGE 12-7 MM 1 EACH: 12-7 MISC at 11:09

## 2023-08-29 NOTE — H&P
[facility]   Interventional Radiology H&P    Patient Name: Vlad Guerrero  : 1945  MRN: [id number]  Primary Care Physician:  Vlad Moreland MD  Referring Physician: Don Kramer MD  Date of admission: 2023    Subjective   Subjective     HPI:  Vlad Guerrero is a 77 y.o. male with subtle segment VIII liver lesion.    Review of Systems:   Constitutional no fever,  no weight loss       Otolaryngeal no difficulty swallowing   Cardiovascular no chest pain   Pulmonary no cough, no sputum production   Gastrointestinal no constipation, no diarrhea                         Personal History       Past Medical/Surgical History:   Past Medical History:   Diagnosis Date    Anemia     Colon cancer     colon    Diabetes mellitus     Hyperlipidemia     Hypertension      Past Surgical History:   Procedure Laterality Date    APPENDECTOMY      CARDIAC CATHETERIZATION      COLON RESECTION N/A 12/15/2022    Procedure: COLON RESECTION RIGHT;  Surgeon: Percy Davis MD;  Location: [facility] MAIN OR;  Service: General;  Laterality: N/A;    COLONOSCOPY N/A 2022    Procedure: COLONOSCOPY WITH BIOPSY AND POLYPECTOMY;  Surgeon: Billy Julien MD;  Location: [facility] ENDOSCOPY;  Service: Gastroenterology;  Laterality: N/A;  Impression:  1.  Large 4-5cm fulgurating circumferential ulcerated mass in the very proximal part of the ascending colon next to ileocecal valve multiple biopsies were performed.  This is highly concerning for colon malignancy.  2.  2 polyp rem    ENDOSCOPY N/A 2022    Procedure: ESOPHAGOGASTRODUODENOSCOPY with biopsy X1;  Surgeon: Billy Julien MD;  Location: [facility] ENDOSCOPY;  Service: Gastroenterology;  Laterality: N/A;  5.  Upper endoscopy lamination unremarkable.       PORTACATH PLACEMENT Right 2023    Procedure: INSERTION OF PORTACATH;  Surgeon: Percy Davis MD;  Location: [facility] MAIN OR;  Service: General;  Laterality: Right;    TONSILLECTOMY          Social History:  reports that he quit smoking about 57 years ago. His smoking use included cigarettes. He started smoking about 59 years ago. He has a 2.00 pack-year smoking history. He has never used smokeless tobacco. He reports that he does not currently use alcohol. He reports that he does not use drugs.    Medications:  (Not in a hospital admission)    Current medications:     Current IV drips:  sodium chloride, 75 mL/hr, Last Rate: 75 mL/hr (08/29/23 0832)        Allergies:  Allergies   Allergen Reactions    Penicillins Rash       Objective    Objective     Vitals:   Temp:  [97.7 øF (36.5 øC)] 97.7 øF (36.5 øC)  Heart Rate:  [68] 68  Resp:  [11] 11  BP: (143)/(79) 143/79      Physical Exam:   Constitutional: Awake, alert, No acute distress    Respiratory: Clear to auscultation bilaterally, nonlabored respirations    Cardiovascular: RRR, no murmurs, rubs, or gallops, palpable pedal pulses bilaterally   Gastrointestinal: Positive bowel sounds, soft, nontender, nondistended        ASA SCALE ASSESSMENT:  2-Mild to moderate systemic disease, medically well controlled, with no functional limitation    MALLAMPATI CLASSIFICATION:  2-Able to visualize the soft palate, fauces, uvula. The anterior & posterior tonsilar pillars are hidden by the tongue.       Result Review        Result Review:     Sodium   Date Value Ref Range Status   08/28/2023 136 136 - 145 mmol/L Final       Potassium   Date Value Ref Range Status   08/28/2023 4.2 3.5 - 5.2 mmol/L Final       Chloride   Date Value Ref Range Status   08/28/2023 102 98 - 107 mmol/L Final       No results found for: PLASMABICARB    BUN   Date Value Ref Range Status   08/28/2023 16 8 - 23 mg/dL Final       Creatinine   Date Value Ref Range Status   08/28/2023 0.83 0.76 - 1.27 mg/dL Final       Calcium   Date Value Ref Range Status   08/28/2023 9.1 8.6 - 10.5 mg/dL Final           No components found for: GLUCOSE.*  Results from last 7 days   Lab Units 08/28/23  0845    WBC 10*3/mm3 5.21   HEMOGLOBIN g/dL 12.3*   HEMATOCRIT % 39.3   PLATELETS 10*3/mm3 140      Results from last 7 days   Lab Units 08/29/23  0851   INR  1.12*           Assessment / Plan     Assesment:   Liver lesion.      Plan:   Liver biopsy.    The risks and benefits of the procedure were discussed with the patient.    Electronically signed by Pedro Maritnez MD, 08/29/23, 10:02 AM EDT.

## 2023-08-29 NOTE — DISCHARGE INSTRUCTIONS
A responsible adult should stay with you and you should rest quietly for the rest of the day. Do not drink alcohol, drive or cook for 24 hours following your procedure.  Progress your diet as tolerated.  Resume your usual medications including aspirin.  When you remove your dressing in 48 hours, a small amount of blood is to be expected. Do not be alarmed.  If you feel it is bleeding excessively apply pressure and proceed to the Emergency room.  Do not shower, bath, or get your dressing wet at all for 48 hours.  You may shower after the dressing is removed. No lifting more that 10 pounds for 48 hours.  If severe pain, increased shortness of air or racing heartbeat occur, seek immediate medical attention.  Follow up with Dr. Kramer for results.

## 2023-08-30 LAB
LAB AP CASE REPORT: NORMAL
LAB AP DIAGNOSIS COMMENT: NORMAL
Lab: NORMAL
PATH REPORT.FINAL DX SPEC: NORMAL
PATH REPORT.GROSS SPEC: NORMAL

## 2023-09-06 NOTE — PROGRESS NOTES
HEMATOLOGY ONCOLOGY OUTPATIENT FOLLOW-UP       Patient name: Vlad Guerrero  : 1945  MRN: [id number]  Primary Care Physician: Vlad Moreland MD  Referring Physician: Vlad Moreland MD  Reason For Consult: Stage III colon cancer    Chief Complaint   Patient presents with    Follow-up     Follow up  Malignant neoplasm of ascending colon     HPI:   History of Present Illness:  Vlad Guerrero is 77 y.o. male who presented to our office on 23 for consultation regarding    2023: Mr. Guerrero dated the beginning of his present illness to sometime at the end of  when he started to feel fatigued.  He was seen at the [facility] and had laboratory exams that revealed microcytic anemia.  He had evidence of iron deficiency and received intravenous iron in the hospital.  He had upper and lower gastrointestinal endoscopies that demonstrated a cecal tumor that measured 4 to 5 cm and was fungating in appearance.  It was circumferential and was next to the ileocecal valve.  The upper gastrointestinal endoscopy revealed no abnormalities.  On this basis he was on December 15, 2022 he was taken to the hospital and underwent a right hemicolectomy without complications.  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  It corresponded to a grade 2 malignancy that invaded through the muscularis propria into the pericolonic tissues.  Macroscopic tumor perforation or lymphovascular space invasion were not present.  Perineural invasion was not present either.  All margins of excision were negative.  All of 36 lymph nodes submitted to were positive for involvement with malignancy.  The disease was McPherson staged as [id number].  Loss of expression of mismatch repair enzymes was not documented.  Mr. Guerrero was discharged to continue treatment as outpatient.  At the time of this visit he was recovering well from  the surgery.  His incision had healed completely and he had no drains or suture materials.  He had returned home and was eating well.  He had yet to return to work.  He had been afebrile and free of nausea.  For the most part his weight had been stable.  After reviewing the records a long conversation, of approximately 1 hour, was had with the patient in regards to options of treatment.  The nature of his disease as well as the likelihood of recurrence were described.  The use of adjuvant chemotherapy to increase the rate of cure after surgery was explained.  Side effects were described in detail.  The need for a port was expressed as well.  A treatment plan was placed.    2/6/2023: Received the first cycle of adjuvant chemotherapy without any side effects. On the day of this visit feeling well and without any new symptoms, except a very mild rash, especially on the forearms, but no oral pain. Had stools of diminished consistency for a few days but now resolved. The exam was rather unremarkable, except for a few very light erythematous macules on the forearms.     2/27/2023: Feeling well and without new symptoms.  As active as before.  Eating well and without unintended weight loss.  Stronger and more energetic since the institution of vitamin B12 and iron.  No chest pains and cough.  No abdominal pain or diarrhea.  On exam no changes.  A decision was made to continue with the same treatment.  Laboratory exams were reviewed.  He was to see me again in approximately 4 weeks from this date with new scans.    3/27/2023: Suffered an ischemic stroke.  MRI on March 10, 2023 reported a 7 mm focus of restricted diffusion in the left periventricular white matter of the posterior left frontal lobe.  This was felt to be suspicious for an acute/subacute infarct.  There was also evidence of previous lacunar strokes.  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  There was bilateral internal carotid artery stenosis with 60% stenosis estimated at the right and not greater than 50% at the left.  Chemotherapy was held following discharge.  He was left without any sequela.  At the time of this visit he was entirely asymptomatic.  He was convinced a large part of his problem was that he had suffered from hyperglycemia, consistently for some time.  Indeed his glucose was between 152 mg/dL and 193 mg/dL in the preceding days.  However, he reported at home glucose readings of greater than 400 mg/dL..  On exam there were no changes.  The laboratory exams reported a blood count with normocytic anemia and mild thrombocytopenia.  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    4/14/2023: Completed 3 months of treatment.  On the day of this visit not feeling very well.  Weak and tired.  Not very good appetite although eating well.  Having some dysgeusia.  Afebrile.  No chest pains or cough and no abdominal pain.  Had maintain regular bowel activity.  No edema.  On exam no changes.  A decision was made to stop the chemotherapy.  He was to get intravenous fluids on the day of this visit.  To return to see me in 3 weeks.  Tentatively to have scans in early June 2023.    5/5/2023: Feeling progressively stronger. Eating better and slowly regaining weight. Afebrile. No more nausea. No diarrhea and now with regular bowel activity. On exam alert, conversant and well oriented. No pale or jaundice. No oral lesions and no palpable lymph nodes. Lungs clear and abdomen soft. Minimal edema of the right lower extremity. The laboratory exams revealed  persistent anemia with a tendency to macrocytosis. Hemoglobin and platelets within normal ranges. A decision was made to continue to observe and I asked him to see me in approximately 3 months with new scans.     8/7/2023: Feels as well as at the time of the last visit. Active and returned to work full time. Eating well and no nausea or vomiting. No chest pain or cough and no abdominal pain. On exam no changes, though he had lost a large amount of weight since the previous visit. The imaging studies suggested a new lesion in the liver, as well as several lesions in the spleen of unclear cause. Reviewed the images and the report of the scans. Discussed with him at length and explained the findings. Discussed with him the plans for a MRI. He will see me with results.     8/28/2023: Entirely asymptomatic.  Working without limitations.  Eating well.  Weight stable.  Afebrile.  No pain.  On exam no changes.  Laboratory exams were reviewed and discussed with him.  In spite of the fact things on the scans the Carcinoembryonic antigen has not increased.  However both the CT and the MRI suggest one isolated metastatic deposit in segment 8 of the liver.  Discussed with him the options at this time.  I believe a biopsy is essential and after that he would be treated with chemotherapy following which surgery, if no new lesions have appeared, could be considered.  He may still be curable even in the setting of metastatic disease.  Discussed with him at great length.  Explained the steps.  Sent a communication to Dr. Davis, the patient's surgeon.    9/11/2023: Feels about the same as before.  No new symptoms.  As active as before and working.  Eating well and with good appetite.  No unintended weight loss.  Afebrile.  On exam alert, conversant and in good spirits.  No distress.  No jaundice or pallor.  Lungs clear and heart regular.  Abdomen soft.  The liver is not palpable.  No edema.  Laboratory exams were reviewed.  The  liver biopsy report confirms metastatic colorectal cancer.  This appears to be an isolated metastases.  On this basis treatment with chemotherapy followed by surgery is not ordered.  I have asked him to have a PET scan and discussed the study with him.  Discussed the objectives of the treatment and its requirements.  Placed the treatment plan with 5 fluorouracil and irinotecan and discussed with him.  To begin as soon as possible.    Subjective:  9/11/2023: Again completely asymptomatic.  As active as before and with is good and appetite.  Working without limitations and feeling energetic most of the time.  No fevers, unintended weight loss or other general symptoms.    The following portions of the patient's history were reviewed and updated as appropriate: allergies, current medications, past family history, past medical history, past social history, past surgical history and problem list.    Past Medical History:   Diagnosis Date    Anemia     Colon cancer 2022    colon    Diabetes mellitus     Hyperlipidemia     Hypertension      Past Surgical History:   Procedure Laterality Date    APPENDECTOMY      CARDIAC CATHETERIZATION      COLON RESECTION N/A 12/15/2022    Procedure: COLON RESECTION RIGHT;  Surgeon: Percy Davis MD;  Location: [facility] MAIN OR;  Service: General;  Laterality: N/A;    COLONOSCOPY N/A 11/11/2022    Procedure: COLONOSCOPY WITH BIOPSY AND POLYPECTOMY;  Surgeon: Billy Julien MD;  Location: [facility] ENDOSCOPY;  Service: Gastroenterology;  Laterality: N/A;  Impression:  1.  Large 4-5cm fulgurating circumferential ulcerated mass in the very proximal part of the ascending colon next to ileocecal valve multiple biopsies were performed.  This is highly concerning for colon malignancy.  2.  2 polyp rem    ENDOSCOPY N/A 11/11/2022    Procedure: ESOPHAGOGASTRODUODENOSCOPY with biopsy X1;  Surgeon: Billy Julien MD;  Location: [facility] ENDOSCOPY;  Service: Gastroenterology;  Laterality: N/A;  5.   Upper endoscopy lamination unremarkable.       PORTACATH PLACEMENT Right 1/12/2023    Procedure: INSERTION OF PORTACATH;  Surgeon: Percy Davis MD;  Location: [facility] MAIN OR;  Service: General;  Laterality: Right;    TONSILLECTOMY         Current Outpatient Medications:     aspirin 325 MG tablet, Take 1 tablet by mouth Daily., Disp: 30 tablet, Rfl: 0    atorvastatin (LIPITOR) 40 MG tablet, Take 1 tablet by mouth Every Night., Disp: 90 tablet, Rfl: 0    capecitabine (XELODA) 150 MG chemo tablet, Take 5 tablets by mouth (with 1 other capecitabine Rx) for 1,750 mg total 2 (Two) Times a Day on Days 1-14, then off for 7 days. Total dose is 1750mg in the AM & 1750mg in the PM., Disp: 140 tablet, Rfl: 4    capecitabine (XELODA) 500 MG chemo tablet, Take 2 tablets by mouth (with 1 other capecitabine prescription) for 1,750 mg total 2 (Two) Times a Day on Days 1-14, then off for 7 days.  Total dose is 1750mg in the AM & 1750mg in the PM., Disp: 56 tablet, Rfl: 4    empagliflozin (JARDIANCE) 25 MG tablet tablet, Take 1 tablet by mouth Daily. Dont take preop, Disp: , Rfl:     ferrous gluconate (FERGON) 324 MG tablet, Take 1 tablet by mouth Daily With Breakfast., Disp: 30 tablet, Rfl: 3    glimepiride (AMARYL) 4 MG tablet, Take 1 tablet by mouth 2 (Two) Times a Day. None preop, Disp: , Rfl:     Insulin Glargine (LANTUS SOLOSTAR) 100 UNIT/ML injection pen, Inject 7 Units under the skin into the appropriate area as directed Every Night., Disp: 15 mL, Rfl: 0    Insulin Pen Needle (Pen Needles) 32G X 4 MM misc, 1 each Daily. Dx code: E11.65, Disp: 100 each, Rfl: 2    losartan (COZAAR) 100 MG tablet, , Disp: , Rfl:     metFORMIN (GLUCOPHAGE) 1000 MG tablet, Take 2 tablets by mouth Daily With Dinner. Last dose 12/12, Disp: , Rfl:     ondansetron (ZOFRAN) 8 MG tablet, Take 1 tablet by mouth 3 (Three) Times a Day As Needed for Nausea or Vomiting., Disp: 30 tablet, Rfl: 5    pioglitazone (ACTOS) 45 MG tablet, Take 1 tablet  by mouth Daily. None preop, Disp: , Rfl:     Semaglutide,0.25 or 0.5MG/DOS, (Ozempic, 0.25 or 0.5 MG/DOSE,) 2 MG/1.5ML solution pen-injector, Inject 0.25 mg under the skin into the appropriate area as directed As Needed. friday, Disp: , Rfl:     Allergies   Allergen Reactions    Penicillins Rash     Family History   Problem Relation Age of Onset    Pneumonia Mother 94        SARS-CoV2    Colon cancer Father 62    Heart disease Sister      Cancer-related family history includes Colon cancer (age of onset: 62) in his father.    Social History     Tobacco Use    Smoking status: Former     Packs/day: 1.00     Years: 2.00     Pack years: 2.00     Types: Cigarettes     Start date:      Quit date:      Years since quittin.7    Smokeless tobacco: Never   Vaping Use    Vaping Use: Never used   Substance Use Topics    Alcohol use: Not Currently    Drug use: Never     Social History     Social History Narrative    Not on file      ROS:     Review of Systems   Constitutional:  Negative for activity change, appetite change, chills, diaphoresis, fatigue, fever and unexpected weight change.   HENT:  Negative for congestion, dental problem, drooling, ear discharge, ear pain, facial swelling, hearing loss, mouth sores, nosebleeds, postnasal drip, rhinorrhea, sinus pressure, sinus pain, sneezing, sore throat, tinnitus, trouble swallowing and voice change.    Eyes:  Negative for photophobia, pain, discharge, redness, itching and visual disturbance.   Respiratory:  Negative for apnea, cough, choking, chest tightness, shortness of breath, wheezing and stridor.    Cardiovascular:  Negative for chest pain, palpitations and leg swelling.   Gastrointestinal:  Negative for abdominal distention, abdominal pain, anal bleeding, blood in stool, constipation, diarrhea, nausea, rectal pain and vomiting.   Endocrine: Negative for cold intolerance, heat intolerance, polydipsia and polyuria.   Genitourinary:  Negative for decreased urine  "volume, difficulty urinating, dysuria, flank pain, frequency, genital sores, hematuria and urgency.   Musculoskeletal:  Negative for arthralgias, back pain, gait problem, joint swelling, myalgias, neck pain and neck stiffness.   Skin:  Negative for color change, pallor and rash.   Neurological:  Negative for dizziness, tremors, seizures, syncope, facial asymmetry, speech difficulty, weakness, light-headedness, numbness and headaches.   Hematological:  Negative for adenopathy. Does not bruise/bleed easily.   Psychiatric/Behavioral:  Negative for agitation, behavioral problems, confusion, decreased concentration, hallucinations, self-injury, sleep disturbance and suicidal ideas. The patient is not nervous/anxious.      Objective:    Vitals:    09/11/23 0800   BP: 137/76   Pulse: 77   Temp: 97.7 °F (36.5 °C)   TempSrc: Oral   SpO2: 98%   Weight: 80.1 kg (176 lb 9.6 oz)   Height: 185.4 cm (73\")   PainSc: 0-No pain     Body mass index is 23.3 kg/m².  ECOG  (0) Fully active, able to carry on all predisease performance without restriction    Physical Exam:     Physical Exam  Constitutional:       General: He is not in acute distress.     Appearance: Normal appearance. He is not ill-appearing, toxic-appearing or diaphoretic.   HENT:      Head: Normocephalic and atraumatic.      Right Ear: External ear normal.      Left Ear: External ear normal.      Nose: Nose normal.      Mouth/Throat:      Mouth: Mucous membranes are moist.      Pharynx: Oropharynx is clear.   Eyes:      General: No scleral icterus.        Right eye: No discharge.         Left eye: No discharge.      Conjunctiva/sclera: Conjunctivae normal.      Pupils: Pupils are equal, round, and reactive to light.   Cardiovascular:      Rate and Rhythm: Normal rate and regular rhythm.      Pulses: Normal pulses.      Heart sounds: Normal heart sounds. No murmur heard.    No friction rub. No gallop.   Pulmonary:      Effort: No respiratory distress.      Breath sounds: " No stridor. No wheezing, rhonchi or rales.   Chest:      Chest wall: No tenderness.   Abdominal:      General: Abdomen is flat. Bowel sounds are normal. There is no distension.      Palpations: Abdomen is soft. There is no mass.      Tenderness: There is no abdominal tenderness. There is no right CVA tenderness, left CVA tenderness, guarding or rebound.   Musculoskeletal:         General: No swelling, tenderness, deformity or signs of injury.      Cervical back: No rigidity.      Right lower leg: No edema.      Left lower leg: No edema.   Lymphadenopathy:      Cervical: No cervical adenopathy.   Skin:     General: Skin is warm and dry.      Coloration: Skin is not jaundiced.      Findings: No bruising or rash.   Neurological:      General: No focal deficit present.      Mental Status: He is alert and oriented to person, place, and time.      Cranial Nerves: No cranial nerve deficit.      Gait: Gait normal.   Psychiatric:         Mood and Affect: Mood normal.         Behavior: Behavior normal.         Thought Content: Thought content normal.         Judgment: Judgment normal.   KATIE Kramer MD performed a physical exam on 9/11/2023 as documented above.    Lab Results - Last 18 Months   Lab Units 09/11/23  0814 08/28/23  0822 08/07/23  1020   WBC 10*3/mm3 4.78 5.21 4.83   HEMOGLOBIN g/dL 12.3* 12.3* 12.5*   HEMATOCRIT % 39.2 39.3 39.1   PLATELETS 10*3/mm3 143 140 155   MCV fL 88.1 88.1 88.1     Lab Results - Last 18 Months   Lab Units 08/28/23  0822 08/07/23  1020 07/31/23  0959 05/22/23  2226   SODIUM mmol/L 136 138  --  138   POTASSIUM mmol/L 4.2 4.1  --  4.2   CHLORIDE mmol/L 102 102  --  102   CO2 mmol/L 25.0 27.0  --  27.0   BUN mg/dL 16 21  --  20   CREATININE mg/dL 0.83 0.92 0.80 0.79   CALCIUM mg/dL 9.1 8.7  --  8.7   BILIRUBIN mg/dL 0.5 0.5  --  0.3   ALK PHOS U/L 111 115  --  120*   ALT (SGPT) U/L 8 15  --  9   AST (SGOT) U/L 17 13  --  14   GLUCOSE mg/dL 205* 281*  --  326*     Lab Results   Component  Value Date    GLUCOSE 205 (H) 08/28/2023    BUN 16 08/28/2023    CREATININE 0.83 08/28/2023    EGFRIFNONA 76 11/15/2021    EGFRIFAFRI 87 11/15/2021    BCR 19.3 08/28/2023    K 4.2 08/28/2023    CO2 25.0 08/28/2023    CALCIUM 9.1 08/28/2023    ALBUMIN 3.7 08/28/2023    AST 17 08/28/2023    ALT 8 08/28/2023     Lab Results   Component Value Date    IRON 15 (L) 01/06/2023    TIBC 548 (H) 01/06/2023    FERRITIN 23.51 (L) 01/06/2023     Lab Results   Component Value Date    CEA 2.94 08/28/2023     Assessment & Plan     Assessment:  Apparent isolated metastatic lesion in segment 8 of the liver.  Reviewed again the images of the CT scans and the MRI as well as the images of the recent biopsy.  This appears to be a small and isolated metastatic deposit and does the possibility of cure still exists.  Because he received adjuvant therapy with oxaliplatin based chemotherapy and there was progression even after it, I have decided to treat him with an irinotecan based regimen.  Discussed with him.  I would like to have him undergo a PET scan prior to the commencement of treatment to ensure as much as possible that other lesions are not present.  Discussed with him as well.  Moderately differentiated adenocarcinoma of the ascending colon [id number] MMR proficient.  Completed 3 months of chemotherapy with CapeOx.  Completed the planned chemotherapy in April 2023.  Diabetes mellitus.  History of ischemic stroke.  He will begin chemotherapy as soon as approved.  He will see me with the second cycle of treatment.  PET scan before commencement of chemotherapy.    Plan:  As above.    Don Kramer MD on 9/11/2023 at 8:49 AM.

## 2023-09-11 ENCOUNTER — OFFICE VISIT (OUTPATIENT)
Dept: ONCOLOGY | Facility: CLINIC | Age: 78
End: 2023-09-11
Payer: OTHER GOVERNMENT

## 2023-09-11 ENCOUNTER — TELEPHONE (OUTPATIENT)
Dept: ONCOLOGY | Facility: CLINIC | Age: 78
End: 2023-09-11
Payer: OTHER GOVERNMENT

## 2023-09-11 ENCOUNTER — APPOINTMENT (OUTPATIENT)
Dept: LAB | Facility: HOSPITAL | Age: 78
End: 2023-09-11
Payer: OTHER GOVERNMENT

## 2023-09-11 VITALS
HEIGHT: 73 IN | DIASTOLIC BLOOD PRESSURE: 76 MMHG | BODY MASS INDEX: 23.4 KG/M2 | TEMPERATURE: 97.7 F | HEART RATE: 77 BPM | WEIGHT: 176.6 LBS | SYSTOLIC BLOOD PRESSURE: 137 MMHG | OXYGEN SATURATION: 98 %

## 2023-09-11 DIAGNOSIS — C18.2 MALIGNANT NEOPLASM OF ASCENDING COLON: Primary | ICD-10-CM

## 2023-09-11 PROBLEM — C78.7 METASTASES TO THE LIVER: Status: ACTIVE | Noted: 2023-09-11

## 2023-09-11 LAB
BASOPHILS # BLD AUTO: 0.02 10*3/MM3 (ref 0–0.2)
BASOPHILS NFR BLD AUTO: 0.4 % (ref 0–1.5)
DEPRECATED RDW RBC AUTO: 49.4 FL (ref 37–54)
EOSINOPHIL # BLD AUTO: 0.1 10*3/MM3 (ref 0–0.4)
EOSINOPHIL NFR BLD AUTO: 2.1 % (ref 0.3–6.2)
ERYTHROCYTE [DISTWIDTH] IN BLOOD BY AUTOMATED COUNT: 15.6 % (ref 12.3–15.4)
HCT VFR BLD AUTO: 39.2 % (ref 37.5–51)
HGB BLD-MCNC: 12.3 G/DL (ref 13–17.7)
HOLD SPECIMEN: NORMAL
HOLD SPECIMEN: NORMAL
LYMPHOCYTES # BLD AUTO: 0.87 10*3/MM3 (ref 0.7–3.1)
LYMPHOCYTES NFR BLD AUTO: 18.2 % (ref 19.6–45.3)
MCH RBC QN AUTO: 27.6 PG (ref 26.6–33)
MCHC RBC AUTO-ENTMCNC: 31.4 G/DL (ref 31.5–35.7)
MCV RBC AUTO: 88.1 FL (ref 79–97)
MONOCYTES # BLD AUTO: 0.53 10*3/MM3 (ref 0.1–0.9)
MONOCYTES NFR BLD AUTO: 11.1 % (ref 5–12)
NEUTROPHILS NFR BLD AUTO: 3.26 10*3/MM3 (ref 1.7–7)
NEUTROPHILS NFR BLD AUTO: 68.2 % (ref 42.7–76)
PLATELET # BLD AUTO: 143 10*3/MM3 (ref 140–450)
PMV BLD AUTO: 8.9 FL (ref 6–12)
RBC # BLD AUTO: 4.45 10*6/MM3 (ref 4.14–5.8)
WBC NRBC COR # BLD: 4.78 10*3/MM3 (ref 3.4–10.8)

## 2023-09-11 PROCEDURE — 36415 COLL VENOUS BLD VENIPUNCTURE: CPT

## 2023-09-11 PROCEDURE — 85025 COMPLETE CBC W/AUTO DIFF WBC: CPT | Performed by: INTERNAL MEDICINE

## 2023-09-11 NOTE — TELEPHONE ENCOUNTER
Called and let Pt know that appt was made, at his Req, for tomorrow @ 2:00. He needs to arrive 15 min prior and NPO from 8 am on. Only plain water if Pt needs to take any meds. Pt's INF to start on 9/18 as pre Dr Kramer treatment plan.  He v/u.

## 2023-09-12 ENCOUNTER — HOSPITAL ENCOUNTER (OUTPATIENT)
Dept: PET IMAGING | Facility: HOSPITAL | Age: 78
Discharge: HOME | End: 2023-09-12
Admitting: INTERNAL MEDICINE
Payer: OTHER GOVERNMENT

## 2023-09-12 DIAGNOSIS — C18.2 MALIGNANT NEOPLASM OF ASCENDING COLON: ICD-10-CM

## 2023-09-12 LAB — GLUCOSE BLDC GLUCOMTR-MCNC: 99 MG/DL (ref 70–105)

## 2023-09-12 PROCEDURE — 82948 REAGENT STRIP/BLOOD GLUCOSE: CPT

## 2023-09-12 PROCEDURE — 0 FLUDEOXYGLUCOSE F18 SOLUTION: Performed by: INTERNAL MEDICINE

## 2023-09-12 PROCEDURE — A9552 F18 FDG: HCPCS | Performed by: INTERNAL MEDICINE

## 2023-09-12 PROCEDURE — 78815 PET IMAGE W/CT SKULL-THIGH: CPT

## 2023-09-12 RX ADMIN — FLUDEOXYGLUCOSE F18 1 DOSE: 300 INJECTION INTRAVENOUS at 13:53

## 2023-09-15 DIAGNOSIS — C18.2 MALIGNANT NEOPLASM OF ASCENDING COLON: ICD-10-CM

## 2023-09-15 DIAGNOSIS — C78.7 METASTASES TO THE LIVER: Primary | ICD-10-CM

## 2023-09-15 RX ORDER — ONDANSETRON HYDROCHLORIDE 8 MG/1
8 TABLET, FILM COATED ORAL 3 TIMES DAILY PRN
Qty: 30 TABLET | Refills: 5 | Status: SHIPPED | OUTPATIENT
Start: 2023-09-15

## 2023-09-17 DIAGNOSIS — C18.2 MALIGNANT NEOPLASM OF ASCENDING COLON: Primary | ICD-10-CM

## 2023-09-17 DIAGNOSIS — C78.7 METASTASES TO THE LIVER: ICD-10-CM

## 2023-09-17 RX ORDER — ATROPINE SULFATE 1 MG/ML
0.25 INJECTION, SOLUTION INTRAMUSCULAR; INTRAVENOUS; SUBCUTANEOUS
Status: CANCELLED | OUTPATIENT
Start: 2023-09-18

## 2023-09-17 RX ORDER — SODIUM CHLORIDE 9 MG/ML
250 INJECTION, SOLUTION INTRAVENOUS ONCE
Status: CANCELLED | OUTPATIENT
Start: 2023-09-18

## 2023-09-17 RX ORDER — PALONOSETRON 0.05 MG/ML
0.25 INJECTION, SOLUTION INTRAVENOUS ONCE
Status: CANCELLED | OUTPATIENT
Start: 2023-09-18

## 2023-09-17 RX ORDER — FLUOROURACIL 50 MG/ML
400 INJECTION, SOLUTION INTRAVENOUS ONCE
Status: CANCELLED | OUTPATIENT
Start: 2023-09-18

## 2023-09-18 ENCOUNTER — HOSPITAL ENCOUNTER (OUTPATIENT)
Dept: ONCOLOGY | Facility: HOSPITAL | Age: 78
Discharge: HOME | End: 2023-09-18
Admitting: STUDENT IN AN ORGANIZED HEALTH CARE EDUCATION/TRAINING PROGRAM
Payer: OTHER GOVERNMENT

## 2023-09-18 VITALS
TEMPERATURE: 97.7 F | SYSTOLIC BLOOD PRESSURE: 150 MMHG | WEIGHT: 179 LBS | DIASTOLIC BLOOD PRESSURE: 79 MMHG | BODY MASS INDEX: 23.72 KG/M2 | OXYGEN SATURATION: 96 % | HEIGHT: 73 IN | RESPIRATION RATE: 16 BRPM | HEART RATE: 105 BPM

## 2023-09-18 DIAGNOSIS — C78.7 METASTASES TO THE LIVER: Primary | ICD-10-CM

## 2023-09-18 DIAGNOSIS — C18.2 MALIGNANT NEOPLASM OF ASCENDING COLON: ICD-10-CM

## 2023-09-18 LAB
ALBUMIN SERPL-MCNC: 3.4 G/DL (ref 3.5–5.2)
ALBUMIN/GLOB SERPL: 1.3 G/DL
ALP SERPL-CCNC: 97 U/L (ref 39–117)
ALT SERPL W P-5'-P-CCNC: 13 U/L (ref 1–41)
ANION GAP SERPL CALCULATED.3IONS-SCNC: 6 MMOL/L (ref 5–15)
AST SERPL-CCNC: 12 U/L (ref 1–40)
BASOPHILS # BLD AUTO: 0.01 10*3/MM3 (ref 0–0.2)
BASOPHILS NFR BLD AUTO: 0.2 % (ref 0–1.5)
BILIRUB SERPL-MCNC: 0.4 MG/DL (ref 0–1.2)
BUN SERPL-MCNC: 21 MG/DL (ref 8–23)
BUN/CREAT SERPL: 21.2 (ref 7–25)
CALCIUM SPEC-SCNC: 8.6 MG/DL (ref 8.6–10.5)
CHLORIDE SERPL-SCNC: 106 MMOL/L (ref 98–107)
CO2 SERPL-SCNC: 28 MMOL/L (ref 22–29)
CREAT SERPL-MCNC: 0.99 MG/DL (ref 0.76–1.27)
DEPRECATED RDW RBC AUTO: 50.7 FL (ref 37–54)
EGFRCR SERPLBLD CKD-EPI 2021: 79.4 ML/MIN/1.73
EOSINOPHIL # BLD AUTO: 0.1 10*3/MM3 (ref 0–0.4)
EOSINOPHIL NFR BLD AUTO: 2.4 % (ref 0.3–6.2)
ERYTHROCYTE [DISTWIDTH] IN BLOOD BY AUTOMATED COUNT: 15.9 % (ref 12.3–15.4)
GLOBULIN UR ELPH-MCNC: 2.7 GM/DL
GLUCOSE SERPL-MCNC: 321 MG/DL (ref 65–99)
HCT VFR BLD AUTO: 36.9 % (ref 37.5–51)
HGB BLD-MCNC: 11.9 G/DL (ref 13–17.7)
LYMPHOCYTES # BLD AUTO: 1.05 10*3/MM3 (ref 0.7–3.1)
LYMPHOCYTES NFR BLD AUTO: 25.5 % (ref 19.6–45.3)
MAGNESIUM SERPL-MCNC: 2 MG/DL (ref 1.6–2.4)
MCH RBC QN AUTO: 28.6 PG (ref 26.6–33)
MCHC RBC AUTO-ENTMCNC: 32.2 G/DL (ref 31.5–35.7)
MCV RBC AUTO: 88.7 FL (ref 79–97)
MONOCYTES # BLD AUTO: 0.44 10*3/MM3 (ref 0.1–0.9)
MONOCYTES NFR BLD AUTO: 10.7 % (ref 5–12)
NEUTROPHILS NFR BLD AUTO: 2.52 10*3/MM3 (ref 1.7–7)
NEUTROPHILS NFR BLD AUTO: 61.2 % (ref 42.7–76)
PLATELET # BLD AUTO: 140 10*3/MM3 (ref 140–450)
PMV BLD AUTO: 9.8 FL (ref 6–12)
POTASSIUM SERPL-SCNC: 4 MMOL/L (ref 3.5–5.2)
PROT SERPL-MCNC: 6.1 G/DL (ref 6–8.5)
RBC # BLD AUTO: 4.16 10*6/MM3 (ref 4.14–5.8)
SODIUM SERPL-SCNC: 140 MMOL/L (ref 136–145)
WBC NRBC COR # BLD: 4.12 10*3/MM3 (ref 3.4–10.8)

## 2023-09-18 PROCEDURE — 80053 COMPREHEN METABOLIC PANEL: CPT | Performed by: STUDENT IN AN ORGANIZED HEALTH CARE EDUCATION/TRAINING PROGRAM

## 2023-09-18 PROCEDURE — 25010000002 ATROPINE SULFATE 0.4 MG/ML SOLUTION 1 ML VIAL: Performed by: STUDENT IN AN ORGANIZED HEALTH CARE EDUCATION/TRAINING PROGRAM

## 2023-09-18 PROCEDURE — G0498 CHEMO EXTEND IV INFUS W/PUMP: HCPCS

## 2023-09-18 PROCEDURE — 25010000002 PANITUMUMAB 400 MG/20ML SOLUTION 20 ML VIAL: Performed by: STUDENT IN AN ORGANIZED HEALTH CARE EDUCATION/TRAINING PROGRAM

## 2023-09-18 PROCEDURE — 96415 CHEMO IV INFUSION ADDL HR: CPT

## 2023-09-18 PROCEDURE — 85025 COMPLETE CBC W/AUTO DIFF WBC: CPT | Performed by: STUDENT IN AN ORGANIZED HEALTH CARE EDUCATION/TRAINING PROGRAM

## 2023-09-18 PROCEDURE — 25010000002 FLUOROURACIL PER 500 MG: Performed by: STUDENT IN AN ORGANIZED HEALTH CARE EDUCATION/TRAINING PROGRAM

## 2023-09-18 PROCEDURE — 96416 CHEMO PROLONG INFUSE W/PUMP: CPT

## 2023-09-18 PROCEDURE — 96375 TX/PRO/DX INJ NEW DRUG ADDON: CPT

## 2023-09-18 PROCEDURE — 25010000002 IRINOTECAN PER 20 MG: Performed by: STUDENT IN AN ORGANIZED HEALTH CARE EDUCATION/TRAINING PROGRAM

## 2023-09-18 PROCEDURE — 25010000002 LEUCOVORIN 200 MG RECONSTITUTED SOLUTION 200 MG VIAL: Performed by: INTERNAL MEDICINE

## 2023-09-18 PROCEDURE — 96413 CHEMO IV INFUSION 1 HR: CPT

## 2023-09-18 PROCEDURE — 96417 CHEMO IV INFUS EACH ADDL SEQ: CPT

## 2023-09-18 PROCEDURE — 96411 CHEMO IV PUSH ADDL DRUG: CPT

## 2023-09-18 PROCEDURE — 25010000002 PALONOSETRON 0.25 MG/5ML SOLUTION PREFILLED SYRINGE: Performed by: STUDENT IN AN ORGANIZED HEALTH CARE EDUCATION/TRAINING PROGRAM

## 2023-09-18 PROCEDURE — 25010000002 DEXAMETHASONE SODIUM PHOSPHATE 120 MG/30ML SOLUTION: Performed by: STUDENT IN AN ORGANIZED HEALTH CARE EDUCATION/TRAINING PROGRAM

## 2023-09-18 PROCEDURE — 25010000002 PANITUMUMAB PER 10 MG: Performed by: STUDENT IN AN ORGANIZED HEALTH CARE EDUCATION/TRAINING PROGRAM

## 2023-09-18 PROCEDURE — 96367 TX/PROPH/DG ADDL SEQ IV INF: CPT

## 2023-09-18 PROCEDURE — 96368 THER/DIAG CONCURRENT INF: CPT

## 2023-09-18 PROCEDURE — 83735 ASSAY OF MAGNESIUM: CPT | Performed by: STUDENT IN AN ORGANIZED HEALTH CARE EDUCATION/TRAINING PROGRAM

## 2023-09-18 RX ORDER — FLUOROURACIL 50 MG/ML
400 INJECTION, SOLUTION INTRAVENOUS ONCE
Status: COMPLETED | OUTPATIENT
Start: 2023-09-18 | End: 2023-09-18

## 2023-09-18 RX ORDER — ATROPINE SULFATE 1 MG/ML
0.25 INJECTION, SOLUTION INTRAMUSCULAR; INTRAVENOUS; SUBCUTANEOUS
Status: DISCONTINUED | OUTPATIENT
Start: 2023-09-18 | End: 2023-09-18

## 2023-09-18 RX ORDER — SODIUM CHLORIDE 9 MG/ML
250 INJECTION, SOLUTION INTRAVENOUS ONCE
Status: COMPLETED | OUTPATIENT
Start: 2023-09-18 | End: 2023-09-18

## 2023-09-18 RX ORDER — PALONOSETRON 0.05 MG/ML
0.25 INJECTION, SOLUTION INTRAVENOUS ONCE
Status: COMPLETED | OUTPATIENT
Start: 2023-09-18 | End: 2023-09-18

## 2023-09-18 RX ADMIN — FLUOROURACIL 4870 MG: 50 INJECTION, SOLUTION INTRAVENOUS at 12:17

## 2023-09-18 RX ADMIN — DEXAMETHASONE SODIUM PHOSPHATE 12 MG: 4 INJECTION, SOLUTION INTRA-ARTICULAR; INTRALESIONAL; INTRAMUSCULAR; INTRAVENOUS; SOFT TISSUE at 09:04

## 2023-09-18 RX ADMIN — SODIUM CHLORIDE 250 ML: 9 INJECTION, SOLUTION INTRAVENOUS at 08:58

## 2023-09-18 RX ADMIN — IRINOTECAN HYDROCHLORIDE 365 MG: 20 INJECTION, SOLUTION INTRAVENOUS at 10:32

## 2023-09-18 RX ADMIN — PANITUMUMAB 480 MG: 400 SOLUTION INTRAVENOUS at 09:28

## 2023-09-18 RX ADMIN — LEUCOVORIN CALCIUM 800 MG: 200 INJECTION, POWDER, LYOPHILIZED, FOR SOLUTION INTRAMUSCULAR; INTRAVENOUS at 10:31

## 2023-09-18 RX ADMIN — FLUOROURACIL 810 MG: 50 INJECTION, SOLUTION INTRAVENOUS at 12:12

## 2023-09-18 RX ADMIN — PALONOSETRON 0.25 MG: 0.25 INJECTION, SOLUTION INTRAVENOUS at 09:01

## 2023-09-18 NOTE — PROGRESS NOTES
[facility] Medical Oncology     Education for Administration of Chemotherapy and/or Biotherapy     09/18/23    Vlad Guerrero  [phone]    Vlad Guerrero is here today for education on their upcoming Chemotherapy and/or Biotherapy.     I will be going over their treatment options, obtain signed consent and answer any questions that they may have in regards to the administration of Vectibix, Irinotecan, Leucovorin, Fluorouracil.     Vlad Guerrero has already consulted with  for the treatment of adenocarcinoma of the colon. The provider has gone over the same treatment options with the patient and answered their question prior to today's visit.     The goal of the treatment is to:    [x] Cure my cancer - means treatment that kills cancer cells to the point my doctor                                     cannot find them in my body and they will not grow back.    [] Control my cancer - means treatment that keeps cancer from spreading or growing.    [] Relieve my cancer symptoms - means treatment that helps problems such as pain or pressure.     This treatment has been explained to Vlad Guerrero. Alternative methods of treatment, if any, have been explained to Vlad Cesar as have the benefits and risks of each. Based on the physician's explanation of the benefits and risks of this treatment and any alternatives available, The patient agrees that the potential benefit's out weighs the risks involved. I have explained to the patient the most likely complications that might occur from this treatment. The patient understands that along with the treatment additional medications may be necessary to lesson the side effects. Possible side effect may include but are not limited to, any of the following, or a combination of the following:          []  Abdominal pain  []  Fatigue []  Insomnia []  Petechiae   []  Allergic Reaction []  Fertility effects  []  Itching []  Photosensitivity    []  Anemia []  Fevers []  Joint  pain []  Pleural effusion    []  Anxiety []  Fistula formation [] Kidney damage/toxicity []  Proteinuria    []  Back pain []  Flu-like symptoms []  LFT imbalances []  Rash   []  Blood clots (DVT/PE) []  Fluid retention []  Liver damage []  Secondary malignancies   []  Bleeding []  Forgetfulness []  Loss of appetite []  Sexual side effects    []  Bone pain []  Gastrointestinal perforation []  Low blood pressure []  Shortness of breath   []  Bruising []  Hand foot syndrome []  Lung damage []  Skin changes   []  Cardiovascular events  []  Hair loss/discoloration []  Menopausal symptoms []  Sore throat   []  Central neurotoxicity []  Headaches []  Menstrual irregularities []  Swelling   []  Chest pain [] Hearing loss/change []  Mood changes []  Taste changes   []  Chills []  Heart damage []  Mouth sores []  Temperature sensitivity   []  Confusion []  Hematuria []  Muscle aches  []    Thrombocytopenia   []  Congestive heart failure []  Hemorrhage []  Nephrotic syndrome []  Thyroid changes   []  Constipation []  Hypertension []  Nail changes []  Tinnitus   []  Cough []  Hypertensive crisis []  Nausea  []  Upper respiratory tract infection    []  Depression []  Hypertriglyceridemia  []  Neck pain  []  Visual changes   []  Diarrhea []  Hypoglycemia []  Neutropenia []  Vomiting   []  Dizziness []  Immune-mediated reaction []  Nosebleeds []  Watery eyes   []  Dry skin []  Infection  []  Pain in arms/legs []  Weakness   []  Electrolyte imbalances []  Infusion reaction  []  Pericardial effusion  []  Weight changes   []  Elevated LDH []  Injection site reaction  []  Peripheral neuropathy []  Wound healing complication   []  Eye irritation []  Insomnia       While receiving treatment, it has been explained to the patient with regards to their blood counts. This may include but not limited to CBC, Neutropenia ,Anemia, Thrombocytopenia. This handout has been explained and given to the patient.     It was explained to the patient  about nutrition and how important it is while undergoing Chemotherapy and/or Biotherapy. Certain medications will be prescribed during the treatment which may change the way foods taste or smell. These changes may cause poor or no appetite. Food is fuel for your body, and if it does not get the fuel it needs, your body may become mal-nourished, which can lead to sever fatigue.   Information was given to Vlad Guerrero in regards to some good protein sources to help maintain muscle mass and help to prevent malnutrition   We also discussed with the patient how important it is to drink/eat every 2-3 hours while awake regardless of hunger. We discussed fluid intake of at least 64 ounces liquids per day to stay hydrated.     It has been discussed with the patient the risks of becoming pregnant while receiving Chemotherapy and/or Biotherapy. We also discussed the importance of using reliable barrier methods while participating in intimate activities as this may expose their partners to a potentially harmful drug.     Further home instructions were given to the patient in regards to symptoms, treatment and how to handle those situations as well as when to contact the treatment team or the providers office.     Vlad Guerrero was given handouts on:  Home Instructions  Tips from your chemotherapy nurse  Nutrition during cancer therapy  Cancer related fatigue  Fluids/Dehydration  Hair and Prosthesis solutions  Mouth care  Nausea/Appetite  Constipation and Diarrhea  Understanding your blood  Improving your veins  Patient edcuation from [email or web address]  Emergency home pump/spill kit provided to patient    I have discussed and gone over the full consent with the patient and answered all their questions regarding the medication they are to receive.  Written information has been provided and reviewed with [unfilled]. The patient and their family had a chance to ask any questions about the treatment medications and are satisfied with  the information that was provided to them.     The patient has read and completed the consent form. They understand the possible risks and benefits of the recommended treatment plan and voluntarily agree to undergo the planned treatment. Should they change their mind and decide to stop treatment at any time, they will notify the providers office.       Viviana Hugo RN  09/18/2023  8:15  EDT

## 2023-09-20 ENCOUNTER — HOSPITAL ENCOUNTER (OUTPATIENT)
Dept: ONCOLOGY | Facility: HOSPITAL | Age: 78
Discharge: HOME | End: 2023-09-20
Admitting: STUDENT IN AN ORGANIZED HEALTH CARE EDUCATION/TRAINING PROGRAM
Payer: OTHER GOVERNMENT

## 2023-09-20 DIAGNOSIS — C78.7 METASTASES TO THE LIVER: Primary | ICD-10-CM

## 2023-09-20 DIAGNOSIS — C18.2 MALIGNANT NEOPLASM OF ASCENDING COLON: ICD-10-CM

## 2023-09-20 PROCEDURE — 25010000002 HEPARIN LOCK FLUSH PER 10 UNITS: Performed by: INTERNAL MEDICINE

## 2023-09-20 RX ORDER — HEPARIN SODIUM (PORCINE) LOCK FLUSH IV SOLN 100 UNIT/ML 100 UNIT/ML
500 SOLUTION INTRAVENOUS AS NEEDED
Status: DISCONTINUED | OUTPATIENT
Start: 2023-09-20 | End: 2023-09-21 | Stop reason: HOSPADM

## 2023-09-20 RX ORDER — HEPARIN SODIUM (PORCINE) LOCK FLUSH IV SOLN 100 UNIT/ML 100 UNIT/ML
500 SOLUTION INTRAVENOUS AS NEEDED
OUTPATIENT
Start: 2023-09-20

## 2023-09-20 RX ORDER — SODIUM CHLORIDE 0.9 % (FLUSH) 0.9 %
20 SYRINGE (ML) INJECTION AS NEEDED
Status: DISCONTINUED | OUTPATIENT
Start: 2023-09-20 | End: 2023-09-21 | Stop reason: HOSPADM

## 2023-09-20 RX ORDER — SODIUM CHLORIDE 0.9 % (FLUSH) 0.9 %
20 SYRINGE (ML) INJECTION AS NEEDED
OUTPATIENT
Start: 2023-09-20

## 2023-09-20 RX ADMIN — HEPARIN 500 UNITS: 100 SYRINGE at 11:54

## 2023-09-20 RX ADMIN — Medication 10 ML: at 11:54

## 2023-09-20 NOTE — PROGRESS NOTES
Pt here for CIV DC.  Pt w/ no complaints.  Pump empty and removed.  Positive blood return noted.  Port flushed and needle removed.  Pt given AVS w/ next chemo appointment.  Pt does not have a follow up scheduled.  IM sent to [facility] to call pt with appointment.  Pt discharged from clinic.

## 2023-09-24 ENCOUNTER — HOSPITAL ENCOUNTER (EMERGENCY)
Facility: HOSPITAL | Age: 78
Discharge: HOME | End: 2023-09-24
Attending: EMERGENCY MEDICINE | Admitting: EMERGENCY MEDICINE
Payer: OTHER GOVERNMENT

## 2023-09-24 VITALS
HEIGHT: 72 IN | RESPIRATION RATE: 20 BRPM | WEIGHT: 175 LBS | TEMPERATURE: 98.4 F | OXYGEN SATURATION: 97 % | SYSTOLIC BLOOD PRESSURE: 140 MMHG | BODY MASS INDEX: 23.7 KG/M2 | DIASTOLIC BLOOD PRESSURE: 74 MMHG | HEART RATE: 67 BPM

## 2023-09-24 DIAGNOSIS — R19.7 DIARRHEA, UNSPECIFIED TYPE: Primary | ICD-10-CM

## 2023-09-24 LAB
ADV 40+41 DNA STL QL NAA+NON-PROBE: NOT DETECTED
ALBUMIN SERPL-MCNC: 3.6 G/DL (ref 3.5–5.2)
ALBUMIN/GLOB SERPL: 1.2 G/DL
ALP SERPL-CCNC: 97 U/L (ref 39–117)
ALT SERPL W P-5'-P-CCNC: 16 U/L (ref 1–41)
AMORPH URATE CRY URNS QL MICRO: ABNORMAL /HPF
ANION GAP SERPL CALCULATED.3IONS-SCNC: 9 MMOL/L (ref 5–15)
AST SERPL-CCNC: 15 U/L (ref 1–40)
ASTRO TYP 1-8 RNA STL QL NAA+NON-PROBE: NOT DETECTED
BACTERIA UR QL AUTO: ABNORMAL /HPF
BASOPHILS # BLD AUTO: 0 10*3/MM3 (ref 0–0.2)
BASOPHILS NFR BLD AUTO: 0.4 % (ref 0–1.5)
BILIRUB SERPL-MCNC: 0.9 MG/DL (ref 0–1.2)
BILIRUB UR QL STRIP: NEGATIVE
BUN SERPL-MCNC: 21 MG/DL (ref 8–23)
BUN/CREAT SERPL: 21.6 (ref 7–25)
C CAYETANENSIS DNA STL QL NAA+NON-PROBE: NOT DETECTED
C COLI+JEJ+UPSA DNA STL QL NAA+NON-PROBE: NOT DETECTED
CALCIUM SPEC-SCNC: 8.9 MG/DL (ref 8.6–10.5)
CHLORIDE SERPL-SCNC: 100 MMOL/L (ref 98–107)
CLARITY UR: CLEAR
CO2 SERPL-SCNC: 26 MMOL/L (ref 22–29)
COLOR UR: YELLOW
CREAT SERPL-MCNC: 0.97 MG/DL (ref 0.76–1.27)
CRYPTOSP DNA STL QL NAA+NON-PROBE: NOT DETECTED
DEPRECATED RDW RBC AUTO: 51.2 FL (ref 37–54)
E HISTOLYT DNA STL QL NAA+NON-PROBE: NOT DETECTED
EAEC PAA PLAS AGGR+AATA ST NAA+NON-PRB: NOT DETECTED
EC STX1+STX2 GENES STL QL NAA+NON-PROBE: NOT DETECTED
EGFRCR SERPLBLD CKD-EPI 2021: 81.4 ML/MIN/1.73
EOSINOPHIL # BLD AUTO: 0 10*3/MM3 (ref 0–0.4)
EOSINOPHIL NFR BLD AUTO: 0.6 % (ref 0.3–6.2)
EPEC EAE GENE STL QL NAA+NON-PROBE: NOT DETECTED
ERYTHROCYTE [DISTWIDTH] IN BLOOD BY AUTOMATED COUNT: 16 % (ref 12.3–15.4)
ETEC LTA+ST1A+ST1B TOX ST NAA+NON-PROBE: NOT DETECTED
G LAMBLIA DNA STL QL NAA+NON-PROBE: NOT DETECTED
GLOBULIN UR ELPH-MCNC: 3 GM/DL
GLUCOSE SERPL-MCNC: 351 MG/DL (ref 65–99)
GLUCOSE UR STRIP-MCNC: ABNORMAL MG/DL
GRAN CASTS URNS QL MICRO: ABNORMAL /LPF
HCT VFR BLD AUTO: 40.6 % (ref 37.5–51)
HGB BLD-MCNC: 13.2 G/DL (ref 13–17.7)
HGB UR QL STRIP.AUTO: NEGATIVE
HOLD SPECIMEN: NORMAL
HYALINE CASTS UR QL AUTO: ABNORMAL /LPF
KETONES UR QL STRIP: ABNORMAL
LEUKOCYTE ESTERASE UR QL STRIP.AUTO: NEGATIVE
LIPASE SERPL-CCNC: 16 U/L (ref 13–60)
LYMPHOCYTES # BLD AUTO: 0.8 10*3/MM3 (ref 0.7–3.1)
LYMPHOCYTES NFR BLD AUTO: 20.6 % (ref 19.6–45.3)
MCH RBC QN AUTO: 28 PG (ref 26.6–33)
MCHC RBC AUTO-ENTMCNC: 32.4 G/DL (ref 31.5–35.7)
MCV RBC AUTO: 86.5 FL (ref 79–97)
MONOCYTES # BLD AUTO: 0.2 10*3/MM3 (ref 0.1–0.9)
MONOCYTES NFR BLD AUTO: 4.5 % (ref 5–12)
MUCOUS THREADS URNS QL MICRO: ABNORMAL /HPF
NEUTROPHILS NFR BLD AUTO: 2.9 10*3/MM3 (ref 1.7–7)
NEUTROPHILS NFR BLD AUTO: 73.9 % (ref 42.7–76)
NITRITE UR QL STRIP: NEGATIVE
NOROVIRUS GI+II RNA STL QL NAA+NON-PROBE: NOT DETECTED
NRBC BLD AUTO-RTO: 0.1 /100 WBC (ref 0–0.2)
P SHIGELLOIDES DNA STL QL NAA+NON-PROBE: NOT DETECTED
PH UR STRIP.AUTO: <=5 [PH] (ref 5–8)
PLATELET # BLD AUTO: 115 10*3/MM3 (ref 140–450)
PMV BLD AUTO: 8 FL (ref 6–12)
POTASSIUM SERPL-SCNC: 4.4 MMOL/L (ref 3.5–5.2)
PROT SERPL-MCNC: 6.6 G/DL (ref 6–8.5)
PROT UR QL STRIP: ABNORMAL
RBC # BLD AUTO: 4.69 10*6/MM3 (ref 4.14–5.8)
RBC # UR STRIP: ABNORMAL /HPF
REF LAB TEST METHOD: ABNORMAL
RVA RNA STL QL NAA+NON-PROBE: NOT DETECTED
S ENT+BONG DNA STL QL NAA+NON-PROBE: NOT DETECTED
SAPO I+II+IV+V RNA STL QL NAA+NON-PROBE: NOT DETECTED
SHIGELLA SP+EIEC IPAH ST NAA+NON-PROBE: NOT DETECTED
SODIUM SERPL-SCNC: 135 MMOL/L (ref 136–145)
SP GR UR STRIP: 1.03 (ref 1–1.03)
SQUAMOUS #/AREA URNS HPF: ABNORMAL /HPF
UROBILINOGEN UR QL STRIP: ABNORMAL
V CHOL+PARA+VUL DNA STL QL NAA+NON-PROBE: NOT DETECTED
V CHOLERAE DNA STL QL NAA+NON-PROBE: NOT DETECTED
WBC # UR STRIP: ABNORMAL /HPF
WBC NRBC COR # BLD: 3.9 10*3/MM3 (ref 3.4–10.8)
Y ENTEROCOL DNA STL QL NAA+NON-PROBE: NOT DETECTED

## 2023-09-24 PROCEDURE — 83690 ASSAY OF LIPASE: CPT | Performed by: NURSE PRACTITIONER

## 2023-09-24 PROCEDURE — 80053 COMPREHEN METABOLIC PANEL: CPT | Performed by: NURSE PRACTITIONER

## 2023-09-24 PROCEDURE — 87507 IADNA-DNA/RNA PROBE TQ 12-25: CPT | Performed by: NURSE PRACTITIONER

## 2023-09-24 PROCEDURE — 81001 URINALYSIS AUTO W/SCOPE: CPT | Performed by: NURSE PRACTITIONER

## 2023-09-24 PROCEDURE — 85025 COMPLETE CBC W/AUTO DIFF WBC: CPT | Performed by: NURSE PRACTITIONER

## 2023-09-24 PROCEDURE — 99283 EMERGENCY DEPT VISIT LOW MDM: CPT

## 2023-09-24 RX ORDER — SODIUM CHLORIDE 0.9 % (FLUSH) 0.9 %
10 SYRINGE (ML) INJECTION AS NEEDED
Status: DISCONTINUED | OUTPATIENT
Start: 2023-09-24 | End: 2023-09-24 | Stop reason: HOSPADM

## 2023-09-24 NOTE — ED PROVIDER NOTES
Subjective   Provider in Triage Note  Diarrhea x2 days patient states he recently had chemotherapy for liver cancer.  He states he called his oncologist who advised him come to the ED for further management.  He denies any abdominal pain, fever, chills, vomiting.  States he tried Imodium that has slow the diarrhea down some he denies any black or bloody stool.  He does report slight change in color of urine but denies any dysuria or hematuria.  No chest pain or shortness of breath.    History of Present Illness  Agree with above pit note.    Patient is a 77-year-old  male with history of colon cancer in remission, currently receiving chemotherapy for liver cancer, anemia, diabetes presents to the ER with complaints of diarrhea for 2 days.  He denies abdominal pain or cramping.  No nausea or vomiting.  No hematochezia or melena.  No dysuria.  Patient states he is not taking Imodium with no relief.  He was told by his oncologist to come to the ER for evaluation.  No fever or chills.    History provided by:  Patient    Review of Systems   Constitutional:  Negative for chills and fever.   HENT:  Negative for sore throat and trouble swallowing.    Eyes: Negative.    Respiratory:  Negative for shortness of breath and wheezing.    Cardiovascular:  Negative for chest pain.   Gastrointestinal:  Positive for diarrhea. Negative for abdominal pain, nausea and vomiting.   Endocrine: Negative.    Genitourinary:  Negative for dysuria.   Musculoskeletal:  Negative for myalgias.   Skin:  Negative for rash.   Allergic/Immunologic: Negative.    Neurological:  Negative for numbness and headaches.   Psychiatric/Behavioral:  Negative for behavioral problems.    All other systems reviewed and are negative.    Past Medical History:   Diagnosis Date    Anemia     Colon cancer 2022    colon    Diabetes mellitus     Hyperlipidemia     Hypertension        Allergies   Allergen Reactions    Penicillins Rash       Past Surgical History:    Procedure Laterality Date    APPENDECTOMY      CARDIAC CATHETERIZATION      COLON RESECTION N/A 12/15/2022    Procedure: COLON RESECTION RIGHT;  Surgeon: Percy Davis MD;  Location: [facility] MAIN OR;  Service: General;  Laterality: N/A;    COLONOSCOPY N/A 2022    Procedure: COLONOSCOPY WITH BIOPSY AND POLYPECTOMY;  Surgeon: Billy Julien MD;  Location: [facility] ENDOSCOPY;  Service: Gastroenterology;  Laterality: N/A;  Impression:  1.  Large 4-5cm fulgurating circumferential ulcerated mass in the very proximal part of the ascending colon next to ileocecal valve multiple biopsies were performed.  This is highly concerning for colon malignancy.  2.  2 polyp rem    ENDOSCOPY N/A 2022    Procedure: ESOPHAGOGASTRODUODENOSCOPY with biopsy X1;  Surgeon: Billy Julien MD;  Location: [facility] ENDOSCOPY;  Service: Gastroenterology;  Laterality: N/A;  5.  Upper endoscopy lamination unremarkable.       PORTACATH PLACEMENT Right 2023    Procedure: INSERTION OF PORTACATH;  Surgeon: Percy Davis MD;  Location: [facility] MAIN OR;  Service: General;  Laterality: Right;    TONSILLECTOMY         Family History   Problem Relation Age of Onset    Pneumonia Mother 94        SARS-CoV2    Colon cancer Father 62    Heart disease Sister        Social History     Socioeconomic History    Marital status: Single   Tobacco Use    Smoking status: Former     Packs/day: 1.00     Years: 2.00     Pack years: 2.00     Types: Cigarettes     Start date:      Quit date:      Years since quittin.7    Smokeless tobacco: Never   Vaping Use    Vaping Use: Never used   Substance and Sexual Activity    Alcohol use: Not Currently    Drug use: Never    Sexual activity: Defer           Objective   Physical Exam  Vitals and nursing note reviewed.   Constitutional:       General: He is not in acute distress.     Appearance: Normal appearance. He is normal weight. He is not diaphoretic.   HENT:      Head: Normocephalic  "and atraumatic.      Mouth/Throat:      Mouth: Mucous membranes are moist.   Eyes:      Extraocular Movements: Extraocular movements intact.      Pupils: Pupils are equal, round, and reactive to light.   Cardiovascular:      Rate and Rhythm: Normal rate and regular rhythm.      Pulses: Normal pulses.      Heart sounds: Normal heart sounds. No murmur heard.  Pulmonary:      Effort: Pulmonary effort is normal.      Breath sounds: Normal breath sounds.   Abdominal:      General: Abdomen is flat.      Palpations: Abdomen is soft.      Tenderness: There is no abdominal tenderness.   Musculoskeletal:      Cervical back: Normal range of motion.   Skin:     General: Skin is warm.      Capillary Refill: Capillary refill takes less than 2 seconds.   Neurological:      General: No focal deficit present.      Mental Status: He is alert and oriented to person, place, and time.   Psychiatric:         Mood and Affect: Mood normal.         Behavior: Behavior normal.       Procedures           ED Course  ED Course as of 09/24/23 2130   Sun Sep 24, 2023   1736 Waiting for GI panel []   1753 Waiting for GI panel []      ED Course User Index  [] Nikki Nuno PA    /74   Pulse 67   Temp 98.4 °F (36.9 °C) (Temporal)   Resp 20   Ht 182.9 cm (72\")   Wt 79.4 kg (175 lb)   SpO2 97%   BMI 23.73 kg/m²   Labs Reviewed   COMPREHENSIVE METABOLIC PANEL - Abnormal; Notable for the following components:       Result Value    Glucose 351 (*)     Sodium 135 (*)     All other components within normal limits    Narrative:     GFR Normal >60  Chronic Kidney Disease <60  Kidney Failure <15    The GFR formula is only valid for adults with stable renal function between ages 18 and 70.   URINALYSIS W/ MICROSCOPIC IF INDICATED (NO CULTURE) - Abnormal; Notable for the following components:    Glucose, UA >=1000 mg/dL (3+) (*)     Ketones, UA Trace (*)     Protein, UA 30 mg/dL (1+) (*)     All other components within normal limits   CBC " WITH AUTO DIFFERENTIAL - Abnormal; Notable for the following components:    RDW 16.0 (*)     Platelets 115 (*)     Monocyte % 4.5 (*)     All other components within normal limits   URINALYSIS, MICROSCOPIC ONLY - Abnormal; Notable for the following components:    WBC, UA 0-2 (*)     Mucus, UA Small/1+ (*)     All other components within normal limits   GASTROINTESTINAL PANEL, PCR (PREFERRED) DOES NOT INCLUDE CDIFF - Normal    Narrative:     If Aeromonas, Staphylococcus aureus or Bacillus cereus are suspected, please order [id number]: Stool Culture, Aeromonas, S aureus, B Cereus.   LIPASE - Normal   RAINBOW DRAW    Narrative:     The following orders were created for panel order [place] Draw.  Procedure                               Abnormality         Status                     ---------                               -----------         ------                     Gold Top - SST[[id number]]                                   Final result                 Please view results for these tests on the individual orders.   CBC AND DIFFERENTIAL    Narrative:     The following orders were created for panel order CBC & Differential.  Procedure                               Abnormality         Status                     ---------                               -----------         ------                     CBC Auto Differential[[id number]]        Abnormal            Final result                 Please view results for these tests on the individual orders.   GOLD TOP - SST     Medications   sodium chloride 0.9 % flush 10 mL (has no administration in time range)     No radiology results for the last day                                         Medical Decision Making  Differential Dx (Includes but not limited to): Gastroenteritis, diverticulitis, chemotherapy reaction  Medical Records Reviewed: Patient seen by interventional radiology 8/29/2023 for liver biopsy  Labs: On my interpretation, urinalysis negative for UTI.  GI panel negative.   CBC, CMP unremarkable.  Imaging: Not warranted, patient has no abdominal pain  Telemetry: N/A  Testing considered but not ordered: CT abdomen pelvis patient denies abdominal pain  Nature of Complaint: Acute  Admission vs Discharge: Discharge  Discussion: While in the ED IV was placed and labs were obtained appropriate PPE was worn during exam and throughout all encounters with the patient.  Patient with above evaluation.  Patient afebrile, nontoxic appearance in no acute distress.  Patient's only complaint is diarrhea.  He has no complaints of abdominal pain.  No hematochezia or melena.  No fever.  Patient is nontoxic in appearance.  Lab work reassuring.  No leukocytosis.  GI panel negative.  Urinalysis negative.  I see no indication for admission.  CT imaging was considered but he has no pain, no evidence of sepsis or acute infectious process therefore not felt warranted today.  Diarrhea could be related to side effect associated with his chemotherapy.  Patient will be discharged encouraged to follow-up with PCP and oncology for recheck.    Discharge plan and instructions were discussed with the patient who verbalized understanding and is in agreement with the plan, all questions were answered at this time.  Patient is aware of signs symptoms that would require immediate return to the emergency room.  Patient understands importance of following up with primary care provider for further evaluation and worsening concerns as well as blood pressure recheck in the next 4 weeks.    Patient was discharged in improved stable condition with an upright steady gait.    Patient is aware that discharge does not mean that nothing is wrong but indicates no emergencies present and they must continue care with follow-up as given below or physician of their choice.    Problems Addressed:  Diarrhea, unspecified type: acute illness or injury    Risk  OTC drugs.        Final diagnoses:   Diarrhea, unspecified type       ED  Disposition  ED Disposition       ED Disposition   Discharge    Condition   Stable    Comment   --               Vlad Moreland MD  [address] [id number] [phone]    Schedule an appointment as soon as possible for a visit in 2 days  As needed, If symptoms worsen         Medication List      No changes were made to your prescriptions during this visit.            Nikki Nuno PA  09/24/23 [id number]

## 2023-09-24 NOTE — DISCHARGE INSTRUCTIONS
Tylenol as needed for pain.  May take Imodium to help with diarrhea    Follow-up with primary care for recheck    Return to the ER for new or worsening symptoms

## 2023-09-25 ENCOUNTER — OFFICE VISIT (OUTPATIENT)
Dept: ONCOLOGY | Facility: CLINIC | Age: 78
End: 2023-09-25

## 2023-09-25 VITALS
HEART RATE: 85 BPM | HEIGHT: 72 IN | SYSTOLIC BLOOD PRESSURE: 118 MMHG | OXYGEN SATURATION: 98 % | WEIGHT: 168.8 LBS | BODY MASS INDEX: 22.86 KG/M2 | DIASTOLIC BLOOD PRESSURE: 70 MMHG

## 2023-09-25 DIAGNOSIS — C18.2 MALIGNANT NEOPLASM OF ASCENDING COLON: Primary | ICD-10-CM

## 2023-09-25 NOTE — PROGRESS NOTES
HEMATOLOGY ONCOLOGY OUTPATIENT FOLLOW-UP       Patient name: Vlad Guerrero  : 1945  MRN: [id number]  Primary Care Physician: Vlad Moreland MD  Referring Physician: No ref. provider found  Reason For Consult: Stage III colon cance    No chief complaint on file.    HPI:   History of Present Illness:  Vlad Guerrero is 77 y.o. male who presented to our office on 23 for consultation regarding    2023: Mr. Guerrero dated the beginning of his present illness to sometime at the end of  when he started to feel fatigued.  He was seen at the [facility] and had laboratory exams that revealed microcytic anemia.  He had evidence of iron deficiency and received intravenous iron in the hospital.  He had upper and lower gastrointestinal endoscopies that demonstrated a cecal tumor that measured 4 to 5 cm and was fungating in appearance.  It was circumferential and was next to the ileocecal valve.  The upper gastrointestinal endoscopy revealed no abnormalities.  On this basis he was on December 15, 2022 he was taken to the hospital and underwent a right hemicolectomy without complications.  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  It corresponded to a grade 2 malignancy that invaded through the muscularis propria into the pericolonic tissues.  Macroscopic tumor perforation or lymphovascular space invasion were not present.  Perineural invasion was not present either.  All margins of excision were negative.  All of 36 lymph nodes submitted to were positive for involvement with malignancy.  The disease was Elzbieta staged as [id number].  Loss of expression of mismatch repair enzymes was not documented.  Mr. Guerrero was discharged to continue treatment as outpatient.  At the time of this visit he was recovering well from the surgery.  His incision had healed completely and he had no drains or suture  materials.  He had returned home and was eating well.  He had yet to return to work.  He had been afebrile and free of nausea.  For the most part his weight had been stable.  After reviewing the records a long conversation, of approximately 1 hour, was had with the patient in regards to options of treatment.  The nature of his disease as well as the likelihood of recurrence were described.  The use of adjuvant chemotherapy to increase the rate of cure after surgery was explained.  Side effects were described in detail.  The need for a port was expressed as well.  A treatment plan was placed.    2/6/2023: Received the first cycle of adjuvant chemotherapy without any side effects. On the day of this visit feeling well and without any new symptoms, except a very mild rash, especially on the forearms, but no oral pain. Had stools of diminished consistency for a few days but now resolved. The exam was rather unremarkable, except for a few very light erythematous macules on the forearms.     2/27/2023: Feeling well and without new symptoms.  As active as before.  Eating well and without unintended weight loss.  Stronger and more energetic since the institution of vitamin B12 and iron.  No chest pains and cough.  No abdominal pain or diarrhea.  On exam no changes.  A decision was made to continue with the same treatment.  Laboratory exams were reviewed.  He was to see me again in approximately 4 weeks from this date with new scans.    3/27/2023: Suffered an ischemic stroke.  MRI on March 10, 2023 reported a 7 mm focus of restricted diffusion in the left periventricular white matter of the posterior left frontal lobe.  This was felt to be suspicious for an acute/subacute infarct.  There was also evidence of previous lacunar strokes.  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  There was bilateral internal carotid artery stenosis with 60% stenosis estimated at the right and not greater than 50% at the left.  Chemotherapy was held following discharge.  He was left without any sequela.  At the time of this visit he was entirely asymptomatic.  He was convinced a large part of his problem was that he had suffered from hyperglycemia, consistently for some time.  Indeed his glucose was between 152 mg/dL and 193 mg/dL in the preceding days.  However, he reported at home glucose readings of greater than 400 mg/dL..  On exam there were no changes.  The laboratory exams reported a blood count with normocytic anemia and mild thrombocytopenia.  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    4/14/2023: Completed 3 months of treatment.  On the day of this visit not feeling very well.  Weak and tired.  Not very good appetite although eating well.  Having some dysgeusia.  Afebrile.  No chest pains or cough and no abdominal pain.  Had maintain regular bowel activity.  No edema.  On exam no changes.  A decision was made to stop the chemotherapy.  He was to get intravenous fluids on the day of this visit.  To return to see me in 3 weeks.  Tentatively to have scans in early June 2023.    5/5/2023: Feeling progressively stronger. Eating better and slowly regaining weight. Afebrile. No more nausea. No diarrhea and now with regular bowel activity. On exam alert, conversant and well oriented. No pale or jaundice. No oral lesions and no palpable lymph nodes. Lungs clear and abdomen soft. Minimal edema of the right lower extremity. The laboratory exams revealed persistent anemia with a tendency to macrocytosis. Hemoglobin and platelets  within normal ranges. A decision was made to continue to observe and I asked him to see me in approximately 3 months with new scans.     8/7/2023: Feels as well as at the time of the last visit. Active and returned to work full time. Eating well and no nausea or vomiting. No chest pain or cough and no abdominal pain. On exam no changes, though he had lost a large amount of weight since the previous visit. The imaging studies suggested a new lesion in the liver, as well as several lesions in the spleen of unclear cause. Reviewed the images and the report of the scans. Discussed with him at length and explained the findings. Discussed with him the plans for a MRI. He will see me with results.     8/28/2023: Entirely asymptomatic.  Working without limitations.  Eating well.  Weight stable.  Afebrile.  No pain.  On exam no changes.  Laboratory exams were reviewed and discussed with him.  In spite of the fact things on the scans the Carcinoembryonic antigen has not increased.  However both the CT and the MRI suggest one isolated metastatic deposit in segment 8 of the liver.  Discussed with him the options at this time.  I believe a biopsy is essential and after that he would be treated with chemotherapy following which surgery, if no new lesions have appeared, could be considered.  He may still be curable even in the setting of metastatic disease.  Discussed with him at great length.  Explained the steps.  Sent a communication to Dr. Davis, the patient's surgeon.    9/11/2023: Feels about the same as before.  No new symptoms.  As active as before and working.  Eating well and with good appetite.  No unintended weight loss.  Afebrile.  On exam alert, conversant and in good spirits.  No distress.  No jaundice or pallor.  Lungs clear and heart regular.  Abdomen soft.  The liver is not palpable.  No edema.  Laboratory exams were reviewed.  The liver biopsy report confirms metastatic colorectal cancer.  This appears to be  an isolated metastases.  On this basis treatment with chemotherapy followed by surgery is not ordered.  I have asked him to have a PET scan and discussed the study with him.  Discussed the objectives of the treatment and its requirements.  Placed the treatment plan with 5 fluorouracil and irinotecan and discussed with him.  To begin as soon as possible.    9/25/2023: In the office before the next scheduled time.  He called to report that over the weekend he had had between 5 and 8 defecations of liquid stool.  He took loperamide irregularly and it did not seem to provide much relief.  He was able to eat and drink without any difficulties and was never nauseated.  He was seen in the emergency room on 9/24/2023 and he was not found to have any dehydration or other evidence of complications.  They discharged him.  At the time of this visit feeling better.  As of this time he had not had any liquid defecations.  He had continued to eat and drink fluids without any problems.  He had no chest pains and had not had any dyspnea.  He was also free of abdominal pain.  On exam alert, oriented and conversant.  Seemed well-hydrated and was not jaundiced or pale.  Lungs clear.  Heart regular.  Abdomen soft.  A decision was made to continue with the same plan.  I independently reviewed the images of the PET scan and discussed with him.  It reveals only an abnormal lesion in the liver as identified before.  No suggestion of distant metastatic disease.  Discussed with him the significance of this and explained the possibility of surgery and potentially cure.    Subjective:  9/25/2023: Before the next scheduled appointment.  Call to requested to be seen sooner.  Having liquid stools for the last 72 hours.  The regular use of Imodium did not seem to relieve the problem.  Eating well.  Drinking fluids without any difficulties.  No nausea.  Afebrile.  No chest pains or cough.  No abdominal pain.  No dysuria.  No peripheral edema.    The  following portions of the patient's history were reviewed and updated as appropriate: allergies, current medications, past family history, past medical history, past social history, past surgical history and problem list.    Past Medical History:   Diagnosis Date    Anemia     Colon cancer 2022    colon    Diabetes mellitus     Hyperlipidemia     Hypertension      Past Surgical History:   Procedure Laterality Date    APPENDECTOMY      CARDIAC CATHETERIZATION      COLON RESECTION N/A 12/15/2022    Procedure: COLON RESECTION RIGHT;  Surgeon: Percy Davis MD;  Location: [facility] MAIN OR;  Service: General;  Laterality: N/A;    COLONOSCOPY N/A 11/11/2022    Procedure: COLONOSCOPY WITH BIOPSY AND POLYPECTOMY;  Surgeon: Billy Julien MD;  Location: [facility] ENDOSCOPY;  Service: Gastroenterology;  Laterality: N/A;  Impression:  1.  Large 4-5cm fulgurating circumferential ulcerated mass in the very proximal part of the ascending colon next to ileocecal valve multiple biopsies were performed.  This is highly concerning for colon malignancy.  2.  2 polyp rem    ENDOSCOPY N/A 11/11/2022    Procedure: ESOPHAGOGASTRODUODENOSCOPY with biopsy X1;  Surgeon: Billy Julien MD;  Location: [facility] ENDOSCOPY;  Service: Gastroenterology;  Laterality: N/A;  5.  Upper endoscopy lamination unremarkable.       PORTACATH PLACEMENT Right 1/12/2023    Procedure: INSERTION OF PORTACATH;  Surgeon: Percy Davis MD;  Location: [facility] MAIN OR;  Service: General;  Laterality: Right;    TONSILLECTOMY         Current Outpatient Medications:     aspirin 325 MG tablet, Take 1 tablet by mouth Daily., Disp: 30 tablet, Rfl: 0    atorvastatin (LIPITOR) 40 MG tablet, Take 1 tablet by mouth Every Night., Disp: 90 tablet, Rfl: 0    capecitabine (XELODA) 150 MG chemo tablet, Take 5 tablets by mouth (with 1 other capecitabine Rx) for 1,750 mg total 2 (Two) Times a Day on Days 1-14, then off for 7 days. Total dose is 1750mg in the AM & 1750mg  in the PM., Disp: 140 tablet, Rfl: 4    capecitabine (XELODA) 500 MG chemo tablet, Take 2 tablets by mouth (with 1 other capecitabine prescription) for 1,750 mg total 2 (Two) Times a Day on Days 1-14, then off for 7 days.  Total dose is 1750mg in the AM & 1750mg in the PM., Disp: 56 tablet, Rfl: 4    empagliflozin (JARDIANCE) 25 MG tablet tablet, Take 1 tablet by mouth Daily. Dont take preop, Disp: , Rfl:     ferrous gluconate (FERGON) 324 MG tablet, Take 1 tablet by mouth Daily With Breakfast., Disp: 30 tablet, Rfl: 3    glimepiride (AMARYL) 4 MG tablet, Take 1 tablet by mouth 2 (Two) Times a Day. None preop, Disp: , Rfl:     Insulin Glargine (LANTUS SOLOSTAR) 100 UNIT/ML injection pen, Inject 7 Units under the skin into the appropriate area as directed Every Night., Disp: 15 mL, Rfl: 0    Insulin Pen Needle (Pen Needles) 32G X 4 MM misc, 1 each Daily. Dx code: E11.65, Disp: 100 each, Rfl: 2    losartan (COZAAR) 100 MG tablet, , Disp: , Rfl:     metFORMIN (GLUCOPHAGE) 1000 MG tablet, Take 2 tablets by mouth Daily With Dinner. Last dose 12/12, Disp: , Rfl:     ondansetron (ZOFRAN) 8 MG tablet, Take 1 tablet by mouth 3 (Three) Times a Day As Needed for Nausea or Vomiting., Disp: 30 tablet, Rfl: 5    ondansetron (ZOFRAN) 8 MG tablet, Take 1 tablet by mouth 3 (Three) Times a Day As Needed for Nausea or Vomiting., Disp: 30 tablet, Rfl: 5    pioglitazone (ACTOS) 45 MG tablet, Take 1 tablet by mouth Daily. None preop, Disp: , Rfl:     Semaglutide,0.25 or 0.5MG/DOS, (Ozempic, 0.25 or 0.5 MG/DOSE,) 2 MG/1.5ML solution pen-injector, Inject 0.25 mg under the skin into the appropriate area as directed As Needed. friday, Disp: , Rfl:   No current facility-administered medications for this visit.    Allergies   Allergen Reactions    Penicillins Rash     Family History   Problem Relation Age of Onset    Pneumonia Mother 94        SARS-CoV2    Colon cancer Father 62    Heart disease Sister      Cancer-related family history  includes Colon cancer (age of onset: 62) in his father.    Social History     Tobacco Use    Smoking status: Former     Packs/day: 1.00     Years: 2.00     Pack years: 2.00     Types: Cigarettes     Start date:      Quit date:      Years since quittin.7    Smokeless tobacco: Never   Vaping Use    Vaping Use: Never used   Substance Use Topics    Alcohol use: Not Currently    Drug use: Never     Social History     Social History Narrative    Not on file      ROS:     Review of Systems   Constitutional:  Negative for activity change, appetite change, chills, diaphoresis, fatigue, fever and unexpected weight change.   HENT:  Negative for congestion, dental problem, drooling, ear discharge, ear pain, facial swelling, hearing loss, mouth sores, nosebleeds, postnasal drip, rhinorrhea, sinus pressure, sinus pain, sneezing, sore throat, tinnitus, trouble swallowing and voice change.    Eyes:  Negative for photophobia, pain, discharge, redness, itching and visual disturbance.   Respiratory:  Negative for apnea, cough, choking, chest tightness, shortness of breath, wheezing and stridor.    Cardiovascular:  Negative for chest pain, palpitations and leg swelling.   Gastrointestinal:  Negative for abdominal distention, abdominal pain, anal bleeding, blood in stool, constipation, diarrhea, nausea, rectal pain and vomiting.   Endocrine: Negative for cold intolerance, heat intolerance, polydipsia and polyuria.   Genitourinary:  Negative for decreased urine volume, difficulty urinating, dysuria, flank pain, frequency, genital sores, hematuria and urgency.   Musculoskeletal:  Negative for arthralgias, back pain, gait problem, joint swelling, myalgias, neck pain and neck stiffness.   Skin:  Negative for color change, pallor and rash.   Neurological:  Negative for dizziness, tremors, seizures, syncope, facial asymmetry, speech difficulty, weakness, light-headedness, numbness and headaches.   Hematological:  Negative for  adenopathy. Does not bruise/bleed easily.   Psychiatric/Behavioral:  Negative for agitation, behavioral problems, confusion, decreased concentration, hallucinations, self-injury, sleep disturbance and suicidal ideas. The patient is not nervous/anxious.      Objective:    There were no vitals filed for this visit.    There is no height or weight on file to calculate BMI.  ECOG  (0) Fully active, able to carry on all predisease performance without restriction    Physical Exam:     Physical Exam  Constitutional:       General: He is not in acute distress.     Appearance: Normal appearance. He is not ill-appearing, toxic-appearing or diaphoretic.   HENT:      Head: Normocephalic and atraumatic.      Right Ear: External ear normal.      Left Ear: External ear normal.      Nose: Nose normal.      Mouth/Throat:      Mouth: Mucous membranes are moist.      Pharynx: Oropharynx is clear.   Eyes:      General: No scleral icterus.        Right eye: No discharge.         Left eye: No discharge.      Conjunctiva/sclera: Conjunctivae normal.      Pupils: Pupils are equal, round, and reactive to light.   Cardiovascular:      Rate and Rhythm: Normal rate and regular rhythm.      Pulses: Normal pulses.      Heart sounds: Normal heart sounds. No murmur heard.    No friction rub. No gallop.   Pulmonary:      Effort: No respiratory distress.      Breath sounds: No stridor. No wheezing, rhonchi or rales.   Chest:      Chest wall: No tenderness.   Abdominal:      General: Abdomen is flat. Bowel sounds are normal. There is no distension.      Palpations: Abdomen is soft. There is no mass.      Tenderness: There is no abdominal tenderness. There is no right CVA tenderness, left CVA tenderness, guarding or rebound.   Musculoskeletal:         General: No swelling, tenderness, deformity or signs of injury.      Cervical back: No rigidity.      Right lower leg: No edema.      Left lower leg: No edema.   Lymphadenopathy:      Cervical: No  cervical adenopathy.   Skin:     General: Skin is warm and dry.      Coloration: Skin is not jaundiced.      Findings: No bruising or rash.   Neurological:      General: No focal deficit present.      Mental Status: He is alert and oriented to person, place, and time.      Cranial Nerves: No cranial nerve deficit.      Gait: Gait normal.   Psychiatric:         Mood and Affect: Mood normal.         Behavior: Behavior normal.         Thought Content: Thought content normal.         Judgment: Judgment normal.   KATIE Kramer MD performed a physical exam on 9/25/2023 as documented above.    Lab Results - Last 18 Months   Lab Units 09/24/23  1436 09/18/23  0817 09/11/23  0814   WBC 10*3/mm3 3.90 4.12 4.78   HEMOGLOBIN g/dL 13.2 11.9* 12.3*   HEMATOCRIT % 40.6 36.9* 39.2   PLATELETS 10*3/mm3 115* 140 143   MCV fL 86.5 88.7 88.1       Lab Results - Last 18 Months   Lab Units 09/24/23  1436 09/18/23  0817 08/28/23  0822   SODIUM mmol/L 135* 140 136   POTASSIUM mmol/L 4.4 4.0 4.2   CHLORIDE mmol/L 100 106 102   CO2 mmol/L 26.0 28.0 25.0   BUN mg/dL 21 21 16   CREATININE mg/dL 0.97 0.99 0.83   CALCIUM mg/dL 8.9 8.6 9.1   BILIRUBIN mg/dL 0.9 0.4 0.5   ALK PHOS U/L 97 97 111   ALT (SGPT) U/L 16 13 8   AST (SGOT) U/L 15 12 17   GLUCOSE mg/dL 351* 321* 205*       Lab Results   Component Value Date    GLUCOSE 351 (H) 09/24/2023    BUN 21 09/24/2023    CREATININE 0.97 09/24/2023    EGFRIFNONA 76 11/15/2021    EGFRIFAFRI 87 11/15/2021    BCR 21.6 09/24/2023    K 4.4 09/24/2023    CO2 26.0 09/24/2023    CALCIUM 8.9 09/24/2023    ALBUMIN 3.6 09/24/2023    AST 15 09/24/2023    ALT 16 09/24/2023     Lab Results   Component Value Date    IRON 15 (L) 01/06/2023    TIBC 548 (H) 01/06/2023    FERRITIN 23.51 (L) 01/06/2023     Lab Results   Component Value Date    CEA 2.94 08/28/2023     Assessment & Plan     Assessment:  Apparent isolated metastatic lesion in segment 8 of the liver.  Independently reviewed the images of the PET scan and  discussed the results with him.  No suggestion of distant metastatic disease.  He appears to have 1 isolated metastatic deposit in the liver.  The plan of chemotherapy with potential for surgery was discussed again with him.  The possibility of cure was discussed again.  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  Completed Cape-Ox adjuvantly for 3 months in April 2023.  History of ischemic stroke.  No evidence for recurrence.  No intervention at this time.  Discussed at length the use of loperamide.  I have asked him to take 2 tablets with the first liquid stool and then 1 every 2 hours for at least 8 hours after the last liquid defecation.  Explained to him repeatedly.  Reviewed all laboratory exams.  Discussed with him the results.  He is to see me again in approximately 4 weeks.  Continue with the same chemotherapy.    Plan:  As above.    Don Kramer MD on 9/25/2023 at 9:05 AM.

## 2023-10-02 ENCOUNTER — HOSPITAL ENCOUNTER (OUTPATIENT)
Dept: ONCOLOGY | Facility: HOSPITAL | Age: 78
Discharge: HOME | End: 2023-10-02
Admitting: INTERNAL MEDICINE
Payer: OTHER GOVERNMENT

## 2023-10-02 VITALS
HEART RATE: 96 BPM | SYSTOLIC BLOOD PRESSURE: 115 MMHG | OXYGEN SATURATION: 97 % | DIASTOLIC BLOOD PRESSURE: 64 MMHG | WEIGHT: 165.6 LBS | RESPIRATION RATE: 16 BRPM | BODY MASS INDEX: 22.43 KG/M2 | TEMPERATURE: 97.9 F | HEIGHT: 72 IN

## 2023-10-02 DIAGNOSIS — C78.7 METASTASES TO THE LIVER: ICD-10-CM

## 2023-10-02 DIAGNOSIS — C18.2 MALIGNANT NEOPLASM OF ASCENDING COLON: Primary | ICD-10-CM

## 2023-10-02 LAB
ALBUMIN SERPL-MCNC: 3.5 G/DL (ref 3.5–5.2)
ALBUMIN/GLOB SERPL: 1.3 G/DL
ALP SERPL-CCNC: 94 U/L (ref 39–117)
ALT SERPL W P-5'-P-CCNC: 17 U/L (ref 1–41)
ANION GAP SERPL CALCULATED.3IONS-SCNC: 10 MMOL/L (ref 5–15)
AST SERPL-CCNC: 20 U/L (ref 1–40)
BASOPHILS # BLD AUTO: 0.01 10*3/MM3 (ref 0–0.2)
BASOPHILS NFR BLD AUTO: 0.2 % (ref 0–1.5)
BILIRUB SERPL-MCNC: 0.7 MG/DL (ref 0–1.2)
BUN SERPL-MCNC: 14 MG/DL (ref 8–23)
BUN/CREAT SERPL: 15.2 (ref 7–25)
CALCIUM SPEC-SCNC: 8.5 MG/DL (ref 8.6–10.5)
CHLORIDE SERPL-SCNC: 102 MMOL/L (ref 98–107)
CO2 SERPL-SCNC: 26 MMOL/L (ref 22–29)
CREAT SERPL-MCNC: 0.92 MG/DL (ref 0.76–1.27)
DEPRECATED RDW RBC AUTO: 46.1 FL (ref 37–54)
EGFRCR SERPLBLD CKD-EPI 2021: 86.7 ML/MIN/1.73
EOSINOPHIL # BLD AUTO: 0.02 10*3/MM3 (ref 0–0.4)
EOSINOPHIL NFR BLD AUTO: 0.5 % (ref 0.3–6.2)
ERYTHROCYTE [DISTWIDTH] IN BLOOD BY AUTOMATED COUNT: 14.9 % (ref 12.3–15.4)
GLOBULIN UR ELPH-MCNC: 2.6 GM/DL
GLUCOSE SERPL-MCNC: 160 MG/DL (ref 65–99)
HCT VFR BLD AUTO: 35.9 % (ref 37.5–51)
HGB BLD-MCNC: 11.6 G/DL (ref 13–17.7)
LYMPHOCYTES # BLD AUTO: 0.64 10*3/MM3 (ref 0.7–3.1)
LYMPHOCYTES NFR BLD AUTO: 14.9 % (ref 19.6–45.3)
MAGNESIUM SERPL-MCNC: 1.6 MG/DL (ref 1.6–2.4)
MCH RBC QN AUTO: 28.3 PG (ref 26.6–33)
MCHC RBC AUTO-ENTMCNC: 32.3 G/DL (ref 31.5–35.7)
MCV RBC AUTO: 87.6 FL (ref 79–97)
MONOCYTES # BLD AUTO: 0.68 10*3/MM3 (ref 0.1–0.9)
MONOCYTES NFR BLD AUTO: 15.8 % (ref 5–12)
NEUTROPHILS NFR BLD AUTO: 2.95 10*3/MM3 (ref 1.7–7)
NEUTROPHILS NFR BLD AUTO: 68.6 % (ref 42.7–76)
PLATELET # BLD AUTO: 121 10*3/MM3 (ref 140–450)
PMV BLD AUTO: 9.2 FL (ref 6–12)
POTASSIUM SERPL-SCNC: 3.5 MMOL/L (ref 3.5–5.2)
PROT SERPL-MCNC: 6.1 G/DL (ref 6–8.5)
RBC # BLD AUTO: 4.1 10*6/MM3 (ref 4.14–5.8)
SODIUM SERPL-SCNC: 138 MMOL/L (ref 136–145)
WBC NRBC COR # BLD: 4.3 10*3/MM3 (ref 3.4–10.8)

## 2023-10-02 PROCEDURE — 96413 CHEMO IV INFUSION 1 HR: CPT

## 2023-10-02 PROCEDURE — 96411 CHEMO IV PUSH ADDL DRUG: CPT

## 2023-10-02 PROCEDURE — 96415 CHEMO IV INFUSION ADDL HR: CPT

## 2023-10-02 PROCEDURE — 25010000002 DEXAMETHASONE SODIUM PHOSPHATE 120 MG/30ML SOLUTION: Performed by: INTERNAL MEDICINE

## 2023-10-02 PROCEDURE — 96416 CHEMO PROLONG INFUSE W/PUMP: CPT

## 2023-10-02 PROCEDURE — G0498 CHEMO EXTEND IV INFUS W/PUMP: HCPCS

## 2023-10-02 PROCEDURE — 83735 ASSAY OF MAGNESIUM: CPT | Performed by: INTERNAL MEDICINE

## 2023-10-02 PROCEDURE — 25010000002 IRINOTECAN PER 20 MG: Performed by: INTERNAL MEDICINE

## 2023-10-02 PROCEDURE — 96367 TX/PROPH/DG ADDL SEQ IV INF: CPT

## 2023-10-02 PROCEDURE — 96368 THER/DIAG CONCURRENT INF: CPT

## 2023-10-02 PROCEDURE — 96375 TX/PRO/DX INJ NEW DRUG ADDON: CPT

## 2023-10-02 PROCEDURE — 25010000002 PALONOSETRON 0.25 MG/5ML SOLUTION PREFILLED SYRINGE: Performed by: INTERNAL MEDICINE

## 2023-10-02 PROCEDURE — 25010000002 LEUCOVORIN 500 MG RECONSTITUTED SOLUTION 1 EACH VIAL: Performed by: INTERNAL MEDICINE

## 2023-10-02 PROCEDURE — 25010000002 ATROPINE SULFATE 0.4 MG/ML SOLUTION 1 ML VIAL: Performed by: INTERNAL MEDICINE

## 2023-10-02 PROCEDURE — 25010000002 FLUOROURACIL PER 500 MG: Performed by: INTERNAL MEDICINE

## 2023-10-02 PROCEDURE — 80053 COMPREHEN METABOLIC PANEL: CPT | Performed by: INTERNAL MEDICINE

## 2023-10-02 PROCEDURE — 85025 COMPLETE CBC W/AUTO DIFF WBC: CPT | Performed by: INTERNAL MEDICINE

## 2023-10-02 RX ORDER — SODIUM CHLORIDE 9 MG/ML
250 INJECTION, SOLUTION INTRAVENOUS ONCE
Status: CANCELLED | OUTPATIENT
Start: 2023-10-02

## 2023-10-02 RX ORDER — PALONOSETRON 0.05 MG/ML
0.25 INJECTION, SOLUTION INTRAVENOUS ONCE
Status: COMPLETED | OUTPATIENT
Start: 2023-10-02 | End: 2023-10-02

## 2023-10-02 RX ORDER — ATROPINE SULFATE 1 MG/ML
0.25 INJECTION, SOLUTION INTRAMUSCULAR; INTRAVENOUS; SUBCUTANEOUS
Status: CANCELLED | OUTPATIENT
Start: 2023-10-02

## 2023-10-02 RX ORDER — FLUOROURACIL 50 MG/ML
400 INJECTION, SOLUTION INTRAVENOUS ONCE
Status: CANCELLED | OUTPATIENT
Start: 2023-10-02

## 2023-10-02 RX ORDER — SODIUM CHLORIDE 9 MG/ML
250 INJECTION, SOLUTION INTRAVENOUS ONCE
Status: COMPLETED | OUTPATIENT
Start: 2023-10-02 | End: 2023-10-02

## 2023-10-02 RX ORDER — ATROPINE SULFATE 1 MG/ML
0.25 INJECTION, SOLUTION INTRAMUSCULAR; INTRAVENOUS; SUBCUTANEOUS
Status: DISCONTINUED | OUTPATIENT
Start: 2023-10-02 | End: 2023-10-02

## 2023-10-02 RX ORDER — PALONOSETRON 0.05 MG/ML
0.25 INJECTION, SOLUTION INTRAVENOUS ONCE
Status: CANCELLED | OUTPATIENT
Start: 2023-10-02

## 2023-10-02 RX ORDER — FLUOROURACIL 50 MG/ML
400 INJECTION, SOLUTION INTRAVENOUS ONCE
Status: COMPLETED | OUTPATIENT
Start: 2023-10-02 | End: 2023-10-02

## 2023-10-02 RX ADMIN — FLUOROURACIL 810 MG: 50 INJECTION, SOLUTION INTRAVENOUS at 12:07

## 2023-10-02 RX ADMIN — LEUCOVORIN CALCIUM 810 MG: 500 INJECTION, POWDER, LYOPHILIZED, FOR SOLUTION INTRAMUSCULAR; INTRAVENOUS at 10:21

## 2023-10-02 RX ADMIN — IRINOTECAN HYDROCHLORIDE 365 MG: 20 INJECTION, SOLUTION INTRAVENOUS at 10:22

## 2023-10-02 RX ADMIN — SODIUM CHLORIDE 250 ML: 9 INJECTION, SOLUTION INTRAVENOUS at 08:49

## 2023-10-02 RX ADMIN — PALONOSETRON 0.25 MG: 0.25 INJECTION, SOLUTION INTRAVENOUS at 08:51

## 2023-10-02 RX ADMIN — DEXAMETHASONE SODIUM PHOSPHATE 12 MG: 4 INJECTION, SOLUTION INTRA-ARTICULAR; INTRALESIONAL; INTRAMUSCULAR; INTRAVENOUS; SOFT TISSUE at 08:54

## 2023-10-02 RX ADMIN — FLUOROURACIL 4870 MG: 50 INJECTION, SOLUTION INTRAVENOUS at 12:12

## 2023-10-02 NOTE — PATIENT INSTRUCTIONS
Ensure taking Immodium, 2 tablets after first loose stool and then one tablet every 2 hours after each loose stool      Use Aquaphor to rash on face

## 2023-10-02 NOTE — PROGRESS NOTES
Pt here for C2D1 Vectibix/Folfiri. He has had a 13 pound weight loss since starting C1. He has developed Vectibix rash to face and denies itching/pain. Pt reports developed rash 2 days ago. Pt reports he has had ongoing diarrhea/loose stools since C1, he went to [facility] ER after C1 due to diarrhea. Pt reports he is having 2-3 stools a day and reports he is taking immodium, he is inquiring is there is something more he can take to control diarrhea and to help with Vectibix rash. He denies nausea/vomiting and reports he has been eating, reviewed above and  CBC results with Dr Kramer and new order noted to hold Vectibix with this cycle,   discussed that pt reports taking Immodium 2 tablets after first stool and one every 2 hours after loose stool,  pt reports taking 4-6 tablets total daily,  reviewed with Dr Kramer.  Instructed pt to take Immodium 2 tablets after first stool and one every 2 hours after loose stool up to 8 tablets/day,  reviewed with pt and provided written instruction as well.  Instructed pt to use aquaphor to face,  provided pt sample aquaphor,  pt v/u to all above and agreement with plan.        Pt inquiring if ok to receive COVID booster,  reviewed with Dr Kramer and pt ok to proceed with COVID vaccine,  reviewed with pt and v/u

## 2023-10-04 ENCOUNTER — HOSPITAL ENCOUNTER (OUTPATIENT)
Dept: ONCOLOGY | Facility: HOSPITAL | Age: 78
Discharge: HOME | End: 2023-10-04
Admitting: INTERNAL MEDICINE
Payer: OTHER GOVERNMENT

## 2023-10-04 VITALS
DIASTOLIC BLOOD PRESSURE: 70 MMHG | BODY MASS INDEX: 22.96 KG/M2 | WEIGHT: 169.3 LBS | HEART RATE: 78 BPM | TEMPERATURE: 97.5 F | SYSTOLIC BLOOD PRESSURE: 107 MMHG

## 2023-10-04 DIAGNOSIS — C18.2 MALIGNANT NEOPLASM OF ASCENDING COLON: ICD-10-CM

## 2023-10-04 DIAGNOSIS — C78.7 METASTASES TO THE LIVER: Primary | ICD-10-CM

## 2023-10-04 PROCEDURE — 25010000002 HEPARIN LOCK FLUSH PER 10 UNITS: Performed by: INTERNAL MEDICINE

## 2023-10-04 RX ORDER — SODIUM CHLORIDE 0.9 % (FLUSH) 0.9 %
20 SYRINGE (ML) INJECTION AS NEEDED
OUTPATIENT
Start: 2023-10-04

## 2023-10-04 RX ORDER — HEPARIN SODIUM (PORCINE) LOCK FLUSH IV SOLN 100 UNIT/ML 100 UNIT/ML
500 SOLUTION INTRAVENOUS AS NEEDED
OUTPATIENT
Start: 2023-10-04

## 2023-10-04 RX ORDER — HEPARIN SODIUM (PORCINE) LOCK FLUSH IV SOLN 100 UNIT/ML 100 UNIT/ML
500 SOLUTION INTRAVENOUS AS NEEDED
Status: DISCONTINUED | OUTPATIENT
Start: 2023-10-04 | End: 2023-10-05 | Stop reason: HOSPADM

## 2023-10-04 RX ORDER — SODIUM CHLORIDE 0.9 % (FLUSH) 0.9 %
20 SYRINGE (ML) INJECTION AS NEEDED
Status: DISCONTINUED | OUTPATIENT
Start: 2023-10-04 | End: 2023-10-05 | Stop reason: HOSPADM

## 2023-10-04 RX ADMIN — HEPARIN 500 UNITS: 100 SYRINGE at 10:03

## 2023-10-04 RX ADMIN — Medication 20 ML: at 10:03

## 2023-10-04 NOTE — PROGRESS NOTES
Pt here for CIV DC, pump empty with blood return noted,  pt reports feeling ok and states had one episode of diarrhea and took immodium and resolved symptom, pt instructed to cont with prn immodium, pt v/u .  Pt discharged and has current appts.

## 2023-10-10 ENCOUNTER — HOSPITAL ENCOUNTER (OUTPATIENT)
Dept: ONCOLOGY | Facility: HOSPITAL | Age: 78
Discharge: HOME | End: 2023-10-10
Admitting: INTERNAL MEDICINE
Payer: OTHER GOVERNMENT

## 2023-10-10 ENCOUNTER — TELEPHONE (OUTPATIENT)
Dept: ONCOLOGY | Facility: CLINIC | Age: 78
End: 2023-10-10
Payer: OTHER GOVERNMENT

## 2023-10-10 VITALS — DIASTOLIC BLOOD PRESSURE: 74 MMHG | SYSTOLIC BLOOD PRESSURE: 120 MMHG | HEART RATE: 81 BPM

## 2023-10-10 DIAGNOSIS — C18.2 MALIGNANT NEOPLASM OF ASCENDING COLON: Primary | ICD-10-CM

## 2023-10-10 DIAGNOSIS — R19.7 DIARRHEA, UNSPECIFIED TYPE: Primary | ICD-10-CM

## 2023-10-10 LAB
ALBUMIN SERPL-MCNC: 3.2 G/DL (ref 3.5–5.2)
ALBUMIN/GLOB SERPL: 1 G/DL
ALP SERPL-CCNC: 75 U/L (ref 39–117)
ALT SERPL W P-5'-P-CCNC: 15 U/L (ref 1–41)
ANION GAP SERPL CALCULATED.3IONS-SCNC: 10 MMOL/L (ref 5–15)
AST SERPL-CCNC: 11 U/L (ref 1–40)
BASOPHILS # BLD AUTO: 0.01 10*3/MM3 (ref 0–0.2)
BASOPHILS NFR BLD AUTO: 0.8 % (ref 0–1.5)
BILIRUB SERPL-MCNC: 0.6 MG/DL (ref 0–1.2)
BUN SERPL-MCNC: 27 MG/DL (ref 8–23)
BUN/CREAT SERPL: 23.7 (ref 7–25)
CALCIUM SPEC-SCNC: 8.4 MG/DL (ref 8.6–10.5)
CHLORIDE SERPL-SCNC: 100 MMOL/L (ref 98–107)
CO2 SERPL-SCNC: 25 MMOL/L (ref 22–29)
CREAT SERPL-MCNC: 1.14 MG/DL (ref 0.76–1.27)
DEPRECATED RDW RBC AUTO: 42.8 FL (ref 37–54)
EGFRCR SERPLBLD CKD-EPI 2021: 67.1 ML/MIN/1.73
EOSINOPHIL # BLD AUTO: 0.02 10*3/MM3 (ref 0–0.4)
EOSINOPHIL NFR BLD AUTO: 1.7 % (ref 0.3–6.2)
ERYTHROCYTE [DISTWIDTH] IN BLOOD BY AUTOMATED COUNT: 13.7 % (ref 12.3–15.4)
GLOBULIN UR ELPH-MCNC: 3.1 GM/DL
GLUCOSE BLDC GLUCOMTR-MCNC: 325 MG/DL (ref 70–105)
GLUCOSE SERPL-MCNC: 352 MG/DL (ref 65–99)
HCT VFR BLD AUTO: 35.1 % (ref 37.5–51)
HGB BLD-MCNC: 11.4 G/DL (ref 13–17.7)
LYMPHOCYTES # BLD AUTO: 0.55 10*3/MM3 (ref 0.7–3.1)
LYMPHOCYTES NFR BLD AUTO: 46.6 % (ref 19.6–45.3)
MAGNESIUM SERPL-MCNC: 1.7 MG/DL (ref 1.6–2.4)
MCH RBC QN AUTO: 28.5 PG (ref 26.6–33)
MCHC RBC AUTO-ENTMCNC: 32.5 G/DL (ref 31.5–35.7)
MCV RBC AUTO: 87.8 FL (ref 79–97)
MONOCYTES # BLD AUTO: 0.11 10*3/MM3 (ref 0.1–0.9)
MONOCYTES NFR BLD AUTO: 9.3 % (ref 5–12)
NEUTROPHILS NFR BLD AUTO: 0.49 10*3/MM3 (ref 1.7–7)
NEUTROPHILS NFR BLD AUTO: 41.6 % (ref 42.7–76)
PLATELET # BLD AUTO: 94 10*3/MM3 (ref 140–450)
PMV BLD AUTO: 9.4 FL (ref 6–12)
POTASSIUM SERPL-SCNC: 3.8 MMOL/L (ref 3.5–5.2)
PROT SERPL-MCNC: 6.3 G/DL (ref 6–8.5)
RBC # BLD AUTO: 4 10*6/MM3 (ref 4.14–5.8)
SODIUM SERPL-SCNC: 135 MMOL/L (ref 136–145)
WBC NRBC COR # BLD: 1.18 10*3/MM3 (ref 3.4–10.8)

## 2023-10-10 PROCEDURE — 25010000002 HEPARIN LOCK FLUSH PER 10 UNITS: Performed by: INTERNAL MEDICINE

## 2023-10-10 PROCEDURE — 85025 COMPLETE CBC W/AUTO DIFF WBC: CPT | Performed by: INTERNAL MEDICINE

## 2023-10-10 PROCEDURE — 96360 HYDRATION IV INFUSION INIT: CPT

## 2023-10-10 PROCEDURE — 36591 DRAW BLOOD OFF VENOUS DEVICE: CPT

## 2023-10-10 PROCEDURE — 82948 REAGENT STRIP/BLOOD GLUCOSE: CPT

## 2023-10-10 PROCEDURE — 83735 ASSAY OF MAGNESIUM: CPT | Performed by: INTERNAL MEDICINE

## 2023-10-10 PROCEDURE — 80053 COMPREHEN METABOLIC PANEL: CPT | Performed by: INTERNAL MEDICINE

## 2023-10-10 PROCEDURE — 25810000003 SODIUM CHLORIDE 0.9 % SOLUTION: Performed by: INTERNAL MEDICINE

## 2023-10-10 RX ORDER — HEPARIN SODIUM (PORCINE) LOCK FLUSH IV SOLN 100 UNIT/ML 100 UNIT/ML
500 SOLUTION INTRAVENOUS AS NEEDED
Status: CANCELLED | OUTPATIENT
Start: 2023-10-10

## 2023-10-10 RX ORDER — HEPARIN SODIUM (PORCINE) LOCK FLUSH IV SOLN 100 UNIT/ML 100 UNIT/ML
500 SOLUTION INTRAVENOUS AS NEEDED
Status: DISCONTINUED | OUTPATIENT
Start: 2023-10-10 | End: 2023-10-11 | Stop reason: HOSPADM

## 2023-10-10 RX ORDER — SODIUM CHLORIDE 0.9 % (FLUSH) 0.9 %
20 SYRINGE (ML) INJECTION AS NEEDED
Status: CANCELLED | OUTPATIENT
Start: 2023-10-10

## 2023-10-10 RX ORDER — SODIUM CHLORIDE 0.9 % (FLUSH) 0.9 %
20 SYRINGE (ML) INJECTION AS NEEDED
Status: DISCONTINUED | OUTPATIENT
Start: 2023-10-10 | End: 2023-10-11 | Stop reason: HOSPADM

## 2023-10-10 RX ADMIN — HEPARIN 500 UNITS: 100 SYRINGE at 14:51

## 2023-10-10 RX ADMIN — Medication 20 ML: at 14:51

## 2023-10-10 RX ADMIN — SODIUM CHLORIDE 1000 ML: 9 INJECTION, SOLUTION INTRAVENOUS at 13:03

## 2023-10-10 NOTE — TELEPHONE ENCOUNTER
RECEIVED A PHONE CALL FROM THE PATIENT REGARDING CURRENT SYMPTOMS HE IS EXPERIENCING. PATIENT STATED HE HAS NOT ATE ANYTHING IN 2 DAYS AND IS EXPERIENCING DIARRHEA FOR THE PAST 2 DAYS AS WELL. HE STATED ALL HE WANTS TO DO IS SLEEP. DENIES HAVING FEVERS. HE STATED DR. LEBRON WANTED HIM TO RECEIVE FLUIDS LAST WEEK BUT HE DID NOT THINK THAT HE NEEDED THEM BUT NOW HE BELIEVES THAT HE DOES DUE TO NOT EATING OR DRINKING AND HAVING DIARRHEA. I INFORMED HIM THAT I WILL SPEAK WITH ANEUDY DIANE THEN I WILL CONTACT HIM BACK TO INFORM HIM OF WHAT SHE ADVISES. HE CONFIRMED.

## 2023-10-10 NOTE — PROGRESS NOTES
Patient was an add-on appointment for labs and IVF for poor intake, diarrhea and weakness.  Port accessed by Beatriz Leal RN with sterile technique and positive blood return noted.  Blood drawn from port.  10 cc blood wasted prior to specimen collection.  Specimens sent to lab for processing. Patient has been having frequent episodes of diarrhea that is not resolved with Imodium and has not been able to eat due to lack of appetite. Per JULIANNE Dyson-patient is to have stool studies; patient was educated and given supplies and will bring sample tomorrow when he returns for IVF. Patient was educated and given written instructions to take Imodium 2 tablets every 6 hours while having diarrhea. Neutropenic education was given and attached to AVS. The patient asked for a glucose check because he is diabetic and did not check his sugar today. His blood glucose is 352. He takes multiple medications, oral and SQ for diabetic control. Patient was instructed to keep checking his blood sugar as usual and if his levels stay above 200 consistently he is to contact his PCP or endocrinologist for medication review; patient verbalized understanding. Patient will return to infusion on 10/11/23 for IVF and for nurse re-evaluation per JULIANNE Dyson. Patient stated he felt better after the IVF. Port was flushed and de accessed and patient discharged home with AVS.

## 2023-10-10 NOTE — TELEPHONE ENCOUNTER
AFTER SPEAKING WITH ANEUDY DIANE I CONTACTED MR. SANCHEZ. I ASKED MR. SANCHEZ IF HE HAS BEEN TAKING ANY MEDICATION TO HELP WITH THE DIARRHEA. PATIENT STATED IMMODIUM BUT IT HAS NOT BEEN HELPING. I INFORMED HIM THAT PER ANEUDY DIANE WOULD LIKE FOR HIM TO COME INTO THE OFFICE TO HAVE LABS DRAWN AND RECEIVE FLUIDS. I ASKED IF HE WOULD BE ABLE TO COME INTO THE OFFICE AT 1445; PATIENT CONFIRMED. PATIENT SCHEDULED.     CBC, CMP, MAGNESIUM, 1 L NS OVER 1 HR PER ANEUDY DIANE.

## 2023-10-11 ENCOUNTER — HOSPITAL ENCOUNTER (OUTPATIENT)
Dept: ONCOLOGY | Facility: HOSPITAL | Age: 78
Discharge: HOME | End: 2023-10-11
Admitting: NURSE PRACTITIONER
Payer: OTHER GOVERNMENT

## 2023-10-11 VITALS — DIASTOLIC BLOOD PRESSURE: 72 MMHG | HEART RATE: 70 BPM | SYSTOLIC BLOOD PRESSURE: 132 MMHG

## 2023-10-11 DIAGNOSIS — C18.2 MALIGNANT NEOPLASM OF ASCENDING COLON: Primary | ICD-10-CM

## 2023-10-11 PROCEDURE — 25010000002 HEPARIN LOCK FLUSH PER 10 UNITS: Performed by: INTERNAL MEDICINE

## 2023-10-11 PROCEDURE — 25810000003 SODIUM CHLORIDE 0.9 % SOLUTION: Performed by: NURSE PRACTITIONER

## 2023-10-11 PROCEDURE — 96360 HYDRATION IV INFUSION INIT: CPT

## 2023-10-11 RX ORDER — SODIUM CHLORIDE 0.9 % (FLUSH) 0.9 %
20 SYRINGE (ML) INJECTION AS NEEDED
Status: DISCONTINUED | OUTPATIENT
Start: 2023-10-11 | End: 2023-10-12 | Stop reason: HOSPADM

## 2023-10-11 RX ORDER — SODIUM CHLORIDE 0.9 % (FLUSH) 0.9 %
20 SYRINGE (ML) INJECTION AS NEEDED
Status: CANCELLED | OUTPATIENT
Start: 2023-10-11

## 2023-10-11 RX ORDER — HEPARIN SODIUM (PORCINE) LOCK FLUSH IV SOLN 100 UNIT/ML 100 UNIT/ML
500 SOLUTION INTRAVENOUS AS NEEDED
Status: DISCONTINUED | OUTPATIENT
Start: 2023-10-11 | End: 2023-10-12 | Stop reason: HOSPADM

## 2023-10-11 RX ORDER — HEPARIN SODIUM (PORCINE) LOCK FLUSH IV SOLN 100 UNIT/ML 100 UNIT/ML
500 SOLUTION INTRAVENOUS AS NEEDED
Status: CANCELLED | OUTPATIENT
Start: 2023-10-11

## 2023-10-11 RX ADMIN — HEPARIN 500 UNITS: 100 SYRINGE at 15:03

## 2023-10-11 RX ADMIN — Medication 20 ML: at 15:03

## 2023-10-11 RX ADMIN — SODIUM CHLORIDE 500 ML: 900 INJECTION, SOLUTION INTRAVENOUS at 13:35

## 2023-10-11 NOTE — PROGRESS NOTES
Pt here to receive more fluids per Karis AMADO. Pt was here yesterday for fluids as well d/t diarrhea. Pt reports taking the Imodium as instructed and didn't have any diarrhea last night or today. Pt states he is feeling better and was able to eat dinner last night as well as eat meals today. Pt did have a bowel movement after his 500mL IV fluids and states it's a lot better than what it was. Pt instructed to monitor his diarrhea and to start imodium again if the diarrhea returns. Pt's port had blood return and was flushed and heparin locked before pt was discharged. Pt given an AVS and was discharged.

## 2023-10-13 DIAGNOSIS — C18.2 MALIGNANT NEOPLASM OF ASCENDING COLON: Primary | ICD-10-CM

## 2023-10-13 DIAGNOSIS — C78.7 METASTASES TO THE LIVER: ICD-10-CM

## 2023-10-13 RX ORDER — SODIUM CHLORIDE 9 MG/ML
250 INJECTION, SOLUTION INTRAVENOUS ONCE
OUTPATIENT
Start: 2023-10-23

## 2023-10-13 RX ORDER — ATROPINE SULFATE 1 MG/ML
0.25 INJECTION, SOLUTION INTRAMUSCULAR; INTRAVENOUS; SUBCUTANEOUS
OUTPATIENT
Start: 2023-10-23

## 2023-10-13 RX ORDER — FLUOROURACIL 50 MG/ML
400 INJECTION, SOLUTION INTRAVENOUS ONCE
OUTPATIENT
Start: 2023-10-23

## 2023-10-13 RX ORDER — PALONOSETRON 0.05 MG/ML
0.25 INJECTION, SOLUTION INTRAVENOUS ONCE
OUTPATIENT
Start: 2023-10-23

## 2023-10-16 ENCOUNTER — HOSPITAL ENCOUNTER (OUTPATIENT)
Dept: ONCOLOGY | Facility: HOSPITAL | Age: 78
Discharge: HOME | End: 2023-10-16
Admitting: STUDENT IN AN ORGANIZED HEALTH CARE EDUCATION/TRAINING PROGRAM
Payer: OTHER GOVERNMENT

## 2023-10-16 VITALS
WEIGHT: 169 LBS | DIASTOLIC BLOOD PRESSURE: 83 MMHG | HEART RATE: 70 BPM | TEMPERATURE: 98.1 F | BODY MASS INDEX: 22.89 KG/M2 | OXYGEN SATURATION: 93 % | HEIGHT: 72 IN | SYSTOLIC BLOOD PRESSURE: 149 MMHG | RESPIRATION RATE: 16 BRPM

## 2023-10-16 DIAGNOSIS — R19.7 DIARRHEA, UNSPECIFIED TYPE: ICD-10-CM

## 2023-10-16 DIAGNOSIS — C78.7 METASTASES TO THE LIVER: Primary | ICD-10-CM

## 2023-10-16 DIAGNOSIS — C18.2 MALIGNANT NEOPLASM OF ASCENDING COLON: ICD-10-CM

## 2023-10-16 LAB
ALBUMIN SERPL-MCNC: 3 G/DL (ref 3.5–5.2)
ALBUMIN/GLOB SERPL: 1.2 G/DL
ALP SERPL-CCNC: 66 U/L (ref 39–117)
ALT SERPL W P-5'-P-CCNC: 12 U/L (ref 1–41)
ANION GAP SERPL CALCULATED.3IONS-SCNC: 9 MMOL/L (ref 5–15)
AST SERPL-CCNC: 13 U/L (ref 1–40)
BASOPHILS # BLD AUTO: 0.01 10*3/MM3 (ref 0–0.2)
BASOPHILS NFR BLD AUTO: 0.5 % (ref 0–1.5)
BILIRUB SERPL-MCNC: 0.3 MG/DL (ref 0–1.2)
BUN SERPL-MCNC: 10 MG/DL (ref 8–23)
BUN/CREAT SERPL: 12.5 (ref 7–25)
C DIFF GDH + TOXINS A+B STL QL IA.RAPID: NEGATIVE
C DIFF GDH + TOXINS A+B STL QL IA.RAPID: NEGATIVE
CALCIUM SPEC-SCNC: 8.3 MG/DL (ref 8.6–10.5)
CHLORIDE SERPL-SCNC: 105 MMOL/L (ref 98–107)
CO2 SERPL-SCNC: 28 MMOL/L (ref 22–29)
CREAT SERPL-MCNC: 0.8 MG/DL (ref 0.76–1.27)
DEPRECATED RDW RBC AUTO: 43.7 FL (ref 37–54)
EGFRCR SERPLBLD CKD-EPI 2021: 92.3 ML/MIN/1.73
EOSINOPHIL # BLD AUTO: 0.03 10*3/MM3 (ref 0–0.4)
EOSINOPHIL NFR BLD AUTO: 1.6 % (ref 0.3–6.2)
ERYTHROCYTE [DISTWIDTH] IN BLOOD BY AUTOMATED COUNT: 14.4 % (ref 12.3–15.4)
GLOBULIN UR ELPH-MCNC: 2.5 GM/DL
GLUCOSE SERPL-MCNC: 197 MG/DL (ref 65–99)
HCT VFR BLD AUTO: 31.1 % (ref 37.5–51)
HGB BLD-MCNC: 10.4 G/DL (ref 13–17.7)
LACTOFERRIN STL QL LA: POSITIVE
LYMPHOCYTES # BLD AUTO: 0.83 10*3/MM3 (ref 0.7–3.1)
LYMPHOCYTES NFR BLD AUTO: 43.7 % (ref 19.6–45.3)
MAGNESIUM SERPL-MCNC: 1.6 MG/DL (ref 1.6–2.4)
MCH RBC QN AUTO: 29.5 PG (ref 26.6–33)
MCHC RBC AUTO-ENTMCNC: 33.4 G/DL (ref 31.5–35.7)
MCV RBC AUTO: 88.1 FL (ref 79–97)
MONOCYTES # BLD AUTO: 0.37 10*3/MM3 (ref 0.1–0.9)
MONOCYTES NFR BLD AUTO: 19.5 % (ref 5–12)
NEUTROPHILS NFR BLD AUTO: 0.66 10*3/MM3 (ref 1.7–7)
NEUTROPHILS NFR BLD AUTO: 34.7 % (ref 42.7–76)
PLATELET # BLD AUTO: 139 10*3/MM3 (ref 140–450)
PMV BLD AUTO: 8.9 FL (ref 6–12)
POTASSIUM SERPL-SCNC: 3.3 MMOL/L (ref 3.5–5.2)
PROT SERPL-MCNC: 5.5 G/DL (ref 6–8.5)
RBC # BLD AUTO: 3.53 10*6/MM3 (ref 4.14–5.8)
SODIUM SERPL-SCNC: 142 MMOL/L (ref 136–145)
WBC NRBC COR # BLD: 1.9 10*3/MM3 (ref 3.4–10.8)

## 2023-10-16 PROCEDURE — 87324 CLOSTRIDIUM AG IA: CPT | Performed by: NURSE PRACTITIONER

## 2023-10-16 PROCEDURE — 36591 DRAW BLOOD OFF VENOUS DEVICE: CPT

## 2023-10-16 PROCEDURE — 25010000002 HEPARIN LOCK FLUSH PER 10 UNITS: Performed by: INTERNAL MEDICINE

## 2023-10-16 PROCEDURE — 83735 ASSAY OF MAGNESIUM: CPT | Performed by: STUDENT IN AN ORGANIZED HEALTH CARE EDUCATION/TRAINING PROGRAM

## 2023-10-16 PROCEDURE — 83630 LACTOFERRIN FECAL (QUAL): CPT | Performed by: NURSE PRACTITIONER

## 2023-10-16 PROCEDURE — 85025 COMPLETE CBC W/AUTO DIFF WBC: CPT | Performed by: STUDENT IN AN ORGANIZED HEALTH CARE EDUCATION/TRAINING PROGRAM

## 2023-10-16 PROCEDURE — 80053 COMPREHEN METABOLIC PANEL: CPT | Performed by: STUDENT IN AN ORGANIZED HEALTH CARE EDUCATION/TRAINING PROGRAM

## 2023-10-16 PROCEDURE — 87449 NOS EACH ORGANISM AG IA: CPT | Performed by: NURSE PRACTITIONER

## 2023-10-16 RX ORDER — HEPARIN SODIUM (PORCINE) LOCK FLUSH IV SOLN 100 UNIT/ML 100 UNIT/ML
500 SOLUTION INTRAVENOUS AS NEEDED
Status: DISCONTINUED | OUTPATIENT
Start: 2023-10-16 | End: 2023-10-17 | Stop reason: HOSPADM

## 2023-10-16 RX ORDER — SODIUM CHLORIDE 0.9 % (FLUSH) 0.9 %
20 SYRINGE (ML) INJECTION AS NEEDED
Status: DISCONTINUED | OUTPATIENT
Start: 2023-10-16 | End: 2023-10-17 | Stop reason: HOSPADM

## 2023-10-16 RX ORDER — SODIUM CHLORIDE 0.9 % (FLUSH) 0.9 %
20 SYRINGE (ML) INJECTION AS NEEDED
OUTPATIENT
Start: 2023-10-16

## 2023-10-16 RX ORDER — HEPARIN SODIUM (PORCINE) LOCK FLUSH IV SOLN 100 UNIT/ML 100 UNIT/ML
500 SOLUTION INTRAVENOUS AS NEEDED
OUTPATIENT
Start: 2023-10-16

## 2023-10-16 RX ADMIN — Medication 10 ML: at 08:55

## 2023-10-16 RX ADMIN — HEPARIN 500 UNITS: 100 SYRINGE at 08:55

## 2023-10-16 NOTE — ADDENDUM NOTE
Encounter addended by: Nikki Maher on: 10/16/2023 11:15 AM   Actions taken: Order list changed, Diagnosis association updated

## 2023-10-18 ENCOUNTER — TELEPHONE (OUTPATIENT)
Dept: ONCOLOGY | Facility: CLINIC | Age: 78
End: 2023-10-18
Payer: OTHER GOVERNMENT

## 2023-10-18 NOTE — TELEPHONE ENCOUNTER
Called and s/w Pt and moved INF [facility] around due to Pt being out of town.

## 2023-10-18 NOTE — TELEPHONE ENCOUNTER
"    Caller: Vlad Guerrero \"Fredi\"    Relationship to patient: Self    Best call back number: [phone]    Chief complaint: R/S    Type of visit: INFUSION, CHEMO UNHOOK AND F/U    Requested date: FIRST WEEK OF NOVEMBER    If rescheduling, when is the original appointment: 10-23, 10-25 AND 10-30    Additional notes:PT GOING OUT OF TOWN        "

## 2023-10-30 ENCOUNTER — HOSPITAL ENCOUNTER (OUTPATIENT)
Dept: ONCOLOGY | Facility: HOSPITAL | Age: 78
Discharge: HOME | End: 2023-10-30
Admitting: INTERNAL MEDICINE
Payer: OTHER GOVERNMENT

## 2023-10-30 VITALS
RESPIRATION RATE: 18 BRPM | SYSTOLIC BLOOD PRESSURE: 136 MMHG | HEART RATE: 63 BPM | WEIGHT: 168 LBS | HEIGHT: 72 IN | DIASTOLIC BLOOD PRESSURE: 73 MMHG | OXYGEN SATURATION: 98 % | BODY MASS INDEX: 22.75 KG/M2 | TEMPERATURE: 97.4 F

## 2023-10-30 DIAGNOSIS — C18.2 MALIGNANT NEOPLASM OF ASCENDING COLON: ICD-10-CM

## 2023-10-30 DIAGNOSIS — C78.7 METASTASES TO THE LIVER: Primary | ICD-10-CM

## 2023-10-30 LAB
ALBUMIN SERPL-MCNC: 3.4 G/DL (ref 3.5–5.2)
ALBUMIN/GLOB SERPL: 1.3 G/DL
ALP SERPL-CCNC: 100 U/L (ref 39–117)
ALT SERPL W P-5'-P-CCNC: 11 U/L (ref 1–41)
ANION GAP SERPL CALCULATED.3IONS-SCNC: 7 MMOL/L (ref 5–15)
AST SERPL-CCNC: 11 U/L (ref 1–40)
BASOPHILS # BLD AUTO: 0.04 10*3/MM3 (ref 0–0.2)
BASOPHILS NFR BLD AUTO: 0.8 % (ref 0–1.5)
BILIRUB SERPL-MCNC: 0.4 MG/DL (ref 0–1.2)
BUN SERPL-MCNC: 17 MG/DL (ref 8–23)
BUN/CREAT SERPL: 17.5 (ref 7–25)
CALCIUM SPEC-SCNC: 8.7 MG/DL (ref 8.6–10.5)
CHLORIDE SERPL-SCNC: 101 MMOL/L (ref 98–107)
CO2 SERPL-SCNC: 29 MMOL/L (ref 22–29)
CREAT SERPL-MCNC: 0.97 MG/DL (ref 0.76–1.27)
DEPRECATED RDW RBC AUTO: 56.1 FL (ref 37–54)
EGFRCR SERPLBLD CKD-EPI 2021: 81.4 ML/MIN/1.73
EOSINOPHIL # BLD AUTO: 0.03 10*3/MM3 (ref 0–0.4)
EOSINOPHIL NFR BLD AUTO: 0.6 % (ref 0.3–6.2)
ERYTHROCYTE [DISTWIDTH] IN BLOOD BY AUTOMATED COUNT: 16.9 % (ref 12.3–15.4)
GLOBULIN UR ELPH-MCNC: 2.7 GM/DL
GLUCOSE SERPL-MCNC: 222 MG/DL (ref 65–99)
HCT VFR BLD AUTO: 34.5 % (ref 37.5–51)
HGB BLD-MCNC: 10.8 G/DL (ref 13–17.7)
LYMPHOCYTES # BLD AUTO: 1.47 10*3/MM3 (ref 0.7–3.1)
LYMPHOCYTES NFR BLD AUTO: 30.1 % (ref 19.6–45.3)
MAGNESIUM SERPL-MCNC: 2.1 MG/DL (ref 1.6–2.4)
MCH RBC QN AUTO: 29.1 PG (ref 26.6–33)
MCHC RBC AUTO-ENTMCNC: 31.3 G/DL (ref 31.5–35.7)
MCV RBC AUTO: 93 FL (ref 79–97)
MONOCYTES # BLD AUTO: 0.77 10*3/MM3 (ref 0.1–0.9)
MONOCYTES NFR BLD AUTO: 15.8 % (ref 5–12)
NEUTROPHILS NFR BLD AUTO: 2.57 10*3/MM3 (ref 1.7–7)
NEUTROPHILS NFR BLD AUTO: 52.7 % (ref 42.7–76)
PLATELET # BLD AUTO: 231 10*3/MM3 (ref 140–450)
PMV BLD AUTO: 8.8 FL (ref 6–12)
POTASSIUM SERPL-SCNC: 4.1 MMOL/L (ref 3.5–5.2)
PROT SERPL-MCNC: 6.1 G/DL (ref 6–8.5)
RBC # BLD AUTO: 3.71 10*6/MM3 (ref 4.14–5.8)
SODIUM SERPL-SCNC: 137 MMOL/L (ref 136–145)
WBC NRBC COR # BLD: 4.88 10*3/MM3 (ref 3.4–10.8)

## 2023-10-30 PROCEDURE — 0 DEXTROSE 5 % SOLUTION 250 ML FLEX CONT: Performed by: STUDENT IN AN ORGANIZED HEALTH CARE EDUCATION/TRAINING PROGRAM

## 2023-10-30 PROCEDURE — 25810000003 SODIUM CHLORIDE 0.9 % SOLUTION 250 ML FLEX CONT: Performed by: STUDENT IN AN ORGANIZED HEALTH CARE EDUCATION/TRAINING PROGRAM

## 2023-10-30 PROCEDURE — 85025 COMPLETE CBC W/AUTO DIFF WBC: CPT | Performed by: INTERNAL MEDICINE

## 2023-10-30 PROCEDURE — 96416 CHEMO PROLONG INFUSE W/PUMP: CPT

## 2023-10-30 PROCEDURE — 25010000002 LEUCOVORIN CALCIUM PER 50 MG: Performed by: STUDENT IN AN ORGANIZED HEALTH CARE EDUCATION/TRAINING PROGRAM

## 2023-10-30 PROCEDURE — 25010000002 DEXAMETHASONE SODIUM PHOSPHATE 120 MG/30ML SOLUTION: Performed by: STUDENT IN AN ORGANIZED HEALTH CARE EDUCATION/TRAINING PROGRAM

## 2023-10-30 PROCEDURE — 96417 CHEMO IV INFUS EACH ADDL SEQ: CPT

## 2023-10-30 PROCEDURE — 0 DEXTROSE 5 % SOLUTION 250 ML FLEX CONT: Performed by: INTERNAL MEDICINE

## 2023-10-30 PROCEDURE — 96415 CHEMO IV INFUSION ADDL HR: CPT

## 2023-10-30 PROCEDURE — 25810000003 SODIUM CHLORIDE 0.9 % SOLUTION: Performed by: STUDENT IN AN ORGANIZED HEALTH CARE EDUCATION/TRAINING PROGRAM

## 2023-10-30 PROCEDURE — 96368 THER/DIAG CONCURRENT INF: CPT

## 2023-10-30 PROCEDURE — 83735 ASSAY OF MAGNESIUM: CPT | Performed by: STUDENT IN AN ORGANIZED HEALTH CARE EDUCATION/TRAINING PROGRAM

## 2023-10-30 PROCEDURE — 25010000002 IRINOTECAN PER 20 MG: Performed by: INTERNAL MEDICINE

## 2023-10-30 PROCEDURE — 25010000002 PANITUMUMAB PER 10 MG: Performed by: STUDENT IN AN ORGANIZED HEALTH CARE EDUCATION/TRAINING PROGRAM

## 2023-10-30 PROCEDURE — 96413 CHEMO IV INFUSION 1 HR: CPT

## 2023-10-30 PROCEDURE — 96367 TX/PROPH/DG ADDL SEQ IV INF: CPT

## 2023-10-30 PROCEDURE — 80053 COMPREHEN METABOLIC PANEL: CPT | Performed by: INTERNAL MEDICINE

## 2023-10-30 PROCEDURE — G0498 CHEMO EXTEND IV INFUS W/PUMP: HCPCS

## 2023-10-30 PROCEDURE — 96411 CHEMO IV PUSH ADDL DRUG: CPT

## 2023-10-30 PROCEDURE — 25010000002 PANITUMUMAB 400 MG/20ML SOLUTION 20 ML VIAL: Performed by: STUDENT IN AN ORGANIZED HEALTH CARE EDUCATION/TRAINING PROGRAM

## 2023-10-30 PROCEDURE — 25010000002 FLUOROURACIL PER 500 MG: Performed by: STUDENT IN AN ORGANIZED HEALTH CARE EDUCATION/TRAINING PROGRAM

## 2023-10-30 PROCEDURE — 25010000002 PALONOSETRON 0.25 MG/5ML SOLUTION PREFILLED SYRINGE: Performed by: STUDENT IN AN ORGANIZED HEALTH CARE EDUCATION/TRAINING PROGRAM

## 2023-10-30 PROCEDURE — 96375 TX/PRO/DX INJ NEW DRUG ADDON: CPT

## 2023-10-30 RX ORDER — ATROPINE SULFATE 1 MG/ML
0.25 INJECTION, SOLUTION INTRAMUSCULAR; INTRAVENOUS; SUBCUTANEOUS
Status: DISCONTINUED | OUTPATIENT
Start: 2023-10-30 | End: 2023-10-30

## 2023-10-30 RX ORDER — FLUOROURACIL 50 MG/ML
400 INJECTION, SOLUTION INTRAVENOUS ONCE
Status: COMPLETED | OUTPATIENT
Start: 2023-10-30 | End: 2023-10-30

## 2023-10-30 RX ORDER — SODIUM CHLORIDE 9 MG/ML
250 INJECTION, SOLUTION INTRAVENOUS ONCE
Status: COMPLETED | OUTPATIENT
Start: 2023-10-30 | End: 2023-10-30

## 2023-10-30 RX ORDER — PALONOSETRON 0.05 MG/ML
0.25 INJECTION, SOLUTION INTRAVENOUS ONCE
Status: COMPLETED | OUTPATIENT
Start: 2023-10-30 | End: 2023-10-30

## 2023-10-30 RX ADMIN — FLUOROURACIL 810 MG: 50 INJECTION, SOLUTION INTRAVENOUS at 11:24

## 2023-10-30 RX ADMIN — ATROPINE SULFATE 290 MG: 0.4 INJECTION, SOLUTION INTRAVENOUS at 09:41

## 2023-10-30 RX ADMIN — FLUOROURACIL 4870 MG: 50 INJECTION, SOLUTION INTRAVENOUS at 11:29

## 2023-10-30 RX ADMIN — LEUCOVORIN CALCIUM 800 MG: 350 INJECTION, POWDER, LYOPHILIZED, FOR SUSPENSION INTRAMUSCULAR; INTRAVENOUS at 09:41

## 2023-10-30 RX ADMIN — PALONOSETRON 0.25 MG: 0.25 INJECTION, SOLUTION INTRAVENOUS at 08:25

## 2023-10-30 RX ADMIN — SODIUM CHLORIDE 250 ML: 9 INJECTION, SOLUTION INTRAVENOUS at 08:25

## 2023-10-30 RX ADMIN — DEXAMETHASONE SODIUM PHOSPHATE 12 MG: 4 INJECTION, SOLUTION INTRA-ARTICULAR; INTRALESIONAL; INTRAMUSCULAR; INTRAVENOUS; SOFT TISSUE at 08:26

## 2023-10-30 RX ADMIN — PANITUMUMAB 460 MG: 400 SOLUTION INTRAVENOUS at 08:49

## 2023-10-30 NOTE — PROGRESS NOTES
Pt here for C3 D1 vectibix, folfiri. Pt's port accessed using sterile technique for blood collection. Port aspirated and positive blood return noted. 10 ml blood wasted prior to blood for labs being collected. Lab results within normal parameters for treatment. Pt denies having any new complaints. Order received from Dr Kramer to dose reduce irinotecan by 20%.

## 2023-11-01 ENCOUNTER — HOSPITAL ENCOUNTER (OUTPATIENT)
Dept: ONCOLOGY | Facility: HOSPITAL | Age: 78
Discharge: HOME | End: 2023-11-01
Admitting: STUDENT IN AN ORGANIZED HEALTH CARE EDUCATION/TRAINING PROGRAM
Payer: OTHER GOVERNMENT

## 2023-11-01 DIAGNOSIS — C18.2 MALIGNANT NEOPLASM OF ASCENDING COLON: ICD-10-CM

## 2023-11-01 DIAGNOSIS — C78.7 METASTASES TO THE LIVER: Primary | ICD-10-CM

## 2023-11-01 PROCEDURE — 25010000002 HEPARIN LOCK FLUSH PER 10 UNITS: Performed by: INTERNAL MEDICINE

## 2023-11-01 RX ORDER — SODIUM CHLORIDE 0.9 % (FLUSH) 0.9 %
20 SYRINGE (ML) INJECTION AS NEEDED
Status: DISCONTINUED | OUTPATIENT
Start: 2023-11-01 | End: 2023-11-02 | Stop reason: HOSPADM

## 2023-11-01 RX ORDER — SODIUM CHLORIDE 0.9 % (FLUSH) 0.9 %
20 SYRINGE (ML) INJECTION AS NEEDED
OUTPATIENT
Start: 2023-11-01

## 2023-11-01 RX ORDER — HEPARIN SODIUM (PORCINE) LOCK FLUSH IV SOLN 100 UNIT/ML 100 UNIT/ML
500 SOLUTION INTRAVENOUS AS NEEDED
Status: DISCONTINUED | OUTPATIENT
Start: 2023-11-01 | End: 2023-11-02 | Stop reason: HOSPADM

## 2023-11-01 RX ORDER — HEPARIN SODIUM (PORCINE) LOCK FLUSH IV SOLN 100 UNIT/ML 100 UNIT/ML
500 SOLUTION INTRAVENOUS AS NEEDED
OUTPATIENT
Start: 2023-11-01

## 2023-11-01 RX ADMIN — Medication 20 ML: at 09:09

## 2023-11-01 RX ADMIN — HEPARIN 500 UNITS: 100 SYRINGE at 09:09

## 2023-11-01 NOTE — PROGRESS NOTES
Pt here for chemo pump d/c. 5FU pump is empty. Port aspirated and positive blood return noted. Port flushed with 10 ml NS and heparin 500 units, then de-accessed. Pt tolerated well.

## 2023-11-09 NOTE — PROGRESS NOTES
HEMATOLOGY ONCOLOGY OUTPATIENT FOLLOW-UP       Patient name: Vlad Guerrero  : 1945  MRN: [id number]  Primary Care Physician: Vlad Moreland MD  Referring Physician: Vlad Moreland MD  Reason For Consult: Stage III colon cance    Chief Complaint   Patient presents with    Follow-up     Malignant neoplasm of ascending colon     HPI:   History of Present Illness:  Vlad Guerrero is 78 y.o. male who presented to our office on 23 for consultation regarding    2023: Mr. Guerrero dated the beginning of his present illness to sometime at the end of  when he started to feel fatigued.  He was seen at the [facility] and had laboratory exams that revealed microcytic anemia.  He had evidence of iron deficiency and received intravenous iron in the hospital.  He had upper and lower gastrointestinal endoscopies that demonstrated a cecal tumor that measured 4 to 5 cm and was fungating in appearance.  It was circumferential and was next to the ileocecal valve.  The upper gastrointestinal endoscopy revealed no abnormalities.  On this basis he was on December 15, 2022 he was taken to the hospital and underwent a right hemicolectomy without complications.  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  It corresponded to a grade 2 malignancy that invaded through the muscularis propria into the pericolonic tissues.  Macroscopic tumor perforation or lymphovascular space invasion were not present.  Perineural invasion was not present either.  All margins of excision were negative.  All of 36 lymph nodes submitted to were positive for involvement with malignancy.  The disease was Elzbieta staged as [id number].  Loss of expression of mismatch repair enzymes was not documented.  Mr. Guerrero was discharged to continue treatment as outpatient.  At the time of this visit he was recovering well from the surgery.   His incision had healed completely and he had no drains or suture materials.  He had returned home and was eating well.  He had yet to return to work.  He had been afebrile and free of nausea.  For the most part his weight had been stable.  After reviewing the records a long conversation, of approximately 1 hour, was had with the patient in regards to options of treatment.  The nature of his disease as well as the likelihood of recurrence were described.  The use of adjuvant chemotherapy to increase the rate of cure after surgery was explained.  Side effects were described in detail.  The need for a port was expressed as well.  A treatment plan was placed. A decision was made to treat him with three months of CAPOX given the characteristics of his disease.     2/6/2023: Received the first cycle of adjuvant chemotherapy without any side effects. On the day of this visit feeling well and without any new symptoms, except a very mild rash, especially on the forearms, but no oral pain. Had stools of diminished consistency for a few days but now resolved. The exam was rather unremarkable, except for a few very light erythematous macules on the forearms.     2/27/2023: Feeling well and without new symptoms.  As active as before.  Eating well and without unintended weight loss.  Stronger and more energetic since the institution of vitamin B12 and iron.  No chest pains and cough.  No abdominal pain or diarrhea.  On exam no changes.  A decision was made to continue with the same treatment.  Laboratory exams were reviewed.  He was to see me again in approximately 4 weeks from this date with new scans.    3/27/2023: Suffered an ischemic stroke.  MRI on March 10, 2023 reported a 7 mm focus of restricted diffusion in the left periventricular white matter of the posterior left frontal lobe.  This was felt to be suspicious for an acute/subacute infarct.  There was also evidence of previous lacunar strokes.  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  There was bilateral internal carotid artery stenosis with 60% stenosis estimated at the right and not greater than 50% at the left.  Chemotherapy was held following discharge.  He was left without any sequela.  At the time of this visit he was entirely asymptomatic.  He was convinced a large part of his problem was that he had suffered from hyperglycemia, consistently for some time.  Indeed his glucose was between 152 mg/dL and 193 mg/dL in the preceding days.  However, he reported at home glucose readings of greater than 400 mg/dL..  On exam there were no changes.  The laboratory exams reported a blood count with normocytic anemia and mild thrombocytopenia.  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    4/14/2023: Completed 3 months of treatment with CAPOX.  On the day of this visit not feeling very well.  Weak and tired.  Not very good appetite although eating well.  Having some dysgeusia.  Afebrile.  No chest pains or cough and no abdominal pain.  Had maintain regular bowel activity.  No edema.  On exam no changes.  A decision was made to stop the chemotherapy.  He was to get intravenous fluids on the day of this visit.  To return to see me in 3 weeks.  Tentatively to have scans in early June 2023.    5/5/2023: Feeling progressively stronger. Eating better and slowly regaining weight. Afebrile. No more nausea. No diarrhea and now with regular bowel activity. On exam alert, conversant and well oriented. No pale or jaundice. No oral lesions and no palpable lymph nodes. Lungs  clear and abdomen soft. Minimal edema of the right lower extremity. The laboratory exams revealed persistent anemia with a tendency to macrocytosis. Hemoglobin and platelets within normal ranges. A decision was made to continue to observe and I asked him to see me in approximately 3 months with new scans.     8/7/2023: Feels as well as at the time of the last visit. Active and returned to work full time. Eating well and no nausea or vomiting. No chest pain or cough and no abdominal pain. On exam no changes, though he had lost a large amount of weight since the previous visit. The imaging studies suggested a new lesion in the liver, as well as several lesions in the spleen of unclear cause. Reviewed the images and the report of the scans. Discussed with him at length and explained the findings. Discussed with him the plans for a MRI. He will see me with results.     8/28/2023: Entirely asymptomatic.  Working without limitations.  Eating well.  Weight stable.  Afebrile.  No pain.  On exam no changes.  Laboratory exams were reviewed and discussed with him.  In spite of the fact things on the scans the Carcinoembryonic antigen has not increased.  However both the CT and the MRI suggest one isolated metastatic deposit in segment 8 of the liver.  Discussed with him the options at this time.  I believe a biopsy is essential and after that he would be treated with chemotherapy following which surgery, if no new lesions have appeared, could be considered.  He may still be curable even in the setting of metastatic disease.  Discussed with him at great length.  Explained the steps.  Sent a communication to Dr. Davis, the patient's surgeon.    9/11/2023: Feels about the same as before.  No new symptoms.  As active as before and working.  Eating well and with good appetite.  No unintended weight loss.  Afebrile.  On exam alert, conversant and in good spirits.  No distress.  No jaundice or pallor.  Lungs clear and heart  regular.  Abdomen soft.  The liver is not palpable.  No edema.  Laboratory exams were reviewed.  The liver biopsy report confirms metastatic colorectal cancer.  This appears to be an isolated metastases.  On this basis treatment with chemotherapy followed by surgery is not ordered.  I have asked him to have a PET scan and discussed the study with him.  Discussed the objectives of the treatment and its requirements.  Placed the treatment plan with 5 fluorouracil, irinotecan and panitumumab and discussed with him.  To begin as soon as possible.    9/25/2023: In the office before the next scheduled time.  He called to report that over the weekend he had had between 5 and 8 defecations of liquid stool.  He took loperamide irregularly and it did not seem to provide much relief.  He was able to eat and drink without any difficulties and was never nauseated.  He was seen in the emergency room on 9/24/2023 and he was not found to have any dehydration or other evidence of complications.  They discharged him.  At the time of this visit feeling better.  As of this time he had not had any liquid defecations.  He had continued to eat and drink fluids without any problems.  He had no chest pains and had not had any dyspnea.  He was also free of abdominal pain.  On exam alert, oriented and conversant.  Seemed well-hydrated and was not jaundiced or pale.  Lungs clear.  Heart regular.  Abdomen soft.  A decision was made to continue with the same plan.  I independently reviewed the images of the PET scan and discussed with him.  It reveals only an abnormal lesion in the liver as identified before.  No suggestion of distant metastatic disease.  Discussed with him the significance of this and explained the possibility of surgery and potentially cure.    11/15/2023: Completed 4 cycles of FOLFIRI/panitumumab.  Experienced the expected side effects, particularly skin rash.  However, the treatment proceeded without major complications.   Today he tells me he has been feeling reasonably well and has continued to work part-time.  He enjoys his job.  He has been eating about as much as he had been eating before but he has lost some weight without intention.  He did have persistent diarrhea that responded only to large doses of loperamide.  At this point he no longer has diarrhea.  He has been without chest pains or cough and denies as well abdominal pain.  On exam he still has some erythema on the nasolabial regions on both sides.  The lungs are clear.  The heart is regular and the abdomen soft.  Liver and spleen do not seem to be enlarged.  The laboratory exams were reviewed and discussed with him.  To have another PET scan and consider surgical excision.  He will need to go to [place] for this.    Subjective:  11/15/2023: Feeling very well.  Active and without new limitations.  Eating well.  No chest pains.  No cough.  No abdominal pain or diarrhea and no dysuria.  No edema.    The following portions of the patient's history were reviewed and updated as appropriate: allergies, current medications, past family history, past medical history, past social history, past surgical history and problem list.    Past Medical History:   Diagnosis Date    Anemia     Colon cancer 2022    colon    Diabetes mellitus     Hyperlipidemia     Hypertension      Past Surgical History:   Procedure Laterality Date    APPENDECTOMY      CARDIAC CATHETERIZATION      COLON RESECTION N/A 12/15/2022    Procedure: COLON RESECTION RIGHT;  Surgeon: Percy Davis MD;  Location: [facility] MAIN OR;  Service: General;  Laterality: N/A;    COLONOSCOPY N/A 11/11/2022    Procedure: COLONOSCOPY WITH BIOPSY AND POLYPECTOMY;  Surgeon: Billy Julien MD;  Location: [facility] ENDOSCOPY;  Service: Gastroenterology;  Laterality: N/A;  Impression:  1.  Large 4-5cm fulgurating circumferential ulcerated mass in the very proximal part of the ascending colon next to ileocecal valve multiple  biopsies were performed.  This is highly concerning for colon malignancy.  2.  2 polyp rem    ENDOSCOPY N/A 11/11/2022    Procedure: ESOPHAGOGASTRODUODENOSCOPY with biopsy X1;  Surgeon: Billy Julien MD;  Location: [facility] ENDOSCOPY;  Service: Gastroenterology;  Laterality: N/A;  5.  Upper endoscopy lamination unremarkable.       PORTACATH PLACEMENT Right 1/12/2023    Procedure: INSERTION OF PORTACATH;  Surgeon: Percy Davis MD;  Location: [facility] MAIN OR;  Service: General;  Laterality: Right;    TONSILLECTOMY         Current Outpatient Medications:     atorvastatin (LIPITOR) 40 MG tablet, Take 1 tablet by mouth Every Night., Disp: 90 tablet, Rfl: 0    empagliflozin (JARDIANCE) 25 MG tablet tablet, Take 1 tablet by mouth Daily. Dont take preop, Disp: , Rfl:     ferrous gluconate (FERGON) 324 MG tablet, Take 1 tablet by mouth Daily With Breakfast., Disp: 30 tablet, Rfl: 3    glimepiride (AMARYL) 4 MG tablet, Take 1 tablet by mouth 2 (Two) Times a Day. None preop, Disp: , Rfl:     Insulin Glargine (LANTUS SOLOSTAR) 100 UNIT/ML injection pen, Inject 7 Units under the skin into the appropriate area as directed Every Night., Disp: 15 mL, Rfl: 0    Insulin Pen Needle (Pen Needles) 32G X 4 MM misc, 1 each Daily. Dx code: E11.65, Disp: 100 each, Rfl: 2    losartan (COZAAR) 100 MG tablet, , Disp: , Rfl:     metFORMIN (GLUCOPHAGE) 1000 MG tablet, Take 2 tablets by mouth Daily With Dinner. Last dose 12/12, Disp: , Rfl:     ondansetron (ZOFRAN) 8 MG tablet, Take 1 tablet by mouth 3 (Three) Times a Day As Needed for Nausea or Vomiting., Disp: 30 tablet, Rfl: 5    ondansetron (ZOFRAN) 8 MG tablet, Take 1 tablet by mouth 3 (Three) Times a Day As Needed for Nausea or Vomiting., Disp: 30 tablet, Rfl: 5    pioglitazone (ACTOS) 45 MG tablet, Take 1 tablet by mouth Daily. None preop, Disp: , Rfl:     Semaglutide,0.25 or 0.5MG/DOS, (Ozempic, 0.25 or 0.5 MG/DOSE,) 2 MG/1.5ML solution pen-injector, Inject 0.25 mg under the  skin into the appropriate area as directed As Needed. friday, Disp: , Rfl:     aspirin 325 MG tablet, Take 1 tablet by mouth Daily. (Patient not taking: Reported on 2023), Disp: 30 tablet, Rfl: 0    capecitabine (XELODA) 150 MG chemo tablet, Take 5 tablets by mouth (with 1 other capecitabine Rx) for 1,750 mg total 2 (Two) Times a Day on Days 1-14, then off for 7 days. Total dose is 1750mg in the AM & 1750mg in the PM. (Patient not taking: Reported on 2023), Disp: 140 tablet, Rfl: 4    capecitabine (XELODA) 500 MG chemo tablet, Take 2 tablets by mouth (with 1 other capecitabine prescription) for 1,750 mg total 2 (Two) Times a Day on Days 1-14, then off for 7 days.  Total dose is 1750mg in the AM & 1750mg in the PM. (Patient not taking: Reported on 2023), Disp: 56 tablet, Rfl: 4  No current facility-administered medications for this visit.    Facility-Administered Medications Ordered in Other Visits:     heparin injection 500 Units, 500 Units, Intravenous, PRNNia Alfonso, MD, 500 Units at 11/15/23 0823    sodium chloride 0.9 % flush 20 mL, 20 mL, Intravenous, PRN, Don Kramer MD, 20 mL at 11/15/23 0823    Allergies   Allergen Reactions    Penicillins Rash     Family History   Problem Relation Age of Onset    Pneumonia Mother 94        SARS-CoV2    Colon cancer Father 62    Heart disease Sister      Cancer-related family history includes Colon cancer (age of onset: 62) in his father.    Social History     Tobacco Use    Smoking status: Former     Packs/day: 1.00     Years: 2.00     Additional pack years: 0.00     Total pack years: 2.00     Types: Cigarettes     Start date:      Quit date:      Years since quittin.9    Smokeless tobacco: Never   Vaping Use    Vaping Use: Never used   Substance Use Topics    Alcohol use: Not Currently    Drug use: Never     Social History     Social History Narrative    Not on file      ROS:     Review of Systems   Constitutional:  Negative for  "activity change, appetite change, chills, diaphoresis, fatigue, fever and unexpected weight change.   HENT:  Negative for congestion, dental problem, drooling, ear discharge, ear pain, facial swelling, hearing loss, mouth sores, nosebleeds, postnasal drip, rhinorrhea, sinus pressure, sinus pain, sneezing, sore throat, tinnitus, trouble swallowing and voice change.    Eyes:  Negative for photophobia, pain, discharge, redness, itching and visual disturbance.   Respiratory:  Negative for apnea, cough, choking, chest tightness, shortness of breath, wheezing and stridor.    Cardiovascular:  Negative for chest pain, palpitations and leg swelling.   Gastrointestinal:  Negative for abdominal distention, abdominal pain, anal bleeding, blood in stool, constipation, diarrhea, nausea, rectal pain and vomiting.   Endocrine: Negative for cold intolerance, heat intolerance, polydipsia and polyuria.   Genitourinary:  Negative for decreased urine volume, difficulty urinating, dysuria, flank pain, frequency, genital sores, hematuria and urgency.   Musculoskeletal:  Negative for arthralgias, back pain, gait problem, joint swelling, myalgias, neck pain and neck stiffness.   Skin:  Negative for color change, pallor and rash.   Neurological:  Negative for dizziness, tremors, seizures, syncope, facial asymmetry, speech difficulty, weakness, light-headedness, numbness and headaches.   Hematological:  Negative for adenopathy. Does not bruise/bleed easily.   Psychiatric/Behavioral:  Negative for agitation, behavioral problems, confusion, decreased concentration, hallucinations, self-injury, sleep disturbance and suicidal ideas. The patient is not nervous/anxious.      Objective:    Vitals:    11/15/23 0820   BP: 120/71   Pulse: 68   Temp: 94.7 °F (34.8 °C)  Comment: had a drink   SpO2: 99%   Weight: 73.6 kg (162 lb 3.2 oz)   Height: 182.9 cm (72\")   PainSc: 0-No pain     Body mass index is 22 kg/m².  ECOG  (0) Fully active, able to carry on " all predisease performance without restriction    Physical Exam:     Physical Exam  Constitutional:       General: He is not in acute distress.     Appearance: Normal appearance. He is not ill-appearing, toxic-appearing or diaphoretic.   HENT:      Head: Normocephalic and atraumatic.      Right Ear: External ear normal.      Left Ear: External ear normal.      Nose: Nose normal.      Mouth/Throat:      Mouth: Mucous membranes are moist.      Pharynx: Oropharynx is clear.   Eyes:      General: No scleral icterus.        Right eye: No discharge.         Left eye: No discharge.      Conjunctiva/sclera: Conjunctivae normal.      Pupils: Pupils are equal, round, and reactive to light.   Cardiovascular:      Rate and Rhythm: Normal rate and regular rhythm.      Pulses: Normal pulses.      Heart sounds: Normal heart sounds. No murmur heard.     No friction rub. No gallop.   Pulmonary:      Effort: No respiratory distress.      Breath sounds: No stridor. No wheezing, rhonchi or rales.   Chest:      Chest wall: No tenderness.   Abdominal:      General: Abdomen is flat. Bowel sounds are normal. There is no distension.      Palpations: Abdomen is soft. There is no mass.      Tenderness: There is no abdominal tenderness. There is no right CVA tenderness, left CVA tenderness, guarding or rebound.   Musculoskeletal:         General: No swelling, tenderness, deformity or signs of injury.      Cervical back: No rigidity.      Right lower leg: No edema.      Left lower leg: No edema.   Lymphadenopathy:      Cervical: No cervical adenopathy.   Skin:     General: Skin is warm and dry.      Coloration: Skin is not jaundiced.      Findings: No bruising or rash.   Neurological:      General: No focal deficit present.      Mental Status: He is alert and oriented to person, place, and time.      Cranial Nerves: No cranial nerve deficit.      Gait: Gait normal.   Psychiatric:         Mood and Affect: Mood normal.         Behavior: Behavior  normal.         Thought Content: Thought content normal.         Judgment: Judgment normal.     KATIE Kramer MD performed a physical exam on 11/15/2023 as documented above.    Lab Results - Last 18 Months   Lab Units 11/13/23  0817 10/30/23  0746 10/16/23  0756   WBC 10*3/mm3 5.87 4.88 1.90*   HEMOGLOBIN g/dL 11.2* 10.8* 10.4*   HEMATOCRIT % 34.8* 34.5* 31.1*   PLATELETS 10*3/mm3 145 231 139*   MCV fL 91.3 93.0 88.1     Lab Results - Last 18 Months   Lab Units 11/13/23  0817 10/30/23  0746 10/16/23  0756   SODIUM mmol/L 141 137 142   POTASSIUM mmol/L 3.8 4.1 3.3*   CHLORIDE mmol/L 108* 101 105   CO2 mmol/L 24.0 29.0 28.0   BUN mg/dL 16 17 10   CREATININE mg/dL 0.76 0.97 0.80   CALCIUM mg/dL 8.7 8.7 8.3*   BILIRUBIN mg/dL 0.3 0.4 0.3   ALK PHOS U/L 86 100 66   ALT (SGPT) U/L 14 11 12   AST (SGOT) U/L 15 11 13   GLUCOSE mg/dL 284* 222* 197*     Lab Results   Component Value Date    GLUCOSE 284 (H) 11/13/2023    BUN 16 11/13/2023    CREATININE 0.76 11/13/2023    EGFRIFNONA 76 11/15/2021    EGFRIFAFRI 87 11/15/2021    BCR 21.1 11/13/2023    K 3.8 11/13/2023    CO2 24.0 11/13/2023    CALCIUM 8.7 11/13/2023    ALBUMIN 3.2 (L) 11/13/2023    AST 15 11/13/2023    ALT 14 11/13/2023     Lab Results   Component Value Date    IRON 15 (L) 01/06/2023    TIBC 548 (H) 01/06/2023    FERRITIN 23.51 (L) 01/06/2023     Lab Results   Component Value Date    CEA 2.94 08/28/2023     Assessment & Plan     Assessment:  Isolated metastatic lesion in segment 8 of the liver.  Received 4 cycles of FOLFIRI/panitumumab with the expected side effects and no complications.  To have a PET scan to assess the response.  Consider surgical excision if no progression.  Moderately differentiated adenocarcinoma of the ascending colon [id number] MMR proficient.  Completed Cape-Ox adjuvantly for 3 months in April 2023.  History of ischemic stroke.  No evidence for recurrence.  No intervention at this time.  Diarrhea secondary to treatment  resolved.  Reviewed all the laboratory exams and discussed the results with him.  He is to see me in approximately 3 weeks with a new PET scan.  Discussed with him the plans.    Plan:  As above.    Don Kramer MD on 11/15/2023 at 9:57 AM.

## 2023-11-13 ENCOUNTER — HOSPITAL ENCOUNTER (OUTPATIENT)
Dept: ONCOLOGY | Facility: HOSPITAL | Age: 78
Discharge: HOME | End: 2023-11-13
Admitting: INTERNAL MEDICINE
Payer: OTHER GOVERNMENT

## 2023-11-13 VITALS
HEIGHT: 72 IN | RESPIRATION RATE: 16 BRPM | BODY MASS INDEX: 22.21 KG/M2 | WEIGHT: 164 LBS | TEMPERATURE: 97.5 F | HEART RATE: 73 BPM | SYSTOLIC BLOOD PRESSURE: 137 MMHG | OXYGEN SATURATION: 93 % | DIASTOLIC BLOOD PRESSURE: 74 MMHG

## 2023-11-13 DIAGNOSIS — C18.2 MALIGNANT NEOPLASM OF ASCENDING COLON: Primary | ICD-10-CM

## 2023-11-13 DIAGNOSIS — C78.7 METASTASES TO THE LIVER: ICD-10-CM

## 2023-11-13 LAB
ALBUMIN SERPL-MCNC: 3.2 G/DL (ref 3.5–5.2)
ALBUMIN/GLOB SERPL: 1 G/DL
ALP SERPL-CCNC: 86 U/L (ref 39–117)
ALT SERPL W P-5'-P-CCNC: 14 U/L (ref 1–41)
ANION GAP SERPL CALCULATED.3IONS-SCNC: 9 MMOL/L (ref 5–15)
AST SERPL-CCNC: 15 U/L (ref 1–40)
BASOPHILS # BLD AUTO: 0.02 10*3/MM3 (ref 0–0.2)
BASOPHILS NFR BLD AUTO: 0.3 % (ref 0–1.5)
BILIRUB SERPL-MCNC: 0.3 MG/DL (ref 0–1.2)
BUN SERPL-MCNC: 16 MG/DL (ref 8–23)
BUN/CREAT SERPL: 21.1 (ref 7–25)
CALCIUM SPEC-SCNC: 8.7 MG/DL (ref 8.6–10.5)
CHLORIDE SERPL-SCNC: 108 MMOL/L (ref 98–107)
CO2 SERPL-SCNC: 24 MMOL/L (ref 22–29)
CREAT SERPL-MCNC: 0.76 MG/DL (ref 0.76–1.27)
DEPRECATED RDW RBC AUTO: 52.4 FL (ref 37–54)
EGFRCR SERPLBLD CKD-EPI 2021: 93.2 ML/MIN/1.73
EOSINOPHIL # BLD AUTO: 0.13 10*3/MM3 (ref 0–0.4)
EOSINOPHIL NFR BLD AUTO: 2.2 % (ref 0.3–6.2)
ERYTHROCYTE [DISTWIDTH] IN BLOOD BY AUTOMATED COUNT: 16.2 % (ref 12.3–15.4)
GLOBULIN UR ELPH-MCNC: 3.1 GM/DL
GLUCOSE SERPL-MCNC: 284 MG/DL (ref 65–99)
HCT VFR BLD AUTO: 34.8 % (ref 37.5–51)
HGB BLD-MCNC: 11.2 G/DL (ref 13–17.7)
LYMPHOCYTES # BLD AUTO: 1.1 10*3/MM3 (ref 0.7–3.1)
LYMPHOCYTES NFR BLD AUTO: 18.7 % (ref 19.6–45.3)
MAGNESIUM SERPL-MCNC: 1.7 MG/DL (ref 1.6–2.4)
MCH RBC QN AUTO: 29.4 PG (ref 26.6–33)
MCHC RBC AUTO-ENTMCNC: 32.2 G/DL (ref 31.5–35.7)
MCV RBC AUTO: 91.3 FL (ref 79–97)
MONOCYTES # BLD AUTO: 0.72 10*3/MM3 (ref 0.1–0.9)
MONOCYTES NFR BLD AUTO: 12.3 % (ref 5–12)
NEUTROPHILS NFR BLD AUTO: 3.9 10*3/MM3 (ref 1.7–7)
NEUTROPHILS NFR BLD AUTO: 66.5 % (ref 42.7–76)
PLATELET # BLD AUTO: 145 10*3/MM3 (ref 140–450)
PMV BLD AUTO: 9.3 FL (ref 6–12)
POTASSIUM SERPL-SCNC: 3.8 MMOL/L (ref 3.5–5.2)
PROT SERPL-MCNC: 6.3 G/DL (ref 6–8.5)
RBC # BLD AUTO: 3.81 10*6/MM3 (ref 4.14–5.8)
SODIUM SERPL-SCNC: 141 MMOL/L (ref 136–145)
WBC NRBC COR # BLD: 5.87 10*3/MM3 (ref 3.4–10.8)

## 2023-11-13 PROCEDURE — 96413 CHEMO IV INFUSION 1 HR: CPT

## 2023-11-13 PROCEDURE — 0 DEXTROSE 5 % SOLUTION 250 ML FLEX CONT: Performed by: STUDENT IN AN ORGANIZED HEALTH CARE EDUCATION/TRAINING PROGRAM

## 2023-11-13 PROCEDURE — 96368 THER/DIAG CONCURRENT INF: CPT

## 2023-11-13 PROCEDURE — 25010000002 PANITUMUMAB 400 MG/20ML SOLUTION 20 ML VIAL: Performed by: STUDENT IN AN ORGANIZED HEALTH CARE EDUCATION/TRAINING PROGRAM

## 2023-11-13 PROCEDURE — 25010000002 DEXAMETHASONE SODIUM PHOSPHATE 120 MG/30ML SOLUTION: Performed by: STUDENT IN AN ORGANIZED HEALTH CARE EDUCATION/TRAINING PROGRAM

## 2023-11-13 PROCEDURE — 96366 THER/PROPH/DIAG IV INF ADDON: CPT

## 2023-11-13 PROCEDURE — 96411 CHEMO IV PUSH ADDL DRUG: CPT

## 2023-11-13 PROCEDURE — 80053 COMPREHEN METABOLIC PANEL: CPT | Performed by: INTERNAL MEDICINE

## 2023-11-13 PROCEDURE — 96416 CHEMO PROLONG INFUSE W/PUMP: CPT

## 2023-11-13 PROCEDURE — 96417 CHEMO IV INFUS EACH ADDL SEQ: CPT

## 2023-11-13 PROCEDURE — 25810000003 SODIUM CHLORIDE 0.9 % SOLUTION 250 ML FLEX CONT: Performed by: STUDENT IN AN ORGANIZED HEALTH CARE EDUCATION/TRAINING PROGRAM

## 2023-11-13 PROCEDURE — 25010000002 PANITUMUMAB PER 10 MG: Performed by: STUDENT IN AN ORGANIZED HEALTH CARE EDUCATION/TRAINING PROGRAM

## 2023-11-13 PROCEDURE — 25010000002 ATROPINE SULFATE 0.4 MG/ML SOLUTION 1 ML VIAL: Performed by: STUDENT IN AN ORGANIZED HEALTH CARE EDUCATION/TRAINING PROGRAM

## 2023-11-13 PROCEDURE — 96415 CHEMO IV INFUSION ADDL HR: CPT

## 2023-11-13 PROCEDURE — 83735 ASSAY OF MAGNESIUM: CPT | Performed by: INTERNAL MEDICINE

## 2023-11-13 PROCEDURE — 25010000002 IRINOTECAN PER 20 MG: Performed by: STUDENT IN AN ORGANIZED HEALTH CARE EDUCATION/TRAINING PROGRAM

## 2023-11-13 PROCEDURE — 25010000002 PALONOSETRON 0.25 MG/5ML SOLUTION PREFILLED SYRINGE: Performed by: STUDENT IN AN ORGANIZED HEALTH CARE EDUCATION/TRAINING PROGRAM

## 2023-11-13 PROCEDURE — 96367 TX/PROPH/DG ADDL SEQ IV INF: CPT

## 2023-11-13 PROCEDURE — 96375 TX/PRO/DX INJ NEW DRUG ADDON: CPT

## 2023-11-13 PROCEDURE — 85025 COMPLETE CBC W/AUTO DIFF WBC: CPT | Performed by: INTERNAL MEDICINE

## 2023-11-13 PROCEDURE — 25010000002 LEUCOVORIN CALCIUM PER 50 MG: Performed by: STUDENT IN AN ORGANIZED HEALTH CARE EDUCATION/TRAINING PROGRAM

## 2023-11-13 PROCEDURE — 25010000002 FLUOROURACIL PER 500 MG: Performed by: STUDENT IN AN ORGANIZED HEALTH CARE EDUCATION/TRAINING PROGRAM

## 2023-11-13 PROCEDURE — G0498 CHEMO EXTEND IV INFUS W/PUMP: HCPCS

## 2023-11-13 PROCEDURE — 25810000003 SODIUM CHLORIDE 0.9 % SOLUTION: Performed by: STUDENT IN AN ORGANIZED HEALTH CARE EDUCATION/TRAINING PROGRAM

## 2023-11-13 RX ORDER — FLUOROURACIL 50 MG/ML
400 INJECTION, SOLUTION INTRAVENOUS ONCE
Status: COMPLETED | OUTPATIENT
Start: 2023-11-13 | End: 2023-11-13

## 2023-11-13 RX ORDER — PALONOSETRON 0.05 MG/ML
0.25 INJECTION, SOLUTION INTRAVENOUS ONCE
Status: COMPLETED | OUTPATIENT
Start: 2023-11-13 | End: 2023-11-13

## 2023-11-13 RX ORDER — ATROPINE SULFATE 1 MG/ML
0.25 INJECTION, SOLUTION INTRAMUSCULAR; INTRAVENOUS; SUBCUTANEOUS
Status: DISCONTINUED | OUTPATIENT
Start: 2023-11-13 | End: 2023-11-13

## 2023-11-13 RX ORDER — SODIUM CHLORIDE 9 MG/ML
250 INJECTION, SOLUTION INTRAVENOUS ONCE
Status: COMPLETED | OUTPATIENT
Start: 2023-11-13 | End: 2023-11-13

## 2023-11-13 RX ADMIN — SODIUM CHLORIDE 250 ML: 9 INJECTION, SOLUTION INTRAVENOUS at 08:54

## 2023-11-13 RX ADMIN — FLUOROURACIL 790 MG: 50 INJECTION, SOLUTION INTRAVENOUS at 11:32

## 2023-11-13 RX ADMIN — LEUCOVORIN CALCIUM 790 MG: 350 INJECTION, POWDER, LYOPHILIZED, FOR SUSPENSION INTRAMUSCULAR; INTRAVENOUS at 09:47

## 2023-11-13 RX ADMIN — PANITUMUMAB 460 MG: 400 SOLUTION INTRAVENOUS at 09:13

## 2023-11-13 RX ADMIN — FLUOROURACIL 4750 MG: 50 INJECTION, SOLUTION INTRAVENOUS at 11:37

## 2023-11-13 RX ADMIN — IRINOTECAN HYDROCHLORIDE 285 MG: 20 INJECTION, SOLUTION INTRAVENOUS at 09:47

## 2023-11-13 RX ADMIN — DEXAMETHASONE SODIUM PHOSPHATE 12 MG: 4 INJECTION, SOLUTION INTRA-ARTICULAR; INTRALESIONAL; INTRAMUSCULAR; INTRAVENOUS; SOFT TISSUE at 08:55

## 2023-11-13 RX ADMIN — PALONOSETRON 0.25 MG: 0.25 INJECTION, SOLUTION INTRAVENOUS at 08:54

## 2023-11-15 ENCOUNTER — HOSPITAL ENCOUNTER (OUTPATIENT)
Dept: ONCOLOGY | Facility: HOSPITAL | Age: 78
Discharge: HOME | End: 2023-11-15
Admitting: STUDENT IN AN ORGANIZED HEALTH CARE EDUCATION/TRAINING PROGRAM
Payer: OTHER GOVERNMENT

## 2023-11-15 ENCOUNTER — OFFICE VISIT (OUTPATIENT)
Dept: ONCOLOGY | Facility: CLINIC | Age: 78
End: 2023-11-15
Payer: OTHER GOVERNMENT

## 2023-11-15 VITALS
HEART RATE: 68 BPM | TEMPERATURE: 94.7 F | OXYGEN SATURATION: 99 % | BODY MASS INDEX: 21.97 KG/M2 | WEIGHT: 162.2 LBS | DIASTOLIC BLOOD PRESSURE: 71 MMHG | SYSTOLIC BLOOD PRESSURE: 120 MMHG | HEIGHT: 72 IN

## 2023-11-15 VITALS — SYSTOLIC BLOOD PRESSURE: 120 MMHG | HEART RATE: 75 BPM | DIASTOLIC BLOOD PRESSURE: 71 MMHG

## 2023-11-15 DIAGNOSIS — C18.2 MALIGNANT NEOPLASM OF ASCENDING COLON: ICD-10-CM

## 2023-11-15 DIAGNOSIS — C18.2 MALIGNANT NEOPLASM OF ASCENDING COLON: Primary | ICD-10-CM

## 2023-11-15 DIAGNOSIS — C78.7 METASTASES TO THE LIVER: Primary | ICD-10-CM

## 2023-11-15 PROCEDURE — 25010000002 HEPARIN LOCK FLUSH PER 10 UNITS: Performed by: INTERNAL MEDICINE

## 2023-11-15 RX ORDER — HEPARIN SODIUM (PORCINE) LOCK FLUSH IV SOLN 100 UNIT/ML 100 UNIT/ML
500 SOLUTION INTRAVENOUS AS NEEDED
OUTPATIENT
Start: 2023-11-15

## 2023-11-15 RX ORDER — SODIUM CHLORIDE 0.9 % (FLUSH) 0.9 %
20 SYRINGE (ML) INJECTION AS NEEDED
Status: DISCONTINUED | OUTPATIENT
Start: 2023-11-15 | End: 2023-11-16 | Stop reason: HOSPADM

## 2023-11-15 RX ORDER — HEPARIN SODIUM (PORCINE) LOCK FLUSH IV SOLN 100 UNIT/ML 100 UNIT/ML
500 SOLUTION INTRAVENOUS AS NEEDED
Status: DISCONTINUED | OUTPATIENT
Start: 2023-11-15 | End: 2023-11-16 | Stop reason: HOSPADM

## 2023-11-15 RX ORDER — SODIUM CHLORIDE 0.9 % (FLUSH) 0.9 %
20 SYRINGE (ML) INJECTION AS NEEDED
OUTPATIENT
Start: 2023-11-15

## 2023-11-15 RX ADMIN — Medication 20 ML: at 08:23

## 2023-11-15 RX ADMIN — HEPARIN 500 UNITS: 100 SYRINGE at 08:23

## 2023-11-15 NOTE — PROGRESS NOTES
Pt here for Dr. Kramer f/flakita appt and 5FU pump d/c.  5FU pump empty.  Right port noted with positive blood return and flushed with 20cc n/s and 500units heparin then deaccessed.  Pt tolerated well.  Pt sent to waiting room to be called back for Dr. Nia guzmán/flakita appt.

## 2023-11-16 ENCOUNTER — TELEPHONE (OUTPATIENT)
Dept: ONCOLOGY | Facility: CLINIC | Age: 78
End: 2023-11-16
Payer: OTHER GOVERNMENT

## 2023-11-26 ENCOUNTER — HOSPITAL ENCOUNTER (EMERGENCY)
Facility: HOSPITAL | Age: 78
Discharge: HOME | End: 2023-11-26
Attending: EMERGENCY MEDICINE | Admitting: EMERGENCY MEDICINE
Payer: OTHER GOVERNMENT

## 2023-11-26 VITALS
SYSTOLIC BLOOD PRESSURE: 130 MMHG | RESPIRATION RATE: 17 BRPM | BODY MASS INDEX: 22.22 KG/M2 | DIASTOLIC BLOOD PRESSURE: 66 MMHG | HEART RATE: 61 BPM | WEIGHT: 158.73 LBS | OXYGEN SATURATION: 94 % | HEIGHT: 71 IN | TEMPERATURE: 97.8 F

## 2023-11-26 DIAGNOSIS — R19.7 DIARRHEA, UNSPECIFIED TYPE: Primary | ICD-10-CM

## 2023-11-26 LAB
ADV 40+41 DNA STL QL NAA+NON-PROBE: NOT DETECTED
ALBUMIN SERPL-MCNC: 3.4 G/DL (ref 3.5–5.2)
ALBUMIN/GLOB SERPL: 1.3 G/DL
ALP SERPL-CCNC: 84 U/L (ref 39–117)
ALT SERPL W P-5'-P-CCNC: 17 U/L (ref 1–41)
ANION GAP SERPL CALCULATED.3IONS-SCNC: 10 MMOL/L (ref 5–15)
AST SERPL-CCNC: 18 U/L (ref 1–40)
ASTRO TYP 1-8 RNA STL QL NAA+NON-PROBE: NOT DETECTED
BASOPHILS # BLD AUTO: 0 10*3/MM3 (ref 0–0.2)
BASOPHILS NFR BLD AUTO: 0.4 % (ref 0–1.5)
BILIRUB SERPL-MCNC: 0.6 MG/DL (ref 0–1.2)
BUN SERPL-MCNC: 12 MG/DL (ref 8–23)
BUN/CREAT SERPL: 13.2 (ref 7–25)
C CAYETANENSIS DNA STL QL NAA+NON-PROBE: NOT DETECTED
C COLI+JEJ+UPSA DNA STL QL NAA+NON-PROBE: NOT DETECTED
CALCIUM SPEC-SCNC: 8.7 MG/DL (ref 8.6–10.5)
CHLORIDE SERPL-SCNC: 106 MMOL/L (ref 98–107)
CO2 SERPL-SCNC: 25 MMOL/L (ref 22–29)
CREAT SERPL-MCNC: 0.91 MG/DL (ref 0.76–1.27)
CRYPTOSP DNA STL QL NAA+NON-PROBE: NOT DETECTED
DEPRECATED RDW RBC AUTO: 55.6 FL (ref 37–54)
E HISTOLYT DNA STL QL NAA+NON-PROBE: NOT DETECTED
EAEC PAA PLAS AGGR+AATA ST NAA+NON-PRB: NOT DETECTED
EC STX1+STX2 GENES STL QL NAA+NON-PROBE: NOT DETECTED
EGFRCR SERPLBLD CKD-EPI 2021: 87.3 ML/MIN/1.73
EOSINOPHIL # BLD AUTO: 0.1 10*3/MM3 (ref 0–0.4)
EOSINOPHIL NFR BLD AUTO: 1.3 % (ref 0.3–6.2)
EPEC EAE GENE STL QL NAA+NON-PROBE: DETECTED
ERYTHROCYTE [DISTWIDTH] IN BLOOD BY AUTOMATED COUNT: 18.1 % (ref 12.3–15.4)
ETEC LTA+ST1A+ST1B TOX ST NAA+NON-PROBE: NOT DETECTED
G LAMBLIA DNA STL QL NAA+NON-PROBE: NOT DETECTED
GLOBULIN UR ELPH-MCNC: 2.7 GM/DL
GLUCOSE SERPL-MCNC: 340 MG/DL (ref 65–99)
HCT VFR BLD AUTO: 36 % (ref 37.5–51)
HGB BLD-MCNC: 11.7 G/DL (ref 13–17.7)
LYMPHOCYTES # BLD AUTO: 0.6 10*3/MM3 (ref 0.7–3.1)
LYMPHOCYTES NFR BLD AUTO: 15.4 % (ref 19.6–45.3)
MAGNESIUM SERPL-MCNC: 1.7 MG/DL (ref 1.6–2.4)
MCH RBC QN AUTO: 29 PG (ref 26.6–33)
MCHC RBC AUTO-ENTMCNC: 32.5 G/DL (ref 31.5–35.7)
MCV RBC AUTO: 89.2 FL (ref 79–97)
MONOCYTES # BLD AUTO: 0.4 10*3/MM3 (ref 0.1–0.9)
MONOCYTES NFR BLD AUTO: 9.7 % (ref 5–12)
NEUTROPHILS NFR BLD AUTO: 3 10*3/MM3 (ref 1.7–7)
NEUTROPHILS NFR BLD AUTO: 73.2 % (ref 42.7–76)
NOROVIRUS GI+II RNA STL QL NAA+NON-PROBE: NOT DETECTED
NRBC BLD AUTO-RTO: 0 /100 WBC (ref 0–0.2)
P SHIGELLOIDES DNA STL QL NAA+NON-PROBE: NOT DETECTED
PLATELET # BLD AUTO: 127 10*3/MM3 (ref 140–450)
PMV BLD AUTO: 7.5 FL (ref 6–12)
POTASSIUM SERPL-SCNC: 3.4 MMOL/L (ref 3.5–5.2)
PROT SERPL-MCNC: 6.1 G/DL (ref 6–8.5)
RBC # BLD AUTO: 4.03 10*6/MM3 (ref 4.14–5.8)
RVA RNA STL QL NAA+NON-PROBE: NOT DETECTED
S ENT+BONG DNA STL QL NAA+NON-PROBE: NOT DETECTED
SAPO I+II+IV+V RNA STL QL NAA+NON-PROBE: NOT DETECTED
SHIGELLA SP+EIEC IPAH ST NAA+NON-PROBE: NOT DETECTED
SODIUM SERPL-SCNC: 141 MMOL/L (ref 136–145)
V CHOL+PARA+VUL DNA STL QL NAA+NON-PROBE: NOT DETECTED
V CHOLERAE DNA STL QL NAA+NON-PROBE: NOT DETECTED
WBC NRBC COR # BLD AUTO: 4.1 10*3/MM3 (ref 3.4–10.8)
Y ENTEROCOL DNA STL QL NAA+NON-PROBE: NOT DETECTED

## 2023-11-26 PROCEDURE — 25810000003 LACTATED RINGERS SOLUTION: Performed by: NURSE PRACTITIONER

## 2023-11-26 PROCEDURE — 80053 COMPREHEN METABOLIC PANEL: CPT | Performed by: NURSE PRACTITIONER

## 2023-11-26 PROCEDURE — 85025 COMPLETE CBC W/AUTO DIFF WBC: CPT | Performed by: NURSE PRACTITIONER

## 2023-11-26 PROCEDURE — 63710000001 INSULIN REGULAR HUMAN PER 5 UNITS: Performed by: NURSE PRACTITIONER

## 2023-11-26 PROCEDURE — 87449 NOS EACH ORGANISM AG IA: CPT | Performed by: NURSE PRACTITIONER

## 2023-11-26 PROCEDURE — 87324 CLOSTRIDIUM AG IA: CPT | Performed by: NURSE PRACTITIONER

## 2023-11-26 PROCEDURE — 87507 IADNA-DNA/RNA PROBE TQ 12-25: CPT | Performed by: NURSE PRACTITIONER

## 2023-11-26 PROCEDURE — 83735 ASSAY OF MAGNESIUM: CPT | Performed by: NURSE PRACTITIONER

## 2023-11-26 PROCEDURE — 99283 EMERGENCY DEPT VISIT LOW MDM: CPT

## 2023-11-26 RX ORDER — DIPHENOXYLATE HCL/ATROPINE 2.5-.025/5
5 LIQUID (ML) ORAL 4 TIMES DAILY PRN
Qty: 60 ML | Refills: 0 | Status: SHIPPED | OUTPATIENT
Start: 2023-11-26

## 2023-11-26 RX ORDER — AZITHROMYCIN 500 MG/1
500 TABLET, FILM COATED ORAL DAILY
Qty: 3 TABLET | Refills: 0 | Status: SHIPPED | OUTPATIENT
Start: 2023-11-26 | End: 2023-11-29

## 2023-11-26 RX ORDER — POTASSIUM CHLORIDE 20 MEQ/1
40 TABLET, EXTENDED RELEASE ORAL ONCE
Status: COMPLETED | OUTPATIENT
Start: 2023-11-26 | End: 2023-11-26

## 2023-11-26 RX ORDER — SODIUM CHLORIDE 0.9 % (FLUSH) 0.9 %
10 SYRINGE (ML) INJECTION AS NEEDED
Status: DISCONTINUED | OUTPATIENT
Start: 2023-11-26 | End: 2023-11-26 | Stop reason: HOSPADM

## 2023-11-26 RX ADMIN — SODIUM CHLORIDE, POTASSIUM CHLORIDE, SODIUM LACTATE AND CALCIUM CHLORIDE 1000 ML: 600; 310; 30; 20 INJECTION, SOLUTION INTRAVENOUS at 12:16

## 2023-11-26 RX ADMIN — POTASSIUM CHLORIDE 40 MEQ: 1500 TABLET, EXTENDED RELEASE ORAL at 13:44

## 2023-11-26 RX ADMIN — INSULIN HUMAN 6 UNITS: 100 INJECTION, SOLUTION PARENTERAL at 13:45

## 2023-11-26 NOTE — DISCHARGE INSTRUCTIONS
Take Lomotil as prescribed.  Use it instead of Imodium.  Do not take both together.  Drink plenty of fluids.  Close follow-up with your oncologist.  Return for new or worsening symptoms.

## 2023-11-27 LAB
C DIFF GDH + TOXINS A+B STL QL IA.RAPID: NEGATIVE
C DIFF GDH + TOXINS A+B STL QL IA.RAPID: NEGATIVE

## 2023-11-28 ENCOUNTER — HOSPITAL ENCOUNTER (OUTPATIENT)
Dept: PET IMAGING | Facility: HOSPITAL | Age: 78
Discharge: HOME | End: 2023-11-28
Admitting: INTERNAL MEDICINE
Payer: OTHER GOVERNMENT

## 2023-11-28 DIAGNOSIS — C18.2 MALIGNANT NEOPLASM OF ASCENDING COLON: ICD-10-CM

## 2023-11-28 LAB — GLUCOSE BLDC GLUCOMTR-MCNC: 181 MG/DL (ref 70–105)

## 2023-11-28 PROCEDURE — A9552 F18 FDG: HCPCS | Performed by: INTERNAL MEDICINE

## 2023-11-28 PROCEDURE — 82948 REAGENT STRIP/BLOOD GLUCOSE: CPT

## 2023-11-28 PROCEDURE — 78815 PET IMAGE W/CT SKULL-THIGH: CPT

## 2023-11-28 PROCEDURE — 0 FLUDEOXYGLUCOSE F18 SOLUTION: Performed by: INTERNAL MEDICINE

## 2023-11-28 RX ADMIN — FLUDEOXYGLUCOSE F 18 1 DOSE: 200 INJECTION, SOLUTION INTRAVENOUS at 07:28

## 2023-12-05 ENCOUNTER — TELEPHONE (OUTPATIENT)
Dept: ONCOLOGY | Facility: CLINIC | Age: 78
End: 2023-12-05
Payer: OTHER GOVERNMENT

## 2023-12-08 NOTE — PROGRESS NOTES
HEMATOLOGY ONCOLOGY OUTPATIENT FOLLOW-UP       Patient name: Vlad Guerrero  : 1945  MRN: [id number]  Primary Care Physician: Vlad Moreland MD  Referring Physician: Vlad Moreland MD  Reason For Consult: Stage III colon cance    Chief Complaint   Patient presents with    Follow-up     Malignant neoplasm of ascending colon     HPI:   History of Present Illness:  Vlad Guerrero is 78 y.o. male who presented to our office on 23 for consultation regarding    2023: Mr. Guerrero dated the beginning of his present illness to sometime at the end of  when he started to feel fatigued.  He was seen at the [facility] and had laboratory exams that revealed microcytic anemia.  He had evidence of iron deficiency and received intravenous iron in the hospital.  He had upper and lower gastrointestinal endoscopies that demonstrated a cecal tumor that measured 4 to 5 cm and was fungating in appearance.  It was circumferential and was next to the ileocecal valve.  The upper gastrointestinal endoscopy revealed no abnormalities.  On this basis he was on December 15, 2022 he was taken to the hospital and underwent a right hemicolectomy without complications.  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  It corresponded to a grade 2 malignancy that invaded through the muscularis propria into the pericolonic tissues.  Macroscopic tumor perforation or lymphovascular space invasion were not present.  Perineural invasion was not present either.  All margins of excision were negative.  All of 36 lymph nodes submitted to were positive for involvement with malignancy.  The disease was Elzbieta staged as [id number].  Loss of expression of mismatch repair enzymes was not documented.  Mr. Guerrero was discharged to continue treatment as outpatient.  At the time of this visit he was recovering well from the surgery.   His incision had healed completely and he had no drains or suture materials.  He had returned home and was eating well.  He had yet to return to work.  He had been afebrile and free of nausea.  For the most part his weight had been stable.  After reviewing the records a long conversation, of approximately 1 hour, was had with the patient in regards to options of treatment.  The nature of his disease as well as the likelihood of recurrence were described.  The use of adjuvant chemotherapy to increase the rate of cure after surgery was explained.  Side effects were described in detail.  The need for a port was expressed as well.  A treatment plan was placed. A decision was made to treat him with three months of CAPOX given the characteristics of his disease.     2/6/2023: Received the first cycle of adjuvant chemotherapy without any side effects. On the day of this visit feeling well and without any new symptoms, except a very mild rash, especially on the forearms, but no oral pain. Had stools of diminished consistency for a few days but now resolved. The exam was rather unremarkable, except for a few very light erythematous macules on the forearms.     2/27/2023: Feeling well and without new symptoms.  As active as before.  Eating well and without unintended weight loss.  Stronger and more energetic since the institution of vitamin B12 and iron.  No chest pains and cough.  No abdominal pain or diarrhea.  On exam no changes.  A decision was made to continue with the same treatment.  Laboratory exams were reviewed.  He was to see me again in approximately 4 weeks from this date with new scans.    3/27/2023: Suffered an ischemic stroke.  MRI on March 10, 2023 reported a 7 mm focus of restricted diffusion in the left periventricular white matter of the posterior left frontal lobe.  This was felt to be suspicious for an acute/subacute infarct.  There was also evidence of previous lacunar strokes.  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  There was bilateral internal carotid artery stenosis with 60% stenosis estimated at the right and not greater than 50% at the left.  Chemotherapy was held following discharge.  He was left without any sequela.  At the time of this visit he was entirely asymptomatic.  He was convinced a large part of his problem was that he had suffered from hyperglycemia, consistently for some time.  Indeed his glucose was between 152 mg/dL and 193 mg/dL in the preceding days.  However, he reported at home glucose readings of greater than 400 mg/dL..  On exam there were no changes.  The laboratory exams reported a blood count with normocytic anemia and mild thrombocytopenia.  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    4/14/2023: Completed 3 months of treatment with CAPOX.  On the day of this visit not feeling very well.  Weak and tired.  Not very good appetite although eating well.  Having some dysgeusia.  Afebrile.  No chest pains or cough and no abdominal pain.  Had maintain regular bowel activity.  No edema.  On exam no changes.  A decision was made to stop the chemotherapy.  He was to get intravenous fluids on the day of this visit.  To return to see me in 3 weeks.  Tentatively to have scans in early June 2023.    5/5/2023: Feeling progressively stronger. Eating better and slowly regaining weight. Afebrile. No more nausea. No diarrhea and now with regular bowel activity. On exam alert, conversant and well oriented. No pale or jaundice. No oral lesions and no palpable lymph nodes. Lungs  clear and abdomen soft. Minimal edema of the right lower extremity. The laboratory exams revealed persistent anemia with a tendency to macrocytosis. Hemoglobin and platelets within normal ranges. A decision was made to continue to observe and I asked him to see me in approximately 3 months with new scans.     8/7/2023: Feels as well as at the time of the last visit. Active and returned to work full time. Eating well and no nausea or vomiting. No chest pain or cough and no abdominal pain. On exam no changes, though he had lost a large amount of weight since the previous visit. The imaging studies suggested a new lesion in the liver, as well as several lesions in the spleen of unclear cause. Reviewed the images and the report of the scans. Discussed with him at length and explained the findings. Discussed with him the plans for a MRI. He will see me with results.     8/28/2023: Entirely asymptomatic.  Working without limitations.  Eating well.  Weight stable.  Afebrile.  No pain.  On exam no changes.  Laboratory exams were reviewed and discussed with him.  In spite of the fact things on the scans the Carcinoembryonic antigen has not increased.  However both the CT and the MRI suggest one isolated metastatic deposit in segment 8 of the liver.  Discussed with him the options at this time.  I believe a biopsy is essential and after that he would be treated with chemotherapy following which surgery, if no new lesions have appeared, could be considered.  He may still be curable even in the setting of metastatic disease.  Discussed with him at great length.  Explained the steps.  Sent a communication to Dr. Davis, the patient's surgeon.    9/11/2023: Feels about the same as before.  No new symptoms.  As active as before and working.  Eating well and with good appetite.  No unintended weight loss.  Afebrile.  On exam alert, conversant and in good spirits.  No distress.  No jaundice or pallor.  Lungs clear and heart  regular.  Abdomen soft.  The liver is not palpable.  No edema.  Laboratory exams were reviewed.  The liver biopsy report confirms metastatic colorectal cancer.  This appears to be an isolated metastases.  On this basis treatment with chemotherapy followed by surgery is not ordered.  I have asked him to have a PET scan and discussed the study with him.  Discussed the objectives of the treatment and its requirements.  Placed the treatment plan with 5 fluorouracil, irinotecan and panitumumab and discussed with him.  To begin as soon as possible.    9/25/2023: In the office before the next scheduled time.  He called to report that over the weekend he had had between 5 and 8 defecations of liquid stool.  He took loperamide irregularly and it did not seem to provide much relief.  He was able to eat and drink without any difficulties and was never nauseated.  He was seen in the emergency room on 9/24/2023 and he was not found to have any dehydration or other evidence of complications.  They discharged him.  At the time of this visit feeling better.  As of this time he had not had any liquid defecations.  He had continued to eat and drink fluids without any problems.  He had no chest pains and had not had any dyspnea.  He was also free of abdominal pain.  On exam alert, oriented and conversant.  Seemed well-hydrated and was not jaundiced or pale.  Lungs clear.  Heart regular.  Abdomen soft.  A decision was made to continue with the same plan.  I independently reviewed the images of the PET scan and discussed with him.  It reveals only an abnormal lesion in the liver as identified before.  No suggestion of distant metastatic disease.  Discussed with him the significance of this and explained the possibility of surgery and potentially cure.    11/15/2023: Completed 4 cycles of FOLFIRI/panitumumab.  Experienced the expected side effects, particularly skin rash.  However, the treatment proceeded without major complications.   Today he tells me he has been feeling reasonably well and has continued to work part-time.  He enjoys his job.  He has been eating about as much as he had been eating before but he has lost some weight without intention.  He did have persistent diarrhea that responded only to large doses of loperamide.  At this point he no longer has diarrhea.  He has been without chest pains or cough and denies as well abdominal pain.  On exam he still has some erythema on the nasolabial regions on both sides.  The lungs are clear.  The heart is regular and the abdomen soft.  Liver and spleen do not seem to be enlarged.  The laboratory exams were reviewed and discussed with him.  To have another PET scan and consider surgical excision.  He will need to go to [place] for this.    12/11/2023: Feeling reasonably well at this time without any new complaints.  His diarrhea is essentially resolved although he still has soft defecations intermittently.  He is eating well and has a good appetite.  He has had no chest pains and has been without cough.  He denies abdominal pain.  No melena, hematochezia or hematuria.  No peripheral edema.  On exam no changes.  The PET scan reveals complete response with no residual evidence of disease activity.  I have discussed with Dr. Praveen Whittaker in [place] and he requested that a MRI be done prior to deciding on surgery.  Discussed with Mr. Guerrero.  Explained the plans.  He is to have the MRI and will see me with the results.    Subjective:  12/11/2023: Essentially asymptomatic at this time.  Able to eat and without nausea or vomiting.  Energetic and working full-time.  No chest pains or cough.  No abdominal pain, melena or hematochezia.  As above intermittently he still has soft to liquid defecations.  No dysuria or hematuria.  No peripheral edema.  No skin rash.    The following portions of the patient's history were reviewed and updated as appropriate: allergies, current medications, past  family history, past medical history, past social history, past surgical history and problem list.    Past Medical History:   Diagnosis Date    Anemia     Colon cancer 2022    colon    Diabetes mellitus     Hyperlipidemia     Hypertension      Past Surgical History:   Procedure Laterality Date    APPENDECTOMY      CARDIAC CATHETERIZATION      COLON RESECTION N/A 12/15/2022    Procedure: COLON RESECTION RIGHT;  Surgeon: Percy Davis MD;  Location: [facility] MAIN OR;  Service: General;  Laterality: N/A;    COLONOSCOPY N/A 11/11/2022    Procedure: COLONOSCOPY WITH BIOPSY AND POLYPECTOMY;  Surgeon: Billy Julien MD;  Location: [facility] ENDOSCOPY;  Service: Gastroenterology;  Laterality: N/A;  Impression:  1.  Large 4-5cm fulgurating circumferential ulcerated mass in the very proximal part of the ascending colon next to ileocecal valve multiple biopsies were performed.  This is highly concerning for colon malignancy.  2.  2 polyp rem    ENDOSCOPY N/A 11/11/2022    Procedure: ESOPHAGOGASTRODUODENOSCOPY with biopsy X1;  Surgeon: Billy Julien MD;  Location: [facility] ENDOSCOPY;  Service: Gastroenterology;  Laterality: N/A;  5.  Upper endoscopy lamination unremarkable.       PORTACATH PLACEMENT Right 1/12/2023    Procedure: INSERTION OF PORTACATH;  Surgeon: Percy Davis MD;  Location: [facility] MAIN OR;  Service: General;  Laterality: Right;    TONSILLECTOMY         Current Outpatient Medications:     atorvastatin (LIPITOR) 40 MG tablet, Take 1 tablet by mouth Every Night., Disp: 90 tablet, Rfl: 0    diphenoxylate-atropine (LOMOTIL) 2.5-0.025 MG/5ML liquid, Take 5 mL by mouth 4 (Four) Times a Day As Needed for Diarrhea., Disp: 60 mL, Rfl: 0    empagliflozin (JARDIANCE) 25 MG tablet tablet, Take 1 tablet by mouth Daily. Dont take preop, Disp: , Rfl:     ferrous gluconate (FERGON) 324 MG tablet, Take 1 tablet by mouth Daily With Breakfast., Disp: 30 tablet, Rfl: 3    glimepiride (AMARYL) 4 MG tablet, Take 1  tablet by mouth 2 (Two) Times a Day. None preop, Disp: , Rfl:     Insulin Glargine (LANTUS SOLOSTAR) 100 UNIT/ML injection pen, Inject 7 Units under the skin into the appropriate area as directed Every Night., Disp: 15 mL, Rfl: 0    Insulin Pen Needle (Pen Needles) 32G X 4 MM misc, 1 each Daily. Dx code: E11.65, Disp: 100 each, Rfl: 2    losartan (COZAAR) 100 MG tablet, , Disp: , Rfl:     metFORMIN (GLUCOPHAGE) 1000 MG tablet, Take 2 tablets by mouth Daily With Dinner. Last dose 12/12, Disp: , Rfl:     ondansetron (ZOFRAN) 8 MG tablet, Take 1 tablet by mouth 3 (Three) Times a Day As Needed for Nausea or Vomiting., Disp: 30 tablet, Rfl: 5    ondansetron (ZOFRAN) 8 MG tablet, Take 1 tablet by mouth 3 (Three) Times a Day As Needed for Nausea or Vomiting., Disp: 30 tablet, Rfl: 5    pioglitazone (ACTOS) 45 MG tablet, Take 1 tablet by mouth Daily. None preop, Disp: , Rfl:     Semaglutide,0.25 or 0.5MG/DOS, (Ozempic, 0.25 or 0.5 MG/DOSE,) 2 MG/1.5ML solution pen-injector, Inject 0.25 mg under the skin into the appropriate area as directed As Needed. friday, Disp: , Rfl:     aspirin 325 MG tablet, Take 1 tablet by mouth Daily. (Patient not taking: Reported on 9/25/2023), Disp: 30 tablet, Rfl: 0    capecitabine (XELODA) 150 MG chemo tablet, Take 5 tablets by mouth (with 1 other capecitabine Rx) for 1,750 mg total 2 (Two) Times a Day on Days 1-14, then off for 7 days. Total dose is 1750mg in the AM & 1750mg in the PM. (Patient not taking: Reported on 9/25/2023), Disp: 140 tablet, Rfl: 4    capecitabine (XELODA) 500 MG chemo tablet, Take 2 tablets by mouth (with 1 other capecitabine prescription) for 1,750 mg total 2 (Two) Times a Day on Days 1-14, then off for 7 days.  Total dose is 1750mg in the AM & 1750mg in the PM. (Patient not taking: Reported on 9/25/2023), Disp: 56 tablet, Rfl: 4    Allergies   Allergen Reactions    Penicillins Rash     Family History   Problem Relation Age of Onset    Pneumonia Mother 94         SARS-CoV2    Colon cancer Father 62    Heart disease Sister      Cancer-related family history includes Colon cancer (age of onset: 62) in his father.    Social History     Tobacco Use    Smoking status: Former     Packs/day: 1.00     Years: 2.00     Additional pack years: 0.00     Total pack years: 2.00     Types: Cigarettes     Start date:      Quit date:      Years since quittin.9    Smokeless tobacco: Never   Vaping Use    Vaping Use: Never used   Substance Use Topics    Alcohol use: Not Currently    Drug use: Never     Social History     Social History Narrative    Not on file      ROS:     Review of Systems   Constitutional:  Negative for activity change, appetite change, chills, diaphoresis, fatigue, fever and unexpected weight change.   HENT:  Negative for congestion, dental problem, drooling, ear discharge, ear pain, facial swelling, hearing loss, mouth sores, nosebleeds, postnasal drip, rhinorrhea, sinus pressure, sinus pain, sneezing, sore throat, tinnitus, trouble swallowing and voice change.    Eyes:  Negative for photophobia, pain, discharge, redness, itching and visual disturbance.   Respiratory:  Negative for apnea, cough, choking, chest tightness, shortness of breath, wheezing and stridor.    Cardiovascular:  Negative for chest pain, palpitations and leg swelling.   Gastrointestinal:  Negative for abdominal distention, abdominal pain, anal bleeding, blood in stool, constipation, diarrhea, nausea, rectal pain and vomiting.   Endocrine: Negative for cold intolerance, heat intolerance, polydipsia and polyuria.   Genitourinary:  Negative for decreased urine volume, difficulty urinating, dysuria, flank pain, frequency, genital sores, hematuria and urgency.   Musculoskeletal:  Negative for arthralgias, back pain, gait problem, joint swelling, myalgias, neck pain and neck stiffness.   Skin:  Negative for color change, pallor and rash.   Neurological:  Negative for dizziness, tremors, seizures,  "syncope, facial asymmetry, speech difficulty, weakness, light-headedness, numbness and headaches.   Hematological:  Negative for adenopathy. Does not bruise/bleed easily.   Psychiatric/Behavioral:  Negative for agitation, behavioral problems, confusion, decreased concentration, hallucinations, self-injury, sleep disturbance and suicidal ideas. The patient is not nervous/anxious.      Objective:    Vitals:    12/11/23 0840   BP: 113/71   Pulse: 60   Temp: 97.5 °F (36.4 °C)   TempSrc: Oral   SpO2: 96%   Weight: 71.8 kg (158 lb 3.2 oz)   Height: 180.3 cm (71\")   PainSc: 0-No pain     Body mass index is 22.06 kg/m².  ECOG  (0) Fully active, able to carry on all predisease performance without restriction    Physical Exam:     Physical Exam  Constitutional:       General: He is not in acute distress.     Appearance: Normal appearance. He is not ill-appearing, toxic-appearing or diaphoretic.   HENT:      Head: Normocephalic and atraumatic.      Right Ear: External ear normal.      Left Ear: External ear normal.      Nose: Nose normal.      Mouth/Throat:      Mouth: Mucous membranes are moist.      Pharynx: Oropharynx is clear.   Eyes:      General: No scleral icterus.        Right eye: No discharge.         Left eye: No discharge.      Conjunctiva/sclera: Conjunctivae normal.      Pupils: Pupils are equal, round, and reactive to light.   Cardiovascular:      Rate and Rhythm: Normal rate and regular rhythm.      Pulses: Normal pulses.      Heart sounds: Normal heart sounds. No murmur heard.     No friction rub. No gallop.   Pulmonary:      Effort: No respiratory distress.      Breath sounds: No stridor. No wheezing, rhonchi or rales.   Chest:      Chest wall: No tenderness.   Abdominal:      General: Abdomen is flat. Bowel sounds are normal. There is no distension.      Palpations: Abdomen is soft. There is no mass.      Tenderness: There is no abdominal tenderness. There is no right CVA tenderness, left CVA tenderness, " guarding or rebound.   Musculoskeletal:         General: No swelling, tenderness, deformity or signs of injury.      Cervical back: No rigidity.      Right lower leg: No edema.      Left lower leg: No edema.   Lymphadenopathy:      Cervical: No cervical adenopathy.   Skin:     General: Skin is warm and dry.      Coloration: Skin is not jaundiced.      Findings: No bruising or rash.   Neurological:      General: No focal deficit present.      Mental Status: He is alert and oriented to person, place, and time.      Cranial Nerves: No cranial nerve deficit.      Gait: Gait normal.   Psychiatric:         Mood and Affect: Mood normal.         Behavior: Behavior normal.         Thought Content: Thought content normal.         Judgment: Judgment normal.     KATIE Kramer MD performed a physical exam on 12/11/2023 as documented above.    Lab Results - Last 18 Months   Lab Units 12/11/23  0811 11/26/23  1209 11/13/23  0817   WBC 10*3/mm3 5.06 4.10 5.87   HEMOGLOBIN g/dL 12.4* 11.7* 11.2*   HEMATOCRIT % 39.4 36.0* 34.8*   PLATELETS 10*3/mm3 164 127* 145   MCV fL 93.8 89.2 91.3     Lab Results - Last 18 Months   Lab Units 11/26/23  1209 11/13/23  0817 10/30/23  0746   SODIUM mmol/L 141 141 137   POTASSIUM mmol/L 3.4* 3.8 4.1   CHLORIDE mmol/L 106 108* 101   CO2 mmol/L 25.0 24.0 29.0   BUN mg/dL 12 16 17   CREATININE mg/dL 0.91 0.76 0.97   CALCIUM mg/dL 8.7 8.7 8.7   BILIRUBIN mg/dL 0.6 0.3 0.4   ALK PHOS U/L 84 86 100   ALT (SGPT) U/L 17 14 11   AST (SGOT) U/L 18 15 11   GLUCOSE mg/dL 340* 284* 222*     Lab Results   Component Value Date    GLUCOSE 340 (H) 11/26/2023    BUN 12 11/26/2023    CREATININE 0.91 11/26/2023    EGFRIFNONA 76 11/15/2021    EGFRIFAFRI 87 11/15/2021    BCR 13.2 11/26/2023    K 3.4 (L) 11/26/2023    CO2 25.0 11/26/2023    CALCIUM 8.7 11/26/2023    ALBUMIN 3.4 (L) 11/26/2023    AST 18 11/26/2023    ALT 17 11/26/2023     Lab Results   Component Value Date    IRON 15 (L) 01/06/2023    TIBC 548 (H)  01/06/2023    FERRITIN 23.51 (L) 01/06/2023     Lab Results   Component Value Date    CEA 2.94 08/28/2023     Assessment & Plan     Assessment:  Isolated metastatic lesion in segment 8 of the liver.  Received 4 cycles of FOLFIRI/panitumumab with the expected side effects and no complications.  There is what appears to be a complete response based on the results of the PET scan.  As above I have discussed with surgical oncology and a MRI has been requested.  Discussed with Mr. Guerrero the logic behind his decision.  Moderately differentiated adenocarcinoma of the ascending colon [id number] MMR proficient.  Completed Cape-Ox adjuvantly for 3 months in April 2023.  History of ischemic stroke.  No evidence for recurrence.  Entirely asymptomatic at this time.  No need for additional intervention.  Diarrhea: Resolved.  Reviewed the images of the PET scan.  Discussed with him.  Discussed with Dr. Praveen Whittaker on the phone.  He will return to see me in 2 weeks with the MRI.    Plan:  As above.    Don Kramer MD on 12/11/2023 at 9:17 AM.

## 2023-12-11 ENCOUNTER — LAB (OUTPATIENT)
Dept: LAB | Facility: HOSPITAL | Age: 78
End: 2023-12-11
Payer: OTHER GOVERNMENT

## 2023-12-11 ENCOUNTER — OFFICE VISIT (OUTPATIENT)
Dept: ONCOLOGY | Facility: CLINIC | Age: 78
End: 2023-12-11
Payer: MEDICARE

## 2023-12-11 VITALS
TEMPERATURE: 97.5 F | DIASTOLIC BLOOD PRESSURE: 71 MMHG | BODY MASS INDEX: 22.15 KG/M2 | HEIGHT: 71 IN | OXYGEN SATURATION: 96 % | HEART RATE: 60 BPM | WEIGHT: 158.2 LBS | SYSTOLIC BLOOD PRESSURE: 113 MMHG

## 2023-12-11 DIAGNOSIS — C78.7 METASTASES TO THE LIVER: Primary | ICD-10-CM

## 2023-12-11 DIAGNOSIS — C18.2 MALIGNANT NEOPLASM OF ASCENDING COLON: ICD-10-CM

## 2023-12-11 LAB
ALBUMIN SERPL-MCNC: 3.5 G/DL (ref 3.5–5.2)
ALBUMIN/GLOB SERPL: 1.2 G/DL
ALP SERPL-CCNC: 101 U/L (ref 39–117)
ALT SERPL W P-5'-P-CCNC: 14 U/L (ref 1–41)
ANION GAP SERPL CALCULATED.3IONS-SCNC: 9 MMOL/L (ref 5–15)
AST SERPL-CCNC: 14 U/L (ref 1–40)
BASOPHILS # BLD AUTO: 0.02 10*3/MM3 (ref 0–0.2)
BASOPHILS NFR BLD AUTO: 0.4 % (ref 0–1.5)
BILIRUB SERPL-MCNC: 0.6 MG/DL (ref 0–1.2)
BUN SERPL-MCNC: 13 MG/DL (ref 8–23)
BUN/CREAT SERPL: 14.3 (ref 7–25)
CALCIUM SPEC-SCNC: 8.8 MG/DL (ref 8.6–10.5)
CEA SERPL-MCNC: 6.34 NG/ML
CHLORIDE SERPL-SCNC: 101 MMOL/L (ref 98–107)
CO2 SERPL-SCNC: 28 MMOL/L (ref 22–29)
CREAT SERPL-MCNC: 0.91 MG/DL (ref 0.76–1.27)
DEPRECATED RDW RBC AUTO: 55.4 FL (ref 37–54)
EGFRCR SERPLBLD CKD-EPI 2021: 87.3 ML/MIN/1.73
EOSINOPHIL # BLD AUTO: 0.05 10*3/MM3 (ref 0–0.4)
EOSINOPHIL NFR BLD AUTO: 1 % (ref 0.3–6.2)
ERYTHROCYTE [DISTWIDTH] IN BLOOD BY AUTOMATED COUNT: 16.4 % (ref 12.3–15.4)
GLOBULIN UR ELPH-MCNC: 3 GM/DL
GLUCOSE SERPL-MCNC: 319 MG/DL (ref 65–99)
HCT VFR BLD AUTO: 39.4 % (ref 37.5–51)
HGB BLD-MCNC: 12.4 G/DL (ref 13–17.7)
LYMPHOCYTES # BLD AUTO: 1.41 10*3/MM3 (ref 0.7–3.1)
LYMPHOCYTES NFR BLD AUTO: 27.9 % (ref 19.6–45.3)
MCH RBC QN AUTO: 29.5 PG (ref 26.6–33)
MCHC RBC AUTO-ENTMCNC: 31.5 G/DL (ref 31.5–35.7)
MCV RBC AUTO: 93.8 FL (ref 79–97)
MONOCYTES # BLD AUTO: 0.61 10*3/MM3 (ref 0.1–0.9)
MONOCYTES NFR BLD AUTO: 12.1 % (ref 5–12)
NEUTROPHILS NFR BLD AUTO: 2.97 10*3/MM3 (ref 1.7–7)
NEUTROPHILS NFR BLD AUTO: 58.6 % (ref 42.7–76)
PLATELET # BLD AUTO: 164 10*3/MM3 (ref 140–450)
PMV BLD AUTO: 9.7 FL (ref 6–12)
POTASSIUM SERPL-SCNC: 4.3 MMOL/L (ref 3.5–5.2)
PROT SERPL-MCNC: 6.5 G/DL (ref 6–8.5)
RBC # BLD AUTO: 4.2 10*6/MM3 (ref 4.14–5.8)
SODIUM SERPL-SCNC: 138 MMOL/L (ref 136–145)
WBC NRBC COR # BLD AUTO: 5.06 10*3/MM3 (ref 3.4–10.8)

## 2023-12-11 PROCEDURE — 3074F SYST BP LT 130 MM HG: CPT | Performed by: INTERNAL MEDICINE

## 2023-12-11 PROCEDURE — 1126F AMNT PAIN NOTED NONE PRSNT: CPT | Performed by: INTERNAL MEDICINE

## 2023-12-11 PROCEDURE — 85025 COMPLETE CBC W/AUTO DIFF WBC: CPT

## 2023-12-11 PROCEDURE — 82378 CARCINOEMBRYONIC ANTIGEN: CPT | Performed by: INTERNAL MEDICINE

## 2023-12-11 PROCEDURE — 1160F RVW MEDS BY RX/DR IN RCRD: CPT | Performed by: INTERNAL MEDICINE

## 2023-12-11 PROCEDURE — 1159F MED LIST DOCD IN RCRD: CPT | Performed by: INTERNAL MEDICINE

## 2023-12-11 PROCEDURE — 3078F DIAST BP <80 MM HG: CPT | Performed by: INTERNAL MEDICINE

## 2023-12-11 PROCEDURE — 99214 OFFICE O/P EST MOD 30 MIN: CPT | Performed by: INTERNAL MEDICINE

## 2023-12-11 PROCEDURE — 36415 COLL VENOUS BLD VENIPUNCTURE: CPT

## 2023-12-11 PROCEDURE — 80053 COMPREHEN METABOLIC PANEL: CPT | Performed by: INTERNAL MEDICINE

## 2023-12-12 ENCOUNTER — TELEPHONE (OUTPATIENT)
Dept: ONCOLOGY | Facility: CLINIC | Age: 78
End: 2023-12-12
Payer: OTHER GOVERNMENT

## 2023-12-12 NOTE — TELEPHONE ENCOUNTER
Hub is instructed to read the documentation below to patient  Called to let Pt know that Dr Kramer suggested he take Imodium as necessary. No answer, unable to leave v/m

## 2023-12-26 NOTE — PROGRESS NOTES
HEMATOLOGY ONCOLOGY OUTPATIENT FOLLOW-UP       Patient name: Vlad Guerrero  : 1945  MRN: [id number]  Primary Care Physician: Vlad Moreland MD  Referring Physician: No ref. provider found  Reason For Consult: Stage III colon cance    Chief Complaint   Patient presents with    Follow-up     Metastases to the liver     HPI:   History of Present Illness:  Vlad Guerrero is 78 y.o. male who presented to our office on 23 for consultation regarding    2023: Mr. Guerrero dated the beginning of his present illness to sometime at the end of  when he started to feel fatigued.  He was seen at the [facility] and had laboratory exams that revealed microcytic anemia.  He had evidence of iron deficiency and received intravenous iron in the hospital.  He had upper and lower gastrointestinal endoscopies that demonstrated a cecal tumor that measured 4 to 5 cm and was fungating in appearance.  It was circumferential and was next to the ileocecal valve.  The upper gastrointestinal endoscopy revealed no abnormalities.  On this basis he was on December 15, 2022 he was taken to the hospital and underwent a right hemicolectomy without complications.  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  It corresponded to a grade 2 malignancy that invaded through the muscularis propria into the pericolonic tissues.  Macroscopic tumor perforation or lymphovascular space invasion were not present.  Perineural invasion was not present either.  All margins of excision were negative.  All of 36 lymph nodes submitted to were positive for involvement with malignancy.  The disease was Elzbieta staged as [id number].  Loss of expression of mismatch repair enzymes was not documented.  Mr. Guerrero was discharged to continue treatment as outpatient.  At the time of this visit he was recovering well from the surgery.  His  incision had healed completely and he had no drains or suture materials.  He had returned home and was eating well.  He had yet to return to work.  He had been afebrile and free of nausea.  For the most part his weight had been stable.  After reviewing the records a long conversation, of approximately 1 hour, was had with the patient in regards to options of treatment.  The nature of his disease as well as the likelihood of recurrence were described.  The use of adjuvant chemotherapy to increase the rate of cure after surgery was explained.  Side effects were described in detail.  The need for a port was expressed as well.  A treatment plan was placed. A decision was made to treat him with three months of CAPOX given the characteristics of his disease.     2/6/2023: Received the first cycle of adjuvant chemotherapy without any side effects. On the day of this visit feeling well and without any new symptoms, except a very mild rash, especially on the forearms, but no oral pain. Had stools of diminished consistency for a few days but now resolved. The exam was rather unremarkable, except for a few very light erythematous macules on the forearms.     2/27/2023: Feeling well and without new symptoms.  As active as before.  Eating well and without unintended weight loss.  Stronger and more energetic since the institution of vitamin B12 and iron.  No chest pains and cough.  No abdominal pain or diarrhea.  On exam no changes.  A decision was made to continue with the same treatment.  Laboratory exams were reviewed.  He was to see me again in approximately 4 weeks from this date with new scans.    3/27/2023: Suffered an ischemic stroke.  MRI on March 10, 2023 reported a 7 mm focus of restricted diffusion in the left periventricular white matter of the posterior left frontal lobe.  This was felt to be suspicious for an acute/subacute infarct.  There was also evidence of previous lacunar strokes.  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  There was bilateral internal carotid artery stenosis with 60% stenosis estimated at the right and not greater than 50% at the left.  Chemotherapy was held following discharge.  He was left without any sequela.  At the time of this visit he was entirely asymptomatic.  He was convinced a large part of his problem was that he had suffered from hyperglycemia, consistently for some time.  Indeed his glucose was between 152 mg/dL and 193 mg/dL in the preceding days.  However, he reported at home glucose readings of greater than 400 mg/dL..  On exam there were no changes.  The laboratory exams reported a blood count with normocytic anemia and mild thrombocytopenia.  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    4/14/2023: Completed 3 months of treatment with CAPOX.  On the day of this visit not feeling very well.  Weak and tired.  Not very good appetite although eating well.  Having some dysgeusia.  Afebrile.  No chest pains or cough and no abdominal pain.  Had maintain regular bowel activity.  No edema.  On exam no changes.  A decision was made to stop the chemotherapy.  He was to get intravenous fluids on the day of this visit.  To return to see me in 3 weeks.  Tentatively to have scans in early June 2023.    5/5/2023: Feeling progressively stronger. Eating better and slowly regaining weight. Afebrile. No more nausea. No diarrhea and now with regular bowel activity. On exam alert, conversant and well oriented. No pale or jaundice. No oral lesions and no palpable lymph nodes. Lungs  clear and abdomen soft. Minimal edema of the right lower extremity. The laboratory exams revealed persistent anemia with a tendency to macrocytosis. Hemoglobin and platelets within normal ranges. A decision was made to continue to observe and I asked him to see me in approximately 3 months with new scans.     8/7/2023: Feels as well as at the time of the last visit. Active and returned to work full time. Eating well and no nausea or vomiting. No chest pain or cough and no abdominal pain. On exam no changes, though he had lost a large amount of weight since the previous visit. The imaging studies suggested a new lesion in the liver, as well as several lesions in the spleen of unclear cause. Reviewed the images and the report of the scans. Discussed with him at length and explained the findings. Discussed with him the plans for a MRI. He will see me with results.     8/28/2023: Entirely asymptomatic.  Working without limitations.  Eating well.  Weight stable.  Afebrile.  No pain.  On exam no changes.  Laboratory exams were reviewed and discussed with him.  In spite of the fact things on the scans the Carcinoembryonic antigen has not increased.  However both the CT and the MRI suggest one isolated metastatic deposit in segment 8 of the liver.  Discussed with him the options at this time.  I believe a biopsy is essential and after that he would be treated with chemotherapy following which surgery, if no new lesions have appeared, could be considered.  He may still be curable even in the setting of metastatic disease.  Discussed with him at great length.  Explained the steps.  Sent a communication to Dr. Davis, the patient's surgeon.    9/11/2023: Feels about the same as before.  No new symptoms.  As active as before and working.  Eating well and with good appetite.  No unintended weight loss.  Afebrile.  On exam alert, conversant and in good spirits.  No distress.  No jaundice or pallor.  Lungs clear and heart  regular.  Abdomen soft.  The liver is not palpable.  No edema.  Laboratory exams were reviewed.  The liver biopsy report confirms metastatic colorectal cancer.  This appears to be an isolated metastases.  On this basis treatment with chemotherapy followed by surgery is not ordered.  I have asked him to have a PET scan and discussed the study with him.  Discussed the objectives of the treatment and its requirements.  Placed the treatment plan with 5 fluorouracil, irinotecan and panitumumab and discussed with him.  To begin as soon as possible.    9/25/2023: In the office before the next scheduled time.  He called to report that over the weekend he had had between 5 and 8 defecations of liquid stool.  He took loperamide irregularly and it did not seem to provide much relief.  He was able to eat and drink without any difficulties and was never nauseated.  He was seen in the emergency room on 9/24/2023 and he was not found to have any dehydration or other evidence of complications.  They discharged him.  At the time of this visit feeling better.  As of this time he had not had any liquid defecations.  He had continued to eat and drink fluids without any problems.  He had no chest pains and had not had any dyspnea.  He was also free of abdominal pain.  On exam alert, oriented and conversant.  Seemed well-hydrated and was not jaundiced or pale.  Lungs clear.  Heart regular.  Abdomen soft.  A decision was made to continue with the same plan.  I independently reviewed the images of the PET scan and discussed with him.  It reveals only an abnormal lesion in the liver as identified before.  No suggestion of distant metastatic disease.  Discussed with him the significance of this and explained the possibility of surgery and potentially cure.    11/15/2023: Completed 4 cycles of FOLFIRI/panitumumab.  Experienced the expected side effects, particularly skin rash.  However, the treatment proceeded without major complications.   "Today he tells me he has been feeling reasonably well and has continued to work part-time.  He enjoys his job.  He has been eating about as much as he had been eating before but he has lost some weight without intention.  He did have persistent diarrhea that responded only to large doses of loperamide.  At this point he no longer has diarrhea.  He has been without chest pains or cough and denies as well abdominal pain.  On exam he still has some erythema on the nasolabial regions on both sides.  The lungs are clear.  The heart is regular and the abdomen soft.  Liver and spleen do not seem to be enlarged.  The laboratory exams were reviewed and discussed with him.  To have another PET scan and consider surgical excision.  He will need to go to [place] for this.    12/11/2023: Feeling reasonably well at this time without any new complaints.  His diarrhea is essentially resolved although he still has soft defecations intermittently.  He is eating well and has a good appetite.  He has had no chest pains and has been without cough.  He denies abdominal pain.  No melena, hematochezia or hematuria.  No peripheral edema.  On exam no changes.  The PET scan reveals complete response with no residual evidence of disease activity.  I have discussed with Dr. Praveen Whittaker in [place] and he requested that a MRI be done prior to deciding on surgery.  Discussed with Mr. Guerrero.  Explained the plans.  He is to have the MRI and will see me with the results.    12/27/2023: Without new symptoms.  Has not had the MRI because of scheduling problems.  He feels lightheaded at times and \"I am not as sure footed as I was before\".  He has had no falls and he continues to work full-time.  He has been eating well and has regained some of the weight that he had lost.  He has been afebrile.  No chest pains or cough.  No abdominal pain or diarrhea and no dysuria.  No skin rash.  On exam no changes.  The laboratory exams were reviewed and " discussed with him.  To reschedule the MRI for the next couple of weeks.  Discussed with him.    Subjective:  12/27/2023: Progressively stronger.  Still lightheaded.  Has not had any falls.  Has been afebrile and without weight loss and in fact he has gained some weight.  No chest pains or cough.  No abdominal pain or diarrhea and no dysuria.    The following portions of the patient's history were reviewed and updated as appropriate: allergies, current medications, past family history, past medical history, past social history, past surgical history and problem list.    Past Medical History:   Diagnosis Date    Anemia     Colon cancer 2022    colon    Diabetes mellitus     Hyperlipidemia     Hypertension      Past Surgical History:   Procedure Laterality Date    APPENDECTOMY      CARDIAC CATHETERIZATION      COLON RESECTION N/A 12/15/2022    Procedure: COLON RESECTION RIGHT;  Surgeon: Percy Davis MD;  Location: [facility] MAIN OR;  Service: General;  Laterality: N/A;    COLONOSCOPY N/A 11/11/2022    Procedure: COLONOSCOPY WITH BIOPSY AND POLYPECTOMY;  Surgeon: Billy Julien MD;  Location: [facility] ENDOSCOPY;  Service: Gastroenterology;  Laterality: N/A;  Impression:  1.  Large 4-5cm fulgurating circumferential ulcerated mass in the very proximal part of the ascending colon next to ileocecal valve multiple biopsies were performed.  This is highly concerning for colon malignancy.  2.  2 polyp rem    ENDOSCOPY N/A 11/11/2022    Procedure: ESOPHAGOGASTRODUODENOSCOPY with biopsy X1;  Surgeon: Billy Julien MD;  Location: [facility] ENDOSCOPY;  Service: Gastroenterology;  Laterality: N/A;  5.  Upper endoscopy lamination unremarkable.       PORTACATH PLACEMENT Right 1/12/2023    Procedure: INSERTION OF PORTACATH;  Surgeon: Percy Davis MD;  Location: [facility] MAIN OR;  Service: General;  Laterality: Right;    TONSILLECTOMY         Current Outpatient Medications:     atorvastatin (LIPITOR) 40 MG tablet, Take  1 tablet by mouth Every Night., Disp: 90 tablet, Rfl: 0    diphenoxylate-atropine (LOMOTIL) 2.5-0.025 MG/5ML liquid, Take 5 mL by mouth 4 (Four) Times a Day As Needed for Diarrhea., Disp: 60 mL, Rfl: 0    empagliflozin (JARDIANCE) 25 MG tablet tablet, Take 1 tablet by mouth Daily. Dont take preop, Disp: , Rfl:     ferrous gluconate (FERGON) 324 MG tablet, Take 1 tablet by mouth Daily With Breakfast., Disp: 30 tablet, Rfl: 3    glimepiride (AMARYL) 4 MG tablet, Take 1 tablet by mouth 2 (Two) Times a Day. None preop, Disp: , Rfl:     Insulin Glargine (LANTUS SOLOSTAR) 100 UNIT/ML injection pen, Inject 7 Units under the skin into the appropriate area as directed Every Night., Disp: 15 mL, Rfl: 0    Insulin Pen Needle (Pen Needles) 32G X 4 MM misc, 1 each Daily. Dx code: E11.65, Disp: 100 each, Rfl: 2    losartan (COZAAR) 100 MG tablet, , Disp: , Rfl:     metFORMIN (GLUCOPHAGE) 1000 MG tablet, Take 2 tablets by mouth Daily With Dinner. Last dose 12/12, Disp: , Rfl:     ondansetron (ZOFRAN) 8 MG tablet, Take 1 tablet by mouth 3 (Three) Times a Day As Needed for Nausea or Vomiting., Disp: 30 tablet, Rfl: 5    ondansetron (ZOFRAN) 8 MG tablet, Take 1 tablet by mouth 3 (Three) Times a Day As Needed for Nausea or Vomiting., Disp: 30 tablet, Rfl: 5    pioglitazone (ACTOS) 45 MG tablet, Take 1 tablet by mouth Daily. None preop, Disp: , Rfl:     Semaglutide,0.25 or 0.5MG/DOS, (Ozempic, 0.25 or 0.5 MG/DOSE,) 2 MG/1.5ML solution pen-injector, Inject 0.25 mg under the skin into the appropriate area as directed As Needed. friday, Disp: , Rfl:     aspirin 325 MG tablet, Take 1 tablet by mouth Daily. (Patient not taking: Reported on 9/25/2023), Disp: 30 tablet, Rfl: 0    capecitabine (XELODA) 150 MG chemo tablet, Take 5 tablets by mouth (with 1 other capecitabine Rx) for 1,750 mg total 2 (Two) Times a Day on Days 1-14, then off for 7 days. Total dose is 1750mg in the AM & 1750mg in the PM. (Patient not taking: Reported on 9/25/2023),  Disp: 140 tablet, Rfl: 4    capecitabine (XELODA) 500 MG chemo tablet, Take 2 tablets by mouth (with 1 other capecitabine prescription) for 1,750 mg total 2 (Two) Times a Day on Days 1-14, then off for 7 days.  Total dose is 1750mg in the AM & 1750mg in the PM. (Patient not taking: Reported on 2023), Disp: 56 tablet, Rfl: 4    Allergies   Allergen Reactions    Penicillins Rash     Family History   Problem Relation Age of Onset    Pneumonia Mother 94        SARS-CoV2    Colon cancer Father 62    Heart disease Sister      Cancer-related family history includes Colon cancer (age of onset: 62) in his father.    Social History     Tobacco Use    Smoking status: Former     Packs/day: 1.00     Years: 2.00     Additional pack years: 0.00     Total pack years: 2.00     Types: Cigarettes     Start date:      Quit date:      Years since quittin.0    Smokeless tobacco: Never   Vaping Use    Vaping Use: Never used   Substance Use Topics    Alcohol use: Not Currently    Drug use: Never     Social History     Social History Narrative    Not on file      ROS:     Review of Systems   Constitutional:  Negative for activity change, appetite change, chills, diaphoresis, fatigue, fever and unexpected weight change.   HENT:  Negative for congestion, dental problem, drooling, ear discharge, ear pain, facial swelling, hearing loss, mouth sores, nosebleeds, postnasal drip, rhinorrhea, sinus pressure, sinus pain, sneezing, sore throat, tinnitus, trouble swallowing and voice change.    Eyes:  Negative for photophobia, pain, discharge, redness, itching and visual disturbance.   Respiratory:  Negative for apnea, cough, choking, chest tightness, shortness of breath, wheezing and stridor.    Cardiovascular:  Negative for chest pain, palpitations and leg swelling.   Gastrointestinal:  Negative for abdominal distention, abdominal pain, anal bleeding, blood in stool, constipation, diarrhea, nausea, rectal pain and vomiting.  "  Endocrine: Negative for cold intolerance, heat intolerance, polydipsia and polyuria.   Genitourinary:  Negative for decreased urine volume, difficulty urinating, dysuria, flank pain, frequency, genital sores, hematuria and urgency.   Musculoskeletal:  Negative for arthralgias, back pain, gait problem, joint swelling, myalgias, neck pain and neck stiffness.   Skin:  Negative for color change, pallor and rash.   Neurological:  Negative for dizziness, tremors, seizures, syncope, facial asymmetry, speech difficulty, weakness, light-headedness, numbness and headaches.   Hematological:  Negative for adenopathy. Does not bruise/bleed easily.   Psychiatric/Behavioral:  Negative for agitation, behavioral problems, confusion, decreased concentration, hallucinations, self-injury, sleep disturbance and suicidal ideas. The patient is not nervous/anxious.      Objective:    Vitals:    12/27/23 0959   BP: 160/84   Pulse: 68   Temp: 94.7 °F (34.8 °C)  Comment: had a drink of water   TempSrc: Oral   SpO2: 94%   Weight: 77.1 kg (170 lb)   Height: 180.3 cm (71\")   PainSc: 0-No pain       Body mass index is 23.71 kg/m².  ECOG  (0) Fully active, able to carry on all predisease performance without restriction    Physical Exam:     Physical Exam  Constitutional:       General: He is not in acute distress.     Appearance: Normal appearance. He is not ill-appearing, toxic-appearing or diaphoretic.   HENT:      Head: Normocephalic and atraumatic.      Right Ear: External ear normal.      Left Ear: External ear normal.      Nose: Nose normal.      Mouth/Throat:      Mouth: Mucous membranes are moist.      Pharynx: Oropharynx is clear.   Eyes:      General: No scleral icterus.        Right eye: No discharge.         Left eye: No discharge.      Conjunctiva/sclera: Conjunctivae normal.      Pupils: Pupils are equal, round, and reactive to light.   Cardiovascular:      Rate and Rhythm: Normal rate and regular rhythm.      Pulses: Normal " pulses.      Heart sounds: Normal heart sounds. No murmur heard.     No friction rub. No gallop.   Pulmonary:      Effort: No respiratory distress.      Breath sounds: No stridor. No wheezing, rhonchi or rales.   Chest:      Chest wall: No tenderness.   Abdominal:      General: Abdomen is flat. Bowel sounds are normal. There is no distension.      Palpations: Abdomen is soft. There is no mass.      Tenderness: There is no abdominal tenderness. There is no right CVA tenderness, left CVA tenderness, guarding or rebound.   Musculoskeletal:         General: No swelling, tenderness, deformity or signs of injury.      Cervical back: No rigidity.      Right lower leg: No edema.      Left lower leg: No edema.   Lymphadenopathy:      Cervical: No cervical adenopathy.   Skin:     General: Skin is warm and dry.      Coloration: Skin is not jaundiced.      Findings: No bruising or rash.   Neurological:      General: No focal deficit present.      Mental Status: He is alert and oriented to person, place, and time.      Cranial Nerves: No cranial nerve deficit.      Gait: Gait normal.   Psychiatric:         Mood and Affect: Mood normal.         Behavior: Behavior normal.         Thought Content: Thought content normal.         Judgment: Judgment normal.     KATIE Kramer MD performed a physical exam on 12/27/2023 as documented above.    Lab Results - Last 18 Months   Lab Units 12/27/23  0933 12/11/23  0811 11/26/23  1209   WBC 10*3/mm3 5.04 5.06 4.10   HEMOGLOBIN g/dL 12.3* 12.4* 11.7*   HEMATOCRIT % 39.1 39.4 36.0*   PLATELETS 10*3/mm3 158 164 127*   MCV fL 94.0 93.8 89.2     Lab Results - Last 18 Months   Lab Units 12/11/23  0811 11/26/23  1209 11/13/23  0817   SODIUM mmol/L 138 141 141   POTASSIUM mmol/L 4.3 3.4* 3.8   CHLORIDE mmol/L 101 106 108*   CO2 mmol/L 28.0 25.0 24.0   BUN mg/dL 13 12 16   CREATININE mg/dL 0.91 0.91 0.76   CALCIUM mg/dL 8.8 8.7 8.7   BILIRUBIN mg/dL 0.6 0.6 0.3   ALK PHOS U/L 101 84 86   ALT (SGPT)  U/L 14 17 14   AST (SGOT) U/L 14 18 15   GLUCOSE mg/dL 319* 340* 284*     Lab Results   Component Value Date    GLUCOSE 319 (H) 12/11/2023    BUN 13 12/11/2023    CREATININE 0.91 12/11/2023    EGFRIFNONA 76 11/15/2021    EGFRIFAFRI 87 11/15/2021    BCR 14.3 12/11/2023    K 4.3 12/11/2023    CO2 28.0 12/11/2023    CALCIUM 8.8 12/11/2023    ALBUMIN 3.5 12/11/2023    AST 14 12/11/2023    ALT 14 12/11/2023     Lab Results   Component Value Date    IRON 15 (L) 01/06/2023    TIBC 548 (H) 01/06/2023    FERRITIN 23.51 (L) 01/06/2023     Lab Results   Component Value Date    CEA 6.34 12/11/2023     Assessment & Plan     Assessment:  Isolated metastatic lesion in segment 8 of the liver.  Received 4 cycles of FOLFIRI/panitumumab with the expected side effects and no complications.  With complete response by PET scan.  MRI pending.  I will see him with the results.  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  Completed Cape-Ox adjuvantly for 3 months in April 2023.  History of ischemic stroke.  Asymptomatic at this time.  Reviewed the blood counts and discussed with him.  He is to see me in approximately 2 weeks with the MRI.    Plan:  As above.    Don Kramer MD on 12/27/2023 at 10:43 AM.

## 2023-12-27 ENCOUNTER — LAB (OUTPATIENT)
Dept: LAB | Facility: HOSPITAL | Age: 78
End: 2023-12-27
Payer: OTHER GOVERNMENT

## 2023-12-27 ENCOUNTER — OFFICE VISIT (OUTPATIENT)
Dept: ONCOLOGY | Facility: CLINIC | Age: 78
End: 2023-12-27
Payer: OTHER GOVERNMENT

## 2023-12-27 VITALS
HEIGHT: 71 IN | WEIGHT: 170 LBS | DIASTOLIC BLOOD PRESSURE: 84 MMHG | OXYGEN SATURATION: 94 % | HEART RATE: 68 BPM | SYSTOLIC BLOOD PRESSURE: 160 MMHG | BODY MASS INDEX: 23.8 KG/M2 | TEMPERATURE: 94.7 F

## 2023-12-27 DIAGNOSIS — C18.2 MALIGNANT NEOPLASM OF ASCENDING COLON: Primary | ICD-10-CM

## 2023-12-27 DIAGNOSIS — C78.7 METASTASES TO THE LIVER: ICD-10-CM

## 2023-12-27 DIAGNOSIS — C78.7 METASTASES TO THE LIVER: Primary | ICD-10-CM

## 2023-12-27 LAB
ALBUMIN SERPL-MCNC: 3.7 G/DL (ref 3.5–5.2)
ALBUMIN/GLOB SERPL: 1.4 G/DL
ALP SERPL-CCNC: 113 U/L (ref 39–117)
ALT SERPL W P-5'-P-CCNC: 24 U/L (ref 1–41)
ANION GAP SERPL CALCULATED.3IONS-SCNC: 10 MMOL/L (ref 5–15)
AST SERPL-CCNC: 22 U/L (ref 1–40)
BASOPHILS # BLD AUTO: 0.01 10*3/MM3 (ref 0–0.2)
BASOPHILS NFR BLD AUTO: 0.2 % (ref 0–1.5)
BILIRUB SERPL-MCNC: 0.4 MG/DL (ref 0–1.2)
BUN SERPL-MCNC: 17 MG/DL (ref 8–23)
BUN/CREAT SERPL: 23 (ref 7–25)
CALCIUM SPEC-SCNC: 8.5 MG/DL (ref 8.6–10.5)
CHLORIDE SERPL-SCNC: 99 MMOL/L (ref 98–107)
CO2 SERPL-SCNC: 26 MMOL/L (ref 22–29)
CREAT SERPL-MCNC: 0.74 MG/DL (ref 0.76–1.27)
DEPRECATED RDW RBC AUTO: 50.1 FL (ref 37–54)
EGFRCR SERPLBLD CKD-EPI 2021: 93.9 ML/MIN/1.73
EOSINOPHIL # BLD AUTO: 0.06 10*3/MM3 (ref 0–0.4)
EOSINOPHIL NFR BLD AUTO: 1.2 % (ref 0.3–6.2)
ERYTHROCYTE [DISTWIDTH] IN BLOOD BY AUTOMATED COUNT: 14.9 % (ref 12.3–15.4)
GLOBULIN UR ELPH-MCNC: 2.6 GM/DL
GLUCOSE SERPL-MCNC: 420 MG/DL (ref 65–99)
HCT VFR BLD AUTO: 39.1 % (ref 37.5–51)
HGB BLD-MCNC: 12.3 G/DL (ref 13–17.7)
HOLD SPECIMEN: NORMAL
LYMPHOCYTES # BLD AUTO: 1.24 10*3/MM3 (ref 0.7–3.1)
LYMPHOCYTES NFR BLD AUTO: 24.6 % (ref 19.6–45.3)
MCH RBC QN AUTO: 29.6 PG (ref 26.6–33)
MCHC RBC AUTO-ENTMCNC: 31.5 G/DL (ref 31.5–35.7)
MCV RBC AUTO: 94 FL (ref 79–97)
MONOCYTES # BLD AUTO: 0.59 10*3/MM3 (ref 0.1–0.9)
MONOCYTES NFR BLD AUTO: 11.7 % (ref 5–12)
NEUTROPHILS NFR BLD AUTO: 3.14 10*3/MM3 (ref 1.7–7)
NEUTROPHILS NFR BLD AUTO: 62.3 % (ref 42.7–76)
PLATELET # BLD AUTO: 158 10*3/MM3 (ref 140–450)
PMV BLD AUTO: 9.9 FL (ref 6–12)
POTASSIUM SERPL-SCNC: 4.3 MMOL/L (ref 3.5–5.2)
PROT SERPL-MCNC: 6.3 G/DL (ref 6–8.5)
RBC # BLD AUTO: 4.16 10*6/MM3 (ref 4.14–5.8)
SODIUM SERPL-SCNC: 135 MMOL/L (ref 136–145)
WBC NRBC COR # BLD AUTO: 5.04 10*3/MM3 (ref 3.4–10.8)

## 2023-12-27 PROCEDURE — 85025 COMPLETE CBC W/AUTO DIFF WBC: CPT

## 2023-12-27 PROCEDURE — 80053 COMPREHEN METABOLIC PANEL: CPT | Performed by: INTERNAL MEDICINE

## 2023-12-27 PROCEDURE — 36415 COLL VENOUS BLD VENIPUNCTURE: CPT

## 2023-12-28 ENCOUNTER — TELEPHONE (OUTPATIENT)
Dept: ONCOLOGY | Facility: CLINIC | Age: 78
End: 2023-12-28
Payer: OTHER GOVERNMENT

## 2023-12-28 NOTE — TELEPHONE ENCOUNTER
Caller: MARY    Relationship:  [employer]    Best call back number: [phone]    What is the best time to reach you: ASAP    Who are you requesting to speak with (clinical staff, provider,  specific staff member): CLINICAL    What was the call regarding: MARY AT  [employer] SAYS THERE IS AN ISSUE WITH THE [facility]EN[facility] THAT IS SCHEDULED FOR JAN.5.  SHE SAYS THERE IS A [facility] REFERRAL ON FILE FOR OUR OFFICE BUT [facility]ONNY AT THE [facility] SAYS WE NEED TO SUBMIT A REQUEST FOR SERVICES TO ADD THIS PROCEDURE BECAUSE IT IS LOOKING AT A DIFFERENT BODY PART.  PLEASE CALL MARY TO DISCUSS WHAT IS NEEDED.

## 2023-12-29 NOTE — TELEPHONE ENCOUNTER
Contacted the [facility] and spoke with Dara regarding Mr. Guerrero. I informed Dara that our financial department was contacted regarding our most recent order for Mr. Guerrero for an MRI abdomen w/wo contrast. I informed her that we have ordered an MRI abdomen w/wo contrast for him in the past therefore I am requesting to know what else we need to do regarding this referral. She stated they currently have an auth on file which does not  until 24; I stated he is currently scheduled on 24. I confirmed that nothing else is needed regarding this referral. She confirmed. Dara stated she will send the referral with the authorization number over to us. I confirmed.

## 2023-12-29 NOTE — TELEPHONE ENCOUNTER
Late entry:    Called and spoke to Joanne with [facility] yesterday regarding Mr. Guerrero. She relayed the information to me that she was provided by [facility]. I informed her that I will call to verify what is needed. She confirmed.

## 2024-01-05 ENCOUNTER — HOSPITAL ENCOUNTER (OUTPATIENT)
Dept: MRI IMAGING | Facility: HOSPITAL | Age: 79
Discharge: HOME | End: 2024-01-05
Admitting: INTERNAL MEDICINE
Payer: OTHER GOVERNMENT

## 2024-01-08 ENCOUNTER — APPOINTMENT (OUTPATIENT)
Dept: GENERAL RADIOLOGY | Facility: HOSPITAL | Age: 79
End: 2024-01-08
Payer: OTHER GOVERNMENT

## 2024-01-08 ENCOUNTER — APPOINTMENT (OUTPATIENT)
Dept: MRI IMAGING | Facility: HOSPITAL | Age: 79
End: 2024-01-08
Payer: OTHER GOVERNMENT

## 2024-01-08 ENCOUNTER — HOSPITAL ENCOUNTER (OUTPATIENT)
Facility: HOSPITAL | Age: 79
Setting detail: OBSERVATION
Discharge: HOME | End: 2024-01-09
Attending: EMERGENCY MEDICINE | Admitting: EMERGENCY MEDICINE
Payer: OTHER GOVERNMENT

## 2024-01-08 DIAGNOSIS — Z86.73 HISTORY OF CVA (CEREBROVASCULAR ACCIDENT): ICD-10-CM

## 2024-01-08 DIAGNOSIS — R73.9 HYPERGLYCEMIA: Primary | ICD-10-CM

## 2024-01-08 LAB
ALBUMIN SERPL-MCNC: 3.7 G/DL (ref 3.5–5.2)
ALBUMIN/GLOB SERPL: 1.2 G/DL
ALP SERPL-CCNC: 122 U/L (ref 39–117)
ALT SERPL W P-5'-P-CCNC: 16 U/L (ref 1–41)
ANION GAP SERPL CALCULATED.3IONS-SCNC: 6 MMOL/L (ref 5–15)
APTT PPP: 27 SECONDS (ref 61–76.5)
AST SERPL-CCNC: 13 U/L (ref 1–40)
BASOPHILS # BLD AUTO: 0 10*3/MM3 (ref 0–0.2)
BASOPHILS NFR BLD AUTO: 0.3 % (ref 0–1.5)
BILIRUB SERPL-MCNC: 0.3 MG/DL (ref 0–1.2)
BILIRUB UR QL STRIP: NEGATIVE
BUN SERPL-MCNC: 24 MG/DL (ref 8–23)
BUN/CREAT SERPL: 25 (ref 7–25)
CALCIUM SPEC-SCNC: 8.9 MG/DL (ref 8.6–10.5)
CHLORIDE SERPL-SCNC: 99 MMOL/L (ref 98–107)
CLARITY UR: CLEAR
CO2 SERPL-SCNC: 29 MMOL/L (ref 22–29)
COLOR UR: YELLOW
CREAT SERPL-MCNC: 0.96 MG/DL (ref 0.76–1.27)
DEPRECATED RDW RBC AUTO: 51.6 FL (ref 37–54)
EGFRCR SERPLBLD CKD-EPI 2021: 81.9 ML/MIN/1.73
EOSINOPHIL # BLD AUTO: 0 10*3/MM3 (ref 0–0.4)
EOSINOPHIL NFR BLD AUTO: 0.9 % (ref 0.3–6.2)
ERYTHROCYTE [DISTWIDTH] IN BLOOD BY AUTOMATED COUNT: 15.5 % (ref 12.3–15.4)
GLOBULIN UR ELPH-MCNC: 3.2 GM/DL
GLUCOSE BLDC GLUCOMTR-MCNC: 134 MG/DL (ref 70–105)
GLUCOSE BLDC GLUCOMTR-MCNC: 189 MG/DL (ref 70–105)
GLUCOSE BLDC GLUCOMTR-MCNC: 229 MG/DL (ref 70–105)
GLUCOSE BLDC GLUCOMTR-MCNC: 448 MG/DL (ref 70–105)
GLUCOSE SERPL-MCNC: 582 MG/DL (ref 65–99)
GLUCOSE UR STRIP-MCNC: ABNORMAL MG/DL
HBA1C MFR BLD: 12.2 % (ref 4.8–5.6)
HCT VFR BLD AUTO: 38.8 % (ref 37.5–51)
HGB BLD-MCNC: 12.6 G/DL (ref 13–17.7)
HGB UR QL STRIP.AUTO: NEGATIVE
HOLD SPECIMEN: NORMAL
HOLD SPECIMEN: NORMAL
INR PPP: 1.09 (ref 0.93–1.1)
KETONES UR QL STRIP: NEGATIVE
LEUKOCYTE ESTERASE UR QL STRIP.AUTO: NEGATIVE
LYMPHOCYTES # BLD AUTO: 1.1 10*3/MM3 (ref 0.7–3.1)
LYMPHOCYTES NFR BLD AUTO: 23.8 % (ref 19.6–45.3)
MCH RBC QN AUTO: 28.9 PG (ref 26.6–33)
MCHC RBC AUTO-ENTMCNC: 32.4 G/DL (ref 31.5–35.7)
MCV RBC AUTO: 89.1 FL (ref 79–97)
MONOCYTES # BLD AUTO: 0.4 10*3/MM3 (ref 0.1–0.9)
MONOCYTES NFR BLD AUTO: 9.6 % (ref 5–12)
NEUTROPHILS NFR BLD AUTO: 3 10*3/MM3 (ref 1.7–7)
NEUTROPHILS NFR BLD AUTO: 65.4 % (ref 42.7–76)
NITRITE UR QL STRIP: NEGATIVE
NRBC BLD AUTO-RTO: 0 /100 WBC (ref 0–0.2)
PH UR STRIP.AUTO: <=5 [PH] (ref 5–8)
PLATELET # BLD AUTO: 159 10*3/MM3 (ref 140–450)
PMV BLD AUTO: 8 FL (ref 6–12)
POTASSIUM SERPL-SCNC: 4.9 MMOL/L (ref 3.5–5.2)
PROT SERPL-MCNC: 6.9 G/DL (ref 6–8.5)
PROT UR QL STRIP: NEGATIVE
PROTHROMBIN TIME: 11.8 SECONDS (ref 9.6–11.7)
RBC # BLD AUTO: 4.36 10*6/MM3 (ref 4.14–5.8)
SODIUM SERPL-SCNC: 134 MMOL/L (ref 136–145)
SP GR UR STRIP: 1.03 (ref 1–1.03)
UROBILINOGEN UR QL STRIP: ABNORMAL
WBC NRBC COR # BLD AUTO: 4.7 10*3/MM3 (ref 3.4–10.8)
WHOLE BLOOD HOLD COAG: NORMAL
WHOLE BLOOD HOLD SPECIMEN: NORMAL

## 2024-01-08 PROCEDURE — 81003 URINALYSIS AUTO W/O SCOPE: CPT

## 2024-01-08 PROCEDURE — 83036 HEMOGLOBIN GLYCOSYLATED A1C: CPT | Performed by: NURSE PRACTITIONER

## 2024-01-08 PROCEDURE — 71045 X-RAY EXAM CHEST 1 VIEW: CPT

## 2024-01-08 PROCEDURE — 85730 THROMBOPLASTIN TIME PARTIAL: CPT

## 2024-01-08 PROCEDURE — 63710000001 INSULIN GLARGINE PER 5 UNITS: Performed by: INTERNAL MEDICINE

## 2024-01-08 PROCEDURE — 99285 EMERGENCY DEPT VISIT HI MDM: CPT

## 2024-01-08 PROCEDURE — G0378 HOSPITAL OBSERVATION PER HR: HCPCS

## 2024-01-08 PROCEDURE — 25010000002 GADOTERIDOL PER 1 ML: Performed by: EMERGENCY MEDICINE

## 2024-01-08 PROCEDURE — 63710000001 INSULIN LISPRO (HUMAN) PER 5 UNITS: Performed by: NURSE PRACTITIONER

## 2024-01-08 PROCEDURE — 63710000001 INSULIN REGULAR HUMAN PER 5 UNITS

## 2024-01-08 PROCEDURE — 82948 REAGENT STRIP/BLOOD GLUCOSE: CPT

## 2024-01-08 PROCEDURE — 96360 HYDRATION IV INFUSION INIT: CPT

## 2024-01-08 PROCEDURE — 85025 COMPLETE CBC W/AUTO DIFF WBC: CPT

## 2024-01-08 PROCEDURE — 85610 PROTHROMBIN TIME: CPT

## 2024-01-08 PROCEDURE — 70553 MRI BRAIN STEM W/O & W/DYE: CPT

## 2024-01-08 PROCEDURE — 96361 HYDRATE IV INFUSION ADD-ON: CPT

## 2024-01-08 PROCEDURE — 25810000003 SODIUM CHLORIDE 0.9 % SOLUTION

## 2024-01-08 PROCEDURE — 63710000001 INSULIN LISPRO (HUMAN) PER 5 UNITS: Performed by: INTERNAL MEDICINE

## 2024-01-08 PROCEDURE — 36415 COLL VENOUS BLD VENIPUNCTURE: CPT

## 2024-01-08 PROCEDURE — 93005 ELECTROCARDIOGRAM TRACING: CPT | Performed by: EMERGENCY MEDICINE

## 2024-01-08 PROCEDURE — 25810000003 SODIUM CHLORIDE 0.9 % SOLUTION: Performed by: NURSE PRACTITIONER

## 2024-01-08 PROCEDURE — A9579 GAD-BASE MR CONTRAST NOS,1ML: HCPCS | Performed by: EMERGENCY MEDICINE

## 2024-01-08 PROCEDURE — 80053 COMPREHEN METABOLIC PANEL: CPT

## 2024-01-08 RX ORDER — SODIUM CHLORIDE 0.9 % (FLUSH) 0.9 %
10 SYRINGE (ML) INJECTION AS NEEDED
Status: DISCONTINUED | OUTPATIENT
Start: 2024-01-08 | End: 2024-01-09 | Stop reason: HOSPADM

## 2024-01-08 RX ORDER — INSULIN LISPRO 100 [IU]/ML
2-9 INJECTION, SOLUTION INTRAVENOUS; SUBCUTANEOUS
Status: DISCONTINUED | OUTPATIENT
Start: 2024-01-08 | End: 2024-01-08

## 2024-01-08 RX ORDER — NITROGLYCERIN 0.4 MG/1
0.4 TABLET SUBLINGUAL
Status: DISCONTINUED | OUTPATIENT
Start: 2024-01-08 | End: 2024-01-09

## 2024-01-08 RX ORDER — SODIUM CHLORIDE 9 MG/ML
75 INJECTION, SOLUTION INTRAVENOUS CONTINUOUS
Status: DISCONTINUED | OUTPATIENT
Start: 2024-01-08 | End: 2024-01-09

## 2024-01-08 RX ORDER — BISACODYL 5 MG/1
5 TABLET, DELAYED RELEASE ORAL DAILY PRN
Status: DISCONTINUED | OUTPATIENT
Start: 2024-01-08 | End: 2024-01-09 | Stop reason: HOSPADM

## 2024-01-08 RX ORDER — ACETAMINOPHEN 325 MG/1
650 TABLET ORAL EVERY 4 HOURS PRN
Status: DISCONTINUED | OUTPATIENT
Start: 2024-01-08 | End: 2024-01-09 | Stop reason: HOSPADM

## 2024-01-08 RX ORDER — ALUMINA, MAGNESIA, AND SIMETHICONE 2400; 2400; 240 MG/30ML; MG/30ML; MG/30ML
15 SUSPENSION ORAL EVERY 6 HOURS PRN
Status: DISCONTINUED | OUTPATIENT
Start: 2024-01-08 | End: 2024-01-09 | Stop reason: HOSPADM

## 2024-01-08 RX ORDER — INSULIN GLARGINE 100 [IU]/ML
20 INJECTION, SOLUTION SUBCUTANEOUS NIGHTLY
Status: DISCONTINUED | OUTPATIENT
Start: 2024-01-08 | End: 2024-01-09 | Stop reason: HOSPADM

## 2024-01-08 RX ORDER — ONDANSETRON 2 MG/ML
4 INJECTION INTRAMUSCULAR; INTRAVENOUS EVERY 6 HOURS PRN
Status: DISCONTINUED | OUTPATIENT
Start: 2024-01-08 | End: 2024-01-09 | Stop reason: HOSPADM

## 2024-01-08 RX ORDER — INSULIN GLARGINE 100 [IU]/ML
16 INJECTION, SOLUTION SUBCUTANEOUS NIGHTLY
Status: ON HOLD | COMMUNITY
End: 2024-01-09 | Stop reason: SDUPTHER

## 2024-01-08 RX ORDER — SODIUM CHLORIDE 9 MG/ML
40 INJECTION, SOLUTION INTRAVENOUS AS NEEDED
Status: DISCONTINUED | OUTPATIENT
Start: 2024-01-08 | End: 2024-01-09 | Stop reason: HOSPADM

## 2024-01-08 RX ORDER — INSULIN LISPRO 100 [IU]/ML
2-9 INJECTION, SOLUTION INTRAVENOUS; SUBCUTANEOUS
Status: DISCONTINUED | OUTPATIENT
Start: 2024-01-09 | End: 2024-01-09 | Stop reason: HOSPADM

## 2024-01-08 RX ORDER — ACETAMINOPHEN 650 MG/1
650 SUPPOSITORY RECTAL EVERY 4 HOURS PRN
Status: DISCONTINUED | OUTPATIENT
Start: 2024-01-08 | End: 2024-01-09 | Stop reason: HOSPADM

## 2024-01-08 RX ORDER — DEXTROSE MONOHYDRATE 25 G/50ML
25 INJECTION, SOLUTION INTRAVENOUS
Status: DISCONTINUED | OUTPATIENT
Start: 2024-01-08 | End: 2024-01-09 | Stop reason: HOSPADM

## 2024-01-08 RX ORDER — ACETAMINOPHEN 160 MG/5ML
650 SOLUTION ORAL EVERY 4 HOURS PRN
Status: DISCONTINUED | OUTPATIENT
Start: 2024-01-08 | End: 2024-01-09 | Stop reason: HOSPADM

## 2024-01-08 RX ORDER — SODIUM CHLORIDE 0.9 % (FLUSH) 0.9 %
10 SYRINGE (ML) INJECTION EVERY 12 HOURS SCHEDULED
Status: DISCONTINUED | OUTPATIENT
Start: 2024-01-08 | End: 2024-01-09 | Stop reason: HOSPADM

## 2024-01-08 RX ORDER — AMOXICILLIN 250 MG
2 CAPSULE ORAL 2 TIMES DAILY
Status: DISCONTINUED | OUTPATIENT
Start: 2024-01-08 | End: 2024-01-09

## 2024-01-08 RX ORDER — BISACODYL 10 MG
10 SUPPOSITORY, RECTAL RECTAL DAILY PRN
Status: DISCONTINUED | OUTPATIENT
Start: 2024-01-08 | End: 2024-01-09 | Stop reason: HOSPADM

## 2024-01-08 RX ORDER — INSULIN GLARGINE 100 [IU]/ML
16 INJECTION, SOLUTION SUBCUTANEOUS NIGHTLY
Status: DISCONTINUED | OUTPATIENT
Start: 2024-01-08 | End: 2024-01-08

## 2024-01-08 RX ORDER — IBUPROFEN 600 MG/1
1 TABLET ORAL
Status: DISCONTINUED | OUTPATIENT
Start: 2024-01-08 | End: 2024-01-09 | Stop reason: HOSPADM

## 2024-01-08 RX ORDER — NICOTINE POLACRILEX 4 MG
15 LOZENGE BUCCAL
Status: DISCONTINUED | OUTPATIENT
Start: 2024-01-08 | End: 2024-01-09 | Stop reason: HOSPADM

## 2024-01-08 RX ORDER — POLYETHYLENE GLYCOL 3350 17 G/17G
17 POWDER, FOR SOLUTION ORAL DAILY PRN
Status: DISCONTINUED | OUTPATIENT
Start: 2024-01-08 | End: 2024-01-09 | Stop reason: HOSPADM

## 2024-01-08 RX ORDER — INSULIN LISPRO 100 [IU]/ML
7 INJECTION, SOLUTION INTRAVENOUS; SUBCUTANEOUS
Status: DISCONTINUED | OUTPATIENT
Start: 2024-01-08 | End: 2024-01-09 | Stop reason: HOSPADM

## 2024-01-08 RX ORDER — INSULIN LISPRO 100 [IU]/ML
5 INJECTION, SOLUTION INTRAVENOUS; SUBCUTANEOUS
Status: DISCONTINUED | OUTPATIENT
Start: 2024-01-08 | End: 2024-01-08

## 2024-01-08 RX ADMIN — SODIUM CHLORIDE 75 ML/HR: 9 INJECTION, SOLUTION INTRAVENOUS at 13:48

## 2024-01-08 RX ADMIN — INSULIN LISPRO 5 UNITS: 100 INJECTION, SOLUTION INTRAVENOUS; SUBCUTANEOUS at 13:48

## 2024-01-08 RX ADMIN — GADOTERIDOL 15 ML: 279.3 INJECTION, SOLUTION INTRAVENOUS at 11:54

## 2024-01-08 RX ADMIN — SODIUM CHLORIDE 500 ML: 0.9 INJECTION, SOLUTION INTRAVENOUS at 12:16

## 2024-01-08 RX ADMIN — INSULIN GLARGINE 20 UNITS: 100 INJECTION, SOLUTION SUBCUTANEOUS at 21:45

## 2024-01-08 RX ADMIN — INSULIN HUMAN 8 UNITS: 100 INJECTION, SOLUTION PARENTERAL at 10:40

## 2024-01-08 RX ADMIN — INSULIN LISPRO 7 UNITS: 100 INJECTION, SOLUTION INTRAVENOUS; SUBCUTANEOUS at 17:37

## 2024-01-08 RX ADMIN — INSULIN LISPRO 9 UNITS: 100 INJECTION, SOLUTION INTRAVENOUS; SUBCUTANEOUS at 13:51

## 2024-01-08 RX ADMIN — Medication 10 ML: at 21:45

## 2024-01-08 NOTE — CONSULTS
Inpatient Endocrine Consult  Consultation request by primary team for uncontrolled type 2 diabetes  Patient Care Team:  Vlad Moreland MD as PCP - General (Internal Medicine)  Percy Davis MD as Surgeon (General Surgery)  Don Kramer MD as Consulting Physician (Hematology and Oncology)    Chief Complaint: Uncontrolled type 2 diabetes    HPI: This is a 78-year-old male with history of colon cancer, hypertension, type 2 diabetes and hyperlipidemia who came in because of the balance issues.  Initially was found to have significant hyperglycemia.  Subsequently admitted for further evaluation management.  Patient tells me that he has diabetes for last 10 years, he does take insulin 18 units but does not remember the name of the insulin.  He has lost almost 100 pounds of weight in the last 1 year.  Does admit polyuria and nocturia and polydipsia.  Denies any excessive fatigue or tiredness, denies any nausea or vomiting.  Is eating well at this time.    Past Medical History:   Diagnosis Date    Anemia     Colon cancer     colon    Diabetes mellitus     Hyperlipidemia     Hypertension        Social History     Socioeconomic History    Marital status: Single   Tobacco Use    Smoking status: Former     Packs/day: 1.00     Years: 2.00     Additional pack years: 0.00     Total pack years: 2.00     Types: Cigarettes     Start date:      Quit date:      Years since quittin.0    Smokeless tobacco: Never   Vaping Use    Vaping Use: Never used   Substance and Sexual Activity    Alcohol use: Not Currently    Drug use: Never    Sexual activity: Defer       Family History   Problem Relation Age of Onset    Pneumonia Mother 94        SARS-CoV2    Colon cancer Father 62    Heart disease Sister        Allergies   Allergen Reactions    Penicillins Rash       ROS:   Constitutional:  Denies fatigue, tiredness.    Eyes:  Denies change in visual acuity   HENT:  Denies nasal congestion or sore throat    Respiratory: Denies cough, shortness of breath.   Cardiovascular:  Denies chest pain, edema   GI:  Denies abdominal pain, nausea, vomiting.   : Admit polyuria and polydipsia  Musculoskeletal:  Denies back pain or joint pain   Integument:  Denies dry skin, rash   Neurologic:  Denies headache, focal weakness or sensory changes   Endocrine: Admit polyuria and polydipsia.  Psychiatric:  Denies depression or anxiety      Vitals:    01/08/24 1528   BP: 145/74   Pulse: 99   Resp: 13   Temp: 97.4 °F (36.3 °C)   SpO2: 99%      Body mass index is 22.43 kg/m².     Physical Exam:  GEN: NAD, conversant  EYES: EOMI, PERRL  NECK: no thyromegaly  CV: RRR  LUNG: CTA  SKIN: no rashes, no acanthosis  MSK: no deformities,   NEURO: no tremors, DTR normal  PSYCH: Awake and coherent      Results Review:     I reviewed the patient's new clinical results.    Lab Results   Component Value Date    GLUCOSE 582 (C) 01/08/2024    BUN 24 (H) 01/08/2024    CREATININE 0.96 01/08/2024    EGFRIFNONA 76 11/15/2021    EGFRIFAFRI 87 11/15/2021    BCR 25.0 01/08/2024    K 4.9 01/08/2024    CO2 29.0 01/08/2024    CALCIUM 8.9 01/08/2024    ALBUMIN 3.7 01/08/2024    AST 13 01/08/2024    ALT 16 01/08/2024       Lab Results   Component Value Date    HGBA1C 12.20 (H) 01/08/2024    HGBA1C 11.00 (H) 03/11/2023    HGBA1C 7.2 (H) 11/08/2022     Lab Results   Component Value Date    MICROALBUR 30 07/08/2022    CREATININE 0.96 01/08/2024     Results from last 7 days   Lab Units 01/08/24  1607 01/08/24  1415 01/08/24  1222   GLUCOSE mg/dL 134* 189* 448*       Medication Review: Reviewed.       Current Facility-Administered Medications:     acetaminophen (TYLENOL) tablet 650 mg, 650 mg, Oral, Q4H PRN **OR** acetaminophen (TYLENOL) 160 MG/5ML oral solution 650 mg, 650 mg, Oral, Q4H PRN **OR** acetaminophen (TYLENOL) suppository 650 mg, 650 mg, Rectal, Q4H PRN, Yesenia Arreola APRN    aluminum-magnesium hydroxide-simethicone (MAALOX MAX) 400-400-40 MG/5ML  suspension 15 mL, 15 mL, Oral, Q6H PRN, TripDean pimentela S, APRN    sennosides-docusate (PERICOLACE) 8.6-50 MG per tablet 2 tablet, 2 tablet, Oral, BID **AND** polyethylene glycol (MIRALAX) packet 17 g, 17 g, Oral, Daily PRN **AND** bisacodyl (DULCOLAX) EC tablet 5 mg, 5 mg, Oral, Daily PRN **AND** bisacodyl (DULCOLAX) suppository 10 mg, 10 mg, Rectal, Daily PRN, Dean Arreolaa S, APRN    dextrose (D50W) (25 g/50 mL) IV injection 25 g, 25 g, Intravenous, Q15 Min PRN, Yesenia Arreola APRN    dextrose (GLUTOSE) oral gel 15 g, 15 g, Oral, Q15 Min PRN, Dean Arreolaa S, APRN    glucagon (GLUCAGEN) injection 1 mg, 1 mg, Intramuscular, Q15 Min PRN, Dean Arreolaa S, APRN    insulin glargine (LANTUS, SEMGLEE) injection 16 Units, 16 Units, Subcutaneous, Nightly, Dean Arreolaa S, APRN    insulin lispro (HUMALOG/ADMELOG) injection 2-9 Units, 2-9 Units, Subcutaneous, 4x Daily AC & at Bedtime, Yesenia Arreola APRN, 9 Units at 01/08/24 1351    insulin lispro (HUMALOG/ADMELOG) injection 5 Units, 5 Units, Subcutaneous, TID With Meals, Dean Arreolaa S, APRN, 5 Units at 01/08/24 1348    nitroglycerin (NITROSTAT) SL tablet 0.4 mg, 0.4 mg, Sublingual, Q5 Min PRN, Dean Arreolaa S, APRN    ondansetron (ZOFRAN) injection 4 mg, 4 mg, Intravenous, Q6H PRN, TripDean pimentela S, APRN    [COMPLETED] Insert Peripheral IV, , , Once **AND** sodium chloride 0.9 % flush 10 mL, 10 mL, Intravenous, PRN, Fe Ch APRN    Access VAD, , , Once **AND** sodium chloride 0.9 % flush 10 mL, 10 mL, Intravenous, PRN, Fe Ch, APRN    sodium chloride 0.9 % flush 10 mL, 10 mL, Intravenous, Q12H, Tripure, Yesenia S, APRN    sodium chloride 0.9 % flush 10 mL, 10 mL, Intravenous, PRN, Tripure, Yesenia S, APRN    sodium chloride 0.9 % infusion 40 mL, 40 mL, Intravenous, PRN, Tripure, Yesenia S, APRN    sodium chloride 0.9 % infusion, 75 mL/hr, Intravenous, Continuous, Tripure, Yesenia S, APRN, Last Rate: 75  mL/hr at 01/08/24 1348, 75 mL/hr at 01/08/24 1348          Assessment and plan:  Diabetes mellitus type 2 with hyperglycemia: Uncontrolled with very high blood sugars and A1c of 12.2%.  I will recommend basal bolus insulin therapy with Lantus and Humalog.  Will change Lantus to 20 units subcu nightly along with Humalog 7 units with each meal along with Humalog sliding scale at meals.  Will get diabetes educators to see him.  Will follow blood sugars and make further recommendations as needed.    Colon cancer: Status post finished chemotherapy about 3 weeks ago and followed by oncology.    Hypertension/hyperlipidemia: Overall stable.    Thank you very much for the consultation.      Akiko Cottrell MD FACE.

## 2024-01-08 NOTE — PLAN OF CARE
Goal Outcome Evaluation:     Problem: Adult Inpatient Plan of Care  Goal: Plan of Care Review  Outcome: Ongoing, Progressing  Goal: Patient-Specific Goal (Individualized)  Outcome: Ongoing, Progressing  Goal: Absence of Hospital-Acquired Illness or Injury  Outcome: Ongoing, Progressing  Intervention: Identify and Manage Fall Risk  Recent Flowsheet Documentation  Taken 1/8/2024 1531 by Ryann Mosqueda RN  Safety Promotion/Fall Prevention:   safety round/check completed   room organization consistent   clutter free environment maintained  Intervention: Prevent Skin Injury  Recent Flowsheet Documentation  Taken 1/8/2024 1531 by Ryann Mosqueda RN  Body Position: position changed independently  Skin Protection: adhesive use limited  Intervention: Prevent and Manage VTE (Venous Thromboembolism) Risk  Recent Flowsheet Documentation  Taken 1/8/2024 1531 by Ryann Mosqueda RN  Activity Management: up ad mike  Intervention: Prevent Infection  Recent Flowsheet Documentation  Taken 1/8/2024 1531 by Ryann Mosqueda RN  Infection Prevention:   hand hygiene promoted   personal protective equipment utilized   rest/sleep promoted  Goal: Optimal Comfort and Wellbeing  Outcome: Ongoing, Progressing  Intervention: Provide Person-Centered Care  Recent Flowsheet Documentation  Taken 1/8/2024 1531 by Ryann Mosqueda RN  Trust Relationship/Rapport:   care explained   questions answered   thoughts/feelings acknowledged  Goal: Readiness for Transition of Care  Outcome: Ongoing, Progressing  Intervention: Mutually Develop Transition Plan  Recent Flowsheet Documentation  Taken 1/8/2024 1533 by Ryann Mosqueda RN  Equipment Currently Used at Home: none  Taken 1/8/2024 1531 by Ryann Mosqueda RN  Transportation Anticipated: family or friend will provide  Patient/Family Anticipated Services at Transition: none  Patient/Family Anticipates Transition to: home     Problem: Diabetes Comorbidity  Goal: Blood Glucose Level Within Targeted Range  Outcome: Ongoing,  Progressing  Intervention: Monitor and Manage Glycemia  Recent Flowsheet Documentation  Taken 1/8/2024 1531 by Ryann Mosqueda RN  Glycemic Management: blood glucose monitored

## 2024-01-08 NOTE — ED NOTES
Nursing report ED to floor  Vlad Guerrero  78 y.o.  male    HPI:   Chief Complaint   Patient presents with    Balance Issues       Admitting doctor:   Rudy Gamez DO    Admitting diagnosis:   The encounter diagnosis was Hyperglycemia.    Code status:   Current Code Status       Date Active Code Status Order ID Comments User Context       1/8/2024 1255 CPR (Attempt to Resuscitate) [id number]  Yesenia Arreola APRN ED        Question Answer    Code Status (Patient has no pulse and is not breathing) CPR (Attempt to Resuscitate)    Medical Interventions (Patient has pulse or is breathing) Full Support    Level Of Support Discussed With Patient                    Allergies:   Penicillins    Isolation:  No active isolations     Fall Risk:  Fall Risk Assessment was completed, and patient is at low risk for falls.   Predictive Model Details         8 (Low) Factor Value    Calculated 1/8/2024 13:03 Age 78    Risk of Fall Model Musculoskeletal Assessment WDL     Active Peripheral IV Present     Imaging order in this encounter Present     Skin Assessment WDL     Magnesium not on file     Number of Distinct Medication Classes administered 3     Drug Use No     Tobacco Use Quit     Isaiah Scale not on file     Financial Class Private Insurance     Peripheral Vascular Assessment WDL     Calcium 8.9 mg/dL     Chloride 99 mmol/L     Albumin 3.7 g/dL     Diastolic BP 82     Clinically Relevant Sex Not Female     Gastrointestinal Assessment WDL     Cardiac Assessment WDL     Respiratory Rate 16     Potassium 4.9 mmol/L     Days after Admission 0.158     Total Bilirubin 0.3 mg/dL     Creatinine 0.96 mg/dL     ALT 16 U/L         Weight:       01/08/24  0925   Weight: 77.1 kg (170 lb)       Intake and Output  No intake or output data in the 24 hours ending 01/08/24 1303    Diet:        Most recent vitals:   Vitals:    01/08/24 1031 01/08/24 1216 01/08/24 1246 01/08/24 1302   BP: 158/86 153/87 155/88 145/82   Pulse: 67 69 63  73   Resp:       Temp:       TempSrc:       SpO2: 98% 100% 100% 98%   Weight:       Height:           Active LDAs/IV Access:   Lines, Drains & Airways       Active LDAs       Name Placement date Placement time Site Days    Peripheral IV 01/08/24 0933 Posterior;Right Hand 01/08/24 0933  Hand  less than 1    Single Lumen Implantable Port Right Subclavian --  --  Subclavian  --                    Skin Condition:   Skin Assessments (last day)       Date/Time Interval    01/08/24 09:43:16 baseline             Labs (abnormal labs have a star):   Labs Reviewed   COMPREHENSIVE METABOLIC PANEL - Abnormal; Notable for the following components:       Result Value    Glucose 582 (*)     BUN 24 (*)     Sodium 134 (*)     Alkaline Phosphatase 122 (*)     All other components within normal limits    Narrative:     GFR Normal >60  Chronic Kidney Disease <60  Kidney Failure <15    The GFR formula is only valid for adults with stable renal function between ages 18 and 70.   PROTIME-INR - Abnormal; Notable for the following components:    Protime 11.8 (*)     All other components within normal limits   APTT - Abnormal; Notable for the following components:    PTT 27.0 (*)     All other components within normal limits   URINALYSIS W/ MICROSCOPIC IF INDICATED (NO CULTURE) - Abnormal; Notable for the following components:    Specific Gravity, UA 1.032 (*)     Glucose, UA >=1000 mg/dL (3+) (*)     All other components within normal limits    Narrative:     Urine microscopic not indicated.   CBC WITH AUTO DIFFERENTIAL - Abnormal; Notable for the following components:    Hemoglobin 12.6 (*)     RDW 15.5 (*)     All other components within normal limits   POCT GLUCOSE FINGERSTICK - Abnormal; Notable for the following components:    Glucose 448 (*)     All other components within normal limits   RAINBOW DRAW    Narrative:     The following orders were created for panel order [place] Draw.  Procedure                               Abnormality          Status                     ---------                               -----------         ------                     Green Top (Gel)[[id number]]                                  Final result               Lavender Top[[id number]]                                     Final result               Gold Top - SST[[id number]]                                   Final result               Light Blue Top[[id number]]                                   Final result                 Please view results for these tests on the individual orders.   GREEN TOP   LAVENDER TOP   GOLD TOP - SST   LIGHT BLUE TOP   CBC AND DIFFERENTIAL    Narrative:     The following orders were created for panel order CBC & Differential.  Procedure                               Abnormality         Status                     ---------                               -----------         ------                     CBC Auto Differential[[id number]]        Abnormal            Final result                 Please view results for these tests on the individual orders.       LOC: Person, Place, Time, and Situation    Telemetry:  Observation Unit    Cardiac Monitoring Ordered: yes    EKG:   ECG 12 Lead Stroke Evaluation   Preliminary Result   HEART RATE= 74  bpm   RR Interval= 816  ms   KS Interval= 260  ms   P Horizontal Axis= 14  deg   P Front Axis= 73  deg   QRSD Interval= 145  ms   QT Interval= 414  ms   QTcB= 458  ms   QRS Axis= -46  deg   T Wave Axis= 53  deg   - ABNORMAL ECG -   Sinus rhythm   Prolonged KS interval   Left bundle branch block   ST elevation secondary to IVCD   When compared with ECG of 09-Jan-2023 13:17:46,   No significant change   Electronically Signed By:    Date and Time of Study: 2024-01-08 09:28:22          Medications Given in the ED:   Medications   sodium chloride 0.9 % flush 10 mL (has no administration in time range)   sodium chloride 0.9 % flush 10 mL (has no administration in time range)   insulin regular (humuLIN R,novoLIN R)  injection 8 Units (8 Units Subcutaneous Given 24 1040)   sodium chloride 0.9 % bolus 500 mL (500 mL Intravenous New Bag 24 1216)   gadoteridol (PROHANCE) injection 15 mL (15 mL Intravenous Given 24 1154)       Imaging results:  MRI Brain With & Without Contrast    Result Date: 2024  Impression: 1. No acute intracranial abnormality. No abnormal intracranial enhancement. 2. Age-related atrophy and suspected chronic microvascular ischemic changes. 3. 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 Electronically Signed: Ene Toth MD  2024 12:24 PM EST  Workstation ID: [id number] [facility]    XR Chest 1 View    Result Date: 2024  Impression: Mild left basilar atelectasis. There is a suspected skinfold overlying the left lateral lower thorax; however, repeat chest x-ray can be performed for confirmation and to exclude small pneumothorax. Electronically Signed: Ene Toth MD  2024 10:11 AM EST  Workstation ID: [id number]     Social issues:   Social History     Socioeconomic History    Marital status: Single   Tobacco Use    Smoking status: Former     Packs/day: 1.00     Years: 2.00     Additional pack years: 0.00     Total pack years: 2.00     Types: Cigarettes     Start date:      Quit date:      Years since quittin.0    Smokeless tobacco: Never   Vaping Use    Vaping Use: Never used   Substance and Sexual Activity    Alcohol use: Not Currently    Drug use: Never    Sexual activity: Defer       NIH Stroke Scale:  Interval: baseline (24)  1a. Level of Consciousness: 0-->Alert, keenly responsive (24)  1b. LOC Questions: 0-->Answers both questions correctly (24)  1c. LOC Commands: 0-->Performs both tasks correctly (24)  2. Best Gaze: 0-->Normal (24)  3. Visual: 0-->No visual loss (24)  4. Facial Palsy: 0-->Normal symmetrical movements (24)  5a. Motor Arm, Left: 0-->No drift, limb holds 90  (or 45) degrees for full 10 secs (01/08/24 0943)  5b. Motor Arm, Right: 0-->No drift, limb holds 90 (or 45) degrees for full 10 secs (01/08/24 0943)  6a. Motor Leg, Left: 0-->No drift, leg holds 30 degree position for full 5 secs (01/08/24 0943)  6b. Motor Leg, Right: 0-->No drift, leg holds 30 degree position for full 5 secs (01/08/24 0943)  7. Limb Ataxia: 0-->Absent (01/08/24 0943)  8. Sensory: 0-->Normal, no sensory loss (01/08/24 0943)  9. Best Language: 0-->No aphasia, normal (01/08/24 0943)  10. Dysarthria: 0-->Normal (01/08/24 0943)  11. Extinction and Inattention (formerly Neglect): 0-->No abnormality (01/08/24 0943)    Total (NIH Stroke Scale): 0 (01/08/24 0943)     Additional notable assessment information:     Nursing report ED to floor:  Ana Maria Massey RN   01/08/24 13:03 EST

## 2024-01-08 NOTE — ED PROVIDER NOTES
"Subjective   History of Present Illness  Chief Complaint: \"Feeling off\"      HPI: Patient is a 78-year-old male [facility] of [facility], colon cancer currently undergoing chemo with last treatment approximately 3 weeks ago, diabetes hyperlipidemia and hypertension.  He states approximately 1 week ago he began feeling \"off\" stating that he was in his apartment and he felt as if he could not get out, denies confusion but states his symptoms are similar to when he had a stroke approximately 1 year ago.  He has had no headache, dizziness, weakness, denies urinary incontinence has had no recent illness or exposure.  No associated chest pain or shortness of breath.  He has had no recent falls.    Patient standing at bedside independently undressing himself moving all extremities.    PCP: Merly  Onc: Nia    History provided by:  Patient      Review of Systems   Respiratory:  Negative for shortness of breath.    Cardiovascular:  Negative for chest pain.   Gastrointestinal:  Negative for abdominal pain, nausea and vomiting.   Genitourinary:  Negative for difficulty urinating.   Neurological:  Negative for dizziness, syncope, speech difficulty, light-headedness, numbness and headaches.       Past Medical History:   Diagnosis Date    Anemia     Colon cancer 2022    colon    Diabetes mellitus     Hyperlipidemia     Hypertension        Allergies   Allergen Reactions    Penicillins Rash       Past Surgical History:   Procedure Laterality Date    APPENDECTOMY      CARDIAC CATHETERIZATION      COLON RESECTION N/A 12/15/2022    Procedure: COLON RESECTION RIGHT;  Surgeon: Percy Davis MD;  Location: [facility] MAIN OR;  Service: General;  Laterality: N/A;    COLONOSCOPY N/A 11/11/2022    Procedure: COLONOSCOPY WITH BIOPSY AND POLYPECTOMY;  Surgeon: Billy Julien MD;  Location: [facility] ENDOSCOPY;  Service: Gastroenterology;  Laterality: N/A;  Impression:  1.  Large 4-5cm fulgurating " circumferential ulcerated mass in the very proximal part of the ascending colon next to ileocecal valve multiple biopsies were performed.  This is highly concerning for colon malignancy.  2.  2 polyp rem    ENDOSCOPY N/A 2022    Procedure: ESOPHAGOGASTRODUODENOSCOPY with biopsy X1;  Surgeon: Billy Julien MD;  Location: [facility] ENDOSCOPY;  Service: Gastroenterology;  Laterality: N/A;  5.  Upper endoscopy lamination unremarkable.       PORTACATH PLACEMENT Right 2023    Procedure: INSERTION OF PORTACATH;  Surgeon: Percy Davis MD;  Location: [facility] MAIN OR;  Service: General;  Laterality: Right;    TONSILLECTOMY         Family History   Problem Relation Age of Onset    Pneumonia Mother 94        SARS-CoV2    Colon cancer Father 62    Heart disease Sister        Social History     Socioeconomic History    Marital status: Single   Tobacco Use    Smoking status: Former     Packs/day: 1.00     Years: 2.00     Additional pack years: 0.00     Total pack years: 2.00     Types: Cigarettes     Start date:      Quit date:      Years since quittin.0    Smokeless tobacco: Never   Vaping Use    Vaping Use: Never used   Substance and Sexual Activity    Alcohol use: Not Currently    Drug use: Never    Sexual activity: Defer           Objective   Physical Exam  Vitals reviewed.   Constitutional:       Appearance: He is not toxic-appearing.   HENT:      Head: Normocephalic.      Mouth/Throat:      Mouth: Mucous membranes are moist.      Pharynx: Oropharynx is clear.   Eyes:      Extraocular Movements: Extraocular movements intact.      Pupils: Pupils are equal, round, and reactive to light.   Cardiovascular:      Pulses: Normal pulses.      Heart sounds: Normal heart sounds.   Pulmonary:      Effort: Pulmonary effort is normal.      Breath sounds: Normal breath sounds. No wheezing.   Musculoskeletal:         General: Normal range of motion.   Skin:     General: Skin is warm and dry.   Neurological:  "     General: No focal deficit present.      Mental Status: He is alert and oriented to person, place, and time.      GCS: GCS eye subscore is 4. GCS verbal subscore is 5. GCS motor subscore is 6.      Sensory: Sensation is intact.      Motor: No weakness.      Coordination: Coordination is intact. Finger-Nose-Finger Test normal.      Gait: Gait is intact.         Procedures      Sinus rhythm with a rate of 74 no ST elevation noted.  Reviewed with Dr. Gamez who agrees with interpretation.           ED Course  ED Course as of 01/08/24 1946 Mon Jan 08, 2024   1251 Poke with Yesenia nurse practitioner in the ED observation unit who is agreeable patient mission.  At bedside I discussed the ED findings with patient who is also agreeable. [BH]      ED Course User Index  [BH] Fe hC APRN      /74 (BP Location: Left arm, Patient Position: Lying)   Pulse 99   Temp 97.4 °F (36.3 °C) (Oral)   Resp 13   Ht 185.4 cm (73\")   Wt 77.1 kg (170 lb)   SpO2 99%   BMI 22.43 kg/m²   Labs Reviewed   COMPREHENSIVE METABOLIC PANEL - Abnormal; Notable for the following components:       Result Value    Glucose 582 (*)     BUN 24 (*)     Sodium 134 (*)     Alkaline Phosphatase 122 (*)     All other components within normal limits    Narrative:     GFR Normal >60  Chronic Kidney Disease <60  Kidney Failure <15    The GFR formula is only valid for adults with stable renal function between ages 18 and 70.   PROTIME-INR - Abnormal; Notable for the following components:    Protime 11.8 (*)     All other components within normal limits   APTT - Abnormal; Notable for the following components:    PTT 27.0 (*)     All other components within normal limits   URINALYSIS W/ MICROSCOPIC IF INDICATED (NO CULTURE) - Abnormal; Notable for the following components:    Specific Gravity, UA 1.032 (*)     Glucose, UA >=1000 mg/dL (3+) (*)     All other components within normal limits    Narrative:     Urine microscopic not " indicated.   CBC WITH AUTO DIFFERENTIAL - Abnormal; Notable for the following components:    Hemoglobin 12.6 (*)     RDW 15.5 (*)     All other components within normal limits   HEMOGLOBIN A1C - Abnormal; Notable for the following components:    Hemoglobin A1C 12.20 (*)     All other components within normal limits   POCT GLUCOSE FINGERSTICK - Abnormal; Notable for the following components:    Glucose 448 (*)     All other components within normal limits   POCT GLUCOSE FINGERSTICK - Abnormal; Notable for the following components:    Glucose 189 (*)     All other components within normal limits   POCT GLUCOSE FINGERSTICK - Abnormal; Notable for the following components:    Glucose 134 (*)     All other components within normal limits   RAINBOW DRAW    Narrative:     The following orders were created for panel order [place] Draw.  Procedure                               Abnormality         Status                     ---------                               -----------         ------                     Green Top (Gel)[[id number]]                                  Final result               Lavender Top[[id number]]                                     Final result               Gold Top - SST[[id number]]                                   Final result               Light Blue Top[[id number]]                                   Final result                 Please view results for these tests on the individual orders.   POCT GLUCOSE FINGERSTICK   POCT GLUCOSE FINGERSTICK   POCT GLUCOSE FINGERSTICK   POCT GLUCOSE FINGERSTICK   POCT GLUCOSE FINGERSTICK   GREEN TOP   LAVENDER TOP   GOLD TOP - SST   LIGHT BLUE TOP   CBC AND DIFFERENTIAL    Narrative:     The following orders were created for panel order CBC & Differential.  Procedure                               Abnormality         Status                     ---------                               -----------         ------                     CBC Auto Differential[[id number]]         Abnormal            Final result                 Please view results for these tests on the individual orders.     Medications   sodium chloride 0.9 % flush 10 mL (has no administration in time range)   sodium chloride 0.9 % flush 10 mL (has no administration in time range)   sodium chloride 0.9 % flush 10 mL (10 mL Intravenous Not Given 1/8/24 1351)   sodium chloride 0.9 % flush 10 mL (has no administration in time range)   sodium chloride 0.9 % infusion 40 mL (has no administration in time range)   sennosides-docusate (PERICOLACE) 8.6-50 MG per tablet 2 tablet (2 tablets Oral Not Given 1/8/24 1343)     And   polyethylene glycol (MIRALAX) packet 17 g (has no administration in time range)     And   bisacodyl (DULCOLAX) EC tablet 5 mg (has no administration in time range)     And   bisacodyl (DULCOLAX) suppository 10 mg (has no administration in time range)   nitroglycerin (NITROSTAT) SL tablet 0.4 mg (has no administration in time range)   acetaminophen (TYLENOL) tablet 650 mg (has no administration in time range)     Or   acetaminophen (TYLENOL) 160 MG/5ML oral solution 650 mg (has no administration in time range)     Or   acetaminophen (TYLENOL) suppository 650 mg (has no administration in time range)   ondansetron (ZOFRAN) injection 4 mg (has no administration in time range)   aluminum-magnesium hydroxide-simethicone (MAALOX MAX) 400-400-40 MG/5ML suspension 15 mL (has no administration in time range)   sodium chloride 0.9 % infusion (75 mL/hr Intravenous New Bag 1/8/24 1348)   dextrose (GLUTOSE) oral gel 15 g (has no administration in time range)   dextrose (D50W) (25 g/50 mL) IV injection 25 g (has no administration in time range)   glucagon (GLUCAGEN) injection 1 mg (has no administration in time range)   insulin glargine (LANTUS, SEMGLEE) injection 20 Units (has no administration in time range)   insulin lispro (HUMALOG/ADMELOG) injection 7 Units (7 Units Subcutaneous Given 1/8/24 4928)   insulin lispro  (HUMALOG/ADMELOG) injection 2-9 Units (has no administration in time range)   insulin regular (humuLIN R,novoLIN R) injection 8 Units (8 Units Subcutaneous Given 1/8/24 1040)   sodium chloride 0.9 % bolus 500 mL (0 mL Intravenous Stopped 1/8/24 1351)   gadoteridol (PROHANCE) injection 15 mL (15 mL Intravenous Given 1/8/24 1154)     MRI Brain With & Without Contrast    Result Date: 1/8/2024  Impression: 1. No acute intracranial abnormality. No abnormal intracranial enhancement. 2. Age-related atrophy and suspected chronic microvascular ischemic changes. 3. 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 Electronically Signed: Ene Toth MD  1/8/2024 12:24 PM EST  Workstation ID: [id number] [employer]    XR Chest 1 View    Result Date: 1/8/2024  Impression: Mild left basilar atelectasis. There is a suspected skinfold overlying the left lateral lower thorax; however, repeat chest x-ray can be performed for confirmation and to exclude small pneumothorax. Electronically Signed: Ene Toth MD  1/8/2024 10:11 AM EST  Workstation ID: [id number]                       Total (NIH Stroke Scale): 0                  Medical Decision Making  Patient presented with above complaints on physical exam he is moving all extremities no notable ataxia.  Urinalysis negative for urinary tract infection.  Chest x-ray per my interpretation was negative for pneumonia or pleural effusion.  MRI was completed as his onset was approximately 1 week ago, no evidence of intracranial hemorrhage no brain metastases or concern for CVA.  Hyperglycemia was noted with a blood sugar greater than 582 he is a known diabetic there is no evidence of DKA, he was treated with subcutaneous insulin with improvement of his blood sugar.  White blood cell count within normal limits no acute renal insufficiency noted.  Patient case was discussed with Dr. Gamez who advised placement in ED observation for continued monitoring as CVA has been ruled out  we discussed possible dehydration in combination with his hyperglycemia causing some of his symptoms as with review of the chart he has been persistently hyperglycemic for over the last few weeks.  Patient has denied chest pain or shortness of breath.  I have given him an update on the ED plan of care and he is agreeable.  Spoke with Yesenia nurse practitioner in the ED observation unit who was agreeable to patient placement.  Patient denied further questions or complaints stable vitals at time of admission.    Differentials considered but not all-inclusive: Metastatic disease, CVA, TIA, electrolyte imbalance, dehydration, urinary tract infection    Chart review:  11/28/2023 PET abdomen pelvis: Interval resolution of hypermetabolic liver lesion. No new abnormality.  9/12/2023 PET   1. Hypermetabolic 1.3 cm liver lesion at the junction of segments 8 and segment 4A consistent with hepatic metastasis.  2. Stable postsurgical changes of right hemicolectomy.  3. No findings of intrathoracic metastatic disease.  4. 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  5. Coronary and carotid atherosclerotic disease, prostatomegaly, and additional chronic findings above.      Note Disclaimer: At [facility], we believe that sharing information builds trust and better  relationships. You are receiving this note because you recently visited [facility]. It is possible you will see health information before a provider has talked with you about it. This kind of information can be easy to misunderstand. To help you fully understand what it means for your health, we urge you to discuss this note with your provider.       Part of this note may be an electronic transcription/translation of spoken language to printed text using the Dragon Dictation System.    Appropriate PPE worn during exam.    Problems Addressed:  Hyperglycemia:  complicated acute illness or injury    Amount and/or Complexity of Data Reviewed  Labs: ordered. Decision-making details documented in ED Course.  Radiology: ordered and independent interpretation performed. Decision-making details documented in ED Course.  ECG/medicine tests: ordered and independent interpretation performed. Decision-making details documented in ED Course.    Risk  OTC drugs.  Prescription drug management.  Decision regarding hospitalization.        Final diagnoses:   Hyperglycemia   History of CVA (cerebrovascular accident)       ED Disposition  ED Disposition       ED Disposition   Decision to Admit    Condition   --    Comment   --               No follow-up provider specified.       Medication List        ASK your doctor about these medications      insulin glargine 100 UNIT/ML injection  Commonly known as: YUKI ZALDIVAR  Ask about: Which instructions should I use?                 Fe Ch, APRN  01/1945

## 2024-01-09 ENCOUNTER — READMISSION MANAGEMENT (OUTPATIENT)
Dept: CALL CENTER | Facility: HOSPITAL | Age: 79
End: 2024-01-09
Payer: OTHER GOVERNMENT

## 2024-01-09 VITALS
RESPIRATION RATE: 12 BRPM | OXYGEN SATURATION: 98 % | HEIGHT: 73 IN | TEMPERATURE: 97.4 F | DIASTOLIC BLOOD PRESSURE: 79 MMHG | HEART RATE: 72 BPM | WEIGHT: 170 LBS | BODY MASS INDEX: 22.53 KG/M2 | SYSTOLIC BLOOD PRESSURE: 137 MMHG

## 2024-01-09 PROBLEM — R73.9 HYPERGLYCEMIA: Status: RESOLVED | Noted: 2024-01-08 | Resolved: 2024-01-09

## 2024-01-09 LAB
ANION GAP SERPL CALCULATED.3IONS-SCNC: 6 MMOL/L (ref 5–15)
BASOPHILS # BLD AUTO: 0 10*3/MM3 (ref 0–0.2)
BASOPHILS NFR BLD AUTO: 0.3 % (ref 0–1.5)
BUN SERPL-MCNC: 23 MG/DL (ref 8–23)
BUN/CREAT SERPL: 32.9 (ref 7–25)
CA-I SERPL ISE-MCNC: 1.14 MMOL/L (ref 1.2–1.3)
CALCIUM SPEC-SCNC: 7.1 MG/DL (ref 8.6–10.5)
CHLORIDE SERPL-SCNC: 109 MMOL/L (ref 98–107)
CO2 SERPL-SCNC: 23 MMOL/L (ref 22–29)
CREAT SERPL-MCNC: 0.7 MG/DL (ref 0.76–1.27)
DEPRECATED RDW RBC AUTO: 49.9 FL (ref 37–54)
EGFRCR SERPLBLD CKD-EPI 2021: 95.5 ML/MIN/1.73
EOSINOPHIL # BLD AUTO: 0.1 10*3/MM3 (ref 0–0.4)
EOSINOPHIL NFR BLD AUTO: 1.6 % (ref 0.3–6.2)
ERYTHROCYTE [DISTWIDTH] IN BLOOD BY AUTOMATED COUNT: 15 % (ref 12.3–15.4)
GLUCOSE BLDC GLUCOMTR-MCNC: 102 MG/DL (ref 70–105)
GLUCOSE BLDC GLUCOMTR-MCNC: 220 MG/DL (ref 70–105)
GLUCOSE BLDC GLUCOMTR-MCNC: 224 MG/DL (ref 70–105)
GLUCOSE SERPL-MCNC: 312 MG/DL (ref 65–99)
HCT VFR BLD AUTO: 34.8 % (ref 37.5–51)
HGB BLD-MCNC: 11.1 G/DL (ref 13–17.7)
LYMPHOCYTES # BLD AUTO: 1.2 10*3/MM3 (ref 0.7–3.1)
LYMPHOCYTES NFR BLD AUTO: 23.4 % (ref 19.6–45.3)
MAGNESIUM SERPL-MCNC: 1.6 MG/DL (ref 1.6–2.4)
MCH RBC QN AUTO: 28.3 PG (ref 26.6–33)
MCHC RBC AUTO-ENTMCNC: 31.7 G/DL (ref 31.5–35.7)
MCV RBC AUTO: 89.2 FL (ref 79–97)
MONOCYTES # BLD AUTO: 0.5 10*3/MM3 (ref 0.1–0.9)
MONOCYTES NFR BLD AUTO: 9.4 % (ref 5–12)
NEUTROPHILS NFR BLD AUTO: 3.3 10*3/MM3 (ref 1.7–7)
NEUTROPHILS NFR BLD AUTO: 65.3 % (ref 42.7–76)
NRBC BLD AUTO-RTO: 0.1 /100 WBC (ref 0–0.2)
PLATELET # BLD AUTO: 145 10*3/MM3 (ref 140–450)
PMV BLD AUTO: 7.6 FL (ref 6–12)
POTASSIUM SERPL-SCNC: 3.9 MMOL/L (ref 3.5–5.2)
QT INTERVAL: 414 MS
QTC INTERVAL: 458 MS
RBC # BLD AUTO: 3.91 10*6/MM3 (ref 4.14–5.8)
SODIUM SERPL-SCNC: 138 MMOL/L (ref 136–145)
T4 FREE SERPL-MCNC: 0.89 NG/DL (ref 0.93–1.7)
TSH SERPL DL<=0.05 MIU/L-ACNC: 0.84 UIU/ML (ref 0.27–4.2)
WBC NRBC COR # BLD AUTO: 5.1 10*3/MM3 (ref 3.4–10.8)

## 2024-01-09 PROCEDURE — 25010000002 INFLUENZA VAC HIGH-DOSE QUAD 0.7 ML SUSPENSION PREFILLED SYRINGE: Performed by: NURSE PRACTITIONER

## 2024-01-09 PROCEDURE — 82948 REAGENT STRIP/BLOOD GLUCOSE: CPT

## 2024-01-09 PROCEDURE — 85025 COMPLETE CBC W/AUTO DIFF WBC: CPT | Performed by: NURSE PRACTITIONER

## 2024-01-09 PROCEDURE — G0008 ADMIN INFLUENZA VIRUS VAC: HCPCS | Performed by: NURSE PRACTITIONER

## 2024-01-09 PROCEDURE — 84439 ASSAY OF FREE THYROXINE: CPT | Performed by: NURSE PRACTITIONER

## 2024-01-09 PROCEDURE — 82330 ASSAY OF CALCIUM: CPT | Performed by: NURSE PRACTITIONER

## 2024-01-09 PROCEDURE — 63710000001 INSULIN LISPRO (HUMAN) PER 5 UNITS: Performed by: INTERNAL MEDICINE

## 2024-01-09 PROCEDURE — 80048 BASIC METABOLIC PNL TOTAL CA: CPT | Performed by: NURSE PRACTITIONER

## 2024-01-09 PROCEDURE — 97165 OT EVAL LOW COMPLEX 30 MIN: CPT

## 2024-01-09 PROCEDURE — G0378 HOSPITAL OBSERVATION PER HR: HCPCS

## 2024-01-09 PROCEDURE — 83735 ASSAY OF MAGNESIUM: CPT | Performed by: NURSE PRACTITIONER

## 2024-01-09 PROCEDURE — 96361 HYDRATE IV INFUSION ADD-ON: CPT

## 2024-01-09 PROCEDURE — G0108 DIAB MANAGE TRN  PER INDIV: HCPCS

## 2024-01-09 PROCEDURE — 25810000003 SODIUM CHLORIDE 0.9 % SOLUTION: Performed by: NURSE PRACTITIONER

## 2024-01-09 PROCEDURE — 84443 ASSAY THYROID STIM HORMONE: CPT | Performed by: NURSE PRACTITIONER

## 2024-01-09 PROCEDURE — 90662 IIV NO PRSV INCREASED AG IM: CPT | Performed by: NURSE PRACTITIONER

## 2024-01-09 RX ORDER — PEN NEEDLE, DIABETIC 30 GX3/16"
1 NEEDLE, DISPOSABLE MISCELLANEOUS
Qty: 200 EACH | Refills: 2 | Status: SHIPPED | OUTPATIENT
Start: 2024-01-09

## 2024-01-09 RX ORDER — INSULIN LISPRO 100 [IU]/ML
7 INJECTION, SOLUTION INTRAVENOUS; SUBCUTANEOUS
Qty: 9 ML | Refills: 0 | Status: SHIPPED | OUTPATIENT
Start: 2024-01-09 | End: 2024-02-21

## 2024-01-09 RX ORDER — ACYCLOVIR 400 MG/1
1 TABLET ORAL
Qty: 3 EACH | Refills: 2 | Status: SHIPPED | OUTPATIENT
Start: 2024-01-09

## 2024-01-09 RX ORDER — INSULIN GLARGINE 100 [IU]/ML
20 INJECTION, SOLUTION SUBCUTANEOUS NIGHTLY
Qty: 6 ML | Refills: 0 | Status: SHIPPED | OUTPATIENT
Start: 2024-01-09 | End: 2024-02-08

## 2024-01-09 RX ORDER — ACYCLOVIR 400 MG/1
1 TABLET ORAL ONCE
Qty: 1 EACH | Refills: 0 | Status: SHIPPED | OUTPATIENT
Start: 2024-01-09 | End: 2024-01-09

## 2024-01-09 RX ADMIN — Medication 10 ML: at 07:58

## 2024-01-09 RX ADMIN — INSULIN LISPRO 7 UNITS: 100 INJECTION, SOLUTION INTRAVENOUS; SUBCUTANEOUS at 07:57

## 2024-01-09 RX ADMIN — INFLUENZA A VIRUS A/VICTORIA/4897/2022 IVR-238 (H1N1) ANTIGEN (FORMALDEHYDE INACTIVATED), INFLUENZA A VIRUS A/DARWIN/9/2021 SAN-010 (H3N2) ANTIGEN (FORMALDEHYDE INACTIVATED), INFLUENZA B VIRUS B/PHUKET/3073/2013 ANTIGEN (FORMALDEHYDE INACTIVATED), AND INFLUENZA B VIRUS B/MICHIGAN/01/2021 ANTIGEN (FORMALDEHYDE INACTIVATED) 0.7 ML: 60; 60; 60; 60 INJECTION, SUSPENSION INTRAMUSCULAR at 16:52

## 2024-01-09 RX ADMIN — SODIUM CHLORIDE 75 ML/HR: 9 INJECTION, SOLUTION INTRAVENOUS at 02:30

## 2024-01-09 RX ADMIN — INSULIN LISPRO 4 UNITS: 100 INJECTION, SOLUTION INTRAVENOUS; SUBCUTANEOUS at 07:58

## 2024-01-09 RX ADMIN — INSULIN LISPRO 7 UNITS: 100 INJECTION, SOLUTION INTRAVENOUS; SUBCUTANEOUS at 12:15

## 2024-01-09 NOTE — PROGRESS NOTES
Daily Progress Note    Patient Care Team:  Vlad Moreland MD as PCP - General (Internal Medicine)  Percy Davis MD as Surgeon (General Surgery)  Don Kramer MD as Consulting Physician (Hematology and Oncology)    Chief Complaint: Follow-up type 2 diabetes    HPI: Patient seen, blood sugar log reviewed, blood sugars improving.  Eating well.  No complaints at this time.    ROS:   Constitutional:  Denies fatigue, tiredness.    Respiratory: denies cough, shortness of breath.   Cardiovascular:  denies chest pain, edema   GI:  Denies abdominal pain, nausea, vomiting.         Vitals:    01/09/24 1140   BP: 137/79   Pulse: 72   Resp: 12   Temp: 97.4 °F (36.3 °C)   SpO2: 98%     Body mass index is 22.43 kg/m².    Physical Exam:  GEN: NAD, conversant  PSYCH: Awake and coherent      Results Review:     I reviewed the patient's new clinical results.    Glucose   Date Value Ref Range Status   01/09/2024 312 (H) 65 - 99 mg/dL Final     Sodium   Date Value Ref Range Status   01/09/2024 138 136 - 145 mmol/L Final     Potassium   Date Value Ref Range Status   01/09/2024 3.9 3.5 - 5.2 mmol/L Final     CO2   Date Value Ref Range Status   01/09/2024 23.0 22.0 - 29.0 mmol/L Final     Chloride   Date Value Ref Range Status   01/09/2024 109 (H) 98 - 107 mmol/L Final     Anion Gap   Date Value Ref Range Status   01/09/2024 6.0 5.0 - 15.0 mmol/L Final     Creatinine   Date Value Ref Range Status   01/09/2024 0.70 (L) 0.76 - 1.27 mg/dL Final     BUN   Date Value Ref Range Status   01/09/2024 23 8 - 23 mg/dL Final     BUN/Creatinine Ratio   Date Value Ref Range Status   01/09/2024 32.9 (H) 7.0 - 25.0 Final     Calcium   Date Value Ref Range Status   01/09/2024 7.1 (L) 8.6 - 10.5 mg/dL Final     Alkaline Phosphatase   Date Value Ref Range Status   01/08/2024 122 (H) 39 - 117 U/L Final     Total Protein   Date Value Ref Range Status   01/08/2024 6.9 6.0 - 8.5 g/dL Final     ALT (SGPT)   Date Value Ref Range Status    01/08/2024 16 1 - 41 U/L Final     AST (SGOT)   Date Value Ref Range Status   01/08/2024 13 1 - 40 U/L Final     Total Bilirubin   Date Value Ref Range Status   01/08/2024 0.3 0.0 - 1.2 mg/dL Final     Albumin   Date Value Ref Range Status   01/08/2024 3.7 3.5 - 5.2 g/dL Final     Globulin   Date Value Ref Range Status   01/08/2024 3.2 gm/dL Final     Magnesium   Date Value Ref Range Status   01/09/2024 1.6 1.6 - 2.4 mg/dL Final     Lab Results   Component Value Date    HGBA1C 12.20 (H) 01/08/2024    HGBA1C 11.00 (H) 03/11/2023    HGBA1C 7.2 (H) 11/08/2022     Lab Results   Component Value Date    MICROALBUR 30 07/08/2022     Results from last 7 days   Lab Units 01/09/24  1433 01/09/24  1141 01/09/24  0739 01/08/24 2024 01/08/24  1607 01/08/24  1415   GLUCOSE mg/dL 224* 102 220* 229* 134* 189*             Medication Review: Reviewed.     insulin glargine, 20 Units, Subcutaneous, Nightly  insulin lispro, 2-9 Units, Subcutaneous, TID With Meals  insulin lispro, 7 Units, Subcutaneous, TID With Meals  sodium chloride, 10 mL, Intravenous, Q12H              Assessment and plan:  Diabetes mellitus type 2 with hyperglycemia: Overall doing well.  Okay to discharge from the endocrine perspective on current regimen of insulin.  Recommend follow-up in the office.    Akiko Cottrell MD. FACE

## 2024-01-09 NOTE — NURSING NOTE
Educated patient on discharge instructions. Pt verbalized understanding and had no further questions/concerns at this time. IV and heart monitor has been removed. Patient remains without distress awaiting on family for ride

## 2024-01-09 NOTE — CASE MANAGEMENT/SOCIAL WORK
Discharge Planning Assessment  [facility]     Patient Name: Vlad Guerrero  MRN: [id number]  Today's Date: 1/9/2024    Admit Date: 1/8/2024    Plan: Return home alone. Will need transport to [facility] where his car is   Discharge Needs Assessment       Row Name 01/09/24 1331       Living Environment    Current Living Arrangements apartment    Potentially Unsafe Housing Conditions none    Primary Care Provided by self    Provides Primary Care For no one, unable/limited ability to care for self    Family Caregiver if Needed none    Able to Return to Prior Arrangements yes       Resource/Environmental Concerns    Transportation Concerns none       Transition Planning    Patient/Family Anticipates Transition to home    Patient/Family Anticipated Services at Transition none       Discharge Needs Assessment    Equipment Currently Used at Home none    Concerns to be Addressed no discharge needs identified    Anticipated Changes Related to Illness none    Equipment Needed After Discharge none                   Discharge Plan       Row Name 01/09/24 0635       Plan    Plan Return home alone. Will need transport to [facility] where his car is    Patient/Family in Agreement with Plan yes    Plan Comments Barriers: Endocrinology and Diabetic education. CM met  with Mr. 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  He does not have any medical equipment. His car is at the [facility] and he will need transport to his car at discharge. CM verified PCP and Pharmacy. He denies difficulty obtaining medications                  Demographic Summary       Row Name 01/09/24 1326       General Information    Admission Type observation    Arrived From emergency department    Required Notices Provided Observation Status Notice    Referral Source admission list    Reason for Consult discharge planning    Preferred Language English       Contact Information    Permission  Granted to Share Info With                    Functional Status       Row Name 01/09/24 9642       Functional Status    Usual Activity Tolerance good    Current Activity Tolerance moderate       Functional Status, IADL    Medications independent    Meal Preparation independent    Housekeeping independent    Laundry independent    Shopping independent       Mental Status    General Appearance WDL WDL       Mental Status Summary    Recent Changes in Mental Status/Cognitive Functioning no changes       Employment/    Employment Status employed full-time                    Maintained distance greater than six feet and spent less than 15 minutes in the room.       Nikki MEDINA,RN Case Manager  [facility]  Phone: Desk- [phone] cell- [phone]

## 2024-01-09 NOTE — PLAN OF CARE
"Goal Outcome Evaluation:              Outcome Evaluation: Patient is a 78-year-old male [facility] of [facility], colon cancer currently undergoing chemo with last treatment approximately 3 weeks ago, diabetes hyperlipidemia and hypertension.  He states approximately 1 week ago he began feeling \"off\" stating that he was in his apartment and he felt as if he could not get out, denies confusion but states his symptoms are similar to when he had a stroke approximately 1 year ago.  He demos independence with ADLs and mobility this date, though does demonstrate questionable cognitive impairment.  Recommend follow up with OP OT or SLP for more extensive assessment      Anticipated Discharge Disposition (OT): home with outpatient therapy services                        "

## 2024-01-09 NOTE — OUTREACH NOTE
Prep Survey      Flowsheet Row Responses   [facility] facility patient discharged from? [place]   Is LACE score < 7 ? No   Eligibility Readm Mgmt   Discharge diagnosis Hyperglycemia   Does the patient have one of the following disease processes/diagnoses(primary or secondary)? Other   Does the patient have Home health ordered? No   Is there a DME ordered? No   Prep survey completed? Yes            MACIEJ ALLISON - Registered Nurse

## 2024-01-09 NOTE — PLAN OF CARE
Patient is up ad mike in room, no deficits noted/reported. Patient does not need Physical therapy services. Will complete order.

## 2024-01-09 NOTE — CONSULTS
"Diabetes Education  Assessment/Teaching    Patient Name:  Vlad Guerrero  YOB: 1945  MRN: [id number]  Admit Date:  1/8/2024      Assessment Date:  1/9/2024  Flowsheet Row Most Recent Value   General Information     Referral From: MD order  [Consult received to teach insulin administration and due to bs being >200. Adm bs 582. A1c this adm 12.2%.]   Height 185.4 cm (73\")   Weight 77.1 kg (170 lb)   Pregnancy Assessment    Diabetes History    What type of diabetes do you have? Type 2   Length of Diabetes Diagnosis --  [Dx about 10 years ago.]   Have you had diabetes education/teaching in the past? yes   When and where was your diabetes education? Was seen by inpatient diabetes educator at [facility] last on 3/13/2023.   Do you test your blood sugar at home? yes   Frequency of checks 2-3 times/day   Meter type unsure of name of meter, states a few months old. Pt states has current test strips at home   Who performs the test? self   Have you had low blood sugar? (<70mg/dl) no   Education Preferences    Nutrition Information    Assessment Topics    DM Goals             Flowsheet Row Most Recent Value   DM Education Needs    Meter Has own   Frequency of Testing 3 times a day  [Discussed importance of checking bs 3-4 times/day and to record readings. Gave log book. Discussed importance of sharing readings with PCP.]   Blood Glucose Target Range Reviewed A1c result of 12.2%. Reviewed healthy bs range and healthy A1c target. Discussed importance of bs control.   Medication Insulin, Other injectables, Pen  [Pt states has been taking insulin 18 units at hs. He is unsure of name of insulin. Pt states not sure if he has been taking Ozempic.]   Problem Solving Hypoglycemia, Hyperglycemia, Signs, Symptoms, Treatment  [Discussed importance of carrying low bs tx at all times. Gave low bs handout.]   Reducing Risks A1C testing  [Gave A1c info sheet.]   Healthy Eating --  [Pt eating 3 meals/day plus snacks. Discussed " importance of taking meatime insulin premeals.]   Motivation Strong   Teaching Method Explanation, Discussion, Demonstration, Handouts   Patient Response Verbalized understanding, Demonstrates adequately              Other Comments:  Met with pt at bedside. Pt states he has PCP. [facility] He is unsure if he [facility]. He states they come to him by mail order and he has current test strips.     Reviewed use of insulin pen. Pt performed return demo correctly and injected into foam pad correctly. Pt states he had not been priming the pen or counting 10 seconds before withdrawing pen needle from tissue. Pt states he will begin performing these steps. Discussed when to take long-acting and mealtime insulin. Discussed importance of not taking mealtime insulin if not eating. Discussed injection sites, rotation of sites, storage of insulin and disposal of pen needles.     Pt interested in getting a CGMS. Reviewed use of Dexcom G7. Pt watched video of how to insert the sensor. Pt wanting educator to order  and sensor to see if insurance will cover. Pt states he may need help with inserting sensor the first time and he states may contact educator for review. Discussed sensor is worn for 10 days then changed.     Rxs started for Lantus pen, Lispro pen, pen needles, Dexcom G7 sensors and . Handouts given: Insulin pen instruction sheet, needle disposal handout, First Steps booklet.     Pt verbalized understanding of info. Discussed educator would attempt follow up call a few days after discharge. Pt states he would let educator know which bs meter he uses at that time.       Electronically signed by:  Deena Andersen RN  01/09/24 11:43 EST

## 2024-01-09 NOTE — PLAN OF CARE
Goal Outcome Evaluation:  Plan of Care Reviewed With: patient        Progress: improving  Outcome Evaluation: Patient had at least 2 episodes of loose stools. Stool softener not given at night time med pass. Sleeping between care.

## 2024-01-09 NOTE — DISCHARGE SUMMARY
"[facility]    Vlad Moreland MD    CHIEF COMPLAINT:     \"Feeling off\"    HISTORY OF PRESENT ILLNESS:    [facility] [place]  ED 01/08/2024  HPI: Patient is a 78-year-old male who presents by EMS from [facility] known history of anemia, colon cancer currently undergoing chemo with last treatment approximately 3 weeks ago, diabetes hyperlipidemia and hypertension.  He states approximately 1 week ago he began feeling \"off\" stating that he was in his apartment and he felt as if he could not get out, denies confusion but states his symptoms are similar to when he had a stroke approximately 1 year ago.  He has had no headache, dizziness, weakness, denies urinary incontinence has had no recent illness or exposure.  No associated chest pain or shortness of breath.  He has had no recent falls.     Patient standing at bedside independently undressing himself moving all extremities.    Observation 01/09/2024  Patient agrees with HPI noted above and states that [facility] and told provider that he could not keep his balance.  His provider advised him to go to the ED to rule out stroke.  Patient denies any recent falls or dizziness and states that he lives alone and does not need to use a walker or does not have any balance issues.  He would like to go home today as he feels significantly better.    Past Medical History:   Diagnosis Date    Anemia     Colon cancer 2022    colon    Diabetes mellitus     Hyperlipidemia     Hypertension      Past Surgical History:   Procedure Laterality Date    APPENDECTOMY      CARDIAC CATHETERIZATION      COLON RESECTION N/A 12/15/2022    Procedure: COLON RESECTION RIGHT;  Surgeon: Percy Davis MD;  Location: [facility] MAIN OR;  Service: General;  Laterality: N/A;    COLONOSCOPY N/A 11/11/2022    Procedure: COLONOSCOPY WITH BIOPSY AND POLYPECTOMY;  Surgeon: Billy Julien MD;  Location: [facility] ENDOSCOPY;  Service: " Gastroenterology;  Laterality: N/A;  Impression:  1.  Large 4-5cm fulgurating circumferential ulcerated mass in the very proximal part of the ascending colon next to ileocecal valve multiple biopsies were performed.  This is highly concerning for colon malignancy.  2.  2 polyp rem    ENDOSCOPY N/A 2022    Procedure: ESOPHAGOGASTRODUODENOSCOPY with biopsy X1;  Surgeon: Billy Julien MD;  Location: [facility] ENDOSCOPY;  Service: Gastroenterology;  Laterality: N/A;  5.  Upper endoscopy lamination unremarkable.       PORTACATH PLACEMENT Right 2023    Procedure: INSERTION OF PORTACATH;  Surgeon: Percy Davis MD;  Location: [facility] MAIN OR;  Service: General;  Laterality: Right;    TONSILLECTOMY       Family History   Problem Relation Age of Onset    Pneumonia Mother 94        SARS-CoV2    Colon cancer Father 62    Heart disease Sister      Social History     Tobacco Use    Smoking status: Former     Packs/day: 1.00     Years: 2.00     Additional pack years: 0.00     Total pack years: 2.00     Types: Cigarettes     Start date:      Quit date:      Years since quittin.0    Smokeless tobacco: Never   Vaping Use    Vaping Use: Never used   Substance Use Topics    Alcohol use: Not Currently    Drug use: Never     Medications Prior to Admission   Medication Sig Dispense Refill Last Dose    capecitabine (XELODA) 150 MG chemo tablet Take 5 tablets by mouth (with 1 other capecitabine Rx) for 1,750 mg total 2 (Two) Times a Day on Days 1-14, then off for 7 days. Total dose is 1750mg in the AM & 1750mg in the PM. (Patient not taking: Reported on 2023) 140 tablet 4     capecitabine (XELODA) 500 MG chemo tablet Take 2 tablets by mouth (with 1 other capecitabine prescription) for 1,750 mg total 2 (Two) Times a Day on Days 1-14, then off for 7 days.  Total dose is 1750mg in the AM & 1750mg in the PM. (Patient not taking: Reported on 2023) 56 tablet 4     insulin glargine (LANTUS, SEMGLEE) 100  UNIT/ML injection Inject 16 Units under the skin into the appropriate area as directed Every Night.       Semaglutide,0.25 or 0.5MG/DOS, (Ozempic, 0.25 or 0.5 MG/DOSE,) 2 MG/1.5ML solution pen-injector Inject 0.25 mg under the skin into the appropriate area as directed As Needed. friday        Allergies:  Penicillins    Immunization History   Administered Date(s) Administered    COVID-19 (PFIZER) Purple Cap Monovalent 03/11/2021, 04/05/2021, 11/13/2021           REVIEW OF SYSTEMS:    Review of Systems   Constitutional: Negative for malaise/fatigue.   Cardiovascular:  Negative for chest pain.   Respiratory:  Negative for shortness of breath.    Endocrine: Positive for polyuria.   Neurological:  Negative for numbness, paresthesias and weakness.   All other systems reviewed and are negative.        Vital Signs  Temp:  [97.1 °F (36.2 °C)-98.2 °F (36.8 °C)] 97.4 °F (36.3 °C)  Heart Rate:  [64-99] 72  Resp:  [10-17] 12  BP: (127-152)/(61-85) 137/79          Physical Exam:  Physical Exam  Vitals and nursing note reviewed.   Constitutional:       Appearance: Normal appearance.   HENT:      Head: Normocephalic and atraumatic.      Right Ear: External ear normal.      Left Ear: External ear normal.      Nose: Nose normal.      Mouth/Throat:      Mouth: Mucous membranes are moist.   Eyes:      Extraocular Movements: Extraocular movements intact.   Cardiovascular:      Rate and Rhythm: Normal rate and regular rhythm.      Pulses: Normal pulses.      Heart sounds: Normal heart sounds.   Pulmonary:      Effort: Pulmonary effort is normal.      Breath sounds: Normal breath sounds.   Abdominal:      General: Abdomen is flat. Bowel sounds are normal.      Palpations: Abdomen is soft.   Musculoskeletal:         General: Normal range of motion.      Cervical back: Normal range of motion.   Skin:     General: Skin is warm.   Neurological:      Mental Status: He is alert and oriented to person, place, and time.   Psychiatric:          Behavior: Behavior normal.         Thought Content: Thought content normal.         Judgment: Judgment normal.         Emotional Behavior:    Normal   Debilities:   None  Results Review:    I reviewed the patient's new clinical results.  Lab Results (most recent)       Procedure Component Value Units Date/Time    POC Glucose Once [[id number]]  (Normal) Collected: 01/09/24 1141    Specimen: Blood Updated: 01/09/24 1142     Glucose 102 mg/dL      Comment: Serial Number: [id number]:  [id number]       TSH [[id number]]  (Normal) Collected: 01/09/24 0339    Specimen: Blood from Arm, Right Updated: 01/09/24 1050     TSH 0.844 uIU/mL     T4, Free [[id number]]  (Abnormal) Collected: 01/09/24 0339    Specimen: Blood from Arm, Right Updated: 01/09/24 1050     Free T4 0.89 ng/dL     Narrative:      Results may be falsely increased if patient taking Biotin.      Magnesium [[id number]]  (Normal) Collected: 01/09/24 0339    Specimen: Blood from Arm, Right Updated: 01/09/24 1041     Magnesium 1.6 mg/dL     Calcium, Ionized [[id number]]  (Abnormal) Collected: 01/09/24 0806    Specimen: Blood from Arm, Left Updated: 01/09/24 0844     Ionized Calcium 1.14 mmol/L     POC Glucose Once [[id number]]  (Abnormal) Collected: 01/09/24 0739    Specimen: Blood Updated: 01/09/24 0741     Glucose 220 mg/dL      Comment: Serial Number: [id number]:  [id number]       Basic Metabolic Panel [[id number]]  (Abnormal) Collected: 01/09/24 0339    Specimen: Blood from Arm, Right Updated: 01/09/24 0440     Glucose 312 mg/dL      BUN 23 mg/dL      Creatinine 0.70 mg/dL      Sodium 138 mmol/L      Potassium 3.9 mmol/L      Chloride 109 mmol/L      CO2 23.0 mmol/L      Calcium 7.1 mg/dL      BUN/Creatinine Ratio 32.9     Anion Gap 6.0 mmol/L      eGFR 95.5 mL/min/1.73     Narrative:      GFR Normal >60  Chronic Kidney Disease <60  Kidney Failure <15    The GFR formula is only valid for adults with stable renal function between ages 18 and 70.    CBC &  Differential [[id number]]  (Abnormal) Collected: 01/09/24 0339    Specimen: Blood from Arm, Right Updated: 01/09/24 0414    Narrative:      The following orders were created for panel order CBC & Differential.  Procedure                               Abnormality         Status                     ---------                               -----------         ------                     CBC Auto Differential[[id number]]        Abnormal            Final result                 Please view results for these tests on the individual orders.    CBC Auto Differential [[id number]]  (Abnormal) Collected: 01/09/24 0339    Specimen: Blood from Arm, Right Updated: 01/09/24 0414     WBC 5.10 10*3/mm3      RBC 3.91 10*6/mm3      Hemoglobin 11.1 g/dL      Hematocrit 34.8 %      MCV 89.2 fL      MCH 28.3 pg      MCHC 31.7 g/dL      RDW 15.0 %      RDW-SD 49.9 fl      MPV 7.6 fL      Platelets 145 10*3/mm3      Neutrophil % 65.3 %      Lymphocyte % 23.4 %      Monocyte % 9.4 %      Eosinophil % 1.6 %      Basophil % 0.3 %      Neutrophils, Absolute 3.30 10*3/mm3      Lymphocytes, Absolute 1.20 10*3/mm3      Monocytes, Absolute 0.50 10*3/mm3      Eosinophils, Absolute 0.10 10*3/mm3      Basophils, Absolute 0.00 10*3/mm3      nRBC 0.1 /100 WBC     Hemoglobin A1c [[id number]]  (Abnormal) Collected: 01/08/24 0951    Specimen: Blood from Arm, Left Updated: 01/08/24 1344     Hemoglobin A1C 12.20 %     [place] Draw [[id number]] Collected: 01/08/24 0951    Specimen: Blood from Arm, Left Updated: 01/08/24 1102    Narrative:      The following orders were created for panel order [place] Draw.  Procedure                               Abnormality         Status                     ---------                               -----------         ------                     Green Top (Gel)[[id number]]                                  Final result               Lavender Top[[id number]]                                     Final result               Gold Top -  SST[[id number]]                                   Final result               Light Blue Top[[id number]]                                   Final result                 Please view results for these tests on the individual orders.    Lavender Top [[id number]] Collected: 01/08/24 0951    Specimen: Blood from Arm, Left Updated: 01/08/24 1102     Extra Tube hold for add-on     Comment: Auto resulted       Light Blue Top [[id number]] Collected: 01/08/24 0951    Specimen: Blood from Arm, Left Updated: 01/08/24 1102     Extra Tube Hold for add-ons.     Comment: Auto resulted       Green Top (Gel) [[id number]] Collected: 01/08/24 0951    Specimen: Blood from Arm, Left Updated: 01/08/24 1102     Extra Tube Hold for add-ons.     Comment: Auto resulted.       Gold Top - SST [[id number]] Collected: 01/08/24 0951    Specimen: Blood from Arm, Left Updated: 01/08/24 1102     Extra Tube Hold for add-ons.     Comment: Auto resulted.       Comprehensive Metabolic Panel [[id number]]  (Abnormal) Collected: 01/08/24 0951    Specimen: Blood from Arm, Left Updated: 01/08/24 1024     Glucose 582 mg/dL      BUN 24 mg/dL      Creatinine 0.96 mg/dL      Sodium 134 mmol/L      Potassium 4.9 mmol/L      Chloride 99 mmol/L      CO2 29.0 mmol/L      Calcium 8.9 mg/dL      Total Protein 6.9 g/dL      Albumin 3.7 g/dL      ALT (SGPT) 16 U/L      AST (SGOT) 13 U/L      Alkaline Phosphatase 122 U/L      Total Bilirubin 0.3 mg/dL      Globulin 3.2 gm/dL      A/G Ratio 1.2 g/dL      BUN/Creatinine Ratio 25.0     Anion Gap 6.0 mmol/L      eGFR 81.9 mL/min/1.73     Narrative:      GFR Normal >60  Chronic Kidney Disease <60  Kidney Failure <15    The GFR formula is only valid for adults with stable renal function between ages 18 and 70.    Protime-INR [[id number]]  (Abnormal) Collected: 01/08/24 0951    Specimen: Blood from Arm, Left Updated: 01/08/24 1020     Protime 11.8 Seconds      INR 1.09    aPTT [[id number]]  (Abnormal) Collected: 01/08/24 0951    Specimen:  Blood from Arm, Left Updated: 01/08/24 1020     PTT 27.0 seconds     CBC & Differential [[id number]]  (Abnormal) Collected: 01/08/24 0951    Specimen: Blood from Arm, Left Updated: 01/08/24 1014    Narrative:      The following orders were created for panel order CBC & Differential.  Procedure                               Abnormality         Status                     ---------                               -----------         ------                     CBC Auto Differential[[id number]]        Abnormal            Final result                 Please view results for these tests on the individual orders.    CBC Auto Differential [[id number]]  (Abnormal) Collected: 01/08/24 0951    Specimen: Blood from Arm, Left Updated: 01/08/24 1014     WBC 4.70 10*3/mm3      RBC 4.36 10*6/mm3      Hemoglobin 12.6 g/dL      Hematocrit 38.8 %      MCV 89.1 fL      MCH 28.9 pg      MCHC 32.4 g/dL      RDW 15.5 %      RDW-SD 51.6 fl      MPV 8.0 fL      Platelets 159 10*3/mm3      Neutrophil % 65.4 %      Lymphocyte % 23.8 %      Monocyte % 9.6 %      Eosinophil % 0.9 %      Basophil % 0.3 %      Neutrophils, Absolute 3.00 10*3/mm3      Lymphocytes, Absolute 1.10 10*3/mm3      Monocytes, Absolute 0.40 10*3/mm3      Eosinophils, Absolute 0.00 10*3/mm3      Basophils, Absolute 0.00 10*3/mm3      nRBC 0.0 /100 WBC     Urinalysis With Microscopic If Indicated (No Culture) - Urine, Clean Catch [[id number]]  (Abnormal) Collected: 01/08/24 0931    Specimen: Urine, Clean Catch Updated: 01/08/24 1013     Color, UA Yellow     Appearance, UA Clear     pH, UA <=5.0     Specific Gravity, UA 1.032     Glucose, UA >=1000 mg/dL (3+)     Ketones, UA Negative     Bilirubin, UA Negative     Blood, UA Negative     Protein, UA Negative     Leuk Esterase, UA Negative     Nitrite, UA Negative     Urobilinogen, UA 0.2 E.U./dL    Narrative:      Urine microscopic not indicated.            Imaging Results (Most Recent)       Procedure Component Value Units  Date/Time    MRI Brain With & Without Contrast [[id number]] Collected: 01/08/24 1157     Updated: 01/08/24 1226    Narrative:      MRI BRAIN W WO CONTRAST    Date of Exam: 1/8/2024 11:19 AM EST    Indication: mental status change, current cancer.     Comparison: MRI brain 3/11/2023    Technique:  Routine multiplanar/multisequence sequence images of the brain were obtained before and after the uneventful administration of Prohance.      Findings:  There is no diffusion restriction to suggest acute infarct.     There is no evidence of acute intracranial hemorrhage. There are a few scattered punctate susceptibility foci suggestive of chronic microhemorrhages. No mass effect or midline shift. No abnormal extra-axial collections.     The basal ganglia, brainstem and cerebellum appear within normal limits. Midline structures are intact. There is mild age-related atrophy. T2/FLAIR hyperintensities in the periventricular and deep white matter along with the jacque likely represent the   sequela of chronic microvascular ischemic disease. There are multiple chronic lacunar infarcts in the basal ganglia and bilateral cerebellum.    Calvarial and superficial soft tissue signal is within normal limits. Orbits appear unremarkable. There is mild mucosal thickening of the paranasal sinuses. Mucous retention cysts are seen in the left maxillary sinus. No air-fluid levels. The mastoid air   cells are clear.    There is no abnormal intracranial enhancement. 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      Impression:      Impression:    1. No acute intracranial abnormality. No abnormal intracranial enhancement.  2. Age-related atrophy and suspected chronic microvascular ischemic changes.  3. 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        Electronically Signed: Ene Toth MD    1/8/2024 12:24 PM EST    Workstation ID: [id number]  Prohance    XR Chest 1 View [[id number]] Collected:  01/08/24 1006     Updated: 01/08/24 1023    Narrative:      XR CHEST 1 VW    Date of Exam: 1/8/2024 10:01 AM EST    Indication: stroke r/o    Comparison: Chest x-ray 5/22/2023    Findings:  Cardiomediastinal silhouette is unremarkable Mild left basilar atelectasis. No pleural effusion. Suspected skinfold overlying the left lower thorax. No acute osseous abnormality identified. Right sided central line with the tip in the SVC.      Impression:      Impression:  Mild left basilar atelectasis. There is a suspected skinfold overlying the left lateral lower thorax; however, repeat chest x-ray can be performed for confirmation and to exclude small pneumothorax.      Electronically Signed: Ene Toth MD    1/8/2024 10:11 AM EST    Workstation ID: [id number]          reviewed    ECG/EMG Results (most recent)       Procedure Component Value Units Date/Time    SCANNED - TELEMETRY   [[id number]] Resulted: 01/08/24     Updated: 01/08/24 1809    ECG 12 Lead Stroke Evaluation [[id number]] Collected: 01/08/24 0928     Updated: 01/09/24 0557     QT Interval 414 ms      QTC Interval 458 ms     Narrative:      HEART RATE= 74  bpm  RR Interval= 816  ms  MI Interval= 260  ms  P Horizontal Axis= 14  deg  P Front Axis= 73  deg  QRSD Interval= 145  ms  QT Interval= 414  ms  QTcB= 458  ms  QRS Axis= -46  deg  T Wave Axis= 53  deg  - ABNORMAL ECG -  Sinus rhythm  Prolonged MI interval  Left bundle branch block  ST elevation secondary to IVCD  Electronically Signed By: Rudy Gamez (KG) 09-Jan-2024 05:57:10  Date and Time of Study: 2024-01-08 09:28:22    SCANNED - TELEMETRY   [[id number]] Resulted: 01/08/24     Updated: 01/09/24 0831    SCANNED - TELEMETRY   [[id number]] Resulted: 01/08/24     Updated: 01/09/24 1050    SCANNED - TELEMETRY   [[id number]] Resulted: 01/08/24     Updated: 01/09/24 1222          reviewed        Results for orders placed during the hospital encounter of 03/11/23    Adult Transthoracic Echo Complete W/ Cont if  Necessary Per Protocol (With Agitated Saline)    Interpretation Summary    Left ventricular systolic function is mildly decreased. Calculated left ventricular EF = 43% Left ventricular ejection fraction appears to be 41 - 45%.    Left ventricular diastolic function is consistent with (grade I) impaired relaxation.    There is moderate calcification of the aortic valve mainly affecting the non-coronary and left coronary cusp(s).      Microbiology Results (last 10 days)       ** No results found for the last 240 hours. **            Assessment & Plan     Hyperglycemia        Diabetes mellitus  -Poorly controlled   Lab Results   Component Value Date    GLUCOSE 312 (H) 01/09/2024    GLUCOSE 582 (C) 01/08/2024    GLUCOSE 420 (C) 12/27/2023    GLUCOSE 319 (H) 12/11/2023   -Patient sent to the ED from [facility] to rule out stroke  -MRI brain showed no acute intracranial abnormality and no abnormal intracranial enhancement  -Glucose 582 upon arrival in the ED  -In the ED patient received 500 mL bolus, 8 units of Humulin R and 15 mL of ProHance  -A1c 12.20  -Diabetes educator consulted  -Endocrinology consulted  -Initially ordered 16 units of Lantus which was increased to 20 units per endocrinologist  -Humalog 5 units 3 times daily with meals which was increased to 7 units 3 times daily by endocrinologist  -Glucose has improved and patient feels well  -PT was consulted and signed off. Patient up ad mike in room, no deficits noticed.   -Diabetic diet  -Monitor AC and HS     I discussed the patients findings and my recommendations with patient and nursing staff.     Discharge Diagnosis:      Hyperglycemia      Hospital Course  Patient is a 78 y.o. male presented with balance issues as noted in HPI above.  Glucose was 582 in the ED and patient received Humulin R, ProHance and 500 mill bolus.  He was kept overnight in the observation unit for IV fluid hydration, endocrinology consult and diabetes educator consult.  A1c 12.20.   Patient's glucose improved significantly on basal and bolus insulin as ordered by endocrinologist.  At this time, patient felt to be in good condition for discharge with close follow up with PCP. Instructed to take all medications as prescribed and to return to ED if any concerning signs/symptoms. All test/lab results were discussed with patient. All questions were answered and patient verbalizes understanding.       Past Medical History:     Past Medical History:   Diagnosis Date    Anemia     Colon cancer 2022    colon    Diabetes mellitus     Hyperlipidemia     Hypertension        Past Surgical History:     Past Surgical History:   Procedure Laterality Date    APPENDECTOMY      CARDIAC CATHETERIZATION      COLON RESECTION N/A 12/15/2022    Procedure: COLON RESECTION RIGHT;  Surgeon: Percy Davis MD;  Location: [facility] MAIN OR;  Service: General;  Laterality: N/A;    COLONOSCOPY N/A 11/11/2022    Procedure: COLONOSCOPY WITH BIOPSY AND POLYPECTOMY;  Surgeon: Billy Julien MD;  Location: [facility] ENDOSCOPY;  Service: Gastroenterology;  Laterality: N/A;  Impression:  1.  Large 4-5cm fulgurating circumferential ulcerated mass in the very proximal part of the ascending colon next to ileocecal valve multiple biopsies were performed.  This is highly concerning for colon malignancy.  2.  2 polyp rem    ENDOSCOPY N/A 11/11/2022    Procedure: ESOPHAGOGASTRODUODENOSCOPY with biopsy X1;  Surgeon: Billy Julien MD;  Location: [facility] ENDOSCOPY;  Service: Gastroenterology;  Laterality: N/A;  5.  Upper endoscopy lamination unremarkable.       PORTACATH PLACEMENT Right 1/12/2023    Procedure: INSERTION OF PORTACATH;  Surgeon: Percy Davis MD;  Location: [facility] MAIN OR;  Service: General;  Laterality: Right;    TONSILLECTOMY         Social History:   Social History     Socioeconomic History    Marital status: Single   Tobacco Use    Smoking status: Former     Packs/day: 1.00     Years: 2.00     Additional  pack years: 0.00     Total pack years: 2.00     Types: Cigarettes     Start date:      Quit date:      Years since quittin.0    Smokeless tobacco: Never   Vaping Use    Vaping Use: Never used   Substance and Sexual Activity    Alcohol use: Not Currently    Drug use: Never    Sexual activity: Defer       Procedures Performed         Consults:   Consults       Date and Time Order Name Status Description    2024  2:05 PM Inpatient Endocrinology Consult              Condition on Discharge:     Stable    Discharge Disposition      Discharge Medications     Discharge Medications        ASK your doctor about these medications        Instructions Start Date   capecitabine 150 MG chemo tablet  Commonly known as: XELODA   Take 5 tablets by mouth (with 1 other capecitabine Rx) for 1,750 mg total 2 (Two) Times a Day on Days 1-14, then off for 7 days. Total dose is 1750mg in the AM & 1750mg in the PM.      capecitabine 500 MG chemo tablet  Commonly known as: XELODA   Take 2 tablets by mouth (with 1 other capecitabine prescription) for 1,750 mg total 2 (Two) Times a Day on Days 1-14, then off for 7 days.  Total dose is 1750mg in the AM & 1750mg in the PM.      insulin glargine 100 UNIT/ML injection  Commonly known as: LANANN SEMGLEE  Ask about: Which instructions should I use?   16 Units, Subcutaneous, Nightly      Ozempic (0.25 or 0.5 MG/DOSE) 2 MG/1.5ML solution pen-injector  Generic drug: Semaglutide(0.25 or 0.5MG/DOS)   0.25 mg, Subcutaneous, As Needed, friday               Discharge Diet:     Activity at Discharge:     Follow-up Appointments  No future appointments.      Test Results Pending at Discharge       Risk for Readmission (LACE) Score: 9 (2024  6:00 AM)      Greater than 30 minutes spent in discharge activities for this patient    Yesenia ACEVEDO Elba, ANEUDY  24  13:22 EST

## 2024-01-09 NOTE — THERAPY EVALUATION
Patient Name: Vlad Guerrero  : 1945    MRN: [id number]                              Today's Date: 2024       Admit Date: 2024    Visit Dx:     ICD-10-CM ICD-9-CM   1. Hyperglycemia  R73.9 790.29   2. 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     Past Medical History:   Diagnosis Date    Anemia     Colon cancer     colon    Diabetes mellitus     Hyperlipidemia     Hypertension      Past Surgical History:   Procedure Laterality Date    APPENDECTOMY      CARDIAC CATHETERIZATION      COLON RESECTION N/A 12/15/2022    Procedure: COLON RESECTION RIGHT;  Surgeon: Percy Davis MD;  Location: [facility] MAIN OR;  Service: General;  Laterality: N/A;    COLONOSCOPY N/A 2022    Procedure: COLONOSCOPY WITH BIOPSY AND POLYPECTOMY;  Surgeon: Billy Julien MD;  Location: [facility] ENDOSCOPY;  Service: Gastroenterology;  Laterality: N/A;  Impression:  1.  Large 4-5cm fulgurating circumferential ulcerated mass in the very proximal part of the ascending colon next to ileocecal valve multiple biopsies were performed.  This is highly concerning for colon malignancy.  2.  2 polyp rem    ENDOSCOPY N/A 2022    Procedure: ESOPHAGOGASTRODUODENOSCOPY with biopsy X1;  Surgeon: Billy Julien MD;  Location: [facility] ENDOSCOPY;  Service: Gastroenterology;  Laterality: N/A;  5.  Upper endoscopy lamination unremarkable.       PORTACATH PLACEMENT Right 2023    Procedure: INSERTION OF PORTACATH;  Surgeon: Percy Davis MD;  Location: [facility] MAIN OR;  Service: General;  Laterality: Right;    TONSILLECTOMY        General Information       Row Name 24 1613          OT Time and Intention     "Document Type evaluation  -SR     Mode of Treatment occupational therapy  -SR       Row Name 01/09/24 1613          Occupational Profile    Reason for Services/Referral (Occupational Profile) Patient is a 78-year-old male [facility] of [facility], colon cancer currently undergoing chemo with last treatment approximately 3 weeks ago, diabetes hyperlipidemia and hypertension.  He states approximately 1 week ago he began feeling \"off\" stating that he was in his apartment and he felt as if he could not get out, denies confusion but states his symptoms are similar to when he had a stroke approximately 1 year ago.  -SR       Row Name 01/09/24 1613          Cognition    Orientation Status (Cognition) oriented x 4  Pt scores 6/26 on SBT indicating questionable cognitive impairment.  -SR       Row Name 01/09/24 1613          Safety Issues, Functional Mobility    Impairments Affecting Function (Mobility) cognition  -SR               User Key  (r) = Recorded By, (t) = Taken By, (c) = Cosigned By      Initials Name Provider Type    SR Priyanka Paula, OT Occupational Therapist                     Mobility/ADL's       Row Name 01/09/24 1616          Bed Mobility    Bed Mobility bed mobility (all) activities  -SR     All Activities, [place] (Bed Mobility) independent  -SR       Row Name 01/09/24 1616          Transfers    Transfers sit-stand transfer  -SR       Row Name 01/09/24 1616          Sit-Stand Transfer    Sit-Stand [place] (Transfers) independent  -SR       Row Name 01/09/24 1616          Functional Mobility    Functional Mobility- Ind. Level independent  -SR       Row Name 01/09/24 1616          Activities of Daily Living    BADL Assessment/Intervention lower body dressing  -SR       Row Name 01/09/24 1616          Lower Body Dressing Assessment/Training    [place] Level (Lower Body Dressing) don;socks;independent  -SR               User Key  (r) = Recorded By, (t) " "= Taken By, (c) = Cosigned By      Initials Name Provider Type    Priyanka Fonseca OT Occupational Therapist                   Obj/Interventions       Row Name 01/09/24 1617          Range of Motion Comprehensive    General Range of Motion no range of motion deficits identified  -       Row Name 01/09/24 1617          Strength Comprehensive (MMT)    General Manual Muscle Testing (MMT) Assessment no strength deficits identified  -SR               User Key  (r) = Recorded By, (t) = Taken By, (c) = Cosigned By      Initials Name Provider Type    SR Priyanka Paula OT Occupational Therapist                   Goals/Plan    No documentation.                  Clinical Impression       Row Name 01/09/24 1617          Pain Assessment    Pretreatment Pain Rating 0/10 - no pain  -SR     Posttreatment Pain Rating 0/10 - no pain  -SR       Row Name 01/09/24 1617          Plan of Care Review    Outcome Evaluation Patient is a 78-year-old male who [facility] of [facility], colon cancer currently undergoing chemo with last treatment approximately 3 weeks ago, diabetes hyperlipidemia and hypertension.  He states approximately 1 week ago he began feeling \"off\" stating that he was in his apartment and he felt as if he could not get out, denies confusion but states his symptoms are similar to when he had a stroke approximately 1 year ago.  He demos independence with ADLs and mobility this date, though does demonstrate questionable cognitive impairment.  Recommend follow up with OP OT or SLP for more extensive assessment  -       Row Name 01/09/24 1617          Therapy Plan Review/Discharge Plan (OT)    Anticipated Discharge Disposition (OT) home with outpatient therapy services  -       Row Name 01/09/24 1617          Positioning and Restraints    Pre-Treatment Position in bed  -SR     Post Treatment Position chair  -SR               User Key  (r) = Recorded By, (t) = Taken By, (c) " "= Cosigned By      Initials Name Provider Type     Priyanka Paula OT Occupational Therapist                   Outcome Measures       Row Name 01/09/24 0800          How much help from another person do you currently need...    Turning from your back to your side while in flat bed without using bedrails? 4  -RR     Moving from lying on back to sitting on the side of a flat bed without bedrails? 4  -RR     Moving to and from a bed to a chair (including a wheelchair)? 4  -RR     Standing up from a chair using your arms (e.g., wheelchair, bedside chair)? 4  -RR     Climbing 3-5 steps with a railing? 4  -RR     To walk in hospital room? 4  -RR     AM-PAC 6 Clicks Score (PT) 24  -RR     Highest Level of Mobility Goal 8 --> Walked 250 feet or more  -RR               User Key  (r) = Recorded By, (t) = Taken By, (c) = Cosigned By      Initials Name Provider Type    RR Annalise Curtis, RN Registered Nurse                    Occupational Therapy Education       Title: PT OT SLP Therapies (Done)       Topic: Occupational Therapy (Done)       Point: ADL training (Done)       Description:   Instruct learner(s) on proper safety adaptation and remediation techniques during self care or transfers.   Instruct in proper use of assistive devices.                  Learning Progress Summary             Patient Acceptance, E,TB, VU by  at 1/9/2024 1619                                         User Key       Initials Effective Dates Name Provider Type [facility] 06/16/21 -  Priyanka Paula OT Occupational Therapist OT                  OT Recommendation and Plan     Plan of Care Review  Outcome Evaluation: Patient is a 78-year-old male who [facility] of [facility], colon cancer currently undergoing chemo with last treatment approximately 3 weeks ago, diabetes hyperlipidemia and hypertension.  He states approximately 1 week ago he began feeling \"off\" stating that he was in his apartment " and he felt as if he could not get out, denies confusion but states his symptoms are similar to when he had a stroke approximately 1 year ago.  He demos independence with ADLs and mobility this date, though does demonstrate questionable cognitive impairment.  Recommend follow up with OP OT or SLP for more extensive assessment     Time Calculation:         Time Calculation- OT       Row Name 01/09/24 1619             Time Calculation- OT    OT Start Time 0840  -SR      OT Stop Time 0857  -SR      OT Time Calculation (min) 17 min  -SR      Total Timed Code Minutes- OT 0 minute(s)  -SR      OT Received On 01/09/24  -SR                User Key  (r) = Recorded By, (t) = Taken By, (c) = Cosigned By      Initials Name Provider Type    SR Priyanka Paula OT Occupational Therapist                  Therapy Charges for Today       Code Description Service Date Service Provider Modifiers Qty    [id number] HC OT EVAL LOW COMPLEXITY 3 1/9/2024 Priyanka Paula OT GO 1                 Priyanka Paula OT  1/9/2024

## 2024-01-09 NOTE — PLAN OF CARE
Goal Outcome Evaluation:      A/O x4 no pains,n,v. Loose stools noted with urgency (wears pull ups) patient states loose stools since DX colon cancer, stool soft brown-wnl. Bld sugars better continues on IVF's

## 2024-01-10 ENCOUNTER — TELEPHONE (OUTPATIENT)
Dept: DIABETES SERVICES | Facility: HOSPITAL | Age: 79
End: 2024-01-10
Payer: OTHER GOVERNMENT

## 2024-01-11 ENCOUNTER — TELEPHONE (OUTPATIENT)
Dept: DIABETES SERVICES | Facility: HOSPITAL | Age: 79
End: 2024-01-11
Payer: OTHER GOVERNMENT

## 2024-01-11 NOTE — TELEPHONE ENCOUNTER
Pt contacted educator office after hours on 1/9/2024 and left voice mail that he has [employer] bs meter. Requesting educator return call.     Educator returned call on 1/10/2024. No answer. Voice mailbox full and unable to leave message.    Educator attempted to reach pt again today and no answer. Voice mailbox full and unable to leave message.

## 2024-01-12 ENCOUNTER — TELEPHONE (OUTPATIENT)
Dept: DIABETES SERVICES | Facility: HOSPITAL | Age: 79
End: 2024-01-12
Payer: OTHER GOVERNMENT

## 2024-01-15 ENCOUNTER — TELEPHONE (OUTPATIENT)
Dept: DIABETES SERVICES | Facility: HOSPITAL | Age: 79
End: 2024-01-15
Payer: OTHER GOVERNMENT

## 2024-01-16 ENCOUNTER — TELEPHONE (OUTPATIENT)
Dept: DIABETES SERVICES | Facility: HOSPITAL | Age: 79
End: 2024-01-16
Payer: OTHER GOVERNMENT

## 2024-01-17 ENCOUNTER — TELEPHONE (OUTPATIENT)
Dept: DIABETES SERVICES | Facility: HOSPITAL | Age: 79
End: 2024-01-17
Payer: OTHER GOVERNMENT

## 2024-01-18 ENCOUNTER — TELEPHONE (OUTPATIENT)
Dept: DIABETES SERVICES | Facility: HOSPITAL | Age: 79
End: 2024-01-18
Payer: OTHER GOVERNMENT

## 2024-01-18 ENCOUNTER — READMISSION MANAGEMENT (OUTPATIENT)
Dept: CALL CENTER | Facility: HOSPITAL | Age: 79
End: 2024-01-18
Payer: OTHER GOVERNMENT

## 2024-01-18 NOTE — TELEPHONE ENCOUNTER
Called pt and asked pt if insurance covered for the Novolog insulin. Pt states he has not been to the pharmacy to pick it up yet but plans to go today. Discussed importance of taking insulin as prescribed. Encouraged pt to contact PCP if insurance not covering for the Novolog to have rx for different rapid-acting insulin sent in. Pt with no additional questions at this time.

## 2024-01-18 NOTE — OUTREACH NOTE
Medical Week 2 Survey      Flowsheet Row Responses   [facility] patient discharged from? [place]   Does the patient have one of the following disease processes/diagnoses(primary or secondary)? Other   Week 2 attempt successful? Yes   Call start time 1554   Discharge diagnosis Hyperglycemia   Call end time 1555   Is patient permission given to speak with other caregiver? Yes   List who call center can speak with Eben   Person spoke with today (if not patient) and relationship Son-Prashanth   Meds reviewed with patient/caregiver? Yes   Is the patient having any side effects they believe may be caused by any medication additions or changes? No   Does the patient have all medications ordered at discharge? Yes   Is the patient taking all medications as directed (includes completed medication regime)? Yes   Does the patient have a primary care provider?  Yes   Does the patient have an appointment with their PCP within 7 days of discharge? No   What is preventing the patient from scheduling follow up appointments within 7 days of discharge? Haven't had time   Nursing Interventions Advised patient to make appointment, Educated patient on importance of making appointment   Has the patient kept scheduled appointments due by today? N/A   Has home health visited the patient within 72 hours of discharge? N/A   Psychosocial issues? No   Did the patient receive a copy of their discharge instructions? Yes   Nursing interventions Reviewed instructions with patient   What is the patient's perception of their health status since discharge? Improving   Is the patient/caregiver able to teach back signs and symptoms related to disease process for when to call PCP? Yes   Is the patient/caregiver able to teach back signs and symptoms related to disease process for when to call 911? Yes   Is the patient/caregiver able to teach back the hierarchy of who to call/visit for symptoms/problems? PCP, Specialist, Home health nurse, Urgent Care,  ED, 911 Yes   Week 2 Call Completed? Yes   Is the patient interested in additional calls from an ambulatory ? No   Would this patient benefit from a Referral to [facility] Work? No   Wrap up additional comments Son reports patient improving.   Call end time [id number]            Ca HENLEY - Registered Nurse

## 2024-01-26 ENCOUNTER — TELEPHONE (OUTPATIENT)
Dept: ONCOLOGY | Facility: CLINIC | Age: 79
End: 2024-01-26
Payer: OTHER GOVERNMENT

## 2024-01-26 NOTE — TELEPHONE ENCOUNTER
Patient contact our office to reschedule his follow up appt with Dr. Kramer due to being hospitalized during his last appt. I informed him that Dr. Kramer is wanting him to have the MRI abdomen completed prior to seeing him therefore I provided him with the phone number to the central scheduling hub. I advised him to contact them to reschedule his MRI then contact us back. He confirmed.     Patient contacted us back and stated the earliest they were able to schedule him is 3/13/24 at 1330 for the MRI. Patient scheduled for follow up appt with Dr. Kramer on 3/18/24 at 0830 and 0845. I informed him that his current authorization for the MRI has  therefore the authorizations department will [facility]. He confirmed. I stated I will contact him if his appt is able to be moved to an earlier time.

## 2024-02-06 ENCOUNTER — TELEPHONE (OUTPATIENT)
Dept: ONCOLOGY | Facility: CLINIC | Age: 79
End: 2024-02-06
Payer: OTHER GOVERNMENT

## 2024-02-07 ENCOUNTER — TELEPHONE (OUTPATIENT)
Dept: ONCOLOGY | Facility: CLINIC | Age: 79
End: 2024-02-07

## 2024-02-07 NOTE — TELEPHONE ENCOUNTER
The [facility] received a fax that requires your attention. The document has been indexed to the patient’s chart for your review.      Reason for sending: RECEIVED MEDICAL RECORDS    Documents Description: UPDATED REFERRAL 02/07/24, HSRM 02/07/24, PATIENT SUMMARY 02/02/24, PROGRESS NOTE 01/16/24, LAB 01/08/24    Name of Sender: [facility]    Date Indexed: 02/07/24    Notes (if needed): THANKS!

## 2024-03-15 NOTE — PROGRESS NOTES
HEMATOLOGY ONCOLOGY OUTPATIENT FOLLOW-UP       Patient name: Vlad Guerrero  : 1945  MRN: [id number]  Primary Care Physician: Vlad Moreland MD  Referring Physician: Vlad Moreland MD  Reason For Consult: Stage III colon cance    Chief Complaint   Patient presents with    Follow-up     Metastases to the liver     HPI:   History of Present Illness:  Vlad Guerrero is 78 y.o. male who presented to our office on 23 for consultation regarding    2023: Mr. Guerrero dated the beginning of his present illness to sometime at the end of  when he started to feel fatigued.  He was seen at the [facility] and had laboratory exams that revealed microcytic anemia.  He had evidence of iron deficiency and received intravenous iron in the hospital.  He had upper and lower gastrointestinal endoscopies that demonstrated a cecal tumor that measured 4 to 5 cm and was fungating in appearance.  It was circumferential and was next to the ileocecal valve.  The upper gastrointestinal endoscopy revealed no abnormalities.  On this basis he was on December 15, 2022 he was taken to the hospital and underwent a right hemicolectomy without complications.  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  It corresponded to a grade 2 malignancy that invaded through the muscularis propria into the pericolonic tissues.  Macroscopic tumor perforation or lymphovascular space invasion were not present.  Perineural invasion was not present either.  All margins of excision were negative.  All of 36 lymph nodes submitted to were positive for involvement with malignancy.  The disease was Edwards staged as [id number].  Loss of expression of mismatch repair enzymes was not documented.  Mr. Guerrero was discharged to continue treatment as outpatient.  At the time of this visit he was recovering well from the surgery.  His incision  had healed completely and he had no drains or suture materials.  He had returned home and was eating well.  He had yet to return to work.  He had been afebrile and free of nausea.  For the most part his weight had been stable.  After reviewing the records a long conversation, of approximately 1 hour, was had with the patient in regards to options of treatment.  The nature of his disease as well as the likelihood of recurrence were described.  The use of adjuvant chemotherapy to increase the rate of cure after surgery was explained.  Side effects were described in detail.  The need for a port was expressed as well.  A treatment plan was placed. A decision was made to treat him with three months of CAPOX given the characteristics of his disease.     2/6/2023: Received the first cycle of adjuvant chemotherapy without any side effects. On the day of this visit feeling well and without any new symptoms, except a very mild rash, especially on the forearms, but no oral pain. Had stools of diminished consistency for a few days but now resolved. The exam was rather unremarkable, except for a few very light erythematous macules on the forearms.     2/27/2023: Feeling well and without new symptoms.  As active as before.  Eating well and without unintended weight loss.  Stronger and more energetic since the institution of vitamin B12 and iron.  No chest pains and cough.  No abdominal pain or diarrhea.  On exam no changes.  A decision was made to continue with the same treatment.  Laboratory exams were reviewed.  He was to see me again in approximately 4 weeks from this date with new scans.    3/27/2023: Suffered an ischemic stroke.  MRI on March 10, 2023 reported a 7 mm focus of restricted diffusion in the left periventricular white matter of the posterior left frontal lobe.  This was felt to be suspicious for an acute/subacute infarct.  There was also evidence of previous lacunar strokes.  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  There was bilateral internal carotid artery stenosis with 60% stenosis estimated at the right and not greater than 50% at the left.  Chemotherapy was held following discharge.  He was left without any sequela.  At the time of this visit he was entirely asymptomatic.  He was convinced a large part of his problem was that he had suffered from hyperglycemia, consistently for some time.  Indeed his glucose was between 152 mg/dL and 193 mg/dL in the preceding days.  However, he reported at home glucose readings of greater than 400 mg/dL..  On exam there were no changes.  The laboratory exams reported a blood count with normocytic anemia and mild thrombocytopenia.  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    4/14/2023: Completed 3 months of treatment with CAPOX.  On the day of this visit not feeling very well.  Weak and tired.  Not very good appetite although eating well.  Having some dysgeusia.  Afebrile.  No chest pains or cough and no abdominal pain.  Had maintain regular bowel activity.  No edema.  On exam no changes.  A decision was made to stop the chemotherapy.  He was to get intravenous fluids on the day of this visit.  To return to see me in 3 weeks.  Tentatively to have scans in early June 2023.    5/5/2023: Feeling progressively stronger. Eating better and slowly regaining weight. Afebrile. No more nausea. No diarrhea and now with regular bowel activity. On exam alert, conversant and well oriented. No pale or jaundice. No oral lesions and no palpable lymph nodes. Lungs clear  and abdomen soft. Minimal edema of the right lower extremity. The laboratory exams revealed persistent anemia with a tendency to macrocytosis. Hemoglobin and platelets within normal ranges. A decision was made to continue to observe and I asked him to see me in approximately 3 months with new scans.     8/7/2023: Feels as well as at the time of the last visit. Active and returned to work full time. Eating well and no nausea or vomiting. No chest pain or cough and no abdominal pain. On exam no changes, though he had lost a large amount of weight since the previous visit. The imaging studies suggested a new lesion in the liver, as well as several lesions in the spleen of unclear cause. Reviewed the images and the report of the scans. Discussed with him at length and explained the findings. Discussed with him the plans for a MRI. He will see me with results.     8/28/2023: Entirely asymptomatic.  Working without limitations.  Eating well.  Weight stable.  Afebrile.  No pain.  On exam no changes.  Laboratory exams were reviewed and discussed with him.  In spite of the fact things on the scans the Carcinoembryonic antigen has not increased.  However both the CT and the MRI suggest one isolated metastatic deposit in segment 8 of the liver.  Discussed with him the options at this time.  I believe a biopsy is essential and after that he would be treated with chemotherapy following which surgery, if no new lesions have appeared, could be considered.  He may still be curable even in the setting of metastatic disease.  Discussed with him at great length.  Explained the steps.  Sent a communication to Dr. Davis, the patient's surgeon.    9/11/2023: Feels about the same as before.  No new symptoms.  As active as before and working.  Eating well and with good appetite.  No unintended weight loss.  Afebrile.  On exam alert, conversant and in good spirits.  No distress.  No jaundice or pallor.  Lungs clear and heart regular.   Abdomen soft.  The liver is not palpable.  No edema.  Laboratory exams were reviewed.  The liver biopsy report confirms metastatic colorectal cancer.  This appears to be an isolated metastases.  On this basis treatment with chemotherapy followed by surgery is not ordered.  I have asked him to have a PET scan and discussed the study with him.  Discussed the objectives of the treatment and its requirements.  Placed the treatment plan with 5 fluorouracil, irinotecan and panitumumab and discussed with him.  To begin as soon as possible.    9/25/2023: In the office before the next scheduled time.  He called to report that over the weekend he had had between 5 and 8 defecations of liquid stool.  He took loperamide irregularly and it did not seem to provide much relief.  He was able to eat and drink without any difficulties and was never nauseated.  He was seen in the emergency room on 9/24/2023 and he was not found to have any dehydration or other evidence of complications.  They discharged him.  At the time of this visit feeling better.  As of this time he had not had any liquid defecations.  He had continued to eat and drink fluids without any problems.  He had no chest pains and had not had any dyspnea.  He was also free of abdominal pain.  On exam alert, oriented and conversant.  Seemed well-hydrated and was not jaundiced or pale.  Lungs clear.  Heart regular.  Abdomen soft.  A decision was made to continue with the same plan.  I independently reviewed the images of the PET scan and discussed with him.  It reveals only an abnormal lesion in the liver as identified before.  No suggestion of distant metastatic disease.  Discussed with him the significance of this and explained the possibility of surgery and potentially cure.    11/15/2023: Completed 4 cycles of FOLFIRI/panitumumab.  Experienced the expected side effects, particularly skin rash.  However, the treatment proceeded without major complications.  Today he  "tells me he has been feeling reasonably well and has continued to work part-time.  He enjoys his job.  He has been eating about as much as he had been eating before but he has lost some weight without intention.  He did have persistent diarrhea that responded only to large doses of loperamide.  At this point he no longer has diarrhea.  He has been without chest pains or cough and denies as well abdominal pain.  On exam he still has some erythema on the nasolabial regions on both sides.  The lungs are clear.  The heart is regular and the abdomen soft.  Liver and spleen do not seem to be enlarged.  The laboratory exams were reviewed and discussed with him.  To have another PET scan and consider surgical excision.  He will need to go to [place] for this.    12/11/2023: Feeling reasonably well at this time without any new complaints.  His diarrhea is essentially resolved although he still has soft defecations intermittently.  He is eating well and has a good appetite.  He has had no chest pains and has been without cough.  He denies abdominal pain.  No melena, hematochezia or hematuria.  No peripheral edema.  On exam no changes.  The PET scan reveals complete response with no residual evidence of disease activity.  I have discussed with Dr. Praveen Whittaker in [place] and he requested that a MRI be done prior to deciding on surgery.  Discussed with Mr. Guerrero.  Explained the plans.  He is to have the MRI and will see me with the results.    12/27/2023: Without new symptoms.  Has not had the MRI because of scheduling problems.  He feels lightheaded at times and \"I am not as sure footed as I was before\".  He has had no falls and he continues to work full-time.  He has been eating well and has regained some of the weight that he had lost.  He has been afebrile.  No chest pains or cough.  No abdominal pain or diarrhea and no dysuria.  No skin rash.  On exam no changes.  The laboratory exams were reviewed and discussed " with him.  To reschedule the MRI for the next couple of weeks.  Discussed with him.    3/18/2024: Feeling very well. His appetite is good and he has gained weight. He has been working without any difficulties. He denies chest pain or cough. No abdominal pain or diarrhea and no dysuria. On exam alert and conversant. In good spirits and in no distress. No jaundice. No oral lesions and respirations not labored. The lungs are clear and the heart is regular. Abdomen is soft and not tender. The laboratory exams reveal a blood count in the normal ranges. He persists with hyperglycemia. The alkaline phosphatase has risen some in the recent past. He is to have the MRI of the liver. He will see me with the results.     The following portions of the patient's history were reviewed and updated as appropriate: allergies, current medications, past family history, past medical history, past social history, past surgical history and problem list.    Past Medical History:   Diagnosis Date    Anemia     Colon cancer 2022    colon    Diabetes mellitus     Hyperlipidemia     Hypertension      Past Surgical History:   Procedure Laterality Date    APPENDECTOMY      CARDIAC CATHETERIZATION      COLON RESECTION N/A 12/15/2022    Procedure: COLON RESECTION RIGHT;  Surgeon: Percy Davis MD;  Location: [facility] MAIN OR;  Service: General;  Laterality: N/A;    COLONOSCOPY N/A 11/11/2022    Procedure: COLONOSCOPY WITH BIOPSY AND POLYPECTOMY;  Surgeon: Billy Julien MD;  Location: [facility] ENDOSCOPY;  Service: Gastroenterology;  Laterality: N/A;  Impression:  1.  Large 4-5cm fulgurating circumferential ulcerated mass in the very proximal part of the ascending colon next to ileocecal valve multiple biopsies were performed.  This is highly concerning for colon malignancy.  2.  2 polyp rem    ENDOSCOPY N/A 11/11/2022    Procedure: ESOPHAGOGASTRODUODENOSCOPY with biopsy X1;  Surgeon: Billy Julien MD;  Location: [facility] ENDOSCOPY;  Service:  Gastroenterology;  Laterality: N/A;  5.  Upper endoscopy lamination unremarkable.       PORTACATH PLACEMENT Right 2023    Procedure: INSERTION OF PORTACATH;  Surgeon: Percy Davis MD;  Location: [facility] MAIN OR;  Service: General;  Laterality: Right;    TONSILLECTOMY         Current Outpatient Medications:     Continuous Blood Gluc Sensor (Dexcom G7 Sensor) misc, Use 1 each Every 10 (Ten) Days. Dx: E11.65, Disp: 3 each, Rfl: 2    Insulin Pen Needle (Pen Needles) 32G X 4 MM misc, Use 1 each 4 (Four) Times a Day Before Meals & at Bedtime., Disp: 200 each, Rfl: 2    Lantus SoloStar 100 UNIT/ML injection pen, , Disp: , Rfl:     insulin glargine (LANTUS, SEMGLEE) 100 UNIT/ML injection, Inject 20 Units under the skin into the appropriate area as directed Every Night for 30 days., Disp: 6 mL, Rfl: 0    Insulin Lispro, 1 Unit Dial, (HumaLOG KwikPen) 100 UNIT/ML solution pen-injector, Inject 7 Units under the skin into the appropriate area as directed 3 (Three) Times a Day Before Meals for 43 days., Disp: 9 mL, Rfl: 0    Allergies   Allergen Reactions    Penicillins Rash     Family History   Problem Relation Age of Onset    Pneumonia Mother 94        SARS-CoV2    Colon cancer Father 62    Heart disease Sister      Cancer-related family history includes Colon cancer (age of onset: 62) in his father.    Social History     Tobacco Use    Smoking status: Former     Current packs/day: 0.00     Average packs/day: 1 pack/day for 2.0 years (2.0 ttl pk-yrs)     Types: Cigarettes     Start date:      Quit date:      Years since quittin.2    Smokeless tobacco: Never   Vaping Use    Vaping status: Never Used   Substance Use Topics    Alcohol use: Not Currently    Drug use: Never     Social History     Social History Narrative    Not on file      ROS:     Review of Systems   Constitutional:  Negative for activity change, appetite change, chills, diaphoresis, fatigue, fever and unexpected weight change.  "  HENT:  Negative for congestion, dental problem, drooling, ear discharge, ear pain, facial swelling, hearing loss, mouth sores, nosebleeds, postnasal drip, rhinorrhea, sinus pressure, sinus pain, sneezing, sore throat, tinnitus, trouble swallowing and voice change.    Eyes:  Negative for photophobia, pain, discharge, redness, itching and visual disturbance.   Respiratory:  Negative for apnea, cough, choking, chest tightness, shortness of breath, wheezing and stridor.    Cardiovascular:  Negative for chest pain, palpitations and leg swelling.   Gastrointestinal:  Negative for abdominal distention, abdominal pain, anal bleeding, blood in stool, constipation, diarrhea, nausea, rectal pain and vomiting.   Endocrine: Negative for cold intolerance, heat intolerance, polydipsia and polyuria.   Genitourinary:  Negative for decreased urine volume, difficulty urinating, dysuria, flank pain, frequency, genital sores, hematuria and urgency.   Musculoskeletal:  Negative for arthralgias, back pain, gait problem, joint swelling, myalgias, neck pain and neck stiffness.   Skin:  Negative for color change, pallor and rash.   Neurological:  Negative for dizziness, tremors, seizures, syncope, facial asymmetry, speech difficulty, weakness, light-headedness, numbness and headaches.   Hematological:  Negative for adenopathy. Does not bruise/bleed easily.   Psychiatric/Behavioral:  Negative for agitation, behavioral problems, confusion, decreased concentration, hallucinations, self-injury, sleep disturbance and suicidal ideas. The patient is not nervous/anxious.      Objective:    Vitals:    03/18/24 0753   BP: 162/83   Pulse: 72   Temp: 97.5 °F (36.4 °C)   TempSrc: Oral   SpO2: 98%   Weight: 84.9 kg (187 lb 3.2 oz)   Height: 185.4 cm (73\")   PainSc: 0-No pain     Body mass index is 24.7 kg/m².  ECOG  (0) Fully active, able to carry on all predisease performance without restriction    Physical Exam:     Physical Exam  Constitutional:      "  General: He is not in acute distress.     Appearance: Normal appearance. He is not ill-appearing, toxic-appearing or diaphoretic.   HENT:      Head: Normocephalic and atraumatic.      Right Ear: External ear normal.      Left Ear: External ear normal.      Nose: Nose normal.      Mouth/Throat:      Mouth: Mucous membranes are moist.      Pharynx: Oropharynx is clear.   Eyes:      General: No scleral icterus.        Right eye: No discharge.         Left eye: No discharge.      Conjunctiva/sclera: Conjunctivae normal.      Pupils: Pupils are equal, round, and reactive to light.   Cardiovascular:      Rate and Rhythm: Normal rate and regular rhythm.      Pulses: Normal pulses.      Heart sounds: Normal heart sounds. No murmur heard.     No friction rub. No gallop.   Pulmonary:      Effort: No respiratory distress.      Breath sounds: No stridor. No wheezing, rhonchi or rales.   Chest:      Chest wall: No tenderness.   Abdominal:      General: Abdomen is flat. Bowel sounds are normal. There is no distension.      Palpations: Abdomen is soft. There is no mass.      Tenderness: There is no abdominal tenderness. There is no right CVA tenderness, left CVA tenderness, guarding or rebound.   Musculoskeletal:         General: No swelling, tenderness, deformity or signs of injury.      Cervical back: No rigidity.      Right lower leg: No edema.      Left lower leg: No edema.   Lymphadenopathy:      Cervical: No cervical adenopathy.   Skin:     General: Skin is warm and dry.      Coloration: Skin is not jaundiced.      Findings: No bruising or rash.   Neurological:      General: No focal deficit present.      Mental Status: He is alert and oriented to person, place, and time.      Cranial Nerves: No cranial nerve deficit.      Gait: Gait normal.   Psychiatric:         Mood and Affect: Mood normal.         Behavior: Behavior normal.         Thought Content: Thought content normal.         Judgment: Judgment normal.     KATIE Ochoa  Nia EUGENE performed a physical exam on 3/18/2024 as documented above.     Lab Results - Last 18 Months   Lab Units 03/18/24  0747 01/09/24  0339 01/08/24  0951   WBC 10*3/mm3 5.77 5.10 4.70   HEMOGLOBIN g/dL 13.2 11.1* 12.6*   HEMATOCRIT % 42.2 34.8* 38.8   PLATELETS 10*3/mm3 146 145 159   MCV fL 89.2 89.2 89.1     Lab Results - Last 18 Months   Lab Units 03/18/24  0747 01/09/24  0339 01/08/24  0951 12/27/23  0933   SODIUM mmol/L 139 138 134* 135*   POTASSIUM mmol/L 4.6 3.9 4.9 4.3   CHLORIDE mmol/L 102 109* 99 99   CO2 mmol/L 28.0 23.0 29.0 26.0   BUN mg/dL 21 23 24* 17   CREATININE mg/dL 1.00 0.70* 0.96 0.74*   CALCIUM mg/dL 8.7 7.1* 8.9 8.5*   BILIRUBIN mg/dL 0.3  --  0.3 0.4   ALK PHOS U/L 130*  --  122* 113   ALT (SGPT) U/L 21  --  16 24   AST (SGOT) U/L 17  --  13 22   GLUCOSE mg/dL 367* 312* 582* 420*     Lab Results   Component Value Date    GLUCOSE 367 (H) 03/18/2024    BUN 21 03/18/2024    CREATININE 1.00 03/18/2024    EGFRIFNONA 76 11/15/2021    EGFRIFAFRI 87 11/15/2021    BCR 21.0 03/18/2024    K 4.6 03/18/2024    CO2 28.0 03/18/2024    CALCIUM 8.7 03/18/2024    ALBUMIN 3.8 03/18/2024    AST 17 03/18/2024    ALT 21 03/18/2024     Lab Results   Component Value Date    IRON 15 (L) 01/06/2023    TIBC 548 (H) 01/06/2023    FERRITIN 23.51 (L) 01/06/2023     Lab Results   Component Value Date    CEA 6.34 12/11/2023     Assessment & Plan     Assessment:  Isolated metastatic lesion in segment 8 of the liver.  Received 4 cycles of FOLFIRI/panitumumab with the expected side effects and no complications.  He was admitted to the hospital and lost his MRI appointments. Will obtain again.   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  Completed [place]-Ox adjuvantly for 3 months in April 2023.  History of ischemic stroke.  No changes. Continue observation.   Reviewed tumor markers, cbc and cmp. Discussed with him.   He will see me with the result of the scans.     Plan:  As  above.     Don Kramer MD on 3/18/2024 at 12:28 PM.

## 2024-03-18 ENCOUNTER — LAB (OUTPATIENT)
Dept: LAB | Facility: HOSPITAL | Age: 79
End: 2024-03-18
Payer: OTHER GOVERNMENT

## 2024-03-18 ENCOUNTER — OFFICE VISIT (OUTPATIENT)
Dept: ONCOLOGY | Facility: CLINIC | Age: 79
End: 2024-03-18
Payer: OTHER GOVERNMENT

## 2024-03-18 VITALS
SYSTOLIC BLOOD PRESSURE: 162 MMHG | WEIGHT: 187.2 LBS | TEMPERATURE: 97.5 F | HEART RATE: 72 BPM | BODY MASS INDEX: 24.81 KG/M2 | OXYGEN SATURATION: 98 % | DIASTOLIC BLOOD PRESSURE: 83 MMHG | HEIGHT: 73 IN

## 2024-03-18 DIAGNOSIS — C18.2 MALIGNANT NEOPLASM OF ASCENDING COLON: Primary | ICD-10-CM

## 2024-03-18 LAB
ALBUMIN SERPL-MCNC: 3.8 G/DL (ref 3.5–5.2)
ALBUMIN/GLOB SERPL: 1.6 G/DL
ALP SERPL-CCNC: 130 U/L (ref 39–117)
ALT SERPL W P-5'-P-CCNC: 21 U/L (ref 1–41)
ANION GAP SERPL CALCULATED.3IONS-SCNC: 9 MMOL/L (ref 5–15)
AST SERPL-CCNC: 17 U/L (ref 1–40)
BASOPHILS # BLD AUTO: 0.02 10*3/MM3 (ref 0–0.2)
BASOPHILS NFR BLD AUTO: 0.3 % (ref 0–1.5)
BILIRUB SERPL-MCNC: 0.3 MG/DL (ref 0–1.2)
BUN SERPL-MCNC: 21 MG/DL (ref 8–23)
BUN/CREAT SERPL: 21 (ref 7–25)
CALCIUM SPEC-SCNC: 8.7 MG/DL (ref 8.6–10.5)
CEA SERPL-MCNC: 4.26 NG/ML
CHLORIDE SERPL-SCNC: 102 MMOL/L (ref 98–107)
CO2 SERPL-SCNC: 28 MMOL/L (ref 22–29)
CREAT SERPL-MCNC: 1 MG/DL (ref 0.76–1.27)
DEPRECATED RDW RBC AUTO: 45.1 FL (ref 37–54)
EGFRCR SERPLBLD CKD-EPI 2021: 78 ML/MIN/1.73
EOSINOPHIL # BLD AUTO: 0.11 10*3/MM3 (ref 0–0.4)
EOSINOPHIL NFR BLD AUTO: 1.9 % (ref 0.3–6.2)
ERYTHROCYTE [DISTWIDTH] IN BLOOD BY AUTOMATED COUNT: 14.2 % (ref 12.3–15.4)
GLOBULIN UR ELPH-MCNC: 2.4 GM/DL
GLUCOSE SERPL-MCNC: 367 MG/DL (ref 65–99)
HCT VFR BLD AUTO: 42.2 % (ref 37.5–51)
HGB BLD-MCNC: 13.2 G/DL (ref 13–17.7)
HOLD SPECIMEN: NORMAL
HOLD SPECIMEN: NORMAL
LYMPHOCYTES # BLD AUTO: 1.43 10*3/MM3 (ref 0.7–3.1)
LYMPHOCYTES NFR BLD AUTO: 24.8 % (ref 19.6–45.3)
MCH RBC QN AUTO: 27.9 PG (ref 26.6–33)
MCHC RBC AUTO-ENTMCNC: 31.3 G/DL (ref 31.5–35.7)
MCV RBC AUTO: 89.2 FL (ref 79–97)
MONOCYTES # BLD AUTO: 0.68 10*3/MM3 (ref 0.1–0.9)
MONOCYTES NFR BLD AUTO: 11.8 % (ref 5–12)
NEUTROPHILS NFR BLD AUTO: 3.53 10*3/MM3 (ref 1.7–7)
NEUTROPHILS NFR BLD AUTO: 61.2 % (ref 42.7–76)
PLATELET # BLD AUTO: 146 10*3/MM3 (ref 140–450)
PMV BLD AUTO: 9.9 FL (ref 6–12)
POTASSIUM SERPL-SCNC: 4.6 MMOL/L (ref 3.5–5.2)
PROT SERPL-MCNC: 6.2 G/DL (ref 6–8.5)
RBC # BLD AUTO: 4.73 10*6/MM3 (ref 4.14–5.8)
SODIUM SERPL-SCNC: 139 MMOL/L (ref 136–145)
WBC NRBC COR # BLD AUTO: 5.77 10*3/MM3 (ref 3.4–10.8)

## 2024-03-18 PROCEDURE — 82378 CARCINOEMBRYONIC ANTIGEN: CPT | Performed by: INTERNAL MEDICINE

## 2024-03-18 PROCEDURE — 80053 COMPREHEN METABOLIC PANEL: CPT | Performed by: INTERNAL MEDICINE

## 2024-03-18 PROCEDURE — 85025 COMPLETE CBC W/AUTO DIFF WBC: CPT

## 2024-03-18 PROCEDURE — 99213 OFFICE O/P EST LOW 20 MIN: CPT | Performed by: INTERNAL MEDICINE

## 2024-03-18 PROCEDURE — 36415 COLL VENOUS BLD VENIPUNCTURE: CPT

## 2024-03-18 RX ORDER — INSULIN GLARGINE 100 [IU]/ML
INJECTION, SOLUTION SUBCUTANEOUS
COMMUNITY
Start: 2024-01-09

## 2024-03-22 ENCOUNTER — HOSPITAL ENCOUNTER (OUTPATIENT)
Dept: MRI IMAGING | Facility: HOSPITAL | Age: 79
Discharge: HOME | End: 2024-03-22
Payer: MEDICARE

## 2024-03-22 DIAGNOSIS — C18.2 MALIGNANT NEOPLASM OF ASCENDING COLON: ICD-10-CM

## 2024-03-22 PROCEDURE — 25010000002 GADOTERIDOL PER 1 ML: Performed by: INTERNAL MEDICINE

## 2024-03-22 PROCEDURE — 74183 MRI ABD W/O CNTR FLWD CNTR: CPT

## 2024-03-22 PROCEDURE — A9579 GAD-BASE MR CONTRAST NOS,1ML: HCPCS | Performed by: INTERNAL MEDICINE

## 2024-03-22 RX ADMIN — GADOTERIDOL 20 ML: 279.3 INJECTION, SOLUTION INTRAVENOUS at 17:05

## 2024-04-24 ENCOUNTER — HOSPITAL ENCOUNTER (INPATIENT)
Facility: HOSPITAL | Age: 79
LOS: 1 days | Discharge: HOME | DRG: 642 | End: 2024-04-27
Attending: EMERGENCY MEDICINE | Admitting: EMERGENCY MEDICINE
Payer: OTHER GOVERNMENT

## 2024-04-24 ENCOUNTER — APPOINTMENT (OUTPATIENT)
Dept: GENERAL RADIOLOGY | Facility: HOSPITAL | Age: 79
DRG: 642 | End: 2024-04-24
Payer: OTHER GOVERNMENT

## 2024-04-24 DIAGNOSIS — L03.90 CELLULITIS, UNSPECIFIED CELLULITIS SITE: Primary | ICD-10-CM

## 2024-04-24 DIAGNOSIS — I63.9 ACUTE CVA (CEREBROVASCULAR ACCIDENT): ICD-10-CM

## 2024-04-24 LAB
ANION GAP SERPL CALCULATED.3IONS-SCNC: 8 MMOL/L (ref 5–15)
APTT PPP: 26.3 SECONDS (ref 61–76.5)
BASOPHILS # BLD AUTO: 0.02 10*3/MM3 (ref 0–0.2)
BASOPHILS NFR BLD AUTO: 0.2 % (ref 0–1.5)
BUN SERPL-MCNC: 25 MG/DL (ref 8–23)
BUN/CREAT SERPL: 23.1 (ref 7–25)
CALCIUM SPEC-SCNC: 9 MG/DL (ref 8.6–10.5)
CHLORIDE SERPL-SCNC: 98 MMOL/L (ref 98–107)
CO2 SERPL-SCNC: 30 MMOL/L (ref 22–29)
CREAT SERPL-MCNC: 1.08 MG/DL (ref 0.76–1.27)
CRP SERPL-MCNC: 2.98 MG/DL (ref 0–0.5)
DEPRECATED RDW RBC AUTO: 47.2 FL (ref 37–54)
EGFRCR SERPLBLD CKD-EPI 2021: 71.1 ML/MIN/1.73
EOSINOPHIL # BLD AUTO: 0.03 10*3/MM3 (ref 0–0.4)
EOSINOPHIL NFR BLD AUTO: 0.3 % (ref 0.3–6.2)
ERYTHROCYTE [DISTWIDTH] IN BLOOD BY AUTOMATED COUNT: 15.1 % (ref 12.3–15.4)
ERYTHROCYTE [SEDIMENTATION RATE] IN BLOOD: 32 MM/HR (ref 0–20)
GLUCOSE BLDC GLUCOMTR-MCNC: 121 MG/DL (ref 70–105)
GLUCOSE SERPL-MCNC: 135 MG/DL (ref 65–99)
HCT VFR BLD AUTO: 43.2 % (ref 37.5–51)
HGB BLD-MCNC: 13.7 G/DL (ref 13–17.7)
HOLD SPECIMEN: NORMAL
IMM GRANULOCYTES # BLD AUTO: 0.02 10*3/MM3 (ref 0–0.05)
IMM GRANULOCYTES NFR BLD AUTO: 0.2 % (ref 0–0.5)
INR PPP: 1.06 (ref 0.93–1.1)
LYMPHOCYTES # BLD AUTO: 1.35 10*3/MM3 (ref 0.7–3.1)
LYMPHOCYTES NFR BLD AUTO: 13.5 % (ref 19.6–45.3)
MCH RBC QN AUTO: 26.9 PG (ref 26.6–33)
MCHC RBC AUTO-ENTMCNC: 31.7 G/DL (ref 31.5–35.7)
MCV RBC AUTO: 84.7 FL (ref 79–97)
MONOCYTES # BLD AUTO: 0.76 10*3/MM3 (ref 0.1–0.9)
MONOCYTES NFR BLD AUTO: 7.6 % (ref 5–12)
NEUTROPHILS NFR BLD AUTO: 7.79 10*3/MM3 (ref 1.7–7)
NEUTROPHILS NFR BLD AUTO: 78.2 % (ref 42.7–76)
NRBC BLD AUTO-RTO: 0 /100 WBC (ref 0–0.2)
PLATELET # BLD AUTO: 156 10*3/MM3 (ref 140–450)
PMV BLD AUTO: 9.4 FL (ref 6–12)
POTASSIUM SERPL-SCNC: 3.7 MMOL/L (ref 3.5–5.2)
PROTHROMBIN TIME: 11.5 SECONDS (ref 9.6–11.7)
RBC # BLD AUTO: 5.1 10*6/MM3 (ref 4.14–5.8)
SODIUM SERPL-SCNC: 136 MMOL/L (ref 136–145)
URATE SERPL-MCNC: 4.6 MG/DL (ref 3.4–7)
WBC NRBC COR # BLD AUTO: 9.97 10*3/MM3 (ref 3.4–10.8)

## 2024-04-24 PROCEDURE — 82607 VITAMIN B-12: CPT | Performed by: NURSE PRACTITIONER

## 2024-04-24 PROCEDURE — 86140 C-REACTIVE PROTEIN: CPT | Performed by: EMERGENCY MEDICINE

## 2024-04-24 PROCEDURE — 99285 EMERGENCY DEPT VISIT HI MDM: CPT

## 2024-04-24 PROCEDURE — 85610 PROTHROMBIN TIME: CPT | Performed by: EMERGENCY MEDICINE

## 2024-04-24 PROCEDURE — G0378 HOSPITAL OBSERVATION PER HR: HCPCS

## 2024-04-24 PROCEDURE — 87040 BLOOD CULTURE FOR BACTERIA: CPT | Performed by: EMERGENCY MEDICINE

## 2024-04-24 PROCEDURE — 36415 COLL VENOUS BLD VENIPUNCTURE: CPT

## 2024-04-24 PROCEDURE — 84550 ASSAY OF BLOOD/URIC ACID: CPT | Performed by: EMERGENCY MEDICINE

## 2024-04-24 PROCEDURE — 85730 THROMBOPLASTIN TIME PARTIAL: CPT | Performed by: EMERGENCY MEDICINE

## 2024-04-24 PROCEDURE — 73110 X-RAY EXAM OF WRIST: CPT

## 2024-04-24 PROCEDURE — 80048 BASIC METABOLIC PNL TOTAL CA: CPT | Performed by: EMERGENCY MEDICINE

## 2024-04-24 PROCEDURE — 25010000002 CEFTRIAXONE PER 250 MG: Performed by: EMERGENCY MEDICINE

## 2024-04-24 PROCEDURE — 82948 REAGENT STRIP/BLOOD GLUCOSE: CPT | Performed by: EMERGENCY MEDICINE

## 2024-04-24 PROCEDURE — 85025 COMPLETE CBC W/AUTO DIFF WBC: CPT | Performed by: EMERGENCY MEDICINE

## 2024-04-24 PROCEDURE — 85652 RBC SED RATE AUTOMATED: CPT | Performed by: EMERGENCY MEDICINE

## 2024-04-24 RX ORDER — POLYETHYLENE GLYCOL 3350 17 G/17G
17 POWDER, FOR SOLUTION ORAL DAILY PRN
Status: DISCONTINUED | OUTPATIENT
Start: 2024-04-24 | End: 2024-04-27 | Stop reason: HOSPADM

## 2024-04-24 RX ORDER — ACETAMINOPHEN 325 MG/1
650 TABLET ORAL EVERY 4 HOURS PRN
Status: DISCONTINUED | OUTPATIENT
Start: 2024-04-24 | End: 2024-04-27 | Stop reason: HOSPADM

## 2024-04-24 RX ORDER — INSULIN LISPRO 100 [IU]/ML
10 INJECTION, SOLUTION INTRAVENOUS; SUBCUTANEOUS
COMMUNITY

## 2024-04-24 RX ORDER — BISACODYL 5 MG/1
5 TABLET, DELAYED RELEASE ORAL DAILY PRN
Status: DISCONTINUED | OUTPATIENT
Start: 2024-04-24 | End: 2024-04-27 | Stop reason: HOSPADM

## 2024-04-24 RX ORDER — ONDANSETRON 2 MG/ML
4 INJECTION INTRAMUSCULAR; INTRAVENOUS EVERY 6 HOURS PRN
Status: DISCONTINUED | OUTPATIENT
Start: 2024-04-24 | End: 2024-04-27 | Stop reason: HOSPADM

## 2024-04-24 RX ORDER — SODIUM CHLORIDE 0.9 % (FLUSH) 0.9 %
10 SYRINGE (ML) INJECTION EVERY 12 HOURS SCHEDULED
Status: DISCONTINUED | OUTPATIENT
Start: 2024-04-24 | End: 2024-04-27 | Stop reason: HOSPADM

## 2024-04-24 RX ORDER — CHOLECALCIFEROL (VITAMIN D3) 125 MCG
5 CAPSULE ORAL NIGHTLY PRN
Status: DISCONTINUED | OUTPATIENT
Start: 2024-04-24 | End: 2024-04-27 | Stop reason: HOSPADM

## 2024-04-24 RX ORDER — SODIUM CHLORIDE 9 MG/ML
40 INJECTION, SOLUTION INTRAVENOUS AS NEEDED
Status: DISCONTINUED | OUTPATIENT
Start: 2024-04-24 | End: 2024-04-27 | Stop reason: HOSPADM

## 2024-04-24 RX ORDER — NITROGLYCERIN 0.4 MG/1
0.4 TABLET SUBLINGUAL
Status: DISCONTINUED | OUTPATIENT
Start: 2024-04-24 | End: 2024-04-27 | Stop reason: HOSPADM

## 2024-04-24 RX ORDER — SODIUM CHLORIDE 0.9 % (FLUSH) 0.9 %
10 SYRINGE (ML) INJECTION AS NEEDED
Status: DISCONTINUED | OUTPATIENT
Start: 2024-04-24 | End: 2024-04-27 | Stop reason: HOSPADM

## 2024-04-24 RX ORDER — BISACODYL 10 MG
10 SUPPOSITORY, RECTAL RECTAL DAILY PRN
Status: DISCONTINUED | OUTPATIENT
Start: 2024-04-24 | End: 2024-04-27 | Stop reason: HOSPADM

## 2024-04-24 RX ORDER — AMOXICILLIN 250 MG
2 CAPSULE ORAL 2 TIMES DAILY
Status: DISCONTINUED | OUTPATIENT
Start: 2024-04-24 | End: 2024-04-27 | Stop reason: HOSPADM

## 2024-04-24 RX ADMIN — CEFTRIAXONE 2000 MG: 2 INJECTION, POWDER, FOR SOLUTION INTRAMUSCULAR; INTRAVENOUS at 21:03

## 2024-04-24 RX ADMIN — DOXYCYCLINE 100 MG: 100 INJECTION, POWDER, LYOPHILIZED, FOR SOLUTION INTRAVENOUS at 21:15

## 2024-04-24 RX ADMIN — Medication 10 ML: at 21:16

## 2024-04-24 NOTE — ED PROVIDER NOTES
"Subjective   History of Present Illness  Chief complaint: Patient is a 78-year-old who today is noticed increasing redness with small amount of bruising to the right wrist.  Goes up to the dorsum of the hand around that side.  He has pain with range of motion but is able to use his hand.  He has not had fever.  He used to be on blood thinners but he states he just does not want to take them anymore has not had them for \"quite some time\".  He has no numbness.  He did not injure his wrist or hand.    Context:    Duration:    Timing:    Severity:    Associated Symptoms:        PCP:  LMP:      Review of Systems   Constitutional:  Negative for fever.   Respiratory:  Negative for shortness of breath.    Cardiovascular:  Negative for chest pain.   Gastrointestinal:  Negative for abdominal pain.   Skin:  Positive for color change.       Past Medical History:   Diagnosis Date    Anemia     Colon cancer 2022    colon    Diabetes mellitus     Hyperlipidemia     Hypertension        Allergies   Allergen Reactions    Penicillins Rash       Past Surgical History:   Procedure Laterality Date    APPENDECTOMY      CARDIAC CATHETERIZATION      COLON RESECTION N/A 12/15/2022    Procedure: COLON RESECTION RIGHT;  Surgeon: Percy Davis MD;  Location: [facility] MAIN OR;  Service: General;  Laterality: N/A;    COLONOSCOPY N/A 11/11/2022    Procedure: COLONOSCOPY WITH BIOPSY AND POLYPECTOMY;  Surgeon: Billy Julien MD;  Location: [facility] ENDOSCOPY;  Service: Gastroenterology;  Laterality: N/A;  Impression:  1.  Large 4-5cm fulgurating circumferential ulcerated mass in the very proximal part of the ascending colon next to ileocecal valve multiple biopsies were performed.  This is highly concerning for colon malignancy.  2.  2 polyp rem    ENDOSCOPY N/A 11/11/2022    Procedure: ESOPHAGOGASTRODUODENOSCOPY with biopsy X1;  Surgeon: Billy Julien MD;  Location: [facility] ENDOSCOPY;  Service: Gastroenterology;  Laterality: N/A;  5.  Upper " endoscopy lamination unremarkable.       PORTACATH PLACEMENT Right 2023    Procedure: INSERTION OF PORTACATH;  Surgeon: Percy Davis MD;  Location: [facility] MAIN OR;  Service: General;  Laterality: Right;    TONSILLECTOMY         Family History   Problem Relation Age of Onset    Pneumonia Mother 94        SARS-CoV2    Colon cancer Father 62    Heart disease Sister        Social History     Socioeconomic History    Marital status: Single   Tobacco Use    Smoking status: Former     Current packs/day: 0.00     Average packs/day: 1 pack/day for 2.0 years (2.0 ttl pk-yrs)     Types: Cigarettes     Start date:      Quit date:      Years since quittin.3    Smokeless tobacco: Never   Vaping Use    Vaping status: Never Used   Substance and Sexual Activity    Alcohol use: Not Currently    Drug use: Never    Sexual activity: Defer           Objective   Physical Exam  Vitals and nursing note reviewed.   Constitutional:       Appearance: Normal appearance.   HENT:      Head: Normocephalic and atraumatic.   Cardiovascular:      Rate and Rhythm: Normal rate.   Pulmonary:      Effort: Pulmonary effort is normal.      Breath sounds: Normal breath sounds.   Abdominal:      Tenderness: There is no abdominal tenderness.   Musculoskeletal:      Right wrist: Tenderness and bony tenderness present. Decreased range of motion. Normal pulse.      Comments: Patient is distal wrist does have decreased range of motion but can flex and extend.  Pulses are present.  There is distal perfusion.  Significant erythema and pain with range of motion of the wrist.   Skin:            Comments: Patient erythematous blanching.  Appears cellulitic.   Neurological:      General: No focal deficit present.      Mental Status: He is alert and oriented to person, place, and time.      Sensory: No sensory deficit.   Psychiatric:         Mood and Affect: Mood normal.         Thought Content: Thought content normal.          Procedures           ED Course                                      XR Wrist 3+ View Right    Result Date: 4/24/2024  Impression: No acute bony abnormality Electronically Signed: Severino Mace  4/24/2024 7:11 PM EDT  Workstation ID: [id number]        Results for orders placed or performed during the hospital encounter of 04/24/24   Basic Metabolic Panel    Specimen: Blood   Result Value Ref Range    Glucose 135 (H) 65 - 99 mg/dL    BUN 25 (H) 8 - 23 mg/dL    Creatinine 1.08 0.76 - 1.27 mg/dL    Sodium 136 136 - 145 mmol/L    Potassium 3.7 3.5 - 5.2 mmol/L    Chloride 98 98 - 107 mmol/L    CO2 30.0 (H) 22.0 - 29.0 mmol/L    Calcium 9.0 8.6 - 10.5 mg/dL    BUN/Creatinine Ratio 23.1 7.0 - 25.0    Anion Gap 8.0 5.0 - 15.0 mmol/L    eGFR 71.1 >60.0 mL/min/1.73   Protime-INR    Specimen: Blood   Result Value Ref Range    Protime 11.5 9.6 - 11.7 Seconds    INR 1.06 0.93 - 1.10   aPTT    Specimen: Blood   Result Value Ref Range    PTT 26.3 (L) 61.0 - 76.5 seconds   Sedimentation Rate    Specimen: Blood   Result Value Ref Range    Sed Rate 32 (H) 0 - 20 mm/hr   C-reactive Protein    Specimen: Blood   Result Value Ref Range    C-Reactive Protein 2.98 (H) 0.00 - 0.50 mg/dL   Uric Acid    Specimen: Blood   Result Value Ref Range    Uric Acid 4.6 3.4 - 7.0 mg/dL   CBC Auto Differential    Specimen: Blood   Result Value Ref Range    WBC 9.97 3.40 - 10.80 10*3/mm3    RBC 5.10 4.14 - 5.80 10*6/mm3    Hemoglobin 13.7 13.0 - 17.7 g/dL    Hematocrit 43.2 37.5 - 51.0 %    MCV 84.7 79.0 - 97.0 fL    MCH 26.9 26.6 - 33.0 pg    MCHC 31.7 31.5 - 35.7 g/dL    RDW 15.1 12.3 - 15.4 %    RDW-SD 47.2 37.0 - 54.0 fl    MPV 9.4 6.0 - 12.0 fL    Platelets 156 140 - 450 10*3/mm3    Neutrophil % 78.2 (H) 42.7 - 76.0 %    Lymphocyte % 13.5 (L) 19.6 - 45.3 %    Monocyte % 7.6 5.0 - 12.0 %    Eosinophil % 0.3 0.3 - 6.2 %    Basophil % 0.2 0.0 - 1.5 %    Immature Grans % 0.2 0.0 - 0.5 %    Neutrophils, Absolute 7.79 (H) 1.70 - 7.00 10*3/mm3     Lymphocytes, Absolute 1.35 0.70 - 3.10 10*3/mm3    Monocytes, Absolute 0.76 0.10 - 0.90 10*3/mm3    Eosinophils, Absolute 0.03 0.00 - 0.40 10*3/mm3    Basophils, Absolute 0.02 0.00 - 0.20 10*3/mm3    Immature Grans, Absolute 0.02 0.00 - 0.05 10*3/mm3    nRBC 0.0 0.0 - 0.2 /100 WBC   Gold Top - SST   Result Value Ref Range    Extra Tube Hold for add-ons.          Medical Decision Making  Patient was seen evaluated for the above problem    Differential diagnosis includes but is not limited to septic arthritis, cellulitis, gout, abscess    Patient has no signs of abscess on physical exam.  He does have decreased range of motion of his wrist.  His sed rate and CRP are elevated.  He was prescribed antibiotics as I do believe he has cellulitis.  I will place in the ED observation unit for orthopedic consultation.  Septic joint still is a possibility.  Patient verbalized understanding is okay with this.  X-ray reveals no edema.  No fracture.    Problems Addressed:  Cellulitis, unspecified cellulitis site: complicated acute illness or injury    Amount and/or Complexity of Data Reviewed  Labs: ordered. Decision-making details documented in ED Course.     Details: Labs reviewed myself  Radiology: ordered and independent interpretation performed.     Details: X-ray reviewed by myself as above  ECG/medicine tests: ordered.    Risk  OTC drugs.  Prescription drug management.  Decision regarding hospitalization.        Final diagnoses:   None     Cellulitis  Concern for septic wrist  ED Disposition  ED Disposition       None            No follow-up provider specified.       Medication List      No changes were made to your prescriptions during this visit.            Rudy Gamez,   04/24/24 2107       Rudy Gamez,   04/25/24 1512

## 2024-04-25 ENCOUNTER — APPOINTMENT (OUTPATIENT)
Dept: MRI IMAGING | Facility: HOSPITAL | Age: 79
DRG: 642 | End: 2024-04-25
Payer: OTHER GOVERNMENT

## 2024-04-25 ENCOUNTER — APPOINTMENT (OUTPATIENT)
Dept: CT IMAGING | Facility: HOSPITAL | Age: 79
DRG: 642 | End: 2024-04-25
Payer: OTHER GOVERNMENT

## 2024-04-25 PROBLEM — R27.0 ATAXIA: Status: ACTIVE | Noted: 2024-04-25

## 2024-04-25 PROBLEM — M79.641 RIGHT HAND PAIN: Status: ACTIVE | Noted: 2024-04-25

## 2024-04-25 LAB
ANION GAP SERPL CALCULATED.3IONS-SCNC: 9 MMOL/L (ref 5–15)
BASOPHILS # BLD AUTO: 0.01 10*3/MM3 (ref 0–0.2)
BASOPHILS NFR BLD AUTO: 0.1 % (ref 0–1.5)
BUN SERPL-MCNC: 24 MG/DL (ref 8–23)
BUN/CREAT SERPL: 25 (ref 7–25)
CALCIUM SPEC-SCNC: 8.5 MG/DL (ref 8.6–10.5)
CHLORIDE SERPL-SCNC: 100 MMOL/L (ref 98–107)
CO2 SERPL-SCNC: 29 MMOL/L (ref 22–29)
CREAT SERPL-MCNC: 0.96 MG/DL (ref 0.76–1.27)
DEPRECATED RDW RBC AUTO: 47.3 FL (ref 37–54)
EGFRCR SERPLBLD CKD-EPI 2021: 81.9 ML/MIN/1.73
EOSINOPHIL # BLD AUTO: 0.03 10*3/MM3 (ref 0–0.4)
EOSINOPHIL NFR BLD AUTO: 0.4 % (ref 0.3–6.2)
ERYTHROCYTE [DISTWIDTH] IN BLOOD BY AUTOMATED COUNT: 15.2 % (ref 12.3–15.4)
GLUCOSE BLDC GLUCOMTR-MCNC: 150 MG/DL (ref 70–105)
GLUCOSE BLDC GLUCOMTR-MCNC: 188 MG/DL (ref 70–105)
GLUCOSE BLDC GLUCOMTR-MCNC: 224 MG/DL (ref 70–105)
GLUCOSE BLDC GLUCOMTR-MCNC: 311 MG/DL (ref 70–105)
GLUCOSE SERPL-MCNC: 142 MG/DL (ref 65–99)
HCT VFR BLD AUTO: 43.9 % (ref 37.5–51)
HGB BLD-MCNC: 13.7 G/DL (ref 13–17.7)
IMM GRANULOCYTES # BLD AUTO: 0.02 10*3/MM3 (ref 0–0.05)
IMM GRANULOCYTES NFR BLD AUTO: 0.3 % (ref 0–0.5)
LYMPHOCYTES # BLD AUTO: 1.44 10*3/MM3 (ref 0.7–3.1)
LYMPHOCYTES NFR BLD AUTO: 19.5 % (ref 19.6–45.3)
MCH RBC QN AUTO: 26.5 PG (ref 26.6–33)
MCHC RBC AUTO-ENTMCNC: 31.2 G/DL (ref 31.5–35.7)
MCV RBC AUTO: 84.9 FL (ref 79–97)
MONOCYTES # BLD AUTO: 0.59 10*3/MM3 (ref 0.1–0.9)
MONOCYTES NFR BLD AUTO: 8 % (ref 5–12)
NEUTROPHILS NFR BLD AUTO: 5.31 10*3/MM3 (ref 1.7–7)
NEUTROPHILS NFR BLD AUTO: 71.7 % (ref 42.7–76)
NRBC BLD AUTO-RTO: 0 /100 WBC (ref 0–0.2)
PLATELET # BLD AUTO: 148 10*3/MM3 (ref 140–450)
PMV BLD AUTO: 9.7 FL (ref 6–12)
POTASSIUM SERPL-SCNC: 3.8 MMOL/L (ref 3.5–5.2)
RBC # BLD AUTO: 5.17 10*6/MM3 (ref 4.14–5.8)
SODIUM SERPL-SCNC: 138 MMOL/L (ref 136–145)
WBC NRBC COR # BLD AUTO: 7.4 10*3/MM3 (ref 3.4–10.8)

## 2024-04-25 PROCEDURE — 63710000001 INSULIN LISPRO (HUMAN) PER 5 UNITS: Performed by: PHYSICIAN ASSISTANT

## 2024-04-25 PROCEDURE — G0378 HOSPITAL OBSERVATION PER HR: HCPCS

## 2024-04-25 PROCEDURE — 97161 PT EVAL LOW COMPLEX 20 MIN: CPT

## 2024-04-25 PROCEDURE — 82948 REAGENT STRIP/BLOOD GLUCOSE: CPT

## 2024-04-25 PROCEDURE — 73221 MRI JOINT UPR EXTREM W/O DYE: CPT

## 2024-04-25 PROCEDURE — 70551 MRI BRAIN STEM W/O DYE: CPT

## 2024-04-25 PROCEDURE — 85025 COMPLETE CBC W/AUTO DIFF WBC: CPT | Performed by: EMERGENCY MEDICINE

## 2024-04-25 PROCEDURE — 80048 BASIC METABOLIC PNL TOTAL CA: CPT | Performed by: EMERGENCY MEDICINE

## 2024-04-25 PROCEDURE — 63710000001 INSULIN GLARGINE PER 5 UNITS: Performed by: PHYSICIAN ASSISTANT

## 2024-04-25 PROCEDURE — 25010000002 CEFTRIAXONE PER 250 MG: Performed by: PHYSICIAN ASSISTANT

## 2024-04-25 PROCEDURE — 70450 CT HEAD/BRAIN W/O DYE: CPT

## 2024-04-25 RX ORDER — SODIUM CHLORIDE 0.9 % (FLUSH) 0.9 %
10 SYRINGE (ML) INJECTION AS NEEDED
Status: DISCONTINUED | OUTPATIENT
Start: 2024-04-25 | End: 2024-04-27 | Stop reason: HOSPADM

## 2024-04-25 RX ORDER — INSULIN LISPRO 100 [IU]/ML
10 INJECTION, SOLUTION INTRAVENOUS; SUBCUTANEOUS
Status: DISCONTINUED | OUTPATIENT
Start: 2024-04-25 | End: 2024-04-27 | Stop reason: HOSPADM

## 2024-04-25 RX ORDER — INSULIN GLARGINE 100 [IU]/ML
20 INJECTION, SOLUTION SUBCUTANEOUS NIGHTLY
Status: DISCONTINUED | OUTPATIENT
Start: 2024-04-25 | End: 2024-04-27 | Stop reason: HOSPADM

## 2024-04-25 RX ORDER — DEXTROSE MONOHYDRATE 25 G/50ML
25 INJECTION, SOLUTION INTRAVENOUS
Status: DISCONTINUED | OUTPATIENT
Start: 2024-04-25 | End: 2024-04-27 | Stop reason: HOSPADM

## 2024-04-25 RX ORDER — INSULIN LISPRO 100 [IU]/ML
INJECTION, SOLUTION INTRAVENOUS; SUBCUTANEOUS
Status: CANCELLED | OUTPATIENT
Start: 2024-04-25

## 2024-04-25 RX ORDER — INSULIN LISPRO 100 [IU]/ML
2-9 INJECTION, SOLUTION INTRAVENOUS; SUBCUTANEOUS
Status: DISCONTINUED | OUTPATIENT
Start: 2024-04-25 | End: 2024-04-27 | Stop reason: HOSPADM

## 2024-04-25 RX ORDER — ATORVASTATIN CALCIUM 40 MG/1
80 TABLET, FILM COATED ORAL NIGHTLY
Status: DISCONTINUED | OUTPATIENT
Start: 2024-04-25 | End: 2024-04-27 | Stop reason: HOSPADM

## 2024-04-25 RX ORDER — ASPIRIN 300 MG/1
300 SUPPOSITORY RECTAL DAILY
Status: DISCONTINUED | OUTPATIENT
Start: 2024-04-25 | End: 2024-04-27 | Stop reason: HOSPADM

## 2024-04-25 RX ORDER — SODIUM CHLORIDE 9 MG/ML
40 INJECTION, SOLUTION INTRAVENOUS AS NEEDED
Status: DISCONTINUED | OUTPATIENT
Start: 2024-04-25 | End: 2024-04-27 | Stop reason: HOSPADM

## 2024-04-25 RX ORDER — ASPIRIN 81 MG/1
81 TABLET, CHEWABLE ORAL DAILY
Status: DISCONTINUED | OUTPATIENT
Start: 2024-04-25 | End: 2024-04-27 | Stop reason: HOSPADM

## 2024-04-25 RX ORDER — SODIUM CHLORIDE 0.9 % (FLUSH) 0.9 %
10 SYRINGE (ML) INJECTION EVERY 12 HOURS SCHEDULED
Status: DISCONTINUED | OUTPATIENT
Start: 2024-04-25 | End: 2024-04-27 | Stop reason: HOSPADM

## 2024-04-25 RX ORDER — NICOTINE POLACRILEX 4 MG
15 LOZENGE BUCCAL
Status: DISCONTINUED | OUTPATIENT
Start: 2024-04-25 | End: 2024-04-27 | Stop reason: HOSPADM

## 2024-04-25 RX ORDER — IBUPROFEN 600 MG/1
1 TABLET ORAL
Status: DISCONTINUED | OUTPATIENT
Start: 2024-04-25 | End: 2024-04-27 | Stop reason: HOSPADM

## 2024-04-25 RX ADMIN — INSULIN LISPRO 4 UNITS: 100 INJECTION, SOLUTION INTRAVENOUS; SUBCUTANEOUS at 11:28

## 2024-04-25 RX ADMIN — Medication 10 ML: at 11:30

## 2024-04-25 RX ADMIN — Medication 10 ML: at 21:55

## 2024-04-25 RX ADMIN — CEFTRIAXONE 2000 MG: 2 INJECTION, POWDER, FOR SOLUTION INTRAMUSCULAR; INTRAVENOUS at 21:55

## 2024-04-25 RX ADMIN — DOXYCYCLINE 100 MG: 100 INJECTION, POWDER, LYOPHILIZED, FOR SOLUTION INTRAVENOUS at 11:27

## 2024-04-25 RX ADMIN — Medication 5 MG: at 21:32

## 2024-04-25 RX ADMIN — ASPIRIN 81 MG CHEWABLE TABLET 81 MG: 81 TABLET CHEWABLE at 21:32

## 2024-04-25 RX ADMIN — INSULIN LISPRO 7 UNITS: 100 INJECTION, SOLUTION INTRAVENOUS; SUBCUTANEOUS at 21:30

## 2024-04-25 RX ADMIN — INSULIN LISPRO 10 UNITS: 100 INJECTION, SOLUTION INTRAVENOUS; SUBCUTANEOUS at 11:27

## 2024-04-25 RX ADMIN — DOXYCYCLINE 100 MG: 100 INJECTION, POWDER, LYOPHILIZED, FOR SOLUTION INTRAVENOUS at 21:31

## 2024-04-25 RX ADMIN — ATORVASTATIN CALCIUM 80 MG: 40 TABLET, FILM COATED ORAL at 21:32

## 2024-04-25 RX ADMIN — INSULIN GLARGINE 20 UNITS: 100 INJECTION, SOLUTION SUBCUTANEOUS at 21:31

## 2024-04-25 RX ADMIN — Medication 10 ML: at 21:00

## 2024-04-25 NOTE — PLAN OF CARE
Goal Outcome Evaluation:  Plan of Care Reviewed With: patient        Progress: improving  Outcome Evaluation: Pt has slept throughout the shift this far with minimal complaints of pain. Pt strict NPO upon arrival on the floor for Ortho consult to be called in AM. Pt to recieve IV antibiotics, Rocephin and Doxy, which pt recieved in ER. Pt has an unaccessed port on the right side of his chest, under his clavicle. Pt recieved a CHG bath.

## 2024-04-25 NOTE — CONSULTS
ORTHOPEDIC SURGERY CONSULT      Patient: Vlad Guerrero  Date of Admission: 4/24/2024  6:26 PM  YOB: 1945  Medical Record Number: [id number]  Attending Physician: Rudy Gamez DO  Consulting Physician: Delvin Machado MD    CHIEF COMPLAINT: right wrist pain    HISTORY OF PRESENT ILLINESS: Patient is a 78 y.o. year old male presents to [facility] with above complaints.  I was consulted for further evaluation and treatment. He says that yesterday he began having right wrist pain and redness.  He says that this morning he is doing much better than yesterday.  He says it feels sore.  He can move the wrist and hand.    ALLERGIES:   Allergies   Allergen Reactions    Penicillins Rash       HOME MEDICATIONS:  Medications Prior to Admission   Medication Sig Dispense Refill Last Dose    insulin glargine (LANTUS, SEMGLEE) 100 UNIT/ML injection Inject 20 Units under the skin into the appropriate area as directed Every Night for 30 days. 6 mL 0     Insulin Lispro (humaLOG) 100 UNIT/ML injection Inject 10 Units under the skin into the appropriate area as directed 3 (Three) Times a Day Before Meals.       Continuous Blood Gluc Sensor (Dexcom G7 Sensor) misc Use 1 each Every 10 (Ten) Days. Dx: E11.65 3 each 2     Insulin Pen Needle (Pen Needles) 32G X 4 MM misc Use 1 each 4 (Four) Times a Day Before Meals & at Bedtime. 200 each 2        CURRENT MEDICATIONS:  Scheduled Meds:cefTRIAXone, 2,000 mg, Intravenous, Q24H  doxycycline, 100 mg, Intravenous, Q12H  insulin glargine, 20 Units, Subcutaneous, Nightly  insulin lispro, 10 Units, Subcutaneous, TID AC  insulin lispro, 2-9 Units, Subcutaneous, 4x Daily AC & at Bedtime  senna-docusate sodium, 2 tablet, Oral, BID  sodium chloride, 10 mL, Intravenous, Q12H      Continuous Infusions:   PRN Meds:.  acetaminophen    senna-docusate sodium **AND** polyethylene glycol **AND** bisacodyl **AND** bisacodyl    dextrose    dextrose    glucagon (human  recombinant)    melatonin    nitroglycerin    ondansetron    [COMPLETED] Insert Peripheral IV **AND** sodium chloride    sodium chloride    sodium chloride    Past Medical History:   Diagnosis Date    Anemia     Colon cancer     colon    Diabetes mellitus     Hyperlipidemia     Hypertension      Past Surgical History:   Procedure Laterality Date    APPENDECTOMY      CARDIAC CATHETERIZATION      COLON RESECTION N/A 12/15/2022    Procedure: COLON RESECTION RIGHT;  Surgeon: Percy Davis MD;  Location: [facility] MAIN OR;  Service: General;  Laterality: N/A;    COLONOSCOPY N/A 2022    Procedure: COLONOSCOPY WITH BIOPSY AND POLYPECTOMY;  Surgeon: Billy Julien MD;  Location: [facility] ENDOSCOPY;  Service: Gastroenterology;  Laterality: N/A;  Impression:  1.  Large 4-5cm fulgurating circumferential ulcerated mass in the very proximal part of the ascending colon next to ileocecal valve multiple biopsies were performed.  This is highly concerning for colon malignancy.  2.  2 polyp rem    ENDOSCOPY N/A 2022    Procedure: ESOPHAGOGASTRODUODENOSCOPY with biopsy X1;  Surgeon: Billy Julien MD;  Location: [facility] ENDOSCOPY;  Service: Gastroenterology;  Laterality: N/A;  5.  Upper endoscopy lamination unremarkable.       PORTACATH PLACEMENT Right 2023    Procedure: INSERTION OF PORTACATH;  Surgeon: Percy Davis MD;  Location: [facility] MAIN OR;  Service: General;  Laterality: Right;    TONSILLECTOMY       Social History     Occupational History    Not on file   Tobacco Use    Smoking status: Former     Current packs/day: 0.00     Average packs/day: 1 pack/day for 2.0 years (2.0 ttl pk-yrs)     Types: Cigarettes     Start date:      Quit date:      Years since quittin.3    Smokeless tobacco: Never   Vaping Use    Vaping status: Never Used   Substance and Sexual Activity    Alcohol use: Not Currently    Drug use: Never    Sexual activity: Defer      Social History     Social History  "Narrative    Not on file     Family History   Problem Relation Age of Onset    Pneumonia Mother 94        SARS-CoV2    Colon cancer Father 62    Heart disease Sister        REVIEW OF SYSTEMS:   12 point ROS is negative except as above     PHYSICAL EXAM:   Vitals:  Vitals:    04/24/24 2205 04/24/24 2207 04/25/24 0234 04/25/24 0633   BP: (!) 121/101 141/63 132/72 117/55   BP Location:  Left arm Left arm Left arm   Patient Position:  Lying Lying Lying   Pulse: 74 76 74 83   Resp:  18 16 13   Temp:  97.4 °F (36.3 °C) 97.2 °F (36.2 °C) 97.9 °F (36.6 °C)   TempSrc:  Oral Oral Oral   SpO2: 95% 96% 95% 97%   Weight:  80.6 kg (177 lb 11.1 oz)     Height:  185.4 cm (73\")          GENERAL: no distress   HEENT: normocephalic   CV: No audible murmur   Resp: no audible wheezes   RUE: redness to the radial wrist.  Mild pain with ROM of the wrist.  Mild tenderness to the wrist.  He can make a fist and can abduct the fingers.  Sensation is intact       DIAGNOSTIC TEST:  Admission on 04/24/2024   Component Date Value Ref Range Status    Glucose 04/24/2024 135 (H)  65 - 99 mg/dL Final    BUN 04/24/2024 25 (H)  8 - 23 mg/dL Final    Creatinine 04/24/2024 1.08  0.76 - 1.27 mg/dL Final    Sodium 04/24/2024 136  136 - 145 mmol/L Final    Potassium 04/24/2024 3.7  3.5 - 5.2 mmol/L Final    Slight hemolysis detected by analyzer. Result may be falsely elevated.    Chloride 04/24/2024 98  98 - 107 mmol/L Final    CO2 04/24/2024 30.0 (H)  22.0 - 29.0 mmol/L Final    Calcium 04/24/2024 9.0  8.6 - 10.5 mg/dL Final    BUN/Creatinine Ratio 04/24/2024 23.1  7.0 - 25.0 Final    Anion Gap 04/24/2024 8.0  5.0 - 15.0 mmol/L Final    eGFR 04/24/2024 71.1  >60.0 mL/min/1.73 Final    Protime 04/24/2024 11.5  9.6 - 11.7 Seconds Final    INR 04/24/2024 1.06  0.93 - 1.10 Final    PTT 04/24/2024 26.3 (L)  61.0 - 76.5 seconds Final    Sed Rate 04/24/2024 32 (H)  0 - 20 mm/hr Final    C-Reactive Protein 04/24/2024 2.98 (H)  0.00 - 0.50 mg/dL Final    Uric Acid " 04/24/2024 4.6  3.4 - 7.0 mg/dL Final    WBC 04/24/2024 9.97  3.40 - 10.80 10*3/mm3 Final    RBC 04/24/2024 5.10  4.14 - 5.80 10*6/mm3 Final    Hemoglobin 04/24/2024 13.7  13.0 - 17.7 g/dL Final    Hematocrit 04/24/2024 43.2  37.5 - 51.0 % Final    MCV 04/24/2024 84.7  79.0 - 97.0 fL Final    MCH 04/24/2024 26.9  26.6 - 33.0 pg Final    MCHC 04/24/2024 31.7  31.5 - 35.7 g/dL Final    RDW 04/24/2024 15.1  12.3 - 15.4 % Final    RDW-SD 04/24/2024 47.2  37.0 - 54.0 fl Final    MPV 04/24/2024 9.4  6.0 - 12.0 fL Final    Platelets 04/24/2024 156  140 - 450 10*3/mm3 Final    Neutrophil % 04/24/2024 78.2 (H)  42.7 - 76.0 % Final    Lymphocyte % 04/24/2024 13.5 (L)  19.6 - 45.3 % Final    Monocyte % 04/24/2024 7.6  5.0 - 12.0 % Final    Eosinophil % 04/24/2024 0.3  0.3 - 6.2 % Final    Basophil % 04/24/2024 0.2  0.0 - 1.5 % Final    Immature Grans % 04/24/2024 0.2  0.0 - 0.5 % Final    Neutrophils, Absolute 04/24/2024 7.79 (H)  1.70 - 7.00 10*3/mm3 Final    Lymphocytes, Absolute 04/24/2024 1.35  0.70 - 3.10 10*3/mm3 Final    Monocytes, Absolute 04/24/2024 0.76  0.10 - 0.90 10*3/mm3 Final    Eosinophils, Absolute 04/24/2024 0.03  0.00 - 0.40 10*3/mm3 Final    Basophils, Absolute 04/24/2024 0.02  0.00 - 0.20 10*3/mm3 Final    Immature Grans, Absolute 04/24/2024 0.02  0.00 - 0.05 10*3/mm3 Final    nRBC 04/24/2024 0.0  0.0 - 0.2 /100 WBC Final    Extra Tube 04/24/2024 Hold for add-ons.   Final    Auto resulted.    Glucose 04/24/2024 121 (H)  70 - 105 mg/dL Final    Serial Number: [id number]:  [id number]    Glucose 04/25/2024 142 (H)  65 - 99 mg/dL Final    BUN 04/25/2024 24 (H)  8 - 23 mg/dL Final    Creatinine 04/25/2024 0.96  0.76 - 1.27 mg/dL Final    Sodium 04/25/2024 138  136 - 145 mmol/L Final    Potassium 04/25/2024 3.8  3.5 - 5.2 mmol/L Final    Chloride 04/25/2024 100  98 - 107 mmol/L Final    CO2 04/25/2024 29.0  22.0 - 29.0 mmol/L Final    Calcium 04/25/2024 8.5 (L)  8.6 - 10.5 mg/dL Final    BUN/Creatinine  Ratio 04/25/2024 25.0  7.0 - 25.0 Final    Anion Gap 04/25/2024 9.0  5.0 - 15.0 mmol/L Final    eGFR 04/25/2024 81.9  >60.0 mL/min/1.73 Final    WBC 04/25/2024 7.40  3.40 - 10.80 10*3/mm3 Final    RBC 04/25/2024 5.17  4.14 - 5.80 10*6/mm3 Final    Hemoglobin 04/25/2024 13.7  13.0 - 17.7 g/dL Final    Hematocrit 04/25/2024 43.9  37.5 - 51.0 % Final    MCV 04/25/2024 84.9  79.0 - 97.0 fL Final    MCH 04/25/2024 26.5 (L)  26.6 - 33.0 pg Final    MCHC 04/25/2024 31.2 (L)  31.5 - 35.7 g/dL Final    RDW 04/25/2024 15.2  12.3 - 15.4 % Final    RDW-SD 04/25/2024 47.3  37.0 - 54.0 fl Final    MPV 04/25/2024 9.7  6.0 - 12.0 fL Final    Platelets 04/25/2024 148  140 - 450 10*3/mm3 Final    Neutrophil % 04/25/2024 71.7  42.7 - 76.0 % Final    Lymphocyte % 04/25/2024 19.5 (L)  19.6 - 45.3 % Final    Monocyte % 04/25/2024 8.0  5.0 - 12.0 % Final    Eosinophil % 04/25/2024 0.4  0.3 - 6.2 % Final    Basophil % 04/25/2024 0.1  0.0 - 1.5 % Final    Immature Grans % 04/25/2024 0.3  0.0 - 0.5 % Final    Neutrophils, Absolute 04/25/2024 5.31  1.70 - 7.00 10*3/mm3 Final    Lymphocytes, Absolute 04/25/2024 1.44  0.70 - 3.10 10*3/mm3 Final    Monocytes, Absolute 04/25/2024 0.59  0.10 - 0.90 10*3/mm3 Final    Eosinophils, Absolute 04/25/2024 0.03  0.00 - 0.40 10*3/mm3 Final    Basophils, Absolute 04/25/2024 0.01  0.00 - 0.20 10*3/mm3 Final    Immature Grans, Absolute 04/25/2024 0.02  0.00 - 0.05 10*3/mm3 Final    nRBC 04/25/2024 0.0  0.0 - 0.2 /100 WBC Final    Glucose 04/25/2024 150 (H)  70 - 105 mg/dL Final    Serial Number: [id number]:  [id number]       No results found.    3 views of the right wrist shows no fractures.  Mild arthritis.  Calcification of vasculature noted    ASSESSMENT:  Mr. Guerrero is a 78 year old male with right wrist pain    Cellulitis      PLAN:    I discussed with the patient that clinically it is unlikely he has a septic wrist, but we can do an MRI to see if there is an effusion.  If so then we could consider  aspiration depending on how he is doing clinically.    The above diagnosis and treatment plan was discussed with the patient.  They were educated in treatment options for their condition.   They were given the opportunity to ask questions and were answered to their satisfaction.  They agreed to proceed with the above treatment plan.        Delvin Machado MD  Date: 4/25/2024

## 2024-04-25 NOTE — PROGRESS NOTES
"     DATE OF ADMISSION: 4/24/2024  6:26 PM     LOS: 0 days     Subjective :   Ms. Guerrero says his wrist does not hurt.    Objective :    Vital signs in last 24 hours:  Vitals:    04/25/24 0633 04/25/24 0906 04/25/24 1057 04/25/24 1500   BP: 117/55  120/64    BP Location: Left arm  Left arm    Patient Position: Lying  Lying    Pulse: 83      Resp: 13  18 17   Temp: 97.9 °F (36.6 °C)  97.4 °F (36.3 °C) 97.3 °F (36.3 °C)   TempSrc: Oral  Oral Oral   SpO2: 97%      Weight:  77.1 kg (170 lb)     Height:  185.4 cm (73\")       Body mass index is 22.43 kg/m².    PHYSICAL EXAM:  LUE:  Brisk capillary refill  Sensation intact  No tenderness to the wrist.  No pain with ROM of wrist  No pain with      LABS:  Results from last 7 days   Lab Units 04/25/24  0314   WBC 10*3/mm3 7.40   HEMOGLOBIN g/dL 13.7   HEMATOCRIT % 43.9   PLATELETS 10*3/mm3 148     Results from last 7 days   Lab Units 04/25/24  0314   SODIUM mmol/L 138   POTASSIUM mmol/L 3.8   CHLORIDE mmol/L 100   CO2 mmol/L 29.0   BUN mg/dL 24*   CREATININE mg/dL 0.96   GLUCOSE mg/dL 142*   CALCIUM mg/dL 8.5*     Results from last 7 days   Lab Units 04/24/24  1857   INR  1.06   APTT seconds 26.3*       MRI shows \"slightly increased fluid\" in carpal space.    ASSESSMENT:  Mr. Guerrero is a 78 year old male with left wrist cellulitis    Plan:  I discussed with the patient that clinically I have very little concern about a septic joint.  WE discussed that aspirating the wrist may be possible, but with slightly increased fluid it is unlikely we will get enough to be usable.  Recommend oral antibiotics per the admitting doctors.  We discussed if the symptoms resume, then he would need aspiration.  He should come back to the ER if he worsens.  He can follow up in my office in 1 week.  Ortho will sign off.  Please call with questions.    Delvin Machado MD    Date: 4/25/2024  Time: 18:47 EDT       "

## 2024-04-25 NOTE — NURSING NOTE
While PT was working with patient, patient reported to PT that just before coming to the hospital, he had multiple bouts of incontinence of bowel all over his home. Reports he doesn't remember the incident very well and he is not sure how or why this happened and that this is the first time that it has happened. Pt did not report this to this RN, only to physical therapy. Physical therapy notified this RN and this RN notified JC Bernard who came to assess patient and placed orders. R/O stroke orders placed. Patient is A&Ox4, on RA, and VSS at this time.    This RN called in neuro consult, spoke with Dr. Alegre with neuro, notified 2C charge nurse who came to bedside to perform NIH assessment, and called CT department to notify of STAT CT of the head. Patient will be transferred to  once bed is cleaned and ready.    1619 patient back from CT at this time. Awaiting transfer to . All safety measures in place, no complaints.

## 2024-04-25 NOTE — CONSULTS
[facility] Medicine Services  Consult Note    Patient Name: Vlad Guerrero  : 1945  MRN: [id number]  Primary Care Physician:  Vlad Moreland MD  Referring Physician: Vlad Moreland MD  Date of admission: 2024  Date and Time of Care: 24 at 2000    Inpatient Hospitalist Consult  Consult performed by: Alysa Shepherd APRN  Consult ordered by: Carlos Eduardo Ravi PA-C            Reason for Consult/ Chief Complaint: medical management    Consult Requested By: Vladislav GREENE    Subjective:     History of Present Illness: 78-year-old male with a past medical history of insulin-dependent diabetes mellitus, hypertension, colon cancer with possible mets to the liver followed by oncology and chemotherapy, presented to the emergency department yesterday complaining of right wrist pain and erythema.  X-ray of right wrist showed no bony abnormality WBC was not elevated.  He was seen by orthopedics.  MRI of right wrist per radiology showed diffuse nonspecific edema throughout the subcutaneous soft tissues suggesting changes of soft tissue cellulitis it also showed nonspecific joint effusion finding might indicate early signs of developing septic or inflammatory arthropathy.  Orthopedics is still following.  He is being seen by physical therapy which noticed some ataxia.  Review of records shows he was admitted here in 2023 with a possible CVA and MRI noted to have a 7 mm focus of restricted diffusion in the left periventricular white matter of the posterior left frontal lobe suspicious for an acute/subacute infarct.  Neurology was consulted and l management was advised.  Today, CT head showed no acute intracranial process and showed stable findings per radiology.  MRI brain per radiology today shows area of increased signal on diffusion-weighted imaging with associated abnormal signal on FLAIR and T2 weighted images compatible with a late acute or subacute area of ischemic change  along with extensive white matter changes additionally noted compatible small vessel ischemic disease in this age group..  Neurology has been consulted.  No longer observation patient has been admitted to inpatient status.  Stroke workup in process and orthopedic still following patient.  He is on IV ceftriaxone and IV doxycycline with blood cultures pending.  Of note, he saw his oncologist about a month ago and had an MRI of the abdomen done 3/22/2024 which per radiology shows findings suspicious for malignant disease progression within the liver and a new lesion in the left hepatic lobe.  Patient has not seen oncology for follow-up.  Given results, hematology oncology consulted.  He is currently awake and alert has no slurred speech or facial droop he denies any headache or visual changes and no focal weaknesses.  He reports his right hand feels much better and does not hurt any longer.  He has no complaints.    Review of Systems:   Review of Systems    Personal History:     Past Medical History:   Diagnosis Date    Anemia     Colon cancer 2022    colon    Diabetes mellitus     Hyperlipidemia     Hypertension        Past Surgical History:   Procedure Laterality Date    APPENDECTOMY      CARDIAC CATHETERIZATION      COLON RESECTION N/A 12/15/2022    Procedure: COLON RESECTION RIGHT;  Surgeon: Percy Davis MD;  Location: [facility] MAIN OR;  Service: General;  Laterality: N/A;    COLONOSCOPY N/A 11/11/2022    Procedure: COLONOSCOPY WITH BIOPSY AND POLYPECTOMY;  Surgeon: Billy Julien MD;  Location: [facility] ENDOSCOPY;  Service: Gastroenterology;  Laterality: N/A;  Impression:  1.  Large 4-5cm fulgurating circumferential ulcerated mass in the very proximal part of the ascending colon next to ileocecal valve multiple biopsies were performed.  This is highly concerning for colon malignancy.  2.  2 polyp rem    ENDOSCOPY N/A 11/11/2022    Procedure: ESOPHAGOGASTRODUODENOSCOPY with biopsy X1;  Surgeon: Wily  MD Billy;  Location: [facility] ENDOSCOPY;  Service: Gastroenterology;  Laterality: N/A;  5.  Upper endoscopy lamination unremarkable.       PORTACATH PLACEMENT Right 1/12/2023    Procedure: INSERTION OF PORTACATH;  Surgeon: Percy Davis MD;  Location: [facility] MAIN OR;  Service: General;  Laterality: Right;    TONSILLECTOMY         Family History: family history includes Colon cancer (age of onset: 62) in his father; Heart disease in his sister; Pneumonia (age of onset: 94) in his mother. Otherwise pertinent FHx was reviewed and not pertinent to current issue.    Social History:  reports that he quit smoking about 58 years ago. His smoking use included cigarettes. He started smoking about 60 years ago. He has a 2 pack-year smoking history. He has never used smokeless tobacco. He reports that he does not currently use alcohol. He reports that he does not use drugs.    Home Medications:   Dexcom G7 Sensor, Insulin Lispro, Pen Needles, and insulin glargine    Allergies:  Allergies   Allergen Reactions    Penicillins Rash         Objective:     Vital Signs  Temp:  [97.2 °F (36.2 °C)-97.9 °F (36.6 °C)] 97.7 °F (36.5 °C)  Heart Rate:  [74-83] 81  Resp:  [13-18] 16  BP: (117-154)/() 154/82   Body mass index is 24.03 kg/m².    Physical Exam  Physical Exam    Scheduled Meds   aspirin, 81 mg, Oral, Daily   Or  aspirin, 300 mg, Rectal, Daily  atorvastatin, 80 mg, Oral, Nightly  cefTRIAXone, 2,000 mg, Intravenous, Q24H  doxycycline, 100 mg, Intravenous, Q12H  insulin glargine, 20 Units, Subcutaneous, Nightly  insulin lispro, 10 Units, Subcutaneous, TID AC  insulin lispro, 2-9 Units, Subcutaneous, 4x Daily AC & at Bedtime  senna-docusate sodium, 2 tablet, Oral, BID  sodium chloride, 10 mL, Intravenous, Q12H  sodium chloride, 10 mL, Intravenous, Q12H       PRN Meds     acetaminophen    senna-docusate sodium **AND** polyethylene glycol **AND** bisacodyl **AND** bisacodyl    dextrose    dextrose    glucagon (human  recombinant)    melatonin    nitroglycerin    ondansetron    [COMPLETED] Insert Peripheral IV **AND** sodium chloride    sodium chloride    sodium chloride    sodium chloride    sodium chloride   Infusions         Diagnostic Data    Results from last 7 days   Lab Units 04/25/24  0314   WBC 10*3/mm3 7.40   HEMOGLOBIN g/dL 13.7   HEMATOCRIT % 43.9   PLATELETS 10*3/mm3 148   GLUCOSE mg/dL 142*   CREATININE mg/dL 0.96   BUN mg/dL 24*   SODIUM mmol/L 138   POTASSIUM mmol/L 3.8   ANION GAP mmol/L 9.0       MRI Brain Without Contrast    Result Date: 4/25/2024  Impression: 1. Area of increased signal on diffusion weighted imaging with associated abnormal signal on FLAIR and T2-weighted images is compatible with a late acute or subacute area of ischemic change. 2. Extensive white matter changes additionally noted compatible small vessel ischemic disease in this age group. 3. Paranasal sinus disease Electronically Signed: Pineda Vargas MD  4/25/2024 8:23 PM EDT  Workstation ID: [id number]    CT Head Without Contrast    Result Date: 4/25/2024  Impression: 1.No acute intracranial process is identified. 2.Likely stable chronic findings. Electronically Signed: Mario Nassar MD  4/25/2024 4:24 PM EDT  Workstation ID: [id number]    MRI Wrist Right Without Contrast    Result Date: 4/25/2024  Impression: 1.Diffuse nonspecific edema throughout the subcutaneous soft tissues suggesting changes of soft tissue cellulitis. 2.No definitive evidence for abscess. 3.Increased fluid is noted within the tendon sheaths of the flexor compartment as well as the extensor compartments. The findings are nonspecific and may be related to tendinopathy. Spread of infection along the tendons and pyogenic tenosynovitis could potentially have this appearance. 4.Nonspecific joint effusion throughout the carpus. This finding may indicate early signs of developing septic or inflammatory arthropathy. A reactive joint effusion related to osteoarthritis could also  have this appearance. 5.No definitive signs of osteomyelitis. Electronically Signed: Domenico Escalante MD  4/25/2024 10:43 AM EDT  Workstation ID: [id number]    XR Wrist 3+ View Right    Result Date: 4/24/2024  Impression: No acute bony abnormality Electronically Signed: Severino Mace  4/24/2024 7:11 PM EDT  Workstation ID: [id number]       I reviewed the patient's new clinical results.    Assessment/Plan:     Active and Resolved Problems  Active Hospital Problems    Diagnosis  POA    **Cellulitis [L03.90]  Yes     Priority: Medium    Right hand pain [M79.641]  Yes     Priority: High    Ataxia [R27.0]  Yes     Priority: Medium    Metastases to the liver [C78.7]  Yes     Priority: Medium    Malignant neoplasm of ascending colon [C18.2]  Yes    Primary hypertension [I10]  Yes    Mixed hyperlipidemia [E78.2]  Yes    Type 2 diabetes mellitus [E11.9]  Yes      Resolved Hospital Problems   No resolved problems to display.     Right hand pain, resolved edema decreased, per radiology report of MRI soft tissue cellulitis versus possible early septic or inflammatory arthropathy.  On IV ceftriaxone and IV doxycycline culture still pending orthopedics following    Ataxia, previous history of CVA in 2023, NIH 0, MRI brain today showed area of increased signal on diffusion-weighted imaging with associated abnormal signal on FLAIR and T2 weighted images compatible with late acute or subacute area of ischemic change.  Neurology following on statin and aspirin stroke orders in place    Colon cancer with metastasis to liver, no result MRI abdomen 3/22/2024 shows findings suspicious for malignant disease progression within the liver, hematology oncology consulted    Primary hypertension, no home meds controlled, monitor BP Will add as needed antihypertensives according to stroke protocol    Mixed hyperlipidemia on home statin    Diabetes mellitus type 2, fluctuates has been 1 42-3 11, consistent carb heart healthy diet, on home Lantus and  Humalog, add SSI as needed with Kelly ROBINS diabetes educator spoke with patient      DVT prophylaxis:  Mechanical DVT prophylaxis orders are present.         Code status is   Code Status and Medical Interventions:   Ordered at: 04/24/24 2110     Level Of Support Discussed With:    Patient     Code Status (Patient has no pulse and is not breathing):    CPR (Attempt to Resuscitate)     Medical Interventions (Patient has pulse or is breathing):    Full Support       Plan for disposition: in I day     Time: 30 minutes        Signature: Electronically signed by ANEUDY Clemens, 04/25/24, 21:41 EDT.  [facility] Hospitalist Team

## 2024-04-25 NOTE — NURSING NOTE
"Patient had an orthopedic surgery consult for this AM. I called the on-call provider via the \"[facility]ician on call schedule\" and it said the on-call physician was Inocente Le MD from 12am-12am. I called and left a message for [place], Dr. Le called me back and said that he was not on-call for today and it was Andrea Machado MD because there schedules are 7 to 7.  I called and placed a consult out to Andrea Machado MD but the consult has already been completed with Rey on the brain. Andrea Machado MD will be the on call physician for Ortho surgery.  "

## 2024-04-25 NOTE — H&P
"[facility] Observation Unit H&P    Patient Name: Vlad Guerrero  : 1945  MRN: [id number]  Primary Care Physician: Vlad Moreland MD  Date of admission: 2024     Patient Care Team:  Vlad Moreland MD as PCP - General (Internal Medicine)  Percy Davis MD as Surgeon (General Surgery)  Don Kramer MD as Consulting Physician (Hematology and Oncology)          Subjective   History Present Illness     Chief Complaint:   Chief Complaint   Patient presents with    Upper Extremity Issue     Right hand redness, bruising and pain, area is warm to touch, pt reports no known injury.           History of Present Illness  Obtained from admitting physician HPI on 2024:  Chief complaint: Patient is a 78-year-old who today is noticed increasing redness with small amount of bruising to the right wrist.  Goes up to the dorsum of the hand around that side.  He has pain with range of motion but is able to use his hand.  He has not had fever.  He used to be on blood thinners but he states he just does not want to take them anymore has not had them for \"quite some time\".  He has no numbness.  He did not injure his wrist or hand.     24:  Patient confirms the HPI noted above reporting that the back of his right wrist became increasingly sore with a well-demarcated area of erythema noted.  He denies any falls or other trauma and notes that range of motion was significantly painful.  He denies any fever, history of MDRO's or previous pathology in that joint.  Following treatment in the ED he reports significant improvement in symptoms with increased range of motion and improvement though not complete resolution in pain.  No other new or acute symptoms are noted    Patient evaluated by PT noted to have some mild right lower extremity ataxia which caused some concern.  When pressed about other symptoms patient reported that he woke to find that he had been incontinent at some point throughout the night " and several locations in his home though he does not remember this and continues to deny any known trauma.  He has been ambulatory in his room throughout the day walking back and forth to the restroom and reports that he drove himself to the hospital on the previous day.  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  He does have some trouble holding peripheral gaze on exam.      ROS  Review of Systems   Constitutional: Negative.   HENT: Negative.     Eyes: Negative.    Cardiovascular: Negative.    Respiratory: Negative.     Skin: Negative.    Musculoskeletal: Negative.  Positive for joint pain and joint swelling.   Gastrointestinal: Negative.    Genitourinary: Negative.    Neurological: Negative.  Positive for tremors.   Psychiatric/Behavioral: Negative.          Personal History     Past Medical History:   Past Medical History:   Diagnosis Date    Anemia     Colon cancer 2022    colon    Diabetes mellitus     Hyperlipidemia     Hypertension        Surgical History:      Past Surgical History:   Procedure Laterality Date    APPENDECTOMY      CARDIAC CATHETERIZATION      COLON RESECTION N/A 12/15/2022    Procedure: COLON RESECTION RIGHT;  Surgeon: Percy Davis MD;  Location: [facility] MAIN OR;  Service: General;  Laterality: N/A;    COLONOSCOPY N/A 11/11/2022    Procedure: COLONOSCOPY WITH BIOPSY AND POLYPECTOMY;  Surgeon: Billy Julien MD;  Location: [facility] ENDOSCOPY;  Service: Gastroenterology;  Laterality: N/A;  Impression:  1.  Large 4-5cm fulgurating circumferential ulcerated mass in the very proximal part of the ascending colon next to ileocecal valve multiple biopsies were performed.  This is highly concerning for colon malignancy.  2.  2 polyp rem    ENDOSCOPY N/A 11/11/2022    Procedure: ESOPHAGOGASTRODUODENOSCOPY with biopsy X1;  Surgeon: Billy Julien MD;  Location: [facility] ENDOSCOPY;  Service: Gastroenterology;  Laterality: N/A;  5.  Upper endoscopy  lamination unremarkable.       PORTACATH PLACEMENT Right 1/12/2023    Procedure: INSERTION OF PORTACATH;  Surgeon: Percy Davis MD;  Location: [facility] MAIN OR;  Service: General;  Laterality: Right;    TONSILLECTOMY             Family History: family history includes Colon cancer (age of onset: 62) in his father; Heart disease in his sister; Pneumonia (age of onset: 94) in his mother. Otherwise pertinent FHx was reviewed and unremarkable.     Social History:  reports that he quit smoking about 58 years ago. His smoking use included cigarettes. He started smoking about 60 years ago. He has a 2 pack-year smoking history. He has never used smokeless tobacco. He reports that he does not currently use alcohol. He reports that he does not use drugs.      Medications:  Prior to Admission medications    Medication Sig Start Date End Date Taking? Authorizing Provider   insulin glargine (LANTUS, SEMGLEE) 100 UNIT/ML injection Inject 20 Units under the skin into the appropriate area as directed Every Night for 30 days. 1/9/24 4/24/25 Yes Yesenia Arreola APRN   Insulin Lispro (humaLOG) 100 UNIT/ML injection Inject 10 Units under the skin into the appropriate area as directed 3 (Three) Times a Day Before Meals.   Yes Provider, MD Rolly   Continuous Blood Gluc Sensor (Dexcom G7 Sensor) misc Use 1 each Every 10 (Ten) Days. Dx: E11.65 1/9/24   Yesenia Arreola APRN   Insulin Pen Needle (Pen Needles) 32G X 4 MM misc Use 1 each 4 (Four) Times a Day Before Meals & at Bedtime. 1/9/24   Yesenia Arreola APRN       Allergies:    Allergies   Allergen Reactions    Penicillins Rash       Objective   Objective     Vital Signs  Temp:  [97.2 °F (36.2 °C)-97.9 °F (36.6 °C)] 97.4 °F (36.3 °C)  Heart Rate:  [] 83  Resp:  [13-20] 18  BP: (103-141)/() 120/64  SpO2:  [95 %-97 %] 97 %  on   ;   Device (Oxygen Therapy): room air  Body mass index is 22.43 kg/m².    Physical Exam  Vitals reviewed.    Constitutional:       General: He is not in acute distress.     Appearance: Normal appearance. He is normal weight. He is not ill-appearing, toxic-appearing or diaphoretic.   HENT:      Head: Normocephalic.      Right Ear: External ear normal.      Left Ear: External ear normal.      Nose: Nose normal.      Mouth/Throat:      Mouth: Mucous membranes are moist.   Eyes:      Extraocular Movements: Extraocular movements intact.   Cardiovascular:      Rate and Rhythm: Normal rate and regular rhythm.      Pulses: Normal pulses.      Heart sounds: Normal heart sounds.   Pulmonary:      Effort: Pulmonary effort is normal.      Breath sounds: Normal breath sounds.   Abdominal:      General: Bowel sounds are normal.      Palpations: Abdomen is soft.      Tenderness: There is no abdominal tenderness.   Musculoskeletal:         General: Normal range of motion.      Cervical back: Normal range of motion.      Right lower leg: No edema.      Left lower leg: No edema.   Skin:     General: Skin is warm and dry.      Capillary Refill: Capillary refill takes less than 2 seconds.      Findings: Erythema present.   Neurological:      General: No focal deficit present.      Mental Status: He is alert and oriented to person, place, and time.   Psychiatric:         Mood and Affect: Mood normal.         Behavior: Behavior normal.         Thought Content: Thought content normal.         Judgment: Judgment normal.     Results Review:  I have personally reviewed most recent cardiac tracings, lab results, and radiology images and interpretations and agree with findings, most notably: BMP, CBC, CRP, sed rate, x-ray and MRI of right wrist.    Results from last 7 days   Lab Units 04/25/24  0314 04/24/24  1857   WBC 10*3/mm3 7.40 9.97   HEMOGLOBIN g/dL 13.7 13.7   HEMATOCRIT % 43.9 43.2   PLATELETS 10*3/mm3 148 156   INR   --  1.06     Results from last 7 days   Lab Units 04/25/24  0314   SODIUM mmol/L 138   POTASSIUM mmol/L 3.8   CHLORIDE  mmol/L 100   CO2 mmol/L 29.0   BUN mg/dL 24*   CREATININE mg/dL 0.96   GLUCOSE mg/dL 142*   CALCIUM mg/dL 8.5*     Estimated Creatinine Clearance: 71.4 mL/min (by C-G formula based on SCr of 0.96 mg/dL).  Brief Urine Lab Results  (Last result in the past 365 days)        Color   Clarity   Blood   Leuk Est   Nitrite   Protein   CREAT   Urine HCG        01/08/24 0931 Yellow   Clear   Negative   Negative   Negative   Negative                   Microbiology Results (last 10 days)       ** No results found for the last 240 hours. **            ECG/EMG Results (most recent)       Procedure Component Value Units Date/Time    Telemetry Scan [[id number]] Resulted: 04/24/24     Updated: 04/25/24 1041                Results for orders placed during the hospital encounter of 03/11/23    Adult Transthoracic Echo Complete W/ Cont if Necessary Per Protocol (With Agitated Saline)    Interpretation Summary    Left ventricular systolic function is mildly decreased. Calculated left ventricular EF = 43% Left ventricular ejection fraction appears to be 41 - 45%.    Left ventricular diastolic function is consistent with (grade I) impaired relaxation.    There is moderate calcification of the aortic valve mainly affecting the non-coronary and left coronary cusp(s).      MRI Wrist Right Without Contrast    Result Date: 4/25/2024  Impression: 1.Diffuse nonspecific edema throughout the subcutaneous soft tissues suggesting changes of soft tissue cellulitis. 2.No definitive evidence for abscess. 3.Increased fluid is noted within the tendon sheaths of the flexor compartment as well as the extensor compartments. The findings are nonspecific and may be related to tendinopathy. Spread of infection along the tendons and pyogenic tenosynovitis could potentially have this appearance. 4.Nonspecific joint effusion throughout the carpus. This finding may indicate early signs of developing septic or inflammatory arthropathy. A reactive joint effusion  related to osteoarthritis could also have this appearance. 5.No definitive signs of osteomyelitis. Electronically Signed: Domenico Escalante MD  4/25/2024 10:43 AM EDT  Workstation ID: [id number]    XR Wrist 3+ View Right    Result Date: 4/24/2024  Impression: No acute bony abnormality Electronically Signed: Severino Mace  4/24/2024 7:11 PM EDT  Workstation ID: [id number]       Estimated Creatinine Clearance: 71.4 mL/min (by C-G formula based on SCr of 0.96 mg/dL).    Assessment & Plan   Assessment/Plan       Active Hospital Problems    Diagnosis  POA    **Cellulitis [L03.90]  Yes      Resolved Hospital Problems   No resolved problems to display.     Right wrist pain/cellulitis  -WBCs: 9.97, 7.40  -Uric acid: 4.6  -CRP: 2.98, sed rate: 32  -X-ray of right wrist showed no acute bony abnormality  -Blood cultures obtained in the ED  -Patient started on Rocephin and doxycycline in the ED, continue for now  -Orthopedic surgery consulted in the ED who ordered MRI of right wrist    Right leg ataxia with altered mental status reported previous day  -PT was consulted who noted right leg ataxia and patient reports some bowel incontinence in multiple locations [place] home the previous morning without recollection of these events  -Tremor noted on exam though patient does have a history of benign essential tremor documented at [facility] from 2011  -Stat CT of head and MRI of brain ordered  -Neurology consulted  -Check A1c, lipid panel and TSH  -Echocardiogram ordered  -Continue telemetry  -Start statin, will add aspirin following completion of CT of head     Diabetes mellitus  -Well controlled         Lab Results   Component Value Date     GLUCOSE 142 (H) 04/25/2024     GLUCOSE 135 (H) 04/24/2024     GLUCOSE 367 (H) 03/18/2024     GLUCOSE 312 (H) 01/09/2024   -Basal, prandial and correctional insulin ordered  -Diabetic diet  -Monitor before meals and at bedtime            VTE Prophylaxis -   Mechanical Order History:         Ordered        04/25/24 1503  Place Sequential Compression Device  Once            04/25/24 1503  Maintain Sequential Compression Device  Continuous            04/24/24 2114  Place Sequential Compression Device  Once            04/24/24 2114  Maintain Sequential Compression Device  Continuous                          Pharmalogical Order History:       None            CODE STATUS:    Code Status and Medical Interventions:   Ordered at: 04/24/24 2110     Level Of Support Discussed With:    Patient     Code Status (Patient has no pulse and is not breathing):    CPR (Attempt to Resuscitate)     Medical Interventions (Patient has pulse or is breathing):    Full Support       This patient has been examined wearing personal protective equipment.     I discussed the patient's findings and my recommendations with patient and nursing staff.      Signature:Electronically signed by Carlos Eduardo Ravi PA-C, 04/25/24, 3:13 PM EDT.

## 2024-04-25 NOTE — THERAPY EVALUATION
Patient Name: Vlad Guerrero  : 1945    MRN: [id number]                              Today's Date: 2024       Admit Date: 2024    Visit Dx:     ICD-10-CM ICD-9-CM   1. 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    Cellulitis     Past Medical History:   Diagnosis Date    Anemia     Colon cancer     colon    Diabetes mellitus     Hyperlipidemia     Hypertension      Past Surgical History:   Procedure Laterality Date    APPENDECTOMY      CARDIAC CATHETERIZATION      COLON RESECTION N/A 12/15/2022    Procedure: COLON RESECTION RIGHT;  Surgeon: Percy Davis MD;  Location: [facility] MAIN OR;  Service: General;  Laterality: N/A;    COLONOSCOPY N/A 2022    Procedure: COLONOSCOPY WITH BIOPSY AND POLYPECTOMY;  Surgeon: Billy Julien MD;  Location: [facility] ENDOSCOPY;  Service: Gastroenterology;  Laterality: N/A;  Impression:  1.  Large 4-5cm fulgurating circumferential ulcerated mass in the very proximal part of the ascending colon next to ileocecal valve multiple biopsies were performed.  This is highly concerning for colon malignancy.  2.  2 polyp rem    ENDOSCOPY N/A 2022    Procedure: ESOPHAGOGASTRODUODENOSCOPY with biopsy X1;  Surgeon: Billy Julien MD;  Location: [facility] ENDOSCOPY;  Service: Gastroenterology;  Laterality: N/A;  5.  Upper endoscopy lamination unremarkable.       PORTACATH PLACEMENT Right 2023    Procedure: INSERTION OF PORTACATH;  Surgeon: Percy Davis MD;  Location: [facility] MAIN OR;  Service: General;  Laterality: Right;    TONSILLECTOMY        General Information       Row Name 24 1454          Physical Therapy Time and Intention     Document Type evaluation  -CM     Mode of Treatment physical therapy  -CM       Row Name 04/25/24 1454          General Information    Patient Profile Reviewed yes  -CM     Prior Level of Function independent:;community mobility;gait;driving;work  works part time at [employer]  -CM     Existing Precautions/Restrictions no known precautions/restrictions  -CM     Barriers to Rehab medically complex  -CM       Row Name 04/25/24 1454          Living Environment    People in Home alone  -CM       Row Name 04/25/24 1454          Home Main Entrance    Number of Stairs, Main Entrance none  -CM       Row Name 04/25/24 1454          Stairs Within Home, Primary    Number of Stairs, Within Home, Primary none  -CM       Row Name 04/25/24 1454          Cognition    Orientation Status (Cognition) oriented x 4  -CM       Row Name 04/25/24 1454          Safety Issues, Functional Mobility    Impairments Affecting Function (Mobility) muscle tone abnormal  -CM     Comment, Safety Issues/Impairments (Mobility) mild hypertonicity noted in RLE w/ gait  -CM               User Key  (r) = Recorded By, (t) = Taken By, (c) = Cosigned By      Initials Name Provider Type    CM Lydia Garsia, PT Physical Therapist                   Mobility       Row Name 04/25/24 1455          Bed Mobility    Bed Mobility bed mobility (all) activities  -CM     All Activities, [place] (Bed Mobility) independent  -CM       Row Name 04/25/24 1455          Sit-Stand Transfer    Sit-Stand [place] (Transfers) independent  -CM       Row Name 04/25/24 1455          Gait/Stairs (Locomotion)    [place] Level (Gait) supervision  -CM     Assistive Device (Gait) other (see comments)  gait belt, non skid socks  -CM     Patient was able to Ambulate yes  -CM     Distance in Feet (Gait) 100  -CM     Deviations/Abnormal Patterns (Gait) right sided deviations  -CM     Right Sided Gait Deviations decreased knee extension;heel strike decreased   exhibits decreased heel contact and difficulty achieving full knee extension in R knee w/ swingthrough  -CM               User Key  (r) = Recorded By, (t) = Taken By, (c) = Cosigned By      Initials Name Provider Type    Lydia Ramsey PT Physical Therapist                   Obj/Interventions       Row Name 04/25/24 1456          Range of Motion Comprehensive    General Range of Motion no range of motion deficits identified  -CM       Row Name 04/25/24 1456          Strength Comprehensive (MMT)    General Manual Muscle Testing (MMT) Assessment no strength deficits identified  -CM       Row Name 04/25/24 1456          Balance    Balance Assessment sitting static balance;sitting dynamic balance;standing static balance;standing dynamic balance  -CM     Static Sitting Balance independent  -CM     Dynamic Sitting Balance independent  -CM     Position, Sitting Balance unsupported  -CM     Static Standing Balance independent  -CM     Dynamic Standing Balance independent  -CM     Position/Device Used, Standing Balance unsupported  -CM       Row Name 04/25/24 1456          Sensory Assessment (Somatosensory)    Sensory Assessment (Somatosensory) sensation intact  -CM               User Key  (r) = Recorded By, (t) = Taken By, (c) = Cosigned By      Initials Name Provider Type    Lydia Ramsey, PT Physical Therapist                   Goals/Plan    No documentation.                  Clinical Impression       Row Name 04/25/24 1457          Pain    Pretreatment Pain Rating 0/10 - no pain  -CM     Posttreatment Pain Rating 0/10 - no pain  -CM     Pain Location - Side/Orientation Right  -CM     Pain Location - wrist  -CM     Pre/Posttreatment Pain Comment reports only soreness in R wrist; no pain; wrist slightly edematous but no pitting edema noted. Found pt sleeping w/ R hand hanging down off of side of bed when PT arrived. Educated pt that he needed to keep R wrist/hand elevated.  -CM     Pain Intervention(s)  Ambulation/increased activity;Emotional support;Repositioned  -CM       Row Name 04/25/24 9369          Plan of Care Review    Plan of Care Reviewed With patient  -CM     Progress improving  -CM     Outcome Evaluation 79 yo male adm 4/24/24 due to R wrist cellulitis w/ possible joint sepsis. Orthopedics is following. MRI is pending. PMH: colon ca w/ liver mets (pt is waiting to hear this week if he is in remission); cva; dm. Pt reports to PT that he had strange episode just prior to coming to hospital. States he has not discussed w/ other staff. Reports he was incontinent of bowel at home, and he is not sure how it happened. Reports that he had multiple bouts of incontinence of bowel all over his home. Doesn't really remember this incident. Lives alone in single level home. Works part time at [employer] store. Today, pt is alert and oriented x 4. Very pleasant, no episodes of incontinence. Pt has normal strength and ROM for his age. No significant weakness. However, when ambulating, demonstrates ataxic movement of RLE w/ swingthrough. Notified attending MD and RN to address, in event that pt may have undergone some type of neurological event. Educated pt on signs/symptoms/management of stroke. Also advised that pt set up system w/ his adult sons to check on him daily, which he reports they can/will do. Pt is independent for all mobility this date. He does have an unusual gait pattern. He gets incomplete knee extension and heel strike on the RLE w/ swingthrough. Has no loss of balance w/ stopping, starting, or quick turns w/ ambulation. No need for skilled PT. Recommend home at d/c. Will sign off.  -CM       Row Name 04/25/24 1506          Therapy Assessment/Plan (PT)    Criteria for Skilled Interventions Met (PT) no;does not meet criteria for skilled intervention  -CM     Therapy Frequency (PT) evaluation only  -CM       Row Name 04/25/24 1506          Vital Signs    O2 Delivery Pre Treatment room air  -CM     O2  Delivery Intra Treatment room air  -CM     O2 Delivery Post Treatment room air  -CM       Row Name 04/25/24 1506          Positioning and Restraints    Pre-Treatment Position in bed  -CM     Post Treatment Position chair  -CM     In Chair notified nsg;sitting;call light within reach;encouraged to call for assist;with other staff   present  -               User Key  (r) = Recorded By, (t) = Taken By, (c) = Cosigned By      Initials Name Provider Type    Lydia Ramsey, PT Physical Therapist                   Outcome Measures       Row Name 04/25/24 1506 04/25/24 0800       How much help from another person do you currently need...    Turning from your back to your side while in flat bed without using bedrails? 4  -CM 4  -KW    Moving from lying on back to sitting on the side of a flat bed without bedrails? 4  -CM 4  -KW    Moving to and from a bed to a chair (including a wheelchair)? 4  -CM 4  -KW    Standing up from a chair using your arms (e.g., wheelchair, bedside chair)? 4  -CM 4  -KW    Climbing 3-5 steps with a railing? 4  -CM 3  -KW    To walk in hospital room? 4  -CM 3  -KW    AM-PAC 6 Clicks Score (PT) 24  -CM 22  -KW    Highest Level of Mobility Goal 8 --> Walked 250 feet or more  -CM 7 --> Walk 25 feet or more  -KW      Row Name 04/25/24 1506          Modified [place] Scale    Pre-Stroke Modified Neeta Scale 0 - No Symptoms at all.  -CM     Modified Neeta Scale 1 - No significant disability despite symptoms.  Able to carry out all usual duties and activities.  -CM       Row Name 04/25/24 1506          Functional Assessment    Outcome Measure Options Modified [place]  -CM               User Key  (r) = Recorded By, (t) = Taken By, (c) = Cosigned By      Initials Name Provider Type    Danielle Chung, RN Registered Nurse    Lydia Ramsey, PT Physical Therapist                                 Physical Therapy Education       Title: PT OT SLP Therapies (In Progress)       Topic:  Physical Therapy (Done)       Point: Mobility training (Done)       Learning Progress Summary             Patient Acceptance, E,TB,H, VU by CM at 4/25/2024 1506                                         User Key       Initials Effective Dates Name Provider Type Discipline    AMOS 06/16/21 -  Lydia Garsia, PT Physical Therapist PT                  PT Recommendation and Plan     Plan of Care Reviewed With: patient  Progress: improving  Outcome Evaluation: 79 yo male adm 4/24/24 due to R wrist cellulitis w/ possible joint sepsis. Orthopedics is following. MRI is pending. PMH: colon ca w/ liver mets (pt is waiting to hear this week if he is in remission); cva; dm. Pt reports to PT that he had strange episode just prior to coming to hospital. States he has not discussed w/ other staff. Reports he was incontinent of bowel at home, and he is not sure how it happened. Reports that he had multiple bouts of incontinence of bowel all over his home. Doesn't really remember this incident. Lives alone in single level home. Works part time at [employer] store. Today, pt is alert and oriented x 4. Very pleasant, no episodes of incontinence. Pt has normal strength and ROM for his age. No significant weakness. However, when ambulating, demonstrates ataxic movement of RLE w/ swingthrough. Notified attending MD and RN to address, in event that pt may have undergone some type of neurological event. Educated pt on signs/symptoms/management of stroke. Also advised that pt set up system w/ his adult sons to check on him daily, which he reports they can/will do. Pt is independent for all mobility this date. He does have an unusual gait pattern. He gets incomplete knee extension and heel strike on the RLE w/ swingthrough. Has no loss of balance w/ stopping, starting, or quick turns w/ ambulation. No need for skilled PT. Recommend home at d/c. Will sign off.     Time Calculation:   PT Evaluation Complexity  History, PT Evaluation Complexity:  1-2 personal factors and/or comorbidities  Examination of Body Systems (PT Eval Complexity): total of 4 or more elements  Clinical Presentation (PT Evaluation Complexity): stable  Clinical Decision Making (PT Evaluation Complexity): low complexity  Overall Complexity (PT Evaluation Complexity): low complexity     PT Charges       Row Name 04/25/24 1508             Time Calculation    Start Time 1350  -CM      Stop Time 1420  -CM      Time Calculation (min) 30 min  -CM      PT Received On 04/25/24  -CM         Time Calculation- PT    Total Timed Code Minutes- PT 0 minute(s)  -CM                User Key  (r) = Recorded By, (t) = Taken By, (c) = Cosigned By      Initials Name Provider Type    Lydia Ramsey, PT Physical Therapist                  Therapy Charges for Today       Code Description Service Date Service Provider Modifiers Qty    [id number]  PT EVAL LOW COMPLEXITY 4 4/25/2024 Lydia Garsia, PT GP 1            PT G-Codes  Outcome Measure Options: Modified [place]  AM-PAC 6 Clicks Score (PT): 24  Modified [place] Scale: 1 - No significant disability despite symptoms.  Able to carry out all usual duties and activities.  PT Discharge Summary  Anticipated Discharge Disposition (PT): home    Lydia Garsia, PT  4/25/2024

## 2024-04-25 NOTE — CASE MANAGEMENT/SOCIAL WORK
Discharge Planning Assessment   Az     Patient Name: Vlad Guerrero  MRN: [id number]  Today's Date: 4/25/2024    Admit Date: 4/24/2024    Plan: Routine home   Discharge Needs Assessment       Row Name 04/25/24 1503       Living Environment    People in Home alone    Current Living Arrangements home    Potentially Unsafe Housing Conditions none    In the past 12 months has the electric, gas, oil, or water company threatened to shut off services in your home? No    Primary Care Provided by self    Provides Primary Care For no one    Family Caregiver if Needed child(avel), adult    Family Caregiver Names sons Prashanth, Remy, and Percy    Quality of Family Relationships helpful;involved;supportive    Able to Return to Prior Arrangements yes       Resource/Environmental Concerns    Resource/Environmental Concerns none    Transportation Concerns none       Transportation Needs    In the past 12 months, has lack of transportation kept you from medical appointments or from getting medications? no    In the past 12 months, has lack of transportation kept you from meetings, work, or from getting things needed for daily living? No       Food Insecurity    Within the past 12 months, you worried that your food would run out before you got the money to buy more. Never true    Within the past 12 months, the food you bought just didn't last and you didn't have money to get more. Never true       Transition Planning    Patient/Family Anticipates Transition to home    Patient/Family Anticipated Services at Transition none    Transportation Anticipated car, drives self;family or friend will provide       Discharge Needs Assessment    Readmission Within the Last 30 Days no previous admission in last 30 days    Equipment Currently Used at Home none    Concerns to be Addressed denies needs/concerns at this time    Anticipated Changes Related to Illness none    Equipment Needed After Discharge none                   Discharge Plan       [place]  Name 04/25/24 1508       Plan    Plan Routine home    Plan Comments CM met with patient at the bedside. Confirmed PCP, insurance, and pharmacy. Patient denies any difficulty affording medications. Patient is not current with any HHC/OPPT/OT services. Patient lives at home alone, is IADLS, and drives. Patient states he will drive himself home at DC. DC Barriers: Ortho following, pending stroke work-up, IV abx.                  Continued Care and Services - Admitted Since 4/24/2024    No active coordination exists for this encounter.       Expected Discharge Date and Time       Expected Discharge Date Expected Discharge Time    Apr 27, 2024            Demographic Summary       Row Name 04/25/24 1502       General Information    Admission Type observation    Arrived From emergency department    Referral Source admission list    Reason for Consult discharge planning    Preferred Language English       Contact Information    Permission Granted to Share Info With     Contact Information Obtained for                    Functional Status       Row Name 04/25/24 1503       Functional Status    Usual Activity Tolerance moderate    Current Activity Tolerance moderate       Functional Status, IADL    Medications independent    Meal Preparation independent    Housekeeping independent    Laundry independent    Shopping independent       Mental Status    General Appearance WDL WDL       Mental Status Summary    Recent Changes in Mental Status/Cognitive Functioning no changes           Ori Salas RN     Cell number [phone]  Office number [phone]

## 2024-04-25 NOTE — PLAN OF CARE
Goal Outcome Evaluation:  Plan of Care Reviewed With: patient        Progress: improving  Outcome Evaluation: 79 yo male adm 4/24/24 due to R wrist cellulitis w/ possible joint sepsis. Orthopedics is following. MRI is pending. PMH: colon ca w/ liver mets (pt is waiting to hear this week if he is in remission); cva; dm. Pt reports to PT that he had strange episode just prior to coming to hospital. States he has not discussed w/ other staff. Reports he was incontinent of bowel at home, and he is not sure how it happened. Reports that he had multiple bouts of incontinence of bowel all over his home. Doesn't really remember this incident. Lives alone in single level home. Works part time at [employer] store. Today, pt is alert and oriented x 4. Very pleasant, no episodes of incontinence. Pt has normal strength and ROM for his age. No significant weakness. However, when ambulating, demonstrates ataxic movement of RLE w/ swingthrough. Notified attending MD and RN to address, in event that pt may have undergone some type of neurological event. Educated pt on signs/symptoms/management of stroke. Also advised that pt set up system w/ his adult sons to check on him daily, which he reports they can/will do. Pt is independent for all mobility this date. He does have an unusual gait pattern. He gets incomplete knee extension and heel strike on the RLE w/ swingthrough. Has no loss of balance w/ stopping, starting, or quick turns w/ ambulation. No need for skilled PT. Recommend home at d/c. Will sign off.

## 2024-04-25 NOTE — CONSULTS
"Diabetes Education  Assessment/Teaching    Patient Name:  Vlad Guerrero  YOB: 1945  MRN: [id number]  Admit Date:  4/24/2024      Assessment Date:  4/25/2024  Flowsheet Row Most Recent Value   General Information     Referral From: MD order  [Consult received per stroke protocol being ordered. A1c has been ordered this adm but has not been resulted. Previous A1c from 1/2024 was 12.2%. Adm bs 135.]   Height 185.4 cm (73\")   Height Method Stated   Weight 77.1 kg (170 lb)   Weight Method Standing scale   Pregnancy Assessment    Diabetes History    What type of diabetes do you have? Type 2   Length of Diabetes Diagnosis --  [Dx about 10 years ago.]   Have you had diabetes education/teaching in the past? yes   When and where was your diabetes education? Was last seen by inpatient diabetes educator at [facility] on 1/9/2024.   Do you test your blood sugar at home? yes   Frequency of checks 2-3 times/day   Meter type Accuchek Guide ([facility]   Who performs the test? self   Have you had low blood sugar? (<70mg/dl) no   Education Preferences    Nutrition Information    Assessment Topics    DM Goals             Flowsheet Row Most Recent Value   DM Education Needs    Meter Has own   Meter Type Accuchek   Frequency of Testing 3 times a day  [Encouraged pt to record bs and take to PCP visits.]   Blood Glucose Target Range Reviewed healthy bs range and healthy A1c target. Discussed importance of bs control.   Medication Insulin, Pen  [Pt taking Lantus 20 units hs and Lispro 10 units ac. Pt states taking insulin as prescribed.]   Problem Solving Hypoglycemia, Hyperglycemia, Signs, Symptoms, Treatment  [Discussed importance of keeping low bs tx available.]   Reducing Risks A1C testing  [Educator will attempt follow up regarding A1c result this adm.]   Healthy Eating --  [Pt usually eating 3 meals/day. Pt states is taking insulin prior to meals]   Motivation Engaged   Teaching Method Explanation, " Discussion   Patient Response Verbalized understanding              Other Comments:  Met with pt at bedside. When pt seen by inpatient diabetes educator in 1/2024, pt was interested in continuous glucose monitoring. Rx for sensors were sent to local pharmacy for pt. Pt states he never received the sensors. [facility] with [facility], sensor would most likely not be [facility]. Pt states he does have current meter and test strips and will continue to check bs several times/day. Pt is needing rxs for more Lantus and Lispro pens. Rxs have been started. Pt states he has insulin pen needles at home. Educator will attempt follow up on different day to review A1c result.         Electronically signed by:  Deena Andersen RN  04/25/24 19:26 EDT

## 2024-04-26 ENCOUNTER — APPOINTMENT (OUTPATIENT)
Dept: MRI IMAGING | Facility: HOSPITAL | Age: 79
DRG: 642 | End: 2024-04-26
Payer: OTHER GOVERNMENT

## 2024-04-26 ENCOUNTER — APPOINTMENT (OUTPATIENT)
Dept: CARDIOLOGY | Facility: HOSPITAL | Age: 79
DRG: 642 | End: 2024-04-26
Payer: OTHER GOVERNMENT

## 2024-04-26 LAB
AORTIC DIMENSIONLESS INDEX: 0.63 (DI)
BH CV ECHO LEFT VENTRICLE GLOBAL LONGITUDINAL STRAIN: -9.6 %
BH CV ECHO MEAS - AO MAX PG: 8.1 MMHG
BH CV ECHO MEAS - AO MEAN PG: 4 MMHG
BH CV ECHO MEAS - AO V2 MAX: 142 CM/SEC
BH CV ECHO MEAS - AO V2 VTI: 28.3 CM
BH CV ECHO MEAS - AVA(I,D): 2.5 CM2
BH CV ECHO MEAS - EDV(CUBED): 205.4 ML
BH CV ECHO MEAS - EDV(MOD-SP2): 198 ML
BH CV ECHO MEAS - EDV(MOD-SP4): 218 ML
BH CV ECHO MEAS - EF(MOD-BP): 34.9 %
BH CV ECHO MEAS - EF(MOD-SP2): 35.4 %
BH CV ECHO MEAS - EF(MOD-SP4): 32.6 %
BH CV ECHO MEAS - ESV(CUBED): 110.6 ML
BH CV ECHO MEAS - ESV(MOD-SP2): 128 ML
BH CV ECHO MEAS - ESV(MOD-SP4): 147 ML
BH CV ECHO MEAS - FS: 18.6 %
BH CV ECHO MEAS - IVS/LVPW: 0.8 CM
BH CV ECHO MEAS - IVSD: 0.8 CM
BH CV ECHO MEAS - LA DIMENSION: 3.8 CM
BH CV ECHO MEAS - LAT PEAK E' VEL: 10.9 CM/SEC
BH CV ECHO MEAS - LV DIASTOLIC VOL/BSA (35-75): 105.5 CM2
BH CV ECHO MEAS - LV MASS(C)D: 209.6 GRAMS
BH CV ECHO MEAS - LV MAX PG: 2.8 MMHG
BH CV ECHO MEAS - LV MEAN PG: 1 MMHG
BH CV ECHO MEAS - LV SYSTOLIC VOL/BSA (12-30): 71.1 CM2
BH CV ECHO MEAS - LV V1 MAX: 83.4 CM/SEC
BH CV ECHO MEAS - LV V1 VTI: 15.8 CM
BH CV ECHO MEAS - LVIDD: 5.9 CM
BH CV ECHO MEAS - LVIDS: 4.8 CM
BH CV ECHO MEAS - LVOT AREA: 4.5 CM2
BH CV ECHO MEAS - LVOT DIAM: 2.4 CM
BH CV ECHO MEAS - LVPWD: 1 CM
BH CV ECHO MEAS - MED PEAK E' VEL: 4.9 CM/SEC
BH CV ECHO MEAS - MR MAX PG: 62.7 MMHG
BH CV ECHO MEAS - MR MAX VEL: 396 CM/SEC
BH CV ECHO MEAS - MV A MAX VEL: 83.4 CM/SEC
BH CV ECHO MEAS - MV DEC SLOPE: 383 CM/SEC2
BH CV ECHO MEAS - MV DEC TIME: 0.2 SEC
BH CV ECHO MEAS - MV E MAX VEL: 59.6 CM/SEC
BH CV ECHO MEAS - MV E/A: 0.71
BH CV ECHO MEAS - MV MAX PG: 2.7 MMHG
BH CV ECHO MEAS - MV MEAN PG: 1 MMHG
BH CV ECHO MEAS - MV P1/2T: 46.3 MSEC
BH CV ECHO MEAS - MV V2 VTI: 16.7 CM
BH CV ECHO MEAS - MVA(P1/2T): 4.8 CM2
BH CV ECHO MEAS - MVA(VTI): 4.3 CM2
BH CV ECHO MEAS - PA ACC TIME: 0.12 SEC
BH CV ECHO MEAS - PA V2 MAX: 87.7 CM/SEC
BH CV ECHO MEAS - RAP SYSTOLE: 3 MMHG
BH CV ECHO MEAS - RV MAX PG: 1.23 MMHG
BH CV ECHO MEAS - RV V1 MAX: 55.4 CM/SEC
BH CV ECHO MEAS - RV V1 VTI: 11.6 CM
BH CV ECHO MEAS - RVSP: 19.2 MMHG
BH CV ECHO MEAS - SV(LVOT): 71.5 ML
BH CV ECHO MEAS - SV(MOD-SP2): 70 ML
BH CV ECHO MEAS - SV(MOD-SP4): 71 ML
BH CV ECHO MEAS - SVI(LVOT): 34.6 ML/M2
BH CV ECHO MEAS - SVI(MOD-SP2): 33.9 ML/M2
BH CV ECHO MEAS - SVI(MOD-SP4): 34.3 ML/M2
BH CV ECHO MEAS - TAPSE (>1.6): 1.65 CM
BH CV ECHO MEAS - TR MAX PG: 16.2 MMHG
BH CV ECHO MEAS - TR MAX VEL: 201 CM/SEC
BH CV ECHO MEASUREMENTS AVERAGE E/E' RATIO: 7.54
BH CV ECHO SHUNT ASSESSMENT PERFORMED (HIDDEN SCRIPTING): 1
BH CV XLRA - RV BASE: 3.7 CM
BH CV XLRA - RV MID: 2 CM
BH CV XLRA - TDI S': 9.7 CM/SEC
BH CV XLRA MEAS LEFT CAROTID BULB PSV: 47.2 CM/SEC
BH CV XLRA MEAS LEFT DIST CCA EDV: -21 CM/SEC
BH CV XLRA MEAS LEFT DIST CCA PSV: -77.1 CM/SEC
BH CV XLRA MEAS LEFT DIST ICA EDV: -25.6 CM/SEC
BH CV XLRA MEAS LEFT DIST ICA PSV: -89.4 CM/SEC
BH CV XLRA MEAS LEFT ICA/CCA RATIO: -0.81
BH CV XLRA MEAS LEFT PROX CCA EDV: 21.7 CM/SEC
BH CV XLRA MEAS LEFT PROX CCA PSV: 111 CM/SEC
BH CV XLRA MEAS LEFT PROX ECA PSV: -61.6 CM/SEC
BH CV XLRA MEAS LEFT PROX ICA EDV: -26 CM/SEC
BH CV XLRA MEAS LEFT PROX ICA PSV: -80.6 CM/SEC
BH CV XLRA MEAS LEFT PROX SCLA PSV: 119 CM/SEC
BH CV XLRA MEAS LEFT VERTEBRAL A PSV: -57 CM/SEC
BH CV XLRA MEAS RIGHT CAROTID BULB PSV: -57.8 CM/SEC
BH CV XLRA MEAS RIGHT DIST CCA EDV: -16.2 CM/SEC
BH CV XLRA MEAS RIGHT DIST CCA PSV: -78.9 CM/SEC
BH CV XLRA MEAS RIGHT DIST ICA EDV: -21.7 CM/SEC
BH CV XLRA MEAS RIGHT DIST ICA PSV: -86.4 CM/SEC
BH CV XLRA MEAS RIGHT ICA/CCA RATIO: -0.69
BH CV XLRA MEAS RIGHT PROX CCA EDV: 23 CM/SEC
BH CV XLRA MEAS RIGHT PROX CCA PSV: 125 CM/SEC
BH CV XLRA MEAS RIGHT PROX ECA PSV: 120 CM/SEC
BH CV XLRA MEAS RIGHT PROX ICA EDV: -14.9 CM/SEC
BH CV XLRA MEAS RIGHT PROX ICA PSV: -64 CM/SEC
BH CV XLRA MEAS RIGHT PROX SCLA PSV: 187 CM/SEC
BH CV XLRA MEAS RIGHT VERTEBRAL A PSV: -47.8 CM/SEC
CHOLEST SERPL-MCNC: 153 MG/DL (ref 0–200)
GLUCOSE BLDC GLUCOMTR-MCNC: 133 MG/DL (ref 70–105)
GLUCOSE BLDC GLUCOMTR-MCNC: 189 MG/DL (ref 70–105)
GLUCOSE BLDC GLUCOMTR-MCNC: 209 MG/DL (ref 70–105)
GLUCOSE BLDC GLUCOMTR-MCNC: 228 MG/DL (ref 70–105)
HBA1C MFR BLD: 12.9 % (ref 4.8–5.6)
HDLC SERPL-MCNC: 55 MG/DL (ref 40–60)
LDLC SERPL CALC-MCNC: 83 MG/DL (ref 0–100)
LDLC/HDLC SERPL: 1.5 {RATIO}
LEFT ATRIUM VOLUME INDEX: 36.5 ML/M2
SINUS: 3.4 CM
STJ: 2.7 CM
TRIGL SERPL-MCNC: 77 MG/DL (ref 0–150)
TSH SERPL DL<=0.05 MIU/L-ACNC: 1.4 UIU/ML (ref 0.27–4.2)
VIT B12 BLD-MCNC: 285 PG/ML (ref 211–946)
VLDLC SERPL-MCNC: 15 MG/DL (ref 5–40)

## 2024-04-26 PROCEDURE — 93356 MYOCRD STRAIN IMG SPCKL TRCK: CPT

## 2024-04-26 PROCEDURE — 99223 1ST HOSP IP/OBS HIGH 75: CPT | Performed by: NURSE PRACTITIONER

## 2024-04-26 PROCEDURE — 83036 HEMOGLOBIN GLYCOSYLATED A1C: CPT | Performed by: PHYSICIAN ASSISTANT

## 2024-04-26 PROCEDURE — 82948 REAGENT STRIP/BLOOD GLUCOSE: CPT | Performed by: PHYSICIAN ASSISTANT

## 2024-04-26 PROCEDURE — 97166 OT EVAL MOD COMPLEX 45 MIN: CPT

## 2024-04-26 PROCEDURE — 93306 TTE W/DOPPLER COMPLETE: CPT

## 2024-04-26 PROCEDURE — 25010000002 GADOTERIDOL PER 1 ML: Performed by: INTERNAL MEDICINE

## 2024-04-26 PROCEDURE — 93306 TTE W/DOPPLER COMPLETE: CPT | Performed by: INTERNAL MEDICINE

## 2024-04-26 PROCEDURE — 63710000001 INSULIN LISPRO (HUMAN) PER 5 UNITS: Performed by: PHYSICIAN ASSISTANT

## 2024-04-26 PROCEDURE — 84443 ASSAY THYROID STIM HORMONE: CPT | Performed by: NURSE PRACTITIONER

## 2024-04-26 PROCEDURE — 99222 1ST HOSP IP/OBS MODERATE 55: CPT | Performed by: INTERNAL MEDICINE

## 2024-04-26 PROCEDURE — A9579 GAD-BASE MR CONTRAST NOS,1ML: HCPCS | Performed by: INTERNAL MEDICINE

## 2024-04-26 PROCEDURE — 25010000002 SULFUR HEXAFLUORIDE MICROSPH 60.7-25 MG RECONSTITUTED SUSPENSION: Performed by: INTERNAL MEDICINE

## 2024-04-26 PROCEDURE — 93010 ELECTROCARDIOGRAM REPORT: CPT | Performed by: INTERNAL MEDICINE

## 2024-04-26 PROCEDURE — 93005 ELECTROCARDIOGRAM TRACING: CPT | Performed by: NURSE PRACTITIONER

## 2024-04-26 PROCEDURE — 70544 MR ANGIOGRAPHY HEAD W/O DYE: CPT

## 2024-04-26 PROCEDURE — 25010000002 CYANOCOBALAMIN PER 1000 MCG: Performed by: NURSE PRACTITIONER

## 2024-04-26 PROCEDURE — 82948 REAGENT STRIP/BLOOD GLUCOSE: CPT

## 2024-04-26 PROCEDURE — 93880 EXTRACRANIAL BILAT STUDY: CPT | Performed by: SURGERY

## 2024-04-26 PROCEDURE — 80061 LIPID PANEL: CPT | Performed by: PHYSICIAN ASSISTANT

## 2024-04-26 PROCEDURE — 93356 MYOCRD STRAIN IMG SPCKL TRCK: CPT | Performed by: INTERNAL MEDICINE

## 2024-04-26 PROCEDURE — 25010000002 CEFTRIAXONE PER 250 MG: Performed by: PHYSICIAN ASSISTANT

## 2024-04-26 PROCEDURE — 70552 MRI BRAIN STEM W/DYE: CPT

## 2024-04-26 PROCEDURE — 63710000001 INSULIN GLARGINE PER 5 UNITS: Performed by: PHYSICIAN ASSISTANT

## 2024-04-26 PROCEDURE — 93880 EXTRACRANIAL BILAT STUDY: CPT

## 2024-04-26 PROCEDURE — 92523 SPEECH SOUND LANG COMPREHEN: CPT

## 2024-04-26 RX ORDER — CYANOCOBALAMIN 1000 UG/ML
1000 INJECTION, SOLUTION INTRAMUSCULAR; SUBCUTANEOUS
Status: DISCONTINUED | OUTPATIENT
Start: 2024-04-26 | End: 2024-04-27 | Stop reason: HOSPADM

## 2024-04-26 RX ORDER — CLOPIDOGREL BISULFATE 75 MG/1
75 TABLET ORAL DAILY
Status: DISCONTINUED | OUTPATIENT
Start: 2024-04-26 | End: 2024-04-27 | Stop reason: HOSPADM

## 2024-04-26 RX ADMIN — DOXYCYCLINE 100 MG: 100 INJECTION, POWDER, LYOPHILIZED, FOR SOLUTION INTRAVENOUS at 22:01

## 2024-04-26 RX ADMIN — ASPIRIN 81 MG CHEWABLE TABLET 81 MG: 81 TABLET CHEWABLE at 08:17

## 2024-04-26 RX ADMIN — CEFTRIAXONE 2000 MG: 2 INJECTION, POWDER, FOR SOLUTION INTRAMUSCULAR; INTRAVENOUS at 20:58

## 2024-04-26 RX ADMIN — INSULIN LISPRO 10 UNITS: 100 INJECTION, SOLUTION INTRAVENOUS; SUBCUTANEOUS at 08:16

## 2024-04-26 RX ADMIN — GADOTERIDOL 17 ML: 279.3 INJECTION, SOLUTION INTRAVENOUS at 12:24

## 2024-04-26 RX ADMIN — Medication 10 ML: at 22:01

## 2024-04-26 RX ADMIN — INSULIN GLARGINE 20 UNITS: 100 INJECTION, SOLUTION SUBCUTANEOUS at 20:59

## 2024-04-26 RX ADMIN — INSULIN LISPRO 10 UNITS: 100 INJECTION, SOLUTION INTRAVENOUS; SUBCUTANEOUS at 13:23

## 2024-04-26 RX ADMIN — INSULIN LISPRO 10 UNITS: 100 INJECTION, SOLUTION INTRAVENOUS; SUBCUTANEOUS at 16:58

## 2024-04-26 RX ADMIN — ATORVASTATIN CALCIUM 80 MG: 40 TABLET, FILM COATED ORAL at 20:59

## 2024-04-26 RX ADMIN — INSULIN LISPRO 4 UNITS: 100 INJECTION, SOLUTION INTRAVENOUS; SUBCUTANEOUS at 08:17

## 2024-04-26 RX ADMIN — DOXYCYCLINE 100 MG: 100 INJECTION, POWDER, LYOPHILIZED, FOR SOLUTION INTRAVENOUS at 08:17

## 2024-04-26 RX ADMIN — CLOPIDOGREL BISULFATE 75 MG: 75 TABLET ORAL at 17:00

## 2024-04-26 RX ADMIN — Medication 10 ML: at 08:17

## 2024-04-26 RX ADMIN — INSULIN LISPRO 4 UNITS: 100 INJECTION, SOLUTION INTRAVENOUS; SUBCUTANEOUS at 13:23

## 2024-04-26 RX ADMIN — SULFUR HEXAFLUORIDE 2 ML: KIT at 14:02

## 2024-04-26 RX ADMIN — INSULIN LISPRO 2 UNITS: 100 INJECTION, SOLUTION INTRAVENOUS; SUBCUTANEOUS at 16:59

## 2024-04-26 RX ADMIN — CYANOCOBALAMIN 1000 MCG: 1000 INJECTION, SOLUTION INTRAMUSCULAR; SUBCUTANEOUS at 18:18

## 2024-04-26 NOTE — CONSULTS
Hematology/Oncology Inpatient Consultation    Patient name: Vlad Guerrero  : 1945  MRN: [id number]  Referring Provider: ANEUDY Clemens  Reason for Consultation: Colon cancer    Chief complaint: Right wrist pain    History of present illness:    Vlad Guerrero is a 78 y.o. male with past medical history significant for insulin-dependent diabetes mellitus, hypertension, colon cancer, history of CVA, who presented to [facility] on 2024 with complaints of right wrist pain and erythema.  X-ray of the right wrist showed no bony abnormalities.  He was seen by orthopedic surgery and MRI was completed.  This showed diffuse nonspecific edema throughout the subcutaneous soft tissues suggesting changes of soft tissue cellulitis.  It also showed nonspecific joint effusion, a finding may indicate early signs of developing septic or inflammatory arthropathy.  Orthopedic surgery is still following and patient is on IV ceftriaxone and IV doxycycline with blood cultures pending.    Patient also reports following with physical therapy for ataxia.  Patient had CT head on 2024.  This showed no acute intracranial process.  MRI brain showed area of increased signal on diffusion-weighted imaging with associated abnormal signal on FLAIR and T2 weighted images that is compatible with late acute or subacute area of ischemic changes.  There is extensive white matter changes additionally noted that are compatible with small vessel ischemic disease. Paranasal sinus disease.  Neurology was consulted and stroke workup is in process.  Patient denies slurred speech or facial droop, headache or visual changes, focal weaknesses.    24  Hematology/Oncology was consulted.  He is established with Dr. Kramer for colon cancer.  He was most recently seen 3/18/2024 with stable tumor markers.  Patient with recent abdominal MRI on 3/22/2024.  Showed findings suspicious for malignant disease progression  within the liver.  A 3.1 cm lesion at the junction between anterior right and medial left hepatic segments appears larger than on prior MRI from July 2023, and a new lesion has developed in the left hepatic lobe.    Oncology history (from record):  Vlad Guerrero is 78 y.o. male who presented to our office on 01/06/23 for consultation regarding     1/6/2023: Mr. Guerrero dated the beginning of his present illness to sometime at the end of 2022 when he started to feel fatigued.  He was seen at the [facility] and had laboratory exams that revealed microcytic anemia.  He had evidence of iron deficiency and received intravenous iron in the hospital.  He had upper and lower gastrointestinal endoscopies that demonstrated a cecal tumor that measured 4 to 5 cm and was fungating in appearance.  It was circumferential and was next to the ileocecal valve.  The upper gastrointestinal endoscopy revealed no abnormalities.  On this basis he was on December 15, 2022 he was taken to the hospital and underwent a right hemicolectomy without complications.  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  It corresponded to a grade 2 malignancy that invaded through the muscularis propria into the pericolonic tissues.  Macroscopic tumor perforation or lymphovascular space invasion were not present.  Perineural invasion was not present either.  All margins of excision were negative.  All of 36 lymph nodes submitted to were positive for involvement with malignancy.  The disease was Elzbieta staged as [id number].  Loss of expression of mismatch repair enzymes was not documented.  Mr. Guerrero was discharged to continue treatment as outpatient.  At the time of this visit he was recovering well from the surgery.  His incision had healed completely and he had no drains or suture materials.  He had returned home and was eating well.  He had yet to return to  work.  He had been afebrile and free of nausea.  For the most part his weight had been stable.  After reviewing the records a long conversation, of approximately 1 hour, was had with the patient in regards to options of treatment.  The nature of his disease as well as the likelihood of recurrence were described.  The use of adjuvant chemotherapy to increase the rate of cure after surgery was explained.  Side effects were described in detail.  The need for a port was expressed as well.  A treatment plan was placed. A decision was made to treat him with three months of CAPOX given the characteristics of his disease.      2/6/2023: Received the first cycle of adjuvant chemotherapy without any side effects. On the day of this visit feeling well and without any new symptoms, except a very mild rash, especially on the forearms, but no oral pain. Had stools of diminished consistency for a few days but now resolved. The exam was rather unremarkable, except for a few very light erythematous macules on the forearms.      2/27/2023: Feeling well and without new symptoms.  As active as before.  Eating well and without unintended weight loss.  Stronger and more energetic since the institution of vitamin B12 and iron.  No chest pains and cough.  No abdominal pain or diarrhea.  On exam no changes.  A decision was made to continue with the same treatment.  Laboratory exams were reviewed.  He was to see me again in approximately 4 weeks from this date with new scans.     3/27/2023: Suffered an ischemic stroke.  MRI on March 10, 2023 reported a 7 mm focus of restricted diffusion in the left periventricular white matter of the posterior left frontal lobe.  This was felt to be suspicious for an acute/subacute infarct.  There was also evidence of previous lacunar strokes.  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  There was bilateral internal carotid artery stenosis with 60% stenosis estimated at the right and not greater than 50% at the left.  Chemotherapy was held following discharge.  He was left without any sequela.  At the time of this visit he was entirely asymptomatic.  He was convinced a large part of his problem was that he had suffered from hyperglycemia, consistently for some time.  Indeed his glucose was between 152 mg/dL and 193 mg/dL in the preceding days.  However, he reported at home glucose readings of greater than 400 mg/dL..  On exam there were no changes.  The laboratory exams reported a blood count with normocytic anemia and mild thrombocytopenia.  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     4/14/2023: Completed 3 months of treatment with CAPOX.  On the day of this visit not feeling very well.  Weak and tired.  Not very good appetite although eating well.  Having some dysgeusia.  Afebrile.  No chest pains or cough and no abdominal pain.  Had maintain regular bowel activity.  No edema.  On exam no changes.  A decision was made to stop the chemotherapy.  He was to get intravenous fluids on the day of this visit.  To return to see me in 3 weeks.  Tentatively to have scans in early June 2023.     5/5/2023: Feeling progressively stronger. Eating better and slowly regaining weight. Afebrile. No more nausea. No diarrhea and now with regular bowel activity. On exam alert, conversant and well oriented. No pale or jaundice. No oral lesions and no palpable lymph nodes. Lungs clear and abdomen soft. Minimal edema of the right lower extremity. The laboratory exams revealed persistent anemia with a tendency to  macrocytosis. Hemoglobin and platelets within normal ranges. A decision was made to continue to observe and I asked him to see me in approximately 3 months with new scans.      8/7/2023: Feels as well as at the time of the last visit. Active and returned to work full time. Eating well and no nausea or vomiting. No chest pain or cough and no abdominal pain. On exam no changes, though he had lost a large amount of weight since the previous visit. The imaging studies suggested a new lesion in the liver, as well as several lesions in the spleen of unclear cause. Reviewed the images and the report of the scans. Discussed with him at length and explained the findings. Discussed with him the plans for a MRI. He will see me with results.      8/28/2023: Entirely asymptomatic.  Working without limitations.  Eating well.  Weight stable.  Afebrile.  No pain.  On exam no changes.  Laboratory exams were reviewed and discussed with him.  In spite of the fact things on the scans the Carcinoembryonic antigen has not increased.  However both the CT and the MRI suggest one isolated metastatic deposit in segment 8 of the liver.  Discussed with him the options at this time.  I believe a biopsy is essential and after that he would be treated with chemotherapy following which surgery, if no new lesions have appeared, could be considered.  He may still be curable even in the setting of metastatic disease.  Discussed with him at great length.  Explained the steps.  Sent a communication to Dr. Davis, the patient's surgeon.     9/11/2023: Feels about the same as before.  No new symptoms.  As active as before and working.  Eating well and with good appetite.  No unintended weight loss.  Afebrile.  On exam alert, conversant and in good spirits.  No distress.  No jaundice or pallor.  Lungs clear and heart regular.  Abdomen soft.  The liver is not palpable.  No edema.  Laboratory exams were reviewed.  The liver biopsy report confirms  metastatic colorectal cancer.  This appears to be an isolated metastases.  On this basis treatment with chemotherapy followed by surgery is not ordered.  I have asked him to have a PET scan and discussed the study with him.  Discussed the objectives of the treatment and its requirements.  Placed the treatment plan with 5 fluorouracil, irinotecan and panitumumab and discussed with him.  To begin as soon as possible.     9/25/2023: In the office before the next scheduled time.  He called to report that over the weekend he had had between 5 and 8 defecations of liquid stool.  He took loperamide irregularly and it did not seem to provide much relief.  He was able to eat and drink without any difficulties and was never nauseated.  He was seen in the emergency room on 9/24/2023 and he was not found to have any dehydration or other evidence of complications.  They discharged him.  At the time of this visit feeling better.  As of this time he had not had any liquid defecations.  He had continued to eat and drink fluids without any problems.  He had no chest pains and had not had any dyspnea.  He was also free of abdominal pain.  On exam alert, oriented and conversant.  Seemed well-hydrated and was not jaundiced or pale.  Lungs clear.  Heart regular.  Abdomen soft.  A decision was made to continue with the same plan.  I independently reviewed the images of the PET scan and discussed with him.  It reveals only an abnormal lesion in the liver as identified before.  No suggestion of distant metastatic disease.  Discussed with him the significance of this and explained the possibility of surgery and potentially cure.     11/15/2023: Completed 4 cycles of FOLFIRI/panitumumab.  Experienced the expected side effects, particularly skin rash.  However, the treatment proceeded without major complications.  Today he tells me he has been feeling reasonably well and has continued to work part-time.  He enjoys his job.  He has been eating  "about as much as he had been eating before but he has lost some weight without intention.  He did have persistent diarrhea that responded only to large doses of loperamide.  At this point he no longer has diarrhea.  He has been without chest pains or cough and denies as well abdominal pain.  On exam he still has some erythema on the nasolabial regions on both sides.  The lungs are clear.  The heart is regular and the abdomen soft.  Liver and spleen do not seem to be enlarged.  The laboratory exams were reviewed and discussed with him.  To have another PET scan and consider surgical excision.  He will need to go to [place] for this.     12/11/2023: Feeling reasonably well at this time without any new complaints.  His diarrhea is essentially resolved although he still has soft defecations intermittently.  He is eating well and has a good appetite.  He has had no chest pains and has been without cough.  He denies abdominal pain.  No melena, hematochezia or hematuria.  No peripheral edema.  On exam no changes.  The PET scan reveals complete response with no residual evidence of disease activity.  I have discussed with Dr. Praveen Whittaker in [place] and he requested that a MRI be done prior to deciding on surgery.  Discussed with  Cesar.  Explained the plans.  He is to have the MRI and will see me with the results.     12/27/2023: Without new symptoms.  Has not had the MRI because of scheduling problems.  He feels lightheaded at times and \"I am not as sure footed as I was before\".  He has had no falls and he continues to work full-time.  He has been eating well and has regained some of the weight that he had lost.  He has been afebrile.  No chest pains or cough.  No abdominal pain or diarrhea and no dysuria.  No skin rash.  On exam no changes.  The laboratory exams were reviewed and discussed with him.  To reschedule the MRI for the next couple of weeks.  Discussed with him.     3/18/2024: Feeling very well. His " appetite is good and he has gained weight. He has been working without any difficulties. He denies chest pain or cough. No abdominal pain or diarrhea and no dysuria. On exam alert and conversant. In good spirits and in no distress. No jaundice. No oral lesions and respirations not labored. The lungs are clear and the heart is regular. Abdomen is soft and not tender. The laboratory exams reveal a blood count in the normal ranges. He persists with hyperglycemia. The alkaline phosphatase has risen some in the recent past. He is to have the MRI of the liver. He will see me with the results.     He/She  has a past medical history of Anemia, Colon cancer (2022), Diabetes mellitus, Hyperlipidemia, and Hypertension.    PCP: Vlad Moreland MD    History:  Past Medical History:   Diagnosis Date    Anemia     Colon cancer 2022    colon    Diabetes mellitus     Hyperlipidemia     Hypertension    ,   Past Surgical History:   Procedure Laterality Date    APPENDECTOMY      CARDIAC CATHETERIZATION      COLON RESECTION N/A 12/15/2022    Procedure: COLON RESECTION RIGHT;  Surgeon: Percy Davis MD;  Location: [facility] MAIN OR;  Service: General;  Laterality: N/A;    COLONOSCOPY N/A 11/11/2022    Procedure: COLONOSCOPY WITH BIOPSY AND POLYPECTOMY;  Surgeon: Billy Julien MD;  Location: [facility] ENDOSCOPY;  Service: Gastroenterology;  Laterality: N/A;  Impression:  1.  Large 4-5cm fulgurating circumferential ulcerated mass in the very proximal part of the ascending colon next to ileocecal valve multiple biopsies were performed.  This is highly concerning for colon malignancy.  2.  2 polyp rem    ENDOSCOPY N/A 11/11/2022    Procedure: ESOPHAGOGASTRODUODENOSCOPY with biopsy X1;  Surgeon: Billy Julien MD;  Location: [facility] ENDOSCOPY;  Service: Gastroenterology;  Laterality: N/A;  5.  Upper endoscopy lamination unremarkable.       PORTACATH PLACEMENT Right 1/12/2023    Procedure: INSERTION OF PORTACATH;  Surgeon: Susan  Percy Buckner MD;  Location: [facility] MAIN OR;  Service: General;  Laterality: Right;    TONSILLECTOMY     ,   Family History   Problem Relation Age of Onset    Pneumonia Mother 94        SARS-CoV2    Colon cancer Father 62    Heart disease Sister    ,   Social History     Tobacco Use    Smoking status: Former     Current packs/day: 0.00     Average packs/day: 1 pack/day for 2.0 years (2.0 ttl pk-yrs)     Types: Cigarettes     Start date:      Quit date:      Years since quittin.3    Smokeless tobacco: Never   Vaping Use    Vaping status: Never Used   Substance Use Topics    Alcohol use: Not Currently    Drug use: Never   ,   Medications Prior to Admission   Medication Sig Dispense Refill Last Dose    insulin glargine (LANTUS, SEMGLEE) 100 UNIT/ML injection Inject 20 Units under the skin into the appropriate area as directed Every Night for 30 days. 6 mL 0     Insulin Lispro (humaLOG) 100 UNIT/ML injection Inject 10 Units under the skin into the appropriate area as directed 3 (Three) Times a Day Before Meals.       Continuous Blood Gluc Sensor (Dexcom G7 Sensor) misc Use 1 each Every 10 (Ten) Days. Dx: E11.65 3 each 2     Insulin Pen Needle (Pen Needles) 32G X 4 MM misc Use 1 each 4 (Four) Times a Day Before Meals & at Bedtime. 200 each 2    , Scheduled Meds:  aspirin, 81 mg, Oral, Daily   Or  aspirin, 300 mg, Rectal, Daily  atorvastatin, 80 mg, Oral, Nightly  cefTRIAXone, 2,000 mg, Intravenous, Q24H  doxycycline, 100 mg, Intravenous, Q12H  insulin glargine, 20 Units, Subcutaneous, Nightly  insulin lispro, 10 Units, Subcutaneous, TID AC  insulin lispro, 2-9 Units, Subcutaneous, 4x Daily AC & at Bedtime  senna-docusate sodium, 2 tablet, Oral, BID  sodium chloride, 10 mL, Intravenous, Q12H  sodium chloride, 10 mL, Intravenous, Q12H    , Continuous Infusions:   , PRN Meds:    acetaminophen    senna-docusate sodium **AND** polyethylene glycol **AND** bisacodyl **AND** bisacodyl    dextrose    dextrose     "glucagon (human recombinant)    melatonin    nitroglycerin    ondansetron    [COMPLETED] Insert Peripheral IV **AND** sodium chloride    sodium chloride    sodium chloride    sodium chloride    sodium chloride   Allergies:  Penicillins    Subjective     ROS:  Review of Systems     Objective   Vital Signs:   /63 (BP Location: Left arm, Patient Position: Lying)   Pulse 79   Temp 98 °F (36.7 °C) (Axillary)   Resp 12   Ht 185.4 cm (73\")   Wt 82.6 kg (182 lb 1.6 oz)   SpO2 95%   BMI 24.03 kg/m²     Physical Exam: (performed by MD)  Physical Exam    Results Review:  Lab Results (last 48 hours)       Procedure Component Value Units Date/Time    POC Glucose 4x Daily Before Meals & at Bedtime [[id number]]  (Abnormal) Collected: 04/26/24 0725    Specimen: Blood Updated: 04/26/24 0727     Glucose 209 mg/dL      Comment: Serial Number: [id number]:  [id number]       Lipid Panel [[id number]] Collected: 04/26/24 0435    Specimen: Blood Updated: 04/26/24 0550     Total Cholesterol 153 mg/dL      Triglycerides 77 mg/dL      HDL Cholesterol 55 mg/dL      LDL Cholesterol  83 mg/dL      VLDL Cholesterol 15 mg/dL      LDL/HDL Ratio 1.50    Narrative:      Cholesterol Reference Ranges  (U.S. Department of Health and Human Services ATP III Classifications)    Desirable          <200 mg/dL  Borderline High    200-239 mg/dL  High Risk          >240 mg/dL      Triglyceride Reference Ranges  (U.S. Department of Health and Human Services ATP III Classifications)    Normal           <150 mg/dL  Borderline High  150-199 mg/dL  High             200-499 mg/dL  Very High        >500 mg/dL    HDL Reference Ranges  (U.S. Department of Health and Human Services ATP III Classifications)    Low     <40 mg/dl (major risk factor for CHD)  High    >60 mg/dl ('negative' risk factor for CHD)        LDL Reference Ranges  (U.S. Department of Health and Human Services ATP III Classifications)    Optimal          <100 mg/dL  Near Optimal     " 100-129 mg/dL  Borderline High  130-159 mg/dL  High             160-189 mg/dL  Very High        >189 mg/dL    Hemoglobin A1c [[id number]]  (Abnormal) Collected: 04/26/24 0435    Specimen: Blood Updated: 04/26/24 0548     Hemoglobin A1C 12.90 %     POC Glucose Once [[id number]]  (Abnormal) Collected: 04/25/24 2113    Specimen: Blood Updated: 04/25/24 2114     Glucose 311 mg/dL      Comment: Serial Number: [id number]:  [id number]       Blood Culture - Blood, Arm, Left [[id number]]  (Normal) Collected: 04/24/24 2048    Specimen: Blood from Arm, Left Updated: 04/25/24 2100     Blood Culture No growth at 24 hours    Blood Culture - Blood, Arm, Right [[id number]]  (Normal) Collected: 04/24/24 2049    Specimen: Blood from Arm, Right Updated: 04/25/24 2100     Blood Culture No growth at 24 hours    POC Glucose Once [[id number]]  (Abnormal) Collected: 04/25/24 1757    Specimen: Blood Updated: 04/25/24 1758     Glucose 188 mg/dL      Comment: Serial Number: [id number]:  [id number]       POC Glucose Once [[id number]]  (Abnormal) Collected: 04/25/24 1108    Specimen: Blood Updated: 04/25/24 1110     Glucose 224 mg/dL      Comment: Serial Number: [id number]:  [id number]       POC Glucose Once [[id number]]  (Abnormal) Collected: 04/25/24 0718    Specimen: Blood Updated: 04/25/24 0720     Glucose 150 mg/dL      Comment: Serial Number: [id number]:  [id number]       Basic Metabolic Panel [[id number]]  (Abnormal) Collected: 04/25/24 0314    Specimen: Blood from Arm, Left Updated: 04/25/24 0412     Glucose 142 mg/dL      BUN 24 mg/dL      Creatinine 0.96 mg/dL      Sodium 138 mmol/L      Potassium 3.8 mmol/L      Chloride 100 mmol/L      CO2 29.0 mmol/L      Calcium 8.5 mg/dL      BUN/Creatinine Ratio 25.0     Anion Gap 9.0 mmol/L      eGFR 81.9 mL/min/1.73     Narrative:      GFR Normal >60  Chronic Kidney Disease <60  Kidney Failure <15    The GFR formula is only valid for adults with stable renal function between  ages 18 and 70.    CBC Auto Differential [[id number]]  (Abnormal) Collected: 04/25/24 0314    Specimen: Blood from Arm, Left Updated: 04/25/24 0344     WBC 7.40 10*3/mm3      RBC 5.17 10*6/mm3      Hemoglobin 13.7 g/dL      Hematocrit 43.9 %      MCV 84.9 fL      MCH 26.5 pg      MCHC 31.2 g/dL      RDW 15.2 %      RDW-SD 47.3 fl      MPV 9.7 fL      Platelets 148 10*3/mm3      Neutrophil % 71.7 %      Lymphocyte % 19.5 %      Monocyte % 8.0 %      Eosinophil % 0.4 %      Basophil % 0.1 %      Immature Grans % 0.3 %      Neutrophils, Absolute 5.31 10*3/mm3      Lymphocytes, Absolute 1.44 10*3/mm3      Monocytes, Absolute 0.59 10*3/mm3      Eosinophils, Absolute 0.03 10*3/mm3      Basophils, Absolute 0.01 10*3/mm3      Immature Grans, Absolute 0.02 10*3/mm3      nRBC 0.0 /100 WBC     POC Glucose Finger 4x Daily Before Meals & at Bedtime [[id number]]  (Abnormal) Collected: 04/24/24 2200    Specimen: Blood from Finger Updated: 04/24/24 2202     Glucose 121 mg/dL      Comment: Serial Number: [id number]:  [id number]       C-reactive Protein [[id number]]  (Abnormal) Collected: 04/24/24 1857    Specimen: Blood Updated: 04/24/24 1945     C-Reactive Protein 2.98 mg/dL     Uric Acid [[id number]]  (Normal) Collected: 04/24/24 1857    Specimen: Blood Updated: 04/24/24 1945     Uric Acid 4.6 mg/dL     Sedimentation Rate [[id number]]  (Abnormal) Collected: 04/24/24 1857    Specimen: Blood Updated: 04/24/24 1929     Sed Rate 32 mm/hr     Basic Metabolic Panel [[id number]]  (Abnormal) Collected: 04/24/24 1857    Specimen: Blood Updated: 04/24/24 1929     Glucose 135 mg/dL      BUN 25 mg/dL      Creatinine 1.08 mg/dL      Sodium 136 mmol/L      Potassium 3.7 mmol/L      Comment: Slight hemolysis detected by analyzer. Result may be falsely elevated.        Chloride 98 mmol/L      CO2 30.0 mmol/L      Calcium 9.0 mg/dL      BUN/Creatinine Ratio 23.1     Anion Gap 8.0 mmol/L      eGFR 71.1 mL/min/1.73     Narrative:      GFR Normal  >60  Chronic Kidney Disease <60  Kidney Failure <15    The GFR formula is only valid for adults with stable renal function between ages 18 and 70.    Protime-INR [[id number]]  (Normal) Collected: 04/24/24 1857    Specimen: Blood Updated: 04/24/24 1924     Protime 11.5 Seconds      INR 1.06    aPTT [[id number]]  (Abnormal) Collected: 04/24/24 1857    Specimen: Blood Updated: 04/24/24 1924     PTT 26.3 seconds     CBC & Differential [[id number]]  (Abnormal) Collected: 04/24/24 1857    Specimen: Blood Updated: 04/24/24 1908    Narrative:      The following orders were created for panel order CBC & Differential.  Procedure                               Abnormality         Status                     ---------                               -----------         ------                     CBC Auto Differential[[id number]]        Abnormal            Final result                 Please view results for these tests on the individual orders.    CBC Auto Differential [[id number]]  (Abnormal) Collected: 04/24/24 1857    Specimen: Blood Updated: 04/24/24 1908     WBC 9.97 10*3/mm3      RBC 5.10 10*6/mm3      Hemoglobin 13.7 g/dL      Hematocrit 43.2 %      MCV 84.7 fL      MCH 26.9 pg      MCHC 31.7 g/dL      RDW 15.1 %      RDW-SD 47.2 fl      MPV 9.4 fL      Platelets 156 10*3/mm3      Neutrophil % 78.2 %      Lymphocyte % 13.5 %      Monocyte % 7.6 %      Eosinophil % 0.3 %      Basophil % 0.2 %      Immature Grans % 0.2 %      Neutrophils, Absolute 7.79 10*3/mm3      Lymphocytes, Absolute 1.35 10*3/mm3      Monocytes, Absolute 0.76 10*3/mm3      Eosinophils, Absolute 0.03 10*3/mm3      Basophils, Absolute 0.02 10*3/mm3      Immature Grans, Absolute 0.02 10*3/mm3      nRBC 0.0 /100 WBC     Extra Tubes [[id number]] Collected: 04/24/24 1857    Specimen: Blood, Venous Line Updated: 04/24/24 1900    Narrative:      The following orders were created for panel order Extra Tubes.  Procedure                               Abnormality          Status                     ---------                               -----------         ------                     Gold Top - SST[[id number]]                                   Final result                 Please view results for these tests on the individual orders.    [facility] [[id number]] Collected: 04/24/24 1857    Specimen: Blood Updated: 04/24/24 1900     Extra Tube Hold for add-ons.     Comment: Auto resulted.                Pending Results: Blood cultures    Imaging Reviewed:   MRI Brain Without Contrast    Result Date: 4/25/2024  Impression: 1. Area of increased signal on diffusion weighted imaging with associated abnormal signal on FLAIR and T2-weighted images is compatible with a late acute or subacute area of ischemic change. 2. Extensive white matter changes additionally noted compatible small vessel ischemic disease in this age group. 3. Paranasal sinus disease Electronically Signed: Pineda Vargas MD  4/25/2024 8:23 PM EDT  Workstation ID: [id number]    CT Head Without Contrast    Result Date: 4/25/2024  Impression: 1.No acute intracranial process is identified. 2.Likely stable chronic findings. Electronically Signed: Mario Nassar MD  4/25/2024 4:24 PM EDT  Workstation ID: [id number]    MRI Wrist Right Without Contrast    Result Date: 4/25/2024  Impression: 1.Diffuse nonspecific edema throughout the subcutaneous soft tissues suggesting changes of soft tissue cellulitis. 2.No definitive evidence for abscess. 3.Increased fluid is noted within the tendon sheaths of the flexor compartment as well as the extensor compartments. The findings are nonspecific and may be related to tendinopathy. Spread of infection along the tendons and pyogenic tenosynovitis could potentially have this appearance. 4.Nonspecific joint effusion throughout the carpus. This finding may indicate early signs of developing septic or inflammatory arthropathy. A reactive joint effusion related to osteoarthritis could also have this  appearance. 5.No definitive signs of osteomyelitis. 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  MMR proficient.  He completed CapeOx adjuvantly for 3 months in April 2023.  Isolated metastatic lesion in segment 8 of the liver.  He received 4 cycles of FOLFIRI with panitumumab with the expected side effects and no complications.  Most recent MRI with concerns for progressive disease within the liver.  Right wrist pain and cellulitis: Blood cultures with no growth at 24 hours.  Patient started on Rocephin and doxycycline.  Orthopedic surgery following  History of ischemic stroke.  Neurology following    PLAN  As above  Further recommendations per Dr. Kramer    Electronically signed by Analisa Moscoso PA-C, 04/26/24    Mr. Guerrero has been a patient of mine for some time now. He has metastatic colon cancer and intermittently has had surgery and chemotherapy. He was admitted with cellulitis of the right upper extremity, which has improved significantly with the treatment he is receiving. He reports he most likely will be discharged tomorrow. On exam he is alert and conversant and in no distress. No jaundice. Respirations not labored and lungs clear. Heart regular and no edema. Reviewed the most recent MRI of the abdomen and discussed with him. There is evidence of progressive malignancy and treatment with chemotherapy can be re-instituted; in the recent past his disease has responded to a combination including irinotecan. Discussed with him at length.   I reviewed the records with Ms. Danis MARTINEZ and concur with her note. I formulated the analysis and the plans.     Don Kramer MD on  4/26/2024 at 17:37

## 2024-04-26 NOTE — PROGRESS NOTES
[facility]    Patient Name: Vlad Guerrero  : 1945  MRN: [id number]  Primary Care Physician:  Vlad Moreland MD  Referring Physician: Vlad Moreland MD  Date of admission: 2024  Date and Time of Care: 24 at 2000     Inpatient Hospitalist Consult  Consult performed by: Alysa Shepherd APRN  Consult ordered by: Carlos Eduardo Ravi PA-C                 Reason for Consult/ Chief Complaint: medical management     Consult Requested By: Vladislav GREENE     Subjective:      History of Present Illness: 78-year-old male with a past medical history of insulin-dependent diabetes mellitus, hypertension, colon cancer with possible mets to the liver followed by oncology and chemotherapy, presented to the emergency department yesterday complaining of right wrist pain and erythema.  X-ray of right wrist showed no bony abnormality WBC was not elevated.  He was seen by orthopedics.  MRI of right wrist per radiology showed diffuse nonspecific edema throughout the subcutaneous soft tissues suggesting changes of soft tissue cellulitis it also showed nonspecific joint effusion finding might indicate early signs of developing septic or inflammatory arthropathy.  Orthopedics is still following.  He is being seen by physical therapy which noticed some ataxia.  Review of records shows he was admitted here in 2023 with a possible CVA and MRI noted to have a 7 mm focus of restricted diffusion in the left periventricular white matter of the posterior left frontal lobe suspicious for an acute/subacute infarct.  Neurology was consulted and l management was advised.  Today, CT head showed no acute intracranial process and showed stable findings per radiology.  MRI brain per radiology today shows area of increased signal on diffusion-weighted imaging with associated abnormal signal on FLAIR and T2 weighted images compatible with a late acute or subacute area of ischemic change along  with extensive white matter changes additionally noted compatible small vessel ischemic disease in this age group..  Neurology has been consulted.  No longer observation patient has been admitted to inpatient status.  Stroke workup in process and orthopedic still following patient.  He is on IV ceftriaxone and IV doxycycline with blood cultures pending.  Of note, he saw his oncologist about a month ago and had an MRI of the abdomen done 3/22/2024 which per radiology shows findings suspicious for malignant disease progression within the liver and a new lesion in the left hepatic lobe.  Patient has not seen oncology for follow-up.  Given results, hematology oncology consulted.  He is currently awake and alert has no slurred speech or facial droop he denies any headache or visual changes and no focal weaknesses.  He reports his right hand feels much better and does not hurt any longer.  He has no complaints.  4/26  Patient admitted with right wrist swelling and redness and some cellulitis  Patient with recently diagnosed liver mass getting chemo and following up with oncology  His right wrist seems to be getting better today  He is able to bend the wrist  Awaiting neuro evaluation as MRI showed areas of increased signal and abnormality images compatible with acute or subacute ischemic change with extensive white matter change  Anticipate discharge home in the morning  EKG with left bundle branch block  Patient on IV Rocephin and doxycycline  Will check echocardiogram due to the presence of left bundle branch block       Personal History:      Medical History        Past Medical History:   Diagnosis Date    Anemia      Colon cancer 2022     colon    Diabetes mellitus      Hyperlipidemia      Hypertension              Surgical History         Past Surgical History:   Procedure Laterality Date    APPENDECTOMY        CARDIAC CATHETERIZATION        COLON RESECTION N/A 12/15/2022     Procedure: COLON RESECTION RIGHT;   Surgeon: Percy Davis MD;  Location: [facility] MAIN OR;  Service: General;  Laterality: N/A;    COLONOSCOPY N/A 11/11/2022     Procedure: COLONOSCOPY WITH BIOPSY AND POLYPECTOMY;  Surgeon: Billy Julien MD;  Location: [facility] ENDOSCOPY;  Service: Gastroenterology;  Laterality: N/A;  Impression:  1.  Large 4-5cm fulgurating circumferential ulcerated mass in the very proximal part of the ascending colon next to ileocecal valve multiple biopsies were performed.  This is highly concerning for colon malignancy.  2.  2 polyp rem    ENDOSCOPY N/A 11/11/2022     Procedure: ESOPHAGOGASTRODUODENOSCOPY with biopsy X1;  Surgeon: Billy Julien MD;  Location: [facility] ENDOSCOPY;  Service: Gastroenterology;  Laterality: N/A;  5.  Upper endoscopy lamination unremarkable.       PORTACATH PLACEMENT Right 1/12/2023     Procedure: INSERTION OF PORTACATH;  Surgeon: Percy Davis MD;  Location: [facility] MAIN OR;  Service: General;  Laterality: Right;    TONSILLECTOMY                Family History: family history includes Colon cancer (age of onset: 62) in his father; Heart disease in his sister; Pneumonia (age of onset: 94) in his mother. Otherwise pertinent FHx was reviewed and not pertinent to current issue.     Social History:  reports that he quit smoking about 58 years ago. His smoking use included cigarettes. He started smoking about 60 years ago. He has a 2 pack-year smoking history. He has never used smokeless tobacco. He reports that he does not currently use alcohol. He reports that he does not use drugs.     Home Medications:   Dexcom G7 Sensor, Insulin Lispro, Pen Needles, and insulin glargine     Allergies:  Allergies        Allergies   Allergen Reactions    Penicillins Rash               Objective:      Vital Signs  Temp:  [97.2 °F (36.2 °C)-97.9 °F (36.6 °C)] 97.7 °F (36.5 °C)  Heart Rate:  [74-83] 81  Resp:  [13-18] 16  BP: (117-154)/() 154/82   Body mass index is 24.03 kg/m².     Physical  Exam  Physical Exam     Scheduled Meds     Scheduled Medication   aspirin, 81 mg, Oral, Daily   Or  aspirin, 300 mg, Rectal, Daily  atorvastatin, 80 mg, Oral, Nightly  cefTRIAXone, 2,000 mg, Intravenous, Q24H  doxycycline, 100 mg, Intravenous, Q12H  insulin glargine, 20 Units, Subcutaneous, Nightly  insulin lispro, 10 Units, Subcutaneous, TID AC  insulin lispro, 2-9 Units, Subcutaneous, 4x Daily AC & at Bedtime  senna-docusate sodium, 2 tablet, Oral, BID  sodium chloride, 10 mL, Intravenous, Q12H  sodium chloride, 10 mL, Intravenous, Q12H         PRN Meds     PRN Medication     acetaminophen    senna-docusate sodium **AND** polyethylene glycol **AND** bisacodyl **AND** bisacodyl    dextrose    dextrose    glucagon (human recombinant)    melatonin    nitroglycerin    ondansetron    [COMPLETED] Insert Peripheral IV **AND** sodium chloride    sodium chloride    sodium chloride    sodium chloride    sodium chloride      Infusions  Infusion Medications                       Diagnostic Data         Results from last 7 days   Lab Units 04/25/24  0314   WBC 10*3/mm3 7.40   HEMOGLOBIN g/dL 13.7   HEMATOCRIT % 43.9   PLATELETS 10*3/mm3 148   GLUCOSE mg/dL 142*   CREATININE mg/dL 0.96   BUN mg/dL 24*   SODIUM mmol/L 138   POTASSIUM mmol/L 3.8   ANION GAP mmol/L 9.0         MRI Brain Without Contrast     Result Date: 4/25/2024  Impression: 1. Area of increased signal on diffusion weighted imaging with associated abnormal signal on FLAIR and T2-weighted images is compatible with a late acute or subacute area of ischemic change. 2. Extensive white matter changes additionally noted compatible small vessel ischemic disease in this age group. 3. Paranasal sinus disease Electronically Signed: Pineda Vargas MD  4/25/2024 8:23 PM EDT  Workstation ID: [id number]     CT Head Without Contrast     Result Date: 4/25/2024  Impression: 1.No acute intracranial process is identified. 2.Likely stable chronic findings. Electronically Signed:  Mario Nassar MD  4/25/2024 4:24 PM EDT  Workstation ID: [id number]     MRI Wrist Right Without Contrast     Result Date: 4/25/2024  Impression: 1.Diffuse nonspecific edema throughout the subcutaneous soft tissues suggesting changes of soft tissue cellulitis. 2.No definitive evidence for abscess. 3.Increased fluid is noted within the tendon sheaths of the flexor compartment as well as the extensor compartments. The findings are nonspecific and may be related to tendinopathy. Spread of infection along the tendons and pyogenic tenosynovitis could potentially have this appearance. 4.Nonspecific joint effusion throughout the carpus. This finding may indicate early signs of developing septic or inflammatory arthropathy. A reactive joint effusion related to osteoarthritis could also have this appearance. 5.No definitive signs of osteomyelitis. Electronically Signed: Domenico Escalante MD  4/25/2024 10:43 AM EDT  Workstation ID: [id number]     XR Wrist 3+ View Right     Result Date: 4/24/2024  Impression: No acute bony abnormality Electronically Signed: Severino Mace  4/24/2024 7:11 PM EDT  Workstation ID: [id number]         I reviewed the patient's new clinical results.     Assessment/Plan:      Active and Resolved Problems         Active Hospital Problems     Diagnosis   POA    **Cellulitis [L03.90]   Yes       Priority: Medium    Right hand pain [M79.641]   Yes       Priority: High    Ataxia [R27.0]   Yes       Priority: Medium    Metastases to the liver [C78.7]   Yes       Priority: Medium    Malignant neoplasm of ascending colon [C18.2]   Yes    Primary hypertension [I10]   Yes    Mixed hyperlipidemia [E78.2]   Yes    Type 2 diabetes mellitus [E11.9]   Yes       Resolved Hospital Problems   No resolved problems to display.      Right hand pain, resolved edema decreased, per radiology report of MRI soft tissue cellulitis versus possible early septic or inflammatory arthropathy.  On IV ceftriaxone and IV doxycycline culture  still pending orthopedics following     Ataxia, previous history of CVA in 2023, NIH 0, MRI brain today showed area of increased signal on diffusion-weighted imaging with associated abnormal signal on FLAIR and T2 weighted images compatible with late acute or subacute area of ischemic change.  Neurology following on statin and aspirin stroke orders in place     Colon cancer with metastasis to liver, no result MRI abdomen 3/22/2024 shows findings suspicious for malignant disease progression within the liver, hematology oncology consulted     Primary hypertension, no home meds controlled, monitor BP Will add as needed antihypertensives according to stroke protocol     Mixed hyperlipidemia on home statin     Diabetes mellitus type 2, fluctuates has been 1 42-3 11, consistent carb heart healthy diet, on home Lantus and Humalog, add SSI as [facility] diabetes educator spoke with patient        DVT prophylaxis:  Mechanical DVT prophylaxis orders are present.           Code status is       Code Status and Medical Interventions:   Ordered at: 04/24/24 2110     Level Of Support Discussed With:     Patient     Code Status (Patient has no pulse and is not breathing):     CPR (Attempt to Resuscitate)     Medical Interventions (Patient has pulse or is breathing):     Full Support         Plan for disposition: in I day      Time: 30 minutes

## 2024-04-26 NOTE — THERAPY EVALUATION
Patient Name: Vlad Guerrero  : 1945    MRN: [id number]                              Today's Date: 2024       Admit Date: 2024    Visit Dx:     ICD-10-CM ICD-9-CM   1. 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    Cellulitis    Right hand pain    Ataxia     Past Medical History:   Diagnosis Date    Anemia     Colon cancer     colon    Diabetes mellitus     Hyperlipidemia     Hypertension      Past Surgical History:   Procedure Laterality Date    APPENDECTOMY      CARDIAC CATHETERIZATION      COLON RESECTION N/A 12/15/2022    Procedure: COLON RESECTION RIGHT;  Surgeon: Percy Davis MD;  Location: [facility] MAIN OR;  Service: General;  Laterality: N/A;    COLONOSCOPY N/A 2022    Procedure: COLONOSCOPY WITH BIOPSY AND POLYPECTOMY;  Surgeon: Billy Julien MD;  Location: [facility] ENDOSCOPY;  Service: Gastroenterology;  Laterality: N/A;  Impression:  1.  Large 4-5cm fulgurating circumferential ulcerated mass in the very proximal part of the ascending colon next to ileocecal valve multiple biopsies were performed.  This is highly concerning for colon malignancy.  2.  2 polyp rem    ENDOSCOPY N/A 2022    Procedure: ESOPHAGOGASTRODUODENOSCOPY with biopsy X1;  Surgeon: Billy Julien MD;  Location: [facility] ENDOSCOPY;  Service: Gastroenterology;  Laterality: N/A;  5.  Upper endoscopy lamination unremarkable.       PORTACATH PLACEMENT Right 2023    Procedure: INSERTION OF PORTACATH;  Surgeon: Percy Davis MD;  Location: [facility] MAIN OR;  Service: General;  Laterality: Right;    TONSILLECTOMY        General Information       Row Name 24 1127          OT Time and  Intention    Document Type evaluation  -SP     Mode of Treatment occupational therapy  -SP       Row Name 04/26/24 1127          General Information    Patient Profile Reviewed yes  -SP     Prior Level of Function independent:;all household mobility;driving;home management;ADL's;community mobility  -SP     Existing Precautions/Restrictions no known precautions/restrictions  -SP     Barriers to Rehab medically complex  -SP       Row Name 04/26/24 1127          Occupational Profile    Reason for Services/Referral (Occupational Profile) Pt is a 77 y/o male admitted to [facility] on 4/24/24 with c/o erythema/bruising in R wrist, hospital dx of cellulitis. CTH (-), MRI brain w/o contrast reveals extensive white matter changes, paranasal sinus disease, and area of increased signal on diffusion weighted imaging with associated abnormal signal on FLAIR and T2. PMHx of anemia, CVA, colon cancer, diabetes hyperlipidemia and HTN.  -SP       Row Name 04/26/24 1127          Living Environment    People in Home alone  -SP       Row Name 04/26/24 1127          Home Main Entrance    Number of Stairs, Main Entrance none  -SP       Row Name 04/26/24 1127          Stairs Within Home, Primary    Number of Stairs, Within Home, Primary none  -SP       Row Name 04/26/24 1127          Cognition    Orientation Status (Cognition) oriented x 4  -SP               User Key  (r) = Recorded By, (t) = Taken By, (c) = Cosigned By      Initials Name Provider Type    SP Jonathan Mata OT Occupational Therapist                     Mobility/ADL's       Row Name 04/26/24 1131          Bed Mobility    All Activities, [place] (Bed Mobility) not tested;other (see comments)  Pt sitting in chair upon arrival.  -SP       Row Name 04/26/24 1131          Transfers    Transfers sit-stand transfer;stand-sit transfer  -SP       Row Name 04/26/24 1131          Sit-Stand Transfer    Sit-Stand [place] (Transfers) standby assist  -SP       Row Name 04/26/24 1131           Stand-Sit Transfer    Stand-Sit [place] (Transfers) standby assist  -SP       Row Name 04/26/24 1131          Functional Mobility    Functional Mobility- Ind. Level contact guard assist  CGA, gait belt, and non-skid socks applied for safety  -SP     Patient was able to Ambulate yes  -SP       Row Name 04/26/24 1131          Activities of Daily Living    BADL Assessment/Intervention lower body dressing  -[facility] Name 04/26/24 1131          Lower Body Dressing Assessment/Training    [place] Level (Lower Body Dressing) don;socks;independent  -SP     Position (Lower Body Dressing) unsupported sitting  in chair  -SP               User Key  (r) = Recorded By, (t) = Taken By, (c) = Cosigned By      Initials Name Provider Type    SP Jonathan Mata OT Occupational Therapist                   Obj/Interventions       Row Name 04/26/24 1134          Sensory Assessment (Somatosensory)    Sensory Assessment (Somatosensory) UE sensation intact  -SP       [facility] Name 04/26/24 1134          Vision Assessment/Intervention    Visual Impairment/Limitations corrective lenses full-time  -[facility] Name 04/26/24 1134          Range of Motion Comprehensive    General Range of Motion bilateral upper extremity ROM WNL  -SP       [facility] Name 04/26/24 1134          Strength Comprehensive (MMT)    General Manual Muscle Testing (MMT) Assessment no strength deficits identified  -SP       [facility] Name 04/26/24 1134          Balance    Balance Assessment sitting static balance;sitting dynamic balance;sit to stand dynamic balance;standing static balance;standing dynamic balance  -SP     Static Sitting Balance independent  -SP     Dynamic Sitting Balance independent  -SP     Position, Sitting Balance unsupported  -SP     Sit to Stand Dynamic Balance standby assist  -SP     Static Standing Balance standby assist  -SP     Dynamic Standing Balance standby assist  -SP     Position/Device Used, Standing Balance unsupported  -SP                User Key  (r) = Recorded By, (t) = Taken By, (c) = Cosigned By      Initials Name Provider Type    SP Jonathan Mata, CELE Occupational Therapist                   Goals/Plan    No documentation.                  Clinical Impression       Row Name 04/26/24 1135          Pain Assessment    Pretreatment Pain Rating 0/10 - no pain  -SP     Posttreatment Pain Rating 0/10 - no pain  -SP     Pre/Posttreatment Pain Comment Pt reports improvement in R wrist, able to flex/extend w/o pain this date.  -SP       Row Name 04/26/24 1135          Plan of Care Review    Plan of Care Reviewed With patient  -SP     Outcome Evaluation Pt is a 79 y/o male admitted to [facility] on 4/24/24 with c/o erythema/bruising in R wrist, hospital dx of cellulitis. CTH (-), MRI brain w/o contrast reveals extensive white matter changes, paranasal sinus disease, and area of increased signal on diffusion weighted imaging with associated abnormal signal on FLAIR and T2. PMHx of anemia, CVA, colon cancer, diabetes hyperlipidemia and hypertension. PLOF pt reprots living alone in [facility] with 0 JAIDA. Pt A&O x4 with no reports of pain, presenting on RA at 100%. Pt sitting in chair upon arrival with ROM and MMT WNLs. Pt IND donned socks sitting unsupported and ambulated household distances. OT will complete orders at this time with pt functioning near his baseline. New orders will need to be placed if functional decline is noted. OT recommends home with assist as needed.  -SP       Row Name 04/26/24 1135          Therapy Assessment/Plan (OT)    Criteria for Skilled Therapeutic Interventions Met (OT) no  -SP       Row Name 04/26/24 1135          Therapy Plan Review/Discharge Plan (OT)    Anticipated Discharge Disposition (OT) home with assist  -SP       Row Name 04/26/24 1135          Vital Signs    O2 Delivery Pre Treatment room air  -SP     O2 Delivery Intra Treatment room air  -SP     O2 Delivery Post Treatment room air  -SP     Pre Patient Position Sitting  -SP      Intra Patient Position Standing  -SP     Post Patient Position Sitting  -SP       Row Name 04/26/24 1135          Positioning and Restraints    Pre-Treatment Position sitting in chair/recliner  -SP     Post Treatment Position chair  -SP     In Chair call light within reach;encouraged to call for assist;exit alarm on;with other staff  -SP               User Key  (r) = Recorded By, (t) = Taken By, (c) = Cosigned By      Initials Name Provider Type    SP Jonathan Mata OT Occupational Therapist                   Outcome Measures       Row Name 04/26/24 1140          How much help from another is currently needed...    Putting on and taking off regular lower body clothing? 4  -SP     Bathing (including washing, rinsing, and drying) 4  -SP     Toileting (which includes using toilet bed pan or urinal) 4  -SP     Putting on and taking off regular upper body clothing 4  -SP     Taking care of personal grooming (such as brushing teeth) 4  -SP     Eating meals 4  -SP     AM-PAC 6 Clicks Score (OT) 24  -SP       Row Name 04/26/24 0800 04/26/24 0300       How much help from another person do you currently need...    Turning from your back to your side while in flat bed without using bedrails? 4  -CM 4  -PM    Moving from lying on back to sitting on the side of a flat bed without bedrails? 4  -CM 4  -PM    Moving to and from a bed to a chair (including a wheelchair)? 4  -CM 4  -PM    Standing up from a chair using your arms (e.g., wheelchair, bedside chair)? 4  -CM 4  -PM    Climbing 3-5 steps with a railing? 4  -CM 4  -PM    To walk in hospital room? 4  -CM 4  -PM    AM-PAC 6 Clicks Score (PT) 24  -CM 24  -PM    Highest Level of Mobility Goal 8 --> Walked 250 feet or more  -CM 8 --> Walked 250 feet or more  -PM      Row Name 04/26/24 1140          Functional Assessment    Outcome Measure Options AM-PAC 6 Clicks Daily Activity (OT)  -SP               User Key  (r) = Recorded By, (t) = Taken By, (c) = Cosigned By       Initials Name Provider Type    Maria Luisa Herr LPN Licensed Nurse    Nelly Toure LPN Licensed Nurse    Jonathan Campos OT Occupational Therapist                    Occupational Therapy Education       Title: PT OT SLP Therapies (In Progress)       Topic: Occupational Therapy (In Progress)       Point: ADL training (Done)       Description:   Instruct learner(s) on proper safety adaptation and remediation techniques during self care or transfers.   Instruct in proper use of assistive devices.                  Learning Progress Summary             Patient Acceptance, E,TB, VU by SP at 4/26/2024 1141                         Point: Home exercise program (Not Started)       Description:   Instruct learner(s) on appropriate technique for monitoring, assisting and/or progressing therapeutic exercises/activities.                  Learner Progress:  Not documented in this visit.              Point: Precautions (Not Started)       Description:   Instruct learner(s) on prescribed precautions during self-care and functional transfers.                  Learner Progress:  Not documented in this visit.              Point: Body mechanics (Done)       Description:   Instruct learner(s) on proper positioning and spine alignment during self-care, functional mobility activities and/or exercises.                  Learning Progress Summary             Patient Acceptance, E,TB, VU by SP at 4/26/2024 1141                                         User Key       Initials Effective Dates Name Provider Type Discipline    EN 11/15/23 -  Jonathan Mata OT Occupational Therapist OT                  OT Recommendation and Plan     Plan of Care Review  Plan of Care Reviewed With: patient  Outcome Evaluation: Pt is a 77 y/o male admitted to [facility] on 4/24/24 with c/o erythema/bruising in R wrist, hospital dx of cellulitis. CTH (-), MRI brain w/o contrast reveals extensive white matter changes, paranasal sinus disease, and area of  increased signal on diffusion weighted imaging with associated abnormal signal on FLAIR and T2. PMHx of anemia, CVA, colon cancer, diabetes hyperlipidemia and hypertension. PLOF pt reprots living alone in [place] with 0 JAIDA. Pt A&O x4 with no reports of pain, presenting on RA at 100%. Pt sitting in chair upon arrival with ROM and MMT WNLs. Pt IND donned socks sitting unsupported and ambulated household distances. OT will complete orders at this time with pt functioning near his baseline. New orders will need to be placed if functional decline is noted. OT recommends home with assist as needed.     Time Calculation:         Time Calculation- OT       Row Name 04/26/24 1141             Time Calculation- OT    OT Start Time 1110  -SP      OT Stop Time 1125  -SP      OT Time Calculation (min) 15 min  -SP      OT Received On 04/26/24  -SP                User Key  (r) = Recorded By, (t) = Taken By, (c) = Cosigned By      Initials Name Provider Type    SP Jonathan Mata OT Occupational Therapist                  Therapy Charges for Today       Code Description Service Date Service Provider Modifiers Qty    [id number]  OT EVAL MOD COMPLEXITY 4 4/26/2024 Jonathan Mata OT GO 1                 Jonathan Mata OT  4/26/2024

## 2024-04-26 NOTE — PLAN OF CARE
Goal Outcome Evaluation:    SLP Diagnosis Comments: SLC eval completed in the setting of acute CVA. No word finding difficulties, apraxia, or dysarthria observed at any time. SLC skills appear to be at baseline. Mild delayed memory deficits noted, however, pt reports memory skills are baseline and does not wish to pursue ST services. Will sign off. Please re consult if indicated. (04/26/24 6544)

## 2024-04-26 NOTE — CONSULTS
Primary Care Provider: Vlad Moreland MD     Consult requested by:  Carlos Eduardo Ravi PA-C     Reason for Consultation: Neurological evaluation      History taken from: patient chart RN    Chief complaint: Ataxia       SUBJECTIVE:    History of present illness: Background per H&P: Vlad Guerrero is a 78 y.o. year old male who 78-year-old male with a past medical history of insulin-dependent diabetes mellitus, hypertension, colon cancer with possible mets to the liver followed by oncology and chemotherapy, presented to the emergency department on 4/24/24 complaining of right wrist pain and erythema.  X-ray of right wrist showed no bony abnormality, WBC was not elevated.  He was seen by orthopedics.  MRI of right wrist per radiology showed diffuse nonspecific edema throughout the subcutaneous soft tissues suggesting changes of soft tissue cellulitis it also showed nonspecific joint effusion finding might indicate early signs of developing septic or inflammatory arthropathy.  Orthopedics is still following.  He is being seen by physical therapy which noticed some ataxia.  Review of records shows he was admitted here in March 2023 with a possible CVA and MRI noted to have a 7 mm focus of restricted diffusion in the left periventricular white matter of the posterior left frontal lobe suspicious for an acute/subacute infarct.     On 4/25, CT head showed no acute intracranial process and showed stable findings per radiology.  MRI brain per radiology today shows area of increased signal on diffusion-weighted imaging with associated abnormal signal on FLAIR and T2 weighted images compatible with a late acute or subacute area of ischemic change along with extensive white matter changes additionally noted compatible small vessel ischemic disease in this age group..  Neurology has been consulted.  No longer observation patient has been admitted to inpatient status.  Stroke workup in process and orthopedic still following  patient.  He is on IV ceftriaxone and IV doxycycline with blood cultures pending.  Of note, he saw his oncologist about a month ago and had an MRI of the abdomen done 3/22/2024 which per radiology shows findings suspicious for malignant disease progression within the liver and a new lesion in the left hepatic lobe.  Patient has not seen oncology for follow-up.  Given results, hematology oncology consulted. He is currently awake and alert has no slurred speech or facial droop he denies any headache or visual changes and no focal weaknesses.  He reports his right hand feels much better and does not hurt any longer.  He has no complaints.  - Portions of the above HPI were copied from previous encounters and edited as appropriate. PMH as detailed below.     Pt is known to the neuro team from his previous admission in March 2023. He was found to have a small left periventricular what matter stroke. CTA at that time showed an occlusion of the proximal left MCA, suspected to be chronic given the established collateral flow. Bilateral carotid stenosis, 60% on the right, and no grater than 50% on the left. He was not taking antiplatelets at the time so he was started on  and Lipitor. No indication for anticoagulation was found at that time. ASA and Lipitor are not currently listed on his home med list.     Pt currently denies any neuro complaints. No focal deficits. He states he has had some difficulty walking for a few months, but he does not find this to be significant or limiting. He denies any focal deficits. He does not take antithrombotics or statins.     Pt was under the impression that his cancer had resolved since his PET scan was negative. He was not aware of his most recent MRI abd results and had planned to follow up with his oncologist next month. He has since been made aware of the results and he was rather upset.     Pt states he had an episode of incontinence while sleeping the night/morning he came  "in. He has no idea what happened or how, but states he woke up to stool in his bed and all over his house, \"like a bag of poop popped and exploded everywhere from one end of the house to the other.\" He states this has never happened before. His description of the findings is rather unusual and it is unclear if this was a true account of the occurrence or possibly confusion as he states he was having crazy dreams that night. He had not mentioned this until today.     Review of Systems   Constitutional:  Negative for chills, diaphoresis and fatigue.   Eyes:  Negative for photophobia and visual disturbance.   Neurological:  Negative for dizziness, tremors, seizures, syncope, facial asymmetry, speech difficulty, weakness, light-headedness, numbness and headaches.           PATIENT HISTORY:  Past Medical History:   Diagnosis Date    Anemia     Colon cancer 2022    colon    Diabetes mellitus     Hyperlipidemia     Hypertension    ,   Past Surgical History:   Procedure Laterality Date    APPENDECTOMY      CARDIAC CATHETERIZATION      COLON RESECTION N/A 12/15/2022    Procedure: COLON RESECTION RIGHT;  Surgeon: Percy Davis MD;  Location: [facility] MAIN OR;  Service: General;  Laterality: N/A;    COLONOSCOPY N/A 11/11/2022    Procedure: COLONOSCOPY WITH BIOPSY AND POLYPECTOMY;  Surgeon: Billy Julien MD;  Location: [facility] ENDOSCOPY;  Service: Gastroenterology;  Laterality: N/A;  Impression:  1.  Large 4-5cm fulgurating circumferential ulcerated mass in the very proximal part of the ascending colon next to ileocecal valve multiple biopsies were performed.  This is highly concerning for colon malignancy.  2.  2 polyp rem    ENDOSCOPY N/A 11/11/2022    Procedure: ESOPHAGOGASTRODUODENOSCOPY with biopsy X1;  Surgeon: Billy Julien MD;  Location: [facility] ENDOSCOPY;  Service: Gastroenterology;  Laterality: N/A;  5.  Upper endoscopy lamination unremarkable.       PORTACATH PLACEMENT Right 1/12/2023    Procedure: INSERTION " [facility];  Surgeon: Percy Davis MD;  Location: [facility] MAIN OR;  Service: General;  Laterality: Right;    TONSILLECTOMY     ,   Family History   Problem Relation Age of Onset    Pneumonia Mother 94        SARS-CoV2    Colon cancer Father 62    Heart disease Sister    ,   Social History     Tobacco Use    Smoking status: Former     Current packs/day: 0.00     Average packs/day: 1 pack/day for 2.0 years (2.0 ttl pk-yrs)     Types: Cigarettes     Start date:      Quit date:      Years since quittin.3    Smokeless tobacco: Never   Vaping Use    Vaping status: Never Used   Substance Use Topics    Alcohol use: Not Currently    Drug use: Never   ,   Prior to Admission medications    Medication Sig Start Date End Date Taking? Authorizing Provider   insulin glargine (LANTUS, SEMGLEE) 100 UNIT/ML injection Inject 20 Units under the skin into the appropriate area as directed Every Night for 30 days. 24 Yes Yesenia Arreola APRN   Insulin Lispro (humaLOG) 100 UNIT/ML injection Inject 10 Units under the skin into the appropriate area as directed 3 (Three) Times a Day Before Meals.   Yes Provider, MD Rolly   Continuous Blood Gluc Sensor (Dexcom G7 Sensor) misc Use 1 each Every 10 (Ten) Days. Dx: E11.65 24   Yesenia Arreola APRN   Insulin Pen Needle (Pen Needles) 32G X 4 MM misc Use 1 each 4 (Four) Times a Day Before Meals & at Bedtime. 24   Yesenia Arreola APRN    Allergies:  Penicillins    Current Facility-Administered Medications   Medication Dose Route Frequency Provider Last Rate Last Admin    acetaminophen (TYLENOL) tablet 650 mg  650 mg Oral Q4H PRN Rudy Gamez,         aspirin chewable tablet 81 mg  81 mg Oral Daily Carlos Eduardo Ravi PA-C   81 mg at 24 0817    Or    aspirin suppository 300 mg  300 mg Rectal Daily Carlos Eduardo Ravi PA-C        atorvastatin (LIPITOR) tablet 80 mg  80 mg Oral Nightly Carlos Eduardo Ravi PA-C   80 mg  at 04/25/24 2132    sennosides-docusate (PERICOLACE) 8.6-50 MG per tablet 2 tablet  2 tablet Oral BID Hottman, Rudy Ben, DO        And    polyethylene glycol (MIRALAX) packet 17 g  17 g Oral Daily PRN Hottman, Rudy Raymundo, DO        And    bisacodyl (DULCOLAX) EC tablet 5 mg  5 mg Oral Daily PRN Hottman, Rudy Ben, DO        And    bisacodyl (DULCOLAX) suppository 10 mg  10 mg Rectal Daily PRN Hottman, Rudy Fanean, DO        cefTRIAXone (ROCEPHIN) 2,000 mg in sodium chloride 0.9 % 100 mL MBP  2,000 mg Intravenous Q24H Carlos Eduardo Ravi PA-C 200 mL/hr at 04/25/24 2155 2,000 mg at 04/25/24 2155    dextrose (D50W) (25 g/50 mL) IV injection 25 g  25 g Intravenous Q15 Min PRN Carlos Eduardo Ravi PA-C        dextrose (GLUTOSE) oral gel 15 g  15 g Oral Q15 Min PRN Carlos Eduardo Ravi PA-C        doxycycline (VIBRAMYCIN) 100 mg in sodium chloride 0.9 % 100 mL MBP  100 mg Intravenous Q12H Carlos Eduardo Ravi PA-C   100 mg at 04/26/24 0817    glucagon (GLUCAGEN) injection 1 mg  1 mg Intramuscular Q15 Min PRN Carlos Eduardo Ravi PA-C        insulin glargine (LANTUS, SEMGLEE) injection 20 Units  20 Units Subcutaneous Nightly Carlos Eduardo Ravi PA-C   20 Units at 04/25/24 2131    insulin lispro (HUMALOG/ADMELOG) injection 10 Units  10 Units Subcutaneous TID AC Carlos Eduardo Ravi PA-C   10 Units at 04/26/24 0816    insulin lispro (HUMALOG/ADMELOG) injection 2-9 Units  2-9 Units Subcutaneous 4x Daily AC & at Bedtime Carlos Eduardo Ravi PA-C   4 Units at 04/26/24 0817    melatonin tablet 5 mg  5 mg Oral Nightly PRN HottmRudy wolf, DO   5 mg at 04/25/24 2132    nitroglycerin (NITROSTAT) SL tablet 0.4 mg  0.4 mg Sublingual Q5 Min PRN Rudy Gamez DO        ondansetron (ZOFRAN) injection 4 mg  4 mg Intravenous Q6H PRN Rudy Gamez DO        sodium chloride 0.9 % flush 10 mL  10 mL Intravenous PRN Carlos Eduardo Ravi PA-C        sodium chloride 0.9 % flush 10 mL  10 mL Intravenous Q12H  Rudy Gamez, DO   10 mL at 04/26/24 0817    sodium chloride 0.9 % flush 10 mL  10 mL Intravenous PRN Rudy Gamez, DO        sodium chloride 0.9 % flush 10 mL  10 mL Intravenous Q12H Carlos Eduardo Ravi PA-C   10 mL at 04/26/24 0817    sodium chloride 0.9 % flush 10 mL  10 mL Intravenous PRN Carlos Eduardo Ravi PA-ORION        sodium chloride 0.9 % infusion 40 mL  40 mL Intravenous PRN Rudy Gamezn, DO        sodium chloride 0.9 % infusion 40 mL  40 mL Intravenous PRN Carlos Eduardo Ravi PA-C            ________________________________________________________        OBJECTIVE:  Upon today's exam, pt is awake and alert. Oriented, pleasant, cooperative.      Neurologic Exam  PHYSICAL EXAM:    CONSTITUTIONAL: The patient is in no apparent distress, bright awake and alert.      HEENT: There is no tenderness over the temporal arteries bilaterally. Normocephalic, atraumatic. TMJ open symmetrically without tenderness.    NECK: ROM normal, supple    RESPIRATORY: Breathing unlabored, Breath sounds are normal    CARDIAC: Regular rate and rhythm.     EXTREMITIES:  No clubbing, cyanosis or edema.    PSYCHIATRIC: Mood/affect normal, judgement normal, appropriate    NEUROLOGICAL:    Cognition:   Fully oriented.  Fund of knowledge excellent.  Concentration and attention normal.   Language normal with normal comprehension, fluent speech, intact repetition and naming.   Short and long term memory appears intact    Cranial nerves;    II - pupils bilaterally equal reacting to light,  No new Visual field deficits;  Fundoscopic exam- Not able to be done, non-dilated exam  III,IV,VI: EOMI with no diplopia  V: Normal facial sensations  VII: No facial asymmetry,  VIII: No New hearing abnormality  IX, X, XI: normal gag and shoulder shrug;  XII: tongue is in the midline.    Sensory:  Intact to light touch in all extremities.     Motor: Strength 5/5 bilaterally upper and lower extremities. No involuntary movements  present. Normal tone and bulk.  Deep tendon reflexes: 2/4 and symmetrical in biceps, brachioradialis, triceps, bilateral 2/4 knees and ankles. Both plantars are mute.    Cerebellar: Finger to nose and mirror movements normal bilaterally.    Gait and balance: Able to stand easily without assistance. Stance wide, but stable. Gait steady, no ataxia noted. Flat feet with calcaneal gait    ________________________________________________________   RESULTS REVIEW:    VITAL SIGNS:   Temp:  [97.3 °F (36.3 °C)-98 °F (36.7 °C)] 97.8 °F (36.6 °C)  Heart Rate:  [79-87] 80  Resp:  [12-18] 15  BP: (108-154)/(62-82) 132/74     LABS:      Lab 04/25/24  0314 04/24/24 1857   WBC 7.40 9.97   HEMOGLOBIN 13.7 13.7   HEMATOCRIT 43.9 43.2   PLATELETS 148 156   NEUTROS ABS 5.31 7.79*   IMMATURE GRANS (ABS) 0.02 0.02   LYMPHS ABS 1.44 1.35   MONOS ABS 0.59 0.76   EOS ABS 0.03 0.03   MCV 84.9 84.7   SED RATE  --  32*   CRP  --  2.98*   PROTIME  --  11.5   APTT  --  26.3*         Lab 04/26/24 0435 04/25/24 0314 04/24/24 1857   SODIUM  --  138 136   POTASSIUM  --  3.8 3.7   CHLORIDE  --  100 98   CO2  --  29.0 30.0*   ANION GAP  --  9.0 8.0   BUN  --  24* 25*   CREATININE  --  0.96 1.08   EGFR  --  81.9 71.1   GLUCOSE  --  142* 135*   CALCIUM  --  8.5* 9.0   HEMOGLOBIN A1C 12.90*  --   --              Lab 04/24/24 1857   PROTIME 11.5   INR 1.06         Lab 04/26/24  0435   CHOLESTEROL 153   LDL CHOL 83   HDL CHOL 55   TRIGLYCERIDES 77             UA          5/22/2023    22:45 9/24/2023    17:02 1/8/2024    09:31   Urinalysis   Squamous Epithelial Cells, UA  0-2     Specific Gravity, UA 1.034  1.028  1.032    Ketones, UA Negative  Trace  Negative    Blood, UA Negative  Negative  Negative    Leukocytes, UA Negative  Negative  Negative    Nitrite, UA Negative  Negative  Negative    RBC, UA  None Seen     WBC, UA  0-2     Bacteria, UA  None Seen         Lab Results   Component Value Date    TSH 0.844 01/09/2024    LDL 83 04/26/2024    HGBA1C  12.90 (H) 04/26/2024    [id number] 1,120 (H) 03/12/2023       IMAGING STUDIES:  MRI Brain Without Contrast    Result Date: 4/25/2024  Impression: 1. Area of increased signal on diffusion weighted imaging with associated abnormal signal on FLAIR and T2-weighted images is compatible with a late acute or subacute area of ischemic change. 2. Extensive white matter changes additionally noted compatible small vessel ischemic disease in this age group. 3. Paranasal sinus disease Electronically Signed: Pineda Vargas MD  4/25/2024 8:23 PM EDT  Workstation ID: [id number]    CT Head Without Contrast    Result Date: 4/25/2024  Impression: 1.No acute intracranial process is identified. 2.Likely stable chronic findings. Electronically Signed: Mario Nassar MD  4/25/2024 4:24 PM EDT  Workstation ID: [id number]    MRI Wrist Right Without Contrast    Result Date: 4/25/2024  Impression: 1.Diffuse nonspecific edema throughout the subcutaneous soft tissues suggesting changes of soft tissue cellulitis. 2.No definitive evidence for abscess. 3.Increased fluid is noted within the tendon sheaths of the flexor compartment as well as the extensor compartments. The findings are nonspecific and may be related to tendinopathy. Spread of infection along the tendons and pyogenic tenosynovitis could potentially have this appearance. 4.Nonspecific joint effusion throughout the carpus. This finding may indicate early signs of developing septic or inflammatory arthropathy. A reactive joint effusion related to osteoarthritis could also have this appearance. 5.No definitive signs of osteomyelitis. Electronically Signed: Domenico Escalante MD  4/25/2024 10:43 AM EDT  Workstation ID: [id number]    XR Wrist 3+ View Right    Result Date: 4/24/2024  Impression: No acute bony abnormality Electronically Signed: Severino Mace  4/24/2024 7:11 PM EDT  Workstation ID: [id number]     I reviewed the patient's new clinical  results.    ________________________________________________________     PROBLEM LIST:    Cellulitis    Primary hypertension    Mixed hyperlipidemia    Malignant neoplasm of ascending colon    Type 2 diabetes mellitus    Metastases to the liver    Right hand pain    Ataxia            ASSESSMENT/PLAN:  1. Small ischemic stroke in the left frontal cortex, appears more subacute, asymptomatic. He also has lesion in the right cerebellum that does not appear acute, but is also new since 1/2024 scan. He does have metastatic colon cancer so MRI with contrast was completed with no signs of metastatic disease. There is concern for possible cancer induced coagulopathy. Echo shows a PFO. Hx of stroke in 2023.   - MRI brain: Area of increased signal on diffusion weighted imaging with associated abnormal signal on FLAIR and T2-weighted images is compatible with a late acute or subacute area of ischemic change. Extensive white matter changes additionally noted compatible small vessel ischemic disease in this age group. Paranasal sinus disease. MRI reviewed: Small lesion in left frontal lobe is new compared to 1/2024 scan. He also has a lesion in the right cerebellum that does not appear acute, but is also new since 1/2024 scan.   - MRA head: 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 No new stenosis or occlusion identified   - MRI brain with contrast: Similar subacute subcortical punctate infarct involving the left frontal lobe. No evidence of intracranial metastatic disease. Other stable chronic findings.   - Carotid duplex: less than 50% stenosis bilaterally   - Echo: LV cavity is mildly dilated.Small patent foramen ovale present with right to left shunting indicated by saline contrast. Saline test results are positive with valsalva manuever.  - EKG: Sinus rhythm. Prolonged [place] interval. Left bundle branch block. ST elevation secondary to IVCD   - Labs: A1C: 12.9, B12: 285, LDL: 83, TSH:  1.4  - Antithrombotics: DAPT with ASA 81mg daily and Plavix 75mg daily for 90 days, then discontinue ASA and continue Plavix only.   - Statin: Lipitor 8omg qhs   - PT/OT/ST as appropriate, fall precautions as appropriate, Neuro checks per protocol, DVT prophylaxis, Stroke education  - Pt has a PFO and there is a possibility of a malignancy induced hypercoagulable state. Neurology would recommend anticoagulation, however, pt has been reluctant to take even ASA in the past due to skin bleeding. He is okay with ASA and Plavix for now. Heme/onc has been consulted and will consult cardiology for their opinion regarding PFO and need for anticoagulation.      2. Small patent foramen ovale present with right to left shunting indicated by saline contrast. - Saline test results are positive with valsalva manuever.  - DAPT for now   - Cardiology consult for recommendations     3. Gait abnormality, baseline per pt. Pt has flat feet with a calcaneal gait. No significant ataxia noted on exam.  - Recommend PT/OT, fall precautions     4.  Right hand pain, resolved edema decreased, per radiology report of MRI soft tissue cellulitis versus possible early septic or inflammatory arthropathy.  On IV ceftriaxone and IV doxycycline culture still pending orthopedics following     5. Mixed hyperlipidemia  - Well controlled, recommend diet and lifestyle modifications.    6. Diabetes mellitus type 2, uncontrolled  - Strict glycemic control, SSI per primary    7. Hypertension  - Strict BP control, monitor per protocol  - No indicatio for permissive HTN with subacute appearing stroke     8. Colon cancer with metastasis to liver, no result MRI abdomen 3/22/2024 shows findings suspicious for malignant disease progression within the liver, hematology oncology consulted    9. B12 Deficiency  - Replacement ordered  - Recommend cyanocobalamin 1000mcg PO daily or IM monthly    Modification of stroke risk factors:   - Blood pressure should be less than  130/80 outpatient, HbA1c less than 6.5, LDL less than 70; b12>500 and smoking cessation if applicable. We would be grateful if the primary team / primary care physician would keep a close watch on the above targets.  - Stroke education  - Follow up with neurologist of choice    Will follow hematology and cardiology recommendations. Stroke workup is otherwise complete.     I discussed the patient's findings and my recommendations with patient, nursing staff, and consulting provider    ANEUDY Angel  04/26/24  10:09 EDT

## 2024-04-26 NOTE — PLAN OF CARE
Goal Outcome Evaluation:  Plan of Care Reviewed With: patient     Outcome Evaluation: Pt is a 79 y/o male admitted to [facility] on 4/24/24 with c/o erythema/bruising in R wrist, hospital dx of cellulitis. CTH (-), MRI brain w/o contrast reveals extensive white matter changes, paranasal sinus disease, and area of increased signal on diffusion weighted imaging with associated abnormal signal on FLAIR and T2. PMHx of anemia, CVA, colon cancer, diabetes hyperlipidemia and hypertension. PLOF pt reprots living alone in [facility] with 0 JAIDA. Pt A&O x4 with no reports of pain, presenting on RA at 100%. Pt sitting in chair upon arrival with ROM and MMT WNLs. Pt IND donned socks sitting unsupported and ambulated household distances. OT will complete orders at this time with pt functioning near his baseline. New orders will need to be placed if functional decline is noted. OT recommends home with assist as needed.    Anticipated Discharge Disposition (OT): home with assist

## 2024-04-26 NOTE — PLAN OF CARE
Goal Outcome Evaluation:  Plan of Care Reviewed With: patient   Making sure to call before going to bathroom; safety issues;

## 2024-04-26 NOTE — PLAN OF CARE
Problem: Adult Inpatient Plan of Care  Goal: Plan of Care Review  Outcome: Ongoing, Progressing  Flowsheets (Taken 4/26/2024 1449)  Progress: improving  Plan of Care Reviewed With: patient  Goal: Patient-Specific Goal (Individualized)  Outcome: Ongoing, Progressing  Goal: Absence of Hospital-Acquired Illness or Injury  Outcome: Ongoing, Progressing  Intervention: Identify and Manage Fall Risk  Recent Flowsheet Documentation  Taken 4/26/2024 1215 by MariaL uisa Torres LPN  Safety Promotion/Fall Prevention: patient off unit  Taken 4/26/2024 1000 by Maria Luisa Torres LPN  Safety Promotion/Fall Prevention:   assistive device/personal items within reach   clutter free environment maintained   fall prevention program maintained   nonskid shoes/slippers when out of bed   safety round/check completed   room organization consistent  Taken 4/26/2024 0800 by Maria Luisa Torres LPN  Safety Promotion/Fall Prevention:   assistive device/personal items within reach   clutter free environment maintained   fall prevention program maintained   nonskid shoes/slippers when out of bed   safety round/check completed   room organization consistent  Intervention: Prevent Skin Injury  Recent Flowsheet Documentation  Taken 4/26/2024 0800 by Maria Luisa Torres LPN  Body Position: position changed independently  Skin Protection:   adhesive use limited   tubing/devices free from skin contact  Intervention: Prevent and Manage VTE (Venous Thromboembolism) Risk  Recent Flowsheet Documentation  Taken 4/26/2024 0800 by Maria Luisa Torres LPN  Activity Management: standing at bedside  VTE Prevention/Management: sequential compression devices off  Range of Motion: active ROM (range of motion) encouraged  Intervention: Prevent Infection  Recent Flowsheet Documentation  Taken 4/26/2024 1000 by Maria Luisa Torres LPN  Infection Prevention:   environmental surveillance performed   hand hygiene promoted   rest/sleep promoted   single patient room provided  Taken 4/26/2024 0800  by Maria Luisa Torres LPN  Infection Prevention:   environmental surveillance performed   hand hygiene promoted   rest/sleep promoted   single patient room provided  Goal: Optimal Comfort and Wellbeing  Outcome: Ongoing, Progressing  Intervention: Provide Person-Centered Care  Recent Flowsheet Documentation  Taken 4/26/2024 0800 by Maria Luisa Torres LPN  Trust Relationship/Rapport:   care explained   thoughts/feelings acknowledged  Goal: Readiness for Transition of Care  Outcome: Ongoing, Progressing     Problem: Skin Injury Risk Increased  Goal: Skin Health and Integrity  Outcome: Ongoing, Progressing  Intervention: Optimize Skin Protection  Recent Flowsheet Documentation  Taken 4/26/2024 0800 by Maria Luisa Torres LPN  Pressure Reduction Techniques: frequent weight shift encouraged  Head of Bed (HOB) Positioning: HOB elevated  Pressure Reduction Devices: pressure-redistributing mattress utilized  Skin Protection:   adhesive use limited   tubing/devices free from skin contact   Goal Outcome Evaluation:  Plan of Care Reviewed With: patient        Progress: improving

## 2024-04-26 NOTE — THERAPY EVALUATION
Acute Care - Speech Language Pathology Initial Evaluation  [facility]     Patient Name: Vlad Guerrero  : 1945  MRN: [id number]  Today's Date: 2024               Admit Date: 2024     Visit Dx:    ICD-10-CM ICD-9-CM   1. 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    Cellulitis    Right hand pain    Ataxia     Past Medical History:   Diagnosis Date    Anemia     Colon cancer     colon    Diabetes mellitus     Hyperlipidemia     Hypertension      Past Surgical History:   Procedure Laterality Date    APPENDECTOMY      CARDIAC CATHETERIZATION      COLON RESECTION N/A 12/15/2022    Procedure: COLON RESECTION RIGHT;  Surgeon: Percy Davis MD;  Location: [facility] MAIN OR;  Service: General;  Laterality: N/A;    COLONOSCOPY N/A 2022    Procedure: COLONOSCOPY WITH BIOPSY AND POLYPECTOMY;  Surgeon: Billy Julien MD;  Location: [facility] ENDOSCOPY;  Service: Gastroenterology;  Laterality: N/A;  Impression:  1.  Large 4-5cm fulgurating circumferential ulcerated mass in the very proximal part of the ascending colon next to ileocecal valve multiple biopsies were performed.  This is highly concerning for colon malignancy.  2.  2 polyp rem    ENDOSCOPY N/A 2022    Procedure: ESOPHAGOGASTRODUODENOSCOPY with biopsy X1;  Surgeon: Billy Julien MD;  Location: [facility] ENDOSCOPY;  Service: Gastroenterology;  Laterality: N/A;  5.  Upper endoscopy lamination unremarkable.       PORTACATH PLACEMENT Right 2023    Procedure: INSERTION OF PORTACATH;  Surgeon: Percy Davis MD;  Location: [facility] MAIN OR;  Service: General;  Laterality: Right;    TONSILLECTOMY         SLP Recommendation and  Plan     SLP Diagnosis Comments: SLC eval completed in the setting of acute CVA. No word finding difficulties, apraxia, or dysarthria observed at any time. SLC skills appear to be at baseline. Mild delayed memory deficits noted, however, pt reports memory skills are baseline and does not wish to pursue ST services. Will sign off. Please re consult if indicated. (04/26/24 1135)        SLC Criteria for Skilled Therapy Interventions Met: baseline status (04/26/24 1135)           Therapy Frequency (SLP SLC): evaluation only (04/26/24 1135)                                       SLP EVALUATION (Last 72 Hours)       SLP SLC Evaluation       Row Name 04/26/24 1135       Communication Assessment/Intervention    Document Type evaluation  -LF    Subjective Information no complaints  -LF    Patient Observations alert;cooperative;agree to therapy  -LF    Patient Effort good  -LF    Symptoms Noted During/After Treatment none  -LF       General Information    Patient Profile Reviewed yes  -LF    Pertinent History Of Current Problem Pt is a 79 y/o male admitted to [facility] on 4/24/24 with c/o erythema/bruising in R wrist, hospital dx of cellulitis. CTH (-), MRI brain w/o contrast reveals extensive white matter changes, paranasal sinus disease, and area of increased signal on diffusion weighted imaging with associated abnormal signal on FLAIR and T2, concerning for acute/subacute CVA. PMHx of anemia, CVA, colon cancer, diabetes hyperlipidemia and hypertension. ST orders placed per stroke protocol. Pt passed swallow screen and placed on a regular and thin liquid diet.  -LF    Precautions/Limitations, Vision WFL;for purposes of eval  -LF    Precautions/Limitations, Hearing WFL;for purposes of eval  -LF    Prior Level of Function-Communication WFL  -LF    Plans/Goals Discussed with patient;agreed upon  -LF    Barriers to Rehab none identified  -LF       Comprehension Assessment/Intervention    Comprehension Assessment/Intervention Auditory  Comprehension  -LF       Auditory Comprehension Assessment/Intervention    Auditory Comprehension (Communication) WFL  -LF    Able to Identify Objects/Pictures (Communication) familiar objects;WFL  -LF    Answers Questions (Communication) yes/no;wh questions;personal;simple;complex;mild impairment  -LF    Able to Follow Commands (Communication) 1-step;2-step;body part;WFL  -LF       Expression Assessment/Intervention    Expression Assessment/Intervention verbal expression  -LF       Verbal Expression Assessment/Intervention    Verbal Expression WFL  -LF    Automatic Speech (Communication) counting 1-20;days of week  -LF    Repetition words;WFL  -LF    Responsive Naming WFL  -LF    Confrontational Naming WFL  -LF    Conversational Discourse/Fluency WFL  -LF       Oral Motor Structure and Function    Oral Motor Structure and Function WFL  -LF    Dentition Assessment natural, present and adequate  -LF    Mucosal Quality moist, healthy  -LF       Motor Speech Assessment/Intervention    Motor Speech Function WFL  -LF    Speech intelligibility 100%;in quiet environment;in connected speech;with unfamiliar listener  -LF       Cognitive Assessment Intervention- SLP    Orientation Status (Cognition) awareness of basic personal information;person;place;time;WFL  -LF    Memory (Cognitive) immediate;WFL  -LF    Attention (Cognitive) WFL  -LF    Problem Solving (Cognitive) WFL  -LF    Functional Math (Cognitive) simple;money calculation;WFL  -LF    Executive Function (Cognition) planning;home management activities;WFL  -LF    Cognition, Comment Pt displayed mild deficits in delayed memory tasks. Pt reports memory skills are at baseline and does not wish to pursue ST targeting memory  -LF       SLP Evaluation Clinical Impressions    SLP Diagnosis Comments SLC eval completed in the setting of acute CVA. No word finding difficulties, apraxia, or dysarthria observed at any time. SLC skills appear to be at baseline. Mild delayed  memory deficits noted, however, pt reports memory skills are baseline and does not wish to pursue ST services. Will sign off. Please re consult if indicated.  -LF    SLC Criteria for Skilled Therapy Interventions Met baseline status  -LF       Recommendations    Therapy Frequency (SLP SLC) evaluation only  -LF              User Key  (r) = Recorded By, (t) = Taken By, (c) = Cosigned By      Initials Name Effective Dates    LF Val Moran SLP 06/16/21 -                        EDUCATION  The patient has been educated in the following areas:     Cognitive Impairment.                      Time Calculation:                        KERRIE Mccracken  4/26/2024

## 2024-04-27 ENCOUNTER — READMISSION MANAGEMENT (OUTPATIENT)
Dept: CALL CENTER | Facility: HOSPITAL | Age: 79
End: 2024-04-27
Payer: OTHER GOVERNMENT

## 2024-04-27 ENCOUNTER — APPOINTMENT (OUTPATIENT)
Dept: CARDIOLOGY | Facility: HOSPITAL | Age: 79
DRG: 642 | End: 2024-04-27
Payer: OTHER GOVERNMENT

## 2024-04-27 VITALS
SYSTOLIC BLOOD PRESSURE: 139 MMHG | DIASTOLIC BLOOD PRESSURE: 83 MMHG | OXYGEN SATURATION: 98 % | BODY MASS INDEX: 24.12 KG/M2 | RESPIRATION RATE: 14 BRPM | HEART RATE: 79 BPM | WEIGHT: 182 LBS | TEMPERATURE: 97.7 F | HEIGHT: 73 IN

## 2024-04-27 LAB

## 2024-04-27 PROCEDURE — 93970 EXTREMITY STUDY: CPT

## 2024-04-27 PROCEDURE — 63710000001 INSULIN LISPRO (HUMAN) PER 5 UNITS: Performed by: PHYSICIAN ASSISTANT

## 2024-04-27 PROCEDURE — 99222 1ST HOSP IP/OBS MODERATE 55: CPT | Performed by: INTERNAL MEDICINE

## 2024-04-27 PROCEDURE — 99233 SBSQ HOSP IP/OBS HIGH 50: CPT | Performed by: NURSE PRACTITIONER

## 2024-04-27 PROCEDURE — 93970 EXTREMITY STUDY: CPT | Performed by: STUDENT IN AN ORGANIZED HEALTH CARE EDUCATION/TRAINING PROGRAM

## 2024-04-27 PROCEDURE — 82948 REAGENT STRIP/BLOOD GLUCOSE: CPT | Performed by: PHYSICIAN ASSISTANT

## 2024-04-27 RX ORDER — CARVEDILOL 3.12 MG/1
3.12 TABLET ORAL 2 TIMES DAILY WITH MEALS
Qty: 60 TABLET | Refills: 0 | Status: SHIPPED | OUTPATIENT
Start: 2024-04-27 | End: 2024-05-27

## 2024-04-27 RX ORDER — LISINOPRIL 2.5 MG/1
2.5 TABLET ORAL DAILY
Qty: 30 TABLET | Refills: 1 | Status: SHIPPED | OUTPATIENT
Start: 2024-04-27 | End: 2024-06-26

## 2024-04-27 RX ORDER — CLOPIDOGREL BISULFATE 75 MG/1
75 TABLET ORAL DAILY
Qty: 30 TABLET | Refills: 0 | Status: SHIPPED | OUTPATIENT
Start: 2024-04-28 | End: 2024-07-27

## 2024-04-27 RX ORDER — ASPIRIN 81 MG/1
81 TABLET ORAL DAILY
Qty: 90 TABLET | Refills: 0 | Status: SHIPPED | OUTPATIENT
Start: 2024-04-27 | End: 2024-07-26

## 2024-04-27 RX ORDER — ATORVASTATIN CALCIUM 80 MG/1
80 TABLET, FILM COATED ORAL NIGHTLY
Qty: 90 TABLET | Refills: 0 | Status: SHIPPED | OUTPATIENT
Start: 2024-04-27 | End: 2024-07-26

## 2024-04-27 RX ORDER — DOXYCYCLINE HYCLATE 100 MG/1
100 CAPSULE ORAL 2 TIMES DAILY
Qty: 8 CAPSULE | Refills: 0 | Status: SHIPPED | OUTPATIENT
Start: 2024-04-27 | End: 2024-05-01

## 2024-04-27 RX ADMIN — ASPIRIN 81 MG CHEWABLE TABLET 81 MG: 81 TABLET CHEWABLE at 08:18

## 2024-04-27 RX ADMIN — INSULIN LISPRO 10 UNITS: 100 INJECTION, SOLUTION INTRAVENOUS; SUBCUTANEOUS at 12:30

## 2024-04-27 RX ADMIN — Medication 10 ML: at 08:29

## 2024-04-27 RX ADMIN — INSULIN LISPRO 6 UNITS: 100 INJECTION, SOLUTION INTRAVENOUS; SUBCUTANEOUS at 08:17

## 2024-04-27 RX ADMIN — INSULIN LISPRO 10 UNITS: 100 INJECTION, SOLUTION INTRAVENOUS; SUBCUTANEOUS at 08:17

## 2024-04-27 RX ADMIN — CLOPIDOGREL BISULFATE 75 MG: 75 TABLET ORAL at 08:18

## 2024-04-27 RX ADMIN — DOXYCYCLINE 100 MG: 100 INJECTION, POWDER, LYOPHILIZED, FOR SOLUTION INTRAVENOUS at 08:18

## 2024-04-27 NOTE — CONSULTS
Cardiology Consult Note    Patient Identification:  Name: Vlad Guerrero  Age: 78 y.o.  Sex: male  :  1945  MRN: [id number]             Requesting Physician : Dr. Roderick Alegre    Reason for Consultation / Chief Complaint :   PFO    History of Present Illness:      Mr. Vlad Guerrero has PMH of    Hypertension  History of TIA   Metastatic colon cancer with possible liver mets  Insulin-dependent diabetes      Presented 2022 for with right wrist pain and erythema possible cellulitis.  Physical therapy noticed ataxia and MRI was done which revealed left frontal punctate acute CVA on 2024.  Echocardiogram revealed PFO.  Patient denies any chest pain or shortness of breath.      Assessment:  :    PFO  Left bundle branch block  Diastolic dysfunction  Left atrial enlargement  Acute left frontal stroke.  Metastatic colon cancer  History of previous TIA  Carotid disease      Recommendations / Plan:        EKG is revealing left bundle branch block  Echocardiogram is revealing normal LV systolic function with diastolic dysfunction and left atrial enlargement and PFO.  Patient has carotid disease and possible hypercoagulable state from metastatic colon cancer which potentially can put him at risk for CVA.  Will follow-up in 2 event monitor as outpatient to rule out A-fib.  If patient had cryptogenic stroke, PFO can be closed.  But if patient is going to be on anticoagulation no need to close PFO.  Will follow along with hematology and consider the same.  Will check venous Dopplers.           Diagnosis Plan   1. Cellulitis, unspecified cellulitis site  Ambulatory Referral to Cardiology    Ambulatory Referral to Neurology      2. Acute CVA (cerebrovascular accident)  Ambulatory Referral to Cardiology    Ambulatory Referral to Neurology                 Past Medical History:  Past Medical History:   Diagnosis Date    Anemia     Colon cancer     colon    Diabetes mellitus     Hyperlipidemia     Hypertension       Past Surgical History:  Past Surgical History:   Procedure Laterality Date    APPENDECTOMY      CARDIAC CATHETERIZATION      COLON RESECTION N/A 12/15/2022    Procedure: COLON RESECTION RIGHT;  Surgeon: Percy Davis MD;  Location: [facility] MAIN OR;  Service: General;  Laterality: N/A;    COLONOSCOPY N/A 11/11/2022    Procedure: COLONOSCOPY WITH BIOPSY AND POLYPECTOMY;  Surgeon: Billy Julien MD;  Location: [facility] ENDOSCOPY;  Service: Gastroenterology;  Laterality: N/A;  Impression:  1.  Large 4-5cm fulgurating circumferential ulcerated mass in the very proximal part of the ascending colon next to ileocecal valve multiple biopsies were performed.  This is highly concerning for colon malignancy.  2.  2 polyp rem    ENDOSCOPY N/A 11/11/2022    Procedure: ESOPHAGOGASTRODUODENOSCOPY with biopsy X1;  Surgeon: Billy Julien MD;  Location: [facility] ENDOSCOPY;  Service: Gastroenterology;  Laterality: N/A;  5.  Upper endoscopy lamination unremarkable.       PORTACATH PLACEMENT Right 1/12/2023    Procedure: INSERTION OF PORTACATH;  Surgeon: Percy Davis MD;  Location: [facility] MAIN OR;  Service: General;  Laterality: Right;    TONSILLECTOMY        Allergies:  Allergies   Allergen Reactions    Penicillins Rash     Home Meds:  No medications prior to admission.     Current Meds:   No current facility-administered medications for this encounter.    Current Outpatient Medications:     atorvastatin (LIPITOR) 80 MG tablet, Take 1 tablet by mouth Every Night for 90 days., Disp: 90 tablet, Rfl: 0    [START ON 4/28/2024] clopidogrel (PLAVIX) 75 MG tablet, Take 1 tablet by mouth Daily for 90 days., Disp: 30 tablet, Rfl: 0    insulin glargine (LANTUS, SEMGLEE) 100 UNIT/ML injection, Inject 20 Units under the skin into the appropriate area as directed Every Night for 30 days., Disp: 6 mL, Rfl: 0    Insulin Lispro (humaLOG) 100 UNIT/ML injection, Inject 10 Units under the skin into the appropriate area as directed  "3 (Three) Times a Day Before Meals., Disp: , Rfl:     aspirin 81 MG EC tablet, Take 1 tablet by mouth Daily for 90 days., Disp: 90 tablet, Rfl: 0    carvedilol (Coreg) 3.125 MG tablet, Take 1 tablet by mouth 2 (Two) Times a Day With Meals for 30 days., Disp: 60 tablet, Rfl: 0    Continuous Blood Gluc Sensor (Dexcom G7 Sensor) misc, Use 1 each Every 10 (Ten) Days. Dx: E11.65, Disp: 3 each, Rfl: 2    doxycycline (VIBRAMYCIN) 100 MG capsule, Take 1 capsule by mouth 2 (Two) Times a Day for 4 days., Disp: 8 capsule, Rfl: 0    empagliflozin (Jardiance) 10 MG tablet tablet, Take 1 tablet by mouth Daily for 60 days., Disp: 30 tablet, Rfl: 1    Insulin Pen Needle (Pen Needles) 32G X 4 MM misc, Use 1 each 4 (Four) Times a Day Before Meals & at Bedtime., Disp: 200 each, Rfl: 2    lisinopril (Zestril) 2.5 MG tablet, Take 1 tablet by mouth Daily for 60 days., Disp: 30 tablet, Rfl: 1  Social History:   Social History     Tobacco Use    Smoking status: Former     Current packs/day: 0.00     Average packs/day: 1 pack/day for 2.0 years (2.0 ttl pk-yrs)     Types: Cigarettes     Start date:      Quit date:      Years since quittin.3    Smokeless tobacco: Never   Substance Use Topics    Alcohol use: Not Currently      Family History:  Family History   Problem Relation Age of Onset    Pneumonia Mother 94        SARS-CoV2    Colon cancer Father 62    Heart disease Sister         Review of Systems : Review of Systems   All other systems reviewed and are negative.                 Constitutional:  Temp:  [97.5 °F (36.4 °C)-97.8 °F (36.6 °C)] 97.7 °F (36.5 °C)  Heart Rate:  [79-88] 79  Resp:  [14-16] 14  BP: (115-146)/(73-95) 139/83    Physical Exam   /83 (BP Location: Left arm, Patient Position: Standing)   Pulse 79   Temp 97.7 °F (36.5 °C) (Oral)   Resp 14   Ht 185.4 cm (73\")   Wt 82.6 kg (182 lb)   SpO2 98%   BMI 24.01 kg/m²   Physical Exam  General:  Appears in no acute distress  Eyes: Sclerae are anicteric,  " conjunctivae are clear   HEENT:  No JVD. Thyroid not visibly enlarged. No mucosal pallor or cyanosis  Respiratory: Respirations regular and unlabored at rest.  Bilaterally good breath sounds with good air entry in all fields. No crackles, rubs or wheezes auscultated  Cardiovascular: S1,S2 Regular rate and rhythm. No murmur, rub or gallop auscultated. No pretibial pitting edema  Gastrointestinal: Abdomen soft, flat, nontender. Bowel sounds present.   Musculoskeletal:  No abnormal movements  Extremities: No digital clubbing or cyanosis  Skin: Color pink. Skin warm and dry to touch. No rashes  No xanthoma  Neuro: Alert and awake, no lateralizing deficits appreciated    Cardiographics  ECG: EKG tracing was  personally reviewed/interpreted by me  ECG 12 Lead Stroke Evaluation   Preliminary Result   HEART RATE= 81  bpm   RR Interval= 744  ms   LA Interval= 281  ms   P Horizontal Axis= 82  deg   P Front Axis= 69  deg   QRSD Interval= 154  ms   QT Interval= 409  ms   QTcB= 474  ms   QRS Axis= -57  deg   T Wave Axis= 38  deg   - ABNORMAL ECG -   Sinus rhythm   Prolonged LA interval   Left bundle branch block   ST elevation secondary to IVCD   When compared with ECG of 08-Jan-2024 9:28:22,   No significant change   Electronically Signed By:    Date and Time of Study: 2024-04-26 10:50:43      Telemetry Scan   Final Result      Telemetry Scan   Final Result      Telemetry Scan   Final Result      Telemetry Scan   Final Result      Telemetry Scan   Final Result          Telemetry: Sinus rhythm    Echocardiogram:   Results for orders placed during the hospital encounter of 04/24/24    Adult Transthoracic Echo Complete W/ Cont if Necessary Per Protocol (With Agitated Saline)    Interpretation Summary    The left ventricular cavity is mildly dilated.    Left ventricular diastolic function is consistent with (grade Ia w/high LAP) impaired relaxation.    Small patent foramen ovale present with right to left shunting indicated by  saline contrast.    Saline test results are positive with valsalva manuever.    There is mild, bileaflet mitral valve thickening present.    Estimated right ventricular systolic pressure from tricuspid regurgitation is mildly elevated (35-45 mmHg).    Mild pulmonary hypertension is present.      Imaging  Chest X-ray:   Imaging Results (Last 24 Hours)       Procedure Component Value Units Date/Time    MRI Angiogram Head Without Contrast [[id number]] Collected: 04/26/24 1307     Updated: 04/26/24 1343    Narrative:      MRI ANGIOGRAM HEAD WO CONTRAST    Date of Exam: 4/26/2024 11:58 AM EDT    Indication: Stroke, follow up.     Comparison: CTA 3/12/2023    Technique:  Routine 3-D time-of-flight gradient echo imaging was obtained of the head without contrast administration.      Findings:    CTA HEAD:  *Anterior circulation: Again noted is occlusion of the proximal left middle cerebral artery with numerous collateral vessels in this region. More distal left MCA branches appear to be patent. Absent right A1 segment. Widely patent anterior communicating   artery supplying the more distal right anterior cerebral artery. Overall, findings are similar to previous CTA.  *Posterior circulation: No aneurysm, significant stenosis or occlusion. Left vertebral artery dominance.  *Anatomic variants: None of significance.  *Venous sinuses: Patent.      Impression:      1.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 No new stenosis or occlusion identified.        Electronically Signed: Mario Nassar MD    4/26/2024 1:11 PM EDT    Workstation ID: [id number]    MRI Brain With Contrast [[id number]] Collected: 04/26/24 1257     Updated: 04/26/24 1343    Narrative:      MRI BRAIN W CONTRAST    Date of Exam: 4/26/2024 11:59 AM EDT    Indication: Stroke, unexplained ataxia, metastatic liver disease.     Comparison: Noncontrast MRI brain 4/25/2024, MRI brain with and without contrast 1/8/2021    Technique:   Routine multiplanar/multisequence sequence images of the brain were obtained before and after the uneventful administration of 17 cc Prohance.        Findings:  Punctate area of subcortical restricted diffusion involving the left frontal lobe is similar to prior examination with increased signal intensity on FLAIR sequence, consistent with subacute infarct.    There is no evidence of acute or chronic intracranial hemorrhage. No mass effect or midline shift. No abnormal extra-axial collections.     There is moderate to advanced periventricular and subcortical white matter signal abnormality otherwise, similar to prior examination consistent chronic microvascular ischemic disease. The basal ganglia, brainstem and cerebellum appear within normal   limits. Midline structures are intact. No significant cortical abnormality is identified.    Calvarial and superficial soft tissue signal is within normal limits. Orbits appear unremarkable. There is persistent coastal thickening involving the left sphenoid and maxillary sinus.    There is a linear area of enhancement involving the left cerebellar hemisphere consistent with vessel/vascular anomaly. There is no suspicious abnormal intracranial enhancement. There is normal enhancement within the intracranial vasculature including   the dural venous sinuses.      Impression:      Impression:  1.Similar subacute subcortical punctate infarct involving the left frontal lobe.  2.No evidence of intracranial metastatic disease.  3.Other stable chronic findings.        Electronically Signed: Mario Nassar MD    4/26/2024 1:06 PM EDT    Workstation ID: [id number]            Lab Review: I have reviewed the labs          Results from last 7 days   Lab Units 04/25/24  0314   SODIUM mmol/L 138   POTASSIUM mmol/L 3.8   BUN mg/dL 24*   CREATININE mg/dL 0.96   CALCIUM mg/dL 8.5*             Results from last 7 days   Lab Units 04/25/24  0314 04/24/24  1857   WBC 10*3/mm3 7.40 9.97   HEMOGLOBIN  g/dL 13.7 13.7   HEMATOCRIT % 43.9 43.2   PLATELETS 10*3/mm3 148 156     Results from last 7 days   Lab Units 04/24/24  1857   INR  1.06   APTT seconds 26.3*             Mg Ramhan MD  4/27/2024, 16:03 EDT      EMR Dragon/Transcription:   Dictated utilizing Dragon dictation

## 2024-04-27 NOTE — DISCHARGE SUMMARY
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  With recent MRI concerning for progressive disease within the liver    Date of Discharge:  24  Primary Care Physician: Vlad Moreland MD      Presenting Problem:   Cellulitis [L03.90]  Cellulitis, unspecified cellulitis site [L03.90]    Active and Resolved Hospital Problems:  Active Hospital Problems    Diagnosis POA    **Cellulitis [L03.90] Yes    Right hand pain [M79.641] Yes    Ataxia [R27.0] Yes    Metastases to the liver [C78.7] Yes    Malignant neoplasm of ascending colon [C18.2] Yes    Primary hypertension [I10] Yes    Mixed hyperlipidemia [E78.2] Yes    Type 2 diabetes mellitus [E11.9] Yes      Resolved Hospital Problems   No resolved problems to display.         Hospital Course     HPI:  78 y.o. year old male who 78-year-old male with a past medical history of insulin-dependent diabetes mellitus, hypertension, colon cancer with possible mets to the liver followed by oncology and chemotherapy, presented to the emergency department on 24 complaining of right wrist pain and erythema.  X-ray of right wrist showed no bony abnormality, WBC was not elevated.  He was seen by orthopedics.  MRI of right wrist per radiology showed diffuse nonspecific edema throughout the subcutaneous soft tissues suggesting changes of soft tissue cellulitis it also showed nonspecific joint effusion finding might indicate early signs of developing septic or inflammatory arthropathy     On , CT head showed no acute intracranial process and showed stable findings per radiology.  MRI brain  per radiology today shows area of increased signal on diffusion-weighted imaging with associated abnormal signal on FLAIR and T2 weighted images compatible with a late acute or subacute area of ischemic change along with extensive white matter changes additionally noted compatible small vessel ischemic disease in this age group     Hospital Course:    1-in regard to the right arm cellulitis patient evaluated by Ortho who recommended only antibiotics treatment at this point, swelling has subsided and patient blood culture remain no growth, no recommendation for arthrocentesis per orthopedics unless if right wrist effusions/swelling comes back and does not respond to treatment.  On my exam actually this morning patient did not have any erythema at all as it seems that it is completely resolved after IV doxycycline and Rocephin    2-in regard to the acute versus subacute stroke evaluated by neurology, with recommendation to keep patient on statin, aspirin and Plavix, patient had echocardiogram that shows small PFO and neurology consulted cardiology, my plan today was to wait until patient been evaluated by cardiology however he insisted on going home this morning because he had important stuff to take care of, I talked him twice during my morning rounds and on another visit however the patient was determined and ask to get cardiology appointment as an outpatient.  For which an ambulatory referral was sent  In the meantime patient to remain on aspirin and Plavix until be evaluated by cardiology    3-also evaluated by oncology as mentioned above recent MRI showed progression in his disease patient already follow-up with oncology as an outpatient actually has appointment in the next few days    4-patient does have chronic systolic CHF he is not in acute exacerbation, repeat echo at this time showed an EF of 30% compared to an echo almost a year ago that was 41% patient is not sure why he is not on ACE inhibitors or SGLT2  neither beta-blocker for which I did add small dose of those 3 agents, will send a copy of this discharge summary to primary care physician    DISCHARGE Follow Up Recommendations for labs and diagnostics:   -Follow-up with the neurology  -Follow-up with cardiology in the next couple weeks  -Keep previous appointment with oncology      Reasons For Change In Medications and Indications for New Medications:      Day of Discharge     Vital Signs:  Temp:  [97.5 °F (36.4 °C)-97.8 °F (36.6 °C)] 97.7 °F (36.5 °C)  Heart Rate:  [79-88] 79  Resp:  [14-16] 14  BP: (115-146)/(73-95) 139/83    Physical Exam:  Physical Exam  Constitutional:       Appearance: Normal appearance.   HENT:      Head: Normocephalic and atraumatic.   Eyes:      Extraocular Movements: Extraocular movements intact.      Pupils: Pupils are equal, round, and reactive to light.   Cardiovascular:      Rate and Rhythm: Normal rate and regular rhythm.      Pulses: Normal pulses.      Heart sounds: No murmur heard.     No friction rub.   Pulmonary:      Effort: Pulmonary effort is normal. No respiratory distress.      Breath sounds: Normal breath sounds. No stridor.   Abdominal:      General: Abdomen is flat. There is no distension.      Palpations: Abdomen is soft.      Tenderness: There is no abdominal tenderness.   Musculoskeletal:         General: No swelling or tenderness.      Cervical back: Normal range of motion and neck supple.   Skin:     Coloration: Skin is not jaundiced.   Neurological:      General: No focal deficit present.      Mental Status: He is alert and oriented to person, place, and time.          Pertinent  and/or Most Recent Results     LAB RESULTS:      Lab 04/25/24  0314 04/24/24  1857   WBC 7.40 9.97   HEMOGLOBIN 13.7 13.7   HEMATOCRIT 43.9 43.2   PLATELETS 148 156   NEUTROS ABS 5.31 7.79*   IMMATURE GRANS (ABS) 0.02 0.02   LYMPHS ABS 1.44 1.35   MONOS ABS 0.59 0.76   EOS ABS 0.03 0.03   MCV 84.9 84.7   SED RATE  --  32*   CRP  --   2.98*   PROTIME  --  11.5   APTT  --  26.3*         Lab 04/26/24  0435 04/25/24  0314 04/24/24  1857   SODIUM  --  138 136   POTASSIUM  --  3.8 3.7   CHLORIDE  --  100 98   CO2  --  29.0 30.0*   ANION GAP  --  9.0 8.0   BUN  --  24* 25*   CREATININE  --  0.96 1.08   EGFR  --  81.9 71.1   GLUCOSE  --  142* 135*   CALCIUM  --  8.5* 9.0   HEMOGLOBIN A1C 12.90*  --   --    TSH 1.400  --   --              Lab 04/24/24  1857   PROTIME 11.5   INR 1.06         Lab 04/26/24  0435   CHOLESTEROL 153   LDL CHOL 83   HDL CHOL 55   TRIGLYCERIDES 77         Lab 04/24/24 1857   VITAMIN B 12 285         Brief Urine Lab Results  (Last result in the past 365 days)        Color   Clarity   Blood   Leuk Est   Nitrite   Protein   CREAT   Urine HCG        01/08/24 0931 Yellow   Clear   Negative   Negative   Negative   Negative                 Microbiology Results (last 10 days)       Procedure Component Value - Date/Time    Blood Culture - Blood, Arm, Right [[id number]]  (Normal) Collected: 04/24/24 2049    Lab Status: Preliminary result Specimen: Blood from Arm, Right Updated: 04/26/24 2100     Blood Culture No growth at 2 days    Blood Culture - Blood, Arm, Left [[id number]]  (Normal) Collected: 04/24/24 2048    Lab Status: Preliminary result Specimen: Blood from Arm, Left Updated: 04/26/24 2100     Blood Culture No growth at 2 days            MRI Angiogram Head Without Contrast    Result Date: 4/26/2024  Impression: 1.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 No new stenosis or occlusion identified. Electronically Signed: Mario Nassar MD  4/26/2024 1:11 PM EDT  Workstation ID: [id number]    MRI Brain With Contrast    Result Date: 4/26/2024  Impression: Impression: 1.Similar subacute subcortical punctate infarct involving the left frontal lobe. 2.No evidence of intracranial metastatic disease. 3.Other stable chronic findings. Electronically Signed: Mario Nassar MD  4/26/2024 1:06 PM EDT  Workstation  ID: [id number]    MRI Brain Without Contrast    Result Date: 4/25/2024  Impression: Impression: 1. Area of increased signal on diffusion weighted imaging with associated abnormal signal on FLAIR and T2-weighted images is compatible with a late acute or subacute area of ischemic change. 2. Extensive white matter changes additionally noted compatible small vessel ischemic disease in this age group. 3. Paranasal sinus disease Electronically Signed: Pineda Vargas MD  4/25/2024 8:23 PM EDT  Workstation ID: [id number]    CT Head Without Contrast    Result Date: 4/25/2024  Impression: Impression: 1.No acute intracranial process is identified. 2.Likely stable chronic findings. Electronically Signed: Mario Nassar MD  4/25/2024 4:24 PM EDT  Workstation ID: [id number]    MRI Wrist Right Without Contrast    Result Date: 4/25/2024  Impression: Impression: 1.Diffuse nonspecific edema throughout the subcutaneous soft tissues suggesting changes of soft tissue cellulitis. 2.No definitive evidence for abscess. 3.Increased fluid is noted within the tendon sheaths of the flexor compartment as well as the extensor compartments. The findings are nonspecific and may be related to tendinopathy. Spread of infection along the tendons and pyogenic tenosynovitis could potentially have this appearance. 4.Nonspecific joint effusion throughout the carpus. This finding may indicate early signs of developing septic or inflammatory arthropathy. A reactive joint effusion related to osteoarthritis could also have this appearance. 5.No definitive signs of osteomyelitis. Electronically Signed: Domenico Escalante MD  4/25/2024 10:43 AM EDT  Workstation ID: [id number]    XR Wrist 3+ View Right    Result Date: 4/24/2024  Impression: Impression: No acute bony abnormality Electronically Signed: Severino Mace  4/24/2024 7:11 PM EDT  Workstation ID: [id number]     Results for orders placed during the hospital encounter of 04/24/24    Duplex Carotid Ultrasound  CAR    Interpretation Summary    Right internal carotid artery demonstrates a less than 50% stenosis.    Left internal carotid artery demonstrates a less than 50% stenosis.      Results for orders placed during the hospital encounter of 04/24/24    Duplex Carotid Ultrasound CAR    Interpretation Summary    Right internal carotid artery demonstrates a less than 50% stenosis.    Left internal carotid artery demonstrates a less than 50% stenosis.      Results for orders placed during the hospital encounter of 04/24/24    Adult Transthoracic Echo Complete W/ Cont if Necessary Per Protocol (With Agitated Saline)    Interpretation Summary    The left ventricular cavity is mildly dilated.    Left ventricular diastolic function is consistent with (grade Ia w/high LAP) impaired relaxation.    Small patent foramen ovale present with right to left shunting indicated by saline contrast.    Saline test results are positive with valsalva manuever.    There is mild, bileaflet mitral valve thickening present.    Estimated right ventricular systolic pressure from tricuspid regurgitation is mildly elevated (35-45 mmHg).    Mild pulmonary hypertension is present.      Labs Pending at Discharge:  Pending Labs       Order Current Status    Blood Culture - Blood, Arm, Left Preliminary result    Blood Culture - Blood, Arm, Right Preliminary result            Procedures Performed           Consults:   Consults       Date and Time Order Name Status Description    4/26/2024  4:52 PM Inpatient Cardiology Consult      4/25/2024  8:09 PM Hematology & Oncology Inpatient Consult Completed     4/25/2024  5:51 PM Inpatient Hospitalist Consult Completed     4/25/2024  3:03 PM Inpatient Neurology Consult Stroke Completed     4/24/2024  9:14 PM Inpatient Orthopedic Surgery Consult Completed               Discharge Details        Discharge Medications        New Medications        Instructions Start Date   aspirin 81 MG EC tablet   81 mg, Oral, Daily       atorvastatin 80 MG tablet  Commonly known as: LIPITOR   80 mg, Oral, Nightly      carvedilol 3.125 MG tablet  Commonly known as: Coreg   3.125 mg, Oral, 2 Times Daily With Meals      clopidogrel 75 MG tablet  Commonly known as: PLAVIX   75 mg, Oral, Daily   Start Date: April 28, 2024     doxycycline 100 MG capsule  Commonly known as: VIBRAMYCIN   100 mg, Oral, 2 Times Daily      empagliflozin 10 MG tablet tablet  Commonly known as: Jardiance   10 mg, Oral, Daily      lisinopril 2.5 MG tablet  Commonly known as: Zestril   2.5 mg, Oral, Daily             Continue These Medications        Instructions Start Date   Dexcom G7 Sensor misc   1 each, Does not apply, Every 10 Days, Dx: E11.65      insulin glargine 100 UNIT/ML injection  Commonly known as: LANTUS, SEMGLEE   20 Units, Subcutaneous, Nightly      Insulin Lispro 100 UNIT/ML injection  Commonly known as: humaLOG   10 Units, Subcutaneous, 3 Times Daily Before Meals      Pen Needles 32G X 4 MM misc   1 each, Does not apply, 4 Times Daily Before Meals & Nightly               Allergies   Allergen Reactions    Penicillins Rash         Discharge Disposition:   Home or Self Care    Diet:  Hospital:  Diet Order   Procedures    Diet: Diabetic; Consistent Carbohydrate; Fluid Consistency: Thin (IDDSI 0)         Discharge Activity:         CODE STATUS:  Code Status and Medical Interventions:   Ordered at: 04/24/24 2110     Level Of Support Discussed With:    Patient     Code Status (Patient has no pulse and is not breathing):    CPR (Attempt to Resuscitate)     Medical Interventions (Patient has pulse or is breathing):    Full Support         Future Appointments   Date Time Provider Department Center   5/6/2024  8:15 AM LAB MD [facility] LAB NA [facility]   5/6/2024  8:30 AM Don Kramer MD [facility]           Time spent on Discharge including face to face service:  >30 minutes    Signature: Electronically signed by Stephane Mcduffie MD, 04/27/24, 11:28  EDT.  [facility] Hospitalist Team

## 2024-04-27 NOTE — PLAN OF CARE
Problem: Adult Inpatient Plan of Care  Goal: Absence of Hospital-Acquired Illness or Injury  Intervention: Identify and Manage Fall Risk  Recent Flowsheet Documentation  Taken 4/26/2024 2357 by Juliet Arroyo LPN  Safety Promotion/Fall Prevention:   activity supervised   assistive device/personal items within reach  Taken 4/26/2024 2200 by Juliet Arroyo LPN  Safety Promotion/Fall Prevention:   activity supervised   assistive device/personal items within reach  Taken 4/26/2024 2000 by Juliet Arroyo LPN  Safety Promotion/Fall Prevention:   assistive device/personal items within reach   activity supervised  Intervention: Prevent Skin Injury  Recent Flowsheet Documentation  Taken 4/26/2024 2357 by Juliet Arroyo LPN  Body Position: position changed independently  Taken 4/26/2024 2000 by Juliet Arroyo LPN  Body Position: position changed independently  Skin Protection:   adhesive use limited   skin-to-skin areas padded  Intervention: Prevent and Manage VTE (Venous Thromboembolism) Risk  Recent Flowsheet Documentation  Taken 4/26/2024 2357 by Juliet Arroyo LPN  Activity Management: activity encouraged  Taken 4/26/2024 2000 by Juliet Arroyo LPN  Activity Management: activity encouraged  VTE Prevention/Management:   bilateral   sequential compression devices off   patient refused intervention  Intervention: Prevent Infection  Recent Flowsheet Documentation  Taken 4/26/2024 2357 by Juliet Arroyo LPN  Infection Prevention:   environmental surveillance performed   equipment surfaces disinfected  Taken 4/26/2024 2200 by Juliet Arroyo LPN  Infection Prevention:   environmental surveillance performed   equipment surfaces disinfected  Taken 4/26/2024 2000 by Juliet Arroyo LPN  Infection Prevention:   environmental surveillance performed   equipment surfaces disinfected  Goal: Optimal Comfort and Wellbeing  Intervention: Provide Person-Centered Care  Recent Flowsheet Documentation  Taken 4/26/2024 2000  by Cruz, Juliet, LPN  Trust Relationship/Rapport:   empathic listening provided   questions encouraged   reassurance provided     Problem: Skin Injury Risk Increased  Goal: Skin Health and Integrity  Intervention: Optimize Skin Protection  Recent Flowsheet Documentation  Taken 4/26/2024 2357 by Juliet Arroyo LPN  Head of Bed (HOB) Positioning: HOB elevated  Taken 4/26/2024 2000 by Juliet Arroyo LPN  Pressure Reduction Techniques: frequent weight shift encouraged  Head of Bed (HOB) Positioning: HOB elevated  Pressure Reduction Devices: pressure-redistributing mattress utilized  Skin Protection:   adhesive use limited   skin-to-skin areas padded   Goal Outcome Evaluation:

## 2024-04-27 NOTE — PLAN OF CARE
Problem: Adult Inpatient Plan of Care  Goal: Plan of Care Review  Outcome: Met  Goal: Patient-Specific Goal (Individualized)  Outcome: Met  Goal: Absence of Hospital-Acquired Illness or Injury  Outcome: Met  Intervention: Identify and Manage Fall Risk  Recent Flowsheet Documentation  Taken 4/27/2024 1000 by Sierra Porter LPN  Safety Promotion/Fall Prevention: safety round/check completed  Taken 4/27/2024 0810 by Sierra Porter LPN  Safety Promotion/Fall Prevention: safety round/check completed  Intervention: Prevent Infection  Recent Flowsheet Documentation  Taken 4/27/2024 1000 by Sierra Porter LPN  Infection Prevention:   hand hygiene promoted   rest/sleep promoted   single patient room provided  Taken 4/27/2024 0810 by Sierra Porter LPN  Infection Prevention:   hand hygiene promoted   single patient room provided   rest/sleep promoted  Goal: Optimal Comfort and Wellbeing  Outcome: Met  Goal: Readiness for Transition of Care  Outcome: Met     Problem: Skin Injury Risk Increased  Goal: Skin Health and Integrity  Outcome: Met     Problem: Adjustment to Illness (Stroke, Ischemic/Transient Ischemic Attack)  Goal: Optimal Coping  Outcome: Met     Problem: Bowel Elimination Impaired (Stroke, Ischemic/Transient Ischemic Attack)  Goal: Effective Bowel Elimination  Outcome: Met     Problem: Cerebral Tissue Perfusion (Stroke, Ischemic/Transient Ischemic Attack)  Goal: Optimal Cerebral Tissue Perfusion  Outcome: Met     Problem: Cognitive Impairment (Stroke, Ischemic/Transient Ischemic Attack)  Goal: Optimal Cognitive Function  Outcome: Met     Problem: Communication Impairment (Stroke, Ischemic/Transient Ischemic Attack)  Goal: Improved Communication Skills  Outcome: Met     Problem: Functional Ability Impaired (Stroke, Ischemic/Transient Ischemic Attack)  Goal: Optimal Functional Ability  Outcome: Met     Problem: Respiratory Compromise (Stroke, Ischemic/Transient Ischemic Attack)  Goal: Effective Oxygenation and  Ventilation  Outcome: Met     Problem: Sensorimotor Impairment (Stroke, Ischemic/Transient Ischemic Attack)  Goal: Improved Sensorimotor Function  Outcome: Met     Problem: Swallowing Impairment (Stroke, Ischemic/Transient Ischemic Attack)  Goal: Optimal Eating and Swallowing without Aspiration  Outcome: Met     Problem: Urinary Elimination Impaired (Stroke, Ischemic/Transient Ischemic Attack)  Goal: Effective Urinary Elimination  Outcome: Met   Goal Outcome Evaluation:

## 2024-04-27 NOTE — PLAN OF CARE
Problem: Adult Inpatient Plan of Care  Goal: Plan of Care Review  Outcome: Ongoing, Progressing  Goal: Patient-Specific Goal (Individualized)  Outcome: Ongoing, Progressing  Goal: Absence of Hospital-Acquired Illness or Injury  Outcome: Ongoing, Progressing  Intervention: Identify and Manage Fall Risk  Recent Flowsheet Documentation  Taken 4/26/2024 2357 by Juliet Arroyo LPN  Safety Promotion/Fall Prevention:   activity supervised   assistive device/personal items within reach  Taken 4/26/2024 2200 by Juliet Arroyo LPN  Safety Promotion/Fall Prevention:   activity supervised   assistive device/personal items within reach  Taken 4/26/2024 2000 by Juliet Arroyo LPN  Safety Promotion/Fall Prevention:   assistive device/personal items within reach   activity supervised  Intervention: Prevent Skin Injury  Recent Flowsheet Documentation  Taken 4/26/2024 2357 by Juliet Arroyo LPN  Body Position: position changed independently  Taken 4/26/2024 2000 by Juliet Arroyo LPN  Body Position: position changed independently  Skin Protection:   adhesive use limited   skin-to-skin areas padded  Intervention: Prevent and Manage VTE (Venous Thromboembolism) Risk  Recent Flowsheet Documentation  Taken 4/26/2024 2357 by Juliet Arroyo LPN  Activity Management: activity encouraged  Taken 4/26/2024 2000 by Juliet Arroyo LPN  Activity Management: activity encouraged  VTE Prevention/Management:   bilateral   sequential compression devices off   patient refused intervention  Intervention: Prevent Infection  Recent Flowsheet Documentation  Taken 4/26/2024 2357 by Juliet Arroyo LPN  Infection Prevention:   environmental surveillance performed   equipment surfaces disinfected  Taken 4/26/2024 2200 by Juliet Arroyo LPN  Infection Prevention:   environmental surveillance performed   equipment surfaces disinfected  Taken 4/26/2024 2000 by Juliet Arroyo LPN  Infection Prevention:   environmental surveillance performed    equipment surfaces disinfected  Goal: Optimal Comfort and Wellbeing  Outcome: Ongoing, Progressing  Intervention: Provide Person-Centered Care  Recent Flowsheet Documentation  Taken 4/26/2024 2000 by Juliet Arroyo LPN  Trust Relationship/Rapport:   empathic listening provided   questions encouraged   reassurance provided  Goal: Readiness for Transition of Care  Outcome: Ongoing, Progressing     Problem: Skin Injury Risk Increased  Goal: Skin Health and Integrity  Outcome: Ongoing, Progressing  Intervention: Optimize Skin Protection  Recent Flowsheet Documentation  Taken 4/26/2024 2357 by Juliet Arroyo LPN  Head of Bed (HOB) Positioning: HOB elevated  Taken 4/26/2024 2000 by Juliet Arroyo LPN  Pressure Reduction Techniques: frequent weight shift encouraged  Head of Bed (HOB) Positioning: HOB elevated  Pressure Reduction Devices: pressure-redistributing mattress utilized  Skin Protection:   adhesive use limited   skin-to-skin areas padded     Problem: Adjustment to Illness (Stroke, Ischemic/Transient Ischemic Attack)  Goal: Optimal Coping  Outcome: Ongoing, Progressing  Intervention: Support Psychosocial Response to Stroke  Recent Flowsheet Documentation  Taken 4/26/2024 2000 by Juliet Arroyo LPN  Family/Support System Care: caregiver stress acknowledged     Problem: Bowel Elimination Impaired (Stroke, Ischemic/Transient Ischemic Attack)  Goal: Effective Bowel Elimination  Outcome: Ongoing, Progressing     Problem: Cerebral Tissue Perfusion (Stroke, Ischemic/Transient Ischemic Attack)  Goal: Optimal Cerebral Tissue Perfusion  Outcome: Ongoing, Progressing     Problem: Cognitive Impairment (Stroke, Ischemic/Transient Ischemic Attack)  Goal: Optimal Cognitive Function  Outcome: Ongoing, Progressing     Problem: Communication Impairment (Stroke, Ischemic/Transient Ischemic Attack)  Goal: Improved Communication Skills  Outcome: Ongoing, Progressing     Problem: Functional Ability Impaired (Stroke,  Ischemic/Transient Ischemic Attack)  Goal: Optimal Functional Ability  Outcome: Ongoing, Progressing  Intervention: Optimize Functional Ability  Recent Flowsheet Documentation  Taken 4/26/2024 2357 by Juliet Arroyo LPN  Activity Management: activity encouraged  Taken 4/26/2024 2000 by Juliet Arroyo LPN  Activity Management: activity encouraged     Problem: Respiratory Compromise (Stroke, Ischemic/Transient Ischemic Attack)  Goal: Effective Oxygenation and Ventilation  Outcome: Ongoing, Progressing  Intervention: Optimize Oxygenation and Ventilation  Recent Flowsheet Documentation  Taken 4/26/2024 2357 by Juliet Arroyo LPN  Head of Bed (HOB) Positioning: HOB elevated  Taken 4/26/2024 2000 by Juliet Arroyo LPN  Head of Bed (HOB) Positioning: HOB elevated     Problem: Sensorimotor Impairment (Stroke, Ischemic/Transient Ischemic Attack)  Goal: Improved Sensorimotor Function  Outcome: Ongoing, Progressing  Intervention: Optimize Range of Motion, Motor Control and Function  Recent Flowsheet Documentation  Taken 4/26/2024 2357 by Juliet Arroyo LPN  Positioning/Transfer Devices:   pillows   in use  Taken 4/26/2024 2000 by Juliet Arroyo LPN  Positioning/Transfer Devices:   pillows   in use  Intervention: Optimize Sensory and Perceptual Ability  Recent Flowsheet Documentation  Taken 4/26/2024 2000 by Juliet Arroyo LPN  Pressure Reduction Techniques: frequent weight shift encouraged  Pressure Reduction Devices: pressure-redistributing mattress utilized     Problem: Swallowing Impairment (Stroke, Ischemic/Transient Ischemic Attack)  Goal: Optimal Eating and Swallowing without Aspiration  Outcome: Ongoing, Progressing     Problem: Urinary Elimination Impaired (Stroke, Ischemic/Transient Ischemic Attack)  Goal: Effective Urinary Elimination  Outcome: Ongoing, Progressing   Goal Outcome Evaluation:

## 2024-04-27 NOTE — PROGRESS NOTES
LOS: 1 day     Chief Complaint:  Gait abnormalities, stroke        SUBJECTIVE:    History taken from: patient chart RN    Interval History: Vlad Guerrero was admitted on 4/24/2024 with c/o wrist pain/edema and was found to have probable cellulitis. While working with PT, there were concerns for an abnormal gait with ataxia. An MRI brain was completed which showed a tiny ischemic stroke in the left fontal cortex, likely subacute embolic. He also has a more chronic appearing stroke in the cerebellum that is new since his scan earlier this year. Pt denies any significant issues with ambulation, but states he became a little unsteady about 2-3 months ago which could be consistent with the timing of the new cerebellar stroke. On exam, he was not significantly ataxia and was non-focal.   - Portions of the above HPI were copied from previous encounters and edited as appropriate.    Patient Complaints: Pt denies any complaints today. Eager to go home.       Review of Systems   Constitutional:  Negative for chills, diaphoresis and fatigue.   Eyes:  Negative for photophobia and visual disturbance.   Neurological:  Negative for dizziness, tremors, seizures, syncope, facial asymmetry, speech difficulty, weakness, light-headedness, numbness and headaches.          Pertinent PMH:  has a past medical history of Anemia, Colon cancer (2022), Diabetes mellitus, Hyperlipidemia, and Hypertension.   ________________________________________________     OBJECTIVE:  Pt examined at .     Neurologic Exam    On exam:  GENERAL: NAD, awake and alert  HEENT: Normocephalic, Atraumatic. Oral mucosa clear and moist.   RESP: Breathing unlabored, breath sounds normal  CARDIO: RRR  SKIN: Warm, dry. No apparent rash.   PSYCH: Mood/affect normal    NEURO:  Oriented x3  EOMI, PERRL, no visual field deficits  No facial asymmetry  Speech clear without dysarthria  Sensations intact and equal bilaterally  Strength 5/5 and equal in all extremities  No  ataxia    ________________________________________________   RESULTS REVIEW    VITAL SIGNS:  Temp:  [97.5 °F (36.4 °C)-97.8 °F (36.6 °C)] 97.7 °F (36.5 °C)  Heart Rate:  [79-88] 79  Resp:  [14-19] 14  BP: (115-146)/(73-95) 139/79    LABS:       Lab 04/25/24  0314 04/24/24 1857   WBC 7.40 9.97   HEMOGLOBIN 13.7 13.7   HEMATOCRIT 43.9 43.2   PLATELETS 148 156   NEUTROS ABS 5.31 7.79*   IMMATURE GRANS (ABS) 0.02 0.02   LYMPHS ABS 1.44 1.35   MONOS ABS 0.59 0.76   EOS ABS 0.03 0.03   MCV 84.9 84.7   SED RATE  --  32*   CRP  --  2.98*   PROTIME  --  11.5   APTT  --  26.3*         Lab 04/26/24  0435 04/25/24  0314 04/24/24 1857   SODIUM  --  138 136   POTASSIUM  --  3.8 3.7   CHLORIDE  --  100 98   CO2  --  29.0 30.0*   ANION GAP  --  9.0 8.0   BUN  --  24* 25*   CREATININE  --  0.96 1.08   EGFR  --  81.9 71.1   GLUCOSE  --  142* 135*   CALCIUM  --  8.5* 9.0   HEMOGLOBIN A1C 12.90*  --   --    TSH 1.400  --   --              Lab 04/24/24 1857   PROTIME 11.5   INR 1.06         Lab 04/26/24  0435   CHOLESTEROL 153   LDL CHOL 83   HDL CHOL 55   TRIGLYCERIDES 77         Lab 04/24/24 1857   VITAMIN B 12 285         UA          5/22/2023    22:45 9/24/2023    17:02 1/8/2024    09:31   Urinalysis   Squamous Epithelial Cells, UA  0-2     Specific Gravity, UA 1.034  1.028  1.032    Ketones, UA Negative  Trace  Negative    Blood, UA Negative  Negative  Negative    Leukocytes, UA Negative  Negative  Negative    Nitrite, UA Negative  Negative  Negative    RBC, UA  None Seen     WBC, UA  0-2     Bacteria, UA  None Seen         Lab Results   Component Value Date    TSH 1.400 04/26/2024    LDL 83 04/26/2024    HGBA1C 12.90 (H) 04/26/2024    HOVZLETC53 285 04/24/2024         IMAGING STUDIES:  MRI Angiogram Head Without Contrast    Result Date: 4/26/2024  1.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 No new stenosis or occlusion identified. Electronically Signed: Mario Nassar MD  4/26/2024 1:11 PM  EDT  Workstation ID: [id number]    MRI Brain With Contrast    Result Date: 4/26/2024  Impression: 1.Similar subacute subcortical punctate infarct involving the left frontal lobe. 2.No evidence of intracranial metastatic disease. 3.Other stable chronic findings. Electronically Signed: Mario Nassar MD  4/26/2024 1:06 PM EDT  Workstation ID: [id number]    MRI Brain Without Contrast    Result Date: 4/25/2024  Impression: 1. Area of increased signal on diffusion weighted imaging with associated abnormal signal on FLAIR and T2-weighted images is compatible with a late acute or subacute area of ischemic change. 2. Extensive white matter changes additionally noted compatible small vessel ischemic disease in this age group. 3. Paranasal sinus disease Electronically Signed: Pineda Vargas MD  4/25/2024 8:23 PM EDT  Workstation ID: [id number]    CT Head Without Contrast    Result Date: 4/25/2024  Impression: 1.No acute intracranial process is identified. 2.Likely stable chronic findings. Electronically Signed: Mario Nassar MD  4/25/2024 4:24 PM EDT  Workstation ID: [id number]    MRI Wrist Right Without Contrast    Result Date: 4/25/2024  Impression: 1.Diffuse nonspecific edema throughout the subcutaneous soft tissues suggesting changes of soft tissue cellulitis. 2.No definitive evidence for abscess. 3.Increased fluid is noted within the tendon sheaths of the flexor compartment as well as the extensor compartments. The findings are nonspecific and may be related to tendinopathy. Spread of infection along the tendons and pyogenic tenosynovitis could potentially have this appearance. 4.Nonspecific joint effusion throughout the carpus. This finding may indicate early signs of developing septic or inflammatory arthropathy. A reactive joint effusion related to osteoarthritis could also have this appearance. 5.No definitive signs of osteomyelitis. Electronically Signed: Domenico Escalante MD  4/25/2024 10:43 AM EDT  Workstation ID: [id number]      I reviewed the patient's new clinical results.    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 Small ischemic stroke in the left frontal cortex, appears more subacute, asymptomatic. He also has lesion in the right cerebellum that does not appear acute, but is also new since 1/2024 scan. He does have metastatic colon cancer so MRI with contrast was completed with no signs of metastatic disease. There is concern for possible cancer induced coagulopathy. Echo shows a PFO. Hx of stroke in 2023.   - Labs: A1C: 12.9, B12: 285, LDL: 83, TSH: 1.4  - Antithrombotics: DAPT with ASA 81mg daily and Plavix 75mg daily  - Statin: Lipitor 80mg qhs   - Pt has a PFO and there is a possibility of a malignancy induced hypercoagulable state. Neurology would recommend anticoagulation, however, pt has been reluctant to take even ASA in the past due to skin bleeding. He is okay with ASA and Plavix for now. Heme/onc has been consulted and will consult cardiology for their opinion regarding PFO and need for anticoagulation vs closure. He may require anticoagulation for hypercoagulable state so PFO closure may not be necessary.      2. Small patent foramen ovale present with right to left shunting indicated by saline contrast. - Saline test results are positive with valsalva manuever.  - DAPT for now   - Cardiology consult for recommendations      3. Gait abnormality, baseline per pt. Pt has flat feet with a calcaneal gait. No significant ataxia noted on exam.  - Recommend PT/OT, fall precautions   - Some chemo patients develop peripheral neuropathy which can cause some gait impairment. Recommend outpt EMG/NCS     4.  Right hand pain, resolved edema decreased, per radiology report of MRI soft tissue  cellulitis versus possible early septic or inflammatory arthropathy.  On IV ceftriaxone and IV doxycycline culture still pending orthopedics following     5. Mixed hyperlipidemia  - Well controlled, recommend diet and lifestyle modifications.     6. Diabetes mellitus type 2, uncontrolled  - Strict glycemic control, SSI per primary     7. Hypertension  - Strict BP control, monitor per protocol  - No indicatio for permissive HTN with subacute appearing stroke      8. Colon cancer with metastasis to liver, no result MRI abdomen 3/22/2024 shows findings suspicious for malignant disease progression within the liver, hematology oncology consulted     9. B12 Deficiency  - Replacement ordered  - Recommend cyanocobalamin 1000mcg PO daily or IM monthly       **Please refer to previous notes for further details and recommendations.     I discussed the patient's findings and my recommendations with patient, nursing staff, and consulting provider    ANEUDY Angel  04/27/24  10:32 EDT

## 2024-04-27 NOTE — OUTREACH NOTE
Prep Survey      Flowsheet Row Responses   [facility]? [facility]   Is LACE score < 7 ? No   Eligibility Readm Mgmt   Discharge diagnosis Cellulitis   Does the patient have one of the following disease processes/diagnoses(primary or secondary)? Other   Does the patient have Home health ordered? No   Is there a DME ordered? No   Prep survey completed? Yes            NITHYA HENLEY - Registered Nurse

## 2024-04-27 NOTE — CONSULTS
Diabetes Education    Patient Name:  Vlad Guerrero  YOB: 1945  MRN: [id number]  Admit Date:  4/24/2024        Follow-up with patient to review A1c result of 12.9% on 4/26/2024. A1c info sheet given with discussion on A1c target and healthy blood sugar range. Patient stated that he takes his insulin as prescribed. Patient stated he drinks zero sugar drinks.Recommended to patient to alternate water with his zero sugar drinks. 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 Patient has no further questions or concerns related to diabetes at this time.      Electronically signed by:  Nikki Royal RN  04/26/24 20:49 EDT

## 2024-04-29 ENCOUNTER — TELEPHONE (OUTPATIENT)
Dept: DIABETES SERVICES | Facility: HOSPITAL | Age: 79
End: 2024-04-29
Payer: OTHER GOVERNMENT

## 2024-04-29 LAB
BACTERIA SPEC AEROBE CULT: NORMAL
BACTERIA SPEC AEROBE CULT: NORMAL
QT INTERVAL: 409 MS
QTC INTERVAL: 474 MS

## 2024-04-30 ENCOUNTER — TELEPHONE (OUTPATIENT)
Dept: DIABETES SERVICES | Facility: HOSPITAL | Age: 79
End: 2024-04-30
Payer: OTHER GOVERNMENT

## 2024-05-01 ENCOUNTER — READMISSION MANAGEMENT (OUTPATIENT)
Dept: CALL CENTER | Facility: HOSPITAL | Age: 79
End: 2024-05-01
Payer: OTHER GOVERNMENT

## 2024-05-01 NOTE — OUTREACH NOTE
Medical Week 1 Survey      Flowsheet Row Responses   [facility] patient discharged from? [facility]   Does the patient have one of the following disease processes/diagnoses(primary or secondary)? Other   Week 1 attempt successful? Yes   Call start time 1552   Call end time 1556   Discharge diagnosis Cellulitis   Meds reviewed with patient/caregiver? Yes   Is the patient having any side effects they believe may be caused by any medication additions or changes? No   Does the patient have all medications ordered at discharge? Yes   Is the patient taking all medications as directed (includes completed medication regime)? Yes   Does the patient have a primary care provider?  Yes   Does the patient have an appointment with their PCP within 7 days of discharge? Yes   Has the patient kept scheduled appointments due by today? N/A   Has home health visited the patient within 72 hours of discharge? N/A   Psychosocial issues? No   Did the patient receive a copy of their discharge instructions? Yes   Nursing interventions Reviewed instructions with patient   What is the patient's perception of their health status since discharge? Improving   Is the patient/caregiver able to teach back signs and symptoms related to disease process for when to call PCP? Yes   Is the patient/caregiver able to teach back signs and symptoms related to disease process for when to call 911? Yes   Is the patient/caregiver able to teach back the hierarchy of who to call/visit for symptoms/problems? PCP, Specialist, Home health nurse, Urgent Care, ED, 911 Yes   Week 1 call completed? Yes   Call end time 1556            Brissa ACEVEDO - Registered Nurse

## 2024-05-22 ENCOUNTER — TELEPHONE (OUTPATIENT)
Dept: ONCOLOGY | Facility: CLINIC | Age: 79
End: 2024-05-22

## 2024-05-22 NOTE — TELEPHONE ENCOUNTER
"    Caller: Vlad Guerrero \"Fredi\"    Relationship to patient: Self    Best call back number: [phone]    Chief complaint: R/S    Type of visit: LAB AND FOLLOW UP 1    Requested date: 5-28, 5-29 OR 5-30    If rescheduling, when is the original appointment: 5-6-2024              "

## 2024-06-17 ENCOUNTER — TELEPHONE (OUTPATIENT)
Dept: ONCOLOGY | Facility: CLINIC | Age: 79
End: 2024-06-17
Payer: OTHER GOVERNMENT

## 2024-06-17 NOTE — TELEPHONE ENCOUNTER
Called to r/s appt due to Dr Kramer being out of the office this day. No answer, unable to v/m for call back

## 2024-06-18 ENCOUNTER — TELEPHONE (OUTPATIENT)
Dept: ONCOLOGY | Facility: CLINIC | Age: 79
End: 2024-06-18
Payer: OTHER GOVERNMENT

## 2024-06-18 NOTE — TELEPHONE ENCOUNTER
Called to r/s appt due to Dr Kramer being out of the office on 6/20, appt was cancelled. No answer, unable to leave v/m for call back

## 2024-06-21 NOTE — PROGRESS NOTES
HEMATOLOGY ONCOLOGY OUTPATIENT FOLLOW-UP       Patient name: Vlad Guerrero  : 1945  MRN: [id number]  Primary Care Physician: Vlad Moreland MD  Referring Physician: Vlad Moreland MD  Reason For Consult: Stage III colon cance    Chief Complaint   Patient presents with    Follow-up     Follow up  Malignant neoplasm of ascending colon     HPI:   History of Present Illness:  Vlad Guerrero is 78 y.o. male who presented to our office on 23 for consultation regarding    2023: Mr. Guerrero dated the beginning of his present illness to sometime at the end of  when he started to feel fatigued.  He was seen at the [facility] and had laboratory exams that revealed microcytic anemia.  He had evidence of iron deficiency and received intravenous iron in the hospital.  He had upper and lower gastrointestinal endoscopies that demonstrated a cecal tumor that measured 4 to 5 cm and was fungating in appearance.  It was circumferential and was next to the ileocecal valve.  The upper gastrointestinal endoscopy revealed no abnormalities.  On this basis he was on December 15, 2022 he was taken to the hospital and underwent a right hemicolectomy without complications.  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  It corresponded to a grade 2 malignancy that invaded through the muscularis propria into the pericolonic tissues.  Macroscopic tumor perforation or lymphovascular space invasion were not present.  Perineural invasion was not present either.  All margins of excision were negative.  All of 36 lymph nodes submitted to were positive for involvement with malignancy.  The disease was [place] staged as [id number].  Loss of expression of mismatch repair enzymes was not documented.  Mr. Guerrero was discharged to continue treatment as outpatient.  At the time of this visit he was recovering well from  the surgery.  His incision had healed completely and he had no drains or suture materials.  He had returned home and was eating well.  He had yet to return to work.  He had been afebrile and free of nausea.  For the most part his weight had been stable.  After reviewing the records a long conversation, of approximately 1 hour, was had with the patient in regards to options of treatment.  The nature of his disease as well as the likelihood of recurrence were described.  The use of adjuvant chemotherapy to increase the rate of cure after surgery was explained.  Side effects were described in detail.  The need for a port was expressed as well.  A treatment plan was placed. A decision was made to treat him with three months of CAPOX given the characteristics of his disease.     2/6/2023: Received the first cycle of adjuvant chemotherapy without any side effects. On the day of this visit feeling well and without any new symptoms, except a very mild rash, especially on the forearms, but no oral pain. Had stools of diminished consistency for a few days but now resolved. The exam was rather unremarkable, except for a few very light erythematous macules on the forearms.     2/27/2023: Feeling well and without new symptoms.  As active as before.  Eating well and without unintended weight loss.  Stronger and more energetic since the institution of vitamin B12 and iron.  No chest pains and cough.  No abdominal pain or diarrhea.  On exam no changes.  A decision was made to continue with the same treatment.  Laboratory exams were reviewed.  He was to see me again in approximately 4 weeks from this date with new scans.    3/27/2023: Suffered an ischemic stroke.  MRI on March 10, 2023 reported a 7 mm focus of restricted diffusion in the left periventricular white matter of the posterior left frontal lobe.  This was felt to be suspicious for an acute/subacute infarct.  There was also evidence of previous lacunar strokes.  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  There was bilateral internal carotid artery stenosis with 60% stenosis estimated at the right and not greater than 50% at the left.  Chemotherapy was held following discharge.  He was left without any sequela.  At the time of this visit he was entirely asymptomatic.  He was convinced a large part of his problem was that he had suffered from hyperglycemia, consistently for some time.  Indeed his glucose was between 152 mg/dL and 193 mg/dL in the preceding days.  However, he reported at home glucose readings of greater than 400 mg/dL..  On exam there were no changes.  The laboratory exams reported a blood count with normocytic anemia and mild thrombocytopenia.  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    4/14/2023: Completed 3 months of treatment with CAPOX.  On the day of this visit not feeling very well.  Weak and tired.  Not very good appetite although eating well.  Having some dysgeusia.  Afebrile.  No chest pains or cough and no abdominal pain.  Had maintain regular bowel activity.  No edema.  On exam no changes.  A decision was made to stop the chemotherapy.  He was to get intravenous fluids on the day of this visit.  To return to see me in 3 weeks.  Tentatively to have scans in early June 2023.    5/5/2023: Feeling progressively stronger. Eating better and slowly regaining weight. Afebrile. No more nausea. No diarrhea and now with regular bowel activity. On exam alert, conversant and well oriented. No pale or jaundice. No oral lesions and no palpable lymph  nodes. Lungs clear and abdomen soft. Minimal edema of the right lower extremity. The laboratory exams revealed persistent anemia with a tendency to macrocytosis. Hemoglobin and platelets within normal ranges. A decision was made to continue to observe and I asked him to see me in approximately 3 months with new scans.     8/7/2023: Feels as well as at the time of the last visit. Active and returned to work full time. Eating well and no nausea or vomiting. No chest pain or cough and no abdominal pain. On exam no changes, though he had lost a large amount of weight since the previous visit. The imaging studies suggested a new lesion in the liver, as well as several lesions in the spleen of unclear cause. Reviewed the images and the report of the scans. Discussed with him at length and explained the findings. Discussed with him the plans for a MRI. He will see me with results.     8/28/2023: Entirely asymptomatic.  Working without limitations.  Eating well.  Weight stable.  Afebrile.  No pain.  On exam no changes.  Laboratory exams were reviewed and discussed with him.  In spite of the fact things on the scans the Carcinoembryonic antigen has not increased.  However both the CT and the MRI suggest one isolated metastatic deposit in segment 8 of the liver.  Discussed with him the options at this time.  I believe a biopsy is essential and after that he would be treated with chemotherapy following which surgery, if no new lesions have appeared, could be considered.  He may still be curable even in the setting of metastatic disease.  Discussed with him at great length.  Explained the steps.  Sent a communication to Dr. Davis, the patient's surgeon.    9/11/2023: Feels about the same as before.  No new symptoms.  As active as before and working.  Eating well and with good appetite.  No unintended weight loss.  Afebrile.  On exam alert, conversant and in good spirits.  No distress.  No jaundice or pallor.  Lungs clear  and heart regular.  Abdomen soft.  The liver is not palpable.  No edema.  Laboratory exams were reviewed.  The liver biopsy report confirms metastatic colorectal cancer.  This appears to be an isolated metastases.  On this basis treatment with chemotherapy followed by surgery is not ordered.  I have asked him to have a PET scan and discussed the study with him.  Discussed the objectives of the treatment and its requirements.  Placed the treatment plan with 5 fluorouracil, irinotecan and panitumumab and discussed with him.  To begin as soon as possible.    9/25/2023: In the office before the next scheduled time.  He called to report that over the weekend he had had between 5 and 8 defecations of liquid stool.  He took loperamide irregularly and it did not seem to provide much relief.  He was able to eat and drink without any difficulties and was never nauseated.  He was seen in the emergency room on 9/24/2023 and he was not found to have any dehydration or other evidence of complications.  They discharged him.  At the time of this visit feeling better.  As of this time he had not had any liquid defecations.  He had continued to eat and drink fluids without any problems.  He had no chest pains and had not had any dyspnea.  He was also free of abdominal pain.  On exam alert, oriented and conversant.  Seemed well-hydrated and was not jaundiced or pale.  Lungs clear.  Heart regular.  Abdomen soft.  A decision was made to continue with the same plan.  I independently reviewed the images of the PET scan and discussed with him.  It reveals only an abnormal lesion in the liver as identified before.  No suggestion of distant metastatic disease.  Discussed with him the significance of this and explained the possibility of surgery and potentially cure.    11/15/2023: Completed 4 cycles of FOLFIRI/panitumumab.  Experienced the expected side effects, particularly skin rash.  However, the treatment proceeded without major  "complications.  Today he tells me he has been feeling reasonably well and has continued to work part-time.  He enjoys his job.  He has been eating about as much as he had been eating before but he has lost some weight without intention.  He did have persistent diarrhea that responded only to large doses of loperamide.  At this point he no longer has diarrhea.  He has been without chest pains or cough and denies as well abdominal pain.  On exam he still has some erythema on the nasolabial regions on both sides.  The lungs are clear.  The heart is regular and the abdomen soft.  Liver and spleen do not seem to be enlarged.  The laboratory exams were reviewed and discussed with him.  To have another PET scan and consider surgical excision.  He will need to go to [place] for this.    12/11/2023: Feeling reasonably well at this time without any new complaints.  His diarrhea is essentially resolved although he still has soft defecations intermittently.  He is eating well and has a good appetite.  He has had no chest pains and has been without cough.  He denies abdominal pain.  No melena, hematochezia or hematuria.  No peripheral edema.  On exam no changes.  The PET scan reveals complete response with no residual evidence of disease activity.  I have discussed with Dr. Praveen Whittaker in [place] and he requested that a MRI be done prior to deciding on surgery.  Discussed with Mr. Guerrero.  Explained the plans.  He is to have the MRI and will see me with the results.    12/27/2023: Without new symptoms.  Has not had the MRI because of scheduling problems.  He feels lightheaded at times and \"I am not as sure footed as I was before\".  He has had no falls and he continues to work full-time.  He has been eating well and has regained some of the weight that he had lost.  He has been afebrile.  No chest pains or cough.  No abdominal pain or diarrhea and no dysuria.  No skin rash.  On exam no changes.  The laboratory exams were " reviewed and discussed with him.  To reschedule the MRI for the next couple of weeks.  Discussed with him.    3/18/2024: Feeling very well. His appetite is good and he has gained weight. He has been working without any difficulties. He denies chest pain or cough. No abdominal pain or diarrhea and no dysuria. On exam alert and conversant. In good spirits and in no distress. No jaundice. No oral lesions and respirations not labored. The lungs are clear and the heart is regular. Abdomen is soft and not tender. The laboratory exams reveal a blood count in the normal ranges. He persists with hyperglycemia. The alkaline phosphatase has risen some in the recent past. He is to have the MRI of the liver. He will see me with the results.     6/26/2024: Back after an absence of a few weeks and a couple of missed appointments.  He feels about the same as he felt before.  Following the last admission to the hospital he was transferred to an assisted care living facility where he has been doing progressively better.  Persists with tremors.  Eats well.  Has not lost weight.  Denies abdominal pain.  Has not had diarrhea, melena or hematochezia.  On exam in no distress.  No jaundice.  Not pale.  The lungs are clear bilaterally and the heart regular.  The abdomen is soft and without hepatomegaly or splenomegaly.  No edema.  The laboratory exams were reviewed.  To obtain a Carcinoembryonic antigen today.  He will see me in a couple of weeks with new scans as it has been more than 3 months since the last 1.  Consider treatment with an irinotecan based regimen that seem to result in a very pronounced response in the recent past.    The following portions of the patient's history were reviewed and updated as appropriate: allergies, current medications, past family history, past medical history, past social history, past surgical history and problem list.    Past Medical History:   Diagnosis Date    Anemia     Colon cancer 2022    colon     Diabetes mellitus     Hyperlipidemia     Hypertension      Past Surgical History:   Procedure Laterality Date    APPENDECTOMY      CARDIAC CATHETERIZATION      COLON RESECTION N/A 12/15/2022    Procedure: COLON RESECTION RIGHT;  Surgeon: Percy Davis MD;  Location: [facility] MAIN OR;  Service: General;  Laterality: N/A;    COLONOSCOPY N/A 11/11/2022    Procedure: COLONOSCOPY WITH BIOPSY AND POLYPECTOMY;  Surgeon: Billy Julien MD;  Location: [facility] ENDOSCOPY;  Service: Gastroenterology;  Laterality: N/A;  Impression:  1.  Large 4-5cm fulgurating circumferential ulcerated mass in the very proximal part of the ascending colon next to ileocecal valve multiple biopsies were performed.  This is highly concerning for colon malignancy.  2.  2 polyp rem    ENDOSCOPY N/A 11/11/2022    Procedure: ESOPHAGOGASTRODUODENOSCOPY with biopsy X1;  Surgeon: Billy Julien MD;  Location: [facility] ENDOSCOPY;  Service: Gastroenterology;  Laterality: N/A;  5.  Upper endoscopy lamination unremarkable.       PORTACATH PLACEMENT Right 1/12/2023    Procedure: INSERTION OF PORTACATH;  Surgeon: Percy Davis MD;  Location: [facility] MAIN OR;  Service: General;  Laterality: Right;    TONSILLECTOMY         Current Outpatient Medications:     aspirin 81 MG EC tablet, Take 1 tablet by mouth Daily for 90 days., Disp: 90 tablet, Rfl: 0    insulin glargine (LANTUS, SEMGLEE) 100 UNIT/ML injection, Inject 20 Units under the skin into the appropriate area as directed Every Night for 30 days., Disp: 6 mL, Rfl: 0    Insulin Lispro (humaLOG) 100 UNIT/ML injection, Inject 10 Units under the skin into the appropriate area as directed 3 (Three) Times a Day Before Meals., Disp: , Rfl:     Insulin Pen Needle (Pen Needles) 32G X 4 MM misc, Use 1 each 4 (Four) Times a Day Before Meals & at Bedtime., Disp: 200 each, Rfl: 2    atorvastatin (LIPITOR) 80 MG tablet, Take 1 tablet by mouth Every Night for 90 days., Disp: 90 tablet, Rfl: 0     carvedilol (Coreg) 3.125 MG tablet, Take 1 tablet by mouth 2 (Two) Times a Day With Meals for 30 days., Disp: 60 tablet, Rfl: 0    clopidogrel (PLAVIX) 75 MG tablet, Take 1 tablet by mouth Daily for 90 days., Disp: 30 tablet, Rfl: 0    Continuous Blood Gluc Sensor (Dexcom G7 Sensor) misc, Use 1 each Every 10 (Ten) Days. Dx: E11.65 (Patient not taking: Reported on 2024), Disp: 3 each, Rfl: 2    empagliflozin (Jardiance) 10 MG tablet tablet, Take 1 tablet by mouth Daily for 60 days., Disp: 30 tablet, Rfl: 1    lisinopril (Zestril) 2.5 MG tablet, Take 1 tablet by mouth Daily for 60 days., Disp: 30 tablet, Rfl: 1    Allergies   Allergen Reactions    Penicillins Rash     Family History   Problem Relation Age of Onset    Pneumonia Mother 94        SARS-CoV2    Colon cancer Father 62    Heart disease Sister      Cancer-related family history includes Colon cancer (age of onset: 62) in his father.    Social History     Tobacco Use    Smoking status: Former     Current packs/day: 0.00     Average packs/day: 1 pack/day for 2.0 years (2.0 ttl pk-yrs)     Types: Cigarettes     Start date:      Quit date:      Years since quittin.5    Smokeless tobacco: Never   Vaping Use    Vaping status: Never Used   Substance Use Topics    Alcohol use: Not Currently    Drug use: Never     Social History     Social History Narrative    Not on file      ROS:     Review of Systems   Constitutional:  Negative for activity change, appetite change, chills, diaphoresis, fatigue, fever and unexpected weight change.   HENT:  Negative for congestion, dental problem, drooling, ear discharge, ear pain, facial swelling, hearing loss, mouth sores, nosebleeds, postnasal drip, rhinorrhea, sinus pressure, sinus pain, sneezing, sore throat, tinnitus, trouble swallowing and voice change.    Eyes:  Negative for photophobia, pain, discharge, redness, itching and visual disturbance.   Respiratory:  Negative for apnea, cough, choking, chest  "tightness, shortness of breath, wheezing and stridor.    Cardiovascular:  Negative for chest pain, palpitations and leg swelling.   Gastrointestinal:  Negative for abdominal distention, abdominal pain, anal bleeding, blood in stool, constipation, diarrhea, nausea, rectal pain and vomiting.   Endocrine: Negative for cold intolerance, heat intolerance, polydipsia and polyuria.   Genitourinary:  Negative for decreased urine volume, difficulty urinating, dysuria, flank pain, frequency, genital sores, hematuria and urgency.   Musculoskeletal:  Negative for arthralgias, back pain, gait problem, joint swelling, myalgias, neck pain and neck stiffness.   Skin:  Negative for color change, pallor and rash.   Neurological:  Negative for dizziness, tremors, seizures, syncope, facial asymmetry, speech difficulty, weakness, light-headedness, numbness and headaches.   Hematological:  Negative for adenopathy. Does not bruise/bleed easily.   Psychiatric/Behavioral:  Negative for agitation, behavioral problems, confusion, decreased concentration, hallucinations, self-injury, sleep disturbance and suicidal ideas. The patient is not nervous/anxious.      Objective:    Vitals:    06/26/24 0833   BP: 139/70   Pulse: 89   SpO2: 95%   Weight: 82.5 kg (181 lb 12.8 oz)   Height: 185.4 cm (73\")   PainSc: 0-No pain     Body mass index is 23.99 kg/m².  ECOG  (0) Fully active, able to carry on all predisease performance without restriction    Physical Exam:     Physical Exam  Constitutional:       General: He is not in acute distress.     Appearance: Normal appearance. He is not ill-appearing, toxic-appearing or diaphoretic.   HENT:      Head: Normocephalic and atraumatic.      Right Ear: External ear normal.      Left Ear: External ear normal.      Nose: Nose normal.      Mouth/Throat:      Mouth: Mucous membranes are moist.      Pharynx: Oropharynx is clear.   Eyes:      General: No scleral icterus.        Right eye: No discharge.         Left " eye: No discharge.      Conjunctiva/sclera: Conjunctivae normal.      Pupils: Pupils are equal, round, and reactive to light.   Cardiovascular:      Rate and Rhythm: Normal rate and regular rhythm.      Pulses: Normal pulses.      Heart sounds: Normal heart sounds. No murmur heard.     No friction rub. No gallop.   Pulmonary:      Effort: No respiratory distress.      Breath sounds: No stridor. No wheezing, rhonchi or rales.   Chest:      Chest wall: No tenderness.   Abdominal:      General: Abdomen is flat. Bowel sounds are normal. There is no distension.      Palpations: Abdomen is soft. There is no mass.      Tenderness: There is no abdominal tenderness. There is no right CVA tenderness, left CVA tenderness, guarding or rebound.   Musculoskeletal:         General: No swelling, tenderness, deformity or signs of injury.      Cervical back: No rigidity.      Right lower leg: No edema.      Left lower leg: No edema.   Lymphadenopathy:      Cervical: No cervical adenopathy.   Skin:     General: Skin is warm and dry.      Coloration: Skin is not jaundiced.      Findings: No bruising or rash.   Neurological:      General: No focal deficit present.      Mental Status: He is alert and oriented to person, place, and time.      Cranial Nerves: No cranial nerve deficit.      Gait: Gait normal.   Psychiatric:         Mood and Affect: Mood normal.         Behavior: Behavior normal.         Thought Content: Thought content normal.         Judgment: Judgment normal.     KATIE Kramer MD performed a physical exam on 6/26/2024 as documented above.    Lab Results - Last 18 Months   Lab Units 06/26/24 0818 04/25/24 0314 04/24/24  1857   WBC 10*3/mm3 6.14 7.40 9.97   HEMOGLOBIN g/dL 13.5 13.7 13.7   HEMATOCRIT % 41.7 43.9 43.2   PLATELETS 10*3/mm3 181 148 156   MCV fL 86.9 84.9 84.7     Lab Results - Last 18 Months   Lab Units 06/26/24  0818 04/25/24  0314 04/24/24  1857 03/18/24  0747 01/09/24  0339 01/08/24  0951   SODIUM  mmol/L 136 138 136 139   < > 134*   POTASSIUM mmol/L 4.4 3.8 3.7 4.6   < > 4.9   CHLORIDE mmol/L 100 100 98 102   < > 99   CO2 mmol/L 27.0 29.0 30.0* 28.0   < > 29.0   BUN mg/dL 15 24* 25* 21   < > 24*   CREATININE mg/dL 0.96 0.96 1.08 1.00   < > 0.96   CALCIUM mg/dL 8.9 8.5* 9.0 8.7   < > 8.9   BILIRUBIN mg/dL 0.5  --   --  0.3  --  0.3   ALK PHOS U/L 143*  --   --  130*  --  122*   ALT (SGPT) U/L 16  --   --  21  --  16   AST (SGOT) U/L 21  --   --  17  --  13   GLUCOSE mg/dL 342* 142* 135* 367*   < > 582*    < > = values in this interval not displayed.     Lab Results   Component Value Date    GLUCOSE 342 (H) 06/26/2024    BUN 15 06/26/2024    CREATININE 0.96 06/26/2024    EGFRIFNONA 76 11/15/2021    EGFRIFAFRI 87 11/15/2021    BCR 15.6 06/26/2024    K 4.4 06/26/2024    CO2 27.0 06/26/2024    CALCIUM 8.9 06/26/2024    ALBUMIN 3.6 06/26/2024    AST 21 06/26/2024    ALT 16 06/26/2024     Lab Results   Component Value Date    IRON 15 (L) 01/06/2023    TIBC 548 (H) 01/06/2023    FERRITIN 23.51 (L) 01/06/2023     Lab Results   Component Value Date    CEA 4.26 03/18/2024     Assessment & Plan     Assessment:  Isolated metastatic lesion in segment 8 of the liver.  Received 4 cycles of FOLFIRI/panitumumab with the expected side effects and no complications.  MRI of the liver confirmed at least 2 metastatic deposits, the 1 that was known before and a new 1.  Moderately differentiated adenocarcinoma of the ascending colon [id number] MMR proficient.  Completed Cape-Ox adjuvantly for 3 months in April 2023.  History of recurrent ischemic stroke.  On acetyl salicylic acid.  He has not been taking the atorvastatin and other medications prescribed to him.  Today only mildly hypertensive.  Reviewed the notes from the recent admission to the hospital.  Reviewed all imaging studies available including MRI of the brain and angiograms of the neck.  Reviewed the images and the report of the recent MRI of the liver.  Reviewed all  laboratory exams and discussed with him.  He is to see me in a couple of weeks with the results of a new scan.    Plan:  As above.    Don Kramer MD on 6/26/2024 at 11:06 AM.

## 2024-06-26 ENCOUNTER — LAB (OUTPATIENT)
Dept: LAB | Facility: HOSPITAL | Age: 79
End: 2024-06-26
Payer: OTHER GOVERNMENT

## 2024-06-26 ENCOUNTER — HOSPITAL ENCOUNTER (OUTPATIENT)
Dept: ONCOLOGY | Facility: HOSPITAL | Age: 79
Discharge: HOME | End: 2024-06-26
Payer: OTHER GOVERNMENT

## 2024-06-26 ENCOUNTER — OFFICE VISIT (OUTPATIENT)
Dept: ONCOLOGY | Facility: CLINIC | Age: 79
End: 2024-06-26
Payer: OTHER GOVERNMENT

## 2024-06-26 VITALS
DIASTOLIC BLOOD PRESSURE: 70 MMHG | SYSTOLIC BLOOD PRESSURE: 139 MMHG | BODY MASS INDEX: 24.09 KG/M2 | HEART RATE: 89 BPM | HEIGHT: 73 IN | WEIGHT: 181.8 LBS | OXYGEN SATURATION: 95 %

## 2024-06-26 DIAGNOSIS — C18.2 MALIGNANT NEOPLASM OF ASCENDING COLON: Primary | ICD-10-CM

## 2024-06-26 LAB
ALBUMIN SERPL-MCNC: 3.6 G/DL (ref 3.5–5.2)
ALBUMIN/GLOB SERPL: 1.2 G/DL
ALP SERPL-CCNC: 143 U/L (ref 39–117)
ALT SERPL W P-5'-P-CCNC: 16 U/L (ref 1–41)
ANION GAP SERPL CALCULATED.3IONS-SCNC: 9 MMOL/L (ref 5–15)
AST SERPL-CCNC: 21 U/L (ref 1–40)
BASOPHILS # BLD AUTO: 0.02 10*3/MM3 (ref 0–0.2)
BASOPHILS NFR BLD AUTO: 0.3 % (ref 0–1.5)
BILIRUB SERPL-MCNC: 0.5 MG/DL (ref 0–1.2)
BUN SERPL-MCNC: 15 MG/DL (ref 8–23)
BUN/CREAT SERPL: 15.6 (ref 7–25)
CALCIUM SPEC-SCNC: 8.9 MG/DL (ref 8.6–10.5)
CEA SERPL-MCNC: 5.06 NG/ML
CHLORIDE SERPL-SCNC: 100 MMOL/L (ref 98–107)
CO2 SERPL-SCNC: 27 MMOL/L (ref 22–29)
CREAT SERPL-MCNC: 0.96 MG/DL (ref 0.76–1.27)
DEPRECATED RDW RBC AUTO: 46.2 FL (ref 37–54)
EGFRCR SERPLBLD CKD-EPI 2021: 81.9 ML/MIN/1.73
EOSINOPHIL # BLD AUTO: 0.08 10*3/MM3 (ref 0–0.4)
EOSINOPHIL NFR BLD AUTO: 1.3 % (ref 0.3–6.2)
ERYTHROCYTE [DISTWIDTH] IN BLOOD BY AUTOMATED COUNT: 14.7 % (ref 12.3–15.4)
GLOBULIN UR ELPH-MCNC: 3.1 GM/DL
GLUCOSE SERPL-MCNC: 342 MG/DL (ref 65–99)
HCT VFR BLD AUTO: 41.7 % (ref 37.5–51)
HGB BLD-MCNC: 13.5 G/DL (ref 13–17.7)
HOLD SPECIMEN: NORMAL
LYMPHOCYTES # BLD AUTO: 1.22 10*3/MM3 (ref 0.7–3.1)
LYMPHOCYTES NFR BLD AUTO: 19.9 % (ref 19.6–45.3)
MCH RBC QN AUTO: 28.1 PG (ref 26.6–33)
MCHC RBC AUTO-ENTMCNC: 32.4 G/DL (ref 31.5–35.7)
MCV RBC AUTO: 86.9 FL (ref 79–97)
MONOCYTES # BLD AUTO: 0.53 10*3/MM3 (ref 0.1–0.9)
MONOCYTES NFR BLD AUTO: 8.6 % (ref 5–12)
NEUTROPHILS NFR BLD AUTO: 4.29 10*3/MM3 (ref 1.7–7)
NEUTROPHILS NFR BLD AUTO: 69.9 % (ref 42.7–76)
PLATELET # BLD AUTO: 181 10*3/MM3 (ref 140–450)
PMV BLD AUTO: 9.7 FL (ref 6–12)
POTASSIUM SERPL-SCNC: 4.4 MMOL/L (ref 3.5–5.2)
PROT SERPL-MCNC: 6.7 G/DL (ref 6–8.5)
RBC # BLD AUTO: 4.8 10*6/MM3 (ref 4.14–5.8)
SODIUM SERPL-SCNC: 136 MMOL/L (ref 136–145)
WBC NRBC COR # BLD AUTO: 6.14 10*3/MM3 (ref 3.4–10.8)

## 2024-06-26 PROCEDURE — G0463 HOSPITAL OUTPT CLINIC VISIT: HCPCS

## 2024-06-26 PROCEDURE — 25010000002 HEPARIN LOCK FLUSH PER 10 UNITS: Performed by: INTERNAL MEDICINE

## 2024-06-26 PROCEDURE — 85025 COMPLETE CBC W/AUTO DIFF WBC: CPT

## 2024-06-26 PROCEDURE — 82378 CARCINOEMBRYONIC ANTIGEN: CPT | Performed by: INTERNAL MEDICINE

## 2024-06-26 PROCEDURE — 36415 COLL VENOUS BLD VENIPUNCTURE: CPT

## 2024-06-26 PROCEDURE — 99213 OFFICE O/P EST LOW 20 MIN: CPT | Performed by: INTERNAL MEDICINE

## 2024-06-26 PROCEDURE — 80053 COMPREHEN METABOLIC PANEL: CPT | Performed by: INTERNAL MEDICINE

## 2024-06-26 RX ORDER — HEPARIN SODIUM (PORCINE) LOCK FLUSH IV SOLN 100 UNIT/ML 100 UNIT/ML
500 SOLUTION INTRAVENOUS AS NEEDED
Status: DISCONTINUED | OUTPATIENT
Start: 2024-06-26 | End: 2024-06-27 | Stop reason: HOSPADM

## 2024-06-26 RX ORDER — SODIUM CHLORIDE 0.9 % (FLUSH) 0.9 %
20 SYRINGE (ML) INJECTION AS NEEDED
Status: DISCONTINUED | OUTPATIENT
Start: 2024-06-26 | End: 2024-06-27 | Stop reason: HOSPADM

## 2024-06-26 RX ADMIN — Medication 20 ML: at 11:09

## 2024-06-26 RX ADMIN — HEPARIN 500 UNITS: 100 SYRINGE at 11:11

## 2024-07-03 ENCOUNTER — HOSPITAL ENCOUNTER (OUTPATIENT)
Dept: PET IMAGING | Facility: HOSPITAL | Age: 79
Discharge: HOME | End: 2024-07-03
Admitting: INTERNAL MEDICINE
Payer: OTHER GOVERNMENT

## 2024-07-03 DIAGNOSIS — C18.2 MALIGNANT NEOPLASM OF ASCENDING COLON: ICD-10-CM

## 2024-07-03 LAB — GLUCOSE BLDC GLUCOMTR-MCNC: 137 MG/DL (ref 70–105)

## 2024-07-03 PROCEDURE — 71260 CT THORAX DX C+: CPT

## 2024-07-03 PROCEDURE — 74177 CT ABD & PELVIS W/CONTRAST: CPT

## 2024-07-03 PROCEDURE — 82948 REAGENT STRIP/BLOOD GLUCOSE: CPT

## 2024-07-03 PROCEDURE — 25510000001 IOPAMIDOL PER 1 ML: Performed by: INTERNAL MEDICINE

## 2024-07-03 RX ADMIN — IOPAMIDOL 100 ML: 755 INJECTION, SOLUTION INTRAVENOUS at 08:09

## 2024-07-09 NOTE — PROGRESS NOTES
HEMATOLOGY ONCOLOGY OUTPATIENT FOLLOW-UP       Patient name: Vlad Guerrero  : 1945  MRN: [id number]  Primary Care Physician: Vlad Moreland MD  Referring Physician: No ref. provider found  Reason For Consult: Stage III colon cance    Chief Complaint   Patient presents with    Follow-up     Malignant neoplasm of ascending colon     HPI:   History of Present Illness:  Vlad Guerrero is 78 y.o. male who presented to our office on 23 for consultation regarding    2023: Mr. Guerrero dated the beginning of his present illness to sometime at the end of  when he started to feel fatigued.  He was seen at the [facility] and had laboratory exams that revealed microcytic anemia.  He had evidence of iron deficiency and received intravenous iron in the hospital.  He had upper and lower gastrointestinal endoscopies that demonstrated a cecal tumor that measured 4 to 5 cm and was fungating in appearance.  It was circumferential and was next to the ileocecal valve.  The upper gastrointestinal endoscopy revealed no abnormalities.  On this basis he was on December 15, 2022 he was taken to the hospital and underwent a right hemicolectomy without complications.  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  It corresponded to a grade 2 malignancy that invaded through the muscularis propria into the pericolonic tissues.  Macroscopic tumor perforation or lymphovascular space invasion were not present.  Perineural invasion was not present either.  All margins of excision were negative.  All of 36 lymph nodes submitted to were positive for involvement with malignancy.  The disease was [place] staged as [id number].  Loss of expression of mismatch repair enzymes was not documented.  Mr. Guerrero was discharged to continue treatment as outpatient.  At the time of this visit he was recovering well from the  surgery.  His incision had healed completely and he had no drains or suture materials.  He had returned home and was eating well.  He had yet to return to work.  He had been afebrile and free of nausea.  For the most part his weight had been stable.  After reviewing the records a long conversation, of approximately 1 hour, was had with the patient in regards to options of treatment.  The nature of his disease as well as the likelihood of recurrence were described.  The use of adjuvant chemotherapy to increase the rate of cure after surgery was explained.  Side effects were described in detail.  The need for a port was expressed as well.  A treatment plan was placed. A decision was made to treat him with three months of CAPOX given the characteristics of his disease.     2/6/2023: Received the first cycle of adjuvant chemotherapy without any side effects. On the day of this visit feeling well and without any new symptoms, except a very mild rash, especially on the forearms, but no oral pain. Had stools of diminished consistency for a few days but now resolved. The exam was rather unremarkable, except for a few very light erythematous macules on the forearms.     2/27/2023: Feeling well and without new symptoms.  As active as before.  Eating well and without unintended weight loss.  Stronger and more energetic since the institution of vitamin B12 and iron.  No chest pains and cough.  No abdominal pain or diarrhea.  On exam no changes.  A decision was made to continue with the same treatment.  Laboratory exams were reviewed.  He was to see me again in approximately 4 weeks from this date with new scans.    3/27/2023: Suffered an ischemic stroke.  MRI on March 10, 2023 reported a 7 mm focus of restricted diffusion in the left periventricular white matter of the posterior left frontal lobe.  This was felt to be suspicious for an acute/subacute infarct.  There was also evidence of previous lacunar strokes.  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  There was bilateral internal carotid artery stenosis with 60% stenosis estimated at the right and not greater than 50% at the left.  Chemotherapy was held following discharge.  He was left without any sequela.  At the time of this visit he was entirely asymptomatic.  He was convinced a large part of his problem was that he had suffered from hyperglycemia, consistently for some time.  Indeed his glucose was between 152 mg/dL and 193 mg/dL in the preceding days.  However, he reported at home glucose readings of greater than 400 mg/dL..  On exam there were no changes.  The laboratory exams reported a blood count with normocytic anemia and mild thrombocytopenia.  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    4/14/2023: Completed 3 months of treatment with CAPOX.  On the day of this visit not feeling very well.  Weak and tired.  Not very good appetite although eating well.  Having some dysgeusia.  Afebrile.  No chest pains or cough and no abdominal pain.  Had maintain regular bowel activity.  No edema.  On exam no changes.  A decision was made to stop the chemotherapy.  He was to get intravenous fluids on the day of this visit.  To return to see me in 3 weeks.  Tentatively to have scans in early June 2023.    5/5/2023: Feeling progressively stronger. Eating better and slowly regaining weight. Afebrile. No more nausea. No diarrhea and now with regular bowel activity. On exam alert, conversant and well oriented. No pale or jaundice. No oral lesions and no palpable lymph  nodes. Lungs clear and abdomen soft. Minimal edema of the right lower extremity. The laboratory exams revealed persistent anemia with a tendency to macrocytosis. Hemoglobin and platelets within normal ranges. A decision was made to continue to observe and I asked him to see me in approximately 3 months with new scans.     8/7/2023: Feels as well as at the time of the last visit. Active and returned to work full time. Eating well and no nausea or vomiting. No chest pain or cough and no abdominal pain. On exam no changes, though he had lost a large amount of weight since the previous visit. The imaging studies suggested a new lesion in the liver, as well as several lesions in the spleen of unclear cause. Reviewed the images and the report of the scans. Discussed with him at length and explained the findings. Discussed with him the plans for a MRI. He will see me with results.     8/28/2023: Entirely asymptomatic.  Working without limitations.  Eating well.  Weight stable.  Afebrile.  No pain.  On exam no changes.  Laboratory exams were reviewed and discussed with him.  In spite of the fact things on the scans the Carcinoembryonic antigen has not increased.  However both the CT and the MRI suggest one isolated metastatic deposit in segment 8 of the liver.  Discussed with him the options at this time.  I believe a biopsy is essential and after that he would be treated with chemotherapy following which surgery, if no new lesions have appeared, could be considered.  He may still be curable even in the setting of metastatic disease.  Discussed with him at great length.  Explained the steps.  Sent a communication to Dr. Davis, the patient's surgeon.    9/11/2023: Feels about the same as before.  No new symptoms.  As active as before and working.  Eating well and with good appetite.  No unintended weight loss.  Afebrile.  On exam alert, conversant and in good spirits.  No distress.  No jaundice or pallor.  Lungs clear  and heart regular.  Abdomen soft.  The liver is not palpable.  No edema.  Laboratory exams were reviewed.  The liver biopsy report confirms metastatic colorectal cancer.  This appears to be an isolated metastases.  On this basis treatment with chemotherapy followed by surgery is not ordered.  I have asked him to have a PET scan and discussed the study with him.  Discussed the objectives of the treatment and its requirements.  Placed the treatment plan with 5 fluorouracil, irinotecan and panitumumab and discussed with him.  To begin as soon as possible.    9/25/2023: In the office before the next scheduled time.  He called to report that over the weekend he had had between 5 and 8 defecations of liquid stool.  He took loperamide irregularly and it did not seem to provide much relief.  He was able to eat and drink without any difficulties and was never nauseated.  He was seen in the emergency room on 9/24/2023 and he was not found to have any dehydration or other evidence of complications.  They discharged him.  At the time of this visit feeling better.  As of this time he had not had any liquid defecations.  He had continued to eat and drink fluids without any problems.  He had no chest pains and had not had any dyspnea.  He was also free of abdominal pain.  On exam alert, oriented and conversant.  Seemed well-hydrated and was not jaundiced or pale.  Lungs clear.  Heart regular.  Abdomen soft.  A decision was made to continue with the same plan.  I independently reviewed the images of the PET scan and discussed with him.  It reveals only an abnormal lesion in the liver as identified before.  No suggestion of distant metastatic disease.  Discussed with him the significance of this and explained the possibility of surgery and potentially cure.    11/15/2023: Completed 4 cycles of FOLFIRI/panitumumab.  Experienced the expected side effects, particularly skin rash.  However, the treatment proceeded without major  "complications.  Today he tells me he has been feeling reasonably well and has continued to work part-time.  He enjoys his job.  He has been eating about as much as he had been eating before but he has lost some weight without intention.  He did have persistent diarrhea that responded only to large doses of loperamide.  At this point he no longer has diarrhea.  He has been without chest pains or cough and denies as well abdominal pain.  On exam he still has some erythema on the nasolabial regions on both sides.  The lungs are clear.  The heart is regular and the abdomen soft.  Liver and spleen do not seem to be enlarged.  The laboratory exams were reviewed and discussed with him.  To have another PET scan and consider surgical excision.  He will need to go to [place] for this.    12/11/2023: Feeling reasonably well at this time without any new complaints.  His diarrhea is essentially resolved although he still has soft defecations intermittently.  He is eating well and has a good appetite.  He has had no chest pains and has been without cough.  He denies abdominal pain.  No melena, hematochezia or hematuria.  No peripheral edema.  On exam no changes.  The PET scan reveals complete response with no residual evidence of disease activity.  I have discussed with Dr. Praveen Whittaker in [place] and he requested that a MRI be done prior to deciding on surgery.  Discussed with Mr. Guerrero.  Explained the plans.  He is to have the MRI and will see me with the results.    12/27/2023: Without new symptoms.  Has not had the MRI because of scheduling problems.  He feels lightheaded at times and \"I am not as sure footed as I was before\".  He has had no falls and he continues to work full-time.  He has been eating well and has regained some of the weight that he had lost.  He has been afebrile.  No chest pains or cough.  No abdominal pain or diarrhea and no dysuria.  No skin rash.  On exam no changes.  The laboratory exams were " reviewed and discussed with him.  To reschedule the MRI for the next couple of weeks.  Discussed with him.    3/18/2024: Feeling very well. His appetite is good and he has gained weight. He has been working without any difficulties. He denies chest pain or cough. No abdominal pain or diarrhea and no dysuria. On exam alert and conversant. In good spirits and in no distress. No jaundice. No oral lesions and respirations not labored. The lungs are clear and the heart is regular. Abdomen is soft and not tender. The laboratory exams reveal a blood count in the normal ranges. He persists with hyperglycemia. The alkaline phosphatase has risen some in the recent past. He is to have the MRI of the liver. He will see me with the results.     6/26/2024: Back after an absence of a few weeks and a couple of missed appointments.  He feels about the same as he felt before.  Following the last admission to the hospital he was transferred to an assisted care living facility where he has been doing progressively better.  Persists with tremors.  Eats well.  Has not lost weight.  Denies abdominal pain.  Has not had diarrhea, melena or hematochezia.  On exam in no distress.  No jaundice.  Not pale.  The lungs are clear bilaterally and the heart regular.  The abdomen is soft and without hepatomegaly or splenomegaly.  No edema.  The laboratory exams were reviewed.  To obtain a Carcinoembryonic antigen today.  He will see me in a couple of weeks with new scans as it has been more than 3 months since the last 1.  Consider treatment with an irinotecan based regimen that seem to result in a very pronounced response in the recent past.    7/12/2024: Back to review the results of the recent scans.  He feels well generally and continues to be as active as before.  As well, his appetite appears to be the same.  On exam he does not seem ill and he is conversant and well-oriented.  He is neither pale nor jaundiced and he seems well-hydrated.  The  lungs are clear and the heart regular.  The abdomen is soft.  There is no edema.  Laboratory exams reviewed.  Reviewed the images and the report of the scans.  He has 2 metastatic deposits in the liver and no other evidence of metastatic dissemination of his malignancy.  I believe it reasonable to treat him again with chemotherapy and consider some form of local therapy in the future, given how localized the disease is.  Will resume chemotherapy with irinotecan, cetuximab and 5-fluorouracil.  No 5-FU bolus.  I will see him again in approximately 4 weeks.    The following portions of the patient's history were reviewed and updated as appropriate: allergies, current medications, past family history, past medical history, past social history, past surgical history and problem list.    Past Medical History:   Diagnosis Date    Anemia     Colon cancer 2022    colon    Diabetes mellitus     Hyperlipidemia     Hypertension      Past Surgical History:   Procedure Laterality Date    APPENDECTOMY      CARDIAC CATHETERIZATION      COLON RESECTION N/A 12/15/2022    Procedure: COLON RESECTION RIGHT;  Surgeon: Percy Davis MD;  Location: [facility] MAIN OR;  Service: General;  Laterality: N/A;    COLONOSCOPY N/A 11/11/2022    Procedure: COLONOSCOPY WITH BIOPSY AND POLYPECTOMY;  Surgeon: Billy Julien MD;  Location: [facility] ENDOSCOPY;  Service: Gastroenterology;  Laterality: N/A;  Impression:  1.  Large 4-5cm fulgurating circumferential ulcerated mass in the very proximal part of the ascending colon next to ileocecal valve multiple biopsies were performed.  This is highly concerning for colon malignancy.  2.  2 polyp rem    ENDOSCOPY N/A 11/11/2022    Procedure: ESOPHAGOGASTRODUODENOSCOPY with biopsy X1;  Surgeon: Billy Julien MD;  Location: [facility] ENDOSCOPY;  Service: Gastroenterology;  Laterality: N/A;  5.  Upper endoscopy lamination unremarkable.       PORTACATH PLACEMENT Right 1/12/2023    Procedure: INSERTION OF  KYRIE;  Surgeon: Percy Davis MD;  Location: [facility] MAIN OR;  Service: General;  Laterality: Right;    TONSILLECTOMY         Current Outpatient Medications:     aspirin 81 MG EC tablet, Take 1 tablet by mouth Daily for 90 days., Disp: 90 tablet, Rfl: 0    Continuous Blood Gluc Sensor (Dexcom G7 Sensor) misc, Use 1 each Every 10 (Ten) Days. Dx: E11.65, Disp: 3 each, Rfl: 2    insulin aspart (NovoLOG FlexPen) 100 UNIT/ML solution pen-injector sc pen, Every 8 (Eight) Hours., Disp: , Rfl:     insulin glargine (LANTUS, SEMGLEE) 100 UNIT/ML injection, Inject 20 Units under the skin into the appropriate area as directed Every Night for 30 days., Disp: 6 mL, Rfl: 0    Insulin Pen Needle (Pen Needles) 32G X 4 MM misc, Use 1 each 4 (Four) Times a Day Before Meals & at Bedtime., Disp: 200 each, Rfl: 2    atorvastatin (LIPITOR) 80 MG tablet, Take 1 tablet by mouth Every Night for 90 days. (Patient not taking: Reported on 2024), Disp: 90 tablet, Rfl: 0    carvedilol (Coreg) 3.125 MG tablet, Take 1 tablet by mouth 2 (Two) Times a Day With Meals for 30 days., Disp: 60 tablet, Rfl: 0    clopidogrel (PLAVIX) 75 MG tablet, Take 1 tablet by mouth Daily for 90 days. (Patient not taking: Reported on 2024), Disp: 30 tablet, Rfl: 0    lisinopril (Zestril) 2.5 MG tablet, Take 1 tablet by mouth Daily for 60 days., Disp: 30 tablet, Rfl: 1    Allergies   Allergen Reactions    Penicillins Rash     Family History   Problem Relation Age of Onset    Pneumonia Mother 94        SARS-CoV2    Colon cancer Father 62    Heart disease Sister      Cancer-related family history includes Colon cancer (age of onset: 62) in his father.    Social History     Tobacco Use    Smoking status: Former     Current packs/day: 0.00     Average packs/day: 1 pack/day for 2.0 years (2.0 ttl pk-yrs)     Types: Cigarettes     Start date:      Quit date:      Years since quittin.5    Smokeless tobacco: Never   Vaping Use    Vaping  status: Never Used   Substance Use Topics    Alcohol use: Not Currently    Drug use: Never     Social History     Social History Narrative    Not on file      ROS:     Review of Systems   Constitutional:  Negative for activity change, appetite change, chills, diaphoresis, fatigue, fever and unexpected weight change.   HENT:  Negative for congestion, dental problem, drooling, ear discharge, ear pain, facial swelling, hearing loss, mouth sores, nosebleeds, postnasal drip, rhinorrhea, sinus pressure, sinus pain, sneezing, sore throat, tinnitus, trouble swallowing and voice change.    Eyes:  Negative for photophobia, pain, discharge, redness, itching and visual disturbance.   Respiratory:  Negative for apnea, cough, choking, chest tightness, shortness of breath, wheezing and stridor.    Cardiovascular:  Negative for chest pain, palpitations and leg swelling.   Gastrointestinal:  Negative for abdominal distention, abdominal pain, anal bleeding, blood in stool, constipation, diarrhea, nausea, rectal pain and vomiting.   Endocrine: Negative for cold intolerance, heat intolerance, polydipsia and polyuria.   Genitourinary:  Negative for decreased urine volume, difficulty urinating, dysuria, flank pain, frequency, genital sores, hematuria and urgency.   Musculoskeletal:  Negative for arthralgias, back pain, gait problem, joint swelling, myalgias, neck pain and neck stiffness.   Skin:  Negative for color change, pallor and rash.   Neurological:  Negative for dizziness, tremors, seizures, syncope, facial asymmetry, speech difficulty, weakness, light-headedness, numbness and headaches.   Hematological:  Negative for adenopathy. Does not bruise/bleed easily.   Psychiatric/Behavioral:  Negative for agitation, behavioral problems, confusion, decreased concentration, hallucinations, self-injury, sleep disturbance and suicidal ideas. The patient is not nervous/anxious.      Objective:    Vitals:    07/12/24 0813   BP: 133/81   Pulse:  "86   SpO2: 96%   Weight: 81.6 kg (180 lb)   Height: 185.4 cm (73\")   PainSc: 0-No pain     Body mass index is 23.75 kg/m².  ECOG  (0) Fully active, able to carry on all predisease performance without restriction    Physical Exam:     Physical Exam  Constitutional:       General: He is not in acute distress.     Appearance: Normal appearance. He is not ill-appearing, toxic-appearing or diaphoretic.   HENT:      Head: Normocephalic and atraumatic.      Right Ear: External ear normal.      Left Ear: External ear normal.      Nose: Nose normal.      Mouth/Throat:      Mouth: Mucous membranes are moist.      Pharynx: Oropharynx is clear.   Eyes:      General: No scleral icterus.        Right eye: No discharge.         Left eye: No discharge.      Conjunctiva/sclera: Conjunctivae normal.      Pupils: Pupils are equal, round, and reactive to light.   Cardiovascular:      Rate and Rhythm: Normal rate and regular rhythm.      Pulses: Normal pulses.      Heart sounds: Normal heart sounds. No murmur heard.     No friction rub. No gallop.   Pulmonary:      Effort: No respiratory distress.      Breath sounds: No stridor. No wheezing, rhonchi or rales.   Chest:      Chest wall: No tenderness.   Abdominal:      General: Abdomen is flat. Bowel sounds are normal. There is no distension.      Palpations: Abdomen is soft. There is no mass.      Tenderness: There is no abdominal tenderness. There is no right CVA tenderness, left CVA tenderness, guarding or rebound.   Musculoskeletal:         General: No swelling, tenderness, deformity or signs of injury.      Cervical back: No rigidity.      Right lower leg: No edema.      Left lower leg: No edema.   Lymphadenopathy:      Cervical: No cervical adenopathy.   Skin:     General: Skin is warm and dry.      Coloration: Skin is not jaundiced.      Findings: No bruising or rash.   Neurological:      General: No focal deficit present.      Mental Status: He is alert and oriented to person, " place, and time.      Cranial Nerves: No cranial nerve deficit.      Gait: Gait normal.   Psychiatric:         Mood and Affect: Mood normal.         Behavior: Behavior normal.         Thought Content: Thought content normal.         Judgment: Judgment normal.     KATIE Kramer MD performed a physical exam on 7/12/2024 as documented above.    Lab Results - Last 18 Months   Lab Units 07/12/24  0744 06/26/24  0818 04/25/24  0314   WBC 10*3/mm3 6.18 6.14 7.40   HEMOGLOBIN g/dL 14.3 13.5 13.7   HEMATOCRIT % 44.7 41.7 43.9   PLATELETS 10*3/mm3 187 181 148   MCV fL 88.3 86.9 84.9     Lab Results - Last 18 Months   Lab Units 06/26/24  0818 04/25/24  0314 04/24/24  1857 03/18/24  0747 01/09/24  0339 01/08/24  0951   SODIUM mmol/L 136 138 136 139   < > 134*   POTASSIUM mmol/L 4.4 3.8 3.7 4.6   < > 4.9   CHLORIDE mmol/L 100 100 98 102   < > 99   CO2 mmol/L 27.0 29.0 30.0* 28.0   < > 29.0   BUN mg/dL 15 24* 25* 21   < > 24*   CREATININE mg/dL 0.96 0.96 1.08 1.00   < > 0.96   CALCIUM mg/dL 8.9 8.5* 9.0 8.7   < > 8.9   BILIRUBIN mg/dL 0.5  --   --  0.3  --  0.3   ALK PHOS U/L 143*  --   --  130*  --  122*   ALT (SGPT) U/L 16  --   --  21  --  16   AST (SGOT) U/L 21  --   --  17  --  13   GLUCOSE mg/dL 342* 142* 135* 367*   < > 582*    < > = values in this interval not displayed.     Lab Results   Component Value Date    GLUCOSE 342 (H) 06/26/2024    BUN 15 06/26/2024    CREATININE 0.96 06/26/2024    EGFRIFNONA 76 11/15/2021    EGFRIFAFRI 87 11/15/2021    BCR 15.6 06/26/2024    K 4.4 06/26/2024    CO2 27.0 06/26/2024    CALCIUM 8.9 06/26/2024    ALBUMIN 3.6 06/26/2024    AST 21 06/26/2024    ALT 16 06/26/2024     Lab Results   Component Value Date    IRON 15 (L) 01/06/2023    TIBC 548 (H) 01/06/2023    FERRITIN 23.51 (L) 01/06/2023     Lab Results   Component Value Date    CEA 5.06 06/26/2024     Assessment & Plan     Assessment:  Isolated metastatic colon cancer to the central portion of the liver with 2 distinct lesions.  To  resume chemotherapy with 5-fluorouracil irinotecan and cetuximab.  I will not give him a bolus to reduce side effects.  Consider scans 2 months from now.  Tumor markers today.   Moderately differentiated adenocarcinoma of the ascending colon [id number] MMR proficient.  Completed Cape-Ox adjuvantly for 3 months in April 2023.  History of recurrent ischemic stroke.  On acetyl salicylic acid.  He has not been taking the atorvastatin and other medications prescribed to him.  Persists with mild hypertension.  Reviewed the images of the scan and discussed with him.  Reviewed the report.  Reviewed all laboratory exams including tumor markers and blood counts.  See me again in approximately 4 weeks.    Plan:  As above.    Don Kramer MD on 7/12/2024 at 8:57 AM..

## 2024-07-12 ENCOUNTER — LAB (OUTPATIENT)
Dept: LAB | Facility: HOSPITAL | Age: 79
End: 2024-07-12
Payer: OTHER GOVERNMENT

## 2024-07-12 ENCOUNTER — OFFICE VISIT (OUTPATIENT)
Dept: ONCOLOGY | Facility: CLINIC | Age: 79
End: 2024-07-12
Payer: OTHER GOVERNMENT

## 2024-07-12 VITALS
SYSTOLIC BLOOD PRESSURE: 133 MMHG | WEIGHT: 180 LBS | HEART RATE: 86 BPM | BODY MASS INDEX: 23.86 KG/M2 | DIASTOLIC BLOOD PRESSURE: 81 MMHG | OXYGEN SATURATION: 96 % | HEIGHT: 73 IN

## 2024-07-12 DIAGNOSIS — C18.2 MALIGNANT NEOPLASM OF ASCENDING COLON: Primary | ICD-10-CM

## 2024-07-12 DIAGNOSIS — C78.7 METASTASES TO THE LIVER: ICD-10-CM

## 2024-07-12 LAB
ALBUMIN SERPL-MCNC: 4 G/DL (ref 3.5–5.2)
ALBUMIN/GLOB SERPL: 1.4 G/DL
ALP SERPL-CCNC: 164 U/L (ref 39–117)
ALT SERPL W P-5'-P-CCNC: 15 U/L (ref 1–41)
ANION GAP SERPL CALCULATED.3IONS-SCNC: 10.7 MMOL/L (ref 5–15)
AST SERPL-CCNC: 16 U/L (ref 1–40)
BASOPHILS # BLD AUTO: 0.02 10*3/MM3 (ref 0–0.2)
BASOPHILS NFR BLD AUTO: 0.3 % (ref 0–1.5)
BILIRUB SERPL-MCNC: 0.4 MG/DL (ref 0–1.2)
BUN SERPL-MCNC: 17 MG/DL (ref 8–23)
BUN/CREAT SERPL: 16.3 (ref 7–25)
CALCIUM SPEC-SCNC: 8.9 MG/DL (ref 8.6–10.5)
CEA SERPL-MCNC: 6.33 NG/ML
CHLORIDE SERPL-SCNC: 98 MMOL/L (ref 98–107)
CO2 SERPL-SCNC: 26.3 MMOL/L (ref 22–29)
CREAT SERPL-MCNC: 1.04 MG/DL (ref 0.76–1.27)
DEPRECATED RDW RBC AUTO: 47.1 FL (ref 37–54)
EGFRCR SERPLBLD CKD-EPI 2021: 74.4 ML/MIN/1.73
EOSINOPHIL # BLD AUTO: 0.08 10*3/MM3 (ref 0–0.4)
EOSINOPHIL NFR BLD AUTO: 1.3 % (ref 0.3–6.2)
ERYTHROCYTE [DISTWIDTH] IN BLOOD BY AUTOMATED COUNT: 14.9 % (ref 12.3–15.4)
GLOBULIN UR ELPH-MCNC: 2.9 GM/DL
GLUCOSE SERPL-MCNC: 455 MG/DL (ref 65–99)
HCT VFR BLD AUTO: 44.7 % (ref 37.5–51)
HGB BLD-MCNC: 14.3 G/DL (ref 13–17.7)
HOLD SPECIMEN: NORMAL
HOLD SPECIMEN: NORMAL
LYMPHOCYTES # BLD AUTO: 1.38 10*3/MM3 (ref 0.7–3.1)
LYMPHOCYTES NFR BLD AUTO: 22.3 % (ref 19.6–45.3)
MCH RBC QN AUTO: 28.3 PG (ref 26.6–33)
MCHC RBC AUTO-ENTMCNC: 32 G/DL (ref 31.5–35.7)
MCV RBC AUTO: 88.3 FL (ref 79–97)
MONOCYTES # BLD AUTO: 0.69 10*3/MM3 (ref 0.1–0.9)
MONOCYTES NFR BLD AUTO: 11.2 % (ref 5–12)
NEUTROPHILS NFR BLD AUTO: 4.01 10*3/MM3 (ref 1.7–7)
NEUTROPHILS NFR BLD AUTO: 64.9 % (ref 42.7–76)
PLATELET # BLD AUTO: 187 10*3/MM3 (ref 140–450)
PMV BLD AUTO: 9.8 FL (ref 6–12)
POTASSIUM SERPL-SCNC: 4.4 MMOL/L (ref 3.5–5.2)
PROT SERPL-MCNC: 6.9 G/DL (ref 6–8.5)
RBC # BLD AUTO: 5.06 10*6/MM3 (ref 4.14–5.8)
SODIUM SERPL-SCNC: 135 MMOL/L (ref 136–145)
WBC NRBC COR # BLD AUTO: 6.18 10*3/MM3 (ref 3.4–10.8)

## 2024-07-12 PROCEDURE — 80053 COMPREHEN METABOLIC PANEL: CPT | Performed by: INTERNAL MEDICINE

## 2024-07-12 PROCEDURE — 85025 COMPLETE CBC W/AUTO DIFF WBC: CPT

## 2024-07-12 PROCEDURE — 36415 COLL VENOUS BLD VENIPUNCTURE: CPT

## 2024-07-12 PROCEDURE — 82378 CARCINOEMBRYONIC ANTIGEN: CPT | Performed by: INTERNAL MEDICINE

## 2024-07-12 RX ORDER — PALONOSETRON 0.05 MG/ML
0.25 INJECTION, SOLUTION INTRAVENOUS ONCE
OUTPATIENT
Start: 2024-07-16

## 2024-07-12 RX ORDER — SODIUM CHLORIDE 9 MG/ML
250 INJECTION, SOLUTION INTRAVENOUS ONCE
OUTPATIENT
Start: 2024-07-16

## 2024-07-12 RX ORDER — ATROPINE SULFATE 1 MG/ML
0.25 INJECTION, SOLUTION INTRAMUSCULAR; INTRAVENOUS; SUBCUTANEOUS
OUTPATIENT
Start: 2024-07-16

## 2024-07-12 RX ORDER — INSULIN ASPART 100 [IU]/ML
INJECTION, SOLUTION INTRAVENOUS; SUBCUTANEOUS EVERY 8 HOURS SCHEDULED
COMMUNITY

## 2024-07-15 NOTE — PROGRESS NOTES
Spoke with DR. Kramer as last note and treatment plan differ. Dr. Kramer confirms patient should continue Vectibix. Treatment plan is correct.

## 2024-07-16 ENCOUNTER — APPOINTMENT (OUTPATIENT)
Dept: PHARMACY | Facility: HOSPITAL | Age: 79
End: 2024-07-16
Payer: OTHER GOVERNMENT

## 2024-07-16 ENCOUNTER — HOSPITAL ENCOUNTER (OUTPATIENT)
Dept: ONCOLOGY | Facility: HOSPITAL | Age: 79
Discharge: HOME | End: 2024-07-16
Admitting: INTERNAL MEDICINE
Payer: OTHER GOVERNMENT

## 2024-07-16 VITALS
TEMPERATURE: 97.3 F | OXYGEN SATURATION: 96 % | RESPIRATION RATE: 18 BRPM | HEART RATE: 79 BPM | BODY MASS INDEX: 24.24 KG/M2 | WEIGHT: 183.7 LBS | SYSTOLIC BLOOD PRESSURE: 143 MMHG | DIASTOLIC BLOOD PRESSURE: 81 MMHG

## 2024-07-16 DIAGNOSIS — C18.2 MALIGNANT NEOPLASM OF ASCENDING COLON: Primary | ICD-10-CM

## 2024-07-16 DIAGNOSIS — C78.7 METASTASES TO THE LIVER: ICD-10-CM

## 2024-07-16 LAB
ALP BLD-CCNC: 115 U/L (ref 53–128)
BASOPHILS # BLD AUTO: 0.01 10*3/MM3 (ref 0–0.2)
BASOPHILS NFR BLD AUTO: 0.2 % (ref 0–1.5)
BUN BLDA-MCNC: 16 MG/DL (ref 7–22)
CALCIUM BLD QL: 8.9 MG/DL (ref 8–10.3)
CEA SERPL-MCNC: 5.16 NG/ML
CHLORIDE BLDA-SCNC: 105 MMOL/L (ref 98–108)
CO2 BLDA-SCNC: 28 MMOL/L (ref 18–33)
CREAT BLDA-MCNC: 0.9 MG/DL (ref 0.6–1.2)
DEPRECATED RDW RBC AUTO: 47.5 FL (ref 37–54)
EGFRCR SERPLBLD CKD-EPI 2021: 88.5 ML/MIN/1.73
EOSINOPHIL # BLD AUTO: 0.1 10*3/MM3 (ref 0–0.4)
EOSINOPHIL NFR BLD AUTO: 1.9 % (ref 0.3–6.2)
ERYTHROCYTE [DISTWIDTH] IN BLOOD BY AUTOMATED COUNT: 14.9 % (ref 12.3–15.4)
GLUCOSE BLDC GLUCOMTR-MCNC: 304 MG/DL (ref 73–118)
HCT VFR BLD AUTO: 41.1 % (ref 37.5–51)
HGB BLD-MCNC: 13.1 G/DL (ref 13–17.7)
LYMPHOCYTES # BLD AUTO: 1.11 10*3/MM3 (ref 0.7–3.1)
LYMPHOCYTES NFR BLD AUTO: 21.3 % (ref 19.6–45.3)
MAGNESIUM SERPL-MCNC: 2.1 MG/DL (ref 1.6–2.4)
MCH RBC QN AUTO: 28.5 PG (ref 26.6–33)
MCHC RBC AUTO-ENTMCNC: 31.9 G/DL (ref 31.5–35.7)
MCV RBC AUTO: 89.3 FL (ref 79–97)
MONOCYTES # BLD AUTO: 0.54 10*3/MM3 (ref 0.1–0.9)
MONOCYTES NFR BLD AUTO: 10.4 % (ref 5–12)
NEUTROPHILS NFR BLD AUTO: 3.44 10*3/MM3 (ref 1.7–7)
NEUTROPHILS NFR BLD AUTO: 66.2 % (ref 42.7–76)
PLATELET # BLD AUTO: 150 10*3/MM3 (ref 140–450)
PMV BLD AUTO: 9.7 FL (ref 6–12)
POC ALBUMIN: 3.1 G/L (ref 3.3–5.5)
POC ALT (SGPT): 14 U/L (ref 10–47)
POC AST (SGOT): 22 U/L (ref 11–38)
POC TOTAL BILIRUBIN: 0.6 MG/DL (ref 0.2–1.6)
POC TOTAL PROTEIN: 6.7 G/DL (ref 6.4–8.1)
POTASSIUM BLDA-SCNC: 3.9 MMOL/L (ref 3.6–5.1)
RBC # BLD AUTO: 4.6 10*6/MM3 (ref 4.14–5.8)
SODIUM BLD-SCNC: 142 MMOL/L (ref 128–145)
WBC NRBC COR # BLD AUTO: 5.2 10*3/MM3 (ref 3.4–10.8)

## 2024-07-16 PROCEDURE — 82378 CARCINOEMBRYONIC ANTIGEN: CPT | Performed by: INTERNAL MEDICINE

## 2024-07-16 PROCEDURE — 25010000002 PANITUMUMAB 400 MG/20ML SOLUTION 20 ML VIAL: Performed by: INTERNAL MEDICINE

## 2024-07-16 PROCEDURE — 83735 ASSAY OF MAGNESIUM: CPT | Performed by: INTERNAL MEDICINE

## 2024-07-16 PROCEDURE — 25810000003 SODIUM CHLORIDE 0.9 % SOLUTION: Performed by: INTERNAL MEDICINE

## 2024-07-16 PROCEDURE — 25810000003 SODIUM CHLORIDE 0.9 % SOLUTION 250 ML FLEX CONT: Performed by: INTERNAL MEDICINE

## 2024-07-16 PROCEDURE — 25010000002 PALONOSETRON 0.25 MG/5ML SOLUTION PREFILLED SYRINGE: Performed by: INTERNAL MEDICINE

## 2024-07-16 PROCEDURE — 25010000002 ATROPINE SULFATE 0.4 MG/ML SOLUTION 1 ML VIAL: Performed by: INTERNAL MEDICINE

## 2024-07-16 PROCEDURE — 25010000002 DEXAMETHASONE SODIUM PHOSPHATE 120 MG/30ML SOLUTION: Performed by: INTERNAL MEDICINE

## 2024-07-16 PROCEDURE — 96367 TX/PROPH/DG ADDL SEQ IV INF: CPT

## 2024-07-16 PROCEDURE — 96415 CHEMO IV INFUSION ADDL HR: CPT

## 2024-07-16 PROCEDURE — 96416 CHEMO PROLONG INFUSE W/PUMP: CPT

## 2024-07-16 PROCEDURE — 25010000002 PANITUMUMAB PER 10 MG: Performed by: INTERNAL MEDICINE

## 2024-07-16 PROCEDURE — 96368 THER/DIAG CONCURRENT INF: CPT

## 2024-07-16 PROCEDURE — 25010000002 IRINOTECAN PER 20 MG: Performed by: INTERNAL MEDICINE

## 2024-07-16 PROCEDURE — 96375 TX/PRO/DX INJ NEW DRUG ADDON: CPT

## 2024-07-16 PROCEDURE — G0498 CHEMO EXTEND IV INFUS W/PUMP: HCPCS

## 2024-07-16 PROCEDURE — 0 DEXTROSE 5 % SOLUTION 250 ML FLEX CONT: Performed by: INTERNAL MEDICINE

## 2024-07-16 PROCEDURE — 25010000002 FLUOROURACIL PER 500 MG: Performed by: INTERNAL MEDICINE

## 2024-07-16 PROCEDURE — 25010000002 LEUCOVORIN CALCIUM PER 50 MG: Performed by: INTERNAL MEDICINE

## 2024-07-16 PROCEDURE — 96417 CHEMO IV INFUS EACH ADDL SEQ: CPT

## 2024-07-16 PROCEDURE — 80053 COMPREHEN METABOLIC PANEL: CPT

## 2024-07-16 PROCEDURE — 85025 COMPLETE CBC W/AUTO DIFF WBC: CPT | Performed by: INTERNAL MEDICINE

## 2024-07-16 PROCEDURE — 96413 CHEMO IV INFUSION 1 HR: CPT

## 2024-07-16 RX ORDER — PALONOSETRON 0.05 MG/ML
0.25 INJECTION, SOLUTION INTRAVENOUS ONCE
Status: COMPLETED | OUTPATIENT
Start: 2024-07-16 | End: 2024-07-16

## 2024-07-16 RX ORDER — ATROPINE SULFATE 1 MG/ML
0.25 INJECTION, SOLUTION INTRAMUSCULAR; INTRAVENOUS; SUBCUTANEOUS
Status: DISCONTINUED | OUTPATIENT
Start: 2024-07-16 | End: 2024-07-16

## 2024-07-16 RX ORDER — SODIUM CHLORIDE 9 MG/ML
250 INJECTION, SOLUTION INTRAVENOUS ONCE
Status: COMPLETED | OUTPATIENT
Start: 2024-07-16 | End: 2024-07-16

## 2024-07-16 RX ADMIN — FLUOROURACIL 4750 MG: 50 INJECTION, SOLUTION INTRAVENOUS at 11:04

## 2024-07-16 RX ADMIN — DEXAMETHASONE SODIUM PHOSPHATE 12 MG: 4 INJECTION, SOLUTION INTRA-ARTICULAR; INTRALESIONAL; INTRAMUSCULAR; INTRAVENOUS; SOFT TISSUE at 08:20

## 2024-07-16 RX ADMIN — SODIUM CHLORIDE 250 ML: 9 INJECTION, SOLUTION INTRAVENOUS at 08:16

## 2024-07-16 RX ADMIN — PALONOSETRON 0.25 MG: 0.25 INJECTION, SOLUTION INTRAVENOUS at 08:17

## 2024-07-16 RX ADMIN — LEUCOVORIN CALCIUM 790 MG: 350 INJECTION, POWDER, LYOPHILIZED, FOR SUSPENSION INTRAMUSCULAR; INTRAVENOUS at 09:15

## 2024-07-16 RX ADMIN — PANITUMUMAB 460 MG: 400 SOLUTION INTRAVENOUS at 08:41

## 2024-07-16 RX ADMIN — ATROPINE SULFATE 285 MG: 0.4 INJECTION, SOLUTION INTRAVENOUS at 09:16

## 2024-07-18 ENCOUNTER — HOSPITAL ENCOUNTER (OUTPATIENT)
Dept: ONCOLOGY | Facility: HOSPITAL | Age: 79
Discharge: HOME | End: 2024-07-18
Payer: OTHER GOVERNMENT

## 2024-07-18 DIAGNOSIS — C78.7 METASTASES TO THE LIVER: Primary | ICD-10-CM

## 2024-07-18 DIAGNOSIS — C18.2 MALIGNANT NEOPLASM OF ASCENDING COLON: ICD-10-CM

## 2024-07-18 PROCEDURE — 25010000002 HEPARIN LOCK FLUSH PER 10 UNITS: Performed by: INTERNAL MEDICINE

## 2024-07-18 RX ORDER — SODIUM CHLORIDE 0.9 % (FLUSH) 0.9 %
20 SYRINGE (ML) INJECTION AS NEEDED
Status: DISCONTINUED | OUTPATIENT
Start: 2024-07-18 | End: 2024-07-19 | Stop reason: HOSPADM

## 2024-07-18 RX ORDER — HEPARIN SODIUM (PORCINE) LOCK FLUSH IV SOLN 100 UNIT/ML 100 UNIT/ML
500 SOLUTION INTRAVENOUS AS NEEDED
Status: DISCONTINUED | OUTPATIENT
Start: 2024-07-18 | End: 2024-07-19 | Stop reason: HOSPADM

## 2024-07-18 RX ADMIN — Medication 20 ML: at 10:45

## 2024-07-18 RX ADMIN — HEPARIN 500 UNITS: 100 SYRINGE at 10:45

## 2024-07-30 ENCOUNTER — HOSPITAL ENCOUNTER (OUTPATIENT)
Dept: ONCOLOGY | Facility: HOSPITAL | Age: 79
Discharge: HOME | End: 2024-07-30
Admitting: INTERNAL MEDICINE
Payer: MEDICARE

## 2024-07-30 VITALS
WEIGHT: 179.9 LBS | TEMPERATURE: 97.5 F | SYSTOLIC BLOOD PRESSURE: 126 MMHG | RESPIRATION RATE: 16 BRPM | OXYGEN SATURATION: 91 % | HEIGHT: 73 IN | HEART RATE: 99 BPM | BODY MASS INDEX: 23.84 KG/M2 | DIASTOLIC BLOOD PRESSURE: 81 MMHG

## 2024-07-30 DIAGNOSIS — C78.7 METASTASES TO THE LIVER: ICD-10-CM

## 2024-07-30 DIAGNOSIS — C18.2 MALIGNANT NEOPLASM OF ASCENDING COLON: Primary | ICD-10-CM

## 2024-07-30 DIAGNOSIS — T50.905A ADVERSE REACTION TO DRUG IN THERAPEUTIC USE: ICD-10-CM

## 2024-07-30 DIAGNOSIS — R21 RASH AND NONSPECIFIC SKIN ERUPTION: Primary | ICD-10-CM

## 2024-07-30 LAB
ALP BLD-CCNC: 91 U/L (ref 53–128)
BASOPHILS # BLD AUTO: 0.03 10*3/MM3 (ref 0–0.2)
BASOPHILS NFR BLD AUTO: 0.5 % (ref 0–1.5)
BUN BLDA-MCNC: 14 MG/DL (ref 7–22)
CALCIUM BLD QL: 8.8 MG/DL (ref 8–10.3)
CHLORIDE BLDA-SCNC: 106 MMOL/L (ref 98–108)
CO2 BLDA-SCNC: 28 MMOL/L (ref 18–33)
CREAT BLDA-MCNC: 1 MG/DL (ref 0.6–1.2)
DEPRECATED RDW RBC AUTO: 46.2 FL (ref 37–54)
EGFRCR SERPLBLD CKD-EPI 2021: 78 ML/MIN/1.73
EOSINOPHIL # BLD AUTO: 0.25 10*3/MM3 (ref 0–0.4)
EOSINOPHIL NFR BLD AUTO: 3.9 % (ref 0.3–6.2)
ERYTHROCYTE [DISTWIDTH] IN BLOOD BY AUTOMATED COUNT: 14.7 % (ref 12.3–15.4)
GLUCOSE BLDC GLUCOMTR-MCNC: 336 MG/DL (ref 73–118)
HCT VFR BLD AUTO: 40.2 % (ref 37.5–51)
HGB BLD-MCNC: 13 G/DL (ref 13–17.7)
LYMPHOCYTES # BLD AUTO: 1.22 10*3/MM3 (ref 0.7–3.1)
LYMPHOCYTES NFR BLD AUTO: 18.8 % (ref 19.6–45.3)
MAGNESIUM SERPL-MCNC: 1.9 MG/DL (ref 1.6–2.4)
MCH RBC QN AUTO: 28.8 PG (ref 26.6–33)
MCHC RBC AUTO-ENTMCNC: 32.3 G/DL (ref 31.5–35.7)
MCV RBC AUTO: 88.9 FL (ref 79–97)
MONOCYTES # BLD AUTO: 0.72 10*3/MM3 (ref 0.1–0.9)
MONOCYTES NFR BLD AUTO: 11.1 % (ref 5–12)
NEUTROPHILS NFR BLD AUTO: 4.26 10*3/MM3 (ref 1.7–7)
NEUTROPHILS NFR BLD AUTO: 65.7 % (ref 42.7–76)
PLATELET # BLD AUTO: 135 10*3/MM3 (ref 140–450)
PMV BLD AUTO: 9.6 FL (ref 6–12)
POC ALBUMIN: 3.1 G/L (ref 3.3–5.5)
POC ALT (SGPT): 15 U/L (ref 10–47)
POC AST (SGOT): 19 U/L (ref 11–38)
POC TOTAL BILIRUBIN: 0.5 MG/DL (ref 0.2–1.6)
POC TOTAL PROTEIN: 6.4 G/DL (ref 6.4–8.1)
POTASSIUM BLDA-SCNC: 3.8 MMOL/L (ref 3.6–5.1)
RBC # BLD AUTO: 4.52 10*6/MM3 (ref 4.14–5.8)
SODIUM BLD-SCNC: 143 MMOL/L (ref 128–145)
WBC NRBC COR # BLD AUTO: 6.48 10*3/MM3 (ref 3.4–10.8)

## 2024-07-30 PROCEDURE — 36593 DECLOT VASCULAR DEVICE: CPT

## 2024-07-30 PROCEDURE — 96413 CHEMO IV INFUSION 1 HR: CPT

## 2024-07-30 PROCEDURE — 85025 COMPLETE CBC W/AUTO DIFF WBC: CPT | Performed by: INTERNAL MEDICINE

## 2024-07-30 PROCEDURE — 25010000002 FLUOROURACIL PER 500 MG: Performed by: PHYSICIAN ASSISTANT

## 2024-07-30 PROCEDURE — 96375 TX/PRO/DX INJ NEW DRUG ADDON: CPT

## 2024-07-30 PROCEDURE — 25810000003 SODIUM CHLORIDE 0.9 % SOLUTION 250 ML FLEX CONT: Performed by: INTERNAL MEDICINE

## 2024-07-30 PROCEDURE — 25810000003 SODIUM CHLORIDE 0.9 % SOLUTION 250 ML FLEX CONT: Performed by: PHYSICIAN ASSISTANT

## 2024-07-30 PROCEDURE — 83735 ASSAY OF MAGNESIUM: CPT | Performed by: INTERNAL MEDICINE

## 2024-07-30 PROCEDURE — 25010000002 PALONOSETRON 0.25 MG/5ML SOLUTION PREFILLED SYRINGE: Performed by: PHYSICIAN ASSISTANT

## 2024-07-30 PROCEDURE — 96366 THER/PROPH/DIAG IV INF ADDON: CPT

## 2024-07-30 PROCEDURE — 25010000002 PANITUMUMAB PER 10 MG: Performed by: PHYSICIAN ASSISTANT

## 2024-07-30 PROCEDURE — 96417 CHEMO IV INFUS EACH ADDL SEQ: CPT

## 2024-07-30 PROCEDURE — 25010000002 PANITUMUMAB 400 MG/20ML SOLUTION 20 ML VIAL: Performed by: PHYSICIAN ASSISTANT

## 2024-07-30 PROCEDURE — 96368 THER/DIAG CONCURRENT INF: CPT

## 2024-07-30 PROCEDURE — 80053 COMPREHEN METABOLIC PANEL: CPT

## 2024-07-30 PROCEDURE — 25010000002 ALTEPLASE 2 MG RECONSTITUTED SOLUTION: Performed by: INTERNAL MEDICINE

## 2024-07-30 PROCEDURE — 96367 TX/PROPH/DG ADDL SEQ IV INF: CPT

## 2024-07-30 PROCEDURE — 25810000003 SODIUM CHLORIDE 0.9 % SOLUTION: Performed by: PHYSICIAN ASSISTANT

## 2024-07-30 PROCEDURE — 25010000002 IRINOTECAN PER 20 MG: Performed by: INTERNAL MEDICINE

## 2024-07-30 PROCEDURE — 96415 CHEMO IV INFUSION ADDL HR: CPT

## 2024-07-30 PROCEDURE — 25010000002 ATROPINE SULFATE 0.4 MG/ML SOLUTION 1 ML VIAL: Performed by: INTERNAL MEDICINE

## 2024-07-30 PROCEDURE — 25010000002 DEXAMETHASONE SODIUM PHOSPHATE 120 MG/30ML SOLUTION: Performed by: PHYSICIAN ASSISTANT

## 2024-07-30 PROCEDURE — G0498 CHEMO EXTEND IV INFUS W/PUMP: HCPCS

## 2024-07-30 PROCEDURE — 25010000002 LEUCOVORIN CALCIUM PER 50 MG: Performed by: INTERNAL MEDICINE

## 2024-07-30 RX ORDER — ATROPINE SULFATE 1 MG/ML
0.25 INJECTION, SOLUTION INTRAMUSCULAR; INTRAVENOUS; SUBCUTANEOUS
Status: DISCONTINUED | OUTPATIENT
Start: 2024-07-30 | End: 2024-07-30

## 2024-07-30 RX ORDER — PALONOSETRON 0.05 MG/ML
0.25 INJECTION, SOLUTION INTRAVENOUS ONCE
Status: COMPLETED | OUTPATIENT
Start: 2024-07-30 | End: 2024-07-30

## 2024-07-30 RX ORDER — DOXYCYCLINE HYCLATE 100 MG/1
100 CAPSULE ORAL 2 TIMES DAILY
Qty: 60 CAPSULE | Refills: 0 | Status: SHIPPED | OUTPATIENT
Start: 2024-07-30 | End: 2024-08-29

## 2024-07-30 RX ORDER — SODIUM CHLORIDE 9 MG/ML
250 INJECTION, SOLUTION INTRAVENOUS ONCE
Status: COMPLETED | OUTPATIENT
Start: 2024-07-30 | End: 2024-07-30

## 2024-07-30 RX ADMIN — PALONOSETRON 0.25 MG: 0.25 INJECTION, SOLUTION INTRAVENOUS at 09:19

## 2024-07-30 RX ADMIN — SODIUM CHLORIDE 250 ML: 9 INJECTION, SOLUTION INTRAVENOUS at 09:19

## 2024-07-30 RX ADMIN — PANITUMUMAB 500 MG: 400 SOLUTION INTRAVENOUS at 09:48

## 2024-07-30 RX ADMIN — IRINOTECAN HYDROCHLORIDE 300 MG: 20 INJECTION, SOLUTION INTRAVENOUS at 10:37

## 2024-07-30 RX ADMIN — DEXAMETHASONE SODIUM PHOSPHATE 12 MG: 4 INJECTION, SOLUTION INTRA-ARTICULAR; INTRALESIONAL; INTRAMUSCULAR; INTRAVENOUS; SOFT TISSUE at 09:22

## 2024-07-30 RX ADMIN — ALTEPLASE: 2.2 INJECTION, POWDER, LYOPHILIZED, FOR SOLUTION INTRAVENOUS at 08:09

## 2024-07-30 RX ADMIN — FLUOROURACIL 4990 MG: 50 INJECTION, SOLUTION INTRAVENOUS at 12:26

## 2024-07-30 RX ADMIN — LEUCOVORIN CALCIUM 830 MG: 350 INJECTION, POWDER, LYOPHILIZED, FOR SOLUTION INTRAMUSCULAR; INTRAVENOUS at 10:35

## 2024-07-30 NOTE — PROGRESS NOTES
Pt is here for C6D1 Vectibix/Folfiri. He is having vectibix induced rash to his face, he reports itching and dry skin and does not have any open area to face. Advised aquaphor His port flushed easy this am and would not return blood, placed activase and started peripheral IV for premeds. WBC 6.48, Hgb 13, Plts 135, ANC 4.21. Reviewed with Analisa GREENE and pt reports has had symptoms since starting vectibix.  Discussed with Analisa and he has not tried anything at home for symptoms and is not on doxycycline.  She advised to  minimize sunlight exposure and wear sunscreen and protective clothing/hat; sunlight may exacerbate skin reactions.  Glucose elevated,  pt reports he has been working with [facility] to control his diabetes and has appt next week.   All above reviewed/discussed with JC Hauser and pt  and v/u and agreement.  Provided pt skin kit and Analisa sent in doxycycline.  Pt v/u

## 2024-08-01 ENCOUNTER — HOSPITAL ENCOUNTER (OUTPATIENT)
Dept: ONCOLOGY | Facility: HOSPITAL | Age: 79
Discharge: HOME | End: 2024-08-01
Payer: OTHER GOVERNMENT

## 2024-08-01 DIAGNOSIS — C18.2 MALIGNANT NEOPLASM OF ASCENDING COLON: ICD-10-CM

## 2024-08-01 DIAGNOSIS — C78.7 METASTASES TO THE LIVER: Primary | ICD-10-CM

## 2024-08-01 DIAGNOSIS — Z95.828 PORT-A-CATH IN PLACE: ICD-10-CM

## 2024-08-01 PROCEDURE — 25010000002 HEPARIN LOCK FLUSH PER 10 UNITS: Performed by: INTERNAL MEDICINE

## 2024-08-01 RX ORDER — HEPARIN SODIUM (PORCINE) LOCK FLUSH IV SOLN 100 UNIT/ML 100 UNIT/ML
500 SOLUTION INTRAVENOUS AS NEEDED
Status: DISCONTINUED | OUTPATIENT
Start: 2024-08-01 | End: 2024-08-02 | Stop reason: HOSPADM

## 2024-08-01 RX ORDER — HEPARIN SODIUM (PORCINE) LOCK FLUSH IV SOLN 100 UNIT/ML 100 UNIT/ML
500 SOLUTION INTRAVENOUS AS NEEDED
OUTPATIENT
Start: 2024-08-01

## 2024-08-01 RX ORDER — SODIUM CHLORIDE 0.9 % (FLUSH) 0.9 %
20 SYRINGE (ML) INJECTION AS NEEDED
Status: DISCONTINUED | OUTPATIENT
Start: 2024-08-01 | End: 2024-08-02 | Stop reason: HOSPADM

## 2024-08-01 RX ORDER — SODIUM CHLORIDE 0.9 % (FLUSH) 0.9 %
20 SYRINGE (ML) INJECTION AS NEEDED
OUTPATIENT
Start: 2024-08-01

## 2024-08-01 RX ADMIN — Medication 20 ML: at 10:23

## 2024-08-01 RX ADMIN — HEPARIN 500 UNITS: 100 SYRINGE at 10:23

## 2024-08-01 NOTE — PROGRESS NOTES
Pt here for Adrucil pump DC. Pump empty. Site clean, dry, and intact.  Pt denies having any issues. Pt had had a rash on his face from his tx. Rash noticed on his chest when removing port needle. Pt reports he was unaware of the rash on his chest. Pt states it isn't bothering him at the moment. Pt instructed to start using same cream for rash on his face on his chest. Pt v/u. Port flushed with good blood return noted.  Port flushed with saline and heparin prior to needle removal.  Pt given AVS and d/c by self.

## 2024-08-13 ENCOUNTER — HOSPITAL ENCOUNTER (OUTPATIENT)
Dept: ONCOLOGY | Facility: HOSPITAL | Age: 79
Discharge: HOME | End: 2024-08-13
Admitting: INTERNAL MEDICINE
Payer: MEDICARE

## 2024-08-13 ENCOUNTER — OFFICE VISIT (OUTPATIENT)
Dept: ONCOLOGY | Facility: CLINIC | Age: 79
End: 2024-08-13
Payer: OTHER GOVERNMENT

## 2024-08-13 VITALS
HEART RATE: 83 BPM | HEIGHT: 73 IN | WEIGHT: 177 LBS | BODY MASS INDEX: 23.46 KG/M2 | SYSTOLIC BLOOD PRESSURE: 133 MMHG | RESPIRATION RATE: 16 BRPM | DIASTOLIC BLOOD PRESSURE: 78 MMHG | TEMPERATURE: 97.4 F

## 2024-08-13 VITALS
TEMPERATURE: 97.4 F | DIASTOLIC BLOOD PRESSURE: 78 MMHG | BODY MASS INDEX: 23.39 KG/M2 | WEIGHT: 177.3 LBS | HEART RATE: 83 BPM | SYSTOLIC BLOOD PRESSURE: 133 MMHG | RESPIRATION RATE: 16 BRPM

## 2024-08-13 DIAGNOSIS — T50.905A ADVERSE REACTION TO DRUG IN THERAPEUTIC USE: ICD-10-CM

## 2024-08-13 DIAGNOSIS — C18.2 MALIGNANT NEOPLASM OF ASCENDING COLON: ICD-10-CM

## 2024-08-13 DIAGNOSIS — N30.00 ACUTE CYSTITIS WITHOUT HEMATURIA: ICD-10-CM

## 2024-08-13 DIAGNOSIS — C18.2 MALIGNANT NEOPLASM OF ASCENDING COLON: Primary | ICD-10-CM

## 2024-08-13 DIAGNOSIS — R21 RASH AND NONSPECIFIC SKIN ERUPTION: ICD-10-CM

## 2024-08-13 DIAGNOSIS — C78.7 METASTASES TO THE LIVER: ICD-10-CM

## 2024-08-13 DIAGNOSIS — R39.11 URINARY HESITANCY: Primary | ICD-10-CM

## 2024-08-13 LAB
ALP BLD-CCNC: 92 U/L (ref 53–128)
BACTERIA UR QL AUTO: ABNORMAL /HPF
BASOPHILS # BLD AUTO: 0.02 10*3/MM3 (ref 0–0.2)
BASOPHILS NFR BLD AUTO: 0.4 % (ref 0–1.5)
BILIRUB UR QL STRIP: NEGATIVE
BUN BLDA-MCNC: 11 MG/DL (ref 7–22)
CALCIUM BLD QL: 8.8 MG/DL (ref 8–10.3)
CHLORIDE BLDA-SCNC: 102 MMOL/L (ref 98–108)
CLARITY UR: ABNORMAL
CO2 BLDA-SCNC: 29 MMOL/L (ref 18–33)
COLOR UR: YELLOW
CREAT BLDA-MCNC: 0.8 MG/DL (ref 0.6–1.2)
DEPRECATED RDW RBC AUTO: 50.2 FL (ref 37–54)
EGFRCR SERPLBLD CKD-EPI 2021: 91.7 ML/MIN/1.73
EOSINOPHIL # BLD AUTO: 0.25 10*3/MM3 (ref 0–0.4)
EOSINOPHIL NFR BLD AUTO: 5.2 % (ref 0.3–6.2)
ERYTHROCYTE [DISTWIDTH] IN BLOOD BY AUTOMATED COUNT: 15.8 % (ref 12.3–15.4)
GLUCOSE BLDC GLUCOMTR-MCNC: 288 MG/DL (ref 73–118)
GLUCOSE UR STRIP-MCNC: ABNORMAL MG/DL
HCT VFR BLD AUTO: 41.5 % (ref 37.5–51)
HGB BLD-MCNC: 13.3 G/DL (ref 13–17.7)
HGB UR QL STRIP.AUTO: ABNORMAL
HYALINE CASTS UR QL AUTO: ABNORMAL /LPF
KETONES UR QL STRIP: NEGATIVE
LEUKOCYTE ESTERASE UR QL STRIP.AUTO: ABNORMAL
LYMPHOCYTES # BLD AUTO: 1.34 10*3/MM3 (ref 0.7–3.1)
LYMPHOCYTES NFR BLD AUTO: 27.9 % (ref 19.6–45.3)
MAGNESIUM SERPL-MCNC: 2 MG/DL (ref 1.6–2.4)
MCH RBC QN AUTO: 28.7 PG (ref 26.6–33)
MCHC RBC AUTO-ENTMCNC: 32 G/DL (ref 31.5–35.7)
MCV RBC AUTO: 89.4 FL (ref 79–97)
MONOCYTES # BLD AUTO: 0.63 10*3/MM3 (ref 0.1–0.9)
MONOCYTES NFR BLD AUTO: 13.1 % (ref 5–12)
MUCOUS THREADS URNS QL MICRO: ABNORMAL /HPF
NEUTROPHILS NFR BLD AUTO: 2.56 10*3/MM3 (ref 1.7–7)
NEUTROPHILS NFR BLD AUTO: 53.4 % (ref 42.7–76)
NITRITE UR QL STRIP: POSITIVE
PH UR STRIP.AUTO: 5.5 [PH] (ref 5–8)
PLATELET # BLD AUTO: 133 10*3/MM3 (ref 140–450)
PMV BLD AUTO: 9.4 FL (ref 6–12)
POC ALBUMIN: 3.1 G/L (ref 3.3–5.5)
POC ALT (SGPT): 18 U/L (ref 10–47)
POC AST (SGOT): 23 U/L (ref 11–38)
POC TOTAL BILIRUBIN: 0.6 MG/DL (ref 0.2–1.6)
POC TOTAL PROTEIN: 6.3 G/DL (ref 6.4–8.1)
POTASSIUM BLDA-SCNC: 3.6 MMOL/L (ref 3.6–5.1)
PROT UR QL STRIP: ABNORMAL
RBC # BLD AUTO: 4.64 10*6/MM3 (ref 4.14–5.8)
RBC # UR STRIP: ABNORMAL /HPF
REF LAB TEST METHOD: ABNORMAL
SODIUM BLD-SCNC: 140 MMOL/L (ref 128–145)
SP GR UR STRIP: 1.02 (ref 1–1.03)
SQUAMOUS #/AREA URNS HPF: ABNORMAL /HPF
UROBILINOGEN UR QL STRIP: ABNORMAL
WBC # UR STRIP: ABNORMAL /HPF
WBC NRBC COR # BLD AUTO: 4.8 10*3/MM3 (ref 3.4–10.8)

## 2024-08-13 PROCEDURE — 25010000002 PANITUMUMAB 400 MG/20ML SOLUTION 20 ML VIAL: Performed by: PHYSICIAN ASSISTANT

## 2024-08-13 PROCEDURE — 96413 CHEMO IV INFUSION 1 HR: CPT

## 2024-08-13 PROCEDURE — 96417 CHEMO IV INFUS EACH ADDL SEQ: CPT

## 2024-08-13 PROCEDURE — 25010000002 PANITUMUMAB PER 10 MG: Performed by: PHYSICIAN ASSISTANT

## 2024-08-13 PROCEDURE — 25010000002 FLUOROURACIL PER 500 MG: Performed by: PHYSICIAN ASSISTANT

## 2024-08-13 PROCEDURE — 96375 TX/PRO/DX INJ NEW DRUG ADDON: CPT

## 2024-08-13 PROCEDURE — 25010000002 LEUCOVORIN 200 MG RECONSTITUTED SOLUTION 1 EACH VIAL: Performed by: PHYSICIAN ASSISTANT

## 2024-08-13 PROCEDURE — 81015 MICROSCOPIC EXAM OF URINE: CPT | Performed by: NURSE PRACTITIONER

## 2024-08-13 PROCEDURE — 96368 THER/DIAG CONCURRENT INF: CPT

## 2024-08-13 PROCEDURE — G0498 CHEMO EXTEND IV INFUS W/PUMP: HCPCS

## 2024-08-13 PROCEDURE — 25010000002 LEUCOVORIN CALCIUM PER 50 MG: Performed by: PHYSICIAN ASSISTANT

## 2024-08-13 PROCEDURE — 25810000003 SODIUM CHLORIDE 0.9 % SOLUTION: Performed by: PHYSICIAN ASSISTANT

## 2024-08-13 PROCEDURE — 96415 CHEMO IV INFUSION ADDL HR: CPT

## 2024-08-13 PROCEDURE — 83735 ASSAY OF MAGNESIUM: CPT | Performed by: INTERNAL MEDICINE

## 2024-08-13 PROCEDURE — 80053 COMPREHEN METABOLIC PANEL: CPT

## 2024-08-13 PROCEDURE — 99214 OFFICE O/P EST MOD 30 MIN: CPT | Performed by: PHYSICIAN ASSISTANT

## 2024-08-13 PROCEDURE — 96366 THER/PROPH/DIAG IV INF ADDON: CPT

## 2024-08-13 PROCEDURE — 81003 URINALYSIS AUTO W/O SCOPE: CPT | Performed by: NURSE PRACTITIONER

## 2024-08-13 PROCEDURE — 25010000002 IRINOTECAN PER 20 MG: Performed by: PHYSICIAN ASSISTANT

## 2024-08-13 PROCEDURE — 96367 TX/PROPH/DG ADDL SEQ IV INF: CPT

## 2024-08-13 PROCEDURE — 25010000002 ATROPINE SULFATE 0.4 MG/ML SOLUTION 1 ML VIAL: Performed by: PHYSICIAN ASSISTANT

## 2024-08-13 PROCEDURE — 25010000002 DEXAMETHASONE SODIUM PHOSPHATE 120 MG/30ML SOLUTION: Performed by: PHYSICIAN ASSISTANT

## 2024-08-13 PROCEDURE — 85025 COMPLETE CBC W/AUTO DIFF WBC: CPT | Performed by: INTERNAL MEDICINE

## 2024-08-13 PROCEDURE — 25010000002 PALONOSETRON 0.25 MG/5ML SOLUTION PREFILLED SYRINGE: Performed by: PHYSICIAN ASSISTANT

## 2024-08-13 PROCEDURE — 25810000003 SODIUM CHLORIDE 0.9 % SOLUTION 250 ML FLEX CONT: Performed by: PHYSICIAN ASSISTANT

## 2024-08-13 PROCEDURE — 0 DEXTROSE 5 % SOLUTION 250 ML FLEX CONT: Performed by: PHYSICIAN ASSISTANT

## 2024-08-13 RX ORDER — PALONOSETRON 0.05 MG/ML
0.25 INJECTION, SOLUTION INTRAVENOUS ONCE
Status: CANCELLED | OUTPATIENT
Start: 2024-08-13

## 2024-08-13 RX ORDER — ATROPINE SULFATE 1 MG/ML
0.25 INJECTION, SOLUTION INTRAMUSCULAR; INTRAVENOUS; SUBCUTANEOUS
Status: DISCONTINUED | OUTPATIENT
Start: 2024-08-13 | End: 2024-08-13

## 2024-08-13 RX ORDER — SODIUM CHLORIDE 9 MG/ML
250 INJECTION, SOLUTION INTRAVENOUS ONCE
Status: COMPLETED | OUTPATIENT
Start: 2024-08-13 | End: 2024-08-13

## 2024-08-13 RX ORDER — BENZOCAINE/MENTHOL 6 MG-10 MG
1 LOZENGE MUCOUS MEMBRANE 2 TIMES DAILY
Qty: 454 G | Refills: 1 | Status: SHIPPED | OUTPATIENT
Start: 2024-08-13

## 2024-08-13 RX ORDER — PALONOSETRON 0.05 MG/ML
0.25 INJECTION, SOLUTION INTRAVENOUS ONCE
Status: COMPLETED | OUTPATIENT
Start: 2024-08-13 | End: 2024-08-13

## 2024-08-13 RX ORDER — ATROPINE SULFATE 1 MG/ML
0.25 INJECTION, SOLUTION INTRAMUSCULAR; INTRAVENOUS; SUBCUTANEOUS
Status: CANCELLED | OUTPATIENT
Start: 2024-08-13

## 2024-08-13 RX ORDER — DOXYCYCLINE HYCLATE 100 MG/1
100 CAPSULE ORAL 2 TIMES DAILY
Qty: 60 CAPSULE | Refills: 0 | Status: SHIPPED | OUTPATIENT
Start: 2024-08-13 | End: 2024-09-12

## 2024-08-13 RX ORDER — SULFAMETHOXAZOLE AND TRIMETHOPRIM 800; 160 MG/1; MG/1
1 TABLET ORAL 2 TIMES DAILY
Qty: 14 TABLET | Refills: 0 | Status: SHIPPED | OUTPATIENT
Start: 2024-08-13 | End: 2024-08-20

## 2024-08-13 RX ORDER — SODIUM CHLORIDE 9 MG/ML
250 INJECTION, SOLUTION INTRAVENOUS ONCE
Status: CANCELLED | OUTPATIENT
Start: 2024-08-13

## 2024-08-13 RX ADMIN — PANITUMUMAB 500 MG: 400 SOLUTION INTRAVENOUS at 09:38

## 2024-08-13 RX ADMIN — LEUCOVORIN CALCIUM 830 MG: 350 INJECTION, POWDER, LYOPHILIZED, FOR SOLUTION INTRAMUSCULAR; INTRAVENOUS at 10:10

## 2024-08-13 RX ADMIN — PALONOSETRON 0.25 MG: 0.25 INJECTION, SOLUTION INTRAVENOUS at 08:55

## 2024-08-13 RX ADMIN — SODIUM CHLORIDE 250 ML: 9 INJECTION, SOLUTION INTRAVENOUS at 08:54

## 2024-08-13 RX ADMIN — FLUOROURACIL 4990 MG: 50 INJECTION, SOLUTION INTRAVENOUS at 11:50

## 2024-08-13 RX ADMIN — ATROPINE SULFATE 300 MG: 0.4 INJECTION, SOLUTION INTRAVENOUS at 10:15

## 2024-08-13 RX ADMIN — DEXAMETHASONE SODIUM PHOSPHATE 12 MG: 4 INJECTION, SOLUTION INTRA-ARTICULAR; INTRALESIONAL; INTRAMUSCULAR; INTRAVENOUS; SOFT TISSUE at 08:56

## 2024-08-13 NOTE — PROGRESS NOTES
Pt is here for C7 of Vectibix, Camptosar, lcv and 5fu. [facility] infusaport accessed. 10cc wasted. CBC and CMP drawn and resulted. Pt C/o urinary hesitancy. Urine checked and sent for culture. Treatment administered as directed. Pt tolerated well AVS printed.

## 2024-08-13 NOTE — PROGRESS NOTES
HEMATOLOGY ONCOLOGY OUTPATIENT FOLLOW-UP       Patient name: Vlad Guerrero  : 1945  MRN: [id number]  Primary Care Physician: Vlad Moreland MD  Referring Physician: Vlad Moreland MD  Reason For Consult: Stage III colon cance    Chief Complaint   Patient presents with    Follow-up     Malignant neoplasm of ascending colon         Follow-upCurrent symptoms include no chest pain, no confusion, no nausea, no palpitations, no fatigue, no wheezing, no abdominal pain and no sore throat.      History of Present Illness:  Vlad Guerrero is 78 y.o. male who presented to our office on 23 for consultation regarding    2023: Mr. Guerrero dated the beginning of his present illness to sometime at the end of  when he started to feel fatigued.  He was seen at the [facility] and had laboratory exams that revealed microcytic anemia.  He had evidence of iron deficiency and received intravenous iron in the hospital.  He had upper and lower gastrointestinal endoscopies that demonstrated a cecal tumor that measured 4 to 5 cm and was fungating in appearance.  It was circumferential and was next to the ileocecal valve.  The upper gastrointestinal endoscopy revealed no abnormalities.  On this basis he was on December 15, 2022 he was taken to the hospital and underwent a right hemicolectomy without complications.  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  It corresponded to a grade 2 malignancy that invaded through the muscularis propria into the pericolonic tissues.  Macroscopic tumor perforation or lymphovascular space invasion were not present.  Perineural invasion was not present either.  All margins of excision were negative.  All of 36 lymph nodes submitted to were positive for involvement with malignancy.  The disease was [place] staged as [id number].  Loss of expression of mismatch repair  enzymes was not documented.  Mr. Guerrero was discharged to continue treatment as outpatient.  At the time of this visit he was recovering well from the surgery.  His incision had healed completely and he had no drains or suture materials.  He had returned home and was eating well.  He had yet to return to work.  He had been afebrile and free of nausea.  For the most part his weight had been stable.  After reviewing the records a long conversation, of approximately 1 hour, was had with the patient in regards to options of treatment.  The nature of his disease as well as the likelihood of recurrence were described.  The use of adjuvant chemotherapy to increase the rate of cure after surgery was explained.  Side effects were described in detail.  The need for a port was expressed as well.  A treatment plan was placed. A decision was made to treat him with three months of CAPOX given the characteristics of his disease.     2/6/2023: Received the first cycle of adjuvant chemotherapy without any side effects. On the day of this visit feeling well and without any new symptoms, except a very mild rash, especially on the forearms, but no oral pain. Had stools of diminished consistency for a few days but now resolved. The exam was rather unremarkable, except for a few very light erythematous macules on the forearms.     2/27/2023: Feeling well and without new symptoms.  As active as before.  Eating well and without unintended weight loss.  Stronger and more energetic since the institution of vitamin B12 and iron.  No chest pains and cough.  No abdominal pain or diarrhea.  On exam no changes.  A decision was made to continue with the same treatment.  Laboratory exams were reviewed.  He was to see me again in approximately 4 weeks from this date with new scans.    3/27/2023: Suffered an ischemic stroke.  MRI on March 10, 2023 reported a 7 mm focus of restricted diffusion in the left periventricular white matter of the posterior  left frontal lobe.  This was felt to be suspicious for an acute/subacute infarct.  There was also evidence of previous lacunar strokes.  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  There was bilateral internal carotid artery stenosis with 60% stenosis estimated at the right and not greater than 50% at the left.  Chemotherapy was held following discharge.  He was left without any sequela.  At the time of this visit he was entirely asymptomatic.  He was convinced a large part of his problem was that he had suffered from hyperglycemia, consistently for some time.  Indeed his glucose was between 152 mg/dL and 193 mg/dL in the preceding days.  However, he reported at home glucose readings of greater than 400 mg/dL..  On exam there were no changes.  The laboratory exams reported a blood count with normocytic anemia and mild thrombocytopenia.  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    4/14/2023: Completed 3 months of treatment with CAPOX.  On the day of this visit not feeling very well.  Weak and tired.  Not very good appetite although eating well.  Having some dysgeusia.  Afebrile.  No chest pains or cough and no abdominal pain.  Had maintain regular bowel activity.  No edema.  On exam no changes.  A decision was made to stop the chemotherapy.  He was to get intravenous fluids on the day of this visit.  To return to see me in 3 weeks.  Tentatively to have scans in early June 2023.    5/5/2023: Feeling progressively stronger. Eating better and slowly regaining weight. Afebrile. No more nausea. No  diarrhea and now with regular bowel activity. On exam alert, conversant and well oriented. No pale or jaundice. No oral lesions and no palpable lymph nodes. Lungs clear and abdomen soft. Minimal edema of the right lower extremity. The laboratory exams revealed persistent anemia with a tendency to macrocytosis. Hemoglobin and platelets within normal ranges. A decision was made to continue to observe and I asked him to see me in approximately 3 months with new scans.     8/7/2023: Feels as well as at the time of the last visit. Active and returned to work full time. Eating well and no nausea or vomiting. No chest pain or cough and no abdominal pain. On exam no changes, though he had lost a large amount of weight since the previous visit. The imaging studies suggested a new lesion in the liver, as well as several lesions in the spleen of unclear cause. Reviewed the images and the report of the scans. Discussed with him at length and explained the findings. Discussed with him the plans for a MRI. He will see me with results.     8/28/2023: Entirely asymptomatic.  Working without limitations.  Eating well.  Weight stable.  Afebrile.  No pain.  On exam no changes.  Laboratory exams were reviewed and discussed with him.  In spite of the fact things on the scans the Carcinoembryonic antigen has not increased.  However both the CT and the MRI suggest one isolated metastatic deposit in segment 8 of the liver.  Discussed with him the options at this time.  I believe a biopsy is essential and after that he would be treated with chemotherapy following which surgery, if no new lesions have appeared, could be considered.  He may still be curable even in the setting of metastatic disease.  Discussed with him at great length.  Explained the steps.  Sent a communication to Dr. Davis, the patient's surgeon.    9/11/2023: Feels about the same as before.  No new symptoms.  As active as before and working.  Eating well and with good  appetite.  No unintended weight loss.  Afebrile.  On exam alert, conversant and in good spirits.  No distress.  No jaundice or pallor.  Lungs clear and heart regular.  Abdomen soft.  The liver is not palpable.  No edema.  Laboratory exams were reviewed.  The liver biopsy report confirms metastatic colorectal cancer.  This appears to be an isolated metastases.  On this basis treatment with chemotherapy followed by surgery is not ordered.  I have asked him to have a PET scan and discussed the study with him.  Discussed the objectives of the treatment and its requirements.  Placed the treatment plan with 5 fluorouracil, irinotecan and panitumumab and discussed with him.  To begin as soon as possible.    9/25/2023: In the office before the next scheduled time.  He called to report that over the weekend he had had between 5 and 8 defecations of liquid stool.  He took loperamide irregularly and it did not seem to provide much relief.  He was able to eat and drink without any difficulties and was never nauseated.  He was seen in the emergency room on 9/24/2023 and he was not found to have any dehydration or other evidence of complications.  They discharged him.  At the time of this visit feeling better.  As of this time he had not had any liquid defecations.  He had continued to eat and drink fluids without any problems.  He had no chest pains and had not had any dyspnea.  He was also free of abdominal pain.  On exam alert, oriented and conversant.  Seemed well-hydrated and was not jaundiced or pale.  Lungs clear.  Heart regular.  Abdomen soft.  A decision was made to continue with the same plan.  I independently reviewed the images of the PET scan and discussed with him.  It reveals only an abnormal lesion in the liver as identified before.  No suggestion of distant metastatic disease.  Discussed with him the significance of this and explained the possibility of surgery and potentially cure.    11/15/2023: Completed 4  "cycles of FOLFIRI/panitumumab.  Experienced the expected side effects, particularly skin rash.  However, the treatment proceeded without major complications.  Today he tells me he has been feeling reasonably well and has continued to work part-time.  He enjoys his job.  He has been eating about as much as he had been eating before but he has lost some weight without intention.  He did have persistent diarrhea that responded only to large doses of loperamide.  At this point he no longer has diarrhea.  He has been without chest pains or cough and denies as well abdominal pain.  On exam he still has some erythema on the nasolabial regions on both sides.  The lungs are clear.  The heart is regular and the abdomen soft.  Liver and spleen do not seem to be enlarged.  The laboratory exams were reviewed and discussed with him.  To have another PET scan and consider surgical excision.  He will need to go to [place] for this.    12/11/2023: Feeling reasonably well at this time without any new complaints.  His diarrhea is essentially resolved although he still has soft defecations intermittently.  He is eating well and has a good appetite.  He has had no chest pains and has been without cough.  He denies abdominal pain.  No melena, hematochezia or hematuria.  No peripheral edema.  On exam no changes.  The PET scan reveals complete response with no residual evidence of disease activity.  I have discussed with Dr. Praveen Whittaker in [place] and he requested that a MRI be done prior to deciding on surgery.  Discussed with Mr. Leungal.  Explained the plans.  He is to have the MRI and will see me with the results.    12/27/2023: Without new symptoms.  Has not had the MRI because of scheduling problems.  He feels lightheaded at times and \"I am not as sure footed as I was before\".  He has had no falls and he continues to work full-time.  He has been eating well and has regained some of the weight that he had lost.  He has been " afebrile.  No chest pains or cough.  No abdominal pain or diarrhea and no dysuria.  No skin rash.  On exam no changes.  The laboratory exams were reviewed and discussed with him.  To reschedule the MRI for the next couple of weeks.  Discussed with him.    3/18/2024: Feeling very well. His appetite is good and he has gained weight. He has been working without any difficulties. He denies chest pain or cough. No abdominal pain or diarrhea and no dysuria. On exam alert and conversant. In good spirits and in no distress. No jaundice. No oral lesions and respirations not labored. The lungs are clear and the heart is regular. Abdomen is soft and not tender. The laboratory exams reveal a blood count in the normal ranges. He persists with hyperglycemia. The alkaline phosphatase has risen some in the recent past. He is to have the MRI of the liver. He will see me with the results.     6/26/2024: Back after an absence of a few weeks and a couple of missed appointments.  He feels about the same as he felt before.  Following the last admission to the hospital he was transferred to an assisted care living facility where he has been doing progressively better.  Persists with tremors.  Eats well.  Has not lost weight.  Denies abdominal pain.  Has not had diarrhea, melena or hematochezia.  On exam in no distress.  No jaundice.  Not pale.  The lungs are clear bilaterally and the heart regular.  The abdomen is soft and without hepatomegaly or splenomegaly.  No edema.  The laboratory exams were reviewed.  To obtain a Carcinoembryonic antigen today.  He will see me in a couple of weeks with new scans as it has been more than 3 months since the last 1.  Consider treatment with an irinotecan based regimen that seem to result in a very pronounced response in the recent past.    7/12/2024: Back to review the results of the recent scans.  He feels well generally and continues to be as active as before.  As well, his appetite appears to be  the same.  On exam he does not seem ill and he is conversant and well-oriented.  He is neither pale nor jaundiced and he seems well-hydrated.  The lungs are clear and the heart regular.  The abdomen is soft.  There is no edema.  Laboratory exams reviewed.  Reviewed the images and the report of the scans.  He has 2 metastatic deposits in the liver and no other evidence of metastatic dissemination of his malignancy.  I believe it reasonable to treat him again with chemotherapy and consider some form of local therapy in the future, given how localized the disease is.  Will resume chemotherapy with irinotecan, cetuximab and 5-fluorouracil.  No 5-FU bolus.  I will see him again in approximately 4 weeks.    8/13/2024: Fredi is here today for routine follow-up.  He reports well generally and continues to remain active.  He states his appetite appears same.  His only complaints today are urinary urgency and decreased stream. He does have history of BPH, but states that these symptoms are new over the past week.  On exam he does not seem ill and he is conversant and well-oriented.  Lungs are clear to auscultation heart is regular.  Abdomen is soft.  He does have acneiform rash to his chest as well as dry skin to his face.  He has not started doxycycline as he has not been able to pick it up from the pharmacy.  We discussed symptom management as well as limiting heat and sun exposure.    The following portions of the patient's history were reviewed and updated as appropriate: allergies, current medications, past family history, past medical history, past social history, past surgical history and problem list.    Past Medical History:   Diagnosis Date    Anemia     Colon cancer 2022    colon    Diabetes mellitus     Hyperlipidemia     Hypertension      Past Surgical History:   Procedure Laterality Date    APPENDECTOMY      CARDIAC CATHETERIZATION      COLON RESECTION N/A 12/15/2022    Procedure: COLON RESECTION RIGHT;  Surgeon:  Percy Davis MD;  Location: [facility] MAIN OR;  Service: General;  Laterality: N/A;    COLONOSCOPY N/A 11/11/2022    Procedure: COLONOSCOPY WITH BIOPSY AND POLYPECTOMY;  Surgeon: Billy Julien MD;  Location: [facility] ENDOSCOPY;  Service: Gastroenterology;  Laterality: N/A;  Impression:  1.  Large 4-5cm fulgurating circumferential ulcerated mass in the very proximal part of the ascending colon next to ileocecal valve multiple biopsies were performed.  This is highly concerning for colon malignancy.  2.  2 polyp rem    ENDOSCOPY N/A 11/11/2022    Procedure: ESOPHAGOGASTRODUODENOSCOPY with biopsy X1;  Surgeon: Billy Julien MD;  Location: [facility] ENDOSCOPY;  Service: Gastroenterology;  Laterality: N/A;  5.  Upper endoscopy lamination unremarkable.       PORTACATH PLACEMENT Right 1/12/2023    Procedure: INSERTION OF PORTACATH;  Surgeon: Percy Davis MD;  Location: [facility] MAIN OR;  Service: General;  Laterality: Right;    TONSILLECTOMY         Current Outpatient Medications:     doxycycline (VIBRAMYCIN) 100 MG capsule, Take 1 capsule by mouth 2 (Two) Times a Day for 30 days., Disp: 60 capsule, Rfl: 0    carvedilol (Coreg) 3.125 MG tablet, Take 1 tablet by mouth 2 (Two) Times a Day With Meals for 30 days., Disp: 60 tablet, Rfl: 0    Continuous Blood Gluc Sensor (Dexcom G7 Sensor) misc, Use 1 each Every 10 (Ten) Days. Dx: E11.65, Disp: 3 each, Rfl: 2    hydrocortisone 1 % cream, Apply 1 Application topically to the appropriate area as directed 2 (Two) Times a Day., Disp: 454 g, Rfl: 1    insulin aspart (NovoLOG FlexPen) 100 UNIT/ML solution pen-injector sc pen, Every 8 (Eight) Hours., Disp: , Rfl:     insulin glargine (LANTUS, SEMGLEE) 100 UNIT/ML injection, Inject 20 Units under the skin into the appropriate area as directed Every Night for 30 days., Disp: 6 mL, Rfl: 0    Insulin Pen Needle (Pen Needles) 32G X 4 MM misc, Use 1 each 4 (Four) Times a Day Before Meals & at Bedtime., Disp: 200 each, Rfl:  2    lisinopril (Zestril) 2.5 MG tablet, Take 1 tablet by mouth Daily for 60 days., Disp: 30 tablet, Rfl: 1    sulfamethoxazole-trimethoprim (BACTRIM DS,SEPTRA DS) 800-160 MG per tablet, Take 1 tablet by mouth 2 (Two) Times a Day for 7 days., Disp: 14 tablet, Rfl: 0  No current facility-administered medications for this visit.    Facility-Administered Medications Ordered in Other Visits:     fluorouracil (ADRUCIL) 4,990 mg in sodium chloride 0.9 % 240 mL chemo infusion - FOR HOME USE, 2,400 mg/m2 (Treatment Plan Recorded), Intravenous, Once, Analisa Moscoso PA-C    Allergies   Allergen Reactions    Penicillins Rash     Family History   Problem Relation Age of Onset    Pneumonia Mother 94        SARS-CoV2    Colon cancer Father 62    Heart disease Sister      Cancer-related family history includes Colon cancer (age of onset: 62) in his father.    Social History     Tobacco Use    Smoking status: Former     Current packs/day: 0.00     Average packs/day: 1 pack/day for 2.0 years (2.0 ttl pk-yrs)     Types: Cigarettes     Start date:      Quit date:      Years since quittin.6    Smokeless tobacco: Never   Vaping Use    Vaping status: Never Used   Substance Use Topics    Alcohol use: Not Currently    Drug use: Never     Social History     Social History Narrative    Not on file      ROS:     Review of Systems   Constitutional:  Negative for activity change, appetite change, chills, fatigue and fever.   HENT:  Negative for ear pain, mouth sores, nosebleeds and sore throat.    Eyes:  Negative for photophobia and visual disturbance.   Respiratory:  Negative for wheezing and stridor.    Cardiovascular:  Negative for chest pain and palpitations.   Gastrointestinal:  Negative for abdominal pain, diarrhea, nausea and vomiting.   Endocrine: Negative for cold intolerance and heat intolerance.   Genitourinary:  Positive for difficulty urinating. Negative for dysuria and hematuria.   Musculoskeletal:  Negative  "for joint swelling and neck stiffness.   Skin:  Positive for rash. Negative for color change.   Neurological:  Negative for seizures and syncope.   Hematological:  Negative for adenopathy.        No obvious bleeding   Psychiatric/Behavioral:  Negative for agitation, confusion and hallucinations.      Objective:    Vitals:    08/13/24 0811   BP: 133/78   Pulse: 83   Resp: 16   Temp: 97.4 °F (36.3 °C)   TempSrc: Oral   Weight: 80.3 kg (177 lb)   Height: 185.4 cm (73\")   PainSc: 0-No pain     Body mass index is 23.35 kg/m².  ECOG  (0) Fully active, able to carry on all predisease performance without restriction    Physical Exam:     Physical Exam  Vitals reviewed.   Constitutional:       General: He is not in acute distress.     Appearance: Normal appearance.   HENT:      Head: Normocephalic and atraumatic.   Eyes:      Pupils: Pupils are equal, round, and reactive to light.   Cardiovascular:      Rate and Rhythm: Normal rate and regular rhythm.      Pulses: Normal pulses.      Heart sounds: No murmur heard.  Pulmonary:      Effort: Pulmonary effort is normal.      Breath sounds: Normal breath sounds.   Abdominal:      General: There is no distension.      Palpations: Abdomen is soft. There is no mass.      Tenderness: There is no abdominal tenderness.   Musculoskeletal:         General: No swelling. Normal range of motion.      Cervical back: Normal range of motion and neck supple.   Skin:     General: Skin is warm.      Findings: Rash present.   Neurological:      General: No focal deficit present.      Mental Status: He is alert and oriented to person, place, and time.   Psychiatric:         Mood and Affect: Mood normal.         Lab Results - Last 18 Months   Lab Units 08/13/24  0745 07/30/24  0754 07/16/24  0747   WBC 10*3/mm3 4.80 6.48 5.20   HEMOGLOBIN g/dL 13.3 13.0 13.1   HEMATOCRIT % 41.5 40.2 41.1   PLATELETS 10*3/mm3 133* 135* 150   MCV fL 89.4 88.9 89.3     Lab Results - Last 18 Months   Lab Units " 08/13/24  0752 07/30/24  0812 07/16/24  0754 07/12/24  0744 06/26/24  0818 04/25/24  0314 04/24/24  1857 03/18/24  0747   SODIUM mmol/L  --   --   --  135* 136 138   < > 139   POTASSIUM mmol/L  --   --   --  4.4 4.4 3.8   < > 4.6   CHLORIDE mmol/L  --   --   --  98 100 100   < > 102   CO2 mmol/L  --   --   --  26.3 27.0 29.0   < > 28.0   BUN mg/dL  --   --   --  17 15 24*   < > 21   CREATININE mg/dL 0.80 1.00 0.90 1.04 0.96 0.96   < > 1.00   CALCIUM mg/dL  --   --   --  8.9 8.9 8.5*   < > 8.7   BILIRUBIN mg/dL  --   --   --  0.4 0.5  --   --  0.3   ALK PHOS U/L  --   --   --  164* 143*  --   --  130*   ALT (SGPT) U/L  --   --   --  15 16  --   --  21   AST (SGOT) U/L  --   --   --  16 21  --   --  17   GLUCOSE mg/dL  --   --   --  455* 342* 142*   < > 367*    < > = values in this interval not displayed.     Lab Results   Component Value Date    GLUCOSE 455 (C) 07/12/2024    BUN 17 07/12/2024    CREATININE 0.80 08/13/2024    EGFRIFNONA 76 11/15/2021    EGFRIFAFRI 87 11/15/2021    BCR 16.3 07/12/2024    K 4.4 07/12/2024    CO2 26.3 07/12/2024    CALCIUM 8.9 07/12/2024    ALBUMIN 4.0 07/12/2024    AST 16 07/12/2024    ALT 15 07/12/2024     Lab Results   Component Value Date    IRON 15 (L) 01/06/2023    TIBC 548 (H) 01/06/2023    FERRITIN 23.51 (L) 01/06/2023     Lab Results   Component Value Date    CEA 5.16 07/16/2024     Assessment & Plan     Assessment:  Isolated metastatic colon cancer to the central portion of the liver with 2 distinct lesions.  To resume chemotherapy with 5-fluorouracil irinotecan and cetuximab.  I will not give him a bolus to reduce side effects.  Will order restaging scans to be completed prior to his follow-up with Dr. Kramer  Moderately differentiated adenocarcinoma of the ascending colon [id number] MMR proficient.  Completed Cape-Ox adjuvantly for 3 months in April 2023.  History of recurrent ischemic stroke.  On acetyl salicylic acid.  He has not been taking the atorvastatin and other  medications prescribed to him.  Persists with mild hypertension.  Hyperglycemia.  He is working with his PCP regarding his blood sugars.  He is now on insulin.  Acneiform rash secondary to panitumumab.  Discussed symptom management   Urinary tract infection noted on urinalysis.  Culture is pending.  Antibiotics sent to his preferred pharmacy  Reviewed and laboratory exams which are acceptable to proceed with treatment.  Will continue with current regimen.    Plan:  As above.  Follow-up with Dr. Kramer in 4 weeks, sooner if condition indicates    Electronically signed by Analisa Moscoso PA-C      Time spent on encounter including record review, history taking, exam, discussion, counseling and documentation at: 40 minutes

## 2024-08-15 ENCOUNTER — HOSPITAL ENCOUNTER (OUTPATIENT)
Dept: ONCOLOGY | Facility: HOSPITAL | Age: 79
Discharge: HOME | End: 2024-08-15
Payer: MEDICARE

## 2024-08-15 VITALS — HEART RATE: 70 BPM | DIASTOLIC BLOOD PRESSURE: 67 MMHG | SYSTOLIC BLOOD PRESSURE: 111 MMHG

## 2024-08-15 DIAGNOSIS — C18.2 MALIGNANT NEOPLASM OF ASCENDING COLON: ICD-10-CM

## 2024-08-15 DIAGNOSIS — Z95.828 PORT-A-CATH IN PLACE: ICD-10-CM

## 2024-08-15 DIAGNOSIS — C78.7 METASTASES TO THE LIVER: Primary | ICD-10-CM

## 2024-08-15 PROCEDURE — 25010000002 HEPARIN LOCK FLUSH PER 10 UNITS: Performed by: INTERNAL MEDICINE

## 2024-08-15 RX ORDER — HEPARIN SODIUM (PORCINE) LOCK FLUSH IV SOLN 100 UNIT/ML 100 UNIT/ML
500 SOLUTION INTRAVENOUS AS NEEDED
Status: DISCONTINUED | OUTPATIENT
Start: 2024-08-15 | End: 2024-08-16 | Stop reason: HOSPADM

## 2024-08-15 RX ORDER — HEPARIN SODIUM (PORCINE) LOCK FLUSH IV SOLN 100 UNIT/ML 100 UNIT/ML
500 SOLUTION INTRAVENOUS AS NEEDED
OUTPATIENT
Start: 2024-08-15

## 2024-08-15 RX ORDER — SODIUM CHLORIDE 0.9 % (FLUSH) 0.9 %
20 SYRINGE (ML) INJECTION AS NEEDED
Status: DISCONTINUED | OUTPATIENT
Start: 2024-08-15 | End: 2024-08-16 | Stop reason: HOSPADM

## 2024-08-15 RX ORDER — SODIUM CHLORIDE 0.9 % (FLUSH) 0.9 %
20 SYRINGE (ML) INJECTION AS NEEDED
OUTPATIENT
Start: 2024-08-15

## 2024-08-15 RX ADMIN — Medication 20 ML: at 09:30

## 2024-08-15 RX ADMIN — HEPARIN 500 UNITS: 100 SYRINGE at 09:30

## 2024-08-27 ENCOUNTER — HOSPITAL ENCOUNTER (OUTPATIENT)
Dept: ONCOLOGY | Facility: HOSPITAL | Age: 79
Discharge: HOME | End: 2024-08-27
Admitting: INTERNAL MEDICINE
Payer: OTHER GOVERNMENT

## 2024-08-27 VITALS
WEIGHT: 179.4 LBS | BODY MASS INDEX: 23.67 KG/M2 | RESPIRATION RATE: 18 BRPM | HEART RATE: 74 BPM | DIASTOLIC BLOOD PRESSURE: 80 MMHG | OXYGEN SATURATION: 98 % | TEMPERATURE: 97.5 F | SYSTOLIC BLOOD PRESSURE: 136 MMHG

## 2024-08-27 DIAGNOSIS — C18.2 MALIGNANT NEOPLASM OF ASCENDING COLON: Primary | ICD-10-CM

## 2024-08-27 DIAGNOSIS — C78.7 METASTASES TO THE LIVER: ICD-10-CM

## 2024-08-27 LAB
ALBUMIN SERPL-MCNC: 3.5 G/DL (ref 3.5–5.2)
ALBUMIN/GLOB SERPL: 1.4 G/DL
ALP SERPL-CCNC: 98 U/L (ref 39–117)
ALT SERPL W P-5'-P-CCNC: 28 U/L (ref 1–41)
ANION GAP SERPL CALCULATED.3IONS-SCNC: 6.9 MMOL/L (ref 5–15)
AST SERPL-CCNC: 24 U/L (ref 1–40)
BASOPHILS # BLD AUTO: 0.02 10*3/MM3 (ref 0–0.2)
BASOPHILS NFR BLD AUTO: 0.4 % (ref 0–1.5)
BILIRUB SERPL-MCNC: 0.3 MG/DL (ref 0–1.2)
BUN SERPL-MCNC: 11 MG/DL (ref 8–23)
BUN/CREAT SERPL: 12.5 (ref 7–25)
CALCIUM SPEC-SCNC: 8.3 MG/DL (ref 8.6–10.5)
CHLORIDE SERPL-SCNC: 105 MMOL/L (ref 98–107)
CO2 SERPL-SCNC: 27.1 MMOL/L (ref 22–29)
CREAT SERPL-MCNC: 0.88 MG/DL (ref 0.76–1.27)
DEPRECATED RDW RBC AUTO: 52.2 FL (ref 37–54)
EGFRCR SERPLBLD CKD-EPI 2021: 89.1 ML/MIN/1.73
EOSINOPHIL # BLD AUTO: 0.18 10*3/MM3 (ref 0–0.4)
EOSINOPHIL NFR BLD AUTO: 3.9 % (ref 0.3–6.2)
ERYTHROCYTE [DISTWIDTH] IN BLOOD BY AUTOMATED COUNT: 16.5 % (ref 12.3–15.4)
GLOBULIN UR ELPH-MCNC: 2.5 GM/DL
GLUCOSE SERPL-MCNC: 363 MG/DL (ref 65–99)
HCT VFR BLD AUTO: 41 % (ref 37.5–51)
HGB BLD-MCNC: 13.2 G/DL (ref 13–17.7)
LYMPHOCYTES # BLD AUTO: 1.26 10*3/MM3 (ref 0.7–3.1)
LYMPHOCYTES NFR BLD AUTO: 27.2 % (ref 19.6–45.3)
MAGNESIUM SERPL-MCNC: 2 MG/DL (ref 1.6–2.4)
MCH RBC QN AUTO: 28.9 PG (ref 26.6–33)
MCHC RBC AUTO-ENTMCNC: 32.2 G/DL (ref 31.5–35.7)
MCV RBC AUTO: 89.9 FL (ref 79–97)
MONOCYTES # BLD AUTO: 0.62 10*3/MM3 (ref 0.1–0.9)
MONOCYTES NFR BLD AUTO: 13.4 % (ref 5–12)
NEUTROPHILS NFR BLD AUTO: 2.55 10*3/MM3 (ref 1.7–7)
NEUTROPHILS NFR BLD AUTO: 55.1 % (ref 42.7–76)
PLATELET # BLD AUTO: 128 10*3/MM3 (ref 140–450)
PMV BLD AUTO: 9 FL (ref 6–12)
POTASSIUM SERPL-SCNC: 3.9 MMOL/L (ref 3.5–5.2)
PROT SERPL-MCNC: 6 G/DL (ref 6–8.5)
RBC # BLD AUTO: 4.56 10*6/MM3 (ref 4.14–5.8)
SODIUM SERPL-SCNC: 139 MMOL/L (ref 136–145)
WBC NRBC COR # BLD AUTO: 4.63 10*3/MM3 (ref 3.4–10.8)

## 2024-08-27 PROCEDURE — 25010000002 FLUOROURACIL PER 500 MG: Performed by: NURSE PRACTITIONER

## 2024-08-27 PROCEDURE — 25010000002 ATROPINE SULFATE 0.4 MG/ML SOLUTION 1 ML VIAL: Performed by: NURSE PRACTITIONER

## 2024-08-27 PROCEDURE — 25810000003 SODIUM CHLORIDE 0.9 % SOLUTION: Performed by: NURSE PRACTITIONER

## 2024-08-27 PROCEDURE — 25010000002 IRINOTECAN PER 20 MG: Performed by: NURSE PRACTITIONER

## 2024-08-27 PROCEDURE — 85025 COMPLETE CBC W/AUTO DIFF WBC: CPT | Performed by: INTERNAL MEDICINE

## 2024-08-27 PROCEDURE — 96415 CHEMO IV INFUSION ADDL HR: CPT

## 2024-08-27 PROCEDURE — 25010000002 LEUCOVORIN CALCIUM PER 50 MG: Performed by: NURSE PRACTITIONER

## 2024-08-27 PROCEDURE — 25010000002 DEXAMETHASONE PER 1 MG: Performed by: NURSE PRACTITIONER

## 2024-08-27 PROCEDURE — 25010000002 LEUCOVORIN 500 MG RECONSTITUTED SOLUTION 1 EACH VIAL: Performed by: NURSE PRACTITIONER

## 2024-08-27 PROCEDURE — G0498 CHEMO EXTEND IV INFUS W/PUMP: HCPCS

## 2024-08-27 PROCEDURE — 83735 ASSAY OF MAGNESIUM: CPT | Performed by: INTERNAL MEDICINE

## 2024-08-27 PROCEDURE — 25010000002 PANITUMUMAB 400 MG/20ML SOLUTION 20 ML VIAL: Performed by: NURSE PRACTITIONER

## 2024-08-27 PROCEDURE — 25810000003 SODIUM CHLORIDE 0.9 % SOLUTION 250 ML FLEX CONT: Performed by: NURSE PRACTITIONER

## 2024-08-27 PROCEDURE — 0 DEXTROSE 5 % SOLUTION 250 ML FLEX CONT: Performed by: NURSE PRACTITIONER

## 2024-08-27 PROCEDURE — 25010000002 PALONOSETRON 0.25 MG/5ML SOLUTION PREFILLED SYRINGE: Performed by: NURSE PRACTITIONER

## 2024-08-27 PROCEDURE — 25010000002 PANITUMUMAB PER 10 MG: Performed by: NURSE PRACTITIONER

## 2024-08-27 PROCEDURE — 96417 CHEMO IV INFUS EACH ADDL SEQ: CPT

## 2024-08-27 PROCEDURE — 96413 CHEMO IV INFUSION 1 HR: CPT

## 2024-08-27 PROCEDURE — 80053 COMPREHEN METABOLIC PANEL: CPT | Performed by: INTERNAL MEDICINE

## 2024-08-27 PROCEDURE — 96416 CHEMO PROLONG INFUSE W/PUMP: CPT

## 2024-08-27 PROCEDURE — 96368 THER/DIAG CONCURRENT INF: CPT

## 2024-08-27 PROCEDURE — 96375 TX/PRO/DX INJ NEW DRUG ADDON: CPT

## 2024-08-27 RX ORDER — DEXAMETHASONE SODIUM PHOSPHATE 4 MG/ML
6 INJECTION, SOLUTION INTRA-ARTICULAR; INTRALESIONAL; INTRAMUSCULAR; INTRAVENOUS; SOFT TISSUE ONCE
Status: COMPLETED | OUTPATIENT
Start: 2024-08-27 | End: 2024-08-27

## 2024-08-27 RX ORDER — ATROPINE SULFATE 1 MG/ML
0.25 INJECTION, SOLUTION INTRAMUSCULAR; INTRAVENOUS; SUBCUTANEOUS
Status: DISCONTINUED | OUTPATIENT
Start: 2024-08-27 | End: 2024-08-27

## 2024-08-27 RX ORDER — PALONOSETRON 0.05 MG/ML
0.25 INJECTION, SOLUTION INTRAVENOUS ONCE
Status: COMPLETED | OUTPATIENT
Start: 2024-08-27 | End: 2024-08-27

## 2024-08-27 RX ORDER — SODIUM CHLORIDE 9 MG/ML
250 INJECTION, SOLUTION INTRAVENOUS ONCE
Status: CANCELLED | OUTPATIENT
Start: 2024-08-27

## 2024-08-27 RX ORDER — SODIUM CHLORIDE 9 MG/ML
250 INJECTION, SOLUTION INTRAVENOUS ONCE
Status: COMPLETED | OUTPATIENT
Start: 2024-08-27 | End: 2024-08-27

## 2024-08-27 RX ORDER — ATROPINE SULFATE 1 MG/ML
0.25 INJECTION, SOLUTION INTRAMUSCULAR; INTRAVENOUS; SUBCUTANEOUS
Status: CANCELLED | OUTPATIENT
Start: 2024-08-27

## 2024-08-27 RX ORDER — PALONOSETRON 0.05 MG/ML
0.25 INJECTION, SOLUTION INTRAVENOUS ONCE
Status: CANCELLED | OUTPATIENT
Start: 2024-08-27

## 2024-08-27 RX ADMIN — PALONOSETRON 0.25 MG: 0.25 INJECTION, SOLUTION INTRAVENOUS at 09:25

## 2024-08-27 RX ADMIN — SODIUM CHLORIDE 250 ML: 9 INJECTION, SOLUTION INTRAVENOUS at 09:25

## 2024-08-27 RX ADMIN — FLUOROURACIL 4990 MG: 50 INJECTION, SOLUTION INTRAVENOUS at 12:46

## 2024-08-27 RX ADMIN — DEXAMETHASONE SODIUM PHOSPHATE 6 MG: 4 INJECTION INTRA-ARTICULAR; INTRALESIONAL; INTRAMUSCULAR; INTRAVENOUS; SOFT TISSUE at 09:32

## 2024-08-27 RX ADMIN — PANITUMUMAB 500 MG: 400 SOLUTION INTRAVENOUS at 09:55

## 2024-08-27 RX ADMIN — LEUCOVORIN CALCIUM 830 MG: 500 INJECTION, POWDER, LYOPHILIZED, FOR SOLUTION INTRAMUSCULAR; INTRAVENOUS at 10:59

## 2024-08-27 RX ADMIN — IRINOTECAN HYDROCHLORIDE 300 MG: 20 INJECTION, SOLUTION INTRAVENOUS at 11:01

## 2024-08-27 NOTE — PROGRESS NOTES
Message below sent to NP/MD  Dr. Kramer, pt. is here for C8D1 Vectibix, Isma, pt. seems to be doing ok today. He state his UTI symptoms are better. He has the acne rash to his face/chest area he is using the prescribed cream and he states symptoms have not worsened. He does have a couple of fingernails/toenails that look like they are yellow/loose like they may fall off. I didn't know if that was normal with his regimen. CBC results within parameters, CMP went over to hospital stat. Treatment plan needs signed if ok to treat? 08/27/24 07:50 Lab results sent to NP for review (pt's glucose level 363 today) Pt. States he did take his insulin this morning as prescribed and he has his insulin with him today.   OK to treat, reduce Dexamethasone to 6mg IV today  per Karis AMADO  Yes, nail changes can occur with vectibix, recommend pt. To keep fingernails/toenails clean, watch for any signs/symptoms of infection per Karis AMADO.   Pt. Verbalized understanding of all orders/instructions.   Treatment given as ordered and pt. Tolerated well.   Pt. Discharged from clinic with no complaints, no AVS given today.

## 2024-08-29 ENCOUNTER — HOSPITAL ENCOUNTER (OUTPATIENT)
Dept: ONCOLOGY | Facility: HOSPITAL | Age: 79
Discharge: HOME | End: 2024-08-29
Admitting: NURSE PRACTITIONER
Payer: OTHER GOVERNMENT

## 2024-08-29 ENCOUNTER — HOSPITAL ENCOUNTER (OUTPATIENT)
Dept: PET IMAGING | Facility: HOSPITAL | Age: 79
Discharge: HOME | End: 2024-08-29
Admitting: PHYSICIAN ASSISTANT
Payer: OTHER GOVERNMENT

## 2024-08-29 DIAGNOSIS — C78.7 METASTASES TO THE LIVER: Primary | ICD-10-CM

## 2024-08-29 DIAGNOSIS — C18.2 MALIGNANT NEOPLASM OF ASCENDING COLON: ICD-10-CM

## 2024-08-29 DIAGNOSIS — C78.7 METASTASES TO THE LIVER: ICD-10-CM

## 2024-08-29 DIAGNOSIS — Z95.828 PORT-A-CATH IN PLACE: ICD-10-CM

## 2024-08-29 PROCEDURE — 71260 CT THORAX DX C+: CPT

## 2024-08-29 PROCEDURE — 25510000001 IOPAMIDOL PER 1 ML: Performed by: PHYSICIAN ASSISTANT

## 2024-08-29 PROCEDURE — 25010000002 HEPARIN LOCK FLUSH PER 10 UNITS: Performed by: INTERNAL MEDICINE

## 2024-08-29 PROCEDURE — 74177 CT ABD & PELVIS W/CONTRAST: CPT

## 2024-08-29 RX ORDER — IOPAMIDOL 755 MG/ML
100 INJECTION, SOLUTION INTRAVASCULAR
Status: COMPLETED | OUTPATIENT
Start: 2024-08-29 | End: 2024-08-29

## 2024-08-29 RX ORDER — HEPARIN SODIUM (PORCINE) LOCK FLUSH IV SOLN 100 UNIT/ML 100 UNIT/ML
500 SOLUTION INTRAVENOUS AS NEEDED
OUTPATIENT
Start: 2024-08-29

## 2024-08-29 RX ORDER — HEPARIN SODIUM (PORCINE) LOCK FLUSH IV SOLN 100 UNIT/ML 100 UNIT/ML
500 SOLUTION INTRAVENOUS AS NEEDED
Status: DISCONTINUED | OUTPATIENT
Start: 2024-08-29 | End: 2024-08-31 | Stop reason: HOSPADM

## 2024-08-29 RX ORDER — SODIUM CHLORIDE 0.9 % (FLUSH) 0.9 %
20 SYRINGE (ML) INJECTION AS NEEDED
Status: DISCONTINUED | OUTPATIENT
Start: 2024-08-29 | End: 2024-08-31 | Stop reason: HOSPADM

## 2024-08-29 RX ORDER — SODIUM CHLORIDE 0.9 % (FLUSH) 0.9 %
20 SYRINGE (ML) INJECTION AS NEEDED
OUTPATIENT
Start: 2024-08-29

## 2024-08-29 RX ADMIN — Medication 20 ML: at 09:30

## 2024-08-29 RX ADMIN — HEPARIN 500 UNITS: 100 SYRINGE at 09:30

## 2024-08-29 RX ADMIN — IOPAMIDOL 100 ML: 755 INJECTION, SOLUTION INTRAVENOUS at 13:16

## 2024-08-29 NOTE — PROGRESS NOTES
Pt to injection chair for CIV d/c. Pump empty upon arrival. Port accessed and flushed with good blood return noted. Port flushed with saline and heparin prior to needle removal. AVS printed and pt discharged home.

## 2024-09-03 ENCOUNTER — HOSPITAL ENCOUNTER (OUTPATIENT)
Dept: PET IMAGING | Facility: HOSPITAL | Age: 79
Discharge: HOME | End: 2024-09-03
Admitting: PHYSICIAN ASSISTANT
Payer: OTHER GOVERNMENT

## 2024-09-09 ENCOUNTER — LAB (OUTPATIENT)
Dept: LAB | Facility: HOSPITAL | Age: 79
End: 2024-09-09
Payer: OTHER GOVERNMENT

## 2024-09-09 ENCOUNTER — OFFICE VISIT (OUTPATIENT)
Dept: ONCOLOGY | Facility: CLINIC | Age: 79
End: 2024-09-09
Payer: OTHER GOVERNMENT

## 2024-09-09 VITALS
DIASTOLIC BLOOD PRESSURE: 83 MMHG | WEIGHT: 175 LBS | SYSTOLIC BLOOD PRESSURE: 136 MMHG | HEART RATE: 78 BPM | BODY MASS INDEX: 23.7 KG/M2 | HEIGHT: 72 IN | OXYGEN SATURATION: 98 % | RESPIRATION RATE: 18 BRPM | TEMPERATURE: 97.5 F

## 2024-09-09 DIAGNOSIS — C18.2 MALIGNANT NEOPLASM OF ASCENDING COLON: ICD-10-CM

## 2024-09-09 DIAGNOSIS — C18.2 MALIGNANT NEOPLASM OF ASCENDING COLON: Primary | ICD-10-CM

## 2024-09-09 DIAGNOSIS — C78.7 METASTASES TO THE LIVER: ICD-10-CM

## 2024-09-09 PROBLEM — R26.81 UNSTEADY GAIT: Status: ACTIVE | Noted: 2024-09-09

## 2024-09-09 LAB
ALBUMIN SERPL-MCNC: 3.8 G/DL (ref 3.5–5.2)
ALBUMIN/GLOB SERPL: 1.7 G/DL
ALP SERPL-CCNC: 96 U/L (ref 39–117)
ALT SERPL W P-5'-P-CCNC: 19 U/L (ref 1–41)
ANION GAP SERPL CALCULATED.3IONS-SCNC: 8.2 MMOL/L (ref 5–15)
AST SERPL-CCNC: 20 U/L (ref 1–40)
BILIRUB SERPL-MCNC: 0.3 MG/DL (ref 0–1.2)
BUN SERPL-MCNC: 12 MG/DL (ref 8–23)
BUN/CREAT SERPL: 11.9 (ref 7–25)
CALCIUM SPEC-SCNC: 8.5 MG/DL (ref 8.6–10.5)
CHLORIDE SERPL-SCNC: 103 MMOL/L (ref 98–107)
CO2 SERPL-SCNC: 25.8 MMOL/L (ref 22–29)
CREAT SERPL-MCNC: 1.01 MG/DL (ref 0.76–1.27)
EGFRCR SERPLBLD CKD-EPI 2021: 77.1 ML/MIN/1.73
GLOBULIN UR ELPH-MCNC: 2.2 GM/DL
GLUCOSE SERPL-MCNC: 373 MG/DL (ref 65–99)
HOLD SPECIMEN: NORMAL
HOLD SPECIMEN: NORMAL
MAGNESIUM SERPL-MCNC: 1.8 MG/DL (ref 1.6–2.4)
POTASSIUM SERPL-SCNC: 4 MMOL/L (ref 3.5–5.2)
PROT SERPL-MCNC: 6 G/DL (ref 6–8.5)
SODIUM SERPL-SCNC: 137 MMOL/L (ref 136–145)

## 2024-09-09 PROCEDURE — 83735 ASSAY OF MAGNESIUM: CPT | Performed by: INTERNAL MEDICINE

## 2024-09-09 PROCEDURE — 36415 COLL VENOUS BLD VENIPUNCTURE: CPT

## 2024-09-09 PROCEDURE — 80053 COMPREHEN METABOLIC PANEL: CPT | Performed by: INTERNAL MEDICINE

## 2024-09-09 PROCEDURE — 99214 OFFICE O/P EST MOD 30 MIN: CPT | Performed by: INTERNAL MEDICINE

## 2024-09-09 RX ORDER — SODIUM CHLORIDE 9 MG/ML
250 INJECTION, SOLUTION INTRAVENOUS ONCE
Status: CANCELLED | OUTPATIENT
Start: 2024-09-10

## 2024-09-09 RX ORDER — ATROPINE SULFATE 1 MG/ML
0.25 INJECTION, SOLUTION INTRAMUSCULAR; INTRAVENOUS; SUBCUTANEOUS
Status: CANCELLED | OUTPATIENT
Start: 2024-09-10

## 2024-09-09 RX ORDER — PALONOSETRON 0.05 MG/ML
0.25 INJECTION, SOLUTION INTRAVENOUS ONCE
Status: CANCELLED | OUTPATIENT
Start: 2024-09-10

## 2024-09-09 NOTE — PROGRESS NOTES
HEMATOLOGY ONCOLOGY OUTPATIENT FOLLOW-UP       Patient name: Vlad Guerrero  : 1945  MRN: [id number]  Primary Care Physician: Vlad Moreland MD  Referring Physician: Vlad Moreland MD  Reason For Consult: Stage III colon cance    Chief Complaint   Patient presents with    Follow-up     Malignant neoplasm of ascending colon         HPI:   History of Present Illness:  Vlad Guerrero is 78 y.o. male who presented to our office on 23 for consultation regarding    2023: Mr. Guerrero dated the beginning of his present illness to sometime at the end of  when he started to feel fatigued.  He was seen at the [facility] and had laboratory exams that revealed microcytic anemia.  He had evidence of iron deficiency and received intravenous iron in the hospital.  He had upper and lower gastrointestinal endoscopies that demonstrated a cecal tumor that measured 4 to 5 cm and was fungating in appearance.  It was circumferential and was next to the ileocecal valve.  The upper gastrointestinal endoscopy revealed no abnormalities.  On this basis he was on December 15, 2022 he was taken to the hospital and underwent a right hemicolectomy without complications.  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  It corresponded to a grade 2 malignancy that invaded through the muscularis propria into the pericolonic tissues.  Macroscopic tumor perforation or lymphovascular space invasion were not present.  Perineural invasion was not present either.  All margins of excision were negative.  All of 36 lymph nodes submitted to were positive for involvement with malignancy.  The disease was Elzbieta staged as [id number].  Loss of expression of mismatch repair enzymes was not documented.  Mr. Guerrero was discharged to continue treatment as outpatient.  At the time of this visit he was recovering well from the  surgery.  His incision had healed completely and he had no drains or suture materials.  He had returned home and was eating well.  He had yet to return to work.  He had been afebrile and free of nausea.  For the most part his weight had been stable.  After reviewing the records a long conversation, of approximately 1 hour, was had with the patient in regards to options of treatment.  The nature of his disease as well as the likelihood of recurrence were described.  The use of adjuvant chemotherapy to increase the rate of cure after surgery was explained.  Side effects were described in detail.  The need for a port was expressed as well.  A treatment plan was placed. A decision was made to treat him with three months of CAPOX given the characteristics of his disease.     2/6/2023: Received the first cycle of adjuvant chemotherapy without any side effects. On the day of this visit feeling well and without any new symptoms, except a very mild rash, especially on the forearms, but no oral pain. Had stools of diminished consistency for a few days but now resolved. The exam was rather unremarkable, except for a few very light erythematous macules on the forearms.     2/27/2023: Feeling well and without new symptoms.  As active as before.  Eating well and without unintended weight loss.  Stronger and more energetic since the institution of vitamin B12 and iron.  No chest pains and cough.  No abdominal pain or diarrhea.  On exam no changes.  A decision was made to continue with the same treatment.  Laboratory exams were reviewed.  He was to see me again in approximately 4 weeks from this date with new scans.    3/27/2023: Suffered an ischemic stroke.  MRI on March 10, 2023 reported a 7 mm focus of restricted diffusion in the left periventricular white matter of the posterior left frontal lobe.  This was felt to be suspicious for an acute/subacute infarct.  There was also evidence of previous lacunar strokes.  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  There was bilateral internal carotid artery stenosis with 60% stenosis estimated at the right and not greater than 50% at the left.  Chemotherapy was held following discharge.  He was left without any sequela.  At the time of this visit he was entirely asymptomatic.  He was convinced a large part of his problem was that he had suffered from hyperglycemia, consistently for some time.  Indeed his glucose was between 152 mg/dL and 193 mg/dL in the preceding days.  However, he reported at home glucose readings of greater than 400 mg/dL..  On exam there were no changes.  The laboratory exams reported a blood count with normocytic anemia and mild thrombocytopenia.  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    4/14/2023: Completed 3 months of treatment with CAPOX.  On the day of this visit not feeling very well.  Weak and tired.  Not very good appetite although eating well.  Having some dysgeusia.  Afebrile.  No chest pains or cough and no abdominal pain.  Had maintain regular bowel activity.  No edema.  On exam no changes.  A decision was made to stop the chemotherapy.  He was to get intravenous fluids on the day of this visit.  To return to see me in 3 weeks.  Tentatively to have scans in early June 2023.    5/5/2023: Feeling progressively stronger. Eating better and slowly regaining weight. Afebrile. No more nausea. No diarrhea and now with regular bowel activity. On exam alert, conversant and well oriented. No pale or jaundice. No oral lesions and no palpable lymph  nodes. Lungs clear and abdomen soft. Minimal edema of the right lower extremity. The laboratory exams revealed persistent anemia with a tendency to macrocytosis. Hemoglobin and platelets within normal ranges. A decision was made to continue to observe and I asked him to see me in approximately 3 months with new scans.     8/7/2023: Feels as well as at the time of the last visit. Active and returned to work full time. Eating well and no nausea or vomiting. No chest pain or cough and no abdominal pain. On exam no changes, though he had lost a large amount of weight since the previous visit. The imaging studies suggested a new lesion in the liver, as well as several lesions in the spleen of unclear cause. Reviewed the images and the report of the scans. Discussed with him at length and explained the findings. Discussed with him the plans for a MRI. He will see me with results.     8/28/2023: Entirely asymptomatic.  Working without limitations.  Eating well.  Weight stable.  Afebrile.  No pain.  On exam no changes.  Laboratory exams were reviewed and discussed with him.  In spite of the fact things on the scans the Carcinoembryonic antigen has not increased.  However both the CT and the MRI suggest one isolated metastatic deposit in segment 8 of the liver.  Discussed with him the options at this time.  I believe a biopsy is essential and after that he would be treated with chemotherapy following which surgery, if no new lesions have appeared, could be considered.  He may still be curable even in the setting of metastatic disease.  Discussed with him at great length.  Explained the steps.  Sent a communication to Dr. Davis, the patient's surgeon.    9/11/2023: Feels about the same as before.  No new symptoms.  As active as before and working.  Eating well and with good appetite.  No unintended weight loss.  Afebrile.  On exam alert, conversant and in good spirits.  No distress.  No jaundice or pallor.  Lungs clear  and heart regular.  Abdomen soft.  The liver is not palpable.  No edema.  Laboratory exams were reviewed.  The liver biopsy report confirms metastatic colorectal cancer.  This appears to be an isolated metastases.  On this basis treatment with chemotherapy followed by surgery is not ordered.  I have asked him to have a PET scan and discussed the study with him.  Discussed the objectives of the treatment and its requirements.  Placed the treatment plan with 5 fluorouracil, irinotecan and panitumumab and discussed with him.  To begin as soon as possible.    9/25/2023: In the office before the next scheduled time.  He called to report that over the weekend he had had between 5 and 8 defecations of liquid stool.  He took loperamide irregularly and it did not seem to provide much relief.  He was able to eat and drink without any difficulties and was never nauseated.  He was seen in the emergency room on 9/24/2023 and he was not found to have any dehydration or other evidence of complications.  They discharged him.  At the time of this visit feeling better.  As of this time he had not had any liquid defecations.  He had continued to eat and drink fluids without any problems.  He had no chest pains and had not had any dyspnea.  He was also free of abdominal pain.  On exam alert, oriented and conversant.  Seemed well-hydrated and was not jaundiced or pale.  Lungs clear.  Heart regular.  Abdomen soft.  A decision was made to continue with the same plan.  I independently reviewed the images of the PET scan and discussed with him.  It reveals only an abnormal lesion in the liver as identified before.  No suggestion of distant metastatic disease.  Discussed with him the significance of this and explained the possibility of surgery and potentially cure.    11/15/2023: Completed 4 cycles of FOLFIRI/panitumumab.  Experienced the expected side effects, particularly skin rash.  However, the treatment proceeded without major  "complications.  Today he tells me he has been feeling reasonably well and has continued to work part-time.  He enjoys his job.  He has been eating about as much as he had been eating before but he has lost some weight without intention.  He did have persistent diarrhea that responded only to large doses of loperamide.  At this point he no longer has diarrhea.  He has been without chest pains or cough and denies as well abdominal pain.  On exam he still has some erythema on the nasolabial regions on both sides.  The lungs are clear.  The heart is regular and the abdomen soft.  Liver and spleen do not seem to be enlarged.  The laboratory exams were reviewed and discussed with him.  To have another PET scan and consider surgical excision.  He will need to go to [place] for this.    12/11/2023: Feeling reasonably well at this time without any new complaints.  His diarrhea is essentially resolved although he still has soft defecations intermittently.  He is eating well and has a good appetite.  He has had no chest pains and has been without cough.  He denies abdominal pain.  No melena, hematochezia or hematuria.  No peripheral edema.  On exam no changes.  The PET scan reveals complete response with no residual evidence of disease activity.  I have discussed with Dr. Praveen Whittaker in [place] and he requested that a MRI be done prior to deciding on surgery.  Discussed with Mr. Guerrero.  Explained the plans.  He is to have the MRI and will see me with the results.    12/27/2023: Without new symptoms.  Has not had the MRI because of scheduling problems.  He feels lightheaded at times and \"I am not as sure footed as I was before\".  He has had no falls and he continues to work full-time.  He has been eating well and has regained some of the weight that he had lost.  He has been afebrile.  No chest pains or cough.  No abdominal pain or diarrhea and no dysuria.  No skin rash.  On exam no changes.  The laboratory exams were " reviewed and discussed with him.  To reschedule the MRI for the next couple of weeks.  Discussed with him.    3/18/2024: Feeling very well. His appetite is good and he has gained weight. He has been working without any difficulties. He denies chest pain or cough. No abdominal pain or diarrhea and no dysuria. On exam alert and conversant. In good spirits and in no distress. No jaundice. No oral lesions and respirations not labored. The lungs are clear and the heart is regular. Abdomen is soft and not tender. The laboratory exams reveal a blood count in the normal ranges. He persists with hyperglycemia. The alkaline phosphatase has risen some in the recent past. He is to have the MRI of the liver. He will see me with the results.     6/26/2024: Back after an absence of a few weeks and a couple of missed appointments.  He feels about the same as he felt before.  Following the last admission to the hospital he was transferred to an assisted care living facility where he has been doing progressively better.  Persists with tremors.  Eats well.  Has not lost weight.  Denies abdominal pain.  Has not had diarrhea, melena or hematochezia.  On exam in no distress.  No jaundice.  Not pale.  The lungs are clear bilaterally and the heart regular.  The abdomen is soft and without hepatomegaly or splenomegaly.  No edema.  The laboratory exams were reviewed.  To obtain a Carcinoembryonic antigen today.  He will see me in a couple of weeks with new scans as it has been more than 3 months since the last 1.  Consider treatment with an irinotecan based regimen that seem to result in a very pronounced response in the recent past.    7/12/2024: Back to review the results of the recent scans.  He feels well generally and continues to be as active as before.  As well, his appetite appears to be the same.  On exam he does not seem ill and he is conversant and well-oriented.  He is neither pale nor jaundiced and he seems well-hydrated.  The  lungs are clear and the heart regular.  The abdomen is soft.  There is no edema.  Laboratory exams reviewed.  Reviewed the images and the report of the scans.  He has 2 metastatic deposits in the liver and no other evidence of metastatic dissemination of his malignancy.  I believe it reasonable to treat him again with chemotherapy and consider some form of local therapy in the future, given how localized the disease is.  Will resume chemotherapy with irinotecan, cetuximab and 5-fluorouracil.  No 5-FU bolus.  I will see him again in approximately 4 weeks.    9/9/2024: Tolerating the chemotherapy well.  So far he has had 4 cycles without any undue side effects and no complications.  He does have a rash that is mainly around the eyes, nose and mouth but very little on the chest and the back.  It is not uncomfortable and he has been receiving treatment with topical clindamycin and sun protection.  He continues to eat well and his weight has increased slightly.  He has no chest pain and no abdominal pain.  He has diarrhea only intermittently.  On exam indeed he has an erythematous rash with a few papules but no pustules at this time.  No oral lesions.  Respirations not labored.  Lungs clear bilaterally.  Heart regular.  Abdomen soft.  No edema.  Laboratory exams reviewed.  I reviewed the recent scans of the abdomen and pelvis.  No suggestion of metastatic disease in the chest or the pelvis.  In the liver there are 2 lesions previously identified have decreased in size some.  To continue with the same treatment and see me in approximately 4 weeks.    The following portions of the patient's history were reviewed and updated as appropriate: allergies, current medications, past family history, past medical history, past social history, past surgical history and problem list.    Past Medical History:   Diagnosis Date    Anemia     Colon cancer 2022    colon    Diabetes mellitus     Hyperlipidemia     Hypertension      Past  Surgical History:   Procedure Laterality Date    APPENDECTOMY      CARDIAC CATHETERIZATION      COLON RESECTION N/A 12/15/2022    Procedure: COLON RESECTION RIGHT;  Surgeon: Percy Davis MD;  Location: [facility] MAIN OR;  Service: General;  Laterality: N/A;    COLONOSCOPY N/A 11/11/2022    Procedure: COLONOSCOPY WITH BIOPSY AND POLYPECTOMY;  Surgeon: Billy Jluien MD;  Location: [facility] ENDOSCOPY;  Service: Gastroenterology;  Laterality: N/A;  Impression:  1.  Large 4-5cm fulgurating circumferential ulcerated mass in the very proximal part of the ascending colon next to ileocecal valve multiple biopsies were performed.  This is highly concerning for colon malignancy.  2.  2 polyp rem    ENDOSCOPY N/A 11/11/2022    Procedure: ESOPHAGOGASTRODUODENOSCOPY with biopsy X1;  Surgeon: Billy Julien MD;  Location: [facility] ENDOSCOPY;  Service: Gastroenterology;  Laterality: N/A;  5.  Upper endoscopy lamination unremarkable.       PORTACATH PLACEMENT Right 1/12/2023    Procedure: INSERTION OF PORTACATH;  Surgeon: Percy Davis MD;  Location: [facility] MAIN OR;  Service: General;  Laterality: Right;    TONSILLECTOMY         Current Outpatient Medications:     carvedilol (Coreg) 3.125 MG tablet, Take 1 tablet by mouth 2 (Two) Times a Day With Meals for 30 days., Disp: 60 tablet, Rfl: 0    Continuous Blood Gluc Sensor (Dexcom G7 Sensor) misc, Use 1 each Every 10 (Ten) Days. Dx: E11.65, Disp: 3 each, Rfl: 2    doxycycline (VIBRAMYCIN) 100 MG capsule, Take 1 capsule by mouth 2 (Two) Times a Day for 30 days., Disp: 60 capsule, Rfl: 0    hydrocortisone 1 % cream, Apply 1 Application topically to the appropriate area as directed 2 (Two) Times a Day., Disp: 454 g, Rfl: 1    insulin aspart (NovoLOG FlexPen) 100 UNIT/ML solution pen-injector sc pen, Every 8 (Eight) Hours., Disp: , Rfl:     insulin glargine (LANTUS, SEMGLEE) 100 UNIT/ML injection, Inject 20 Units under the skin into the appropriate area as directed Every  Night for 30 days., Disp: 6 mL, Rfl: 0    Insulin Pen Needle (Pen Needles) 32G X 4 MM misc, Use 1 each 4 (Four) Times a Day Before Meals & at Bedtime., Disp: 200 each, Rfl: 2    lisinopril (Zestril) 2.5 MG tablet, Take 1 tablet by mouth Daily for 60 days., Disp: 30 tablet, Rfl: 1    Allergies   Allergen Reactions    Penicillins Rash     Family History   Problem Relation Age of Onset    Pneumonia Mother 94        SARS-CoV2    Colon cancer Father 62    Heart disease Sister      Cancer-related family history includes Colon cancer (age of onset: 62) in his father.    Social History     Tobacco Use    Smoking status: Former     Current packs/day: 0.00     Average packs/day: 1 pack/day for 2.0 years (2.0 ttl pk-yrs)     Types: Cigarettes     Start date:      Quit date:      Years since quittin.7    Smokeless tobacco: Never   Vaping Use    Vaping status: Never Used   Substance Use Topics    Alcohol use: Not Currently    Drug use: Never     Social History     Social History Narrative    Not on file      ROS:     Review of Systems   Constitutional:  Negative for activity change, appetite change, chills, diaphoresis, fatigue, fever and unexpected weight change.   HENT:  Negative for congestion, dental problem, drooling, ear discharge, ear pain, facial swelling, hearing loss, mouth sores, nosebleeds, postnasal drip, rhinorrhea, sinus pressure, sinus pain, sneezing, sore throat, tinnitus, trouble swallowing and voice change.    Eyes:  Negative for photophobia, pain, discharge, redness, itching and visual disturbance.   Respiratory:  Negative for apnea, cough, choking, chest tightness, shortness of breath, wheezing and stridor.    Cardiovascular:  Negative for chest pain, palpitations and leg swelling.   Gastrointestinal:  Negative for abdominal distention, abdominal pain, anal bleeding, blood in stool, constipation, diarrhea, nausea, rectal pain and vomiting.   Endocrine: Negative for cold intolerance, heat  "intolerance, polydipsia and polyuria.   Genitourinary:  Negative for decreased urine volume, difficulty urinating, dysuria, flank pain, frequency, genital sores, hematuria and urgency.   Musculoskeletal:  Negative for arthralgias, back pain, gait problem, joint swelling, myalgias, neck pain and neck stiffness.   Skin:  Negative for color change, pallor and rash.   Neurological:  Negative for dizziness, tremors, seizures, syncope, facial asymmetry, speech difficulty, weakness, light-headedness, numbness and headaches.   Hematological:  Negative for adenopathy. Does not bruise/bleed easily.   Psychiatric/Behavioral:  Negative for agitation, behavioral problems, confusion, decreased concentration, hallucinations, self-injury, sleep disturbance and suicidal ideas. The patient is not nervous/anxious.      Objective:    Vitals:    09/09/24 1527   BP: 136/83   Pulse: 78   Resp: 18   Temp: 97.5 °F (36.4 °C)   TempSrc: Infrared   SpO2: 98%   Weight: 79.4 kg (175 lb)   Height: 182.9 cm (72\")   PainSc: 0-No pain     Body mass index is 23.73 kg/m².  ECOG  (0) Fully active, able to carry on all predisease performance without restriction    Physical Exam:     Physical Exam  Constitutional:       General: He is not in acute distress.     Appearance: Normal appearance. He is not ill-appearing, toxic-appearing or diaphoretic.   HENT:      Head: Normocephalic and atraumatic.      Right Ear: External ear normal.      Left Ear: External ear normal.      Nose: Nose normal.      Mouth/Throat:      Mouth: Mucous membranes are moist.      Pharynx: Oropharynx is clear.   Eyes:      General: No scleral icterus.        Right eye: No discharge.         Left eye: No discharge.      Conjunctiva/sclera: Conjunctivae normal.      Pupils: Pupils are equal, round, and reactive to light.   Cardiovascular:      Rate and Rhythm: Normal rate and regular rhythm.      Pulses: Normal pulses.      Heart sounds: Normal heart sounds. No murmur heard.     No " friction rub. No gallop.   Pulmonary:      Effort: No respiratory distress.      Breath sounds: No stridor. No wheezing, rhonchi or rales.   Chest:      Chest wall: No tenderness.   Abdominal:      General: Abdomen is flat. Bowel sounds are normal. There is no distension.      Palpations: Abdomen is soft. There is no mass.      Tenderness: There is no abdominal tenderness. There is no right CVA tenderness, left CVA tenderness, guarding or rebound.   Musculoskeletal:         General: No swelling, tenderness, deformity or signs of injury.      Cervical back: No rigidity.      Right lower leg: No edema.      Left lower leg: No edema.   Lymphadenopathy:      Cervical: No cervical adenopathy.   Skin:     General: Skin is warm and dry.      Coloration: Skin is not jaundiced.      Findings: No bruising or rash.   Neurological:      General: No focal deficit present.      Mental Status: He is alert and oriented to person, place, and time.      Cranial Nerves: No cranial nerve deficit.      Gait: Gait normal.   Psychiatric:         Mood and Affect: Mood normal.         Behavior: Behavior normal.         Thought Content: Thought content normal.         Judgment: Judgment normal.   KATIE Kramer MD performed a physical exam on 9/9/2024 as documented above.    Lab Results - Last 18 Months   Lab Units 09/09/24  1510 08/27/24  0750 08/13/24  0745   WBC 10*3/mm3 5.43 4.63 4.80   HEMOGLOBIN g/dL 13.5 13.2 13.3   HEMATOCRIT % 41.5 41.0 41.5   PLATELETS 10*3/mm3 136* 128* 133*   MCV fL 91.2 89.9 89.4     Lab Results - Last 18 Months   Lab Units 08/27/24  0750 08/13/24  0752 07/30/24  0812 07/16/24  0754 07/12/24  0744 06/26/24  0818   SODIUM mmol/L 139  --   --   --  135* 136   POTASSIUM mmol/L 3.9  --   --   --  4.4 4.4   CHLORIDE mmol/L 105  --   --   --  98 100   CO2 mmol/L 27.1  --   --   --  26.3 27.0   BUN mg/dL 11  --   --   --  17 15   CREATININE mg/dL 0.88 0.80 1.00   < > 1.04 0.96   CALCIUM mg/dL 8.3*  --   --   --  8.9  8.9   BILIRUBIN mg/dL 0.3  --   --   --  0.4 0.5   ALK PHOS U/L 98  --   --   --  164* 143*   ALT (SGPT) U/L 28  --   --   --  15 16   AST (SGOT) U/L 24  --   --   --  16 21   GLUCOSE mg/dL 363*  --   --   --  455* 342*    < > = values in this interval not displayed.     Lab Results   Component Value Date    GLUCOSE 363 (H) 08/27/2024    BUN 11 08/27/2024    CREATININE 0.88 08/27/2024    EGFRIFNONA 76 11/15/2021    EGFRIFAFRI 87 11/15/2021    BCR 12.5 08/27/2024    K 3.9 08/27/2024    CO2 27.1 08/27/2024    CALCIUM 8.3 (L) 08/27/2024    ALBUMIN 3.5 08/27/2024    AST 24 08/27/2024    ALT 28 08/27/2024     Lab Results   Component Value Date    IRON 15 (L) 01/06/2023    TIBC 548 (H) 01/06/2023    FERRITIN 23.51 (L) 01/06/2023     Lab Results   Component Value Date    CEA 5.16 07/16/2024     Assessment & Plan     Assessment:  Isolated metastatic colon cancer to the central portion of the liver with 2 distinct lesions.  To resume chemotherapy with 5-fluorouracil irinotecan and cetuximab.  Tolerates the treatment well.  Scans after 4 cycles revealed response.  Continue same.  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  Completed [facility] adjuvantly for 3 months in April 2023.  History of recurrent ischemic stroke.  On acetyl salicylic acid.  He has not been taking the atorvastatin and other medications prescribed to him.  Persists with mild hypertension.  Independently reviewed the images of the scans.  Reviewed the reports and all laboratory exams and discussed the results with him.  There is evidence of response.  Continue same treatment.  See me again in approximately 4 weeks.    Plan:  As above.    Don Kramer MD on 9/9/2024 at 1611.

## 2024-09-10 ENCOUNTER — HOSPITAL ENCOUNTER (OUTPATIENT)
Dept: ONCOLOGY | Facility: HOSPITAL | Age: 79
Discharge: HOME | End: 2024-09-10
Admitting: INTERNAL MEDICINE
Payer: OTHER GOVERNMENT

## 2024-09-10 VITALS
BODY MASS INDEX: 24.36 KG/M2 | DIASTOLIC BLOOD PRESSURE: 90 MMHG | RESPIRATION RATE: 16 BRPM | HEART RATE: 71 BPM | TEMPERATURE: 97.4 F | OXYGEN SATURATION: 98 % | SYSTOLIC BLOOD PRESSURE: 146 MMHG | WEIGHT: 179.6 LBS

## 2024-09-10 DIAGNOSIS — C18.2 MALIGNANT NEOPLASM OF ASCENDING COLON: ICD-10-CM

## 2024-09-10 DIAGNOSIS — C78.7 METASTASES TO THE LIVER: Primary | ICD-10-CM

## 2024-09-10 LAB — GLUCOSE BLDC GLUCOMTR-MCNC: 303 MG/DL (ref 70–105)

## 2024-09-10 PROCEDURE — 25810000003 SODIUM CHLORIDE 0.9 % SOLUTION 250 ML FLEX CONT: Performed by: INTERNAL MEDICINE

## 2024-09-10 PROCEDURE — 96413 CHEMO IV INFUSION 1 HR: CPT

## 2024-09-10 PROCEDURE — 82948 REAGENT STRIP/BLOOD GLUCOSE: CPT

## 2024-09-10 PROCEDURE — 25010000002 DEXAMETHASONE PER 1 MG: Performed by: INTERNAL MEDICINE

## 2024-09-10 PROCEDURE — 96415 CHEMO IV INFUSION ADDL HR: CPT

## 2024-09-10 PROCEDURE — 25010000002 FLUOROURACIL PER 500 MG: Performed by: INTERNAL MEDICINE

## 2024-09-10 PROCEDURE — 25010000002 ATROPINE SULFATE 0.4 MG/ML SOLUTION 1 ML VIAL: Performed by: INTERNAL MEDICINE

## 2024-09-10 PROCEDURE — 96368 THER/DIAG CONCURRENT INF: CPT

## 2024-09-10 PROCEDURE — 25810000003 SODIUM CHLORIDE 0.9 % SOLUTION: Performed by: INTERNAL MEDICINE

## 2024-09-10 PROCEDURE — 25010000002 PALONOSETRON 0.25 MG/5ML SOLUTION PREFILLED SYRINGE: Performed by: INTERNAL MEDICINE

## 2024-09-10 PROCEDURE — 25010000002 IRINOTECAN PER 20 MG: Performed by: INTERNAL MEDICINE

## 2024-09-10 PROCEDURE — 25010000002 LEUCOVORIN CALCIUM PER 50 MG: Performed by: INTERNAL MEDICINE

## 2024-09-10 PROCEDURE — G0498 CHEMO EXTEND IV INFUS W/PUMP: HCPCS

## 2024-09-10 PROCEDURE — 96375 TX/PRO/DX INJ NEW DRUG ADDON: CPT

## 2024-09-10 PROCEDURE — 25010000002 LEUCOVORIN 500 MG RECONSTITUTED SOLUTION 1 EACH VIAL: Performed by: INTERNAL MEDICINE

## 2024-09-10 PROCEDURE — 96366 THER/PROPH/DIAG IV INF ADDON: CPT

## 2024-09-10 PROCEDURE — 25010000002 PANITUMUMAB PER 10 MG: Performed by: INTERNAL MEDICINE

## 2024-09-10 PROCEDURE — 25010000002 PANITUMUMAB 400 MG/20ML SOLUTION 20 ML VIAL: Performed by: INTERNAL MEDICINE

## 2024-09-10 PROCEDURE — 25810000003 SODIUM CHLORIDE 0.9 % SOLUTION 500 ML FLEX CONT: Performed by: INTERNAL MEDICINE

## 2024-09-10 PROCEDURE — 96417 CHEMO IV INFUS EACH ADDL SEQ: CPT

## 2024-09-10 RX ORDER — PALONOSETRON 0.05 MG/ML
0.25 INJECTION, SOLUTION INTRAVENOUS ONCE
Status: COMPLETED | OUTPATIENT
Start: 2024-09-10 | End: 2024-09-10

## 2024-09-10 RX ORDER — SODIUM CHLORIDE 9 MG/ML
250 INJECTION, SOLUTION INTRAVENOUS ONCE
Status: COMPLETED | OUTPATIENT
Start: 2024-09-10 | End: 2024-09-10

## 2024-09-10 RX ORDER — DEXAMETHASONE SODIUM PHOSPHATE 4 MG/ML
6 INJECTION, SOLUTION INTRA-ARTICULAR; INTRALESIONAL; INTRAMUSCULAR; INTRAVENOUS; SOFT TISSUE ONCE
Status: COMPLETED | OUTPATIENT
Start: 2024-09-10 | End: 2024-09-10

## 2024-09-10 RX ORDER — ATROPINE SULFATE 1 MG/ML
0.25 INJECTION, SOLUTION INTRAMUSCULAR; INTRAVENOUS; SUBCUTANEOUS
Status: DISCONTINUED | OUTPATIENT
Start: 2024-09-10 | End: 2024-09-10

## 2024-09-10 RX ADMIN — FLUOROURACIL 4990 MG: 50 INJECTION, SOLUTION INTRAVENOUS at 11:04

## 2024-09-10 RX ADMIN — SODIUM CHLORIDE 250 ML: 9 INJECTION, SOLUTION INTRAVENOUS at 08:26

## 2024-09-10 RX ADMIN — DEXAMETHASONE SODIUM PHOSPHATE 6 MG: 4 INJECTION INTRA-ARTICULAR; INTRALESIONAL; INTRAMUSCULAR; INTRAVENOUS; SOFT TISSUE at 08:30

## 2024-09-10 RX ADMIN — IRINOTECAN HYDROCHLORIDE 300 MG: 20 INJECTION, SOLUTION INTRAVENOUS at 09:09

## 2024-09-10 RX ADMIN — PANITUMUMAB 500 MG: 400 SOLUTION INTRAVENOUS at 08:36

## 2024-09-10 RX ADMIN — PALONOSETRON 0.25 MG: 0.25 INJECTION, SOLUTION INTRAVENOUS at 08:26

## 2024-09-10 RX ADMIN — LEUCOVORIN CALCIUM 830 MG: 350 INJECTION, POWDER, LYOPHILIZED, FOR SOLUTION INTRAMUSCULAR; INTRAVENOUS at 09:08

## 2024-09-10 NOTE — PROGRESS NOTES
Pt  here for C9D1 Vectibix/Folfiri, his glucose yesterday on CMP was 373, rechecked POC glucose  this am and 303, he is scheduled to get 12 dex, reviewed with JC Bee and pt to receive Dex 6 mg IV with treatment today and Folfiri to be mixed with NS,  pharmacy notified and plan for dex and NS adjusted accordingly,   treatment administered per MAR,  inbasket sent to inquire if dex needs to be adjusted for future cycles.  Pt reports he has appt [facility] next week to discuss glucose control.

## 2024-09-12 ENCOUNTER — HOSPITAL ENCOUNTER (OUTPATIENT)
Dept: ONCOLOGY | Facility: HOSPITAL | Age: 79
Discharge: HOME | End: 2024-09-12
Payer: OTHER GOVERNMENT

## 2024-09-12 DIAGNOSIS — C18.2 MALIGNANT NEOPLASM OF ASCENDING COLON: ICD-10-CM

## 2024-09-12 DIAGNOSIS — Z95.828 PORT-A-CATH IN PLACE: ICD-10-CM

## 2024-09-12 DIAGNOSIS — C78.7 METASTASES TO THE LIVER: Primary | ICD-10-CM

## 2024-09-12 PROCEDURE — 25010000002 HEPARIN LOCK FLUSH PER 10 UNITS: Performed by: INTERNAL MEDICINE

## 2024-09-12 RX ORDER — HEPARIN SODIUM (PORCINE) LOCK FLUSH IV SOLN 100 UNIT/ML 100 UNIT/ML
500 SOLUTION INTRAVENOUS AS NEEDED
Status: DISCONTINUED | OUTPATIENT
Start: 2024-09-12 | End: 2024-09-13 | Stop reason: HOSPADM

## 2024-09-12 RX ORDER — HEPARIN SODIUM (PORCINE) LOCK FLUSH IV SOLN 100 UNIT/ML 100 UNIT/ML
500 SOLUTION INTRAVENOUS AS NEEDED
OUTPATIENT
Start: 2024-09-12

## 2024-09-12 RX ORDER — SODIUM CHLORIDE 0.9 % (FLUSH) 0.9 %
20 SYRINGE (ML) INJECTION AS NEEDED
Status: DISCONTINUED | OUTPATIENT
Start: 2024-09-12 | End: 2024-09-13 | Stop reason: HOSPADM

## 2024-09-12 RX ORDER — SODIUM CHLORIDE 0.9 % (FLUSH) 0.9 %
20 SYRINGE (ML) INJECTION AS NEEDED
OUTPATIENT
Start: 2024-09-12

## 2024-09-12 RX ADMIN — Medication 20 ML: at 08:48

## 2024-09-12 RX ADMIN — HEPARIN 500 UNITS: 100 SYRINGE at 08:49

## 2024-09-23 DIAGNOSIS — C18.2 MALIGNANT NEOPLASM OF ASCENDING COLON: Primary | ICD-10-CM

## 2024-09-23 DIAGNOSIS — C78.7 METASTASES TO THE LIVER: ICD-10-CM

## 2024-09-24 ENCOUNTER — OFFICE VISIT (OUTPATIENT)
Dept: ONCOLOGY | Facility: CLINIC | Age: 79
End: 2024-09-24
Payer: OTHER GOVERNMENT

## 2024-09-24 ENCOUNTER — HOSPITAL ENCOUNTER (OUTPATIENT)
Dept: ONCOLOGY | Facility: HOSPITAL | Age: 79
Discharge: HOME | End: 2024-09-24
Admitting: INTERNAL MEDICINE
Payer: OTHER GOVERNMENT

## 2024-09-24 VITALS
SYSTOLIC BLOOD PRESSURE: 123 MMHG | HEIGHT: 72 IN | HEART RATE: 69 BPM | WEIGHT: 177.4 LBS | RESPIRATION RATE: 16 BRPM | OXYGEN SATURATION: 97 % | BODY MASS INDEX: 24.03 KG/M2 | DIASTOLIC BLOOD PRESSURE: 74 MMHG | TEMPERATURE: 97.4 F

## 2024-09-24 VITALS
SYSTOLIC BLOOD PRESSURE: 123 MMHG | BODY MASS INDEX: 23.98 KG/M2 | OXYGEN SATURATION: 97 % | TEMPERATURE: 97.4 F | DIASTOLIC BLOOD PRESSURE: 74 MMHG | HEART RATE: 69 BPM | HEIGHT: 72 IN | WEIGHT: 177 LBS

## 2024-09-24 DIAGNOSIS — C78.7 METASTASES TO THE LIVER: Primary | ICD-10-CM

## 2024-09-24 DIAGNOSIS — C18.2 MALIGNANT NEOPLASM OF ASCENDING COLON: ICD-10-CM

## 2024-09-24 DIAGNOSIS — C18.2 MALIGNANT NEOPLASM OF ASCENDING COLON: Primary | ICD-10-CM

## 2024-09-24 DIAGNOSIS — L70.8 ACNEIFORM RASH: ICD-10-CM

## 2024-09-24 DIAGNOSIS — C78.7 METASTASES TO THE LIVER: ICD-10-CM

## 2024-09-24 LAB
ALBUMIN SERPL-MCNC: 3.5 G/DL (ref 3.5–5.2)
ALBUMIN/GLOB SERPL: 1.5 G/DL
ALP BLD-CCNC: 89 U/L (ref 53–128)
ALP SERPL-CCNC: 93 U/L (ref 39–117)
ALT SERPL W P-5'-P-CCNC: 16 U/L (ref 1–41)
ANION GAP SERPL CALCULATED.3IONS-SCNC: 8.1 MMOL/L (ref 5–15)
AST SERPL-CCNC: 16 U/L (ref 1–40)
BASOPHILS # BLD AUTO: 0.02 10*3/MM3 (ref 0–0.2)
BASOPHILS NFR BLD AUTO: 0.4 % (ref 0–1.5)
BILIRUB SERPL-MCNC: 0.3 MG/DL (ref 0–1.2)
BUN BLDA-MCNC: 9 MG/DL (ref 7–22)
BUN SERPL-MCNC: 10 MG/DL (ref 8–23)
BUN/CREAT SERPL: 11.9 (ref 7–25)
CALCIUM BLD QL: 8.5 MG/DL (ref 8–10.3)
CALCIUM SPEC-SCNC: 8.2 MG/DL (ref 8.6–10.5)
CHLORIDE BLDA-SCNC: 108 MMOL/L (ref 98–108)
CHLORIDE SERPL-SCNC: 106 MMOL/L (ref 98–107)
CO2 BLDA-SCNC: 28 MMOL/L (ref 18–33)
CO2 SERPL-SCNC: 24.9 MMOL/L (ref 22–29)
CREAT BLDA-MCNC: 0.8 MG/DL (ref 0.6–1.2)
CREAT SERPL-MCNC: 0.84 MG/DL (ref 0.76–1.27)
DEPRECATED RDW RBC AUTO: 55.4 FL (ref 37–54)
EGFRCR SERPLBLD CKD-EPI 2021: 90.4 ML/MIN/1.73
EGFRCR SERPLBLD CKD-EPI 2021: 91.7 ML/MIN/1.73
EOSINOPHIL # BLD AUTO: 0.16 10*3/MM3 (ref 0–0.4)
EOSINOPHIL NFR BLD AUTO: 3 % (ref 0.3–6.2)
ERYTHROCYTE [DISTWIDTH] IN BLOOD BY AUTOMATED COUNT: 17.1 % (ref 12.3–15.4)
GLOBULIN UR ELPH-MCNC: 2.4 GM/DL
GLUCOSE BLDC GLUCOMTR-MCNC: 131 MG/DL (ref 70–105)
GLUCOSE BLDC GLUCOMTR-MCNC: 145 MG/DL (ref 73–118)
GLUCOSE SERPL-MCNC: 143 MG/DL (ref 65–99)
HCT VFR BLD AUTO: 39.7 % (ref 37.5–51)
HGB BLD-MCNC: 12.9 G/DL (ref 13–17.7)
LYMPHOCYTES # BLD AUTO: 1.66 10*3/MM3 (ref 0.7–3.1)
LYMPHOCYTES NFR BLD AUTO: 30.9 % (ref 19.6–45.3)
MAGNESIUM SERPL-MCNC: 1.9 MG/DL (ref 1.6–2.4)
MCH RBC QN AUTO: 30 PG (ref 26.6–33)
MCHC RBC AUTO-ENTMCNC: 32.5 G/DL (ref 31.5–35.7)
MCV RBC AUTO: 92.3 FL (ref 79–97)
MONOCYTES # BLD AUTO: 0.74 10*3/MM3 (ref 0.1–0.9)
MONOCYTES NFR BLD AUTO: 13.8 % (ref 5–12)
NEUTROPHILS NFR BLD AUTO: 2.79 10*3/MM3 (ref 1.7–7)
NEUTROPHILS NFR BLD AUTO: 51.9 % (ref 42.7–76)
PLATELET # BLD AUTO: 119 10*3/MM3 (ref 140–450)
PMV BLD AUTO: 9 FL (ref 6–12)
POC ALBUMIN: 2.9 G/L (ref 3.3–5.5)
POC ALT (SGPT): 22 U/L (ref 10–47)
POC AST (SGOT): 20 U/L (ref 11–38)
POC TOTAL BILIRUBIN: 0.5 MG/DL (ref 0.2–1.6)
POC TOTAL PROTEIN: 5.9 G/DL (ref 6.4–8.1)
POTASSIUM BLDA-SCNC: 3.4 MMOL/L (ref 3.6–5.1)
POTASSIUM SERPL-SCNC: 3.5 MMOL/L (ref 3.5–5.2)
PROT SERPL-MCNC: 5.9 G/DL (ref 6–8.5)
RBC # BLD AUTO: 4.3 10*6/MM3 (ref 4.14–5.8)
SODIUM BLD-SCNC: 141 MMOL/L (ref 128–145)
SODIUM SERPL-SCNC: 139 MMOL/L (ref 136–145)
WBC NRBC COR # BLD AUTO: 5.37 10*3/MM3 (ref 3.4–10.8)

## 2024-09-24 PROCEDURE — 82948 REAGENT STRIP/BLOOD GLUCOSE: CPT

## 2024-09-24 PROCEDURE — 25010000002 IRINOTECAN PER 20 MG: Performed by: NURSE PRACTITIONER

## 2024-09-24 PROCEDURE — 96415 CHEMO IV INFUSION ADDL HR: CPT

## 2024-09-24 PROCEDURE — 99214 OFFICE O/P EST MOD 30 MIN: CPT | Performed by: NURSE PRACTITIONER

## 2024-09-24 PROCEDURE — 0 DEXTROSE 5 % SOLUTION 250 ML FLEX CONT: Performed by: NURSE PRACTITIONER

## 2024-09-24 PROCEDURE — 80053 COMPREHEN METABOLIC PANEL: CPT

## 2024-09-24 PROCEDURE — 96375 TX/PRO/DX INJ NEW DRUG ADDON: CPT

## 2024-09-24 PROCEDURE — 85025 COMPLETE CBC W/AUTO DIFF WBC: CPT | Performed by: INTERNAL MEDICINE

## 2024-09-24 PROCEDURE — 83735 ASSAY OF MAGNESIUM: CPT | Performed by: INTERNAL MEDICINE

## 2024-09-24 PROCEDURE — 96366 THER/PROPH/DIAG IV INF ADDON: CPT

## 2024-09-24 PROCEDURE — 25010000002 LEUCOVORIN CALCIUM PER 50 MG: Performed by: NURSE PRACTITIONER

## 2024-09-24 PROCEDURE — 25010000002 PALONOSETRON 0.25 MG/5ML SOLUTION PREFILLED SYRINGE: Performed by: NURSE PRACTITIONER

## 2024-09-24 PROCEDURE — 25010000002 FLUOROURACIL PER 500 MG: Performed by: NURSE PRACTITIONER

## 2024-09-24 PROCEDURE — 25010000002 PANITUMUMAB 400 MG/20ML SOLUTION 20 ML VIAL: Performed by: NURSE PRACTITIONER

## 2024-09-24 PROCEDURE — 96368 THER/DIAG CONCURRENT INF: CPT

## 2024-09-24 PROCEDURE — 25810000003 SODIUM CHLORIDE 0.9 % SOLUTION 500 ML FLEX CONT: Performed by: NURSE PRACTITIONER

## 2024-09-24 PROCEDURE — 25010000002 ATROPINE SULFATE 0.4 MG/ML SOLUTION 1 ML VIAL: Performed by: NURSE PRACTITIONER

## 2024-09-24 PROCEDURE — 25010000002 DEXAMETHASONE PER 1 MG: Performed by: NURSE PRACTITIONER

## 2024-09-24 PROCEDURE — 80053 COMPREHEN METABOLIC PANEL: CPT | Performed by: INTERNAL MEDICINE

## 2024-09-24 PROCEDURE — 96413 CHEMO IV INFUSION 1 HR: CPT

## 2024-09-24 PROCEDURE — G0498 CHEMO EXTEND IV INFUS W/PUMP: HCPCS

## 2024-09-24 PROCEDURE — 25810000003 SODIUM CHLORIDE 0.9 % SOLUTION: Performed by: NURSE PRACTITIONER

## 2024-09-24 PROCEDURE — 25010000002 LEUCOVORIN 500 MG RECONSTITUTED SOLUTION 1 EACH VIAL: Performed by: NURSE PRACTITIONER

## 2024-09-24 PROCEDURE — 96417 CHEMO IV INFUS EACH ADDL SEQ: CPT

## 2024-09-24 PROCEDURE — 25010000002 PANITUMUMAB PER 10 MG: Performed by: NURSE PRACTITIONER

## 2024-09-24 RX ORDER — PALONOSETRON 0.05 MG/ML
0.25 INJECTION, SOLUTION INTRAVENOUS ONCE
Status: COMPLETED | OUTPATIENT
Start: 2024-09-24 | End: 2024-09-24

## 2024-09-24 RX ORDER — DEXAMETHASONE SODIUM PHOSPHATE 4 MG/ML
6 INJECTION, SOLUTION INTRA-ARTICULAR; INTRALESIONAL; INTRAMUSCULAR; INTRAVENOUS; SOFT TISSUE ONCE
Status: CANCELLED | OUTPATIENT
Start: 2024-09-24 | End: 2024-09-24

## 2024-09-24 RX ORDER — DEXAMETHASONE SODIUM PHOSPHATE 4 MG/ML
6 INJECTION, SOLUTION INTRA-ARTICULAR; INTRALESIONAL; INTRAMUSCULAR; INTRAVENOUS; SOFT TISSUE ONCE
Status: COMPLETED | OUTPATIENT
Start: 2024-09-24 | End: 2024-09-24

## 2024-09-24 RX ORDER — PALONOSETRON 0.05 MG/ML
0.25 INJECTION, SOLUTION INTRAVENOUS ONCE
Status: CANCELLED | OUTPATIENT
Start: 2024-09-24

## 2024-09-24 RX ORDER — SODIUM CHLORIDE 9 MG/ML
250 INJECTION, SOLUTION INTRAVENOUS ONCE
Status: COMPLETED | OUTPATIENT
Start: 2024-09-24 | End: 2024-09-24

## 2024-09-24 RX ORDER — TRIAMCINOLONE ACETONIDE 1 MG/G
1 OINTMENT TOPICAL 2 TIMES DAILY
Qty: 30 G | Refills: 0 | Status: SHIPPED | OUTPATIENT
Start: 2024-09-24

## 2024-09-24 RX ORDER — ATROPINE SULFATE 1 MG/ML
0.25 INJECTION, SOLUTION INTRAMUSCULAR; INTRAVENOUS; SUBCUTANEOUS
Status: DISCONTINUED | OUTPATIENT
Start: 2024-09-24 | End: 2024-09-24

## 2024-09-24 RX ORDER — SODIUM CHLORIDE 9 MG/ML
250 INJECTION, SOLUTION INTRAVENOUS ONCE
Status: CANCELLED | OUTPATIENT
Start: 2024-09-24

## 2024-09-24 RX ORDER — ATROPINE SULFATE 1 MG/ML
0.25 INJECTION, SOLUTION INTRAMUSCULAR; INTRAVENOUS; SUBCUTANEOUS
Status: CANCELLED | OUTPATIENT
Start: 2024-09-24

## 2024-09-24 RX ORDER — DOXYCYCLINE 100 MG/1
100 CAPSULE ORAL 2 TIMES DAILY
Qty: 60 CAPSULE | Refills: 0 | Status: SHIPPED | OUTPATIENT
Start: 2024-09-24

## 2024-09-24 RX ADMIN — SODIUM CHLORIDE 250 ML: 9 INJECTION, SOLUTION INTRAVENOUS at 10:23

## 2024-09-24 RX ADMIN — IRINOTECAN HYDROCHLORIDE 300 MG: 20 INJECTION, SOLUTION INTRAVENOUS at 11:27

## 2024-09-24 RX ADMIN — LEUCOVORIN CALCIUM 830 MG: 500 INJECTION, POWDER, LYOPHILIZED, FOR SOLUTION INTRAMUSCULAR; INTRAVENOUS at 11:27

## 2024-09-24 RX ADMIN — PALONOSETRON 0.25 MG: 0.25 INJECTION, SOLUTION INTRAVENOUS at 10:26

## 2024-09-24 RX ADMIN — FLUOROURACIL 4990 MG: 50 INJECTION, SOLUTION INTRAVENOUS at 13:05

## 2024-09-24 RX ADMIN — PANITUMUMAB 500 MG: 400 SOLUTION INTRAVENOUS at 10:51

## 2024-09-24 RX ADMIN — DEXAMETHASONE SODIUM PHOSPHATE 6 MG: 4 INJECTION, SOLUTION INTRAMUSCULAR; INTRAVENOUS at 10:29

## 2024-09-26 ENCOUNTER — HOSPITAL ENCOUNTER (OUTPATIENT)
Dept: ONCOLOGY | Facility: HOSPITAL | Age: 79
Discharge: HOME | End: 2024-09-26
Admitting: NURSE PRACTITIONER
Payer: OTHER GOVERNMENT

## 2024-09-26 DIAGNOSIS — Z95.828 PORT-A-CATH IN PLACE: ICD-10-CM

## 2024-09-26 DIAGNOSIS — C78.7 METASTASES TO THE LIVER: Primary | ICD-10-CM

## 2024-09-26 DIAGNOSIS — C18.2 MALIGNANT NEOPLASM OF ASCENDING COLON: ICD-10-CM

## 2024-09-26 PROCEDURE — 25010000002 HEPARIN LOCK FLUSH PER 10 UNITS: Performed by: INTERNAL MEDICINE

## 2024-09-26 RX ORDER — SODIUM CHLORIDE 0.9 % (FLUSH) 0.9 %
20 SYRINGE (ML) INJECTION AS NEEDED
Status: DISCONTINUED | OUTPATIENT
Start: 2024-09-26 | End: 2024-09-27 | Stop reason: HOSPADM

## 2024-09-26 RX ORDER — HEPARIN SODIUM (PORCINE) LOCK FLUSH IV SOLN 100 UNIT/ML 100 UNIT/ML
500 SOLUTION INTRAVENOUS AS NEEDED
Status: DISCONTINUED | OUTPATIENT
Start: 2024-09-26 | End: 2024-09-27 | Stop reason: HOSPADM

## 2024-09-26 RX ORDER — HEPARIN SODIUM (PORCINE) LOCK FLUSH IV SOLN 100 UNIT/ML 100 UNIT/ML
500 SOLUTION INTRAVENOUS AS NEEDED
OUTPATIENT
Start: 2024-09-26

## 2024-09-26 RX ORDER — SODIUM CHLORIDE 0.9 % (FLUSH) 0.9 %
20 SYRINGE (ML) INJECTION AS NEEDED
OUTPATIENT
Start: 2024-09-26

## 2024-09-26 RX ADMIN — HEPARIN 500 UNITS: 100 SYRINGE at 10:33

## 2024-09-26 RX ADMIN — Medication 20 ML: at 10:32

## 2024-10-08 ENCOUNTER — HOSPITAL ENCOUNTER (OUTPATIENT)
Dept: ONCOLOGY | Facility: HOSPITAL | Age: 79
Discharge: HOME | End: 2024-10-08
Payer: OTHER GOVERNMENT

## 2024-10-08 VITALS
RESPIRATION RATE: 14 BRPM | HEART RATE: 70 BPM | WEIGHT: 173 LBS | DIASTOLIC BLOOD PRESSURE: 73 MMHG | HEIGHT: 72 IN | SYSTOLIC BLOOD PRESSURE: 123 MMHG | TEMPERATURE: 97.8 F | OXYGEN SATURATION: 100 % | BODY MASS INDEX: 23.43 KG/M2

## 2024-10-08 DIAGNOSIS — C78.7 METASTASES TO THE LIVER: ICD-10-CM

## 2024-10-08 DIAGNOSIS — C18.2 MALIGNANT NEOPLASM OF ASCENDING COLON: Primary | ICD-10-CM

## 2024-10-08 LAB
ALP BLD-CCNC: 75 U/L (ref 53–128)
BASOPHILS # BLD AUTO: 0.01 10*3/MM3 (ref 0–0.2)
BASOPHILS NFR BLD AUTO: 0.2 % (ref 0–1.5)
BUN BLDA-MCNC: 10 MG/DL (ref 7–22)
CALCIUM BLD QL: 7.9 MG/DL (ref 8–10.3)
CEA SERPL-MCNC: 4.75 NG/ML
CHLORIDE BLDA-SCNC: 106 MMOL/L (ref 98–108)
CO2 BLDA-SCNC: 29 MMOL/L (ref 18–33)
CREAT BLDA-MCNC: 0.8 MG/DL (ref 0.6–1.2)
DEPRECATED RDW RBC AUTO: 56.6 FL (ref 37–54)
EGFRCR SERPLBLD CKD-EPI 2021: 91.7 ML/MIN/1.73
EOSINOPHIL # BLD AUTO: 0.11 10*3/MM3 (ref 0–0.4)
EOSINOPHIL NFR BLD AUTO: 2.3 % (ref 0.3–6.2)
ERYTHROCYTE [DISTWIDTH] IN BLOOD BY AUTOMATED COUNT: 17.1 % (ref 12.3–15.4)
GLUCOSE BLDC GLUCOMTR-MCNC: 242 MG/DL (ref 73–118)
HCT VFR BLD AUTO: 39.4 % (ref 37.5–51)
HGB BLD-MCNC: 12.6 G/DL (ref 13–17.7)
HOLD SPECIMEN: NORMAL
LYMPHOCYTES # BLD AUTO: 1.47 10*3/MM3 (ref 0.7–3.1)
LYMPHOCYTES NFR BLD AUTO: 31.1 % (ref 19.6–45.3)
MAGNESIUM SERPL-MCNC: 1.7 MG/DL (ref 1.6–2.4)
MCH RBC QN AUTO: 30.4 PG (ref 26.6–33)
MCHC RBC AUTO-ENTMCNC: 32 G/DL (ref 31.5–35.7)
MCV RBC AUTO: 94.9 FL (ref 79–97)
MONOCYTES # BLD AUTO: 0.65 10*3/MM3 (ref 0.1–0.9)
MONOCYTES NFR BLD AUTO: 13.8 % (ref 5–12)
NEUTROPHILS NFR BLD AUTO: 2.48 10*3/MM3 (ref 1.7–7)
NEUTROPHILS NFR BLD AUTO: 52.6 % (ref 42.7–76)
PLATELET # BLD AUTO: 133 10*3/MM3 (ref 140–450)
PMV BLD AUTO: 9.7 FL (ref 6–12)
POC ALBUMIN: 3 G/L (ref 3.3–5.5)
POC ALT (SGPT): 17 U/L (ref 10–47)
POC AST (SGOT): 17 U/L (ref 11–38)
POC TOTAL BILIRUBIN: 0.8 MG/DL (ref 0.2–1.6)
POC TOTAL PROTEIN: 5.9 G/DL (ref 6.4–8.1)
POTASSIUM BLDA-SCNC: 3.3 MMOL/L (ref 3.6–5.1)
POTASSIUM SERPL-SCNC: 3.7 MMOL/L (ref 3.5–5.2)
RBC # BLD AUTO: 4.15 10*6/MM3 (ref 4.14–5.8)
SODIUM BLD-SCNC: 142 MMOL/L (ref 128–145)
WBC NRBC COR # BLD AUTO: 4.72 10*3/MM3 (ref 3.4–10.8)

## 2024-10-08 PROCEDURE — 25010000002 PANITUMUMAB 400 MG/20ML SOLUTION 20 ML VIAL: Performed by: STUDENT IN AN ORGANIZED HEALTH CARE EDUCATION/TRAINING PROGRAM

## 2024-10-08 PROCEDURE — 0 DEXTROSE 5 % SOLUTION 250 ML FLEX CONT: Performed by: STUDENT IN AN ORGANIZED HEALTH CARE EDUCATION/TRAINING PROGRAM

## 2024-10-08 PROCEDURE — 25010000002 LEUCOVORIN 500 MG RECONSTITUTED SOLUTION 1 EACH VIAL: Performed by: STUDENT IN AN ORGANIZED HEALTH CARE EDUCATION/TRAINING PROGRAM

## 2024-10-08 PROCEDURE — 82378 CARCINOEMBRYONIC ANTIGEN: CPT | Performed by: INTERNAL MEDICINE

## 2024-10-08 PROCEDURE — 25010000002 IRINOTECAN PER 20 MG: Performed by: STUDENT IN AN ORGANIZED HEALTH CARE EDUCATION/TRAINING PROGRAM

## 2024-10-08 PROCEDURE — 25010000002 DEXAMETHASONE PER 1 MG: Performed by: STUDENT IN AN ORGANIZED HEALTH CARE EDUCATION/TRAINING PROGRAM

## 2024-10-08 PROCEDURE — G0498 CHEMO EXTEND IV INFUS W/PUMP: HCPCS

## 2024-10-08 PROCEDURE — 25010000002 LEUCOVORIN CALCIUM PER 50 MG: Performed by: STUDENT IN AN ORGANIZED HEALTH CARE EDUCATION/TRAINING PROGRAM

## 2024-10-08 PROCEDURE — 85025 COMPLETE CBC W/AUTO DIFF WBC: CPT | Performed by: INTERNAL MEDICINE

## 2024-10-08 PROCEDURE — 96417 CHEMO IV INFUS EACH ADDL SEQ: CPT

## 2024-10-08 PROCEDURE — 80053 COMPREHEN METABOLIC PANEL: CPT

## 2024-10-08 PROCEDURE — 25010000002 PALONOSETRON 0.25 MG/5ML SOLUTION PREFILLED SYRINGE: Performed by: STUDENT IN AN ORGANIZED HEALTH CARE EDUCATION/TRAINING PROGRAM

## 2024-10-08 PROCEDURE — 84132 ASSAY OF SERUM POTASSIUM: CPT | Performed by: INTERNAL MEDICINE

## 2024-10-08 PROCEDURE — 25010000002 FLUOROURACIL PER 500 MG: Performed by: STUDENT IN AN ORGANIZED HEALTH CARE EDUCATION/TRAINING PROGRAM

## 2024-10-08 PROCEDURE — 96415 CHEMO IV INFUSION ADDL HR: CPT

## 2024-10-08 PROCEDURE — 96375 TX/PRO/DX INJ NEW DRUG ADDON: CPT

## 2024-10-08 PROCEDURE — 83735 ASSAY OF MAGNESIUM: CPT | Performed by: INTERNAL MEDICINE

## 2024-10-08 PROCEDURE — 96366 THER/PROPH/DIAG IV INF ADDON: CPT

## 2024-10-08 PROCEDURE — 25810000003 SODIUM CHLORIDE 0.9 % SOLUTION 500 ML FLEX CONT: Performed by: STUDENT IN AN ORGANIZED HEALTH CARE EDUCATION/TRAINING PROGRAM

## 2024-10-08 PROCEDURE — 25010000002 ATROPINE SULFATE 0.4 MG/ML SOLUTION 1 ML VIAL: Performed by: STUDENT IN AN ORGANIZED HEALTH CARE EDUCATION/TRAINING PROGRAM

## 2024-10-08 PROCEDURE — 96368 THER/DIAG CONCURRENT INF: CPT

## 2024-10-08 PROCEDURE — 96413 CHEMO IV INFUSION 1 HR: CPT

## 2024-10-08 PROCEDURE — 25010000002 PANITUMUMAB PER 10 MG: Performed by: STUDENT IN AN ORGANIZED HEALTH CARE EDUCATION/TRAINING PROGRAM

## 2024-10-08 RX ORDER — ATROPINE SULFATE 1 MG/ML
0.25 INJECTION, SOLUTION INTRAMUSCULAR; INTRAVENOUS; SUBCUTANEOUS
Status: CANCELLED | OUTPATIENT
Start: 2024-10-08

## 2024-10-08 RX ORDER — DEXAMETHASONE SODIUM PHOSPHATE 4 MG/ML
6 INJECTION, SOLUTION INTRA-ARTICULAR; INTRALESIONAL; INTRAMUSCULAR; INTRAVENOUS; SOFT TISSUE ONCE
Status: COMPLETED | OUTPATIENT
Start: 2024-10-08 | End: 2024-10-08

## 2024-10-08 RX ORDER — PALONOSETRON 0.05 MG/ML
0.25 INJECTION, SOLUTION INTRAVENOUS ONCE
Status: COMPLETED | OUTPATIENT
Start: 2024-10-08 | End: 2024-10-08

## 2024-10-08 RX ORDER — SODIUM CHLORIDE 9 MG/ML
250 INJECTION, SOLUTION INTRAVENOUS ONCE
Status: CANCELLED | OUTPATIENT
Start: 2024-10-08

## 2024-10-08 RX ORDER — DEXAMETHASONE SODIUM PHOSPHATE 4 MG/ML
6 INJECTION, SOLUTION INTRA-ARTICULAR; INTRALESIONAL; INTRAMUSCULAR; INTRAVENOUS; SOFT TISSUE ONCE
Status: CANCELLED | OUTPATIENT
Start: 2024-10-08 | End: 2024-10-08

## 2024-10-08 RX ORDER — SODIUM CHLORIDE 9 MG/ML
250 INJECTION, SOLUTION INTRAVENOUS ONCE
Status: DISCONTINUED | OUTPATIENT
Start: 2024-10-08 | End: 2024-10-09 | Stop reason: HOSPADM

## 2024-10-08 RX ORDER — PALONOSETRON 0.05 MG/ML
0.25 INJECTION, SOLUTION INTRAVENOUS ONCE
Status: CANCELLED | OUTPATIENT
Start: 2024-10-08

## 2024-10-08 RX ORDER — ATROPINE SULFATE 1 MG/ML
0.25 INJECTION, SOLUTION INTRAMUSCULAR; INTRAVENOUS; SUBCUTANEOUS
Status: DISCONTINUED | OUTPATIENT
Start: 2024-10-08 | End: 2024-10-08

## 2024-10-08 RX ADMIN — LEUCOVORIN CALCIUM 810 MG: 500 INJECTION, POWDER, LYOPHILIZED, FOR SOLUTION INTRAMUSCULAR; INTRAVENOUS at 11:21

## 2024-10-08 RX ADMIN — PANITUMUMAB 480 MG: 400 SOLUTION INTRAVENOUS at 10:42

## 2024-10-08 RX ADMIN — PALONOSETRON 0.25 MG: 0.25 INJECTION, SOLUTION INTRAVENOUS at 09:32

## 2024-10-08 RX ADMIN — DEXAMETHASONE SODIUM PHOSPHATE 6 MG: 4 INJECTION, SOLUTION INTRAMUSCULAR; INTRAVENOUS at 09:34

## 2024-10-08 RX ADMIN — FLUOROURACIL 4850 MG: 50 INJECTION, SOLUTION INTRAVENOUS at 13:17

## 2024-10-08 RX ADMIN — IRINOTECAN HYDROCHLORIDE 290 MG: 20 INJECTION, SOLUTION INTRAVENOUS at 11:21

## 2024-10-08 NOTE — PROGRESS NOTES
Patient is here for vectibix, irrinotecan, leucovorin and 5FU. Port accessed and flushed with good blood return noted. 10cc of blood wasted prior to specimen collection. Blood specimen obtained and sent to lab for processing per protocol.  MD/PA sent: Patient is here for C11D1 vectibix, irrinotecan, leucovorin and 5FU. Patient stated his diarrhea is the same-1 episode every couple days and he takes immodium for it and his rash is better than last visit. per pt. He still has it on his face, chest and some on his neck but patient stated the axilla rash is gone. Patient stated the his cracking on his fingertips is better. He stated he is using the hydrocortisone and aquaphor to the face and using the kenalog and doxycycline ashley prescribed and it is helping. CBC and RUSTY results sent-sent recheck to hospital for potassium. Dr. Larios sent: Dr. Larios would you be able to sign his plan? rash is better and labs are in parameters. I think dr. palma is on his way to [place] and kevin is in a chemo teaching. DR. Larios signed plan. Patient tolerated treatment and was discharged with AVS. CEA drawn for his MD appt on Thursday today-inbox message sent to make sure that was ok and if he still needed any labs on Thursday. Patient tolerated treatment and was discharged and declined AVS. Patient aware of next appt.

## 2024-10-08 NOTE — PROGRESS NOTES
HEMATOLOGY ONCOLOGY OUTPATIENT FOLLOW-UP       Patient name: Vlad Guerrero  : 1945  MRN: [id number]  Primary Care Physician: Vlad Moreland MD  Referring Physician: No ref. provider found  Reason For Consult: Stage III colon cance    Chief Complaint   Patient presents with    Follow-up     Malignant neoplasm of ascending colon     HPI:   History of Present Illness:  Vlad Guerrero is 78 y.o. male who presented to our office on 23 for consultation regarding    2023: Mr. Guerrero dated the beginning of his present illness to sometime at the end of  when he started to feel fatigued.  He was seen at the [facility] and had laboratory exams that revealed microcytic anemia.  He had evidence of iron deficiency and received intravenous iron in the hospital.  He had upper and lower gastrointestinal endoscopies that demonstrated a cecal tumor that measured 4 to 5 cm and was fungating in appearance.  It was circumferential and was next to the ileocecal valve.  The upper gastrointestinal endoscopy revealed no abnormalities.  On this basis he was on December 15, 2022 he was taken to the hospital and underwent a right hemicolectomy without complications.  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  It corresponded to a grade 2 malignancy that invaded through the muscularis propria into the pericolonic tissues.  Macroscopic tumor perforation or lymphovascular space invasion were not present.  Perineural invasion was not present either.  All margins of excision were negative.  All of 36 lymph nodes submitted to were positive for involvement with malignancy.  The disease was [place] staged as [id number].  Loss of expression of mismatch repair enzymes was not documented.  Mr. Guerrero was discharged to continue treatment as outpatient.  At the time of this visit he was recovering well from the  surgery.  His incision had healed completely and he had no drains or suture materials.  He had returned home and was eating well.  He had yet to return to work.  He had been afebrile and free of nausea.  For the most part his weight had been stable.  After reviewing the records a long conversation, of approximately 1 hour, was had with the patient in regards to options of treatment.  The nature of his disease as well as the likelihood of recurrence were described.  The use of adjuvant chemotherapy to increase the rate of cure after surgery was explained.  Side effects were described in detail.  The need for a port was expressed as well.  A treatment plan was placed. A decision was made to treat him with three months of CAPOX given the characteristics of his disease.     2/6/2023: Received the first cycle of adjuvant chemotherapy without any side effects. On the day of this visit feeling well and without any new symptoms, except a very mild rash, especially on the forearms, but no oral pain. Had stools of diminished consistency for a few days but now resolved. The exam was rather unremarkable, except for a few very light erythematous macules on the forearms.     2/27/2023: Feeling well and without new symptoms.  As active as before.  Eating well and without unintended weight loss.  Stronger and more energetic since the institution of vitamin B12 and iron.  No chest pains and cough.  No abdominal pain or diarrhea.  On exam no changes.  A decision was made to continue with the same treatment.  Laboratory exams were reviewed.  He was to see me again in approximately 4 weeks from this date with new scans.    3/27/2023: Suffered an ischemic stroke.  MRI on March 10, 2023 reported a 7 mm focus of restricted diffusion in the left periventricular white matter of the posterior left frontal lobe.  This was felt to be suspicious for an acute/subacute infarct.  There was also evidence of previous lacunar strokes.  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  There was bilateral internal carotid artery stenosis with 60% stenosis estimated at the right and not greater than 50% at the left.  Chemotherapy was held following discharge.  He was left without any sequela.  At the time of this visit he was entirely asymptomatic.  He was convinced a large part of his problem was that he had suffered from hyperglycemia, consistently for some time.  Indeed his glucose was between 152 mg/dL and 193 mg/dL in the preceding days.  However, he reported at home glucose readings of greater than 400 mg/dL..  On exam there were no changes.  The laboratory exams reported a blood count with normocytic anemia and mild thrombocytopenia.  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    4/14/2023: Completed 3 months of treatment with CAPOX.  On the day of this visit not feeling very well.  Weak and tired.  Not very good appetite although eating well.  Having some dysgeusia.  Afebrile.  No chest pains or cough and no abdominal pain.  Had maintain regular bowel activity.  No edema.  On exam no changes.  A decision was made to stop the chemotherapy.  He was to get intravenous fluids on the day of this visit.  To return to see me in 3 weeks.  Tentatively to have scans in early June 2023.    5/5/2023: Feeling progressively stronger. Eating better and slowly regaining weight. Afebrile. No more nausea. No diarrhea and now with regular bowel activity. On exam alert, conversant and well oriented. No pale or jaundice. No oral lesions and no palpable lymph  nodes. Lungs clear and abdomen soft. Minimal edema of the right lower extremity. The laboratory exams revealed persistent anemia with a tendency to macrocytosis. Hemoglobin and platelets within normal ranges. A decision was made to continue to observe and I asked him to see me in approximately 3 months with new scans.     8/7/2023: Feels as well as at the time of the last visit. Active and returned to work full time. Eating well and no nausea or vomiting. No chest pain or cough and no abdominal pain. On exam no changes, though he had lost a large amount of weight since the previous visit. The imaging studies suggested a new lesion in the liver, as well as several lesions in the spleen of unclear cause. Reviewed the images and the report of the scans. Discussed with him at length and explained the findings. Discussed with him the plans for a MRI. He will see me with results.     8/28/2023: Entirely asymptomatic.  Working without limitations.  Eating well.  Weight stable.  Afebrile.  No pain.  On exam no changes.  Laboratory exams were reviewed and discussed with him.  In spite of the fact things on the scans the Carcinoembryonic antigen has not increased.  However both the CT and the MRI suggest one isolated metastatic deposit in segment 8 of the liver.  Discussed with him the options at this time.  I believe a biopsy is essential and after that he would be treated with chemotherapy following which surgery, if no new lesions have appeared, could be considered.  He may still be curable even in the setting of metastatic disease.  Discussed with him at great length.  Explained the steps.  Sent a communication to Dr. Davis, the patient's surgeon.    9/11/2023: Feels about the same as before.  No new symptoms.  As active as before and working.  Eating well and with good appetite.  No unintended weight loss.  Afebrile.  On exam alert, conversant and in good spirits.  No distress.  No jaundice or pallor.  Lungs clear  and heart regular.  Abdomen soft.  The liver is not palpable.  No edema.  Laboratory exams were reviewed.  The liver biopsy report confirms metastatic colorectal cancer.  This appears to be an isolated metastases.  On this basis treatment with chemotherapy followed by surgery is not ordered.  I have asked him to have a PET scan and discussed the study with him.  Discussed the objectives of the treatment and its requirements.  Placed the treatment plan with 5 fluorouracil, irinotecan and panitumumab and discussed with him.  To begin as soon as possible.    9/25/2023: In the office before the next scheduled time.  He called to report that over the weekend he had had between 5 and 8 defecations of liquid stool.  He took loperamide irregularly and it did not seem to provide much relief.  He was able to eat and drink without any difficulties and was never nauseated.  He was seen in the emergency room on 9/24/2023 and he was not found to have any dehydration or other evidence of complications.  They discharged him.  At the time of this visit feeling better.  As of this time he had not had any liquid defecations.  He had continued to eat and drink fluids without any problems.  He had no chest pains and had not had any dyspnea.  He was also free of abdominal pain.  On exam alert, oriented and conversant.  Seemed well-hydrated and was not jaundiced or pale.  Lungs clear.  Heart regular.  Abdomen soft.  A decision was made to continue with the same plan.  I independently reviewed the images of the PET scan and discussed with him.  It reveals only an abnormal lesion in the liver as identified before.  No suggestion of distant metastatic disease.  Discussed with him the significance of this and explained the possibility of surgery and potentially cure.    11/15/2023: Completed 4 cycles of FOLFIRI/panitumumab.  Experienced the expected side effects, particularly skin rash.  However, the treatment proceeded without major  "complications.  Today he tells me he has been feeling reasonably well and has continued to work part-time.  He enjoys his job.  He has been eating about as much as he had been eating before but he has lost some weight without intention.  He did have persistent diarrhea that responded only to large doses of loperamide.  At this point he no longer has diarrhea.  He has been without chest pains or cough and denies as well abdominal pain.  On exam he still has some erythema on the nasolabial regions on both sides.  The lungs are clear.  The heart is regular and the abdomen soft.  Liver and spleen do not seem to be enlarged.  The laboratory exams were reviewed and discussed with him.  To have another PET scan and consider surgical excision.  He will need to go to [place] for this.    12/11/2023: Feeling reasonably well at this time without any new complaints.  His diarrhea is essentially resolved although he still has soft defecations intermittently.  He is eating well and has a good appetite.  He has had no chest pains and has been without cough.  He denies abdominal pain.  No melena, hematochezia or hematuria.  No peripheral edema.  On exam no changes.  The PET scan reveals complete response with no residual evidence of disease activity.  I have discussed with Dr. Praveen Whittaker in [place] and he requested that a MRI be done prior to deciding on surgery.  Discussed with Mr. Guerrero.  Explained the plans.  He is to have the MRI and will see me with the results.    12/27/2023: Without new symptoms.  Has not had the MRI because of scheduling problems.  He feels lightheaded at times and \"I am not as sure footed as I was before\".  He has had no falls and he continues to work full-time.  He has been eating well and has regained some of the weight that he had lost.  He has been afebrile.  No chest pains or cough.  No abdominal pain or diarrhea and no dysuria.  No skin rash.  On exam no changes.  The laboratory exams were " reviewed and discussed with him.  To reschedule the MRI for the next couple of weeks.  Discussed with him.    3/18/2024: Feeling very well. His appetite is good and he has gained weight. He has been working without any difficulties. He denies chest pain or cough. No abdominal pain or diarrhea and no dysuria. On exam alert and conversant. In good spirits and in no distress. No jaundice. No oral lesions and respirations not labored. The lungs are clear and the heart is regular. Abdomen is soft and not tender. The laboratory exams reveal a blood count in the normal ranges. He persists with hyperglycemia. The alkaline phosphatase has risen some in the recent past. He is to have the MRI of the liver. He will see me with the results.     6/26/2024: Back after an absence of a few weeks and a couple of missed appointments.  He feels about the same as he felt before.  Following the last admission to the hospital he was transferred to an assisted care living facility where he has been doing progressively better.  Persists with tremors.  Eats well.  Has not lost weight.  Denies abdominal pain.  Has not had diarrhea, melena or hematochezia.  On exam in no distress.  No jaundice.  Not pale.  The lungs are clear bilaterally and the heart regular.  The abdomen is soft and without hepatomegaly or splenomegaly.  No edema.  The laboratory exams were reviewed.  To obtain a Carcinoembryonic antigen today.  He will see me in a couple of weeks with new scans as it has been more than 3 months since the last 1.  Consider treatment with an irinotecan based regimen that seem to result in a very pronounced response in the recent past.    7/12/2024: Back to review the results of the recent scans.  He feels well generally and continues to be as active as before.  As well, his appetite appears to be the same.  On exam he does not seem ill and he is conversant and well-oriented.  He is neither pale nor jaundiced and he seems well-hydrated.  The  lungs are clear and the heart regular.  The abdomen is soft.  There is no edema.  Laboratory exams reviewed.  Reviewed the images and the report of the scans.  He has 2 metastatic deposits in the liver and no other evidence of metastatic dissemination of his malignancy.  I believe it reasonable to treat him again with chemotherapy and consider some form of local therapy in the future, given how localized the disease is.  Will resume chemotherapy with irinotecan, cetuximab and 5-fluorouracil.  No 5-FU bolus.  I will see him again in approximately 4 weeks.    9/9/2024: Tolerating the chemotherapy well.  So far he has had 4 cycles without any undue side effects and no complications.  He does have a rash that is mainly around the eyes, nose and mouth but very little on the chest and the back.  It is not uncomfortable and he has been receiving treatment with topical clindamycin and sun protection.  He continues to eat well and his weight has increased slightly.  He has no chest pain and no abdominal pain.  He has diarrhea only intermittently.  On exam indeed he has an erythematous rash with a few papules but no pustules at this time.  No oral lesions.  Respirations not labored.  Lungs clear bilaterally.  Heart regular.  Abdomen soft.  No edema.  Laboratory exams reviewed.  I reviewed the recent scans of the abdomen and pelvis.  No suggestion of metastatic disease in the chest or the pelvis.  In the liver there are 2 lesions previously identified have decreased in size some.  To continue with the same treatment and see me in approximately 4 weeks.    10/10/2024: Feels reasonably well.  Has continued to have a skin rash but there are no associated symptoms to it and it is not any more extensive than before.  Perhaps it is even less extensive than before.  He maintains a good appetite.  He continues to receive care at the assisted living facility and he has had no difficulties.  No chest pains or cough.  No abdominal pain  or diarrhea.  On exam alert and conversant, in good spirits and well-oriented.  No jaundice.  Indeed there is diffuse erythema on the face and the conjunctivae are somewhat erythematous.  No discharge.  The lungs are clear and the heart regular.  The abdomen is soft nontender.  Liver and spleen are not enlarged.  There is no edema.  Laboratory exams reviewed.  To continue with the same treatment.  Obtain scans after the next chemotherapy and see me with the results.  Consider radioembolization of the liver.  I have discussed with Dr. Vlad carmichael for coordination of care.    The following portions of the patient's history were reviewed and updated as appropriate: allergies, current medications, past family history, past medical history, past social history, past surgical history and problem list.    Past Medical History:   Diagnosis Date    Anemia     Colon cancer 2022    colon    Diabetes mellitus     Hyperlipidemia     Hypertension      Past Surgical History:   Procedure Laterality Date    APPENDECTOMY      CARDIAC CATHETERIZATION      COLON RESECTION N/A 12/15/2022    Procedure: COLON RESECTION RIGHT;  Surgeon: Percy Davis MD;  Location: [facility] MAIN OR;  Service: General;  Laterality: N/A;    COLONOSCOPY N/A 11/11/2022    Procedure: COLONOSCOPY WITH BIOPSY AND POLYPECTOMY;  Surgeon: Billy Julien MD;  Location: [facility] ENDOSCOPY;  Service: Gastroenterology;  Laterality: N/A;  Impression:  1.  Large 4-5cm fulgurating circumferential ulcerated mass in the very proximal part of the ascending colon next to ileocecal valve multiple biopsies were performed.  This is highly concerning for colon malignancy.  2.  2 polyp rem    ENDOSCOPY N/A 11/11/2022    Procedure: ESOPHAGOGASTRODUODENOSCOPY with biopsy X1;  Surgeon: Billy Julien MD;  Location: [facility] ENDOSCOPY;  Service: Gastroenterology;  Laterality: N/A;  5.  Upper endoscopy lamination unremarkable.       PORTACATH PLACEMENT Right 1/12/2023     Procedure: INSERTION OF PORTACATH;  Surgeon: Percy Davis MD;  Location: [facility] MAIN OR;  Service: General;  Laterality: Right;    TONSILLECTOMY         Current Outpatient Medications:     Continuous Blood Gluc Sensor (Dexcom G7 Sensor) misc, Use 1 each Every 10 (Ten) Days. Dx: E11.65, Disp: 3 each, Rfl: 2    doxycycline (VIBRAMYCIN) 100 MG capsule, Take 1 capsule by mouth 2 (Two) Times a Day., Disp: 60 capsule, Rfl: 0    hydrocortisone 1 % cream, Apply 1 Application topically to the appropriate area as directed 2 (Two) Times a Day., Disp: 454 g, Rfl: 1    insulin aspart (NovoLOG FlexPen) 100 UNIT/ML solution pen-injector sc pen, Every 8 (Eight) Hours., Disp: , Rfl:     insulin glargine (LANTUS, SEMGLEE) 100 UNIT/ML injection, Inject 20 Units under the skin into the appropriate area as directed Every Night for 30 days., Disp: 6 mL, Rfl: 0    Insulin Pen Needle (Pen Needles) 32G X 4 MM misc, Use 1 each 4 (Four) Times a Day Before Meals & at Bedtime., Disp: 200 each, Rfl: 2    triamcinolone (KENALOG) 0.1 % ointment, Apply 1 Application topically to the appropriate area as directed 2 (Two) Times a Day. Avoid face, Disp: 30 g, Rfl: 0    carvedilol (Coreg) 3.125 MG tablet, Take 1 tablet by mouth 2 (Two) Times a Day With Meals for 30 days., Disp: 60 tablet, Rfl: 0    lisinopril (Zestril) 2.5 MG tablet, Take 1 tablet by mouth Daily for 60 days., Disp: 30 tablet, Rfl: 1  No current facility-administered medications for this visit.    Facility-Administered Medications Ordered in Other Visits:     heparin injection 500 Units, 500 Units, Intravenous, PRN, Don Kramer MD, 500 Units at 10/10/24 1414    sodium chloride 0.9 % flush 20 mL, 20 mL, Intravenous, PRN, Don Kramer MD, 20 mL at 10/10/24 1414    Allergies   Allergen Reactions    Penicillins Rash     Family History   Problem Relation Age of Onset    Pneumonia Mother 94        SARS-CoV2    Colon cancer Father 62    Heart disease Sister       Cancer-related family history includes Colon cancer (age of onset: 62) in his father.    Social History     Tobacco Use    Smoking status: Former     Current packs/day: 0.00     Average packs/day: 1 pack/day for 2.0 years (2.0 ttl pk-yrs)     Types: Cigarettes     Start date:      Quit date:      Years since quittin.8    Smokeless tobacco: Never   Vaping Use    Vaping status: Never Used   Substance Use Topics    Alcohol use: Not Currently    Drug use: Never     Social History     Social History Narrative    Not on file      ROS:     Review of Systems   Constitutional:  Negative for activity change, appetite change, chills, diaphoresis, fatigue, fever and unexpected weight change.   HENT:  Negative for congestion, dental problem, drooling, ear discharge, ear pain, facial swelling, hearing loss, mouth sores, nosebleeds, postnasal drip, rhinorrhea, sinus pressure, sinus pain, sneezing, sore throat, tinnitus, trouble swallowing and voice change.    Eyes:  Negative for photophobia, pain, discharge, redness, itching and visual disturbance.   Respiratory:  Negative for apnea, cough, choking, chest tightness, shortness of breath, wheezing and stridor.    Cardiovascular:  Negative for chest pain, palpitations and leg swelling.   Gastrointestinal:  Negative for abdominal distention, abdominal pain, anal bleeding, blood in stool, constipation, diarrhea, nausea, rectal pain and vomiting.   Endocrine: Negative for cold intolerance, heat intolerance, polydipsia and polyuria.   Genitourinary:  Negative for decreased urine volume, difficulty urinating, dysuria, flank pain, frequency, genital sores, hematuria and urgency.   Musculoskeletal:  Negative for arthralgias, back pain, gait problem, joint swelling, myalgias, neck pain and neck stiffness.   Skin:  Negative for color change, pallor and rash.   Neurological:  Negative for dizziness, tremors, seizures, syncope, facial asymmetry, speech difficulty, weakness,  "light-headedness, numbness and headaches.   Hematological:  Negative for adenopathy. Does not bruise/bleed easily.   Psychiatric/Behavioral:  Negative for agitation, behavioral problems, confusion, decreased concentration, hallucinations, self-injury, sleep disturbance and suicidal ideas. The patient is not nervous/anxious.      Objective:    Vitals:    10/10/24 1423   BP: 108/55   Pulse: 85   Temp: 98.4 °F (36.9 °C)   SpO2: 96%   Weight: 80.1 kg (176 lb 9.6 oz)   Height: 182.9 cm (72.01\")   PainSc: 0-No pain     Body mass index is 23.95 kg/m².  ECOG  (0) Fully active, able to carry on all predisease performance without restriction    Physical Exam:     Physical Exam  Constitutional:       General: He is not in acute distress.     Appearance: Normal appearance. He is not ill-appearing, toxic-appearing or diaphoretic.   HENT:      Head: Normocephalic and atraumatic.      Right Ear: External ear normal.      Left Ear: External ear normal.      Nose: Nose normal.      Mouth/Throat:      Mouth: Mucous membranes are moist.      Pharynx: Oropharynx is clear.   Eyes:      General: No scleral icterus.        Right eye: No discharge.         Left eye: No discharge.      Conjunctiva/sclera: Conjunctivae normal.      Pupils: Pupils are equal, round, and reactive to light.   Cardiovascular:      Rate and Rhythm: Normal rate and regular rhythm.      Pulses: Normal pulses.      Heart sounds: Normal heart sounds. No murmur heard.     No friction rub. No gallop.   Pulmonary:      Effort: No respiratory distress.      Breath sounds: No stridor. No wheezing, rhonchi or rales.   Chest:      Chest wall: No tenderness.   Abdominal:      General: Abdomen is flat. Bowel sounds are normal. There is no distension.      Palpations: Abdomen is soft. There is no mass.      Tenderness: There is no abdominal tenderness. There is no right CVA tenderness, left CVA tenderness, guarding or rebound.   Musculoskeletal:         General: No swelling, " tenderness, deformity or signs of injury.      Cervical back: No rigidity.      Right lower leg: No edema.      Left lower leg: No edema.   Lymphadenopathy:      Cervical: No cervical adenopathy.   Skin:     General: Skin is warm and dry.      Coloration: Skin is not jaundiced.      Findings: No bruising or rash.   Neurological:      General: No focal deficit present.      Mental Status: He is alert and oriented to person, place, and time.      Cranial Nerves: No cranial nerve deficit.      Gait: Gait normal.   Psychiatric:         Mood and Affect: Mood normal.         Behavior: Behavior normal.         Thought Content: Thought content normal.         Judgment: Judgment normal.     KATIE Kramer MD performed a physical exam on 10/10/2024 as documented above.    Lab Results - Last 18 Months   Lab Units 10/10/24  1410 10/08/24  0801 09/24/24  0812   WBC 10*3/mm3 3.46 4.72 5.37   HEMOGLOBIN g/dL 12.3* 12.6* 12.9*   HEMATOCRIT % 38.4 39.4 39.7   PLATELETS 10*3/mm3 145 133* 119*   MCV fL 95.0 94.9 92.3     Lab Results - Last 18 Months   Lab Units 10/08/24  0808 10/08/24  0803 09/24/24  0844 09/24/24  0812 09/09/24  1510 08/27/24  0750   SODIUM mmol/L  --   --   --  139 137 139   POTASSIUM mmol/L  --  3.7  --  3.5 4.0 3.9   CHLORIDE mmol/L  --   --   --  106 103 105   CO2 mmol/L  --   --   --  24.9 25.8 27.1   BUN mg/dL  --   --   --  10 12 11   CREATININE mg/dL 0.80  --  0.80 0.84 1.01 0.88   CALCIUM mg/dL  --   --   --  8.2* 8.5* 8.3*   BILIRUBIN mg/dL  --   --   --  0.3 0.3 0.3   ALK PHOS U/L  --   --   --  93 96 98   ALT (SGPT) U/L  --   --   --  16 19 28   AST (SGOT) U/L  --   --   --  16 20 24   GLUCOSE mg/dL  --   --   --  143* 373* 363*     Lab Results   Component Value Date    GLUCOSE 143 (H) 09/24/2024    BUN 10 09/24/2024    CREATININE 0.80 10/08/2024    EGFRIFNONA 76 11/15/2021    EGFRIFAFRI 87 11/15/2021    BCR 11.9 09/24/2024    K 3.7 10/08/2024    CO2 24.9 09/24/2024    CALCIUM 8.2 (L) 09/24/2024     ALBUMIN 3.5 09/24/2024    AST 16 09/24/2024    ALT 16 09/24/2024     Lab Results   Component Value Date    IRON 15 (L) 01/06/2023    TIBC 548 (H) 01/06/2023    FERRITIN 23.51 (L) 01/06/2023     Lab Results   Component Value Date    CEA 4.75 10/08/2024     Assessment & Plan     Assessment:  Isolated metastatic colon cancer to the central portion of the liver with 2 distinct lesions.  To resume chemotherapy with 5-fluorouracil irinotecan and cetuximab.  Tolerating treatment well.  Near completion of 12 cycles.  After this next cycle he will have scans.  If continued response consider radioembolization.  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  Completed Cape-Ox adjuvantly for 3 months in April 2023.  History of recurrent ischemic stroke.  Remains on acetyl salicylic acid.  No recurrent symptoms.  Reviewed all laboratory exams.  Discussed with him.  See me again in approximately 5 weeks with new scans.    Don Kramer MD on 10/10/2024 at 1458.

## 2024-10-10 ENCOUNTER — OFFICE VISIT (OUTPATIENT)
Dept: ONCOLOGY | Facility: CLINIC | Age: 79
End: 2024-10-10
Payer: OTHER GOVERNMENT

## 2024-10-10 ENCOUNTER — HOSPITAL ENCOUNTER (OUTPATIENT)
Dept: ONCOLOGY | Facility: HOSPITAL | Age: 79
Discharge: HOME | End: 2024-10-10
Admitting: INTERNAL MEDICINE
Payer: OTHER GOVERNMENT

## 2024-10-10 VITALS
BODY MASS INDEX: 23.92 KG/M2 | WEIGHT: 176.6 LBS | HEIGHT: 72 IN | HEART RATE: 85 BPM | SYSTOLIC BLOOD PRESSURE: 108 MMHG | DIASTOLIC BLOOD PRESSURE: 55 MMHG | OXYGEN SATURATION: 96 % | TEMPERATURE: 98.4 F

## 2024-10-10 DIAGNOSIS — C18.2 MALIGNANT NEOPLASM OF ASCENDING COLON: Primary | ICD-10-CM

## 2024-10-10 DIAGNOSIS — C78.7 METASTASES TO THE LIVER: ICD-10-CM

## 2024-10-10 DIAGNOSIS — Z95.828 PORT-A-CATH IN PLACE: ICD-10-CM

## 2024-10-10 LAB
ALBUMIN SERPL-MCNC: 3.3 G/DL (ref 3.5–5.2)
ALBUMIN/GLOB SERPL: 1.6 G/DL
ALP SERPL-CCNC: 79 U/L (ref 39–117)
ALT SERPL W P-5'-P-CCNC: 15 U/L (ref 1–41)
ANION GAP SERPL CALCULATED.3IONS-SCNC: 7.3 MMOL/L (ref 5–15)
AST SERPL-CCNC: 21 U/L (ref 1–40)
BASOPHILS # BLD AUTO: 0.01 10*3/MM3 (ref 0–0.2)
BASOPHILS NFR BLD AUTO: 0.3 % (ref 0–1.5)
BILIRUB SERPL-MCNC: 0.6 MG/DL (ref 0–1.2)
BUN SERPL-MCNC: 16 MG/DL (ref 8–23)
BUN/CREAT SERPL: 19.5 (ref 7–25)
CALCIUM SPEC-SCNC: 8 MG/DL (ref 8.6–10.5)
CHLORIDE SERPL-SCNC: 104 MMOL/L (ref 98–107)
CO2 SERPL-SCNC: 26.7 MMOL/L (ref 22–29)
CREAT SERPL-MCNC: 0.82 MG/DL (ref 0.76–1.27)
DEPRECATED RDW RBC AUTO: 56.9 FL (ref 37–54)
EGFRCR SERPLBLD CKD-EPI 2021: 91 ML/MIN/1.73
EOSINOPHIL # BLD AUTO: 0.08 10*3/MM3 (ref 0–0.4)
EOSINOPHIL NFR BLD AUTO: 2.3 % (ref 0.3–6.2)
ERYTHROCYTE [DISTWIDTH] IN BLOOD BY AUTOMATED COUNT: 16.9 % (ref 12.3–15.4)
GLOBULIN UR ELPH-MCNC: 2.1 GM/DL
GLUCOSE SERPL-MCNC: 320 MG/DL (ref 65–99)
HCT VFR BLD AUTO: 38.4 % (ref 37.5–51)
HGB BLD-MCNC: 12.3 G/DL (ref 13–17.7)
LYMPHOCYTES # BLD AUTO: 1.09 10*3/MM3 (ref 0.7–3.1)
LYMPHOCYTES NFR BLD AUTO: 31.5 % (ref 19.6–45.3)
MCH RBC QN AUTO: 30.4 PG (ref 26.6–33)
MCHC RBC AUTO-ENTMCNC: 32 G/DL (ref 31.5–35.7)
MCV RBC AUTO: 95 FL (ref 79–97)
MONOCYTES # BLD AUTO: 0.26 10*3/MM3 (ref 0.1–0.9)
MONOCYTES NFR BLD AUTO: 7.5 % (ref 5–12)
NEUTROPHILS NFR BLD AUTO: 2.02 10*3/MM3 (ref 1.7–7)
NEUTROPHILS NFR BLD AUTO: 58.4 % (ref 42.7–76)
PLATELET # BLD AUTO: 145 10*3/MM3 (ref 140–450)
PMV BLD AUTO: 9.6 FL (ref 6–12)
POTASSIUM SERPL-SCNC: 3.9 MMOL/L (ref 3.5–5.2)
PROT SERPL-MCNC: 5.4 G/DL (ref 6–8.5)
RBC # BLD AUTO: 4.04 10*6/MM3 (ref 4.14–5.8)
SODIUM SERPL-SCNC: 138 MMOL/L (ref 136–145)
WBC NRBC COR # BLD AUTO: 3.46 10*3/MM3 (ref 3.4–10.8)

## 2024-10-10 PROCEDURE — 85025 COMPLETE CBC W/AUTO DIFF WBC: CPT | Performed by: INTERNAL MEDICINE

## 2024-10-10 PROCEDURE — 80053 COMPREHEN METABOLIC PANEL: CPT | Performed by: INTERNAL MEDICINE

## 2024-10-10 PROCEDURE — 36591 DRAW BLOOD OFF VENOUS DEVICE: CPT

## 2024-10-10 PROCEDURE — 99213 OFFICE O/P EST LOW 20 MIN: CPT | Performed by: INTERNAL MEDICINE

## 2024-10-10 PROCEDURE — 25010000002 HEPARIN LOCK FLUSH PER 10 UNITS: Performed by: INTERNAL MEDICINE

## 2024-10-10 RX ORDER — HEPARIN SODIUM (PORCINE) LOCK FLUSH IV SOLN 100 UNIT/ML 100 UNIT/ML
500 SOLUTION INTRAVENOUS AS NEEDED
OUTPATIENT
Start: 2024-10-10

## 2024-10-10 RX ORDER — SODIUM CHLORIDE 0.9 % (FLUSH) 0.9 %
20 SYRINGE (ML) INJECTION AS NEEDED
OUTPATIENT
Start: 2024-10-10

## 2024-10-10 RX ORDER — SODIUM CHLORIDE 0.9 % (FLUSH) 0.9 %
20 SYRINGE (ML) INJECTION AS NEEDED
Status: DISCONTINUED | OUTPATIENT
Start: 2024-10-10 | End: 2024-10-11 | Stop reason: HOSPADM

## 2024-10-10 RX ORDER — HEPARIN SODIUM (PORCINE) LOCK FLUSH IV SOLN 100 UNIT/ML 100 UNIT/ML
500 SOLUTION INTRAVENOUS AS NEEDED
Status: DISCONTINUED | OUTPATIENT
Start: 2024-10-10 | End: 2024-10-11 | Stop reason: HOSPADM

## 2024-10-10 RX ADMIN — HEPARIN 500 UNITS: 100 SYRINGE at 14:14

## 2024-10-10 RX ADMIN — Medication 20 ML: at 14:14

## 2024-10-10 NOTE — PROGRESS NOTES
Patient in clinic for 5FU pump disconnect. Pump empty. Blood taken from port for and specimens taken to lab. Port flushed and heparin locked.

## 2024-10-22 ENCOUNTER — HOSPITAL ENCOUNTER (OUTPATIENT)
Dept: ONCOLOGY | Facility: HOSPITAL | Age: 79
Discharge: HOME | End: 2024-10-22
Admitting: INTERNAL MEDICINE
Payer: MEDICARE

## 2024-10-22 VITALS
DIASTOLIC BLOOD PRESSURE: 68 MMHG | RESPIRATION RATE: 14 BRPM | HEIGHT: 72 IN | WEIGHT: 169.7 LBS | HEART RATE: 86 BPM | OXYGEN SATURATION: 96 % | BODY MASS INDEX: 22.98 KG/M2 | SYSTOLIC BLOOD PRESSURE: 116 MMHG | TEMPERATURE: 98.2 F

## 2024-10-22 DIAGNOSIS — C78.7 METASTASES TO THE LIVER: ICD-10-CM

## 2024-10-22 DIAGNOSIS — E87.6 HYPOKALEMIA: ICD-10-CM

## 2024-10-22 DIAGNOSIS — C18.2 MALIGNANT NEOPLASM OF ASCENDING COLON: Primary | ICD-10-CM

## 2024-10-22 LAB
ALP BLD-CCNC: 90 U/L (ref 53–128)
BASOPHILS # BLD AUTO: 0.02 10*3/MM3 (ref 0–0.2)
BASOPHILS NFR BLD AUTO: 0.3 % (ref 0–1.5)
BUN BLDA-MCNC: 11 MG/DL (ref 7–22)
CALCIUM BLD QL: 8.5 MG/DL (ref 8–10.3)
CHLORIDE BLDA-SCNC: 100 MMOL/L (ref 98–108)
CO2 BLDA-SCNC: 26 MMOL/L (ref 18–33)
CREAT BLDA-MCNC: 0.8 MG/DL (ref 0.6–1.2)
DEPRECATED RDW RBC AUTO: 51.4 FL (ref 37–54)
EGFRCR SERPLBLD CKD-EPI 2021: 91.7 ML/MIN/1.73
EOSINOPHIL # BLD AUTO: 0.08 10*3/MM3 (ref 0–0.4)
EOSINOPHIL NFR BLD AUTO: 1.3 % (ref 0.3–6.2)
ERYTHROCYTE [DISTWIDTH] IN BLOOD BY AUTOMATED COUNT: 15.8 % (ref 12.3–15.4)
GLUCOSE BLDC GLUCOMTR-MCNC: 351 MG/DL (ref 70–105)
GLUCOSE BLDC GLUCOMTR-MCNC: 368 MG/DL (ref 70–105)
GLUCOSE BLDC GLUCOMTR-MCNC: 368 MG/DL (ref 73–118)
HCT VFR BLD AUTO: 38 % (ref 37.5–51)
HGB BLD-MCNC: 12.5 G/DL (ref 13–17.7)
LYMPHOCYTES # BLD AUTO: 1.5 10*3/MM3 (ref 0.7–3.1)
LYMPHOCYTES NFR BLD AUTO: 24.6 % (ref 19.6–45.3)
MAGNESIUM SERPL-MCNC: 1.6 MG/DL (ref 1.6–2.4)
MCH RBC QN AUTO: 30.6 PG (ref 26.6–33)
MCHC RBC AUTO-ENTMCNC: 32.9 G/DL (ref 31.5–35.7)
MCV RBC AUTO: 93.1 FL (ref 79–97)
MONOCYTES # BLD AUTO: 0.83 10*3/MM3 (ref 0.1–0.9)
MONOCYTES NFR BLD AUTO: 13.6 % (ref 5–12)
NEUTROPHILS NFR BLD AUTO: 3.67 10*3/MM3 (ref 1.7–7)
NEUTROPHILS NFR BLD AUTO: 60.2 % (ref 42.7–76)
PLATELET # BLD AUTO: 143 10*3/MM3 (ref 140–450)
PMV BLD AUTO: 9.7 FL (ref 6–12)
POC ALBUMIN: 3 G/L (ref 3.3–5.5)
POC ALT (SGPT): 21 U/L (ref 10–47)
POC AST (SGOT): 21 U/L (ref 11–38)
POC TOTAL BILIRUBIN: 0.6 MG/DL (ref 0.2–1.6)
POC TOTAL PROTEIN: 6 G/DL (ref 6.4–8.1)
POTASSIUM BLDA-SCNC: 3.2 MMOL/L (ref 3.6–5.1)
RBC # BLD AUTO: 4.08 10*6/MM3 (ref 4.14–5.8)
SODIUM BLD-SCNC: 137 MMOL/L (ref 128–145)
WBC NRBC COR # BLD AUTO: 6.1 10*3/MM3 (ref 3.4–10.8)

## 2024-10-22 PROCEDURE — 25010000002 PANITUMUMAB PER 10 MG: Performed by: NURSE PRACTITIONER

## 2024-10-22 PROCEDURE — 96368 THER/DIAG CONCURRENT INF: CPT

## 2024-10-22 PROCEDURE — 25810000003 SODIUM CHLORIDE 0.9 % SOLUTION: Performed by: NURSE PRACTITIONER

## 2024-10-22 PROCEDURE — 25010000002 LEUCOVORIN 500 MG RECONSTITUTED SOLUTION 1 EACH VIAL: Performed by: NURSE PRACTITIONER

## 2024-10-22 PROCEDURE — 80053 COMPREHEN METABOLIC PANEL: CPT

## 2024-10-22 PROCEDURE — 25010000002 FLUOROURACIL PER 500 MG: Performed by: NURSE PRACTITIONER

## 2024-10-22 PROCEDURE — 25010000002 IRINOTECAN PER 20 MG: Performed by: NURSE PRACTITIONER

## 2024-10-22 PROCEDURE — G0498 CHEMO EXTEND IV INFUS W/PUMP: HCPCS

## 2024-10-22 PROCEDURE — 25810000003 SODIUM CHLORIDE 0.9 % SOLUTION 500 ML FLEX CONT: Performed by: NURSE PRACTITIONER

## 2024-10-22 PROCEDURE — 96417 CHEMO IV INFUS EACH ADDL SEQ: CPT

## 2024-10-22 PROCEDURE — 25010000002 DEXAMETHASONE PER 1 MG: Performed by: NURSE PRACTITIONER

## 2024-10-22 PROCEDURE — 85025 COMPLETE CBC W/AUTO DIFF WBC: CPT | Performed by: INTERNAL MEDICINE

## 2024-10-22 PROCEDURE — 25010000002 PALONOSETRON 0.25 MG/5ML SOLUTION PREFILLED SYRINGE: Performed by: NURSE PRACTITIONER

## 2024-10-22 PROCEDURE — 25010000002 LEUCOVORIN 200 MG RECONSTITUTED SOLUTION 1 EACH VIAL: Performed by: NURSE PRACTITIONER

## 2024-10-22 PROCEDURE — 25010000002 PANITUMUMAB 400 MG/20ML SOLUTION 20 ML VIAL: Performed by: NURSE PRACTITIONER

## 2024-10-22 PROCEDURE — 82948 REAGENT STRIP/BLOOD GLUCOSE: CPT

## 2024-10-22 PROCEDURE — 0 DEXTROSE 5 % SOLUTION 250 ML FLEX CONT: Performed by: NURSE PRACTITIONER

## 2024-10-22 PROCEDURE — 96375 TX/PRO/DX INJ NEW DRUG ADDON: CPT

## 2024-10-22 PROCEDURE — 96366 THER/PROPH/DIAG IV INF ADDON: CPT

## 2024-10-22 PROCEDURE — 96413 CHEMO IV INFUSION 1 HR: CPT

## 2024-10-22 PROCEDURE — 96415 CHEMO IV INFUSION ADDL HR: CPT

## 2024-10-22 PROCEDURE — 83735 ASSAY OF MAGNESIUM: CPT | Performed by: INTERNAL MEDICINE

## 2024-10-22 PROCEDURE — 25010000002 LEUCOVORIN CALCIUM PER 50 MG: Performed by: NURSE PRACTITIONER

## 2024-10-22 PROCEDURE — 25010000002 ATROPINE SULFATE 0.4 MG/ML SOLUTION 1 ML VIAL: Performed by: NURSE PRACTITIONER

## 2024-10-22 RX ORDER — PALONOSETRON 0.05 MG/ML
0.25 INJECTION, SOLUTION INTRAVENOUS ONCE
Status: COMPLETED | OUTPATIENT
Start: 2024-10-22 | End: 2024-10-22

## 2024-10-22 RX ORDER — DEXAMETHASONE SODIUM PHOSPHATE 4 MG/ML
6 INJECTION, SOLUTION INTRA-ARTICULAR; INTRALESIONAL; INTRAMUSCULAR; INTRAVENOUS; SOFT TISSUE ONCE
Status: COMPLETED | OUTPATIENT
Start: 2024-10-22 | End: 2024-10-22

## 2024-10-22 RX ORDER — SODIUM CHLORIDE 9 MG/ML
250 INJECTION, SOLUTION INTRAVENOUS ONCE
Status: CANCELLED | OUTPATIENT
Start: 2024-10-22

## 2024-10-22 RX ORDER — ATROPINE SULFATE 0.05 MG/ML
0.25 INJECTION INTRAVENOUS
Status: DISCONTINUED | OUTPATIENT
Start: 2024-10-22 | End: 2024-10-22

## 2024-10-22 RX ORDER — ATROPINE SULFATE 1 MG/ML
0.25 INJECTION, SOLUTION INTRAMUSCULAR; INTRAVENOUS; SUBCUTANEOUS
Status: CANCELLED | OUTPATIENT
Start: 2024-10-22

## 2024-10-22 RX ORDER — PALONOSETRON 0.05 MG/ML
0.25 INJECTION, SOLUTION INTRAVENOUS ONCE
Status: CANCELLED | OUTPATIENT
Start: 2024-10-22

## 2024-10-22 RX ORDER — DEXAMETHASONE SODIUM PHOSPHATE 4 MG/ML
6 INJECTION, SOLUTION INTRA-ARTICULAR; INTRALESIONAL; INTRAMUSCULAR; INTRAVENOUS; SOFT TISSUE ONCE
Status: CANCELLED | OUTPATIENT
Start: 2024-10-22 | End: 2024-10-22

## 2024-10-22 RX ORDER — POTASSIUM CHLORIDE 750 MG/1
20 TABLET, EXTENDED RELEASE ORAL DAILY
Qty: 6 TABLET | Refills: 0 | Status: SHIPPED | OUTPATIENT
Start: 2024-10-22 | End: 2024-10-25

## 2024-10-22 RX ORDER — SODIUM CHLORIDE 9 MG/ML
250 INJECTION, SOLUTION INTRAVENOUS ONCE
Status: COMPLETED | OUTPATIENT
Start: 2024-10-22 | End: 2024-10-22

## 2024-10-22 RX ADMIN — FLUOROURACIL 4850 MG: 50 INJECTION, SOLUTION INTRAVENOUS at 12:18

## 2024-10-22 RX ADMIN — PANITUMUMAB 480 MG: 400 SOLUTION INTRAVENOUS at 09:32

## 2024-10-22 RX ADMIN — DEXAMETHASONE SODIUM PHOSPHATE 6 MG: 4 INJECTION, SOLUTION INTRAMUSCULAR; INTRAVENOUS at 09:17

## 2024-10-22 RX ADMIN — IRINOTECAN HYDROCHLORIDE 290 MG: 20 INJECTION, SOLUTION INTRAVENOUS at 10:14

## 2024-10-22 RX ADMIN — LEUCOVORIN CALCIUM 800 MG: 500 INJECTION, POWDER, LYOPHILIZED, FOR SOLUTION INTRAMUSCULAR; INTRAVENOUS at 10:09

## 2024-10-22 RX ADMIN — SODIUM CHLORIDE 250 ML: 9 INJECTION, SOLUTION INTRAVENOUS at 09:09

## 2024-10-22 RX ADMIN — PALONOSETRON 0.25 MG: 0.25 INJECTION, SOLUTION INTRAVENOUS at 09:15

## 2024-10-22 NOTE — PROGRESS NOTES
Pt  is here for C12D1 Vectibix/Folfiri. This is his last planned treatment prior to his CT on 10/31 and follow up on 11/15. He has ongoing vectibix skin issues that are unchanged and controlled with current med and creams. He reports occasional diarrhea that is unchanged and controlled with prn immodium. He reports patito heels are sore and applying cream at home that relieves them. WBC 6.10, Hgb 12.5, Plts 143, ANC 3.67. RUSTY ok with exception of glucose 368 and K 3.2. He has already had his dex reduced to 6 mg with treatments, he just took his insulin injection. Reviewed with Karis ACEVEDO, NP and pt to proceed with treatment,  pt to have POC glucose recheck in 30 min,  pt to have Mag drawn (already processing) and pt to have repeat K check in one week,  and Karis sending in rx for KCL replacement.  reviewed with pt and v/u and pt scheduled accordingly. For 30 min Glucose pt resulted at 351, reviewed with Karis and to recheck prior to discharged,  redrawn at discharge and resulted at 368, pt states will take injection prior to eating,  reviewed with Karis and in agreement with plan, pt discharged and AVS provided

## 2024-10-24 ENCOUNTER — HOSPITAL ENCOUNTER (OUTPATIENT)
Dept: ONCOLOGY | Facility: HOSPITAL | Age: 79
Discharge: HOME | End: 2024-10-24
Admitting: NURSE PRACTITIONER
Payer: MEDICARE

## 2024-10-24 DIAGNOSIS — Z95.828 PORT-A-CATH IN PLACE: ICD-10-CM

## 2024-10-24 DIAGNOSIS — C78.7 METASTASES TO THE LIVER: Primary | ICD-10-CM

## 2024-10-24 DIAGNOSIS — C18.2 MALIGNANT NEOPLASM OF ASCENDING COLON: ICD-10-CM

## 2024-10-24 PROCEDURE — 25010000002 HEPARIN LOCK FLUSH PER 10 UNITS: Performed by: INTERNAL MEDICINE

## 2024-10-24 RX ORDER — HEPARIN SODIUM (PORCINE) LOCK FLUSH IV SOLN 100 UNIT/ML 100 UNIT/ML
500 SOLUTION INTRAVENOUS AS NEEDED
OUTPATIENT
Start: 2024-10-24

## 2024-10-24 RX ORDER — HEPARIN SODIUM (PORCINE) LOCK FLUSH IV SOLN 100 UNIT/ML 100 UNIT/ML
500 SOLUTION INTRAVENOUS AS NEEDED
Status: DISCONTINUED | OUTPATIENT
Start: 2024-10-24 | End: 2024-10-25 | Stop reason: HOSPADM

## 2024-10-24 RX ORDER — SODIUM CHLORIDE 0.9 % (FLUSH) 0.9 %
20 SYRINGE (ML) INJECTION AS NEEDED
Status: DISCONTINUED | OUTPATIENT
Start: 2024-10-24 | End: 2024-10-25 | Stop reason: HOSPADM

## 2024-10-24 RX ORDER — SODIUM CHLORIDE 0.9 % (FLUSH) 0.9 %
20 SYRINGE (ML) INJECTION AS NEEDED
OUTPATIENT
Start: 2024-10-24

## 2024-10-24 RX ADMIN — HEPARIN 500 UNITS: 100 SYRINGE at 13:11

## 2024-10-24 RX ADMIN — Medication 20 ML: at 13:10

## 2024-10-24 NOTE — PROGRESS NOTES
Pt to clinic for CIV d/c.  5FU pump empty. Port aspirated, positive blood return noted. Port flushed with 10mL NS and Heparin 500units, then de-accessed.  Pt discharged without issue. AVS given to pt prior to leaving.

## 2024-10-29 ENCOUNTER — TELEPHONE (OUTPATIENT)
Dept: ONCOLOGY | Facility: CLINIC | Age: 79
End: 2024-10-29
Payer: OTHER GOVERNMENT

## 2024-10-29 NOTE — TELEPHONE ENCOUNTER
Received a call from the pt stating that he would not be able to make it to his lab appt due to a fall. Pt denied injury, I advised him to be evaluated if he does develop symptoms. He verbalized understanding. Lab appt canceled. No further needs at this time.

## 2024-10-31 ENCOUNTER — HOSPITAL ENCOUNTER (OUTPATIENT)
Dept: PET IMAGING | Facility: HOSPITAL | Age: 79
Discharge: HOME | End: 2024-10-31
Payer: OTHER GOVERNMENT

## 2024-11-04 ENCOUNTER — LAB (OUTPATIENT)
Dept: LAB | Facility: HOSPITAL | Age: 79
End: 2024-11-04
Payer: MEDICARE

## 2024-11-04 DIAGNOSIS — E87.6 HYPOKALEMIA: ICD-10-CM

## 2024-11-04 LAB — POTASSIUM SERPL-SCNC: 4.2 MMOL/L (ref 3.5–5.2)

## 2024-11-04 PROCEDURE — 84132 ASSAY OF SERUM POTASSIUM: CPT | Performed by: NURSE PRACTITIONER

## 2024-11-04 PROCEDURE — 36415 COLL VENOUS BLD VENIPUNCTURE: CPT

## 2024-11-11 ENCOUNTER — APPOINTMENT (OUTPATIENT)
Dept: GENERAL RADIOLOGY | Facility: HOSPITAL | Age: 79
End: 2024-11-11
Payer: OTHER GOVERNMENT

## 2024-11-11 ENCOUNTER — HOSPITAL ENCOUNTER (INPATIENT)
Facility: HOSPITAL | Age: 79
LOS: 2 days | Discharge: NURSING, REHAB OR LONG-TERM CARE | End: 2024-11-14
Attending: EMERGENCY MEDICINE | Admitting: INTERNAL MEDICINE
Payer: OTHER GOVERNMENT

## 2024-11-11 ENCOUNTER — APPOINTMENT (OUTPATIENT)
Dept: CT IMAGING | Facility: HOSPITAL | Age: 79
End: 2024-11-11
Payer: OTHER GOVERNMENT

## 2024-11-11 DIAGNOSIS — R53.1 GENERALIZED WEAKNESS: ICD-10-CM

## 2024-11-11 DIAGNOSIS — R73.9 HYPERGLYCEMIA: ICD-10-CM

## 2024-11-11 DIAGNOSIS — E11.65 UNCONTROLLED TYPE 2 DIABETES MELLITUS WITH HYPERGLYCEMIA: ICD-10-CM

## 2024-11-11 DIAGNOSIS — I95.1 AUTONOMIC ORTHOSTATIC HYPOTENSION: ICD-10-CM

## 2024-11-11 DIAGNOSIS — I42.0 CARDIOMYOPATHY, DILATED: Chronic | ICD-10-CM

## 2024-11-11 DIAGNOSIS — I50.42 CHRONIC COMBINED SYSTOLIC AND DIASTOLIC CONGESTIVE HEART FAILURE: Primary | ICD-10-CM

## 2024-11-11 LAB
ACETONE BLD QL: NEGATIVE
ALBUMIN SERPL-MCNC: 3.5 G/DL (ref 3.5–5.2)
ALBUMIN/GLOB SERPL: 1.2 G/DL
ALP SERPL-CCNC: 124 U/L (ref 39–117)
ALT SERPL W P-5'-P-CCNC: 11 U/L (ref 1–41)
ANION GAP SERPL CALCULATED.3IONS-SCNC: 9.3 MMOL/L (ref 5–15)
ARTERIAL PATENCY WRIST A: POSITIVE
AST SERPL-CCNC: 12 U/L (ref 1–40)
B PARAPERT DNA SPEC QL NAA+PROBE: NOT DETECTED
B PERT DNA SPEC QL NAA+PROBE: NOT DETECTED
BACTERIA UR QL AUTO: NORMAL /HPF
BASE DEFICIT: 3.1 MEQ/LITER
BASE EXCESS BLDA CALC-SCNC: 2.7 MMOL/L (ref 0–3)
BASOPHILS # BLD AUTO: 0.03 10*3/MM3 (ref 0–0.2)
BASOPHILS NFR BLD AUTO: 0.4 % (ref 0–1.5)
BDY SITE: ABNORMAL
BILIRUB SERPL-MCNC: 0.6 MG/DL (ref 0–1.2)
BILIRUB UR QL STRIP: NEGATIVE
BUN SERPL-MCNC: 17 MG/DL (ref 8–23)
BUN/CREAT SERPL: 19.1 (ref 7–25)
C PNEUM DNA NPH QL NAA+NON-PROBE: NOT DETECTED
CA-I BLDA-SCNC: 1.22 MMOL/L (ref 1.15–1.33)
CALCIUM SPEC-SCNC: 8.9 MG/DL (ref 8.6–10.5)
CHLORIDE SERPL-SCNC: 102 MMOL/L (ref 98–107)
CLARITY UR: CLEAR
CO2 BLDA-SCNC: <1 MMOL/L (ref 22–29)
CO2 SERPL-SCNC: 24.7 MMOL/L (ref 22–29)
COLOR UR: YELLOW
CREAT BLDA-MCNC: 0.69 MG/DL (ref 0.6–1.3)
CREAT SERPL-MCNC: 0.89 MG/DL (ref 0.76–1.27)
D-LACTATE SERPL-SCNC: 1 MMOL/L (ref 0.2–2)
DEPRECATED RDW RBC AUTO: 52.6 FL (ref 37–54)
EGFRCR SERPLBLD CKD-EPI 2021: 88.3 ML/MIN/1.73
EGFRCR SERPLBLD CKD-EPI 2021: 95.3 ML/MIN/1.73
EOSINOPHIL # BLD AUTO: 0.06 10*3/MM3 (ref 0–0.4)
EOSINOPHIL NFR BLD AUTO: 0.8 % (ref 0.3–6.2)
ERYTHROCYTE [DISTWIDTH] IN BLOOD BY AUTOMATED COUNT: 15.1 % (ref 12.3–15.4)
FLUAV SUBTYP SPEC NAA+PROBE: NOT DETECTED
FLUBV RNA ISLT QL NAA+PROBE: NOT DETECTED
GLOBULIN UR ELPH-MCNC: 3 GM/DL
GLUCOSE BLDC GLUCOMTR-MCNC: 216 MG/DL (ref 70–105)
GLUCOSE BLDC GLUCOMTR-MCNC: 279 MG/DL (ref 70–105)
GLUCOSE BLDC GLUCOMTR-MCNC: 324 MG/DL (ref 70–105)
GLUCOSE BLDC GLUCOMTR-MCNC: 392 MG/DL (ref 70–105)
GLUCOSE BLDC GLUCOMTR-MCNC: 445 MG/DL (ref 74–100)
GLUCOSE BLDC GLUCOMTR-MCNC: 445 MG/DL (ref 74–100)
GLUCOSE SERPL-MCNC: 448 MG/DL (ref 65–99)
GLUCOSE UR STRIP-MCNC: ABNORMAL MG/DL
HADV DNA SPEC NAA+PROBE: NOT DETECTED
HCO3 MIXED: 27.5 MMOL/L (ref 21–29)
HCOV 229E RNA SPEC QL NAA+PROBE: NOT DETECTED
HCOV HKU1 RNA SPEC QL NAA+PROBE: NOT DETECTED
HCOV NL63 RNA SPEC QL NAA+PROBE: NOT DETECTED
HCOV OC43 RNA SPEC QL NAA+PROBE: NOT DETECTED
HCT VFR BLD AUTO: 42.2 % (ref 37.5–51)
HCT VFR BLDA CALC: 43 % (ref 38–51)
HEMODILUTION: NO
HGB BLD-MCNC: 13.8 G/DL (ref 13–17.7)
HGB BLDA-MCNC: 14.6 G/DL (ref 12–17)
HGB UR QL STRIP.AUTO: NEGATIVE
HMPV RNA NPH QL NAA+NON-PROBE: NOT DETECTED
HOLD SPECIMEN: NORMAL
HOLD SPECIMEN: NORMAL
HPIV1 RNA ISLT QL NAA+PROBE: NOT DETECTED
HPIV2 RNA SPEC QL NAA+PROBE: NOT DETECTED
HPIV3 RNA NPH QL NAA+PROBE: NOT DETECTED
HPIV4 P GENE NPH QL NAA+PROBE: NOT DETECTED
HYALINE CASTS UR QL AUTO: NORMAL /LPF
IMM GRANULOCYTES # BLD AUTO: 0.02 10*3/MM3 (ref 0–0.05)
IMM GRANULOCYTES NFR BLD AUTO: 0.3 % (ref 0–0.5)
INHALED O2 CONCENTRATION: 21 %
KETONES UR QL STRIP: ABNORMAL
LEUKOCYTE ESTERASE UR QL STRIP.AUTO: ABNORMAL
LIPASE SERPL-CCNC: 25 U/L (ref 13–60)
LYMPHOCYTES # BLD AUTO: 0.78 10*3/MM3 (ref 0.7–3.1)
LYMPHOCYTES NFR BLD AUTO: 11 % (ref 19.6–45.3)
Lab: ABNORMAL
M PNEUMO IGG SER IA-ACNC: NOT DETECTED
MAGNESIUM SERPL-MCNC: 1.6 MG/DL (ref 1.6–2.4)
MCH RBC QN AUTO: 30.5 PG (ref 26.6–33)
MCHC RBC AUTO-ENTMCNC: 32.7 G/DL (ref 31.5–35.7)
MCV RBC AUTO: 93.4 FL (ref 79–97)
MODALITY: ABNORMAL
MONOCYTES # BLD AUTO: 0.86 10*3/MM3 (ref 0.1–0.9)
MONOCYTES NFR BLD AUTO: 12.2 % (ref 5–12)
NEUTROPHILS NFR BLD AUTO: 5.31 10*3/MM3 (ref 1.7–7)
NEUTROPHILS NFR BLD AUTO: 75.3 % (ref 42.7–76)
NITRITE UR QL STRIP: NEGATIVE
NOTIFIED WHO: ABNORMAL
NRBC BLD AUTO-RTO: 0 /100 WBC (ref 0–0.2)
O2 SATURATION MIXED: 88.9 %
OSMOLALITY SERPL: 316 MOSM/KG (ref 280–301)
PCO2 MIXED: 41.9 MMHG (ref 35–51)
PH MIXED: 7.43 PH UNITS (ref 7.32–7.45)
PH UR STRIP.AUTO: 6 [PH] (ref 5–8)
PHOSPHATE SERPL-MCNC: 3.5 MG/DL (ref 2.5–4.5)
PLATELET # BLD AUTO: 189 10*3/MM3 (ref 140–450)
PMV BLD AUTO: 9.4 FL (ref 6–12)
PO2 MIXED: 55.3 MMHG
POTASSIUM BLDA-SCNC: 5 MMOL/L (ref 3.5–4.5)
POTASSIUM SERPL-SCNC: 4.8 MMOL/L (ref 3.5–5.2)
PROT SERPL-MCNC: 6.5 G/DL (ref 6–8.5)
PROT UR QL STRIP: ABNORMAL
QT INTERVAL: 445 MS
QTC INTERVAL: 504 MS
RBC # BLD AUTO: 4.52 10*6/MM3 (ref 4.14–5.8)
RBC # UR STRIP: NORMAL /HPF
READ BACK: ABNORMAL
REF LAB TEST METHOD: NORMAL
RHINOVIRUS RNA SPEC NAA+PROBE: NOT DETECTED
RSV RNA NPH QL NAA+NON-PROBE: NOT DETECTED
SARS-COV-2 RNA NPH QL NAA+NON-PROBE: NOT DETECTED
SODIUM BLD-SCNC: 138 MMOL/L (ref 138–146)
SODIUM SERPL-SCNC: 136 MMOL/L (ref 136–145)
SP GR UR STRIP: 1.03 (ref 1–1.03)
SQUAMOUS #/AREA URNS HPF: NORMAL /HPF
UROBILINOGEN UR QL STRIP: ABNORMAL
WBC # UR STRIP: NORMAL /HPF
WBC NRBC COR # BLD AUTO: 7.06 10*3/MM3 (ref 3.4–10.8)
WHOLE BLOOD HOLD COAG: NORMAL
WHOLE BLOOD HOLD SPECIMEN: NORMAL

## 2024-11-11 PROCEDURE — 82009 KETONE BODYS QUAL: CPT

## 2024-11-11 PROCEDURE — G0378 HOSPITAL OBSERVATION PER HR: HCPCS

## 2024-11-11 PROCEDURE — 82948 REAGENT STRIP/BLOOD GLUCOSE: CPT

## 2024-11-11 PROCEDURE — 82565 ASSAY OF CREATININE: CPT

## 2024-11-11 PROCEDURE — 82803 BLOOD GASES ANY COMBINATION: CPT

## 2024-11-11 PROCEDURE — 73080 X-RAY EXAM OF ELBOW: CPT

## 2024-11-11 PROCEDURE — 99285 EMERGENCY DEPT VISIT HI MDM: CPT

## 2024-11-11 PROCEDURE — 81001 URINALYSIS AUTO W/SCOPE: CPT | Performed by: EMERGENCY MEDICINE

## 2024-11-11 PROCEDURE — 83930 ASSAY OF BLOOD OSMOLALITY: CPT

## 2024-11-11 PROCEDURE — 83605 ASSAY OF LACTIC ACID: CPT

## 2024-11-11 PROCEDURE — 80053 COMPREHEN METABOLIC PANEL: CPT | Performed by: EMERGENCY MEDICINE

## 2024-11-11 PROCEDURE — 82330 ASSAY OF CALCIUM: CPT

## 2024-11-11 PROCEDURE — 71046 X-RAY EXAM CHEST 2 VIEWS: CPT

## 2024-11-11 PROCEDURE — 80051 ELECTROLYTE PANEL: CPT

## 2024-11-11 PROCEDURE — 70450 CT HEAD/BRAIN W/O DYE: CPT

## 2024-11-11 PROCEDURE — 82948 REAGENT STRIP/BLOOD GLUCOSE: CPT | Performed by: EMERGENCY MEDICINE

## 2024-11-11 PROCEDURE — 63710000001 INSULIN GLARGINE PER 5 UNITS

## 2024-11-11 PROCEDURE — 73090 X-RAY EXAM OF FOREARM: CPT

## 2024-11-11 PROCEDURE — 93005 ELECTROCARDIOGRAM TRACING: CPT

## 2024-11-11 PROCEDURE — 0202U NFCT DS 22 TRGT SARS-COV-2: CPT

## 2024-11-11 PROCEDURE — 36415 COLL VENOUS BLD VENIPUNCTURE: CPT

## 2024-11-11 PROCEDURE — 63710000001 INSULIN LISPRO (HUMAN) PER 5 UNITS

## 2024-11-11 PROCEDURE — 83735 ASSAY OF MAGNESIUM: CPT

## 2024-11-11 PROCEDURE — 84100 ASSAY OF PHOSPHORUS: CPT

## 2024-11-11 PROCEDURE — 85018 HEMOGLOBIN: CPT

## 2024-11-11 PROCEDURE — 83690 ASSAY OF LIPASE: CPT

## 2024-11-11 PROCEDURE — 85025 COMPLETE CBC W/AUTO DIFF WBC: CPT | Performed by: EMERGENCY MEDICINE

## 2024-11-11 PROCEDURE — 93010 ELECTROCARDIOGRAM REPORT: CPT | Performed by: INTERNAL MEDICINE

## 2024-11-11 RX ORDER — BISACODYL 10 MG
10 SUPPOSITORY, RECTAL RECTAL DAILY PRN
Status: DISCONTINUED | OUTPATIENT
Start: 2024-11-11 | End: 2024-11-14 | Stop reason: HOSPADM

## 2024-11-11 RX ORDER — AMOXICILLIN 250 MG
2 CAPSULE ORAL 2 TIMES DAILY PRN
Status: DISCONTINUED | OUTPATIENT
Start: 2024-11-11 | End: 2024-11-14 | Stop reason: HOSPADM

## 2024-11-11 RX ORDER — SODIUM CHLORIDE 0.9 % (FLUSH) 0.9 %
10 SYRINGE (ML) INJECTION AS NEEDED
Status: DISCONTINUED | OUTPATIENT
Start: 2024-11-11 | End: 2024-11-14 | Stop reason: HOSPADM

## 2024-11-11 RX ORDER — SODIUM CHLORIDE 0.9 % (FLUSH) 0.9 %
10 SYRINGE (ML) INJECTION EVERY 12 HOURS SCHEDULED
Status: DISCONTINUED | OUTPATIENT
Start: 2024-11-11 | End: 2024-11-14 | Stop reason: HOSPADM

## 2024-11-11 RX ORDER — BISACODYL 5 MG/1
5 TABLET, DELAYED RELEASE ORAL DAILY PRN
Status: DISCONTINUED | OUTPATIENT
Start: 2024-11-11 | End: 2024-11-14 | Stop reason: HOSPADM

## 2024-11-11 RX ORDER — ASPIRIN 81 MG/1
81 TABLET ORAL DAILY
COMMUNITY

## 2024-11-11 RX ORDER — ONDANSETRON 2 MG/ML
4 INJECTION INTRAMUSCULAR; INTRAVENOUS EVERY 6 HOURS PRN
Status: DISCONTINUED | OUTPATIENT
Start: 2024-11-11 | End: 2024-11-14 | Stop reason: HOSPADM

## 2024-11-11 RX ORDER — INSULIN LISPRO 100 [IU]/ML
5 INJECTION, SOLUTION INTRAVENOUS; SUBCUTANEOUS ONCE
Status: COMPLETED | OUTPATIENT
Start: 2024-11-11 | End: 2024-11-11

## 2024-11-11 RX ORDER — SODIUM CHLORIDE 9 MG/ML
40 INJECTION, SOLUTION INTRAVENOUS AS NEEDED
Status: DISCONTINUED | OUTPATIENT
Start: 2024-11-11 | End: 2024-11-14 | Stop reason: HOSPADM

## 2024-11-11 RX ORDER — POLYETHYLENE GLYCOL 3350 17 G/17G
17 POWDER, FOR SOLUTION ORAL DAILY PRN
Status: DISCONTINUED | OUTPATIENT
Start: 2024-11-11 | End: 2024-11-14 | Stop reason: HOSPADM

## 2024-11-11 RX ADMIN — INSULIN LISPRO 5 UNITS: 100 INJECTION, SOLUTION INTRAVENOUS; SUBCUTANEOUS at 17:06

## 2024-11-11 RX ADMIN — INSULIN GLARGINE 10 UNITS: 100 INJECTION, SOLUTION SUBCUTANEOUS at 21:57

## 2024-11-11 NOTE — Clinical Note
The DP pulses are detected w/ doppler bilaterally. The left PT pulse is non-palpable. The right PT pulse is detected w/ doppler.

## 2024-11-11 NOTE — ED PROVIDER NOTES
Subjective   History of Present Illness  79-year-old male with history of colon cancer type 2 diabetes, hypertension, hyperlipidemia presents the ED with complaints of hyperglycemia for several months along with generalized weakness that has been ongoing for the last 3 weeks.  Patient reports he also has a bump to the right arm that he noticed today however is unsure how long it has been there and denies no trauma to the area.  Patient states he has mild urinary frequency without dysuria this been going on for a week.  Intermittent diarrhea however had a normal bowel movement this morning.  Denies fever, numbness, tingling, cough, congestion, changes in vision, chest pain, shortness of breath, melena, hematochezia, nausea, vomiting, abdominal pain, recent falls, dizziness, headache.  States his glucose is usually around 500 as he believes it was supposed to be this high due to being on chemotherapy for his cancer, reports he does not see an endocrinologist.  States he did not take his insulin today as he had very little to eat.    PCP: Merly  Onc: Nia        Review of Systems   Constitutional:  Negative for fever.   HENT:  Negative for congestion.    Respiratory:  Negative for cough and shortness of breath.    Cardiovascular:  Negative for chest pain.   Gastrointestinal:  Negative for abdominal pain, diarrhea, nausea and vomiting.   Genitourinary:  Positive for frequency. Negative for dysuria.   Musculoskeletal:  Positive for arthralgias.   Neurological:  Positive for weakness (generalized). Negative for dizziness.       Past Medical History:   Diagnosis Date    Anemia     Colon cancer 2022    colon    Diabetes mellitus     Hyperlipidemia     Hypertension        Allergies   Allergen Reactions    Penicillins Rash       Past Surgical History:   Procedure Laterality Date    APPENDECTOMY      CARDIAC CATHETERIZATION      COLON RESECTION N/A 12/15/2022    Procedure: COLON RESECTION RIGHT;  Surgeon: Percy Davis  MD Dudley;  Location: [facility] MAIN OR;  Service: General;  Laterality: N/A;    COLONOSCOPY N/A 2022    Procedure: COLONOSCOPY WITH BIOPSY AND POLYPECTOMY;  Surgeon: Billy Julien MD;  Location: [facility] ENDOSCOPY;  Service: Gastroenterology;  Laterality: N/A;  Impression:  1.  Large 4-5cm fulgurating circumferential ulcerated mass in the very proximal part of the ascending colon next to ileocecal valve multiple biopsies were performed.  This is highly concerning for colon malignancy.  2.  2 polyp rem    ENDOSCOPY N/A 2022    Procedure: ESOPHAGOGASTRODUODENOSCOPY with biopsy X1;  Surgeon: Billy Julien MD;  Location: [facility] ENDOSCOPY;  Service: Gastroenterology;  Laterality: N/A;  5.  Upper endoscopy lamination unremarkable.       PORTACATH PLACEMENT Right 2023    Procedure: INSERTION OF PORTACATH;  Surgeon: Percy Davis MD;  Location: [facility] MAIN OR;  Service: General;  Laterality: Right;    TONSILLECTOMY         Family History   Problem Relation Age of Onset    Pneumonia Mother 94        SARS-CoV2    Colon cancer Father 62    Heart disease Sister        Social History     Socioeconomic History    Marital status:    Tobacco Use    Smoking status: Former     Current packs/day: 0.00     Average packs/day: 1 pack/day for 2.0 years (2.0 ttl pk-yrs)     Types: Cigarettes     Start date:      Quit date:      Years since quittin.9    Smokeless tobacco: Never   Vaping Use    Vaping status: Never Used   Substance and Sexual Activity    Alcohol use: Not Currently    Drug use: Never    Sexual activity: Defer           Objective   Physical Exam  Vitals reviewed.   HENT:      Head: Normocephalic.   Eyes:      Extraocular Movements: Extraocular movements intact.      Conjunctiva/sclera: Conjunctivae normal.      Pupils: Pupils are equal, round, and reactive to light.   Cardiovascular:      Rate and Rhythm: Normal rate and regular rhythm.      Pulses: Normal pulses.      Heart  "sounds: Normal heart sounds.   Pulmonary:      Effort: Pulmonary effort is normal.      Breath sounds: Normal breath sounds.   Abdominal:      General: Bowel sounds are normal.      Palpations: Abdomen is soft.      Tenderness: There is no abdominal tenderness.   Musculoskeletal:         General: Tenderness present. Normal range of motion.      Comments: There is mild tenderness to the posterior aspect of the lateral right forearm, there is soft tissue swelling noted however does not have significant redness, warmth, or abnormal discharge present.  Patient has full range of motion of the right arm and is distally neurovascularly intact   Skin:     General: Skin is warm and dry.   Neurological:      General: No focal deficit present.      Mental Status: He is alert and oriented to person, place, and time.   Psychiatric:         Mood and Affect: Mood normal.         Behavior: Behavior normal.         Procedures    EKG independently interpreted by Dr. Nguyễn and reviewed by myself as sinus rhythm with a left bundle branch block a rate of 77 as compared to previous from 4/26/2024 that was sinus rhythm with left bundle branch block at a rate of 81           ED Course  ED Course as of 11/11/24 2122 Mon Nov 11, 2024   1543 EKG requested [KB]   1544 ABG requested to respiratory [KB]   1646 Marked ready for CT [KB]   1647 Requested that lab add on the serum ketones to blood work already collected, they are adding it now [KB]   1657 Glucose(!!): 448  Humalog ordered [KB]   1725 Acetone: Negative [KB]   1947 Glucose(!): 279 [KB]   1948 pH, mixed: 7.43 [KB]      ED Course User Index  [KB] Sarah Roth, ANEUDY    /59   Pulse 79   Temp 97.8 °F (36.6 °C) (Oral)   Resp 14   Ht 182.9 cm (72\")   Wt 66 kg (145 lb 9.6 oz)   SpO2 92%   BMI 19.75 kg/m²   Labs Reviewed   COMPREHENSIVE METABOLIC PANEL - Abnormal; Notable for the following components:       Result Value    Glucose 448 (*)     Alkaline Phosphatase 124 (*)     " All other components within normal limits    Narrative:     GFR Normal >60  Chronic Kidney Disease <60  Kidney Failure <15    The GFR formula is only valid for adults with stable renal function between ages 18 and 70.   URINALYSIS W/ MICROSCOPIC IF INDICATED (NO CULTURE) - Abnormal; Notable for the following components:    Specific Gravity, UA 1.034 (*)     Glucose, UA >=1000 mg/dL (3+) (*)     Ketones, UA Trace (*)     Protein, UA Trace (*)     Leuk Esterase, UA Trace (*)     All other components within normal limits   CBC WITH AUTO DIFFERENTIAL - Abnormal; Notable for the following components:    Lymphocyte % 11.0 (*)     Monocyte % 12.2 (*)     All other components within normal limits   OSMOLALITY, SERUM - Abnormal; Notable for the following components:    Osmolality 316 (*)     All other components within normal limits   POCT GLUCOSE FINGERSTICK - Abnormal; Notable for the following components:    Glucose 324 (*)     All other components within normal limits   POCT GLUCOSE FINGERSTICK - Abnormal; Notable for the following components:    Glucose 392 (*)     All other components within normal limits   BLOOD GAS, MIXED - Abnormal; Notable for the following components:    PO2 MIXED 55.3 (*)     CO2 Content <1 (*)     All other components within normal limits   ELECTROLYTES + H&H - Abnormal; Notable for the following components:    POC Potassium 5.0 (*)     Glucose 445 (*)     All other components within normal limits   POCT GLUCOSE FINGERSTICK - Abnormal; Notable for the following components:    Glucose 445 (*)     All other components within normal limits   POCT GLUCOSE FINGERSTICK - Abnormal; Notable for the following components:    Glucose 279 (*)     All other components within normal limits   RESPIRATORY PANEL PCR W/ COVID-19 (SARS-COV-2), NP [facility], 2 HR TAT - Normal    Narrative:     In the setting of a positive respiratory panel with a viral infection PLUS a negative procalcitonin without other  underlying concern for bacterial infection, consider observing off antibiotics or discontinuation of antibiotics and continue supportive care. If the respiratory panel is positive for atypical bacterial infection (Bordetella pertussis, Chlamydophila pneumoniae, or Mycoplasma pneumoniae), consider antibiotic de-escalation to target atypical bacterial infection.   MAGNESIUM - Normal   PHOSPHORUS - Normal   LIPASE - Normal   ACETONE - Normal   POCT CREATININE - Normal   POC LACTATE - Normal   RAINBOW DRAW    Narrative:     The following orders were created for panel order [place] Draw.  Procedure                               Abnormality         Status                     ---------                               -----------         ------                     Green Top (Gel)[[id number]]                                  Final result               Lavender Top[[id number]]                                     Final result               Gold Top - SST[[id number]]                                   Final result               Light Blue Top[[id number]]                                   Final result                 Please view results for these tests on the individual orders.   BLOOD GAS, ARTERIAL   URINALYSIS, MICROSCOPIC ONLY   POCT GLUCOSE FINGERSTICK   POCT GLUCOSE FINGERSTICK   POCT GLUCOSE FINGERSTICK   POCT GLUCOSE FINGERSTICK   POCT GLUCOSE FINGERSTICK   POCT GLUCOSE FINGERSTICK   POCT GLUCOSE FINGERSTICK   POCT GLUCOSE FINGERSTICK   POCT GLUCOSE FINGERSTICK   CBC AND DIFFERENTIAL    Narrative:     The following orders were created for panel order CBC & Differential.  Procedure                               Abnormality         Status                     ---------                               -----------         ------                     CBC Auto Differential[[id number]]        Abnormal            Final result                 Please view results for these tests on the individual orders.   GREEN TOP   LAVENDER TOP   GOLD TOP -  SST   LIGHT BLUE TOP   KETONE BODIES SERUM    Narrative:     The following orders were created for panel order Ketone Bodies, Serum (Not performed at [facility]).  Procedure                               Abnormality         Status                     ---------                               -----------         ------                     Acetone[[id number]]                      Normal              Final result                 Please view results for these tests on the individual orders.     Medications   sodium chloride 0.9 % flush 10 mL (has no administration in time range)   insulin glargine (LANTUS, SEMGLEE) injection 10 Units (has no administration in time range)   sodium chloride 0.9 % flush 10 mL (has no administration in time range)   sodium chloride 0.9 % flush 10 mL (has no administration in time range)   sodium chloride 0.9 % infusion 40 mL (has no administration in time range)   ondansetron (ZOFRAN) injection 4 mg (has no administration in time range)   melatonin tablet 5 mg (has no administration in time range)   sennosides-docusate (PERICOLACE) 8.6-50 MG per tablet 2 tablet (has no administration in time range)     And   polyethylene glycol (MIRALAX) packet 17 g (has no administration in time range)     And   bisacodyl (DULCOLAX) EC tablet 5 mg (has no administration in time range)     And   bisacodyl (DULCOLAX) suppository 10 mg (has no administration in time range)   insulin lispro (HUMALOG/ADMELOG) injection 5 Units (5 Units Subcutaneous Given 11/11/24 1706)       CT Head Without Contrast    Result Date: 11/11/2024  Impression: No acute intracranial abnormality. Electronically Signed: Billy Ribeiro DO  11/11/2024 6:21 PM EST  Workstation ID: [id number]    XR Chest 2 View    Result Date: 11/11/2024  No radiographic findings of acute cardiopulmonary abnormality. Electronically Signed: Andrea Kirk  11/11/2024 5:54 PM EST  Workstation ID: [id number]    XR Forearm 2 View Right    Result Date:  11/11/2024  1.Focal soft tissue prominence at the posterior aspect of the proximal forearm. This could be related to olecranon bursitis, hematoma, soft tissue infection, or neoplasm. 2.No radiographic findings of acute osseous elbow or forearm abnormality. 3.Moderate elbow osteoarthritis. 4.Calcific atherosclerosis. Electronically Signed: Andrea Kirk  11/11/2024 5:43 PM EST  Workstation ID: [id number]    XR Elbow 3+ View Right    Result Date: 11/11/2024  1.Focal soft tissue prominence at the posterior aspect of the proximal forearm. This could be related to olecranon bursitis, hematoma, soft tissue infection, or neoplasm. 2.No radiographic findings of acute osseous elbow or forearm abnormality. 3.Moderate elbow osteoarthritis. 4.Calcific atherosclerosis. Electronically Signed: Andrea Kirk  11/11/2024 5:43 PM EST  Workstation ID: [id number]                   No data recorded                             Medical Decision Making  Patient seen for above complaints.  IV was established and blood work was obtained to assess electrolyte abnormalities, infection, possible HHS or DKA.  Acetone negative, serum osmolarity 316, lipase 25, phosphorus 3.5, mag 1.6, white blood cell count 7.06, hemoglobin 13.8, initial glucose 448, other electrolytes within normal limits.  ABG shows a pH of 7.43.  DKA and HHS felt less likely due to the above workup.  Respiratory panel negative.  Patient was given 5 units of subcu Humalog with a glucose of 279 on repeat.  UA obtained, this was negative for UTI.  Chest x-ray was obtained for possible pneumonia, this was independently interpreted by the radiologist as no radiographic findings of acute cardiopulmonary abnormality.  X-ray of the right forearm and elbow were obtained and independently interpreted by the radiologist as:  1.Focal soft tissue prominence at the posterior aspect of the proximal forearm. This could be related to olecranon bursitis, hematoma, soft tissue infection, or neoplasm.    2.No radiographic findings of acute osseous elbow or forearm abnormality.   3.Moderate elbow osteoarthritis.   4.Calcific atherosclerosis.     Bursitis felt likely as there is no significant redness, warmth or abnormal discharge present to the area.  Head CT obtained and independently interpreted by the radiologist as no acute intercranial abnormalities.  Due to the generalized weakness and hyperglycemia, patient be placed in observation unit for endocrinology follow-up and physical therapy follow-up along with repeat blood work in the morning.  Discussed plan with patient who verbalized understanding was agreeable plan of care at this time.  Patient to be placed observation unit.  Discussed with Dr. Nguyễn who agrees with plan of care.    Problems Addressed:  Generalized weakness: acute illness or injury  Hyperglycemia: acute illness or injury    Amount and/or Complexity of Data Reviewed  Labs: ordered. Decision-making details documented in ED Course.  Radiology: ordered and independent interpretation performed. Decision-making details documented in ED Course.  ECG/medicine tests: ordered and independent interpretation performed. Decision-making details documented in ED Course.    Risk  OTC drugs.  Prescription drug management.  Decision regarding hospitalization.        Final diagnoses:   Hyperglycemia   Generalized weakness       ED Disposition  ED Disposition       ED Disposition   Decision to Admit    Condition   --    Comment   --               No follow-up provider specified.       Medication List      No changes were made to your prescriptions during this visit.            Sarah Roth, APRN  11/11/24 1573

## 2024-11-12 LAB
ANION GAP SERPL CALCULATED.3IONS-SCNC: 10.7 MMOL/L (ref 5–15)
BASOPHILS # BLD AUTO: 0.04 10*3/MM3 (ref 0–0.2)
BASOPHILS NFR BLD AUTO: 0.4 % (ref 0–1.5)
BUN SERPL-MCNC: 16 MG/DL (ref 8–23)
BUN/CREAT SERPL: 18.2 (ref 7–25)
CALCIUM SPEC-SCNC: 9 MG/DL (ref 8.6–10.5)
CHLORIDE SERPL-SCNC: 104 MMOL/L (ref 98–107)
CO2 SERPL-SCNC: 25.3 MMOL/L (ref 22–29)
CREAT SERPL-MCNC: 0.88 MG/DL (ref 0.76–1.27)
DEPRECATED RDW RBC AUTO: 55 FL (ref 37–54)
EGFRCR SERPLBLD CKD-EPI 2021: 88.6 ML/MIN/1.73
EOSINOPHIL # BLD AUTO: 0.01 10*3/MM3 (ref 0–0.4)
EOSINOPHIL NFR BLD AUTO: 0.1 % (ref 0.3–6.2)
ERYTHROCYTE [DISTWIDTH] IN BLOOD BY AUTOMATED COUNT: 15.3 % (ref 12.3–15.4)
GLUCOSE BLDC GLUCOMTR-MCNC: 148 MG/DL (ref 70–105)
GLUCOSE BLDC GLUCOMTR-MCNC: 190 MG/DL (ref 70–105)
GLUCOSE BLDC GLUCOMTR-MCNC: 216 MG/DL (ref 70–105)
GLUCOSE BLDC GLUCOMTR-MCNC: 243 MG/DL (ref 70–105)
GLUCOSE BLDC GLUCOMTR-MCNC: 92 MG/DL (ref 70–105)
GLUCOSE SERPL-MCNC: 237 MG/DL (ref 65–99)
HBA1C MFR BLD: 11.8 % (ref 4.8–5.6)
HCT VFR BLD AUTO: 47.1 % (ref 37.5–51)
HGB BLD-MCNC: 14.8 G/DL (ref 13–17.7)
IMM GRANULOCYTES # BLD AUTO: 0.03 10*3/MM3 (ref 0–0.05)
IMM GRANULOCYTES NFR BLD AUTO: 0.3 % (ref 0–0.5)
LYMPHOCYTES # BLD AUTO: 1.22 10*3/MM3 (ref 0.7–3.1)
LYMPHOCYTES NFR BLD AUTO: 13.7 % (ref 19.6–45.3)
MCH RBC QN AUTO: 30.7 PG (ref 26.6–33)
MCHC RBC AUTO-ENTMCNC: 31.4 G/DL (ref 31.5–35.7)
MCV RBC AUTO: 97.7 FL (ref 79–97)
MONOCYTES # BLD AUTO: 0.92 10*3/MM3 (ref 0.1–0.9)
MONOCYTES NFR BLD AUTO: 10.3 % (ref 5–12)
NEUTROPHILS NFR BLD AUTO: 6.68 10*3/MM3 (ref 1.7–7)
NEUTROPHILS NFR BLD AUTO: 75.2 % (ref 42.7–76)
NRBC BLD AUTO-RTO: 0 /100 WBC (ref 0–0.2)
NT-PROBNP SERPL-MCNC: 3231 PG/ML (ref 0–1800)
PLATELET # BLD AUTO: 180 10*3/MM3 (ref 140–450)
PMV BLD AUTO: 10 FL (ref 6–12)
POTASSIUM SERPL-SCNC: 5.1 MMOL/L (ref 3.5–5.2)
RBC # BLD AUTO: 4.82 10*6/MM3 (ref 4.14–5.8)
SODIUM SERPL-SCNC: 140 MMOL/L (ref 136–145)
WBC NRBC COR # BLD AUTO: 8.9 10*3/MM3 (ref 3.4–10.8)

## 2024-11-12 PROCEDURE — 82948 REAGENT STRIP/BLOOD GLUCOSE: CPT

## 2024-11-12 PROCEDURE — 63710000001 INSULIN LISPRO (HUMAN) PER 5 UNITS: Performed by: NURSE PRACTITIONER

## 2024-11-12 PROCEDURE — 83036 HEMOGLOBIN GLYCOSYLATED A1C: CPT | Performed by: NURSE PRACTITIONER

## 2024-11-12 PROCEDURE — 97162 PT EVAL MOD COMPLEX 30 MIN: CPT

## 2024-11-12 PROCEDURE — 93005 ELECTROCARDIOGRAM TRACING: CPT | Performed by: NURSE PRACTITIONER

## 2024-11-12 PROCEDURE — 85025 COMPLETE CBC W/AUTO DIFF WBC: CPT

## 2024-11-12 PROCEDURE — 25810000003 SODIUM CHLORIDE 0.9 % SOLUTION: Performed by: NURSE PRACTITIONER

## 2024-11-12 PROCEDURE — 83880 ASSAY OF NATRIURETIC PEPTIDE: CPT | Performed by: NURSE PRACTITIONER

## 2024-11-12 PROCEDURE — 99222 1ST HOSP IP/OBS MODERATE 55: CPT | Performed by: INTERNAL MEDICINE

## 2024-11-12 PROCEDURE — 97166 OT EVAL MOD COMPLEX 45 MIN: CPT

## 2024-11-12 PROCEDURE — 82948 REAGENT STRIP/BLOOD GLUCOSE: CPT | Performed by: NURSE PRACTITIONER

## 2024-11-12 PROCEDURE — 80048 BASIC METABOLIC PNL TOTAL CA: CPT

## 2024-11-12 PROCEDURE — 93010 ELECTROCARDIOGRAM REPORT: CPT | Performed by: INTERNAL MEDICINE

## 2024-11-12 PROCEDURE — 63710000001 INSULIN GLARGINE PER 5 UNITS: Performed by: INTERNAL MEDICINE

## 2024-11-12 PROCEDURE — 63710000001 INSULIN LISPRO (HUMAN) PER 5 UNITS: Performed by: INTERNAL MEDICINE

## 2024-11-12 RX ORDER — INSULIN LISPRO 100 [IU]/ML
4 INJECTION, SOLUTION INTRAVENOUS; SUBCUTANEOUS
Status: DISCONTINUED | OUTPATIENT
Start: 2024-11-12 | End: 2024-11-13

## 2024-11-12 RX ORDER — IBUPROFEN 600 MG/1
1 TABLET ORAL
Status: DISCONTINUED | OUTPATIENT
Start: 2024-11-12 | End: 2024-11-14 | Stop reason: HOSPADM

## 2024-11-12 RX ORDER — ACETAMINOPHEN 325 MG/1
650 TABLET ORAL EVERY 6 HOURS PRN
Status: DISCONTINUED | OUTPATIENT
Start: 2024-11-12 | End: 2024-11-14 | Stop reason: HOSPADM

## 2024-11-12 RX ORDER — INSULIN GLARGINE 100 [IU]/ML
20 INJECTION, SOLUTION SUBCUTANEOUS NIGHTLY
Status: DISCONTINUED | OUTPATIENT
Start: 2024-11-12 | End: 2024-11-12

## 2024-11-12 RX ORDER — INSULIN GLARGINE 100 [IU]/ML
12 INJECTION, SOLUTION SUBCUTANEOUS NIGHTLY
Status: DISCONTINUED | OUTPATIENT
Start: 2024-11-12 | End: 2024-11-13

## 2024-11-12 RX ORDER — INSULIN LISPRO 100 [IU]/ML
7 INJECTION, SOLUTION INTRAVENOUS; SUBCUTANEOUS
Status: DISCONTINUED | OUTPATIENT
Start: 2024-11-12 | End: 2024-11-12

## 2024-11-12 RX ORDER — DEXTROSE MONOHYDRATE 25 G/50ML
25 INJECTION, SOLUTION INTRAVENOUS
Status: DISCONTINUED | OUTPATIENT
Start: 2024-11-12 | End: 2024-11-14 | Stop reason: HOSPADM

## 2024-11-12 RX ORDER — INSULIN LISPRO 100 [IU]/ML
2-7 INJECTION, SOLUTION INTRAVENOUS; SUBCUTANEOUS
Status: DISCONTINUED | OUTPATIENT
Start: 2024-11-12 | End: 2024-11-14

## 2024-11-12 RX ORDER — NICOTINE POLACRILEX 4 MG
15 LOZENGE BUCCAL
Status: DISCONTINUED | OUTPATIENT
Start: 2024-11-12 | End: 2024-11-14 | Stop reason: HOSPADM

## 2024-11-12 RX ORDER — INSULIN LISPRO 100 [IU]/ML
2-9 INJECTION, SOLUTION INTRAVENOUS; SUBCUTANEOUS
Status: DISCONTINUED | OUTPATIENT
Start: 2024-11-12 | End: 2024-11-12

## 2024-11-12 RX ORDER — NITROGLYCERIN 0.4 MG/1
0.4 TABLET SUBLINGUAL
Status: DISCONTINUED | OUTPATIENT
Start: 2024-11-12 | End: 2024-11-14 | Stop reason: HOSPADM

## 2024-11-12 RX ORDER — ASPIRIN 81 MG/1
81 TABLET ORAL DAILY
Status: DISCONTINUED | OUTPATIENT
Start: 2024-11-12 | End: 2024-11-14 | Stop reason: HOSPADM

## 2024-11-12 RX ADMIN — INSULIN GLARGINE 12 UNITS: 100 INJECTION, SOLUTION SUBCUTANEOUS at 20:59

## 2024-11-12 RX ADMIN — Medication 10 ML: at 21:00

## 2024-11-12 RX ADMIN — INSULIN LISPRO 7 UNITS: 100 INJECTION, SOLUTION INTRAVENOUS; SUBCUTANEOUS at 08:45

## 2024-11-12 RX ADMIN — SODIUM CHLORIDE 250 ML: 9 INJECTION, SOLUTION INTRAVENOUS at 11:08

## 2024-11-12 RX ADMIN — ASPIRIN 81 MG: 81 TABLET, COATED ORAL at 08:52

## 2024-11-12 RX ADMIN — INSULIN LISPRO 4 UNITS: 100 INJECTION, SOLUTION INTRAVENOUS; SUBCUTANEOUS at 17:54

## 2024-11-12 RX ADMIN — INSULIN LISPRO 2 UNITS: 100 INJECTION, SOLUTION INTRAVENOUS; SUBCUTANEOUS at 08:44

## 2024-11-12 RX ADMIN — INSULIN LISPRO 3 UNITS: 100 INJECTION, SOLUTION INTRAVENOUS; SUBCUTANEOUS at 17:53

## 2024-11-12 RX ADMIN — Medication 10 ML: at 08:52

## 2024-11-12 NOTE — PLAN OF CARE
Goal Outcome Evaluation:  Plan of Care Reviewed With: patient           Outcome Evaluation: 80 y/o M who presented with generalized weakness. PMH of colon cancer, type 2 diabetes, hypertension. Recent chemo per report but not undergoing active chemotherapy treatment, reporting to OT that he has completed treatment. Patient lives in [place] and states he is interested in moving to long term care. Pt states he drives, ambulates with RW in community and no AD in his apartment. This date, pt feels very fatigued, grossly weak. Min A provided for supine to sit EOB. Pt noted to drop from 126/99 to 72/54 in seated position. Gentle exercises performed in attempt to increase BP. Pt also completed lower body dressing with CGA. BP did not rise, thus pt assisted back to supine. Pt is far from baseline level at this time, OT feels he would benefit from SNF prior to return to [place] or transition to LTC pending pt choice. Will follow.    Anticipated Discharge Disposition (OT): skilled nursing facility

## 2024-11-12 NOTE — THERAPY EVALUATION
Patient Name: Vlad Guerrero  : 1945    MRN: [id number]                              Today's Date: 2024       Admit Date: 2024    Visit Dx:     ICD-10-CM ICD-9-CM   1. Hyperglycemia  R73.9 790.29   2. 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    Cellulitis    Right hand pain    Ataxia    Port-A-Cath in place    Unsteady gait    Hyperglycemia     Past Medical History:   Diagnosis Date    Anemia     Colon cancer     colon    Diabetes mellitus     Hyperlipidemia     Hypertension      Past Surgical History:   Procedure Laterality Date    APPENDECTOMY      CARDIAC CATHETERIZATION      COLON RESECTION N/A 12/15/2022    Procedure: COLON RESECTION RIGHT;  Surgeon: Percy Davis MD;  Location: [facility] MAIN OR;  Service: General;  Laterality: N/A;    COLONOSCOPY N/A 2022    Procedure: COLONOSCOPY WITH BIOPSY AND POLYPECTOMY;  Surgeon: Billy Julien MD;  Location: [facility] ENDOSCOPY;  Service: Gastroenterology;  Laterality: N/A;  Impression:  1.  Large 4-5cm fulgurating circumferential ulcerated mass in the very proximal part of the ascending colon next to ileocecal valve multiple biopsies were performed.  This is highly concerning for colon malignancy.  2.  2 polyp rem    ENDOSCOPY N/A 2022    Procedure: ESOPHAGOGASTRODUODENOSCOPY with biopsy X1;  Surgeon: Billy Julien MD;  Location: [facility] ENDOSCOPY;  Service: Gastroenterology;  Laterality: N/A;  5.  Upper endoscopy lamination unremarkable.       PORTACATH PLACEMENT Right 2023    Procedure: INSERTION OF PORTACATH;  Surgeon: Percy Davis MD;  Location: [facility] MAIN OR;  Service: General;  Laterality: Right;    TONSILLECTOMY         General Information       Row Name 11/12/24 1602          OT Time and Intention    Document Type evaluation  -LS     Mode of Treatment occupational therapy  -LS     Patient Effort good  -LS       Row Name 11/12/24 1602          General Information    Patient Profile Reviewed yes  -LS     Prior Level of Function independent:;ADL's;driving;all household mobility  -LS     Existing Precautions/Restrictions orthostatic hypotension;fall  -LS     Barriers to Rehab medically complex  -LS       Row Name 11/12/24 1602          Living Environment    People in Home facility resident  senior care  -LS       Row Name 11/12/24 1602          Cognition    Orientation Status (Cognition) oriented x 4  -LS       Row Name 11/12/24 1602          Safety Issues/Impairments Affecting Functional Mobility    Impairments Affecting Function (Mobility) endurance/activity tolerance  -LS               User Key  (r) = Recorded By, (t) = Taken By, (c) = Cosigned By      Initials Name Provider Type    LS Brandon Nicole, OT Occupational Therapist                     Mobility/ADL's       Row Name 11/12/24 1603          Bed Mobility    Bed Mobility bed mobility (all) activities  -     All Activities, [place] (Bed Mobility) minimum assist (75% patient effort)  -LS       Row Name 11/12/24 1603          Sit-Stand Transfer    Comment, (Sit-Stand Transfer) Unable to attempt due to orthostatic hypotension in sitting  -LS       Row Name 11/12/24 1603          Functional Mobility    Patient was able to Ambulate no, other medical factors prevent ambulation  -LS     Reason Patient was unable to Ambulate Hypotension  -LS       Row Name 11/12/24 1603          Activities of Daily Living    BADL Assessment/Intervention lower body dressing  -       Row Name 11/12/24 1603          Lower Body Dressing Assessment/Training    [place] Level (Lower Body Dressing) contact guard assist  -LS               User Key  (r) = Recorded By, (t) = Taken By, (c) = Cosigned By       Initials Name Provider Type    KEM Brandon Nicole OT Occupational Therapist                   Obj/Interventions       Row Name 11/12/24 1605          Sensory Assessment (Somatosensory)    Sensory Assessment (Somatosensory) UE sensation intact  -[facility] Name 11/12/24 1605          Vision Assessment/Intervention    Visual Impairment/Limitations corrective lenses for reading  -[facility] Name 11/12/24 1605          Range of Motion Comprehensive    General Range of Motion no range of motion deficits identified  -LS       Row Name 11/12/24 1605          Strength Comprehensive (MMT)    Comment, General Manual Muscle Testing (MMT) Assessment BUE grossly 3/5  -       Row Name 11/12/24 1605          Balance    Balance Assessment sitting static balance;sitting dynamic balance  -LS     Static Sitting Balance standby assist  -LS     Dynamic Sitting Balance standby assist  -LS               User Key  (r) = Recorded By, (t) = Taken By, (c) = Cosigned By      Initials Name Provider Type    LS Brandon Nicole OT Occupational Therapist                   Goals/Plan       Row Name 11/12/24 1611          Bed Mobility Goal 1 (OT)    Activity/Assistive Device (Bed Mobility Goal 1, OT) bed mobility activities, all  -LS     [place] Level/Cues Needed (Bed Mobility Goal 1, OT) modified independence  -LS     Time Frame (Bed Mobility Goal 1, OT) long term goal (LTG);2 weeks  -[facility] Name 11/12/24 1611          Transfer Goal 1 (OT)    Activity/Assistive Device (Transfer Goal 1, OT) transfers, all  -LS     [place] Level/Cues Needed (Transfer Goal 1, OT) modified independence  -LS     Time Frame (Transfer Goal 1, OT) long term goal (LTG);2 weeks  -LS       Row Name 11/12/24 1611          Dressing Goal 1 (OT)    Activity/Device (Dressing Goal 1, OT) dressing skills, all  -LS     [place]/Cues Needed (Dressing Goal 1, OT) modified independence  -LS     Time Frame (Dressing Goal 1, OT) long term goal (LTG);2 weeks  -        Row Name 11/12/24 1611          Toileting Goal 1 (OT)    Activity/Device (Toileting Goal 1, OT) toileting skills, all  -LS     [place] Level/Cues Needed (Toileting Goal 1, OT) modified independence  -LS     Time Frame (Toileting Goal 1, OT) long term goal (LTG);2 weeks  -LS       Row Name 11/12/24 1611          Therapy Assessment/Plan (OT)    Planned Therapy Interventions (OT) activity tolerance training;BADL retraining;functional balance retraining;IADL retraining;occupation/activity based interventions;ROM/therapeutic exercise;strengthening exercise;transfer/mobility retraining;patient/caregiver education/training  -LS               User Key  (r) = Recorded By, (t) = Taken By, (c) = Cosigned By      Initials Name Provider Type    LS Brandon Nicole, CELE Occupational Therapist                   Clinical Impression       Row Name 11/12/24 1605          Pain Assessment    Pretreatment Pain Rating 0/10 - no pain  -LS     Posttreatment Pain Rating 0/10 - no pain  -LS       Row Name 11/12/24 1605          Plan of Care Review    Plan of Care Reviewed With patient  -LS     Outcome Evaluation 78 y/o M who presented with generalized weakness. PMH of colon cancer, type 2 diabetes, hypertension. Recent chemo per report but not undergoing active chemotherapy treatment, reporting to OT that he has completed treatment. Patient lives in [facility] and states he is interested in moving to long term care. Pt states he drives, ambulates with RW in community and no AD in his apartment. This date, pt feels very fatigued, grossly weak. Min A provided for supine to sit EOB. Pt noted to drop from 126/99 to 72/54 in seated position. Gentle exercises performed in attempt to increase BP. Pt also completed lower body dressing with CGA. BP did not rise, thus pt assisted back to supine. Pt is far from baseline level at this time, OT feels he would benefit from SNF prior to return to [facility] or transition to LTC pending pt choice. Will follow.  -LS        Row Name 11/12/24 1605          Therapy Assessment/Plan (OT)    Rehab Potential (OT) good  -LS     Criteria for Skilled Therapeutic Interventions Met (OT) yes;skilled treatment is necessary  -LS     Therapy Frequency (OT) 3 times/wk  -LS     Predicted Duration of Therapy Intervention (OT) until dc  -LS       Row Name 11/12/24 1605          Therapy Plan Review/Discharge Plan (OT)    Anticipated Discharge Disposition (OT) skilled nursing facility  -       Row Name 11/12/24 1605          Vital Signs    Pre Systolic BP Rehab 126  -LS     Pre Treatment Diastolic BP 99  -LS     Intra Systolic BP Rehab 72  -LS     Intra Treatment Diastolic BP 54  -LS     O2 Delivery Pre Treatment room air  -LS     O2 Delivery Intra Treatment room air  -LS     O2 Delivery Post Treatment room air  -LS     Pre Patient Position Supine  -LS     Intra Patient Position Standing  -LS       Row Name 11/12/24 1605          Positioning and Restraints    Pre-Treatment Position in bed  -LS     Post Treatment Position bed  -LS     In Bed notified nsg;fowlers;call light within reach;encouraged to call for assist;exit alarm on  -LS               User Key  (r) = Recorded By, (t) = Taken By, (c) = Cosigned By      Initials Name Provider Type    LS Brandon Nicole, CELE Occupational Therapist                   Outcome Measures       Row Name 11/12/24 1611          How much help from another is currently needed...    Putting on and taking off regular lower body clothing? 3  -LS     Bathing (including washing, rinsing, and drying) 3  -LS     Toileting (which includes using toilet bed pan or urinal) 3  -LS     Putting on and taking off regular upper body clothing 3  -LS     Taking care of personal grooming (such as brushing teeth) 4  -LS     Eating meals 4  -LS     AM-PAC 6 Clicks Score (OT) 20  -LS       Row Name 11/12/24 0800          How much help from another person do you currently need...    Turning from your back to your side while in flat bed  without using bedrails? 4  -KH     Moving from lying on back to sitting on the side of a flat bed without bedrails? 4  -KH     Moving to and from a bed to a chair (including a wheelchair)? 3  -KH     Standing up from a chair using your arms (e.g., wheelchair, bedside chair)? 3  -KH     Climbing 3-5 steps with a railing? 3  -KH     To walk in hospital room? 3  -KH     AM-PAC 6 Clicks Score (PT) 20  -KH       Row Name 11/12/24 1611          Functional Assessment    Outcome Measure Options AM-PAC 6 Clicks Daily Activity (OT)  -               User Key  (r) = Recorded By, (t) = Taken By, (c) = Cosigned By      Initials Name Provider Type    Wilma Zee, RN Registered Nurse    Brandon Rain OT Occupational Therapist                    Occupational Therapy Education       Title: PT OT SLP Therapies (Done)       Topic: Occupational Therapy (Done)       Point: ADL training (Done)       Description:   Instruct learner(s) on proper safety adaptation and remediation techniques during self care or transfers.   Instruct in proper use of assistive devices.                  Learning Progress Summary            Patient Acceptance, E,TB, VU by  at 11/12/2024 1612                      Point: Precautions (Done)       Description:   Instruct learner(s) on prescribed precautions during self-care and functional transfers.                  Learning Progress Summary            Patient Acceptance, E,TB, VU by  at 11/12/2024 1612                      Point: Body mechanics (Done)       Description:   Instruct learner(s) on proper positioning and spine alignment during self-care, functional mobility activities and/or exercises.                  Learning Progress Summary            Patient Acceptance, E,TB, VU by  at 11/12/2024 1612                                      User Key       Initials Effective Dates Name Provider Type [facility] 09/22/22 -  Brandon Nicole OT Occupational Therapist OT                  OT  Recommendation and Plan  Planned Therapy Interventions (OT): activity tolerance training, BADL retraining, functional balance retraining, IADL retraining, occupation/activity based interventions, ROM/therapeutic exercise, strengthening exercise, transfer/mobility retraining, patient/caregiver education/training  Therapy Frequency (OT): 3 times/wk  Plan of Care Review  Plan of Care Reviewed With: patient  Outcome Evaluation: 78 y/o M who presented with generalized weakness. PMH of colon cancer, type 2 diabetes, hypertension. Recent chemo per report but not undergoing active chemotherapy treatment, reporting to OT that he has completed treatment. Patient lives in [facility] and states he is interested in moving to long term care. Pt states he drives, ambulates with RW in community and no AD in his apartment. This date, pt feels very fatigued, grossly weak. Min A provided for supine to sit EOB. Pt noted to drop from 126/99 to 72/54 in seated position. Gentle exercises performed in attempt to increase BP. Pt also completed lower body dressing with CGA. BP did not rise, thus pt assisted back to supine. Pt is far from baseline level at this time, OT feels he would benefit from SNF prior to return to [facility] or transition to LTC pending pt choice. Will follow.     Time Calculation:         Time Calculation- OT       Row Name 11/12/24 1612             Time Calculation- OT    OT Start Time 1123  -LS      OT Stop Time 1144  -LS      OT Time Calculation (min) 21 min  -LS      OT Received On 11/12/24  -LS      OT - Next Appointment 11/14/24  -      OT Goal Re-Cert Due Date 11/26/24  -         Untimed Charges    OT Eval/Re-eval Minutes 21  -LS         Total Minutes    Untimed Charges Total Minutes 21  -LS       Total Minutes 21  -LS                User Key  (r) = Recorded By, (t) = Taken By, (c) = Cosigned By      Initials Name Provider Type    Brandon Rain OT Occupational Therapist                  Therapy Charges for Today        Code Description Service Date Service Provider Modifiers Qty    [id number] HC OT EVAL MOD COMPLEXITY 4 11/12/2024 Brandon Nicole OT GO 1                 Brandon Nicole OT  11/12/2024

## 2024-11-12 NOTE — CONSULTS
Cardiology Consult Note    Patient Identification:  Name: Vlad Guerrero  Age: 79 y.o.  Sex: male  :  1945  MRN: [id number]             Requesting Physician :  Praveen Nguyễn MD     Reason for Consultation / Chief Complaint :   Orthostatic hypotension and tachycardia    History of Present Illness:        Mr. Vlad Guerrero has PMH of     PFO  Left bundle branch block  Diastolic function  Left atrial enlargement  Hypertension  History of TIA , left frontal CVA 2024  Metastatic colon cancer with possible liver mets  Insulin-dependent diabetes        Presented 2024 with weakness and hyperglycemia.  Patient was undergoing chemotherapy for his liver mets.  Patient was found to be tachycardic and hypotensive and orthostatic.  Sugars were in the 500.  Patient's recent A1c was 11.80 on 2024.  Patient denies any chest pain or shortness of breath.    Data:  2024: proBNP is 3231.  Glucose elevated.  CT head 2024 no acute abnormality.  EKG done 2024 reviewed/interpreted by me reveals sinus rhythm with first-degree AV block at the rate of 79 bpm with left bundle branch block        Assessment:  :     Orthostatic hypotension  Type 2 diabetes, poorly controlled  PFO  Left bundle branch block  Diastolic dysfunction  Left atrial enlargement  Acute left frontal stroke.  Metastatic colon cancer  History of previous TIA  Carotid disease        Recommendations / Plan:         EKG is revealing left bundle branch block  Echocardiogram is revealing normal LV systolic function with diastolic dysfunction and left atrial enlargement and PFO.  Will recheck echo.  Patient is orthostatic.  Patient's orthostasis is probably due to autonomic insufficiency.  Will consult endocrinology.  Will follow and consider further evaluation treatment                  Diagnosis Plan   1. Chronic combined systolic and diastolic congestive heart failure  Case Request Cath Lab: Left Heart Cath, possible pci     Case Request Cath Lab: Left Heart Cath, possible pci    Cardiac Catheterization/Vascular Study    Cardiac Catheterization/Vascular Study      2. Hyperglycemia        3. Generalized weakness        4. Autonomic orthostatic hypotension  Case Request Cath Lab: Left Heart Cath, possible pci    Case Request Cath Lab: Left Heart Cath, possible pci    Cardiac Catheterization/Vascular Study    Cardiac Catheterization/Vascular Study      5. Uncontrolled type 2 diabetes mellitus with hyperglycemia  Case Request Cath Lab: Left Heart Cath, possible pci    Case Request Cath Lab: Left Heart Cath, possible pci    Cardiac Catheterization/Vascular Study    Cardiac Catheterization/Vascular Study                 Past Medical History:  Past Medical History:   Diagnosis Date    Anemia     Colon cancer 2022    colon    Diabetes mellitus     Hyperlipidemia     Hypertension      Past Surgical History:  Past Surgical History:   Procedure Laterality Date    APPENDECTOMY      CARDIAC CATHETERIZATION      COLON RESECTION N/A 12/15/2022    Procedure: COLON RESECTION RIGHT;  Surgeon: Percy Davis MD;  Location: [facility] MAIN OR;  Service: General;  Laterality: N/A;    COLONOSCOPY N/A 11/11/2022    Procedure: COLONOSCOPY WITH BIOPSY AND POLYPECTOMY;  Surgeon: Billy Julien MD;  Location: [facility] ENDOSCOPY;  Service: Gastroenterology;  Laterality: N/A;  Impression:  1.  Large 4-5cm fulgurating circumferential ulcerated mass in the very proximal part of the ascending colon next to ileocecal valve multiple biopsies were performed.  This is highly concerning for colon malignancy.  2.  2 polyp rem    ENDOSCOPY N/A 11/11/2022    Procedure: ESOPHAGOGASTRODUODENOSCOPY with biopsy X1;  Surgeon: Billy Julien MD;  Location: [facility] ENDOSCOPY;  Service: Gastroenterology;  Laterality: N/A;  5.  Upper endoscopy lamination unremarkable.       PORTACATH PLACEMENT Right 1/12/2023    Procedure: INSERTION OF PORTACATH;  Surgeon: Percy Davis,  MD;  Location: [facility] MAIN OR;  Service: General;  Laterality: Right;    TONSILLECTOMY        Allergies:  Allergies   Allergen Reactions    Penicillins Rash     Home Meds:  Medications Prior to Admission   Medication Sig Dispense Refill Last Dose/Taking    aspirin 81 MG EC tablet Take 1 tablet by mouth Daily.   11/10/2024    insulin aspart (NovoLOG FlexPen) 100 UNIT/ML solution pen-injector sc pen Inject 10 Units under the skin into the appropriate area as directed 3 (Three) Times a Day With Meals.   11/10/2024 Morning    insulin glargine (LANTUS, SEMGLEE) 100 UNIT/ML injection Inject 20 Units under the skin into the appropriate area as directed Every Night for 30 days. 6 mL 0 11/10/2024 Evening     Current Meds:     Current Facility-Administered Medications:     acetaminophen (TYLENOL) tablet 650 mg, 650 mg, Oral, Q6H PRN, Dean Arreolaa S, APRN    aspirin EC tablet 81 mg, 81 mg, Oral, Daily, Yeseina Arreola APRN, 81 mg at 11/13/24 0909    sennosides-docusate (PERICOLACE) 8.6-50 MG per tablet 2 tablet, 2 tablet, Oral, BID PRN **AND** polyethylene glycol (MIRALAX) packet 17 g, 17 g, Oral, Daily PRN **AND** bisacodyl (DULCOLAX) EC tablet 5 mg, 5 mg, Oral, Daily PRN **AND** bisacodyl (DULCOLAX) suppository 10 mg, 10 mg, Rectal, Daily PRN, Sarah Roth APRN    dextrose (D50W) (25 g/50 mL) IV injection 25 g, 25 g, Intravenous, Q15 Min PRN, Yesenia Arreola APRN    dextrose (GLUTOSE) oral gel 15 g, 15 g, Oral, Q15 Min PRN, Dean Arreolaa S, APRN    Enoxaparin Sodium (LOVENOX) syringe 40 mg, 40 mg, Subcutaneous, Daily, Chris Cruz MD    glucagon (GLUCAGEN) injection 1 mg, 1 mg, Intramuscular, Q15 Min PRN, Yesenia Arreola APRN    insulin glargine (LANTUS, SEMGLEE) injection 12 Units, 12 Units, Subcutaneous, Nightly, Jesus Singleton MD, 12 Units at 11/12/24 2059    insulin lispro (HUMALOG/ADMELOG) injection 2-7 Units, 2-7 Units, Subcutaneous, 4x Daily AC & at Bedtime, Jesus Singleton MD, 3 Units at  "24 1239    insulin lispro (HUMALOG/ADMELOG) injection 4 Units, 4 Units, Subcutaneous, TID With Meals, Jesus Singleton MD, 4 Units at 24 1240    melatonin tablet 5 mg, 5 mg, Oral, Nightly PRN, Sarah Roth APRN    midodrine (PROAMATINE) tablet 10 mg, 10 mg, Oral, TID AC, Chris Cruz MD, 10 mg at 24 1240    nitroglycerin (NITROSTAT) SL tablet 0.4 mg, 0.4 mg, Sublingual, Q5 Min PRN, Liz Fish APRN    ondansetron (ZOFRAN) injection 4 mg, 4 mg, Intravenous, Q6H PRN, Sarah Roth APRN    sodium chloride 0.9 % flush 10 mL, 10 mL, Intravenous, PRN, Praveen Nguyễn MD    sodium chloride 0.9 % flush 10 mL, 10 mL, Intravenous, Q12H, Sarah Roth APRN, 10 mL at 24 0910    sodium chloride 0.9 % flush 10 mL, 10 mL, Intravenous, PRN, Sarah Roth APRN    sodium chloride 0.9 % infusion 40 mL, 40 mL, Intravenous, PRN, Sarah Roht APRN  Social History:   Social History     Tobacco Use    Smoking status: Former     Current packs/day: 0.00     Average packs/day: 1 pack/day for 2.0 years (2.0 ttl pk-yrs)     Types: Cigarettes     Start date:      Quit date:      Years since quittin.9    Smokeless tobacco: Never   Substance Use Topics    Alcohol use: Never      Family History:  Family History   Problem Relation Age of Onset    Pneumonia Mother 94        SARS-CoV2    Colon cancer Father 62    Heart disease Sister         Review of Systems : Review of Systems   All other systems reviewed and are negative.               Constitutional:  Temp:  [97.6 °F (36.4 °C)-98.7 °F (37.1 °C)] 97.8 °F (36.6 °C)  Heart Rate:  [71-92] 85  Resp:  [13-20] 18  BP: (103-125)/(51-79) 110/62    Physical Exam   /62 (BP Location: Right arm, Patient Position: Lying)   Pulse 85   Temp 97.8 °F (36.6 °C) (Oral)   Resp 18   Ht 185.4 cm (73\")   Wt 69.9 kg (154 lb)   SpO2 94%   BMI 20.32 kg/m²   Physical Exam  General:  Appears in no acute distress  Eyes: Sclerae are anicteric,  conjunctivae are clear "   HEENT:  No JVD. Thyroid not visibly enlarged. No mucosal pallor or cyanosis  Respiratory: Respirations regular and unlabored at rest.  Bilaterally good breath sounds with good air entry in all fields. No crackles, rubs or wheezes auscultated  Cardiovascular: S1,S2 Regular rate and rhythm. No murmur, rub or gallop auscultated. No pretibial pitting edema  Gastrointestinal: Abdomen soft, flat, nontender. Bowel sounds present.   Musculoskeletal:  No abnormal movements  Extremities: No digital clubbing or cyanosis  Skin: Color pink. Skin warm and dry to touch. No rashes  No xanthoma  Neuro: Alert and awake, no lateralizing deficits appreciated    Cardiographics  ECG: EKG tracing was  personally reviewed/interpreted by me  ECG 12 Lead Rhythm Change   Preliminary Result   HEART RATE=79  bpm   RR Nqlsxxko=228  ms   OK Mlypfmgq=403  ms   P Horizontal Axis=-55  deg   P Front Axis=82  deg   QRSD Akwuyahl=214  ms   QT Mhurepuy=547  ms   EZaE=937  ms   QRS Axis=-24  deg   T Wave Axis=-89  deg   - ABNORMAL ECG -   Sinus rhythm   Prolonged OK interval   IVCD, consider LBBB   Probable anterolateral infarct, age indeterm   Date and Time of Study:2024-11-12 13:02:37      ECG 12 Lead Other; gen weakness   Final Result   HEART RATE=77  bpm   RR Bhixllta=402  ms   OK Kjlywovv=260  ms   P Horizontal Axis=  deg   P Front Axis=80  deg   QRSD Qojnvtud=171  ms   QT Rgmmmxxs=826  ms   UOrZ=086  ms   QRS Axis=-51  deg   T Wave Axis=133  deg   - ABNORMAL ECG -   Sinus rhythm   Prolonged OK interval   Left bundle branch block   ST elevation secondary to IVCD   When compared with ECG of 26-Apr-2024 10:50:43,   Nonspecific significant change   Electronically Signed By: Arnav Dumont (KG) 2024-11-11 20:09:08   Date and Time of Study:2024-11-11 16:00:09      Telemetry Scan   Final Result          Telemetry:     Echocardiogram:   Results for orders placed during the hospital encounter of 11/11/24    Adult Transthoracic Echo Complete w/ Color,  Spectral and Contrast if Necessary Per Protocol    Interpretation Summary    Left ventricular systolic function is severely decreased. Left ventricular ejection fraction appears to be 21 - 25%.    The left ventricular cavity is moderately dilated.    Left atrial volume is severely increased.    Moderate mitral valve regurgitation is present.    Estimated right ventricular systolic pressure from tricuspid regurgitation is normal (<35 mmHg).    Conclusion      Normal LV size, paradoxical septal motion, severe LV dysfunction with EF of 20-25%.    Normal RV size  Normal atrial size  Pulmonic valve is not well visualized.  Aortic valve with mild sclerosis.  Mitral valve, tricuspid valve appears structurally normal, trace tricuspid and moderate mitral regurgitation seen.  Normal calculated RV systolic pressure 28 mmHg  No pericardial effusion seen.  Proximal aorta appears normal in size.      Imaging  Chest X-ray:   Imaging Results (Last 24 Hours)       ** No results found for the last 24 hours. **            Lab Review: I have reviewed the labs      Results from last 7 days   Lab Units 11/11/24  1434   MAGNESIUM mg/dL 1.6     Results from last 7 days   Lab Units 11/12/24  0254   SODIUM mmol/L 140   POTASSIUM mmol/L 5.1   BUN mg/dL 16   CREATININE mg/dL 0.88   CALCIUM mg/dL 9.0         Results from last 7 days   Lab Units 11/12/24  0254   PROBNP pg/mL 3,231.0*     Results from last 7 days   Lab Units 11/13/24  0507 11/12/24  0254 11/11/24  1552 11/11/24  1434   WBC 10*3/mm3 7.22 8.90  --  7.06   HEMOGLOBIN g/dL 13.8 14.8  --  13.8   HEMOGLOBIN, POC g/dL  --   --  14.6  --    HEMATOCRIT % 43.3 47.1  --  42.2   HEMATOCRIT POC %  --   --  43  --    PLATELETS 10*3/mm3 185 180  --  189                 Mg Rahman MD  11/13/2024, 13:05 EST      EMR Dragon/Transcription:   Dictated utilizing Dragon dictation

## 2024-11-12 NOTE — PLAN OF CARE
Goal Outcome Evaluation:  Plan of Care Reviewed With: patient           Outcome Evaluation: 78 y/o M who presented with generalized weakness. PMH of colon cancer type 2 diabetes, hypertension. Recent chemo per report but not undergoing active chemotherapy treatment. Patient lives in snf and states he is interested in moving to long term care but did not clearly articulate why. pt states he drives, ambulates with RW in community and no AD in his apartment, denies falls. Patient reports chronic tremors that are evident at rest and states this is familial. Patient seated vitals 120bpm BP 93/69. Standing vitals: HR 135bpm, BP 76/46. Pt does not report symptoms but he has significant tremors and postural instability so he appears to be a poor symptom  and is at risk for falls. Informed provider. Will continue to follow. Current recommendation is SNF at d.c but will update if he becomes appropriate for SNF.    Anticipated Discharge Disposition (PT): skilled nursing facility

## 2024-11-12 NOTE — CASE MANAGEMENT/SOCIAL WORK
Discharge Planning Assessment   Az     Patient Name: Vlad Guerrero  MRN: [id number]  Today's Date: 11/12/2024    Admit Date: 11/11/2024    Plan: [place] Plan: [facility] pending acceptance. Precert will be required. PASRR approved.   Discharge Needs Assessment       Row Name 11/12/24 1609       Living Environment    People in Home alone    Current Living Arrangements assisted living facility  [facility]    Potentially Unsafe Housing Conditions none    In the past 12 months has the electric, gas, oil, or water company threatened to shut off services in your home? No    Primary Care Provided by self    Provides Primary Care For no one;no one, unable/limited ability to care for self    Family Caregiver if Needed child(avel), adult    Family Caregiver Names Sons Prashanth, Remy, and Percy    Quality of Family Relationships helpful;supportive    Able to Return to Prior Arrangements yes       Resource/Environmental Concerns    Resource/Environmental Concerns none    Transportation Concerns none       Transportation Needs    In the past 12 months, has lack of transportation kept you from medical appointments or from getting medications? no    In the past 12 months, has lack of transportation kept you from meetings, work, or from getting things needed for daily living? No       Food Insecurity    Within the past 12 months, you worried that your food would run out before you got the money to buy more. Never true    Within the past 12 months, the food you bought just didn't last and you didn't have money to get more. Never true       Transition Planning    Patient/Family Anticipates Transition to inpatient rehabilitation facility    Patient/Family Anticipated Services at Transition skilled nursing    Transportation Anticipated car, drives self;family or friend will provide       Discharge Needs Assessment    Readmission Within the Last 30 Days no previous admission in last 30 days    Equipment Currently  Used at Home rollator    Concerns to be Addressed discharge planning    Do you want help finding or keeping work or a job? I do not need or want help    Do you want help with school or training? For example, starting or completing job training or getting a high school diploma, GED or equivalent No    Anticipated Changes Related to Illness inability to care for self    Equipment Needed After Discharge none    Outpatient/Agency/Support Group Needs skilled nursing facility    Discharge Facility/Level of Care Needs nursing facility, skilled    Provided Post Acute Provider List? Yes    Post Acute Provider List Nursing Home    Provided Post Acute Provider Quality & Resource List? Yes    Post Acute Provider Quality and Resource List Nursing Home    Delivered To Patient    Method of Delivery In person    Offered/Gave Vendor List yes    Patient's Choice of Community Agency(s) [facility]    Current Discharge Risk chronically ill;physical impairment;lives alone                   Discharge Plan       Row Name 11/12/24 6380       Plan    Plan Dc Plan: [facility] and [facility] pending acceptance. Precert will be required. PASRR approved.    Patient/Family in Agreement with Plan yes    Provided Post Acute Provider List? Yes    Post Acute Provider List Nursing Home    Provided Post Acute Provider Quality & Resource List? Yes    Post Acute Provider Quality and Resource List Nursing Home    Delivered To Patient    Method of Delivery In person    Plan Comments CM spoke with patient at bedside to discuss admission assessment and discharge planning. Patient confirms PCP and pharmacy. Patient denies any difficulty affording medications or food at this time. Patient is aware of therapy recommendation for Skilled Rehab and is agreeable. Patient further states he is interested in transitioning to LTC post rehab if able. CM spoke with patient at bedside about options for services. Patient wants to be in [place] and  selected [facility] as first choice. CM placed referral in basket and liaison Ashal notified. Awaiting confirmation of acceptance at this time. Patient reports IADL's at a moderate to fair level at baseline and drives. Patient son will provide transportation when ready for DC.CM reviewed chart documentation for clinical updates.CM will continue to follow for any additional needs and adjust DC plan accordingly. DC Barriers: Cardiac monitoring, Glucose control, eleveated BNP, Cardiology consult pending, and monitor labs.                  Continued Care and Services - Admitted Since 11/11/2024       Destination       Service Provider Request Status Services Address Phone Fax Patient Preferred    [facility] Pending - Request Sent -- [address], [place] [address] [phone] --                  Expected Discharge Date and Time       Expected Discharge Date Expected Discharge Time    Nov 13, 2024            Demographic Summary       Row Name 11/12/24 1607       General Information    Admission Type inpatient    Arrived From emergency department;home    Required Notices Provided Important Message from Medicare    Referral Source admission list    Reason for Consult discharge planning    Preferred Language English       Contact Information    Permission Granted to Share Info With                    Functional Status       Row Name 11/12/24 1608       Functional Status    Usual Activity Tolerance moderate    Current Activity Tolerance moderate       Physical Activity    On average, how many days per week do you engage in moderate to strenuous exercise (like a brisk walk)? 2 days    On average, how many minutes do you engage in exercise at this level? 30 min    Number of minutes of exercise per week 60       Functional Status, IADL    Medications independent    Meal Preparation independent    Housekeeping independent    Laundry independent    Shopping  independent    If for any reason you need help with day-to-day activities such as bathing, preparing meals, shopping, managing finances, etc., do you get the help you need? I could use a little more help       Mental Status    General Appearance WDL appearance    General Appearance unkempt  Dried eye drainage noted.       Mental Status Summary    Recent Changes in Mental Status/Cognitive Functioning no changes       Employment/    Employment Status , previous service;retired           Current or Previous  Service active duty, past     Service Experiences experienced combat     [employer]                  Patient Forms       Row Name 11/12/24 1554       Patient Forms    Important Message from Medicare (IMM) Delivered    Delivered to Patient    Method of delivery In person  Patient verbalized understanding. Signed copy left at bedside.                  Met with patient in room   Maintain distance greater than six feet and spent less than fifteen minutes in the room.      Jeanine Tariq RN    Office Phone: [phone]  Office Cell:     [phone]

## 2024-11-12 NOTE — PLAN OF CARE
Problem: Adult Inpatient Plan of Care  Goal: Plan of Care Review  Flowsheets (Taken 11/12/2024 1759)  Plan of Care Reviewed With: patient  Goal: Absence of Hospital-Acquired Illness or Injury  Intervention: Identify and Manage Fall Risk  Recent Flowsheet Documentation  Taken 11/12/2024 1600 by Wilma Peña RN  Safety Promotion/Fall Prevention: safety round/check completed  Taken 11/12/2024 1400 by Wilma Peña RN  Safety Promotion/Fall Prevention: safety round/check completed  Taken 11/12/2024 1200 by Wilma Peña RN  Safety Promotion/Fall Prevention: safety round/check completed  Taken 11/12/2024 1000 by Wilma Peña RN  Safety Promotion/Fall Prevention: safety round/check completed  Taken 11/12/2024 0800 by Wilma Peña RN  Safety Promotion/Fall Prevention: safety round/check completed  Intervention: Prevent Skin Injury  Recent Flowsheet Documentation  Taken 11/12/2024 1200 by Wilma Peña RN  Body Position: supine  Taken 11/12/2024 0800 by Wilma Peña RN  Body Position: sitting up in bed  Goal: Optimal Comfort and Wellbeing  Intervention: Provide Person-Centered Care  Recent Flowsheet Documentation  Taken 11/12/2024 0800 by Wilma Peña RN  Trust Relationship/Rapport:   care explained   choices provided   emotional support provided   empathic listening provided   questions answered   reassurance provided   questions encouraged   thoughts/feelings acknowledged         Cardiology consulted per provider order.

## 2024-11-12 NOTE — CONSULTS
ENDOCRINE INITIAL CONSULT NOTE  DATE OF SERVICE: 24  Patient Care Team:  Vlad Moreland MD as PCP - General (Internal Medicine)  Percy Davis MD as Surgeon (General Surgery)  Don Kramer MD as Consulting Physician (Hematology and Oncology)        PATIENT NAME: Vlad Guerrero  PATIENT : 1945 AGE: 79 y.o.  MRN NUMBER: [id number]  PRIMARY CARE: Vlad Moreland MD    ==========================================================================    REASON FOR CONSULT: Diabetes management     HPI / SUBJECTIVE  79 y.o. male with multiple medical problems admitted to the hospital for generalized weakness.  Endocrinology team is consulted for type 2 diabetes management.  Patient seen and examined.  Patient reports to have type 2 diabetes for last 25 years and currently following PCP for diabetes management.  Patient have A1c of 11.8%.  Reports to be currently taking insulin Lantus 20 units in the evening along with insulin lispro 10 units 3 times daily with meals.  Currently staying at assisted living facility.  Reports to be checking blood sugar through fingersticks.    ==========================================================================                                                PAST MEDICAL HISTORY    Past Medical History:   Diagnosis Date    Anemia     Colon cancer     colon    Diabetes mellitus     Hyperlipidemia     Hypertension        ==========================================================================    PAST SURGICAL HISTORY    Past Surgical History:   Procedure Laterality Date    APPENDECTOMY      CARDIAC CATHETERIZATION      COLON RESECTION N/A 12/15/2022    Procedure: COLON RESECTION RIGHT;  Surgeon: Percy Davis MD;  Location: [facility] MAIN OR;  Service: General;  Laterality: N/A;    COLONOSCOPY N/A 2022    Procedure: COLONOSCOPY WITH BIOPSY AND POLYPECTOMY;  Surgeon: Billy Julien MD;  Location: [facility] ENDOSCOPY;  Service:  Gastroenterology;  Laterality: N/A;  Impression:  1.  Large 4-5cm fulgurating circumferential ulcerated mass in the very proximal part of the ascending colon next to ileocecal valve multiple biopsies were performed.  This is highly concerning for colon malignancy.  2.  2 polyp rem    ENDOSCOPY N/A 2022    Procedure: ESOPHAGOGASTRODUODENOSCOPY with biopsy X1;  Surgeon: Billy Julien MD;  Location: [facility] ENDOSCOPY;  Service: Gastroenterology;  Laterality: N/A;  5.  Upper endoscopy lamination unremarkable.       PORTACATH PLACEMENT Right 2023    Procedure: INSERTION OF PORTACATH;  Surgeon: Percy Davis MD;  Location: [facility] MAIN OR;  Service: General;  Laterality: Right;    TONSILLECTOMY         ==========================================================================    FAMILY HISTORY    Family History   Problem Relation Age of Onset    Pneumonia Mother 94        SARS-CoV2    Colon cancer Father 62    Heart disease Sister        ==========================================================================    SOCIAL HISTORY    Social History     Socioeconomic History    Marital status:    Tobacco Use    Smoking status: Former     Current packs/day: 0.00     Average packs/day: 1 pack/day for 2.0 years (2.0 ttl pk-yrs)     Types: Cigarettes     Start date:      Quit date:      Years since quittin.9    Smokeless tobacco: Never   Vaping Use    Vaping status: Never Used   Substance and Sexual Activity    Alcohol use: Never    Drug use: Never    Sexual activity: Defer       ==========================================================================    HOME MEDICATIONS REPORTED ON ADMISSION      Current Facility-Administered Medications:     acetaminophen (TYLENOL) tablet 650 mg, 650 mg, Oral, Q6H PRN, Yesenia Arreola APRN    aspirin EC tablet 81 mg, 81 mg, Oral, Daily, Yesenia Arreola APRN, 81 mg at 24 0852    sennosides-docusate (PERICOLACE) 8.6-50 MG per tablet 2  tablet, 2 tablet, Oral, BID PRN **AND** polyethylene glycol (MIRALAX) packet 17 g, 17 g, Oral, Daily PRN **AND** bisacodyl (DULCOLAX) EC tablet 5 mg, 5 mg, Oral, Daily PRN **AND** bisacodyl (DULCOLAX) suppository 10 mg, 10 mg, Rectal, Daily PRN, Sarah Roth APRN    dextrose (D50W) (25 g/50 mL) IV injection 25 g, 25 g, Intravenous, Q15 Min PRN, Yesenia Arreola APRN    dextrose (GLUTOSE) oral gel 15 g, 15 g, Oral, Q15 Min PRN, Yesenia Arreola APRN    glucagon (GLUCAGEN) injection 1 mg, 1 mg, Intramuscular, Q15 Min PRN, Yesenia Arreola APRN    insulin glargine (LANTUS, SEMGLEE) injection 12 Units, 12 Units, Subcutaneous, Nightly, Jesus Singleton MD    insulin lispro (HUMALOG/ADMELOG) injection 2-7 Units, 2-7 Units, Subcutaneous, 4x Daily AC & at Bedtime, Jesus Singleton MD    insulin lispro (HUMALOG/ADMELOG) injection 4 Units, 4 Units, Subcutaneous, TID With Meals, Jesus Singleton MD    melatonin tablet 5 mg, 5 mg, Oral, Nightly PRN, Sarah Roth APRN    nitroglycerin (NITROSTAT) SL tablet 0.4 mg, 0.4 mg, Sublingual, Q5 Min PRN, Liz Fish APRN    ondansetron (ZOFRAN) injection 4 mg, 4 mg, Intravenous, Q6H PRN, Sarah Roth APRN    sodium chloride 0.9 % flush 10 mL, 10 mL, Intravenous, PRN, Praveen Nguyễn MD    sodium chloride 0.9 % flush 10 mL, 10 mL, Intravenous, Q12H, Sarah Roth APRN, 10 mL at 11/12/24 0852    sodium chloride 0.9 % flush 10 mL, 10 mL, Intravenous, PRN, Sarah Roth APRN    sodium chloride 0.9 % infusion 40 mL, 40 mL, Intravenous, PRN, Gonzalez, Sarah, APRN    ==========================================================================    ALLERGIES    Allergies   Allergen Reactions    Penicillins Rash       ==========================================================================    ACTIVE HOSPITAL MEDICATIONS    Scheduled Medications:  aspirin, 81 mg, Oral, Daily  insulin glargine, 12 Units, Subcutaneous, Nightly  insulin lispro, 2-7 Units, Subcutaneous, 4x Daily AC & at  Bedtime  insulin lispro, 4 Units, Subcutaneous, TID With Meals  sodium chloride, 10 mL, Intravenous, Q12H         PRN Medications:    acetaminophen    senna-docusate sodium **AND** polyethylene glycol **AND** bisacodyl **AND** bisacodyl    dextrose    dextrose    glucagon (human recombinant)    melatonin    nitroglycerin    ondansetron    sodium chloride    sodium chloride    sodium chloride     ==========================================================================    OBJECTIVE    Vitals:    11/12/24 1500   BP: 118/66   Pulse: 92   Resp: 16   Temp: 98.5 °F (36.9 °C)   SpO2: (!) 78%      Body mass index is 20 kg/m².     General: Alert, cooperative, no acute distress    ==========================================================================    LABORATORY DATA    Lab Results   Component Value Date    GLUCOSE 237 (H) 11/12/2024    BUN 16 11/12/2024    CREATININE 0.88 11/12/2024    EGFRIFNONA 76 11/15/2021    EGFRIFAFRI 87 11/15/2021    BCR 18.2 11/12/2024    K 5.1 11/12/2024    CO2 25.3 11/12/2024    CALCIUM 9.0 11/12/2024    ALBUMIN 3.5 11/11/2024    AST 12 11/11/2024    ALT 11 11/11/2024       Lab Results   Component Value Date    HGBA1C 11.80 (H) 11/12/2024    HGBA1C 12.90 (H) 04/26/2024    HGBA1C 12.20 (H) 01/08/2024     Lab Results   Component Value Date    MICROALBUR 30 07/08/2022    CREATININE 0.88 11/12/2024     Results from last 7 days   Lab Units 11/12/24  1722 11/12/24  1540 11/12/24  1200 11/12/24  0718 11/11/24  2126 11/11/24  1832   GLUCOSE mg/dL 216* 243* 92 190* 216* 279*     ==========================================================================    ASSESSMENT AND PLAN    # Type 2 diabetes with hyperglycemia, uncontrolled with A1c of 11.8%  - Blood sugar reviewed for last 24 hours  - So far patient have received only 10 units of insulin Lantus and blood sugar this morning was 119 and patient had insulin lispro 9 units with breakfast that lead to blood sugar of 92 with the lunchtime  - Will  adjust insulin therapy as follows  Insulin Lantus 12 units nightly  Insulin lispro 4 units with each meal with low-dose correction scale  - Blood sugar for next 24 hours and adjust therapy accordingly  - Target will be to avoid hypoglycemia and  to maintain blood sugar between 150 and 180 for now    # Generalized weakness  # Heart failure with reduced ejection fraction    Thank you for courtesy of consultation.  Will follow with you.  Rest as per primary care.    Part of this note may be an electronic transcription/translation of spoken language to printed text using the Dragon Dictation System.     The time of this note does not reflect the time I saw the patient but the time that this note was written.    =======================================  Jesus Singleton MD  Department of Endocrine, Diabetes and Metabolism  [facility], [place]  =======================================

## 2024-11-12 NOTE — CONSULTS
"Diabetes Education  Assessment/Teaching    Patient Name:  Vlad Guerrero  YOB: 1945  MRN: [id number]  Admit Date:  11/11/2024      Assessment Date:  11/12/2024  Flowsheet Row Most Recent Value   General Information     Referral From: MD order  [Consult received due to A1c >7%. A1c this adm 11.8%. Adm bs 448.]   Height 185.4 cm (73\")   Height Method Stated   Weight 75.1 kg (165 lb 9.1 oz)   Weight Method Bed scale   Pregnancy Assessment    Diabetes History    What type of diabetes do you have? Type 2   Length of Diabetes Diagnosis --  [Dx about 10 years ago.]   Have you had diabetes education/teaching in the past? yes   When and where was your diabetes education? Last seen by inpatient diabetes educator at [facility] on 4/25/2024.   Do you test your blood sugar at home? yes   Frequency of checks 3 times/day   Meter type Accuchek Guide, [facility]   Who performs the test? self   Typical readings >200   Have you had low blood sugar? (<70mg/dl) no   Education Preferences    Nutrition Information    Assessment Topics    DM Goals             Flowsheet Row Most Recent Value   DM Education Needs    Meter Has own  [Pt states not sure if bs meter is working. Pt states sometimes meter reads HI. Discussed when meter reading HI, bs is >600 and pt needing to contact MD. [facility].]   Meter Type Accuchek   Frequency of Testing 3 times a day  [Discussed importance of continuing to check bs 3-4 times/day and to record bs. Discussed importance of sharing readings with PCP if bs running outside healthy range.]   Blood Glucose Target Range Discussed current A1c result of 11.8%. Discussed healthy bs range and healthy A1c target. Discussed importance of bs control.   Medication Insulin, Pen  [Pt taking Lantus 20 units hs and Novolog 10 units ac. Pt states he has been taking insulin as prescribed. In hospital pt receiving Lantus 20 " units hs and Humalog 7 units ac. BS in hospital today was 190 in am and 92 prelunch.]   Problem Solving Hyperglycemia, Signs, Symptoms, Treatment   Reducing Risks A1C testing  [Gave A1c info sheet.]   Healthy Eating --  [Pt usually eating 3 meals/day. Discussed importance of avoiding sugared beverages and high CHO sweets/treats. Pt states he does not drink sweetened beverages and not eating high CHO snacks/sweets.]   Motivation Engaged   Teaching Method Explanation, Discussion, Handouts   Patient Response Verbalized understanding              Other Comments:  Met with pt at bedside. Pt states he is at assisted living facility in [place]. Pt states he takes care of checking his own bs and taking insulin. Pt states he uses insulin pens. Reviewed proper storage guidelines. Discussed importance of taking insulin as prescribed. Pt agreeable to begin recording bs and sharing readings with PCP. [facility] He states will [facility] to see if he can get new bs meter. Pt states not needing additional info at this time.       Electronically signed by:  Deena Andersen RN  11/12/24 14:37 EST

## 2024-11-12 NOTE — THERAPY EVALUATION
Patient Name: Vlad Guerrero  : 1945    MRN: [id number]                              Today's Date: 2024       Admit Date: 2024    Visit Dx:     ICD-10-CM ICD-9-CM   1. Hyperglycemia  R73.9 790.29   2. 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    Cellulitis    Right hand pain    Ataxia    Port-A-Cath in place    Unsteady gait    Hyperglycemia     Past Medical History:   Diagnosis Date    Anemia     Colon cancer     colon    Diabetes mellitus     Hyperlipidemia     Hypertension      Past Surgical History:   Procedure Laterality Date    APPENDECTOMY      CARDIAC CATHETERIZATION      COLON RESECTION N/A 12/15/2022    Procedure: COLON RESECTION RIGHT;  Surgeon: Percy Davis MD;  Location: [facility] MAIN OR;  Service: General;  Laterality: N/A;    COLONOSCOPY N/A 2022    Procedure: COLONOSCOPY WITH BIOPSY AND POLYPECTOMY;  Surgeon: Billy Julien MD;  Location: [facility] ENDOSCOPY;  Service: Gastroenterology;  Laterality: N/A;  Impression:  1.  Large 4-5cm fulgurating circumferential ulcerated mass in the very proximal part of the ascending colon next to ileocecal valve multiple biopsies were performed.  This is highly concerning for colon malignancy.  2.  2 polyp rem    ENDOSCOPY N/A 2022    Procedure: ESOPHAGOGASTRODUODENOSCOPY with biopsy X1;  Surgeon: Billy Julien MD;  Location: [facility] ENDOSCOPY;  Service: Gastroenterology;  Laterality: N/A;  5.  Upper endoscopy lamination unremarkable.       PORTACATH PLACEMENT Right 2023    Procedure: INSERTION OF PORTACATH;  Surgeon: Percy Davis MD;  Location: [facility] MAIN OR;  Service: General;  Laterality: Right;    TONSILLECTOMY         Problem: Potential for Falls  Goal: Patient will remain free of falls  Description: INTERVENTIONS:  - Educate patient/family on patient safety including physical limitations  - Instruct patient to call for assistance with activity   - Consult OT/PT to assist with strengthening/mobility   - Keep Call bell within reach  - Keep bed low and locked with side rails adjusted as appropriate  - Keep care items and personal belongings within reach  - Initiate and maintain comfort rounds  - Make Fall Risk Sign visible to staff  -- Apply yellow socks and bracelet for high fall risk patients  - Consider moving patient to room near nurses station  Outcome: Progressing General Information       Row Name 11/12/24 1027          Physical Therapy Time and Intention    Document Type evaluation  -     Mode of Treatment individual therapy  -       Row Name 11/12/24 1027          General Information    Patient Profile Reviewed yes  -     Prior Level of Function --  uses RW for community ambulation, drives, dresses and bathes self, does own laundry, manages own meds. Denies falls. Does not use RW in apartment  -     Existing Precautions/Restrictions orthostatic hypotension  -       Row Name 11/12/24 1027          Living Environment    [facility] facility resident  lives in [facility]  -       Row Name 11/12/24 1027          Cognition    Orientation Status (Cognition) oriented x 4  -       Row Name 11/12/24 1027          Safety Issues/Impairments Affecting Functional Mobility    Impairments Affecting Function (Mobility) balance  -     Comment, Safety Issues/Impairments (Mobility) vitals  -               User Key  (r) = Recorded By, (t) = Taken By, (c) = Cosigned By      Initials Name Provider Type     Juliet Delatorre PT Physical Therapist                   Mobility       Row Name 11/12/24 1031          Bed Mobility    Bed Mobility bed mobility (all) activities  -     All Activities, [place] (Bed Mobility) minimum assist (75% patient effort)  -     Comment, (Bed Mobility) assist at trunk  -       Row Name 11/12/24 1031          Sit-Stand Transfer    Sit-Stand [place] (Transfers) contact guard  -     Comment, (Sit-Stand Transfer) tachycardia and orthostatic hypotension noted, not safe to advance to ambulation  -               User Key  (r) = Recorded By, (t) = Taken By, (c) = Cosigned By      Initials Name Provider Type     Juliet Delatorre PT Physical Therapist                   Obj/Interventions       Row Name 11/12/24 1031          Range of Motion Comprehensive    General Range of Motion no range of motion deficits identified  -       Row  Name 11/12/24 1031          Strength Comprehensive (MMT)    Comment, General Manual Muscle Testing (MMT) Assessment B LE >3/5  -       Row Name 11/12/24 1031          Sensory Assessment (Somatosensory)    Sensory Assessment (Somatosensory) not tested  -               User Key  (r) = Recorded By, (t) = Taken By, (c) = Cosigned By      Initials Name Provider Type    SS Juliet Delatorre, PT Physical Therapist                   Goals/Plan       Row Name 11/12/24 1053          Bed Mobility Goal 1 (PT)    Activity/Assistive Device (Bed Mobility Goal 1, PT) bed mobility activities, all  -SS     [place] Level/Cues Needed (Bed Mobility Goal 1, PT) modified independence  -SS     Time Frame (Bed Mobility Goal 1, PT) long term goal (LTG);2 weeks  -SS       Row Name 11/12/24 1053          Transfer Goal 1 (PT)    Activity/Assistive Device (Transfer Goal 1, PT) transfers, all  -SS     [place] Level/Cues Needed (Transfer Goal 1, PT) modified independence  -SS     Time Frame (Transfer Goal 1, PT) long term goal (LTG);2 weeks  -SS       Row Name 11/12/24 1053          Gait Training Goal 1 (PT)    Activity/Assistive Device (Gait Training Goal 1, PT) gait (walking locomotion);walker, rolling  -SS     [place] Level (Gait Training Goal 1, PT) modified independence  -SS     Distance (Gait Training Goal 1, PT) 75  -SS     Time Frame (Gait Training Goal 1, PT) long term goal (LTG);2 weeks  -SS     Strategies/Barriers (Gait Training Goal 1, PT) with vitals WNL  -       Row Name 11/12/24 1053          Therapy Assessment/Plan (PT)    Planned Therapy Interventions (PT) balance training;bed mobility training;gait training;home exercise program;transfer training;patient/family education;strengthening  -SS               User Key  (r) = Recorded By, (t) = Taken By, (c) = Cosigned By      Initials Name Provider Type    Juliet Werner PT Physical Therapist                   Clinical Impression       Row Name 11/12/24  1041          Pain    Additional Documentation Pain Scale: FACES Pre/Post-Treatment (Group)  -SS       Row Name 11/12/24 1041          Pain Scale: FACES Pre/Post-Treatment    Pain: FACES Scale, Pretreatment 0-->no hurt  -SS     Posttreatment Pain Rating 0-->no hurt  -SS       Row Name 11/12/24 1041          Plan of Care Review    Plan of Care Reviewed With patient  -SS     Outcome Evaluation 80 y/o M who presented with generalized weakness. PMH of colon cancer type 2 diabetes, hypertension. Recent chemo per report but not undergoing active chemotherapy treatment. Patient lives in [facility] and states he is interested in moving to long term care but did not clearly articulate why. pt states he drives, ambulates with RW in community and no AD in his apartment, denies falls. Patient reports chronic tremors that are evident at rest and states this is familial. Patient seated vitals 120bpm BP 93/69. Standing vitals: HR 135bpm, BP 76/46. Pt does not report symptoms but he has significant tremors and postural instability so he appears to be a poor symptom  and is at risk for falls. Informed provider. Will continue to follow. Current recommendation is SNF at d.c but will update if he becomes appropriate for SNF.  -       Row Name 11/12/24 1041          Therapy Assessment/Plan (PT)    Criteria for Skilled Interventions Met (PT) yes;meets criteria  -SS     Therapy Frequency (PT) 5 times/wk  -SS     Predicted Duration of Therapy Intervention (PT) until d/c  -SS       Row Name 11/12/24 1041          Vital Signs    Pre Systolic BP Rehab 93  -SS     Pre Treatment Diastolic BP 69  -SS     Intra Systolic BP Rehab 76  -SS     Intra Treatment Diastolic BP 46  -SS     Pretreatment Heart Rate (beats/min) 120  -SS     Intratreatment Heart Rate (beats/min) 135  -SS       Row Name 11/12/24 1041          Positioning and Restraints    Pre-Treatment Position in bed  -SS     Post Treatment Position bed  -SS               User Key  (r) =  Recorded By, (t) = Taken By, (c) = Cosigned By      Initials Name Provider Type    SS Juliet Delatorre, PT Physical Therapist                   Outcome Measures    No documentation.                                Physical Therapy Education       Title: PT OT SLP Therapies (Done)       Topic: Physical Therapy (Done)       Point: Mobility training (Done)       Learning Progress Summary            Patient Acceptance, E, VU by  at 11/12/2024 1054                                      User Key       Initials Effective Dates Name Provider Type Discipline     06/16/21 -  Juliet Delatorre PT Physical Therapist PT                  PT Recommendation and Plan  Planned Therapy Interventions (PT): balance training, bed mobility training, gait training, home exercise program, transfer training, patient/family education, strengthening  Outcome Evaluation: 80 y/o M who presented with generalized weakness. PMH of colon cancer type 2 diabetes, hypertension. Recent chemo per report but not undergoing active chemotherapy treatment. Patient lives in [facility] and states he is interested in moving to long term care but did not clearly articulate why. pt states he drives, ambulates with RW in community and no AD in his apartment, denies falls. Patient reports chronic tremors that are evident at rest and states this is familial. Patient seated vitals 120bpm BP 93/69. Standing vitals: HR 135bpm, BP 76/46. Pt does not report symptoms but he has significant tremors and postural instability so he appears to be a poor symptom  and is at risk for falls. Informed provider. Will continue to follow. Current recommendation is SNF at d.c but will update if he becomes appropriate for SNF.     Time Calculation:   PT Evaluation Complexity  History, PT Evaluation Complexity: 3 or more personal factors and/or comorbidities  Examination of Body Systems (PT Eval Complexity): total of 4 or more elements  Clinical Presentation (PT Evaluation  Complexity): evolving  Clinical Decision Making (PT Evaluation Complexity): moderate complexity  Overall Complexity (PT Evaluation Complexity): moderate complexity     PT Charges       Row Name 11/12/24 1054             Time Calculation    Start Time 0932  -      Stop Time 0950  -      Time Calculation (min) 18 min  -      PT Received On 11/12/24  -      PT - Next Appointment 11/13/24  -      PT Goal Re-Cert Due Date 11/26/24  -         Time Calculation- PT    Total Timed Code Minutes- PT 0 minute(s)  -                User Key  (r) = Recorded By, (t) = Taken By, (c) = Cosigned By      Initials Name Provider Type     Juliet Delatorre, PT Physical Therapist                  Therapy Charges for Today       Code Description Service Date Service Provider Modifiers Qty    09176585166 HC PT EVAL MOD COMPLEXITY 4 11/12/2024 Juliet Delatorre, PT GP 1            PT G-Codes  AM-PAC 6 Clicks Score (PT): 24  PT Discharge Summary  Anticipated Discharge Disposition (PT): skilled nursing facility    Juliet Delatorre PT  11/12/2024

## 2024-11-12 NOTE — PLAN OF CARE
Goal Outcome Evaluation:         Patient sleeping in between care. Endocrine and PT consulted. Patient stable at this time.

## 2024-11-12 NOTE — ED NOTES
Nursing report ED to floor  Vlad Guerrero  79 y.o.  male    HPI:   Chief Complaint   Patient presents with    Weakness - Generalized       Admitting doctor:   Praveen Nguyễn MD    Admitting diagnosis:   The primary encounter diagnosis was Hyperglycemia. A diagnosis of Generalized weakness was also pertinent to this visit.    Code status:   Current Code Status       Date Active Code Status Order ID Comments User Context       Prior            Allergies:   Penicillins    Isolation:  No active isolations     Fall Risk:  Fall Risk Assessment was completed, and patient is at high risk for falls.   Predictive Model Details         10 (Low) Factor Value    Calculated 11/11/2024 20:14 Age 79    Risk of Fall Model Active Peripheral IV Present     Imaging order in this encounter Present     Magnesium 1.6 mg/dL     Diastolic BP 59     Isaiah Scale not on file     Albumin 3.5 g/dL     Calcium 8.9 mg/dL     Clinically Relevant Sex Not Female     Number of Distinct Medication Classes administered 1     Total Bilirubin 0.6 mg/dL     Chloride 102 mmol/L     Respiratory Rate 14     Potassium 5 mmol/L     Creatinine 0.69 mg/dL     Days after Admission 0.251     Tobacco Use Quit     ALT 11 U/L         Weight:       11/11/24  1431   Weight: 66 kg (145 lb 9.6 oz)       Intake and Output  No intake or output data in the 24 hours ending 11/11/24 2017    Diet:   Dietary Orders (From admission, onward)       Start     Ordered    11/11/24 1426  NPO Diet NPO Type: Strict NPO  (Hyperglycemia > 250 (Standing Order))  Diet Effective Now        Question:  NPO Type  Answer:  Strict NPO    11/11/24 1426                     Most recent vitals:   Vitals:    11/11/24 1633 11/11/24 1708 11/11/24 1832 11/11/24 1929   BP: 143/80 137/85 122/73 108/59   Pulse: 78 85 77 79   Resp: 16 14 15 14   Temp:       TempSrc:       SpO2: 92% 93%  92%   Weight:       Height:           Active LDAs/IV Access:   Lines, Drains & Airways       Active LDAs       Name  Placement date Placement time Site Days    Peripheral IV 11/11/24 1424 Posterior;Right Hand 11/11/24  1424  Hand  less than 1    Single Lumen Implantable Port Right Subclavian --  --  Subclavian  --                    Skin Condition:   Skin Assessments (last day)       None             Labs (abnormal labs have a star):   Labs Reviewed   COMPREHENSIVE METABOLIC PANEL - Abnormal; Notable for the following components:       Result Value    Glucose 448 (*)     Alkaline Phosphatase 124 (*)     All other components within normal limits    Narrative:     GFR Normal >60  Chronic Kidney Disease <60  Kidney Failure <15    The GFR formula is only valid for adults with stable renal function between ages 18 and 70.   URINALYSIS W/ MICROSCOPIC IF INDICATED (NO CULTURE) - Abnormal; Notable for the following components:    Specific Gravity, UA 1.034 (*)     Glucose, UA >=1000 mg/dL (3+) (*)     Ketones, UA Trace (*)     Protein, UA Trace (*)     Leuk Esterase, UA Trace (*)     All other components within normal limits   CBC WITH AUTO DIFFERENTIAL - Abnormal; Notable for the following components:    Lymphocyte % 11.0 (*)     Monocyte % 12.2 (*)     All other components within normal limits   OSMOLALITY, SERUM - Abnormal; Notable for the following components:    Osmolality 316 (*)     All other components within normal limits   POCT GLUCOSE FINGERSTICK - Abnormal; Notable for the following components:    Glucose 324 (*)     All other components within normal limits   POCT GLUCOSE FINGERSTICK - Abnormal; Notable for the following components:    Glucose 392 (*)     All other components within normal limits   BLOOD GAS, MIXED - Abnormal; Notable for the following components:    PO2 MIXED 55.3 (*)     CO2 Content <1 (*)     All other components within normal limits   ELECTROLYTES + H&H - Abnormal; Notable for the following components:    POC Potassium 5.0 (*)     Glucose 445 (*)     All other components within normal limits   POCT  GLUCOSE FINGERSTICK - Abnormal; Notable for the following components:    Glucose 445 (*)     All other components within normal limits   POCT GLUCOSE FINGERSTICK - Abnormal; Notable for the following components:    Glucose 279 (*)     All other components within normal limits   RESPIRATORY PANEL PCR W/ COVID-19 (SARS-COV-2), NP SWAB IN UTM/VTP, 2 HR TAT - Normal    Narrative:     In the setting of a positive respiratory panel with a viral infection PLUS a negative procalcitonin without other underlying concern for bacterial infection, consider observing off antibiotics or discontinuation of antibiotics and continue supportive care. If the respiratory panel is positive for atypical bacterial infection (Bordetella pertussis, Chlamydophila pneumoniae, or Mycoplasma pneumoniae), consider antibiotic de-escalation to target atypical bacterial infection.   MAGNESIUM - Normal   PHOSPHORUS - Normal   LIPASE - Normal   ACETONE - Normal   POCT CREATININE - Normal   POC LACTATE - Normal   RAINBOW DRAW    Narrative:     The following orders were created for panel order [facility].  Procedure                               Abnormality         Status                     ---------                               -----------         ------                     Green Top (Gel)[[id number]]                                  Final result               Lavender Top[[id number]]                                     Final result               Gold Top - SST[[id number]]                                   Final result               Light Blue Top[[id number]]                                   Final result                 Please view results for these tests on the individual orders.   BLOOD GAS, ARTERIAL   URINALYSIS, MICROSCOPIC ONLY   POCT GLUCOSE FINGERSTICK   POCT GLUCOSE FINGERSTICK   POCT GLUCOSE FINGERSTICK   POCT GLUCOSE FINGERSTICK   POCT GLUCOSE FINGERSTICK   POCT GLUCOSE FINGERSTICK   POCT GLUCOSE FINGERSTICK   POCT GLUCOSE FINGERSTICK   POCT  GLUCOSE FINGERSTICK   POCT GLUCOSE FINGERSTICK   POCT GLUCOSE FINGERSTICK   POCT GLUCOSE FINGERSTICK   POCT GLUCOSE FINGERSTICK   POCT GLUCOSE FINGERSTICK   POCT GLUCOSE FINGERSTICK   POCT GLUCOSE FINGERSTICK   POCT GLUCOSE FINGERSTICK   POCT GLUCOSE FINGERSTICK   POCT GLUCOSE FINGERSTICK   POCT GLUCOSE FINGERSTICK   POCT GLUCOSE FINGERSTICK   POCT GLUCOSE FINGERSTICK   POCT GLUCOSE FINGERSTICK   POCT GLUCOSE FINGERSTICK   POCT GLUCOSE FINGERSTICK   POCT GLUCOSE FINGERSTICK   POCT GLUCOSE FINGERSTICK   POCT GLUCOSE FINGERSTICK   POCT GLUCOSE FINGERSTICK   CBC AND DIFFERENTIAL    Narrative:     The following orders were created for panel order CBC & Differential.  Procedure                               Abnormality         Status                     ---------                               -----------         ------                     CBC Auto Differential[[id number]]        Abnormal            Final result                 Please view results for these tests on the individual orders.   GREEN TOP   LAVENDER TOP   GOLD TOP - SST   LIGHT BLUE TOP   KETONE BODIES SERUM    Narrative:     The following orders were created for panel order Ketone Bodies, Serum (Not performed at [facility]).  Procedure                               Abnormality         Status                     ---------                               -----------         ------                     Acetone[[id number]]                      Normal              Final result                 Please view results for these tests on the individual orders.       LOC: Person, Place, Time, and Situation    Telemetry:  Observation Unit    Cardiac Monitoring Ordered: no    EKG:   ECG 12 Lead Other; gen weakness   Final Result   HEART RATE=77  bpm   RR Ejlnnkcl=306  ms   SC Dmdkxbyh=765  ms   P Horizontal Axis=  deg   P Front Axis=80  deg   QRSD Gmwmqblo=620  ms   QT Llunyerj=318  ms   RTnP=824  ms   QRS Axis=-51  deg   T Wave Axis=133  deg   - ABNORMAL ECG -   Sinus rhythm    Prolonged MT interval   Left bundle branch block   ST elevation secondary to IVCD   When compared with ECG of 26-Apr-2024 10:50:43,   Nonspecific significant change   Electronically Signed By: Arnav Dumont ([facility]) 2024-11-11 20:09:08   Date and Time of Study:2024-11-11 16:00:09          Medications Given in the ED:   Medications   sodium chloride 0.9 % flush 10 mL (has no administration in time range)   insulin lispro (HUMALOG/ADMELOG) injection 5 Units (5 Units Subcutaneous Given 11/11/24 1706)       Imaging results:  CT Head Without Contrast    Result Date: 11/11/2024  Impression: No acute intracranial abnormality. Electronically Signed: Billy Ribeiro DO  11/11/2024 6:21 PM EST  Workstation ID: [id number]    XR Chest 2 View    Result Date: 11/11/2024  No radiographic findings of acute cardiopulmonary abnormality. Electronically Signed: Andrea Kirk  11/11/2024 5:54 PM EST  Workstation ID: [id number]    XR Forearm 2 View Right    Result Date: 11/11/2024  1.Focal soft tissue prominence at the posterior aspect of the proximal forearm. This could be related to olecranon bursitis, hematoma, soft tissue infection, or neoplasm. 2.No radiographic findings of acute osseous elbow or forearm abnormality. 3.Moderate elbow osteoarthritis. 4.Calcific atherosclerosis. Electronically Signed: Andrea Kirk  11/11/2024 5:43 PM EST  Workstation ID: [id number]    XR Elbow 3+ View Right    Result Date: 11/11/2024  1.Focal soft tissue prominence at the posterior aspect of the proximal forearm. This could be related to olecranon bursitis, hematoma, soft tissue infection, or neoplasm. 2.No radiographic findings of acute osseous elbow or forearm abnormality. 3.Moderate elbow osteoarthritis. 4.Calcific atherosclerosis. Electronically Signed: Andrea Kirk  11/11/2024 5:43 PM EST  Workstation ID: [id number]     Social issues:   Social History     Socioeconomic History    Marital status:    Tobacco Use    Smoking status: Former      Current packs/day: 0.00     Average packs/day: 1 pack/day for 2.0 years (2.0 ttl pk-yrs)     Types: Cigarettes     Start date:      Quit date:      Years since quittin.9    Smokeless tobacco: Never   Vaping Use    Vaping status: Never Used   Substance and Sexual Activity    Alcohol use: Not Currently    Drug use: Never    Sexual activity: Defer       NIH Stroke Scale:  Interval: (not recorded)  1a. Level of Consciousness: (not recorded)  1b. LOC Questions: (not recorded)  1c. LOC Commands: (not recorded)  2. Best Gaze: (not recorded)  3. Visual: (not recorded)  4. Facial Palsy: (not recorded)  5a. Motor Arm, Left: (not recorded)  5b. Motor Arm, Right: (not recorded)  6a. Motor Leg, Left: (not recorded)  6b. Motor Leg, Right: (not recorded)  7. Limb Ataxia: (not recorded)  8. Sensory: (not recorded)  9. Best Language: (not recorded)  10. Dysarthria: (not recorded)  11. Extinction and Inattention (formerly Neglect): (not recorded)    Total (NIH Stroke Scale): (not recorded)     Additional notable assessment information:     Nursing report ED to floor:  [facility], room 222    Fe Singer RN   24 20:17 EST

## 2024-11-12 NOTE — H&P
[facility] Medicine Services  History & Physical    Patient Name: Vlad Guerrero  : 1945  MRN: [id number]  Primary Care Physician:  Vlad Moreland MD  Date of admission: 2024  Date and Time of Service: 2024 at 1300    Subjective      Chief Complaint: generalized weakness    History of Present Illness: Vlad Guerrero is a 79 y.o. male with a CMH of anemia, colon cancer, DM, HLD, HTN who presented to [facility] on 2024 with hyperglycemia and generalized weakness. This has been going on for the past three weeks. Has a bump on his right arm that he doesn't know where it came from. Also complains of urinary frequency, diarrhea. Says his blood sugar at home has been running around 500. Patient was initially admitted under observation where he worked with PT who noted the patient was very hypotensive and tachycardic while standing. Cardiology was consulted as well as the hospital team. The hospital team will now be taking over care of this patient.       Review of Systems   Constitutional:  Positive for fatigue.   Musculoskeletal:  Positive for myalgias.   Neurological:  Positive for weakness.       Personal History     Past Medical History:   Diagnosis Date    Anemia     Colon cancer     colon    Diabetes mellitus     Hyperlipidemia     Hypertension        Past Surgical History:   Procedure Laterality Date    APPENDECTOMY      CARDIAC CATHETERIZATION      COLON RESECTION N/A 12/15/2022    Procedure: COLON RESECTION RIGHT;  Surgeon: Percy Davis MD;  Location: [facility] MAIN OR;  Service: General;  Laterality: N/A;    COLONOSCOPY N/A 2022    Procedure: COLONOSCOPY WITH BIOPSY AND POLYPECTOMY;  Surgeon: Billy Julien MD;  Location: [facility] ENDOSCOPY;  Service: Gastroenterology;  Laterality: N/A;  Impression:  1.  Large 4-5cm fulgurating circumferential ulcerated mass in the very proximal part of the ascending colon next to ileocecal valve multiple biopsies  were performed.  This is highly concerning for colon malignancy.  2.  2 polyp rem    ENDOSCOPY N/A 11/11/2022    Procedure: ESOPHAGOGASTRODUODENOSCOPY with biopsy X1;  Surgeon: Billy Julien MD;  Location: [facility] ENDOSCOPY;  Service: Gastroenterology;  Laterality: N/A;  5.  Upper endoscopy lamination unremarkable.       PORTACATH PLACEMENT Right 1/12/2023    Procedure: INSERTION OF PORTACATH;  Surgeon: Percy Davis MD;  Location: [facility] MAIN OR;  Service: General;  Laterality: Right;    TONSILLECTOMY         Family History: family history includes Colon cancer (age of onset: 62) in his father; Heart disease in his sister; Pneumonia (age of onset: 94) in his mother. Otherwise pertinent FHx was reviewed and not pertinent to current issue.    Social History:  reports that he quit smoking about 58 years ago. His smoking use included cigarettes. He started smoking about 60 years ago. He has a 2 pack-year smoking history. He has never used smokeless tobacco. He reports that he does not drink alcohol and does not use drugs.    Home Medications:  Prior to Admission Medications       Prescriptions Last Dose Informant Patient Reported? Taking?    aspirin 81 MG EC tablet 11/10/2024  Yes Yes    Take 1 tablet by mouth Daily.    insulin aspart (NovoLOG FlexPen) 100 UNIT/ML solution pen-injector sc pen 11/10/2024  Yes Yes    Inject 10 Units under the skin into the appropriate area as directed 3 (Three) Times a Day With Meals.    insulin glargine (LANTUS, SEMGLEE) 100 UNIT/ML injection 11/10/2024  No Yes    Inject 20 Units under the skin into the appropriate area as directed Every Night for 30 days.              Allergies:  Allergies   Allergen Reactions    Penicillins Rash       Objective      Vitals:   Temp:  [97.6 °F (36.4 °C)-97.8 °F (36.6 °C)] 97.8 °F (36.6 °C)  Heart Rate:  [] 94  Resp:  [12-19] 19  BP: ()/(51-96) 109/60  Body mass index is 21.84 kg/m².  Physical Exam  Vitals and nursing note  reviewed.   Constitutional:       Appearance: He is ill-appearing.   HENT:      Head: Normocephalic and atraumatic.      Nose: Nose normal.      Mouth/Throat:      Mouth: Mucous membranes are moist.   Eyes:      Extraocular Movements: Extraocular movements intact.      Pupils: Pupils are equal, round, and reactive to light.   Cardiovascular:      Rate and Rhythm: Normal rate and regular rhythm.      Pulses: Normal pulses.   Pulmonary:      Effort: Pulmonary effort is normal.      Breath sounds: Normal breath sounds.   Abdominal:      General: Bowel sounds are normal.      Palpations: Abdomen is soft.   Musculoskeletal:         General: Normal range of motion.      Cervical back: Normal range of motion and neck supple.   Skin:     General: Skin is warm.      Capillary Refill: Capillary refill takes less than 2 seconds.   Neurological:      Mental Status: He is alert and oriented to person, place, and time.      Motor: Tremor present.         Diagnostic Data:  Lab Results (last 24 hours)       Procedure Component Value Units Date/Time    POC Glucose Once [[id number]]  (Normal) Collected: 11/12/24 1200    Specimen: Blood Updated: 11/12/24 1202     Glucose 92 mg/dL      Comment: Serial Number: [id number]:  [id number]       BNP [[id number]]  (Abnormal) Collected: 11/12/24 0254    Specimen: Blood Updated: 11/12/24 1143     proBNP 3,231.0 pg/mL     Narrative:      This assay is used as an aid in the diagnosis of individuals suspected of having heart failure. It can be used as an aid in the diagnosis of acute decompensated heart failure (ADHF) in patients presenting with signs and symptoms of ADHF to the emergency department (ED). In addition, NT-proBNP of <300 pg/mL indicates ADHF is not likely.    Age Range Result Interpretation  NT-proBNP Concentration (pg/mL:      <50             Positive            >450                   Gray                 300-450                    Negative             <300    50-75            Positive            >900                  Gray                300-900                  Negative            <300      >75             Positive            >1800                  Gray                300-1800                  Negative            <300    Hemoglobin A1c [[id number]]  (Abnormal) Collected: 11/12/24 0254    Specimen: Blood Updated: 11/12/24 0833     Hemoglobin A1C 11.80 %     POC Glucose 4x Daily Before Meals & at Bedtime [[id number]]  (Abnormal) Collected: 11/12/24 0718    Specimen: Blood Updated: 11/12/24 0720     Glucose 190 mg/dL      Comment: Serial Number: [id number]:  [id number]       Basic Metabolic Panel [[id number]]  (Abnormal) Collected: 11/12/24 0254    Specimen: Blood Updated: 11/12/24 0430     Glucose 237 mg/dL      BUN 16 mg/dL      Creatinine 0.88 mg/dL      Sodium 140 mmol/L      Potassium 5.1 mmol/L      Comment: Specimen hemolyzed.  Result may be falsely elevated.        Chloride 104 mmol/L      CO2 25.3 mmol/L      Calcium 9.0 mg/dL      BUN/Creatinine Ratio 18.2     Anion Gap 10.7 mmol/L      eGFR 88.6 mL/min/1.73     Narrative:      GFR Normal >60  Chronic Kidney Disease <60  Kidney Failure <15    The GFR formula is only valid for adults with stable renal function between ages 18 and 70.    CBC Auto Differential [[id number]]  (Abnormal) Collected: 11/12/24 0254    Specimen: Blood Updated: 11/12/24 0408     WBC 8.90 10*3/mm3      RBC 4.82 10*6/mm3      Hemoglobin 14.8 g/dL      Hematocrit 47.1 %      MCV 97.7 fL      MCH 30.7 pg      MCHC 31.4 g/dL      RDW 15.3 %      RDW-SD 55.0 fl      MPV 10.0 fL      Platelets 180 10*3/mm3      Neutrophil % 75.2 %      Lymphocyte % 13.7 %      Monocyte % 10.3 %      Eosinophil % 0.1 %      Basophil % 0.4 %      Immature Grans % 0.3 %      Neutrophils, Absolute 6.68 10*3/mm3      Lymphocytes, Absolute 1.22 10*3/mm3      Monocytes, Absolute 0.92 10*3/mm3      Eosinophils, Absolute 0.01 10*3/mm3      Basophils, Absolute 0.04 10*3/mm3      Immature  Grans, Absolute 0.03 10*3/mm3      nRBC 0.0 /100 WBC     POC Glucose Once [[id number]]  (Abnormal) Collected: 11/11/24 2126    Specimen: Blood Updated: 11/11/24 2128     Glucose 216 mg/dL      Comment: Serial Number: [id number]:  [id number]       Blood Gas, Mixed [[id number]]  (Abnormal) Collected: 11/11/24 1552    Specimen: Blood Updated: 11/11/24 1946     pH, mixed 7.43 pH units      PCO2 MIXED 41.9 mmHg      PO2 MIXED 55.3 mmHg      HCO3 MIXED 27.5 mmol/L      CO2 Content <1 mmol/L      Base Excess, Arterial 2.7 mmol/L      Comment: Serial Number: [id number]:  [id number]        O2 SATURATION MIXED 88.9 %      Hemodilution No     Site Left Radial     Tod's Test Positive     Modality Room Air     FIO2 21 %      Base Deficit 3.1 mEq/liter      Notified Who --     Notified Time --     Read Back --    POC Glucose Once [[id number]]  (Abnormal) Collected: 11/11/24 1552    Specimen: Blood Updated: 11/11/24 1943     Glucose 445 mg/dL      Comment: Serial Number: [id number]:  [id number]        Notified Who --     Notified Time --     Read Back --    POCT Electrolytes +HGB +HCT [[id number]]  (Abnormal) Collected: 11/11/24 1552    Specimen: Blood Updated: 11/11/24 1943     Sodium 138 mmol/L      POC Potassium 5.0 mmol/L      Ionized Calcium 1.22 mmol/L      Comment: Serial Number: [id number]:  [id number]        Glucose 445 mg/dL      Hematocrit 43 %      Hemoglobin 14.6 g/dL      Notified Time --     Notified Who --     Read Back --    POC Glucose Once [[id number]]  (Abnormal) Collected: 11/11/24 1832    Specimen: Blood Updated: 11/11/24 1835     Glucose 279 mg/dL      Comment: Serial Number: [id number]:  [id number]       Respiratory Panel PCR w/COVID-19(SARS-CoV-2) JODY/TICO/KG/PAD/COR/OZ In-House, NP Swab in UTM/VTM, 2 HR TAT - Swab, Nasopharynx [[id number]]  (Normal) Collected: 11/11/24 1707    Specimen: Swab from Nasopharynx Updated: 11/11/24 1816     ADENOVIRUS, PCR Not Detected     Coronavirus 229E Not Detected      Coronavirus HKU1 Not Detected     Coronavirus NL63 Not Detected     Coronavirus OC43 Not Detected     COVID19 Not Detected     Human Metapneumovirus Not Detected     Human Rhinovirus/Enterovirus Not Detected     Influenza A PCR Not Detected     Influenza B PCR Not Detected     Parainfluenza Virus 1 Not Detected     Parainfluenza Virus 2 Not Detected     Parainfluenza Virus 3 Not Detected     Parainfluenza Virus 4 Not Detected     RSV, PCR Not Detected     Bordetella pertussis pcr Not Detected     Bordetella parapertussis PCR Not Detected     Chlamydophila pneumoniae PCR Not Detected     Mycoplasma pneumo by PCR Not Detected    Narrative:      In the setting of a positive respiratory panel with a viral infection PLUS a negative procalcitonin without other underlying concern for bacterial infection, consider observing off antibiotics or discontinuation of antibiotics and continue supportive care. If the respiratory panel is positive for atypical bacterial infection (Bordetella pertussis, Chlamydophila pneumoniae, or Mycoplasma pneumoniae), consider antibiotic de-escalation to target atypical bacterial infection.    POC Glucose Q1H [[id number]]  (Abnormal) Collected: 11/11/24 1807    Specimen: Blood Updated: 11/11/24 1809     Glucose 324 mg/dL      Comment: Serial Number: [id number]:  [id number]       Osmolality, Serum [[id number]]  (Abnormal) Collected: 11/11/24 1434    Specimen: Blood Updated: 11/11/24 1753     Osmolality 316 mOsm/kg     Ketone Bodies, Serum (Not performed at [facility]) [[id number]]  (Normal) Collected: 11/11/24 1434    Specimen: Blood Updated: 11/11/24 1723    Narrative:      The following orders were created for panel order Ketone Bodies, Serum (Not performed at [facility]).  Procedure                               Abnormality         Status                     ---------                               -----------         ------                     Acetone[[id number]]                      Normal               Final result                 Please view results for these tests on the individual orders.    Acetone [[id number]]  (Normal) Collected: 11/11/24 1434    Specimen: Blood Updated: 11/11/24 1723     Acetone Negative    Magnesium [[id number]]  (Normal) Collected: 11/11/24 1434    Specimen: Blood Updated: 11/11/24 1619     Magnesium 1.6 mg/dL     Phosphorus [[id number]]  (Normal) Collected: 11/11/24 1434    Specimen: Blood Updated: 11/11/24 1619     Phosphorus 3.5 mg/dL     Lipase [[id number]]  (Normal) Collected: 11/11/24 1434    Specimen: Blood Updated: 11/11/24 1619     Lipase 25 U/L     POC Creatinine [[id number]]  (Normal) Collected: 11/11/24 1552    Specimen: Blood Updated: 11/11/24 1606     Creatinine 0.69 mg/dL      Comment: Serial Number: [id number]:  [id number]        eGFR 95.3 mL/min/1.73     POC Lactate [[id number]]  (Normal) Collected: 11/11/24 1552    Specimen: Blood Updated: 11/11/24 1606     Lactate 1.0 mmol/L      Comment: Serial Number: [id number]:  [id number]       Urinalysis With Microscopic If Indicated (No Culture) - Urine, Clean Catch [[id number]]  (Abnormal) Collected: 11/11/24 1533    Specimen: Urine, Clean Catch Updated: 11/11/24 1556     Color, UA Yellow     Appearance, UA Clear     pH, UA 6.0     Specific Gravity, UA 1.034     Glucose, UA >=1000 mg/dL (3+)     Ketones, UA Trace     Bilirubin, UA Negative     Blood, UA Negative     Protein, UA Trace     Leuk Esterase, UA Trace     Nitrite, UA Negative     Urobilinogen, UA 0.2 E.U./dL    Urinalysis, Microscopic Only - Urine, Clean Catch [[id number]] Collected: 11/11/24 1533    Specimen: Urine, Clean Catch Updated: 11/11/24 1556     RBC, UA 0-2 /HPF      WBC, UA 0-2 /HPF      Bacteria, UA None Seen /HPF      Squamous Epithelial Cells, UA 0-2 /HPF      Hyaline Casts, UA 0-2 /LPF      Methodology Automated Microscopy    Comprehensive Metabolic Panel [[id number]]  (Abnormal) Collected: 11/11/24 1434    Specimen: Blood Updated: 11/11/24 1512     Glucose  448 mg/dL      BUN 17 mg/dL      Creatinine 0.89 mg/dL      Sodium 136 mmol/L      Potassium 4.8 mmol/L      Chloride 102 mmol/L      CO2 24.7 mmol/L      Calcium 8.9 mg/dL      Total Protein 6.5 g/dL      Albumin 3.5 g/dL      ALT (SGPT) 11 U/L      AST (SGOT) 12 U/L      Alkaline Phosphatase 124 U/L      Total Bilirubin 0.6 mg/dL      Globulin 3.0 gm/dL      A/G Ratio 1.2 g/dL      BUN/Creatinine Ratio 19.1     Anion Gap 9.3 mmol/L      eGFR 88.3 mL/min/1.73     Narrative:      GFR Normal >60  Chronic Kidney Disease <60  Kidney Failure <15    The GFR formula is only valid for adults with stable renal function between ages 18 and 70.    [facility] Draw [[id number]] Collected: 11/11/24 1434    Specimen: Blood Updated: 11/11/24 1445    Narrative:      The following orders were created for panel order [facility] Draw.  Procedure                               Abnormality         Status                     ---------                               -----------         ------                     Green Top (Gel)[[id number]]                                  Final result               Lavender Top[[id number]]                                     Final result               Gold Top - SST[[id number]]                                   Final result               Light Blue Top[[id number]]                                   Final result                 Please view results for these tests on the individual orders.    Green Top (Gel) [[id number]] Collected: 11/11/24 1434    Specimen: Blood Updated: 11/11/24 1445     Extra Tube Hold for add-ons.     Comment: Auto resulted.       Lavender Top [[id number]] Collected: 11/11/24 1434    Specimen: Blood Updated: 11/11/24 1445     Extra Tube hold for add-on     Comment: Auto resulted       Gold Top - SST [[id number]] Collected: 11/11/24 1434    Specimen: Blood Updated: 11/11/24 1445     Extra Tube Hold for add-ons.     Comment: Auto resulted.       Light Blue Top [[id number]] Collected: 11/11/24 1434     Specimen: Blood Updated: 11/11/24 1445     Extra Tube Hold for add-ons.     Comment: Auto resulted       CBC & Differential [[id number]]  (Abnormal) Collected: 11/11/24 1434    Specimen: Blood Updated: 11/11/24 1444    Narrative:      The following orders were created for panel order CBC & Differential.  Procedure                               Abnormality         Status                     ---------                               -----------         ------                     CBC Auto Differential[[id number]]        Abnormal            Final result                 Please view results for these tests on the individual orders.    CBC Auto Differential [[id number]]  (Abnormal) Collected: 11/11/24 1434    Specimen: Blood Updated: 11/11/24 1444     WBC 7.06 10*3/mm3      RBC 4.52 10*6/mm3      Hemoglobin 13.8 g/dL      Hematocrit 42.2 %      MCV 93.4 fL      MCH 30.5 pg      MCHC 32.7 g/dL      RDW 15.1 %      RDW-SD 52.6 fl      MPV 9.4 fL      Platelets 189 10*3/mm3      Neutrophil % 75.3 %      Lymphocyte % 11.0 %      Monocyte % 12.2 %      Eosinophil % 0.8 %      Basophil % 0.4 %      Immature Grans % 0.3 %      Neutrophils, Absolute 5.31 10*3/mm3      Lymphocytes, Absolute 0.78 10*3/mm3      Monocytes, Absolute 0.86 10*3/mm3      Eosinophils, Absolute 0.06 10*3/mm3      Basophils, Absolute 0.03 10*3/mm3      Immature Grans, Absolute 0.02 10*3/mm3      nRBC 0.0 /100 WBC     POC Glucose Once [[id number]]  (Abnormal) Collected: 11/11/24 1429    Specimen: Blood Updated: 11/11/24 1431     Glucose 392 mg/dL      Comment: Serial Number: [id number]:  [id number]                Imaging Results (Last 24 Hours)       Procedure Component Value Units Date/Time    CT Head Without Contrast [[id number]] Collected: 11/11/24 1818     Updated: 11/11/24 1823    Narrative:      CT HEAD WO CONTRAST    Date of Exam: 11/11/2024 6:00 PM EST    Indication: gen weakness x3 days, hx of CVA.    Comparison: MRI 4/25/2024    Technique: Axial CT  images were obtained of the head without contrast administration.  Coronal reconstructions were performed.  Automated exposure control and iterative reconstruction methods were used.      Findings:  No large territory infarct.    There is no evidence of hemorrhage.  No mass effect, edema or midline shift    Mild to moderate periventricular and subcortical white matter hypodensities, nonspecific but most likely represents chronic small vessel ischemic changes.    No extra-axial fluid collection.      Notice of obstructive hydrocephalus. Mild diffuse parenchymal volume loss      Status post bilateral lens extraction. Otherwise the orbits unremarkable  Small polyps versus mucous retention cyst within the left maxillary sinus. Otherwise the paranasal sinuses and mastoid air cells are clear.    The visualized soft tissues are unremarkable.  No acute osseous abnormality.      Impression:      Impression:  No acute intracranial abnormality.      Electronically Signed: Billy Ribeiro DO    11/11/2024 6:21 PM EST    Workstation ID: [id number]    XR Chest 2 View [[id number]] Collected: 11/11/24 1752     Updated: 11/11/24 1756    Narrative:        XR CHEST 2 VW    Date of Exam: 11/11/2024 5:26 PM EST    Indication: gen weakness, assess for poss PNA    Comparison: Chest CT August 29, 2024, chest radiograph January 8, 2024    Findings:  No focal or diffuse pulmonary infiltrate is identified.  No pleural effusion or pneumothorax is seen.  Heart size and mediastinal contour appear within normal limits. Right IJ port catheter with tip projecting in the SVC. Mild to moderate disc   degeneration in the thoracic spine.      Impression:      No radiographic findings of acute cardiopulmonary abnormality.    Electronically Signed: Andrea Kirk    11/11/2024 5:54 PM EST    Workstation ID: [id number]    XR Forearm 2 View Right [[id number]] Collected: 11/11/24 1740     Updated: 11/11/24 1745    Narrative:        XR ELBOW 3+ VW RIGHT, XR  FOREARM 2 VW RIGHT    Date of Exam: 11/11/2024 5:26 PM EST    Indication: R FA pain, swelling near elbow    Comparison: None available.    FINDINGS:    Elbow: There is focal soft tissue prominence at the posterior aspect of the proximal forearm overlying the proximal ulna. There is calcific atherosclerosis of the visualized upper extremity arteries.    No fracture is identified. Mineralization and alignment appear within normal limits.  No definite joint effusion. Prominent enthesophyte at the posterior olecranon. There is also enthesopathy and calcific tendinopathy at the medial and lateral   epicondyles. Moderate elbow joint space narrowing and osteophytosis.    Forearm: Focal posterior soft tissue prominence at the proximal forearm on the lateral view. No definite radiopaque foreign body or soft tissue gas is seen in this location. No acute osseous forearm abnormality is seen. There is calcific atherosclerosis   of the visualized upper extremity arteries.      Impression:      1.Focal soft tissue prominence at the posterior aspect of the proximal forearm. This could be related to olecranon bursitis, hematoma, soft tissue infection, or neoplasm.  2.No radiographic findings of acute osseous elbow or forearm abnormality.  3.Moderate elbow osteoarthritis.  4.Calcific atherosclerosis.        Electronically Signed: Andrea Kirk    11/11/2024 5:43 PM EST    Workstation ID: [id number]    XR Elbow 3+ View Right [[id number]] Collected: 11/11/24 1740     Updated: 11/11/24 1745    Narrative:        XR ELBOW 3+ VW RIGHT, XR FOREARM 2 VW RIGHT    Date of Exam: 11/11/2024 5:26 PM EST    Indication: R FA pain, swelling near elbow    Comparison: None available.    FINDINGS:    Elbow: There is focal soft tissue prominence at the posterior aspect of the proximal forearm overlying the proximal ulna. There is calcific atherosclerosis of the visualized upper extremity arteries.    No fracture is identified. Mineralization and alignment  appear within normal limits.  No definite joint effusion. Prominent enthesophyte at the posterior olecranon. There is also enthesopathy and calcific tendinopathy at the medial and lateral   epicondyles. Moderate elbow joint space narrowing and osteophytosis.    Forearm: Focal posterior soft tissue prominence at the proximal forearm on the lateral view. No definite radiopaque foreign body or soft tissue gas is seen in this location. No acute osseous forearm abnormality is seen. There is calcific atherosclerosis   of the visualized upper extremity arteries.      Impression:      1.Focal soft tissue prominence at the posterior aspect of the proximal forearm. This could be related to olecranon bursitis, hematoma, soft tissue infection, or neoplasm.  2.No radiographic findings of acute osseous elbow or forearm abnormality.  3.Moderate elbow osteoarthritis.  4.Calcific atherosclerosis.        Electronically Signed: Andrea Kirk    11/11/2024 5:43 PM EST    Workstation ID: [id number]              Assessment & Plan        This is a 79 y.o. male with:    Active and Resolved Problems  Active Hospital Problems    Diagnosis  POA    Hyperglycemia [R73.9]  Yes      Resolved Hospital Problems   No resolved problems to display.       Diabetes mellitus/Hyperglycemia   - not well controlled   - BG today 190  - A1c 11.80  - continue SSI, lantus and CC diet   - hypoglycemia protocol     Weakness  - Likely secondary to orthostatic hypotension/deconditioning  - PT/OT consulted and recommended SNF  - Orthostatic vitals: BP/HR- Lying 105/61 HR 94, sitting 86/67  and standing 76/51 and      Heart failure with reduced ejection fraction   - Echocardiogram showed EF 30%  - proBNP 3231  - Chest x-ray: Showed no acute abnormality  - Daily weights  - Strict I&O's  - 1.5L fluid restriction   - Cardiac diet, Sodium < 2g  - Cardiology consulted       Metastatic colon cancer  - on chemotherapy outpatient  -Follows up with Dr Glover     VTE  Prophylaxis:  Mechanical VTE prophylaxis orders are present.        The patient desires to be as follows:    CODE STATUS:    Code Status (Patient has no pulse and is not breathing): CPR (Attempt to Resuscitate)  Medical Interventions (Patient has pulse or is breathing): Full Support        Prashanth Guerrero, who can be contacted at [phone], is the designated person to make medical decisions on the patient's behalf if He is incapable of doing so. This was clarified with patient and/or next of kin on 11/11/2024 during the course of this H&P.    Admission Status:  I believe this patient meets inpatient status.    Expected Length of Stay: > 48 hours     PDMP and Medication Dispenses via Sidebar reviewed and consistent with patient reported medications.    I discussed the patient's findings and my recommendations with patient.      Signature:     This document has been electronically signed by ANEUDY Hope on November 12, 2024 13:08 [facility]ist Team

## 2024-11-12 NOTE — H&P
[facility] H&P    Patient Name: Vlad Guerrero  : 1945  MRN: [id number]  Primary Care Physician: Vlad Moreland MD  Date of admission: 2024     Patient Care Team:  Vlad Moreland MD as PCP - General (Internal Medicine)  Percy Davis MD as Surgeon (General Surgery)  Don Kramer MD as Consulting Physician (Hematology and Oncology)          Subjective   History Present Illness     Chief Complaint:   Chief Complaint   Patient presents with    Weakness - Generalized     Weakness     Weakness - Generalized  Associated symptoms include weakness. Pertinent negatives include no nausea or vomiting.     ED 2024  79-year-old male with history of colon cancer type 2 diabetes, hypertension, hyperlipidemia presents the ED with complaints of hyperglycemia for several months along with generalized weakness that has been ongoing for the last 3 weeks.  Patient reports he also has a bump to the right arm that he noticed today however is unsure how long it has been there and denies no trauma to the area.  Patient states he has mild urinary frequency without dysuria this been going on for a week.  Intermittent diarrhea however had a normal bowel movement this morning.  Denies fever, numbness, tingling, cough, congestion, changes in vision, chest pain, shortness of breath, melena, hematochezia, nausea, vomiting, abdominal pain, recent falls, dizziness, headache.  States his glucose is usually around 500 as he believes it was supposed to be this high due to being on chemotherapy for his cancer, reports he does not see an endocrinologist.  States he did not take his insulin today as he had very little to eat.    Observation 2024  Patient agrees with HPI noted above including generalized weakness ongoing for the past 3 weeks.  PT/OT consulted.  Per PT evaluation patient was significantly hypotensive and tachycardic with standing.  Per chart review, patient has a history of CHF  with reduced EF but is established with cardiology.  Will consult cardiology     Review of Systems   Constitutional: Positive for malaise/fatigue.   Gastrointestinal:  Negative for nausea and vomiting.   Neurological:  Positive for loss of balance, tremors and weakness. Negative for light-headedness.   All other systems reviewed and are negative.          Personal History     Past Medical History:   Past Medical History:   Diagnosis Date    Anemia     Colon cancer 2022    colon    Diabetes mellitus     Hyperlipidemia     Hypertension        Surgical History:      Past Surgical History:   Procedure Laterality Date    APPENDECTOMY      CARDIAC CATHETERIZATION      COLON RESECTION N/A 12/15/2022    Procedure: COLON RESECTION RIGHT;  Surgeon: Percy Davis MD;  Location: [facility] MAIN OR;  Service: General;  Laterality: N/A;    COLONOSCOPY N/A 11/11/2022    Procedure: COLONOSCOPY WITH BIOPSY AND POLYPECTOMY;  Surgeon: Billy Julien MD;  Location: [facility] ENDOSCOPY;  Service: Gastroenterology;  Laterality: N/A;  Impression:  1.  Large 4-5cm fulgurating circumferential ulcerated mass in the very proximal part of the ascending colon next to ileocecal valve multiple biopsies were performed.  This is highly concerning for colon malignancy.  2.  2 polyp rem    ENDOSCOPY N/A 11/11/2022    Procedure: ESOPHAGOGASTRODUODENOSCOPY with biopsy X1;  Surgeon: Billy Julien MD;  Location: [facility] ENDOSCOPY;  Service: Gastroenterology;  Laterality: N/A;  5.  Upper endoscopy lamination unremarkable.       PORTACATH PLACEMENT Right 1/12/2023    Procedure: INSERTION OF PORTACATH;  Surgeon: Percy Davis MD;  Location: [facility] MAIN OR;  Service: General;  Laterality: Right;    TONSILLECTOMY             Family History: family history includes Colon cancer (age of onset: 62) in his father; Heart disease in his sister; Pneumonia (age of onset: 94) in his mother. Otherwise pertinent FHx was reviewed and unremarkable.      Social History:  reports that he quit smoking about 58 years ago. His smoking use included cigarettes. He started smoking about 60 years ago. He has a 2 pack-year smoking history. He has never used smokeless tobacco. He reports that he does not drink alcohol and does not use drugs.      Medications:  Prior to Admission medications    Medication Sig Start Date End Date Taking? Authorizing Provider   aspirin 81 MG EC tablet Take 1 tablet by mouth Daily.   Yes Provider, MD Rolly   insulin aspart (NovoLOG FlexPen) 100 UNIT/ML solution pen-injector sc pen Inject 10 Units under the skin into the appropriate area as directed 3 (Three) Times a Day With Meals.   Yes Provider, MD Rolly   insulin glargine (LANTUS, SEMGLEE) 100 UNIT/ML injection Inject 20 Units under the skin into the appropriate area as directed Every Night for 30 days. 1/9/24 4/24/25 Yes Yesenia Arreola APRN   Continuous Blood Gluc Sensor (Dexcom G7 Sensor) misc Use 1 each Every 10 (Ten) Days. Dx: E11.65 1/9/24 11/12/24  Yesenia Arreola APRN   Insulin Pen Needle (Pen Needles) 32G X 4 MM misc Use 1 each 4 (Four) Times a Day Before Meals & at Bedtime. 1/9/24 11/12/24  Yesenia Arreola APRN       Allergies:    Allergies   Allergen Reactions    Penicillins Rash       Objective   Objective     Vital Signs  Temp:  [97.6 °F (36.4 °C)-97.8 °F (36.6 °C)] 97.8 °F (36.6 °C)  Heart Rate:  [] 89  Resp:  [12-19] 19  BP: ()/(51-96) 119/78  SpO2:  [75 %-99 %] 95 %  on   ;   Device (Oxygen Therapy): room air  Body mass index is 21.84 kg/m².    Physical Exam  Vitals and nursing note reviewed.   Constitutional:       Appearance: He is ill-appearing.      Comments: frail   HENT:      Head: Normocephalic and atraumatic.      Right Ear: External ear normal.      Left Ear: External ear normal.      Nose: Nose normal.      Mouth/Throat:      Mouth: Mucous membranes are moist.   Eyes:      Extraocular Movements: Extraocular movements intact.    Cardiovascular:      Rate and Rhythm: Normal rate and regular rhythm.      Pulses: Normal pulses.      Heart sounds: Normal heart sounds.   Pulmonary:      Effort: Pulmonary effort is normal.      Breath sounds: Normal breath sounds.   Abdominal:      General: Abdomen is flat. Bowel sounds are normal.      Palpations: Abdomen is soft.   Musculoskeletal:         General: Normal range of motion.      Cervical back: Normal range of motion.   Skin:     General: Skin is warm.   Neurological:      Mental Status: He is alert and oriented to person, place, and time.      Comments: Tremors noted, patient states it is his baseline    Psychiatric:         Behavior: Behavior normal.         Thought Content: Thought content normal.         Judgment: Judgment normal.           Results Review:  I have personally reviewed most recent cardiac tracings, lab results, and radiology images and interpretations and agree with findings, most notably: CMP, A1c, C-reactive, CBC, UA, respiratory panel, x-ray forearm,.  CT head  Results from last 7 days   Lab Units 11/12/24  0254   WBC 10*3/mm3 8.90   HEMOGLOBIN g/dL 14.8   HEMATOCRIT % 47.1   PLATELETS 10*3/mm3 180     Results from last 7 days   Lab Units 11/12/24  0254 11/11/24  1552 11/11/24  1434   SODIUM mmol/L 140  --  136   POTASSIUM mmol/L 5.1  --  4.8   CHLORIDE mmol/L 104  --  102   CO2 mmol/L 25.3  --  24.7   BUN mg/dL 16  --  17   CREATININE mg/dL 0.88 0.69 0.89   GLUCOSE mg/dL 237*  --  448*   CALCIUM mg/dL 9.0  --  8.9   ALK PHOS U/L  --   --  124*   ALT (SGPT) U/L  --   --  11   AST (SGOT) U/L  --   --  12   LACTATE mmol/L  --  1.0  --      Estimated Creatinine Clearance: 74.7 mL/min (by C-G formula based on SCr of 0.88 mg/dL).  Brief Urine Lab Results  (Last result in the past 365 days)        Color   Clarity   Blood   Leuk Est   Nitrite   Protein   CREAT   Urine HCG        11/11/24 1533 Yellow   Clear   Negative   Trace   Negative   Trace                   Microbiology  Results (last 10 days)       Procedure Component Value - Date/Time    Respiratory Panel PCR w/COVID-19(SARS-CoV-2) JODY/TICO/KG/PAD/COR/OZ In-House, NP Swab in UTM/VTM, 2 HR TAT - Swab, Nasopharynx [[id number]]  (Normal) Collected: 11/11/24 1707    Lab Status: Final result Specimen: Swab from Nasopharynx Updated: 11/11/24 1816     ADENOVIRUS, PCR Not Detected     Coronavirus 229E Not Detected     Coronavirus HKU1 Not Detected     Coronavirus NL63 Not Detected     Coronavirus OC43 Not Detected     COVID19 Not Detected     Human Metapneumovirus Not Detected     Human Rhinovirus/Enterovirus Not Detected     Influenza A PCR Not Detected     Influenza B PCR Not Detected     Parainfluenza Virus 1 Not Detected     Parainfluenza Virus 2 Not Detected     Parainfluenza Virus 3 Not Detected     Parainfluenza Virus 4 Not Detected     RSV, PCR Not Detected     Bordetella pertussis pcr Not Detected     Bordetella parapertussis PCR Not Detected     Chlamydophila pneumoniae PCR Not Detected     Mycoplasma pneumo by PCR Not Detected    Narrative:      In the setting of a positive respiratory panel with a viral infection PLUS a negative procalcitonin without other underlying concern for bacterial infection, consider observing off antibiotics or discontinuation of antibiotics and continue supportive care. If the respiratory panel is positive for atypical bacterial infection (Bordetella pertussis, Chlamydophila pneumoniae, or Mycoplasma pneumoniae), consider antibiotic de-escalation to target atypical bacterial infection.            ECG/EMG Results (most recent)       Procedure Component Value Units Date/Time    ECG 12 Lead Other; gen weakness [[id number]] Collected: 11/11/24 1600     Updated: 11/11/24 2011     QT Interval 445 ms      QTC Interval 504 ms     Narrative:      HEART RATE=77  bpm  RR Zgetqdjs=633  ms  MD Swarfpql=100  ms  P Horizontal Axis=  deg  P Front Axis=80  deg  QRSD Diiitbgt=766  ms  QT Idtpihqg=664  ms  GGvD=096   ms  QRS Axis=-51  deg  T Wave Axis=133  deg  - ABNORMAL ECG -  Sinus rhythm  Prolonged NY interval  Left bundle branch block  ST elevation secondary to IVCD  When compared with ECG of 26-Apr-2024 10:50:43,  Nonspecific significant change  Electronically Signed By: Arnav Dumont (KG) 2024-11-11 20:09:08  Date and Time of Study:2024-11-11 16:00:09            Results for orders placed during the hospital encounter of 04/24/24    Duplex Venous Lower Extremity - Bilateral CAR    Interpretation Summary    Normal bilateral lower extremity venous duplex scan.      Results for orders placed during the hospital encounter of 04/24/24    Adult Transthoracic Echo Complete W/ Cont if Necessary Per Protocol (With Agitated Saline)    Interpretation Summary    The left ventricular cavity is mildly dilated.    Left ventricular diastolic function is consistent with (grade Ia w/high LAP) impaired relaxation.    Small patent foramen ovale present with right to left shunting indicated by saline contrast.    Saline test results are positive with valsalva manuever.    There is mild, bileaflet mitral valve thickening present.    Estimated right ventricular systolic pressure from tricuspid regurgitation is mildly elevated (35-45 mmHg).    Mild pulmonary hypertension is present.      CT Head Without Contrast    Result Date: 11/11/2024  Impression: No acute intracranial abnormality. Electronically Signed: Billy Ribeiro DO  11/11/2024 6:21 PM EST  Workstation ID: [id number]    XR Chest 2 View    Result Date: 11/11/2024  No radiographic findings of acute cardiopulmonary abnormality. Electronically Signed: Andrea Kirk  11/11/2024 5:54 PM EST  Workstation ID: [id number]    XR Forearm 2 View Right    Result Date: 11/11/2024  1.Focal soft tissue prominence at the posterior aspect of the proximal forearm. This could be related to olecranon bursitis, hematoma, soft tissue infection, or neoplasm. 2.No radiographic findings of acute osseous elbow  or forearm abnormality. 3.Moderate elbow osteoarthritis. 4.Calcific atherosclerosis. Electronically Signed: Andrea Kirk  11/11/2024 5:43 PM EST  Workstation ID: [id number]    XR Elbow 3+ View Right    Result Date: 11/11/2024  1.Focal soft tissue prominence at the posterior aspect of the proximal forearm. This could be related to olecranon bursitis, hematoma, soft tissue infection, or neoplasm. 2.No radiographic findings of acute osseous elbow or forearm abnormality. 3.Moderate elbow osteoarthritis. 4.Calcific atherosclerosis. Electronically Signed: Andrea Kirk  11/11/2024 5:43 PM EST  Workstation ID: [id number]       Estimated Creatinine Clearance: 74.7 mL/min (by C-G formula based on SCr of 0.88 mg/dL).    Assessment & Plan   Assessment/Plan       Active Hospital Problems    Diagnosis  POA    Hyperglycemia [R73.9]  Yes      Resolved Hospital Problems   No resolved problems to display.       Diabetes mellitus/Hyperglycemia   -Poorly controlled   Lab Results   Component Value Date    GLUCOSE 237 (H) 11/12/2024    GLUCOSE 448 (C) 11/11/2024    GLUCOSE 320 (H) 10/10/2024    GLUCOSE 143 (H) 09/24/2024   -Glucose in the   -Continue 20 units of Lantus, 7 units Humalog 3 times daily  -SSI  -Endocrinologist consulted  -Diabetes educator consulted  -Diabetic diet  -Monitor before meals and at bedtime     Weakness  BP Readings from Last 1 Encounters:   11/12/24 119/78   -Likely secondary to orthostatic hypotension  -PT/OT consulted and recommended SNF  -Ortho BP/HR- Lying 105/61 HR 94, sitting 86/67  and standing 76/51 and   - Patient has a history of CHF with reduced EF  -Give 250ml bolus  -Consult cardiology   -Not on BP or heart medications  -Per UR, clinical supports IP criteria  -Consult hospitalist    Heart failure with reduced ejection fraction   Lab Results   Component Value Date    TROPONINT 30 (H) 03/12/2023    TROPONINT 27 (H) 03/11/2023     11/12/2024   -Recent echocardiogram showed EF  30%  -Chest x-ray: Showed no acute abnormality  -Patient is not on GDMT and is positive for orthostatic hypotension  -Cardiology consulted   -Monitor I's and O's and daily weights  -2 g sodium diet     Metastatic colon cancer  -Follows up with Dr Glover             VTE Prophylaxis - Active VTE Prophylaxis  Mechanical:        Start        11/11/24 2021  Maintain Sequential Compression Device  Continuous                          Select Pharmacologic VTE Prophylaxis if Desired & Appropriate      CODE STATUS:    Code Status and Medical Interventions: CPR (Attempt to Resuscitate); Full Support   Ordered at: 11/11/24 2023     Code Status (Patient has no pulse and is not breathing):    CPR (Attempt to Resuscitate)     Medical Interventions (Patient has pulse or is breathing):    Full Support       This patient has been examined wearing personal protective equipment.     I discussed the patient's findings and my recommendations with patient and nursing staff.      Signature:Electronically signed by ANEUDY Murillo, 11/12/24, 11:19 AM EST.

## 2024-11-12 NOTE — DISCHARGE PLACEMENT REQUEST
"Vlad Guerrero \"Fredi\" (79 y.o. Male)       Date of Birth   1945    Social Security Number       Address   [address] [place] [address] [place] [address]    Home Phone   [phone]    MRN   [id number]       [facility]    Marital Status                               Admission Date   11/11/24    Admission Type   Emergency    Admitting Provider   Klarissa August MD    Attending Provider   Klarissa August MD    Department, Room/Bed   [facility] OBSERVATION, 224/1       Discharge Date       Discharge Disposition       Discharge Destination                                 Attending Provider: Klarissa August MD    Allergies: Penicillins    Isolation: None   Infection: None   Code Status: CPR    Ht: 185.4 cm (73\")   Wt: 68.8 kg (151 lb 9.6 oz)    Admission Cmt: None   Principal Problem: None                  Active Insurance as of 11/11/2024       Primary Coverage       Payor Plan Insurance Group Employer/Plan Group    ANTHEM MEDICARE REPLACEMENT ANTHEM MEDICARE ADVANTAGE [id number]       Payor Plan Address Payor Plan Phone Number Payor Plan Fax Number Effective Dates    [address] [phone]  1/1/2022 - None Entered    [facility] [address]         Subscriber Name Subscriber Birth Date Member ID       VLAD GUERRERO 1945 [id number]               Secondary Coverage       Payor Plan Insurance Group Employer/Plan Group    [facility] [employer]        Payor Plan Address Payor Plan Phone Number Payor Plan Fax Number Effective Dates    [address] [phone]  11/11/2024 - None Entered    [facility] [id number]         Subscriber Name Subscriber Birth Date Member ID       VLAD GUERRERO 1945 [id number]                     Emergency Contacts        (Rel.) Home Phone Work Phone Mobile Phone    CARLY GUERRERO (Son) [phone] -- [phone]    J LUIS GUERRERO (Son) -- -- [phone]    MAHESH GUERRERO (Son) -- -- [phone]    JESSICA ORTEGA (Relative) " [phone] -- [phone]                 History & Physical        Liz Fish APRN at 24 1307              [facility]  History & Physical    Patient Name: Vlad Guerrero  : 1945  MRN: [id number]  Primary Care Physician:  Vlad Moreland MD  Date of admission: 2024  Date and Time of Service: 2024 at 1300    Subjective      Chief Complaint: generalized weakness    History of Present Illness: Vlad Guerrero is a 79 y.o. male with a CMH of anemia, colon cancer, DM, HLD, HTN who presented to [facility] on 2024 with hyperglycemia and generalized weakness. This has been going on for the past three weeks. Has a bump on his right arm that he doesn't know where it came from. Also complains of urinary frequency, diarrhea. Says his blood sugar at home has been running around 500. Patient was initially admitted under observation where he worked with PT who noted the patient was very hypotensive and tachycardic while standing. Cardiology was consulted as well as the hospital team. The hospital team will now be taking over care of this patient.       Review of Systems   Constitutional:  Positive for fatigue.   Musculoskeletal:  Positive for myalgias.   Neurological:  Positive for weakness.       Personal History     Past Medical History:   Diagnosis Date    Anemia     Colon cancer     colon    Diabetes mellitus     Hyperlipidemia     Hypertension        Past Surgical History:   Procedure Laterality Date    APPENDECTOMY      CARDIAC CATHETERIZATION      COLON RESECTION N/A 12/15/2022    Procedure: COLON RESECTION RIGHT;  Surgeon: Percy Davis MD;  Location: [facility] MAIN OR;  Service: General;  Laterality: N/A;    COLONOSCOPY N/A 2022    Procedure: COLONOSCOPY WITH BIOPSY AND POLYPECTOMY;  Surgeon: Billy Julien MD;  Location: [facility] ENDOSCOPY;  Service: Gastroenterology;  Laterality: N/A;  Impression:  1.  Large 4-5cm fulgurating  circumferential ulcerated mass in the very proximal part of the ascending colon next to ileocecal valve multiple biopsies were performed.  This is highly concerning for colon malignancy.  2.  2 polyp rem    ENDOSCOPY N/A 11/11/2022    Procedure: ESOPHAGOGASTRODUODENOSCOPY with biopsy X1;  Surgeon: Billy Julien MD;  Location: [facility] ENDOSCOPY;  Service: Gastroenterology;  Laterality: N/A;  5.  Upper endoscopy lamination unremarkable.       PORTACATH PLACEMENT Right 1/12/2023    Procedure: INSERTION OF PORTACATH;  Surgeon: Percy Davis MD;  Location: [facility] MAIN OR;  Service: General;  Laterality: Right;    TONSILLECTOMY         Family History: family history includes Colon cancer (age of onset: 62) in his father; Heart disease in his sister; Pneumonia (age of onset: 94) in his mother. Otherwise pertinent FHx was reviewed and not pertinent to current issue.    Social History:  reports that he quit smoking about 58 years ago. His smoking use included cigarettes. He started smoking about 60 years ago. He has a 2 pack-year smoking history. He has never used smokeless tobacco. He reports that he does not drink alcohol and does not use drugs.    Home Medications:  Prior to Admission Medications       Prescriptions Last Dose Informant Patient Reported? Taking?    aspirin 81 MG EC tablet 11/10/2024  Yes Yes    Take 1 tablet by mouth Daily.    insulin aspart (NovoLOG FlexPen) 100 UNIT/ML solution pen-injector sc pen 11/10/2024  Yes Yes    Inject 10 Units under the skin into the appropriate area as directed 3 (Three) Times a Day With Meals.    insulin glargine (LANTUS, SEMGLEE) 100 UNIT/ML injection 11/10/2024  No Yes    Inject 20 Units under the skin into the appropriate area as directed Every Night for 30 days.              Allergies:  Allergies   Allergen Reactions    Penicillins Rash       Objective      Vitals:   Temp:  [97.6 °F (36.4 °C)-97.8 °F (36.6 °C)] 97.8 °F (36.6 °C)  Heart Rate:  [] 94  Resp:   [12-19] 19  BP: ()/(51-96) 109/60  Body mass index is 21.84 kg/m².  Physical Exam  Vitals and nursing note reviewed.   Constitutional:       Appearance: He is ill-appearing.   HENT:      Head: Normocephalic and atraumatic.      Nose: Nose normal.      Mouth/Throat:      Mouth: Mucous membranes are moist.   Eyes:      Extraocular Movements: Extraocular movements intact.      Pupils: Pupils are equal, round, and reactive to light.   Cardiovascular:      Rate and Rhythm: Normal rate and regular rhythm.      Pulses: Normal pulses.   Pulmonary:      Effort: Pulmonary effort is normal.      Breath sounds: Normal breath sounds.   Abdominal:      General: Bowel sounds are normal.      Palpations: Abdomen is soft.   Musculoskeletal:         General: Normal range of motion.      Cervical back: Normal range of motion and neck supple.   Skin:     General: Skin is warm.      Capillary Refill: Capillary refill takes less than 2 seconds.   Neurological:      Mental Status: He is alert and oriented to person, place, and time.      Motor: Tremor present.         Diagnostic Data:  Lab Results (last 24 hours)       Procedure Component Value Units Date/Time    POC Glucose Once [[id number]]  (Normal) Collected: 11/12/24 1200    Specimen: Blood Updated: 11/12/24 1202     Glucose 92 mg/dL      Comment: Serial Number: [id number]:  [id number]       BNP [[id number]]  (Abnormal) Collected: 11/12/24 0254    Specimen: Blood Updated: 11/12/24 1143     proBNP 3,231.0 pg/mL     Narrative:      This assay is used as an aid in the diagnosis of individuals suspected of having heart failure. It can be used as an aid in the diagnosis of acute decompensated heart failure (ADHF) in patients presenting with signs and symptoms of ADHF to the emergency department (ED). In addition, NT-proBNP of <300 pg/mL indicates ADHF is not likely.    Age Range Result Interpretation  NT-proBNP Concentration (pg/mL:      <50             Positive             >450                   Gray                 300-450                    Negative             <300    50-75           Positive            >900                  Gray                300-900                  Negative            <300      >75             Positive            >1800                  Gray                300-1800                  Negative            <300    Hemoglobin A1c [[id number]]  (Abnormal) Collected: 11/12/24 0254    Specimen: Blood Updated: 11/12/24 0833     Hemoglobin A1C 11.80 %     POC Glucose 4x Daily Before Meals & at Bedtime [[id number]]  (Abnormal) Collected: 11/12/24 0718    Specimen: Blood Updated: 11/12/24 0720     Glucose 190 mg/dL      Comment: Serial Number: [id number]:  [id number]       Basic Metabolic Panel [[id number]]  (Abnormal) Collected: 11/12/24 0254    Specimen: Blood Updated: 11/12/24 0430     Glucose 237 mg/dL      BUN 16 mg/dL      Creatinine 0.88 mg/dL      Sodium 140 mmol/L      Potassium 5.1 mmol/L      Comment: Specimen hemolyzed.  Result may be falsely elevated.        Chloride 104 mmol/L      CO2 25.3 mmol/L      Calcium 9.0 mg/dL      BUN/Creatinine Ratio 18.2     Anion Gap 10.7 mmol/L      eGFR 88.6 mL/min/1.73     Narrative:      GFR Normal >60  Chronic Kidney Disease <60  Kidney Failure <15    The GFR formula is only valid for adults with stable renal function between ages 18 and 70.    CBC Auto Differential [[id number]]  (Abnormal) Collected: 11/12/24 0254    Specimen: Blood Updated: 11/12/24 0408     WBC 8.90 10*3/mm3      RBC 4.82 10*6/mm3      Hemoglobin 14.8 g/dL      Hematocrit 47.1 %      MCV 97.7 fL      MCH 30.7 pg      MCHC 31.4 g/dL      RDW 15.3 %      RDW-SD 55.0 fl      MPV 10.0 fL      Platelets 180 10*3/mm3      Neutrophil % 75.2 %      Lymphocyte % 13.7 %      Monocyte % 10.3 %      Eosinophil % 0.1 %      Basophil % 0.4 %      Immature Grans % 0.3 %      Neutrophils, Absolute 6.68 10*3/mm3      Lymphocytes, Absolute 1.22 10*3/mm3      Monocytes,  Absolute 0.92 10*3/mm3      Eosinophils, Absolute 0.01 10*3/mm3      Basophils, Absolute 0.04 10*3/mm3      Immature Grans, Absolute 0.03 10*3/mm3      nRBC 0.0 /100 WBC     POC Glucose Once [[id number]]  (Abnormal) Collected: 11/11/24 2126    Specimen: Blood Updated: 11/11/24 2128     Glucose 216 mg/dL      Comment: Serial Number: [id number]:  [id number]       Blood Gas, Mixed [[id number]]  (Abnormal) Collected: 11/11/24 1552    Specimen: Blood Updated: 11/11/24 1946     pH, mixed 7.43 pH units      PCO2 MIXED 41.9 mmHg      PO2 MIXED 55.3 mmHg      HCO3 MIXED 27.5 mmol/L      CO2 Content <1 mmol/L      Base Excess, Arterial 2.7 mmol/L      Comment: Serial Number: [id number]:  [id number]        O2 SATURATION MIXED 88.9 %      Hemodilution No     Site Left Radial     Tod's Test Positive     Modality Room Air     FIO2 21 %      Base Deficit 3.1 mEq/liter      Notified Who --     Notified Time --     Read Back --    POC Glucose Once [[id number]]  (Abnormal) Collected: 11/11/24 1552    Specimen: Blood Updated: 11/11/24 1943     Glucose 445 mg/dL      Comment: Serial Number: [id number]:  [id number]        Notified Who --     Notified Time --     Read Back --    POCT Electrolytes +HGB +HCT [[id number]]  (Abnormal) Collected: 11/11/24 1552    Specimen: Blood Updated: 11/11/24 1943     Sodium 138 mmol/L      POC Potassium 5.0 mmol/L      Ionized Calcium 1.22 mmol/L      Comment: Serial Number: [id number]:  [id number]        Glucose 445 mg/dL      Hematocrit 43 %      Hemoglobin 14.6 g/dL      Notified Time --     Notified Who --     Read Back --    POC Glucose Once [[id number]]  (Abnormal) Collected: 11/11/24 1832    Specimen: Blood Updated: 11/11/24 1835     Glucose 279 mg/dL      Comment: Serial Number: [id number]:  [id number]       Respiratory Panel PCR w/COVID-19(SARS-CoV-2) JODY/TICO/KG/PAD/COR/OZ In-House, NP Swab in UTM/VTM, 2 HR TAT - Swab, Nasopharynx [[id number]]  (Normal) Collected: 11/11/24 7154     Specimen: Swab from Nasopharynx Updated: 11/11/24 1816     ADENOVIRUS, PCR Not Detected     Coronavirus 229E Not Detected     Coronavirus HKU1 Not Detected     Coronavirus NL63 Not Detected     Coronavirus OC43 Not Detected     COVID19 Not Detected     Human Metapneumovirus Not Detected     Human Rhinovirus/Enterovirus Not Detected     Influenza A PCR Not Detected     Influenza B PCR Not Detected     Parainfluenza Virus 1 Not Detected     Parainfluenza Virus 2 Not Detected     Parainfluenza Virus 3 Not Detected     Parainfluenza Virus 4 Not Detected     RSV, PCR Not Detected     Bordetella pertussis pcr Not Detected     Bordetella parapertussis PCR Not Detected     Chlamydophila pneumoniae PCR Not Detected     Mycoplasma pneumo by PCR Not Detected    Narrative:      In the setting of a positive respiratory panel with a viral infection PLUS a negative procalcitonin without other underlying concern for bacterial infection, consider observing off antibiotics or discontinuation of antibiotics and continue supportive care. If the respiratory panel is positive for atypical bacterial infection (Bordetella pertussis, Chlamydophila pneumoniae, or Mycoplasma pneumoniae), consider antibiotic de-escalation to target atypical bacterial infection.    POC Glucose Q1H [[id number]]  (Abnormal) Collected: 11/11/24 1807    Specimen: Blood Updated: 11/11/24 1809     Glucose 324 mg/dL      Comment: Serial Number: [id number]:  [id number]       Osmolality, Serum [[id number]]  (Abnormal) Collected: 11/11/24 1434    Specimen: Blood Updated: 11/11/24 1753     Osmolality 316 mOsm/kg     Ketone Bodies, Serum (Not performed at [facility]) [[id number]]  (Normal) Collected: 11/11/24 1434    Specimen: Blood Updated: 11/11/24 1723    Narrative:      The following orders were created for panel order Ketone Bodies, Serum (Not performed at [facility]).  Procedure                               Abnormality         Status                     ---------                                -----------         ------                     Acetone[[id number]]                      Normal              Final result                 Please view results for these tests on the individual orders.    Acetone [[id number]]  (Normal) Collected: 11/11/24 1434    Specimen: Blood Updated: 11/11/24 1723     Acetone Negative    Magnesium [[id number]]  (Normal) Collected: 11/11/24 1434    Specimen: Blood Updated: 11/11/24 1619     Magnesium 1.6 mg/dL     Phosphorus [[id number]]  (Normal) Collected: 11/11/24 1434    Specimen: Blood Updated: 11/11/24 1619     Phosphorus 3.5 mg/dL     Lipase [[id number]]  (Normal) Collected: 11/11/24 1434    Specimen: Blood Updated: 11/11/24 1619     Lipase 25 U/L     POC Creatinine [[id number]]  (Normal) Collected: 11/11/24 1552    Specimen: Blood Updated: 11/11/24 1606     Creatinine 0.69 mg/dL      Comment: Serial Number: [id number]:  [id number]        eGFR 95.3 mL/min/1.73     POC Lactate [[id number]]  (Normal) Collected: 11/11/24 1552    Specimen: Blood Updated: 11/11/24 1606     Lactate 1.0 mmol/L      Comment: Serial Number: [id number]:  [id number]       Urinalysis With Microscopic If Indicated (No Culture) - Urine, Clean Catch [[id number]]  (Abnormal) Collected: 11/11/24 1533    Specimen: Urine, Clean Catch Updated: 11/11/24 1556     Color, UA Yellow     Appearance, UA Clear     pH, UA 6.0     Specific Gravity, UA 1.034     Glucose, UA >=1000 mg/dL (3+)     Ketones, UA Trace     Bilirubin, UA Negative     Blood, UA Negative     Protein, UA Trace     Leuk Esterase, UA Trace     Nitrite, UA Negative     Urobilinogen, UA 0.2 E.U./dL    Urinalysis, Microscopic Only - Urine, Clean Catch [[id number]] Collected: 11/11/24 1533    Specimen: Urine, Clean Catch Updated: 11/11/24 1556     RBC, UA 0-2 /HPF      WBC, UA 0-2 /HPF      Bacteria, UA None Seen /HPF      Squamous Epithelial Cells, UA 0-2 /HPF      Hyaline Casts, UA 0-2 /LPF      Methodology Automated Microscopy     Comprehensive Metabolic Panel [[id number]]  (Abnormal) Collected: 11/11/24 1434    Specimen: Blood Updated: 11/11/24 1512     Glucose 448 mg/dL      BUN 17 mg/dL      Creatinine 0.89 mg/dL      Sodium 136 mmol/L      Potassium 4.8 mmol/L      Chloride 102 mmol/L      CO2 24.7 mmol/L      Calcium 8.9 mg/dL      Total Protein 6.5 g/dL      Albumin 3.5 g/dL      ALT (SGPT) 11 U/L      AST (SGOT) 12 U/L      Alkaline Phosphatase 124 U/L      Total Bilirubin 0.6 mg/dL      Globulin 3.0 gm/dL      A/G Ratio 1.2 g/dL      BUN/Creatinine Ratio 19.1     Anion Gap 9.3 mmol/L      eGFR 88.3 mL/min/1.73     Narrative:      GFR Normal >60  Chronic Kidney Disease <60  Kidney Failure <15    The GFR formula is only valid for adults with stable renal function between ages 18 and 70.    [place] Draw [[id number]] Collected: 11/11/24 1434    Specimen: Blood Updated: 11/11/24 1445    Narrative:      The following orders were created for panel order [place] Draw.  Procedure                               Abnormality         Status                     ---------                               -----------         ------                     Green Top (Gel)[[id number]]                                  Final result               Lavender Top[[id number]]                                     Final result               Gold Top - SST[[id number]]                                   Final result               Light Blue Top[[id number]]                                   Final result                 Please view results for these tests on the individual orders.    Green Top (Gel) [[id number]] Collected: 11/11/24 1434    Specimen: Blood Updated: 11/11/24 1445     Extra Tube Hold for add-ons.     Comment: Auto resulted.       Lavender Top [[id number]] Collected: 11/11/24 1434    Specimen: Blood Updated: 11/11/24 1445     Extra Tube hold for add-on     Comment: Auto resulted       Gold Top - SST [[id number]] Collected: 11/11/24 1434    Specimen: Blood Updated:  11/11/24 1445     Extra Tube Hold for add-ons.     Comment: Auto resulted.       Light Blue Top [[id number]] Collected: 11/11/24 1434    Specimen: Blood Updated: 11/11/24 1445     Extra Tube Hold for add-ons.     Comment: Auto resulted       CBC & Differential [[id number]]  (Abnormal) Collected: 11/11/24 1434    Specimen: Blood Updated: 11/11/24 1444    Narrative:      The following orders were created for panel order CBC & Differential.  Procedure                               Abnormality         Status                     ---------                               -----------         ------                     CBC Auto Differential[[id number]]        Abnormal            Final result                 Please view results for these tests on the individual orders.    CBC Auto Differential [[id number]]  (Abnormal) Collected: 11/11/24 1434    Specimen: Blood Updated: 11/11/24 1444     WBC 7.06 10*3/mm3      RBC 4.52 10*6/mm3      Hemoglobin 13.8 g/dL      Hematocrit 42.2 %      MCV 93.4 fL      MCH 30.5 pg      MCHC 32.7 g/dL      RDW 15.1 %      RDW-SD 52.6 fl      MPV 9.4 fL      Platelets 189 10*3/mm3      Neutrophil % 75.3 %      Lymphocyte % 11.0 %      Monocyte % 12.2 %      Eosinophil % 0.8 %      Basophil % 0.4 %      Immature Grans % 0.3 %      Neutrophils, Absolute 5.31 10*3/mm3      Lymphocytes, Absolute 0.78 10*3/mm3      Monocytes, Absolute 0.86 10*3/mm3      Eosinophils, Absolute 0.06 10*3/mm3      Basophils, Absolute 0.03 10*3/mm3      Immature Grans, Absolute 0.02 10*3/mm3      nRBC 0.0 /100 WBC     POC Glucose Once [[id number]]  (Abnormal) Collected: 11/11/24 1429    Specimen: Blood Updated: 11/11/24 1431     Glucose 392 mg/dL      Comment: Serial Number: [id number]:  [id number]                Imaging Results (Last 24 Hours)       Procedure Component Value Units Date/Time    CT Head Without Contrast [[id number]] Collected: 11/11/24 1818     Updated: 11/11/24 1823    Narrative:      CT HEAD WO  CONTRAST    Date of Exam: 11/11/2024 6:00 PM EST    Indication: gen weakness x3 days, hx of CVA.    Comparison: MRI 4/25/2024    Technique: Axial CT images were obtained of the head without contrast administration.  Coronal reconstructions were performed.  Automated exposure control and iterative reconstruction methods were used.      Findings:  No large territory infarct.    There is no evidence of hemorrhage.  No mass effect, edema or midline shift    Mild to moderate periventricular and subcortical white matter hypodensities, nonspecific but most likely represents chronic small vessel ischemic changes.    No extra-axial fluid collection.      Notice of obstructive hydrocephalus. Mild diffuse parenchymal volume loss      Status post bilateral lens extraction. Otherwise the orbits unremarkable  Small polyps versus mucous retention cyst within the left maxillary sinus. Otherwise the paranasal sinuses and mastoid air cells are clear.    The visualized soft tissues are unremarkable.  No acute osseous abnormality.      Impression:      Impression:  No acute intracranial abnormality.      Electronically Signed: Billy Ribeiro DO    11/11/2024 6:21 PM EST    Workstation ID: [id number]    XR Chest 2 View [[id number]] Collected: 11/11/24 1752     Updated: 11/11/24 1756    Narrative:        XR CHEST 2 VW    Date of Exam: 11/11/2024 5:26 PM EST    Indication: gen weakness, assess for poss PNA    Comparison: Chest CT August 29, 2024, chest radiograph January 8, 2024    Findings:  No focal or diffuse pulmonary infiltrate is identified.  No pleural effusion or pneumothorax is seen.  Heart size and mediastinal contour appear within normal limits. Right IJ port catheter with tip projecting in the SVC. Mild to moderate disc   degeneration in the thoracic spine.      Impression:      No radiographic findings of acute cardiopulmonary abnormality.    Electronically Signed: Andrea Kirk    11/11/2024 5:54 PM EST    Workstation ID:  [id number]    XR Forearm 2 View Right [[id number]] Collected: 11/11/24 1740     Updated: 11/11/24 1745    Narrative:        XR ELBOW 3+ VW RIGHT, XR FOREARM 2 VW RIGHT    Date of Exam: 11/11/2024 5:26 PM EST    Indication: R FA pain, swelling near elbow    Comparison: None available.    FINDINGS:    Elbow: There is focal soft tissue prominence at the posterior aspect of the proximal forearm overlying the proximal ulna. There is calcific atherosclerosis of the visualized upper extremity arteries.    No fracture is identified. Mineralization and alignment appear within normal limits.  No definite joint effusion. Prominent enthesophyte at the posterior olecranon. There is also enthesopathy and calcific tendinopathy at the medial and lateral   epicondyles. Moderate elbow joint space narrowing and osteophytosis.    Forearm: Focal posterior soft tissue prominence at the proximal forearm on the lateral view. No definite radiopaque foreign body or soft tissue gas is seen in this location. No acute osseous forearm abnormality is seen. There is calcific atherosclerosis   of the visualized upper extremity arteries.      Impression:      1.Focal soft tissue prominence at the posterior aspect of the proximal forearm. This could be related to olecranon bursitis, hematoma, soft tissue infection, or neoplasm.  2.No radiographic findings of acute osseous elbow or forearm abnormality.  3.Moderate elbow osteoarthritis.  4.Calcific atherosclerosis.        Electronically Signed: Andrea Kirk    11/11/2024 5:43 PM EST    Workstation ID: [id number]    XR Elbow 3+ View Right [[id number]] Collected: 11/11/24 1740     Updated: 11/11/24 1745    Narrative:        XR ELBOW 3+ VW RIGHT, XR FOREARM 2 VW RIGHT    Date of Exam: 11/11/2024 5:26 PM EST    Indication: R FA pain, swelling near elbow    Comparison: None available.    FINDINGS:    Elbow: There is focal soft tissue prominence at the posterior aspect of the proximal forearm overlying the  proximal ulna. There is calcific atherosclerosis of the visualized upper extremity arteries.    No fracture is identified. Mineralization and alignment appear within normal limits.  No definite joint effusion. Prominent enthesophyte at the posterior olecranon. There is also enthesopathy and calcific tendinopathy at the medial and lateral   epicondyles. Moderate elbow joint space narrowing and osteophytosis.    Forearm: Focal posterior soft tissue prominence at the proximal forearm on the lateral view. No definite radiopaque foreign body or soft tissue gas is seen in this location. No acute osseous forearm abnormality is seen. There is calcific atherosclerosis   of the visualized upper extremity arteries.      Impression:      1.Focal soft tissue prominence at the posterior aspect of the proximal forearm. This could be related to olecranon bursitis, hematoma, soft tissue infection, or neoplasm.  2.No radiographic findings of acute osseous elbow or forearm abnormality.  3.Moderate elbow osteoarthritis.  4.Calcific atherosclerosis.        Electronically Signed: Andrea Kirk    11/11/2024 5:43 PM EST    Workstation ID: [id number]              Assessment & Plan        This is a 79 y.o. male with:    Active and Resolved Problems  Active Hospital Problems    Diagnosis  POA    Hyperglycemia [R73.9]  Yes      Resolved Hospital Problems   No resolved problems to display.       Diabetes mellitus/Hyperglycemia   - not well controlled   - BG today 190  - A1c 11.80  - continue SSI, lantus and CC diet   - hypoglycemia protocol     Weakness  - Likely secondary to orthostatic hypotension/deconditioning  - PT/OT consulted and recommended SNF  - Orthostatic vitals: BP/HR- Lying 105/61 HR 94, sitting 86/67  and standing 76/51 and      Heart failure with reduced ejection fraction   - Echocardiogram showed EF 30%  - proBNP 3231  - Chest x-ray: Showed no acute abnormality  - Daily weights  - Strict I&O's  - 1.5L fluid  restriction   - Cardiac diet, Sodium < 2g  - Cardiology consulted       Metastatic colon cancer  - on chemotherapy outpatient  -Follows up with Dr Glover     VTE Prophylaxis:  Mechanical VTE prophylaxis orders are present.        The patient desires to be as follows:    CODE STATUS:    Code Status (Patient has no pulse and is not breathing): CPR (Attempt to Resuscitate)  Medical Interventions (Patient has pulse or is breathing): Full Support        Prashanth Guerrero, who can be contacted at [phone], is the designated person to make medical decisions on the patient's behalf if He is incapable of doing so. This was clarified with patient and/or next of kin on 2024 during the course of this H&P.    Admission Status:  I believe this patient meets inpatient status.    Expected Length of Stay: > 48 hours     PDMP and Medication Dispenses via Sidebar reviewed and consistent with patient reported medications.    I discussed the patient's findings and my recommendations with patient.      Signature:     This document has been electronically signed by ANEUDY Hope on 2024 13:08 [facility]      Electronically signed by Liz Fish APRN at 24 1358       Yesenia Arreola APRN at 24 1118          [facility] H&P    Patient Name: Vlad Guerrero  : 1945  MRN: [id number]  Primary Care Physician: Vlad Moreland MD  Date of admission: 2024     Patient Care Team:  Vlad Moreland MD as PCP - General (Internal Medicine)  Percy Davis MD as Surgeon (General Surgery)  Don Kramer MD as Consulting Physician (Hematology and Oncology)          Subjective  History Present Illness     Chief Complaint:   Chief Complaint   Patient presents with    Weakness - Generalized     Weakness     Weakness - Generalized  Associated symptoms include weakness. Pertinent negatives include no nausea or vomiting.     ED 2024  79-year-old male with  history of colon cancer type 2 diabetes, hypertension, hyperlipidemia presents the ED with complaints of hyperglycemia for several months along with generalized weakness that has been ongoing for the last 3 weeks.  Patient reports he also has a bump to the right arm that he noticed today however is unsure how long it has been there and denies no trauma to the area.  Patient states he has mild urinary frequency without dysuria this been going on for a week.  Intermittent diarrhea however had a normal bowel movement this morning.  Denies fever, numbness, tingling, cough, congestion, changes in vision, chest pain, shortness of breath, melena, hematochezia, nausea, vomiting, abdominal pain, recent falls, dizziness, headache.  States his glucose is usually around 500 as he believes it was supposed to be this high due to being on chemotherapy for his cancer, reports he does not see an endocrinologist.  States he did not take his insulin today as he had very little to eat.    Observation 11/12/2024  Patient agrees with HPI noted above including generalized weakness ongoing for the past 3 weeks.  PT/OT consulted.  Per PT evaluation patient was significantly hypotensive and tachycardic with standing.  Per chart review, patient has a history of CHF with reduced EF but is established with cardiology.  Will consult cardiology     Review of Systems   Constitutional: Positive for malaise/fatigue.   Gastrointestinal:  Negative for nausea and vomiting.   Neurological:  Positive for loss of balance, tremors and weakness. Negative for light-headedness.   All other systems reviewed and are negative.          Personal History     Past Medical History:   Past Medical History:   Diagnosis Date    Anemia     Colon cancer 2022    colon    Diabetes mellitus     Hyperlipidemia     Hypertension        Surgical History:      Past Surgical History:   Procedure Laterality Date    APPENDECTOMY      CARDIAC CATHETERIZATION      COLON RESECTION N/A  12/15/2022    Procedure: COLON RESECTION RIGHT;  Surgeon: Percy Davis MD;  Location: [facility] MAIN OR;  Service: General;  Laterality: N/A;    COLONOSCOPY N/A 11/11/2022    Procedure: COLONOSCOPY WITH BIOPSY AND POLYPECTOMY;  Surgeon: Billy Julien MD;  Location: [facility] ENDOSCOPY;  Service: Gastroenterology;  Laterality: N/A;  Impression:  1.  Large 4-5cm fulgurating circumferential ulcerated mass in the very proximal part of the ascending colon next to ileocecal valve multiple biopsies were performed.  This is highly concerning for colon malignancy.  2.  2 polyp rem    ENDOSCOPY N/A 11/11/2022    Procedure: ESOPHAGOGASTRODUODENOSCOPY with biopsy X1;  Surgeon: Billy Julien MD;  Location: [facility] ENDOSCOPY;  Service: Gastroenterology;  Laterality: N/A;  5.  Upper endoscopy lamination unremarkable.       PORTACATH PLACEMENT Right 1/12/2023    Procedure: INSERTION OF PORTACATH;  Surgeon: Percy Davis MD;  Location: [facility] MAIN OR;  Service: General;  Laterality: Right;    TONSILLECTOMY             Family History: family history includes Colon cancer (age of onset: 62) in his father; Heart disease in his sister; Pneumonia (age of onset: 94) in his mother. Otherwise pertinent FHx was reviewed and unremarkable.     Social History:  reports that he quit smoking about 58 years ago. His smoking use included cigarettes. He started smoking about 60 years ago. He has a 2 pack-year smoking history. He has never used smokeless tobacco. He reports that he does not drink alcohol and does not use drugs.      Medications:  Prior to Admission medications    Medication Sig Start Date End Date Taking? Authorizing Provider   aspirin 81 MG EC tablet Take 1 tablet by mouth Daily.   Yes Rolly Jason MD   insulin aspart (NovoLOG FlexPen) 100 UNIT/ML solution pen-injector sc pen Inject 10 Units under the skin into the appropriate area as directed 3 (Three) Times a Day With Meals.   Yes Rolly Jason  MD   insulin glargine (LANTUS, SEMGLEE) 100 UNIT/ML injection Inject 20 Units under the skin into the appropriate area as directed Every Night for 30 days. 1/9/24 4/24/25 Yes Yesenia Arreola APRN   Continuous Blood Gluc Sensor (Dexcom G7 Sensor) misc Use 1 each Every 10 (Ten) Days. Dx: E11.65 1/9/24 11/12/24  Yesenia Arreola APRN   Insulin Pen Needle (Pen Needles) 32G X 4 MM misc Use 1 each 4 (Four) Times a Day Before Meals & at Bedtime. 1/9/24 11/12/24  Yesenia Arreola APRN       Allergies:    Allergies   Allergen Reactions    Penicillins Rash       Objective  Objective     Vital Signs  Temp:  [97.6 °F (36.4 °C)-97.8 °F (36.6 °C)] 97.8 °F (36.6 °C)  Heart Rate:  [] 89  Resp:  [12-19] 19  BP: ()/(51-96) 119/78  SpO2:  [75 %-99 %] 95 %  on   ;   Device (Oxygen Therapy): room air  Body mass index is 21.84 kg/m².    Physical Exam  Vitals and nursing note reviewed.   Constitutional:       Appearance: He is ill-appearing.      Comments: frail   HENT:      Head: Normocephalic and atraumatic.      Right Ear: External ear normal.      Left Ear: External ear normal.      Nose: Nose normal.      Mouth/Throat:      Mouth: Mucous membranes are moist.   Eyes:      Extraocular Movements: Extraocular movements intact.   Cardiovascular:      Rate and Rhythm: Normal rate and regular rhythm.      Pulses: Normal pulses.      Heart sounds: Normal heart sounds.   Pulmonary:      Effort: Pulmonary effort is normal.      Breath sounds: Normal breath sounds.   Abdominal:      General: Abdomen is flat. Bowel sounds are normal.      Palpations: Abdomen is soft.   Musculoskeletal:         General: Normal range of motion.      Cervical back: Normal range of motion.   Skin:     General: Skin is warm.   Neurological:      Mental Status: He is alert and oriented to person, place, and time.      Comments: Tremors noted, patient states it is his baseline    Psychiatric:         Behavior: Behavior normal.         Thought  Content: Thought content normal.         Judgment: Judgment normal.           Results Review:  I have personally reviewed most recent cardiac tracings, lab results, and radiology images and interpretations and agree with findings, most notably: CMP, A1c, C-reactive, CBC, UA, respiratory panel, x-ray forearm,.  CT head  Results from last 7 days   Lab Units 11/12/24  0254   WBC 10*3/mm3 8.90   HEMOGLOBIN g/dL 14.8   HEMATOCRIT % 47.1   PLATELETS 10*3/mm3 180     Results from last 7 days   Lab Units 11/12/24  0254 11/11/24  1552 11/11/24  1434   SODIUM mmol/L 140  --  136   POTASSIUM mmol/L 5.1  --  4.8   CHLORIDE mmol/L 104  --  102   CO2 mmol/L 25.3  --  24.7   BUN mg/dL 16  --  17   CREATININE mg/dL 0.88 0.69 0.89   GLUCOSE mg/dL 237*  --  448*   CALCIUM mg/dL 9.0  --  8.9   ALK PHOS U/L  --   --  124*   ALT (SGPT) U/L  --   --  11   AST (SGOT) U/L  --   --  12   LACTATE mmol/L  --  1.0  --      Estimated Creatinine Clearance: 74.7 mL/min (by C-G formula based on SCr of 0.88 mg/dL).  Brief Urine Lab Results  (Last result in the past 365 days)        Color   Clarity   Blood   Leuk Est   Nitrite   Protein   CREAT   Urine HCG        11/11/24 1533 Yellow   Clear   Negative   Trace   Negative   Trace                   Microbiology Results (last 10 days)       Procedure Component Value - Date/Time    Respiratory Panel PCR w/COVID-19(SARS-CoV-2) JODY/TICO/KG/PAD/COR/OZ In-House, NP Swab in UTM/[facility], 2 HR TAT - Swab, Nasopharynx [[id number]]  (Normal) Collected: 11/11/24 1707    Lab Status: Final result Specimen: Swab from Nasopharynx Updated: 11/11/24 1816     ADENOVIRUS, PCR Not Detected     Coronavirus 229E Not Detected     Coronavirus HKU1 Not Detected     Coronavirus NL63 Not Detected     Coronavirus OC43 Not Detected     COVID19 Not Detected     Human Metapneumovirus Not Detected     Human Rhinovirus/Enterovirus Not Detected     Influenza A PCR Not Detected     Influenza B PCR Not Detected     Parainfluenza Virus 1 Not  Detected     Parainfluenza Virus 2 Not Detected     Parainfluenza Virus 3 Not Detected     Parainfluenza Virus 4 Not Detected     RSV, PCR Not Detected     Bordetella pertussis pcr Not Detected     Bordetella parapertussis PCR Not Detected     Chlamydophila pneumoniae PCR Not Detected     Mycoplasma pneumo by PCR Not Detected    Narrative:      In the setting of a positive respiratory panel with a viral infection PLUS a negative procalcitonin without other underlying concern for bacterial infection, consider observing off antibiotics or discontinuation of antibiotics and continue supportive care. If the respiratory panel is positive for atypical bacterial infection (Bordetella pertussis, Chlamydophila pneumoniae, or Mycoplasma pneumoniae), consider antibiotic de-escalation to target atypical bacterial infection.            ECG/EMG Results (most recent)       Procedure Component Value Units Date/Time    ECG 12 Lead Other; gen weakness [[id number]] Collected: 11/11/24 1600     Updated: 11/11/24 2011     QT Interval 445 ms      QTC Interval 504 ms     Narrative:      HEART RATE=77  bpm  RR Iclzaoad=586  ms  NE Jsyipdgo=337  ms  P Horizontal Axis=  deg  P Front Axis=80  deg  QRSD Quzqouze=837  ms  QT Mbotpxrj=706  ms  HQyR=641  ms  QRS Axis=-51  deg  T Wave Axis=133  deg  - ABNORMAL ECG -  Sinus rhythm  Prolonged NE interval  Left bundle branch block  ST elevation secondary to IVCD  When compared with ECG of 26-Apr-2024 10:50:43,  Nonspecific significant change  Electronically Signed By: Arnav Dumont (KG) 2024-11-11 20:09:08  Date and Time of Study:2024-11-11 16:00:09            Results for orders placed during the hospital encounter of 04/24/24    Duplex Venous Lower Extremity - Bilateral CAR    Interpretation Summary    Normal bilateral lower extremity venous duplex scan.      Results for orders placed during the hospital encounter of 04/24/24    Adult Transthoracic Echo Complete W/ Cont if Necessary Per Protocol  (With Agitated Saline)    Interpretation Summary    The left ventricular cavity is mildly dilated.    Left ventricular diastolic function is consistent with (grade Ia w/high LAP) impaired relaxation.    Small patent foramen ovale present with right to left shunting indicated by saline contrast.    Saline test results are positive with valsalva manuever.    There is mild, bileaflet mitral valve thickening present.    Estimated right ventricular systolic pressure from tricuspid regurgitation is mildly elevated (35-45 mmHg).    Mild pulmonary hypertension is present.      CT Head Without Contrast    Result Date: 11/11/2024  Impression: No acute intracranial abnormality. Electronically Signed: Billy Ribeiro DO  11/11/2024 6:21 PM EST  Workstation ID: [id number]    XR Chest 2 View    Result Date: 11/11/2024  No radiographic findings of acute cardiopulmonary abnormality. Electronically Signed: Andrea Kirk  11/11/2024 5:54 PM EST  Workstation ID: [id number]    XR Forearm 2 View Right    Result Date: 11/11/2024  1.Focal soft tissue prominence at the posterior aspect of the proximal forearm. This could be related to olecranon bursitis, hematoma, soft tissue infection, or neoplasm. 2.No radiographic findings of acute osseous elbow or forearm abnormality. 3.Moderate elbow osteoarthritis. 4.Calcific atherosclerosis. Electronically Signed: Andrea Kirk  11/11/2024 5:43 PM EST  Workstation ID: [id number]    XR Elbow 3+ View Right    Result Date: 11/11/2024  1.Focal soft tissue prominence at the posterior aspect of the proximal forearm. This could be related to olecranon bursitis, hematoma, soft tissue infection, or neoplasm. 2.No radiographic findings of acute osseous elbow or forearm abnormality. 3.Moderate elbow osteoarthritis. 4.Calcific atherosclerosis. Electronically Signed: Andrea Kirk  11/11/2024 5:43 PM EST  Workstation ID: [id number]       Estimated Creatinine Clearance: 74.7 mL/min (by C-G formula based on SCr of 0.88  mg/dL).    Assessment & Plan  Assessment/Plan       Active Hospital Problems    Diagnosis  POA    Hyperglycemia [R73.9]  Yes      Resolved Hospital Problems   No resolved problems to display.       Diabetes mellitus/Hyperglycemia   -Poorly controlled   Lab Results   Component Value Date    GLUCOSE 237 (H) 11/12/2024    GLUCOSE 448 (C) 11/11/2024    GLUCOSE 320 (H) 10/10/2024    GLUCOSE 143 (H) 09/24/2024   -Glucose in the   -Continue 20 units of Lantus, 7 units Humalog 3 times daily  -SSI  -Endocrinologist consulted  -Diabetes educator consulted  -Diabetic diet  -Monitor before meals and at bedtime     Weakness  BP Readings from Last 1 Encounters:   11/12/24 119/78   -Likely secondary to orthostatic hypotension  -PT/OT consulted and recommended SNF  -Ortho BP/HR- Lying 105/61 HR 94, sitting 86/67  and standing 76/51 and   - Patient has a history of CHF with reduced EF  -Give 250ml bolus  -Consult cardiology   -Not on BP or heart medications  -Per UR, clinical supports IP criteria  -Consult hospitalist    Heart failure with reduced ejection fraction   Lab Results   Component Value Date    TROPONINT 30 (H) 03/12/2023    TROPONINT 27 (H) 03/11/2023     11/12/2024   -Recent echocardiogram showed EF 30%  -Chest x-ray: Showed no acute abnormality  -Patient is not on GDMT and is positive for orthostatic hypotension  -Cardiology consulted   -Monitor I's and O's and daily weights  -2 g sodium diet     Metastatic colon cancer  -Follows up with Dr Glover             VTE Prophylaxis - Active VTE Prophylaxis  Mechanical:        Start        11/11/24 2021  Maintain Sequential Compression Device  Continuous                          Select Pharmacologic VTE Prophylaxis if Desired & Appropriate      CODE STATUS:    Code Status and Medical Interventions: CPR (Attempt to Resuscitate); Full Support   Ordered at: 11/11/24 2023     Code Status (Patient has no pulse and is not breathing):    CPR (Attempt to  Resuscitate)     Medical Interventions (Patient has pulse or is breathing):    Full Support       This patient has been examined wearing personal protective equipment.     I discussed the patient's findings and my recommendations with patient and nursing staff.      Signature:Electronically signed by ANEUDY Murillo, 11/12/24, 11:19 AM EST.             Electronically signed by Yesenia Arreola APRN at 11/12/24 1143

## 2024-11-13 ENCOUNTER — APPOINTMENT (OUTPATIENT)
Dept: CARDIOLOGY | Facility: HOSPITAL | Age: 79
End: 2024-11-13
Payer: OTHER GOVERNMENT

## 2024-11-13 PROBLEM — I50.42 CHRONIC COMBINED SYSTOLIC AND DIASTOLIC CONGESTIVE HEART FAILURE: Status: ACTIVE | Noted: 2024-11-11

## 2024-11-13 PROBLEM — E11.65 UNCONTROLLED TYPE 2 DIABETES MELLITUS WITH HYPERGLYCEMIA: Status: ACTIVE | Noted: 2024-11-11

## 2024-11-13 PROBLEM — I95.1 AUTONOMIC ORTHOSTATIC HYPOTENSION: Status: ACTIVE | Noted: 2024-11-11

## 2024-11-13 LAB
AORTIC DIMENSIONLESS INDEX: 0.46 (DI)
BASOPHILS # BLD AUTO: 0.02 10*3/MM3 (ref 0–0.2)
BASOPHILS NFR BLD AUTO: 0.3 % (ref 0–1.5)
BH CV ECHO LEFT VENTRICLE GLOBAL LONGITUDINAL STRAIN: -7.8 %
BH CV ECHO MEAS - ACS: 1.7 CM
BH CV ECHO MEAS - AO MAX PG: 7 MMHG
BH CV ECHO MEAS - AO MEAN PG: 4 MMHG
BH CV ECHO MEAS - AO V2 MAX: 132 CM/SEC
BH CV ECHO MEAS - AO V2 VTI: 22.8 CM
BH CV ECHO MEAS - AVA(I,D): 1.64 CM2
BH CV ECHO MEAS - EDV(CUBED): 103.8 ML
BH CV ECHO MEAS - EDV(MOD-SP4): 161 ML
BH CV ECHO MEAS - EF(MOD-SP4): 24.8 %
BH CV ECHO MEAS - ESV(CUBED): 74.1 ML
BH CV ECHO MEAS - ESV(MOD-SP4): 121 ML
BH CV ECHO MEAS - FS: 10.6 %
BH CV ECHO MEAS - IVS/LVPW: 1 CM
BH CV ECHO MEAS - IVSD: 1 CM
BH CV ECHO MEAS - LA DIMENSION: 2.7 CM
BH CV ECHO MEAS - LAT PEAK E' VEL: 10 CM/SEC
BH CV ECHO MEAS - LV MASS(C)D: 164.5 GRAMS
BH CV ECHO MEAS - LV MAX PG: 1.47 MMHG
BH CV ECHO MEAS - LV MEAN PG: 1 MMHG
BH CV ECHO MEAS - LV V1 MAX: 60.7 CM/SEC
BH CV ECHO MEAS - LV V1 VTI: 10.8 CM
BH CV ECHO MEAS - LVIDD: 4.7 CM
BH CV ECHO MEAS - LVIDS: 4.2 CM
BH CV ECHO MEAS - LVOT AREA: 3.5 CM2
BH CV ECHO MEAS - LVOT DIAM: 2.1 CM
BH CV ECHO MEAS - LVPWD: 1 CM
BH CV ECHO MEAS - MED PEAK E' VEL: 9.8 CM/SEC
BH CV ECHO MEAS - MR MAX PG: 49.3 MMHG
BH CV ECHO MEAS - MR MAX VEL: 351 CM/SEC
BH CV ECHO MEAS - MV E MAX VEL: 99.6 CM/SEC
BH CV ECHO MEAS - MV MAX PG: 4.8 MMHG
BH CV ECHO MEAS - MV MEAN PG: 2 MMHG
BH CV ECHO MEAS - MV V2 VTI: 16.1 CM
BH CV ECHO MEAS - MVA(VTI): 2.32 CM2
BH CV ECHO MEAS - PA V2 MAX: 100.4 CM/SEC
BH CV ECHO MEAS - QP/QS: 0.81
BH CV ECHO MEAS - RV MAX PG: 2.16 MMHG
BH CV ECHO MEAS - RV V1 MAX: 73.4 CM/SEC
BH CV ECHO MEAS - RV V1 VTI: 13.3 CM
BH CV ECHO MEAS - RVDD: 2.4 CM
BH CV ECHO MEAS - RVOT DIAM: 1.7 CM
BH CV ECHO MEAS - SV(LVOT): 37.4 ML
BH CV ECHO MEAS - SV(MOD-SP4): 40 ML
BH CV ECHO MEAS - SV(RVOT): 30.2 ML
BH CV ECHO MEAS - TAPSE (>1.6): 3.5 CM
BH CV ECHO MEAS - TR MAX PG: 24.8 MMHG
BH CV ECHO MEAS - TR MAX VEL: 249 CM/SEC
BH CV ECHO MEASUREMENTS AVERAGE E/E' RATIO: 10.06
BH CV XLRA - TDI S': 12.8 CM/SEC
DEPRECATED RDW RBC AUTO: 52.2 FL (ref 37–54)
EOSINOPHIL # BLD AUTO: 0.09 10*3/MM3 (ref 0–0.4)
EOSINOPHIL NFR BLD AUTO: 1.2 % (ref 0.3–6.2)
ERYTHROCYTE [DISTWIDTH] IN BLOOD BY AUTOMATED COUNT: 15.1 % (ref 12.3–15.4)
GLUCOSE BLDC GLUCOMTR-MCNC: 104 MG/DL (ref 70–105)
GLUCOSE BLDC GLUCOMTR-MCNC: 146 MG/DL (ref 70–105)
GLUCOSE BLDC GLUCOMTR-MCNC: 224 MG/DL (ref 70–105)
GLUCOSE BLDC GLUCOMTR-MCNC: 239 MG/DL (ref 70–105)
HCT VFR BLD AUTO: 43.3 % (ref 37.5–51)
HGB BLD-MCNC: 13.8 G/DL (ref 13–17.7)
IMM GRANULOCYTES # BLD AUTO: 0.01 10*3/MM3 (ref 0–0.05)
IMM GRANULOCYTES NFR BLD AUTO: 0.1 % (ref 0–0.5)
LYMPHOCYTES # BLD AUTO: 1.59 10*3/MM3 (ref 0.7–3.1)
LYMPHOCYTES NFR BLD AUTO: 22 % (ref 19.6–45.3)
MCH RBC QN AUTO: 29.9 PG (ref 26.6–33)
MCHC RBC AUTO-ENTMCNC: 31.9 G/DL (ref 31.5–35.7)
MCV RBC AUTO: 93.9 FL (ref 79–97)
MONOCYTES # BLD AUTO: 0.78 10*3/MM3 (ref 0.1–0.9)
MONOCYTES NFR BLD AUTO: 10.8 % (ref 5–12)
NEUTROPHILS NFR BLD AUTO: 4.73 10*3/MM3 (ref 1.7–7)
NEUTROPHILS NFR BLD AUTO: 65.6 % (ref 42.7–76)
NRBC BLD AUTO-RTO: 0 /100 WBC (ref 0–0.2)
PLATELET # BLD AUTO: 185 10*3/MM3 (ref 140–450)
PMV BLD AUTO: 9.7 FL (ref 6–12)
RBC # BLD AUTO: 4.61 10*6/MM3 (ref 4.14–5.8)
SINUS: 3.9 CM
WBC NRBC COR # BLD AUTO: 7.22 10*3/MM3 (ref 3.4–10.8)

## 2024-11-13 PROCEDURE — 85025 COMPLETE CBC W/AUTO DIFF WBC: CPT | Performed by: NURSE PRACTITIONER

## 2024-11-13 PROCEDURE — 82948 REAGENT STRIP/BLOOD GLUCOSE: CPT

## 2024-11-13 PROCEDURE — 63710000001 INSULIN GLARGINE PER 5 UNITS: Performed by: INTERNAL MEDICINE

## 2024-11-13 PROCEDURE — 93356 MYOCRD STRAIN IMG SPCKL TRCK: CPT

## 2024-11-13 PROCEDURE — 97530 THERAPEUTIC ACTIVITIES: CPT

## 2024-11-13 PROCEDURE — 63710000001 INSULIN LISPRO (HUMAN) PER 5 UNITS: Performed by: INTERNAL MEDICINE

## 2024-11-13 PROCEDURE — 99232 SBSQ HOSP IP/OBS MODERATE 35: CPT | Performed by: INTERNAL MEDICINE

## 2024-11-13 PROCEDURE — 93306 TTE W/DOPPLER COMPLETE: CPT | Performed by: INTERNAL MEDICINE

## 2024-11-13 PROCEDURE — 82948 REAGENT STRIP/BLOOD GLUCOSE: CPT | Performed by: NURSE PRACTITIONER

## 2024-11-13 PROCEDURE — 93356 MYOCRD STRAIN IMG SPCKL TRCK: CPT | Performed by: INTERNAL MEDICINE

## 2024-11-13 PROCEDURE — 25010000002 ENOXAPARIN PER 10 MG: Performed by: STUDENT IN AN ORGANIZED HEALTH CARE EDUCATION/TRAINING PROGRAM

## 2024-11-13 PROCEDURE — 93306 TTE W/DOPPLER COMPLETE: CPT

## 2024-11-13 RX ORDER — INSULIN LISPRO 100 [IU]/ML
5 INJECTION, SOLUTION INTRAVENOUS; SUBCUTANEOUS
Status: DISCONTINUED | OUTPATIENT
Start: 2024-11-13 | End: 2024-11-14 | Stop reason: HOSPADM

## 2024-11-13 RX ORDER — INSULIN GLARGINE 100 [IU]/ML
10 INJECTION, SOLUTION SUBCUTANEOUS NIGHTLY
Status: DISCONTINUED | OUTPATIENT
Start: 2024-11-13 | End: 2024-11-14 | Stop reason: HOSPADM

## 2024-11-13 RX ORDER — ENOXAPARIN SODIUM 100 MG/ML
40 INJECTION SUBCUTANEOUS DAILY
Status: DISCONTINUED | OUTPATIENT
Start: 2024-11-13 | End: 2024-11-14 | Stop reason: HOSPADM

## 2024-11-13 RX ORDER — MIDODRINE HYDROCHLORIDE 5 MG/1
10 TABLET ORAL
Status: DISCONTINUED | OUTPATIENT
Start: 2024-11-13 | End: 2024-11-14 | Stop reason: HOSPADM

## 2024-11-13 RX ORDER — MIDODRINE HYDROCHLORIDE 5 MG/1
5 TABLET ORAL
Status: DISCONTINUED | OUTPATIENT
Start: 2024-11-13 | End: 2024-11-13

## 2024-11-13 RX ADMIN — INSULIN GLARGINE 10 UNITS: 100 INJECTION, SOLUTION SUBCUTANEOUS at 21:43

## 2024-11-13 RX ADMIN — Medication 10 ML: at 09:10

## 2024-11-13 RX ADMIN — MIDODRINE HYDROCHLORIDE 10 MG: 5 TABLET ORAL at 12:40

## 2024-11-13 RX ADMIN — INSULIN LISPRO 5 UNITS: 100 INJECTION, SOLUTION INTRAVENOUS; SUBCUTANEOUS at 18:40

## 2024-11-13 RX ADMIN — INSULIN LISPRO 4 UNITS: 100 INJECTION, SOLUTION INTRAVENOUS; SUBCUTANEOUS at 12:40

## 2024-11-13 RX ADMIN — ENOXAPARIN SODIUM 40 MG: 100 INJECTION SUBCUTANEOUS at 17:08

## 2024-11-13 RX ADMIN — MIDODRINE HYDROCHLORIDE 10 MG: 5 TABLET ORAL at 17:09

## 2024-11-13 RX ADMIN — INSULIN LISPRO 4 UNITS: 100 INJECTION, SOLUTION INTRAVENOUS; SUBCUTANEOUS at 09:09

## 2024-11-13 RX ADMIN — INSULIN LISPRO 3 UNITS: 100 INJECTION, SOLUTION INTRAVENOUS; SUBCUTANEOUS at 21:43

## 2024-11-13 RX ADMIN — INSULIN LISPRO 3 UNITS: 100 INJECTION, SOLUTION INTRAVENOUS; SUBCUTANEOUS at 12:39

## 2024-11-13 RX ADMIN — ASPIRIN 81 MG: 81 TABLET, COATED ORAL at 09:09

## 2024-11-13 RX ADMIN — Medication 10 ML: at 21:44

## 2024-11-13 NOTE — PLAN OF CARE
Goal Outcome Evaluation:  Plan of Care Reviewed With: patient         Vlad Guerrero is a 78 y/o M who presented with generalized weakness. PMH of colon cancer type 2 diabetes, hypertension. Recent chemo per report but not undergoing active chemotherapy treatment. Patient continues to be orthostatic and tachycardic although HR decreased since yesterday following fluids. Pt denies symptoms but noted to be functionally weak and has impaired balance in standing which is a decline from his ambulatory baseline. Not able to advance to ambulation secondary to hypotension. Nurse and attending informed. Currently keeping d/c rec SNF however pt may progress to being able to return back to [facility] once medically managed as symptoms of generalized weakness may be related to hypotension. Will continue to follow and progress as tolerated.    Anticipated Discharge Disposition (PT): skilled nursing facility

## 2024-11-13 NOTE — DISCHARGE PLACEMENT REQUEST
"Vlad Guerrero \"Fredi\" (79 y.o. Male)       Date of Birth   1945    Social Security Number       Address   [address] [place] [address] [place] [address]    Home Phone   [phone]    MRN   [id number]       [facility]    Marital Status                               Admission Date   11/11/24    Admission Type   Emergency    Admitting Provider   Klarissa August MD    Attending Provider   Chris Cruz MD    Department, Room/Bed   [facility] OBSERVATION, 224/1       Discharge Date       Discharge Disposition       Discharge Destination                                 Attending Provider: Chris Cruz MD    Allergies: Penicillins    Isolation: None   Infection: None   Code Status: CPR    Ht: 185.4 cm (73\")   Wt: 69.9 kg (154 lb)    Admission Cmt: None   Principal Problem: None                  Active Insurance as of 11/11/2024       Primary Coverage       Payor Plan Insurance Group Employer/Plan Group    ANTHEM MEDICARE REPLACEMENT ANTHEM MEDICARE ADVANTAGE [id number]       Payor Plan Address Payor Plan Phone Number Payor Plan Fax Number Effective Dates    [address] [phone]  1/1/2022 - None Entered    [facility] [id number]         Subscriber Name Subscriber Birth Date Member ID       VLAD GUERRERO 1945 [id number]               Secondary Coverage       Payor Plan Insurance Group Employer/Plan Group    [facility] [employer]        Payor Plan Address Payor Plan Phone Number Payor Plan Fax Number Effective Dates    [address] [phone]  11/11/2024 - None Entered    [facility] [id number]         Subscriber Name Subscriber Birth Date Member ID       VLAD GUERRERO 1945 [id number]                     Emergency Contacts        (Rel.) Home Phone Work Phone Mobile Phone    CARLY GUERRERO (Son) [phone] -- [phone]    LAURAJ LUIS (Son) -- -- [phone]    LAURAMAHESH (Son) -- -- [phone]    JESSICA ORTEGA (Relative) " [phone] -- [phone]                 History & Physical        Liz Fish APRN at 24 1307       Attestation signed by Klarissa August MD at 24 1620    I have reviewed this documentation and agree.                      [facility]  History & Physical    Patient Name: Vlad Guerrero  : 1945  MRN: [id number]  Primary Care Physician:  Vlad Moreland MD  Date of admission: 2024  Date and Time of Service: 2024 at 1300    Subjective      Chief Complaint: generalized weakness    History of Present Illness: Vlad Guerrero is a 79 y.o. male with a CMH of anemia, colon cancer, DM, HLD, HTN who presented to [facility] on 2024 with hyperglycemia and generalized weakness. This has been going on for the past three weeks. Has a bump on his right arm that he doesn't know where it came from. Also complains of urinary frequency, diarrhea. Says his blood sugar at home has been running around 500. Patient was initially admitted under observation where he worked with PT who noted the patient was very hypotensive and tachycardic while standing. Cardiology was consulted as well as the hospital team. The hospital team will now be taking over care of this patient.       Review of Systems   Constitutional:  Positive for fatigue.   Musculoskeletal:  Positive for myalgias.   Neurological:  Positive for weakness.       Personal History     Past Medical History:   Diagnosis Date    Anemia     Colon cancer     colon    Diabetes mellitus     Hyperlipidemia     Hypertension        Past Surgical History:   Procedure Laterality Date    APPENDECTOMY      CARDIAC CATHETERIZATION      COLON RESECTION N/A 12/15/2022    Procedure: COLON RESECTION RIGHT;  Surgeon: Percy Davis MD;  Location: [facility] OR;  Service: General;  Laterality: N/A;    COLONOSCOPY N/A 2022    Procedure: COLONOSCOPY WITH BIOPSY AND POLYPECTOMY;  Surgeon: Billy Julien MD;  Location:  [facility] ENDOSCOPY;  Service: Gastroenterology;  Laterality: N/A;  Impression:  1.  Large 4-5cm fulgurating circumferential ulcerated mass in the very proximal part of the ascending colon next to ileocecal valve multiple biopsies were performed.  This is highly concerning for colon malignancy.  2.  2 polyp rem    ENDOSCOPY N/A 11/11/2022    Procedure: ESOPHAGOGASTRODUODENOSCOPY with biopsy X1;  Surgeon: Billy Julien MD;  Location: [facility] ENDOSCOPY;  Service: Gastroenterology;  Laterality: N/A;  5.  Upper endoscopy lamination unremarkable.       PORTACATH PLACEMENT Right 1/12/2023    Procedure: INSERTION OF PORTACATH;  Surgeon: Percy Davis MD;  Location: [facility] MAIN OR;  Service: General;  Laterality: Right;    TONSILLECTOMY         Family History: family history includes Colon cancer (age of onset: 62) in his father; Heart disease in his sister; Pneumonia (age of onset: 94) in his mother. Otherwise pertinent FHx was reviewed and not pertinent to current issue.    Social History:  reports that he quit smoking about 58 years ago. His smoking use included cigarettes. He started smoking about 60 years ago. He has a 2 pack-year smoking history. He has never used smokeless tobacco. He reports that he does not drink alcohol and does not use drugs.    Home Medications:  Prior to Admission Medications       Prescriptions Last Dose Informant Patient Reported? Taking?    aspirin 81 MG EC tablet 11/10/2024  Yes Yes    Take 1 tablet by mouth Daily.    insulin aspart (NovoLOG FlexPen) 100 UNIT/ML solution pen-injector sc pen 11/10/2024  Yes Yes    Inject 10 Units under the skin into the appropriate area as directed 3 (Three) Times a Day With Meals.    insulin glargine (LANTUS, SEMGLEE) 100 UNIT/ML injection 11/10/2024  No Yes    Inject 20 Units under the skin into the appropriate area as directed Every Night for 30 days.              Allergies:  Allergies   Allergen Reactions    Penicillins Rash       Objective       Vitals:   Temp:  [97.6 °F (36.4 °C)-97.8 °F (36.6 °C)] 97.8 °F (36.6 °C)  Heart Rate:  [] 94  Resp:  [12-19] 19  BP: ()/(51-96) 109/60  Body mass index is 21.84 kg/m².  Physical Exam  Vitals and nursing note reviewed.   Constitutional:       Appearance: He is ill-appearing.   HENT:      Head: Normocephalic and atraumatic.      Nose: Nose normal.      Mouth/Throat:      Mouth: Mucous membranes are moist.   Eyes:      Extraocular Movements: Extraocular movements intact.      Pupils: Pupils are equal, round, and reactive to light.   Cardiovascular:      Rate and Rhythm: Normal rate and regular rhythm.      Pulses: Normal pulses.   Pulmonary:      Effort: Pulmonary effort is normal.      Breath sounds: Normal breath sounds.   Abdominal:      General: Bowel sounds are normal.      Palpations: Abdomen is soft.   Musculoskeletal:         General: Normal range of motion.      Cervical back: Normal range of motion and neck supple.   Skin:     General: Skin is warm.      Capillary Refill: Capillary refill takes less than 2 seconds.   Neurological:      Mental Status: He is alert and oriented to person, place, and time.      Motor: Tremor present.         Diagnostic Data:  Lab Results (last 24 hours)       Procedure Component Value Units Date/Time    POC Glucose Once [[id number]]  (Normal) Collected: 11/12/24 1200    Specimen: Blood Updated: 11/12/24 1202     Glucose 92 mg/dL      Comment: Serial Number: [id number]:  [id number]       BNP [[id number]]  (Abnormal) Collected: 11/12/24 0254    Specimen: Blood Updated: 11/12/24 1143     proBNP 3,231.0 pg/mL     Narrative:      This assay is used as an aid in the diagnosis of individuals suspected of having heart failure. It can be used as an aid in the diagnosis of acute decompensated heart failure (ADHF) in patients presenting with signs and symptoms of ADHF to the emergency department (ED). In addition, NT-proBNP of <300 pg/mL indicates ADHF is not  likely.    Age Range Result Interpretation  NT-proBNP Concentration (pg/mL:      <50             Positive            >450                   Gray                 300-450                    Negative             <300    50-75           Positive            >900                  Gray                300-900                  Negative            <300      >75             Positive            >1800                  Gray                300-1800                  Negative            <300    Hemoglobin A1c [[id number]]  (Abnormal) Collected: 11/12/24 0254    Specimen: Blood Updated: 11/12/24 0833     Hemoglobin A1C 11.80 %     POC Glucose 4x Daily Before Meals & at Bedtime [[id number]]  (Abnormal) Collected: 11/12/24 0718    Specimen: Blood Updated: 11/12/24 0720     Glucose 190 mg/dL      Comment: Serial Number: [id number]:  [id number]       Basic Metabolic Panel [[id number]]  (Abnormal) Collected: 11/12/24 0254    Specimen: Blood Updated: 11/12/24 0430     Glucose 237 mg/dL      BUN 16 mg/dL      Creatinine 0.88 mg/dL      Sodium 140 mmol/L      Potassium 5.1 mmol/L      Comment: Specimen hemolyzed.  Result may be falsely elevated.        Chloride 104 mmol/L      CO2 25.3 mmol/L      Calcium 9.0 mg/dL      BUN/Creatinine Ratio 18.2     Anion Gap 10.7 mmol/L      eGFR 88.6 mL/min/1.73     Narrative:      GFR Normal >60  Chronic Kidney Disease <60  Kidney Failure <15    The GFR formula is only valid for adults with stable renal function between ages 18 and 70.    CBC Auto Differential [[id number]]  (Abnormal) Collected: 11/12/24 0254    Specimen: Blood Updated: 11/12/24 0408     WBC 8.90 10*3/mm3      RBC 4.82 10*6/mm3      Hemoglobin 14.8 g/dL      Hematocrit 47.1 %      MCV 97.7 fL      MCH 30.7 pg      MCHC 31.4 g/dL      RDW 15.3 %      RDW-SD 55.0 fl      MPV 10.0 fL      Platelets 180 10*3/mm3      Neutrophil % 75.2 %      Lymphocyte % 13.7 %      Monocyte % 10.3 %      Eosinophil % 0.1 %      Basophil % 0.4 %       Immature Grans % 0.3 %      Neutrophils, Absolute 6.68 10*3/mm3      Lymphocytes, Absolute 1.22 10*3/mm3      Monocytes, Absolute 0.92 10*3/mm3      Eosinophils, Absolute 0.01 10*3/mm3      Basophils, Absolute 0.04 10*3/mm3      Immature Grans, Absolute 0.03 10*3/mm3      nRBC 0.0 /100 WBC     POC Glucose Once [[id number]]  (Abnormal) Collected: 11/11/24 2126    Specimen: Blood Updated: 11/11/24 2128     Glucose 216 mg/dL      Comment: Serial Number: [id number]:  [id number]       Blood Gas, Mixed [[id number]]  (Abnormal) Collected: 11/11/24 1552    Specimen: Blood Updated: 11/11/24 1946     pH, mixed 7.43 pH units      PCO2 MIXED 41.9 mmHg      PO2 MIXED 55.3 mmHg      HCO3 MIXED 27.5 mmol/L      CO2 Content <1 mmol/L      Base Excess, Arterial 2.7 mmol/L      Comment: Serial Number: [id number]:  [id number]        O2 SATURATION MIXED 88.9 %      Hemodilution No     Site Left Radial     Tod's Test Positive     Modality Room Air     FIO2 21 %      Base Deficit 3.1 mEq/liter      Notified Who --     Notified Time --     Read Back --    POC Glucose Once [[id number]]  (Abnormal) Collected: 11/11/24 1552    Specimen: Blood Updated: 11/11/24 1943     Glucose 445 mg/dL      Comment: Serial Number: [id number]:  [id number]        Notified Who --     Notified Time --     Read Back --    POCT Electrolytes +HGB +HCT [[id number]]  (Abnormal) Collected: 11/11/24 1552    Specimen: Blood Updated: 11/11/24 1943     Sodium 138 mmol/L      POC Potassium 5.0 mmol/L      Ionized Calcium 1.22 mmol/L      Comment: Serial Number: [id number]:  [id number]        Glucose 445 mg/dL      Hematocrit 43 %      Hemoglobin 14.6 g/dL      Notified Time --     Notified Who --     Read Back --    POC Glucose Once [[id number]]  (Abnormal) Collected: 11/11/24 1832    Specimen: Blood Updated: 11/11/24 1835     Glucose 279 mg/dL      Comment: Serial Number: [id number]:  [id number]       Respiratory Panel PCR w/COVID-19(SARS-CoV-2)  JODY/TICO/KG/PAD/COR/OZ In-House, NP Swab in UTM/VTM, 2 HR TAT - Swab, Nasopharynx [[id number]]  (Normal) Collected: 11/11/24 1707    Specimen: Swab from Nasopharynx Updated: 11/11/24 1816     ADENOVIRUS, PCR Not Detected     Coronavirus 229E Not Detected     Coronavirus HKU1 Not Detected     Coronavirus NL63 Not Detected     Coronavirus OC43 Not Detected     COVID19 Not Detected     Human Metapneumovirus Not Detected     Human Rhinovirus/Enterovirus Not Detected     Influenza A PCR Not Detected     Influenza B PCR Not Detected     Parainfluenza Virus 1 Not Detected     Parainfluenza Virus 2 Not Detected     Parainfluenza Virus 3 Not Detected     Parainfluenza Virus 4 Not Detected     RSV, PCR Not Detected     Bordetella pertussis pcr Not Detected     Bordetella parapertussis PCR Not Detected     Chlamydophila pneumoniae PCR Not Detected     Mycoplasma pneumo by PCR Not Detected    Narrative:      In the setting of a positive respiratory panel with a viral infection PLUS a negative procalcitonin without other underlying concern for bacterial infection, consider observing off antibiotics or discontinuation of antibiotics and continue supportive care. If the respiratory panel is positive for atypical bacterial infection (Bordetella pertussis, Chlamydophila pneumoniae, or Mycoplasma pneumoniae), consider antibiotic de-escalation to target atypical bacterial infection.    POC Glucose Q1H [[id number]]  (Abnormal) Collected: 11/11/24 1807    Specimen: Blood Updated: 11/11/24 1809     Glucose 324 mg/dL      Comment: Serial Number: [id number]:  [id number]       Osmolality, Serum [[id number]]  (Abnormal) Collected: 11/11/24 1434    Specimen: Blood Updated: 11/11/24 1753     Osmolality 316 mOsm/kg     Ketone Bodies, Serum (Not performed at [place]) [[id number]]  (Normal) Collected: 11/11/24 1434    Specimen: Blood Updated: 11/11/24 1723    Narrative:      The following orders were created for panel order Ketone Bodies,  Serum (Not performed at [place]).  Procedure                               Abnormality         Status                     ---------                               -----------         ------                     Acetone[[id number]]                      Normal              Final result                 Please view results for these tests on the individual orders.    Acetone [[id number]]  (Normal) Collected: 11/11/24 1434    Specimen: Blood Updated: 11/11/24 1723     Acetone Negative    Magnesium [[id number]]  (Normal) Collected: 11/11/24 1434    Specimen: Blood Updated: 11/11/24 1619     Magnesium 1.6 mg/dL     Phosphorus [[id number]]  (Normal) Collected: 11/11/24 1434    Specimen: Blood Updated: 11/11/24 1619     Phosphorus 3.5 mg/dL     Lipase [[id number]]  (Normal) Collected: 11/11/24 1434    Specimen: Blood Updated: 11/11/24 1619     Lipase 25 U/L     POC Creatinine [[id number]]  (Normal) Collected: 11/11/24 1552    Specimen: Blood Updated: 11/11/24 1606     Creatinine 0.69 mg/dL      Comment: Serial Number: [id number]:  [id number]        eGFR 95.3 mL/min/1.73     POC Lactate [[id number]]  (Normal) Collected: 11/11/24 1552    Specimen: Blood Updated: 11/11/24 1606     Lactate 1.0 mmol/L      Comment: Serial Number: [id number]:  [id number]       Urinalysis With Microscopic If Indicated (No Culture) - Urine, Clean Catch [[id number]]  (Abnormal) Collected: 11/11/24 1533    Specimen: Urine, Clean Catch Updated: 11/11/24 1556     Color, UA Yellow     Appearance, UA Clear     pH, UA 6.0     Specific Gravity, UA 1.034     Glucose, UA >=1000 mg/dL (3+)     Ketones, UA Trace     Bilirubin, UA Negative     Blood, UA Negative     Protein, UA Trace     Leuk Esterase, UA Trace     Nitrite, UA Negative     Urobilinogen, UA 0.2 E.U./dL    Urinalysis, Microscopic Only - Urine, Clean Catch [[id number]] Collected: 11/11/24 1533    Specimen: Urine, Clean Catch Updated: 11/11/24 1556     RBC, UA 0-2 /HPF      WBC, UA 0-2 /HPF      Bacteria, UA  None Seen /HPF      Squamous Epithelial Cells, UA 0-2 /HPF      Hyaline Casts, UA 0-2 /LPF      Methodology Automated Microscopy    Comprehensive Metabolic Panel [[id number]]  (Abnormal) Collected: 11/11/24 1434    Specimen: Blood Updated: 11/11/24 1512     Glucose 448 mg/dL      BUN 17 mg/dL      Creatinine 0.89 mg/dL      Sodium 136 mmol/L      Potassium 4.8 mmol/L      Chloride 102 mmol/L      CO2 24.7 mmol/L      Calcium 8.9 mg/dL      Total Protein 6.5 g/dL      Albumin 3.5 g/dL      ALT (SGPT) 11 U/L      AST (SGOT) 12 U/L      Alkaline Phosphatase 124 U/L      Total Bilirubin 0.6 mg/dL      Globulin 3.0 gm/dL      A/G Ratio 1.2 g/dL      BUN/Creatinine Ratio 19.1     Anion Gap 9.3 mmol/L      eGFR 88.3 mL/min/1.73     Narrative:      GFR Normal >60  Chronic Kidney Disease <60  Kidney Failure <15    The GFR formula is only valid for adults with stable renal function between ages 18 and 70.    [place] Draw [[id number]] Collected: 11/11/24 1434    Specimen: Blood Updated: 11/11/24 1445    Narrative:      The following orders were created for panel order [place] Draw.  Procedure                               Abnormality         Status                     ---------                               -----------         ------                     Green Top (Gel)[[id number]]                                  Final result               Lavender Top[[id number]]                                     Final result               Gold Top - SST[[id number]]                                   Final result               Light Blue Top[[id number]]                                   Final result                 Please view results for these tests on the individual orders.    Green Top (Gel) [[id number]] Collected: 11/11/24 1434    Specimen: Blood Updated: 11/11/24 1445     Extra Tube Hold for add-ons.     Comment: Auto resulted.       Lavender Top [[id number]] Collected: 11/11/24 1434    Specimen: Blood Updated: 11/11/24 1445     Extra Tube hold  for add-on     Comment: Auto resulted       Gold Top - SST [[id number]] Collected: 11/11/24 1434    Specimen: Blood Updated: 11/11/24 1445     Extra Tube Hold for add-ons.     Comment: Auto resulted.       Light Blue Top [[id number]] Collected: 11/11/24 1434    Specimen: Blood Updated: 11/11/24 1445     Extra Tube Hold for add-ons.     Comment: Auto resulted       CBC & Differential [[id number]]  (Abnormal) Collected: 11/11/24 1434    Specimen: Blood Updated: 11/11/24 1444    Narrative:      The following orders were created for panel order CBC & Differential.  Procedure                               Abnormality         Status                     ---------                               -----------         ------                     CBC Auto Differential[[id number]]        Abnormal            Final result                 Please view results for these tests on the individual orders.    CBC Auto Differential [[id number]]  (Abnormal) Collected: 11/11/24 1434    Specimen: Blood Updated: 11/11/24 1444     WBC 7.06 10*3/mm3      RBC 4.52 10*6/mm3      Hemoglobin 13.8 g/dL      Hematocrit 42.2 %      MCV 93.4 fL      MCH 30.5 pg      MCHC 32.7 g/dL      RDW 15.1 %      RDW-SD 52.6 fl      MPV 9.4 fL      Platelets 189 10*3/mm3      Neutrophil % 75.3 %      Lymphocyte % 11.0 %      Monocyte % 12.2 %      Eosinophil % 0.8 %      Basophil % 0.4 %      Immature Grans % 0.3 %      Neutrophils, Absolute 5.31 10*3/mm3      Lymphocytes, Absolute 0.78 10*3/mm3      Monocytes, Absolute 0.86 10*3/mm3      Eosinophils, Absolute 0.06 10*3/mm3      Basophils, Absolute 0.03 10*3/mm3      Immature Grans, Absolute 0.02 10*3/mm3      nRBC 0.0 /100 WBC     POC Glucose Once [[id number]]  (Abnormal) Collected: 11/11/24 1429    Specimen: Blood Updated: 11/11/24 1431     Glucose 392 mg/dL      Comment: Serial Number: [id number]:  [id number]                Imaging Results (Last 24 Hours)       Procedure Component Value Units Date/Time    CT Head  Without Contrast [[id number]] Collected: 11/11/24 1818     Updated: 11/11/24 1823    Narrative:      CT HEAD WO CONTRAST    Date of Exam: 11/11/2024 6:00 PM EST    Indication: gen weakness x3 days, hx of CVA.    Comparison: MRI 4/25/2024    Technique: Axial CT images were obtained of the head without contrast administration.  Coronal reconstructions were performed.  Automated exposure control and iterative reconstruction methods were used.      Findings:  No large territory infarct.    There is no evidence of hemorrhage.  No mass effect, edema or midline shift    Mild to moderate periventricular and subcortical white matter hypodensities, nonspecific but most likely represents chronic small vessel ischemic changes.    No extra-axial fluid collection.      Notice of obstructive hydrocephalus. Mild diffuse parenchymal volume loss      Status post bilateral lens extraction. Otherwise the orbits unremarkable  Small polyps versus mucous retention cyst within the left maxillary sinus. Otherwise the paranasal sinuses and mastoid air cells are clear.    The visualized soft tissues are unremarkable.  No acute osseous abnormality.      Impression:      Impression:  No acute intracranial abnormality.      Electronically Signed: Billy Ribeiro DO    11/11/2024 6:21 PM EST    Workstation ID: [id number]    XR Chest 2 View [[id number]] Collected: 11/11/24 1752     Updated: 11/11/24 1756    Narrative:        XR CHEST 2 VW    Date of Exam: 11/11/2024 5:26 PM EST    Indication: gen weakness, assess for poss PNA    Comparison: Chest CT August 29, 2024, chest radiograph January 8, 2024    Findings:  No focal or diffuse pulmonary infiltrate is identified.  No pleural effusion or pneumothorax is seen.  Heart size and mediastinal contour appear within normal limits. Right IJ port catheter with tip projecting in the SVC. Mild to moderate disc   degeneration in the thoracic spine.      Impression:      No radiographic findings of acute  cardiopulmonary abnormality.    Electronically Signed: Andrea Reagan    11/11/2024 5:54 PM EST    Workstation ID: [id number]    XR Forearm 2 View Right [[id number]] Collected: 11/11/24 1740     Updated: 11/11/24 1745    Narrative:        XR ELBOW 3+ VW RIGHT, XR FOREARM 2 VW RIGHT    Date of Exam: 11/11/2024 5:26 PM EST    Indication: R FA pain, swelling near elbow    Comparison: None available.    FINDINGS:    Elbow: There is focal soft tissue prominence at the posterior aspect of the proximal forearm overlying the proximal ulna. There is calcific atherosclerosis of the visualized upper extremity arteries.    No fracture is identified. Mineralization and alignment appear within normal limits.  No definite joint effusion. Prominent enthesophyte at the posterior olecranon. There is also enthesopathy and calcific tendinopathy at the medial and lateral   epicondyles. Moderate elbow joint space narrowing and osteophytosis.    Forearm: Focal posterior soft tissue prominence at the proximal forearm on the lateral view. No definite radiopaque foreign body or soft tissue gas is seen in this location. No acute osseous forearm abnormality is seen. There is calcific atherosclerosis   of the visualized upper extremity arteries.      Impression:      1.Focal soft tissue prominence at the posterior aspect of the proximal forearm. This could be related to olecranon bursitis, hematoma, soft tissue infection, or neoplasm.  2.No radiographic findings of acute osseous elbow or forearm abnormality.  3.Moderate elbow osteoarthritis.  4.Calcific atherosclerosis.        Electronically Signed: Andrea Reagan    11/11/2024 5:43 PM EST    Workstation ID: [id number]    XR Elbow 3+ View Right [[id number]] Collected: 11/11/24 1740     Updated: 11/11/24 1745    Narrative:        XR ELBOW 3+ VW RIGHT, XR FOREARM 2 VW RIGHT    Date of Exam: 11/11/2024 5:26 PM EST    Indication: R FA pain, swelling near elbow    Comparison: None available.    FINDINGS:     Elbow: There is focal soft tissue prominence at the posterior aspect of the proximal forearm overlying the proximal ulna. There is calcific atherosclerosis of the visualized upper extremity arteries.    No fracture is identified. Mineralization and alignment appear within normal limits.  No definite joint effusion. Prominent enthesophyte at the posterior olecranon. There is also enthesopathy and calcific tendinopathy at the medial and lateral   epicondyles. Moderate elbow joint space narrowing and osteophytosis.    Forearm: Focal posterior soft tissue prominence at the proximal forearm on the lateral view. No definite radiopaque foreign body or soft tissue gas is seen in this location. No acute osseous forearm abnormality is seen. There is calcific atherosclerosis   of the visualized upper extremity arteries.      Impression:      1.Focal soft tissue prominence at the posterior aspect of the proximal forearm. This could be related to olecranon bursitis, hematoma, soft tissue infection, or neoplasm.  2.No radiographic findings of acute osseous elbow or forearm abnormality.  3.Moderate elbow osteoarthritis.  4.Calcific atherosclerosis.        Electronically Signed: Andrea Kirk    11/11/2024 5:43 PM EST    Workstation ID: [id number]              Assessment & Plan        This is a 79 y.o. male with:    Active and Resolved Problems  Active Hospital Problems    Diagnosis  POA    Hyperglycemia [R73.9]  Yes      Resolved Hospital Problems   No resolved problems to display.       Diabetes mellitus/Hyperglycemia   - not well controlled   - BG today 190  - A1c 11.80  - continue SSI, lantus and CC diet   - hypoglycemia protocol     Weakness  - Likely secondary to orthostatic hypotension/deconditioning  - PT/OT consulted and recommended SNF  - Orthostatic vitals: BP/HR- Lying 105/61 HR 94, sitting 86/67  and standing 76/51 and      Heart failure with reduced ejection fraction   - Echocardiogram showed EF 30%  -  proBNP 3231  - Chest x-ray: Showed no acute abnormality  - Daily weights  - Strict I&O's  - 1.5L fluid restriction   - Cardiac diet, Sodium < 2g  - Cardiology consulted       Metastatic colon cancer  - on chemotherapy outpatient  -Follows up with Dr Glover     VTE Prophylaxis:  Mechanical VTE prophylaxis orders are present.        The patient desires to be as follows:    CODE STATUS:    Code Status (Patient has no pulse and is not breathing): CPR (Attempt to Resuscitate)  Medical Interventions (Patient has pulse or is breathing): Full Support        Prashanth Guerrero, who can be contacted at [phone], is the designated person to make medical decisions on the patient's behalf if He is incapable of doing so. This was clarified with patient and/or next of kin on 2024 during the course of this H&P.    Admission Status:  I believe this patient meets inpatient status.    Expected Length of Stay: > 48 hours     PDMP and Medication Dispenses via Sidebar reviewed and consistent with patient reported medications.    I discussed the patient's findings and my recommendations with patient.      Signature:     This document has been electronically signed by ANEUDY Hope on 2024 13:08 [facility]      Electronically signed by Klarissa August MD at 24 1620       Yesenia Arreola APRN at 24 1118          [facility] Observation Unit H&P    Patient Name: Vlad Guerrero  : 1945  MRN: [id number]  Primary Care Physician: Vlad Moreland MD  Date of admission: 2024     Patient Care Team:  Vlad Moreland MD as PCP - General (Internal Medicine)  Percy Davis MD as Surgeon (General Surgery)  Don Kramer MD as Consulting Physician (Hematology and Oncology)          Subjective  History Present Illness     Chief Complaint:   Chief Complaint   Patient presents with    Weakness - Generalized     Weakness     Weakness - Generalized  Associated symptoms include  weakness. Pertinent negatives include no nausea or vomiting.     ED 11/11/2024  79-year-old male with history of colon cancer type 2 diabetes, hypertension, hyperlipidemia presents the ED with complaints of hyperglycemia for several months along with generalized weakness that has been ongoing for the last 3 weeks.  Patient reports he also has a bump to the right arm that he noticed today however is unsure how long it has been there and denies no trauma to the area.  Patient states he has mild urinary frequency without dysuria this been going on for a week.  Intermittent diarrhea however had a normal bowel movement this morning.  Denies fever, numbness, tingling, cough, congestion, changes in vision, chest pain, shortness of breath, melena, hematochezia, nausea, vomiting, abdominal pain, recent falls, dizziness, headache.  States his glucose is usually around 500 as he believes it was supposed to be this high due to being on chemotherapy for his cancer, reports he does not see an endocrinologist.  States he did not take his insulin today as he had very little to eat.    Observation 11/12/2024  Patient agrees with HPI noted above including generalized weakness ongoing for the past 3 weeks.  PT/OT consulted.  Per PT evaluation patient was significantly hypotensive and tachycardic with standing.  Per chart review, patient has a history of CHF with reduced EF but is established with cardiology.  Will consult cardiology     Review of Systems   Constitutional: Positive for malaise/fatigue.   Gastrointestinal:  Negative for nausea and vomiting.   Neurological:  Positive for loss of balance, tremors and weakness. Negative for light-headedness.   All other systems reviewed and are negative.          Personal History     Past Medical History:   Past Medical History:   Diagnosis Date    Anemia     Colon cancer 2022    colon    Diabetes mellitus     Hyperlipidemia     Hypertension        Surgical History:      Past Surgical  History:   Procedure Laterality Date    APPENDECTOMY      CARDIAC CATHETERIZATION      COLON RESECTION N/A 12/15/2022    Procedure: COLON RESECTION RIGHT;  Surgeon: Percy Davis MD;  Location: [facility] MAIN OR;  Service: General;  Laterality: N/A;    COLONOSCOPY N/A 11/11/2022    Procedure: COLONOSCOPY WITH BIOPSY AND POLYPECTOMY;  Surgeon: Billy Julien MD;  Location: [facility] ENDOSCOPY;  Service: Gastroenterology;  Laterality: N/A;  Impression:  1.  Large 4-5cm fulgurating circumferential ulcerated mass in the very proximal part of the ascending colon next to ileocecal valve multiple biopsies were performed.  This is highly concerning for colon malignancy.  2.  2 polyp rem    ENDOSCOPY N/A 11/11/2022    Procedure: ESOPHAGOGASTRODUODENOSCOPY with biopsy X1;  Surgeon: Billy Julien MD;  Location: [facility] ENDOSCOPY;  Service: Gastroenterology;  Laterality: N/A;  5.  Upper endoscopy lamination unremarkable.       PORTACATH PLACEMENT Right 1/12/2023    Procedure: INSERTION OF PORTACATH;  Surgeon: Percy Davis MD;  Location: [facility] MAIN OR;  Service: General;  Laterality: Right;    TONSILLECTOMY             Family History: family history includes Colon cancer (age of onset: 62) in his father; Heart disease in his sister; Pneumonia (age of onset: 94) in his mother. Otherwise pertinent FHx was reviewed and unremarkable.     Social History:  reports that he quit smoking about 58 years ago. His smoking use included cigarettes. He started smoking about 60 years ago. He has a 2 pack-year smoking history. He has never used smokeless tobacco. He reports that he does not drink alcohol and does not use drugs.      Medications:  Prior to Admission medications    Medication Sig Start Date End Date Taking? Authorizing Provider   aspirin 81 MG EC tablet Take 1 tablet by mouth Daily.   Yes Provider, MD Rolly   insulin aspart (NovoLOG FlexPen) 100 UNIT/ML solution pen-injector sc pen Inject 10 Units under the  skin into the appropriate area as directed 3 (Three) Times a Day With Meals.   Yes Provider, MD Rolly   insulin glargine (LANTUS, SEMGLEE) 100 UNIT/ML injection Inject 20 Units under the skin into the appropriate area as directed Every Night for 30 days. 1/9/24 4/24/25 Yes Yesenia Arreola APRN   Continuous Blood Gluc Sensor (Dexcom G7 Sensor) misc Use 1 each Every 10 (Ten) Days. Dx: E11.65 1/9/24 11/12/24  Yesenia Arreola APRN   Insulin Pen Needle (Pen Needles) 32G X 4 MM misc Use 1 each 4 (Four) Times a Day Before Meals & at Bedtime. 1/9/24 11/12/24  Yesenia Arreola APRN       Allergies:    Allergies   Allergen Reactions    Penicillins Rash       Objective  Objective     Vital Signs  Temp:  [97.6 °F (36.4 °C)-97.8 °F (36.6 °C)] 97.8 °F (36.6 °C)  Heart Rate:  [] 89  Resp:  [12-19] 19  BP: ()/(51-96) 119/78  SpO2:  [75 %-99 %] 95 %  on   ;   Device (Oxygen Therapy): room air  Body mass index is 21.84 kg/m².    Physical Exam  Vitals and nursing note reviewed.   Constitutional:       Appearance: He is ill-appearing.      Comments: frail   HENT:      Head: Normocephalic and atraumatic.      Right Ear: External ear normal.      Left Ear: External ear normal.      Nose: Nose normal.      Mouth/Throat:      Mouth: Mucous membranes are moist.   Eyes:      Extraocular Movements: Extraocular movements intact.   Cardiovascular:      Rate and Rhythm: Normal rate and regular rhythm.      Pulses: Normal pulses.      Heart sounds: Normal heart sounds.   Pulmonary:      Effort: Pulmonary effort is normal.      Breath sounds: Normal breath sounds.   Abdominal:      General: Abdomen is flat. Bowel sounds are normal.      Palpations: Abdomen is soft.   Musculoskeletal:         General: Normal range of motion.      Cervical back: Normal range of motion.   Skin:     General: Skin is warm.   Neurological:      Mental Status: He is alert and oriented to person, place, and time.      Comments: Tremors  noted, patient states it is his baseline    Psychiatric:         Behavior: Behavior normal.         Thought Content: Thought content normal.         Judgment: Judgment normal.           Results Review:  I have personally reviewed most recent cardiac tracings, lab results, and radiology images and interpretations and agree with findings, most notably: CMP, A1c, C-reactive, CBC, UA, respiratory panel, x-ray forearm,.  CT head  Results from last 7 days   Lab Units 11/12/24  0254   WBC 10*3/mm3 8.90   HEMOGLOBIN g/dL 14.8   HEMATOCRIT % 47.1   PLATELETS 10*3/mm3 180     Results from last 7 days   Lab Units 11/12/24  0254 11/11/24  1552 11/11/24  1434   SODIUM mmol/L 140  --  136   POTASSIUM mmol/L 5.1  --  4.8   CHLORIDE mmol/L 104  --  102   CO2 mmol/L 25.3  --  24.7   BUN mg/dL 16  --  17   CREATININE mg/dL 0.88 0.69 0.89   GLUCOSE mg/dL 237*  --  448*   CALCIUM mg/dL 9.0  --  8.9   ALK PHOS U/L  --   --  124*   ALT (SGPT) U/L  --   --  11   AST (SGOT) U/L  --   --  12   LACTATE mmol/L  --  1.0  --      Estimated Creatinine Clearance: 74.7 mL/min (by C-G formula based on SCr of 0.88 mg/dL).  Brief Urine Lab Results  (Last result in the past 365 days)        Color   Clarity   Blood   Leuk Est   Nitrite   Protein   CREAT   Urine HCG        11/11/24 1533 Yellow   Clear   Negative   Trace   Negative   Trace                   Microbiology Results (last 10 days)       Procedure Component Value - Date/Time    Respiratory Panel PCR w/COVID-19(SARS-CoV-2) JODY/TICO/KG/PAD/COR/OZ In-House, NP Swab in UTM/VTM, 2 HR TAT - Swab, Nasopharynx [[id number]]  (Normal) Collected: 11/11/24 1707    Lab Status: Final result Specimen: Swab from Nasopharynx Updated: 11/11/24 1816     ADENOVIRUS, PCR Not Detected     Coronavirus 229E Not Detected     Coronavirus HKU1 Not Detected     Coronavirus NL63 Not Detected     Coronavirus OC43 Not Detected     COVID19 Not Detected     Human Metapneumovirus Not Detected     Human Rhinovirus/Enterovirus  Not Detected     Influenza A PCR Not Detected     Influenza B PCR Not Detected     Parainfluenza Virus 1 Not Detected     Parainfluenza Virus 2 Not Detected     Parainfluenza Virus 3 Not Detected     Parainfluenza Virus 4 Not Detected     RSV, PCR Not Detected     Bordetella pertussis pcr Not Detected     Bordetella parapertussis PCR Not Detected     Chlamydophila pneumoniae PCR Not Detected     Mycoplasma pneumo by PCR Not Detected    Narrative:      In the setting of a positive respiratory panel with a viral infection PLUS a negative procalcitonin without other underlying concern for bacterial infection, consider observing off antibiotics or discontinuation of antibiotics and continue supportive care. If the respiratory panel is positive for atypical bacterial infection (Bordetella pertussis, Chlamydophila pneumoniae, or Mycoplasma pneumoniae), consider antibiotic de-escalation to target atypical bacterial infection.            ECG/EMG Results (most recent)       Procedure Component Value Units Date/Time    ECG 12 Lead Other; gen weakness [[id number]] Collected: 11/11/24 1600     Updated: 11/11/24 2011     QT Interval 445 ms      QTC Interval 504 ms     Narrative:      HEART RATE=77  bpm  RR Ylmfthao=763  ms  ND Qtgemchs=176  ms  P Horizontal Axis=  deg  P Front Axis=80  deg  QRSD Ncoxfeap=866  ms  QT Eyeavnth=606  ms  WHbC=185  ms  QRS Axis=-51  deg  T Wave Axis=133  deg  - ABNORMAL ECG -  Sinus rhythm  Prolonged ND interval  Left bundle branch block  ST elevation secondary to IVCD  When compared with ECG of 26-Apr-2024 10:50:43,  Nonspecific significant change  Electronically Signed By: Arnav Dumont (KG) 2024-11-11 20:09:08  Date and Time of Study:2024-11-11 16:00:09            Results for orders placed during the hospital encounter of 04/24/24    Duplex Venous Lower Extremity - Bilateral CAR    Interpretation Summary    Normal bilateral lower extremity venous duplex scan.      Results for orders placed  during the hospital encounter of 04/24/24    Adult Transthoracic Echo Complete W/ Cont if Necessary Per Protocol (With Agitated Saline)    Interpretation Summary    The left ventricular cavity is mildly dilated.    Left ventricular diastolic function is consistent with (grade Ia w/high LAP) impaired relaxation.    Small patent foramen ovale present with right to left shunting indicated by saline contrast.    Saline test results are positive with valsalva manuever.    There is mild, bileaflet mitral valve thickening present.    Estimated right ventricular systolic pressure from tricuspid regurgitation is mildly elevated (35-45 mmHg).    Mild pulmonary hypertension is present.      CT Head Without Contrast    Result Date: 11/11/2024  Impression: No acute intracranial abnormality. Electronically Signed: Billy Ribeiro DO  11/11/2024 6:21 PM EST  Workstation ID: [id number]    XR Chest 2 View    Result Date: 11/11/2024  No radiographic findings of acute cardiopulmonary abnormality. Electronically Signed: Andrea Kirk  11/11/2024 5:54 PM EST  Workstation ID: [id number]    XR Forearm 2 View Right    Result Date: 11/11/2024  1.Focal soft tissue prominence at the posterior aspect of the proximal forearm. This could be related to olecranon bursitis, hematoma, soft tissue infection, or neoplasm. 2.No radiographic findings of acute osseous elbow or forearm abnormality. 3.Moderate elbow osteoarthritis. 4.Calcific atherosclerosis. Electronically Signed: Andrea Kirk  11/11/2024 5:43 PM EST  Workstation ID: [id number]    XR Elbow 3+ View Right    Result Date: 11/11/2024  1.Focal soft tissue prominence at the posterior aspect of the proximal forearm. This could be related to olecranon bursitis, hematoma, soft tissue infection, or neoplasm. 2.No radiographic findings of acute osseous elbow or forearm abnormality. 3.Moderate elbow osteoarthritis. 4.Calcific atherosclerosis. Electronically Signed: Andrea Kirk  11/11/2024 5:43 PM EST   Workstation ID: [id number]       Estimated Creatinine Clearance: 74.7 mL/min (by C-G formula based on SCr of 0.88 mg/dL).    Assessment & Plan  Assessment/Plan       Active Hospital Problems    Diagnosis  POA    Hyperglycemia [R73.9]  Yes      Resolved Hospital Problems   No resolved problems to display.       Diabetes mellitus/Hyperglycemia   -Poorly controlled   Lab Results   Component Value Date    GLUCOSE 237 (H) 11/12/2024    GLUCOSE 448 (C) 11/11/2024    GLUCOSE 320 (H) 10/10/2024    GLUCOSE 143 (H) 09/24/2024   -Glucose in the   -Continue 20 units of Lantus, 7 units Humalog 3 times daily  -SSI  -Endocrinologist consulted  -Diabetes educator consulted  -Diabetic diet  -Monitor before meals and at bedtime     Weakness  BP Readings from Last 1 Encounters:   11/12/24 119/78   -Likely secondary to orthostatic hypotension  -PT/OT consulted and recommended SNF  -Ortho BP/HR- Lying 105/61 HR 94, sitting 86/67  and standing 76/51 and   - Patient has a history of CHF with reduced EF  -Give 250ml bolus  -Consult cardiology   -Not on BP or heart medications  -Per UR, clinical supports IP criteria  -Consult hospitalist    Heart failure with reduced ejection fraction   Lab Results   Component Value Date    TROPONINT 30 (H) 03/12/2023    TROPONINT 27 (H) 03/11/2023     11/12/2024   -Recent echocardiogram showed EF 30%  -Chest x-ray: Showed no acute abnormality  -Patient is not on GDMT and is positive for orthostatic hypotension  -Cardiology consulted   -Monitor I's and O's and daily weights  -2 g sodium diet     Metastatic colon cancer  -Follows up with Dr Glover             VTE Prophylaxis - Active VTE Prophylaxis  Mechanical:        Start        11/11/24 2021  Maintain Sequential Compression Device  Continuous                          Select Pharmacologic VTE Prophylaxis if Desired & Appropriate      CODE STATUS:    Code Status and Medical Interventions: CPR (Attempt to Resuscitate); Full Support    Ordered at: 24     Code Status (Patient has no pulse and is not breathing):    CPR (Attempt to Resuscitate)     Medical Interventions (Patient has pulse or is breathing):    Full Support       This patient has been examined wearing personal protective equipment.     I discussed the patient's findings and my recommendations with patient and nursing staff.      Signature:Electronically signed by ANEUDY Murillo, 24, 11:19 AM EST.             Electronically signed by Yesenia Arreola APRN at 24 1143       Operative/Procedure Notes (all)    No notes of this type exist for this encounter.       Physician Progress Notes (last 48 hours)  Notes from 24 1039 through 24 1039   No notes of this type exist for this encounter.          Consult Notes (last 48 hours)        Jesus Singleton MD at 24 1720        Consult Orders    1. Inpatient Endocrinology Consult [[id number]] ordered by Sarah Roth APRN at 24                     ENDOCRINE INITIAL CONSULT NOTE  DATE OF SERVICE: 24  Patient Care Team:  Vlad Moreland MD as PCP - General (Internal Medicine)  Percy Dvais MD as Surgeon (General Surgery)  Don Kramer MD as Consulting Physician (Hematology and Oncology)        PATIENT NAME: Vlad Guerrero  PATIENT : 1945 AGE: 79 y.o.  MRN NUMBER: [id number]  PRIMARY CARE: Vlad Moreland MD    ==========================================================================    REASON FOR CONSULT: Diabetes management     HPI / SUBJECTIVE  79 y.o. male with multiple medical problems admitted to the hospital for generalized weakness.  Endocrinology team is consulted for type 2 diabetes management.  Patient seen and examined.  Patient reports to have type 2 diabetes for last 25 years and currently following PCP for diabetes management.  Patient have A1c of 11.8%.  Reports to be currently taking insulin Lantus 20 units in the evening along with  insulin lispro 10 units 3 times daily with meals.  Currently staying at assisted living facility.  Reports to be checking blood sugar through fingersticks.    ==========================================================================                                                PAST MEDICAL HISTORY    Past Medical History:   Diagnosis Date    Anemia     Colon cancer 2022    colon    Diabetes mellitus     Hyperlipidemia     Hypertension        ==========================================================================    PAST SURGICAL HISTORY    Past Surgical History:   Procedure Laterality Date    APPENDECTOMY      CARDIAC CATHETERIZATION      COLON RESECTION N/A 12/15/2022    Procedure: COLON RESECTION RIGHT;  Surgeon: Precy Davis MD;  Location: [facility] MAIN OR;  Service: General;  Laterality: N/A;    COLONOSCOPY N/A 11/11/2022    Procedure: COLONOSCOPY WITH BIOPSY AND POLYPECTOMY;  Surgeon: Billy Julien MD;  Location: [facility] ENDOSCOPY;  Service: Gastroenterology;  Laterality: N/A;  Impression:  1.  Large 4-5cm fulgurating circumferential ulcerated mass in the very proximal part of the ascending colon next to ileocecal valve multiple biopsies were performed.  This is highly concerning for colon malignancy.  2.  2 polyp rem    ENDOSCOPY N/A 11/11/2022    Procedure: ESOPHAGOGASTRODUODENOSCOPY with biopsy X1;  Surgeon: Billy Julien MD;  Location: [facility] ENDOSCOPY;  Service: Gastroenterology;  Laterality: N/A;  5.  Upper endoscopy lamination unremarkable.       PORTACATH PLACEMENT Right 1/12/2023    Procedure: INSERTION OF PORTACATH;  Surgeon: Percy Davis MD;  Location: [facility] MAIN OR;  Service: General;  Laterality: Right;    TONSILLECTOMY         ==========================================================================    FAMILY HISTORY    Family History   Problem Relation Age of Onset    Pneumonia Mother 94        SARS-CoV2    Colon cancer Father 62    Heart disease Sister         ==========================================================================    SOCIAL HISTORY    Social History     Socioeconomic History    Marital status:    Tobacco Use    Smoking status: Former     Current packs/day: 0.00     Average packs/day: 1 pack/day for 2.0 years (2.0 ttl pk-yrs)     Types: Cigarettes     Start date:      Quit date:      Years since quittin.9    Smokeless tobacco: Never   Vaping Use    Vaping status: Never Used   Substance and Sexual Activity    Alcohol use: Never    Drug use: Never    Sexual activity: Defer       ==========================================================================    HOME MEDICATIONS REPORTED ON ADMISSION      Current Facility-Administered Medications:     acetaminophen (TYLENOL) tablet 650 mg, 650 mg, Oral, Q6H PRN, Tripure, Yesenia S, APRN    aspirin EC tablet 81 mg, 81 mg, Oral, Daily, Tripure, Yesenia S, APRN, 81 mg at 24 0852    sennosides-docusate (PERICOLACE) 8.6-50 MG per tablet 2 tablet, 2 tablet, Oral, BID PRN **AND** polyethylene glycol (MIRALAX) packet 17 g, 17 g, Oral, Daily PRN **AND** bisacodyl (DULCOLAX) EC tablet 5 mg, 5 mg, Oral, Daily PRN **AND** bisacodyl (DULCOLAX) suppository 10 mg, 10 mg, Rectal, Daily PRN, Sarah Roth, APRN    dextrose (D50W) (25 g/50 mL) IV injection 25 g, 25 g, Intravenous, Q15 Min PRN, Tripure, Yesenia S, APRN    dextrose (GLUTOSE) oral gel 15 g, 15 g, Oral, Q15 Min PRN, Tripure, Yesenia S, APRN    glucagon (GLUCAGEN) injection 1 mg, 1 mg, Intramuscular, Q15 Min PRN, Yesenia Arreola APRN    insulin glargine (LANTUS, SEMGLEE) injection 12 Units, 12 Units, Subcutaneous, Nightly, Jesus Singleton MD    insulin lispro (HUMALOG/ADMELOG) injection 2-7 Units, 2-7 Units, Subcutaneous, 4x Daily AC & at Bedtime, Jesus Singleton MD    insulin lispro (HUMALOG/ADMELOG) injection 4 Units, 4 Units, Subcutaneous, TID With Meals, Jesus Singleton MD    melatonin tablet 5 mg, 5 mg, Oral, Nightly PRN, Gonzalez,  ANEUDY Smith    nitroglycerin (NITROSTAT) SL tablet 0.4 mg, 0.4 mg, Sublingual, Q5 Min PRN, Liz Fish APRN    ondansetron (ZOFRAN) injection 4 mg, 4 mg, Intravenous, Q6H PRN, Sarah Roth, APRSILVIA    sodium chloride 0.9 % flush 10 mL, 10 mL, Intravenous, PRN, Praveen Nguyễn MD    sodium chloride 0.9 % flush 10 mL, 10 mL, Intravenous, Q12H, Sarah Roth APRN, 10 mL at 11/12/24 0852    sodium chloride 0.9 % flush 10 mL, 10 mL, Intravenous, PRN, Sarah Roth, ANEUDY    sodium chloride 0.9 % infusion 40 mL, 40 mL, Intravenous, PRN, Sarah Roth, APRN    ==========================================================================    ALLERGIES    Allergies   Allergen Reactions    Penicillins Rash       ==========================================================================    ACTIVE HOSPITAL MEDICATIONS    Scheduled Medications:  aspirin, 81 mg, Oral, Daily  insulin glargine, 12 Units, Subcutaneous, Nightly  insulin lispro, 2-7 Units, Subcutaneous, 4x Daily AC & at Bedtime  insulin lispro, 4 Units, Subcutaneous, TID With Meals  sodium chloride, 10 mL, Intravenous, Q12H         PRN Medications:    acetaminophen    senna-docusate sodium **AND** polyethylene glycol **AND** bisacodyl **AND** bisacodyl    dextrose    dextrose    glucagon (human recombinant)    melatonin    nitroglycerin    ondansetron    sodium chloride    sodium chloride    sodium chloride     ==========================================================================    OBJECTIVE    Vitals:    11/12/24 1500   BP: 118/66   Pulse: 92   Resp: 16   Temp: 98.5 °F (36.9 °C)   SpO2: (!) 78%      Body mass index is 20 kg/m².     General: Alert, cooperative, no acute distress    ==========================================================================    LABORATORY DATA    Lab Results   Component Value Date    GLUCOSE 237 (H) 11/12/2024    BUN 16 11/12/2024    CREATININE 0.88 11/12/2024    EGFRIFNONA 76 11/15/2021    EGFRIFAFRI 87 11/15/2021    BCR 18.2  11/12/2024    K 5.1 11/12/2024    CO2 25.3 11/12/2024    CALCIUM 9.0 11/12/2024    ALBUMIN 3.5 11/11/2024    AST 12 11/11/2024    ALT 11 11/11/2024       Lab Results   Component Value Date    HGBA1C 11.80 (H) 11/12/2024    HGBA1C 12.90 (H) 04/26/2024    HGBA1C 12.20 (H) 01/08/2024     Lab Results   Component Value Date    MICROALBUR 30 07/08/2022    CREATININE 0.88 11/12/2024     Results from last 7 days   Lab Units 11/12/24  1722 11/12/24  1540 11/12/24  1200 11/12/24  0718 11/11/24  2126 11/11/24  1832   GLUCOSE mg/dL 216* 243* 92 190* 216* 279*     ==========================================================================    ASSESSMENT AND PLAN    # Type 2 diabetes with hyperglycemia, uncontrolled with A1c of 11.8%  - Blood sugar reviewed for last 24 hours  - So far patient have received only 10 units of insulin Lantus and blood sugar this morning was 119 and patient had insulin lispro 9 units with breakfast that lead to blood sugar of 92 with the lunchtime  - Will adjust insulin therapy as follows  Insulin Lantus 12 units nightly  Insulin lispro 4 units with each meal with low-dose correction scale  - Blood sugar for next 24 hours and adjust therapy accordingly  - Target will be to avoid hypoglycemia and  to maintain blood sugar between 150 and 180 for now    # Generalized weakness  # Heart failure with reduced ejection fraction    Thank you for courtesy of consultation.  Will follow with you.  Rest as per primary care.    Part of this note may be an electronic transcription/translation of spoken language to printed text using the Dragon Dictation System.     The time of this note does not reflect the time I saw the patient but the time that this note was written.    =======================================  Jesus Singleton MD  Department of Endocrine, Diabetes and Metabolism  [place], [place]  =======================================      Electronically signed by Jesus Singleton MD at 11/12/24 9459        Mg Rahman MD at 24 1257        Consult Orders    1. Inpatient Cardiology Consult [[id number]] ordered by Yesenia Arreola APRN at 24 1053                     Cardiology Consult Note    Patient Identification:  Name: Vlad Guerrero  Age: 79 y.o.  Sex: male  :  1945  MRN: [id number]             Requesting Physician :  Praveen Nguyễn MD     Reason for Consultation / Chief Complaint :   Orthostatic hypotension and tachycardia    History of Present Illness:        Mr. Vlad Guerrero has PMH of     PFO  Left bundle branch block  Diastolic function  Left atrial enlargement  Hypertension  History of TIA , left frontal CVA 2024  Metastatic colon cancer with possible liver mets  Insulin-dependent diabetes        Presented 2024 with weakness and hyperglycemia.  Patient was undergoing chemotherapy for his liver mets.  Patient was found to be tachycardic and hypotensive and orthostatic sugars were in the 500.  Patient's recent A1c was 11.80 on 2024.    Data:  2024: HS troponin is 3231        Assessment:  :     Orthostatic hypotension  Type 2 diabetes, poorly controlled  PFO  Left bundle branch block  Diastolic dysfunction  Left atrial enlargement  Acute left frontal stroke.  Metastatic colon cancer  History of previous TIA  Carotid disease        Recommendations / Plan:         EKG is revealing left bundle branch block  Echocardiogram is revealing normal LV systolic function with diastolic dysfunction and left atrial enlargement and PFO.  Patient has carotid disease and possible hypercoagulable state from metastatic colon cancer which potentially can put him at risk for CVA.           Assessment       Diagnosis Plan   1. Hyperglycemia        2. Generalized weakness                  Past Medical History:  Past Medical History:   Diagnosis Date    Anemia     Colon cancer     colon    Diabetes mellitus     Hyperlipidemia     Hypertension      Past Surgical  History:  Past Surgical History:   Procedure Laterality Date    APPENDECTOMY      CARDIAC CATHETERIZATION      COLON RESECTION N/A 12/15/2022    Procedure: COLON RESECTION RIGHT;  Surgeon: Percy Davis MD;  Location: [facility] MAIN OR;  Service: General;  Laterality: N/A;    COLONOSCOPY N/A 11/11/2022    Procedure: COLONOSCOPY WITH BIOPSY AND POLYPECTOMY;  Surgeon: Billy Julien MD;  Location: [facility] ENDOSCOPY;  Service: Gastroenterology;  Laterality: N/A;  Impression:  1.  Large 4-5cm fulgurating circumferential ulcerated mass in the very proximal part of the ascending colon next to ileocecal valve multiple biopsies were performed.  This is highly concerning for colon malignancy.  2.  2 polyp rem    ENDOSCOPY N/A 11/11/2022    Procedure: ESOPHAGOGASTRODUODENOSCOPY with biopsy X1;  Surgeon: Billy Julien MD;  Location: [facility] ENDOSCOPY;  Service: Gastroenterology;  Laterality: N/A;  5.  Upper endoscopy lamination unremarkable.       PORTACATH PLACEMENT Right 1/12/2023    Procedure: INSERTION OF PORTACATH;  Surgeon: Percy Davis MD;  Location: [facility] MAIN OR;  Service: General;  Laterality: Right;    TONSILLECTOMY        Allergies:  Allergies   Allergen Reactions    Penicillins Rash     Home Meds:  Medications Prior to Admission   Medication Sig Dispense Refill Last Dose/Taking    aspirin 81 MG EC tablet Take 1 tablet by mouth Daily.   11/10/2024    insulin aspart (NovoLOG FlexPen) 100 UNIT/ML solution pen-injector sc pen Inject 10 Units under the skin into the appropriate area as directed 3 (Three) Times a Day With Meals.   11/10/2024 Morning    insulin glargine (LANTUS, SEMGLEE) 100 UNIT/ML injection Inject 20 Units under the skin into the appropriate area as directed Every Night for 30 days. 6 mL 0 11/10/2024 Evening     Current Meds:     Current Facility-Administered Medications:     acetaminophen (TYLENOL) tablet 650 mg, 650 mg, Oral, Q6H PRN, Yesenia Arreola S, APRN    aspirin EC tablet  81 mg, 81 mg, Oral, Daily, TripDean pimentela S, APRN, 81 mg at 24 0852    sennosides-docusate (PERICOLACE) 8.6-50 MG per tablet 2 tablet, 2 tablet, Oral, BID PRN **AND** polyethylene glycol (MIRALAX) packet 17 g, 17 g, Oral, Daily PRN **AND** bisacodyl (DULCOLAX) EC tablet 5 mg, 5 mg, Oral, Daily PRN **AND** bisacodyl (DULCOLAX) suppository 10 mg, 10 mg, Rectal, Daily PRN, Sarah Roth APRN    dextrose (D50W) (25 g/50 mL) IV injection 25 g, 25 g, Intravenous, Q15 Min PRN, Yesenia Arreola APRN    dextrose (GLUTOSE) oral gel 15 g, 15 g, Oral, Q15 Min PRN, Yesenia Arreola APRN    glucagon (GLUCAGEN) injection 1 mg, 1 mg, Intramuscular, Q15 Min PRN, Yesenia Arreola APRN    insulin glargine (LANTUS, SEMGLEE) injection 20 Units, 20 Units, Subcutaneous, Nightly, Yesenia Arreola APRN    insulin lispro (HUMALOG/ADMELOG) injection 2-9 Units, 2-9 Units, Subcutaneous, 4x Daily AC & at Bedtime, Yesenia Arreola APRN, 2 Units at 24 0844    insulin lispro (HUMALOG/ADMELOG) injection 7 Units, 7 Units, Subcutaneous, TID With Meals, Yesenia Arreola APRN, 7 Units at 24 0845    melatonin tablet 5 mg, 5 mg, Oral, Nightly PRN, Sarah Roth APRN    ondansetron (ZOFRAN) injection 4 mg, 4 mg, Intravenous, Q6H PRN, Sarah Roth APRN    sodium chloride 0.9 % flush 10 mL, 10 mL, Intravenous, PRN, Praveen Nguyễn MD    sodium chloride 0.9 % flush 10 mL, 10 mL, Intravenous, Q12H, Sarah Roth APRN, 10 mL at 24 0852    sodium chloride 0.9 % flush 10 mL, 10 mL, Intravenous, PRN, Sarah Roth, APRN    sodium chloride 0.9 % infusion 40 mL, 40 mL, Intravenous, PRN, Sarah Roth, APRN  Social History:   Social History     Tobacco Use    Smoking status: Former     Current packs/day: 0.00     Average packs/day: 1 pack/day for 2.0 years (2.0 ttl pk-yrs)     Types: Cigarettes     Start date:      Quit date:      Years since quittin.9    Smokeless tobacco: Never   Substance  "Use Topics    Alcohol use: Never      Family History:  Family History   Problem Relation Age of Onset    Pneumonia Mother 94        SARS-CoV2    Colon cancer Father 62    Heart disease Sister         Review of Systems : ROS             Constitutional:  Temp:  [97.6 °F (36.4 °C)-97.8 °F (36.6 °C)] 97.8 °F (36.6 °C)  Heart Rate:  [] 94  Resp:  [12-19] 19  BP: ()/(51-96) 109/60    Physical Exam   /60   Pulse 94   Temp 97.8 °F (36.6 °C) (Oral)   Resp 19   Ht 185.4 cm (73\")   Wt 75.1 kg (165 lb 9.1 oz)   SpO2 (!) 77%   BMI 21.84 kg/m²   Physical Exam  General:  Appears in no acute distress  Eyes: Sclerae are anicteric,  conjunctivae are clear   HEENT:  No JVD. Thyroid not visibly enlarged. No mucosal pallor or cyanosis  Respiratory: Respirations regular and unlabored at rest.  Bilaterally good breath sounds with good air entry in all fields. No crackles, rubs or wheezes auscultated  Cardiovascular: S1,S2 Regular rate and rhythm. No murmur, rub or gallop auscultated. No pretibial pitting edema  Gastrointestinal: Abdomen soft, flat, nontender. Bowel sounds present.   Musculoskeletal:  No abnormal movements  Extremities: No digital clubbing or cyanosis  Skin: Color pink. Skin warm and dry to touch. No rashes  No xanthoma  Neuro: Alert and awake, no lateralizing deficits appreciated    Cardiographics  ECG: EKG tracing was  personally reviewed/interpreted by me  ECG 12 Lead Other; gen weakness   Final Result   HEART RATE=77  bpm   RR Nojufthd=647  ms   CT Pwlgauqw=486  ms   P Horizontal Axis=  deg   P Front Axis=80  deg   QRSD Gmcrobij=468  ms   QT Rglddqfh=414  ms   QSsP=513  ms   QRS Axis=-51  deg   T Wave Axis=133  deg   - ABNORMAL ECG -   Sinus rhythm   Prolonged CT interval   Left bundle branch block   ST elevation secondary to IVCD   When compared with ECG of 26-Apr-2024 10:50:43,   Nonspecific significant change   Electronically Signed By: Arnav Dumont (KG) 2024-11-11 20:09:08   Date and " Time of Study:2024-11-11 16:00:09      ECG 12 Lead Rhythm Change    (Results Pending)       Telemetry:     Echocardiogram:   Results for orders placed during the hospital encounter of 04/24/24    Adult Transthoracic Echo Complete W/ Cont if Necessary Per Protocol (With Agitated Saline)    Interpretation Summary    The left ventricular cavity is mildly dilated.    Left ventricular diastolic function is consistent with (grade Ia w/high LAP) impaired relaxation.    Small patent foramen ovale present with right to left shunting indicated by saline contrast.    Saline test results are positive with valsalva manuever.    There is mild, bileaflet mitral valve thickening present.    Estimated right ventricular systolic pressure from tricuspid regurgitation is mildly elevated (35-45 mmHg).    Mild pulmonary hypertension is present.      Imaging  Chest X-ray:   Imaging Results (Last 24 Hours)       Procedure Component Value Units Date/Time    CT Head Without Contrast [[id number]] Collected: 11/11/24 1818     Updated: 11/11/24 1823    Narrative:      CT HEAD WO CONTRAST    Date of Exam: 11/11/2024 6:00 PM EST    Indication: gen weakness x3 days, hx of CVA.    Comparison: MRI 4/25/2024    Technique: Axial CT images were obtained of the head without contrast administration.  Coronal reconstructions were performed.  Automated exposure control and iterative reconstruction methods were used.      Findings:  No large territory infarct.    There is no evidence of hemorrhage.  No mass effect, edema or midline shift    Mild to moderate periventricular and subcortical white matter hypodensities, nonspecific but most likely represents chronic small vessel ischemic changes.    No extra-axial fluid collection.      Notice of obstructive hydrocephalus. Mild diffuse parenchymal volume loss      Status post bilateral lens extraction. Otherwise the orbits unremarkable  Small polyps versus mucous retention cyst within the left maxillary sinus.  Otherwise the paranasal sinuses and mastoid air cells are clear.    The visualized soft tissues are unremarkable.  No acute osseous abnormality.      Impression:      Impression:  No acute intracranial abnormality.      Electronically Signed: Billy Ribeiro DO    11/11/2024 6:21 PM EST    Workstation ID: [id number]    XR Chest 2 View [[id number]] Collected: 11/11/24 1752     Updated: 11/11/24 1756    Narrative:        XR CHEST 2 VW    Date of Exam: 11/11/2024 5:26 PM EST    Indication: gen weakness, assess for poss PNA    Comparison: Chest CT August 29, 2024, chest radiograph January 8, 2024    Findings:  No focal or diffuse pulmonary infiltrate is identified.  No pleural effusion or pneumothorax is seen.  Heart size and mediastinal contour appear within normal limits. Right IJ port catheter with tip projecting in the SVC. Mild to moderate disc   degeneration in the thoracic spine.      Impression:      No radiographic findings of acute cardiopulmonary abnormality.    Electronically Signed: Andrea Kirk    11/11/2024 5:54 PM EST    Workstation ID: [id number]    XR Forearm 2 View Right [[id number]] Collected: 11/11/24 1740     Updated: 11/11/24 1745    Narrative:        XR ELBOW 3+ VW RIGHT, XR FOREARM 2 VW RIGHT    Date of Exam: 11/11/2024 5:26 PM EST    Indication: R FA pain, swelling near elbow    Comparison: None available.    FINDINGS:    Elbow: There is focal soft tissue prominence at the posterior aspect of the proximal forearm overlying the proximal ulna. There is calcific atherosclerosis of the visualized upper extremity arteries.    No fracture is identified. Mineralization and alignment appear within normal limits.  No definite joint effusion. Prominent enthesophyte at the posterior olecranon. There is also enthesopathy and calcific tendinopathy at the medial and lateral   epicondyles. Moderate elbow joint space narrowing and osteophytosis.    Forearm: Focal posterior soft tissue prominence at the proximal  forearm on the lateral view. No definite radiopaque foreign body or soft tissue gas is seen in this location. No acute osseous forearm abnormality is seen. There is calcific atherosclerosis   of the visualized upper extremity arteries.      Impression:      1.Focal soft tissue prominence at the posterior aspect of the proximal forearm. This could be related to olecranon bursitis, hematoma, soft tissue infection, or neoplasm.  2.No radiographic findings of acute osseous elbow or forearm abnormality.  3.Moderate elbow osteoarthritis.  4.Calcific atherosclerosis.        Electronically Signed: Andrea Kirk    11/11/2024 5:43 PM EST    Workstation ID: [id number]    XR Elbow 3+ View Right [[id number]] Collected: 11/11/24 1740     Updated: 11/11/24 1745    Narrative:        XR ELBOW 3+ VW RIGHT, XR FOREARM 2 VW RIGHT    Date of Exam: 11/11/2024 5:26 PM EST    Indication: R FA pain, swelling near elbow    Comparison: None available.    FINDINGS:    Elbow: There is focal soft tissue prominence at the posterior aspect of the proximal forearm overlying the proximal ulna. There is calcific atherosclerosis of the visualized upper extremity arteries.    No fracture is identified. Mineralization and alignment appear within normal limits.  No definite joint effusion. Prominent enthesophyte at the posterior olecranon. There is also enthesopathy and calcific tendinopathy at the medial and lateral   epicondyles. Moderate elbow joint space narrowing and osteophytosis.    Forearm: Focal posterior soft tissue prominence at the proximal forearm on the lateral view. No definite radiopaque foreign body or soft tissue gas is seen in this location. No acute osseous forearm abnormality is seen. There is calcific atherosclerosis   of the visualized upper extremity arteries.      Impression:      1.Focal soft tissue prominence at the posterior aspect of the proximal forearm. This could be related to olecranon bursitis, hematoma, soft tissue  infection, or neoplasm.  2.No radiographic findings of acute osseous elbow or forearm abnormality.  3.Moderate elbow osteoarthritis.  4.Calcific atherosclerosis.        Electronically Signed: Andrea Kirk    2024 5:43 PM EST    Workstation ID: [id number]            Lab Review: I have reviewed the labs      Results from last 7 days   Lab Units 24  1434   MAGNESIUM mg/dL 1.6     Results from last 7 days   Lab Units 24  0254   SODIUM mmol/L 140   POTASSIUM mmol/L 5.1   BUN mg/dL 16   CREATININE mg/dL 0.88   CALCIUM mg/dL 9.0         Results from last 7 days   Lab Units 24  0254   PROBNP pg/mL 3,231.0*     Results from last 7 days   Lab Units 24  0254 24  1552 24  1434   WBC 10*3/mm3 8.90  --  7.06   HEMOGLOBIN g/dL 14.8  --  13.8   HEMOGLOBIN, POC g/dL  --  14.6  --    HEMATOCRIT % 47.1  --  42.2   HEMATOCRIT POC %  --  43  --    PLATELETS 10*3/mm3 180  --  189                 Mg Rahman MD  2024, 12:58 EST      EMR Dragon/Transcription:   Dictated utilizing Dragon dictation    Electronically signed by Mg Rahman MD at 24 1436       Nutrition Notes (most recent note)    No notes exist for this encounter.       Speech Language Pathology Notes (most recent note)    No notes exist for this encounter.       Juliet Delatorre, PT   Physical Therapist  Physical Therapy  Therapy Evaluation     Signed  Date of Service:  24  Creation Time:  24     Signed        Expand All Collapse All  Patient Name: Vlad Guerrero             : 1945                      MRN: [id number]                              Today's Date: 2024                                 Admit Date: 2024                      Visit Dx:   Visit Diagnosis       ICD-10-CM ICD-9-CM   1. Hyperglycemia  R73.9 790.29   2. Generalized weakness  R53.1 780.79         Problem List       Patient Active Problem List   Diagnosis    Microcytic anemia    Primary  hypertension    Mixed hyperlipidemia    Malignant neoplasm of ascending colon    Acute CVA (cerebrovascular accident)    Benign essential tremor    Type 2 diabetes mellitus    Thrombocytopenia    Metastases to the liver    Cellulitis    Right hand pain    Ataxia    Port-A-Cath in place    Unsteady gait    Hyperglycemia         Medical History        Past Medical History:   Diagnosis Date    Anemia      Colon cancer 2022     colon    Diabetes mellitus      Hyperlipidemia      Hypertension           Surgical History         Past Surgical History:   Procedure Laterality Date    APPENDECTOMY        CARDIAC CATHETERIZATION        COLON RESECTION N/A 12/15/2022     Procedure: COLON RESECTION RIGHT;  Surgeon: Percy Davis MD;  [facility];  Service: General;  Laterality: N/A;    COLONOSCOPY N/A 11/11/2022     Procedure: COLONOSCOPY WITH BIOPSY AND POLYPECTOMY;  Surgeon: Billy Julien MD;  [facility];  Service: Gastroenterology;  Laterality: N/A;  Impression:  1.  Large 4-5cm fulgurating circumferential ulcerated mass in the very proximal part of the ascending colon next to ileocecal valve multiple biopsies were performed.  This is highly concerning for colon malignancy.  2.  2 polyp rem    ENDOSCOPY N/A 11/11/2022     Procedure: ESOPHAGOGASTRODUODENOSCOPY with biopsy X1;  Surgeon: Billy Julien MD;  [facility];  Service: Gastroenterology;  Laterality: N/A;  5.  Upper endoscopy lamination unremarkable.       PORTACATH PLACEMENT Right 1/12/2023     Procedure: INSERTION OF PORTACATH;  Surgeon: Percy Davis MD;  [facility];  Service: General;  Laterality: Right;    TONSILLECTOMY               General Information         Row Name 11/12/24 1023                 Physical Therapy Time and Intention     Document Type evaluation  -SS       Mode of Treatment individual therapy  -SS          Row Name 11/12/24 1027                 General Information      Patient Profile Reviewed yes  -       Prior Level of Function --  uses RW for community ambulation, drives, dresses and bathes self, does own laundry, manages own meds. Denies falls. Does not use RW in apartment  -       Existing Precautions/Restrictions orthostatic hypotension  -          Row Name 11/12/24 1027                 Living Environment     People in Home facility resident  lives in [place]  -          Row Name 11/12/24 1027                 Cognition     Orientation Status (Cognition) oriented x 4  -          Row Name 11/12/24 1027                 Safety Issues/Impairments Affecting Functional Mobility     Impairments Affecting Function (Mobility) balance  -       Comment, Safety Issues/Impairments (Mobility) vitals  -                       User Key  (r) = Recorded By, (t) = Taken By, (c) = Cosigned By        Initials Name Provider Type     Juliet Werner PT Physical Therapist                            Mobility         Row Name 11/12/24 1031                 Bed Mobility     Bed Mobility bed mobility (all) activities  -       All Activities, [place] (Bed Mobility) minimum assist (75% patient effort)  -       Comment, (Bed Mobility) assist at trunk  -          Row Name 11/12/24 1031                 Sit-Stand Transfer     Sit-Stand [place] (Transfers) contact guard  -       Comment, (Sit-Stand Transfer) tachycardia and orthostatic hypotension noted, not safe to advance to ambulation  -                       User Key  (r) = Recorded By, (t) = Taken By, (c) = Cosigned By        Initials Name Provider Type     Juliet Werner PT Physical Therapist                            Obj/Interventions         Row Name 11/12/24 1031                 Range of Motion Comprehensive     General Range of Motion no range of motion deficits identified  -          Row Name 11/12/24 1031                 Strength Comprehensive (MMT)     Comment, General Manual Muscle Testing (MMT)  Assessment B LE >3/5  -SS          Row Name 11/12/24 1031                 Sensory Assessment (Somatosensory)     Sensory Assessment (Somatosensory) not tested  -SS                       User Key  (r) = Recorded By, (t) = Taken By, (c) = Cosigned By        Initials Name Provider Type     SS Juliet Delatorre, PT Physical Therapist                            Goals/Plan         Row Name 11/12/24 1053                 Bed Mobility Goal 1 (PT)     Activity/Assistive Device (Bed Mobility Goal 1, PT) bed mobility activities, all  -SS       [place] Level/Cues Needed (Bed Mobility Goal 1, PT) modified independence  -SS       Time Frame (Bed Mobility Goal 1, PT) long term goal (LTG);2 weeks  -SS          Row Name 11/12/24 1053                 Transfer Goal 1 (PT)     Activity/Assistive Device (Transfer Goal 1, PT) transfers, all  -SS       [place] Level/Cues Needed (Transfer Goal 1, PT) modified independence  -SS       Time Frame (Transfer Goal 1, PT) long term goal (LTG);2 weeks  -SS          Row Name 11/12/24 1053                 Gait Training Goal 1 (PT)     Activity/Assistive Device (Gait Training Goal 1, PT) gait (walking locomotion);walker, rolling  -SS       [place] Level (Gait Training Goal 1, PT) modified independence  -SS       Distance (Gait Training Goal 1, PT) 75  -SS       Time Frame (Gait Training Goal 1, PT) long term goal (LTG);2 weeks  -SS       Strategies/Barriers (Gait Training Goal 1, PT) with vitals WNL  -SS          Row Name 11/12/24 1053                 Therapy Assessment/Plan (PT)     Planned Therapy Interventions (PT) balance training;bed mobility training;gait training;home exercise program;transfer training;patient/family education;strengthening  -SS                    User Key  (r) = Recorded By, (t) = Taken By, (c) = Cosigned By        Initials Name Provider Type     Juliet Werner PT Physical Therapist                         Clinical Impression         Row Name 11/12/24  1041                 Pain     Additional Documentation Pain Scale: FACES Pre/Post-Treatment (Group)  -SS          Row Name 11/12/24 1041                 Pain Scale: FACES Pre/Post-Treatment     Pain: FACES Scale, Pretreatment 0-->no hurt  -SS       Posttreatment Pain Rating 0-->no hurt  -SS          Row Name 11/12/24 1041                 Plan of Care Review     Plan of Care Reviewed With patient  -SS       Outcome Evaluation 80 y/o M who presented with generalized weakness. PMH of colon cancer type 2 diabetes, hypertension. Recent chemo per report but not undergoing active chemotherapy treatment. Patient lives in [facility] and states he is interested in moving to long term care but did not clearly articulate why. pt states he drives, ambulates with RW in community and no AD in his apartment, denies falls. Patient reports chronic tremors that are evident at rest and states this is familial. Patient seated vitals 120bpm BP 93/69. Standing vitals: HR 135bpm, BP 76/46. Pt does not report symptoms but he has significant tremors and postural instability so he appears to be a poor symptom  and is at risk for falls. Informed provider. Will continue to follow. Current recommendation is SNF at d.c but will update if he becomes appropriate for SNF.  -          Row Name 11/12/24 1041                 Therapy Assessment/Plan (PT)     Criteria for Skilled Interventions Met (PT) yes;meets criteria  -       Therapy Frequency (PT) 5 times/wk  -SS       Predicted Duration of Therapy Intervention (PT) until d/c  -          Row Name 11/12/24 1041                 Vital Signs     Pre Systolic BP Rehab 93  -SS       Pre Treatment Diastolic BP 69  -SS       Intra Systolic BP Rehab 76  -SS       Intra Treatment Diastolic BP 46  -SS       Pretreatment Heart Rate (beats/min) 120  -SS       Intratreatment Heart Rate (beats/min) 135  -SS          Row Name 11/12/24 1041                 Positioning and Restraints     Pre-Treatment Position  in bed  -SS       Post Treatment Position bed  -SS                       User Key  (r) = Recorded By, (t) = Taken By, (c) = Cosigned By        Initials Name Provider Type      Juliet Delatorre, PT Physical Therapist                            Outcome Measures    No documentation.                            Physical Therapy Education            Title: PT OT SLP Therapies (Done)         Topic: Physical Therapy (Done)         Point: Mobility training (Done)         Learning Progress Summary             Patient Acceptance, E, VU by  at 11/12/2024 1054                                                User Key         Initials Effective Dates Name Provider Type Discipline      06/16/21 -  Juliet Delatorre PT Physical Therapist PT                          PT Recommendation and Plan  Planned Therapy Interventions (PT): balance training, bed mobility training, gait training, home exercise program, transfer training, patient/family education, strengthening  Outcome Evaluation: 78 y/o M who presented with generalized weakness. PMH of colon cancer type 2 diabetes, hypertension. Recent chemo per report but not undergoing active chemotherapy treatment. Patient lives in [facility] and states he is interested in moving to long term care but did not clearly articulate why. pt states he drives, ambulates with RW in community and no AD in his apartment, denies falls. Patient reports chronic tremors that are evident at rest and states this is familial. Patient seated vitals 120bpm BP 93/69. Standing vitals: HR 135bpm, BP 76/46. Pt does not report symptoms but he has significant tremors and postural instability so he appears to be a poor symptom  and is at risk for falls. Informed provider. Will continue to follow. Current recommendation is SNF at d.c but will update if he becomes appropriate for SNF.      Time Calculation:   PT Evaluation Complexity  History, PT Evaluation Complexity: 3 or more personal factors and/or  comorbidities  Examination of Body Systems (PT Eval Complexity): total of 4 or more elements  Clinical Presentation (PT Evaluation Complexity): evolving  Clinical Decision Making (PT Evaluation Complexity): moderate complexity  Overall Complexity (PT Evaluation Complexity): moderate complexity       PT Charges         Row Name 24 1054                       Time Calculation     Start Time 0932  -SS         Stop Time 0950  -SS         Time Calculation (min) 18 min  -SS         PT Received On 24  -         PT - Next Appointment 24  -         PT Goal Re-Cert Due Date 24  -                 Time Calculation- PT     Total Timed Code Minutes- PT 0 minute(s)  -SS                      User Key  (r) = Recorded By, (t) = Taken By, (c) = Cosigned By        Initials Name Provider Type     SS Juliet Delatorre, PT Physical Therapist                       Therapy Charges for Today         Code Description Service Date Service Provider Modifiers Qty     84495262587 HC PT EVAL MOD COMPLEXITY 4 2024 Juliet Delatorre PT GP 1                PT G-Codes  AM-PAC 6 Clicks Score (PT): 24  PT Discharge Summary  Anticipated Discharge Disposition (PT): skilled nursing facility     Juliet Delatorre PT                     2024                                  Brandon Nicole OT   Occupational Therapist  Specialty:  Occupational Therapy  Therapy Evaluation     Signed  Date of Service:  24  Creation Time:  24     Signed        Expand All Collapse All  Patient Name: Vlad Guerrero             : 1945                      MRN: [id number]                              Today's Date: 2024                                 Admit Date: 2024                      Visit Dx:   Visit Diagnosis       ICD-10-CM ICD-9-CM   1. Hyperglycemia  R73.9 790.29   2. 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    Cellulitis    Right hand pain    Ataxia    Port-A-Cath in place    Unsteady gait    Hyperglycemia         Medical History        Past Medical History:   Diagnosis Date    Anemia      Colon cancer 2022     colon    Diabetes mellitus      Hyperlipidemia      Hypertension           Surgical History         Past Surgical History:   Procedure Laterality Date    APPENDECTOMY        CARDIAC CATHETERIZATION        COLON RESECTION N/A 12/15/2022     Procedure: COLON RESECTION RIGHT;  Surgeon: Percy Davis MD;  Location: [facility] MAIN OR;  Service: General;  Laterality: N/A;    COLONOSCOPY N/A 11/11/2022     Procedure: COLONOSCOPY WITH BIOPSY AND POLYPECTOMY;  Surgeon: Billy Julien MD;  Location: [facility] ENDOSCOPY;  Service: Gastroenterology;  Laterality: N/A;  Impression:  1.  Large 4-5cm fulgurating circumferential ulcerated mass in the very proximal part of the ascending colon next to ileocecal valve multiple biopsies were performed.  This is highly concerning for colon malignancy.  2.  2 polyp rem    ENDOSCOPY N/A 11/11/2022     Procedure: ESOPHAGOGASTRODUODENOSCOPY with biopsy X1;  Surgeon: Billy Julien MD;  Location: [facility] ENDOSCOPY;  Service: Gastroenterology;  Laterality: N/A;  5.  Upper endoscopy lamination unremarkable.       PORTACATH PLACEMENT Right 1/12/2023     Procedure: INSERTION OF PORTACATH;  Surgeon: Percy Davis MD;  Location: [facility] MAIN OR;  Service: General;  Laterality: Right;    TONSILLECTOMY               General Information         Row Name 11/12/24 1605                 OT Time and Intention     Document Type evaluation  -LS       Mode of Treatment occupational therapy  -LS       Patient Effort good  -LS          Row Name 11/12/24 1603                  General Information     Patient Profile Reviewed yes  -LS       Prior Level of Function independent:;ADL's;driving;all household mobility  -LS       Existing Precautions/Restrictions orthostatic hypotension;fall  -LS       Barriers to Rehab medically complex  -          Row Name 11/12/24 1602                 Living Environment     People in Home facility resident  [facility]  -          Row Name 11/12/24 1602                 Cognition     Orientation Status (Cognition) oriented x 4  -LS          Row Name 11/12/24 1602                 Safety Issues/Impairments Affecting Functional Mobility     Impairments Affecting Function (Mobility) endurance/activity tolerance  -                       User Key  (r) = Recorded By, (t) = Taken By, (c) = Cosigned By        Initials Name Provider Type     LS Brandon Nicole OT Occupational Therapist                                     Mobility/ADL's         Row Name 11/12/24 1603                 Bed Mobility     Bed Mobility bed mobility (all) activities  -       All Activities, Travis (Bed Mobility) minimum assist (75% patient effort)  -          Row Name 11/12/24 1603                 Sit-Stand Transfer     Comment, (Sit-Stand Transfer) Unable to attempt due to orthostatic hypotension in sitting  -          Row Name 11/12/24 1603                 Functional Mobility     Patient was able to Ambulate no, other medical factors prevent ambulation  -       Reason Patient was unable to Ambulate Hypotension  -          Row Name 11/12/24 1603                 Activities of Daily Living     BADL Assessment/Intervention lower body dressing  -          Row Name 11/12/24 1603                 Lower Body Dressing Assessment/Training     Travis Level (Lower Body Dressing) contact guard assist  -LS                       User Key  (r) = Recorded By, (t) = Taken By, (c) = Cosigned By        Initials Name Provider Type     Brandon Rain OT Occupational Therapist                             Obj/Interventions         [place] [facility] Name 11/12/24 1605                 Sensory Assessment (Somatosensory)     Sensory Assessment (Somatosensory) UE sensation intact  -LS          Row Name 11/12/24 1605                 Vision Assessment/Intervention     Visual Impairment/Limitations corrective lenses for reading  -LS          Row Name 11/12/24 1605                 Range of Motion Comprehensive     General Range of Motion no range of motion deficits identified  -LS          Row Name 11/12/24 1605                 Strength Comprehensive (MMT)     Comment, General Manual Muscle Testing (MMT) Assessment BUE grossly 3/5  -[facility] Name 11/12/24 1605                 Balance     Balance Assessment sitting static balance;sitting dynamic balance  -LS       Static Sitting Balance standby assist  -LS       Dynamic Sitting Balance standby assist  -LS                       User Key  (r) = Recorded By, (t) = Taken By, (c) = Cosigned By        Initials Name Provider Type     Brandon Rain, OT Occupational Therapist                            Goals/Plan         Row Name 11/12/24 1611                 Bed Mobility Goal 1 (OT)     Activity/Assistive Device (Bed Mobility Goal 1, OT) bed mobility activities, all  -LS       [place] Level/Cues Needed (Bed Mobility Goal 1, OT) modified independence  -LS       Time Frame (Bed Mobility Goal 1, OT) long term goal (LTG);2 weeks  -[facility] Name 11/12/24 1611                 Transfer Goal 1 (OT)     Activity/Assistive Device (Transfer Goal 1, OT) transfers, all  -LS       [place] Level/Cues Needed (Transfer Goal 1, OT) modified independence  -LS       Time Frame (Transfer Goal 1, OT) long term goal (LTG);2 weeks  -LS          Row Name 11/12/24 1611                 Dressing Goal 1 (OT)     Activity/Device (Dressing Goal 1, OT) dressing skills, all  -LS       [place]/Cues Needed (Dressing Goal 1, OT) modified independence  -LS       Time Frame  (Dressing Goal 1, OT) long term goal (LTG);2 weeks  -LS          Row Name 11/12/24 1611                 Toileting Goal 1 (OT)     Activity/Device (Toileting Goal 1, OT) toileting skills, all  -LS       [place] Level/Cues Needed (Toileting Goal 1, OT) modified independence  -LS       Time Frame (Toileting Goal 1, OT) long term goal (LTG);2 weeks  -LS          Row Name 11/12/24 1611                 Therapy Assessment/Plan (OT)     Planned Therapy Interventions (OT) activity tolerance training;BADL retraining;functional balance retraining;IADL retraining;occupation/activity based interventions;ROM/therapeutic exercise;strengthening exercise;transfer/mobility retraining;patient/caregiver education/training  -LS                    User Key  (r) = Recorded By, (t) = Taken By, (c) = Cosigned By        Initials Name Provider Type     LS Brandon Nicole OT Occupational Therapist                         Clinical Impression         Row Name 11/12/24 1605                 Pain Assessment     Pretreatment Pain Rating 0/10 - no pain  -LS       Posttreatment Pain Rating 0/10 - no pain  -LS          Row Name 11/12/24 1605                 Plan of Care Review     Plan of Care Reviewed With patient  -LS       Outcome Evaluation 80 y/o M who presented with generalized weakness. PMH of colon cancer, type 2 diabetes, hypertension. Recent chemo per report but not undergoing active chemotherapy treatment, reporting to OT that he has completed treatment. Patient lives in [facility] and states he is interested in moving to long term care. Pt states he drives, ambulates with RW in community and no AD in his apartment. This date, pt feels very fatigued, grossly weak. Min A provided for supine to sit EOB. Pt noted to drop from 126/99 to 72/54 in seated position. Gentle exercises performed in attempt to increase BP. Pt also completed lower body dressing with CGA. BP did not rise, thus pt assisted back to supine. Pt is far from baseline level at this  time, OT feels he would benefit from SNF prior to return to [facility] or transition to LTC pending pt choice. Will follow.  -          Row Name 11/12/24 1605                 Therapy Assessment/Plan (OT)     Rehab Potential (OT) good  -LS       Criteria for Skilled Therapeutic Interventions Met (OT) yes;skilled treatment is necessary  -LS       Therapy Frequency (OT) 3 times/wk  -LS       Predicted Duration of Therapy Intervention (OT) until dc  -LS          Row Name 11/12/24 1605                 Therapy Plan Review/Discharge Plan (OT)     Anticipated Discharge Disposition (OT) skilled nursing facility  -          Row Name 11/12/24 1605                 Vital Signs     Pre Systolic BP Rehab 126  -LS       Pre Treatment Diastolic BP 99  -LS       Intra Systolic BP Rehab 72  -LS       Intra Treatment Diastolic BP 54  -LS       O2 Delivery Pre Treatment room air  -LS       O2 Delivery Intra Treatment room air  -LS       O2 Delivery Post Treatment room air  -LS       Pre Patient Position Supine  -LS       Intra Patient Position Standing  -          Row Name 11/12/24 1605                 Positioning and Restraints     Pre-Treatment Position in bed  -LS       Post Treatment Position bed  -LS       In Bed notified nsg;fowlers;call light within reach;encouraged to call for assist;exit alarm on  -LS                       User Key  (r) = Recorded By, (t) = Taken By, (c) = Cosigned By        Initials Name Provider Type     Brandon Rain, CELE Occupational Therapist                            Outcome Measures         Row Name 11/12/24 1611                 How much help from another is currently needed...     Putting on and taking off regular lower body clothing? 3  -LS       Bathing (including washing, rinsing, and drying) 3  -LS       Toileting (which includes using toilet bed pan or urinal) 3  -LS       Putting on and taking off regular upper body clothing 3  -LS       Taking care of personal grooming (such as brushing teeth)  4  -LS       Eating meals 4  -LS       AM-PAC 6 Clicks Score (OT) 20  -LS          Row Name 11/12/24 0800                 How much help from another person do you currently need...     Turning from your back to your side while in flat bed without using bedrails? 4  -KH       Moving from lying on back to sitting on the side of a flat bed without bedrails? 4  -KH       Moving to and from a bed to a chair (including a wheelchair)? 3  -KH       Standing up from a chair using your arms (e.g., wheelchair, bedside chair)? 3  -KH       Climbing 3-5 steps with a railing? 3  -KH       To walk in hospital room? 3  -KH       AM-PAC 6 Clicks Score (PT) 20  -KH          Row Name 11/12/24 1611                 Functional Assessment     Outcome Measure Options AM-PAC 6 Clicks Daily Activity (OT)  -                       User Key  (r) = Recorded By, (t) = Taken By, (c) = Cosigned By        Initials Name Provider Type     Wilma Zee, RN Registered Nurse     Brandon Rain OT Occupational Therapist                             Occupational Therapy Education            Title: PT OT SLP Therapies (Done)         Topic: Occupational Therapy (Done)         Point: ADL training (Done)         Description:   Instruct learner(s) on proper safety adaptation and remediation techniques during self care or transfers.   Instruct in proper use of assistive devices.                       Learning Progress Summary             Patient Acceptance, E,TB, VU by KEM at 11/12/2024 1612                            Point: Precautions (Done)         Description:   Instruct learner(s) on prescribed precautions during self-care and functional transfers.                       Learning Progress Summary             Patient Acceptance, E,TB, VU by KEM at 11/12/2024 1612                            Point: Body mechanics (Done)         Description:   Instruct learner(s) on proper positioning and spine alignment during self-care, functional mobility  activities and/or exercises.                       Learning Progress Summary             Patient Acceptance, E,TB, VU by  at 11/12/2024 1612                                                User Key         Initials Effective Dates Name Provider Type Discipline      09/22/22 -  Brandon Nicole OT Occupational Therapist OT                          OT Recommendation and Plan  Planned Therapy Interventions (OT): activity tolerance training, BADL retraining, functional balance retraining, IADL retraining, occupation/activity based interventions, ROM/therapeutic exercise, strengthening exercise, transfer/mobility retraining, patient/caregiver education/training  Therapy Frequency (OT): 3 times/wk  Plan of Care Review  Plan of Care Reviewed With: patient  Outcome Evaluation: 80 y/o M who presented with generalized weakness. PMH of colon cancer, type 2 diabetes, hypertension. Recent chemo per report but not undergoing active chemotherapy treatment, reporting to OT that he has completed treatment. Patient lives in [facility] and states he is interested in moving to long term care. Pt states he drives, ambulates with RW in community and no AD in his apartment. This date, pt feels very fatigued, grossly weak. Min A provided for supine to sit EOB. Pt noted to drop from 126/99 to 72/54 in seated position. Gentle exercises performed in attempt to increase BP. Pt also completed lower body dressing with CGA. BP did not rise, thus pt assisted back to supine. Pt is far from baseline level at this time, OT feels he would benefit from SNF prior to return to [facility] or transition to LTC pending pt choice. Will follow.      Time Calculation:        Time Calculation- OT         Row Name 11/12/24 1612                       Time Calculation- OT     OT Start Time 1123  -         OT Stop Time 1144  -         OT Time Calculation (min) 21 min  -         OT Received On 11/12/24  -         OT - Next Appointment 11/14/24  -         OT Goal  Re-Cert Due Date 24  -LS                 Untimed Charges     OT Eval/Re-eval Minutes 21  -LS                 Total Minutes     Untimed Charges Total Minutes 21  -LS          Total Minutes 21  -LS                      User Key  (r) = Recorded By, (t) = Taken By, (c) = Cosigned By        Initials Name Provider Type     LS Brandon Nicole OT Occupational Therapist                       Therapy Charges for Today         Code Description Service Date Service Provider Modifiers Qty     37760172521 HC OT EVAL MOD COMPLEXITY 4 2024 Brandon Nicole OT GO 1                   Brandon Nicole OT                  2024                Juliet Delatorre, HÉCTOR   Physical Therapist  Physical Therapy  Therapy Treatment Note     Signed  Date of Service:  24  Creation Time:  24     Signed        Subjective: Pt agreeable to therapeutic plan of care. Patient is eager to get home and get cleaned up.      Objective:      Precautions - orthostatic hypotension     Supine: 113/70mmHg; 90bpm  Seated: 81/62mmHg,   Standin/46,   Pt denies symptoms but noted to be functionally weak and has impaired balance in standing      Bed mobility - Supervision  Transfers - CGA and with rolling walker  Ambulation - 0 feet N/A or Not attempted.           Vitals: Hypotensive, Orthostatic, and Tachycardic     Pain: 0 VAS   Location: n/a  Intervention for pain: N/A     Education: Provided education on the importance of mobility in the acute care setting     Assessment: Vlad Guerrero is a 78 y/o M who presented with generalized weakness. PMH of colon cancer type 2 diabetes, hypertension. Recent chemo per report but not undergoing active chemotherapy treatment. Patient continues to be orthostatic and tachycardic although HR decreased since yesterday following fluids. Pt denies symptoms but noted to be functionally weak and has impaired balance in standing which is a decline from his ambulatory baseline. Not able  "to advance to ambulation secondary to hypotension. Nurse and attending informed. Currently keeping d/c rec SNF however pt may progress to being able to return back to nursing home once medically managed as symptoms of generalized weakness may be related to hypotension. Will continue to follow and progress as tolerated.      Plan/Recommendations:   If medically appropriate, Moderate Intensity Therapy recommended post-acute care. This is recommended as therapy feels the patient would require 3-4 days per week and wouldn't tolerate \"3 hour daily\" rehab intensity. SNF would be the preferred choice. If the patient does not agree to SNF, arrange HH or OP depending on home bound status. If patient is medically complex, consider LTACH. Pt requires no DME at discharge.      Pt desires Skilled Rehab placement at discharge. Pt cooperative; agreeable to therapeutic recommendations and plan of care.            Post-Tx Position: Supine with HOB Elevated, Alarms activated, and Call light and personal items within reach  PPE: gloves     Therapy Charges for Today         Code Description Service Date Service Provider Modifiers Qty     [id number]  PT EVAL MOD COMPLEXITY 4 11/12/2024 Juliet Delatorre, PT GP 1     [id number]  PT THERAPEUTIC ACT EA 15 MIN 11/13/2024 Juliet Delatorre, PT GP 1               PT Charges         Row Name 11/13/24 0948                       Time Calculation     Start Time 0917  -         Stop Time 0926  -         Time Calculation (min) 9 min  -         PT Received On 11/13/24  -         PT - Next Appointment 11/14/24  -                 Time Calculation- PT     Total Timed Code Minutes- PT 9 minute(s)  -                      User Key  (r) = Recorded By, (t) = Taken By, (c) = Cosigned By        Initials Name Provider Type      Juliet Delatorre, PT Physical Therapist                                      "

## 2024-11-13 NOTE — PROGRESS NOTES
[facility] MEDICINE SERVICE  DAILY PROGRESS NOTE    NAME: Vlad Guerrero  : 1945  MRN: [id number]      LOS: 1 day     PROVIDER OF SERVICE: Chris Cruz MD    Chief Complaint: <principal problem not specified>    Subjective:     Interval History:  History taken from: patient    Hoping to discharge soon.        Review of Systems:   Review of Systems    Objective:     Vital Signs  Temp:  [97.6 °F (36.4 °C)-98.7 °F (37.1 °C)] 97.8 °F (36.6 °C)  Heart Rate:  [71-92] 85  Resp:  [13-20] 18  BP: (103-125)/(51-79) 110/62   Body mass index is 20.32 kg/m².    Physical Exam  Physical Exam  Constitutional:       Comments: Cachectic, muscle wasting.  Chronically ill-appearing   Cardiovascular:      Rate and Rhythm: Normal rate and regular rhythm.      Pulses: Normal pulses.      Heart sounds: Normal heart sounds.   Pulmonary:      Effort: Pulmonary effort is normal.      Breath sounds: Normal breath sounds.   Abdominal:      General: Abdomen is flat.      Palpations: Abdomen is soft.   Neurological:      Mental Status: Mental status is at baseline.            Diagnostic Data    Results from last 7 days   Lab Units 24  0507 24  0254 24  1552 24  1434   WBC 10*3/mm3 7.22 8.90  --  7.06   HEMOGLOBIN g/dL 13.8 14.8  --  13.8   HEMOGLOBIN, POC   --   --    < >  --    HEMATOCRIT % 43.3 47.1  --  42.2   HEMATOCRIT POC   --   --    < >  --    PLATELETS 10*3/mm3 185 180  --  189   GLUCOSE mg/dL  --  237*  --  448*   CREATININE mg/dL  --  0.88   < > 0.89   BUN mg/dL  --  16  --  17   SODIUM mmol/L  --  140  --  136   POTASSIUM mmol/L  --  5.1  --  4.8   AST (SGOT) U/L  --   --   --  12   ALT (SGPT) U/L  --   --   --  11   ALK PHOS U/L  --   --   --  124*   BILIRUBIN mg/dL  --   --   --  0.6   ANION GAP mmol/L  --  10.7  --  9.3    < > = values in this interval not displayed.       CT Head Without Contrast    Result Date: 2024  Impression: No acute intracranial abnormality. Electronically  Signed: Billy Ribeiro,   11/11/2024 6:21 PM EST  Workstation ID: [id number]    XR Chest 2 View    Result Date: 11/11/2024  No radiographic findings of acute cardiopulmonary abnormality. Electronically Signed: Andrea Kirk  11/11/2024 5:54 PM EST  Workstation ID: [id number]    XR Forearm 2 View Right    Result Date: 11/11/2024  1.Focal soft tissue prominence at the posterior aspect of the proximal forearm. This could be related to olecranon bursitis, hematoma, soft tissue infection, or neoplasm. 2.No radiographic findings of acute osseous elbow or forearm abnormality. 3.Moderate elbow osteoarthritis. 4.Calcific atherosclerosis. Electronically Signed: Andrea Kirk  11/11/2024 5:43 PM EST  Workstation ID: [id number]    XR Elbow 3+ View Right    Result Date: 11/11/2024  1.Focal soft tissue prominence at the posterior aspect of the proximal forearm. This could be related to olecranon bursitis, hematoma, soft tissue infection, or neoplasm. 2.No radiographic findings of acute osseous elbow or forearm abnormality. 3.Moderate elbow osteoarthritis. 4.Calcific atherosclerosis. Electronically Signed: Andrea Kirk  11/11/2024 5:43 PM EST  Workstation ID: [id number]       I reviewed the patient's new clinical results.    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     New onset heart failure with reduced ejection fraction, EF 25%  - Found on echo during this admission  - Patient going to heart cath 11/14/2024    Diabetes mellitus/Hyperglycemia   - not well controlled   - BG today 190  - A1c 11.80  - continue SSI, lantus and CC diet   - hypoglycemia protocol      Weakness  -  Likely secondary to orthostatic hypotension/deconditioning  - PT/OT consulted and recommended SNF  - Orthostatic vitals: BP/HR- Lying 105/61 HR 94, sitting 86/67  and standing 76/51 and      Heart failure with reduced ejection fraction   - Echocardiogram showed EF 30%  - proBNP 3231  - Chest x-ray: Showed no acute abnormality  - Daily weights  - Strict I&O's  - 1.5L fluid restriction   - Cardiac diet, Sodium < 2g  - Cardiology consulted      Metastatic colon cancer  - on chemotherapy outpatient  -Follows up with Dr Glover     VTE Prophylaxis:  Pharmacologic & mechanical VTE prophylaxis orders are present.             Disposition Planning:     Barriers to Discharge: Heart cath  Anticipated Date of Discharge: 11/15/2024  Place of Discharge: SNF      Time: 50 minutes     Code Status and Medical Interventions: CPR (Attempt to Resuscitate); Full Support   Ordered at: 11/11/24 2023     Code Status (Patient has no pulse and is not breathing):    CPR (Attempt to Resuscitate)     Medical Interventions (Patient has pulse or is breathing):    Full Support       Signature: Electronically signed by Chris Cruz MD, 11/13/24, 13:39 EST.  [facility] Hospitalist Team

## 2024-11-13 NOTE — SIGNIFICANT NOTE
Case Management/Social Work    Patient Name:  Vlad Guerrero  YOB: 1945  MRN: [id number]  Admit Date:  11/11/2024 11/13/24 1041   Post Acute Pre-Cert Documentation   Request Submitted by Facility - Type: Hospital   Post-Acute Authorization Type Submitted: SNF   Date Post Acute Pre-Cert Inititated per Facility 11/13/24   Verification from Payer Yes   Date Post Acute Pre-Cert Completed 11/13/24   Accepting Facility [facility]   Hospital Discharge Date Requested 11/13/24   All Clinicals Submitted? Yes   Had Accepting Facility at Time of Submission Yes   Response Received from Insurance? Approval   Response Communicated to:    Authorization Number: [id number]   Post Acute Pre-Cert Initiated Comment NIEVES submitted SNF precert for [facility] via [employer] portal. Auth ID [id number]. SNF precert auto approved. Valid 11/13-11/19. Approval letter indexed to media. CM made aware.           Electronically signed by:  Sagar Dumont CMA  11/13/24 10:56 EST    Sagar Dumont  Case Management Associate  [address] [address]  P: [phone]  F: [phone]

## 2024-11-13 NOTE — PLAN OF CARE
Problem: Adult Inpatient Plan of Care  Goal: Absence of Hospital-Acquired Illness or Injury  Intervention: Identify and Manage Fall Risk  Recent Flowsheet Documentation  Taken 11/13/2024 0234 by Dara Barcenas, RN  Safety Promotion/Fall Prevention:   clutter free environment maintained   room organization consistent   safety round/check completed  Taken 11/13/2024 0035 by Dara Barcenas, RN  Safety Promotion/Fall Prevention:   clutter free environment maintained   room organization consistent   safety round/check completed  Taken 11/12/2024 2208 by Dara Barcenas, RN  Safety Promotion/Fall Prevention:   clutter free environment maintained   room organization consistent   safety round/check completed  Taken 11/12/2024 2016 by Dara Barcenas, RN  Safety Promotion/Fall Prevention:   clutter free environment maintained   safety round/check completed   room organization consistent  Intervention: Prevent Skin Injury  Recent Flowsheet Documentation  Taken 11/12/2024 2016 by Dara Barcenas, RN  Body Position: position changed independently  Intervention: Prevent Infection  Recent Flowsheet Documentation  Taken 11/13/2024 0234 by Dara Barcenas, RN  Infection Prevention:   environmental surveillance performed   rest/sleep promoted   single patient room provided  Taken 11/13/2024 0035 by Dara Barcenas, RN  Infection Prevention:   environmental surveillance performed   single patient room provided   rest/sleep promoted  Taken 11/12/2024 2208 by Dara Barcenas, RN  Infection Prevention:   environmental surveillance performed   single patient room provided   rest/sleep promoted  Taken 11/12/2024 2016 by Dara Barcenas, RN  Infection Prevention:   environmental surveillance performed   rest/sleep promoted   single patient room provided  Goal: Optimal Comfort and Wellbeing  Intervention: Provide Person-Centered Care  Recent Flowsheet Documentation  Taken 11/12/2024 2016 by Dara Barcenas RN  Trust  Relationship/Rapport:   care explained   reassurance provided   thoughts/feelings acknowledged   Goal Outcome Evaluation:

## 2024-11-13 NOTE — THERAPY TREATMENT NOTE
"Subjective: Pt agreeable to therapeutic plan of care. Patient is eager to get home and get cleaned up.     Objective:     Precautions - orthostatic hypotension    Supine: 113/70mmHg; 90bpm  Seated: 81/62mmHg,   Standin/46,   Pt denies symptoms but noted to be functionally weak and has impaired balance in standing     Bed mobility - Supervision  Transfers - CGA and with rolling walker  Ambulation - 0 feet N/A or Not attempted.        Vitals: Hypotensive, Orthostatic, and Tachycardic    Pain: 0 VAS   Location: n/a  Intervention for pain: N/A    Education: Provided education on the importance of mobility in the acute care setting    Assessment: Vlad Guerrero is a 78 y/o M who presented with generalized weakness. PMH of colon cancer type 2 diabetes, hypertension. Recent chemo per report but not undergoing active chemotherapy treatment. Patient continues to be orthostatic and tachycardic although HR decreased since yesterday following fluids. Pt denies symptoms but noted to be functionally weak and has impaired balance in standing which is a decline from his ambulatory baseline. Not able to advance to ambulation secondary to hypotension. Nurse and attending informed. Currently keeping d/c rec SNF however pt may progress to being able to return back to [facility] once medically managed as symptoms of generalized weakness may be related to hypotension. Will continue to follow and progress as tolerated.     Plan/Recommendations:   If medically appropriate, Moderate Intensity Therapy recommended post-acute care. This is recommended as therapy feels the patient would require 3-4 days per week and wouldn't tolerate \"3 hour daily\" rehab intensity. SNF would be the preferred choice. If the patient does not agree to SNF, arrange HH or OP depending on home bound status. If patient is medically complex, consider LTACH. Pt requires no DME at discharge.     Pt desires Skilled Rehab placement at discharge. Pt cooperative; " agreeable to therapeutic recommendations and plan of care.         Post-Tx Position: Supine with HOB Elevated, Alarms activated, and Call light and personal items within reach  PPE: gloves    Therapy Charges for Today       Code Description Service Date Service Provider Modifiers Qty    69348862081 HC PT EVAL MOD COMPLEXITY 4 11/12/2024 Juliet Delatorre, PT GP 1    75779618143 HC PT THERAPEUTIC ACT EA 15 MIN 11/13/2024 Juliet Delatorre, PT GP 1           PT Charges       Row Name 11/13/24 0948             Time Calculation    Start Time 0917  -      Stop Time 0926  -      Time Calculation (min) 9 min  -      PT Received On 11/13/24  -      PT - Next Appointment 11/14/24  -         Time Calculation- PT    Total Timed Code Minutes- PT 9 minute(s)  -SS                User Key  (r) = Recorded By, (t) = Taken By, (c) = Cosigned By      Initials Name Provider Type    SS Juliet Delatorre, PT Physical Therapist

## 2024-11-13 NOTE — CONSULTS
"Heart Failure Program  Nurse Navigator  Discharge Planning    Patient Name:Vlad Guerrero  :1945  Cardiologist:Lacey  Current Admission Date: 2024   Previous Admission:    Admission frequency: 1 admissions in 6 months    Heart Failure history per record:    Symptoms on admission:c/o Weakness, some SOA past several months per pt. Denies swelling or orthopnea.       Admission Weight:  Flowsheet Rows      Flowsheet Row First Filed Value   Admission Height 182.9 cm (72\") Documented at 2024 1349   Admission Weight 66 kg (145 lb 9.6 oz) Documented at 2024 1431              Current Home Medications:  Prior to Admission medications    Medication Sig Start Date End Date Taking? Authorizing Provider   aspirin 81 MG EC tablet Take 1 tablet by mouth Daily.   Yes Provider, MD Rolly   insulin aspart (NovoLOG FlexPen) 100 UNIT/ML solution pen-injector sc pen Inject 10 Units under the skin into the appropriate area as directed 3 (Three) Times a Day With Meals.   Yes Provider, MD Rolly   insulin glargine (LANTUS, SEMGLEE) 100 UNIT/ML injection Inject 20 Units under the skin into the appropriate area as directed Every Night for 30 days. 24 Yes Yesenia Arreola APRN       Social history:   Pt from home, lives alone. Advises he is too week to walk due to pain in his feet from his chemo tx.     Smoking status:     Diagnostics Testing:  proBNP level: 3231    Echocardiogram:Results for orders placed during the hospital encounter of 24    Adult Transthoracic Echo Complete w/ Color, Spectral and Contrast if Necessary Per Protocol    Interpretation Summary    Left ventricular systolic function is severely decreased. Left ventricular ejection fraction appears to be 21 - 25%.    The left ventricular cavity is moderately dilated.    Left atrial volume is severely increased.    Moderate mitral valve regurgitation is present.    Estimated right ventricular systolic pressure from " tricuspid regurgitation is normal (<35 mmHg).    Conclusion      Normal LV size, paradoxical septal motion, severe LV dysfunction with EF of 20-25%.    Normal RV size  Normal atrial size  Pulmonic valve is not well visualized.  Aortic valve with mild sclerosis.  Mitral valve, tricuspid valve appears structurally normal, trace tricuspid and moderate mitral regurgitation seen.  Normal calculated RV systolic pressure 28 mmHg  No pericardial effusion seen.  Proximal aorta appears normal in size.        Patient Assessment:   Pt lying in bed, resp even and unlabored. No soa with conversation, no edema    Current O2:    Home O2:      Education provided to patient:  yes- Heart Failure disease education  yes -Symptom identification/management  yes -Daily Weights  yes- Diet education   - Fluid restriction (if ordered)  yes- Activity education  yes- Medication education   - Smoking cessation  yes- Follow-up Appointments  yes-Provided information on how to access [facility] My HF Guide/Heart Failure Interactive workbook    Acceptance of learning: acceptance, cooperative    Heart Failure education interactive teaching session time: 20 minutes    GWTG: EF 30%    Identified needs/barriers:   Pending new echo, GDMT - ?. Midodrine added for hypotension. Weakness per pt, mobility - pending rehab at NH. Needs cardiology follow-up - no follow-up with cardiology since last admission    Intervention:   Education

## 2024-11-13 NOTE — PROGRESS NOTES
ENDOCRINE CONSULT PROGRESS NOTE  DATE OF SERVICE: 24        PATIENT NAME: Vlad Guerrero  PATIENT : 1945 AGE: 79 y.o.  MRN NUMBER: [id number]    ==========================================================================    CHIEF COMPLAINT: T2DM    CARE TEAM:   Patient Care Team:  Vlad Moreland MD as PCP - General (Internal Medicine)  Percy Davis MD as Surgeon (General Surgery)  Don Kramer MD as Consulting Physician (Hematology and Oncology)    SUBJECTIVE    Pt seen and examined.  Generalized weakness improving.  Reports good appetite.  BG reviewed for last 24 hours.    ==========================================================================    CURRENT ACTIVE HOSPITAL MEDICATIONS    Scheduled Medications:  aspirin, 81 mg, Oral, Daily  enoxaparin, 40 mg, Subcutaneous, Daily  insulin glargine, 10 Units, Subcutaneous, Nightly  insulin lispro, 2-7 Units, Subcutaneous, 4x Daily AC & at Bedtime  insulin lispro, 5 Units, Subcutaneous, TID With Meals  midodrine, 10 mg, Oral, TID AC  sodium chloride, 10 mL, Intravenous, Q12H         PRN Medications:    acetaminophen    senna-docusate sodium **AND** polyethylene glycol **AND** bisacodyl **AND** bisacodyl    dextrose    dextrose    glucagon (human recombinant)    melatonin    nitroglycerin    ondansetron    sodium chloride    sodium chloride    sodium chloride     ==========================================================================    OBJECTIVE    Vitals:    24 1709   BP: 108/79   Pulse: 82   Resp:    Temp:    SpO2:       Body mass index is 20.32 kg/m².     General - A&Ox3, NAD, Calm    ==========================================================================    LAB EVALUATION    Lab Results   Component Value Date    GLUCOSE 237 (H) 2024    BUN 16 2024    CREATININE 0.88 2024    EGFRIFNONA 76 11/15/2021    EGFRIFAFRI 87 11/15/2021    BCR 18.2 2024    K 5.1 2024    CO2 25.3 2024     CALCIUM 9.0 11/12/2024    ALBUMIN 3.5 11/11/2024    AST 12 11/11/2024    ALT 11 11/11/2024       Lab Results   Component Value Date    HGBA1C 11.80 (H) 11/12/2024    HGBA1C 12.90 (H) 04/26/2024    HGBA1C 12.20 (H) 01/08/2024     Lab Results   Component Value Date    MICROALBUR 30 07/08/2022    CREATININE 0.88 11/12/2024     Results from last 7 days   Lab Units 11/13/24  1612 11/13/24  1144 11/13/24  0728 11/12/24  2045 11/12/24  1722 11/12/24  1540   GLUCOSE mg/dL 146* 224* 104 148* 216* 243*     ==========================================================================    ASSESSMENT AND PLAN    # Type 2 diabetes with hyperglycemia, uncontrolled with A1c of 11.8%  - Blood sugar reviewed for last 24 hours  - Will adjust insulin therapy as follows  Insulin Lantus 10 units nightly  Insulin lispro 5 units with each meal with low-dose correction scale  - Will review blood sugar for next 24 hours and adjust therapy accordingly  - Target will be to avoid hypoglycemia and  to maintain blood sugar between 150 and 180 for now     # Generalized weakness  # Heart failure with reduced ejection fraction, cardiac cath tomorrow     Thank you for courtesy of consultation.  Will follow with you.  Rest as per primary care.    Will follow with you.  Rest as per primary team.    Part of this note may be an electronic transcription/translation of spoken language to printed text using the Dragon Dictation System.     Note: Portions of this note may have been copied from previous notes but documentation have been reviewed and edited as necessary to support clinical decision making for today's visit.  The time of this note does not reflect the time I saw the patient but the time that this note was written.    ==========================================================================  Jesus Singleton MD  Department of Endocrine, Diabetes and Metabolism  [facility],  [place]  ==========================================================================

## 2024-11-13 NOTE — PROGRESS NOTES
Cardiology Progress Note    Patient Identification:  Name: Vlad Guerrero  Age: 79 y.o.  Sex: male  :  1945  MRN: [id number]                 Follow Up / Chief Complaint:   Chief Complaint   Patient presents with    Weakness - Generalized       Interval History: Patient presented with weakness and hyperglycemia.  Patient is having issues with orthostatic hypotension.    Orthostatic vital signs per PT  Supine: 113/70mmHg; 90bpm  Seated: 81/62mmHg,   Standin/46,     NP NOTE:  Patient seen and with Dr. Rahman.  Patient worked with PT this morning and was significantly orthostatic. Will start midodrine therapy 5mg TID.  Patient denies any chest pain or shortness of breath.  Glucose is better controlled while inpatient.   Repeat echocardiogram pending   Unable to tolerate any GDMT for heart failure due to hypotension       Will need cardiac cath     Electronically signed by ANEUDY Brown, 24, 10:56 AM EST.    Cardiology attending addendum :    I have personally performed a face-to-face diagnostic evaluation, physical exam and reviewed data on this patient.  I have reviewed documentation done by me and nurse practitioner  and corrected as needed.  And agree with the different components of documentation.Greater than 50% of the time spent in the care of this patient was provided by attending consultant/me.        Subjective: Patient seen and examined; chart and labs reviewed; discussed with bedside nurse         Objective: HS troponin is 32--31 A1C 11.8   2024: CBC unremarkable     History of present illness:      Mr. Vlad Guerrero has PMH of     PFO  Left bundle branch block  HFrEF   EF 30%,  Diastolic dysfunction   Left atrial enlargement  Hypertension  History of TIA , left frontal CVA 2024  Metastatic colon cancer with possible liver mets  Insulin-dependent diabetes        Presented 2024 with weakness and hyperglycemia.  Patient was undergoing chemotherapy for  his liver mets.  Patient was found to be tachycardic and hypotensive and orthostatic sugars were in the 500.  Patient's recent A1c was 11.80 on 11/12/2024.     Data:  11/12/2024: HS troponin is 32--31        Assessment:  :     Orthostatic hypotension  Severe LV dysfunction by echo  Chronic HFrEF due to systolic dysfunction  Type 2 diabetes, poorly controlled  PFO  Left bundle branch block  HFrEF  30% diastolic dysfunction   Left atrial enlargement  Acute left frontal stroke.  Metastatic colon cancer  History of previous TIA  Carotid disease        Recommendations / Plan:         EKG is revealing left bundle branch block  Echo 11/13/2024 is revealing severe LV dysfunction.  Patient is orthostatic.  Patient's orthostasis is probably due to autonomic insufficiency.  Will schedule for cardiac cath to evaluate the etiology of LV dysfunction  Benefits alternatives explained.  Patient's blood pressure is low, cannot give any guideline directed medical therapy for LV dysfunction.      Copied text in this portion of the note has been reviewed and is accurate as of 11/13/2024    Past Medical History:  Past Medical History:   Diagnosis Date    Anemia     Colon cancer 2022    colon    Diabetes mellitus     Hyperlipidemia     Hypertension      Past Surgical History:  Past Surgical History:   Procedure Laterality Date    APPENDECTOMY      CARDIAC CATHETERIZATION      COLON RESECTION N/A 12/15/2022    Procedure: COLON RESECTION RIGHT;  Surgeon: Percy Davis MD;  Location: [facility] MAIN OR;  Service: General;  Laterality: N/A;    COLONOSCOPY N/A 11/11/2022    Procedure: COLONOSCOPY WITH BIOPSY AND POLYPECTOMY;  Surgeon: Billy Julien MD;  Location: [facility] ENDOSCOPY;  Service: Gastroenterology;  Laterality: N/A;  Impression:  1.  Large 4-5cm fulgurating circumferential ulcerated mass in the very proximal part of the ascending colon next to ileocecal valve multiple biopsies were performed.  This is highly concerning for  "colon malignancy.  2.  2 polyp rem    ENDOSCOPY N/A 2022    Procedure: ESOPHAGOGASTRODUODENOSCOPY with biopsy X1;  Surgeon: Billy Julien MD;  Location: [facility] ENDOSCOPY;  Service: Gastroenterology;  Laterality: N/A;  5.  Upper endoscopy lamination unremarkable.       PORTACATH PLACEMENT Right 2023    Procedure: INSERTION OF PORTACATH;  Surgeon: Percy Davis MD;  Location: [facility] MAIN OR;  Service: General;  Laterality: Right;    TONSILLECTOMY          Social History:   Social History     Tobacco Use    Smoking status: Former     Current packs/day: 0.00     Average packs/day: 1 pack/day for 2.0 years (2.0 ttl pk-yrs)     Types: Cigarettes     Start date:      Quit date:      Years since quittin.9    Smokeless tobacco: Never   Substance Use Topics    Alcohol use: Never      Family History:  Family History   Problem Relation Age of Onset    Pneumonia Mother 94        SARS-CoV2    Colon cancer Father 62    Heart disease Sister           Allergies:  Allergies   Allergen Reactions    Penicillins Rash     Scheduled Meds:  aspirin, 81 mg, Daily  insulin glargine, 12 Units, Nightly  insulin lispro, 2-7 Units, 4x Daily AC & at Bedtime  insulin lispro, 4 Units, TID With Meals  sodium chloride, 10 mL, Q12H          Review of Systems:   Review of Systems   Constitutional: Positive for malaise/fatigue.   Neurological:  Positive for weakness.     Review of Systems   Constitution: Negative for chills and fever.   Cardiovascular: Negative for chest pain and palpitations.   Respiratory: Negative for cough and hemoptysis.    Gastrointestinal: Negative for nausea.        Constitutional:  Temp:  [97.6 °F (36.4 °C)-98.7 °F (37.1 °C)] 98.1 °F (36.7 °C)  Heart Rate:  [] 83  Resp:  [13-20] 20  BP: ()/(51-84) 121/51    Physical Exam   /51   Pulse 83   Temp 98.1 °F (36.7 °C) (Oral)   Resp 20   Ht 185.4 cm (73\")   Wt 69.9 kg (154 lb)   SpO2 93%   BMI 20.32 kg/m²   General:  Appears " in no acute distress  Eyes: Sclera is anicteric,  conjunctiva is clear   HEENT:  No JVD. Thyroid not visibly enlarged. No mucosal pallor or cyanosis  Respiratory: Respirations regular and unlabored at rest.  Clear to auscultation  Cardiovascular: S1,S2 Regular rate and rhythm. No murmur, rub or gallop auscultated.  . No pretibial pitting edema  Gastrointestinal: Abdomen nondistended.  Musculoskeletal:  No abnormal movements  Extremities: No digital clubbing or cyanosis  Skin: Color pink.   Neuro: Alert and awake.    INTAKE AND OUTPUT:    Intake/Output Summary (Last 24 hours) at 11/13/2024 0748  Last data filed at 11/12/2024 1700  Gross per 24 hour   Intake --   Output 250 ml   Net -250 ml       Cardiographics  Telemetry: sinus rhythm     ECG:   ECG 12 Lead Rhythm Change   Preliminary Result   HEART RATE=79  bpm   RR Hcplonrp=950  ms   NH Yfyqctmd=482  ms   P Horizontal Axis=-55  deg   P Front Axis=82  deg   QRSD Gttxllvz=864  ms   QT Fwazypfj=957  ms   RNkN=611  ms   QRS Axis=-24  deg   T Wave Axis=-89  deg   - ABNORMAL ECG -   Sinus rhythm   Prolonged NH interval   IVCD, consider LBBB   Probable anterolateral infarct, age indeterm   Date and Time of Study:2024-11-12 13:02:37      ECG 12 Lead Other; gen weakness   Final Result   HEART RATE=77  bpm   RR Slvevmlh=403  ms   NH Neoixjel=500  ms   P Horizontal Axis=  deg   P Front Axis=80  deg   QRSD Eegxbjdw=940  ms   QT Ubdhibik=754  ms   GIoZ=475  ms   QRS Axis=-51  deg   T Wave Axis=133  deg   - ABNORMAL ECG -   Sinus rhythm   Prolonged NH interval   Left bundle branch block   ST elevation secondary to IVCD   When compared with ECG of 26-Apr-2024 10:50:43,   Nonspecific significant change   Electronically Signed By: Arnav Dumont (KG) 2024-11-11 20:09:08   Date and Time of Study:2024-11-11 16:00:09        I have personally reviewed EKG    Echocardiogram: Results for orders placed during the hospital encounter of 04/24/24    Adult Transthoracic Echo Complete W/  "Cont if Necessary Per Protocol (With Agitated Saline)    Interpretation Summary    The left ventricular cavity is mildly dilated.    Left ventricular diastolic function is consistent with (grade Ia w/high LAP) impaired relaxation.    Small patent foramen ovale present with right to left shunting indicated by saline contrast.    Saline test results are positive with valsalva manuever.    There is mild, bileaflet mitral valve thickening present.    Estimated right ventricular systolic pressure from tricuspid regurgitation is mildly elevated (35-45 mmHg).    Mild pulmonary hypertension is present.      Lab Review   I have reviewed the labs      Results from last 7 days   Lab Units 11/11/24  1434   MAGNESIUM mg/dL 1.6     Results from last 7 days   Lab Units 11/12/24  0254   SODIUM mmol/L 140   POTASSIUM mmol/L 5.1   BUN mg/dL 16   CREATININE mg/dL 0.88   CALCIUM mg/dL 9.0         Results from last 7 days   Lab Units 11/13/24  0507 11/12/24  0254 11/11/24  1552 11/11/24  1434   WBC 10*3/mm3 7.22 8.90  --  7.06   HEMOGLOBIN g/dL 13.8 14.8  --  13.8   HEMOGLOBIN, POC g/dL  --   --  14.6  --    HEMATOCRIT % 43.3 47.1  --  42.2   HEMATOCRIT POC %  --   --  43  --    PLATELETS 10*3/mm3 185 180  --  189           RADIOLOGY:  Imaging Results (Last 24 Hours)       ** No results found for the last 24 hours. **                  )11/13/2024  MD JAYME Guzmán/Transcription:   \"Dictated utilizing Dragon dictation\".   "

## 2024-11-13 NOTE — CASE MANAGEMENT/SOCIAL WORK
Continued Stay Note  [facility]     Patient Name: Vlad Guerrero  MRN: [id number]  Today's Date: 11/13/2024    Admit Date: 11/11/2024    Plan: Accepted at [facility] with ready bed 11/13. Precert approved 11/13. Pharmacy changed to [facility].  PASRR completed. Family to transport   Discharge Plan       Row Name 11/13/24 1114       Plan    Plan Accepted at [facility] with ready bed 11/13. Precert approved 11/13. Pharmacy changed to [facility].  PASRR completed. Family to transport    Plan Comments Barriers: Cardiology plans heart tomorrow 11/14 after hypotension with PT this morning. [facility] accepts and will have ready bed. CM asked NIEVES Keith to complete precert which was auto - approved and good 11/13-11/19 and CM informed Teal with [facility].                      Nikki MEDINA,RN Case Manager  [facility]  Phone: Desk- [phone] cell- [phone]

## 2024-11-14 VITALS
BODY MASS INDEX: 23.55 KG/M2 | DIASTOLIC BLOOD PRESSURE: 80 MMHG | HEART RATE: 69 BPM | TEMPERATURE: 97.4 F | RESPIRATION RATE: 10 BRPM | WEIGHT: 177.69 LBS | SYSTOLIC BLOOD PRESSURE: 108 MMHG | OXYGEN SATURATION: 99 % | HEIGHT: 73 IN

## 2024-11-14 PROBLEM — I44.7 LBBB (LEFT BUNDLE BRANCH BLOCK): Status: ACTIVE | Noted: 2024-11-14

## 2024-11-14 PROBLEM — I50.42 CHRONIC COMBINED SYSTOLIC AND DIASTOLIC CONGESTIVE HEART FAILURE: Chronic | Status: ACTIVE | Noted: 2024-11-11

## 2024-11-14 PROBLEM — I42.0 CARDIOMYOPATHY, DILATED: Status: ACTIVE | Noted: 2024-11-14

## 2024-11-14 PROBLEM — I50.21 ACUTE HFREF (HEART FAILURE WITH REDUCED EJECTION FRACTION): Status: ACTIVE | Noted: 2024-11-14

## 2024-11-14 PROBLEM — I42.0 CARDIOMYOPATHY, DILATED: Chronic | Status: ACTIVE | Noted: 2024-11-14

## 2024-11-14 LAB
ANION GAP SERPL CALCULATED.3IONS-SCNC: 7 MMOL/L (ref 5–15)
BASOPHILS # BLD AUTO: 0.02 10*3/MM3 (ref 0–0.2)
BASOPHILS NFR BLD AUTO: 0.3 % (ref 0–1.5)
BUN SERPL-MCNC: 20 MG/DL (ref 8–23)
BUN/CREAT SERPL: 22 (ref 7–25)
CALCIUM SPEC-SCNC: 8.2 MG/DL (ref 8.6–10.5)
CHLORIDE SERPL-SCNC: 105 MMOL/L (ref 98–107)
CO2 SERPL-SCNC: 26 MMOL/L (ref 22–29)
CREAT SERPL-MCNC: 0.91 MG/DL (ref 0.76–1.27)
DEPRECATED RDW RBC AUTO: 53.4 FL (ref 37–54)
EGFRCR SERPLBLD CKD-EPI 2021: 86.8 ML/MIN/1.73
EOSINOPHIL # BLD AUTO: 0.1 10*3/MM3 (ref 0–0.4)
EOSINOPHIL NFR BLD AUTO: 1.4 % (ref 0.3–6.2)
ERYTHROCYTE [DISTWIDTH] IN BLOOD BY AUTOMATED COUNT: 15.2 % (ref 12.3–15.4)
GLUCOSE BLDC GLUCOMTR-MCNC: 194 MG/DL (ref 70–105)
GLUCOSE BLDC GLUCOMTR-MCNC: 208 MG/DL (ref 70–105)
GLUCOSE BLDC GLUCOMTR-MCNC: 245 MG/DL (ref 70–105)
GLUCOSE SERPL-MCNC: 281 MG/DL (ref 65–99)
HCT VFR BLD AUTO: 40.6 % (ref 37.5–51)
HGB BLD-MCNC: 13.3 G/DL (ref 13–17.7)
IMM GRANULOCYTES # BLD AUTO: 0.02 10*3/MM3 (ref 0–0.05)
IMM GRANULOCYTES NFR BLD AUTO: 0.3 % (ref 0–0.5)
LYMPHOCYTES # BLD AUTO: 1.45 10*3/MM3 (ref 0.7–3.1)
LYMPHOCYTES NFR BLD AUTO: 19.8 % (ref 19.6–45.3)
MCH RBC QN AUTO: 31.2 PG (ref 26.6–33)
MCHC RBC AUTO-ENTMCNC: 32.8 G/DL (ref 31.5–35.7)
MCV RBC AUTO: 95.3 FL (ref 79–97)
MONOCYTES # BLD AUTO: 0.78 10*3/MM3 (ref 0.1–0.9)
MONOCYTES NFR BLD AUTO: 10.6 % (ref 5–12)
NEUTROPHILS NFR BLD AUTO: 4.96 10*3/MM3 (ref 1.7–7)
NEUTROPHILS NFR BLD AUTO: 67.6 % (ref 42.7–76)
NRBC BLD AUTO-RTO: 0 /100 WBC (ref 0–0.2)
PLATELET # BLD AUTO: 176 10*3/MM3 (ref 140–450)
PMV BLD AUTO: 9.8 FL (ref 6–12)
POTASSIUM SERPL-SCNC: 4 MMOL/L (ref 3.5–5.2)
RBC # BLD AUTO: 4.26 10*6/MM3 (ref 4.14–5.8)
SODIUM SERPL-SCNC: 138 MMOL/L (ref 136–145)
WBC NRBC COR # BLD AUTO: 7.33 10*3/MM3 (ref 3.4–10.8)

## 2024-11-14 PROCEDURE — 63710000001 INSULIN LISPRO (HUMAN) PER 5 UNITS: Performed by: INTERNAL MEDICINE

## 2024-11-14 PROCEDURE — 85025 COMPLETE CBC W/AUTO DIFF WBC: CPT | Performed by: NURSE PRACTITIONER

## 2024-11-14 PROCEDURE — 97530 THERAPEUTIC ACTIVITIES: CPT

## 2024-11-14 PROCEDURE — 25010000002 LIDOCAINE 1 % SOLUTION: Performed by: INTERNAL MEDICINE

## 2024-11-14 PROCEDURE — B2151ZZ FLUOROSCOPY OF LEFT HEART USING LOW OSMOLAR CONTRAST: ICD-10-PCS | Performed by: INTERNAL MEDICINE

## 2024-11-14 PROCEDURE — B2111ZZ FLUOROSCOPY OF MULTIPLE CORONARY ARTERIES USING LOW OSMOLAR CONTRAST: ICD-10-PCS | Performed by: INTERNAL MEDICINE

## 2024-11-14 PROCEDURE — 4A023N7 MEASUREMENT OF CARDIAC SAMPLING AND PRESSURE, LEFT HEART, PERCUTANEOUS APPROACH: ICD-10-PCS | Performed by: INTERNAL MEDICINE

## 2024-11-14 PROCEDURE — 25010000002 ENOXAPARIN PER 10 MG: Performed by: INTERNAL MEDICINE

## 2024-11-14 PROCEDURE — 82948 REAGENT STRIP/BLOOD GLUCOSE: CPT

## 2024-11-14 PROCEDURE — 97110 THERAPEUTIC EXERCISES: CPT

## 2024-11-14 PROCEDURE — 25510000001 IOPAMIDOL PER 1 ML: Performed by: INTERNAL MEDICINE

## 2024-11-14 PROCEDURE — C1769 GUIDE WIRE: HCPCS | Performed by: INTERNAL MEDICINE

## 2024-11-14 PROCEDURE — B41F1ZZ FLUOROSCOPY OF RIGHT LOWER EXTREMITY ARTERIES USING LOW OSMOLAR CONTRAST: ICD-10-PCS | Performed by: INTERNAL MEDICINE

## 2024-11-14 PROCEDURE — 99152 MOD SED SAME PHYS/QHP 5/>YRS: CPT | Performed by: INTERNAL MEDICINE

## 2024-11-14 PROCEDURE — 25810000003 SODIUM CHLORIDE 0.9 % SOLUTION: Performed by: INTERNAL MEDICINE

## 2024-11-14 PROCEDURE — 99232 SBSQ HOSP IP/OBS MODERATE 35: CPT | Performed by: INTERNAL MEDICINE

## 2024-11-14 PROCEDURE — 93458 L HRT ARTERY/VENTRICLE ANGIO: CPT | Performed by: INTERNAL MEDICINE

## 2024-11-14 PROCEDURE — 82948 REAGENT STRIP/BLOOD GLUCOSE: CPT | Performed by: INTERNAL MEDICINE

## 2024-11-14 PROCEDURE — 97535 SELF CARE MNGMENT TRAINING: CPT

## 2024-11-14 PROCEDURE — 25010000002 FENTANYL CITRATE (PF) 100 MCG/2ML SOLUTION: Performed by: INTERNAL MEDICINE

## 2024-11-14 PROCEDURE — 25010000002 MIDAZOLAM PER 1 MG: Performed by: INTERNAL MEDICINE

## 2024-11-14 PROCEDURE — C1760 CLOSURE DEV, VASC: HCPCS | Performed by: INTERNAL MEDICINE

## 2024-11-14 PROCEDURE — 80048 BASIC METABOLIC PNL TOTAL CA: CPT | Performed by: NURSE PRACTITIONER

## 2024-11-14 PROCEDURE — C1894 INTRO/SHEATH, NON-LASER: HCPCS | Performed by: INTERNAL MEDICINE

## 2024-11-14 RX ORDER — FENTANYL CITRATE 50 UG/ML
INJECTION, SOLUTION INTRAMUSCULAR; INTRAVENOUS
Status: DISCONTINUED | OUTPATIENT
Start: 2024-11-14 | End: 2024-11-14 | Stop reason: HOSPADM

## 2024-11-14 RX ORDER — MIDAZOLAM HYDROCHLORIDE 1 MG/ML
INJECTION, SOLUTION INTRAMUSCULAR; INTRAVENOUS
Status: DISCONTINUED | OUTPATIENT
Start: 2024-11-14 | End: 2024-11-14 | Stop reason: HOSPADM

## 2024-11-14 RX ORDER — INSULIN LISPRO 100 [IU]/ML
2-7 INJECTION, SOLUTION INTRAVENOUS; SUBCUTANEOUS
Status: DISCONTINUED | OUTPATIENT
Start: 2024-11-14 | End: 2024-11-14 | Stop reason: HOSPADM

## 2024-11-14 RX ORDER — SODIUM CHLORIDE 9 MG/ML
1 INJECTION, SOLUTION INTRAVENOUS CONTINUOUS
Status: DISCONTINUED | OUTPATIENT
Start: 2024-11-14 | End: 2024-11-14 | Stop reason: HOSPADM

## 2024-11-14 RX ORDER — ACETAMINOPHEN 325 MG/1
650 TABLET ORAL EVERY 4 HOURS PRN
Status: DISCONTINUED | OUTPATIENT
Start: 2024-11-14 | End: 2024-11-14 | Stop reason: HOSPADM

## 2024-11-14 RX ORDER — INSULIN ASPART 100 [IU]/ML
5 INJECTION, SOLUTION INTRAVENOUS; SUBCUTANEOUS
Qty: 15 ML | Refills: 0 | Status: SHIPPED | OUTPATIENT
Start: 2024-11-14

## 2024-11-14 RX ORDER — INSULIN GLARGINE 100 [IU]/ML
10 INJECTION, SOLUTION SUBCUTANEOUS NIGHTLY
Qty: 3 ML | Refills: 0 | Status: SHIPPED | OUTPATIENT
Start: 2024-11-14 | End: 2024-12-14

## 2024-11-14 RX ORDER — LIDOCAINE HYDROCHLORIDE 10 MG/ML
INJECTION, SOLUTION INFILTRATION; PERINEURAL
Status: DISCONTINUED | OUTPATIENT
Start: 2024-11-14 | End: 2024-11-14 | Stop reason: HOSPADM

## 2024-11-14 RX ORDER — IOPAMIDOL 755 MG/ML
INJECTION, SOLUTION INTRAVASCULAR
Status: DISCONTINUED | OUTPATIENT
Start: 2024-11-14 | End: 2024-11-14 | Stop reason: HOSPADM

## 2024-11-14 RX ORDER — MIDODRINE HYDROCHLORIDE 10 MG/1
10 TABLET ORAL
Qty: 90 TABLET | Refills: 0 | Status: SHIPPED | OUTPATIENT
Start: 2024-11-14

## 2024-11-14 RX ADMIN — INSULIN LISPRO 5 UNITS: 100 INJECTION, SOLUTION INTRAVENOUS; SUBCUTANEOUS at 17:02

## 2024-11-14 RX ADMIN — ENOXAPARIN SODIUM 40 MG: 100 INJECTION SUBCUTANEOUS at 16:51

## 2024-11-14 RX ADMIN — MIDODRINE HYDROCHLORIDE 10 MG: 5 TABLET ORAL at 16:51

## 2024-11-14 RX ADMIN — INSULIN LISPRO 3 UNITS: 100 INJECTION, SOLUTION INTRAVENOUS; SUBCUTANEOUS at 17:00

## 2024-11-14 RX ADMIN — SODIUM CHLORIDE 1 ML/KG/HR: 9 INJECTION, SOLUTION INTRAVENOUS at 14:58

## 2024-11-14 RX ADMIN — Medication 10 ML: at 08:02

## 2024-11-14 NOTE — PLAN OF CARE
Assessment: Vlad Guerrero presents with functional mobility impairments which indicate the need for skilled intervention. Pt continues to be hypotensive but not symptomatic. Demos poor standing balance with min HHA. BP listed above, RN and MD notified. Pt desaturates with activity to mid 80s (questionable pleth) on 2L O2, titrated to 4L with no change. Pulse ox replaced and sat remained >90. Will continue to follow and progress as tolerated.

## 2024-11-14 NOTE — PLAN OF CARE
"Assessment: Vlad Guerrero presents with ADL impairments affecting function including endurance / activity tolerance. Limited OT session secondary to precautions following heart cath. Demonstrated functioning below baseline abilities indicate the need for continued skilled intervention while inpatient. Tolerating session today without incident. Will continue to follow and progress as tolerated.      Plan/Recommendations:   Moderate Intensity Therapy recommended post-acute care. This is recommended as therapy feels the patient would require 3-4 days per week and wouldn't tolerate \"3 hour daily\" rehab intensity. SNF would be the preferred choice. If the patient does not agree to SNF, arrange HH or OP depending on home bound status. If patient is medically complex, consider LTACH.. Pt requires no DME at discharge.      Pt desires Skilled Rehab placement at discharge. Pt cooperative; agreeable to therapeutic recommendations and plan of care.           "

## 2024-11-14 NOTE — PROGRESS NOTES
Daily Progress Note    Patient Care Team:  Vlad Moreland MD as PCP - General (Internal Medicine)  Percy Davis MD as Surgeon (General Surgery)  Don Kramer MD as Consulting Physician (Hematology and Oncology)    Chief Complaint: Follow-up type 2 diabetes    HPI: Patient seen, clinically doing well.  Eating fair.  No complaints at this time.  Blood sugar log reviewed.    ROS:   Constitutional:  Denies fatigue, tiredness.    Respiratory: denies cough, shortness of breath.   Cardiovascular:  denies chest pain, edema   GI:  Denies abdominal pain, nausea, vomiting.         Vitals:    11/14/24 1153   BP: 111/72   Pulse: 78   Resp: 10   Temp: 97.5 °F (36.4 °C)   SpO2: 92%     Body mass index is 23.44 kg/m².    Physical Exam:  GEN: NAD, conversant  PSYCH: Awake and coherent      Results Review:     I reviewed the patient's new clinical results.    Glucose   Date Value Ref Range Status   11/14/2024 281 (H) 65 - 99 mg/dL Final     Sodium   Date Value Ref Range Status   11/14/2024 138 136 - 145 mmol/L Final     Potassium   Date Value Ref Range Status   11/14/2024 4.0 3.5 - 5.2 mmol/L Final     CO2   Date Value Ref Range Status   11/14/2024 26.0 22.0 - 29.0 mmol/L Final     Chloride   Date Value Ref Range Status   11/14/2024 105 98 - 107 mmol/L Final     Anion Gap   Date Value Ref Range Status   11/14/2024 7.0 5.0 - 15.0 mmol/L Final     Creatinine   Date Value Ref Range Status   11/14/2024 0.91 0.76 - 1.27 mg/dL Final   11/11/2024 0.69 0.60 - 1.30 mg/dL Final     Comment:     Serial Number: [id number]:  [id number]     BUN   Date Value Ref Range Status   11/14/2024 20 8 - 23 mg/dL Final     BUN/Creatinine Ratio   Date Value Ref Range Status   11/14/2024 22.0 7.0 - 25.0 Final     Calcium   Date Value Ref Range Status   11/14/2024 8.2 (L) 8.6 - 10.5 mg/dL Final     Lab Results   Component Value Date    HGBA1C 11.80 (H) 11/12/2024    HGBA1C 12.90 (H) 04/26/2024    HGBA1C 12.20 (H) 01/08/2024     Lab  Results   Component Value Date    MICROALBUR 30 07/08/2022     Results from last 7 days   Lab Units 11/14/24  1157 11/14/24  0740 11/13/24  2116 11/13/24  1612 11/13/24  1144 11/13/24  0728   GLUCOSE mg/dL 194* 208* 239* 146* 224* 104             Medication Review: Reviewed.     aspirin, 81 mg, Oral, Daily  enoxaparin, 40 mg, Subcutaneous, Daily  insulin glargine, 10 Units, Subcutaneous, Nightly  insulin lispro, 2-7 Units, Subcutaneous, 4x Daily AC & at Bedtime  insulin lispro, 5 Units, Subcutaneous, TID With Meals  midodrine, 10 mg, Oral, TID AC  sodium chloride, 10 mL, Intravenous, Q12H      Assessment and plan:  Diabetes mellitus type 2 with hyperglycemia: Currently on glargine 10 units subcu nightly with lispro 5 units with each meal along with lispro sliding scale.  Will change lispro sliding scale to AC meals only and follow blood sugars.    Akiko Cottrell MD. FACE

## 2024-11-14 NOTE — CONSULTS
Nutrition Services    Patient Name: Vlad Guerrero  YOB: 1945  MRN: [id number]  Admission date: 11/11/2024    Comment:  -- Continue current diet and encourage good PO intakes      CLINICAL NUTRITION ASSESSMENT      Reason for Assessment 11/14: Consult for Nursing Admission Screen, MST of 3     H&P      Past Medical History:   Diagnosis Date    Anemia     Colon cancer 2022    colon    Diabetes mellitus     Hyperlipidemia     Hypertension        Past Surgical History:   Procedure Laterality Date    APPENDECTOMY      CARDIAC CATHETERIZATION      COLON RESECTION N/A 12/15/2022    Procedure: COLON RESECTION RIGHT;  Surgeon: Percy Davis MD;  Location: [facility] MAIN OR;  Service: General;  Laterality: N/A;    COLONOSCOPY N/A 11/11/2022    Procedure: COLONOSCOPY WITH BIOPSY AND POLYPECTOMY;  Surgeon: Billy Julien MD;  Location: [facility] ENDOSCOPY;  Service: Gastroenterology;  Laterality: N/A;  Impression:  1.  Large 4-5cm fulgurating circumferential ulcerated mass in the very proximal part of the ascending colon next to ileocecal valve multiple biopsies were performed.  This is highly concerning for colon malignancy.  2.  2 polyp rem    ENDOSCOPY N/A 11/11/2022    Procedure: ESOPHAGOGASTRODUODENOSCOPY with biopsy X1;  Surgeon: Billy Julien MD;  Location: [facility] ENDOSCOPY;  Service: Gastroenterology;  Laterality: N/A;  5.  Upper endoscopy lamination unremarkable.       PORTACATH PLACEMENT Right 1/12/2023    Procedure: INSERTION OF PORTACATH;  Surgeon: Percy Davis MD;  Location: [facility] MAIN OR;  Service: General;  Laterality: Right;    TONSILLECTOMY          Current Problems   Diabetes mellitus/Hyperglycemia   - diabetes educator saw 11/12  - endocrinology following  - A1C 11.8% 11/12/24    Weakness  - Cardiology following  - working with PT/OT    Heart failure with reduced ejection fraction      Metastatic colon cancer       Encounter Information        Trending Narrative     11/14:  "Admitted for weakness.  Patient off the floor for heart cath at time of RD visit.         Anthropometrics        Current Height, Weight Height: 185.4 cm (73\")  Weight: 80.6 kg (177 lb 11.1 oz) (11/14/24 0452)       Usual Body Weight (UBW) Unable to obtain from patient        Trending Weight Hx     This admission: 11/14: 177#             PTA: No weight loss to note     Wt Readings from Last 30 Encounters:   11/14/24 0452 80.6 kg (177 lb 11.1 oz)   11/13/24 0740 69.9 kg (154 lb)   11/13/24 0035 70.1 kg (154 lb 8.7 oz)   11/12/24 1324 68.8 kg (151 lb 9.6 oz)   11/11/24 2134 75.1 kg (165 lb 9.1 oz)   11/11/24 1431 66 kg (145 lb 9.6 oz)   10/22/24 0730 77 kg (169 lb 11.2 oz)   10/10/24 1423 80.1 kg (176 lb 9.6 oz)   10/08/24 0746 78.5 kg (173 lb)   09/24/24 0754 80.5 kg (177 lb 6.4 oz)   09/24/24 1015 80.3 kg (177 lb)   09/10/24 0732 81.5 kg (179 lb 9.6 oz)   09/09/24 1527 79.4 kg (175 lb)   08/27/24 0735 81.4 kg (179 lb 6.4 oz)   08/13/24 0733 80.4 kg (177 lb 4.8 oz)   08/13/24 0811 80.3 kg (177 lb)   07/30/24 0728 81.6 kg (179 lb 14.4 oz)   07/16/24 0729 83.3 kg (183 lb 11.2 oz)   07/12/24 0813 81.6 kg (180 lb)   06/26/24 0833 82.5 kg (181 lb 12.8 oz)   04/26/24 1110 82.6 kg (182 lb)   04/25/24 2117 82.6 kg (182 lb 1.6 oz)   04/25/24 0906 77.1 kg (170 lb)   04/24/24 2207 80.6 kg (177 lb 11.1 oz)   04/24/24 1825 80.6 kg (177 lb 11.1 oz)   03/18/24 0753 84.9 kg (187 lb 3.2 oz)   01/08/24 0925 77.1 kg (170 lb)   12/27/23 0959 77.1 kg (170 lb)   12/11/23 0840 71.8 kg (158 lb 3.2 oz)   11/26/23 1114 72 kg (158 lb 11.7 oz)   11/15/23 0820 73.6 kg (162 lb 3.2 oz)   11/13/23 0805 74.4 kg (164 lb)   10/30/23 0742 76.2 kg (168 lb)   10/16/23 0735 76.7 kg (169 lb)   10/04/23 1000 76.8 kg (169 lb 4.8 oz)   10/02/23 0740 75.1 kg (165 lb 9.6 oz)   09/25/23 0830 76.6 kg (168 lb 12.8 oz)   09/24/23 1338 79.4 kg (175 lb)   09/18/23 0739 81.2 kg (179 lb)      BMI kg/m2 Body mass index is 23.44 kg/m².       Labs        Pertinent Labs  "   Results from last 7 days   Lab Units 11/14/24  0448 11/12/24  0254 11/11/24  1552 11/11/24  1434   SODIUM mmol/L 138 140  --  136   POTASSIUM mmol/L 4.0 5.1  --  4.8   CHLORIDE mmol/L 105 104  --  102   CO2 mmol/L 26.0 25.3  --  24.7   BUN mg/dL 20 16  --  17   CREATININE mg/dL 0.91 0.88 0.69 0.89   CALCIUM mg/dL 8.2* 9.0  --  8.9   BILIRUBIN mg/dL  --   --   --  0.6   ALK PHOS U/L  --   --   --  124*   ALT (SGPT) U/L  --   --   --  11   AST (SGOT) U/L  --   --   --  12   GLUCOSE mg/dL 281* 237*  --  448*     Results from last 7 days   Lab Units 11/14/24 0448 11/11/24  1552 11/11/24  1434   MAGNESIUM mg/dL  --   --  1.6   PHOSPHORUS mg/dL  --   --  3.5   HEMOGLOBIN g/dL 13.3   < > 13.8   HEMOGLOBIN, POC   --    < >  --    HEMATOCRIT % 40.6   < > 42.2   HEMATOCRIT POC   --    < >  --     < > = values in this interval not displayed.     Lab Results   Component Value Date    HGBA1C 11.80 (H) 11/12/2024        Medications    Scheduled Medications aspirin, 81 mg, Oral, Daily  enoxaparin, 40 mg, Subcutaneous, Daily  insulin glargine, 10 Units, Subcutaneous, Nightly  insulin lispro, 2-7 Units, Subcutaneous, 4x Daily AC & at Bedtime  insulin lispro, 5 Units, Subcutaneous, TID With Meals  midodrine, 10 mg, Oral, TID AC  sodium chloride, 10 mL, Intravenous, Q12H        Infusions      PRN Medications   acetaminophen    senna-docusate sodium **AND** polyethylene glycol **AND** bisacodyl **AND** bisacodyl    dextrose    dextrose    glucagon (human recombinant)    melatonin    nitroglycerin    ondansetron    sodium chloride    sodium chloride    sodium chloride     Physical Findings        Trending Physical   Appearance, NFPE 11/14: NEY   --  Edema  No edema documented      Bowel Function Last documented BM 11/13 (yesterday)     Tubes No feeding tube      Chewing/Swallowing No known issues      Skin Intact      --  Current Nutrition Orders & Evaluation of Intake       Oral Nutrition     Food Allergies NKFA   Current PO Diet  Diet: Cardiac, Diabetic; Healthy Heart (2-3 Na+); Consistent Carbohydrate; Fluid Consistency: Thin (IDDSI 0)   Supplement None ordered   PO Evaluation     Trending % PO Intake 11/14: 88% average PO intakes x 2 documented meals since admission    --  Nutritional Risk Screening        NRS-2002 Score          Nutrition Diagnosis         Nutrition Dx Problem 1 No nutritional diagnosis noted at this time, RD will continue to monitor for any nutritional diagnosis that may arise.      Nutrition Dx Problem 2        Intervention Goal         Intervention Goal(s) PO intakes continue at least 75%     Nutrition Intervention        RD Action Monitor PO intakes      Nutrition Prescription          Diet Prescription Heart Healthy. Consistent CHO   Supplement Prescription None   --  Monitor/Evaluation        Monitor Per protocol, I&O, PO intake, Supplement intake, Pertinent labs, Weight       Electronically signed by:  Rima Razo RD  11/14/24 10:01 EST

## 2024-11-14 NOTE — PROGRESS NOTES
[facility] MEDICINE SERVICE  DAILY PROGRESS NOTE    NAME: Vlad Guerrero  : 1945  MRN: [id number]      LOS: 2 days     PROVIDER OF SERVICE: Chris Cruz MD    Chief Complaint: Cardiomyopathy, dilated    Subjective:     Interval History:  History taken from: patient    Quite tired today.        Review of Systems:   Review of Systems    Objective:     Vital Signs  Temp:  [97.5 °F (36.4 °C)-98.1 °F (36.7 °C)] 97.5 °F (36.4 °C)  Heart Rate:  [69-82] 78  Resp:  [10-16] 10  BP: (103-148)/() 111/72   Body mass index is 23.44 kg/m².    Physical Exam  Physical Exam  Constitutional:       Comments: Cachectic, muscle wasting.  Chronically ill-appearing   Cardiovascular:      Rate and Rhythm: Normal rate and regular rhythm.      Pulses: Normal pulses.      Heart sounds: Normal heart sounds.   Pulmonary:      Effort: Pulmonary effort is normal.      Breath sounds: Normal breath sounds.   Abdominal:      General: Abdomen is flat.      Palpations: Abdomen is soft.   Neurological:      Mental Status: Mental status is at baseline.            Diagnostic Data    Results from last 7 days   Lab Units 24  0448 24  1552 24  1434   WBC 10*3/mm3 7.33   < > 7.06   HEMOGLOBIN g/dL 13.3   < > 13.8   HEMOGLOBIN, POC   --    < >  --    HEMATOCRIT % 40.6   < > 42.2   HEMATOCRIT POC   --    < >  --    PLATELETS 10*3/mm3 176   < > 189   GLUCOSE mg/dL 281*   < > 448*   CREATININE mg/dL 0.91   < > 0.89   BUN mg/dL 20   < > 17   SODIUM mmol/L 138   < > 136   POTASSIUM mmol/L 4.0   < > 4.8   AST (SGOT) U/L  --   --  12   ALT (SGPT) U/L  --   --  11   ALK PHOS U/L  --   --  124*   BILIRUBIN mg/dL  --   --  0.6   ANION GAP mmol/L 7.0   < > 9.3    < > = values in this interval not displayed.       No radiology results for the last day      I reviewed the patient's new clinical results.    Assessment/Plan:     Active and Resolved Problems  Active Hospital Problems    Diagnosis  POA    **Cardiomyopathy, dilated  [I42.0]  Unknown    LBBB (left bundle branch block) [I44.7]  Unknown    Hyperglycemia [R73.9]  Yes    Chronic combined systolic and diastolic congestive heart failure [I50.42]  Unknown    Autonomic orthostatic hypotension [I95.1]  Unknown    Uncontrolled type 2 diabetes mellitus with hyperglycemia [E11.65]  Unknown      Resolved Hospital Problems   No resolved problems to display.     New onset heart failure with reduced ejection fraction, EF 25%  - Found on echo during this admission  - Patient going to heart cath 11/14/2024  - Blood pressures not tolerable for GDMT at this time.  Agree with midodrine.    Diabetes mellitus/Hyperglycemia   - not well controlled   - BG today 190  - A1c 11.80  - continue SSI, lantus and CC diet   - hypoglycemia protocol      Weakness  - Likely secondary to orthostatic hypotension/deconditioning  - PT/OT consulted and recommended SNF  - Orthostatic vitals: BP/HR- Lying 105/61 HR 94, sitting 86/67  and standing 76/51 and      Metastatic colon cancer  - on chemotherapy outpatient  -Follows up with Dr Glover     VTE Prophylaxis:  Pharmacologic & mechanical VTE prophylaxis orders are present.         Disposition Planning:     Barriers to Discharge: Heart cath  Anticipated Date of Discharge: 11/15/2024  Place of Discharge: SNF      Time: 50 minutes     Code Status and Medical Interventions: CPR (Attempt to Resuscitate); Full Support   Ordered at: 11/11/24 2023     Code Status (Patient has no pulse and is not breathing):    CPR (Attempt to Resuscitate)     Medical Interventions (Patient has pulse or is breathing):    Full Support       Signature: Electronically signed by Chris Cruz MD, 11/14/24, 13:50 EST.  [facility] Hospitalist Team

## 2024-11-14 NOTE — CASE MANAGEMENT/SOCIAL WORK
Continued Stay Note  [facility]     Patient Name: Vlad Guerrero  MRN: [id number]  Today's Date: 11/14/2024    Admit Date: 11/11/2024    Plan: Accepted at [facility] with ready bed 11/13. Precert approved 11/13. Pharmacy changed to [facility].  PASRR completed. Family to transport   Discharge Plan       Row Name 11/14/24 1639       Plan    Plan Comments Cardiology cleared for discharge. CM informed Dr. Cruz that dc orders and dc summary are needed if he feels medically ready for discharge. Nursing said his son will transport him.  CM gave nursing contact info for report to [facility]. CM informed Teal with [facility] of probable dc and she said bed is ready      Row Name 11/14/24 8920       Plan    Plan Comments Heart Cath completed today and DC is pending Cardiology clearance. He  has a ready bed and precert at [facility] today which CM confirmed with Teal with [facility]. If cleared by Cardiology he can discharge. Contact Info for report is [phone].                          Nikki MEDINA,RN Case Manager  [facility]  Phone: Desk- [phone] cell- [phone]

## 2024-11-14 NOTE — THERAPY TREATMENT NOTE
"Subjective: Pt agreeable to therapeutic plan of care. Tired from heart cath today but agreeable to transfer to chair.    Objective:     Precautions - desaturates with activity and orthostatic hypotension, fall risk     Bed mobility - Min-A  Transfers - Min-A HHA  Ambulation - N/A feet N/A or Not attempted. Due to hypotension    Therapeutic Exercise - 2 x 20 Reps B LE AROM supported sitting / chair  Ankle pumps to increase BP    Vitals: Desaturates, Hypotensive, Orthostatic, and Tachycardic    Pain: 0 VAS   Location: N/A  Intervention for pain: N/A    Education: Provided education on the importance of mobility in the acute care setting, Verbal/Tactile Cues, Transfer Training, and Energy conservation strategies    Assessment: Vlad Guerrero presents with functional mobility impairments which indicate the need for skilled intervention. Pt continues to be hypotensive but not symptomatic. Demos poor standing balance with min HHA. BP listed above, RN and MD notified. Pt desaturates with activity to mid 80s (questionable pleth) on 2L O2, titrated to 4L with no change. Pulse ox replaced and sat remained >90. Will continue to follow and progress as tolerated.     Plan/Recommendations:   If medically appropriate, Moderate Intensity Therapy recommended post-acute care. This is recommended as therapy feels the patient would require 3-4 days per week and wouldn't tolerate \"3 hour daily\" rehab intensity. SNF would be the preferred choice. If the patient does not agree to SNF, arrange HH or OP depending on home bound status. If patient is medically complex, consider LTACH. Pt requires no DME at discharge.     Pt desires Skilled Rehab placement at discharge. Pt cooperative; agreeable to therapeutic recommendations and plan of care.     Post-Tx Position: Up in Chair, Alarms activated, and Call light and personal items within reach  PPE: gloves    Therapy Charges for Today       Code Description Service Date Service Provider Modifiers " Qty    96132630513  PT THERAPEUTIC ACT EA 15 MIN 11/14/2024 Keli Leiva, PT GP 2    50134454950 HC PT THER PROC EA 15 MIN 11/14/2024 Keli Leiva, PT GP 1           PT Charges       Row Name 11/14/24 1502             Time Calculation    Start Time 1418  -      Stop Time 1447  -      Time Calculation (min) 29 min  -      PT Received On 11/14/24  -      PT - Next Appointment 11/15/24  -         Time Calculation- PT    Total Timed Code Minutes- PT 29 minute(s)  -                User Key  (r) = Recorded By, (t) = Taken By, (c) = Cosigned By      Initials Name Provider Type     Keli Leiva, PT Physical Therapist

## 2024-11-14 NOTE — CASE MANAGEMENT/SOCIAL WORK
Continued Stay Note  [facility]     Patient Name: Vlad Guerrero  MRN: [id number]  Today's Date: 11/14/2024    Admit Date: 11/11/2024    Plan: Accepted at [facility] with ready bed 11/13. Precert approved 11/13. Pharmacy changed to [facility].  PASRR completed. Family to transport   Discharge Plan       Row Name 11/14/24 8469       Plan    Plan Comments Heart Cath completed today and DC is pending Cardiology clearance. He  has a ready bed and precert at [facility] today which CM confirmed with Teal with [facility]. If cleared by Cardiology he can discharge. Contact Info for report is [phone].                      Nikki MEDINA,RN Case Manager  [facility]  Phone: Desk- [phone] cell- [phone]

## 2024-11-14 NOTE — PLAN OF CARE
Goal Outcome Evaluation:      Called report to [facility].left messsage with Bina for some one to return my call for report.    Reviewed discharge / Transfer with patient.

## 2024-11-14 NOTE — PLAN OF CARE
Problem: Adult Inpatient Plan of Care  Goal: Absence of Hospital-Acquired Illness or Injury  Intervention: Identify and Manage Fall Risk  Recent Flowsheet Documentation  Taken 11/14/2024 0452 by Dara Barcenas, RN  Safety Promotion/Fall Prevention:   clutter free environment maintained   safety round/check completed   room organization consistent   nonskid shoes/slippers when out of bed  Taken 11/14/2024 0226 by Dara Barcenas, RN  Safety Promotion/Fall Prevention:   clutter free environment maintained   safety round/check completed   room organization consistent  Taken 11/14/2024 0002 by Dara Barcenas RN  Safety Promotion/Fall Prevention:   clutter free environment maintained   room organization consistent   safety round/check completed  Taken 11/13/2024 2206 by Dara Barcenas RN  Safety Promotion/Fall Prevention:   clutter free environment maintained   room organization consistent   safety round/check completed  Taken 11/13/2024 2114 by Dara Barcenas, RN  Safety Promotion/Fall Prevention:   clutter free environment maintained   safety round/check completed   room organization consistent   nonskid shoes/slippers when out of bed  Intervention: Prevent Skin Injury  Recent Flowsheet Documentation  Taken 11/13/2024 2114 by Dara Barcenas, RN  Body Position: position changed independently  Intervention: Prevent Infection  Recent Flowsheet Documentation  Taken 11/14/2024 0452 by Dara Barcenas, RN  Infection Prevention:   environmental surveillance performed   rest/sleep promoted   single patient room provided  Taken 11/14/2024 0226 by Dara Barcenas, RN  Infection Prevention:   environmental surveillance performed   rest/sleep promoted   single patient room provided  Taken 11/14/2024 0002 by Dara Barcenas, RN  Infection Prevention:   environmental surveillance performed   single patient room provided   rest/sleep promoted  Taken 11/13/2024 2206 by Dara Barcenas, RN  Infection Prevention:    environmental surveillance performed   single patient room provided   rest/sleep promoted  Taken 11/13/2024 2114 by Dara Barcenas, RN  Infection Prevention:   environmental surveillance performed   rest/sleep promoted   single patient room provided  Goal: Optimal Comfort and Wellbeing  Intervention: Provide Person-Centered Care  Recent Flowsheet Documentation  Taken 11/13/2024 2114 by Dara Barcenas, RN  Trust Relationship/Rapport:   care explained   reassurance provided   thoughts/feelings acknowledged   Goal Outcome Evaluation:

## 2024-11-14 NOTE — DISCHARGE INSTR - DIET
Healthy Heart consistent carb regular consistency thin liquids takes meds with applesauce or pudding.

## 2024-11-14 NOTE — THERAPY TREATMENT NOTE
"Subjective: Pt agreeable to therapeutic plan of care.  Cognition: oriented to Person, Place, Time, and Situation    Objective:     Precautions - desaturates with activity and orthostatic hypotension    OT responded to call light. Pt requesting his wallet to access [facility] business card in order to call to notify them of transition to SNF. Pt BUEs very tremulous this date, requiring assistance for task completion due to poor motor control. While in room, pt also provided education regarding orthostatic hypotension and how to safely manage, including but not limited to taking time to allow body to react to changes in position.    Vitals: WNL BP supine 125/85    Pain: 0 VAS  Location: na  Interventions for pain: N/A  Education: Provided education on the importance of mobility in the acute care setting, Verbal/Tactile Cues, ADL training, and Transfer Training      Assessment: Vlad Guerrero presents with ADL impairments affecting function including endurance / activity tolerance. Limited OT session secondary to precautions following heart cath. Demonstrated functioning below baseline abilities indicate the need for continued skilled intervention while inpatient. Tolerating session today without incident. Will continue to follow and progress as tolerated.     Plan/Recommendations:   Moderate Intensity Therapy recommended post-acute care. This is recommended as therapy feels the patient would require 3-4 days per week and wouldn't tolerate \"3 hour daily\" rehab intensity. SNF would be the preferred choice. If the patient does not agree to SNF, arrange HH or OP depending on home bound status. If patient is medically complex, consider LTACH.. Pt requires no DME at discharge.     Pt desires Skilled Rehab placement at discharge. Pt cooperative; agreeable to therapeutic recommendations and plan of care.     Modified Neeta: N/A = No pre-op stroke/TIA    Post-Tx Position: Supine with HOB Elevated, Alarms activated, and Call light and " personal items within reach  PPE: gloves    Therapy Charges for Today       Code Description Service Date Service Provider Modifiers Qty    37010524911 HC OT SELF CARE/MGMT/TRAIN EA 15 MIN 11/14/2024 Brandon Nicole OT GO 1           Time Calculation- OT       Row Name 11/14/24 1642             Time Calculation- OT    OT Start Time 1309  -LS      OT Stop Time 1321  -LS      OT Time Calculation (min) 12 min  -      Total Timed Code Minutes- OT 12 minute(s)  -      OT Received On 11/14/24  -      OT - Next Appointment 11/16/24  -                User Key  (r) = Recorded By, (t) = Taken By, (c) = Cosigned By      Initials Name Provider Type    LS Brandon Nicole OT Occupational Therapist

## 2024-11-14 NOTE — PROGRESS NOTES
Cardiology Progress Note    Patient Identification:  Name: Vlad Guerrero  Age: 79 y.o.  Sex: male  :  1945  MRN: [id number]                 Follow Up / Chief Complaint:   Chief Complaint   Patient presents with    Weakness - Generalized       Interval History: Patient presented with weakness and hyperglycemia.  Patient is having issues with orthostatic hypotension.    Orthostatic vital signs per PT  Supine: 113/70mmHg; 90bpm  Seated: 81/62mmHg,   Standin/46,     NP NOTE:  Patient seen and with Dr. Rahman.  Patient worked with PT this morning and was significantly orthostatic. Will start midodrine therapy 5mg TID.  Patient denies any chest pain or shortness of breath.  Glucose is better controlled while inpatient.   Repeat echocardiogram pending   Unable to tolerate any GDMT for heart failure due to hypotension       Will need cardiac cath     Electronically signed by ANEUDY Brown, 24, 10:56 AM EST.    Cardiology attending addendum :    I have personally performed a face-to-face diagnostic evaluation, physical exam and reviewed data on this patient.  I have reviewed documentation done by me and nurse practitioner  and corrected as needed.  And agree with the different components of documentation.Greater than 50% of the time spent in the care of this patient was provided by attending consultant/me.        Subjective: Patient seen and examined; chart and labs reviewed; discussed with bedside nurse .  Patient continues to have severe orthostasis and hypotension on standing up.        Objective: HS troponin is 32--31 A1C 11.8   2024: CBC unremarkable     History of present illness:      Mr. Vlad Guerrero has PMH of     PFO  Left bundle branch block  HFrEF   EF 30%,  Diastolic dysfunction   Left atrial enlargement  Hypertension  History of TIA , left frontal CVA 2024  Metastatic colon cancer with possible liver mets  Insulin-dependent diabetes        Presented  11/11/2024 with weakness and hyperglycemia.  Patient was undergoing chemotherapy for his liver mets.  Patient was found to be tachycardic and hypotensive and orthostatic sugars were in the 500.  Patient's recent A1c was 11.80 on 11/12/2024.     Data:  11/12/2024: HS troponin is 32--31        Assessment:  :     Orthostatic hypotension  Severe LV dysfunction by echo  Chronic HFrEF due to systolic dysfunction  Type 2 diabetes, poorly controlled  PFO  Left bundle branch block  HFrEF  30% diastolic dysfunction   Left atrial enlargement  Acute left frontal stroke.  Metastatic colon cancer  History of previous TIA  Carotid disease        Recommendations / Plan:         EKG is revealing left bundle branch block  Echo 11/13/2024 is revealing severe LV dysfunction.  Patient is orthostatic.  Patient's orthostasis is probably due to autonomic insufficiency.  Will give him midodrine and accept a higher supine blood pressure just to help with orthostasis.  Patient has severe LV dysfunction.  Will schedule cardiac cath to evaluate etiology of cardiomyopathy.  Risk-benefit alternatives explained.  Patient's blood pressure is low, cannot give any guideline directed medical therapy for LV dysfunction.      Copied text in this portion of the note has been reviewed and is accurate as of 11/14/2024    Past Medical History:  Past Medical History:   Diagnosis Date    Anemia     Colon cancer 2022    colon    Diabetes mellitus     Hyperlipidemia     Hypertension      Past Surgical History:  Past Surgical History:   Procedure Laterality Date    APPENDECTOMY      CARDIAC CATHETERIZATION      COLON RESECTION N/A 12/15/2022    Procedure: COLON RESECTION RIGHT;  Surgeon: Percy Davis MD;  Location: [facility] MAIN OR;  Service: General;  Laterality: N/A;    COLONOSCOPY N/A 11/11/2022    Procedure: COLONOSCOPY WITH BIOPSY AND POLYPECTOMY;  Surgeon: Billy Julien MD;  Location: [facility] ENDOSCOPY;  Service: Gastroenterology;  Laterality:  N/A;  Impression:  1.  Large 4-5cm fulgurating circumferential ulcerated mass in the very proximal part of the ascending colon next to ileocecal valve multiple biopsies were performed.  This is highly concerning for colon malignancy.  2.  2 polyp rem    ENDOSCOPY N/A 2022    Procedure: ESOPHAGOGASTRODUODENOSCOPY with biopsy X1;  Surgeon: Billy Julien MD;  Location: [facility] ENDOSCOPY;  Service: Gastroenterology;  Laterality: N/A;  5.  Upper endoscopy lamination unremarkable.       PORTACATH PLACEMENT Right 2023    Procedure: INSERTION OF PORTACATH;  Surgeon: Percy Davis MD;  Location: [facility] MAIN OR;  Service: General;  Laterality: Right;    TONSILLECTOMY          Social History:   Social History     Tobacco Use    Smoking status: Former     Current packs/day: 0.00     Average packs/day: 1 pack/day for 2.0 years (2.0 ttl pk-yrs)     Types: Cigarettes     Start date:      Quit date:      Years since quittin.9    Smokeless tobacco: Never   Substance Use Topics    Alcohol use: Never      Family History:  Family History   Problem Relation Age of Onset    Pneumonia Mother 94        SARS-CoV2    Colon cancer Father 62    Heart disease Sister           Allergies:  Allergies   Allergen Reactions    Penicillins Rash     Scheduled Meds:  [Transfer Hold] aspirin, 81 mg, Daily  [Transfer Hold] enoxaparin, 40 mg, Daily  [Transfer Hold] insulin glargine, 10 Units, Nightly  [Transfer Hold] insulin lispro, 2-7 Units, 4x Daily AC & at Bedtime  [Transfer Hold] insulin lispro, 5 Units, TID With Meals  [Transfer Hold] midodrine, 10 mg, TID AC  [Transfer Hold] sodium chloride, 10 mL, Q12H          Review of Systems:   ROS  Review of Systems   Constitution: Negative for chills and fever.   Cardiovascular: Negative for chest pain and palpitations.   Respiratory: Negative for cough and hemoptysis.    Gastrointestinal: Negative for nausea.        Constitutional:  Temp:  [97.5 °F (36.4 °C)-98.1 °F (36.7  "°C)] 97.5 °F (36.4 °C)  Heart Rate:  [69-85] 80  Resp:  [15-18] 15  BP: (103-148)/() 117/76    Physical Exam   /76 (BP Location: Right arm, Patient Position: Lying)   Pulse 80   Temp 97.5 °F (36.4 °C) (Oral)   Resp 15   Ht 185.4 cm (73\")   Wt 80.6 kg (177 lb 11.1 oz)   SpO2 93%   BMI 23.44 kg/m²   General:  Appears in no acute distress  Eyes: Sclera is anicteric,  conjunctiva is clear   HEENT:  No JVD. Thyroid not visibly enlarged. No mucosal pallor or cyanosis  Respiratory: Respirations regular and unlabored at rest.  Clear to auscultation  Cardiovascular: S1,S2 Regular rate and rhythm.   Gastrointestinal: Abdomen nondistended.  Musculoskeletal:  No abnormal movements  Extremities: No digital clubbing or cyanosis  Skin: Color pink.   Neuro: Alert and awake.    INTAKE AND OUTPUT:    Intake/Output Summary (Last 24 hours) at 11/14/2024 1046  Last data filed at 11/13/2024 1310  Gross per 24 hour   Intake 376 ml   Output --   Net 376 ml       Cardiographics  Telemetry: sinus rhythm     ECG:   ECG 12 Lead Rhythm Change   Preliminary Result   HEART RATE=79  bpm   RR Prljqbhg=130  ms   VA Lbnkybyn=208  ms   P Horizontal Axis=-55  deg   P Front Axis=82  deg   QRSD Akfkkekf=880  ms   QT Fktkmkxr=262  ms   KKqW=793  ms   QRS Axis=-24  deg   T Wave Axis=-89  deg   - ABNORMAL ECG -   Sinus rhythm   Prolonged VA interval   IVCD, consider LBBB   Probable anterolateral infarct, age indeterm   Date and Time of Study:2024-11-12 13:02:37      ECG 12 Lead Other; gen weakness   Final Result   HEART RATE=77  bpm   RR Zutjhzny=799  ms   VA Vgjdbudt=686  ms   P Horizontal Axis=  deg   P Front Axis=80  deg   QRSD Hwqcgnxt=167  ms   QT Qiqtuumq=303  ms   LJeL=763  ms   QRS Axis=-51  deg   T Wave Axis=133  deg   - ABNORMAL ECG -   Sinus rhythm   Prolonged VA interval   Left bundle branch block   ST elevation secondary to IVCD   When compared with ECG of 26-Apr-2024 10:50:43,   Nonspecific significant change "   Electronically Signed By: Arnav Dumont (KG) 2024-11-11 20:09:08   Date and Time of Study:2024-11-11 16:00:09      Telemetry Scan   Final Result      Telemetry Scan   Final Result        I have personally reviewed EKG    Echocardiogram: Results for orders placed during the hospital encounter of 11/11/24    Adult Transthoracic Echo Complete w/ Color, Spectral and Contrast if Necessary Per Protocol    Interpretation Summary    Left ventricular systolic function is severely decreased. Left ventricular ejection fraction appears to be 21 - 25%.    The left ventricular cavity is moderately dilated.    Left atrial volume is severely increased.    Moderate mitral valve regurgitation is present.    Estimated right ventricular systolic pressure from tricuspid regurgitation is normal (<35 mmHg).    Conclusion      Normal LV size, paradoxical septal motion, severe LV dysfunction with EF of 20-25%.    Normal RV size  Normal atrial size  Pulmonic valve is not well visualized.  Aortic valve with mild sclerosis.  Mitral valve, tricuspid valve appears structurally normal, trace tricuspid and moderate mitral regurgitation seen.  Normal calculated RV systolic pressure 28 mmHg  No pericardial effusion seen.  Proximal aorta appears normal in size.      Lab Review   I have reviewed the labs      Results from last 7 days   Lab Units 11/11/24  1434   MAGNESIUM mg/dL 1.6     Results from last 7 days   Lab Units 11/14/24  0448   SODIUM mmol/L 138   POTASSIUM mmol/L 4.0   BUN mg/dL 20   CREATININE mg/dL 0.91   CALCIUM mg/dL 8.2*         Results from last 7 days   Lab Units 11/14/24  0448 11/13/24  0507 11/12/24  0254   WBC 10*3/mm3 7.33 7.22 8.90   HEMOGLOBIN g/dL 13.3 13.8 14.8   HEMATOCRIT % 40.6 43.3 47.1   PLATELETS 10*3/mm3 176 185 180           RADIOLOGY:  Imaging Results (Last 24 Hours)       ** No results found for the last 24 hours. **                  )11/14/2024  MD JAYME Guzmán/Transcription:  "  \"Dictated utilizing Dragon dictation\".   "

## 2024-11-14 NOTE — DISCHARGE SUMMARY
"             [facility] Medicine Services  Discharge Summary    Date of Service: 2024  Patient Name: Vlad Guerrero  : 1945  MRN: [id number]    Date of Admission: 2024  Discharge Diagnosis: Cardiomyopathy, dilated  Date of Discharge: 2024  Primary Care Physician: Vlad Moreland MD      Presenting Problem:   Hyperglycemia [R73.9]  Generalized weakness [R53.1]    Active and Resolved Hospital Problems:  Active Hospital Problems    Diagnosis POA    **Cardiomyopathy, dilated [I42.0] Unknown    LBBB (left bundle branch block) [I44.7] Unknown    Hyperglycemia [R73.9] Yes    Chronic combined systolic and diastolic congestive heart failure [I50.42] Unknown    Autonomic orthostatic hypotension [I95.1] Unknown    Uncontrolled type 2 diabetes mellitus with hyperglycemia [E11.65] Unknown      Resolved Hospital Problems   No resolved problems to display.         Hospital Course     HPI:    \"Vlad Guerrero is a 79 y.o. male with a CMH of anemia, colon cancer, DM, HLD, HTN who presented to [facility] on 2024 with hyperglycemia and generalized weakness. This has been going on for the past three weeks. Has a bump on his right arm that he doesn't know where it came from. Also complains of urinary frequency, diarrhea. Says his blood sugar at home has been running around 500. Patient was initially admitted under observation where he worked with PT who noted the patient was very hypotensive and tachycardic while standing. Cardiology was consulted as well as the hospital team. The hospital team will now be taking over care of this patient.\"    Hospital Course:  Patient was admitted for hyperglycemia and generalized weakness. Hyperglycemia controlled with insulin therapy. Weakness was quite severe and so cardiology team repeated echocardiogram which redemonstrated EF of 25%. Patient BP improved somewhat with midodrine but unable to tolerate addition of much GDMT. Discharge plan was arranged " for patient to go to rehab after discharge.        DISCHARGE Follow Up Recommendations for labs and diagnostics:     Oncology  Cardiology  Primary Care        Day of Discharge     Vital Signs:  Temp:  [97.4 °F (36.3 °C)-98.1 °F (36.7 °C)] 97.4 °F (36.3 °C)  Heart Rate:  [69-82] 69  Resp:  [10-16] 10  BP: (108-148)/() 108/80    Physical Exam:  Physical Exam  Constitutional:       Appearance: Normal appearance.   Cardiovascular:      Rate and Rhythm: Normal rate and regular rhythm.   Pulmonary:      Effort: Pulmonary effort is normal.      Breath sounds: Normal breath sounds.   Abdominal:      General: Abdomen is flat.      Palpations: Abdomen is soft.   Neurological:      Mental Status: He is alert. Mental status is at baseline.           Pertinent  and/or Most Recent Results     LAB RESULTS:      Lab 11/14/24 0448 11/13/24 0507 11/12/24  0254 11/11/24  1552 11/11/24  1434   WBC 7.33 7.22 8.90  --  7.06   HEMOGLOBIN 13.3 13.8 14.8  --  13.8   HEMOGLOBIN, POC  --   --   --  14.6  --    HEMATOCRIT 40.6 43.3 47.1  --  42.2   HEMATOCRIT POC  --   --   --  43  --    PLATELETS 176 185 180  --  189   NEUTROS ABS 4.96 4.73 6.68  --  5.31   IMMATURE GRANS (ABS) 0.02 0.01 0.03  --  0.02   LYMPHS ABS 1.45 1.59 1.22  --  0.78   MONOS ABS 0.78 0.78 0.92*  --  0.86   EOS ABS 0.10 0.09 0.01  --  0.06   MCV 95.3 93.9 97.7*  --  93.4   LACTATE  --   --   --  1.0  --          Lab 11/14/24 0448 11/12/24  0254 11/11/24  1552 11/11/24  1434   SODIUM 138 140  --  136   POTASSIUM 4.0 5.1  --  4.8   CHLORIDE 105 104  --  102   CO2 26.0 25.3  --  24.7   ANION GAP 7.0 10.7  --  9.3   BUN 20 16  --  17   CREATININE 0.91 0.88 0.69 0.89   EGFR 86.8 88.6 95.3 88.3   GLUCOSE 281* 237*  --  448*   CALCIUM 8.2* 9.0  --  8.9   MAGNESIUM  --   --   --  1.6   PHOSPHORUS  --   --   --  3.5   HEMOGLOBIN A1C  --  11.80*  --   --          Lab 11/11/24  1434   TOTAL PROTEIN 6.5   ALBUMIN 3.5   GLOBULIN 3.0   ALT (SGPT) 11   AST (SGOT) 12    BILIRUBIN 0.6   ALK PHOS 124*   LIPASE 25         Lab 11/12/24  0254   PROBNP 3,231.0*                 Lab 11/11/24  1552   FIO2 21   BASE EXCESS ART 2.7     Brief Urine Lab Results  (Last result in the past 365 days)        Color   Clarity   Blood   Leuk Est   Nitrite   Protein   CREAT   Urine HCG        11/11/24 1533 Yellow   Clear   Negative   Trace   Negative   Trace                 Microbiology Results (last 10 days)       Procedure Component Value - Date/Time    Respiratory Panel PCR w/COVID-19(SARS-CoV-2) JODY/TICO/KG/PAD/COR/OZ In-House, NP Swab in UTM/VTM, 2 HR TAT - Swab, Nasopharynx [[id number]]  (Normal) Collected: 11/11/24 1707    Lab Status: Final result Specimen: Swab from Nasopharynx Updated: 11/11/24 1816     ADENOVIRUS, PCR Not Detected     Coronavirus 229E Not Detected     Coronavirus HKU1 Not Detected     Coronavirus NL63 Not Detected     Coronavirus OC43 Not Detected     COVID19 Not Detected     Human Metapneumovirus Not Detected     Human Rhinovirus/Enterovirus Not Detected     Influenza A PCR Not Detected     Influenza B PCR Not Detected     Parainfluenza Virus 1 Not Detected     Parainfluenza Virus 2 Not Detected     Parainfluenza Virus 3 Not Detected     Parainfluenza Virus 4 Not Detected     RSV, PCR Not Detected     Bordetella pertussis pcr Not Detected     Bordetella parapertussis PCR Not Detected     Chlamydophila pneumoniae PCR Not Detected     Mycoplasma pneumo by PCR Not Detected    Narrative:      In the setting of a positive respiratory panel with a viral infection PLUS a negative procalcitonin without other underlying concern for bacterial infection, consider observing off antibiotics or discontinuation of antibiotics and continue supportive care. If the respiratory panel is positive for atypical bacterial infection (Bordetella pertussis, Chlamydophila pneumoniae, or Mycoplasma pneumoniae), consider antibiotic de-escalation to target atypical bacterial infection.            CT  Head Without Contrast    Result Date: 11/11/2024  Impression: Impression: No acute intracranial abnormality. Electronically Signed: Billy DO Quintin  11/11/2024 6:21 PM EST  Workstation ID: [id number]    XR Chest 2 View    Result Date: 11/11/2024  Impression: No radiographic findings of acute cardiopulmonary abnormality. Electronically Signed: Andrea Reagan  11/11/2024 5:54 PM EST  Workstation ID: [id number]    XR Forearm 2 View Right    Result Date: 11/11/2024  Impression: 1.Focal soft tissue prominence at the posterior aspect of the proximal forearm. This could be related to olecranon bursitis, hematoma, soft tissue infection, or neoplasm. 2.No radiographic findings of acute osseous elbow or forearm abnormality. 3.Moderate elbow osteoarthritis. 4.Calcific atherosclerosis. Electronically Signed: Andrea Reagan  11/11/2024 5:43 PM EST  Workstation ID: [id number]    XR Elbow 3+ View Right    Result Date: 11/11/2024  Impression: 1.Focal soft tissue prominence at the posterior aspect of the proximal forearm. This could be related to olecranon bursitis, hematoma, soft tissue infection, or neoplasm. 2.No radiographic findings of acute osseous elbow or forearm abnormality. 3.Moderate elbow osteoarthritis. 4.Calcific atherosclerosis. Electronically Signed: Andrea Reagan  11/11/2024 5:43 PM EST  Workstation ID: [id number]     Results for orders placed during the hospital encounter of 04/24/24    Duplex Venous Lower Extremity - Bilateral CAR    Interpretation Summary    Normal bilateral lower extremity venous duplex scan.      Results for orders placed during the hospital encounter of 04/24/24    Duplex Venous Lower Extremity - Bilateral CAR    Interpretation Summary    Normal bilateral lower extremity venous duplex scan.      Results for orders placed during the hospital encounter of 11/11/24    Adult Transthoracic Echo Complete w/ Color, Spectral and Contrast if Necessary Per Protocol    Interpretation Summary    Left ventricular  systolic function is severely decreased. Left ventricular ejection fraction appears to be 21 - 25%.    The left ventricular cavity is moderately dilated.    Left atrial volume is severely increased.    Moderate mitral valve regurgitation is present.    Estimated right ventricular systolic pressure from tricuspid regurgitation is normal (<35 mmHg).    Conclusion      Normal LV size, paradoxical septal motion, severe LV dysfunction with EF of 20-25%.    Normal RV size  Normal atrial size  Pulmonic valve is not well visualized.  Aortic valve with mild sclerosis.  Mitral valve, tricuspid valve appears structurally normal, trace tricuspid and moderate mitral regurgitation seen.  Normal calculated RV systolic pressure 28 mmHg  No pericardial effusion seen.  Proximal aorta appears normal in size.      Labs Pending at Discharge:  Pending Results       Procedure [Order ID] Specimen - Date/Time    Basic Metabolic Panel [[id number]] Updated: 11/13/24 0642    Specimen: Blood from Arm, Right             Procedures Performed  Procedure(s):  Left Heart Cath, possible pci         Consults:   Consults       Date and Time Order Name Status Description    11/12/2024 11:43 AM Inpatient Hospitalist Consult      11/12/2024 10:53 AM Inpatient Cardiology Consult Completed     11/11/2024  8:23 PM Inpatient Endocrinology Consult Completed               Discharge Details        Discharge Medications        ASK your doctor about these medications        Instructions Start Date   aspirin 81 MG EC tablet   81 mg, Daily      insulin glargine 100 UNIT/ML injection  Commonly known as: LANTUS, SEMGLEE   20 Units, Subcutaneous, Nightly      NovoLOG FlexPen 100 UNIT/ML solution pen-injector sc pen  Generic drug: insulin aspart   10 Units, Subcutaneous, 3 Times Daily With Meals               Allergies   Allergen Reactions    Penicillins Rash         Discharge Disposition: SNF      Diet:  Hospital:  Diet Order   Procedures    Diet: Cardiac, Diabetic;  Healthy Heart (2-3 Na+); Consistent Carbohydrate; Fluid Consistency: Thin (IDDSI 0)         Discharge Activity:         CODE STATUS:  Code Status and Medical Interventions: CPR (Attempt to Resuscitate); Full Support   Ordered at: 11/11/24 2023     Code Status (Patient has no pulse and is not breathing):    CPR (Attempt to Resuscitate)     Medical Interventions (Patient has pulse or is breathing):    Full Support         Future Appointments   Date Time Provider Department Center   11/15/2024  1:00 PM [facility] INJECTION CHAIR [facility] NA [facility]   11/15/2024  1:15 PM Don Kramer MD [facility] ONC NA [facility]           Time spent on Discharge including face to face service:  60 minutes    Signature: Electronically signed by Chris Cruz MD, 11/14/24, 16:42 EST.  [facility] Hospitalist Team

## 2024-11-15 NOTE — CASE MANAGEMENT/SOCIAL WORK
Case Management Discharge Note      Final Note: [facility]    Provided Post Acute Provider List?: Yes  Post Acute Provider List: Nursing Home  Provided Post Acute Provider Quality & Resource List?: Yes  Post Acute Provider Quality and Resource List: Nursing Home  Delivered To: Patient  Method of Delivery: In person    Selected Continued Care - Discharged on 11/14/2024 Admission date: 11/11/2024 - Discharge disposition: Skilled Nursing Facility (DC - External)      Destination Coordination complete.      Service Provider Services Address Phone Fax Patient Preferred    [facility] [address], [place] [address] [phone] --                 Transportation Services  Private: Car    Final Discharge Disposition Code: 03 - skilled nursing facility (SNF)

## 2024-11-17 LAB
QT INTERVAL: 423 MS
QTC INTERVAL: 485 MS

## 2024-12-09 ENCOUNTER — TELEPHONE (OUTPATIENT)
Dept: ONCOLOGY | Facility: CLINIC | Age: 79
End: 2024-12-09
Payer: MEDICARE

## 2024-12-09 NOTE — TELEPHONE ENCOUNTER
Received a call from the pt stating that he needs to schedule the CT scans that he missed while he was in rehab. Phone number to scheduling provided, referral reopened.

## 2024-12-18 ENCOUNTER — HOSPITAL ENCOUNTER (OUTPATIENT)
Dept: PET IMAGING | Facility: HOSPITAL | Age: 79
Discharge: HOME | End: 2024-12-18
Admitting: INTERNAL MEDICINE
Payer: MEDICARE

## 2024-12-18 DIAGNOSIS — C18.2 MALIGNANT NEOPLASM OF ASCENDING COLON: ICD-10-CM

## 2024-12-18 DIAGNOSIS — C78.7 METASTASES TO THE LIVER: ICD-10-CM

## 2024-12-18 LAB
CREAT BLDA-MCNC: 0.9 MG/DL (ref 0.6–1.3)
EGFRCR SERPLBLD CKD-EPI 2021: 88 ML/MIN/1.73

## 2024-12-18 PROCEDURE — 74177 CT ABD & PELVIS W/CONTRAST: CPT

## 2024-12-18 PROCEDURE — 71260 CT THORAX DX C+: CPT

## 2024-12-18 PROCEDURE — 82565 ASSAY OF CREATININE: CPT

## 2024-12-18 PROCEDURE — 25510000001 IOPAMIDOL PER 1 ML: Performed by: INTERNAL MEDICINE

## 2024-12-18 RX ORDER — IOPAMIDOL 755 MG/ML
100 INJECTION, SOLUTION INTRAVASCULAR
Status: COMPLETED | OUTPATIENT
Start: 2024-12-18 | End: 2024-12-18

## 2024-12-18 RX ADMIN — IOPAMIDOL 100 ML: 755 INJECTION, SOLUTION INTRAVENOUS at 10:56

## 2025-01-07 ENCOUNTER — TELEPHONE (OUTPATIENT)
Dept: ONCOLOGY | Facility: CLINIC | Age: 80
End: 2025-01-07

## 2025-01-07 NOTE — TELEPHONE ENCOUNTER
"  Caller: Vlad Guerrero \"Fredi\"    Relationship: Self    Best call back number: [phone]    What is the best time to reach you: ANYTIME    Who are you requesting to speak with (clinical staff, provider,  specific staff member): SCHEDULING    What was the call regarding: PT IS SCHEDULED WITH KAMILLE ON 1-10 AND STATES THERES MORE SNOW COMING IN, HE IS NOT FAMILIAR WITH MYCHART AND WANTS TO KNOW IF SHE CAN CALL HIM WITH HIS SCAN RESULTS.     PLEASE ADVISE        "

## 2025-01-16 NOTE — PROGRESS NOTES
HEMATOLOGY ONCOLOGY OUTPATIENT FOLLOW-UP       Patient name: Vlad Guerrero  : 1945  MRN: [id number]  Primary Care Physician: Vlad Moreland MD  Referring Physician: Vlad Moreland MD  Reason For Consult: Stage III colon cance    Chief Complaint   Patient presents with    Follow-up     Malignant neoplasm of ascending colon       History of Present Illness:  Vlad Guerrero is 79 y.o. male who presented to our office on 23 for consultation regarding    2023: Mr. Guerrero dated the beginning of his present illness to sometime at the end of  when he started to feel fatigued.  He was seen at the [facility] and had laboratory exams that revealed microcytic anemia.  He had evidence of iron deficiency and received intravenous iron in the hospital.  He had upper and lower gastrointestinal endoscopies that demonstrated a cecal tumor that measured 4 to 5 cm and was fungating in appearance.  It was circumferential and was next to the ileocecal valve.  The upper gastrointestinal endoscopy revealed no abnormalities.  On this basis he was on December 15, 2022 he was taken to the hospital and underwent a right hemicolectomy without complications.  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  It corresponded to a grade 2 malignancy that invaded through the muscularis propria into the pericolonic tissues.  Macroscopic tumor perforation or lymphovascular space invasion were not present.  Perineural invasion was not present either.  All margins of excision were negative.  All of 36 lymph nodes submitted to were positive for involvement with malignancy.  The disease was Elzbieta staged as [id number].  Loss of expression of mismatch repair enzymes was not documented.  Mr. Guerrero was discharged to continue treatment as outpatient.  At the time of this visit he was recovering well from the surgery.   His incision had healed completely and he had no drains or suture materials.  He had returned home and was eating well.  He had yet to return to work.  He had been afebrile and free of nausea.  For the most part his weight had been stable.  After reviewing the records a long conversation, of approximately 1 hour, was had with the patient in regards to options of treatment.  The nature of his disease as well as the likelihood of recurrence were described.  The use of adjuvant chemotherapy to increase the rate of cure after surgery was explained.  Side effects were described in detail.  The need for a port was expressed as well.  A treatment plan was placed. A decision was made to treat him with three months of CAPOX given the characteristics of his disease.     2/6/2023: Received the first cycle of adjuvant chemotherapy without any side effects. On the day of this visit feeling well and without any new symptoms, except a very mild rash, especially on the forearms, but no oral pain. Had stools of diminished consistency for a few days but now resolved. The exam was rather unremarkable, except for a few very light erythematous macules on the forearms.     2/27/2023: Feeling well and without new symptoms.  As active as before.  Eating well and without unintended weight loss.  Stronger and more energetic since the institution of vitamin B12 and iron.  No chest pains and cough.  No abdominal pain or diarrhea.  On exam no changes.  A decision was made to continue with the same treatment.  Laboratory exams were reviewed.  He was to see me again in approximately 4 weeks from this date with new scans.    3/27/2023: Suffered an ischemic stroke.  MRI on March 10, 2023 reported a 7 mm focus of restricted diffusion in the left periventricular white matter of the posterior left frontal lobe.  This was felt to be suspicious for an acute/subacute infarct.  There was also evidence of previous lacunar strokes.  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  There was bilateral internal carotid artery stenosis with 60% stenosis estimated at the right and not greater than 50% at the left.  Chemotherapy was held following discharge.  He was left without any sequela.  At the time of this visit he was entirely asymptomatic.  He was convinced a large part of his problem was that he had suffered from hyperglycemia, consistently for some time.  Indeed his glucose was between 152 mg/dL and 193 mg/dL in the preceding days.  However, he reported at home glucose readings of greater than 400 mg/dL..  On exam there were no changes.  The laboratory exams reported a blood count with normocytic anemia and mild thrombocytopenia.  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    4/14/2023: Completed 3 months of treatment with CAPOX.  On the day of this visit not feeling very well.  Weak and tired.  Not very good appetite although eating well.  Having some dysgeusia.  Afebrile.  No chest pains or cough and no abdominal pain.  Had maintain regular bowel activity.  No edema.  On exam no changes.  A decision was made to stop the chemotherapy.  He was to get intravenous fluids on the day of this visit.  To return to see me in 3 weeks.  Tentatively to have scans in early June 2023.    5/5/2023: Feeling progressively stronger. Eating better and slowly regaining weight. Afebrile. No more nausea. No diarrhea and now with regular bowel activity. On exam alert, conversant and well oriented. No pale or jaundice. No oral lesions and no palpable lymph nodes. Lungs  clear and abdomen soft. Minimal edema of the right lower extremity. The laboratory exams revealed persistent anemia with a tendency to macrocytosis. Hemoglobin and platelets within normal ranges. A decision was made to continue to observe and I asked him to see me in approximately 3 months with new scans.     8/7/2023: Feels as well as at the time of the last visit. Active and returned to work full time. Eating well and no nausea or vomiting. No chest pain or cough and no abdominal pain. On exam no changes, though he had lost a large amount of weight since the previous visit. The imaging studies suggested a new lesion in the liver, as well as several lesions in the spleen of unclear cause. Reviewed the images and the report of the scans. Discussed with him at length and explained the findings. Discussed with him the plans for a MRI. He will see me with results.     8/28/2023: Entirely asymptomatic.  Working without limitations.  Eating well.  Weight stable.  Afebrile.  No pain.  On exam no changes.  Laboratory exams were reviewed and discussed with him.  In spite of the fact things on the scans the Carcinoembryonic antigen has not increased.  However both the CT and the MRI suggest one isolated metastatic deposit in segment 8 of the liver.  Discussed with him the options at this time.  I believe a biopsy is essential and after that he would be treated with chemotherapy following which surgery, if no new lesions have appeared, could be considered.  He may still be curable even in the setting of metastatic disease.  Discussed with him at great length.  Explained the steps.  Sent a communication to Dr. Davis, the patient's surgeon.    9/11/2023: Feels about the same as before.  No new symptoms.  As active as before and working.  Eating well and with good appetite.  No unintended weight loss.  Afebrile.  On exam alert, conversant and in good spirits.  No distress.  No jaundice or pallor.  Lungs clear and heart  regular.  Abdomen soft.  The liver is not palpable.  No edema.  Laboratory exams were reviewed.  The liver biopsy report confirms metastatic colorectal cancer.  This appears to be an isolated metastases.  On this basis treatment with chemotherapy followed by surgery is not ordered.  I have asked him to have a PET scan and discussed the study with him.  Discussed the objectives of the treatment and its requirements.  Placed the treatment plan with 5 fluorouracil, irinotecan and panitumumab and discussed with him.  To begin as soon as possible.    9/25/2023: In the office before the next scheduled time.  He called to report that over the weekend he had had between 5 and 8 defecations of liquid stool.  He took loperamide irregularly and it did not seem to provide much relief.  He was able to eat and drink without any difficulties and was never nauseated.  He was seen in the emergency room on 9/24/2023 and he was not found to have any dehydration or other evidence of complications.  They discharged him.  At the time of this visit feeling better.  As of this time he had not had any liquid defecations.  He had continued to eat and drink fluids without any problems.  He had no chest pains and had not had any dyspnea.  He was also free of abdominal pain.  On exam alert, oriented and conversant.  Seemed well-hydrated and was not jaundiced or pale.  Lungs clear.  Heart regular.  Abdomen soft.  A decision was made to continue with the same plan.  I independently reviewed the images of the PET scan and discussed with him.  It reveals only an abnormal lesion in the liver as identified before.  No suggestion of distant metastatic disease.  Discussed with him the significance of this and explained the possibility of surgery and potentially cure.    11/15/2023: Completed 4 cycles of FOLFIRI/panitumumab.  Experienced the expected side effects, particularly skin rash.  However, the treatment proceeded without major complications.   "Today he tells me he has been feeling reasonably well and has continued to work part-time.  He enjoys his job.  He has been eating about as much as he had been eating before but he has lost some weight without intention.  He did have persistent diarrhea that responded only to large doses of loperamide.  At this point he no longer has diarrhea.  He has been without chest pains or cough and denies as well abdominal pain.  On exam he still has some erythema on the nasolabial regions on both sides.  The lungs are clear.  The heart is regular and the abdomen soft.  Liver and spleen do not seem to be enlarged.  The laboratory exams were reviewed and discussed with him.  To have another PET scan and consider surgical excision.  He will need to go to [place] for this.    12/11/2023: Feeling reasonably well at this time without any new complaints.  His diarrhea is essentially resolved although he still has soft defecations intermittently.  He is eating well and has a good appetite.  He has had no chest pains and has been without cough.  He denies abdominal pain.  No melena, hematochezia or hematuria.  No peripheral edema.  On exam no changes.  The PET scan reveals complete response with no residual evidence of disease activity.  I have discussed with Dr. Praveen Whittaker in [place] and he requested that a MRI be done prior to deciding on surgery.  Discussed with Mr. Guerrero.  Explained the plans.  He is to have the MRI and will see me with the results.    12/27/2023: Without new symptoms.  Has not had the MRI because of scheduling problems.  He feels lightheaded at times and \"I am not as sure footed as I was before\".  He has had no falls and he continues to work full-time.  He has been eating well and has regained some of the weight that he had lost.  He has been afebrile.  No chest pains or cough.  No abdominal pain or diarrhea and no dysuria.  No skin rash.  On exam no changes.  The laboratory exams were reviewed and " discussed with him.  To reschedule the MRI for the next couple of weeks.  Discussed with him.    3/18/2024: Feeling very well. His appetite is good and he has gained weight. He has been working without any difficulties. He denies chest pain or cough. No abdominal pain or diarrhea and no dysuria. On exam alert and conversant. In good spirits and in no distress. No jaundice. No oral lesions and respirations not labored. The lungs are clear and the heart is regular. Abdomen is soft and not tender. The laboratory exams reveal a blood count in the normal ranges. He persists with hyperglycemia. The alkaline phosphatase has risen some in the recent past. He is to have the MRI of the liver. He will see me with the results.     6/26/2024: Back after an absence of a few weeks and a couple of missed appointments.  He feels about the same as he felt before.  Following the last admission to the hospital he was transferred to an assisted care living facility where he has been doing progressively better.  Persists with tremors.  Eats well.  Has not lost weight.  Denies abdominal pain.  Has not had diarrhea, melena or hematochezia.  On exam in no distress.  No jaundice.  Not pale.  The lungs are clear bilaterally and the heart regular.  The abdomen is soft and without hepatomegaly or splenomegaly.  No edema.  The laboratory exams were reviewed.  To obtain a Carcinoembryonic antigen today.  He will see me in a couple of weeks with new scans as it has been more than 3 months since the last 1.  Consider treatment with an irinotecan based regimen that seem to result in a very pronounced response in the recent past.    7/12/2024: Back to review the results of the recent scans.  He feels well generally and continues to be as active as before.  As well, his appetite appears to be the same.  On exam he does not seem ill and he is conversant and well-oriented.  He is neither pale nor jaundiced and he seems well-hydrated.  The lungs are  clear and the heart regular.  The abdomen is soft.  There is no edema.  Laboratory exams reviewed.  Reviewed the images and the report of the scans.  He has 2 metastatic deposits in the liver and no other evidence of metastatic dissemination of his malignancy.  I believe it reasonable to treat him again with chemotherapy and consider some form of local therapy in the future, given how localized the disease is.  Will resume chemotherapy with irinotecan, cetuximab and 5-fluorouracil.  No 5-FU bolus.  I will see him again in approximately 4 weeks.    9/9/2024: Tolerating the chemotherapy well.  So far he has had 4 cycles without any undue side effects and no complications.  He does have a rash that is mainly around the eyes, nose and mouth but very little on the chest and the back.  It is not uncomfortable and he has been receiving treatment with topical clindamycin and sun protection.  He continues to eat well and his weight has increased slightly.  He has no chest pain and no abdominal pain.  He has diarrhea only intermittently.  On exam indeed he has an erythematous rash with a few papules but no pustules at this time.  No oral lesions.  Respirations not labored.  Lungs clear bilaterally.  Heart regular.  Abdomen soft.  No edema.  Laboratory exams reviewed.  I reviewed the recent scans of the abdomen and pelvis.  No suggestion of metastatic disease in the chest or the pelvis.  In the liver there are 2 lesions previously identified have decreased in size some.  To continue with the same treatment and see me in approximately 4 weeks.    10/10/2024: Feels reasonably well.  Has continued to have a skin rash but there are no associated symptoms to it and it is not any more extensive than before.  Perhaps it is even less extensive than before.  He maintains a good appetite.  He continues to receive care at the assisted living facility and he has had no difficulties.  No chest pains or cough.  No abdominal pain or  diarrhea.  On exam alert and conversant, in good spirits and well-oriented.  No jaundice.  Indeed there is diffuse erythema on the face and the conjunctivae are somewhat erythematous.  No discharge.  The lungs are clear and the heart regular.  The abdomen is soft nontender.  Liver and spleen are not enlarged.  There is no edema.  Laboratory exams reviewed.  To continue with the same treatment.  Obtain scans after the next chemotherapy and see me with the results.  Consider radioembolization of the liver.  I have discussed with Dr. Vlad carmichael for coordination of care.    1/21/2025: Here today for follow-up.  Reports feeling well.  Skin rashes resolved.  He maintains a good appetite.  Denies chest pain or cough, denies abdominal pain, diarrhea.  He denies any back pain.  On exam, he is alert and conversant, in good spirits.  No jaundice, no pallor.  Lungs are clear bilaterally and heart is regular.  His abdomen is soft and nontender.  Laboratory exams reviewed.  Reviewed CT imaging from December 2024.  There has been interval enlargement of subcarinal, precarinal, and right inferior hilar lymph nodes which may be reactive, possibly congestive as there was suggestion of mild basilar interstitial edema.  No significant change in hepatic metastases and no new findings of abdominopelvic metastatic disease.  Discussed with patient recommending short-term follow-up on CT.    The following portions of the patient's history were reviewed and updated as appropriate: allergies, current medications, past family history, past medical history, past social history, past surgical history and problem list.    Past Medical History:   Diagnosis Date    Anemia     Colon cancer 2022    colon    Diabetes mellitus     Hyperlipidemia     Hypertension     LBBB (left bundle branch block) 11/14/2024     Past Surgical History:   Procedure Laterality Date    APPENDECTOMY      CARDIAC CATHETERIZATION      CARDIAC CATHETERIZATION N/A 11/14/2024     Procedure: Left Heart Cath, possible pci;  Surgeon: Mg Rahman MD;  Location: [facility] CATH INVASIVE LOCATION;  Service: Cardiovascular;  Laterality: N/A;    COLON RESECTION N/A 12/15/2022    Procedure: COLON RESECTION RIGHT;  Surgeon: Percy Davis MD;  Location: [facility] MAIN OR;  Service: General;  Laterality: N/A;    COLONOSCOPY N/A 11/11/2022    Procedure: COLONOSCOPY WITH BIOPSY AND POLYPECTOMY;  Surgeon: Billy Julien MD;  Location: [facility] ENDOSCOPY;  Service: Gastroenterology;  Laterality: N/A;  Impression:  1.  Large 4-5cm fulgurating circumferential ulcerated mass in the very proximal part of the ascending colon next to ileocecal valve multiple biopsies were performed.  This is highly concerning for colon malignancy.  2.  2 polyp rem    ENDOSCOPY N/A 11/11/2022    Procedure: ESOPHAGOGASTRODUODENOSCOPY with biopsy X1;  Surgeon: Billy Julien MD;  Location: [facility] ENDOSCOPY;  Service: Gastroenterology;  Laterality: N/A;  5.  Upper endoscopy lamination unremarkable.       PORTACATH PLACEMENT Right 1/12/2023    Procedure: INSERTION OF PORTACATH;  Surgeon: Percy Davis MD;  Location: [facility] MAIN OR;  Service: General;  Laterality: Right;    TONSILLECTOMY         Current Outpatient Medications:     aspirin 81 MG EC tablet, Take 1 tablet by mouth Daily., Disp: , Rfl:     gabapentin (NEURONTIN) 100 MG capsule, Take 1 capsule by mouth Every 12 (Twelve) Hours., Disp: , Rfl:     insulin aspart (NovoLOG FlexPen) 100 UNIT/ML solution pen-injector sc pen, Inject 5 Units under the skin into the appropriate area as directed 3 (Three) Times a Day With Meals., Disp: 15 mL, Rfl: 0    midodrine (PROAMATINE) 10 MG tablet, Take 1 tablet by mouth 3 (Three) Times a Day Before Meals., Disp: 90 tablet, Rfl: 0    insulin glargine (LANTUS, SEMGLEE) 100 UNIT/ML injection, Inject 10 Units under the skin into the appropriate area as directed Every Night for 30 days., Disp: 3 mL, Rfl: 0    Allergies  "  Allergen Reactions    Penicillins Rash     Family History   Problem Relation Age of Onset    Pneumonia Mother 94        SARS-CoV2    Colon cancer Father 62    Heart disease Sister      Cancer-related family history includes Colon cancer (age of onset: 62) in his father.    Social History     Tobacco Use    Smoking status: Former     Current packs/day: 0.00     Average packs/day: 1 pack/day for 2.0 years (2.0 ttl pk-yrs)     Types: Cigarettes     Start date:      Quit date:      Years since quittin.0    Smokeless tobacco: Never   Vaping Use    Vaping status: Never Used   Substance Use Topics    Alcohol use: Never    Drug use: Never     Social History     Social History Narrative    Not on file      ROS:     Review of Systems   Constitutional:  Negative for activity change, appetite change, chills, fatigue, fever and unexpected weight change.   HENT:  Negative for congestion and nosebleeds.    Eyes:  Negative for pain.   Respiratory:  Negative for cough, shortness of breath and wheezing.    Cardiovascular:  Negative for chest pain, palpitations and leg swelling.   Gastrointestinal:  Negative for abdominal distention, abdominal pain, blood in stool, constipation, diarrhea, nausea and vomiting.   Endocrine: Negative for cold intolerance and heat intolerance.   Genitourinary:  Negative for difficulty urinating.   Musculoskeletal:  Negative for arthralgias, back pain and gait problem.   Skin:  Negative for pallor, rash and wound.   Neurological:  Negative for dizziness, weakness, numbness and headaches.   Hematological:  Does not bruise/bleed easily.   Psychiatric/Behavioral:  Negative for behavioral problems.      Objective:    Vitals:    25 0837 25 0905   BP: (!) 163/110 165/95   Pulse: 108    Temp: 98 °F (36.7 °C)    SpO2: 98%    Weight: 83.5 kg (184 lb)    Height: 185.4 cm (72.99\")    PainSc: 0-No pain        Body mass index is 24.28 kg/m².  ECOG  (0) Fully active, able to carry on all " predisease performance without restriction      Physical Exam:     Physical Exam  Vitals reviewed.   Constitutional:       General: He is not in acute distress.     Appearance: He is not toxic-appearing or diaphoretic.   HENT:      Head: Normocephalic and atraumatic.      Right Ear: External ear normal.      Left Ear: External ear normal.      Nose: No congestion.      Mouth/Throat:      Mouth: Mucous membranes are moist.   Eyes:      General: No scleral icterus.        Right eye: No discharge.         Left eye: No discharge.      Conjunctiva/sclera: Conjunctivae normal.   Neck:      Thyroid: No thyromegaly.   Cardiovascular:      Rate and Rhythm: Normal rate and regular rhythm.      Heart sounds: Normal heart sounds.      No friction rub. No gallop.   Pulmonary:      Effort: Pulmonary effort is normal. No respiratory distress.      Breath sounds: No stridor. No wheezing.   Abdominal:      General: Bowel sounds are normal.      Palpations: Abdomen is soft. There is no mass.      Tenderness: There is no abdominal tenderness. There is no guarding or rebound.   Musculoskeletal:         General: No swelling or tenderness. Normal range of motion.      Cervical back: Normal range of motion and neck supple.   Lymphadenopathy:      Cervical: No cervical adenopathy.   Skin:     General: Skin is warm.      Coloration: Skin is not jaundiced or pale.      Findings: No bruising, erythema, lesion or rash.   Neurological:      Mental Status: He is alert and oriented to person, place, and time.      Motor: No weakness or abnormal muscle tone.   Psychiatric:         Mood and Affect: Mood normal.         Behavior: Behavior normal.         Thought Content: Thought content normal.         Judgment: Judgment normal.       Lab Results - Last 18 Months   Lab Units 01/21/25  0826 11/14/24  0448 11/13/24  0507   WBC 10*3/mm3 8.30 7.33 7.22   HEMOGLOBIN g/dL 13.7 13.3 13.8   HEMATOCRIT % 45.0 40.6 43.3   PLATELETS 10*3/mm3 179 176 185   MCV  fL 93.4 95.3 93.9     Lab Results - Last 18 Months   Lab Units 12/18/24  1021 11/14/24  0448 11/12/24  0254 11/11/24  1552 11/11/24  1434 10/22/24  0806 10/10/24  1410 09/24/24  0844 09/24/24  0812   SODIUM mmol/L  --  138 140  --  136  --  138  --  139   POTASSIUM mmol/L  --  4.0 5.1  --  4.8   < > 3.9   < > 3.5   CHLORIDE mmol/L  --  105 104  --  102  --  104  --  106   CO2 mmol/L  --  26.0 25.3  --  24.7  --  26.7  --  24.9   BUN mg/dL  --  20 16  --  17  --  16  --  10   CREATININE mg/dL 0.90 0.91 0.88   < > 0.89   < > 0.82   < > 0.84   CALCIUM mg/dL  --  8.2* 9.0  --  8.9  --  8.0*  --  8.2*   BILIRUBIN mg/dL  --   --   --   --  0.6  --  0.6  --  0.3   ALK PHOS U/L  --   --   --   --  124*  --  79  --  93   ALT (SGPT) U/L  --   --   --   --  11  --  15  --  16   AST (SGOT) U/L  --   --   --   --  12  --  21  --  16   GLUCOSE mg/dL  --  281* 237*  --  448*  --  320*  --  143*    < > = values in this interval not displayed.     Lab Results   Component Value Date    GLUCOSE 281 (H) 11/14/2024    BUN 20 11/14/2024    CREATININE 0.90 12/18/2024    EGFRIFNONA 76 11/15/2021    EGFRIFAFRI 87 11/15/2021    BCR 22.0 11/14/2024    K 4.0 11/14/2024    CO2 26.0 11/14/2024    CALCIUM 8.2 (L) 11/14/2024    ALBUMIN 3.5 11/11/2024    AST 12 11/11/2024    ALT 11 11/11/2024     Lab Results   Component Value Date    IRON 15 (L) 01/06/2023    TIBC 548 (H) 01/06/2023    FERRITIN 23.51 (L) 01/06/2023     Lab Results   Component Value Date    CEA 4.75 10/08/2024     Assessment & Plan     Assessment:  Isolated metastatic colon cancer to the central portion of the liver with 2 distinct lesions. Completed 12 cycles of FOLFIRI/panitumumab October 2024. Will plan for repeat scans.  May consider radioembolization.  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  Completed Cape-Ox adjuvantly for 3 months in April 2023.  History of recurrent ischemic stroke.  Remains on acetyl salicylic acid.  No  recurrent symptoms.  Elevated blood pressure: Patient is asymptomatic.  He will continue to monitor and follow-up with PCP.  Reviewed laboratory exams, recent office records and imaging reports.  Discussed with patient  Follow with Dr. Kramer in about 3 weeks with repeat scans, sooner if condition indicates    Electronically signed by Analisa Moscoso PA-C

## 2025-01-21 ENCOUNTER — LAB (OUTPATIENT)
Dept: LAB | Facility: HOSPITAL | Age: 80
End: 2025-01-21
Payer: MEDICARE

## 2025-01-21 ENCOUNTER — OFFICE VISIT (OUTPATIENT)
Dept: ONCOLOGY | Facility: CLINIC | Age: 80
End: 2025-01-21
Payer: OTHER GOVERNMENT

## 2025-01-21 ENCOUNTER — HOSPITAL ENCOUNTER (OUTPATIENT)
Dept: ONCOLOGY | Facility: HOSPITAL | Age: 80
Discharge: HOME | End: 2025-01-21
Payer: MEDICARE

## 2025-01-21 VITALS
TEMPERATURE: 98 F | WEIGHT: 184 LBS | HEIGHT: 73 IN | HEART RATE: 108 BPM | OXYGEN SATURATION: 98 % | DIASTOLIC BLOOD PRESSURE: 95 MMHG | SYSTOLIC BLOOD PRESSURE: 165 MMHG | BODY MASS INDEX: 24.39 KG/M2

## 2025-01-21 DIAGNOSIS — Z95.828 PORT-A-CATH IN PLACE: ICD-10-CM

## 2025-01-21 DIAGNOSIS — C18.2 MALIGNANT NEOPLASM OF ASCENDING COLON: Primary | ICD-10-CM

## 2025-01-21 DIAGNOSIS — C78.7 METASTASES TO THE LIVER: ICD-10-CM

## 2025-01-21 DIAGNOSIS — C18.2 MALIGNANT NEOPLASM OF ASCENDING COLON: ICD-10-CM

## 2025-01-21 LAB
ALBUMIN SERPL-MCNC: 3.7 G/DL (ref 3.5–5.2)
ALBUMIN/GLOB SERPL: 0.9 G/DL
ALP SERPL-CCNC: 147 U/L (ref 39–117)
ALT SERPL W P-5'-P-CCNC: 11 U/L (ref 1–41)
ANION GAP SERPL CALCULATED.3IONS-SCNC: 12.6 MMOL/L (ref 5–15)
AST SERPL-CCNC: 24 U/L (ref 1–40)
BILIRUB SERPL-MCNC: 0.7 MG/DL (ref 0–1.2)
BUN SERPL-MCNC: 15 MG/DL (ref 8–23)
BUN/CREAT SERPL: 16.9 (ref 7–25)
CALCIUM SPEC-SCNC: 8.9 MG/DL (ref 8.6–10.5)
CEA SERPL-MCNC: 6.29 NG/ML
CHLORIDE SERPL-SCNC: 102 MMOL/L (ref 98–107)
CO2 SERPL-SCNC: 23.4 MMOL/L (ref 22–29)
CREAT SERPL-MCNC: 0.89 MG/DL (ref 0.76–1.27)
EGFRCR SERPLBLD CKD-EPI 2021: 88.3 ML/MIN/1.73
GLOBULIN UR ELPH-MCNC: 4 GM/DL
GLUCOSE SERPL-MCNC: 207 MG/DL (ref 65–99)
POTASSIUM SERPL-SCNC: 4 MMOL/L (ref 3.5–5.2)
PROT SERPL-MCNC: 7.7 G/DL (ref 6–8.5)
SODIUM SERPL-SCNC: 138 MMOL/L (ref 136–145)

## 2025-01-21 PROCEDURE — 82378 CARCINOEMBRYONIC ANTIGEN: CPT | Performed by: PHYSICIAN ASSISTANT

## 2025-01-21 PROCEDURE — G0463 HOSPITAL OUTPT CLINIC VISIT: HCPCS

## 2025-01-21 PROCEDURE — 80053 COMPREHEN METABOLIC PANEL: CPT | Performed by: PHYSICIAN ASSISTANT

## 2025-01-21 PROCEDURE — 25010000002 HEPARIN LOCK FLUSH PER 10 UNITS: Performed by: INTERNAL MEDICINE

## 2025-01-21 PROCEDURE — 36415 COLL VENOUS BLD VENIPUNCTURE: CPT

## 2025-01-21 PROCEDURE — 96523 IRRIG DRUG DELIVERY DEVICE: CPT

## 2025-01-21 RX ORDER — SODIUM CHLORIDE 0.9 % (FLUSH) 0.9 %
20 SYRINGE (ML) INJECTION AS NEEDED
OUTPATIENT
Start: 2025-01-21

## 2025-01-21 RX ORDER — SODIUM CHLORIDE 0.9 % (FLUSH) 0.9 %
20 SYRINGE (ML) INJECTION AS NEEDED
Status: DISCONTINUED | OUTPATIENT
Start: 2025-01-21 | End: 2025-01-22 | Stop reason: HOSPADM

## 2025-01-21 RX ORDER — HEPARIN SODIUM (PORCINE) LOCK FLUSH IV SOLN 100 UNIT/ML 100 UNIT/ML
500 SOLUTION INTRAVENOUS AS NEEDED
Status: DISCONTINUED | OUTPATIENT
Start: 2025-01-21 | End: 2025-01-22 | Stop reason: HOSPADM

## 2025-01-21 RX ORDER — GABAPENTIN 100 MG/1
1 CAPSULE ORAL EVERY 12 HOURS SCHEDULED
COMMUNITY
Start: 2024-11-15

## 2025-01-21 RX ORDER — HEPARIN SODIUM (PORCINE) LOCK FLUSH IV SOLN 100 UNIT/ML 100 UNIT/ML
500 SOLUTION INTRAVENOUS AS NEEDED
OUTPATIENT
Start: 2025-01-21

## 2025-01-21 RX ADMIN — Medication 20 ML: at 09:44

## 2025-01-21 RX ADMIN — HEPARIN 500 UNITS: 100 SYRINGE at 09:44

## 2025-01-21 NOTE — PROGRESS NOTES
Pt came from seeing Dr. Kramer for a port flush. Port accessed and flushed no blood return noted-patient did not want to try activase/cathflo today-pt wanted to be flushed with heparin and is aware if he does not have blood return next time to allow for additional time to do the cathflo. No labs collected. Port flushed with saline and heparin prior to needle removal. Pt discharged and had AVS from MD side-pt stated [facility] was making more appts and calling him.

## 2025-01-25 ENCOUNTER — APPOINTMENT (OUTPATIENT)
Dept: GENERAL RADIOLOGY | Facility: HOSPITAL | Age: 80
End: 2025-01-25
Payer: OTHER GOVERNMENT

## 2025-01-25 ENCOUNTER — HOSPITAL ENCOUNTER (OUTPATIENT)
Facility: HOSPITAL | Age: 80
Setting detail: OBSERVATION
Discharge: HOME | End: 2025-01-26
Attending: EMERGENCY MEDICINE | Admitting: EMERGENCY MEDICINE
Payer: OTHER GOVERNMENT

## 2025-01-25 DIAGNOSIS — I50.9 CONGESTIVE HEART FAILURE, UNSPECIFIED HF CHRONICITY, UNSPECIFIED HEART FAILURE TYPE: Primary | ICD-10-CM

## 2025-01-25 DIAGNOSIS — R06.01 ORTHOPNEA: ICD-10-CM

## 2025-01-25 LAB
ANION GAP SERPL CALCULATED.3IONS-SCNC: 10.1 MMOL/L (ref 5–15)
B PARAPERT DNA SPEC QL NAA+PROBE: NOT DETECTED
B PERT DNA SPEC QL NAA+PROBE: NOT DETECTED
BACTERIA UR QL AUTO: NORMAL /HPF
BASOPHILS # BLD AUTO: 0.03 10*3/MM3 (ref 0–0.2)
BASOPHILS NFR BLD AUTO: 0.5 % (ref 0–1.5)
BILIRUB UR QL STRIP: NEGATIVE
BUN SERPL-MCNC: 19 MG/DL (ref 8–23)
BUN/CREAT SERPL: 20.4 (ref 7–25)
C PNEUM DNA NPH QL NAA+NON-PROBE: NOT DETECTED
CALCIUM SPEC-SCNC: 8.9 MG/DL (ref 8.6–10.5)
CHLORIDE SERPL-SCNC: 104 MMOL/L (ref 98–107)
CLARITY UR: CLEAR
CO2 SERPL-SCNC: 23.9 MMOL/L (ref 22–29)
COLOR UR: YELLOW
CREAT SERPL-MCNC: 0.93 MG/DL (ref 0.76–1.27)
DEPRECATED RDW RBC AUTO: 46.1 FL (ref 37–54)
EGFRCR SERPLBLD CKD-EPI 2021: 84.6 ML/MIN/1.73
EOSINOPHIL # BLD AUTO: 0.07 10*3/MM3 (ref 0–0.4)
EOSINOPHIL NFR BLD AUTO: 1.2 % (ref 0.3–6.2)
ERYTHROCYTE [DISTWIDTH] IN BLOOD BY AUTOMATED COUNT: 13.8 % (ref 12.3–15.4)
FLUAV SUBTYP SPEC NAA+PROBE: NOT DETECTED
FLUBV RNA ISLT QL NAA+PROBE: NOT DETECTED
GLUCOSE SERPL-MCNC: 218 MG/DL (ref 65–99)
GLUCOSE UR STRIP-MCNC: ABNORMAL MG/DL
HADV DNA SPEC NAA+PROBE: NOT DETECTED
HCOV 229E RNA SPEC QL NAA+PROBE: NOT DETECTED
HCOV HKU1 RNA SPEC QL NAA+PROBE: NOT DETECTED
HCOV NL63 RNA SPEC QL NAA+PROBE: NOT DETECTED
HCOV OC43 RNA SPEC QL NAA+PROBE: NOT DETECTED
HCT VFR BLD AUTO: 41.3 % (ref 37.5–51)
HGB BLD-MCNC: 12.8 G/DL (ref 13–17.7)
HGB UR QL STRIP.AUTO: NEGATIVE
HMPV RNA NPH QL NAA+NON-PROBE: NOT DETECTED
HOLD SPECIMEN: NORMAL
HPIV1 RNA ISLT QL NAA+PROBE: NOT DETECTED
HPIV2 RNA SPEC QL NAA+PROBE: NOT DETECTED
HPIV3 RNA NPH QL NAA+PROBE: NOT DETECTED
HPIV4 P GENE NPH QL NAA+PROBE: NOT DETECTED
HYALINE CASTS UR QL AUTO: NORMAL /LPF
IMM GRANULOCYTES # BLD AUTO: 0.01 10*3/MM3 (ref 0–0.05)
IMM GRANULOCYTES NFR BLD AUTO: 0.2 % (ref 0–0.5)
KETONES UR QL STRIP: NEGATIVE
LEUKOCYTE ESTERASE UR QL STRIP.AUTO: NEGATIVE
LYMPHOCYTES # BLD AUTO: 1.18 10*3/MM3 (ref 0.7–3.1)
LYMPHOCYTES NFR BLD AUTO: 19.5 % (ref 19.6–45.3)
M PNEUMO IGG SER IA-ACNC: NOT DETECTED
MCH RBC QN AUTO: 28.3 PG (ref 26.6–33)
MCHC RBC AUTO-ENTMCNC: 31 G/DL (ref 31.5–35.7)
MCV RBC AUTO: 91.4 FL (ref 79–97)
MONOCYTES # BLD AUTO: 0.65 10*3/MM3 (ref 0.1–0.9)
MONOCYTES NFR BLD AUTO: 10.8 % (ref 5–12)
NEUTROPHILS NFR BLD AUTO: 4.1 10*3/MM3 (ref 1.7–7)
NEUTROPHILS NFR BLD AUTO: 67.8 % (ref 42.7–76)
NITRITE UR QL STRIP: NEGATIVE
NRBC BLD AUTO-RTO: 0 /100 WBC (ref 0–0.2)
NT-PROBNP SERPL-MCNC: 4259 PG/ML (ref 0–1800)
PH UR STRIP.AUTO: <=5 [PH] (ref 5–8)
PLATELET # BLD AUTO: 237 10*3/MM3 (ref 140–450)
PMV BLD AUTO: 11.1 FL (ref 6–12)
POTASSIUM SERPL-SCNC: 4.6 MMOL/L (ref 3.5–5.2)
PROT UR QL STRIP: ABNORMAL
RBC # BLD AUTO: 4.52 10*6/MM3 (ref 4.14–5.8)
RBC # UR STRIP: NORMAL /HPF
REF LAB TEST METHOD: NORMAL
RHINOVIRUS RNA SPEC NAA+PROBE: NOT DETECTED
RSV RNA NPH QL NAA+NON-PROBE: NOT DETECTED
SARS-COV-2 RNA RESP QL NAA+PROBE: NOT DETECTED
SODIUM SERPL-SCNC: 138 MMOL/L (ref 136–145)
SP GR UR STRIP: 1.02 (ref 1–1.03)
SQUAMOUS #/AREA URNS HPF: NORMAL /HPF
UROBILINOGEN UR QL STRIP: ABNORMAL
WBC # UR STRIP: NORMAL /HPF
WBC NRBC COR # BLD AUTO: 6.04 10*3/MM3 (ref 3.4–10.8)

## 2025-01-25 PROCEDURE — G0378 HOSPITAL OBSERVATION PER HR: HCPCS

## 2025-01-25 PROCEDURE — 71045 X-RAY EXAM CHEST 1 VIEW: CPT

## 2025-01-25 PROCEDURE — 25010000002 FUROSEMIDE PER 20 MG: Performed by: OCCUPATIONAL THERAPIST

## 2025-01-25 PROCEDURE — 80048 BASIC METABOLIC PNL TOTAL CA: CPT | Performed by: OCCUPATIONAL THERAPIST

## 2025-01-25 PROCEDURE — 96374 THER/PROPH/DIAG INJ IV PUSH: CPT

## 2025-01-25 PROCEDURE — 81001 URINALYSIS AUTO W/SCOPE: CPT | Performed by: OCCUPATIONAL THERAPIST

## 2025-01-25 PROCEDURE — 85025 COMPLETE CBC W/AUTO DIFF WBC: CPT | Performed by: OCCUPATIONAL THERAPIST

## 2025-01-25 PROCEDURE — 36415 COLL VENOUS BLD VENIPUNCTURE: CPT

## 2025-01-25 PROCEDURE — 99285 EMERGENCY DEPT VISIT HI MDM: CPT

## 2025-01-25 PROCEDURE — 83880 ASSAY OF NATRIURETIC PEPTIDE: CPT | Performed by: OCCUPATIONAL THERAPIST

## 2025-01-25 PROCEDURE — 0202U NFCT DS 22 TRGT SARS-COV-2: CPT | Performed by: OCCUPATIONAL THERAPIST

## 2025-01-25 RX ORDER — ONDANSETRON 4 MG/1
4 TABLET, ORALLY DISINTEGRATING ORAL EVERY 6 HOURS PRN
Status: DISCONTINUED | OUTPATIENT
Start: 2025-01-25 | End: 2025-01-26 | Stop reason: HOSPADM

## 2025-01-25 RX ORDER — BISACODYL 5 MG/1
5 TABLET, DELAYED RELEASE ORAL DAILY PRN
Status: DISCONTINUED | OUTPATIENT
Start: 2025-01-25 | End: 2025-01-26 | Stop reason: HOSPADM

## 2025-01-25 RX ORDER — ACETAMINOPHEN 325 MG/1
650 TABLET ORAL EVERY 4 HOURS PRN
Status: DISCONTINUED | OUTPATIENT
Start: 2025-01-25 | End: 2025-01-26 | Stop reason: HOSPADM

## 2025-01-25 RX ORDER — SODIUM CHLORIDE 9 MG/ML
40 INJECTION, SOLUTION INTRAVENOUS AS NEEDED
Status: DISCONTINUED | OUTPATIENT
Start: 2025-01-25 | End: 2025-01-26 | Stop reason: HOSPADM

## 2025-01-25 RX ORDER — ACETAMINOPHEN 160 MG/5ML
650 SOLUTION ORAL EVERY 4 HOURS PRN
Status: DISCONTINUED | OUTPATIENT
Start: 2025-01-25 | End: 2025-01-26 | Stop reason: HOSPADM

## 2025-01-25 RX ORDER — ACETAMINOPHEN 650 MG/1
650 SUPPOSITORY RECTAL EVERY 4 HOURS PRN
Status: DISCONTINUED | OUTPATIENT
Start: 2025-01-25 | End: 2025-01-26 | Stop reason: HOSPADM

## 2025-01-25 RX ORDER — CALCIUM CARBONATE 500 MG/1
2 TABLET, CHEWABLE ORAL 2 TIMES DAILY PRN
Status: DISCONTINUED | OUTPATIENT
Start: 2025-01-25 | End: 2025-01-26 | Stop reason: HOSPADM

## 2025-01-25 RX ORDER — HYDROCODONE BITARTRATE AND ACETAMINOPHEN 5; 325 MG/1; MG/1
1 TABLET ORAL EVERY 6 HOURS PRN
Status: DISCONTINUED | OUTPATIENT
Start: 2025-01-25 | End: 2025-01-26 | Stop reason: HOSPADM

## 2025-01-25 RX ORDER — AMOXICILLIN 250 MG
2 CAPSULE ORAL 2 TIMES DAILY PRN
Status: DISCONTINUED | OUTPATIENT
Start: 2025-01-25 | End: 2025-01-26 | Stop reason: HOSPADM

## 2025-01-25 RX ORDER — SODIUM CHLORIDE 0.9 % (FLUSH) 0.9 %
10 SYRINGE (ML) INJECTION AS NEEDED
Status: DISCONTINUED | OUTPATIENT
Start: 2025-01-25 | End: 2025-01-26 | Stop reason: HOSPADM

## 2025-01-25 RX ORDER — FUROSEMIDE 10 MG/ML
40 INJECTION INTRAMUSCULAR; INTRAVENOUS 2 TIMES DAILY
Status: DISCONTINUED | OUTPATIENT
Start: 2025-01-26 | End: 2025-01-26 | Stop reason: HOSPADM

## 2025-01-25 RX ORDER — FUROSEMIDE 10 MG/ML
80 INJECTION INTRAMUSCULAR; INTRAVENOUS ONCE
Status: COMPLETED | OUTPATIENT
Start: 2025-01-25 | End: 2025-01-25

## 2025-01-25 RX ORDER — SODIUM CHLORIDE 0.9 % (FLUSH) 0.9 %
10 SYRINGE (ML) INJECTION EVERY 12 HOURS SCHEDULED
Status: DISCONTINUED | OUTPATIENT
Start: 2025-01-25 | End: 2025-01-26 | Stop reason: HOSPADM

## 2025-01-25 RX ORDER — ONDANSETRON 2 MG/ML
4 INJECTION INTRAMUSCULAR; INTRAVENOUS EVERY 6 HOURS PRN
Status: DISCONTINUED | OUTPATIENT
Start: 2025-01-25 | End: 2025-01-26 | Stop reason: HOSPADM

## 2025-01-25 RX ORDER — BISACODYL 10 MG
10 SUPPOSITORY, RECTAL RECTAL DAILY PRN
Status: DISCONTINUED | OUTPATIENT
Start: 2025-01-25 | End: 2025-01-26 | Stop reason: HOSPADM

## 2025-01-25 RX ORDER — POLYETHYLENE GLYCOL 3350 17 G/17G
17 POWDER, FOR SOLUTION ORAL DAILY PRN
Status: DISCONTINUED | OUTPATIENT
Start: 2025-01-25 | End: 2025-01-26 | Stop reason: HOSPADM

## 2025-01-25 RX ADMIN — FUROSEMIDE 80 MG: 10 INJECTION, SOLUTION INTRAMUSCULAR; INTRAVENOUS at 17:55

## 2025-01-25 NOTE — ED PROVIDER NOTES
Subjective   History of Present Illness  Patient is a 79-year-old male with past medical history significant for diabetes and hypertension presenting with cough and congestion x 3 to 4 days.  He reports that he has not had any fever or bodyaches.  Cough and congestion worse, and he is having more trouble breathing when he is lying down.  He denies any change in peripheral edema.  He does not think he has a history of heart problems.  Abdominal pain, nausea, vomiting, diarrhea, hematochezia, hematuria, dysuria, headache, sore throat or ear pain.  Patient reports that he has had nonproductive cough and has been having to clear his throat a lot.  He was transported from [facility]        Review of Systems   Constitutional:  Negative for fever.       Past Medical History:   Diagnosis Date    Anemia     Colon cancer 2022    colon    Diabetes mellitus     Hyperlipidemia     Hypertension     LBBB (left bundle branch block) 11/14/2024       Allergies   Allergen Reactions    Penicillins Rash       Past Surgical History:   Procedure Laterality Date    APPENDECTOMY      CARDIAC CATHETERIZATION      CARDIAC CATHETERIZATION N/A 11/14/2024    Procedure: Left Heart Cath, possible pci;  Surgeon: Mg Rahman MD;  Location: [facility] CATH INVASIVE LOCATION;  Service: Cardiovascular;  Laterality: N/A;    COLON RESECTION N/A 12/15/2022    Procedure: COLON RESECTION RIGHT;  Surgeon: Percy Davis MD;  Location: [facility] MAIN OR;  Service: General;  Laterality: N/A;    COLONOSCOPY N/A 11/11/2022    Procedure: COLONOSCOPY WITH BIOPSY AND POLYPECTOMY;  Surgeon: Billy Julien MD;  Location: [facility] ENDOSCOPY;  Service: Gastroenterology;  Laterality: N/A;  Impression:  1.  Large 4-5cm fulgurating circumferential ulcerated mass in the very proximal part of the ascending colon next to ileocecal valve multiple biopsies were performed.  This is highly concerning for colon malignancy.  2.  2 polyp rem    ENDOSCOPY  N/A 2022    Procedure: ESOPHAGOGASTRODUODENOSCOPY with biopsy X1;  Surgeon: Billy Julien MD;  Location: [facility] ENDOSCOPY;  Service: Gastroenterology;  Laterality: N/A;  5.  Upper endoscopy lamination unremarkable.       PORTACATH PLACEMENT Right 2023    Procedure: INSERTION OF PORTACATH;  Surgeon: Percy Davis MD;  Location: [facility] MAIN OR;  Service: General;  Laterality: Right;    TONSILLECTOMY         Family History   Problem Relation Age of Onset    Pneumonia Mother 94        SARS-CoV2    Colon cancer Father 62    Heart disease Sister        Social History     Socioeconomic History    Marital status:    Tobacco Use    Smoking status: Former     Current packs/day: 0.00     Average packs/day: 1 pack/day for 2.0 years (2.0 ttl pk-yrs)     Types: Cigarettes     Start date:      Quit date:      Years since quittin.1    Smokeless tobacco: Never   Vaping Use    Vaping status: Never Used   Substance and Sexual Activity    Alcohol use: Never    Drug use: Never    Sexual activity: Defer           Objective   Physical Exam  Vitals and nursing note reviewed.   Constitutional:       General: He is not in acute distress.     Appearance: Normal appearance. He is normal weight. He is not ill-appearing, toxic-appearing or diaphoretic.   HENT:      Head: Normocephalic and atraumatic.      Right Ear: External ear normal.      Left Ear: External ear normal.      Nose: Congestion and rhinorrhea present.      Mouth/Throat:      Mouth: Mucous membranes are moist.      Pharynx: Oropharynx is clear.   Eyes:      General:         Right eye: No discharge.         Left eye: No discharge.      Extraocular Movements: Extraocular movements intact.      Pupils: Pupils are equal, round, and reactive to light.   Cardiovascular:      Rate and Rhythm: Normal rate and regular rhythm.      Pulses: Normal pulses.      Heart sounds: Normal heart sounds. No murmur heard.     No friction rub. No gallop.    Pulmonary:      Effort: Pulmonary effort is normal. No respiratory distress.      Breath sounds: No stridor. Examination of the left-lower field reveals decreased breath sounds. Decreased breath sounds present. No wheezing, rhonchi or rales.   Abdominal:      General: Abdomen is flat. There is no distension.      Palpations: Abdomen is soft.      Tenderness: There is no abdominal tenderness.   Musculoskeletal:         General: Swelling present. No tenderness, deformity or signs of injury. Normal range of motion.      Cervical back: Normal range of motion and neck supple. No rigidity or tenderness.      Right lower leg: Edema present.      Left lower leg: Edema present.   Lymphadenopathy:      Cervical: No cervical adenopathy.   Skin:     General: Skin is warm and dry.      Capillary Refill: Capillary refill takes less than 2 seconds.      Coloration: Skin is not jaundiced.      Findings: No bruising, erythema, lesion or rash.   Neurological:      General: No focal deficit present.      Mental Status: He is alert.   Psychiatric:         Mood and Affect: Mood normal.         Behavior: Behavior normal.         Procedures           ED Course      Labs Reviewed   BASIC METABOLIC PANEL - Abnormal; Notable for the following components:       Result Value    Glucose 218 (*)     All other components within normal limits    Narrative:     GFR Categories in Chronic Kidney Disease (CKD)      GFR Category          GFR (mL/min/1.73)    Interpretation  G1                     90 or greater         Normal or high (1)  G2                      60-89                Mild decrease (1)  G3a                   45-59                Mild to moderate decrease  G3b                   30-44                Moderate to severe decrease  G4                    15-29                Severe decrease  G5                    14 or less           Kidney failure          (1)In the absence of evidence of kidney disease, neither GFR category G1 or G2 fulfill  the criteria for CKD.    eGFR calculation 2021 CKD-EPI creatinine equation, which does not include race as a factor   BNP (IN-HOUSE) - Abnormal; Notable for the following components:    proBNP 4,259.0 (*)     All other components within normal limits    Narrative:     This assay is used as an aid in the diagnosis of individuals suspected of having heart failure. It can be used as an aid in the diagnosis of acute decompensated heart failure (ADHF) in patients presenting with signs and symptoms of ADHF to the emergency department (ED). In addition, NT-proBNP of <300 pg/mL indicates ADHF is not likely.    Age Range Result Interpretation  NT-proBNP Concentration (pg/mL:      <50             Positive            >450                   Gray                 300-450                    Negative             <300    50-75           Positive            >900                  Gray                300-900                  Negative            <300      >75             Positive            >1800                  Gray                300-1800                  Negative            <300   CBC WITH AUTO DIFFERENTIAL - Abnormal; Notable for the following components:    Hemoglobin 12.8 (*)     MCHC 31.0 (*)     Lymphocyte % 19.5 (*)     All other components within normal limits   URINALYSIS W/ CULTURE IF INDICATED - Abnormal; Notable for the following components:    Glucose,  mg/dL (2+) (*)     Protein,  mg/dL (2+) (*)     All other components within normal limits    Narrative:     In absence of clinical symptoms, the presence of pyuria, bacteria, and/or nitrites on the urinalysis result does not correlate with infection.   RESPIRATORY PANEL PCR W/ COVID-19 (SARS-COV-2), NP SWAB IN UTM/VTP, 2 HR TAT - Normal    Narrative:     In the setting of a positive respiratory panel with a viral infection PLUS a negative procalcitonin without other underlying concern for bacterial infection, consider observing off antibiotics or  discontinuation of antibiotics and continue supportive care. If the respiratory panel is positive for atypical bacterial infection (Bordetella pertussis, Chlamydophila pneumoniae, or Mycoplasma pneumoniae), consider antibiotic de-escalation to target atypical bacterial infection.   URINALYSIS, MICROSCOPIC ONLY   CBC AND DIFFERENTIAL    Narrative:     The following orders were created for panel order CBC & Differential.  Procedure                               Abnormality         Status                     ---------                               -----------         ------                     CBC Auto Differential[[id number]]        Abnormal            Final result                 Please view results for these tests on the individual orders.   EXTRA TUBES    Narrative:     The following orders were created for panel order Extra Tubes.  Procedure                               Abnormality         Status                     ---------                               -----------         ------                     Gold Top - SST[[id number]]                                   Final result                 Please view results for these tests on the individual orders.   GOLD TOP - SST     XR Chest 1 View    Result Date: 1/25/2025  1.Ill-defined multifocal airspace infiltrates bilaterally with diffuse interstitial changes. Bilateral pleural effusions are also noted. The findings suggest developing atypical/viral infection or multifocal pneumonia. Changes of CHF and pulmonary edema could also have this appearance. Recommend correlation for signs or symptoms of acute infection and follow-up to ensure improvement/resolution. Electronically Signed: Domenico Escalante MD  1/25/2025 4:09 PM EST  Workstation ID: [id number]   Medications   sodium chloride 0.9 % flush 10 mL (has no administration in time range)   sodium chloride 0.9 % flush 10 mL (has no administration in time range)   sodium chloride 0.9 % flush 10 mL (has no administration in  time range)   sodium chloride 0.9 % infusion 40 mL (has no administration in time range)   Potassium Replacement - Follow Nurse / BPA Driven Protocol (has no administration in time range)   Magnesium Standard Dose Replacement - Follow Nurse / BPA Driven Protocol (has no administration in time range)   Phosphorus Replacement - Follow Nurse / BPA Driven Protocol (has no administration in time range)   Calcium Replacement - Follow Nurse / BPA Driven Protocol (has no administration in time range)   acetaminophen (TYLENOL) tablet 650 mg (has no administration in time range)     Or   acetaminophen (TYLENOL) 160 MG/5ML oral solution 650 mg (has no administration in time range)     Or   acetaminophen (TYLENOL) suppository 650 mg (has no administration in time range)   HYDROcodone-acetaminophen (NORCO) 5-325 MG per tablet 1 tablet (has no administration in time range)   sennosides-docusate (PERICOLACE) 8.6-50 MG per tablet 2 tablet (has no administration in time range)     And   polyethylene glycol (MIRALAX) packet 17 g (has no administration in time range)     And   bisacodyl (DULCOLAX) EC tablet 5 mg (has no administration in time range)     And   bisacodyl (DULCOLAX) suppository 10 mg (has no administration in time range)   ondansetron ODT (ZOFRAN-ODT) disintegrating tablet 4 mg (has no administration in time range)     Or   ondansetron (ZOFRAN) injection 4 mg (has no administration in time range)   calcium carbonate (TUMS) chewable tablet 500 mg (200 mg elemental) (has no administration in time range)   furosemide (LASIX) injection 40 mg (has no administration in time range)   furosemide (LASIX) injection 80 mg (80 mg Intravenous Given 1/25/25 1755)                                                        Medical Decision Making  Patient is a 79-year-old male presenting with cough and congestion.  He had the above evaluation and exam.    Imaging independently interpreted by radiology and reviewed by myself.  X-ray  suggested multifocal airspace infiltrates that could be developing pneumonia versus CHF.  Patient's physical exam and subjective report are more consistent with CHF at this time.  Patient had negative UA, BMP, respiratory panel, and CBC.  His BNP was 4259.    At reassessment, patient is resting comfortably in no acute distress.  He is hemodynamically stable and afebrile.  Discussed options with patient, and he is very reluctant to stay in the hospital because he has plans later this week.  Discussed the importance of at least staying for some diuresis and cardiology consult, to which he was amenable.  Patient was given Lasix in the ED and placed in observation status.    Amount and/or Complexity of Data Reviewed  Labs: ordered.  Radiology: ordered.    Risk  OTC drugs.  Prescription drug management.  Decision regarding hospitalization.        Final diagnoses:   Congestive heart failure, unspecified HF chronicity, unspecified heart failure type   Orthopnea       ED Disposition  ED Disposition       ED Disposition   Decision to Admit    Condition   --    Comment   --               No follow-up provider specified.       Medication List      No changes were made to your prescriptions during this visit.            Nikki Johnson PA-C  01/25/25 2255

## 2025-01-26 ENCOUNTER — READMISSION MANAGEMENT (OUTPATIENT)
Dept: CALL CENTER | Facility: HOSPITAL | Age: 80
End: 2025-01-26
Payer: OTHER GOVERNMENT

## 2025-01-26 ENCOUNTER — APPOINTMENT (OUTPATIENT)
Dept: CARDIOLOGY | Facility: HOSPITAL | Age: 80
End: 2025-01-26
Payer: OTHER GOVERNMENT

## 2025-01-26 VITALS
SYSTOLIC BLOOD PRESSURE: 134 MMHG | HEART RATE: 92 BPM | OXYGEN SATURATION: 94 % | TEMPERATURE: 98 F | DIASTOLIC BLOOD PRESSURE: 87 MMHG | BODY MASS INDEX: 25.06 KG/M2 | WEIGHT: 185 LBS | HEIGHT: 72 IN | RESPIRATION RATE: 16 BRPM

## 2025-01-26 LAB
ANION GAP SERPL CALCULATED.3IONS-SCNC: 10.3 MMOL/L (ref 5–15)
AORTIC DIMENSIONLESS INDEX: 0.36 (DI)
AV MEAN PRESS GRAD SYS DOP V1V2: 5 MMHG
AV VMAX SYS DOP: 138 CM/SEC
BASOPHILS # BLD AUTO: 0.04 10*3/MM3 (ref 0–0.2)
BASOPHILS NFR BLD AUTO: 0.6 % (ref 0–1.5)
BH CV ECHO LEFT VENTRICLE GLOBAL LONGITUDINAL STRAIN: -5.4 %
BH CV ECHO MEAS - ACS: 1.6 CM
BH CV ECHO MEAS - AO MAX PG: 7.6 MMHG
BH CV ECHO MEAS - AO ROOT DIAM: 3.4 CM
BH CV ECHO MEAS - AO V2 VTI: 21.7 CM
BH CV ECHO MEAS - AVA(I,D): 1.55 CM2
BH CV ECHO MEAS - EDV(CUBED): 300.8 ML
BH CV ECHO MEAS - EDV(MOD-SP2): 209 ML
BH CV ECHO MEAS - EDV(MOD-SP4): 176 ML
BH CV ECHO MEAS - EF(MOD-SP2): 24.4 %
BH CV ECHO MEAS - EF(MOD-SP4): 27.8 %
BH CV ECHO MEAS - ESV(CUBED): 205.4 ML
BH CV ECHO MEAS - ESV(MOD-SP2): 158 ML
BH CV ECHO MEAS - ESV(MOD-SP4): 127 ML
BH CV ECHO MEAS - FS: 11.9 %
BH CV ECHO MEAS - IVS/LVPW: 1.11 CM
BH CV ECHO MEAS - IVSD: 1 CM
BH CV ECHO MEAS - LA DIMENSION: 3.9 CM
BH CV ECHO MEAS - LAT PEAK E' VEL: 13.7 CM/SEC
BH CV ECHO MEAS - LV DIASTOLIC VOL/BSA (35-75): 85.4 CM2
BH CV ECHO MEAS - LV MASS(C)D: 279.6 GRAMS
BH CV ECHO MEAS - LV MAX PG: 1.31 MMHG
BH CV ECHO MEAS - LV MEAN PG: 1 MMHG
BH CV ECHO MEAS - LV SYSTOLIC VOL/BSA (12-30): 61.6 CM2
BH CV ECHO MEAS - LV V1 MAX: 57.3 CM/SEC
BH CV ECHO MEAS - LV V1 VTI: 9.7 CM
BH CV ECHO MEAS - LVIDD: 6.7 CM
BH CV ECHO MEAS - LVIDS: 5.9 CM
BH CV ECHO MEAS - LVOT AREA: 3.5 CM2
BH CV ECHO MEAS - LVOT DIAM: 2.1 CM
BH CV ECHO MEAS - LVPWD: 0.9 CM
BH CV ECHO MEAS - MED PEAK E' VEL: 4.5 CM/SEC
BH CV ECHO MEAS - MR MAX PG: 77.8 MMHG
BH CV ECHO MEAS - MR MAX VEL: 441 CM/SEC
BH CV ECHO MEAS - MR MEAN PG: 43 MMHG
BH CV ECHO MEAS - MR MEAN VEL: 318 CM/SEC
BH CV ECHO MEAS - MR VTI: 128 CM
BH CV ECHO MEAS - MV DEC SLOPE: 794 CM/SEC2
BH CV ECHO MEAS - MV DEC TIME: 0.12 SEC
BH CV ECHO MEAS - MV E MAX VEL: 117.5 CM/SEC
BH CV ECHO MEAS - MV MAX PG: 7.5 MMHG
BH CV ECHO MEAS - MV MEAN PG: 3 MMHG
BH CV ECHO MEAS - MV P1/2T: 54.6 MSEC
BH CV ECHO MEAS - MV V2 VTI: 25.1 CM
BH CV ECHO MEAS - MVA(P1/2T): 4 CM2
BH CV ECHO MEAS - MVA(VTI): 1.34 CM2
BH CV ECHO MEAS - PA ACC TIME: 0.02 SEC
BH CV ECHO MEAS - PA V2 MAX: 60.6 CM/SEC
BH CV ECHO MEAS - QP/QS: 1.36
BH CV ECHO MEAS - RV MAX PG: 0.74 MMHG
BH CV ECHO MEAS - RV V1 MAX: 43.1 CM/SEC
BH CV ECHO MEAS - RV V1 VTI: 6.5 CM
BH CV ECHO MEAS - RVDD: 2.7 CM
BH CV ECHO MEAS - RVOT DIAM: 3 CM
BH CV ECHO MEAS - SV(LVOT): 33.7 ML
BH CV ECHO MEAS - SV(MOD-SP2): 51 ML
BH CV ECHO MEAS - SV(MOD-SP4): 49 ML
BH CV ECHO MEAS - SV(RVOT): 45.9 ML
BH CV ECHO MEAS - SVI(LVOT): 16.4 ML/M2
BH CV ECHO MEAS - SVI(MOD-SP2): 24.7 ML/M2
BH CV ECHO MEAS - SVI(MOD-SP4): 23.8 ML/M2
BH CV ECHO MEAS - TAPSE (>1.6): 1.88 CM
BH CV ECHO MEAS - TR MAX PG: 18.3 MMHG
BH CV ECHO MEAS - TR MAX VEL: 214 CM/SEC
BH CV ECHO MEASUREMENTS AVERAGE E/E' RATIO: 12.91
BH CV XLRA - RV BASE: 4.5 CM
BH CV XLRA - RV MID: 2.8 CM
BH CV XLRA - TDI S': 10.8 CM/SEC
BUN SERPL-MCNC: 18 MG/DL (ref 8–23)
BUN/CREAT SERPL: 18.8 (ref 7–25)
CALCIUM SPEC-SCNC: 8.4 MG/DL (ref 8.6–10.5)
CHLORIDE SERPL-SCNC: 99 MMOL/L (ref 98–107)
CO2 SERPL-SCNC: 25.7 MMOL/L (ref 22–29)
CREAT SERPL-MCNC: 0.96 MG/DL (ref 0.76–1.27)
DEPRECATED RDW RBC AUTO: 44 FL (ref 37–54)
EGFRCR SERPLBLD CKD-EPI 2021: 81.4 ML/MIN/1.73
EOSINOPHIL # BLD AUTO: 0.05 10*3/MM3 (ref 0–0.4)
EOSINOPHIL NFR BLD AUTO: 0.8 % (ref 0.3–6.2)
ERYTHROCYTE [DISTWIDTH] IN BLOOD BY AUTOMATED COUNT: 13.6 % (ref 12.3–15.4)
GLUCOSE BLDC GLUCOMTR-MCNC: 309 MG/DL (ref 70–105)
GLUCOSE SERPL-MCNC: 297 MG/DL (ref 65–99)
HBA1C MFR BLD: 9.79 % (ref 4.8–5.6)
HCT VFR BLD AUTO: 39.6 % (ref 37.5–51)
HGB BLD-MCNC: 12.7 G/DL (ref 13–17.7)
IMM GRANULOCYTES # BLD AUTO: 0.02 10*3/MM3 (ref 0–0.05)
IMM GRANULOCYTES NFR BLD AUTO: 0.3 % (ref 0–0.5)
LEFT ATRIUM VOLUME INDEX: 37.5 ML/M2
LV EF 3D SEGMENTATION: 27 %
LV EF BIPLANE MOD: 26 %
LYMPHOCYTES # BLD AUTO: 1.33 10*3/MM3 (ref 0.7–3.1)
LYMPHOCYTES NFR BLD AUTO: 21.2 % (ref 19.6–45.3)
MCH RBC QN AUTO: 28.3 PG (ref 26.6–33)
MCHC RBC AUTO-ENTMCNC: 32.1 G/DL (ref 31.5–35.7)
MCV RBC AUTO: 88.4 FL (ref 79–97)
MONOCYTES # BLD AUTO: 0.67 10*3/MM3 (ref 0.1–0.9)
MONOCYTES NFR BLD AUTO: 10.7 % (ref 5–12)
NEUTROPHILS NFR BLD AUTO: 4.17 10*3/MM3 (ref 1.7–7)
NEUTROPHILS NFR BLD AUTO: 66.4 % (ref 42.7–76)
NRBC BLD AUTO-RTO: 0 /100 WBC (ref 0–0.2)
NT-PROBNP SERPL-MCNC: 5244 PG/ML (ref 0–1800)
PLATELET # BLD AUTO: 151 10*3/MM3 (ref 140–450)
PMV BLD AUTO: 9.7 FL (ref 6–12)
POTASSIUM SERPL-SCNC: 4 MMOL/L (ref 3.5–5.2)
RBC # BLD AUTO: 4.48 10*6/MM3 (ref 4.14–5.8)
SINUS: 3.5 CM
SODIUM SERPL-SCNC: 135 MMOL/L (ref 136–145)
STJ: 2.5 CM
WBC NRBC COR # BLD AUTO: 6.28 10*3/MM3 (ref 3.4–10.8)

## 2025-01-26 PROCEDURE — G0378 HOSPITAL OBSERVATION PER HR: HCPCS

## 2025-01-26 PROCEDURE — 93306 TTE W/DOPPLER COMPLETE: CPT | Performed by: INTERNAL MEDICINE

## 2025-01-26 PROCEDURE — 85025 COMPLETE CBC W/AUTO DIFF WBC: CPT | Performed by: OCCUPATIONAL THERAPIST

## 2025-01-26 PROCEDURE — 83036 HEMOGLOBIN GLYCOSYLATED A1C: CPT | Performed by: PHYSICIAN ASSISTANT

## 2025-01-26 PROCEDURE — 80048 BASIC METABOLIC PNL TOTAL CA: CPT | Performed by: OCCUPATIONAL THERAPIST

## 2025-01-26 PROCEDURE — 63710000001 INSULIN LISPRO (HUMAN) PER 5 UNITS: Performed by: PHYSICIAN ASSISTANT

## 2025-01-26 PROCEDURE — 93306 TTE W/DOPPLER COMPLETE: CPT

## 2025-01-26 PROCEDURE — 96376 TX/PRO/DX INJ SAME DRUG ADON: CPT

## 2025-01-26 PROCEDURE — 25010000002 FUROSEMIDE PER 20 MG: Performed by: OCCUPATIONAL THERAPIST

## 2025-01-26 PROCEDURE — 82948 REAGENT STRIP/BLOOD GLUCOSE: CPT | Performed by: PHYSICIAN ASSISTANT

## 2025-01-26 PROCEDURE — 93356 MYOCRD STRAIN IMG SPCKL TRCK: CPT

## 2025-01-26 PROCEDURE — 99215 OFFICE O/P EST HI 40 MIN: CPT | Performed by: INTERNAL MEDICINE

## 2025-01-26 PROCEDURE — 83880 ASSAY OF NATRIURETIC PEPTIDE: CPT | Performed by: OCCUPATIONAL THERAPIST

## 2025-01-26 PROCEDURE — 93356 MYOCRD STRAIN IMG SPCKL TRCK: CPT | Performed by: INTERNAL MEDICINE

## 2025-01-26 RX ORDER — GABAPENTIN 100 MG/1
100 CAPSULE ORAL EVERY 12 HOURS SCHEDULED
Status: DISCONTINUED | OUTPATIENT
Start: 2025-01-26 | End: 2025-01-26 | Stop reason: HOSPADM

## 2025-01-26 RX ORDER — ATORVASTATIN CALCIUM 40 MG/1
40 TABLET, FILM COATED ORAL DAILY
COMMUNITY

## 2025-01-26 RX ORDER — INSULIN GLARGINE 100 [IU]/ML
10 INJECTION, SOLUTION SUBCUTANEOUS NIGHTLY
Status: DISCONTINUED | OUTPATIENT
Start: 2025-01-26 | End: 2025-01-26 | Stop reason: HOSPADM

## 2025-01-26 RX ORDER — FERROUS GLUCONATE 324(38)MG
324 TABLET ORAL
COMMUNITY

## 2025-01-26 RX ORDER — INSULIN LISPRO 100 [IU]/ML
2-9 INJECTION, SOLUTION INTRAVENOUS; SUBCUTANEOUS
Status: DISCONTINUED | OUTPATIENT
Start: 2025-01-26 | End: 2025-01-26 | Stop reason: HOSPADM

## 2025-01-26 RX ORDER — ASPIRIN 81 MG/1
81 TABLET ORAL DAILY
Status: DISCONTINUED | OUTPATIENT
Start: 2025-01-26 | End: 2025-01-26 | Stop reason: HOSPADM

## 2025-01-26 RX ORDER — LOSARTAN POTASSIUM 100 MG/1
100 TABLET ORAL DAILY
COMMUNITY

## 2025-01-26 RX ORDER — ATORVASTATIN CALCIUM 40 MG/1
40 TABLET, FILM COATED ORAL DAILY
Status: DISCONTINUED | OUTPATIENT
Start: 2025-01-26 | End: 2025-01-26 | Stop reason: HOSPADM

## 2025-01-26 RX ORDER — DEXTROSE MONOHYDRATE 25 G/50ML
25 INJECTION, SOLUTION INTRAVENOUS
Status: DISCONTINUED | OUTPATIENT
Start: 2025-01-26 | End: 2025-01-26 | Stop reason: HOSPADM

## 2025-01-26 RX ORDER — MIDODRINE HYDROCHLORIDE 5 MG/1
10 TABLET ORAL
Status: DISCONTINUED | OUTPATIENT
Start: 2025-01-26 | End: 2025-01-26 | Stop reason: HOSPADM

## 2025-01-26 RX ORDER — IBUPROFEN 600 MG/1
1 TABLET ORAL
Status: DISCONTINUED | OUTPATIENT
Start: 2025-01-26 | End: 2025-01-26 | Stop reason: HOSPADM

## 2025-01-26 RX ORDER — INSULIN GLARGINE 100 [IU]/ML
20 INJECTION, SOLUTION SUBCUTANEOUS NIGHTLY
COMMUNITY

## 2025-01-26 RX ORDER — NICOTINE POLACRILEX 4 MG
15 LOZENGE BUCCAL
Status: DISCONTINUED | OUTPATIENT
Start: 2025-01-26 | End: 2025-01-26 | Stop reason: HOSPADM

## 2025-01-26 RX ORDER — ONDANSETRON 8 MG/1
8 TABLET, ORALLY DISINTEGRATING ORAL EVERY 8 HOURS PRN
COMMUNITY

## 2025-01-26 RX ORDER — FERROUS SULFATE 324(65)MG
324 TABLET, DELAYED RELEASE (ENTERIC COATED) ORAL
Status: DISCONTINUED | OUTPATIENT
Start: 2025-01-27 | End: 2025-01-26 | Stop reason: HOSPADM

## 2025-01-26 RX ORDER — LOSARTAN POTASSIUM 50 MG/1
100 TABLET ORAL DAILY
Status: DISCONTINUED | OUTPATIENT
Start: 2025-01-26 | End: 2025-01-26 | Stop reason: HOSPADM

## 2025-01-26 RX ORDER — METOPROLOL SUCCINATE 25 MG/1
25 TABLET, EXTENDED RELEASE ORAL
Status: DISCONTINUED | OUTPATIENT
Start: 2025-01-26 | End: 2025-01-26 | Stop reason: HOSPADM

## 2025-01-26 RX ADMIN — FUROSEMIDE 40 MG: 10 INJECTION, SOLUTION INTRAMUSCULAR; INTRAVENOUS at 12:41

## 2025-01-26 RX ADMIN — Medication 5 MG: at 01:00

## 2025-01-26 RX ADMIN — Medication 10 ML: at 01:01

## 2025-01-26 RX ADMIN — INSULIN LISPRO 7 UNITS: 100 INJECTION, SOLUTION INTRAVENOUS; SUBCUTANEOUS at 12:41

## 2025-01-26 RX ADMIN — METOPROLOL SUCCINATE 25 MG: 25 TABLET, EXTENDED RELEASE ORAL at 12:41

## 2025-01-26 NOTE — CONSULTS
Referring Provider: Preston Ch MD  Reason for Consultation: CHF exacerbation    Patient Care Team:  Vlad Moreland MD as PCP - General (Internal Medicine)  Percy Davis MD as Surgeon (General Surgery)  Don Kramer MD as Consulting Physician (Hematology and Oncology)    Chief complaint shortness of breath    Subjective .     History of present illness:  Vlad Guerrero is a 79 y.o. male with a history of diabetes, hypertension, HFrEF, CAD status post PCI, malignant neoplasm of ascending colon who presents with progressively worsening shortness of breath over 1 week.  Patient denies chest pain, edema, palpitations, lightheadedness/dizziness, syncope, fever, chills.  Patient is unaware of his prior heart failure diagnosis.  He was admitted to [facility] in November and underwent echocardiogram which showed reduced LVEF of 21 to 25%, moderately dilated LV and moderate mitral valve regurgitation.  Cardiac catheterization at that time showed patent stent to LAD, left circumflex, RCA.  He was discharged home on limited GDMT due to hypotension requiring midodrine.  The plan was to repeat echocardiogram 3 months outpatient, but patient has not followed with cardiology.       ROS  + Dyspnea on exertion, cough    Since I have last seen, the patient has been without any chest discomfort, palpitations, dizziness or syncope.  Denies having any headache, abdominal pain, nausea, vomiting, diarrhea, constipation, loss of weight or loss of appetite.  Denies having any excessive bruising, hematuria or blood in the stool.    Review of all systems negative except as indicated      History  Past Medical History:   Diagnosis Date    Anemia     Colon cancer 2022    colon    Diabetes mellitus     Hyperlipidemia     Hypertension     LBBB (left bundle branch block) 11/14/2024       Past Surgical History:   Procedure Laterality Date    APPENDECTOMY      CARDIAC CATHETERIZATION      CARDIAC CATHETERIZATION N/A 11/14/2024     Procedure: Left Heart Cath, possible pci;  Surgeon: Mg Rahman MD;  Location: [facility] CATH INVASIVE LOCATION;  Service: Cardiovascular;  Laterality: N/A;    COLON RESECTION N/A 12/15/2022    Procedure: COLON RESECTION RIGHT;  Surgeon: Percy Davis MD;  Location: [facility] MAIN OR;  Service: General;  Laterality: N/A;    COLONOSCOPY N/A 2022    Procedure: COLONOSCOPY WITH BIOPSY AND POLYPECTOMY;  Surgeon: Billy Julien MD;  Location: [facility] ENDOSCOPY;  Service: Gastroenterology;  Laterality: N/A;  Impression:  1.  Large 4-5cm fulgurating circumferential ulcerated mass in the very proximal part of the ascending colon next to ileocecal valve multiple biopsies were performed.  This is highly concerning for colon malignancy.  2.  2 polyp rem    ENDOSCOPY N/A 2022    Procedure: ESOPHAGOGASTRODUODENOSCOPY with biopsy X1;  Surgeon: Billy Julien MD;  Location: [facility] ENDOSCOPY;  Service: Gastroenterology;  Laterality: N/A;  5.  Upper endoscopy lamination unremarkable.       PORTACATH PLACEMENT Right 2023    Procedure: INSERTION OF PORTACATH;  Surgeon: Percy Davis MD;  Location: [facility] MAIN OR;  Service: General;  Laterality: Right;    TONSILLECTOMY         Family History   Problem Relation Age of Onset    Pneumonia Mother 94        SARS-CoV2    Colon cancer Father 62    Heart disease Sister        Social History     Tobacco Use    Smoking status: Former     Current packs/day: 0.00     Average packs/day: 1 pack/day for 2.0 years (2.0 ttl pk-yrs)     Types: Cigarettes     Start date:      Quit date:      Years since quittin.1    Smokeless tobacco: Never   Vaping Use    Vaping status: Never Used   Substance Use Topics    Alcohol use: Never    Drug use: Never        Medications Prior to Admission   Medication Sig Dispense Refill Last Dose/Taking    aspirin 81 MG EC tablet Take 1 tablet by mouth Daily.       gabapentin (NEURONTIN) 100 MG capsule Take 1 capsule  "by mouth Every 12 (Twelve) Hours.       insulin aspart (NovoLOG FlexPen) 100 UNIT/ML solution pen-injector sc pen Inject 5 Units under the skin into the appropriate area as directed 3 (Three) Times a Day With Meals. 15 mL 0     insulin glargine (LANTUS, SEMGLEE) 100 UNIT/ML injection Inject 10 Units under the skin into the appropriate area as directed Every Night for 30 days. 3 mL 0     midodrine (PROAMATINE) 10 MG tablet Take 1 tablet by mouth 3 (Three) Times a Day Before Meals. 90 tablet 0          Penicillins    Scheduled Meds:furosemide, 40 mg, Intravenous, BID  metoprolol succinate XL, 25 mg, Oral, Q24H  sodium chloride, 10 mL, Intravenous, Q12H      Continuous Infusions:   PRN Meds:.  acetaminophen **OR** acetaminophen **OR** acetaminophen    senna-docusate sodium **AND** polyethylene glycol **AND** bisacodyl **AND** bisacodyl    calcium carbonate    Calcium Replacement - Follow Nurse / BPA Driven Protocol    HYDROcodone-acetaminophen    Magnesium Standard Dose Replacement - Follow Nurse / BPA Driven Protocol    ondansetron ODT **OR** ondansetron    Phosphorus Replacement - Follow Nurse / BPA Driven Protocol    Potassium Replacement - Follow Nurse / BPA Driven Protocol    Access VAD **AND** sodium chloride    sodium chloride    sodium chloride    Objective     VITAL SIGNS  Vitals:    01/25/25 2114 01/25/25 2308 01/26/25 0409 01/26/25 0748   BP: 131/84 145/92 121/87 142/87   BP Location: Right arm Right arm Right arm Right arm   Patient Position: Lying Lying Lying Sitting   Pulse: 109 116 105 98   Resp: 18 18 18 21   Temp: 97.5 °F (36.4 °C) 97.7 °F (36.5 °C) 97.2 °F (36.2 °C) 98 °F (36.7 °C)   TempSrc: Oral Oral Oral Oral   SpO2:  93% 97% 95%   Weight: 83.9 kg (185 lb)      Height: 182.9 cm (72\")          Flowsheet Rows      Flowsheet Row First Filed Value   Admission Height 182.9 cm (72\") Documented at 01/25/2025 1512   Admission Weight 84.2 kg (185 lb 10 oz) Documented at 01/25/2025 1512            No intake " or output data in the 24 hours ending 01/26/25 0918     TELEMETRY:    Physical Exam:  The patient is alert, oriented and in no distress.  Vital signs as noted above.  Head and neck revealed no carotid bruits or jugular venous distention.  No thyromegaly or lymphadenopathy is present  Lungs clear.  No wheezing.  Breath sounds are normal bilaterally.  Heart normal first and second heart sounds. No murmur.  No precordial rub is present.  No gallop is present.  Abdomen soft and nontender.  No organomegaly is present.  Extremities with good peripheral pulses without any pedal edema.  Skin warm and dry.  Musculoskeletal system is grossly normal  CNS grossly normal    Reviewed and updated.    Results Review:   I reviewed the patient's new clinical results.  Lab Results (last 24 hours)       Procedure Component Value Units Date/Time    Hemoglobin A1c [[id number]]  (Abnormal) Collected: 01/26/25 0549    Specimen: Blood from Hand, Left Updated: 01/26/25 0905     Hemoglobin A1C 9.79 %     Basic Metabolic Panel [[id number]]  (Abnormal) Collected: 01/26/25 0549    Specimen: Blood from Hand, Left Updated: 01/26/25 0628     Glucose 297 mg/dL      BUN 18 mg/dL      Creatinine 0.96 mg/dL      Sodium 135 mmol/L      Potassium 4.0 mmol/L      Chloride 99 mmol/L      CO2 25.7 mmol/L      Calcium 8.4 mg/dL      BUN/Creatinine Ratio 18.8     Anion Gap 10.3 mmol/L      eGFR 81.4 mL/min/1.73     Narrative:      GFR Categories in Chronic Kidney Disease (CKD)      GFR Category          GFR (mL/min/1.73)    Interpretation  G1                     90 or greater         Normal or high (1)  G2                      60-89                Mild decrease (1)  G3a                   45-59                Mild to moderate decrease  G3b                   30-44                Moderate to severe decrease  G4                    15-29                Severe decrease  G5                    14 or less           Kidney failure          (1)In the absence of evidence  of kidney disease, neither GFR category G1 or G2 fulfill the criteria for CKD.    eGFR calculation 2021 CKD-EPI creatinine equation, which does not include race as a factor    BNP [[id number]]  (Abnormal) Collected: 01/26/25 0549    Specimen: Blood from Hand, Left Updated: 01/26/25 0628     proBNP 5,244.0 pg/mL     Narrative:      This assay is used as an aid in the diagnosis of individuals suspected of having heart failure. It can be used as an aid in the diagnosis of acute decompensated heart failure (ADHF) in patients presenting with signs and symptoms of ADHF to the emergency department (ED). In addition, NT-proBNP of <300 pg/mL indicates ADHF is not likely.    Age Range Result Interpretation  NT-proBNP Concentration (pg/mL:      <50             Positive            >450                   Gray                 300-450                    Negative             <300    50-75           Positive            >900                  Gray                300-900                  Negative            <300      >75             Positive            >1800                  Gray                300-1800                  Negative            <300    CBC Auto Differential [[id number]]  (Abnormal) Collected: 01/26/25 0549    Specimen: Blood from Hand, Left Updated: 01/26/25 0601     WBC 6.28 10*3/mm3      RBC 4.48 10*6/mm3      Hemoglobin 12.7 g/dL      Hematocrit 39.6 %      MCV 88.4 fL      MCH 28.3 pg      MCHC 32.1 g/dL      RDW 13.6 %      RDW-SD 44.0 fl      MPV 9.7 fL      Platelets 151 10*3/mm3      Neutrophil % 66.4 %      Lymphocyte % 21.2 %      Monocyte % 10.7 %      Eosinophil % 0.8 %      Basophil % 0.6 %      Immature Grans % 0.3 %      Neutrophils, Absolute 4.17 10*3/mm3      Lymphocytes, Absolute 1.33 10*3/mm3      Monocytes, Absolute 0.67 10*3/mm3      Eosinophils, Absolute 0.05 10*3/mm3      Basophils, Absolute 0.04 10*3/mm3      Immature Grans, Absolute 0.02 10*3/mm3      nRBC 0.0 /100 WBC     Urinalysis, Microscopic Only  - Urine, Clean Catch [[id number]] Collected: 01/25/25 1840    Specimen: Urine, Clean Catch Updated: 01/25/25 1849     RBC, UA None Seen /HPF      WBC, UA 0-2 /HPF      Comment: Urine culture not indicated.        Bacteria, UA None Seen /HPF      Squamous Epithelial Cells, UA 0-2 /HPF      Hyaline Casts, UA 3-6 /LPF      Methodology Automated Microscopy    Urinalysis With Culture If Indicated - Urine, Clean Catch [[id number]]  (Abnormal) Collected: 01/25/25 1840    Specimen: Urine, Clean Catch Updated: 01/25/25 1847     Color, UA Yellow     Appearance, UA Clear     pH, UA <=5.0     Specific Gravity, UA 1.016     Glucose,  mg/dL (2+)     Ketones, UA Negative     Bilirubin, UA Negative     Blood, UA Negative     Protein,  mg/dL (2+)     Leuk Esterase, UA Negative     Nitrite, UA Negative     Urobilinogen, UA 0.2 E.U./dL    Narrative:      In absence of clinical symptoms, the presence of pyuria, bacteria, and/or nitrites on the urinalysis result does not correlate with infection.    Respiratory Panel PCR w/COVID-19(SARS-CoV-2) JODY/TICO/KG/PAD/COR/OZ In-House, NP Swab in UTM/VTM, 2 HR TAT - Swab, Nasopharynx [[id number]]  (Normal) Collected: 01/25/25 1558    Specimen: Swab from Nasopharynx Updated: 01/25/25 1701     ADENOVIRUS, PCR Not Detected     Coronavirus 229E Not Detected     Coronavirus HKU1 Not Detected     Coronavirus NL63 Not Detected     Coronavirus OC43 Not Detected     COVID19 Not Detected     Human Metapneumovirus Not Detected     Human Rhinovirus/Enterovirus Not Detected     Influenza A PCR Not Detected     Influenza B PCR Not Detected     Parainfluenza Virus 1 Not Detected     Parainfluenza Virus 2 Not Detected     Parainfluenza Virus 3 Not Detected     Parainfluenza Virus 4 Not Detected     RSV, PCR Not Detected     Bordetella pertussis pcr Not Detected     Bordetella parapertussis PCR Not Detected     Chlamydophila pneumoniae PCR Not Detected     Mycoplasma pneumo by PCR Not Detected     Narrative:      In the setting of a positive respiratory panel with a viral infection PLUS a negative procalcitonin without other underlying concern for bacterial infection, consider observing off antibiotics or discontinuation of antibiotics and continue supportive care. If the respiratory panel is positive for atypical bacterial infection (Bordetella pertussis, Chlamydophila pneumoniae, or Mycoplasma pneumoniae), consider antibiotic de-escalation to target atypical bacterial infection.    Basic Metabolic Panel [[id number]]  (Abnormal) Collected: 01/25/25 1624    Specimen: Blood Updated: 01/25/25 1655     Glucose 218 mg/dL      BUN 19 mg/dL      Creatinine 0.93 mg/dL      Sodium 138 mmol/L      Potassium 4.6 mmol/L      Chloride 104 mmol/L      CO2 23.9 mmol/L      Calcium 8.9 mg/dL      BUN/Creatinine Ratio 20.4     Anion Gap 10.1 mmol/L      eGFR 84.6 mL/min/1.73     Narrative:      GFR Categories in Chronic Kidney Disease (CKD)      GFR Category          GFR (mL/min/1.73)    Interpretation  G1                     90 or greater         Normal or high (1)  G2                      60-89                Mild decrease (1)  G3a                   45-59                Mild to moderate decrease  G3b                   30-44                Moderate to severe decrease  G4                    15-29                Severe decrease  G5                    14 or less           Kidney failure          (1)In the absence of evidence of kidney disease, neither GFR category G1 or G2 fulfill the criteria for CKD.    eGFR calculation 2021 CKD-EPI creatinine equation, which does not include race as a factor    BNP [[id number]]  (Abnormal) Collected: 01/25/25 1624    Specimen: Blood Updated: 01/25/25 1655     proBNP 4,259.0 pg/mL     Narrative:      This assay is used as an aid in the diagnosis of individuals suspected of having heart failure. It can be used as an aid in the diagnosis of acute decompensated heart failure (ADHF) in patients  presenting with signs and symptoms of ADHF to the emergency department (ED). In addition, NT-proBNP of <300 pg/mL indicates ADHF is not likely.    Age Range Result Interpretation  NT-proBNP Concentration (pg/mL:      <50             Positive            >450                   Gray                 300-450                    Negative             <300    50-75           Positive            >900                  Gray                300-900                  Negative            <300      >75             Positive            >1800                  Gray                300-1800                  Negative            <300    Extra Tubes [[id number]] Collected: 01/25/25 1624    Specimen: Blood, Venous Line Updated: 01/25/25 1631    Narrative:      The following orders were created for panel order Extra Tubes.  Procedure                               Abnormality         Status                     ---------                               -----------         ------                     Gold Top - SST[[id number]]                                   Final result                 Please view results for these tests on the individual orders.    Gold Top - SST [[id number]] Collected: 01/25/25 1624    Specimen: Blood Updated: 01/25/25 1631     Extra Tube Hold for add-ons.     Comment: Auto resulted.       CBC & Differential [[id number]]  (Abnormal) Collected: 01/25/25 1558    Specimen: Blood Updated: 01/25/25 1604    Narrative:      The following orders were created for panel order CBC & Differential.  Procedure                               Abnormality         Status                     ---------                               -----------         ------                     CBC Auto Differential[[id number]]        Abnormal            Final result                 Please view results for these tests on the individual orders.    CBC Auto Differential [[id number]]  (Abnormal) Collected: 01/25/25 1558    Specimen: Blood Updated: 01/25/25 1604     WBC 6.04  10*3/mm3      RBC 4.52 10*6/mm3      Hemoglobin 12.8 g/dL      Hematocrit 41.3 %      MCV 91.4 fL      MCH 28.3 pg      MCHC 31.0 g/dL      RDW 13.8 %      RDW-SD 46.1 fl      MPV 11.1 fL      Platelets 237 10*3/mm3      Neutrophil % 67.8 %      Lymphocyte % 19.5 %      Monocyte % 10.8 %      Eosinophil % 1.2 %      Basophil % 0.5 %      Immature Grans % 0.2 %      Neutrophils, Absolute 4.10 10*3/mm3      Lymphocytes, Absolute 1.18 10*3/mm3      Monocytes, Absolute 0.65 10*3/mm3      Eosinophils, Absolute 0.07 10*3/mm3      Basophils, Absolute 0.03 10*3/mm3      Immature Grans, Absolute 0.01 10*3/mm3      nRBC 0.0 /100 WBC             Imaging Results (Last 24 Hours)       Procedure Component Value Units Date/Time    XR Chest 1 View [[id number]] Collected: 01/25/25 1607     Updated: 01/25/25 1611    Narrative:      XR CHEST 1 VW    Date of Exam: 1/25/2025 3:43 PM EST    Indication: soa cough.    Comparison: 11/11/2024, CT chest 12/18/2024    FINDINGS:  There are ill-defined multifocal airspace infiltrates throughout the lungs bilaterally with associated diffuse interstitial changes. Bilateral pleural effusions are noted. The cardiac silhouette and mediastinum are stable. The right portacatheter is   stable in position. No acute osseous abnormalities are identified.      Impression:      1.Ill-defined multifocal airspace infiltrates bilaterally with diffuse interstitial changes. Bilateral pleural effusions are also noted. The findings suggest developing atypical/viral infection or multifocal pneumonia. Changes of CHF and pulmonary edema   could also have this appearance. Recommend correlation for signs or symptoms of acute infection and follow-up to ensure improvement/resolution.      Electronically Signed: Domenico Escalante MD    1/25/2025 4:09 PM EST    Workstation ID: [id number]        LAB RESULTS (LAST 7 DAYS)    CBC  Results from last 7 days   Lab Units 01/26/25  0549 01/25/25  1558 01/21/25  0826   WBC 10*3/mm3 6.28  6.04 8.30   RBC 10*6/mm3 4.48 4.52 4.82   HEMOGLOBIN g/dL 12.7* 12.8* 13.7   HEMATOCRIT % 39.6 41.3 45.0   MCV fL 88.4 91.4 93.4   PLATELETS 10*3/mm3 151 237 179       BMP  Results from last 7 days   Lab Units 01/26/25  0549 01/25/25  1624 01/21/25  0826   SODIUM mmol/L 135* 138 138   POTASSIUM mmol/L 4.0 4.6 4.0   CHLORIDE mmol/L 99 104 102   CO2 mmol/L 25.7 23.9 23.4   BUN mg/dL 18 19 15   CREATININE mg/dL 0.96 0.93 0.89   GLUCOSE mg/dL 297* 218* 207*       CMP   Results from last 7 days   Lab Units 01/26/25  0549 01/25/25  1624 01/21/25  0826   SODIUM mmol/L 135* 138 138   POTASSIUM mmol/L 4.0 4.6 4.0   CHLORIDE mmol/L 99 104 102   CO2 mmol/L 25.7 23.9 23.4   BUN mg/dL 18 19 15   CREATININE mg/dL 0.96 0.93 0.89   GLUCOSE mg/dL 297* 218* 207*   ALBUMIN g/dL  --   --  3.7   BILIRUBIN mg/dL  --   --  0.7   ALK PHOS U/L  --   --  147*   AST (SGOT) U/L  --   --  24   ALT (SGPT) U/L  --   --  11         BNP        TROPONIN        CoAg        Creatinine Clearance  Estimated Creatinine Clearance: 76.5 mL/min (by C-G formula based on SCr of 0.96 mg/dL).    ABG        Radiology  XR Chest 1 View    Result Date: 1/25/2025  1.Ill-defined multifocal airspace infiltrates bilaterally with diffuse interstitial changes. Bilateral pleural effusions are also noted. The findings suggest developing atypical/viral infection or multifocal pneumonia. Changes of CHF and pulmonary edema could also have this appearance. Recommend correlation for signs or symptoms of acute infection and follow-up to ensure improvement/resolution. Electronically Signed: Domenico Escalante MD  1/25/2025 4:09 PM EST  Workstation ID: [id number]       EKG      I personally viewed and interpreted the patient's EKG/Telemetry data: Sinus rhythm left bundle branch block    ECHOCARDIOGRAM:    Results for orders placed during the hospital encounter of 11/11/24    Adult Transthoracic Echo Complete w/ Color, Spectral and Contrast if Necessary Per Protocol    Interpretation  Summary    Left ventricular systolic function is severely decreased. Left ventricular ejection fraction appears to be 21 - 25%.    The left ventricular cavity is moderately dilated.    Left atrial volume is severely increased.    Moderate mitral valve regurgitation is present.    Estimated right ventricular systolic pressure from tricuspid regurgitation is normal (<35 mmHg).    Conclusion      Normal LV size, paradoxical septal motion, severe LV dysfunction with EF of 20-25%.    Normal RV size  Normal atrial size  Pulmonic valve is not well visualized.  Aortic valve with mild sclerosis.  Mitral valve, tricuspid valve appears structurally normal, trace tricuspid and moderate mitral regurgitation seen.  Normal calculated RV systolic pressure 28 mmHg  No pericardial effusion seen.  Proximal aorta appears normal in size.      STRESS TEST        Cardiolite (Tc-99m sestamibi) stress test    HEART CATHETERIZATION  Results for orders placed during the hospital encounter of 24    Cardiac Catheterization/Vascular Study    Conclusion  Table formatting from the original result was not included.  2024      Heart Cath Report    NAME:              Vlad Guerrero  :                1945  AGE/SEX:        79 y.o. male  MRN:                [id number]        Procedures Performed    Left heart catheterization  Selective coronary angiography  Left ventriculography  Mynx closure device    :   Mg Rahman MD    Vascular Access Site: Femoral    Indication for procedure: Chronic CHF, severe LV dysfunction, cardiomyopathy rule out ischemic cardiomyopathy, left bundle branch block, diabetes      Procedure Note    After discussing the risks, benefits, and alternatives of the procedure, informed consent was obtained.  Timeout was done before the procedure.  Moderate conscious sedation was given utilizing IV Versed and fentanyl administered by RN with continuous EKG oximetry and hemodynamic monitoring  supervised by me throughout the entire case.  I was present with the patient for the duration of moderate sedation and supervised staff who had no other duties and monitored the patient for the entire procedure patient had Patterson 2-3 sedation scale. the vascular access site was prepped and draped in the usual sterile fashion.  2% lidocaine was used for local anesthesia. Appropriate landmarks were assessed.  A 6 Armenian short sheath was inserted in the artery using the modified Seldinger technique.    Selective coronary angiography was performed with JL4 and JR4 diagnostic catheters. Left ventriculogram  was performed with an angled pigtail catheter.  All exchanges were performed over the wire.  No specimens were removed.  There were no apparent acute or early complications.  The patient tolerated the procedure well and was transferred to the recovery area in stable condition.      Closure device: Mynx device was deployed successfully after right iliofemoral angiogram was performed. Good hemostasis was achieved.    Complications:  None  Blood Loss: minimal    Hemodynamics    Pressures    Ao:    110/45 mmHg  LV:    95/4 mmHg  End-diastolic pressure:  3-4 mmHg  No significant aortic valvular gradient on pullback    Coronary Angiography    Left Main :  The left main   is without disease    Left Anterior Descending : Oxman LAD has 20 to 30% luminal irregularities.  There is a stent patent in mid LAD with excellent long-term results.  Mid to distal LAD has 3040% calcified narrowing.  There is a large diagonal without disease.    Left Circumflex : Had a stent patent stream and proximal portion with excellent long-term results.  Gives rise to 2 large marginals without disease and continues a small distal marginal without disease.    Right Coronary Artery :  The right coronary artery   is a dominant artery with stents patent in vertical segment of midportion and horizontal segment of distal portion.  PLV branch had 50% mid  narrowing.    Dominance:  []  Left  [x]  Right  []  Co-Dominant        Left Ventriculography:    Estimated Ejection Fraction: 25 %  Wall motion abnormalities: Dilated LV with severe global left ventricular hypokinesis  Mitral Regurgitation: 1+ MR    Impression:    Patent stents in LAD, LCx, RCA with excellent long-term results  Severe LV dysfunction    Recommendations:    Patient does not have a any reversible cause for his LV dysfunction.  Unfortunately patient is severely orthostatic and has low baseline blood pressure to add any further guideline directed medical therapy.  Will repeat echo in 3 months as outpatient.        I sincerely appreciate the opportunity to participate in your patient's care. Please feel free to contact me anytime if I can be of assistance in this or any other way.      Pertinent History    Past Medical History:  Diagnosis Date   Anemia   Colon cancer 2022  colon   Diabetes mellitus   Hyperlipidemia   Hypertension   LBBB (left bundle branch block) 11/14/2024    Past Surgical History:  Procedure Laterality Date   APPENDECTOMY   CARDIAC CATHETERIZATION   COLON RESECTION N/A 12/15/2022  Procedure: COLON RESECTION RIGHT;  Surgeon: Percy Davis MD;  Location: [facility] MAIN OR;  Service: General;  Laterality: N/A;   COLONOSCOPY N/A 11/11/2022  Procedure: COLONOSCOPY WITH BIOPSY AND POLYPECTOMY;  Surgeon: Billy Julien MD;  Location: [facility] ENDOSCOPY;  Service: Gastroenterology;  Laterality: N/A;  Impression:  1.  Large 4-5cm fulgurating circumferential ulcerated mass in the very proximal part of the ascending colon next to ileocecal valve multiple biopsies were performed.  This is highly concerning for colon malignancy.  2.  2 polyp rem   ENDOSCOPY N/A 11/11/2022  Procedure: ESOPHAGOGASTRODUODENOSCOPY with biopsy X1;  Surgeon: Billy Julien MD;  Location: [facility] ENDOSCOPY;  Service: Gastroenterology;  Laterality: N/A;  5.  Upper endoscopy lamination unremarkable.     PORTACATH  PLACEMENT Right 1/12/2023  Procedure: INSERTION OF PORTACATH;  Surgeon: Percy Davis MD;  Location: [facility] MAIN OR;  Service: General;  Laterality: Right;   TONSILLECTOMY    Prior to Admission medications  Medication Sig Start Date End Date Taking? Authorizing Provider  aspirin 81 MG EC tablet Take 1 tablet by mouth Daily.   Yes Provider, MD Rolly  insulin aspart (NovoLOG FlexPen) 100 UNIT/ML solution pen-injector sc pen Inject 10 Units under the skin into the appropriate area as directed 3 (Three) Times a Day With Meals.   Yes Provider, MD Rolly  insulin glargine (LANTUS, SEMGLEE) 100 UNIT/ML injection Inject 20 Units under the skin into the appropriate area as directed Every Night for 30 days. 1/9/24 4/24/25 Yes Yesenia Arreola APRN      Pre-Procedure Notes  H&P Performed  [x]  Yes []  No       []  N/A    Indications:  []  ACS <= 24 HRS  []  ACS >24 HRS  []  New Onset Angina <= 2 mos  []  Worsening Angina  []  Resuscitated Cardiac Arrest  []  Angina on Exertion:  []  Suspected CAD  []  Valvular Disease  []  Pericardial Disease  []  Cardiac Arrythmia  []  Cardiomyopathy  []  LV Dysfunction  []  Syncope  []  Post Cardiac Transplant  []  Eval. For Exercise Clearance  []  Other  []  Pre-Operative Evaluation  If Pre-Op Eval:  Evaluation for Surgery Type:  []  Cardiac Surgery   []  Non-Cardiac Surgery  Functional Capacity:  []  <4 METS  []  >=4 METS w/o symptoms  []  >= 4 METS with symptoms  []  Unknown  Surgical Risk:  []  Low  []  Intermediate  []  High Risk: Vascular  []  High Risk Non-Vascular    Risks, Benefits, & Complications Discussed:  [x]  Yes  []  No  []  N/A    Questions Answered:  [x]  Yes  []  No  []  N/A    Consent Obtained:  [x]  Yes  []  No  []  N/A    CHF: []  Yes  []  No  If Yes:  Newly Diagnosed?  [x]  Yes  []  No  If Yes:  HF Type:  []  Diastolic  [x]  Systolic  []  Unknown      Mg Rahman MD  11/14/2024  13:18 EST  Electronically signed by Mg Singleton  "MD Lacey, 24, 1:18 PM EST.      OTHER:     Assessment & Plan     Principal Problem:    CHF (congestive heart failure)    The following problem primary cardiologist Dr. Rahman were reviewed.  \"Patient presented with weakness and hyperglycemia.  Patient is having issues with orthostatic hypotension.    Orthostatic vital signs per PT  Supine: 113/70mmHg; 90bpm  Seated: 81/62mmHg,   Standin/46,      NP NOTE:  Patient seen and with Dr. Rahman.  Patient worked with PT this morning and was significantly orthostatic. Will start midodrine therapy 5mg TID.  Patient denies any chest pain or shortness of breath.  Glucose is better controlled while inpatient.   Repeat echocardiogram pending   Unable to tolerate any GDMT for heart failure due to hypotension         Will need cardiac cath      Electronically signed by ANEUDY Brown, 24, 10:56 AM EST.     Cardiology attending addendum :     I have personally performed a face-to-face diagnostic evaluation, physical exam and reviewed data on this patient.  I have reviewed documentation done by me and nurse practitioner  and corrected as needed.  And agree with the different components of documentation.Greater than 50% of the time spent in the care of this patient was provided by attending consultant/me.        Subjective: Patient seen and examined; chart and labs reviewed; discussed with bedside nurse .  Patient continues to have severe orthostasis and hypotension on standing up.           Objective: HS troponin is 32--31 A1C 11.8   2024: CBC unremarkable      History of present illness:       Mr. Vlad Guerrero has PMH of     PFO  Left bundle branch block  HFrEF   EF 30%,  Diastolic dysfunction   Left atrial enlargement  Hypertension  History of TIA , left frontal CVA 2024  Metastatic colon cancer with possible liver mets  Insulin-dependent diabetes        Presented 2024 with weakness and hyperglycemia.  Patient was " "undergoing chemotherapy for his liver mets.  Patient was found to be tachycardic and hypotensive and orthostatic sugars were in the 500.  Patient's recent A1c was 11.80 on 11/12/2024.     Data:  11/12/2024: HS troponin is 32--31\"        Assessment:  :     Orthostatic hypotension  Severe LV dysfunction by echo  Chronic HFrEF due to systolic dysfunction  Type 2 diabetes, poorly controlled  PFO  Left bundle branch block  HFrEF  30% diastolic dysfunction   Left atrial enlargement  Acute left frontal stroke.  Metastatic colon cancer  History of previous TIA  Carotid disease        Recommendations / Plan:         EKG is revealing left bundle branch block  Echo 11/13/2024 is revealing severe LV dysfunction.  Patient is orthostatic.  Patient's orthostasis is probably due to autonomic insufficiency.  Will give him midodrine and accept a higher supine blood pressure just to help with orthostasis.  Patient has severe LV dysfunction.  Will schedule cardiac cath to evaluate etiology of cardiomyopathy.  Risk-benefit alternatives explained.  Patient's blood pressure is low, cannot give any guideline directed medical therapy for LV dysfunction.      STENTS      Left Ventriculography:    Estimated Ejection Fraction: 25 %  Wall motion abnormalities: Dilated LV with severe global left ventricular hypokinesis  Mitral Regurgitation: 1+ MR    Impression:    Patent stents in LAD, LCx, RCA with excellent long-term results  Severe LV dysfunction    Recommendations:    Patient does not have a any reversible cause for his LV dysfunction.  Unfortunately patient is severely orthostatic and has low baseline blood pressure to add any further guideline directed medical therapy.  Will repeat echo in 3 months as outpatient.    Plan:    CHF exacerbation  HFrEF  proBNP 5244  CXR with bilateral pleural effusions  Previous echocardiogram with reduced LVEF 21 to 25%, moderately dilated LV, moderate mitral valve regurgitation  IV diuresis  Initiate " metoprolol succinate  Further uptitrate and add GDMT as tolerated  Repeat echocardiogram  Heart failure navigator consult    History of CAD  Status post PCI  Recent cardiac catheterization with patent stent to LAD, LCx, RCA  Denies chest pain  Aspirin  Initiate beta-blocker  Recommend statin therapy    Hypertension  Prior admission with hypotension requiring midodrine  Blood pressure elevated  Initiate low-dose beta-blocker    Type 2 diabetes  On long-term insulin therapy  A1c 9.7  Diabetic educator consult    Malignant neoplasm of ascending colon  Follows with oncology    Further recommendations and assessment per Dr. Lobato  ===========  Patient was seen around 9 AM today.  Reviewed the chart in entirety.  Reviewed and agree with the assessment and plan as documented by nurse practitioner Val.  Majority of the MDM was performed by me.    Patient presented with increased shortness of breath.  Patient has severe left ventricular dysfunction.    Echocardiogram 1/26/2025.  Moderate mitral regurgitation.  Time mild mitral and tricuspid regurgitation is present.  Left ventricle is significantly enlarged with severe and diffuse hypocontractility with ejection fraction of 20 to 25%.  Left ventricular peak systolic global longitudinal strain is abnormal with GLS of -5%.  Right ventricle is normal in size and contractility with a TAPSE of 1.8 cm..  Large left pleural effusion is present.  Small pericardial effusion is present.    Diuresis as allowed by renal function and blood pressure.  GDMT.    Patient has left bundle branch block.  Consultation for BiV pacemaker/ICD if left ventricular function does not improve with adequate GDMT therapy.    Patient has a large left pleural effusion.  May benefit from pleural tap.    Patient was reviewed and updated.  Patient is on aspirin Lasix 40 mg IV every 12 hours gabapentin insulin metoprolol midodrine.    Dr. Rahman primary cardiologist will see the patient  tomorrow.    Further plan will depend on patient's progress    Electronically signed by Rohit Lobato MD, 01/26/25, 12:30 PM EST.     ANEUDY Reddy  01/26/25  09:18 EST

## 2025-01-26 NOTE — PLAN OF CARE
Goal Outcome Evaluation:  Plan of Care Reviewed With: patient        Progress: improving  Outcome Evaluation: Patient to follow up with cardiology. RN to discuss dc information.

## 2025-01-26 NOTE — PLAN OF CARE
Problem: Adult Inpatient Plan of Care  Goal: Plan of Care Review  Outcome: Progressing  Flowsheets (Taken 1/26/2025 0606)  Progress: no change  Outcome Evaluation: Patient to have Cardiology consult this AM. Bed in lowest position, call light within reach.  Plan of Care Reviewed With: patient  Goal: Patient-Specific Goal (Individualized)  Outcome: Progressing  Goal: Absence of Hospital-Acquired Illness or Injury  Outcome: Progressing  Intervention: Identify and Manage Fall Risk  Description: Perform standard risk assessment on admission using a validated tool or comprehensive approach appropriate to the patient; reassess fall risk frequently, with change in status or transfer to another level of care.  Communicate risk to interprofessional healthcare team; ensure fall risk visible cue.  Determine need for increased observation, equipment and environmental modification, as well as use of supportive, nonskid footwear.  Adjust safety measures to individual needs and identified risk factors.  Reinforce the importance of active participation with fall risk prevention, safety, and physical activity with the patient and family.  Perform regular intentional rounding to assess need for position change, pain assessment and personal needs, including assistance with toileting.  Recent Flowsheet Documentation  Taken 1/26/2025 0200 by Ana Maria De Guzman, RN  Safety Promotion/Fall Prevention: safety round/check completed  Taken 1/25/2025 2123 by Ana Maria De Guzman, RN  Safety Promotion/Fall Prevention: safety round/check completed  Intervention: Prevent Skin Injury  Description: Perform a screening for skin injury risk, such as pressure or moisture-associated skin damage on admission and at regular intervals throughout hospital stay.  Keep all areas of skin (especially folds) clean and dry.  Maintain adequate skin hydration.  Relieve and redistribute pressure and protect bony prominences and skin at risk for injury; implement  measures based on patient-specific risk factors.  Match turning and repositioning schedule to clinical condition.  Encourage weight shift frequently; assist with reposition if unable to complete independently.  Float heels off bed; avoid pressure on the Achilles tendon.  Keep skin free from extended contact with medical devices.  Optimize nutrition and hydration.  Encourage functional activity and mobility, as early as tolerated.  Use aids (e.g., slide boards, mechanical lift) during transfer.  Recent Flowsheet Documentation  Taken 1/25/2025 2123 by Ana Maria De Guzman RN  Body Position: position changed independently  Intervention: Prevent and Manage VTE (Venous Thromboembolism) Risk  Description: Assess for VTE (venous thromboembolism) risk.  Promote early mobilization; encourage both active and passive leg exercises, if unable to ambulate.  Initiate and maintain compression or other therapy, as indicated, based on identified risk in accordance with organizational protocol and provider order.  Recognize the patient's individual risk for bleeding before initiating pharmacologic thromboprophylaxis.  Recent Flowsheet Documentation  Taken 1/25/2025 2123 by Ana Maria De Guzman RN  VTE Prevention/Management:   bilateral   SCDs (sequential compression devices) off  Intervention: Prevent Infection  Description: Maintain skin and mucous membrane integrity; promote hand, oral and pulmonary hygiene.  Optimize fluid balance, nutrition, sleep and glycemic control to maximize infection resistance.  Identify potential sources of infection early to prevent or mitigate progression of infection (e.g., wound, lines, devices).  Evaluate ongoing need for invasive devices; remove promptly when no longer indicated.  Review vaccination status.  Recent Flowsheet Documentation  Taken 1/26/2025 0200 by An aMaria De Guzman RN  Infection Prevention:   hand hygiene promoted   rest/sleep promoted   single patient room provided  Taken 1/25/2025  2123 by Ana Maria De Guzman RN  Infection Prevention:   hand hygiene promoted   rest/sleep promoted   single patient room provided  Goal: Optimal Comfort and Wellbeing  Outcome: Progressing  Intervention: Provide Person-Centered Care  Description: Use a family-focused approach to care; encourage support system presence and participation.  Develop trust and rapport by proactively providing information, encouraging questions, addressing concerns and offering reassurance.  Acknowledge emotional response to hospitalization.  Recognize and utilize personal coping strategies and strengths; develop goals via shared decision-making.  Honor spiritual and cultural preferences.  Recent Flowsheet Documentation  Taken 1/25/2025 2123 by Ana Maria De Guzman RN  Trust Relationship/Rapport:   care explained   choices provided   emotional support provided   empathic listening provided   questions answered   questions encouraged   reassurance provided   thoughts/feelings acknowledged  Goal: Readiness for Transition of Care  Outcome: Progressing  Intervention: Mutually Develop Transition Plan  Description: Identify available resources for support (e.g., family, friends, community).  Identify and address barriers to ongoing treatment and home management (e.g., environmental, financial).  Provide opportunities to practice self-management skills.  Assess and monitor emotional readiness for transition.  Establish or reconnect linkage with outpatient providers or community-based services.  Recent Flowsheet Documentation  Taken 1/25/2025 2129 by Ana Maria De Guzman RN  Transportation Anticipated: family or friend will provide  Patient/Family Anticipated Services at Transition: skilled nursing  Patient/Family Anticipates Transition to: (Assisted living facility) other (see comments)  Taken 1/25/2025 2120 by Ana Maria De Guzman RN  Equipment Currently Used at Home:   cane, quad   rollator   bp cuff   glucometer     Problem: Comorbidity  Management  Goal: Blood Pressure in Desired Range  Outcome: Progressing  Intervention: Maintain Blood Pressure Management  Description: Evaluate adherence to home antihypertensive regimen (e.g., exercise and activity, diet modification, medication).  Provide scheduled antihypertensive medication; consider administration time and effects (e.g., avoid giving diuretic prior to bedtime).  Monitor response to antihypertensive medication therapy (e.g., blood pressure, electrolyte levels, medication effects).  Minimize risk of orthostatic hypotension; encourage caution with position changes, particularly if elderly.  Recent Flowsheet Documentation  Taken 1/25/2025 2123 by Ana Maria De Guzman, RN  Medication Review/Management: medications reviewed   Goal Outcome Evaluation:  Plan of Care Reviewed With: patient        Progress: no change  Outcome Evaluation: Patient to have Cardiology consult this AM. Bed in lowest position, call light within reach.

## 2025-01-26 NOTE — PLAN OF CARE
Goal Outcome Evaluation:  Plan of Care Reviewed With: patient        Progress: improving  Outcome Evaluation: Cardiology consulted with patient and ordered an echo. Patient alert and oriented on room air. Patient states that he has no pain or SOA. Med rec complete.

## 2025-01-26 NOTE — DISCHARGE SUMMARY
[facility]    Vlad Moreland MD    CHIEF COMPLAINT:     cough    HISTORY OF PRESENT ILLNESS:    Cough  Pertinent negatives include no chest pain, fever or myalgias.       ED    79-year-old male with past medical history significant for diabetes and hypertension presenting with cough and congestion x 3 to 4 days. He reports that he has not had any fever or bodyaches. Cough and congestion worse, and he is having more trouble breathing when he is lying down. He denies any change in peripheral edema. He does not think he has a history of heart problems. Abdominal pain, nausea, vomiting, diarrhea, hematochezia, hematuria, dysuria, headache, sore throat or ear pain. Patient reports that he has had nonproductive cough and has been having to clear his throat a lot. He was transported from [facility]       Past Medical History:   Diagnosis Date    Anemia     Colon cancer 2022    colon    Diabetes mellitus     Hyperlipidemia     Hypertension     LBBB (left bundle branch block) 11/14/2024     Past Surgical History:   Procedure Laterality Date    APPENDECTOMY      CARDIAC CATHETERIZATION      CARDIAC CATHETERIZATION N/A 11/14/2024    Procedure: Left Heart Cath, possible pci;  Surgeon: Mg Rahman MD;  Location: [facility] CATH INVASIVE LOCATION;  Service: Cardiovascular;  Laterality: N/A;    COLON RESECTION N/A 12/15/2022    Procedure: COLON RESECTION RIGHT;  Surgeon: Percy Davis MD;  Location: [facility] MAIN OR;  Service: General;  Laterality: N/A;    COLONOSCOPY N/A 11/11/2022    Procedure: COLONOSCOPY WITH BIOPSY AND POLYPECTOMY;  Surgeon: Billy Julien MD;  Location: [facility] ENDOSCOPY;  Service: Gastroenterology;  Laterality: N/A;  Impression:  1.  Large 4-5cm fulgurating circumferential ulcerated mass in the very proximal part of the ascending colon next to ileocecal valve multiple biopsies were performed.  This is highly concerning for colon malignancy.  2.  2  polyp rem    ENDOSCOPY N/A 2022    Procedure: ESOPHAGOGASTRODUODENOSCOPY with biopsy X1;  Surgeon: Billy Julien MD;  Location: [facility] ENDOSCOPY;  Service: Gastroenterology;  Laterality: N/A;  5.  Upper endoscopy lamination unremarkable.       PORTACATH PLACEMENT Right 2023    Procedure: INSERTION OF PORTACATH;  Surgeon: Percy Davis MD;  Location: [facility] MAIN OR;  Service: General;  Laterality: Right;    TONSILLECTOMY       Family History   Problem Relation Age of Onset    Pneumonia Mother 94        SARS-CoV2    Colon cancer Father 62    Heart disease Sister      Social History     Tobacco Use    Smoking status: Former     Current packs/day: 0.00     Average packs/day: 1 pack/day for 2.0 years (2.0 ttl pk-yrs)     Types: Cigarettes     Start date:      Quit date:      Years since quittin.1    Smokeless tobacco: Never   Vaping Use    Vaping status: Never Used   Substance Use Topics    Alcohol use: Never    Drug use: Never     Medications Prior to Admission   Medication Sig Dispense Refill Last Dose/Taking    aspirin 81 MG EC tablet Take 1 tablet by mouth Daily.   2025    gabapentin (NEURONTIN) 100 MG capsule Take 1 capsule by mouth Every 12 (Twelve) Hours.   2025    midodrine (PROAMATINE) 10 MG tablet Take 1 tablet by mouth 3 (Three) Times a Day Before Meals. 90 tablet 0 2025    atorvastatin (LIPITOR) 40 MG tablet Take 1 tablet by mouth Daily.       ferrous gluconate (FERGON) 324 MG tablet Take 1 tablet by mouth Daily With Breakfast.       insulin aspart (novoLOG FLEXPEN) 100 UNIT/ML solution pen-injector sc pen Inject 8 Units under the skin into the appropriate area as directed 3 (Three) Times a Day With Meals.       insulin glargine (LANTUS, SEMGLEE) 100 UNIT/ML injection Inject 20 Units under the skin into the appropriate area as directed Every Night.       losartan (COZAAR) 100 MG tablet Take 1 tablet by mouth Daily.       metFORMIN (GLUCOPHAGE) 1000 MG tablet  Take 1 tablet by mouth 2 (Two) Times a Day With Meals.       ondansetron ODT (ZOFRAN-ODT) 8 MG disintegrating tablet Place 1 tablet on the tongue Every 8 (Eight) Hours As Needed for Nausea or Vomiting.        Allergies:  Penicillins    Immunization History   Administered Date(s) Administered    31-influenza Vac Quardvalent Preservativ 01/02/2018    COVID-19 (MODERNA) BIVALENT 12+YRS 09/29/2022    COVID-19 (PFIZER) Purple Cap Monovalent 03/11/2021, 04/05/2021, 11/13/2021    Fluad Quad 65+ 10/25/2020    Fluzone (or Fluarix & Flulaval for VFC) >6mos 10/25/2020    Fluzone High-Dose 65+YRS 11/02/2012, 12/14/2013, 11/15/2014, 09/19/2015, 10/03/2016, 11/28/2018, 09/12/2019    Fluzone High-Dose 65+yrs 12/23/2021, 09/29/2022, 11/07/2022, 01/09/2024    Hep A, Unspecified 02/06/2020, 08/20/2020    Hepatitis A 07/28/2020, 01/31/2022, 02/26/2022    Influenza, Unspecified 10/18/2011    Pneumococcal Conjugate 13-Valent (PCV13) 07/07/2017, 11/07/2022    Pneumococcal Polysaccharide (PPSV23) 11/02/2012, 08/10/2018    Td, Not Adsorbed 06/24/2016    Zostavax 07/07/2017           REVIEW OF SYSTEMS:    Review of Systems   Constitutional: Negative for fever.   Cardiovascular:  Negative for chest pain, leg swelling and orthopnea.   Respiratory:  Positive for cough.    Musculoskeletal:  Negative for myalgias.           Vital Signs  Temp:  [97.2 °F (36.2 °C)-98 °F (36.7 °C)] 98 °F (36.7 °C)  Heart Rate:  [] 92  Resp:  [16-21] 16  BP: (121-145)/(84-98) 134/87          Physical Exam:  Physical Exam  Constitutional:       Appearance: Normal appearance.   Cardiovascular:      Rate and Rhythm: Normal rate and regular rhythm.   Pulmonary:      Effort: Pulmonary effort is normal.      Breath sounds: Normal breath sounds.   Skin:     General: Skin is warm and dry.   Neurological:      General: No focal deficit present.      Mental Status: He is alert and oriented to person, place, and time.   Psychiatric:         Mood and Affect: Mood normal.          Behavior: Behavior normal.         Emotional Behavior:    wnl   Debilities:   None      Results Review:    I reviewed the patient's new clinical results.  Lab Results (most recent)       Procedure Component Value Units Date/Time    POC Glucose 4x Daily Before Meals & at Bedtime [[id number]]  (Abnormal) Collected: 01/26/25 1144    Specimen: Blood Updated: 01/26/25 1146     Glucose 309 mg/dL      Comment: Serial Number: [id number]:  [id number]       Hemoglobin A1c [[id number]]  (Abnormal) Collected: 01/26/25 0549    Specimen: Blood from Hand, Left Updated: 01/26/25 0905     Hemoglobin A1C 9.79 %     Basic Metabolic Panel [[id number]]  (Abnormal) Collected: 01/26/25 0549    Specimen: Blood from Hand, Left Updated: 01/26/25 0628     Glucose 297 mg/dL      BUN 18 mg/dL      Creatinine 0.96 mg/dL      Sodium 135 mmol/L      Potassium 4.0 mmol/L      Chloride 99 mmol/L      CO2 25.7 mmol/L      Calcium 8.4 mg/dL      BUN/Creatinine Ratio 18.8     Anion Gap 10.3 mmol/L      eGFR 81.4 mL/min/1.73     Narrative:      GFR Categories in Chronic Kidney Disease (CKD)      GFR Category          GFR (mL/min/1.73)    Interpretation  G1                     90 or greater         Normal or high (1)  G2                      60-89                Mild decrease (1)  G3a                   45-59                Mild to moderate decrease  G3b                   30-44                Moderate to severe decrease  G4                    15-29                Severe decrease  G5                    14 or less           Kidney failure          (1)In the absence of evidence of kidney disease, neither GFR category G1 or G2 fulfill the criteria for CKD.    eGFR calculation 2021 CKD-EPI creatinine equation, which does not include race as a factor    BNP [[id number]]  (Abnormal) Collected: 01/26/25 0549    Specimen: Blood from Hand, Left Updated: 01/26/25 0628     proBNP 5,244.0 pg/mL     Narrative:      This assay is used as an aid in the diagnosis  of individuals suspected of having heart failure. It can be used as an aid in the diagnosis of acute decompensated heart failure (ADHF) in patients presenting with signs and symptoms of ADHF to the emergency department (ED). In addition, NT-proBNP of <300 pg/mL indicates ADHF is not likely.    Age Range Result Interpretation  NT-proBNP Concentration (pg/mL:      <50             Positive            >450                   Gray                 300-450                    Negative             <300    50-75           Positive            >900                  Gray                300-900                  Negative            <300      >75             Positive            >1800                  Gray                300-1800                  Negative            <300    CBC Auto Differential [[id number]]  (Abnormal) Collected: 01/26/25 0549    Specimen: Blood from Hand, Left Updated: 01/26/25 0601     WBC 6.28 10*3/mm3      RBC 4.48 10*6/mm3      Hemoglobin 12.7 g/dL      Hematocrit 39.6 %      MCV 88.4 fL      MCH 28.3 pg      MCHC 32.1 g/dL      RDW 13.6 %      RDW-SD 44.0 fl      MPV 9.7 fL      Platelets 151 10*3/mm3      Neutrophil % 66.4 %      Lymphocyte % 21.2 %      Monocyte % 10.7 %      Eosinophil % 0.8 %      Basophil % 0.6 %      Immature Grans % 0.3 %      Neutrophils, Absolute 4.17 10*3/mm3      Lymphocytes, Absolute 1.33 10*3/mm3      Monocytes, Absolute 0.67 10*3/mm3      Eosinophils, Absolute 0.05 10*3/mm3      Basophils, Absolute 0.04 10*3/mm3      Immature Grans, Absolute 0.02 10*3/mm3      nRBC 0.0 /100 WBC     Urinalysis, Microscopic Only - Urine, Clean Catch [[id number]] Collected: 01/25/25 1840    Specimen: Urine, Clean Catch Updated: 01/25/25 1849     RBC, UA None Seen /HPF      WBC, UA 0-2 /HPF      Comment: Urine culture not indicated.        Bacteria, UA None Seen /HPF      Squamous Epithelial Cells, UA 0-2 /HPF      Hyaline Casts, UA 3-6 /LPF      Methodology Automated Microscopy    Urinalysis With  Culture If Indicated - Urine, Clean Catch [[id number]]  (Abnormal) Collected: 01/25/25 1840    Specimen: Urine, Clean Catch Updated: 01/25/25 1847     Color, UA Yellow     Appearance, UA Clear     pH, UA <=5.0     Specific Gravity, UA 1.016     Glucose,  mg/dL (2+)     Ketones, UA Negative     Bilirubin, UA Negative     Blood, UA Negative     Protein,  mg/dL (2+)     Leuk Esterase, UA Negative     Nitrite, UA Negative     Urobilinogen, UA 0.2 E.U./dL    Narrative:      In absence of clinical symptoms, the presence of pyuria, bacteria, and/or nitrites on the urinalysis result does not correlate with infection.    Respiratory Panel PCR w/COVID-19(SARS-CoV-2) JODY/TICO/KG/PAD/COR/OZ In-House, NP Swab in UTM/VTM, 2 HR TAT - Swab, Nasopharynx [[id number]]  (Normal) Collected: 01/25/25 1558    Specimen: Swab from Nasopharynx Updated: 01/25/25 1701     ADENOVIRUS, PCR Not Detected     Coronavirus 229E Not Detected     Coronavirus HKU1 Not Detected     Coronavirus NL63 Not Detected     Coronavirus OC43 Not Detected     COVID19 Not Detected     Human Metapneumovirus Not Detected     Human Rhinovirus/Enterovirus Not Detected     Influenza A PCR Not Detected     Influenza B PCR Not Detected     Parainfluenza Virus 1 Not Detected     Parainfluenza Virus 2 Not Detected     Parainfluenza Virus 3 Not Detected     Parainfluenza Virus 4 Not Detected     RSV, PCR Not Detected     Bordetella pertussis pcr Not Detected     Bordetella parapertussis PCR Not Detected     Chlamydophila pneumoniae PCR Not Detected     Mycoplasma pneumo by PCR Not Detected    Narrative:      In the setting of a positive respiratory panel with a viral infection PLUS a negative procalcitonin without other underlying concern for bacterial infection, consider observing off antibiotics or discontinuation of antibiotics and continue supportive care. If the respiratory panel is positive for atypical bacterial infection (Bordetella pertussis, Chlamydophila  pneumoniae, or Mycoplasma pneumoniae), consider antibiotic de-escalation to target atypical bacterial infection.    Basic Metabolic Panel [[id number]]  (Abnormal) Collected: 01/25/25 1624    Specimen: Blood Updated: 01/25/25 1655     Glucose 218 mg/dL      BUN 19 mg/dL      Creatinine 0.93 mg/dL      Sodium 138 mmol/L      Potassium 4.6 mmol/L      Chloride 104 mmol/L      CO2 23.9 mmol/L      Calcium 8.9 mg/dL      BUN/Creatinine Ratio 20.4     Anion Gap 10.1 mmol/L      eGFR 84.6 mL/min/1.73     Narrative:      GFR Categories in Chronic Kidney Disease (CKD)      GFR Category          GFR (mL/min/1.73)    Interpretation  G1                     90 or greater         Normal or high (1)  G2                      60-89                Mild decrease (1)  G3a                   45-59                Mild to moderate decrease  G3b                   30-44                Moderate to severe decrease  G4                    15-29                Severe decrease  G5                    14 or less           Kidney failure          (1)In the absence of evidence of kidney disease, neither GFR category G1 or G2 fulfill the criteria for CKD.    eGFR calculation 2021 CKD-EPI creatinine equation, which does not include race as a factor    BNP [[id number]]  (Abnormal) Collected: 01/25/25 1624    Specimen: Blood Updated: 01/25/25 1655     proBNP 4,259.0 pg/mL     Narrative:      This assay is used as an aid in the diagnosis of individuals suspected of having heart failure. It can be used as an aid in the diagnosis of acute decompensated heart failure (ADHF) in patients presenting with signs and symptoms of ADHF to the emergency department (ED). In addition, NT-proBNP of <300 pg/mL indicates ADHF is not likely.    Age Range Result Interpretation  NT-proBNP Concentration (pg/mL:      <50             Positive            >450                   Gray                 300-450                    Negative             <300    50-75           Positive             >900                  Gray                300-900                  Negative            <300      >75             Positive            >1800                  Gray                300-1800                  Negative            <300    Extra Tubes [[id number]] Collected: 01/25/25 1624    Specimen: Blood, Venous Line Updated: 01/25/25 1631    Narrative:      The following orders were created for panel order Extra Tubes.  Procedure                               Abnormality         Status                     ---------                               -----------         ------                     Gold Top - SST[[id number]]                                   Final result                 Please view results for these tests on the individual orders.    Gold Top - SST [[id number]] Collected: 01/25/25 1624    Specimen: Blood Updated: 01/25/25 1631     Extra Tube Hold for add-ons.     Comment: Auto resulted.       CBC & Differential [[id number]]  (Abnormal) Collected: 01/25/25 1558    Specimen: Blood Updated: 01/25/25 1604    Narrative:      The following orders were created for panel order CBC & Differential.  Procedure                               Abnormality         Status                     ---------                               -----------         ------                     CBC Auto Differential[[id number]]        Abnormal            Final result                 Please view results for these tests on the individual orders.    CBC Auto Differential [[id number]]  (Abnormal) Collected: 01/25/25 1558    Specimen: Blood Updated: 01/25/25 1604     WBC 6.04 10*3/mm3      RBC 4.52 10*6/mm3      Hemoglobin 12.8 g/dL      Hematocrit 41.3 %      MCV 91.4 fL      MCH 28.3 pg      MCHC 31.0 g/dL      RDW 13.8 %      RDW-SD 46.1 fl      MPV 11.1 fL      Platelets 237 10*3/mm3      Neutrophil % 67.8 %      Lymphocyte % 19.5 %      Monocyte % 10.8 %      Eosinophil % 1.2 %      Basophil % 0.5 %      Immature Grans % 0.2 %      Neutrophils,  Absolute 4.10 10*3/mm3      Lymphocytes, Absolute 1.18 10*3/mm3      Monocytes, Absolute 0.65 10*3/mm3      Eosinophils, Absolute 0.07 10*3/mm3      Basophils, Absolute 0.03 10*3/mm3      Immature Grans, Absolute 0.01 10*3/mm3      nRBC 0.0 /100 WBC             Imaging Results (Most Recent)       Procedure Component Value Units Date/Time    XR Chest 1 View [[id number]] Collected: 01/25/25 1607     Updated: 01/25/25 1611    Narrative:      XR CHEST 1 VW    Date of Exam: 1/25/2025 3:43 PM EST    Indication: soa cough.    Comparison: 11/11/2024, CT chest 12/18/2024    FINDINGS:  There are ill-defined multifocal airspace infiltrates throughout the lungs bilaterally with associated diffuse interstitial changes. Bilateral pleural effusions are noted. The cardiac silhouette and mediastinum are stable. The right portacatheter is   stable in position. No acute osseous abnormalities are identified.      Impression:      1.Ill-defined multifocal airspace infiltrates bilaterally with diffuse interstitial changes. Bilateral pleural effusions are also noted. The findings suggest developing atypical/viral infection or multifocal pneumonia. Changes of CHF and pulmonary edema   could also have this appearance. Recommend correlation for signs or symptoms of acute infection and follow-up to ensure improvement/resolution.      Electronically Signed: Domenico Escalante MD    1/25/2025 4:09 PM EST    Workstation ID: [id number]          reviewed    ECG/EMG Results (most recent)       Procedure Component Value Units Date/Time    Adult Transthoracic Echo Complete W/ Cont if Necessary Per Protocol [[id number]] Resulted: 01/26/25 1240     Updated: 01/26/25 1240     EF(MOD-bp) 26.0 %      EF_3D-VOL 27.0 %      LV GLOBAL STRAIN  -5.4 %      LVIDd 6.7 cm      LVIDs 5.9 cm      IVSd 1.00 cm      LVPWd 0.90 cm      FS 11.9 %      IVS/LVPW 1.11 cm      ESV(cubed) 205.4 ml      LV Sys Vol (BSA corrected) 61.6 cm2      EDV(cubed) 300.8 ml      LV Fountain Vol  (BSA corrected) 85.4 cm2      LV mass(C)d 279.6 grams      LVOT area 3.5 cm2      LVOT diam 2.10 cm      EDV(MOD-sp2) 209.0 ml      EDV(MOD-sp4) 176.0 ml      ESV(MOD-sp2) 158.0 ml      ESV(MOD-sp4) 127.0 ml      SV(MOD-sp2) 51.0 ml      SV(MOD-sp4) 49.0 ml      SVi(MOD-SP2) 24.7 ml/m2      SVi(MOD-SP4) 23.8 ml/m2      SVi (LVOT) 16.4 ml/m2      EF(MOD-sp2) 24.4 %      EF(MOD-sp4) 27.8 %      MV E max rafal 117.5 cm/sec      MV dec time 0.12 sec      LA ESV Index (BP) 37.5 ml/m2      Med Peak E' Rafal 4.5 cm/sec      Lat Peak E' Rafal 13.7 cm/sec      TR max rafal 214.0 cm/sec      Avg E/e' ratio 12.91     SV(LVOT) 33.7 ml      SV(RVOT) 45.9 ml      Qp/Qs 1.36     RVIDd 2.7 cm      RV Base 4.5 cm      RV Mid 2.8 cm      TAPSE (>1.6) 1.88 cm      RV S' 10.8 cm/sec      LA dimension (2D)  3.9 cm      LV V1 max 57.3 cm/sec      LV V1 max PG 1.31 mmHg      LV V1 mean PG 1.00 mmHg      LV V1 VTI 9.7 cm      Ao pk rafal 138.0 cm/sec      Ao max PG 7.6 mmHg      Ao mean PG 5.0 mmHg      Ao V2 VTI 21.7 cm      RODRICK(I,D) 1.55 cm2      MV max PG 7.5 mmHg      MV mean PG 3.0 mmHg      MV V2 VTI 25.1 cm      MV P1/2t 54.6 msec      MVA(P1/2t) 4.0 cm2      MVA(VTI) 1.34 cm2      MV dec slope 794.0 cm/sec2      MR max rafal 441.0 cm/sec      MR max PG 77.8 mmHg      MR mean rafal 318.0 cm/sec      MR mean PG 43.0 mmHg      MR .0 cm      TR max PG 18.3 mmHg      RVOT diam 3.0 cm      RV V1 max PG 0.74 mmHg      RV V1 max 43.1 cm/sec      RV V1 VTI 6.5 cm      PA V2 max 60.6 cm/sec      PA acc time 0.02 sec      Ao root diam 3.4 cm      ACS 1.60 cm      Sinus 3.5 cm      STJ 2.5 cm      Dimensionless Index 0.36 (DI)     Narrative:        Left ventricular ejection fraction appears to be 21 - 25%.    Indication  Shortness of breath  Heart failure    Technically satisfactory study.  Mitral valve is structurally normal.  Tricuspid valve is structurally normal.  Aortic valve is thickened with adequate opening motion.  Pulmonic valve could not  be well visualized.  Moderate mitral and mild aortic regurgitation is present.  Mild tricuspid   regurgitation is present.  Left atrium is normal in size.  Right atrium is normal in size.  Left ventricle is significantly enlarged with severe and diffuse   hypocontractility with ejection fraction of 20 to 25%.  Left ventricular peak systolic global longitudinal strain is abnormal with   GLS of -5%.  Right ventricle is normal in size and contractility with a TAPSE of 1.8   cm..  Atrial septum is intact.  Aorta is normal.  IVC dilatation with decreased respiratory variation.  Small pericardial effusion is present.  No evidence for intracardiac thrombus is present.  Large left pleural effusion is present.    Impression  Moderate mitral regurgitation.  Time mild mitral and tricuspid   regurgitation is present.  Left ventricle is significantly enlarged with severe and diffuse   hypocontractility with ejection fraction of 20 to 25%.  Left ventricular peak systolic global longitudinal strain is abnormal with   GLS of -5%.  Right ventricle is normal in size and contractility with a TAPSE of 1.8   cm..  Large left pleural effusion is present.  Small pericardial effusion is present.          reviewed    Results for orders placed during the hospital encounter of 04/24/24    Duplex Venous Lower Extremity - Bilateral CAR    Interpretation Summary    Normal bilateral lower extremity venous duplex scan.      Results for orders placed during the hospital encounter of 01/25/25    Adult Transthoracic Echo Complete W/ Cont if Necessary Per Protocol    Interpretation Summary    Left ventricular ejection fraction appears to be 21 - 25%.    Indication  Shortness of breath  Heart failure    Technically satisfactory study.  Mitral valve is structurally normal.  Tricuspid valve is structurally normal.  Aortic valve is thickened with adequate opening motion.  Pulmonic valve could not be well visualized.  Moderate mitral and mild aortic  regurgitation is present.  Mild tricuspid regurgitation is present.  Left atrium is normal in size.  Right atrium is normal in size.  Left ventricle is significantly enlarged with severe and diffuse hypocontractility with ejection fraction of 20 to 25%.  Left ventricular peak systolic global longitudinal strain is abnormal with GLS of -5%.  Right ventricle is normal in size and contractility with a TAPSE of 1.8 cm..  Atrial septum is intact.  Aorta is normal.  IVC dilatation with decreased respiratory variation.  Small pericardial effusion is present.  No evidence for intracardiac thrombus is present.  Large left pleural effusion is present.    Impression  Moderate mitral regurgitation.  Time mild mitral and tricuspid regurgitation is present.  Left ventricle is significantly enlarged with severe and diffuse hypocontractility with ejection fraction of 20 to 25%.  Left ventricular peak systolic global longitudinal strain is abnormal with GLS of -5%.  Right ventricle is normal in size and contractility with a TAPSE of 1.8 cm..  Large left pleural effusion is present.  Small pericardial effusion is present.      Microbiology Results (last 10 days)       Procedure Component Value - Date/Time    Respiratory Panel PCR w/COVID-19(SARS-CoV-2) JODY/TICO/KG/PAD/COR/OZ In-House, NP Swab in UTM/VTM, 2 HR TAT - Swab, Nasopharynx [[id number]]  (Normal) Collected: 01/25/25 1558    Lab Status: Final result Specimen: Swab from Nasopharynx Updated: 01/25/25 1701     ADENOVIRUS, PCR Not Detected     Coronavirus 229E Not Detected     Coronavirus HKU1 Not Detected     Coronavirus NL63 Not Detected     Coronavirus OC43 Not Detected     COVID19 Not Detected     Human Metapneumovirus Not Detected     Human Rhinovirus/Enterovirus Not Detected     Influenza A PCR Not Detected     Influenza B PCR Not Detected     Parainfluenza Virus 1 Not Detected     Parainfluenza Virus 2 Not Detected     Parainfluenza Virus 3 Not Detected     Parainfluenza  Virus 4 Not Detected     RSV, PCR Not Detected     Bordetella pertussis pcr Not Detected     Bordetella parapertussis PCR Not Detected     Chlamydophila pneumoniae PCR Not Detected     Mycoplasma pneumo by PCR Not Detected    Narrative:      In the setting of a positive respiratory panel with a viral infection PLUS a negative procalcitonin without other underlying concern for bacterial infection, consider observing off antibiotics or discontinuation of antibiotics and continue supportive care. If the respiratory panel is positive for atypical bacterial infection (Bordetella pertussis, Chlamydophila pneumoniae, or Mycoplasma pneumoniae), consider antibiotic de-escalation to target atypical bacterial infection.            Assessment & Plan     CHF (congestive heart failure)     CHF  - cbc, cmp, ua unremarkable  - bnp 5244  - Rpp negative  - chest xray reviewed and showing ill-defined multifocal airspace infiltrates bilaterally with diffuse interstitial changes. Bilateral pleural effusions are also noted. The findings suggest developing atypical/viral infection or multifocal pneumonia. Changes of CHF and pulmonary edema   could also have this appearance. Recommend correlation for signs or symptoms of acute infection and follow-up to ensure improvement/resolution.  - iv lasix given  - cardiology consulted  - echo performed anfd showing Moderate mitral regurgitation.  Time mild mitral and tricuspid regurgitation is present.  Left ventricle is significantly enlarged with severe and diffuse hypocontractility with ejection fraction of 20 to 25%.  Left ventricular peak systolic global longitudinal strain is abnormal with GLS of -5%.Right ventricle is normal in size and contractility with a TAPSE of 1.8 cm..Large left pleural effusion is present. Small pericardial effusion is present.  - pt wishes to be discharged and follow up as outpt      Diabetes mellitus  -moderately controlled   Lab Results   Component Value Date     GLUCOSE 297 (H) 01/26/2025    GLUCOSE 218 (H) 01/25/2025    GLUCOSE 207 (H) 01/21/2025    GLUCOSE 281 (H) 11/14/2024     - A1c 9.79  -lantus, ssi, metformin  -Diabetic diet  -Monitor before meals and at bedtime     Hypertension  -well Controlled   BP Readings from Last 1 Encounters:   01/26/25 134/87     - Continue losartan,   - Monitor while admitted     Hx of colon ca  - follows with oncology      I discussed the patients findings and my recommendations with patient and nursing staff.     Discharge Diagnosis:      CHF (congestive heart failure)      Hospital Course  Patient is a 79 y.o. male presented with cough, er evaluated and admitted to observation. Labs including cbc, cmp, ua are normal. Bnp is elevated. Iv lasix given. Chest xray showing chf. Cardiology consulted. Echo performed. Pt wishing to be discharged. Pt will follow up as outpt. Discharge discussed with pt and he is agreeable to plan. Instructed pt to return to er if symptoms reoccur or worsen.      Past Medical History:     Past Medical History:   Diagnosis Date    Anemia     Colon cancer 2022    colon    Diabetes mellitus     Hyperlipidemia     Hypertension     LBBB (left bundle branch block) 11/14/2024       Past Surgical History:     Past Surgical History:   Procedure Laterality Date    APPENDECTOMY      CARDIAC CATHETERIZATION      CARDIAC CATHETERIZATION N/A 11/14/2024    Procedure: Left Heart Cath, possible pci;  Surgeon: Mg Rahman MD;  Location: [facility] CATH INVASIVE LOCATION;  Service: Cardiovascular;  Laterality: N/A;    COLON RESECTION N/A 12/15/2022    Procedure: COLON RESECTION RIGHT;  Surgeon: Percy Davis MD;  Location: [facility] MAIN OR;  Service: General;  Laterality: N/A;    COLONOSCOPY N/A 11/11/2022    Procedure: COLONOSCOPY WITH BIOPSY AND POLYPECTOMY;  Surgeon: Billy Julien MD;  Location: [facility] ENDOSCOPY;  Service: Gastroenterology;  Laterality: N/A;  Impression:  1.  Large 4-5cm fulgurating  circumferential ulcerated mass in the very proximal part of the ascending colon next to ileocecal valve multiple biopsies were performed.  This is highly concerning for colon malignancy.  2.  2 polyp rem    ENDOSCOPY N/A 2022    Procedure: ESOPHAGOGASTRODUODENOSCOPY with biopsy X1;  Surgeon: Billy Julien MD;  Location: [facility] ENDOSCOPY;  Service: Gastroenterology;  Laterality: N/A;  5.  Upper endoscopy lamination unremarkable.       PORTACATH PLACEMENT Right 2023    Procedure: INSERTION OF PORTACATH;  Surgeon: Percy Davis MD;  Location: [facility] MAIN OR;  Service: General;  Laterality: Right;    TONSILLECTOMY         Social History:   Social History     Socioeconomic History    Marital status:    Tobacco Use    Smoking status: Former     Current packs/day: 0.00     Average packs/day: 1 pack/day for 2.0 years (2.0 ttl pk-yrs)     Types: Cigarettes     Start date:      Quit date:      Years since quittin.1    Smokeless tobacco: Never   Vaping Use    Vaping status: Never Used   Substance and Sexual Activity    Alcohol use: Never    Drug use: Never    Sexual activity: Defer       Procedures Performed         Consults:   Consults       Date and Time Order Name Status Description    2025  7:09 PM Inpatient Cardiology Consult Completed             Condition on Discharge:     Stable    Discharge Disposition  Home or Self Care    Discharge Medications     Discharge Medications        Continue These Medications        Instructions Start Date   aspirin 81 MG EC tablet   81 mg, Daily      atorvastatin 40 MG tablet  Commonly known as: LIPITOR   40 mg, Oral, Daily      ferrous gluconate 324 MG tablet  Commonly known as: FERGON   324 mg, Oral, Daily With Breakfast      gabapentin 100 MG capsule  Commonly known as: NEURONTIN   1 capsule, Every 12 Hours Scheduled      insulin aspart 100 UNIT/ML solution pen-injector sc pen  Commonly known as: novoLOG FLEXPEN   8 Units, Subcutaneous,  3 Times Daily With Meals      insulin glargine 100 UNIT/ML injection  Commonly known as: LANTUS, SEMGLEE   20 Units, Subcutaneous, Nightly      losartan 100 MG tablet  Commonly known as: COZAAR   100 mg, Oral, Daily      metFORMIN 1000 MG tablet  Commonly known as: GLUCOPHAGE   1,000 mg, Oral, 2 Times Daily With Meals      midodrine 10 MG tablet  Commonly known as: PROAMATINE   10 mg, Oral, 3 Times Daily Before Meals      ondansetron ODT 8 MG disintegrating tablet  Commonly known as: ZOFRAN-ODT   8 mg, Translingual, Every 8 Hours PRN               Discharge Diet:     Activity at Discharge:     Follow-up Appointments  Future Appointments   Date Time Provider Department Center   2/14/2025  2:15 PM [facility]  [facility]   2/14/2025  2:30 PM Don Kramer MD [facility]     Additional Instructions for the Follow-ups that You Need to Schedule       Discharge Follow-up with Specified Provider: cardiology; 2 Weeks   As directed      To: cardiology   Follow Up: 2 Weeks                Test Results Pending at Discharge  Pending Results       None             Risk for Readmission (LACE) Score: 8 (1/26/2025  6:00 AM)      Less Than 30 minutes spent in discharge activities for this patient    Signature:Electronically signed by Elizabeth Johnston PA-C, 01/26/25, 3:25 PM EST.

## 2025-01-26 NOTE — CASE MANAGEMENT/SOCIAL WORK
Case Management Discharge Note      Final Note: Routine home.         Selected Continued Care - Discharged on 1/26/2025 Admission date: 1/25/2025 - Discharge disposition: Home or Self Care            Transportation Services  Private: Car    Final Discharge Disposition Code: 01 - home or self-care

## 2025-01-26 NOTE — ED NOTES
Nursing report ED to floor  Vlad Guerrero  79 y.o.  male    HPI:   Chief Complaint   Patient presents with    Cough       Admitting doctor:   Preston Ch MD    Admitting diagnosis:   The primary encounter diagnosis was Congestive heart failure, unspecified HF chronicity, unspecified heart failure type. A diagnosis of Orthopnea was also pertinent to this visit.    Code status:   Current Code Status       Date Active Code Status Order ID Comments User Context       1/25/2025 1909 CPR (Attempt to Resuscitate) [id number]  Nikki Johnson PA-C ED        Question Answer    Code Status (Patient has no pulse and is not breathing) CPR (Attempt to Resuscitate)    Medical Interventions (Patient has pulse or is breathing) Full Support    Level Of Support Discussed With Patient                    Allergies:   Penicillins    Isolation:  No active isolations     Fall Risk:  Fall Risk Assessment was completed, and patient is at low risk for falls.   Predictive Model Details         4 (Low) Factor Value    Calculated 1/25/2025 19:23 Age 79    Risk of Fall Model Imaging order in this encounter Present     Respiratory Rate 18     Magnesium not on file     Isaiah Scale not on file     Calcium 8.9 mg/dL     Clinically Relevant Sex Not Female     Number of Distinct Medication Classes administered 1     Albumin not on file     Diastolic BP 98     Potassium 4.6 mmol/L     Days after Admission 0.168     Total Bilirubin not on file     Tobacco Use Quit     Creatinine 0.93 mg/dL     Chloride 104 mmol/L     ALT not on file         Weight:       01/25/25  1512   Weight: 84.2 kg (185 lb 10 oz)       Intake and Output  No intake or output data in the 24 hours ending 01/25/25 1926    Diet:   Dietary Orders (From admission, onward)       Start     Ordered    01/25/25 1905  Diet: Cardiac, Diabetic; Healthy Heart (2-3 Na+); Consistent Carbohydrate; Fluid Consistency: Thin (IDDSI 0)  Diet Effective Now        References:    Diet Order  Definitions   Question Answer Comment   Diets: Cardiac    Diets: Diabetic    Cardiac Diet: Healthy Heart (2-3 Na+)    Diabetic Diet: Consistent Carbohydrate    Fluid Consistency: Thin (IDDSI 0)        01/25/25 1909                     Most recent vitals:   Vitals:    01/25/25 1513 01/25/25 1725 01/25/25 1755 01/25/25 1809   BP: 135/91 134/95 144/86 143/98   Pulse: 114 103 101 109   Resp: 18 16  18   Temp: 98 °F (36.7 °C)      TempSrc: Oral      SpO2: 97% 95%  92%   Weight:       Height:           Active LDAs/IV Access:   Lines, Drains & Airways       Active LDAs       Name Placement date Placement time Site Days    Single Lumen Implantable Port Right Subclavian --  --  Subclavian  --    Arterial Sheath 6 Fr. Right Femoral 11/14/24  1103  Femoral  72                    Skin Condition:   Skin Assessments (last day)       None             Labs (abnormal labs have a star):   Labs Reviewed   BASIC METABOLIC PANEL - Abnormal; Notable for the following components:       Result Value    Glucose 218 (*)     All other components within normal limits    Narrative:     GFR Categories in Chronic Kidney Disease (CKD)      GFR Category          GFR (mL/min/1.73)    Interpretation  G1                     90 or greater         Normal or high (1)  G2                      60-89                Mild decrease (1)  G3a                   45-59                Mild to moderate decrease  G3b                   30-44                Moderate to severe decrease  G4                    15-29                Severe decrease  G5                    14 or less           Kidney failure          (1)In the absence of evidence of kidney disease, neither GFR category G1 or G2 fulfill the criteria for CKD.    eGFR calculation 2021 CKD-EPI creatinine equation, which does not include race as a factor   BNP (IN-HOUSE) - Abnormal; Notable for the following components:    proBNP 4,259.0 (*)     All other components within normal limits    Narrative:     This assay  is used as an aid in the diagnosis of individuals suspected of having heart failure. It can be used as an aid in the diagnosis of acute decompensated heart failure (ADHF) in patients presenting with signs and symptoms of ADHF to the emergency department (ED). In addition, NT-proBNP of <300 pg/mL indicates ADHF is not likely.    Age Range Result Interpretation  NT-proBNP Concentration (pg/mL:      <50             Positive            >450                   Gray                 300-450                    Negative             <300    50-75           Positive            >900                  Gray                300-900                  Negative            <300      >75             Positive            >1800                  Gray                300-1800                  Negative            <300   CBC WITH AUTO DIFFERENTIAL - Abnormal; Notable for the following components:    Hemoglobin 12.8 (*)     MCHC 31.0 (*)     Lymphocyte % 19.5 (*)     All other components within normal limits   URINALYSIS W/ CULTURE IF INDICATED - Abnormal; Notable for the following components:    Glucose,  mg/dL (2+) (*)     Protein,  mg/dL (2+) (*)     All other components within normal limits    Narrative:     In absence of clinical symptoms, the presence of pyuria, bacteria, and/or nitrites on the urinalysis result does not correlate with infection.   RESPIRATORY PANEL PCR W/ COVID-19 (SARS-COV-2), NP SWAB IN UTM/VTP, 2 HR TAT - Normal    Narrative:     In the setting of a positive respiratory panel with a viral infection PLUS a negative procalcitonin without other underlying concern for bacterial infection, consider observing off antibiotics or discontinuation of antibiotics and continue supportive care. If the respiratory panel is positive for atypical bacterial infection (Bordetella pertussis, Chlamydophila pneumoniae, or Mycoplasma pneumoniae), consider antibiotic de-escalation to target atypical bacterial infection.    URINALYSIS, MICROSCOPIC ONLY   CBC AND DIFFERENTIAL    Narrative:     The following orders were created for panel order CBC & Differential.  Procedure                               Abnormality         Status                     ---------                               -----------         ------                     CBC Auto Differential[[id number]]        Abnormal            Final result                 Please view results for these tests on the individual orders.   EXTRA TUBES    Narrative:     The following orders were created for panel order Extra Tubes.  Procedure                               Abnormality         Status                     ---------                               -----------         ------                     Gold Top - SST[[id number]]                                   Final result                 Please view results for these tests on the individual orders.   GOLD TOP - SST       LOC: Person, Place, Time, and Situation    Telemetry:  Observation Unit    Cardiac Monitoring Ordered: yes    EKG:   No orders to display       Medications Given in the ED:   Medications   sodium chloride 0.9 % flush 10 mL (has no administration in time range)   sodium chloride 0.9 % flush 10 mL (has no administration in time range)   sodium chloride 0.9 % flush 10 mL (has no administration in time range)   sodium chloride 0.9 % infusion 40 mL (has no administration in time range)   Potassium Replacement - Follow Nurse / BPA Driven Protocol (has no administration in time range)   Magnesium Standard Dose Replacement - Follow Nurse / BPA Driven Protocol (has no administration in time range)   Phosphorus Replacement - Follow Nurse / BPA Driven Protocol (has no administration in time range)   Calcium Replacement - Follow Nurse / BPA Driven Protocol (has no administration in time range)   acetaminophen (TYLENOL) tablet 650 mg (has no administration in time range)     Or   acetaminophen (TYLENOL) 160 MG/5ML oral solution 650 mg  (has no administration in time range)     Or   acetaminophen (TYLENOL) suppository 650 mg (has no administration in time range)   HYDROcodone-acetaminophen (NORCO) 5-325 MG per tablet 1 tablet (has no administration in time range)   sennosides-docusate (PERICOLACE) 8.6-50 MG per tablet 2 tablet (has no administration in time range)     And   polyethylene glycol (MIRALAX) packet 17 g (has no administration in time range)     And   bisacodyl (DULCOLAX) EC tablet 5 mg (has no administration in time range)     And   bisacodyl (DULCOLAX) suppository 10 mg (has no administration in time range)   ondansetron ODT (ZOFRAN-ODT) disintegrating tablet 4 mg (has no administration in time range)     Or   ondansetron (ZOFRAN) injection 4 mg (has no administration in time range)   calcium carbonate (TUMS) chewable tablet 500 mg (200 mg elemental) (has no administration in time range)   furosemide (LASIX) injection 40 mg (has no administration in time range)   furosemide (LASIX) injection 80 mg (80 mg Intravenous Given 25 1755)       Imaging results:  XR Chest 1 View    Result Date: 2025  1.Ill-defined multifocal airspace infiltrates bilaterally with diffuse interstitial changes. Bilateral pleural effusions are also noted. The findings suggest developing atypical/viral infection or multifocal pneumonia. Changes of CHF and pulmonary edema could also have this appearance. Recommend correlation for signs or symptoms of acute infection and follow-up to ensure improvement/resolution. Electronically Signed: Domenico Escalante MD  2025 4:09 PM EST  Workstation ID: [id number]     Social issues:   Social History     Socioeconomic History    Marital status:    Tobacco Use    Smoking status: Former     Current packs/day: 0.00     Average packs/day: 1 pack/day for 2.0 years (2.0 ttl pk-yrs)     Types: Cigarettes     Start date:      Quit date: 1966     Years since quittin.1    Smokeless tobacco: Never   Vaping Use     Vaping status: Never Used   Substance and Sexual Activity    Alcohol use: Never    Drug use: Never    Sexual activity: Defer       NIH Stroke Scale:  Interval: (not recorded)  1a. Level of Consciousness: (not recorded)  1b. LOC Questions: (not recorded)  1c. LOC Commands: (not recorded)  2. Best Gaze: (not recorded)  3. Visual: (not recorded)  4. Facial Palsy: (not recorded)  5a. Motor Arm, Left: (not recorded)  5b. Motor Arm, Right: (not recorded)  6a. Motor Leg, Left: (not recorded)  6b. Motor Leg, Right: (not recorded)  7. Limb Ataxia: (not recorded)  8. Sensory: (not recorded)  9. Best Language: (not recorded)  10. Dysarthria: (not recorded)  11. Extinction and Inattention (formerly Neglect): (not recorded)    Total (NIH Stroke Scale): (not recorded)     Additional notable assessment information:n/a     Nursing report ED to floor:  [facility] 2B 229    Sloane Jimenez RN   01/25/25 19:26 EST

## 2025-01-27 ENCOUNTER — TELEPHONE (OUTPATIENT)
Dept: CARDIOLOGY | Facility: CLINIC | Age: 80
End: 2025-01-27
Payer: OTHER GOVERNMENT

## 2025-01-27 NOTE — OUTREACH NOTE
Prep Survey      Flowsheet Row Responses   [facility] facility patient discharged from? [facility]   Is LACE score < 7 ? No   Eligibility Readm Mgmt   Discharge diagnosis CHF (congestive heart failure)   Does the patient have one of the following disease processes/diagnoses(primary or secondary)? CHF   Does the patient have Home health ordered? No   Is there a DME ordered? No   Prep survey completed? Yes            Kamille ACEVEDO - Registered Nurse

## 2025-01-27 NOTE — TELEPHONE ENCOUNTER
Please schedule hospital follow up with me in 1-2 weeks.  Patient was discharged over the weekend.    Detail Level: Detailed Quality 131: Pain Assessment And Follow-Up: Pain assessment using a standardized tool is documented as negative, no follow-up plan required Quality 154 Part A: Falls: Risk Assessment (Should Be Reported With Measure 155.): Falls risk assessment completed and documented in the past 12 months. Quality 155 (Denominator): Falls Plan Of Care: Plan of Care not Documented, Reason not Otherwise Specified Quality 431: Preventive Care And Screening: Unhealthy Alcohol Use - Screening: Patient screened for unhealthy alcohol use using a single question and scores less than 2 times per year Quality 110: Preventive Care And Screening: Influenza Immunization: Influenza Immunization Ordered or Recommended, but not Administered due to system reason Quality 154 Part B: Falls: Risk Screening (Should Be Reported With Measure 155.): Patient screened for future fall risk; documentation of no falls in the past year or only one fall without injury in the past year Quality 47: Advance Care Plan: Advance Care Planning discussed and documented in the medical record; patient did not wish or was not able to name a surrogate decision maker or provide an advance care plan. Quality 226: Preventive Care And Screening: Tobacco Use: Screening And Cessation Intervention: Patient screened for tobacco use and is an ex/non-smoker Quality 111:Pneumonia Vaccination Status For Older Adults: Pneumococcal Vaccination not Administered or Previously Received, Reason not Otherwise Specified

## 2025-01-29 ENCOUNTER — READMISSION MANAGEMENT (OUTPATIENT)
Dept: CALL CENTER | Facility: HOSPITAL | Age: 80
End: 2025-01-29
Payer: OTHER GOVERNMENT

## 2025-01-29 NOTE — OUTREACH NOTE
CHF Week 1 Survey      Flowsheet Row Responses   [facility] patient discharged from? [facility]   Does the patient have one of the following disease processes/diagnoses(primary or secondary)? CHF   CHF Week 1 attempt successful? No   Unsuccessful attempts Attempt 1            Madison RAMIREZ - Licensed Nurse

## 2025-02-05 ENCOUNTER — READMISSION MANAGEMENT (OUTPATIENT)
Dept: CALL CENTER | Facility: HOSPITAL | Age: 80
End: 2025-02-05
Payer: OTHER GOVERNMENT

## 2025-02-05 NOTE — OUTREACH NOTE
CHF Week 2 Survey      Flowsheet Row Responses   [facility] patient discharged from? [facility]   Does the patient have one of the following disease processes/diagnoses(primary or secondary)? CHF   Week 2 attempt successful? Yes   Call start time 1225   Call end time 1231   General alerts for this patient Pt lives in . [facility]   Discharge diagnosis CHF (congestive heart failure)   Meds reviewed with patient/caregiver? Yes   Is the patient having any side effects they believe may be caused by any medication additions or changes? No   Does the patient have all medications ordered at discharge? N/A   Does the patient have a primary care provider?  Yes   Does the patient have an appointment with their PCP within 7 days of discharge? Greater than 7 days   What is preventing the patient from scheduling follow up appointments within 7 days of discharge? Earlier appointment not available   Nursing Interventions Verified appointment date/time/provider   Has the patient kept scheduled appointments due by today? N/A   Pulse Ox monitoring Intermittent   Pulse Ox device source Patient   O2 Sat comments 97% RA at home, pt reports   Comments Pt reports that he has no SOA or JEAN-CLAUDE. Pt denies any edema or cough at this time. RN educated pt on benefits of early fluid detection by monitoring morning wts daily, prior to po intake, pt v/u. Pt denies any issues/needs.   Did the patient receive a copy of their discharge instructions? Yes   Nursing interventions Reviewed instructions with patient   What is the patient's perception of their health status since discharge? Returned to baseline/stable   Nursing interventions Nurse provided patient education   Is the patient able to teach back signs and symptoms of worsening condition? (i.e. weight gain, shortness of air, etc.) Yes   If the patient is a current smoker, are they able to teach back resources for cessation? Not a smoker   Is the patient/caregiver able to teach back the  hierarchy of who to call/visit for symptoms/problems? PCP, Specialist, Home health nurse, Urgent Care, ED, 911 Yes   CHF Zone this Call Green Zone   Green Zone Patient reports doing well, No new or worsening shortness of breath, Physical activity level is normal for you   Green Zone Interventions Meds as directed, Follow up visits planned   CHF Week 2 call completed? Yes   Revoked No further contact(revokes)-requires comment   Call end time 1231            Catarina OMER - Registered Nurse

## 2025-02-06 ENCOUNTER — NURSE TRIAGE (OUTPATIENT)
Dept: CALL CENTER | Facility: HOSPITAL | Age: 80
End: 2025-02-06
Payer: OTHER GOVERNMENT

## 2025-02-06 NOTE — TELEPHONE ENCOUNTER
Patient c/o chest congestion, cough. C/o associated wheezing. Denies shortness of breath or chest pain. Reviewed protocol with patient. Advised to see PCP or Urgent Care today. Patient verbalizes agreement with plan.    Reason for Disposition   Wheezing is present    Additional Information   Negative: SEVERE difficulty breathing (e.g., struggling for each breath, speaks in single words)   Negative: Bluish (or gray) lips or face now   Negative: [1] Rapid onset of cough AND [2] has hives   Negative: Coughing started suddenly after medicine, an allergic food or bee sting   Negative: [1] Difficulty breathing AND [2] exposure to flames, smoke, or fumes   Negative: [1] Stridor AND [2] difficulty breathing   Negative: Sounds like a life-threatening emergency to the triager   Negative: Choked on object of food that could be caught in the throat   Negative: Chest pain is main symptom   Negative: [1] Previous asthma attacks AND [2] this feels like asthma attack   Negative: Cough lasts > 3 weeks   Negative: Wet cough (productive; white-yellow, yellow, green, or charlette colored sputum)   Negative: [1] Dry cough (non-productive;  no sputum or minimal clear sputum) AND [2] within 14 days of COVID-19 Exposure   Negative: [1] MODERATE difficulty breathing (e.g., speaks in phrases, SOB even at rest, pulse 100-120) AND [2] still present when not coughing   Negative: Chest pain  (Exception: MILD central chest pain, present only when coughing.)   Negative: Patient sounds very sick or weak to the triager   Negative: [1] MILD difficulty breathing (e.g., minimal/no SOB at rest, SOB with walking, pulse <100) AND [2] still present when not coughing   Negative: [1] Coughed up blood AND [2] > 1 tablespoon (15 ml)   (Exception: Blood-tinged sputum.)   Negative: Fever > 103 F (39.4 C)   Negative: [1] Fever > 101 F (38.3 C) AND [2] age > 60 years   Negative: [1] Fever > 100.0 F (37.8 C) AND [2] bedridden (e.g., CVA, chronic illness, recovering  "from surgery)   Negative: [1] Fever > 100.0 F (37.8 C) AND [2] diabetes mellitus or weak immune system (e.g., HIV positive, cancer chemo, splenectomy, organ transplant, chronic steroids)    Answer Assessment - Initial Assessment Questions  1. ONSET: \"When did the cough begin?\"       During hospitalization   2. SEVERITY: \"How bad is the cough today?\"       Not worsening, but persistent  3. SPUTUM: \"Describe the color of your sputum\" (none, dry cough; clear, white, yellow, green)      N/A  4. HEMOPTYSIS: \"Are you coughing up any blood?\" If so ask: \"How much?\" (flecks, streaks, tablespoons, etc.)      N/A  5. DIFFICULTY BREATHING: \"Are you having difficulty breathing?\" If Yes, ask: \"How bad is it?\" (e.g., mild, moderate, severe)     - MILD: No SOB at rest, mild SOB with walking, speaks normally in sentences, can lie down, no retractions, pulse < 100.     - MODERATE: SOB at rest, SOB with minimal exertion and prefers to sit, cannot lie down flat, speaks in phrases, mild retractions, audible wheezing, pulse 100-120.     - SEVERE: Very SOB at rest, speaks in single words, struggling to breathe, sitting hunched forward, retractions, pulse > 120       No   6. FEVER: \"Do you have a fever?\" If Yes, ask: \"What is your temperature, how was it measured, and when did it start?\"      No   7. CARDIAC HISTORY: \"Do you have any history of heart disease?\" (e.g., heart attack, congestive heart failure)       See chart  8. LUNG HISTORY: \"Do you have any history of lung disease?\"  (e.g., pulmonary embolus, asthma, emphysema)      See chart  9. PE RISK FACTORS: \"Do you have a history of blood clots?\" (or: recent major surgery, recent prolonged travel, bedridden)      No   10. OTHER SYMPTOMS: \"Do you have any other symptoms?\" (e.g., runny nose, wheezing, chest pain)        Wheezing   11. PREGNANCY: \"Is there any chance you are pregnant?\" \"When was your last menstrual period?\"        N/A  12. TRAVEL: \"Have you traveled out of the country in " "the last month?\" (e.g., travel history, exposures)        No    Protocols used: Cough - Acute Non-Productive-ADULT-AH    "

## 2025-02-12 ENCOUNTER — APPOINTMENT (OUTPATIENT)
Dept: GENERAL RADIOLOGY | Facility: HOSPITAL | Age: 80
End: 2025-02-12
Payer: OTHER GOVERNMENT

## 2025-02-12 ENCOUNTER — HOSPITAL ENCOUNTER (OUTPATIENT)
Facility: HOSPITAL | Age: 80
Setting detail: OBSERVATION
Discharge: HOME | End: 2025-02-14
Attending: EMERGENCY MEDICINE | Admitting: EMERGENCY MEDICINE
Payer: OTHER GOVERNMENT

## 2025-02-12 DIAGNOSIS — I50.9 ACUTE ON CHRONIC CONGESTIVE HEART FAILURE, UNSPECIFIED HEART FAILURE TYPE: Primary | ICD-10-CM

## 2025-02-12 LAB
ALBUMIN SERPL-MCNC: 3.7 G/DL (ref 3.5–5.2)
ALBUMIN/GLOB SERPL: 0.9 G/DL
ALP SERPL-CCNC: 179 U/L (ref 39–117)
ALT SERPL W P-5'-P-CCNC: 37 U/L (ref 1–41)
ANION GAP SERPL CALCULATED.3IONS-SCNC: 11.2 MMOL/L (ref 5–15)
AST SERPL-CCNC: 40 U/L (ref 1–40)
BASOPHILS # BLD AUTO: 0.04 10*3/MM3 (ref 0–0.2)
BASOPHILS NFR BLD AUTO: 0.5 % (ref 0–1.5)
BILIRUB SERPL-MCNC: 1.4 MG/DL (ref 0–1.2)
BUN SERPL-MCNC: 19 MG/DL (ref 8–23)
BUN/CREAT SERPL: 21.3 (ref 7–25)
CALCIUM SPEC-SCNC: 9.2 MG/DL (ref 8.6–10.5)
CHLORIDE SERPL-SCNC: 102 MMOL/L (ref 98–107)
CO2 SERPL-SCNC: 26.8 MMOL/L (ref 22–29)
CREAT SERPL-MCNC: 0.89 MG/DL (ref 0.76–1.27)
DEPRECATED RDW RBC AUTO: 47.4 FL (ref 37–54)
EGFRCR SERPLBLD CKD-EPI 2021: 88.3 ML/MIN/1.73
EOSINOPHIL # BLD AUTO: 0.03 10*3/MM3 (ref 0–0.4)
EOSINOPHIL NFR BLD AUTO: 0.4 % (ref 0.3–6.2)
ERYTHROCYTE [DISTWIDTH] IN BLOOD BY AUTOMATED COUNT: 14.4 % (ref 12.3–15.4)
FLUAV RNA RESP QL NAA+PROBE: NOT DETECTED
FLUBV RNA RESP QL NAA+PROBE: NOT DETECTED
GLOBULIN UR ELPH-MCNC: 4.1 GM/DL
GLUCOSE SERPL-MCNC: 86 MG/DL (ref 65–99)
HCT VFR BLD AUTO: 45.9 % (ref 37.5–51)
HGB BLD-MCNC: 14.2 G/DL (ref 13–17.7)
IMM GRANULOCYTES # BLD AUTO: 0.02 10*3/MM3 (ref 0–0.05)
IMM GRANULOCYTES NFR BLD AUTO: 0.2 % (ref 0–0.5)
LYMPHOCYTES # BLD AUTO: 1.06 10*3/MM3 (ref 0.7–3.1)
LYMPHOCYTES NFR BLD AUTO: 13.1 % (ref 19.6–45.3)
MCH RBC QN AUTO: 27.8 PG (ref 26.6–33)
MCHC RBC AUTO-ENTMCNC: 30.9 G/DL (ref 31.5–35.7)
MCV RBC AUTO: 89.8 FL (ref 79–97)
MONOCYTES # BLD AUTO: 0.76 10*3/MM3 (ref 0.1–0.9)
MONOCYTES NFR BLD AUTO: 9.4 % (ref 5–12)
NEUTROPHILS NFR BLD AUTO: 6.2 10*3/MM3 (ref 1.7–7)
NEUTROPHILS NFR BLD AUTO: 76.4 % (ref 42.7–76)
NRBC BLD AUTO-RTO: 0 /100 WBC (ref 0–0.2)
NT-PROBNP SERPL-MCNC: 8163 PG/ML (ref 0–1800)
PLATELET # BLD AUTO: 145 10*3/MM3 (ref 140–450)
PMV BLD AUTO: 10.4 FL (ref 6–12)
POTASSIUM SERPL-SCNC: 4.7 MMOL/L (ref 3.5–5.2)
PROCALCITONIN SERPL-MCNC: 0.17 NG/ML (ref 0–0.25)
PROT SERPL-MCNC: 7.8 G/DL (ref 6–8.5)
RBC # BLD AUTO: 5.11 10*6/MM3 (ref 4.14–5.8)
RSV RNA RESP QL NAA+PROBE: NOT DETECTED
SARS-COV-2 RNA RESP QL NAA+PROBE: NOT DETECTED
SODIUM SERPL-SCNC: 140 MMOL/L (ref 136–145)
TROPONIN T SERPL HS-MCNC: 47 NG/L
WBC NRBC COR # BLD AUTO: 8.11 10*3/MM3 (ref 3.4–10.8)

## 2025-02-12 PROCEDURE — 83880 ASSAY OF NATRIURETIC PEPTIDE: CPT | Performed by: NURSE PRACTITIONER

## 2025-02-12 PROCEDURE — 71046 X-RAY EXAM CHEST 2 VIEWS: CPT

## 2025-02-12 PROCEDURE — 96374 THER/PROPH/DIAG INJ IV PUSH: CPT

## 2025-02-12 PROCEDURE — 99285 EMERGENCY DEPT VISIT HI MDM: CPT

## 2025-02-12 PROCEDURE — 84145 PROCALCITONIN (PCT): CPT | Performed by: EMERGENCY MEDICINE

## 2025-02-12 PROCEDURE — 80053 COMPREHEN METABOLIC PANEL: CPT | Performed by: NURSE PRACTITIONER

## 2025-02-12 PROCEDURE — 36415 COLL VENOUS BLD VENIPUNCTURE: CPT

## 2025-02-12 PROCEDURE — 87637 SARSCOV2&INF A&B&RSV AMP PRB: CPT | Performed by: NURSE PRACTITIONER

## 2025-02-12 PROCEDURE — 93005 ELECTROCARDIOGRAM TRACING: CPT | Performed by: NURSE PRACTITIONER

## 2025-02-12 PROCEDURE — 85025 COMPLETE CBC W/AUTO DIFF WBC: CPT | Performed by: NURSE PRACTITIONER

## 2025-02-12 PROCEDURE — 25010000002 FUROSEMIDE PER 20 MG: Performed by: EMERGENCY MEDICINE

## 2025-02-12 PROCEDURE — 84484 ASSAY OF TROPONIN QUANT: CPT | Performed by: NURSE PRACTITIONER

## 2025-02-12 RX ORDER — FUROSEMIDE 10 MG/ML
40 INJECTION INTRAMUSCULAR; INTRAVENOUS ONCE
Status: COMPLETED | OUTPATIENT
Start: 2025-02-12 | End: 2025-02-12

## 2025-02-12 RX ORDER — SODIUM CHLORIDE 0.9 % (FLUSH) 0.9 %
10 SYRINGE (ML) INJECTION AS NEEDED
Status: DISCONTINUED | OUTPATIENT
Start: 2025-02-12 | End: 2025-02-14 | Stop reason: HOSPADM

## 2025-02-12 RX ADMIN — FUROSEMIDE 40 MG: 10 INJECTION, SOLUTION INTRAMUSCULAR; INTRAVENOUS at 23:58

## 2025-02-13 ENCOUNTER — APPOINTMENT (OUTPATIENT)
Dept: CT IMAGING | Facility: HOSPITAL | Age: 80
End: 2025-02-13
Payer: OTHER GOVERNMENT

## 2025-02-13 LAB
B PARAPERT DNA SPEC QL NAA+PROBE: NOT DETECTED
B PERT DNA SPEC QL NAA+PROBE: NOT DETECTED
C PNEUM DNA NPH QL NAA+NON-PROBE: NOT DETECTED
FLUAV SUBTYP SPEC NAA+PROBE: NOT DETECTED
FLUBV RNA ISLT QL NAA+PROBE: NOT DETECTED
GEN 5 1HR TROPONIN T REFLEX: 45 NG/L
GLUCOSE BLDC GLUCOMTR-MCNC: 167 MG/DL (ref 70–105)
GLUCOSE BLDC GLUCOMTR-MCNC: 171 MG/DL (ref 70–105)
GLUCOSE BLDC GLUCOMTR-MCNC: 174 MG/DL (ref 70–105)
GLUCOSE BLDC GLUCOMTR-MCNC: 180 MG/DL (ref 70–105)
HADV DNA SPEC NAA+PROBE: NOT DETECTED
HCOV 229E RNA SPEC QL NAA+PROBE: NOT DETECTED
HCOV HKU1 RNA SPEC QL NAA+PROBE: NOT DETECTED
HCOV NL63 RNA SPEC QL NAA+PROBE: NOT DETECTED
HCOV OC43 RNA SPEC QL NAA+PROBE: NOT DETECTED
HMPV RNA NPH QL NAA+NON-PROBE: NOT DETECTED
HPIV1 RNA ISLT QL NAA+PROBE: NOT DETECTED
HPIV2 RNA SPEC QL NAA+PROBE: NOT DETECTED
HPIV3 RNA NPH QL NAA+PROBE: NOT DETECTED
HPIV4 P GENE NPH QL NAA+PROBE: NOT DETECTED
M PNEUMO IGG SER IA-ACNC: NOT DETECTED
QT INTERVAL: 374 MS
QTC INTERVAL: 469 MS
RHINOVIRUS RNA SPEC NAA+PROBE: DETECTED
RSV RNA NPH QL NAA+NON-PROBE: NOT DETECTED
SARS-COV-2 RNA RESP QL NAA+PROBE: NOT DETECTED
TROPONIN T % DELTA: -4
TROPONIN T NUMERIC DELTA: -2 NG/L

## 2025-02-13 PROCEDURE — 63710000001 INSULIN LISPRO (HUMAN) PER 5 UNITS: Performed by: PHYSICIAN ASSISTANT

## 2025-02-13 PROCEDURE — 0202U NFCT DS 22 TRGT SARS-COV-2: CPT | Performed by: NURSE PRACTITIONER

## 2025-02-13 PROCEDURE — 96376 TX/PRO/DX INJ SAME DRUG ADON: CPT

## 2025-02-13 PROCEDURE — 63710000001 INSULIN GLARGINE PER 5 UNITS: Performed by: PHYSICIAN ASSISTANT

## 2025-02-13 PROCEDURE — 25010000002 FUROSEMIDE PER 20 MG: Performed by: NURSE PRACTITIONER

## 2025-02-13 PROCEDURE — G0378 HOSPITAL OBSERVATION PER HR: HCPCS

## 2025-02-13 PROCEDURE — 82948 REAGENT STRIP/BLOOD GLUCOSE: CPT

## 2025-02-13 PROCEDURE — 25510000001 IOPAMIDOL PER 1 ML: Performed by: EMERGENCY MEDICINE

## 2025-02-13 PROCEDURE — 71275 CT ANGIOGRAPHY CHEST: CPT

## 2025-02-13 RX ORDER — IBUPROFEN 600 MG/1
1 TABLET ORAL
Status: DISCONTINUED | OUTPATIENT
Start: 2025-02-13 | End: 2025-02-14 | Stop reason: HOSPADM

## 2025-02-13 RX ORDER — INSULIN LISPRO 100 [IU]/ML
2-9 INJECTION, SOLUTION INTRAVENOUS; SUBCUTANEOUS
Status: DISCONTINUED | OUTPATIENT
Start: 2025-02-13 | End: 2025-02-14 | Stop reason: HOSPADM

## 2025-02-13 RX ORDER — SODIUM CHLORIDE 9 MG/ML
40 INJECTION, SOLUTION INTRAVENOUS AS NEEDED
Status: DISCONTINUED | OUTPATIENT
Start: 2025-02-13 | End: 2025-02-14 | Stop reason: HOSPADM

## 2025-02-13 RX ORDER — ONDANSETRON 8 MG/1
8 TABLET, FILM COATED ORAL EVERY 8 HOURS PRN
COMMUNITY

## 2025-02-13 RX ORDER — INSULIN GLARGINE 100 [IU]/ML
20 INJECTION, SOLUTION SUBCUTANEOUS NIGHTLY
Status: DISCONTINUED | OUTPATIENT
Start: 2025-02-13 | End: 2025-02-14 | Stop reason: HOSPADM

## 2025-02-13 RX ORDER — FUROSEMIDE 20 MG/1
20 TABLET ORAL DAILY
Status: DISCONTINUED | OUTPATIENT
Start: 2025-02-13 | End: 2025-02-13

## 2025-02-13 RX ORDER — FERROUS SULFATE 325(65) MG
325 TABLET ORAL
Status: DISCONTINUED | OUTPATIENT
Start: 2025-02-13 | End: 2025-02-14 | Stop reason: HOSPADM

## 2025-02-13 RX ORDER — ONDANSETRON 4 MG/1
4 TABLET, ORALLY DISINTEGRATING ORAL EVERY 6 HOURS PRN
Status: DISCONTINUED | OUTPATIENT
Start: 2025-02-13 | End: 2025-02-14 | Stop reason: HOSPADM

## 2025-02-13 RX ORDER — AMMONIUM LACTATE 120 MG/G
1 CREAM TOPICAL 2 TIMES DAILY
COMMUNITY

## 2025-02-13 RX ORDER — GABAPENTIN 100 MG/1
100 CAPSULE ORAL EVERY 12 HOURS SCHEDULED
Status: DISCONTINUED | OUTPATIENT
Start: 2025-02-13 | End: 2025-02-14 | Stop reason: HOSPADM

## 2025-02-13 RX ORDER — ATORVASTATIN CALCIUM 40 MG/1
40 TABLET, FILM COATED ORAL DAILY
Status: DISCONTINUED | OUTPATIENT
Start: 2025-02-13 | End: 2025-02-14 | Stop reason: HOSPADM

## 2025-02-13 RX ORDER — FUROSEMIDE 20 MG/1
20 TABLET ORAL DAILY
COMMUNITY
End: 2025-02-14 | Stop reason: HOSPADM

## 2025-02-13 RX ORDER — MELATONIN 200 MCG
3 TABLET ORAL NIGHTLY
COMMUNITY

## 2025-02-13 RX ORDER — NICOTINE POLACRILEX 4 MG
15 LOZENGE BUCCAL
Status: DISCONTINUED | OUTPATIENT
Start: 2025-02-13 | End: 2025-02-14 | Stop reason: HOSPADM

## 2025-02-13 RX ORDER — SODIUM CHLORIDE 0.9 % (FLUSH) 0.9 %
10 SYRINGE (ML) INJECTION EVERY 12 HOURS SCHEDULED
Status: DISCONTINUED | OUTPATIENT
Start: 2025-02-13 | End: 2025-02-14 | Stop reason: HOSPADM

## 2025-02-13 RX ORDER — ASPIRIN 81 MG/1
81 TABLET ORAL DAILY
Status: DISCONTINUED | OUTPATIENT
Start: 2025-02-13 | End: 2025-02-14 | Stop reason: HOSPADM

## 2025-02-13 RX ORDER — SODIUM CHLORIDE 0.9 % (FLUSH) 0.9 %
10 SYRINGE (ML) INJECTION AS NEEDED
Status: DISCONTINUED | OUTPATIENT
Start: 2025-02-13 | End: 2025-02-14 | Stop reason: HOSPADM

## 2025-02-13 RX ORDER — ONDANSETRON 2 MG/ML
4 INJECTION INTRAMUSCULAR; INTRAVENOUS EVERY 6 HOURS PRN
Status: DISCONTINUED | OUTPATIENT
Start: 2025-02-13 | End: 2025-02-14 | Stop reason: HOSPADM

## 2025-02-13 RX ORDER — DEXTROSE MONOHYDRATE 25 G/50ML
25 INJECTION, SOLUTION INTRAVENOUS
Status: DISCONTINUED | OUTPATIENT
Start: 2025-02-13 | End: 2025-02-14 | Stop reason: HOSPADM

## 2025-02-13 RX ORDER — ALUMINA, MAGNESIA, AND SIMETHICONE 2400; 2400; 240 MG/30ML; MG/30ML; MG/30ML
15 SUSPENSION ORAL EVERY 6 HOURS PRN
Status: DISCONTINUED | OUTPATIENT
Start: 2025-02-13 | End: 2025-02-14 | Stop reason: HOSPADM

## 2025-02-13 RX ORDER — FUROSEMIDE 10 MG/ML
20 INJECTION INTRAMUSCULAR; INTRAVENOUS EVERY 12 HOURS
Status: DISCONTINUED | OUTPATIENT
Start: 2025-02-13 | End: 2025-02-14 | Stop reason: HOSPADM

## 2025-02-13 RX ORDER — MIDODRINE HYDROCHLORIDE 5 MG/1
10 TABLET ORAL
Status: DISCONTINUED | OUTPATIENT
Start: 2025-02-13 | End: 2025-02-14 | Stop reason: HOSPADM

## 2025-02-13 RX ORDER — IOPAMIDOL 755 MG/ML
100 INJECTION, SOLUTION INTRAVASCULAR
Status: COMPLETED | OUTPATIENT
Start: 2025-02-13 | End: 2025-02-13

## 2025-02-13 RX ADMIN — INSULIN GLARGINE 20 UNITS: 100 INJECTION, SOLUTION SUBCUTANEOUS at 21:42

## 2025-02-13 RX ADMIN — FUROSEMIDE 20 MG: 20 TABLET ORAL at 09:15

## 2025-02-13 RX ADMIN — ASPIRIN 81 MG: 81 TABLET, COATED ORAL at 09:15

## 2025-02-13 RX ADMIN — INSULIN LISPRO 2 UNITS: 100 INJECTION, SOLUTION INTRAVENOUS; SUBCUTANEOUS at 21:42

## 2025-02-13 RX ADMIN — MIDODRINE HYDROCHLORIDE 10 MG: 5 TABLET ORAL at 09:51

## 2025-02-13 RX ADMIN — ATORVASTATIN CALCIUM 40 MG: 40 TABLET, FILM COATED ORAL at 09:15

## 2025-02-13 RX ADMIN — METFORMIN HYDROCHLORIDE 1000 MG: 500 TABLET, FILM COATED ORAL at 09:51

## 2025-02-13 RX ADMIN — GABAPENTIN 100 MG: 100 CAPSULE ORAL at 21:42

## 2025-02-13 RX ADMIN — Medication 10 ML: at 15:16

## 2025-02-13 RX ADMIN — MIDODRINE HYDROCHLORIDE 10 MG: 5 TABLET ORAL at 12:48

## 2025-02-13 RX ADMIN — FUROSEMIDE 20 MG: 10 INJECTION, SOLUTION INTRAMUSCULAR; INTRAVENOUS at 21:42

## 2025-02-13 RX ADMIN — FERROUS SULFATE TAB 325 MG (65 MG ELEMENTAL FE) 325 MG: 325 (65 FE) TAB at 09:15

## 2025-02-13 RX ADMIN — INSULIN LISPRO 2 UNITS: 100 INJECTION, SOLUTION INTRAVENOUS; SUBCUTANEOUS at 12:48

## 2025-02-13 RX ADMIN — IOPAMIDOL 100 ML: 755 INJECTION, SOLUTION INTRAVENOUS at 11:41

## 2025-02-13 RX ADMIN — Medication 10 ML: at 09:15

## 2025-02-13 RX ADMIN — Medication 10 ML: at 21:46

## 2025-02-13 RX ADMIN — INSULIN LISPRO 2 UNITS: 100 INJECTION, SOLUTION INTRAVENOUS; SUBCUTANEOUS at 18:27

## 2025-02-13 RX ADMIN — GABAPENTIN 100 MG: 100 CAPSULE ORAL at 09:15

## 2025-02-13 NOTE — H&P
"[facility] H&P    Patient Name: Vlad Guerrero  : 1945  MRN: [id number]  Primary Care Physician: Vlad Moreland MD  Date of admission: 2025     Patient Care Team:  Vlad Moreland MD as PCP - General (Internal Medicine)  Percy Davis MD as Surgeon (General Surgery)  Don Kramer MD as Consulting Physician (Hematology and Oncology)          Subjective   History Present Illness     Chief Complaint:   Chief Complaint   Patient presents with    Nasal Congestion     Trouble breathing    History of Present Illness      ED  79-year-old male presents the ED with complaint of \"fluid on my lungs\". Patient reports this began a month ago, he reports he was seen here in the hospital and was given pills to \"make me pee\", however he ran out of them, has not had any further medications. Denies any chest pain. No leg swelling. No episodes of syncope. No fevers.     Observation 25  Pt concurs with er hpi. Cardiology consulted.       Review of Systems   Constitutional: Negative for fever.   Cardiovascular:  Negative for chest pain and leg swelling.   Respiratory:  Positive for shortness of breath. Negative for cough.              Personal History     Past Medical History:   Past Medical History:   Diagnosis Date    Anemia     Colon cancer     colon    Diabetes mellitus     Hyperlipidemia     Hypertension     LBBB (left bundle branch block) 2024       Surgical History:      Past Surgical History:   Procedure Laterality Date    APPENDECTOMY      CARDIAC CATHETERIZATION      CARDIAC CATHETERIZATION N/A 2024    Procedure: Left Heart Cath, possible pci;  Surgeon: Mg Rahman MD;  Location: [facility] CATH INVASIVE LOCATION;  Service: Cardiovascular;  Laterality: N/A;    COLON RESECTION N/A 12/15/2022    Procedure: COLON RESECTION RIGHT;  Surgeon: Percy Davis MD;  Location: [facility] MAIN OR;  Service: General;  Laterality: N/A;    COLONOSCOPY N/A " 11/11/2022    Procedure: COLONOSCOPY WITH BIOPSY AND POLYPECTOMY;  Surgeon: Billy Julien MD;  Location: [facility] ENDOSCOPY;  Service: Gastroenterology;  Laterality: N/A;  Impression:  1.  Large 4-5cm fulgurating circumferential ulcerated mass in the very proximal part of the ascending colon next to ileocecal valve multiple biopsies were performed.  This is highly concerning for colon malignancy.  2.  2 polyp rem    ENDOSCOPY N/A 11/11/2022    Procedure: ESOPHAGOGASTRODUODENOSCOPY with biopsy X1;  Surgeon: Billy Julien MD;  Location: [facility] ENDOSCOPY;  Service: Gastroenterology;  Laterality: N/A;  5.  Upper endoscopy lamination unremarkable.       PORTACATH PLACEMENT Right 1/12/2023    Procedure: INSERTION OF PORTACATH;  Surgeon: Percy Davis MD;  Location: [facility] MAIN OR;  Service: General;  Laterality: Right;    TONSILLECTOMY             Family History: family history includes Colon cancer (age of onset: 62) in his father; Heart disease in his sister; Pneumonia (age of onset: 94) in his mother. Otherwise pertinent FHx was reviewed and unremarkable.     Social History:  reports that he quit smoking about 59 years ago. His smoking use included cigarettes. He started smoking about 61 years ago. He has a 2 pack-year smoking history. He has never used smokeless tobacco. He reports that he does not drink alcohol and does not use drugs.      Medications:  Prior to Admission medications    Medication Sig Start Date End Date Taking? Authorizing Provider   ammonium lactate (AMLACTIN) 12 % cream Apply 1 g topically to the appropriate area as directed 2 (Two) Times a Day.   Yes Rolly Jason MD   aspirin 81 MG EC tablet Take 1 tablet by mouth Daily.   Yes Rolly Jason MD   atorvastatin (LIPITOR) 40 MG tablet Take 1 tablet by mouth Daily.   Yes Rolly Jason MD   ferrous gluconate (FERGON) 324 MG tablet Take 1 tablet by mouth Daily With Breakfast.   Yes Rolly Jason MD    furosemide (LASIX) 20 MG tablet Take 1 tablet by mouth Daily.   Yes Rolly Jason MD   gabapentin (NEURONTIN) 100 MG capsule Take 1 capsule by mouth Every 12 (Twelve) Hours. 11/15/24  Yes Rolly Jason MD   insulin aspart (novoLOG FLEXPEN) 100 UNIT/ML solution pen-injector sc pen Inject 8 Units under the skin into the appropriate area as directed 3 (Three) Times a Day With Meals.   Yes Rolly Jason MD   insulin glargine (LANTUS, SEMGLEE) 100 UNIT/ML injection Inject 20 Units under the skin into the appropriate area as directed Every Night.   Yes Rolly Jason MD   Melatonin 3 MG tablet Take 1 tablet by mouth Every Night.   Yes Rolly Jason MD   metFORMIN (GLUCOPHAGE) 1000 MG tablet Take 1 tablet by mouth 2 (Two) Times a Day With Meals.   Yes Rolly Jason MD   midodrine (PROAMATINE) 10 MG tablet Take 1 tablet by mouth 3 (Three) Times a Day Before Meals. 11/14/24  Yes Chris Cruz MD   ondansetron (ZOFRAN) 8 MG tablet Take 1 tablet by mouth Every 8 (Eight) Hours As Needed for Nausea or Vomiting.   Yes Rolly Jason MD   losartan (COZAAR) 100 MG tablet Take 1 tablet by mouth Daily.  2/13/25  Rolly Jason MD   ondansetron ODT (ZOFRAN-ODT) 8 MG disintegrating tablet Place 1 tablet on the tongue Every 8 (Eight) Hours As Needed for Nausea or Vomiting.  2/13/25  Rolly Jason MD       Allergies:    Allergies   Allergen Reactions    Penicillins Rash       Objective   Objective     Vital Signs  Temp:  [97.5 °F (36.4 °C)-97.9 °F (36.6 °C)] 97.6 °F (36.4 °C)  Heart Rate:  [] 94  Resp:  [11-23] 23  BP: (119-137)/(76-90) 137/83  SpO2:  [90 %-97 %] 97 %  on   ;   Device (Oxygen Therapy): room air  Body mass index is 24.73 kg/m².    Physical Exam  Constitutional:       Appearance: Normal appearance.   Cardiovascular:      Rate and Rhythm: Normal rate and regular rhythm.   Pulmonary:      Effort: Pulmonary effort is normal.      Breath sounds:  Rales present.   Skin:     General: Skin is warm and dry.   Neurological:      General: No focal deficit present.      Mental Status: He is alert and oriented to person, place, and time. Mental status is at baseline.   Psychiatric:         Mood and Affect: Mood normal.         Behavior: Behavior normal.         Results Review:  I have personally reviewed most recent cardiac tracings, lab results, microbiology results, and radiology images and interpretations and agree with findings, most notably: cbc, troponin, bnp, cmp, procal, covid/flu, chest xray, cta chest, ekg    Results from last 7 days   Lab Units 02/12/25 2239   WBC 10*3/mm3 8.11   HEMOGLOBIN g/dL 14.2   HEMATOCRIT % 45.9   PLATELETS 10*3/mm3 145     Results from last 7 days   Lab Units 02/12/25 2358 02/12/25 2239 02/12/25 2154   SODIUM mmol/L  --  140  --    POTASSIUM mmol/L  --  4.7  --    CHLORIDE mmol/L  --  102  --    CO2 mmol/L  --  26.8  --    BUN mg/dL  --  19  --    CREATININE mg/dL  --  0.89  --    GLUCOSE mg/dL  --  86  --    CALCIUM mg/dL  --  9.2  --    ALK PHOS U/L  --  179*  --    ALT (SGPT) U/L  --  37  --    AST (SGOT) U/L  --  40  --    HSTROP T ng/L 45* 47*  --    PROBNP pg/mL  --   --  8,163.0*   PROCALCITONIN ng/mL  --  0.17  --      Estimated Creatinine Clearance: 81.3 mL/min (by C-G formula based on SCr of 0.89 mg/dL).  Brief Urine Lab Results  (Last result in the past 365 days)        Color   Clarity   Blood   Leuk Est   Nitrite   Protein   CREAT   Urine HCG        01/25/25 1840 Yellow   Clear   Negative   Negative   Negative   100 mg/dL (2+)                   Microbiology Results (last 10 days)       Procedure Component Value - Date/Time    COVID-19, FLU A/B, RSV PCR 1 HR TAT - Swab, Nasopharynx [[id number]]  (Normal) Collected: 02/12/25 2154    Lab Status: Final result Specimen: Swab from Nasopharynx Updated: 02/12/25 2249     COVID19 Not Detected     Influenza A PCR Not Detected     Influenza B PCR Not Detected     RSV, PCR Not  Detected    Narrative:      Fact sheet for providers: https://www.fda.gov/media/021344/download    Fact sheet for patients: https://www.fda.gov/media/013771/download    Test performed by PCR.            ECG/EMG Results (most recent)       Procedure Component Value Units Date/Time    ECG 12 Lead Dyspnea [[id number]] Collected: 02/12/25 2224     Updated: 02/13/25 0633     QT Interval 374 ms      QTC Interval 469 ms     Narrative:      HEART RATE=95  bpm  RR Dqzyiaci=669  ms  LA Vtezeeti=984  ms  P Horizontal Axis=-61  deg  P Front Axis=86  deg  QRSD Gcvxrnsm=216  ms  QT Wsybsjok=974  ms  WNwQ=967  ms  QRS Axis=107  deg  T Wave Axis=-47  deg  - ABNORMAL ECG -  Sinus rhythm  Atrial premature complex  Prolonged LA interval  Anterior  infarct, old  When compared with ECG of 12-Nov-2024 13:02:37,  Nonspecific significant change  Electronically Signed By: Percy Gonzalez (Keagan) 2025-02-13 06:32:11  Date and Time of Study:2025-02-12 22:24:40            Results for orders placed during the hospital encounter of 04/24/24    Duplex Venous Lower Extremity - Bilateral CAR    Interpretation Summary    Normal bilateral lower extremity venous duplex scan.      Results for orders placed during the hospital encounter of 01/25/25    Adult Transthoracic Echo Complete W/ Cont if Necessary Per Protocol    Interpretation Summary    Left ventricular ejection fraction appears to be 21 - 25%.    Indication  Shortness of breath  Heart failure    Technically satisfactory study.  Mitral valve is structurally normal.  Tricuspid valve is structurally normal.  Aortic valve is thickened with adequate opening motion.  Pulmonic valve could not be well visualized.  Moderate mitral and mild aortic regurgitation is present.  Mild tricuspid regurgitation is present.  Left atrium is normal in size.  Right atrium is normal in size.  Left ventricle is significantly enlarged with severe and diffuse hypocontractility with ejection fraction of 20 to 25%.  Left  ventricular peak systolic global longitudinal strain is abnormal with GLS of -5%.  Right ventricle is normal in size and contractility with a TAPSE of 1.8 cm..  Atrial septum is intact.  Aorta is normal.  IVC dilatation with decreased respiratory variation.  Small pericardial effusion is present.  No evidence for intracardiac thrombus is present.  Large left pleural effusion is present.    Impression  Moderate mitral regurgitation.  Time mild mitral and tricuspid regurgitation is present.  Left ventricle is significantly enlarged with severe and diffuse hypocontractility with ejection fraction of 20 to 25%.  Left ventricular peak systolic global longitudinal strain is abnormal with GLS of -5%.  Right ventricle is normal in size and contractility with a TAPSE of 1.8 cm..  Large left pleural effusion is present.  Small pericardial effusion is present.      CT Angiogram Chest Pulmonary Embolism    Result Date: 2/13/2025  Impression: 1. No pulmonary embolism. 2. Findings consistent with interstitial and alveolar pulmonary edema with moderate to large bilateral pleural effusions. 3. Patchy alveolar densities in the bilateral upper lobes favored to represent pneumonia. 4. Low-density liver lesions consistent with metastatic disease appear larger than on 8/29/2024, suggesting disease progression. 5. Stable mediastinal adenopathy. 6. Stable cystic lesion in the pancreatic uncinate process Electronically Signed: Petra Barrientos MD  2/13/2025 12:01 PM EST  Workstation ID: [id number]    XR Chest 2 View    Result Date: 2/12/2025  Impression: Background of mildly increased likely pulmonary edema. Mildly increased conspicuity of multifocal bilateral airspace opacities, as suspicious for superimposed multifocal pneumonia and/or alveolar pulmonary edema. Small bilateral pleural effusions with adjacent bibasal consolidation, likely representing atelectasis. Underlying infection is also possible. Electronically Signed: Mario Burch   2/12/2025 10:05 PM EST  Workstation ID: [id number]       Estimated Creatinine Clearance: 81.3 mL/min (by C-G formula based on SCr of 0.89 mg/dL).    Assessment & Plan   Assessment/Plan       Active Hospital Problems    Diagnosis  POA    **CHF (congestive heart failure) [I50.9]  Yes      Resolved Hospital Problems   No resolved problems to display.     Acute exacerbation chf  - bnp 8100, baseline 3500  - troponin 47, 45  - chest xray pulmonary edema  - cts chest no pe, interstitial and alveolar pulmonary edema with moderate to large bilateral pleural effusions   - cardiology consulted  - start midodrine, iv diuretics    Hypertension  -well Controlled   BP Readings from Last 1 Encounters:   02/13/25 137/83     - Monitor while admitted     Diabetes mellitus  -well controlled   Lab Results   Component Value Date    GLUCOSE 86 02/12/2025    GLUCOSE 297 (H) 01/26/2025    GLUCOSE 218 (H) 01/25/2025    GLUCOSE 207 (H) 01/21/2025     -lantus, ssi, metformin  -Diabetic diet  -Monitor before meals and at bedtime       Hx of colon ca  - pt followed by oncology      VTE Prophylaxis - Active VTE Prophylaxis  Mechanical:        Start        02/13/25 1000  Maintain Sequential Compression Device  Continuous                          Select Pharmacologic VTE Prophylaxis if Desired & Appropriate      CODE STATUS:    There are no questions and answers to display.       This patient has been examined wearing personal protective equipment.     I discussed the patient's findings and my recommendations with patient and nursing staff.      Signature:Electronically signed by Elizabeth Johnston PA-C, 02/13/25, 1:10 PM EST.

## 2025-02-13 NOTE — CONSULTS
Diabetes Education    Patient Name:  Vlad Guerrero  YOB: 1945  MRN: [id number]  Admit Date:  2/12/2025    Consult received for insulin teaching. Pt with hx of diabetes. Per home med list, pt taking Metformin 1000 mg bid, Lantus 20 units hs, Admelog 8 units ac. A1c this adm 9.79%. 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 Adm bs 86. Educator met with pt at bedside. Pt confirmed he is receiving the diabetes meds listed on his home med list. Pt states he lives at [facility] and staff prepare and give pt his insulin injections and they check pt's bs. Pt drowsy during conversation. Educator did review current A1c result of 9.79%. Discussed A1c goals and bs goals. Pt states not needing additional info since nursing staff take care of administering his insulin. Educator did contact [facility] and they confirmed they are administering pt's insulin and checking pt's bs. No further follow up needed.       Electronically signed by:  Deena Andersen RN  02/13/25 10:52 EST

## 2025-02-13 NOTE — PLAN OF CARE
Problem: Adult Inpatient Plan of Care  Goal: Plan of Care Review  Outcome: Progressing  Goal: Patient-Specific Goal (Individualized)  Outcome: Progressing  Goal: Absence of Hospital-Acquired Illness or Injury  Outcome: Progressing  Intervention: Identify and Manage Fall Risk  Recent Flowsheet Documentation  Taken 2/13/2025 0314 by Goldie Cleveland LPN  Safety Promotion/Fall Prevention:   assistive device/personal items within reach   activity supervised   clutter free environment maintained   safety round/check completed   room organization consistent  Intervention: Prevent Skin Injury  Recent Flowsheet Documentation  Taken 2/13/2025 0314 by Goldie Cleveland LPN  Body Position: position changed independently  Skin Protection: transparent dressing maintained  Intervention: Prevent Infection  Recent Flowsheet Documentation  Taken 2/13/2025 0314 by Goldie Cleveland LPN  Infection Prevention:   environmental surveillance performed   equipment surfaces disinfected   hand hygiene promoted   personal protective equipment utilized   rest/sleep promoted   single patient room provided  Goal: Optimal Comfort and Wellbeing  Outcome: Progressing  Intervention: Provide Person-Centered Care  Recent Flowsheet Documentation  Taken 2/13/2025 0314 by Goldie Cleveland LPN  Trust Relationship/Rapport:   care explained   choices provided   thoughts/feelings acknowledged  Goal: Readiness for Transition of Care  Outcome: Progressing     Problem: Skin Injury Risk Increased  Goal: Skin Health and Integrity  Outcome: Progressing  Intervention: Optimize Skin Protection  Recent Flowsheet Documentation  Taken 2/13/2025 0314 by Goldie Cleveland LPN  Pressure Reduction Techniques: frequent weight shift encouraged  Head of Bed (HOB) Positioning: HOB elevated  Pressure Reduction Devices: pressure-redistributing mattress utilized  Skin Protection: transparent dressing maintained     Problem: Comorbidity Management  Goal: Maintenance of COPD Symptom Control  Outcome:  Progressing  Intervention: Maintain COPD (Chronic Obstructive Pulmonary Disease) Symptom Control  Recent Flowsheet Documentation  Taken 2/13/2025 0314 by Goldie Cleveland LPN  Medication Review/Management: medications reviewed  Goal: Blood Glucose Level Within Target Range  Outcome: Progressing  Intervention: Monitor and Manage Glycemia  Recent Flowsheet Documentation  Taken 2/13/2025 0314 by Goldie Cleveland LPN  Medication Review/Management: medications reviewed  Goal: Maintenance of Heart Failure Symptom Control  Outcome: Progressing  Intervention: Maintain Heart Failure Management  Recent Flowsheet Documentation  Taken 2/13/2025 0314 by Goldie Cleveland LPN  Medication Review/Management: medications reviewed  Goal: Blood Pressure in Desired Range  Outcome: Progressing  Intervention: Maintain Blood Pressure Management  Recent Flowsheet Documentation  Taken 2/13/2025 0314 by Goldie Cleveland LPN  Medication Review/Management: medications reviewed   Goal Outcome Evaluation:

## 2025-02-13 NOTE — ED PROVIDER NOTES
"Subjective     Provider in Triage Note  Patient is a 79-year-old male presents the ED with complaint of \"fluid on my lungs\".  Patient reports this began a month ago, he reports he was seen here in the hospital and was given pills to \"make me pee\", however he ran out of them, has not had any further medications.  Denies any chest pain.  No leg swelling.  No episodes of syncope.  No fevers.      History of Present Illness  Agree with Pit note, 79-year-old male with history of dilated cardiomyopathy discharged to this facility after presumed CHF exacerbation says he felt better initially when he went home on January 26, 2025 blood slowly got worse with shortness of air and orthopnea.  Patient's had a mild associated nonproductive cough, no fever, no chest pain, no other symptoms        Review of Systems   Respiratory:  Positive for cough and shortness of breath.        Past Medical History:   Diagnosis Date    Anemia     Colon cancer 2022    colon    Diabetes mellitus     Hyperlipidemia     Hypertension     LBBB (left bundle branch block) 11/14/2024       Allergies   Allergen Reactions    Penicillins Rash       Past Surgical History:   Procedure Laterality Date    APPENDECTOMY      CARDIAC CATHETERIZATION      CARDIAC CATHETERIZATION N/A 11/14/2024    Procedure: Left Heart Cath, possible pci;  Surgeon: Mg Rahman MD;  Location: [facility] CATH INVASIVE LOCATION;  Service: Cardiovascular;  Laterality: N/A;    COLON RESECTION N/A 12/15/2022    Procedure: COLON RESECTION RIGHT;  Surgeon: Percy Davis MD;  Location: [facility] MAIN OR;  Service: General;  Laterality: N/A;    COLONOSCOPY N/A 11/11/2022    Procedure: COLONOSCOPY WITH BIOPSY AND POLYPECTOMY;  Surgeon: Billy Julien MD;  Location: [facility] ENDOSCOPY;  Service: Gastroenterology;  Laterality: N/A;  Impression:  1.  Large 4-5cm fulgurating circumferential ulcerated mass in the very proximal part of the ascending colon next to ileocecal valve " multiple biopsies were performed.  This is highly concerning for colon malignancy.  2.  2 polyp rem    ENDOSCOPY N/A 2022    Procedure: ESOPHAGOGASTRODUODENOSCOPY with biopsy X1;  Surgeon: Billy Julien MD;  Location: [facility] ENDOSCOPY;  Service: Gastroenterology;  Laterality: N/A;  5.  Upper endoscopy lamination unremarkable.       PORTACATH PLACEMENT Right 2023    Procedure: INSERTION OF PORTACATH;  Surgeon: Percy Davis MD;  Location: [facility] MAIN OR;  Service: General;  Laterality: Right;    TONSILLECTOMY         Family History   Problem Relation Age of Onset    Pneumonia Mother 94        SARS-CoV2    Colon cancer Father 62    Heart disease Sister        Social History     Socioeconomic History    Marital status:    Tobacco Use    Smoking status: Former     Current packs/day: 0.00     Average packs/day: 1 pack/day for 2.0 years (2.0 ttl pk-yrs)     Types: Cigarettes     Start date:      Quit date:      Years since quittin.1    Smokeless tobacco: Never   Vaping Use    Vaping status: Never Used   Substance and Sexual Activity    Alcohol use: Never    Drug use: Never    Sexual activity: Defer           Objective   Physical Exam  Constitutional:       Appearance: Normal appearance.   HENT:      Head: Normocephalic and atraumatic.      Mouth/Throat:      Mouth: Mucous membranes are moist.      Pharynx: Oropharynx is clear.   Cardiovascular:      Rate and Rhythm: Normal rate and regular rhythm.   Pulmonary:      Effort: Pulmonary effort is normal.      Comments: Basal crackles, left greater than right, no wheezes  Abdominal:      General: Bowel sounds are normal. There is no distension.      Palpations: Abdomen is soft.      Tenderness: There is no abdominal tenderness.   Musculoskeletal:      Comments: Trace if any edema bilateral lower extremities, calf soft and nontender bilaterally   Skin:     General: Skin is warm and dry.      Capillary Refill: Capillary refill takes  less than 2 seconds.   Neurological:      Mental Status: He is alert.   Psychiatric:         Mood and Affect: Mood normal.         Behavior: Behavior normal.         Procedures           ED Course                                                       Medical Decision Making  79-year-old male with CHF exacerbation will place in ED observation for a.m. cardiology consult.    Problems Addressed:  Acute on chronic congestive heart failure, unspecified heart failure type: complicated acute illness or injury    Amount and/or Complexity of Data Reviewed  External Data Reviewed: notes.     Details: Discharge summary January 26, 2025 reviewed  Labs: ordered.     Details: Normal procalcitonin  Radiology: ordered and independent interpretation performed.     Details: Agree with heart failure  ECG/medicine tests: ordered and independent interpretation performed.     Details: EKG interpretation: Sinus rhythm, rate 95, left bundle branch block    Risk  Prescription drug management.  Decision regarding hospitalization.        Final diagnoses:   Acute on chronic congestive heart failure, unspecified heart failure type       ED Disposition  ED Disposition       ED Disposition   Decision to Admit    Condition   --    Comment   --               No follow-up provider specified.       Medication List      No changes were made to your prescriptions during this visit.            Percy Gonzalez MD  02/13/25 0019

## 2025-02-13 NOTE — CONSULTS
"    Cardiology Consult Note    Patient Identification:  Name: Vlad Guerrero  Age: 79 y.o.  Sex: male  :  1945  MRN: [id number]             Requesting Physician :  Dr. Gonzalez     Reason for Consultation / Chief Complaint :   CHF     History of Present Illness:        Mr. Vlad Guerrero has PMH of     PFO  Left bundle branch block  HFrEF   EF 30%,  Diastolic dysfunction   Left atrial enlargement  Hypertension  History of TIA , left frontal CVA 2024  Metastatic colon cancer with possible liver mets  Insulin-dependent diabetes        Presented to the ED on 2025 with worsening shortness of breath.  Patient is drowsy during examination, not answering many questions, but reports he has \"fluid on his lungs\".  When asked how long he states for one day, however per ED note, he reported progressively worsening since .  Patient reports he does not have any \"water pills\" at home.  Per ED note, he states he ran out.  Patient denies any chest pain or edema.      Labs in the ED showed HS troponin 47--45, proBNP 8163 CMP unremarkable except alk phos 179 total bilirubin 1.4 CBC unremarkable EKG sinus rhythm with PAC chest x-ray shows increased pulmonary multifocal bilateral airspace opacities suspicious for superimposed multifocal pneumonia small bilateral pleural effusions possible underlying infection.      Will check CT PE protocol given patient's history of cancer  IV diuretics as tolerated  Check respiratory viral panel    Electronically signed by ANEUDY Brown, 25, 11:57 AM EST.    Cardiology attending addendum :    I have personally performed a face-to-face diagnostic evaluation, physical exam and reviewed data on this patient.  I have reviewed documentation done by me and nurse practitioner  and corrected as needed.  And agree with the different components of documentation.Greater than 50% of the time spent in the care of this patient was provided by attending " consultant/me.        Assessment:  :    Acute on chronic HFrEF due to systolic dysfunction  Severe LV dysfunction by echo  Elevated troponin at 47 and 45  Elevated proBNP of 8163.  Abnormal chest x-ray  PFO  LBBB  LAE  CAD history of multivessel stenting, cath 11/14/2024 revealed patent stent  Type 2 diabetes, poorly controlled    History of left frontal stroke.  Metastatic colon cancer  History of previous TIA  Carotid disease        Recommendations / Plan:        Patient presented to 12/20/2025 with worsening shortness of breath.  Patient is a poor historian, states that his lungs are elevated fluid.  Patient has history of chronic HFrEF due to severe systolic dysfunction, echo 11/13/2024 revealing severe LV dysfunction.  Patient has chronic left bundle branch block.  Patient unfortunately has chronic orthostasis and orthostatic hypotension due to autonomic insufficiency.  Therefore was given midodrine.  CT PE study 2/13/2025 revealed no PE.  Findings consistent with interstitial and alveolar pulmonary edema with moderate to large bilateral pleural effusion.  Continue diuresis with IV furosemide  Continue aspirin, atorvastatin, midodrine as tolerated.  Patient underwent cardiac cath 11/14/2024 which revealed EF of 25% and patent stents in LAD LCx and RCA      EKG is revealing left bundle branch block  Echo 11/13/2024 is revealing severe LV dysfunction.  Cardiac cath 11/14/2024 revealed severe LV dysfunction with patent stents in LAD LCx and RCA.  Unfortunately patient is not a candidate for guideline directed medical therapy for LV dysfunction due to chronic hypotension requiring midodrine        Cardiographics  ECG: EKG tracing was  personally reviewed/interpreted by me  ECG 12 Lead Dyspnea   Final Result   HEART RATE=95  bpm   RR Hltputsv=049  ms   PA Pkweoetv=914  ms   P Horizontal Axis=-61  deg   P Front Axis=86  deg   QRSD Geitbuda=887  ms   QT Mxilpmrw=887  ms   BQoW=554  ms   QRS Axis=107  deg   T Wave  Axis=-47  deg   - ABNORMAL ECG -   Sinus rhythm   Atrial premature complex   Prolonged DC interval   Anterior  infarct, old   When compared with ECG of 12-Nov-2024 13:02:37,   Nonspecific significant change   Electronically Signed By: Percy Gonzalez ([facility]) 2025-02-13 06:32:11   Date and Time of Study:2025-02-12 22:24:40      Telemetry Scan   Final Result          Telemetry: Telemetry reviewed/interpreted by me reveals sinus rhythm             Diagnosis Plan   1. Acute on chronic congestive heart failure, unspecified heart failure type                             Past Medical History:  Past Medical History:   Diagnosis Date    Anemia     Colon cancer 2022    colon    Diabetes mellitus     Hyperlipidemia     Hypertension     LBBB (left bundle branch block) 11/14/2024     Past Surgical History:  Past Surgical History:   Procedure Laterality Date    APPENDECTOMY      CARDIAC CATHETERIZATION      CARDIAC CATHETERIZATION N/A 11/14/2024    Procedure: Left Heart Cath, possible pci;  Surgeon: Mg Rahman MD;  Location: [facility] CATH INVASIVE LOCATION;  Service: Cardiovascular;  Laterality: N/A;    COLON RESECTION N/A 12/15/2022    Procedure: COLON RESECTION RIGHT;  Surgeon: Percy Davis MD;  Location: [facility] MAIN OR;  Service: General;  Laterality: N/A;    COLONOSCOPY N/A 11/11/2022    Procedure: COLONOSCOPY WITH BIOPSY AND POLYPECTOMY;  Surgeon: Billy Julien MD;  Location: [facility] ENDOSCOPY;  Service: Gastroenterology;  Laterality: N/A;  Impression:  1.  Large 4-5cm fulgurating circumferential ulcerated mass in the very proximal part of the ascending colon next to ileocecal valve multiple biopsies were performed.  This is highly concerning for colon malignancy.  2.  2 polyp rem    ENDOSCOPY N/A 11/11/2022    Procedure: ESOPHAGOGASTRODUODENOSCOPY with biopsy X1;  Surgeon: Billy Julien MD;  Location: [facility] ENDOSCOPY;  Service: Gastroenterology;  Laterality: N/A;  5.  Upper endoscopy lamination  unremarkable.       PORTACATH PLACEMENT Right 1/12/2023    Procedure: INSERTION OF PORTACATH;  Surgeon: Percy Davis MD;  Location: [facility] MAIN OR;  Service: General;  Laterality: Right;    TONSILLECTOMY        Allergies:  Allergies   Allergen Reactions    Penicillins Rash     Home Meds:  Medications Prior to Admission   Medication Sig Dispense Refill Last Dose/Taking    ammonium lactate (AMLACTIN) 12 % cream Apply 1 g topically to the appropriate area as directed 2 (Two) Times a Day.   Taking    aspirin 81 MG EC tablet Take 1 tablet by mouth Daily.   Taking    atorvastatin (LIPITOR) 40 MG tablet Take 1 tablet by mouth Daily.   Taking    ferrous gluconate (FERGON) 324 MG tablet Take 1 tablet by mouth Daily With Breakfast.   Taking    furosemide (LASIX) 20 MG tablet Take 1 tablet by mouth Daily.   Taking    gabapentin (NEURONTIN) 100 MG capsule Take 1 capsule by mouth Every 12 (Twelve) Hours.   Taking    insulin aspart (novoLOG FLEXPEN) 100 UNIT/ML solution pen-injector sc pen Inject 8 Units under the skin into the appropriate area as directed 3 (Three) Times a Day With Meals.   Taking    insulin glargine (LANTUS, SEMGLEE) 100 UNIT/ML injection Inject 20 Units under the skin into the appropriate area as directed Every Night.   Taking    Melatonin 3 MG tablet Take 1 tablet by mouth Every Night.   Taking    metFORMIN (GLUCOPHAGE) 1000 MG tablet Take 1 tablet by mouth 2 (Two) Times a Day With Meals.   Taking    midodrine (PROAMATINE) 10 MG tablet Take 1 tablet by mouth 3 (Three) Times a Day Before Meals. 90 tablet 0 Taking    ondansetron (ZOFRAN) 8 MG tablet Take 1 tablet by mouth Every 8 (Eight) Hours As Needed for Nausea or Vomiting.   Taking As Needed     Current Meds:     Current Facility-Administered Medications:     aluminum-magnesium hydroxide-simethicone (MAALOX MAX) 400-400-40 MG/5ML suspension 15 mL, 15 mL, Oral, Q6H PRN, Elizabeth Johnston PA-C    aspirin EC tablet 81 mg, 81 mg, Oral, Daily, Preston  MICHELLE Johnson, 81 mg at 02/13/25 0915    atorvastatin (LIPITOR) tablet 40 mg, 40 mg, Oral, Daily, Elizabeth Johnston PA-C, 40 mg at 02/13/25 0915    dextrose (D50W) (25 g/50 mL) IV injection 25 g, 25 g, Intravenous, Q15 Min PRN, Elizabeth Johnston PA-C    dextrose (GLUTOSE) oral gel 15 g, 15 g, Oral, Q15 Min PRN, Elizabeth Johnston PA-C    ferrous sulfate tablet 325 mg, 325 mg, Oral, Daily With Breakfast, Elizabeth Johnston PA-C, 325 mg at 02/13/25 0915    furosemide (LASIX) injection 20 mg, 20 mg, Intravenous, Q12H, Jaz Finn APRN    gabapentin (NEURONTIN) capsule 100 mg, 100 mg, Oral, Q12H, Elizabeth Johnston PA-C, 100 mg at 02/13/25 0915    glucagon (GLUCAGEN) injection 1 mg, 1 mg, Intramuscular, Q15 Min PRN, Elizabeth Johnston PA-C    insulin glargine (LANTUS, SEMGLEE) injection 20 Units, 20 Units, Subcutaneous, Nightly, Elizabeth Johnston PA-C    insulin lispro (HUMALOG/ADMELOG) injection 2-9 Units, 2-9 Units, Subcutaneous, 4x Daily AC & at Bedtime, Elizabeth Johnston PA-C, 2 Units at 02/13/25 1248    metFORMIN (GLUCOPHAGE) tablet 1,000 mg, 1,000 mg, Oral, BID With Meals, Elizabeth Johnston PA-C, 1,000 mg at 02/13/25 0951    midodrine (PROAMATINE) tablet 10 mg, 10 mg, Oral, TID AC, Elizabeth Johnston PA-C, 10 mg at 02/13/25 1248    ondansetron ODT (ZOFRAN-ODT) disintegrating tablet 4 mg, 4 mg, Oral, Q6H PRN **OR** ondansetron (ZOFRAN) injection 4 mg, 4 mg, Intravenous, Q6H PRN, Eliazbeth Johnston PA-C    sodium chloride 0.9 % flush 10 mL, 10 mL, Intravenous, PRN, Val Alas, APRN, 10 mL at 02/13/25 0915    sodium chloride 0.9 % flush 10 mL, 10 mL, Intravenous, Q12H, Elizabeth Johnston PA-C, 10 mL at 02/13/25 1516    sodium chloride 0.9 % flush 10 mL, 10 mL, Intravenous, PRN, Elizabeth Johnston PA-C    sodium chloride 0.9 % infusion 40 mL, 40 mL, Intravenous, PRN, Elizabeth Johnston PA-C  Social History:   Social History     Tobacco Use    Smoking status: Former     Current packs/day: 0.00     Average packs/day: 1  "pack/day for 2.0 years (2.0 ttl pk-yrs)     Types: Cigarettes     Start date:      Quit date:      Years since quittin.1    Smokeless tobacco: Never   Substance Use Topics    Alcohol use: Never      Family History:  Family History   Problem Relation Age of Onset    Pneumonia Mother 94        SARS-CoV2    Colon cancer Father 62    Heart disease Sister         Review of Systems : Review of Systems   Constitutional: Positive for malaise/fatigue. Negative for fever.   Cardiovascular:  Positive for dyspnea on exertion. Negative for chest pain and leg swelling.   Respiratory:  Positive for shortness of breath. Negative for cough.    Neurological:  Positive for weakness.   All other systems reviewed and are negative.           Constitutional:  Temp:  [97.2 °F (36.2 °C)-97.9 °F (36.6 °C)] 97.2 °F (36.2 °C)  Heart Rate:  [] 82  Resp:  [11-23] 16  BP: (119-137)/(75-90) 119/75    Physical Exam   /75 (BP Location: Left arm, Patient Position: Lying)   Pulse 82   Temp 97.2 °F (36.2 °C) (Oral)   Resp 16   Ht 182.9 cm (72\")   Wt 82.7 kg (182 lb 5.1 oz)   SpO2 94%   BMI 24.73 kg/m²   Physical Exam  General:  Appears in no acute distress  Eyes: Sclerae are anicteric,  conjunctivae are clear   HEENT:  No JVD. Thyroid not visibly enlarged. No mucosal pallor or cyanosis  Respiratory: Respirations regular and unlabored at rest.  Bilaterally good breath sounds with good air entry in all fields. + crackles,   Cardiovascular: S1,S2 Regular rate and rhythm. No murmur, rub or gallop auscultated. No pretibial pitting edema  Gastrointestinal: Abdomen nondistended.  Musculoskeletal:  No abnormal movements  Extremities: No digital clubbing or cyanosis  Skin: Color pink. Skin warm and dry to touch.   Neuro: Alert and awake, no lateralizing deficits appreciated        Echocardiogram:   Results for orders placed during the hospital encounter of 25    Adult Transthoracic Echo Complete W/ Cont if Necessary Per " Protocol    Interpretation Summary    Left ventricular ejection fraction appears to be 21 - 25%.    Indication  Shortness of breath  Heart failure    Technically satisfactory study.  Mitral valve is structurally normal.  Tricuspid valve is structurally normal.  Aortic valve is thickened with adequate opening motion.  Pulmonic valve could not be well visualized.  Moderate mitral and mild aortic regurgitation is present.  Mild tricuspid regurgitation is present.  Left atrium is normal in size.  Right atrium is normal in size.  Left ventricle is significantly enlarged with severe and diffuse hypocontractility with ejection fraction of 20 to 25%.  Left ventricular peak systolic global longitudinal strain is abnormal with GLS of -5%.  Right ventricle is normal in size and contractility with a TAPSE of 1.8 cm..  Atrial septum is intact.  Aorta is normal.  IVC dilatation with decreased respiratory variation.  Small pericardial effusion is present.  No evidence for intracardiac thrombus is present.  Large left pleural effusion is present.    Impression  Moderate mitral regurgitation.  Time mild mitral and tricuspid regurgitation is present.  Left ventricle is significantly enlarged with severe and diffuse hypocontractility with ejection fraction of 20 to 25%.  Left ventricular peak systolic global longitudinal strain is abnormal with GLS of -5%.  Right ventricle is normal in size and contractility with a TAPSE of 1.8 cm..  Large left pleural effusion is present.  Small pericardial effusion is present.      STRESS TEST        Cardiolite (Tc-99m sestamibi) stress test    HEART CATHETERIZATION  Results for orders placed during the hospital encounter of 24    Cardiac Catheterization/Vascular Study    Conclusion  Table formatting from the original result was not included.  2024      Heart Cath Report    NAME:              Vlad Guerrero  :                1945  AGE/SEX:        79 y.o. male  MRN:                 [id number]        Procedures Performed    Left heart catheterization  Selective coronary angiography  Left ventriculography  Mynx closure device    :   Mg Rahman MD    Vascular Access Site: Femoral    Indication for procedure: Chronic CHF, severe LV dysfunction, cardiomyopathy rule out ischemic cardiomyopathy, left bundle branch block, diabetes      Procedure Note    After discussing the risks, benefits, and alternatives of the procedure, informed consent was obtained.  Timeout was done before the procedure.  Moderate conscious sedation was given utilizing IV Versed and fentanyl administered by RN with continuous EKG oximetry and hemodynamic monitoring supervised by me throughout the entire case.  I was present with the patient for the duration of moderate sedation and supervised staff who had no other duties and monitored the patient for the entire procedure patient had Patterson 2-3 sedation scale. the vascular access site was prepped and draped in the usual sterile fashion.  2% lidocaine was used for local anesthesia. Appropriate landmarks were assessed.  A 6 British short sheath was inserted in the artery using the modified Seldinger technique.    Selective coronary angiography was performed with JL4 and JR4 diagnostic catheters. Left ventriculogram  was performed with an angled pigtail catheter.  All exchanges were performed over the wire.  No specimens were removed.  There were no apparent acute or early complications.  The patient tolerated the procedure well and was transferred to the recovery area in stable condition.      Closure device: Mynx device was deployed successfully after right iliofemoral angiogram was performed. Good hemostasis was achieved.    Complications:  None  Blood Loss: minimal    Hemodynamics    Pressures    Ao:    110/45 mmHg  LV:    95/4 mmHg  End-diastolic pressure:  3-4 mmHg  No significant aortic valvular gradient on pullback    Coronary Angiography    Left  Main :  The left main   is without disease    Left Anterior Descending : Oxman LAD has 20 to 30% luminal irregularities.  There is a stent patent in mid LAD with excellent long-term results.  Mid to distal LAD has 3040% calcified narrowing.  There is a large diagonal without disease.    Left Circumflex : Had a stent patent stream and proximal portion with excellent long-term results.  Gives rise to 2 large marginals without disease and continues a small distal marginal without disease.    Right Coronary Artery :  The right coronary artery   is a dominant artery with stents patent in vertical segment of midportion and horizontal segment of distal portion.  PLV branch had 50% mid narrowing.    Dominance:  []  Left  [x]  Right  []  Co-Dominant        Left Ventriculography:    Estimated Ejection Fraction: 25 %  Wall motion abnormalities: Dilated LV with severe global left ventricular hypokinesis  Mitral Regurgitation: 1+ MR    Impression:    Patent stents in LAD, LCx, RCA with excellent long-term results  Severe LV dysfunction    Recommendations:    Patient does not have a any reversible cause for his LV dysfunction.  Unfortunately patient is severely orthostatic and has low baseline blood pressure to add any further guideline directed medical therapy.  Will repeat echo in 3 months as outpatient.        I sincerely appreciate the opportunity to participate in your patient's care. Please feel free to contact me anytime if I can be of assistance in this or any other way.      Pertinent History    Past Medical History:  Diagnosis Date   Anemia   Colon cancer 2022  colon   Diabetes mellitus   Hyperlipidemia   Hypertension   LBBB (left bundle branch block) 11/14/2024    Past Surgical History:  Procedure Laterality Date   APPENDECTOMY   CARDIAC CATHETERIZATION   COLON RESECTION N/A 12/15/2022  Procedure: COLON RESECTION RIGHT;  Surgeon: Percy Davis MD;  Location: [facility];  Service: General;  Laterality:  N/A;   COLONOSCOPY N/A 11/11/2022  Procedure: COLONOSCOPY WITH BIOPSY AND POLYPECTOMY;  Surgeon: Billy Julien MD;  Location: [facility] ENDOSCOPY;  Service: Gastroenterology;  Laterality: N/A;  Impression:  1.  Large 4-5cm fulgurating circumferential ulcerated mass in the very proximal part of the ascending colon next to ileocecal valve multiple biopsies were performed.  This is highly concerning for colon malignancy.  2.  2 polyp rem   ENDOSCOPY N/A 11/11/2022  Procedure: ESOPHAGOGASTRODUODENOSCOPY with biopsy X1;  Surgeon: Billy Julien MD;  Location: [facility] ENDOSCOPY;  Service: Gastroenterology;  Laterality: N/A;  5.  Upper endoscopy lamination unremarkable.     PORTACATH PLACEMENT Right 1/12/2023  Procedure: INSERTION OF PORTACATH;  Surgeon: Percy Davis MD;  Location: [facility] MAIN OR;  Service: General;  Laterality: Right;   TONSILLECTOMY    Prior to Admission medications  Medication Sig Start Date End Date Taking? Authorizing Provider  aspirin 81 MG EC tablet Take 1 tablet by mouth Daily.   Yes Provider, MD Rolly  insulin aspart (NovoLOG FlexPen) 100 UNIT/ML solution pen-injector sc pen Inject 10 Units under the skin into the appropriate area as directed 3 (Three) Times a Day With Meals.   Yes ProviderRolly MD  insulin glargine (LANTUS, SEMGLEE) 100 UNIT/ML injection Inject 20 Units under the skin into the appropriate area as directed Every Night for 30 days. 1/9/24 4/24/25 Yes Yesenia Arreola APRN      Pre-Procedure Notes  H&P Performed  [x]  Yes []  No       []  N/A    Indications:  []  ACS <= 24 HRS  []  ACS >24 HRS  []  New Onset Angina <= 2 mos  []  Worsening Angina  []  Resuscitated Cardiac Arrest  []  Angina on Exertion:  []  Suspected CAD  []  Valvular Disease  []  Pericardial Disease  []  Cardiac Arrythmia  []  Cardiomyopathy  []  LV Dysfunction  []  Syncope  []  Post Cardiac Transplant  []  Eval. For Exercise Clearance  []  Other  []  Pre-Operative Evaluation  If Pre-Op  Eval:  Evaluation for Surgery Type:  []  Cardiac Surgery   []  Non-Cardiac Surgery  Functional Capacity:  []  <4 METS  []  >=4 METS w/o symptoms  []  >= 4 METS with symptoms  []  Unknown  Surgical Risk:  []  Low  []  Intermediate  []  High Risk: Vascular  []  High Risk Non-Vascular    Risks, Benefits, & Complications Discussed:  [x]  Yes  []  No  []  N/A    Questions Answered:  [x]  Yes  []  No  []  N/A    Consent Obtained:  [x]  Yes  []  No  []  N/A    CHF: []  Yes  []  No  If Yes:  Newly Diagnosed?  [x]  Yes  []  No  If Yes:  HF Type:  []  Diastolic  [x]  Systolic  []  Unknown      Mg Rahman MD  11/14/2024  13:18 EST  Electronically signed by Mg Rahman MD, 11/14/24, 1:18 PM EST.        Imaging  Chest X-ray:   Imaging Results (Last 24 Hours)       Procedure Component Value Units Date/Time    CT Angiogram Chest Pulmonary Embolism [[id number]] Collected: 02/13/25 1150     Updated: 02/13/25 1203    Narrative:      CT ANGIOGRAM CHEST PULMONARY EMBOLISM    Date of Exam: 2/13/2025 11:41 AM EST    Indication: Shortness of breath.    Comparison: AP chest 2/12/2025, CT chest 2/18/2025    Technique: Axial CT images were obtained of the chest after the uneventful intravenous administration of iodinated contrast utilizing pulmonary embolism protocol.  In addition, a 3-D volume rendered image was created for interpretation.  Sagittal and   coronal reconstructions were performed.  Automated exposure control and iterative reconstruction methods were used.      Findings:  No pulmonary embolism. Mild cardiac enlargement. Dense coronary calcifications. Borderline enlarged mediastinal lymph nodes favored to represent reactive findings. Index subcarinal node measures 1.6 cm short axis (4/83). Index prevascular node measures   0.9 cm short axis (4/67).    Moderate to large bilateral pleural effusions are present, right greater than left. Interstitial thickening with hazy groundglass attenuation in both  lungs suggest the presence of interstitial and alveolar edema.    There are patchy alveolar foci within the bilateral upper lobes and right middle lobe suggestive of pneumonia.    Low-density lesions are seen in the left hepatic lobe, larger than on the postcontrast CT of 8/29/2024, suggesting metastatic disease progression. One of these measures approximately 4 cm on series 4 image 4047, compared to 2.7 cm on 8/29/2024. Another   in the left hepatic lobe medial segment measures approximately 4.3 cm compared to 1.6 cm on 8/29/2024.    Cystic lesion of the pancreatic uncinate process measuring approximately 2.5 x 1.3 cm (series 4 image 188) appears stable since CT abdomen 12/18/2024. Left renal cyst measures approximately 1 cm. Remainder of the imaged upper abdominal organs have a   normal appearance.    Chronic compression fracture of the superior endplate of the L1 vertebral body, without evidence of bony retropulsion or subluxation, similar in appearance to the 12/18/2024 examination.. No suspicious osseous lesions are identified.. Mild gynecomastia   changes are noted. Right chest wall anisa catheter extends to the lower SVC level.      Impression:      Impression:    1. No pulmonary embolism.  2. Findings consistent with interstitial and alveolar pulmonary edema with moderate to large bilateral pleural effusions.  3. Patchy alveolar densities in the bilateral upper lobes favored to represent pneumonia.  4. Low-density liver lesions consistent with metastatic disease appear larger than on 8/29/2024, suggesting disease progression.  5. Stable mediastinal adenopathy.  6. Stable cystic lesion in the pancreatic uncinate process        Electronically Signed: Petra Barrientos MD    2/13/2025 12:01 PM EST    Workstation ID: [id number]    XR Chest 2 View [[id number]] Collected: 02/12/25 2202     Updated: 02/12/25 2207    Narrative:      XR CHEST 2 VW    Date of Exam: 2/12/2025 10:00 PM EST    Indication: CHF/COPD  Protocol  CHF/COPD Protocol    Comparison: Chest radiograph 1/25/2025    Findings:  Right chest port appears in stable position.    Mediastinum: Cardiac silhouette appears unchanged including cardiomegaly    Lungs: Mildly increased conspicuity of ill-defined multifocal bilateral airspace opacities. Mildly increased background of diffuse interstitial changes. Prominence of central pulmonary vasculature. Bibasal consolidation.    Pleura: Small bilateral pleural effusions. No pneumothorax.    Bones and soft tissues: No acute, displaced fracture seen.        Impression:      Impression:  Background of mildly increased likely pulmonary edema.    Mildly increased conspicuity of multifocal bilateral airspace opacities, as suspicious for superimposed multifocal pneumonia and/or alveolar pulmonary edema.    Small bilateral pleural effusions with adjacent bibasal consolidation, likely representing atelectasis. Underlying infection is also possible.      Electronically Signed: Mario Burch    2/12/2025 10:05 PM EST    Workstation ID: [id number]            Lab Review: I have reviewed the labs  Results from last 7 days   Lab Units 02/12/25  2358 02/12/25  2239   HSTROP T ng/L 45* 47*         Results from last 7 days   Lab Units 02/12/25  2239   SODIUM mmol/L 140   POTASSIUM mmol/L 4.7   BUN mg/dL 19   CREATININE mg/dL 0.89   CALCIUM mg/dL 9.2         Results from last 7 days   Lab Units 02/12/25  2154   PROBNP pg/mL 8,163.0*     Results from last 7 days   Lab Units 02/12/25  2239   WBC 10*3/mm3 8.11   HEMOGLOBIN g/dL 14.2   HEMATOCRIT % 45.9   PLATELETS 10*3/mm3 145                 Mg Rahman MD  2/13/2025, 17:21 EST      EMR Dragon/Transcription:   Dictated utilizing Dragon dictation

## 2025-02-13 NOTE — ED NOTES
"Nursing report ED to floor  Vlad Guerrero  79 y.o.  male    HPI :  HPI  Stated Reason for Visit: pt reports congestion, a month ago he had fluid in his chest, pt reports no chest pain or SOA.    Chief Complaint  Chief Complaint   Patient presents with    Nasal Congestion       Admitting doctor:   Percy Gonzalez MD    Admitting diagnosis:   The encounter diagnosis was Acute on chronic congestive heart failure, unspecified heart failure type.    Code status:   Current Code Status       Date Active Code Status Order ID Comments User Context       Prior            Allergies:   Penicillins    Isolation:   No active isolations    Intake and Output  No intake or output data in the 24 hours ending 02/13/25 0148    Weight:       02/12/25 2046   Weight: 82.7 kg (182 lb 5.1 oz)       Most recent vitals:   Vitals:    02/12/25 2046 02/12/25 2326   BP: 128/84 135/90   BP Location:  Right arm   Patient Position:  Lying   Pulse: 102 100   Resp: 20 19   Temp: 97.9 °F (36.6 °C)    SpO2: 96% 93%   Weight: 82.7 kg (182 lb 5.1 oz)    Height: 182.9 cm (72\")        Active LDAs/IV Access:   Lines, Drains & Airways       Active LDAs       Name Placement date Placement time Site Days    Peripheral IV 02/12/25 2154 Anterior;Left Forearm 02/12/25 2154  Forearm  less than 1    Arterial Sheath 6 Fr. Right Femoral 11/14/24  1103  Femoral  90                    Labs (abnormal labs have a star):   Labs Reviewed   COMPREHENSIVE METABOLIC PANEL - Abnormal; Notable for the following components:       Result Value    Alkaline Phosphatase 179 (*)     Total Bilirubin 1.4 (*)     All other components within normal limits    Narrative:     GFR Categories in Chronic Kidney Disease (CKD)      GFR Category          GFR (mL/min/1.73)    Interpretation  G1                     90 or greater         Normal or high (1)  G2                      60-89                Mild decrease (1)  G3a                   45-59                Mild to moderate decrease  G3b         "           30-44                Moderate to severe decrease  G4                    15-29                Severe decrease  G5                    14 or less           Kidney failure          (1)In the absence of evidence of kidney disease, neither GFR category G1 or G2 fulfill the criteria for CKD.    eGFR calculation 2021 CKD-EPI creatinine equation, which does not include race as a factor   BNP (IN-HOUSE) - Abnormal; Notable for the following components:    proBNP 8,163.0 (*)     All other components within normal limits    Narrative:     This assay is used as an aid in the diagnosis of individuals suspected of having heart failure. It can be used as an aid in the diagnosis of acute decompensated heart failure (ADHF) in patients presenting with signs and symptoms of ADHF to the emergency department (ED). In addition, NT-proBNP of <300 pg/mL indicates ADHF is not likely.    Age Range Result Interpretation  NT-proBNP Concentration (pg/mL:      <50             Positive            >450                   Gray                 300-450                    Negative             <300    50-75           Positive            >900                  Gray                300-900                  Negative            <300      >75             Positive            >1800                  Gray                300-1800                  Negative            <300   TROPONIN - Abnormal; Notable for the following components:    HS Troponin T 47 (*)     All other components within normal limits    Narrative:     High Sensitive Troponin T Reference Range:  <14.0 ng/L- Negative Female for AMI  <22.0 ng/L- Negative Male for AMI  >=14 - Abnormal Female indicating possible myocardial injury.  >=22 - Abnormal Male indicating possible myocardial injury.   Clinicians would have to utilize clinical acumen, EKG, Troponin, and serial changes to determine if it is an Acute Myocardial Infarction or myocardial injury due to an underlying chronic condition.       "  CBC WITH AUTO DIFFERENTIAL - Abnormal; Notable for the following components:    MCHC 30.9 (*)     Neutrophil % 76.4 (*)     Lymphocyte % 13.1 (*)     All other components within normal limits   HIGH SENSITIVITIY TROPONIN T 1HR - Abnormal; Notable for the following components:    HS Troponin T 45 (*)     All other components within normal limits    Narrative:     High Sensitive Troponin T Reference Range:  <14.0 ng/L- Negative Female for AMI  <22.0 ng/L- Negative Male for AMI  >=14 - Abnormal Female indicating possible myocardial injury.  >=22 - Abnormal Male indicating possible myocardial injury.   Clinicians would have to utilize clinical acumen, EKG, Troponin, and serial changes to determine if it is an Acute Myocardial Infarction or myocardial injury due to an underlying chronic condition.        COVID-19/FLUA&B/RSV, NP SWAB IN TRANSPORT MEDIA 1 HR TAT - Normal    Narrative:     Fact sheet for providers: https://www.fda.gov/media/173450/download    Fact sheet for patients: https://www.fda.gov/media/811347/download    Test performed by PCR.   PROCALCITONIN - Normal    Narrative:     As a Marker for Sepsis (Non-Neonates):    1. <0.5 ng/mL represents a low risk of severe sepsis and/or septic shock.  2. >2 ng/mL represents a high risk of severe sepsis and/or septic shock.    As a Marker for Lower Respiratory Tract Infections that require antibiotic therapy:    PCT on Admission    Antibiotic Therapy       6-12 Hrs later    >0.5                Strongly Recommended  >0.25 - <0.5        Recommended   0.1 - 0.25          Discouraged              Remeasure/reassess PCT  <0.1                Strongly Discouraged     Remeasure/reassess PCT    As 28 day mortality risk marker: \"Change in Procalcitonin Result\" (>80% or <=80%) if Day 0 (or Day 1) and Day 4 values are available. Refer to http://www.[employer]-pct-calculator.com    Change in PCT <=80%  A decrease of PCT levels below or equal to 80% defines a positive change in PCT " test result representing a higher risk for 28-day all-cause mortality of patients diagnosed with severe sepsis for septic shock.    Change in PCT >80%  A decrease of PCT levels of more than 80% defines a negative change in PCT result representing a lower risk for 28-day all-cause mortality of patients diagnosed with severe sepsis or septic shock.      CBC AND DIFFERENTIAL    Narrative:     The following orders were created for panel order CBC & Differential.  Procedure                               Abnormality         Status                     ---------                               -----------         ------                     CBC Auto Differential[[id number]]        Abnormal            Final result               Scan Slide[[id number]]                                                                    Please view results for these tests on the individual orders.       EKG:   ECG 12 Lead Dyspnea   Preliminary Result   HEART RATE=95  bpm   RR Srlfdasx=384  ms   IL Wrmswpes=203  ms   P Horizontal Axis=-61  deg   P Front Axis=86  deg   QRSD Ctdjunqv=210  ms   QT Geeutvyk=086  ms   PMkR=543  ms   QRS Axis=107  deg   T Wave Axis=-47  deg   - ABNORMAL ECG -   Sinus rhythm   Atrial premature complex   Prolonged IL interval   Anterior infarct, old   Date and Time of Study:2025-02-12 22:24:40          Meds given in ED:   Medications   sodium chloride 0.9 % flush 10 mL (has no administration in time range)   furosemide (LASIX) injection 40 mg (40 mg Intravenous Given 2/12/25 9551)       Imaging results:  XR Chest 2 View    Result Date: 2/12/2025  Impression: Background of mildly increased likely pulmonary edema. Mildly increased conspicuity of multifocal bilateral airspace opacities, as suspicious for superimposed multifocal pneumonia and/or alveolar pulmonary edema. Small bilateral pleural effusions with adjacent bibasal consolidation, likely representing atelectasis. Underlying infection is also possible. Electronically  Signed: Mario COPPOLA Aliciatl  2025 10:05 PM EST  Workstation ID: [id number]     Ambulatory status:   - standby    Social issues:   Social History     Socioeconomic History    Marital status:    Tobacco Use    Smoking status: Former     Current packs/day: 0.00     Average packs/day: 1 pack/day for 2.0 years (2.0 ttl pk-yrs)     Types: Cigarettes     Start date:      Quit date:      Years since quittin.1    Smokeless tobacco: Never   Vaping Use    Vaping status: Never Used   Substance and Sexual Activity    Alcohol use: Never    Drug use: Never    Sexual activity: Defer       Peripheral Neurovascular  Peripheral Neurovascular (Adult)  Peripheral Neurovascular WDL: WDL    Neuro Cognitive  Neuro Cognitive (Adult)  Cognitive/Neuro/Behavioral WDL: WDL, orientation  Orientation: oriented x 4  Additional Documentation: Pupils (Group)  Pupils  Pupil PERRLA: yes    Learning  Learning Assessment  Learning Readiness and Ability: no barriers identified    Respiratory  Respiratory  Airway WDL: WDL  Respiratory WDL  Respiratory WDL: .WDL except, cough, all  Rhythm/Pattern, Respiratory: shortness of breath  Cough Type: nonproductive    Abdominal Pain  Abdominal Pain CPG Interventions  Coping Interventions: safe, supportive environment facilitated, reassurance provided, questions answered  Safety Interventions  Safety Precautions/Falls Reduction: tubing secured  All Alarms: none present    Pain Assessments  Pain (Adult)  (0-10) Pain Rating: Rest: 0    NIH Stroke Scale       Shadia Beard RN  25 01:48 EST

## 2025-02-14 ENCOUNTER — READMISSION MANAGEMENT (OUTPATIENT)
Dept: CALL CENTER | Facility: HOSPITAL | Age: 80
End: 2025-02-14
Payer: OTHER GOVERNMENT

## 2025-02-14 VITALS
OXYGEN SATURATION: 94 % | SYSTOLIC BLOOD PRESSURE: 118 MMHG | HEART RATE: 94 BPM | WEIGHT: 182.32 LBS | TEMPERATURE: 97.8 F | HEIGHT: 72 IN | DIASTOLIC BLOOD PRESSURE: 61 MMHG | RESPIRATION RATE: 21 BRPM | BODY MASS INDEX: 24.69 KG/M2

## 2025-02-14 LAB
ANION GAP SERPL CALCULATED.3IONS-SCNC: 11.1 MMOL/L (ref 5–15)
BASOPHILS # BLD AUTO: 0.06 10*3/MM3 (ref 0–0.2)
BASOPHILS NFR BLD AUTO: 0.9 % (ref 0–1.5)
BUN SERPL-MCNC: 23 MG/DL (ref 8–23)
BUN/CREAT SERPL: 23.2 (ref 7–25)
CALCIUM SPEC-SCNC: 8.7 MG/DL (ref 8.6–10.5)
CHLORIDE SERPL-SCNC: 100 MMOL/L (ref 98–107)
CO2 SERPL-SCNC: 28.9 MMOL/L (ref 22–29)
CREAT SERPL-MCNC: 0.99 MG/DL (ref 0.76–1.27)
D-LACTATE SERPL-SCNC: 1.9 MMOL/L (ref 0.5–2)
DEPRECATED RDW RBC AUTO: 47.7 FL (ref 37–54)
EGFRCR SERPLBLD CKD-EPI 2021: 78.5 ML/MIN/1.73
EOSINOPHIL # BLD AUTO: 0.1 10*3/MM3 (ref 0–0.4)
EOSINOPHIL NFR BLD AUTO: 1.5 % (ref 0.3–6.2)
ERYTHROCYTE [DISTWIDTH] IN BLOOD BY AUTOMATED COUNT: 14.6 % (ref 12.3–15.4)
GLUCOSE BLDC GLUCOMTR-MCNC: 164 MG/DL (ref 70–105)
GLUCOSE BLDC GLUCOMTR-MCNC: 168 MG/DL (ref 70–105)
GLUCOSE BLDC GLUCOMTR-MCNC: 80 MG/DL (ref 70–105)
GLUCOSE SERPL-MCNC: 82 MG/DL (ref 65–99)
HCT VFR BLD AUTO: 44 % (ref 37.5–51)
HGB BLD-MCNC: 13.6 G/DL (ref 13–17.7)
IMM GRANULOCYTES # BLD AUTO: 0.01 10*3/MM3 (ref 0–0.05)
IMM GRANULOCYTES NFR BLD AUTO: 0.2 % (ref 0–0.5)
LYMPHOCYTES # BLD AUTO: 1.6 10*3/MM3 (ref 0.7–3.1)
LYMPHOCYTES NFR BLD AUTO: 24.1 % (ref 19.6–45.3)
MCH RBC QN AUTO: 27.6 PG (ref 26.6–33)
MCHC RBC AUTO-ENTMCNC: 30.9 G/DL (ref 31.5–35.7)
MCV RBC AUTO: 89.2 FL (ref 79–97)
MONOCYTES # BLD AUTO: 0.72 10*3/MM3 (ref 0.1–0.9)
MONOCYTES NFR BLD AUTO: 10.8 % (ref 5–12)
NEUTROPHILS NFR BLD AUTO: 4.15 10*3/MM3 (ref 1.7–7)
NEUTROPHILS NFR BLD AUTO: 62.5 % (ref 42.7–76)
NRBC BLD AUTO-RTO: 0 /100 WBC (ref 0–0.2)
PLATELET # BLD AUTO: 159 10*3/MM3 (ref 140–450)
PMV BLD AUTO: 11 FL (ref 6–12)
POTASSIUM SERPL-SCNC: 4.1 MMOL/L (ref 3.5–5.2)
PROCALCITONIN SERPL-MCNC: 0.21 NG/ML (ref 0–0.25)
RBC # BLD AUTO: 4.93 10*6/MM3 (ref 4.14–5.8)
SODIUM SERPL-SCNC: 140 MMOL/L (ref 136–145)
WBC NRBC COR # BLD AUTO: 6.64 10*3/MM3 (ref 3.4–10.8)

## 2025-02-14 PROCEDURE — 97161 PT EVAL LOW COMPLEX 20 MIN: CPT

## 2025-02-14 PROCEDURE — G0378 HOSPITAL OBSERVATION PER HR: HCPCS

## 2025-02-14 PROCEDURE — 82948 REAGENT STRIP/BLOOD GLUCOSE: CPT

## 2025-02-14 PROCEDURE — 82948 REAGENT STRIP/BLOOD GLUCOSE: CPT | Performed by: PHYSICIAN ASSISTANT

## 2025-02-14 PROCEDURE — 84145 PROCALCITONIN (PCT): CPT | Performed by: NURSE PRACTITIONER

## 2025-02-14 PROCEDURE — 96376 TX/PRO/DX INJ SAME DRUG ADON: CPT

## 2025-02-14 PROCEDURE — 25010000002 FUROSEMIDE PER 20 MG: Performed by: NURSE PRACTITIONER

## 2025-02-14 PROCEDURE — 83605 ASSAY OF LACTIC ACID: CPT | Performed by: NURSE PRACTITIONER

## 2025-02-14 PROCEDURE — 80048 BASIC METABOLIC PNL TOTAL CA: CPT | Performed by: PHYSICIAN ASSISTANT

## 2025-02-14 PROCEDURE — 87040 BLOOD CULTURE FOR BACTERIA: CPT | Performed by: NURSE PRACTITIONER

## 2025-02-14 PROCEDURE — 85025 COMPLETE CBC W/AUTO DIFF WBC: CPT | Performed by: PHYSICIAN ASSISTANT

## 2025-02-14 RX ORDER — FUROSEMIDE 20 MG/1
20 TABLET ORAL 2 TIMES DAILY
Qty: 60 TABLET | Refills: 0 | Status: SHIPPED | OUTPATIENT
Start: 2025-02-14 | End: 2025-03-16

## 2025-02-14 RX ORDER — MIDODRINE HYDROCHLORIDE 10 MG/1
10 TABLET ORAL
Qty: 90 TABLET | Refills: 0 | Status: SHIPPED | OUTPATIENT
Start: 2025-02-14

## 2025-02-14 RX ADMIN — GABAPENTIN 100 MG: 100 CAPSULE ORAL at 08:10

## 2025-02-14 RX ADMIN — FERROUS SULFATE TAB 325 MG (65 MG ELEMENTAL FE) 325 MG: 325 (65 FE) TAB at 08:10

## 2025-02-14 RX ADMIN — METFORMIN HYDROCHLORIDE 1000 MG: 500 TABLET, FILM COATED ORAL at 08:10

## 2025-02-14 RX ADMIN — FUROSEMIDE 20 MG: 10 INJECTION, SOLUTION INTRAMUSCULAR; INTRAVENOUS at 08:10

## 2025-02-14 RX ADMIN — ASPIRIN 81 MG: 81 TABLET, COATED ORAL at 08:10

## 2025-02-14 RX ADMIN — MIDODRINE HYDROCHLORIDE 10 MG: 5 TABLET ORAL at 12:30

## 2025-02-14 RX ADMIN — MIDODRINE HYDROCHLORIDE 10 MG: 5 TABLET ORAL at 08:10

## 2025-02-14 RX ADMIN — Medication 10 ML: at 08:10

## 2025-02-14 RX ADMIN — ATORVASTATIN CALCIUM 40 MG: 40 TABLET, FILM COATED ORAL at 08:10

## 2025-02-14 NOTE — CASE MANAGEMENT/SOCIAL WORK
Discharge Planning Assessment   [facility]     Patient Name: Vlad Guerrero  MRN: [id number]  Today's Date: 2/14/2025    Admit Date: 2/12/2025    Plan: Return to [facility].   Discharge Needs Assessment       Row Name 02/14/25 1127       Living Environment    People in Home facility resident    Name(s) of People in Home Assisted Living    Current Living Arrangements assisted living facility    Potentially Unsafe Housing Conditions none    In the past 12 months has the electric, gas, oil, or water company threatened to shut off services in your home? No    Primary Care Provided by self    Provides Primary Care For no one    Family Caregiver if Needed other (see comments)  states family    Quality of Family Relationships supportive    Able to Return to Prior Arrangements yes       Resource/Environmental Concerns    Resource/Environmental Concerns none    Transportation Concerns none       Transportation Needs    In the past 12 months, has lack of transportation kept you from medical appointments or from getting medications? no    In the past 12 months, has lack of transportation kept you from meetings, work, or from getting things needed for daily living? No       Food Insecurity    Within the past 12 months, you worried that your food would run out before you got the money to buy more. Never true    Within the past 12 months, the food you bought just didn't last and you didn't have money to get more. Never true       Transition Planning    Patient/Family Anticipates Transition to other (see comments)  [facility]    Patient/Family Anticipated Services at Transition none    Transportation Anticipated family or friend will provide       Discharge Needs Assessment    Readmission Within the Last 30 Days no previous admission in last 30 days    Current Outpatient/Agency/Support Group assisted living facility    Equipment Currently Used at Home walker, standard    Concerns to be Addressed denies  needs/concerns at this time    Do you want help finding or keeping work or a job? I do not need or want help    Do you want help with school or training? For example, starting or completing job training or getting a high school diploma, GED or equivalent No    Anticipated Changes Related to Illness none    Equipment Needed After Discharge none    Outpatient/Agency/Support Group Needs assisted living facility                   Discharge Plan       Row Name 02/14/25 1128       Plan    Plan Return to [facility].    Patient/Family in Agreement with Plan yes    Plan Comments CM met with patient at bedside. [facility] refusal [facility] explained and signed. [facility] to [facility] at [phone]. Confirmed PCP, insurance, and pharmacy.  Meds to beds denied. Patient denies any difficulty affording medications. Patient not current with any therapies. Patient states he still drives and is indepedent with ADL's. Confirmed transportation at discharge will be his uncle. DC Barriers: IV diuretics, labs                    Demographic Summary       Row Name 02/14/25 1126       General Information    Admission Type observation    Arrived From emergency department    Referral Source admission list    Reason for Consult discharge planning    Preferred Language English       Contact Information    Permission Granted to Share Info With                    Functional Status       Row Name 02/14/25 1126       Functional Status    Usual Activity Tolerance moderate    Current Activity Tolerance moderate       Functional Status, IADL    Medications independent    Meal Preparation independent    Housekeeping independent    Laundry independent    Shopping independent    If for any reason you need help with day-to-day activities such as bathing, preparing meals, shopping, managing finances, etc., do you get the help you need? I could use a little more help    IADL Comments Lives at [facility]             Danna Maldonado RN    Office:  [phone]

## 2025-02-14 NOTE — OUTREACH NOTE
Prep Survey      Flowsheet Row Responses   [facility] facility patient discharged from? [facility]   Is LACE score < 7 ? No   Eligibility Readm Mgmt   Discharge diagnosis CHF (congestive heart failure)   Does the patient have one of the following disease processes/diagnoses(primary or secondary)? CHF   Prep survey completed? Yes            Ira COPPOLA - Registered Nurse

## 2025-02-14 NOTE — CASE MANAGEMENT/SOCIAL WORK
Case Management/Social Work    Patient Name:  Vlad Guerrero  YOB: 1945  MRN: [id number]  Admit Date:  2/12/2025      Attempted to fax [facility] refusal form 3x on 2/14/25 with busy signal.       Electronically signed by:  Danna Maldonado RN  02/14/25 15:41 EST

## 2025-02-14 NOTE — THERAPY EVALUATION
Patient Name: Vlad Guerrero  : 1945    MRN: [id number]                              Today's Date: 2025       Admit Date: 2025    Visit Dx:     ICD-10-CM ICD-9-CM   1. 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    Cellulitis    Right hand pain    Ataxia    Port-A-Cath in place    Unsteady gait    Hyperglycemia    Chronic combined systolic and diastolic congestive heart failure    Autonomic orthostatic hypotension    Uncontrolled type 2 diabetes mellitus with hyperglycemia    Cardiomyopathy, dilated    LBBB (left bundle branch block)    Acute HFrEF (heart failure with reduced ejection fraction)    CHF (congestive heart failure)     Past Medical History:   Diagnosis Date    Anemia     Colon cancer     colon    Diabetes mellitus     Hyperlipidemia     Hypertension     LBBB (left bundle branch block) 2024     Past Surgical History:   Procedure Laterality Date    APPENDECTOMY      CARDIAC CATHETERIZATION      CARDIAC CATHETERIZATION N/A 2024    Procedure: Left Heart Cath, possible pci;  Surgeon: Mg Rahman MD;  Location: [facility] CATH INVASIVE LOCATION;  Service: Cardiovascular;  Laterality: N/A;    COLON RESECTION N/A 12/15/2022    Procedure: COLON RESECTION RIGHT;  Surgeon: Percy Davis MD;  Location: [facility] MAIN OR;  Service: General;  Laterality: N/A;    COLONOSCOPY N/A 2022    Procedure: COLONOSCOPY WITH BIOPSY AND POLYPECTOMY;  Surgeon: Billy Julien MD;  Location: [facility] ENDOSCOPY;  Service: Gastroenterology;  Laterality: N/A;  Impression:  1.  Large 4-5cm fulgurating circumferential ulcerated mass in the very  proximal part of the ascending colon next to ileocecal valve multiple biopsies were performed.  This is highly concerning for colon malignancy.  2.  2 polyp rem    ENDOSCOPY N/A 11/11/2022    Procedure: ESOPHAGOGASTRODUODENOSCOPY with biopsy X1;  Surgeon: Billy Julien MD;  Location: [facility] ENDOSCOPY;  Service: Gastroenterology;  Laterality: N/A;  5.  Upper endoscopy lamination unremarkable.       PORTACATH PLACEMENT Right 1/12/2023    Procedure: INSERTION OF PORTACATH;  Surgeon: Percy Davis MD;  Location: [facility] MAIN OR;  Service: General;  Laterality: Right;    TONSILLECTOMY        General Information       Row Name 02/14/25 1234          Physical Therapy Time and Intention    Document Type evaluation  -CR     Mode of Treatment physical therapy  -CR       Row Name 02/14/25 1234          General Information    Patient Profile Reviewed yes  -CR     Prior Level of Function independent:;all household mobility;ADL's  -CR       Row Name 02/14/25 1234          Living Environment    People in Home facility resident;alone  ABRIL  -CR       Row Name 02/14/25 1234          Home Main Entrance    Number of Stairs, Main Entrance none  -CR       Row Name 02/14/25 1234          Stairs Within Home, Primary    Stairs, Within Home, Primary uses elevator  -CR       Row Name 02/14/25 1234          Cognition    Orientation Status (Cognition) oriented x 4  -CR               User Key  (r) = Recorded By, (t) = Taken By, (c) = Cosigned By      Initials Name Provider Type    CR Reyes, Carmela, PT Physical Therapist                   Mobility       Row Name 02/14/25 1237          Bed Mobility    Bed Mobility bed mobility (all) activities  -CR     All Activities, [place] (Bed Mobility) independent  -CR       Row Name 02/14/25 1237          Bed-Chair Transfer    Bed-Chair [place] (Transfers) independent  -CR       Row Name 02/14/25 1237          Sit-Stand Transfer    Sit-Stand [place] (Transfers) independent  -CR        Row Name 02/14/25 1237          Gait/Stairs (Locomotion)    [place]es Level (Gait) standby assist  -CR     Distance in Feet (Gait) 120  -CR               User Key  (r) = Recorded By, (t) = Taken By, (c) = Cosigned By      Initials Name Provider Type    CR Reyes, Carmela, PT Physical Therapist                   Obj/Interventions       Row Name 02/14/25 1238          Range of Motion Comprehensive    General Range of Motion no range of motion deficits identified  -CR       Row Name 02/14/25 1238          Strength Comprehensive (MMT)    General Manual Muscle Testing (MMT) Assessment no strength deficits identified  -CR       Row Name 02/14/25 1238          Balance    Balance Assessment sitting static balance;standing static balance;sitting dynamic balance;standing dynamic balance  -CR     Static Sitting Balance independent  -CR     Dynamic Sitting Balance independent  -CR     Position, Sitting Balance sitting edge of bed  -CR     Static Standing Balance independent  -CR     Dynamic Standing Balance independent  -CR     Balance Interventions occupation based/functional task  -CR     Comment, Balance able to don pants and shoes while sitting/standing  -CR               User Key  (r) = Recorded By, (t) = Taken By, (c) = Cosigned By      Initials Name Provider Type    CR Reyes, Carmela, PT Physical Therapist                   Goals/Plan    No documentation.                  Clinical Impression       Row Name 02/14/25 1238          Pain    Pretreatment Pain Rating 0/10 - no pain  -CR     Posttreatment Pain Rating 0/10 - no pain  -CR       Row Name 02/14/25 1238          Plan of Care Review    Plan of Care Reviewed With patient  -CR     Outcome Evaluation 78 y/o male came to hospital on 2/12 due to CHF exacerbation. Patient was in ED on 1/26 for similar presentation and apparently did not get prescribed medication. PMH includes colon Ca, htn. Patient resides in an assisted living facility and normally independent with  mobility and ADL's. At time of eval, patient reports no chest pain, no shortness of breath and no LE edema seen. Patient remains independent wtih all mobility, ambulated 120 ft without a.d. and no loss of balance , no buckling. Patient was able to don his pants and shoes prior to ambulating without difficulties.  Patient is safe to return home, no further rehab services indicated.  -CR       Row Name 02/14/25 1238          Therapy Assessment/Plan (PT)    Patient/Family Therapy Goals Statement (PT) home  -CR     Criteria for Skilled Interventions Met (PT) no;no problems identified which require skilled intervention  -CR     Therapy Frequency (PT) evaluation only  -CR       Row Name 02/14/25 1238          Positioning and Restraints    Post Treatment Position bed  -CR     In Bed sitting EOB;call light within reach;notified nsg  -CR               User Key  (r) = Recorded By, (t) = Taken By, (c) = Cosigned By      Initials Name Provider Type    CR Reyes, Carmela, PT Physical Therapist                   Outcome Measures       Row Name 02/14/25 1242 02/14/25 0800       How much help from another person do you currently need...    Turning from your back to your side while in flat bed without using bedrails? 4  -CR 4  -MR    Moving from lying on back to sitting on the side of a flat bed without bedrails? 4  -CR 4  -MR    Moving to and from a bed to a chair (including a wheelchair)? 4  -CR 4  -MR    Standing up from a chair using your arms (e.g., wheelchair, bedside chair)? 4  -CR 3  -MR    Climbing 3-5 steps with a railing? 3  -CR 3  -MR    To walk in hospital room? 4  -CR 3  -MR    AM-PAC 6 Clicks Score (PT) 23  -CR 21  -MR              User Key  (r) = Recorded By, (t) = Taken By, (c) = Cosigned By      Initials Name Provider Type    CR Reyes, Carmela, PT Physical Therapist    Dale Ralph, RN Registered Nurse                                 Physical Therapy Education        No education to display                   PT Recommendation and Plan     Outcome Evaluation: 78 y/o male came to hospital on 2/12 due to CHF exacerbation. Patient was in ED on 1/26 for similar presentation and apparently did not get prescribed medication. PMH includes colon Ca, htn. Patient resides in an assisted living facility and normally independent with mobility and ADL's. At time of eval, patient reports no chest pain, no shortness of breath and no LE edema seen. Patient remains independent wtih all mobility, ambulated 120 ft without a.d. and no loss of balance , no buckling. Patient was able to don his pants and shoes prior to ambulating without difficulties.  Patient is safe to return home, no further rehab services indicated.     Time Calculation:         PT Charges       Row Name 02/14/25 1243             Time Calculation    Start Time 1004  -CR      Stop Time 1020  -CR      Time Calculation (min) 16 min  -CR      PT Received On 02/14/25  -CR         Time Calculation- PT    Total Timed Code Minutes- PT 0 minute(s)  -CR                User Key  (r) = Recorded By, (t) = Taken By, (c) = Cosigned By      Initials Name Provider Type    CR Reyes, Carmela, PT Physical Therapist                  Therapy Charges for Today       Code Description Service Date Service Provider Modifiers Qty    48164187205 HC PT EVAL LOW COMPLEXITY 3 2/14/2025 Reyes, Carmela, PT GP 1            PT G-Codes  AM-PAC 6 Clicks Score (PT): 23  PT Discharge Summary  Anticipated Discharge Disposition (PT): assisted living    Carmela Reyes, PT  2/14/2025

## 2025-02-14 NOTE — PLAN OF CARE
Goal Outcome Evaluation:  Plan of Care Reviewed With: patient           Outcome Evaluation: 80 y/o male came to hospital on 2/12 due to CHF exacerbation. Patient was in ED on 1/26 for similar presentation and apparently did not get prescribed medication. PMH includes colon Ca, htn. Patient resides in an assisted living facility and normally independent with mobility and ADL's. At time of eval, patient reports no chest pain, no shortness of breath and no LE edema seen. Patient remains independent wtih all mobility, ambulated 120 ft without a.d. and no loss of balance , no buckling. Patient was able to don his pants and shoes prior to ambulating without difficulties.  Patient is safe to return home, no further rehab services indicated.    Anticipated Discharge Disposition (PT): assisted living

## 2025-02-14 NOTE — PLAN OF CARE
Problem: Adult Inpatient Plan of Care  Goal: Plan of Care Review  Outcome: Progressing  Flowsheets (Taken 2/14/2025 0535)  Progress: improving  Plan of Care Reviewed With: patient  Goal: Patient-Specific Goal (Individualized)  Outcome: Progressing  Goal: Absence of Hospital-Acquired Illness or Injury  Outcome: Progressing  Intervention: Identify and Manage Fall Risk  Recent Flowsheet Documentation  Taken 2/14/2025 0400 by Analisa Monae RN  Safety Promotion/Fall Prevention: safety round/check completed  Taken 2/14/2025 0258 by Analisa Monae RN  Safety Promotion/Fall Prevention: safety round/check completed  Taken 2/14/2025 0040 by Analisa Monae RN  Safety Promotion/Fall Prevention: safety round/check completed  Taken 2/13/2025 2200 by Analisa Monae RN  Safety Promotion/Fall Prevention: safety round/check completed  Taken 2/13/2025 2015 by Analisa Monae RN  Safety Promotion/Fall Prevention: safety round/check completed  Intervention: Prevent and Manage VTE (Venous Thromboembolism) Risk  Recent Flowsheet Documentation  Taken 2/13/2025 2015 by Analisa Monae RN  VTE Prevention/Management: SCDs (sequential compression devices) off  Goal: Optimal Comfort and Wellbeing  Outcome: Progressing  Goal: Readiness for Transition of Care  Outcome: Progressing     Problem: Skin Injury Risk Increased  Goal: Skin Health and Integrity  Outcome: Progressing     Problem: Comorbidity Management  Goal: Maintenance of COPD Symptom Control  Outcome: Progressing  Goal: Blood Glucose Level Within Target Range  Outcome: Progressing  Goal: Maintenance of Heart Failure Symptom Control  Outcome: Progressing  Goal: Blood Pressure in Desired Range  Outcome: Progressing   Goal Outcome Evaluation:  Plan of Care Reviewed With: patient        Progress: improving

## 2025-02-14 NOTE — DISCHARGE PLACEMENT REQUEST
"Cesar, Vlad \"Fredi\" (79 y.o. Male)       Date of Birth   1945    Social Security Number       Address   [address] [place] [address] [place] [address]    Home Phone   [phone]    MRN   [id number]       [facility]    Marital Status                               Admission Date   2/12/25    Admission Type   Emergency    Admitting Provider   J Luis Gonzalez MD    Attending Provider   J Luis Gonzalez MD    Department, Room/Bed   [facility] OBSERVATION, 221/1       Discharge Date       Discharge Disposition       Discharge Destination                                 Attending Provider: J Luis Gonzalez MD    Allergies: Penicillins    Isolation: Droplet   Infection: Rhinovirus  (02/13/25)   Code Status: CPR    Ht: 182.9 cm (72.01\")   Wt: 82.7 kg (182 lb 5.1 oz)    Admission Cmt: None   Principal Problem: CHF (congestive heart failure) [I50.9]                   Active Insurance as of 2/12/2025       Primary Coverage       Payor Plan Insurance Group Employer/Plan Group    [facility] [id number]        Payor Plan Address Payor Plan Phone Number Payor Plan Fax Number Effective Dates    [facility]UNITY [facility] [phone]  1/1/2019 - None Entered    [address] [address]       [facility] [address]         Subscriber Name Subscriber Birth Date Member ID       VLAD SANCHEZ 1945 [id number]               Secondary Coverage       Payor Plan Insurance Group Employer/Plan Group    [employer] REPLACEMENT [employer] MED ADV HMO [id number]       Payor Plan Address Payor Plan Phone Number Payor Plan Fax Number Effective Dates    [address] [phone]  1/1/2024 - None Entered    [facility] [address]         Subscriber Name Subscriber Birth Date Member ID       VLAD SANCHEZ 1945 [id number]                     Emergency Contacts        (Rel.) Home Phone Work Phone Mobile Phone    CARLY SANCHEZ (Son) [phone] -- [phone]    CESARJ LUIS (Son) -- -- " [phone]    MAHESH SANCHEZ (Son) -- -- [phone]    JESSICA ORTEGA (Relative) [phone] -- [phone]

## 2025-02-14 NOTE — PROGRESS NOTES
"  Cardiology Progress Note    Patient Identification:  Name: Vlad Guerrero  Age: 79 y.o.  Sex: male  :  1945  MRN: [id number]                 Follow Up / Chief Complaint: Shortness of breath  Chief Complaint   Patient presents with    Nasal Congestion       Interval History: Patient with metastatic colon cancer with liver mets and diabetes PFO (L chronic HFrEF EF of 30% presented with shortness of breath.  Was found to have a proBNP of 8100.  Troponin was elevated but flat at 47 and 45.  Chest x-ray revealed pulmonary edema.  Patient was diuresed.  Developed hypotension was started on midodrine.       Subjective: Patient seen and examined.  Chart reviewed.  Labs reviewed.      Objective:  2025: Potassium is 4.1, glucose elevated, normal CBC.    History of present illness:    Mr. Vlad Guerrero has PMH of     PFO  Left bundle branch block  HFrEF   EF 30%,  Diastolic dysfunction   Left atrial enlargement  Hypertension  History of TIA , left frontal CVA 2024  Metastatic colon cancer with possible liver mets  Insulin-dependent diabetes        Presented to the ED on 2025 with worsening shortness of breath.  Patient is drowsy during examination, not answering many questions, but reports he has \"fluid on his lungs\".  When asked how long he states for one day, however per ED note, he reported progressively worsening since .  Patient reports he does not have any \"water pills\" at home.  Per ED note, he states he ran out.  Patient denies any chest pain or edema.       Labs in the ED showed HS troponin 47--45, proBNP 8163 CMP unremarkable except alk phos 179 total bilirubin 1.4 CBC unremarkable EKG sinus rhythm with PAC chest x-ray shows increased pulmonary multifocal bilateral airspace opacities suspicious for superimposed multifocal pneumonia small bilateral pleural effusions possible underlying infection.        Assessment:  :     Acute on chronic HFrEF due to systolic dysfunction  Severe LV " dysfunction by echo  Elevated troponin at 47 and 45  Elevated proBNP of 8163.  Abnormal chest x-ray  PFO  LBBB  LAE  CAD history of multivessel stenting, cath 11/14/2024 revealed patent stent  Type 2 diabetes, poorly controlled     History of left frontal stroke.  Metastatic colon cancer  History of previous TIA  Carotid disease        Recommendations / Plan:         Patient presented to 12/20/2025 with worsening shortness of breath.  Patient is a poor historian, states that his lungs are elevated fluid.  Patient has history of chronic HFrEF due to severe systolic dysfunction, echo 11/13/2024 revealing severe LV dysfunction.  Patient has chronic left bundle branch block.  Patient unfortunately has chronic orthostasis and orthostatic hypotension due to autonomic insufficiency  Is on chronic midodrine.  Underwent CT PE study 2/13/2025 which revealed no PE.  Findings consistent with interstitial and alveolar pulmonary edema with moderate to large bilateral pleural effusion.  Patient was given IV diuretics with furosemide.  Patient blood pressure is low.  Will continue midodrine.  Monitor renal function urine output electrolytes  Continue aspirin, atorvastatin, midodrine, furosemide as tolerated.  Patient underwent cardiac cath 11/14/2024 which revealed EF of 25% and patent stents in LAD LCx and RCA  Discussed with admitting nurse practitioner     EKG is revealing left bundle branch block  Echo 11/13/2024 is revealing severe LV dysfunction.  Cardiac cath 11/14/2024 revealed severe LV dysfunction with patent stents in LAD LCx and RCA.  Unfortunately patient is not a candidate for guideline directed medical therapy for LV dysfunction due to chronic hypotension requiring midodrine       Copied text in this portion of the note has been reviewed and is accurate as of 2/14/2025    Past Medical History:  Past Medical History:   Diagnosis Date    Anemia     Colon cancer 2022    colon    Diabetes mellitus     Hyperlipidemia      Hypertension     LBBB (left bundle branch block) 2024     Past Surgical History:  Past Surgical History:   Procedure Laterality Date    APPENDECTOMY      CARDIAC CATHETERIZATION      CARDIAC CATHETERIZATION N/A 2024    Procedure: Left Heart Cath, possible pci;  Surgeon: Mg Rahman MD;  Location: [facility] CATH INVASIVE LOCATION;  Service: Cardiovascular;  Laterality: N/A;    COLON RESECTION N/A 12/15/2022    Procedure: COLON RESECTION RIGHT;  Surgeon: Percy Davis MD;  Location: [facility] MAIN OR;  Service: General;  Laterality: N/A;    COLONOSCOPY N/A 2022    Procedure: COLONOSCOPY WITH BIOPSY AND POLYPECTOMY;  Surgeon: Billy Julien MD;  Location: [facility] ENDOSCOPY;  Service: Gastroenterology;  Laterality: N/A;  Impression:  1.  Large 4-5cm fulgurating circumferential ulcerated mass in the very proximal part of the ascending colon next to ileocecal valve multiple biopsies were performed.  This is highly concerning for colon malignancy.  2.  2 polyp rem    ENDOSCOPY N/A 2022    Procedure: ESOPHAGOGASTRODUODENOSCOPY with biopsy X1;  Surgeon: Billy Julien MD;  Location: [facility] ENDOSCOPY;  Service: Gastroenterology;  Laterality: N/A;  5.  Upper endoscopy lamination unremarkable.       PORTACATH PLACEMENT Right 2023    Procedure: INSERTION OF PORTACATH;  Surgeon: Percy Davis MD;  Location: [facility] MAIN OR;  Service: General;  Laterality: Right;    TONSILLECTOMY          Social History:   Social History     Tobacco Use    Smoking status: Former     Current packs/day: 0.00     Average packs/day: 1 pack/day for 2.0 years (2.0 ttl pk-yrs)     Types: Cigarettes     Start date:      Quit date:      Years since quittin.1    Smokeless tobacco: Never   Substance Use Topics    Alcohol use: Never      Family History:  Family History   Problem Relation Age of Onset    Pneumonia Mother 94        SARS-CoV2    Colon cancer Father 62    Heart disease Sister   "         Allergies:  Allergies   Allergen Reactions    Penicillins Rash     Scheduled Meds:          Review of Systems:   ROS  Review of Systems   Constitution: Negative for chills and fever.   Cardiovascular: Negative for chest pain and palpitations.   Respiratory: Negative for cough and hemoptysis.    Gastrointestinal: Negative for nausea.        Constitutional:  Temp:  [96 °F (35.6 °C)-98.7 °F (37.1 °C)] 97.8 °F (36.6 °C)  Heart Rate:  [] 94  Resp:  [15-22] 21  BP: ()/(61-87) 118/61    Physical Exam   /61 (BP Location: Left arm, Patient Position: Lying)   Pulse 94   Temp 97.8 °F (36.6 °C) (Oral)   Resp 21   Ht 182.9 cm (72.01\")   Wt 82.7 kg (182 lb 5.1 oz)   SpO2 94%   BMI 24.72 kg/m²   General:  Appears in no acute distress  Eyes: Sclera is anicteric,  conjunctiva is clear   HEENT: Bitemporal wasting  Respiratory: Respirations regular and unlabored at rest.  Clear to auscultation  Cardiovascular: S1,S2 Regular rate and rhythm.  No edema  Gastrointestinal: Abdomen nondistended.  Musculoskeletal:  No abnormal movements  Extremities: No digital clubbing or cyanosis  Skin: Color pink.   Neuro: Alert and awake.    INTAKE AND OUTPUT:    Intake/Output Summary (Last 24 hours) at 2/14/2025 1631  Last data filed at 2/14/2025 1239  Gross per 24 hour   Intake 600 ml   Output --   Net 600 ml       Cardiographics  Telemetry: Reviewed and interpreted by me reveals sinus rhythm    ECG:   ECG 12 Lead Dyspnea   Final Result   HEART RATE=95  bpm   RR Zbyuqzdn=941  ms   WY Owusuzmi=550  ms   P Horizontal Axis=-61  deg   P Front Axis=86  deg   QRSD Slffpbjm=967  ms   QT Spwjaejj=136  ms   HDtS=766  ms   QRS Axis=107  deg   T Wave Axis=-47  deg   - ABNORMAL ECG -   Sinus rhythm   Atrial premature complex   Prolonged WY interval   Anterior  infarct, old   When compared with ECG of 12-Nov-2024 13:02:37,   Nonspecific significant change   Electronically Signed By: Percy Gonzalez (Keagan) 2025-02-13 06:32:11   Date " and Time of Study:2025-02-12 22:24:40      Telemetry Scan   Final Result      Telemetry Scan   Final Result      Telemetry Scan   Final Result      Telemetry Scan   Final Result      Telemetry Scan   Final Result        I have personally reviewed EKG    Echocardiogram: Results for orders placed during the hospital encounter of 01/25/25    Adult Transthoracic Echo Complete W/ Cont if Necessary Per Protocol    Interpretation Summary    Left ventricular ejection fraction appears to be 21 - 25%.    Indication  Shortness of breath  Heart failure    Technically satisfactory study.  Mitral valve is structurally normal.  Tricuspid valve is structurally normal.  Aortic valve is thickened with adequate opening motion.  Pulmonic valve could not be well visualized.  Moderate mitral and mild aortic regurgitation is present.  Mild tricuspid regurgitation is present.  Left atrium is normal in size.  Right atrium is normal in size.  Left ventricle is significantly enlarged with severe and diffuse hypocontractility with ejection fraction of 20 to 25%.  Left ventricular peak systolic global longitudinal strain is abnormal with GLS of -5%.  Right ventricle is normal in size and contractility with a TAPSE of 1.8 cm..  Atrial septum is intact.  Aorta is normal.  IVC dilatation with decreased respiratory variation.  Small pericardial effusion is present.  No evidence for intracardiac thrombus is present.  Large left pleural effusion is present.    Impression  Moderate mitral regurgitation.  Time mild mitral and tricuspid regurgitation is present.  Left ventricle is significantly enlarged with severe and diffuse hypocontractility with ejection fraction of 20 to 25%.  Left ventricular peak systolic global longitudinal strain is abnormal with GLS of -5%.  Right ventricle is normal in size and contractility with a TAPSE of 1.8 cm..  Large left pleural effusion is present.  Small pericardial effusion is present.      STRESS TEST      HEART  CATHETERIZATION  Results for orders placed during the hospital encounter of 24    Cardiac Catheterization/Vascular Study    Conclusion  Table formatting from the original result was not included.  2024      Heart Cath Report    NAME:              Vlad Guerrero  :                1945  AGE/SEX:        79 y.o. male  MRN:                [id number]        Procedures Performed    Left heart catheterization  Selective coronary angiography  Left ventriculography  Mynx closure device    :   Mg Rahman MD    Vascular Access Site: Femoral    Indication for procedure: Chronic CHF, severe LV dysfunction, cardiomyopathy rule out ischemic cardiomyopathy, left bundle branch block, diabetes      Procedure Note    After discussing the risks, benefits, and alternatives of the procedure, informed consent was obtained.  Timeout was done before the procedure.  Moderate conscious sedation was given utilizing IV Versed and fentanyl administered by RN with continuous EKG oximetry and hemodynamic monitoring supervised by me throughout the entire case.  I was present with the patient for the duration of moderate sedation and supervised staff who had no other duties and monitored the patient for the entire procedure patient had Patterson 2-3 sedation scale. the vascular access site was prepped and draped in the usual sterile fashion.  2% lidocaine was used for local anesthesia. Appropriate landmarks were assessed.  A 6 English short sheath was inserted in the artery using the modified Seldinger technique.    Selective coronary angiography was performed with JL4 and JR4 diagnostic catheters. Left ventriculogram  was performed with an angled pigtail catheter.  All exchanges were performed over the wire.  No specimens were removed.  There were no apparent acute or early complications.  The patient tolerated the procedure well and was transferred to the recovery area in stable condition.      Closure  device: Mynx device was deployed successfully after right iliofemoral angiogram was performed. Good hemostasis was achieved.    Complications:  None  Blood Loss: minimal    Hemodynamics    Pressures    Ao:    110/45 mmHg  LV:    95/4 mmHg  End-diastolic pressure:  3-4 mmHg  No significant aortic valvular gradient on pullback    Coronary Angiography    Left Main :  The left main   is without disease    Left Anterior Descending : Oxman LAD has 20 to 30% luminal irregularities.  There is a stent patent in mid LAD with excellent long-term results.  Mid to distal LAD has 3040% calcified narrowing.  There is a large diagonal without disease.    Left Circumflex : Had a stent patent stream and proximal portion with excellent long-term results.  Gives rise to 2 large marginals without disease and continues a small distal marginal without disease.    Right Coronary Artery :  The right coronary artery   is a dominant artery with stents patent in vertical segment of midportion and horizontal segment of distal portion.  PLV branch had 50% mid narrowing.    Dominance:  []  Left  [x]  Right  []  Co-Dominant        Left Ventriculography:    Estimated Ejection Fraction: 25 %  Wall motion abnormalities: Dilated LV with severe global left ventricular hypokinesis  Mitral Regurgitation: 1+ MR    Impression:    Patent stents in LAD, LCx, RCA with excellent long-term results  Severe LV dysfunction    Recommendations:    Patient does not have a any reversible cause for his LV dysfunction.  Unfortunately patient is severely orthostatic and has low baseline blood pressure to add any further guideline directed medical therapy.  Will repeat echo in 3 months as outpatient.        I sincerely appreciate the opportunity to participate in your patient's care. Please feel free to contact me anytime if I can be of assistance in this or any other way.      Pertinent History    Past Medical History:  Diagnosis Date   Anemia   Colon cancer  2022  colon   Diabetes mellitus   Hyperlipidemia   Hypertension   LBBB (left bundle branch block) 11/14/2024    Past Surgical History:  Procedure Laterality Date   APPENDECTOMY   CARDIAC CATHETERIZATION   COLON RESECTION N/A 12/15/2022  Procedure: COLON RESECTION RIGHT;  Surgeon: Percy Davis MD;  Location: [facility] MAIN OR;  Service: General;  Laterality: N/A;   COLONOSCOPY N/A 11/11/2022  Procedure: COLONOSCOPY WITH BIOPSY AND POLYPECTOMY;  Surgeon: Billy Julien MD;  Location: [facility] ENDOSCOPY;  Service: Gastroenterology;  Laterality: N/A;  Impression:  1.  Large 4-5cm fulgurating circumferential ulcerated mass in the very proximal part of the ascending colon next to ileocecal valve multiple biopsies were performed.  This is highly concerning for colon malignancy.  2.  2 polyp rem   ENDOSCOPY N/A 11/11/2022  Procedure: ESOPHAGOGASTRODUODENOSCOPY with biopsy X1;  Surgeon: Billy Julien MD;  Location: [facility] ENDOSCOPY;  Service: Gastroenterology;  Laterality: N/A;  5.  Upper endoscopy lamination unremarkable.     PORTACATH PLACEMENT Right 1/12/2023  Procedure: INSERTION OF PORTACATH;  Surgeon: Percy Davis MD;  Location: [facility] MAIN OR;  Service: General;  Laterality: Right;   TONSILLECTOMY    Prior to Admission medications  Medication Sig Start Date End Date Taking? Authorizing Provider  aspirin 81 MG EC tablet Take 1 tablet by mouth Daily.   Yes Rolly Jason MD  insulin aspart (NovoLOG FlexPen) 100 UNIT/ML solution pen-injector sc pen Inject 10 Units under the skin into the appropriate area as directed 3 (Three) Times a Day With Meals.   Yes Rolly Jason MD  insulin glargine (LANTUS, SEMGLEE) 100 UNIT/ML injection Inject 20 Units under the skin into the appropriate area as directed Every Night for 30 days. 1/9/24 4/24/25 Yes Yesenia Arreola APRN      Pre-Procedure Notes  H&P Performed  [x]  Yes []  No       []  N/A    Indications:  []  ACS <= 24 HRS  []  ACS >24  "HRS  []  New Onset Angina <= 2 mos  []  Worsening Angina  []  Resuscitated Cardiac Arrest  []  Angina on Exertion:  []  Suspected CAD  []  Valvular Disease  []  Pericardial Disease  []  Cardiac Arrythmia  []  Cardiomyopathy  []  LV Dysfunction  []  Syncope  []  Post Cardiac Transplant  []  Eval. For Exercise Clearance  []  Other  []  Pre-Operative Evaluation  If Pre-Op Eval:  Evaluation for Surgery Type:  []  Cardiac Surgery   []  Non-Cardiac Surgery  Functional Capacity:  []  <4 METS  []  >=4 METS w/o symptoms  []  >= 4 METS with symptoms  []  Unknown  Surgical Risk:  []  Low  []  Intermediate  []  High Risk: Vascular  []  High Risk Non-Vascular    Risks, Benefits, & Complications Discussed:  [x]  Yes  []  No  []  N/A    Questions Answered:  [x]  Yes  []  No  []  N/A    Consent Obtained:  [x]  Yes  []  No  []  N/A    CHF: []  Yes  []  No  If Yes:  Newly Diagnosed?  [x]  Yes  []  No  If Yes:  HF Type:  []  Diastolic  [x]  Systolic  []  Unknown      Mg Rahman MD  11/14/2024  13:18 EST  Electronically signed by Mg Rahman MD, 11/14/24, 1:18 PM EST.      Lab Review   I have reviewed the labs  Results from last 7 days   Lab Units 02/12/25  2358 02/12/25  2239   HSTROP T ng/L 45* 47*         Results from last 7 days   Lab Units 02/14/25  0530   SODIUM mmol/L 140   POTASSIUM mmol/L 4.1   BUN mg/dL 23   CREATININE mg/dL 0.99   CALCIUM mg/dL 8.7         Results from last 7 days   Lab Units 02/14/25  0530 02/12/25  2239   WBC 10*3/mm3 6.64 8.11   HEMOGLOBIN g/dL 13.6 14.2   HEMATOCRIT % 44.0 45.9   PLATELETS 10*3/mm3 159 145           RADIOLOGY:  Imaging Results (Last 24 Hours)       ** No results found for the last 24 hours. **                  )2/14/2025  Mg Rahman MD      EMR Dragon/Transcription:   \"Dictated utilizing Dragon dictation\".   "

## 2025-02-14 NOTE — PLAN OF CARE
Pt functioning at baseline level, (I) with ADLs and mobility. No further OT needs within acute setting, will complete orders at this time.

## 2025-02-14 NOTE — CASE MANAGEMENT/SOCIAL WORK
Case Management Discharge Note      Final Note: Jessica SAMUELS         Selected Continued Care - Discharged on 2/14/2025 Admission date: 2/12/2025 - Discharge disposition: Home or Self Care         Transportation Services  Private: Car    Final Discharge Disposition Code: 01 - home or self-care

## 2025-02-14 NOTE — PLAN OF CARE
Goal Outcome Evaluation:    IV lasix continued BID per MAR. Awaiting PT/OT evaluation for DC placement

## 2025-02-19 ENCOUNTER — READMISSION MANAGEMENT (OUTPATIENT)
Dept: CALL CENTER | Facility: HOSPITAL | Age: 80
End: 2025-02-19
Payer: OTHER GOVERNMENT

## 2025-02-19 LAB — BACTERIA SPEC AEROBE CULT: NORMAL

## 2025-02-19 NOTE — OUTREACH NOTE
CHF Week 1 Survey      Flowsheet Row Responses   [facility] patient discharged from? [facility]   Does the patient have one of the following disease processes/diagnoses(primary or secondary)? CHF   CHF Week 1 attempt successful? No   Unsuccessful attempts Attempt 1            Madison RAMIREZ - Licensed Nurse

## 2025-02-24 ENCOUNTER — READMISSION MANAGEMENT (OUTPATIENT)
Dept: CALL CENTER | Facility: HOSPITAL | Age: 80
End: 2025-02-24
Payer: OTHER GOVERNMENT

## 2025-02-25 NOTE — OUTREACH NOTE
CHF Week 1 Survey      Flowsheet Row Responses   [facility] facility patient discharged from? [facility]   Does the patient have one of the following disease processes/diagnoses(primary or secondary)? CHF   CHF Week 1 attempt successful? No   Unsuccessful attempts Attempt 1            Madison RAMIREZ - Licensed Nurse

## 2025-02-27 ENCOUNTER — HOSPITAL ENCOUNTER (OUTPATIENT)
Dept: PET IMAGING | Facility: HOSPITAL | Age: 80
Discharge: HOME | End: 2025-02-27
Admitting: PHYSICIAN ASSISTANT
Payer: OTHER GOVERNMENT

## 2025-02-27 ENCOUNTER — TELEPHONE (OUTPATIENT)
Dept: ONCOLOGY | Facility: CLINIC | Age: 80
End: 2025-02-27
Payer: OTHER GOVERNMENT

## 2025-02-27 DIAGNOSIS — C78.7 METASTASES TO THE LIVER: ICD-10-CM

## 2025-02-27 DIAGNOSIS — C18.2 MALIGNANT NEOPLASM OF ASCENDING COLON: ICD-10-CM

## 2025-02-27 PROCEDURE — 25510000001 IOPAMIDOL PER 1 ML: Performed by: PHYSICIAN ASSISTANT

## 2025-02-27 PROCEDURE — 74177 CT ABD & PELVIS W/CONTRAST: CPT

## 2025-02-27 PROCEDURE — 71260 CT THORAX DX C+: CPT

## 2025-02-27 RX ORDER — IOPAMIDOL 755 MG/ML
100 INJECTION, SOLUTION INTRAVASCULAR
Status: COMPLETED | OUTPATIENT
Start: 2025-02-27 | End: 2025-02-27

## 2025-02-27 RX ADMIN — IOPAMIDOL 100 ML: 755 INJECTION, SOLUTION INTRAVENOUS at 09:53

## 2025-03-04 ENCOUNTER — READMISSION MANAGEMENT (OUTPATIENT)
Dept: CALL CENTER | Facility: HOSPITAL | Age: 80
End: 2025-03-04
Payer: OTHER GOVERNMENT

## 2025-03-04 NOTE — OUTREACH NOTE
CHF Week 3 Survey      Flowsheet Row Responses   [facility] patient discharged from? [facility]   Does the patient have one of the following disease processes/diagnoses(primary or secondary)? CHF   Week 3 attempt successful? Yes   Call start time 1542   Call end time 1544   Discharge diagnosis CHF (congestive heart failure)   Meds reviewed with patient/caregiver? Yes   Is the patient taking all medications as directed (includes completed medication regime)? Yes   Does the patient have a primary care provider?  Yes   Does the patient have an appointment with their PCP within 7 days of discharge? Yes   Has the patient kept scheduled appointments due by today? N/A   Has home health visited the patient within 72 hours of discharge? N/A   Psychosocial issues? No   Did the patient receive a copy of their discharge instructions? Yes   Nursing interventions Reviewed instructions with patient   What is the patient's perception of their health status since discharge? Improving   Nursing interventions Nurse provided patient education   Is the patient able to teach back signs and symptoms of worsening condition? (i.e. weight gain, shortness of air, etc.) Yes   Is the patient/caregiver able to teach back the hierarchy of who to call/visit for symptoms/problems? PCP, Specialist, Home health nurse, Urgent Care, ED, 911 Yes   CHF Week 3 call completed? Yes   Graduated Yes   Graduated/Revoked comments Pt reports he is doing well. No swelling and is urinating plenty. Denies needs. Has JUAN akhtar next week   Call end time 1544            ASA AMBROCIO - Registered Nurse

## 2025-03-18 NOTE — PROGRESS NOTES
HEMATOLOGY ONCOLOGY OUTPATIENT FOLLOW-UP       Patient name: Vlad Guerrero  : 1945  MRN: [id number]  Primary Care Physician: Vlad Moreland MD  Referring Physician: Vlad Moreland MD  Reason For Consult: Stage III colon cance    Chief Complaint   Patient presents with    Follow-up     Malignant neoplasm of ascending colon         HPI:   History of Present Illness:  Vlad Guerrero is 79 y.o. male who presented to our office on 23 for consultation regarding    2023: Mr. Guerrero dated the beginning of his present illness to sometime at the end of  when he started to feel fatigued.  He was seen at the [facility] and had laboratory exams that revealed microcytic anemia.  He had evidence of iron deficiency and received intravenous iron in the hospital.  He had upper and lower gastrointestinal endoscopies that demonstrated a cecal tumor that measured 4 to 5 cm and was fungating in appearance.  It was circumferential and was next to the ileocecal valve.  The upper gastrointestinal endoscopy revealed no abnormalities.  On this basis he was on December 15, 2022 he was taken to the hospital and underwent a right hemicolectomy without complications.  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  It corresponded to a grade 2 malignancy that invaded through the muscularis propria into the pericolonic tissues.  Macroscopic tumor perforation or lymphovascular space invasion were not present.  Perineural invasion was not present either.  All margins of excision were negative.  All of 36 lymph nodes submitted to were positive for involvement with malignancy.  The disease was Elzbieta staged as [id number].  Loss of expression of mismatch repair enzymes was not documented.  Mr. Guerrero was discharged to continue treatment as outpatient.  At the time of this visit he was recovering well from the  surgery.  His incision had healed completely and he had no drains or suture materials.  He had returned home and was eating well.  He had yet to return to work.  He had been afebrile and free of nausea.  For the most part his weight had been stable.  After reviewing the records a long conversation, of approximately 1 hour, was had with the patient in regards to options of treatment.  The nature of his disease as well as the likelihood of recurrence were described.  The use of adjuvant chemotherapy to increase the rate of cure after surgery was explained.  Side effects were described in detail.  The need for a port was expressed as well.  A treatment plan was placed. A decision was made to treat him with three months of CAPOX given the characteristics of his disease.     2/6/2023: Received the first cycle of adjuvant chemotherapy without any side effects. On the day of this visit feeling well and without any new symptoms, except a very mild rash, especially on the forearms, but no oral pain. Had stools of diminished consistency for a few days but now resolved. The exam was rather unremarkable, except for a few very light erythematous macules on the forearms.     2/27/2023: Feeling well and without new symptoms.  As active as before.  Eating well and without unintended weight loss.  Stronger and more energetic since the institution of vitamin B12 and iron.  No chest pains and cough.  No abdominal pain or diarrhea.  On exam no changes.  A decision was made to continue with the same treatment.  Laboratory exams were reviewed.  He was to see me again in approximately 4 weeks from this date with new scans.    3/27/2023: Suffered an ischemic stroke.  MRI on March 10, 2023 reported a 7 mm focus of restricted diffusion in the left periventricular white matter of the posterior left frontal lobe.  This was felt to be suspicious for an acute/subacute infarct.  There was also evidence of previous lacunar strokes.  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  There was bilateral internal carotid artery stenosis with 60% stenosis estimated at the right and not greater than 50% at the left.  Chemotherapy was held following discharge.  He was left without any sequela.  At the time of this visit he was entirely asymptomatic.  He was convinced a large part of his problem was that he had suffered from hyperglycemia, consistently for some time.  Indeed his glucose was between 152 mg/dL and 193 mg/dL in the preceding days.  However, he reported at home glucose readings of greater than 400 mg/dL..  On exam there were no changes.  The laboratory exams reported a blood count with normocytic anemia and mild thrombocytopenia.  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    4/14/2023: Completed 3 months of treatment with CAPOX.  On the day of this visit not feeling very well.  Weak and tired.  Not very good appetite although eating well.  Having some dysgeusia.  Afebrile.  No chest pains or cough and no abdominal pain.  Had maintain regular bowel activity.  No edema.  On exam no changes.  A decision was made to stop the chemotherapy.  He was to get intravenous fluids on the day of this visit.  To return to see me in 3 weeks.  Tentatively to have scans in early June 2023.    5/5/2023: Feeling progressively stronger. Eating better and slowly regaining weight. Afebrile. No more nausea. No diarrhea and now with regular bowel activity. On exam alert, conversant and well oriented. No pale or jaundice. No oral lesions and no palpable lymph  nodes. Lungs clear and abdomen soft. Minimal edema of the right lower extremity. The laboratory exams revealed persistent anemia with a tendency to macrocytosis. Hemoglobin and platelets within normal ranges. A decision was made to continue to observe and I asked him to see me in approximately 3 months with new scans.     8/7/2023: Feels as well as at the time of the last visit. Active and returned to work full time. Eating well and no nausea or vomiting. No chest pain or cough and no abdominal pain. On exam no changes, though he had lost a large amount of weight since the previous visit. The imaging studies suggested a new lesion in the liver, as well as several lesions in the spleen of unclear cause. Reviewed the images and the report of the scans. Discussed with him at length and explained the findings. Discussed with him the plans for a MRI. He will see me with results.     8/28/2023: Entirely asymptomatic.  Working without limitations.  Eating well.  Weight stable.  Afebrile.  No pain.  On exam no changes.  Laboratory exams were reviewed and discussed with him.  In spite of the fact things on the scans the Carcinoembryonic antigen has not increased.  However both the CT and the MRI suggest one isolated metastatic deposit in segment 8 of the liver.  Discussed with him the options at this time.  I believe a biopsy is essential and after that he would be treated with chemotherapy following which surgery, if no new lesions have appeared, could be considered.  He may still be curable even in the setting of metastatic disease.  Discussed with him at great length.  Explained the steps.  Sent a communication to Dr. Davis, the patient's surgeon.    9/11/2023: Feels about the same as before.  No new symptoms.  As active as before and working.  Eating well and with good appetite.  No unintended weight loss.  Afebrile.  On exam alert, conversant and in good spirits.  No distress.  No jaundice or pallor.  Lungs clear  and heart regular.  Abdomen soft.  The liver is not palpable.  No edema.  Laboratory exams were reviewed.  The liver biopsy report confirms metastatic colorectal cancer.  This appears to be an isolated metastases.  On this basis treatment with chemotherapy followed by surgery is not ordered.  I have asked him to have a PET scan and discussed the study with him.  Discussed the objectives of the treatment and its requirements.  Placed the treatment plan with 5 fluorouracil, irinotecan and panitumumab and discussed with him.  To begin as soon as possible.    9/25/2023: In the office before the next scheduled time.  He called to report that over the weekend he had had between 5 and 8 defecations of liquid stool.  He took loperamide irregularly and it did not seem to provide much relief.  He was able to eat and drink without any difficulties and was never nauseated.  He was seen in the emergency room on 9/24/2023 and he was not found to have any dehydration or other evidence of complications.  They discharged him.  At the time of this visit feeling better.  As of this time he had not had any liquid defecations.  He had continued to eat and drink fluids without any problems.  He had no chest pains and had not had any dyspnea.  He was also free of abdominal pain.  On exam alert, oriented and conversant.  Seemed well-hydrated and was not jaundiced or pale.  Lungs clear.  Heart regular.  Abdomen soft.  A decision was made to continue with the same plan.  I independently reviewed the images of the PET scan and discussed with him.  It reveals only an abnormal lesion in the liver as identified before.  No suggestion of distant metastatic disease.  Discussed with him the significance of this and explained the possibility of surgery and potentially cure.    11/15/2023: Completed 4 cycles of FOLFIRI/panitumumab.  Experienced the expected side effects, particularly skin rash.  However, the treatment proceeded without major  "complications.  Today he tells me he has been feeling reasonably well and has continued to work part-time.  He enjoys his job.  He has been eating about as much as he had been eating before but he has lost some weight without intention.  He did have persistent diarrhea that responded only to large doses of loperamide.  At this point he no longer has diarrhea.  He has been without chest pains or cough and denies as well abdominal pain.  On exam he still has some erythema on the nasolabial regions on both sides.  The lungs are clear.  The heart is regular and the abdomen soft.  Liver and spleen do not seem to be enlarged.  The laboratory exams were reviewed and discussed with him.  To have another PET scan and consider surgical excision.  He will need to go to [place] for this.    12/11/2023: Feeling reasonably well at this time without any new complaints.  His diarrhea is essentially resolved although he still has soft defecations intermittently.  He is eating well and has a good appetite.  He has had no chest pains and has been without cough.  He denies abdominal pain.  No melena, hematochezia or hematuria.  No peripheral edema.  On exam no changes.  The PET scan reveals complete response with no residual evidence of disease activity.  I have discussed with Dr. Praveen Whittaker in [place] and he requested that a MRI be done prior to deciding on surgery.  Discussed with Mr. Guerrero.  Explained the plans.  He is to have the MRI and will see me with the results.    12/27/2023: Without new symptoms.  Has not had the MRI because of scheduling problems.  He feels lightheaded at times and \"I am not as sure footed as I was before\".  He has had no falls and he continues to work full-time.  He has been eating well and has regained some of the weight that he had lost.  He has been afebrile.  No chest pains or cough.  No abdominal pain or diarrhea and no dysuria.  No skin rash.  On exam no changes.  The laboratory exams were " reviewed and discussed with him.  To reschedule the MRI for the next couple of weeks.  Discussed with him.    3/18/2024: Feeling very well. His appetite is good and he has gained weight. He has been working without any difficulties. He denies chest pain or cough. No abdominal pain or diarrhea and no dysuria. On exam alert and conversant. In good spirits and in no distress. No jaundice. No oral lesions and respirations not labored. The lungs are clear and the heart is regular. Abdomen is soft and not tender. The laboratory exams reveal a blood count in the normal ranges. He persists with hyperglycemia. The alkaline phosphatase has risen some in the recent past. He is to have the MRI of the liver. He will see me with the results.     6/26/2024: Back after an absence of a few weeks and a couple of missed appointments.  He feels about the same as he felt before.  Following the last admission to the hospital he was transferred to an assisted care living facility where he has been doing progressively better.  Persists with tremors.  Eats well.  Has not lost weight.  Denies abdominal pain.  Has not had diarrhea, melena or hematochezia.  On exam in no distress.  No jaundice.  Not pale.  The lungs are clear bilaterally and the heart regular.  The abdomen is soft and without hepatomegaly or splenomegaly.  No edema.  The laboratory exams were reviewed.  To obtain a Carcinoembryonic antigen today.  He will see me in a couple of weeks with new scans as it has been more than 3 months since the last 1.  Consider treatment with an irinotecan based regimen that seem to result in a very pronounced response in the recent past.    7/12/2024: Back to review the results of the recent scans.  He feels well generally and continues to be as active as before.  As well, his appetite appears to be the same.  On exam he does not seem ill and he is conversant and well-oriented.  He is neither pale nor jaundiced and he seems well-hydrated.  The  lungs are clear and the heart regular.  The abdomen is soft.  There is no edema.  Laboratory exams reviewed.  Reviewed the images and the report of the scans.  He has 2 metastatic deposits in the liver and no other evidence of metastatic dissemination of his malignancy.  I believe it reasonable to treat him again with chemotherapy and consider some form of local therapy in the future, given how localized the disease is.  Will resume chemotherapy with irinotecan, cetuximab and 5-fluorouracil.  No 5-FU bolus.  I will see him again in approximately 4 weeks.    9/9/2024: Tolerating the chemotherapy well.  So far he has had 4 cycles without any undue side effects and no complications.  He does have a rash that is mainly around the eyes, nose and mouth but very little on the chest and the back.  It is not uncomfortable and he has been receiving treatment with topical clindamycin and sun protection.  He continues to eat well and his weight has increased slightly.  He has no chest pain and no abdominal pain.  He has diarrhea only intermittently.  On exam indeed he has an erythematous rash with a few papules but no pustules at this time.  No oral lesions.  Respirations not labored.  Lungs clear bilaterally.  Heart regular.  Abdomen soft.  No edema.  Laboratory exams reviewed.  I reviewed the recent scans of the abdomen and pelvis.  No suggestion of metastatic disease in the chest or the pelvis.  In the liver there are 2 lesions previously identified have decreased in size some.  To continue with the same treatment and see me in approximately 4 weeks.    10/10/2024: Feels reasonably well.  Has continued to have a skin rash but there are no associated symptoms to it and it is not any more extensive than before.  Perhaps it is even less extensive than before.  He maintains a good appetite.  He continues to receive care at the assisted living facility and he has had no difficulties.  No chest pains or cough.  No abdominal pain  or diarrhea.  On exam alert and conversant, in good spirits and well-oriented.  No jaundice.  Indeed there is diffuse erythema on the face and the conjunctivae are somewhat erythematous.  No discharge.  The lungs are clear and the heart regular.  The abdomen is soft nontender.  Liver and spleen are not enlarged.  There is no edema.  Laboratory exams reviewed.  To continue with the same treatment.  Obtain scans after the next chemotherapy and see me with the results.  Consider radioembolization of the liver.  I have discussed with Dr. Vlad carmichael for coordination of care.    3/19/2025: Back after some time of absence.  He has not had treatment in some time.  He was recently admitted to the hospital with evidence of congestive heart failure and pulmonary edema.  He was discharged feeling better and today he feels much better.  He has had no pain.  He has lost some weight.  He is less active.  Denies chest pains, cough, abdominal pain, diarrhea or dysuria.  On exam alert and conversant.  Oriented and in no distress.  No jaundice.  Lungs are clear bilaterally.  Heart is regular.  Abdomen is soft.  No edema.  I reviewed all the recent imaging studies including those obtained while at the hospital.  There is clear progression of the 2 lesions.  They remain only 2 lesions and there is no suggestion of metastatic disease outside of the liver.  Because of his poor tolerance and dislike of the chemotherapy I have referred him for consideration of stereotactic radiosurgery.  I have discussed that radioembolization in the past with him but I think it would be too demanding for him.  I believe stereotactic radiosurgery may be easier to tolerate.  It may allow him to not receive any treatment for a period of time, without progression.    The following portions of the patient's history were reviewed and updated as appropriate: allergies, current medications, past family history, past medical history, past social history, past  surgical history and problem list.    Past Medical History:   Diagnosis Date    Anemia     Colon cancer 2022    colon    Diabetes mellitus     Hyperlipidemia     Hypertension     LBBB (left bundle branch block) 11/14/2024     Past Surgical History:   Procedure Laterality Date    APPENDECTOMY      CARDIAC CATHETERIZATION      CARDIAC CATHETERIZATION N/A 11/14/2024    Procedure: Left Heart Cath, possible pci;  Surgeon: Mg Rahman MD;  Location: [facility] CATH INVASIVE LOCATION;  Service: Cardiovascular;  Laterality: N/A;    COLON RESECTION N/A 12/15/2022    Procedure: COLON RESECTION RIGHT;  Surgeon: Percy Davis MD;  Location: [facility] MAIN OR;  Service: General;  Laterality: N/A;    COLONOSCOPY N/A 11/11/2022    Procedure: COLONOSCOPY WITH BIOPSY AND POLYPECTOMY;  Surgeon: Billy Julien MD;  Location: [facility] ENDOSCOPY;  Service: Gastroenterology;  Laterality: N/A;  Impression:  1.  Large 4-5cm fulgurating circumferential ulcerated mass in the very proximal part of the ascending colon next to ileocecal valve multiple biopsies were performed.  This is highly concerning for colon malignancy.  2.  2 polyp rem    ENDOSCOPY N/A 11/11/2022    Procedure: ESOPHAGOGASTRODUODENOSCOPY with biopsy X1;  Surgeon: Billy Julien MD;  Location: [facility] ENDOSCOPY;  Service: Gastroenterology;  Laterality: N/A;  5.  Upper endoscopy lamination unremarkable.       PORTACATH PLACEMENT Right 1/12/2023    Procedure: INSERTION OF PORTACATH;  Surgeon: Percy Davis MD;  Location: [facility] MAIN OR;  Service: General;  Laterality: Right;    TONSILLECTOMY         Current Outpatient Medications:     ammonium lactate (AMLACTIN) 12 % cream, Apply 1 g topically to the appropriate area as directed 2 (Two) Times a Day., Disp: , Rfl:     aspirin 81 MG EC tablet, Take 1 tablet by mouth Daily., Disp: , Rfl:     atorvastatin (LIPITOR) 40 MG tablet, Take 1 tablet by mouth Daily., Disp: , Rfl:     ferrous gluconate (FERGON)  324 MG tablet, Take 1 tablet by mouth Daily With Breakfast., Disp: , Rfl:     gabapentin (NEURONTIN) 100 MG capsule, Take 1 capsule by mouth Every 12 (Twelve) Hours., Disp: , Rfl:     insulin aspart (novoLOG FLEXPEN) 100 UNIT/ML solution pen-injector sc pen, Inject 8 Units under the skin into the appropriate area as directed 3 (Three) Times a Day With Meals., Disp: , Rfl:     insulin glargine (LANTUS, SEMGLEE) 100 UNIT/ML injection, Inject 20 Units under the skin into the appropriate area as directed Every Night., Disp: , Rfl:     Melatonin 3 MG tablet, Take 1 tablet by mouth Every Night., Disp: , Rfl:     metFORMIN (GLUCOPHAGE) 1000 MG tablet, Take 1 tablet by mouth 2 (Two) Times a Day With Meals., Disp: , Rfl:     midodrine (PROAMATINE) 10 MG tablet, Take 1 tablet by mouth 3 (Three) Times a Day Before Meals., Disp: 90 tablet, Rfl: 0    ondansetron (ZOFRAN) 8 MG tablet, Take 1 tablet by mouth Every 8 (Eight) Hours As Needed for Nausea or Vomiting., Disp: , Rfl:     furosemide (Lasix) 20 MG tablet, Take 1 tablet by mouth 2 (Two) Times a Day for 30 days., Disp: 60 tablet, Rfl: 0  No current facility-administered medications for this visit.    Facility-Administered Medications Ordered in Other Visits:     heparin injection 500 Units, 500 Units, Intravenous, PRNNia Alfonso, MD, 500 Units at 03/19/25 0939    sodium chloride 0.9 % flush 20 mL, 20 mL, Intravenous, PRNNia Alfonso, MD, 20 mL at 03/19/25 0940    Allergies   Allergen Reactions    Penicillins Rash     Family History   Problem Relation Age of Onset    Pneumonia Mother 94        SARS-CoV2    Colon cancer Father 62    Heart disease Sister      Cancer-related family history includes Colon cancer (age of onset: 62) in his father.    Social History     Tobacco Use    Smoking status: Former     Current packs/day: 0.00     Average packs/day: 1 pack/day for 2.0 years (2.0 ttl pk-yrs)     Types: Cigarettes     Start date: 1964     Quit date: 1966     Years  since quittin.2    Smokeless tobacco: Never   Vaping Use    Vaping status: Never Used   Substance Use Topics    Alcohol use: Never    Drug use: Never     Social History     Social History Narrative    Not on file      ROS:     Review of Systems   Constitutional:  Negative for activity change, appetite change, chills, diaphoresis, fatigue, fever and unexpected weight change.   HENT:  Negative for congestion, dental problem, drooling, ear discharge, ear pain, facial swelling, hearing loss, mouth sores, nosebleeds, postnasal drip, rhinorrhea, sinus pressure, sinus pain, sneezing, sore throat, tinnitus, trouble swallowing and voice change.    Eyes:  Negative for photophobia, pain, discharge, redness, itching and visual disturbance.   Respiratory:  Negative for apnea, cough, choking, chest tightness, shortness of breath, wheezing and stridor.    Cardiovascular:  Negative for chest pain, palpitations and leg swelling.   Gastrointestinal:  Negative for abdominal distention, abdominal pain, anal bleeding, blood in stool, constipation, diarrhea, nausea, rectal pain and vomiting.   Endocrine: Negative for cold intolerance, heat intolerance, polydipsia and polyuria.   Genitourinary:  Negative for decreased urine volume, difficulty urinating, dysuria, flank pain, frequency, genital sores, hematuria and urgency.   Musculoskeletal:  Negative for arthralgias, back pain, gait problem, joint swelling, myalgias, neck pain and neck stiffness.   Skin:  Negative for color change, pallor and rash.   Neurological:  Negative for dizziness, tremors, seizures, syncope, facial asymmetry, speech difficulty, weakness, light-headedness, numbness and headaches.   Hematological:  Negative for adenopathy. Does not bruise/bleed easily.   Psychiatric/Behavioral:  Negative for agitation, behavioral problems, confusion, decreased concentration, hallucinations, self-injury, sleep disturbance and suicidal ideas. The patient is not nervous/anxious.   "    Objective:    Vitals:    03/19/25 0956   BP: 124/76   Pulse: 100   Resp: 16   Temp: 96.8 °F (36 °C)   SpO2: 94%   Weight: 75.8 kg (167 lb 3.2 oz)   Height: 182.9 cm (72\")   PainSc: 0-No pain     Body mass index is 22.68 kg/m².  ECOG  (0) Fully active, able to carry on all predisease performance without restriction    Physical Exam:     Physical Exam  Constitutional:       General: He is not in acute distress.     Appearance: Normal appearance. He is not ill-appearing, toxic-appearing or diaphoretic.   HENT:      Head: Normocephalic and atraumatic.      Right Ear: External ear normal.      Left Ear: External ear normal.      Nose: Nose normal.      Mouth/Throat:      Mouth: Mucous membranes are moist.      Pharynx: Oropharynx is clear.   Eyes:      General: No scleral icterus.        Right eye: No discharge.         Left eye: No discharge.      Conjunctiva/sclera: Conjunctivae normal.      Pupils: Pupils are equal, round, and reactive to light.   Cardiovascular:      Rate and Rhythm: Normal rate and regular rhythm.      Pulses: Normal pulses.      Heart sounds: Normal heart sounds. No murmur heard.     No friction rub. No gallop.   Pulmonary:      Effort: No respiratory distress.      Breath sounds: No stridor. No wheezing, rhonchi or rales.   Chest:      Chest wall: No tenderness.   Abdominal:      General: Abdomen is flat. Bowel sounds are normal. There is no distension.      Palpations: Abdomen is soft. There is no mass.      Tenderness: There is no abdominal tenderness. There is no right CVA tenderness, left CVA tenderness, guarding or rebound.   Musculoskeletal:         General: No swelling, tenderness, deformity or signs of injury.      Cervical back: No rigidity.      Right lower leg: No edema.      Left lower leg: No edema.   Lymphadenopathy:      Cervical: No cervical adenopathy.   Skin:     General: Skin is warm and dry.      Coloration: Skin is not jaundiced.      Findings: No bruising or rash. "   Neurological:      General: No focal deficit present.      Mental Status: He is alert and oriented to person, place, and time.      Cranial Nerves: No cranial nerve deficit.      Gait: Gait normal.   Psychiatric:         Mood and Affect: Mood normal.         Behavior: Behavior normal.         Thought Content: Thought content normal.         Judgment: Judgment normal.     KATIE Kramer MD performed a physical exam on 3/19/2025 as documented above.    Lab Results - Last 18 Months   Lab Units 03/19/25  0939 02/14/25  0530 02/12/25 2239   WBC 10*3/mm3 6.66 6.64 8.11   HEMOGLOBIN g/dL 13.0 13.6 14.2   HEMATOCRIT % 41.1 44.0 45.9   PLATELETS 10*3/mm3 152 159 145   MCV fL 88.2 89.2 89.8     Lab Results - Last 18 Months   Lab Units 02/14/25  0530 02/12/25  2239 01/26/25  0549 01/25/25  1624 01/21/25  0826 11/11/24  1552 11/11/24  1434   SODIUM mmol/L 140 140 135*   < > 138   < > 136   POTASSIUM mmol/L 4.1 4.7 4.0   < > 4.0   < > 4.8   CHLORIDE mmol/L 100 102 99   < > 102   < > 102   CO2 mmol/L 28.9 26.8 25.7   < > 23.4   < > 24.7   BUN mg/dL 23 19 18   < > 15   < > 17   CREATININE mg/dL 0.99 0.89 0.96   < > 0.89   < > 0.89   CALCIUM mg/dL 8.7 9.2 8.4*   < > 8.9   < > 8.9   BILIRUBIN mg/dL  --  1.4*  --   --  0.7  --  0.6   ALK PHOS U/L  --  179*  --   --  147*  --  124*   ALT (SGPT) U/L  --  37  --   --  11  --  11   AST (SGOT) U/L  --  40  --   --  24  --  12   GLUCOSE mg/dL 82 86 297*   < > 207*   < > 448*    < > = values in this interval not displayed.     Lab Results   Component Value Date    GLUCOSE 82 02/14/2025    BUN 23 02/14/2025    CREATININE 0.99 02/14/2025    EGFRIFNONA 76 11/15/2021    EGFRIFAFRI 87 11/15/2021    BCR 23.2 02/14/2025    K 4.1 02/14/2025    CO2 28.9 02/14/2025    CALCIUM 8.7 02/14/2025    ALBUMIN 3.7 02/12/2025    AST 40 02/12/2025    ALT 37 02/12/2025     Lab Results   Component Value Date    IRON 15 (L) 01/06/2023    TIBC 548 (H) 01/06/2023    FERRITIN 23.51 (L) 01/06/2023     Lab  Results   Component Value Date    CEA 6.29 01/21/2025     Assessment & Plan     Assessment:  Isolated metastatic colon cancer to the central portion of the liver with 2 distinct lesions.  Has had good responses to treatment but with poor tolerance.  Despite the lack of treatment in the recent past only 2 lesions remained.  They have increased slowly in size.  Potentially local therapy with stereotactic radiosurgery would provide relief without side effects.  Discussed with Dr. Ag.  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  Completed Cape-Ox adjuvantly for 3 months in April 2023.  History of recurrent ischemic stroke.  No symptoms at this time.  Reviewed the records from the hospital.  Reviewed the images of the recent imaging studies independently.  Reviewed the reports and all laboratory exams.  Discussed with him.  He will return to see me in approximately 6 weeks.    Don Kramer MD on 3/19/2025 at 1111 AM.

## 2025-03-19 ENCOUNTER — HOSPITAL ENCOUNTER (OUTPATIENT)
Dept: ONCOLOGY | Facility: HOSPITAL | Age: 80
Discharge: HOME | End: 2025-03-19
Admitting: INTERNAL MEDICINE
Payer: MEDICARE

## 2025-03-19 ENCOUNTER — OFFICE VISIT (OUTPATIENT)
Dept: ONCOLOGY | Facility: CLINIC | Age: 80
End: 2025-03-19
Payer: OTHER GOVERNMENT

## 2025-03-19 VITALS
HEIGHT: 72 IN | HEART RATE: 100 BPM | RESPIRATION RATE: 16 BRPM | DIASTOLIC BLOOD PRESSURE: 76 MMHG | OXYGEN SATURATION: 94 % | SYSTOLIC BLOOD PRESSURE: 124 MMHG | WEIGHT: 167.2 LBS | BODY MASS INDEX: 22.65 KG/M2 | TEMPERATURE: 96.8 F

## 2025-03-19 DIAGNOSIS — C18.2 MALIGNANT NEOPLASM OF ASCENDING COLON: Primary | ICD-10-CM

## 2025-03-19 DIAGNOSIS — C78.7 METASTASES TO THE LIVER: ICD-10-CM

## 2025-03-19 DIAGNOSIS — Z95.828 PORT-A-CATH IN PLACE: ICD-10-CM

## 2025-03-19 LAB
BASOPHILS # BLD AUTO: 0.01 10*3/MM3 (ref 0–0.2)
BASOPHILS NFR BLD AUTO: 0.2 % (ref 0–1.5)
DEPRECATED RDW RBC AUTO: 52.2 FL (ref 37–54)
EOSINOPHIL # BLD AUTO: 0.12 10*3/MM3 (ref 0–0.4)
EOSINOPHIL NFR BLD AUTO: 1.8 % (ref 0.3–6.2)
ERYTHROCYTE [DISTWIDTH] IN BLOOD BY AUTOMATED COUNT: 16.3 % (ref 12.3–15.4)
HCT VFR BLD AUTO: 41.1 % (ref 37.5–51)
HGB BLD-MCNC: 13 G/DL (ref 13–17.7)
LYMPHOCYTES # BLD AUTO: 0.92 10*3/MM3 (ref 0.7–3.1)
LYMPHOCYTES NFR BLD AUTO: 13.8 % (ref 19.6–45.3)
MCH RBC QN AUTO: 27.9 PG (ref 26.6–33)
MCHC RBC AUTO-ENTMCNC: 31.6 G/DL (ref 31.5–35.7)
MCV RBC AUTO: 88.2 FL (ref 79–97)
MONOCYTES # BLD AUTO: 0.66 10*3/MM3 (ref 0.1–0.9)
MONOCYTES NFR BLD AUTO: 9.9 % (ref 5–12)
NEUTROPHILS NFR BLD AUTO: 4.95 10*3/MM3 (ref 1.7–7)
NEUTROPHILS NFR BLD AUTO: 74.3 % (ref 42.7–76)
PLATELET # BLD AUTO: 152 10*3/MM3 (ref 140–450)
PMV BLD AUTO: 10.3 FL (ref 6–12)
RBC # BLD AUTO: 4.66 10*6/MM3 (ref 4.14–5.8)
WBC NRBC COR # BLD AUTO: 6.66 10*3/MM3 (ref 3.4–10.8)

## 2025-03-19 PROCEDURE — 99214 OFFICE O/P EST MOD 30 MIN: CPT | Performed by: INTERNAL MEDICINE

## 2025-03-19 PROCEDURE — 85025 COMPLETE CBC W/AUTO DIFF WBC: CPT | Performed by: INTERNAL MEDICINE

## 2025-03-19 PROCEDURE — 36591 DRAW BLOOD OFF VENOUS DEVICE: CPT

## 2025-03-19 PROCEDURE — 25010000002 HEPARIN LOCK FLUSH PER 10 UNITS: Performed by: INTERNAL MEDICINE

## 2025-03-19 RX ORDER — SODIUM CHLORIDE 0.9 % (FLUSH) 0.9 %
20 SYRINGE (ML) INJECTION AS NEEDED
OUTPATIENT
Start: 2025-03-19

## 2025-03-19 RX ORDER — HEPARIN SODIUM (PORCINE) LOCK FLUSH IV SOLN 100 UNIT/ML 100 UNIT/ML
500 SOLUTION INTRAVENOUS AS NEEDED
Status: DISCONTINUED | OUTPATIENT
Start: 2025-03-19 | End: 2025-03-20 | Stop reason: HOSPADM

## 2025-03-19 RX ORDER — HEPARIN SODIUM (PORCINE) LOCK FLUSH IV SOLN 100 UNIT/ML 100 UNIT/ML
500 SOLUTION INTRAVENOUS AS NEEDED
OUTPATIENT
Start: 2025-03-19

## 2025-03-19 RX ORDER — SODIUM CHLORIDE 0.9 % (FLUSH) 0.9 %
20 SYRINGE (ML) INJECTION AS NEEDED
Status: DISCONTINUED | OUTPATIENT
Start: 2025-03-19 | End: 2025-03-20 | Stop reason: HOSPADM

## 2025-03-19 RX ADMIN — HEPARIN 500 UNITS: 100 SYRINGE at 09:39

## 2025-03-19 RX ADMIN — Medication 20 ML: at 09:40

## 2025-03-19 NOTE — PROGRESS NOTES
Pt to port chair for PF and labs prior to appt with Dr. Kramer. Port accessed using sterile technique and flushed with good blood return noted. 10cc of blood wasted prior to specimen collection. Blood specimen obtained and sent to lab for processing per protocol.  Port flushed with saline and heparin prior to needle removal. Pt to waiting room.

## 2025-03-23 NOTE — PROGRESS NOTES
[facility] RADIATION ONCOLOGY  CONSULTATION    Name: Vlad Guerrero  YOB: 1945  MRN #: [id number]  Date of Service: 3/29/2025  Referring Provider: Don Kramer MD     Chief Complaint:  Liver mets from colorectal cancer, initial evaluation    Diagnosis:    Encounter Diagnoses   Name Primary?    Malignant neoplasm of ascending colon Yes    Metastases to the liver       Cancer Staging   Stage IV colon cancer with liver metastases       ICD?: no  Prior XRT?: no  Contraindications?: no  HCG needed?: No, patient is male      History of Present Illness     Vlad Guerrero is a 79 y.o. male who presents with a stage IV colon cancer now with two growing liver metastases.  The patient presents to discuss the role of radiation therapy in the management of his disease.    In December 2022, the patient was diagnosed with a colon adenocarcinoma arising around the cecum.  He underwent a right hemicolectomy with final pathology showing a 5.5 cm tumor completely excised, grade 2, pT3N1b(2/36 LN positive).  MMR was intact.    Patient received 3 months of adjuvant CapOx.    8/28/2023 patient had restaging imaging.  This showed concern for metastatic deposit in segment 8 of the liver.    The patient went on additional chemotherapy.    11/29/2020 3 repeat PET scan was performed and showed interval resolution of the hypermetabolic liver lesion with no new abnormality.    3/22/2024 MRI of the abdomen and pelvis was worrisome for malignant disease progression within the liver was worrisome for symptomatic with a 3.1 cm lesion at the junction between the anterior right medial left hepatic segments increased in size from the 7/31/2023 MRI.  An additional new lesion had developed in the left hepatic lobe segment 3.    7/3/2024 CT abdomen and pelvis again showed a hypodense lesion in the central portion Of the anterior segment of the right lobe, somewhat ill-defined measuring 3 x 3 cm.  There was also a  hypodense lesion in the central portion of the left lobe of the liver that measured 2.5 x 2.5 cm in diameter also suspicious for metastatic disease.    8/29/2024 Repeat CT abdomen and pelvis showed interval treatment response with shrinking lesions in the liver.     12/18/2024 Imaging showed interval enlargement of subcarinal, precarinal, and right inferior hilar lymph nodes, suspicious for reactive vs metastatic adenopathy. Short interval PET or CT was recommended. There was no significant change in size of the hepatic metastases.     2/27/2025 CT CAP was performed and showed an interval increase in the size of the liver lesions compatible with disease progression. There were no findings to suggest metastatic disease in the thorax.     3/19/2025 The patient was seen by Dr. Kramer in follow-up. He had had good response to systemic therapy but with poor tolerance and intermittent treatment breaks. Despite his recent break, he only had slow increase in size of his liver lesions with no obvious outside progression. Given the oligoprogression, the patient was referred for discussion of radiation therapy treatment options.          Imaging     CT CAP With Contrast Diagnostic  Result Date: 3/3/2025  1.No evidence of pulmonary embolus. 2.Large bilateral pleural effusions with associated dependent atelectasis. 3.Patchy areas of consolidation within the lungs bilaterally which appears slightly increased compared to the comparison. 4.No findings to suggest metastatic disease within the thorax. 5.Findings of the abdomen and pelvis are reported separately. Electronically Signed: Pineda Vargas MD  3/3/2025 10:11 AM EST  Workstation ID: [id number]    CT Angiogram Chest Pulmonary Embolism  Result Date: 2/13/2025  Impression: 1. No pulmonary embolism. 2. Findings consistent with interstitial and alveolar pulmonary edema with moderate to large bilateral pleural effusions. 3. Patchy alveolar densities in the bilateral upper lobes  favored to represent pneumonia. 4. Low-density liver lesions consistent with metastatic disease appear larger than on 8/29/2024, suggesting disease progression. 5. Stable mediastinal adenopathy. 6. Stable cystic lesion in the pancreatic uncinate process Electronically Signed: Petra Barrientos MD  2/13/2025 12:01 PM EST  Workstation ID: [id number]    XR Chest 2 View  Result Date: 2/12/2025  Impression: Background of mildly increased likely pulmonary edema. Mildly increased conspicuity of multifocal bilateral airspace opacities, as suspicious for superimposed multifocal pneumonia and/or alveolar pulmonary edema. Small bilateral pleural effusions with adjacent bibasal consolidation, likely representing atelectasis. Underlying infection is also possible. Electronically Signed: Mario Burch  2/12/2025 10:05 PM EST  Workstation ID: [id number]    XR Chest 1 View  Result Date: 1/25/2025  1.Ill-defined multifocal airspace infiltrates bilaterally with diffuse interstitial changes. Bilateral pleural effusions are also noted. The findings suggest developing atypical/viral infection or multifocal pneumonia. Changes of CHF and pulmonary edema could also have this appearance. Recommend correlation for signs or symptoms of acute infection and follow-up to ensure improvement/resolution. Electronically Signed: Domenico Escalante MD  1/25/2025 4:09 PM EST  Workstation ID: [id number]    CT CAP With Contrast Diagnostic  Result Date: 12/19/2024  1. There has been interval enlargement of subcarinal, precarinal, and right inferior hilar lymph nodes. Lymph node enlargement may be reactive, or possibly congestive as there is suggestion of mild basilar interstitial edema. Metastatic adenopathy not excluded. Recommend short-term follow-up chest CT, or PET/CT if warranted 2. No significant change in size of hepatic metastases. No findings of new abdominopelvic metastatic disease 3. Stable cystic lesion of the pancreatic uncinate process 4. New L1  superior endplate compression fracture Electronically Signed: Severino Mace  12/19/2024 6:53 PM EST  Workstation ID: [id number]      Pathology       Tissue Pathology Exam: Small Bowel, Ascending Colon, Descending Colon Biopsies  Date: 11/11/2022  Final Diagnosis   Specimen #1 (Small bowel, biopsy):     Benign duodenal mucosa with preserved villous architecture and no significant histopathology      Negative for celiac disease  Specimen #2 (Colon, ascending, biopsies):     Invasive moderately differentiated adenocarcinoma, see comment  Specimen #3 (Colon, descending, polypectomy):     Tubular adenoma    Electronically signed by Demarcus Flynn MD on 11/14/2022 at 1445   Comment    The results of specimen #2 were called to Dr. Julien at 9:51 AM on November 14, 2022. MSI testing is pending on specimen #2 and will be resulted in an addendum.   Addendum    Immunohistochemical (IHC) testing for mismatch repair proteins (MMR).     MLH1: Intact nuclear expression     MSH2: Intact nuclear expression     MSH6: Intact nuclear expression     PMS2: Intact nuclear expression   Interpretation:     No loss of nuclear expression of MMR proteins: Low probability of microsatellite instability-high (MSH-H).       Tissue Pathology Exam: Right Hemicolectomy  Date: 12/15/2022  Final Diagnosis   Right colon, hemicolectomy:     Invasive moderately differentiated adenocarcinoma (size 5.5 cm), completely excised     See synoptic report for additional details    Electronically signed by Edwar Santacruz MD on 12/16/2022 at 1455   Synoptic Checklist   COLON AND RECTUM: Resection, Including Transanal Disk Excision of Rectal Neoplasms   8th Edition - Protocol posted: 6/22/2022COLON AND RECTUM: RESECTION - All Specimens  SPECIMEN   Procedure  Right hemicolectomy   TUMOR   Tumor Site  Cecum   Histologic Type  Adenocarcinoma   Histologic Grade  G2, moderately differentiated   Tumor Size  Greatest dimension (Centimeters): 5.5 cm   Additional Dimension  (Centimeters)  4.3 cm     1 cm   Tumor Extent  Invades through muscularis propria into the pericolonic or perirectal tissue   Macroscopic Tumor Perforation  Not identified   Lymphovascular Invasion  Not identified   Perineural Invasion  Not identified   Treatment Effect  No known presurgical therapy   MARGINS   Margin Status for Invasive Carcinoma  All margins negative for invasive carcinoma   Closest Margin(s) to Invasive Carcinoma  Proximal   Distance from Invasive Carcinoma to Closest Margin  12 cm   Margin Status for Non-Invasive Tumor  All margins negative for high-grade dysplasia / intramucosal carcinoma and low-grade dysplasia   REGIONAL LYMPH NODES   Regional Lymph Node Status  Tumor present in regional lymph node(s)   Number of Lymph Nodes with Tumor  2   Number of Lymph Nodes Examined  36   Tumor Deposits  Not identified   PATHOLOGIC STAGE CLASSIFICATION (pTNM, AJCC 8th Edition)   Reporting of pT, pN, and (when applicable) pM categories is based on information available to the pathologist at the time the report is issued. As per the AJCC (Chapter 1, 8th Ed.) it is the managing physician's responsibility to establish the final pathologic stage based upon all pertinent information, including but potentially not limited to this pathology report.   pT Category  pT3   pN Category  pN1b   ADDITIONAL FINDINGS   Additional Findings  None identified   .          Tissue Pathology Exam: CT Liver Biopsy  Date: 08/29/2023  Final Diagnosis   Liver, CT-guided core biopsy:    Metastatic colonic adenocarcinoma, see comment    Electronically signed by Demarcus Flynn MD on 8/30/2023 at 1625   Comment    The patient's clinical history is noted. The biopsy shows liver parenchyma involved with metastatic carcinoma. Immunohistochemistry was performed utilizing appropriate controls and the tumor is positive for CK20 and CDX2 and is negative for CK7. This staining pattern is consistent with a tumor of colonic origin.          Laboratory Values     Hematology:  WBC   Date Value Ref Range Status   03/19/2025 6.66 3.40 - 10.80 10*3/mm3 Final   11/08/2022 5.6 3.7 - 10.3 x10(3)/mcL Final     RBC   Date Value Ref Range Status   03/19/2025 4.66 4.14 - 5.80 10*6/mm3 Final   11/08/2022 3.99 (L) 4.60 - 6.10 x10(6)/mcL Final     Hemoglobin   Date Value Ref Range Status   03/19/2025 13.0 13.0 - 17.7 g/dL Final   11/08/2022 6.8 (C) 13.7 - 17.5 g/dL Final     Hematocrit   Date Value Ref Range Status   03/19/2025 41.1 37.5 - 51.0 % Final   11/08/2022 26.6 (L) 40.0 - 51.0 % Final     Platelets   Date Value Ref Range Status   03/19/2025 152 140 - 450 10*3/mm3 Final   11/08/2022 217 155 - 369 x10(3)/mcL Final     Chemistry:  Lab Results   Component Value Date    GLUCOSE 82 02/14/2025    BUN 23 02/14/2025    CREATININE 0.99 02/14/2025    EGFRIFNONA 76 11/15/2021    EGFRIFAFRI 87 11/15/2021    BCR 23.2 02/14/2025    K 4.1 02/14/2025    CO2 28.9 02/14/2025    CALCIUM 8.7 02/14/2025    ALBUMIN 3.7 02/12/2025    AST 40 02/12/2025    ALT 37 02/12/2025           Problem List     Patient Active Problem List   Diagnosis    Microcytic anemia    Primary hypertension    Mixed hyperlipidemia    Malignant neoplasm of ascending colon    Acute CVA (cerebrovascular accident)    Benign essential tremor    Type 2 diabetes mellitus    Thrombocytopenia    Metastases to the liver    Cellulitis    Right hand pain    Ataxia    Port-A-Cath in place    Unsteady gait    Hyperglycemia    Chronic combined systolic and diastolic congestive heart failure    Autonomic orthostatic hypotension    Uncontrolled type 2 diabetes mellitus with hyperglycemia    Cardiomyopathy, dilated    LBBB (left bundle branch block)    Acute HFrEF (heart failure with reduced ejection fraction)    CHF (congestive heart failure)        Current Medications     Current Outpatient Medications   Medication Instructions    ammonium lactate (AMLACTIN) 12 % cream 1 Application, 2 Times Daily    aspirin 81 mg,  Daily    atorvastatin (LIPITOR) 40 mg, Daily    ferrous gluconate (FERGON) 324 mg, Daily With Breakfast    furosemide (LASIX) 20 mg, Oral, 2 Times Daily    gabapentin (NEURONTIN) 100 MG capsule 1 capsule, Every 12 Hours Scheduled    insulin aspart (NOVOLOG FLEXPEN) 8 Units, 3 Times Daily With Meals    insulin glargine (LANTUS, SEMGLEE) 20 Units, Nightly    Melatonin 3 mg, Nightly    metFORMIN (GLUCOPHAGE) 1,000 mg, 2 Times Daily With Meals    midodrine (PROAMATINE) 10 mg, Oral, 3 Times Daily Before Meals    ondansetron (ZOFRAN) 8 mg, Every 8 Hours PRN        Allergies     Allergies   Allergen Reactions    Penicillins Rash       Family History     Family History   Problem Relation Age of Onset    Pneumonia Mother 94        SARS-CoV2    Colon cancer Father 62    Heart disease Sister         Social History     Social History     Tobacco Use    Smoking status: Former     Current packs/day: 0.00     Average packs/day: 1 pack/day for 2.0 years (2.0 ttl pk-yrs)     Types: Cigarettes     Start date:      Quit date:      Years since quittin.2    Smokeless tobacco: Never   Vaping Use    Vaping status: Never Used   Substance Use Topics    Alcohol use: Never    Drug use: Never        Review of Systems     Review of Systems   Constitutional:  Positive for appetite change.   HENT:  Positive for rhinorrhea.       Vitals:    25 0831   BP: 119/83   Pulse: 90   Resp: 18   SpO2: 98%      Physical Exam  Constitutional:       General: He is not in acute distress.  HENT:      Head: Normocephalic and atraumatic.   Pulmonary:      Effort: Pulmonary effort is normal. No respiratory distress.   Neurological:      Mental Status: He is alert and oriented to person, place, and time. Mental status is at baseline.   Psychiatric:         Mood and Affect: Mood normal.         Behavior: Behavior normal.          ECOG:  Restricted in physically strenuous activity but ambulatory and able to carry out work of a light or sedentary  nature, e.g., light house work, office work = 1      Assessment and Plan     Assessment:      Vlad Guerrero is a 79 y.o. male who presents with a stage IV colon cancer now with two growing liver metastases. His most recent laboratory testing was consistent with Pat Chaudhry A5.  The patient presents to discuss the role of radiation therapy in the management of his disease.    Diagnoses and all orders for this visit:    1. Malignant neoplasm of ascending colon (Primary)  -     MRI Abdomen With & Without Contrast; Future  -     US Thoracentesis Left; Future  -     US Thoracentesis Right; Future  -     Non-gynecologic Cytology; Standing  -     Hepatic Function Panel; Future  -     CBC & Differential; Future  -     Protime-INR; Future    2. Metastases to the liver  -     MRI Abdomen With & Without Contrast; Future  -     US Thoracentesis Left; Future  -     US Thoracentesis Right; Future  -     Non-gynecologic Cytology; Standing  -     Hepatic Function Panel; Future  -     CBC & Differential; Future  -     Protime-INR; Future       Plan:      Orders:  - MRI liver for restaging and potential treatment planning purposes  - Updated laboratory testing to assess liver function  - Refer to IR to discuss fiducial marker placement and/or ablation of the liver metastases, especially the left liver lobe lesion given the close proximity to the stomach.  - Refer for bilateral thoracentesis given shortness of breath and increase in size of effusions    We reviewed the patient's recent imaging, diagnosis, and work-up to date. We discussed the current findings of two findings of oligometastatic liver metastases. We discussed management strategies for his oligo metastatic disease.  We discussed repeat imaging with an MRI of the liver for additional staging and treatment planning for potential SBRT. We discussed the close proximity of the left hepatic lobe lesion to the stomach/bowel. Based on MRI findings, we can either discuss SBRT,  hypofractionated radiation, or ablation with IR. I am referring the patient to IR for fiducial marker placement, consideration of ablation, especially of the left hepatic lobe lesion, and for thoracentesis of bilateral pleural effusions. I am also ordering updated laboratory testing to assess his liver function and assess his current Child's Chaudhry Score for SBRT tolerability. We will finalize treatment recommendations after MRI and laboratory evaluation. The patient is encouraged to reach out with questions or concerns prior to his next appointment.         I spent 60 minutes caring for Vlad on this date of service. This time includes time spent by me in the following activities:preparing for the visit, reviewing tests, obtaining and/or reviewing a separately obtained history, performing a medically appropriate examination and/or evaluation , counseling and educating the patient/family/caregiver, ordering medications, tests, or procedures, referring and communicating with other health care professionals , documenting information in the medical record, and independently interpreting results and communicating that information with the patient/family/caregiver      FOLLOW UP     No follow-ups on file.     CC: Don Kramer MD

## 2025-03-26 ENCOUNTER — CONSULT (OUTPATIENT)
Dept: RADIATION ONCOLOGY | Facility: HOSPITAL | Age: 80
End: 2025-03-26
Payer: MEDICARE

## 2025-03-26 VITALS
DIASTOLIC BLOOD PRESSURE: 83 MMHG | SYSTOLIC BLOOD PRESSURE: 119 MMHG | BODY MASS INDEX: 21.84 KG/M2 | OXYGEN SATURATION: 98 % | WEIGHT: 161 LBS | HEART RATE: 90 BPM | RESPIRATION RATE: 18 BRPM

## 2025-03-26 DIAGNOSIS — C18.2 MALIGNANT NEOPLASM OF ASCENDING COLON: Primary | ICD-10-CM

## 2025-03-26 DIAGNOSIS — C78.7 METASTASES TO THE LIVER: ICD-10-CM

## 2025-03-26 PROCEDURE — G0463 HOSPITAL OUTPT CLINIC VISIT: HCPCS | Performed by: INTERNAL MEDICINE

## 2025-03-27 ENCOUNTER — TELEPHONE (OUTPATIENT)
Dept: RADIATION ONCOLOGY | Facility: HOSPITAL | Age: 80
End: 2025-03-27
Payer: OTHER GOVERNMENT

## 2025-03-27 NOTE — TELEPHONE ENCOUNTER
Radiation Oncology Nurse Note    Called and spoke to patient regarding scheduling of MRI Abdomen at [facility] on 4/1/2025 at 2:30 PM with arrival to the main entrance of the hospital at 2:00 PM to confirm he could attend. Patient confirmed but questioned if there is any prep. Called centralized scheduling and scheduled patient for appointment and returned call to patient. LVM with prep instructions of NPO but plain water for 4 hours prior. Provided return call back number for any questions or concerns.    Leisa Pelaez, RN, BSN  Radiation Oncology Department  [facility]

## 2025-03-28 ENCOUNTER — DOCUMENTATION (OUTPATIENT)
Dept: ONCOLOGY | Facility: CLINIC | Age: 80
End: 2025-03-28
Payer: OTHER GOVERNMENT

## 2025-03-28 NOTE — PROGRESS NOTES
Distress Screening Follow-up    Diagnosis:     Location of Distress Screening: Radiation Oncology    Distress Level: 5 (3/26/2025 10:42 AM)    Physical Concerns:    Memory or concentration: Y  Changes in eating: Y  Loss or change of physical abilities: Y    Practical Problems:    Taking care of myself: Y  Having enough food: Y  Access to medicine: Y    Emotional Concerns:    Sadness or depression: Y  Loneliness: Y  Feelings of worthlessness or being a burden: Y    Family Concerns:    Relationship with children: Y  Relationship with friends or coworkers: Y    Spiritual Concerns:    Sense of meaning or purpose: Y  Changes in angela or beliefs: Y  Conflict between beliefs and cancer treatments: Y  Ritual or dietary needs: Y     Interventions:        Comments:OSW contacted pt regarding DST score of 5.  Pt resides in an assisted living facility.  He has children nearby.  He has been  for some time.  Pt states the assisted living staff assist with his medications.  He has some concerns about his ability to care for himself.  He has applied for Medicaid and states he got his card, but someone is supposed to be calling him to discuss his benefits.  Chemo ended and pt to begin radiation therapy.  Pt drives himself at this time.  Encouraged pt to join in group activities to decrease loneliness and isolation.  Pt receives his meals at the facility and it appears he has access to sufficient food.  Basic needs are met and there are no safety concerns at this time.  Pt anticipating needing additional assistance in the future.  Pt encouraged to reach out as needed.

## 2025-03-31 ENCOUNTER — HOSPITAL ENCOUNTER (OUTPATIENT)
Dept: INTERVENTIONAL RADIOLOGY/VASCULAR | Facility: HOSPITAL | Age: 80
Discharge: HOME | End: 2025-03-31
Payer: MEDICARE

## 2025-03-31 ENCOUNTER — HOSPITAL ENCOUNTER (OUTPATIENT)
Dept: GENERAL RADIOLOGY | Facility: HOSPITAL | Age: 80
Discharge: HOME | End: 2025-03-31
Payer: MEDICARE

## 2025-03-31 VITALS — OXYGEN SATURATION: 96 % | HEART RATE: 94 BPM | DIASTOLIC BLOOD PRESSURE: 64 MMHG | SYSTOLIC BLOOD PRESSURE: 99 MMHG

## 2025-03-31 DIAGNOSIS — C18.2 MALIGNANT NEOPLASM OF ASCENDING COLON: ICD-10-CM

## 2025-03-31 DIAGNOSIS — C78.7 METASTASES TO THE LIVER: ICD-10-CM

## 2025-03-31 PROCEDURE — 76942 ECHO GUIDE FOR BIOPSY: CPT

## 2025-03-31 PROCEDURE — 88108 CYTOPATH CONCENTRATE TECH: CPT | Performed by: INTERNAL MEDICINE

## 2025-03-31 PROCEDURE — 25010000002 LIDOCAINE 1 % SOLUTION

## 2025-03-31 PROCEDURE — 71045 X-RAY EXAM CHEST 1 VIEW: CPT

## 2025-03-31 PROCEDURE — C1729 CATH, DRAINAGE: HCPCS

## 2025-03-31 RX ORDER — LIDOCAINE HYDROCHLORIDE 10 MG/ML
INJECTION, SOLUTION INFILTRATION; PERINEURAL AS NEEDED
Status: COMPLETED | OUTPATIENT
Start: 2025-03-31 | End: 2025-03-31

## 2025-03-31 RX ADMIN — LIDOCAINE HYDROCHLORIDE 10 ML: 10 INJECTION, SOLUTION INFILTRATION; PERINEURAL at 10:09

## 2025-04-01 ENCOUNTER — HOSPITAL ENCOUNTER (OUTPATIENT)
Dept: GENERAL RADIOLOGY | Facility: HOSPITAL | Age: 80
Discharge: HOME | End: 2025-04-01
Payer: MEDICARE

## 2025-04-01 ENCOUNTER — HOSPITAL ENCOUNTER (OUTPATIENT)
Dept: INTERVENTIONAL RADIOLOGY/VASCULAR | Facility: HOSPITAL | Age: 80
Discharge: HOME | End: 2025-04-01
Payer: MEDICARE

## 2025-04-01 ENCOUNTER — HOSPITAL ENCOUNTER (OUTPATIENT)
Dept: MRI IMAGING | Facility: HOSPITAL | Age: 80
Discharge: HOME | End: 2025-04-01
Payer: MEDICARE

## 2025-04-01 VITALS
OXYGEN SATURATION: 97 % | RESPIRATION RATE: 16 BRPM | SYSTOLIC BLOOD PRESSURE: 91 MMHG | DIASTOLIC BLOOD PRESSURE: 64 MMHG | HEART RATE: 98 BPM

## 2025-04-01 DIAGNOSIS — C78.7 METASTASES TO THE LIVER: ICD-10-CM

## 2025-04-01 DIAGNOSIS — C18.2 MALIGNANT NEOPLASM OF ASCENDING COLON: ICD-10-CM

## 2025-04-01 LAB
CREAT BLDA-MCNC: 0.8 MG/DL (ref 0.6–1.3)
EGFRCR SERPLBLD CKD-EPI 2021: 91.2 ML/MIN/1.73

## 2025-04-01 PROCEDURE — 25010000002 LIDOCAINE 1 % SOLUTION

## 2025-04-01 PROCEDURE — C1729 CATH, DRAINAGE: HCPCS

## 2025-04-01 PROCEDURE — 25010000002 HEPARIN LOCK FLUSH PER 10 UNITS: Performed by: INTERNAL MEDICINE

## 2025-04-01 PROCEDURE — A9579 GAD-BASE MR CONTRAST NOS,1ML: HCPCS | Performed by: INTERNAL MEDICINE

## 2025-04-01 PROCEDURE — 74183 MRI ABD W/O CNTR FLWD CNTR: CPT

## 2025-04-01 PROCEDURE — 76942 ECHO GUIDE FOR BIOPSY: CPT

## 2025-04-01 PROCEDURE — 25010000002 GADOTERIDOL PER 1 ML: Performed by: INTERNAL MEDICINE

## 2025-04-01 PROCEDURE — 87070 CULTURE OTHR SPECIMN AEROBIC: CPT

## 2025-04-01 PROCEDURE — 71045 X-RAY EXAM CHEST 1 VIEW: CPT

## 2025-04-01 PROCEDURE — 82565 ASSAY OF CREATININE: CPT

## 2025-04-01 PROCEDURE — 87205 SMEAR GRAM STAIN: CPT

## 2025-04-01 RX ORDER — LIDOCAINE HYDROCHLORIDE 10 MG/ML
INJECTION, SOLUTION INFILTRATION; PERINEURAL AS NEEDED
Status: DISCONTINUED | OUTPATIENT
Start: 2025-04-01 | End: 2025-04-02 | Stop reason: HOSPADM

## 2025-04-01 RX ORDER — HEPARIN SODIUM (PORCINE) LOCK FLUSH IV SOLN 100 UNIT/ML 100 UNIT/ML
500 SOLUTION INTRAVENOUS ONCE
Status: COMPLETED | OUTPATIENT
Start: 2025-04-01 | End: 2025-04-01

## 2025-04-01 RX ADMIN — Medication 500 UNITS: at 12:30

## 2025-04-01 RX ADMIN — GADOTERIDOL 20 ML: 279.3 INJECTION, SOLUTION INTRAVENOUS at 12:54

## 2025-04-01 RX ADMIN — LIDOCAINE HYDROCHLORIDE 8 ML: 10 INJECTION, SOLUTION INFILTRATION; PERINEURAL at 10:35

## 2025-04-02 LAB
LAB AP CASE REPORT: NORMAL
PATH REPORT.FINAL DX SPEC: NORMAL
PATH REPORT.GROSS SPEC: NORMAL

## 2025-04-06 LAB
BACTERIA FLD CULT: NORMAL
GRAM STN SPEC: NORMAL
GRAM STN SPEC: NORMAL

## 2025-04-18 NOTE — PROGRESS NOTES
[facility] RADIATION ONCOLOGY  FOLLOW-UP    Name: Vlad Guerrero  YOB: 1945  MRN #: [id number]  Date of Service: 4/22/2025    Chief Complaint:  Follow up    Diagnosis:   Encounter Diagnoses   Name Primary?    Malignant neoplasm of ascending colon Yes    Metastases to the liver           Interval History       Vlad Guerrero is a 79 y.o. male who presents with a stage IV colon cancer now with two growing liver metastases. His most recent laboratory testing was consistent with Pat Chaudhry A5.  He was seen in our office on 3/26/2025 for initial consultation with plans for MRI of liver for restaging, updated laboratory testing to assess liver function, bilateral thoracentesis given shortness of breath and increase in size of effusions as well as IR referral to discuss fiducial marker placement and/or ablation of the liver metastases, especially the left liver lobe lesion given the close proximity to the stomach. He presents for follow up today after testing and to discuss further treatment options and planning.     The patient reports no new symptoms. He is here for imaging review. His SOB improved after the thoracentesis. He is on a water pill to help with fluid overload.          Imaging       MRI Abdomen With & Without Contrast  Result Date: 4/3/2025  Impression: 1. Adjacent left hepatic lobe metastases measuring up to 5.1 cm without significant change from 2/27/2025 CT comparison, although enlarged from 3/22/2020 for MRI comparison. No other areas of convincing metastases in the abdomen within limitations of motion. 2. Right greater than left pleural effusions have improved from recent CT comparison. Adjacent dependent consolidation the right lower lobe could reflect pneumonia or atelectasis. 3. Stable pancreatic cystic lesions as above. 4. Other chronic/ancillary findings as above.     US Thoracentesis  Result Date: 4/1/2025  Successful ultrasound-guided left thoracentesis yielding 620  mL of cloudy red fluid.     XR Chest 1 View  Result Date: 4/1/2025  Impression: 1.Trace right pleural effusion with mild bibasilar atelectasis. No pneumothorax is identified.     US Thoracentesis  Result Date: 3/31/2025  Successful right thoracentesis utilizing ultrasound guidance and yielding 1700 mL of clear erin fluid    XR Chest 1 View  Result Date: 3/31/2025  Impression: 1. Decrease in size of right pleural effusion status post thoracentesis with small residual right pleural effusion. 2. No pneumothorax. 3. Persistent small left pleural effusion with left basilar airspace disease likely atelectasis. 4. Patchy airspace disease overlying the upper lobes decreased from prior study.         Pathology     Tissue Pathology Exam: US yamel thoracentesis   Date: 3/31/2025   Final Diagnosis   Right pleural fluid with smears and cytospin:  Chronic inflammation, macrophages and reactive mesothelial cells  No malignancy identified         Labs       Hematology:  WBC   Date Value Ref Range Status   03/19/2025 6.66 3.40 - 10.80 10*3/mm3 Final   11/08/2022 5.6 3.7 - 10.3 x10(3)/mcL Final     RBC   Date Value Ref Range Status   03/19/2025 4.66 4.14 - 5.80 10*6/mm3 Final   11/08/2022 3.99 (L) 4.60 - 6.10 x10(6)/mcL Final     Hemoglobin   Date Value Ref Range Status   03/19/2025 13.0 13.0 - 17.7 g/dL Final   11/08/2022 6.8 (C) 13.7 - 17.5 g/dL Final     Hematocrit   Date Value Ref Range Status   03/19/2025 41.1 37.5 - 51.0 % Final   11/08/2022 26.6 (L) 40.0 - 51.0 % Final     Platelets   Date Value Ref Range Status   03/19/2025 152 140 - 450 10*3/mm3 Final   11/08/2022 217 155 - 369 x10(3)/mcL Final     Chemistry:  Lab Results   Component Value Date    GLUCOSE 82 02/14/2025    BUN 23 02/14/2025    CREATININE 0.80 04/01/2025    EGFRIFNONA 76 11/15/2021    EGFRIFAFRI 87 11/15/2021    BCR 23.2 02/14/2025    K 4.1 02/14/2025    CO2 28.9 02/14/2025    CALCIUM 8.7 02/14/2025    ALBUMIN 3.7 02/12/2025    AST 40 02/12/2025    ALT 37  02/12/2025         Problem List       Patient Active Problem List   Diagnosis    Microcytic anemia    Primary hypertension    Mixed hyperlipidemia    Malignant neoplasm of ascending colon    Acute CVA (cerebrovascular accident)    Benign essential tremor    Type 2 diabetes mellitus    Thrombocytopenia    Metastases to the liver    Cellulitis    Right hand pain    Ataxia    Port-A-Cath in place    Unsteady gait    Hyperglycemia    Chronic combined systolic and diastolic congestive heart failure    Autonomic orthostatic hypotension    Uncontrolled type 2 diabetes mellitus with hyperglycemia    Cardiomyopathy, dilated    LBBB (left bundle branch block)    Acute HFrEF (heart failure with reduced ejection fraction)    CHF (congestive heart failure)          Current Medications       Current Outpatient Medications   Medication Instructions    ammonium lactate (AMLACTIN) 12 % cream 1 Application, 2 Times Daily    aspirin 81 mg, Daily    atorvastatin (LIPITOR) 40 mg, Daily    ferrous gluconate (FERGON) 324 mg, Daily With Breakfast    furosemide (LASIX) 20 mg, Oral, 2 Times Daily    gabapentin (NEURONTIN) 100 MG capsule 1 capsule, Every 12 Hours Scheduled    insulin aspart (NOVOLOG FLEXPEN) 8 Units, 3 Times Daily With Meals    insulin glargine (LANTUS, SEMGLEE) 20 Units, Nightly    Melatonin 3 mg, Nightly    metFORMIN (GLUCOPHAGE) 1,000 mg, 2 Times Daily With Meals    midodrine (PROAMATINE) 10 mg, Oral, 3 Times Daily Before Meals    ondansetron (ZOFRAN) 8 mg, Every 8 Hours PRN          Allergies       Allergies   Allergen Reactions    Penicillins Rash           Review of Systems       Review of Systems   Constitutional:  Negative for activity change and fatigue.   Respiratory:  Negative for shortness of breath.         Vitals:    04/22/25 0800   BP: 119/83   Pulse: 88   Resp: 18   SpO2: 96%      Physical Exam  Constitutional:       General: He is not in acute distress.  HENT:      Head: Normocephalic and atraumatic.    Pulmonary:      Effort: Pulmonary effort is normal. No respiratory distress.   Neurological:      Mental Status: He is alert and oriented to person, place, and time. Mental status is at baseline.   Psychiatric:         Mood and Affect: Mood normal.         Behavior: Behavior normal.          ECOG:  Restricted in physically strenuous activity but ambulatory and able to carry out work of a light or sedentary nature, e.g., light house work, office work = 1          Assessment and Plan       Assessment:      Vlad Guerrero is a 79 y.o. male who presents with a stage IV colon cancer now with two growing liver metastases. His most recent laboratory testing was consistent with Pat Chaudhry A5.  He was seen in our office on 3/26/2025 for initial consultation with plans for MRI of liver for restaging, updated laboratory testing to assess liver function, bilateral thoracentesis given shortness of breath and increase in size of effusions as well as IR referral to discuss fiducial marker placement and/or ablation of the liver metastases, especially the left liver lobe lesion given the close proximity to the stomach. He presents for follow up today after testing and to discuss further treatment options and planning.     Diagnoses and all orders for this visit:    1. Malignant neoplasm of ascending colon (Primary)  -     Cancel: Hepatic Function Panel; Future  -     Cancel: Protime-INR; Future    2. Metastases to the liver  -     Cancel: Hepatic Function Panel; Future  -     Cancel: Protime-INR; Future       Plan:      Orders:  - Complete laboratory testing (will do today)  - Discussed case with Dr. aMrtinez, he will place fudicial markers for radiation planning and will evaluate for potential embolization  - Follow-up after fiducial marker placement for CT simulation      We reviewed the patient's recent imaging and treatment options proceeding forward.  We discussed the need to complete his laboratory testing.  Ideally this will  be scheduled today.  Patient is in agreement with this plan.  I have been in communication with Dr. Martinez.  He feels the patient is most appropriate for fiducial marker placement and potential embolization.  We have placed orders for referral for this to be done.  After embolization and fiducial marker placement, we will bring the patient back for a hypofractionated course of radiation therapy of likely 15 treatments due to the proximity of bowel and central biliary structures of the 2 larger tumors.  Patient expressed understanding and is anxious to get started with treatment.  He is encouraged to reach out with any questions or concerns prior to his next appointment.            Follow-Up       No follow-ups on file.       CC: Karsten Ferrari MD

## 2025-04-22 ENCOUNTER — OFFICE VISIT (OUTPATIENT)
Dept: RADIATION ONCOLOGY | Facility: HOSPITAL | Age: 80
End: 2025-04-22
Payer: MEDICARE

## 2025-04-22 ENCOUNTER — LAB (OUTPATIENT)
Dept: LAB | Facility: HOSPITAL | Age: 80
End: 2025-04-22
Payer: MEDICARE

## 2025-04-22 VITALS
OXYGEN SATURATION: 96 % | HEART RATE: 88 BPM | DIASTOLIC BLOOD PRESSURE: 83 MMHG | BODY MASS INDEX: 21.56 KG/M2 | WEIGHT: 159 LBS | SYSTOLIC BLOOD PRESSURE: 119 MMHG | RESPIRATION RATE: 18 BRPM

## 2025-04-22 DIAGNOSIS — C78.7 METASTASES TO THE LIVER: ICD-10-CM

## 2025-04-22 DIAGNOSIS — C18.2 MALIGNANT NEOPLASM OF ASCENDING COLON: ICD-10-CM

## 2025-04-22 DIAGNOSIS — C18.2 MALIGNANT NEOPLASM OF ASCENDING COLON: Primary | ICD-10-CM

## 2025-04-22 LAB
ALBUMIN SERPL-MCNC: 3.5 G/DL (ref 3.5–5.2)
ALBUMIN/GLOB SERPL: 1 G/DL
ALP SERPL-CCNC: 150 U/L (ref 39–117)
ALT SERPL W P-5'-P-CCNC: 19 U/L (ref 1–41)
ANION GAP SERPL CALCULATED.3IONS-SCNC: 8.9 MMOL/L (ref 5–15)
AST SERPL-CCNC: 30 U/L (ref 1–40)
BILIRUB CONJ SERPL-MCNC: 0.4 MG/DL (ref 0–0.3)
BILIRUB SERPL-MCNC: 0.8 MG/DL (ref 0–1.2)
BUN SERPL-MCNC: 19 MG/DL (ref 8–23)
BUN/CREAT SERPL: 24.7 (ref 7–25)
CALCIUM SPEC-SCNC: 9 MG/DL (ref 8.6–10.5)
CEA SERPL-MCNC: 23.8 NG/ML
CHLORIDE SERPL-SCNC: 103 MMOL/L (ref 98–107)
CO2 SERPL-SCNC: 24.1 MMOL/L (ref 22–29)
CREAT SERPL-MCNC: 0.77 MG/DL (ref 0.76–1.27)
EGFRCR SERPLBLD CKD-EPI 2021: 92.2 ML/MIN/1.73
GLOBULIN UR ELPH-MCNC: 3.5 GM/DL
GLUCOSE SERPL-MCNC: 236 MG/DL (ref 65–99)
INR PPP: 1.39 (ref 0.9–1.1)
POTASSIUM SERPL-SCNC: 4.4 MMOL/L (ref 3.5–5.2)
PROT SERPL-MCNC: 7 G/DL (ref 6–8.5)
PROTHROMBIN TIME: 17 SECONDS (ref 11.7–14.2)
SODIUM SERPL-SCNC: 136 MMOL/L (ref 136–145)

## 2025-04-22 PROCEDURE — 85610 PROTHROMBIN TIME: CPT | Performed by: INTERNAL MEDICINE

## 2025-04-22 PROCEDURE — G0463 HOSPITAL OUTPT CLINIC VISIT: HCPCS | Performed by: INTERNAL MEDICINE

## 2025-04-22 PROCEDURE — 82378 CARCINOEMBRYONIC ANTIGEN: CPT | Performed by: INTERNAL MEDICINE

## 2025-04-22 PROCEDURE — 36415 COLL VENOUS BLD VENIPUNCTURE: CPT

## 2025-04-22 PROCEDURE — 82248 BILIRUBIN DIRECT: CPT | Performed by: INTERNAL MEDICINE

## 2025-04-22 PROCEDURE — 80053 COMPREHEN METABOLIC PANEL: CPT | Performed by: INTERNAL MEDICINE

## 2025-04-27 ENCOUNTER — HOSPITAL ENCOUNTER (EMERGENCY)
Facility: HOSPITAL | Age: 80
Discharge: NURSING, REHAB OR LONG-TERM CARE | End: 2025-04-27
Attending: EMERGENCY MEDICINE | Admitting: EMERGENCY MEDICINE
Payer: OTHER GOVERNMENT

## 2025-04-27 VITALS
HEART RATE: 91 BPM | OXYGEN SATURATION: 93 % | RESPIRATION RATE: 15 BRPM | SYSTOLIC BLOOD PRESSURE: 129 MMHG | TEMPERATURE: 97.3 F | BODY MASS INDEX: 21.2 KG/M2 | HEIGHT: 73 IN | WEIGHT: 160 LBS | DIASTOLIC BLOOD PRESSURE: 99 MMHG

## 2025-04-27 DIAGNOSIS — E11.649 HYPOGLYCEMIC EPISODE IN PATIENT WITH DIABETES MELLITUS: Primary | ICD-10-CM

## 2025-04-27 LAB
ANION GAP SERPL CALCULATED.3IONS-SCNC: 14 MMOL/L (ref 10–20)
BUN BLDA-MCNC: 21 MG/DL (ref 8–26)
CA-I BLDA-SCNC: 1.15 MMOL/L (ref 1.12–1.32)
CHLORIDE BLDA-SCNC: 102 MMOL/L (ref 98–109)
CO2 BLDA-SCNC: 28 MMOL/L (ref 24–29)
CREAT BLDA-MCNC: 0.6 MG/DL (ref 0.6–1.3)
EGFRCR SERPLBLD CKD-EPI 2021: 99.4 ML/MIN/1.73
GLUCOSE BLDC GLUCOMTR-MCNC: 122 MG/DL (ref 70–105)
GLUCOSE BLDC GLUCOMTR-MCNC: 137 MG/DL (ref 70–105)
GLUCOSE BLDC GLUCOMTR-MCNC: 210 MG/DL (ref 70–105)
HCT VFR BLDA CALC: 43 % (ref 38–51)
HGB BLDA-MCNC: 14.6 G/DL (ref 12–17)
POTASSIUM BLDA-SCNC: 3.9 MMOL/L (ref 3.5–4.9)
SODIUM BLD-SCNC: 139 MMOL/L (ref 138–146)

## 2025-04-27 PROCEDURE — 99283 EMERGENCY DEPT VISIT LOW MDM: CPT

## 2025-04-27 PROCEDURE — 82948 REAGENT STRIP/BLOOD GLUCOSE: CPT

## 2025-04-27 PROCEDURE — 85014 HEMATOCRIT: CPT

## 2025-04-27 PROCEDURE — 80047 BASIC METABLC PNL IONIZED CA: CPT

## 2025-04-28 ENCOUNTER — HOSPITAL ENCOUNTER (INPATIENT)
Facility: HOSPITAL | Age: 80
LOS: 4 days | Discharge: NURSING, REHAB OR LONG-TERM CARE | End: 2025-05-02
Attending: EMERGENCY MEDICINE | Admitting: STUDENT IN AN ORGANIZED HEALTH CARE EDUCATION/TRAINING PROGRAM
Payer: MEDICARE

## 2025-04-28 ENCOUNTER — APPOINTMENT (OUTPATIENT)
Dept: GENERAL RADIOLOGY | Facility: HOSPITAL | Age: 80
End: 2025-04-28
Payer: MEDICARE

## 2025-04-28 ENCOUNTER — TELEPHONE (OUTPATIENT)
Dept: ONCOLOGY | Facility: CLINIC | Age: 80
End: 2025-04-28
Payer: OTHER GOVERNMENT

## 2025-04-28 ENCOUNTER — APPOINTMENT (OUTPATIENT)
Dept: CT IMAGING | Facility: HOSPITAL | Age: 80
End: 2025-04-28
Payer: MEDICARE

## 2025-04-28 DIAGNOSIS — R41.82 ALTERED MENTAL STATUS, UNSPECIFIED ALTERED MENTAL STATUS TYPE: Primary | ICD-10-CM

## 2025-04-28 DIAGNOSIS — E16.2 HYPOGLYCEMIA: ICD-10-CM

## 2025-04-28 PROBLEM — E87.1 HYPONATREMIA: Status: ACTIVE | Noted: 2025-04-28

## 2025-04-28 PROBLEM — R53.1 GENERAL WEAKNESS: Status: ACTIVE | Noted: 2025-04-28

## 2025-04-28 PROBLEM — R79.89 ELEVATED TROPONIN: Status: ACTIVE | Noted: 2025-04-28

## 2025-04-28 LAB
ALBUMIN SERPL-MCNC: 3.7 G/DL (ref 3.5–5.2)
ALBUMIN/GLOB SERPL: 0.9 G/DL
ALP SERPL-CCNC: 171 U/L (ref 39–117)
ALT SERPL W P-5'-P-CCNC: 21 U/L (ref 1–41)
ANION GAP SERPL CALCULATED.3IONS-SCNC: 10.9 MMOL/L (ref 5–15)
AST SERPL-CCNC: 47 U/L (ref 1–40)
B PARAPERT DNA SPEC QL NAA+PROBE: NOT DETECTED
B PERT DNA SPEC QL NAA+PROBE: NOT DETECTED
BACTERIA UR QL AUTO: ABNORMAL /HPF
BASOPHILS # BLD AUTO: 0.04 10*3/MM3 (ref 0–0.2)
BASOPHILS NFR BLD AUTO: 0.4 % (ref 0–1.5)
BILIRUB SERPL-MCNC: 1 MG/DL (ref 0–1.2)
BILIRUB UR QL STRIP: NEGATIVE
BUN SERPL-MCNC: 29 MG/DL (ref 8–23)
BUN/CREAT SERPL: 31.9 (ref 7–25)
C PNEUM DNA NPH QL NAA+NON-PROBE: NOT DETECTED
CALCIUM SPEC-SCNC: 9.2 MG/DL (ref 8.6–10.5)
CHLORIDE SERPL-SCNC: 101 MMOL/L (ref 98–107)
CLARITY UR: CLEAR
CO2 SERPL-SCNC: 19.1 MMOL/L (ref 22–29)
COLOR UR: YELLOW
CREAT SERPL-MCNC: 0.91 MG/DL (ref 0.76–1.27)
DEPRECATED RDW RBC AUTO: 59.2 FL (ref 37–54)
EGFRCR SERPLBLD CKD-EPI 2021: 86.8 ML/MIN/1.73
EOSINOPHIL # BLD AUTO: 0.06 10*3/MM3 (ref 0–0.4)
EOSINOPHIL NFR BLD AUTO: 0.6 % (ref 0.3–6.2)
ERYTHROCYTE [DISTWIDTH] IN BLOOD BY AUTOMATED COUNT: 18 % (ref 12.3–15.4)
FLUAV SUBTYP SPEC NAA+PROBE: NOT DETECTED
FLUBV RNA ISLT QL NAA+PROBE: NOT DETECTED
GEN 5 1HR TROPONIN T REFLEX: 54 NG/L
GLOBULIN UR ELPH-MCNC: 4.1 GM/DL
GLUCOSE BLDC GLUCOMTR-MCNC: 100 MG/DL (ref 70–105)
GLUCOSE BLDC GLUCOMTR-MCNC: 100 MG/DL (ref 70–105)
GLUCOSE SERPL-MCNC: 98 MG/DL (ref 65–99)
GLUCOSE UR STRIP-MCNC: ABNORMAL MG/DL
HADV DNA SPEC NAA+PROBE: NOT DETECTED
HCOV 229E RNA SPEC QL NAA+PROBE: NOT DETECTED
HCOV HKU1 RNA SPEC QL NAA+PROBE: NOT DETECTED
HCOV NL63 RNA SPEC QL NAA+PROBE: NOT DETECTED
HCOV OC43 RNA SPEC QL NAA+PROBE: NOT DETECTED
HCT VFR BLD AUTO: 42 % (ref 37.5–51)
HGB BLD-MCNC: 13.3 G/DL (ref 13–17.7)
HGB UR QL STRIP.AUTO: ABNORMAL
HMPV RNA NPH QL NAA+NON-PROBE: NOT DETECTED
HOLD SPECIMEN: NORMAL
HPIV1 RNA ISLT QL NAA+PROBE: NOT DETECTED
HPIV2 RNA SPEC QL NAA+PROBE: NOT DETECTED
HPIV3 RNA NPH QL NAA+PROBE: NOT DETECTED
HPIV4 P GENE NPH QL NAA+PROBE: NOT DETECTED
HYALINE CASTS UR QL AUTO: ABNORMAL /LPF
IMM GRANULOCYTES # BLD AUTO: 0.04 10*3/MM3 (ref 0–0.05)
IMM GRANULOCYTES NFR BLD AUTO: 0.4 % (ref 0–0.5)
KETONES UR QL STRIP: NEGATIVE
LEUKOCYTE ESTERASE UR QL STRIP.AUTO: ABNORMAL
LYMPHOCYTES # BLD AUTO: 0.88 10*3/MM3 (ref 0.7–3.1)
LYMPHOCYTES NFR BLD AUTO: 8.9 % (ref 19.6–45.3)
M PNEUMO IGG SER IA-ACNC: NOT DETECTED
MAGNESIUM SERPL-MCNC: 2.1 MG/DL (ref 1.6–2.4)
MCH RBC QN AUTO: 28.5 PG (ref 26.6–33)
MCHC RBC AUTO-ENTMCNC: 31.7 G/DL (ref 31.5–35.7)
MCV RBC AUTO: 89.9 FL (ref 79–97)
MONOCYTES # BLD AUTO: 0.71 10*3/MM3 (ref 0.1–0.9)
MONOCYTES NFR BLD AUTO: 7.1 % (ref 5–12)
NEUTROPHILS NFR BLD AUTO: 8.21 10*3/MM3 (ref 1.7–7)
NEUTROPHILS NFR BLD AUTO: 82.6 % (ref 42.7–76)
NITRITE UR QL STRIP: NEGATIVE
NRBC BLD AUTO-RTO: 0 /100 WBC (ref 0–0.2)
PH UR STRIP.AUTO: <=5 [PH] (ref 5–8)
PLATELET # BLD AUTO: 169 10*3/MM3 (ref 140–450)
PMV BLD AUTO: 10.5 FL (ref 6–12)
POTASSIUM SERPL-SCNC: 4.7 MMOL/L (ref 3.5–5.2)
PROCALCITONIN SERPL-MCNC: 0.37 NG/ML (ref 0–0.25)
PROT SERPL-MCNC: 7.8 G/DL (ref 6–8.5)
PROT UR QL STRIP: ABNORMAL
RBC # BLD AUTO: 4.67 10*6/MM3 (ref 4.14–5.8)
RBC # UR STRIP: ABNORMAL /HPF
REF LAB TEST METHOD: ABNORMAL
RHINOVIRUS RNA SPEC NAA+PROBE: NOT DETECTED
RSV RNA NPH QL NAA+NON-PROBE: NOT DETECTED
SARS-COV-2 RNA RESP QL NAA+PROBE: NOT DETECTED
SODIUM SERPL-SCNC: 131 MMOL/L (ref 136–145)
SP GR UR STRIP: 1.02 (ref 1–1.03)
SQUAMOUS #/AREA URNS HPF: ABNORMAL /HPF
TROPONIN T % DELTA: 8
TROPONIN T NUMERIC DELTA: 4 NG/L
TROPONIN T SERPL HS-MCNC: 50 NG/L
UROBILINOGEN UR QL STRIP: ABNORMAL
WBC # UR STRIP: ABNORMAL /HPF
WBC NRBC COR # BLD AUTO: 9.94 10*3/MM3 (ref 3.4–10.8)
WHOLE BLOOD HOLD COAG: NORMAL
WHOLE BLOOD HOLD SPECIMEN: NORMAL

## 2025-04-28 PROCEDURE — 87040 BLOOD CULTURE FOR BACTERIA: CPT | Performed by: NURSE PRACTITIONER

## 2025-04-28 PROCEDURE — 81001 URINALYSIS AUTO W/SCOPE: CPT | Performed by: EMERGENCY MEDICINE

## 2025-04-28 PROCEDURE — 36415 COLL VENOUS BLD VENIPUNCTURE: CPT

## 2025-04-28 PROCEDURE — 80053 COMPREHEN METABOLIC PANEL: CPT | Performed by: EMERGENCY MEDICINE

## 2025-04-28 PROCEDURE — 99285 EMERGENCY DEPT VISIT HI MDM: CPT

## 2025-04-28 PROCEDURE — 85025 COMPLETE CBC W/AUTO DIFF WBC: CPT | Performed by: EMERGENCY MEDICINE

## 2025-04-28 PROCEDURE — 83735 ASSAY OF MAGNESIUM: CPT | Performed by: EMERGENCY MEDICINE

## 2025-04-28 PROCEDURE — 0202U NFCT DS 22 TRGT SARS-COV-2: CPT | Performed by: EMERGENCY MEDICINE

## 2025-04-28 PROCEDURE — 70450 CT HEAD/BRAIN W/O DYE: CPT

## 2025-04-28 PROCEDURE — 25810000003 SODIUM CHLORIDE 0.9 % SOLUTION: Performed by: NURSE PRACTITIONER

## 2025-04-28 PROCEDURE — 84145 PROCALCITONIN (PCT): CPT | Performed by: INTERNAL MEDICINE

## 2025-04-28 PROCEDURE — 71045 X-RAY EXAM CHEST 1 VIEW: CPT

## 2025-04-28 PROCEDURE — P9612 CATHETERIZE FOR URINE SPEC: HCPCS

## 2025-04-28 PROCEDURE — 25010000002 CEFTRIAXONE PER 250 MG: Performed by: NURSE PRACTITIONER

## 2025-04-28 PROCEDURE — 82948 REAGENT STRIP/BLOOD GLUCOSE: CPT | Performed by: EMERGENCY MEDICINE

## 2025-04-28 PROCEDURE — 82607 VITAMIN B-12: CPT | Performed by: INTERNAL MEDICINE

## 2025-04-28 PROCEDURE — 83605 ASSAY OF LACTIC ACID: CPT | Performed by: NURSE PRACTITIONER

## 2025-04-28 PROCEDURE — 84484 ASSAY OF TROPONIN QUANT: CPT | Performed by: EMERGENCY MEDICINE

## 2025-04-28 PROCEDURE — 82948 REAGENT STRIP/BLOOD GLUCOSE: CPT | Performed by: NURSE PRACTITIONER

## 2025-04-28 PROCEDURE — 93005 ELECTROCARDIOGRAM TRACING: CPT | Performed by: EMERGENCY MEDICINE

## 2025-04-28 RX ORDER — INSULIN LISPRO 100 [IU]/ML
8 INJECTION, SOLUTION INTRAVENOUS; SUBCUTANEOUS
Status: DISCONTINUED | OUTPATIENT
Start: 2025-04-29 | End: 2025-04-29

## 2025-04-28 RX ORDER — AMMONIUM LACTATE 12 G/100G
1 CREAM TOPICAL 3 TIMES DAILY
Status: DISCONTINUED | OUTPATIENT
Start: 2025-04-29 | End: 2025-04-29

## 2025-04-28 RX ORDER — NICOTINE POLACRILEX 4 MG
15 LOZENGE BUCCAL
Status: DISCONTINUED | OUTPATIENT
Start: 2025-04-28 | End: 2025-04-29 | Stop reason: SDUPTHER

## 2025-04-28 RX ORDER — FUROSEMIDE 20 MG/1
20 TABLET ORAL DAILY
Status: DISCONTINUED | OUTPATIENT
Start: 2025-04-29 | End: 2025-04-29

## 2025-04-28 RX ORDER — FUROSEMIDE 20 MG/1
20 TABLET ORAL DAILY
COMMUNITY

## 2025-04-28 RX ORDER — ONDANSETRON HYDROCHLORIDE 4 MG/5ML
8 SOLUTION ORAL EVERY 8 HOURS PRN
Status: DISCONTINUED | OUTPATIENT
Start: 2025-04-28 | End: 2025-05-02 | Stop reason: HOSPADM

## 2025-04-28 RX ORDER — SODIUM CHLORIDE 9 MG/ML
75 INJECTION, SOLUTION INTRAVENOUS CONTINUOUS
Status: DISCONTINUED | OUTPATIENT
Start: 2025-04-28 | End: 2025-04-29

## 2025-04-28 RX ORDER — DEXTROSE MONOHYDRATE 25 G/50ML
25 INJECTION, SOLUTION INTRAVENOUS
Status: DISCONTINUED | OUTPATIENT
Start: 2025-04-28 | End: 2025-05-02 | Stop reason: HOSPADM

## 2025-04-28 RX ORDER — MIDODRINE HYDROCHLORIDE 5 MG/1
10 TABLET ORAL EVERY 8 HOURS SCHEDULED
Status: DISCONTINUED | OUTPATIENT
Start: 2025-04-29 | End: 2025-04-29

## 2025-04-28 RX ORDER — INSULIN LISPRO 100 [IU]/ML
2-7 INJECTION, SOLUTION INTRAVENOUS; SUBCUTANEOUS
Status: DISCONTINUED | OUTPATIENT
Start: 2025-04-28 | End: 2025-05-02 | Stop reason: HOSPADM

## 2025-04-28 RX ORDER — SODIUM CHLORIDE 0.9 % (FLUSH) 0.9 %
10 SYRINGE (ML) INJECTION AS NEEDED
Status: DISCONTINUED | OUTPATIENT
Start: 2025-04-28 | End: 2025-05-02 | Stop reason: HOSPADM

## 2025-04-28 RX ORDER — GABAPENTIN 100 MG/1
100 CAPSULE ORAL EVERY 12 HOURS SCHEDULED
Status: DISCONTINUED | OUTPATIENT
Start: 2025-04-28 | End: 2025-05-02 | Stop reason: HOSPADM

## 2025-04-28 RX ORDER — IBUPROFEN 600 MG/1
1 TABLET ORAL
Status: DISCONTINUED | OUTPATIENT
Start: 2025-04-28 | End: 2025-04-29 | Stop reason: SDUPTHER

## 2025-04-28 RX ORDER — ASPIRIN 81 MG/1
81 TABLET ORAL DAILY
Status: DISCONTINUED | OUTPATIENT
Start: 2025-04-29 | End: 2025-05-02 | Stop reason: HOSPADM

## 2025-04-28 RX ORDER — ATORVASTATIN CALCIUM 40 MG/1
40 TABLET, FILM COATED ORAL DAILY
Status: DISCONTINUED | OUTPATIENT
Start: 2025-04-29 | End: 2025-05-02 | Stop reason: HOSPADM

## 2025-04-28 RX ORDER — LOSARTAN POTASSIUM 50 MG/1
50 TABLET ORAL DAILY
COMMUNITY
End: 2025-05-02 | Stop reason: HOSPADM

## 2025-04-28 RX ORDER — INSULIN GLARGINE 100 [IU]/ML
20 INJECTION, SOLUTION SUBCUTANEOUS NIGHTLY
Status: DISCONTINUED | OUTPATIENT
Start: 2025-04-28 | End: 2025-04-29

## 2025-04-28 RX ORDER — FERROUS SULFATE 325(65) MG
325 TABLET ORAL
Status: DISCONTINUED | OUTPATIENT
Start: 2025-04-29 | End: 2025-04-29

## 2025-04-28 RX ORDER — LOSARTAN POTASSIUM 50 MG/1
50 TABLET ORAL DAILY
Status: DISCONTINUED | OUTPATIENT
Start: 2025-04-29 | End: 2025-05-02 | Stop reason: HOSPADM

## 2025-04-28 RX ADMIN — SODIUM CHLORIDE 75 ML/HR: 9 INJECTION, SOLUTION INTRAVENOUS at 23:45

## 2025-04-28 RX ADMIN — CEFTRIAXONE SODIUM 1000 MG: 1 INJECTION, POWDER, FOR SOLUTION INTRAMUSCULAR; INTRAVENOUS at 23:48

## 2025-04-28 NOTE — LETTER
EMS Transport Request  [facility] [place], [place], [place], and [place] only   Patient Name: Vlad Guerrero : 1945   Weight:72.6 kg (160 lb 0.9 oz) Pick-up Location:  BLS/ALS: BLS/ALS: BLS   Insurance: [employer] MED[employer]ARE REPLACEMENT Auth End Date: 2025   Pre-Cert #: D/C Summary complete:    Destination: Other [facility]   Contact Precautions: None   Equipment (O2, Fluids, etc.): None   Arrive By Date/Time: 2025 1300 Stretcher/WC: Wheelchair   CM Requesting: Ene Cabrera RN Ext: [phone]   Notes/Medical Necessity: 160lbs, standby transfer, RA, A&O     ______________________________________________________________________    *Only 2 patient bags OR 1 carry-on size bag are permitted.  Wheelchairs and walkers CANNOT transported with the patient. Acknowledge: Yes

## 2025-04-28 NOTE — ED NOTES
"Pt. Is resident of [facility] on [facility] and was given insulin but pt. Did not eat dinner. States he currently gets chemo treatments for cancer but it's been 'awhile' since his last treatment. Pt. States, \"I went out, I dont remember what happened\".  EMS reports that pt. Was found confused and staff checked his BG and it was 44. EMS was called. Pt. States he doesn't remember anything until he showed up at the hospital. Seems confused with time/date. Reoriented.   "

## 2025-04-28 NOTE — Clinical Note
Level of Care: Med/Surg [1]   Admitting Physician: ISAURA SÁNCHEZ [[id number]]   Attending Physician: ISAURA SÁNCHEZ [[id number]]

## 2025-04-28 NOTE — ED PROVIDER NOTES
Subjective   History of Present Illness  Chief complaint: Patient is 79-year-old who presents for [facility].  He was here last night having hypoglycemia.  Apparently he had hypoglycemia again today.  I spoke with Daysi one of the nurses there.  This is an assisted living facility and typically he has been alert and oriented and takes care of his blood glucose.  He has not been walking around his phone or monitor.  He has been more confused all day through the day they have noted.  He was here confused last night with low blood glucose.  He was found to be at 1 point just prior to arrival unresponsive.  They state his heart rate has been all over the place today.  They gave him glucagon and then orange juice and he was still very lethargic.  EMS was called and he was transferred here to for further evaluation.  Per report he does have a history of colon cancer.  Patient denies this.  Patient does not provide good history.  He appears slow to answer questions.  He is oriented.  He states he has no history of colon cancer    Context:    Duration:    Timing:    Severity:    Associated Symptoms:        PCP:  LMP:      Review of Systems   Unable to perform ROS: Mental status change   Respiratory:  Negative for shortness of breath.    Cardiovascular:  Negative for chest pain.   Gastrointestinal:  Negative for abdominal pain.       Past Medical History:   Diagnosis Date    Anemia     Colon cancer 2022    colon    Diabetes mellitus     Hyperlipidemia     Hypertension     LBBB (left bundle branch block) 11/14/2024       Allergies   Allergen Reactions    Penicillins Rash       Past Surgical History:   Procedure Laterality Date    APPENDECTOMY      CARDIAC CATHETERIZATION      CARDIAC CATHETERIZATION N/A 11/14/2024    Procedure: Left Heart Cath, possible pci;  Surgeon: Mg Rahman MD;  Location: [facility] CATH INVASIVE LOCATION;  Service: Cardiovascular;  Laterality: N/A;    COLON RESECTION N/A 12/15/2022     Procedure: COLON RESECTION RIGHT;  Surgeon: Percy Davis MD;  Location: [facility] MAIN OR;  Service: General;  Laterality: N/A;    COLONOSCOPY N/A 2022    Procedure: COLONOSCOPY WITH BIOPSY AND POLYPECTOMY;  Surgeon: Billy Julien MD;  Location: [facility] ENDOSCOPY;  Service: Gastroenterology;  Laterality: N/A;  Impression:  1.  Large 4-5cm fulgurating circumferential ulcerated mass in the very proximal part of the ascending colon next to ileocecal valve multiple biopsies were performed.  This is highly concerning for colon malignancy.  2.  2 polyp rem    ENDOSCOPY N/A 2022    Procedure: ESOPHAGOGASTRODUODENOSCOPY with biopsy X1;  Surgeon: Billy Julien MD;  Location: [facility] ENDOSCOPY;  Service: Gastroenterology;  Laterality: N/A;  5.  Upper endoscopy lamination unremarkable.       PORTACATH PLACEMENT Right 2023    Procedure: INSERTION OF PORTACATH;  Surgeon: Percy Davis MD;  Location: [facility] MAIN OR;  Service: General;  Laterality: Right;    TONSILLECTOMY         Family History   Problem Relation Age of Onset    Pneumonia Mother 94        SARS-CoV2    Colon cancer Father 62    Heart disease Sister        Social History     Socioeconomic History    Marital status:    Tobacco Use    Smoking status: Former     Current packs/day: 0.00     Average packs/day: 1 pack/day for 2.0 years (2.0 ttl pk-yrs)     Types: Cigarettes     Start date:      Quit date:      Years since quittin.3    Smokeless tobacco: Never   Vaping Use    Vaping status: Never Used   Substance and Sexual Activity    Alcohol use: Never    Drug use: Never    Sexual activity: Defer           Objective   Physical Exam  Vitals and nursing note reviewed.   Cardiovascular:      Rate and Rhythm: Tachycardia present.   Pulmonary:      Effort: Pulmonary effort is normal.   Abdominal:      Palpations: Abdomen is soft.      Tenderness: There is no abdominal tenderness.   Musculoskeletal:         General:  Normal range of motion.      Cervical back: Normal range of motion and neck supple. No rigidity.   Skin:     General: Skin is warm and dry.   Neurological:      General: No focal deficit present.      Mental Status: He is alert.      Comments: Patient is oriented but slow to answer questions and cannot remember his history.         Procedures           ED Course                                           Results for orders placed or performed during the hospital encounter of 04/28/25   CBC Auto Differential    Collection Time: 04/28/25  6:16 PM    Specimen: Arm, Right; Blood   Result Value Ref Range    WBC 9.94 3.40 - 10.80 10*3/mm3    RBC 4.67 4.14 - 5.80 10*6/mm3    Hemoglobin 13.3 13.0 - 17.7 g/dL    Hematocrit 42.0 37.5 - 51.0 %    MCV 89.9 79.0 - 97.0 fL    MCH 28.5 26.6 - 33.0 pg    MCHC 31.7 31.5 - 35.7 g/dL    RDW 18.0 (H) 12.3 - 15.4 %    RDW-SD 59.2 (H) 37.0 - 54.0 fl    MPV 10.5 6.0 - 12.0 fL    Platelets 169 140 - 450 10*3/mm3    Neutrophil % 82.6 (H) 42.7 - 76.0 %    Lymphocyte % 8.9 (L) 19.6 - 45.3 %    Monocyte % 7.1 5.0 - 12.0 %    Eosinophil % 0.6 0.3 - 6.2 %    Basophil % 0.4 0.0 - 1.5 %    Immature Grans % 0.4 0.0 - 0.5 %    Neutrophils, Absolute 8.21 (H) 1.70 - 7.00 10*3/mm3    Lymphocytes, Absolute 0.88 0.70 - 3.10 10*3/mm3    Monocytes, Absolute 0.71 0.10 - 0.90 10*3/mm3    Eosinophils, Absolute 0.06 0.00 - 0.40 10*3/mm3    Basophils, Absolute 0.04 0.00 - 0.20 10*3/mm3    Immature Grans, Absolute 0.04 0.00 - 0.05 10*3/mm3    nRBC 0.0 0.0 - 0.2 /100 WBC   ECG 12 Lead Tachycardia    Collection Time: 04/28/25  6:16 PM   Result Value Ref Range    QT Interval 392 ms    QTC Interval 501 ms   Comprehensive Metabolic Panel    Collection Time: 04/28/25  6:34 PM    Specimen: Arm, Right; Blood   Result Value Ref Range    Glucose 98 65 - 99 mg/dL    BUN 29 (H) 8 - 23 mg/dL    Creatinine 0.91 0.76 - 1.27 mg/dL    Sodium 131 (L) 136 - 145 mmol/L    Potassium 4.7 3.5 - 5.2 mmol/L    Chloride 101 98 - 107  mmol/L    CO2 19.1 (L) 22.0 - 29.0 mmol/L    Calcium 9.2 8.6 - 10.5 mg/dL    Total Protein 7.8 6.0 - 8.5 g/dL    Albumin 3.7 3.5 - 5.2 g/dL    ALT (SGPT) 21 1 - 41 U/L    AST (SGOT) 47 (H) 1 - 40 U/L    Alkaline Phosphatase 171 (H) 39 - 117 U/L    Total Bilirubin 1.0 0.0 - 1.2 mg/dL    Globulin 4.1 gm/dL    A/G Ratio 0.9 g/dL    BUN/Creatinine Ratio 31.9 (H) 7.0 - 25.0    Anion Gap 10.9 5.0 - 15.0 mmol/L    eGFR 86.8 >60.0 mL/min/1.73   Magnesium    Collection Time: 04/28/25  6:34 PM    Specimen: Arm, Right; Blood   Result Value Ref Range    Magnesium 2.1 1.6 - 2.4 mg/dL   High Sensitivity Troponin T    Collection Time: 04/28/25  6:34 PM    Specimen: Arm, Right; Blood   Result Value Ref Range    HS Troponin T 50 (H) <22 ng/L   Lavender Top    Collection Time: 04/28/25  6:34 PM   Result Value Ref Range    Extra Tube hold for add-on    Gold Top - SST    Collection Time: 04/28/25  6:34 PM   Result Value Ref Range    Extra Tube Hold for add-ons.    Light Blue Top    Collection Time: 04/28/25  6:34 PM   Result Value Ref Range    Extra Tube Hold for add-ons.    Urinalysis With Culture If Indicated - Straight Cath    Collection Time: 04/28/25  7:19 PM    Specimen: Straight Cath; Urine   Result Value Ref Range    Color, UA Yellow Yellow, Straw    Appearance, UA Clear Clear    pH, UA <=5.0 5.0 - 8.0    Specific Gravity, UA 1.018 1.005 - 1.030    Glucose,  mg/dL (1+) (A) Negative    Ketones, UA Negative Negative    Bilirubin, UA Negative Negative    Blood, UA Trace (A) Negative    Protein,  mg/dL (2+) (A) Negative    Leuk Esterase, UA Trace (A) Negative    Nitrite, UA Negative Negative    Urobilinogen, UA 1.0 E.U./dL 0.2 - 1.0 E.U./dL   Urinalysis, Microscopic Only - Straight Cath    Collection Time: 04/28/25  7:19 PM    Specimen: Straight Cath; Urine   Result Value Ref Range    RBC, UA 6-10 (A) None Seen, 0-2 /HPF    WBC, UA 0-2 None Seen, 0-2 /HPF    Bacteria, UA None Seen None Seen /HPF    Squamous Epithelial  Cells, UA 0-2 None Seen, 0-2 /HPF    Hyaline Casts, UA 0-2 None Seen /LPF    Methodology Manual Light Microscopy          CT Head Without Contrast  Result Date: 4/28/2025  Impression: No acute intracranial pathology. Electronically Signed: Demarcus Tang MD  4/28/2025 6:06 PM EDT  Workstation ID: [id number]              Medical Decision Making  Patient was seen and evaluated for the above problem    Differential diagnosis includes but is not limited to intracerebral hemorrhage, brain metastasis, UTI, ACS,    Patient presented and after speaking with the [facility] had been found less responsive.  They gave him glucagon and orange juice.  He has been confused all day today and was here yesterday with hypoglycemia and confusion at that time.  His glucose level was in the 90s now.  EKG shows poor baseline and regular rhythm with nonspecific interventricular conduction delay rate of 98 prolonged QT rate of 501 ms.  Initial troponin is 50.  He denies chest pain.  His urinalysis shows no signs of infection.  He is confused to his history but oriented to time place and situation.  Family did come to bedside and they feel that he is not at his baseline and is confused as well.  Patient admits that he is just not been acting at his norm for the over the last 24 hours.  He will be admitted for further workup.  I spoke with Alysa on-call for the hospitalist who agrees to admit the patient.  On reevaluation patient is coughing significantly.  I did order chest x-ray and respiratory panel this point time.    Problems Addressed:  Altered mental status, unspecified altered mental status type: complicated acute illness or injury  Hypoglycemia: complicated acute illness or injury    Amount and/or Complexity of Data Reviewed  Labs: ordered. Decision-making details documented in ED Course.     Details: Labs reviewed by myself  Radiology: ordered and independent interpretation performed.     Details: CT head reviewed by myself as  above  ECG/medicine tests: ordered.    Risk  Prescription drug management.  Decision regarding hospitalization.        Final diagnoses:   None     Ams  Hypoglycemia    ED Disposition  ED Disposition       None            No follow-up provider specified.       Medication List      No changes were made to your prescriptions during this visit.            Rudy Gamez DO  04/28/25 2016

## 2025-04-28 NOTE — ED PROVIDER NOTES
Subjective   History of Present Illness  79-year-old male states that he took his usual medication today but did not eat.  He states that he had less appetite than usual.  He reports no nausea vomiting or diarrhea.  He reports no fever or chills.  The patient received oral glucose with some improvement en route to the hospital where the patient also received D50 1 amp IV the patient states that he feels much better now and actually has an appetite.  He reports no focal neurologic defects or lateralizing neurologic sign      Review of Systems   Constitutional:  Negative for chills and fever.   Gastrointestinal:  Negative for diarrhea, nausea and vomiting.   Endocrine: Negative for polydipsia, polyphagia and polyuria.   Neurological:  Positive for light-headedness.   Hematological:  Does not bruise/bleed easily.       Past Medical History:   Diagnosis Date    Anemia     Colon cancer 2022    colon    Diabetes mellitus     Hyperlipidemia     Hypertension     LBBB (left bundle branch block) 11/14/2024       Allergies   Allergen Reactions    Penicillins Rash       Past Surgical History:   Procedure Laterality Date    APPENDECTOMY      CARDIAC CATHETERIZATION      CARDIAC CATHETERIZATION N/A 11/14/2024    Procedure: Left Heart Cath, possible pci;  Surgeon: Mg Rahman MD;  Location: [facility] CATH INVASIVE LOCATION;  Service: Cardiovascular;  Laterality: N/A;    COLON RESECTION N/A 12/15/2022    Procedure: COLON RESECTION RIGHT;  Surgeon: Percy Davis MD;  Location: [facility] MAIN OR;  Service: General;  Laterality: N/A;    COLONOSCOPY N/A 11/11/2022    Procedure: COLONOSCOPY WITH BIOPSY AND POLYPECTOMY;  Surgeon: Billy Julien MD;  Location: [facility] ENDOSCOPY;  Service: Gastroenterology;  Laterality: N/A;  Impression:  1.  Large 4-5cm fulgurating circumferential ulcerated mass in the very proximal part of the ascending colon next to ileocecal valve multiple biopsies were performed.  This is highly  concerning for colon malignancy.  2.  2 polyp rem    ENDOSCOPY N/A 2022    Procedure: ESOPHAGOGASTRODUODENOSCOPY with biopsy X1;  Surgeon: Billy Julien MD;  Location: [facility] ENDOSCOPY;  Service: Gastroenterology;  Laterality: N/A;  5.  Upper endoscopy lamination unremarkable.       PORTACATH PLACEMENT Right 2023    Procedure: INSERTION OF PORTACATH;  Surgeon: Percy Davis MD;  Location: [facility] MAIN OR;  Service: General;  Laterality: Right;    TONSILLECTOMY         Family History   Problem Relation Age of Onset    Pneumonia Mother 94        SARS-CoV2    Colon cancer Father 62    Heart disease Sister        Social History     Socioeconomic History    Marital status:    Tobacco Use    Smoking status: Former     Current packs/day: 0.00     Average packs/day: 1 pack/day for 2.0 years (2.0 ttl pk-yrs)     Types: Cigarettes     Start date:      Quit date:      Years since quittin.3    Smokeless tobacco: Never   Vaping Use    Vaping status: Never Used   Substance and Sexual Activity    Alcohol use: Never    Drug use: Never    Sexual activity: Defer           Objective   Physical Exam  Alert Armani Coma Scale 15   HEENT: Pupils equal and reactive to light. Conjunctivae are not injected. Normal tympanic membranes. Oropharynx and nares are normal.   Neck: Supple. Midline trachea. No JVD. No goiter.   Chest: Clear and equal breath sounds bilaterally, regular rate and rhythm without murmur or rub.   Abdomen: Positive bowel sounds, nontender, nondistended. No rebound or peritoneal signs. No CVA tenderness.   Extremities no clubbing. cyanosis or edema. Motor sensory exam is normal. The full range of motion is intact   Skin: Warm and dry, no rashes or petechia.  Areas of skin breakdown are under treatment with dressing applied   lymphatic: No regional lymphadenopathy. No calf pain, swelling or Homans sign    Procedures           ED Course        Labs Reviewed   POCT GLUCOSE  FINGERSTICK - Abnormal; Notable for the following components:       Result Value    Glucose 122 (*)     All other components within normal limits   POCT CHEM 8 - Abnormal; Notable for the following components:    Glucose 137 (*)     All other components within normal limits   POCT GLUCOSE FINGERSTICK - Abnormal; Notable for the following components:    Glucose 210 (*)     All other components within normal limits     Medications - No data to display  No radiology results for the last day                                                 Medical Decision Making  Follow-up glucose was 207.  The patient was released to return to his assisted living center area the patient caretakers are encouraged to contact his primary care provider in the morning for additional evaluation.  The patient was stable at discharge and vocalized understanding of discharge instructions worn    Amount and/or Complexity of Data Reviewed  Independent Historian: EMS  Labs: ordered. Decision-making details documented in ED Course.    Risk  OTC drugs.        Final diagnoses:   Hypoglycemic episode in patient with diabetes mellitus       ED Disposition  ED Disposition       ED Disposition   Discharge    Condition   Stable    Comment   --               Karsten Ferrari MD  [address] [place] [address] [place] [id number] [phone]               Medication List      No changes were made to your prescriptions during this visit.            Preston Ch MD  04/27/25 [id number]

## 2025-04-28 NOTE — DISCHARGE INSTRUCTIONS
Continue current diet and medication  Try to eat regularly and consistently  Call primary care provider in the morning for additional instruction

## 2025-04-29 ENCOUNTER — APPOINTMENT (OUTPATIENT)
Dept: MRI IMAGING | Facility: HOSPITAL | Age: 80
End: 2025-04-29
Payer: MEDICARE

## 2025-04-29 LAB
AMMONIA BLD-SCNC: 16 UMOL/L (ref 16–60)
ANION GAP SERPL CALCULATED.3IONS-SCNC: 13.1 MMOL/L (ref 5–15)
BASOPHILS # BLD AUTO: 0.03 10*3/MM3 (ref 0–0.2)
BASOPHILS NFR BLD AUTO: 0.3 % (ref 0–1.5)
BUN SERPL-MCNC: 28 MG/DL (ref 8–23)
BUN/CREAT SERPL: 29.5 (ref 7–25)
CALCIUM SPEC-SCNC: 9 MG/DL (ref 8.6–10.5)
CHLORIDE SERPL-SCNC: 103 MMOL/L (ref 98–107)
CO2 SERPL-SCNC: 21.9 MMOL/L (ref 22–29)
CREAT SERPL-MCNC: 0.95 MG/DL (ref 0.76–1.27)
D-LACTATE SERPL-SCNC: 1.9 MMOL/L (ref 0.5–2)
D-LACTATE SERPL-SCNC: 2.5 MMOL/L (ref 0.5–2)
DEPRECATED RDW RBC AUTO: 60.5 FL (ref 37–54)
EGFRCR SERPLBLD CKD-EPI 2021: 82.4 ML/MIN/1.73
EOSINOPHIL # BLD AUTO: 0 10*3/MM3 (ref 0–0.4)
EOSINOPHIL NFR BLD AUTO: 0 % (ref 0.3–6.2)
ERYTHROCYTE [DISTWIDTH] IN BLOOD BY AUTOMATED COUNT: 18.4 % (ref 12.3–15.4)
GLUCOSE BLDC GLUCOMTR-MCNC: 120 MG/DL (ref 70–105)
GLUCOSE BLDC GLUCOMTR-MCNC: 127 MG/DL (ref 70–105)
GLUCOSE BLDC GLUCOMTR-MCNC: 172 MG/DL (ref 70–105)
GLUCOSE BLDC GLUCOMTR-MCNC: 176 MG/DL (ref 70–105)
GLUCOSE BLDC GLUCOMTR-MCNC: 299 MG/DL (ref 70–105)
GLUCOSE SERPL-MCNC: 118 MG/DL (ref 65–99)
HCT VFR BLD AUTO: 41.7 % (ref 37.5–51)
HGB BLD-MCNC: 13 G/DL (ref 13–17.7)
HOLD SPECIMEN: NORMAL
IMM GRANULOCYTES # BLD AUTO: 0.04 10*3/MM3 (ref 0–0.05)
IMM GRANULOCYTES NFR BLD AUTO: 0.4 % (ref 0–0.5)
LYMPHOCYTES # BLD AUTO: 0.91 10*3/MM3 (ref 0.7–3.1)
LYMPHOCYTES NFR BLD AUTO: 9.8 % (ref 19.6–45.3)
MCH RBC QN AUTO: 28.1 PG (ref 26.6–33)
MCHC RBC AUTO-ENTMCNC: 31.2 G/DL (ref 31.5–35.7)
MCV RBC AUTO: 90.3 FL (ref 79–97)
MONOCYTES # BLD AUTO: 0.97 10*3/MM3 (ref 0.1–0.9)
MONOCYTES NFR BLD AUTO: 10.5 % (ref 5–12)
NEUTROPHILS NFR BLD AUTO: 7.31 10*3/MM3 (ref 1.7–7)
NEUTROPHILS NFR BLD AUTO: 79 % (ref 42.7–76)
NRBC BLD AUTO-RTO: 0 /100 WBC (ref 0–0.2)
NT-PROBNP SERPL-MCNC: ABNORMAL PG/ML (ref 0–1800)
PLATELET # BLD AUTO: 149 10*3/MM3 (ref 140–450)
PMV BLD AUTO: 9.9 FL (ref 6–12)
POTASSIUM SERPL-SCNC: 5.2 MMOL/L (ref 3.5–5.2)
QT INTERVAL: 392 MS
QTC INTERVAL: 501 MS
RBC # BLD AUTO: 4.62 10*6/MM3 (ref 4.14–5.8)
SODIUM SERPL-SCNC: 138 MMOL/L (ref 136–145)
TSH SERPL DL<=0.05 MIU/L-ACNC: 1.06 UIU/ML (ref 0.27–4.2)
WBC NRBC COR # BLD AUTO: 9.26 10*3/MM3 (ref 3.4–10.8)

## 2025-04-29 PROCEDURE — 99221 1ST HOSP IP/OBS SF/LOW 40: CPT | Performed by: INTERNAL MEDICINE

## 2025-04-29 PROCEDURE — 63710000001 INSULIN LISPRO (HUMAN) PER 5 UNITS: Performed by: NURSE PRACTITIONER

## 2025-04-29 PROCEDURE — 25010000002 FUROSEMIDE PER 20 MG: Performed by: STUDENT IN AN ORGANIZED HEALTH CARE EDUCATION/TRAINING PROGRAM

## 2025-04-29 PROCEDURE — 70553 MRI BRAIN STEM W/O & W/DYE: CPT

## 2025-04-29 PROCEDURE — 84443 ASSAY THYROID STIM HORMONE: CPT | Performed by: INTERNAL MEDICINE

## 2025-04-29 PROCEDURE — 82948 REAGENT STRIP/BLOOD GLUCOSE: CPT

## 2025-04-29 PROCEDURE — 82140 ASSAY OF AMMONIA: CPT | Performed by: NURSE PRACTITIONER

## 2025-04-29 PROCEDURE — 83605 ASSAY OF LACTIC ACID: CPT | Performed by: NURSE PRACTITIONER

## 2025-04-29 PROCEDURE — 25010000002 CEFTRIAXONE PER 250 MG: Performed by: INTERNAL MEDICINE

## 2025-04-29 PROCEDURE — 25010000002 GADOTERIDOL PER 1 ML: Performed by: INTERNAL MEDICINE

## 2025-04-29 PROCEDURE — 83880 ASSAY OF NATRIURETIC PEPTIDE: CPT | Performed by: INTERNAL MEDICINE

## 2025-04-29 PROCEDURE — 25010000002 DIAZEPAM PER 5 MG: Performed by: INTERNAL MEDICINE

## 2025-04-29 PROCEDURE — 94761 N-INVAS EAR/PLS OXIMETRY MLT: CPT

## 2025-04-29 PROCEDURE — 85025 COMPLETE CBC W/AUTO DIFF WBC: CPT | Performed by: NURSE PRACTITIONER

## 2025-04-29 PROCEDURE — 80048 BASIC METABOLIC PNL TOTAL CA: CPT | Performed by: NURSE PRACTITIONER

## 2025-04-29 PROCEDURE — A9579 GAD-BASE MR CONTRAST NOS,1ML: HCPCS | Performed by: INTERNAL MEDICINE

## 2025-04-29 PROCEDURE — 82948 REAGENT STRIP/BLOOD GLUCOSE: CPT | Performed by: STUDENT IN AN ORGANIZED HEALTH CARE EDUCATION/TRAINING PROGRAM

## 2025-04-29 PROCEDURE — 25010000002 DOXYCYCLINE 100 MG RECONSTITUTED SOLUTION 1 EACH VIAL: Performed by: STUDENT IN AN ORGANIZED HEALTH CARE EDUCATION/TRAINING PROGRAM

## 2025-04-29 PROCEDURE — 94799 UNLISTED PULMONARY SVC/PX: CPT

## 2025-04-29 PROCEDURE — 97162 PT EVAL MOD COMPLEX 30 MIN: CPT

## 2025-04-29 PROCEDURE — 94640 AIRWAY INHALATION TREATMENT: CPT

## 2025-04-29 RX ORDER — FUROSEMIDE 10 MG/ML
20 INJECTION INTRAMUSCULAR; INTRAVENOUS ONCE
Status: DISCONTINUED | OUTPATIENT
Start: 2025-04-29 | End: 2025-04-29

## 2025-04-29 RX ORDER — IBUPROFEN 600 MG/1
1 TABLET ORAL
Status: DISCONTINUED | OUTPATIENT
Start: 2025-04-29 | End: 2025-05-02 | Stop reason: HOSPADM

## 2025-04-29 RX ORDER — FUROSEMIDE 10 MG/ML
40 INJECTION INTRAMUSCULAR; INTRAVENOUS ONCE
Status: COMPLETED | OUTPATIENT
Start: 2025-04-29 | End: 2025-04-29

## 2025-04-29 RX ORDER — NICOTINE POLACRILEX 4 MG
15 LOZENGE BUCCAL
Status: DISCONTINUED | OUTPATIENT
Start: 2025-04-29 | End: 2025-05-02 | Stop reason: HOSPADM

## 2025-04-29 RX ORDER — BUDESONIDE 0.5 MG/2ML
0.5 INHALANT ORAL
Status: DISCONTINUED | OUTPATIENT
Start: 2025-04-29 | End: 2025-05-02 | Stop reason: HOSPADM

## 2025-04-29 RX ORDER — MIDODRINE HYDROCHLORIDE 5 MG/1
10 TABLET ORAL EVERY 8 HOURS SCHEDULED
Status: DISCONTINUED | OUTPATIENT
Start: 2025-04-29 | End: 2025-05-02 | Stop reason: HOSPADM

## 2025-04-29 RX ORDER — DIAZEPAM 10 MG/2ML
5 INJECTION, SOLUTION INTRAMUSCULAR; INTRAVENOUS ONCE
Status: COMPLETED | OUTPATIENT
Start: 2025-04-29 | End: 2025-04-29

## 2025-04-29 RX ORDER — AMMONIUM LACTATE 12 G/100G
1 CREAM TOPICAL 2 TIMES DAILY PRN
Status: DISCONTINUED | OUTPATIENT
Start: 2025-04-29 | End: 2025-04-29

## 2025-04-29 RX ORDER — DEXTROSE MONOHYDRATE 25 G/50ML
25 INJECTION, SOLUTION INTRAVENOUS
Status: DISCONTINUED | OUTPATIENT
Start: 2025-04-29 | End: 2025-04-29 | Stop reason: SDUPTHER

## 2025-04-29 RX ORDER — INSULIN LISPRO 100 [IU]/ML
2-7 INJECTION, SOLUTION INTRAVENOUS; SUBCUTANEOUS
Status: DISCONTINUED | OUTPATIENT
Start: 2025-04-29 | End: 2025-04-29

## 2025-04-29 RX ADMIN — IPRATROPIUM BROMIDE 0.5 MG: 0.5 SOLUTION RESPIRATORY (INHALATION) at 15:07

## 2025-04-29 RX ADMIN — DIAZEPAM 5 MG: 10 INJECTION, SOLUTION INTRAMUSCULAR; INTRAVENOUS at 10:35

## 2025-04-29 RX ADMIN — IPRATROPIUM BROMIDE 0.5 MG: 0.5 SOLUTION RESPIRATORY (INHALATION) at 08:55

## 2025-04-29 RX ADMIN — INSULIN LISPRO 4 UNITS: 100 INJECTION, SOLUTION INTRAVENOUS; SUBCUTANEOUS at 21:02

## 2025-04-29 RX ADMIN — CEFTRIAXONE SODIUM 2000 MG: 2 INJECTION, POWDER, FOR SOLUTION INTRAMUSCULAR; INTRAVENOUS at 21:02

## 2025-04-29 RX ADMIN — DOXYCYCLINE 100 MG: 100 INJECTION, POWDER, LYOPHILIZED, FOR SOLUTION INTRAVENOUS at 15:36

## 2025-04-29 RX ADMIN — IPRATROPIUM BROMIDE 0.5 MG: 0.5 SOLUTION RESPIRATORY (INHALATION) at 12:13

## 2025-04-29 RX ADMIN — GADOTERIDOL 15 ML: 279.3 INJECTION, SOLUTION INTRAVENOUS at 11:08

## 2025-04-29 RX ADMIN — MIDODRINE HYDROCHLORIDE 10 MG: 5 TABLET ORAL at 21:02

## 2025-04-29 RX ADMIN — FUROSEMIDE 40 MG: 10 INJECTION, SOLUTION INTRAMUSCULAR; INTRAVENOUS at 05:53

## 2025-04-29 RX ADMIN — DOXYCYCLINE 100 MG: 100 INJECTION, POWDER, LYOPHILIZED, FOR SOLUTION INTRAVENOUS at 02:09

## 2025-04-29 RX ADMIN — MIDODRINE HYDROCHLORIDE 10 MG: 5 TABLET ORAL at 05:40

## 2025-04-29 RX ADMIN — BUDESONIDE 0.5 MG: 0.5 INHALANT RESPIRATORY (INHALATION) at 09:00

## 2025-04-29 NOTE — H&P
"[facility] Medicine Services  History & Physical    Patient Name: Vlad Guerrero  : 1945  MRN: [id number]  Primary Care Physician:  Karsten Ferrari MD  Date of admission: 2025  Date and Time of Service: 2025 at     Subjective      Chief Complaint: confusion    History of Present Illness: Vlad Guerrero is a 79 y.o. male  resident of [facility] with a CMH of colon cancer with metastasis to the liver, history of a stroke, diabetes mellitus type 2, hypertension, left bundle branch block, combined systolic and diastolic heart failure,  echo in 2025 showed ejection fraction of 21 to 25%, who presented to [facility] on 2025 with complaints  from facility staff of increased confusion and rapid heart rate. He was seen here yesterday in the emergency department was also confused but he had a low glucose at that time.  He was discharged from the emergency department.  Per ED provider report from the facility said he had been confused all day today, he had a low blood glucose level last night was given some orange juice.he is awake and oriented to self but uncooperative in answering questions. He denies any pain, shortness of air, headache, nausea. He reports he wants to eat. Information primarily obtained by son at bedside who states famly feels he is getting dementia but he has not been evaluated. [facility] not [facility] with him so he does not mention any problems. Son also concerned he does not eat and forgets to eat or drink his nutrition supplements and is getting progressively weaker, Son reports he believes he needs placement in a facility with a higher level of care.     Labs here show a troponin of 50 and then 54, sodium 131,cbc unremarkable, urinalysis negative for infection, CXR per radiology shows \"findings highly concerning for developing multifocal atypical pneumonia superimposed pulmonary edema is not completely excluded\". stable on " room air, he is afebrile WBC not elevated.  Blood cultures were subsequently ordered and he was placed on 1 g IV ceftriaxone empirically.  EKG image below, appears to be a regular rhythm with previously seen left bundle branch block.  Troponins are mildly elevated at 5054 were 47 on previous admission likely secondary to ischemic demand from increased rate upon admission.  Troponin T delta percentage was 8.  Ammonia ordered and pending.  CT head without contrast per radiology shows no acute intracranial pathology.  He has no slurred speech, facial droop or focal deficits.  Given past medical history of colon cancer with metastasis to the liver MRI brain ordered and pending.  Last MRI brain 4/26/2024 was negative for metastatic disease.case management has been consulted as well as PT eval and treat. Dietary consult ordered. He will be admitted for further evaluation and treatment     Review of Systems   Unable to perform ROS: Mental status change   Constitutional:         Poor po intake    All other systems reviewed and are negative.      Personal History     Past Medical History:   Diagnosis Date    Anemia     Colon cancer 2022    colon    Diabetes mellitus     Hyperlipidemia     Hypertension     LBBB (left bundle branch block) 11/14/2024       Past Surgical History:   Procedure Laterality Date    APPENDECTOMY      CARDIAC CATHETERIZATION      CARDIAC CATHETERIZATION N/A 11/14/2024    Procedure: Left Heart Cath, possible pci;  Surgeon: Mg Rahman MD;  Location: [facility] CATH INVASIVE LOCATION;  Service: Cardiovascular;  Laterality: N/A;    COLON RESECTION N/A 12/15/2022    Procedure: COLON RESECTION RIGHT;  Surgeon: Percy Davis MD;  Location: [facility] MAIN OR;  Service: General;  Laterality: N/A;    COLONOSCOPY N/A 11/11/2022    Procedure: COLONOSCOPY WITH BIOPSY AND POLYPECTOMY;  Surgeon: Billy Julien MD;  Location: [facility] ENDOSCOPY;  Service: Gastroenterology;  Laterality: N/A;   Impression:  1.  Large 4-5cm fulgurating circumferential ulcerated mass in the very proximal part of the ascending colon next to ileocecal valve multiple biopsies were performed.  This is highly concerning for colon malignancy.  2.  2 polyp rem    ENDOSCOPY N/A 11/11/2022    Procedure: ESOPHAGOGASTRODUODENOSCOPY with biopsy X1;  Surgeon: Billy Julien MD;  Location: [facility] ENDOSCOPY;  Service: Gastroenterology;  Laterality: N/A;  5.  Upper endoscopy lamination unremarkable.       PORTACATH PLACEMENT Right 1/12/2023    Procedure: INSERTION OF PORTACATH;  Surgeon: Percy Davis MD;  Location: [facility] MAIN OR;  Service: General;  Laterality: Right;    TONSILLECTOMY         Family History: family history includes Colon cancer (age of onset: 62) in his father; Heart disease in his sister; Pneumonia (age of onset: 94) in his mother. Otherwise pertinent FHx was reviewed and not pertinent to current issue.    Social History:  reports that he quit smoking about 59 years ago. His smoking use included cigarettes. He started smoking about 61 years ago. He has a 2 pack-year smoking history. He has never used smokeless tobacco. He reports that he does not drink alcohol and does not use drugs.    Home Medications:  Prior to Admission Medications       Prescriptions Last Dose Informant Patient Reported? Taking?    ammonium lactate (AMLACTIN) 12 % cream   Yes Yes    Apply 1 g topically to the appropriate area as directed 3 times a day.    aspirin 81 MG EC tablet   Yes Yes    Take 1 tablet by mouth Daily.    atorvastatin (LIPITOR) 40 MG tablet   Yes Yes    Take 1 tablet by mouth Daily.    ferrous gluconate (FERGON) 324 MG tablet   Yes Yes    Take 1 tablet by mouth Daily With Breakfast.    furosemide (LASIX) 20 MG tablet   Yes Yes    Take 1 tablet by mouth Daily.    gabapentin (NEURONTIN) 100 MG capsule   Yes Yes    Take 1 capsule by mouth Every 12 (Twelve) Hours.    insulin aspart (novoLOG FLEXPEN) 100 UNIT/ML solution  pen-injector sc pen   Yes Yes    Inject 8 Units under the skin into the appropriate area as directed 3 (Three) Times a Day With Meals.    insulin glargine (LANTUS, SEMGLEE) 100 UNIT/ML injection   Yes Yes    Inject 20 Units under the skin into the appropriate area as directed Every Night.    losartan (COZAAR) 50 MG tablet   Yes Yes    Take 1 tablet by mouth Daily.    Melatonin 3 MG tablet   Yes Yes    Take 1 tablet by mouth Every Night.    metFORMIN (GLUCOPHAGE) 1000 MG tablet   Yes Yes    Take 1 tablet by mouth 2 (Two) Times a Day With Meals.    midodrine (PROAMATINE) 10 MG tablet   No Yes    Take 1 tablet by mouth 3 (Three) Times a Day Before Meals.    ondansetron (ZOFRAN) 8 MG tablet   Yes Yes    Take 1 tablet by mouth Every 8 (Eight) Hours As Needed for Nausea or Vomiting.              Allergies:  Allergies   Allergen Reactions    Penicillins Rash       Objective      Vitals:   Temp:  [97.3 °F (36.3 °C)-98.9 °F (37.2 °C)] 98.9 °F (37.2 °C)  Heart Rate:  [] 109  Resp:  [15-19] 19  BP: (111-126)/(78-82) 117/82  Body mass index is 21.12 kg/m².  Physical Exam  Vitals reviewed.   Constitutional:       Comments: Frail appearing    HENT:      Head: Normocephalic and atraumatic.      Right Ear: External ear normal.      Left Ear: External ear normal.      Nose: Nose normal.      Mouth/Throat:      Mouth: Mucous membranes are dry.   Eyes:      General: No scleral icterus.     Extraocular Movements: Extraocular movements intact.      Conjunctiva/sclera: Conjunctivae normal.   Cardiovascular:      Rate and Rhythm: Normal rate and regular rhythm.      Heart sounds: Normal heart sounds.   Pulmonary:      Effort: Pulmonary effort is normal.      Breath sounds: Normal breath sounds.   Abdominal:      General: Bowel sounds are normal.      Palpations: Abdomen is soft.   Genitourinary:     Comments: deferred  Musculoskeletal:         General: Normal range of motion.      Cervical back: Normal range of motion and neck  supple.   Skin:     General: Skin is warm and dry.      Coloration: Skin is pale.   Neurological:      General: No focal deficit present.      Comments: Oriented to person    Psychiatric:      Comments: Confused, uncooperative with answering questions          Diagnostic Data:      Lab Results (last 24 hours)       Procedure Component Value Units Date/Time    POC Glucose 4x Daily Before Meals & at Bedtime [[id number]]  (Normal) Collected: 04/28/25 2232    Specimen: Blood Updated: 04/28/25 2233     Glucose 100 mg/dL      Comment: Serial Number: [id number]:  [id number]       Respiratory Panel PCR w/COVID-19(SARS-CoV-2) JODY/TICO/KG/PAD/COR/OZ In-House, NP Swab in UTM/VTM, 2 HR TAT - Swab, Nasopharynx [[id number]]  (Normal) Collected: 04/28/25 2057    Specimen: Swab from Nasopharynx Updated: 04/28/25 2149     ADENOVIRUS, PCR Not Detected     Coronavirus 229E Not Detected     Coronavirus HKU1 Not Detected     Coronavirus NL63 Not Detected     Coronavirus OC43 Not Detected     COVID19 Not Detected     Human Metapneumovirus Not Detected     Human Rhinovirus/Enterovirus Not Detected     Influenza A PCR Not Detected     Influenza B PCR Not Detected     Parainfluenza Virus 1 Not Detected     Parainfluenza Virus 2 Not Detected     Parainfluenza Virus 3 Not Detected     Parainfluenza Virus 4 Not Detected     RSV, PCR Not Detected     Bordetella pertussis pcr Not Detected     Bordetella parapertussis PCR Not Detected     Chlamydophila pneumoniae PCR Not Detected     Mycoplasma pneumo by PCR Not Detected    Narrative:      In the setting of a positive respiratory panel with a viral infection PLUS a negative procalcitonin without other underlying concern for bacterial infection, consider observing off antibiotics or discontinuation of antibiotics and continue supportive care. If the respiratory panel is positive for atypical bacterial infection (Bordetella pertussis, Chlamydophila pneumoniae, or Mycoplasma pneumoniae),  consider antibiotic de-escalation to target atypical bacterial infection.    Ammonia [[id number]] Updated: 04/28/25 2124    Specimen: Blood from Arm, Left     POC Glucose STAT [[id number]]  (Normal) Collected: 04/28/25 2101    Specimen: Blood Updated: 04/28/25 2102     Glucose 100 mg/dL      Comment: Serial Number: [id number]:  [id number]       High Sensitivity Troponin T 1Hr [[id number]]  (Abnormal) Collected: 04/28/25 2002    Specimen: Blood from Arm, Left Updated: 04/28/25 2035     HS Troponin T 54 ng/L      Troponin T Numeric Delta 4 ng/L      Troponin T % Delta 8    Narrative:      High Sensitive Troponin T Reference Range:  <14.0 ng/L- Negative Female for AMI  <22.0 ng/L- Negative Male for AMI  >=14 - Abnormal Female indicating possible myocardial injury.  >=22 - Abnormal Male indicating possible myocardial injury.   Clinicians would have to utilize clinical acumen, EKG, Troponin, and serial changes to determine if it is an Acute Myocardial Infarction or myocardial injury due to an underlying chronic condition.         Urinalysis, Microscopic Only - Straight Cath [[id number]]  (Abnormal) Collected: 04/28/25 1919    Specimen: Urine from Straight Cath Updated: 04/28/25 1949     RBC, UA 6-10 /HPF      WBC, UA 0-2 /HPF      Comment: Urine culture not indicated.        Bacteria, UA None Seen /HPF      Squamous Epithelial Cells, UA 0-2 /HPF      Hyaline Casts, UA 0-2 /LPF      Methodology Manual Light Microscopy    Urinalysis With Culture If Indicated - Straight Cath [[id number]]  (Abnormal) Collected: 04/28/25 1919    Specimen: Urine from Straight Cath Updated: 04/28/25 1935     Color, UA Yellow     Appearance, UA Clear     pH, UA <=5.0     Specific Gravity, UA 1.018     Glucose,  mg/dL (1+)     Ketones, UA Negative     Bilirubin, UA Negative     Blood, UA Trace     Protein,  mg/dL (2+)     Leuk Esterase, UA Trace     Nitrite, UA Negative     Urobilinogen, UA 1.0 E.U./dL    Narrative:      In absence  of clinical symptoms, the presence of pyuria, bacteria, and/or nitrites on the urinalysis result does not correlate with infection.    Comprehensive Metabolic Panel [[id number]]  (Abnormal) Collected: 04/28/25 1834    Specimen: Blood from Arm, Right Updated: 04/28/25 1921     Glucose 98 mg/dL      BUN 29 mg/dL      Creatinine 0.91 mg/dL      Sodium 131 mmol/L      Potassium 4.7 mmol/L      Chloride 101 mmol/L      CO2 19.1 mmol/L      Calcium 9.2 mg/dL      Total Protein 7.8 g/dL      Albumin 3.7 g/dL      ALT (SGPT) 21 U/L      AST (SGOT) 47 U/L      Alkaline Phosphatase 171 U/L      Total Bilirubin 1.0 mg/dL      Globulin 4.1 gm/dL      A/G Ratio 0.9 g/dL      BUN/Creatinine Ratio 31.9     Anion Gap 10.9 mmol/L      eGFR 86.8 mL/min/1.73     Narrative:      GFR Categories in Chronic Kidney Disease (CKD)      GFR Category          GFR (mL/min/1.73)    Interpretation  G1                     90 or greater         Normal or high (1)  G2                      60-89                Mild decrease (1)  G3a                   45-59                Mild to moderate decrease  G3b                   30-44                Moderate to severe decrease  G4                    15-29                Severe decrease  G5                    14 or less           Kidney failure          (1)In the absence of evidence of kidney disease, neither GFR category G1 or G2 fulfill the criteria for CKD.    eGFR calculation 2021 CKD-EPI creatinine equation, which does not include race as a factor    Magnesium [[id number]]  (Normal) Collected: 04/28/25 1834    Specimen: Blood from Arm, Right Updated: 04/28/25 1921     Magnesium 2.1 mg/dL     High Sensitivity Troponin T [[id number]]  (Abnormal) Collected: 04/28/25 1834    Specimen: Blood from Arm, Right Updated: 04/28/25 1919     HS Troponin T 50 ng/L     Narrative:      High Sensitive Troponin T Reference Range:  <14.0 ng/L- Negative Female for AMI  <22.0 ng/L- Negative Male for AMI  >=14 - Abnormal Female  indicating possible myocardial injury.  >=22 - Abnormal Male indicating possible myocardial injury.   Clinicians would have to utilize clinical acumen, EKG, Troponin, and serial changes to determine if it is an Acute Myocardial Infarction or myocardial injury due to an underlying chronic condition.         Extra Tubes [[id number]] Collected: 04/28/25 1834    Specimen: Blood from Arm, Right Updated: 04/28/25 1845    Narrative:      The following orders were created for panel order Extra Tubes.  Procedure                               Abnormality         Status                     ---------                               -----------         ------                     Lavender Top[[id number]]                                     Final result               Gold Top - SST[[id number]]                                   Final result               Light Blue Top[[id number]]                                   Final result                 Please view results for these tests on the individual orders.    Lavender Top [[id number]] Collected: 04/28/25 1834    Specimen: Blood from Arm, Right Updated: 04/28/25 1845     Extra Tube hold for add-on     Comment: Auto resulted       Gold Top - SST [[id number]] Collected: 04/28/25 1834    Specimen: Blood from Arm, Right Updated: 04/28/25 1845     Extra Tube Hold for add-ons.     Comment: Auto resulted.       Light Blue Top [[id number]] Collected: 04/28/25 1834    Specimen: Blood from Arm, Right Updated: 04/28/25 1845     Extra Tube Hold for add-ons.     Comment: Auto resulted       CBC & Differential [[id number]]  (Abnormal) Collected: 04/28/25 1816    Specimen: Blood from Arm, Right Updated: 04/28/25 1822    Narrative:      The following orders were created for panel order CBC & Differential.  Procedure                               Abnormality         Status                     ---------                               -----------         ------                     CBC Auto Differential[[id number]]         Abnormal            Final result                 Please view results for these tests on the individual orders.    CBC Auto Differential [[id number]]  (Abnormal) Collected: 04/28/25 1816    Specimen: Blood from Arm, Right Updated: 04/28/25 1822     WBC 9.94 10*3/mm3      RBC 4.67 10*6/mm3      Hemoglobin 13.3 g/dL      Hematocrit 42.0 %      MCV 89.9 fL      MCH 28.5 pg      MCHC 31.7 g/dL      RDW 18.0 %      RDW-SD 59.2 fl      MPV 10.5 fL      Platelets 169 10*3/mm3      Neutrophil % 82.6 %      Lymphocyte % 8.9 %      Monocyte % 7.1 %      Eosinophil % 0.6 %      Basophil % 0.4 %      Immature Grans % 0.4 %      Neutrophils, Absolute 8.21 10*3/mm3      Lymphocytes, Absolute 0.88 10*3/mm3      Monocytes, Absolute 0.71 10*3/mm3      Eosinophils, Absolute 0.06 10*3/mm3      Basophils, Absolute 0.04 10*3/mm3      Immature Grans, Absolute 0.04 10*3/mm3      nRBC 0.0 /100 WBC              Imaging Results (Last 24 Hours)       Procedure Component Value Units Date/Time    XR Chest 1 View [[id number]] Collected: 04/28/25 2058     Updated: 04/28/25 2101    Narrative:      XR CHEST 1 VW    Date of Exam: 4/28/2025 8:35 PM EDT    Indication: cough ams    Comparison: 4/1/2025    Findings:  Lines: Right-sided port is unchanged in position.    Lungs: Patchy opacities scattered throughout the lungs, most significantly within the peripheral aspect of the upper lobes bilaterally, concerning for multifocal pneumonia.  Pleura: Small bilateral pleural effusions. No pneumothorax    Cardiomediastinum: The cardiomediastinal silhouette is normal    Soft Tissues: Unremarkable.    Bones: No acute osseous abnormality.      Impression:      Impression:  Findings highly concerning for developing multifocal/atypical pneumonia. Superimposed pulmonary edema is not completely excluded      Electronically Signed: Billy Ribeiro DO    4/28/2025 8:59 PM EDT    Workstation ID: [id number]    CT Head Without Contrast [[id number]] Collected: 04/28/25  1804     Updated: 04/28/25 1808    Narrative:      CT HEAD WO CONTRAST    Date of Exam: 4/28/2025 6:01 PM EDT    Indication: ams.    Comparison: 11/11/2024    Technique: Axial CT images were obtained of the head without contrast administration.  Coronal reconstructions were performed.  Automated exposure control and iterative reconstruction methods were used.    Findings: No intracranial hemorrhage. Gray-white matter differentiation is maintained without evidence of an acute infarction. Multiple foci of decreased attenuation are present within the subcortical, deep cerebral, and periventricular white matter   consistent with chronic small vessel/microangiopathic ischemic changes. No extra-axial mass or collection. The ventricles and sulci are prominent commensurate with involutional changes. The posterior fossa appears grossly normal. Sellar and suprasellar   structures are normal.    Orbital and periorbital soft tissues are normal. Mucoid retention cyst or polyp within the left maxillary sinus. The bony calvarium is intact.      Impression:      Impression: No acute intracranial pathology.          Electronically Signed: Demarcus Tang MD    4/28/2025 6:06 PM EDT    Workstation ID: [id number]              Assessment & Plan        This is a 79 y.o. male with:    Active and Resolved Problems  Active Hospital Problems    Diagnosis  POA    **Altered mental status, unspecified [R41.82]  Yes    Metastases to the liver [C78.7]  Yes     Priority: Medium    Hyponatremia [E87.1]  Yes    Elevated troponin [R79.89]  Yes    General weakness [R53.1]  Yes    LBBB (left bundle branch block) [I44.7]  Yes    Chronic combined systolic and diastolic congestive heart failure [I50.42]  Yes    Malignant neoplasm of ascending colon [C18.2]  Yes    Primary hypertension [I10]  Yes    Mixed hyperlipidemia [E78.2]  Yes    Type 2 diabetes mellitus [E11.9]  Yes      Resolved Hospital Problems   No resolved problems to display.       Altered mental  status progressive increased confusion, CT head negative for acute, no focal deficits, urinalysis negative for infection, chest x-ray indicates possible atypical pneumonia, blood cultures ordered and pending, afebrile 1 g IV ceftriaxone empirically, stable on room air, MRI brain ordered and pending given history of colon cancer with metastasis to liver, consider neurology/psychiatry consult for dementia workup, ammonia ordered and pending    Malignant neoplasm of ascending colon with mets to liver, followed by oncology hematology not currently on chemotherapy or immunotherapy    Hyponatremia, sodium 131, normal saline at 75 cc/h repeat BMP in a.m.    Elevated troponin, 58 and 54 denies chest pain EKG shows known left bundle branch block no acute ST elevation or depression likely secondary to ischemic demand recent 2D echo showed ejection fraction 21 to 25%, continuous cardiac monitoring consider cardiology consult    Generalized weakness, chronic progressive likely multifactorial including neoplasm with metastasis poor p.o. intake possible dementia, dietary services consulted for malnutrition survey, PT eval and treat, case management consulted for higher level of long-term care facility    Chronic combined systolic and diastolic heart failure, no bilateral lower extremity edema stable on room air, reordered home Lasix    Hyperlipidemia on statin    Type 2 diabetes mellitus, reordered home insulin add SSI as needed with Accu-Cheks ACHS consistent carb heart healthy diet    Hypertension/orthostatic hypotension, on home midodrine and losartan monitor BP    VTE Prophylaxis:  Mechanical VTE prophylaxis orders are present.        The patient desires to be as follows:    CODE STATUS:    Code Status (Patient has no pulse and is not breathing): CPR (Attempt to Resuscitate)  Medical Interventions (Patient has pulse or is breathing): Full Support            Admission Status:  I believe this patient meets inpatient   status.    Expected Length of Stay: pending clinical course     PDMP and Medication Dispenses via Sidebar reviewed and consistent with patient reported medications.    I discussed the patient's findings and my recommendations with patient and family.      Signature:     This document has been electronically signed by ANEUDY Clemens on April 28, 2025 23:11 EDT   [facility] Hospitalist Team

## 2025-04-29 NOTE — THERAPY EVALUATION
Patient Name: Vlad Guerrero  : 1945    MRN: [id number]                              Today's Date: 2025       Admit Date: 2025    Visit Dx:     ICD-10-CM ICD-9-CM   1. Altered mental status, unspecified altered mental status type  R41.82 780.97   2. Hypoglycemia  E16.2 251.2     Patient Active Problem List   Diagnosis    Microcytic anemia    Primary hypertension    Mixed hyperlipidemia    Malignant neoplasm of ascending colon    Acute CVA (cerebrovascular accident)    Benign essential tremor    Type 2 diabetes mellitus    Thrombocytopenia    Metastases to the liver    Cellulitis    Right hand pain    Ataxia    Port-A-Cath in place    Unsteady gait    Hyperglycemia    Chronic combined systolic and diastolic congestive heart failure    Autonomic orthostatic hypotension    Uncontrolled type 2 diabetes mellitus with hyperglycemia    Cardiomyopathy, dilated    LBBB (left bundle branch block)    Acute HFrEF (heart failure with reduced ejection fraction)    CHF (congestive heart failure)    Altered mental status, unspecified    Hyponatremia    Elevated troponin    General weakness     Past Medical History:   Diagnosis Date    Anemia     Colon cancer     colon    Diabetes mellitus     Hyperlipidemia     Hypertension     LBBB (left bundle branch block) 2024     Past Surgical History:   Procedure Laterality Date    APPENDECTOMY      CARDIAC CATHETERIZATION      CARDIAC CATHETERIZATION N/A 2024    Procedure: Left Heart Cath, possible pci;  Surgeon: Mg Rahman MD;  Location: [facility] CATH INVASIVE LOCATION;  Service: Cardiovascular;  Laterality: N/A;    COLON RESECTION N/A 12/15/2022    Procedure: COLON RESECTION RIGHT;  Surgeon: Percy Davis MD;  Location: [facility] MAIN OR;  Service: General;  Laterality: N/A;    COLONOSCOPY N/A 2022    Procedure: COLONOSCOPY WITH BIOPSY AND POLYPECTOMY;  Surgeon: Billy Julien MD;  Location: [facility] ENDOSCOPY;  Service:  "Gastroenterology;  Laterality: N/A;  Impression:  1.  Large 4-5cm fulgurating circumferential ulcerated mass in the very proximal part of the ascending colon next to ileocecal valve multiple biopsies were performed.  This is highly concerning for colon malignancy.  2.  2 polyp rem    ENDOSCOPY N/A 11/11/2022    Procedure: ESOPHAGOGASTRODUODENOSCOPY with biopsy X1;  Surgeon: Billy Julien MD;  Location: [facility] ENDOSCOPY;  Service: Gastroenterology;  Laterality: N/A;  5.  Upper endoscopy lamination unremarkable.       PORTACATH PLACEMENT Right 1/12/2023    Procedure: INSERTION OF PORTACATH;  Surgeon: Percy Davis MD;  Location: [facility] MAIN OR;  Service: General;  Laterality: Right;    TONSILLECTOMY        General Information       Row Name 04/29/25 [facility]          Physical Therapy Time and Intention    Document Type evaluation  -AM     Mode of Treatment physical therapy  -AM       Row Name 04/29/25 [facility]          General Information    Patient Profile Reviewed yes  -AM     Prior Level of Function independent:;all household mobility;gait;transfer;bed mobility  uknown if pt uses an assistive device  -AM     Existing Precautions/Restrictions fall  -AM     Barriers to Rehab cognitive status  -AM       Row Name 04/29/25 1353          Living Environment    Current Living Arrangements assisted living facility  -AM     People in Home facility resident  -AM       Row Name 04/29/25 1353          Home Main Entrance    Number of Stairs, Main Entrance none  -AM       Row Name 04/29/25 1353          Stairs Within Home, Primary    Number of Stairs, Within Home, Primary none  -AM       Row Name 04/29/25 1353          Cognition    Orientation Status (Cognition) disoriented to;person;place;situation;time  pt stating his name was \"Jason Lee\"  -AM       Row Name 04/29/25 1353          Safety Issues/Impairments Affecting Functional Mobility    Safety Issues Affecting Function (Mobility) awareness of need for " assistance;insight into deficits/self-awareness;judgment;positioning of assistive device;problem-solving;safety precaution awareness;safety precautions follow-through/compliance;sequencing abilities  -AM     Impairments Affecting Function (Mobility) balance;cognition;endurance/activity tolerance;strength  -AM     Comment, Safety Issues/Impairments (Mobility) gait belt utilized  -AM               User Key  (r) = Recorded By, (t) = Taken By, (c) = Cosigned By      Initials Name Provider Type    AM Tone Thorpe, PT Physical Therapist                   Mobility       Row Name 04/29/25 1358          Bed Mobility    Bed Mobility sit-supine  -AM     Sit-Supine [place] (Bed Mobility) moderate assist (50% patient effort)  -AM     Comment, (Bed Mobility) tactile cues for sequencing  -AM       Row Name 04/29/25 1356          Sit-Stand Transfer    Sit-Stand [place] (Transfers) minimum assist (75% patient effort);2 person assist  -AM     Assistive Device (Sit-Stand Transfers) other (see comments)  B HHA  -AM     Comment, (Sit-Stand Transfer) cues for hand placement  -AM       Row Name 04/29/25 1351          Gait/Stairs (Locomotion)    [place] Level (Gait) minimum assist (75% patient effort);moderate assist (50% patient effort);2 person assist  -AM     Assistive Device (Gait) other (see comments)  B HHA, unable to sequence movement of RW  -AM     Distance in Feet (Gait) 15  -AM     Comment, (Gait/Stairs) Decreaed safety awarenss, decreased balance, unable to sequence movements to transition to bed- mod/max tactile cues needed for navgation.  -AM               User Key  (r) = Recorded By, (t) = Taken By, (c) = Cosigned By      Initials Name Provider Type    AM Tone Thorpe, PT Physical Therapist                   Obj/Interventions       Row Name 04/29/25 8129          Range of Motion Comprehensive    General Range of Motion no range of motion deficits identified  -AM       Row Name 04/29/25 4429           Strength Comprehensive (MMT)    Comment, General Manual Muscle Testing (MMT) Assessment unable to formally assess secondary to pt not following commands.  At least 3+/5 BLEs observed through functional movement observation  -AM       Row Name 04/29/25 1358          Motor Skills    Motor Skills functional endurance  -AM     Functional Endurance poor  -AM       Row Name 04/29/25 1358          Balance    Balance Assessment sitting static balance;sitting dynamic balance;standing static balance;standing dynamic balance  -AM     Static Sitting Balance supervision  -AM     Dynamic Sitting Balance contact guard  -AM     Position, Sitting Balance sitting in chair  -AM     Static Standing Balance minimal assist;moderate assist  -AM     Dynamic Standing Balance moderate assist  -AM     Position/Device Used, Standing Balance supported;other (see comments)  B HHA  -AM       Row Name 04/29/25 1358          Sensory Assessment (Somatosensory)    Sensory Assessment (Somatosensory) unable/difficult to assess  -AM               User Key  (r) = Recorded By, (t) = Taken By, (c) = Cosigned By      Initials Name Provider Type    AM Tone Thorpe, PT Physical Therapist                   Goals/Plan       Row Name 04/29/25 1409          Bed Mobility Goal 1 (PT)    Activity/Assistive Device (Bed Mobility Goal 1, PT) bed mobility activities, all  -AM     [place] Level/Cues Needed (Bed Mobility Goal 1, PT) supervision required  -AM     Time Frame (Bed Mobility Goal 1, PT) long term goal (LTG)  -AM       Row Name 04/29/25 1409          Transfer Goal 1 (PT)    Activity/Assistive Device (Transfer Goal 1, PT) transfers, all;walker, rolling  -AM     [place] Level/Cues Needed (Transfer Goal 1, PT) supervision required  -AM     Time Frame (Transfer Goal 1, PT) long term goal (LTG)  -AM       Row Name 04/29/25 1405          Gait Training Goal 1 (PT)    Activity/Assistive Device (Gait Training Goal 1, PT) gait (walking locomotion);walker,  "rolling  -AM     [place] Level (Gait Training Goal 1, PT) contact guard required  -AM     Distance (Gait Training Goal 1, PT) 100'  -AM     Time Frame (Gait Training Goal 1, PT) long term goal (LTG)  -AM       Row Name 04/29/25 1407          Therapy Assessment/Plan (PT)    Planned Therapy Interventions (PT) balance training;bed mobility training;gait training;strengthening;patient/family education;transfer training  -AM               User Key  (r) = Recorded By, (t) = Taken By, (c) = Cosigned By      Initials Name Provider Type    AM Tone Thorpe, PT Physical Therapist                   Clinical Impression       Row Name 04/29/25 1400          Pain    Additional Documentation Pain Scale: FACES Pre/Post-Treatment (Group)  -AM       Row Name 04/29/25 1400          Pain Scale: FACES Pre/Post-Treatment    Pain: FACES Scale, Pretreatment 0-->no hurt  -AM     Posttreatment Pain Rating 0-->no hurt  -AM       Row Name 04/29/25 1400          Plan of Care Review    Plan of Care Reviewed With patient  -AM     Outcome Evaluation Pt is a 78 y/o male, assisted living resident, with increased confusion and rapid HR.  Pt seen in ED on 4/27 for confusion and low glucose.  Became progressively more confused after hospital d/c.  Family concerned regarding possibiity of dementia.  CMH of colon CA with mets to the liver.  Chest X-ray: highly concerning for developing multifocal PNA.  MRI brain: (-) acute.  CT head: (-) acute.  Pt not oriented to person, place or time.  Stating name was \"Jason Lee\".  Unknown if pt ambulated with an assistive device at assisted living facility.  Pt on roon air and telemetry sitting on b/s commode with RN.  Pt with decreased safety awarenss with all mobility tasks this date.  Required Min A x 2 for sit to stand from commode x 2 attempts.  Unable to sequence movement to ambulate to bed, required Min/Mod A x 2 with B HHA to ambulate 15'.  Mod/Max tactile cues to navigate path towards bed.  Mod A " for sit to supine.  Pt is a very high fall risk and is not safe to return back to assisted living facility at this time.  Recommend SNF but if confusion and decreased safety awareness continue pt will most likely require long-term placement.  -AM       Row Name 04/29/25 1400          Therapy Assessment/Plan (PT)    Patient/Family Therapy Goals Statement (PT) Did not answer when asked  -AM     Rehab Potential (PT) good  -AM     Criteria for Skilled Interventions Met (PT) yes  -AM     Therapy Frequency (PT) 5 times/wk  -AM     Predicted Duration of Therapy Intervention (PT) until d/c  -AM       Row Name 04/29/25 1400          Vital Signs    O2 Delivery Pre Treatment room air  -AM     O2 Delivery Intra Treatment room air  -AM     O2 Delivery Post Treatment room air  -AM     Pre Patient Position Sitting  -AM     Intra Patient Position Standing  -AM     Post Patient Position Supine  -AM       Row Name 04/29/25 1400          Positioning and Restraints    Pre-Treatment Position bedside commode  -AM     Post Treatment Position bed  -AM     In Bed supine;call light within reach;encouraged to call for assist;exit alarm on;side rails up x2  -AM               User Key  (r) = Recorded By, (t) = Taken By, (c) = Cosigned By      Initials Name Provider Type    AM Tone Thorpe, PT Physical Therapist                   Outcome Measures       Row Name 04/29/25 1409          How much help from another person do you currently need...    Turning from your back to your side while in flat bed without using bedrails? 3  -AM     Moving from lying on back to sitting on the side of a flat bed without bedrails? 2  -AM     Moving to and from a bed to a chair (including a wheelchair)? 3  -AM     Standing up from a chair using your arms (e.g., wheelchair, bedside chair)? 3  -AM     Climbing 3-5 steps with a railing? 1  -AM     To walk in hospital room? 2  -AM     AM-PAC 6 Clicks Score (PT) 14  -AM               User Key  (r) = Recorded By, (t) =  "Taken By, (c) = Cosigned By      Initials Name Provider Type    AM Tone Thorpe PT Physical Therapist                                 Physical Therapy Education       Title: PT OT SLP Therapies (In Progress)       Topic: Physical Therapy (In Progress)       Point: Mobility training (In Progress)       Learning Progress Summary            Patient Acceptance, E,TB, NR,NL by AM at 4/29/2025 1410                      Point: Body mechanics (In Progress)       Learning Progress Summary            Patient Acceptance, E,TB, NR,NL by AM at 4/29/2025 1410                      Point: Precautions (In Progress)       Learning Progress Summary            Patient Acceptance, E,TB, NR,NL by AM at 4/29/2025 1410                                      User Key       Initials Effective Dates Name Provider Type Discipline    AM 05/10/21 -  Tone Thorpe PT Physical Therapist PT                  PT Recommendation and Plan  Planned Therapy Interventions (PT): balance training, bed mobility training, gait training, strengthening, patient/family education, transfer training  Outcome Evaluation: Pt is a 78 y/o male, assisted living resident, with increased confusion and rapid HR.  Pt seen in ED on 4/27 for confusion and low glucose.  Became progressively more confused after hospital d/c.  Family concerned regarding possibiity of dementia.  CMH of colon CA with mets to the liver.  Chest X-ray: highly concerning for developing multifocal PNA.  MRI brain: (-) acute.  CT head: (-) acute.  Pt not oriented to person, place or time.  Stating name was \"Jason Lee\".  Unknown if pt ambulated with an assistive device at assisted living facility.  Pt on roon air and telemetry sitting on b/s commode with RN.  Pt with decreased safety awarenss with all mobility tasks this date.  Required Min A x 2 for sit to stand from commode x 2 attempts.  Unable to sequence movement to ambulate to bed, required Min/Mod A x 2 with B HHA to ambulate 15'.  Mod/Max " tactile cues to navigate path towards bed.  Mod A for sit to supine.  Pt is a very high fall risk and is not safe to return back to assisted living facility at this time.  Recommend SNF but if confusion and decreased safety awareness continue pt will most likely require long-term placement.     Time Calculation:         PT Charges       Row Name 04/29/25 1410             Time Calculation    Start Time 0840  -AM      Stop Time 0900  -AM      Time Calculation (min) 20 min  -AM      PT Received On 04/29/25  -AM      PT - Next Appointment 04/30/25  -AM      PT Goal Re-Cert Due Date 05/13/25  -AM                User Key  (r) = Recorded By, (t) = Taken By, (c) = Cosigned By      Initials Name Provider Type    AM Tone Thorpe, PT Physical Therapist                  Therapy Charges for Today       Code Description Service Date Service Provider Modifiers Qty    99788002166 HC PT EVAL MOD COMPLEXITY 4 4/29/2025 Tone Thorpe, PT GP 1            PT G-Codes  AM-PAC 6 Clicks Score (PT): 14  PT Discharge Summary  Anticipated Discharge Disposition (PT): skilled nursing facility (with possible transition to ECF)    Tone Thorpe, HÉCTOR  4/29/2025

## 2025-04-29 NOTE — PLAN OF CARE
Problem: Adult Inpatient Plan of Care  Goal: Plan of Care Review  Outcome: Progressing   Goal Outcome Evaluation:      Plan to do MRI in the am, progressing towards goals.

## 2025-04-29 NOTE — CASE MANAGEMENT/SOCIAL WORK
Social Work Assessment   Az     Patient Name: Vlad Guerrero  MRN: [id number]  Today's Date: 4/29/2025    Admit Date: 4/28/2025       Discharge Plan       Row Name 04/29/25 1448       Plan    Plan From Stefania AVELAR. SNF vs LTC. Precert required if SNF (check if patient can have radiation treatment if skilled) PASRR approved. PT/OT evals.    Plan Comments CM updated on status of PASRR.        Ursula Arias MSW, LSW    Phone: [phone]  Fax: [phone]  [email or web address]

## 2025-04-29 NOTE — PLAN OF CARE
"Goal Outcome Evaluation:  Plan of Care Reviewed With: patient           Outcome Evaluation: Pt is a 78 y/o male, assisted living resident, with increased confusion and rapid HR.  Pt seen in ED on 4/27 for confusion and low glucose.  Became progressively more confused after hospital d/c.  Family concerned regarding possibiity of dementia.  CMH of colon CA with mets to the liver.  Chest X-ray: highly concerning for developing multifocal PNA.  MRI brain: (-) acute.  CT head: (-) acute.  Pt not oriented to person, place or time.  Stating name was \"Jason Lee\".  Unknown if pt ambulated with an assistive device at assisted living facility.  Pt on roon air and telemetry sitting on b/s commode with RN.  Pt with decreased safety awarenss with all mobility tasks this date.  Required Min A x 2 for sit to stand from commode x 2 attempts.  Unable to sequence movement to ambulate to bed, required Min/Mod A x 2 with B HHA to ambulate 15'.  Mod/Max tactile cues to navigate path towards bed.  Mod A for sit to supine.  Pt is a very high fall risk and is not safe to return back to assisted living facility at this time.  Recommend SNF but if confusion and decreased safety awareness continue pt will most likely require long-term placement.    Anticipated Discharge Disposition (PT): skilled nursing facility (with possible transition to ECF)                        "

## 2025-04-29 NOTE — NURSING NOTE
Pt to room 209 with son. Admission profile and assessment complete. Pt has poor appetite and refusing to eat. FSBS 100. Safety measures in place, care assumed.

## 2025-04-29 NOTE — PLAN OF CARE
Goal Outcome Evaluation:         Pt. Lethargic and difficult to arouse this PM, Will follow up as appropriate.

## 2025-04-29 NOTE — CONSULTS
Hematology/Oncology Inpatient Consultation    Patient name: Vlad Guerrero  : 1945  MRN: [id number]  Referring Provider: Dr. Fischer  Reason for Consultation: Established patient        Hematology/Oncology History (from record):  Vlad Guerrero is 79 y.o. male who presented to our office on 23 for consultation regarding     2023: Mr. Guerrero dated the beginning of his present illness to sometime at the end of  when he started to feel fatigued.  He was seen at the [facility] and had laboratory exams that revealed microcytic anemia.  He had evidence of iron deficiency and received intravenous iron in the hospital.  He had upper and lower gastrointestinal endoscopies that demonstrated a cecal tumor that measured 4 to 5 cm and was fungating in appearance.  It was circumferential and was next to the ileocecal valve.  The upper gastrointestinal endoscopy revealed no abnormalities.  On this basis he was on December 15, 2022 he was taken to the hospital and underwent a right hemicolectomy without complications.  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  It corresponded to a grade 2 malignancy that invaded through the muscularis propria into the pericolonic tissues.  Macroscopic tumor perforation or lymphovascular space invasion were not present.  Perineural invasion was not present either.  All margins of excision were negative.  All of 36 lymph nodes submitted to were positive for involvement with malignancy.  The disease was Elzbieta staged as [id number].  Loss of expression of mismatch repair enzymes was not documented.  Mr. Guerrero was discharged to continue treatment as outpatient.  At the time of this visit he was recovering well from the surgery.  His incision had healed completely and he had no drains or suture materials.  He had returned home and was eating well.  He had yet to return to work.  He  had been afebrile and free of nausea.  For the most part his weight had been stable.  After reviewing the records a long conversation, of approximately 1 hour, was had with the patient in regards to options of treatment.  The nature of his disease as well as the likelihood of recurrence were described.  The use of adjuvant chemotherapy to increase the rate of cure after surgery was explained.  Side effects were described in detail.  The need for a port was expressed as well.  A treatment plan was placed. A decision was made to treat him with three months of CAPOX given the characteristics of his disease.      2/6/2023: Received the first cycle of adjuvant chemotherapy without any side effects. On the day of this visit feeling well and without any new symptoms, except a very mild rash, especially on the forearms, but no oral pain. Had stools of diminished consistency for a few days but now resolved. The exam was rather unremarkable, except for a few very light erythematous macules on the forearms.      2/27/2023: Feeling well and without new symptoms.  As active as before.  Eating well and without unintended weight loss.  Stronger and more energetic since the institution of vitamin B12 and iron.  No chest pains and cough.  No abdominal pain or diarrhea.  On exam no changes.  A decision was made to continue with the same treatment.  Laboratory exams were reviewed.  He was to see me again in approximately 4 weeks from this date with new scans.     3/27/2023: Suffered an ischemic stroke.  MRI on March 10, 2023 reported a 7 mm focus of restricted diffusion in the left periventricular white matter of the posterior left frontal lobe.  This was felt to be suspicious for an acute/subacute infarct.  There was also evidence of previous lacunar strokes.  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  There was bilateral internal carotid artery stenosis with 60% stenosis estimated at the right and not greater than 50% at the left.  Chemotherapy was held following discharge.  He was left without any sequela.  At the time of this visit he was entirely asymptomatic.  He was convinced a large part of his problem was that he had suffered from hyperglycemia, consistently for some time.  Indeed his glucose was between 152 mg/dL and 193 mg/dL in the preceding days.  However, he reported at home glucose readings of greater than 400 mg/dL..  On exam there were no changes.  The laboratory exams reported a blood count with normocytic anemia and mild thrombocytopenia.  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     4/14/2023: Completed 3 months of treatment with CAPOX.  On the day of this visit not feeling very well.  Weak and tired.  Not very good appetite although eating well.  Having some dysgeusia.  Afebrile.  No chest pains or cough and no abdominal pain.  Had maintain regular bowel activity.  No edema.  On exam no changes.  A decision was made to stop the chemotherapy.  He was to get intravenous fluids on the day of this visit.  To return to see me in 3 weeks.  Tentatively to have scans in early June 2023.     5/5/2023: Feeling progressively stronger. Eating better and slowly regaining weight. Afebrile. No more nausea. No diarrhea and now with regular bowel activity. On exam alert, conversant and well oriented. No pale or jaundice. No oral lesions and no palpable lymph nodes. Lungs clear and abdomen soft. Minimal edema of the right lower extremity. The laboratory exams revealed persistent anemia with a tendency to  macrocytosis. Hemoglobin and platelets within normal ranges. A decision was made to continue to observe and I asked him to see me in approximately 3 months with new scans.      8/7/2023: Feels as well as at the time of the last visit. Active and returned to work full time. Eating well and no nausea or vomiting. No chest pain or cough and no abdominal pain. On exam no changes, though he had lost a large amount of weight since the previous visit. The imaging studies suggested a new lesion in the liver, as well as several lesions in the spleen of unclear cause. Reviewed the images and the report of the scans. Discussed with him at length and explained the findings. Discussed with him the plans for a MRI. He will see me with results.      8/28/2023: Entirely asymptomatic.  Working without limitations.  Eating well.  Weight stable.  Afebrile.  No pain.  On exam no changes.  Laboratory exams were reviewed and discussed with him.  In spite of the fact things on the scans the Carcinoembryonic antigen has not increased.  However both the CT and the MRI suggest one isolated metastatic deposit in segment 8 of the liver.  Discussed with him the options at this time.  I believe a biopsy is essential and after that he would be treated with chemotherapy following which surgery, if no new lesions have appeared, could be considered.  He may still be curable even in the setting of metastatic disease.  Discussed with him at great length.  Explained the steps.  Sent a communication to Dr. Davis, the patient's surgeon.     9/11/2023: Feels about the same as before.  No new symptoms.  As active as before and working.  Eating well and with good appetite.  No unintended weight loss.  Afebrile.  On exam alert, conversant and in good spirits.  No distress.  No jaundice or pallor.  Lungs clear and heart regular.  Abdomen soft.  The liver is not palpable.  No edema.  Laboratory exams were reviewed.  The liver biopsy report confirms  metastatic colorectal cancer.  This appears to be an isolated metastases.  On this basis treatment with chemotherapy followed by surgery is not ordered.  I have asked him to have a PET scan and discussed the study with him.  Discussed the objectives of the treatment and its requirements.  Placed the treatment plan with 5 fluorouracil, irinotecan and panitumumab and discussed with him.  To begin as soon as possible.     9/25/2023: In the office before the next scheduled time.  He called to report that over the weekend he had had between 5 and 8 defecations of liquid stool.  He took loperamide irregularly and it did not seem to provide much relief.  He was able to eat and drink without any difficulties and was never nauseated.  He was seen in the emergency room on 9/24/2023 and he was not found to have any dehydration or other evidence of complications.  They discharged him.  At the time of this visit feeling better.  As of this time he had not had any liquid defecations.  He had continued to eat and drink fluids without any problems.  He had no chest pains and had not had any dyspnea.  He was also free of abdominal pain.  On exam alert, oriented and conversant.  Seemed well-hydrated and was not jaundiced or pale.  Lungs clear.  Heart regular.  Abdomen soft.  A decision was made to continue with the same plan.  I independently reviewed the images of the PET scan and discussed with him.  It reveals only an abnormal lesion in the liver as identified before.  No suggestion of distant metastatic disease.  Discussed with him the significance of this and explained the possibility of surgery and potentially cure.     11/15/2023: Completed 4 cycles of FOLFIRI/panitumumab.  Experienced the expected side effects, particularly skin rash.  However, the treatment proceeded without major complications.  Today he tells me he has been feeling reasonably well and has continued to work part-time.  He enjoys his job.  He has been eating  "about as much as he had been eating before but he has lost some weight without intention.  He did have persistent diarrhea that responded only to large doses of loperamide.  At this point he no longer has diarrhea.  He has been without chest pains or cough and denies as well abdominal pain.  On exam he still has some erythema on the nasolabial regions on both sides.  The lungs are clear.  The heart is regular and the abdomen soft.  Liver and spleen do not seem to be enlarged.  The laboratory exams were reviewed and discussed with him.  To have another PET scan and consider surgical excision.  He will need to go to [place] for this.     12/11/2023: Feeling reasonably well at this time without any new complaints.  His diarrhea is essentially resolved although he still has soft defecations intermittently.  He is eating well and has a good appetite.  He has had no chest pains and has been without cough.  He denies abdominal pain.  No melena, hematochezia or hematuria.  No peripheral edema.  On exam no changes.  The PET scan reveals complete response with no residual evidence of disease activity.  I have discussed with Dr. Praveen Whittaker in [place] and he requested that a MRI be done prior to deciding on surgery.  Discussed with  Cesar.  Explained the plans.  He is to have the MRI and will see me with the results.     12/27/2023: Without new symptoms.  Has not had the MRI because of scheduling problems.  He feels lightheaded at times and \"I am not as sure footed as I was before\".  He has had no falls and he continues to work full-time.  He has been eating well and has regained some of the weight that he had lost.  He has been afebrile.  No chest pains or cough.  No abdominal pain or diarrhea and no dysuria.  No skin rash.  On exam no changes.  The laboratory exams were reviewed and discussed with him.  To reschedule the MRI for the next couple of weeks.  Discussed with him.     3/18/2024: Feeling very well. His " appetite is good and he has gained weight. He has been working without any difficulties. He denies chest pain or cough. No abdominal pain or diarrhea and no dysuria. On exam alert and conversant. In good spirits and in no distress. No jaundice. No oral lesions and respirations not labored. The lungs are clear and the heart is regular. Abdomen is soft and not tender. The laboratory exams reveal a blood count in the normal ranges. He persists with hyperglycemia. The alkaline phosphatase has risen some in the recent past. He is to have the MRI of the liver. He will see me with the results.      6/26/2024: Back after an absence of a few weeks and a couple of missed appointments.  He feels about the same as he felt before.  Following the last admission to the hospital he was transferred to an assisted care living facility where he has been doing progressively better.  Persists with tremors.  Eats well.  Has not lost weight.  Denies abdominal pain.  Has not had diarrhea, melena or hematochezia.  On exam in no distress.  No jaundice.  Not pale.  The lungs are clear bilaterally and the heart regular.  The abdomen is soft and without hepatomegaly or splenomegaly.  No edema.  The laboratory exams were reviewed.  To obtain a Carcinoembryonic antigen today.  He will see me in a couple of weeks with new scans as it has been more than 3 months since the last 1.  Consider treatment with an irinotecan based regimen that seem to result in a very pronounced response in the recent past.     7/12/2024: Back to review the results of the recent scans.  He feels well generally and continues to be as active as before.  As well, his appetite appears to be the same.  On exam he does not seem ill and he is conversant and well-oriented.  He is neither pale nor jaundiced and he seems well-hydrated.  The lungs are clear and the heart regular.  The abdomen is soft.  There is no edema.  Laboratory exams reviewed.  Reviewed the images and the  report of the scans.  He has 2 metastatic deposits in the liver and no other evidence of metastatic dissemination of his malignancy.  I believe it reasonable to treat him again with chemotherapy and consider some form of local therapy in the future, given how localized the disease is.  Will resume chemotherapy with irinotecan, cetuximab and 5-fluorouracil.  No 5-FU bolus.  I will see him again in approximately 4 weeks.     9/9/2024: Tolerating the chemotherapy well.  So far he has had 4 cycles without any undue side effects and no complications.  He does have a rash that is mainly around the eyes, nose and mouth but very little on the chest and the back.  It is not uncomfortable and he has been receiving treatment with topical clindamycin and sun protection.  He continues to eat well and his weight has increased slightly.  He has no chest pain and no abdominal pain.  He has diarrhea only intermittently.  On exam indeed he has an erythematous rash with a few papules but no pustules at this time.  No oral lesions.  Respirations not labored.  Lungs clear bilaterally.  Heart regular.  Abdomen soft.  No edema.  Laboratory exams reviewed.  I reviewed the recent scans of the abdomen and pelvis.  No suggestion of metastatic disease in the chest or the pelvis.  In the liver there are 2 lesions previously identified have decreased in size some.  To continue with the same treatment and see me in approximately 4 weeks.     10/10/2024: Feels reasonably well.  Has continued to have a skin rash but there are no associated symptoms to it and it is not any more extensive than before.  Perhaps it is even less extensive than before.  He maintains a good appetite.  He continues to receive care at the assisted living facility and he has had no difficulties.  No chest pains or cough.  No abdominal pain or diarrhea.  On exam alert and conversant, in good spirits and well-oriented.  No jaundice.  Indeed there is diffuse erythema on the  face and the conjunctivae are somewhat erythematous.  No discharge.  The lungs are clear and the heart regular.  The abdomen is soft nontender.  Liver and spleen are not enlarged.  There is no edema.  Laboratory exams reviewed.  To continue with the same treatment.  Obtain scans after the next chemotherapy and see me with the results.  Consider radioembolization of the liver.  I have discussed with Dr. Vlad carmichael for coordination of care.     3/19/2025: Back after some time of absence.  He has not had treatment in some time.  He was recently admitted to the hospital with evidence of congestive heart failure and pulmonary edema.  He was discharged feeling better and today he feels much better.  He has had no pain.  He has lost some weight.  He is less active.  Denies chest pains, cough, abdominal pain, diarrhea or dysuria.  On exam alert and conversant.  Oriented and in no distress.  No jaundice.  Lungs are clear bilaterally.  Heart is regular.  Abdomen is soft.  No edema.  I reviewed all the recent imaging studies including those obtained while at the hospital.  There is clear progression of the 2 lesions.  They remain only 2 lesions and there is no suggestion of metastatic disease outside of the liver.  Because of his poor tolerance and dislike of the chemotherapy I have referred him for consideration of stereotactic radiosurgery.  I have discussed that radioembolization in the past with him but I think it would be too demanding for him.  I believe stereotactic radiosurgery may be easier to tolerate.  It may allow him to not receive any treatment for a period of time, without progression.    Patient was seen by radiation oncology with plans for MRI of liver for restaging, updated laboratory testing to assess liver function, bilateral thoracentesis given shortness of breath and increase in size of effusions as well as IR referral to discuss fiducial marker placement and/or ablation of the liver metastases,  especially the left liver lobe lesion given the close proximity to the stomach.  It was recommended to proceed with fiducial marker placement and potential embolization.  Following that, radiation oncology would likely complete hypofractionated course of radiation therapy.      Chief complaint: Confusion    History of present illness:    Vlad Guerrero is a 79 y.o. male who presented to [facility] on 4/28/2025 from facility (he is a resident at [facility]) with complaints of confusion and rapid heart rate.  He was seen on 4/27/2025 in the ED due to confusion.  He had low glucose at that time.  Per ED provider, per report from the facility mentioned confusion for several days and has been noted to have low glucose levels.  Per patient's family, they feel he is getting dementia but he has not been evaluated.  [facility] and family does not go to appointments with him so they are unsure what has been previously discussed.  His son is concerned that he is progressively getting weaker.    In the ED, chest x-ray had findings highly concerning for developing multifocal pneumonia although superimposed pulmonary edema not completely excluded.  He was placed on ceftriaxone and doxycycline empirically.  CT imaging of the head without contrast showed no acute intracranial pathology.  MRI of the brain showed no acute intracranial abnormality. Age-related atrophy with moderate chronic microvascular ischemic changes. 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    04/29/25  Hematology/Oncology was consulted.  He is established with Dr. Kramer.    He/She  has a past medical history of Anemia, Colon cancer (2022), Diabetes mellitus, Hyperlipidemia, Hypertension, and LBBB (left bundle branch block) (11/14/2024).    PCP: Karsten Ferrari MD    History:  Past Medical History:   Diagnosis Date    Anemia     Colon cancer 2022    colon     Diabetes mellitus     Hyperlipidemia     Hypertension     LBBB (left bundle branch block) 2024   ,   Past Surgical History:   Procedure Laterality Date    APPENDECTOMY      CARDIAC CATHETERIZATION      CARDIAC CATHETERIZATION N/A 2024    Procedure: Left Heart Cath, possible pci;  Surgeon: Mg Rahman MD;  Location: [facility] CATH INVASIVE LOCATION;  Service: Cardiovascular;  Laterality: N/A;    COLON RESECTION N/A 12/15/2022    Procedure: COLON RESECTION RIGHT;  Surgeon: Percy Davis MD;  Location: [facility] MAIN OR;  Service: General;  Laterality: N/A;    COLONOSCOPY N/A 2022    Procedure: COLONOSCOPY WITH BIOPSY AND POLYPECTOMY;  Surgeon: Billy Julien MD;  Location: [facility] ENDOSCOPY;  Service: Gastroenterology;  Laterality: N/A;  Impression:  1.  Large 4-5cm fulgurating circumferential ulcerated mass in the very proximal part of the ascending colon next to ileocecal valve multiple biopsies were performed.  This is highly concerning for colon malignancy.  2.  2 polyp rem    ENDOSCOPY N/A 2022    Procedure: ESOPHAGOGASTRODUODENOSCOPY with biopsy X1;  Surgeon: Billy Julien MD;  Location: [facility] ENDOSCOPY;  Service: Gastroenterology;  Laterality: N/A;  5.  Upper endoscopy lamination unremarkable.       PORTACATH PLACEMENT Right 2023    Procedure: INSERTION OF PORTACATH;  Surgeon: Percy Davis MD;  Location: [facility] MAIN OR;  Service: General;  Laterality: Right;    TONSILLECTOMY     ,   Family History   Problem Relation Age of Onset    Pneumonia Mother 94        SARS-CoV2    Colon cancer Father 62    Heart disease Sister    ,   Social History     Tobacco Use    Smoking status: Former     Current packs/day: 0.00     Average packs/day: 1 pack/day for 2.0 years (2.0 ttl pk-yrs)     Types: Cigarettes     Start date:      Quit date:      Years since quittin.3    Smokeless tobacco: Never   Vaping Use    Vaping status: Never Used   Substance Use  Topics    Alcohol use: Never    Drug use: Never   ,   Medications Prior to Admission   Medication Sig Dispense Refill Last Dose/Taking    ammonium lactate (AMLACTIN) 12 % cream Apply 1 g topically to the appropriate area as directed 3 times a day.   Taking    aspirin 81 MG EC tablet Take 1 tablet by mouth Daily.   Taking    atorvastatin (LIPITOR) 40 MG tablet Take 1 tablet by mouth Daily.   Taking    ferrous gluconate (FERGON) 324 MG tablet Take 1 tablet by mouth Daily With Breakfast.   Taking    furosemide (LASIX) 20 MG tablet Take 1 tablet by mouth Daily.   Taking    gabapentin (NEURONTIN) 100 MG capsule Take 1 capsule by mouth Every 12 (Twelve) Hours.   Taking    insulin aspart (novoLOG FLEXPEN) 100 UNIT/ML solution pen-injector sc pen Inject 8 Units under the skin into the appropriate area as directed 3 (Three) Times a Day With Meals.   Taking    insulin glargine (LANTUS, SEMGLEE) 100 UNIT/ML injection Inject 20 Units under the skin into the appropriate area as directed Every Night.   Taking    losartan (COZAAR) 50 MG tablet Take 1 tablet by mouth Daily.   Taking    Melatonin 3 MG tablet Take 1 tablet by mouth Every Night.   Taking    metFORMIN (GLUCOPHAGE) 1000 MG tablet Take 1 tablet by mouth 2 (Two) Times a Day With Meals.   Taking    midodrine (PROAMATINE) 10 MG tablet Take 1 tablet by mouth 3 (Three) Times a Day Before Meals. 90 tablet 0 Taking    ondansetron (ZOFRAN) 8 MG tablet Take 1 tablet by mouth Every 8 (Eight) Hours As Needed for Nausea or Vomiting.   Taking As Needed   , Scheduled Meds:  aspirin, 81 mg, Oral, Daily  atorvastatin, 40 mg, Oral, Daily  budesonide, 0.5 mg, Nebulization, BID - RT  cefTRIAXone, 2,000 mg, Intravenous, Nightly  doxycycline, 100 mg, Intravenous, Q12H  [Held by provider] gabapentin, 100 mg, Oral, Q12H  insulin lispro, 2-7 Units, Subcutaneous, 4x Daily AC & at Bedtime  ipratropium, 0.5 mg, Nebulization, 4x Daily - RT  [Held by provider] losartan, 50 mg, Oral,  "Daily  midodrine, 10 mg, Oral, Q8H    , Continuous Infusions:   , PRN Meds:    dextrose    dextrose    glucagon (human recombinant)    ipratropium    ondansetron    [COMPLETED] Insert Peripheral IV **AND** sodium chloride   Allergies:  Penicillins    Subjective     ROS:  Review of Systems     Objective   Vital Signs:   /84 (BP Location: Left arm, Patient Position: Lying)   Pulse 93   Temp 99 °F (37.2 °C) (Axillary)   Resp 22   Ht 185.4 cm (73\")   Wt 72.6 kg (160 lb 0.9 oz)   SpO2 95%   BMI 21.12 kg/m²     Physical Exam: (performed by MD)  Physical Exam    Results Review:  Lab Results (last 48 hours)       Procedure Component Value Units Date/Time    POC Glucose 4x Daily Before Meals & at Bedtime [[id number]]  (Abnormal) Collected: 04/29/25 1207    Specimen: Blood Updated: 04/29/25 1210     Glucose 172 mg/dL      Comment: Serial Number: [id number]:  [id number]       BNP [[id number]]  (Abnormal) Collected: 04/29/25 0601    Specimen: Blood from Arm, Left Updated: 04/29/25 0852     proBNP 12,470.0 pg/mL     Narrative:      This assay is used as an aid in the diagnosis of individuals suspected of having heart failure. It can be used as an aid in the diagnosis of acute decompensated heart failure (ADHF) in patients presenting with signs and symptoms of ADHF to the emergency department (ED). In addition, NT-proBNP of <300 pg/mL indicates ADHF is not likely.    Age Range Result Interpretation  NT-proBNP Concentration (pg/mL:      <50             Positive            >450                   Gray                 300-450                    Negative             <300    50-75           Positive            >900                  Gray                300-900                  Negative            <300      >75             Positive            >1800                  Gray                300-1800                  Negative            <300    POC Glucose 4x Daily Before Meals & at Bedtime [[id number]]  (Abnormal) Collected: " 04/29/25 0804    Specimen: Blood Updated: 04/29/25 0806     Glucose 120 mg/dL      Comment: Serial Number: [id number]:  [id number]       Basic Metabolic Panel [[id number]]  (Abnormal) Collected: 04/29/25 0601    Specimen: Blood from Arm, Left Updated: 04/29/25 0634     Glucose 118 mg/dL      BUN 28 mg/dL      Creatinine 0.95 mg/dL      Sodium 138 mmol/L      Potassium 5.2 mmol/L      Chloride 103 mmol/L      CO2 21.9 mmol/L      Calcium 9.0 mg/dL      BUN/Creatinine Ratio 29.5     Anion Gap 13.1 mmol/L      eGFR 82.4 mL/min/1.73     Narrative:      GFR Categories in Chronic Kidney Disease (CKD)      GFR Category          GFR (mL/min/1.73)    Interpretation  G1                     90 or greater         Normal or high (1)  G2                      60-89                Mild decrease (1)  G3a                   45-59                Mild to moderate decrease  G3b                   30-44                Moderate to severe decrease  G4                    15-29                Severe decrease  G5                    14 or less           Kidney failure          (1)In the absence of evidence of kidney disease, neither GFR category G1 or G2 fulfill the criteria for CKD.    eGFR calculation 2021 CKD-EPI creatinine equation, which does not include race as a factor    Ammonia [[id number]]  (Normal) Collected: 04/29/25 0601    Specimen: Blood from Arm, Left Updated: 04/29/25 0633     Ammonia 16 umol/L     CBC & Differential [[id number]]  (Abnormal) Collected: 04/29/25 0410    Specimen: Blood from Arm, Left Updated: 04/29/25 0612    Narrative:      The following orders were created for panel order CBC & Differential.  Procedure                               Abnormality         Status                     ---------                               -----------         ------                     CBC Auto Differential[[id number]]        Abnormal            Final result               Scan Slide[[id number]]                                                                     Please view results for these tests on the individual orders.    CBC Auto Differential [[id number]]  (Abnormal) Collected: 04/29/25 0601    Specimen: Blood from Arm, Left Updated: 04/29/25 0612     WBC 9.26 10*3/mm3      RBC 4.62 10*6/mm3      Hemoglobin 13.0 g/dL      Hematocrit 41.7 %      MCV 90.3 fL      MCH 28.1 pg      MCHC 31.2 g/dL      RDW 18.4 %      RDW-SD 60.5 fl      MPV 9.9 fL      Platelets 149 10*3/mm3      Neutrophil % 79.0 %      Lymphocyte % 9.8 %      Monocyte % 10.5 %      Eosinophil % 0.0 %      Basophil % 0.3 %      Immature Grans % 0.4 %      Neutrophils, Absolute 7.31 10*3/mm3      Lymphocytes, Absolute 0.91 10*3/mm3      Monocytes, Absolute 0.97 10*3/mm3      Eosinophils, Absolute 0.00 10*3/mm3      Basophils, Absolute 0.03 10*3/mm3      Immature Grans, Absolute 0.04 10*3/mm3      nRBC 0.0 /100 WBC     POC Glucose Once [[id number]]  (Abnormal) Collected: 04/29/25 0554    Specimen: Blood Updated: 04/29/25 0556     Glucose 127 mg/dL      Comment: Serial Number: [id number]:  [id number]       STAT Lactic Acid, Reflex [[id number]]  (Normal) Collected: 04/29/25 0410    Specimen: Blood from Arm, Left Updated: 04/29/25 0457     Lactate 1.9 mmol/L     Lactic Acid, Plasma [[id number]]  (Abnormal) Collected: 04/28/25 2339    Specimen: Blood Updated: 04/29/25 0029     Lactate 2.5 mmol/L     Extra Tubes [[id number]] Collected: 04/28/25 2339    Specimen: Blood from Arm, Right Updated: 04/29/25 0015    Narrative:      The following orders were created for panel order Extra Tubes.  Procedure                               Abnormality         Status                     ---------                               -----------         ------                     [employer] Blood Culture Garett...[[id number]]                      Final result                 Please view results for these tests on the individual orders.    [employer] Blood Culture Bottle Set [[id number]] Collected: 04/28/25 2339     "Specimen: Blood from Arm, Right Updated: 04/29/25 0015     Extra Tube Hold for add-ons.     Comment: Auto resulted.       Blood Culture - Blood, Arm, Left [[id number]] Collected: 04/28/25 2339    Specimen: Blood from Arm, Left Updated: 04/28/25 2357    Procalcitonin [[id number]]  (Abnormal) Collected: 04/28/25 2002    Specimen: Blood from Arm, Left Updated: 04/28/25 2345     Procalcitonin 0.37 ng/mL     Narrative:      As a Marker for Sepsis (Non-Neonates):    1. <0.5 ng/mL represents a low risk of severe sepsis and/or septic shock.  2. >2 ng/mL represents a high risk of severe sepsis and/or septic shock.    As a Marker for Lower Respiratory Tract Infections that require antibiotic therapy:    PCT on Admission    Antibiotic Therapy       6-12 Hrs later    >0.5                Strongly Recommended  >0.25 - <0.5        Recommended   0.1 - 0.25          Discouraged              Remeasure/reassess PCT  <0.1                Strongly Discouraged     Remeasure/reassess PCT    As 28 day mortality risk marker: \"Change in Procalcitonin Result\" (>80% or <=80%) if Day 0 (or Day 1) and Day 4 values are available. Refer to http://www.[facility] [place]-pct-calculator.com    Change in PCT <=80%  A decrease of PCT levels below or equal to 80% defines a positive change in PCT test result representing a higher risk for 28-day all-cause mortality of patients diagnosed with severe sepsis for septic shock.    Change in PCT >80%  A decrease of PCT levels of more than 80% defines a negative change in PCT result representing a lower risk for 28-day all-cause mortality of patients diagnosed with severe sepsis or septic shock.       POC Glucose 4x Daily Before Meals & at Bedtime [[id number]]  (Normal) Collected: 04/28/25 2232    Specimen: Blood Updated: 04/28/25 2233     Glucose 100 mg/dL      Comment: Serial Number: [id number]:  [id number]       Respiratory Panel PCR w/COVID-19(SARS-CoV-2) JODY/TICO/KG/PAD/COR/OZ In-House, NP Swab in UTM/VTM, 2 HR TAT " - Swab, Nasopharynx [[id number]]  (Normal) Collected: 04/28/25 2057    Specimen: Swab from Nasopharynx Updated: 04/28/25 2149     ADENOVIRUS, PCR Not Detected     Coronavirus 229E Not Detected     Coronavirus HKU1 Not Detected     Coronavirus NL63 Not Detected     Coronavirus OC43 Not Detected     COVID19 Not Detected     Human Metapneumovirus Not Detected     Human Rhinovirus/Enterovirus Not Detected     Influenza A PCR Not Detected     Influenza B PCR Not Detected     Parainfluenza Virus 1 Not Detected     Parainfluenza Virus 2 Not Detected     Parainfluenza Virus 3 Not Detected     Parainfluenza Virus 4 Not Detected     RSV, PCR Not Detected     Bordetella pertussis pcr Not Detected     Bordetella parapertussis PCR Not Detected     Chlamydophila pneumoniae PCR Not Detected     Mycoplasma pneumo by PCR Not Detected    Narrative:      In the setting of a positive respiratory panel with a viral infection PLUS a negative procalcitonin without other underlying concern for bacterial infection, consider observing off antibiotics or discontinuation of antibiotics and continue supportive care. If the respiratory panel is positive for atypical bacterial infection (Bordetella pertussis, Chlamydophila pneumoniae, or Mycoplasma pneumoniae), consider antibiotic de-escalation to target atypical bacterial infection.    POC Glucose STAT [[id number]]  (Normal) Collected: 04/28/25 2101    Specimen: Blood Updated: 04/28/25 2102     Glucose 100 mg/dL      Comment: Serial Number: [id number]:  [id number]       High Sensitivity Troponin T 1Hr [[id number]]  (Abnormal) Collected: 04/28/25 2002    Specimen: Blood from Arm, Left Updated: 04/28/25 2035     HS Troponin T 54 ng/L      Troponin T Numeric Delta 4 ng/L      Troponin T % Delta 8    Narrative:      High Sensitive Troponin T Reference Range:  <14.0 ng/L- Negative Female for AMI  <22.0 ng/L- Negative Male for AMI  >=14 - Abnormal Female indicating possible myocardial injury.  >=22  - Abnormal Male indicating possible myocardial injury.   Clinicians would have to utilize clinical acumen, EKG, Troponin, and serial changes to determine if it is an Acute Myocardial Infarction or myocardial injury due to an underlying chronic condition.         Urinalysis, Microscopic Only - Straight Cath [[id number]]  (Abnormal) Collected: 04/28/25 1919    Specimen: Urine from Straight Cath Updated: 04/28/25 1949     RBC, UA 6-10 /HPF      WBC, UA 0-2 /HPF      Comment: Urine culture not indicated.        Bacteria, UA None Seen /HPF      Squamous Epithelial Cells, UA 0-2 /HPF      Hyaline Casts, UA 0-2 /LPF      Methodology Manual Light Microscopy    Urinalysis With Culture If Indicated - Straight Cath [[id number]]  (Abnormal) Collected: 04/28/25 1919    Specimen: Urine from Straight Cath Updated: 04/28/25 1935     Color, UA Yellow     Appearance, UA Clear     pH, UA <=5.0     Specific Gravity, UA 1.018     Glucose,  mg/dL (1+)     Ketones, UA Negative     Bilirubin, UA Negative     Blood, UA Trace     Protein,  mg/dL (2+)     Leuk Esterase, UA Trace     Nitrite, UA Negative     Urobilinogen, UA 1.0 E.U./dL    Narrative:      In absence of clinical symptoms, the presence of pyuria, bacteria, and/or nitrites on the urinalysis result does not correlate with infection.    Comprehensive Metabolic Panel [[id number]]  (Abnormal) Collected: 04/28/25 1834    Specimen: Blood from Arm, Right Updated: 04/28/25 1921     Glucose 98 mg/dL      BUN 29 mg/dL      Creatinine 0.91 mg/dL      Sodium 131 mmol/L      Potassium 4.7 mmol/L      Chloride 101 mmol/L      CO2 19.1 mmol/L      Calcium 9.2 mg/dL      Total Protein 7.8 g/dL      Albumin 3.7 g/dL      ALT (SGPT) 21 U/L      AST (SGOT) 47 U/L      Alkaline Phosphatase 171 U/L      Total Bilirubin 1.0 mg/dL      Globulin 4.1 gm/dL      A/G Ratio 0.9 g/dL      BUN/Creatinine Ratio 31.9     Anion Gap 10.9 mmol/L      eGFR 86.8 mL/min/1.73     Narrative:      GFR  Categories in Chronic Kidney Disease (CKD)      GFR Category          GFR (mL/min/1.73)    Interpretation  G1                     90 or greater         Normal or high (1)  G2                      60-89                Mild decrease (1)  G3a                   45-59                Mild to moderate decrease  G3b                   30-44                Moderate to severe decrease  G4                    15-29                Severe decrease  G5                    14 or less           Kidney failure          (1)In the absence of evidence of kidney disease, neither GFR category G1 or G2 fulfill the criteria for CKD.    eGFR calculation 2021 CKD-EPI creatinine equation, which does not include race as a factor    Magnesium [[id number]]  (Normal) Collected: 04/28/25 1834    Specimen: Blood from Arm, Right Updated: 04/28/25 1921     Magnesium 2.1 mg/dL     High Sensitivity Troponin T [[id number]]  (Abnormal) Collected: 04/28/25 1834    Specimen: Blood from Arm, Right Updated: 04/28/25 1919     HS Troponin T 50 ng/L     Narrative:      High Sensitive Troponin T Reference Range:  <14.0 ng/L- Negative Female for AMI  <22.0 ng/L- Negative Male for AMI  >=14 - Abnormal Female indicating possible myocardial injury.  >=22 - Abnormal Male indicating possible myocardial injury.   Clinicians would have to utilize clinical acumen, EKG, Troponin, and serial changes to determine if it is an Acute Myocardial Infarction or myocardial injury due to an underlying chronic condition.         Extra Tubes [[id number]] Collected: 04/28/25 1834    Specimen: Blood from Arm, Right Updated: 04/28/25 1845    Narrative:      The following orders were created for panel order Extra Tubes.  Procedure                               Abnormality         Status                     ---------                               -----------         ------                     Lavender Top[[id number]]                                     Final result               Gold Top -  SST[[id number]]                                   Final result               Light Blue Top[[id number]]                                   Final result                 Please view results for these tests on the individual orders.    Lavender Top [[id number]] Collected: 04/28/25 1834    Specimen: Blood from Arm, Right Updated: 04/28/25 1845     Extra Tube hold for add-on     Comment: Auto resulted       Gold Top - SST [[id number]] Collected: 04/28/25 1834    Specimen: Blood from Arm, Right Updated: 04/28/25 1845     Extra Tube Hold for add-ons.     Comment: Auto resulted.       Light Blue Top [[id number]] Collected: 04/28/25 1834    Specimen: Blood from Arm, Right Updated: 04/28/25 1845     Extra Tube Hold for add-ons.     Comment: Auto resulted       CBC & Differential [[id number]]  (Abnormal) Collected: 04/28/25 1816    Specimen: Blood from Arm, Right Updated: 04/28/25 1822    Narrative:      The following orders were created for panel order CBC & Differential.  Procedure                               Abnormality         Status                     ---------                               -----------         ------                     CBC Auto Differential[[id number]]        Abnormal            Final result                 Please view results for these tests on the individual orders.    CBC Auto Differential [[id number]]  (Abnormal) Collected: 04/28/25 1816    Specimen: Blood from Arm, Right Updated: 04/28/25 1822     WBC 9.94 10*3/mm3      RBC 4.67 10*6/mm3      Hemoglobin 13.3 g/dL      Hematocrit 42.0 %      MCV 89.9 fL      MCH 28.5 pg      MCHC 31.7 g/dL      RDW 18.0 %      RDW-SD 59.2 fl      MPV 10.5 fL      Platelets 169 10*3/mm3      Neutrophil % 82.6 %      Lymphocyte % 8.9 %      Monocyte % 7.1 %      Eosinophil % 0.6 %      Basophil % 0.4 %      Immature Grans % 0.4 %      Neutrophils, Absolute 8.21 10*3/mm3      Lymphocytes, Absolute 0.88 10*3/mm3      Monocytes, Absolute 0.71 10*3/mm3      Eosinophils, Absolute  0.06 10*3/mm3      Basophils, Absolute 0.04 10*3/mm3      Immature Grans, Absolute 0.04 10*3/mm3      nRBC 0.0 /100 WBC              Pending Results:     Imaging Reviewed:   MRI Brain With & Without Contrast  Result Date: 4/29/2025  Impression: 1. No acute intracranial abnormality. No abnormal enhancement. 2. Age-related atrophy with moderate chronic microvascular ischemic changes. 3. 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 Electronically Signed: Ene Toth MD  4/29/2025 11:39 AM EDT  Workstation ID: [id number]    XR Chest 1 View  Result Date: 4/28/2025  Impression: Findings highly concerning for developing multifocal/atypical pneumonia. Superimposed pulmonary edema is not completely excluded Electronically Signed: Billy Ribeiro DO  4/28/2025 8:59 PM EDT  Workstation ID: [id number]    CT Head Without Contrast  Result Date: 4/28/2025  Impression: No acute intracranial pathology. Electronically Signed: Demarcus Tang MD  4/28/2025 6:06 PM EDT  Workstation ID: [id number]           Assessment & Plan   ASSESSMENT  Colon cancer with isolated metastatic disease to the liver.  He had some response to treatment but poor tolerance.  With oligoprogression, patient to follow with radiation oncology for potential local therapy.  He is currently not receiving any chemotherapy or immunotherapy.  Altered mental status with confusion.  Progressive generalized weakness.  Likely multifactorial including malignancy, poor oral intake, pneumonia.  Continue supportive care management per primary team  Multifocal pneumonia currently on antibiotics.  Supportive care and management per primary team  History of recurrent ischemic stroke    PLAN  As above      Above note was prepared for Dr. Don Kramer -physical exam and review of systems were performed by physician.  Further recommendations per Dr. Kramer.    Electronically signed by Analisa Moscoso PA-C,  04/29/25 4/29/2025: I've known Mr. Guerrero since early in 2023, when he presented to discuss adjuvant chemotherapy after surgery for a stage III colon cancer. Despite adjuvant treatment he had rapid progression of his disease with metastatic involvement of the liver. He was started on palliative chemotherapy which he tolerated with some difficulties but resulted in a dramatic response. I then lost him to follow up for some time. When he returned he had again progressive disease. There was a new good response with reasonable tolerance. Since the end of 2024 he has been off treatment. He has seemed to decline slowly and in fact has suffered at least one cerebrovascular event. He had to move to an assisted living facility. He was brought in the hospital with weakness and confusion. On exam he is alert and conversant. He is cooperative but is not well oriented. He has very prominent coarse tremors that are new from before. No jaundice. He seems well hydrated. No oral lesions and respirations not labored. The abdomen is soft. There are no palpable tumors and there is no edema. His laboratory exams are not particularly remarkable. His ammonia is within the normal range. His lactic acid was elevated at the time of the admission and he is now on antibiotics, despite which there has not been a rapid improvement. In early April of this year he underwent a MRI of the liver reported one isolated metastasis that had not progressed. He was in the process of investigating whether stereotactic radiosurgery to the liver was possible. At this point I'm not sure why he is so confused and tremulous. Thre is not a good reason to suspect liver failure and his ammonia is unremarkable. Sepsis? Recent microbiology studies reveal no findings. My strongest inclination at this time is to consider Mr. Guerrero's picture to correspond to drug toxicity. For some time he has been on gabapentin and this is the strongest candidate of the medications he  had been taking. It is on hold at this time. After reviewing the record I don't believe that imaging to reassess the neoplastic process is justified at this time. Will re-check TSH and B12.   I reviewed and discussed the records with Ms. Danis MARTINEZ. I concur with her note. I formulated the analysis and the plans.       Don Kramer MD on 4/29/2025 at 18:24

## 2025-04-29 NOTE — CASE MANAGEMENT/SOCIAL WORK
Discharge Planning Assessment   Az     Patient Name: Vlad Guerrero  MRN: [id number]  Today's Date: 4/29/2025    Admit Date: 4/28/2025    Plan: From [facility] [address] [place]. SNF vs LTC. Precert required if SNF (check if patient can have radiation treatment if skilled) PASRR requested. PT/OT mathieu.   Discharge Needs Assessment       Row Name 04/29/25 1351       Living Environment    People in Home facility resident    Name(s) of People in Home [facility] [address] [place]    Current Living Arrangements assisted living facility    Potentially Unsafe Housing Conditions none    In the past 12 months has the electric, gas, oil, or water company threatened to shut off services in your home? No    Primary Care Provided by self    Provides Primary Care For no one, unable/limited ability to care for self    Family Caregiver if Needed child(avel), adult    Family Caregiver Names 3 sons-Prashanth, Remy and Percy    Quality of Family Relationships helpful;involved;supportive    Able to Return to Prior Arrangements no       Resource/Environmental Concerns    Resource/Environmental Concerns none    Transportation Concerns none       Transportation Needs    In the past 12 months, has lack of transportation kept you from medical appointments or from getting medications? no    In the past 12 months, has lack of transportation kept you from meetings, work, or from getting things needed for daily living? No       Food Insecurity    Within the past 12 months, you worried that your food would run out before you got the money to buy more. Never true    Within the past 12 months, the food you bought just didn't last and you didn't have money to get more. Never true       Transition Planning    Patient/Family Anticipates Transition to long-term care facility    Patient/Family Anticipated Services at Transition none    Transportation Anticipated family or friend will provide       Discharge Needs Assessment    Readmission Within the Last 30 Days no previous  admission in last 30 days    Current Outpatient/Agency/Support Group assisted living facility    Equipment Currently Used at Home walker, standard    Concerns to be Addressed discharge planning    Do you want help finding or keeping work or a job? Patient unable to answer    Do you want help with school or training? For example, starting or completing job training or getting a high school diploma, GED or equivalent Patient unable to answer    Anticipated Changes Related to Illness inability to care for self    Equipment Needed After Discharge none    Discharge Facility/Level of Care Needs nursing facility, skilled    Provided Post Acute Provider List? Yes    Post Acute Provider List Nursing Home    Provided Post Acute Provider Quality & Resource List? Yes    Post Acute Provider Quality and Resource List Nursing Home    Delivered To Support Person    Support Person Prashanth    Method of Delivery Telephone    Patient's Choice of Community Agency(s) Will f/u                   Discharge Plan       Row Name 04/29/25 5718       Plan    Plan From [facility]. SNF vs LTC. Precert required if SNF (check if patient can have radiation treatment if skilled) PASRR requested. PT/OT evals.    Patient/Family in Agreement with Plan yes    Plan Comments CM called sonPrashanth to discuss patient and plan of care. Patient currently resides at [facility]. Due to patient's current mental and medical status family and care team believe patient will need LTC or SNF. Prashanth to discuss with his other two brothers and CM will f/u. Discussed if patient went skilled he would require a precert and we would have to check with facility to see if he could still receive radiation treatment. Provided CM phone number for any questions. Discussed in detail with Prashanth the [facility]-verbal consent given and [facility] @ [phone] and HIM stat. Updated nursing and provider. D/C Barriers: disoriented, IV lasix, IV rocephin, MRI, bladder  scan/straight cath, oncology consult, PT/OT.                  Demographic Summary       Row Name 04/29/25 1353       General Information    Admission Type inpatient    Arrived From emergency department    Referral Source admission list    Reason for Consult discharge planning    Preferred Language English       Contact Information    Permission Granted to Share Info With                    Functional Status       Row Name 04/29/25 4731       Functional Status    Usual Activity Tolerance moderate    Current Activity Tolerance poor       Functional Status, IADL    Medications assistive person    Meal Preparation assistive person    Housekeeping assistive person    Laundry assistive person    Shopping assistive person    If for any reason you need help with day-to-day activities such as bathing, preparing meals, shopping, managing finances, etc., do you get the help you need? I get all the help I need    IADL Comments Resides at AL       Mental Status Summary    Recent Changes in Mental Status/Cognitive Functioning mental status             Danna Maldonado RN    Office: [phone]

## 2025-04-29 NOTE — PROGRESS NOTES
[facility] MEDICINE SERVICE  DAILY PROGRESS NOTE    NAME: Vlad Guerrero  : 1945  MRN: [id number]      LOS: 1 day     PROVIDER OF SERVICE: Allan Fischer MD    Chief Complaint: Altered mental status, unspecified    Subjective:     Interval History: Patient still remains somewhat agitated and confused.  He did not sleep all night but was resting this morning although he was arousable.        Review of Systems:   Review of Systems    Objective:     Vital Signs  Temp:  [97.3 °F (36.3 °C)-100.5 °F (38.1 °C)] 100.5 °F (38.1 °C)  Heart Rate:  [] 101  Resp:  [15-36] 20  BP: (111-126)/(78-83) 119/83   Body mass index is 21.12 kg/m².    Physical Exam  Physical Exam  General Appearance:  Alert, cooperative, no distress, appears stated age, confused  Head:  Normocephalic, without obvious abnormality, atraumatic  Eyes:  PERRL, conjunctiva/corneas clear, EOM's intact, fundi benign, both eyes  Ears:  Normal TM's and external ear canals, both ears  Nose: Nares normal, septum midline, mucosa normal, no drainage or sinus tenderness  Throat: Lips, mucosa, and tongue normal; teeth and gums normal  Neck: Supple, symmetrical, trachea midline, no adenopathy, thyroid: not enlarged, symmetric, no tenderness/mass/nodules, no carotid bruit or JVD  Lungs:   Clear to auscultation bilaterally, respirations unlabored  Heart:  Regular rate and rhythm, S1, S2 normal, no murmur, rub or gallop  Abdomen:  Soft, non-tender, bowel sounds active all four quadrants,  no masses, no organomegaly  Extremities: Extremities normal, atraumatic, no cyanosis or edema  Pulses: 2+ and symmetric  Skin: Skin color, texture, turgor normal, no rashes or lesions  Neurologic: Moves all extremity spontaneously         Diagnostic Data    Results from last 7 days   Lab Units 25  0601 25  1834   WBC 10*3/mm3 9.26  --    HEMOGLOBIN g/dL 13.0  --    HEMATOCRIT % 41.7  --    PLATELETS 10*3/mm3 149  --    GLUCOSE mg/dL 118* 98   CREATININE mg/dL  0.95 0.91   BUN mg/dL 28* 29*   SODIUM mmol/L 138 131*   POTASSIUM mmol/L 5.2 4.7   AST (SGOT) U/L  --  47*   ALT (SGPT) U/L  --  21   ALK PHOS U/L  --  171*   BILIRUBIN mg/dL  --  1.0   ANION GAP mmol/L 13.1 10.9       MRI Brain With & Without Contrast  Result Date: 4/29/2025  Impression: 1. No acute intracranial abnormality. No abnormal enhancement. 2. Age-related atrophy with moderate chronic microvascular ischemic changes. 3. 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 Electronically Signed: Ene Toth MD  4/29/2025 11:39 AM EDT  Workstation ID: [id number]    XR Chest 1 View  Result Date: 4/28/2025  Impression: Findings highly concerning for developing multifocal/atypical pneumonia. Superimposed pulmonary edema is not completely excluded Electronically Signed: Billy Ribeiro DO  4/28/2025 8:59 PM EDT  Workstation ID: [id number]    CT Head Without Contrast  Result Date: 4/28/2025  Impression: No acute intracranial pathology. Electronically Signed: Demarcus Tang MD  4/28/2025 6:06 PM EDT  Workstation ID: [id number]        I reviewed the patient's new clinical results.    Assessment/Plan:     Active and Resolved Problems  Active Hospital Problems    Diagnosis  POA    **Altered mental status, unspecified [R41.82]  Yes    Hyponatremia [E87.1]  Yes    Elevated troponin [R79.89]  Yes    General weakness [R53.1]  Yes    LBBB (left bundle branch block) [I44.7]  Yes    Chronic combined systolic and diastolic congestive heart failure [I50.42]  Yes    Metastases to the liver [C78.7]  Yes    Malignant neoplasm of ascending colon [C18.2]  Yes    Primary hypertension [I10]  Yes    Mixed hyperlipidemia [E78.2]  Yes    Type 2 diabetes mellitus [E11.9]  Yes      Resolved Hospital Problems   No resolved problems to display.       Sepsis due to probable community-acquired bacterial pneumonia, unspecified organism  - Imaging on admission with chest x-ray consistent with  multifocal pneumonia  - Initiated on broad-spectrum IV antibiotics  - Patient was febrile with tachycardia  - Follow-up cultures  - Encourage pulmonary toileting with I-S, flutter valve if patient can perform this    Acute metabolic encephalopathy  - Likely secondary to underlying infectious process but patient was also hypoglycemic on admission  - Continue antibiotics as above for infectious process  - Holding long-term insulin and continue low-dose sliding scale for now until patient's p.o. intake and glucose levels are more stable  - MRI of the brain does not show any acute pathology  - Monitor patient's mental status changes    Metastatic colon cancer with liver metastasis  - Patient is apparently currently on treatment for this per the patient's family  - Will consult oncology for further assistance  - MRI of the brain does not show any metastatic disease    Chronic combined systolic and diastolic heart failure  - Appears euvolemic despite elevated BNP  - Continue home Lasix    Dyslipidemia  - Continue statin therapy    DM type II, with hypoglycemia  - As above, patient was having hypoglycemic episodes prior to admission  - Holding Lantus and continue low-dose sliding scale insulin    Hypertension with orthostatic hypotension  - Continue home midodrine dose but holding losartan as patient was borderline hypotensive on admission    VTE Prophylaxis:  Mechanical VTE prophylaxis orders are present.             Disposition Planning:     Barriers to Discharge: Improvement in mental status, treatment of pneumonia  Anticipated Date of Discharge: 5/2  Place of Discharge: SNF      Time: 45 minutes     Code Status and Medical Interventions: CPR (Attempt to Resuscitate); Full Support   Ordered at: 04/28/25 2031     Code Status (Patient has no pulse and is not breathing):    CPR (Attempt to Resuscitate)     Medical Interventions (Patient has pulse or is breathing):    Full Support       Signature: Electronically signed by  Allan Fischer MD, 04/29/25, 11:47 EDT.  [facility], [place] Hospitalist Team

## 2025-04-30 LAB
ANION GAP SERPL CALCULATED.3IONS-SCNC: 11.5 MMOL/L (ref 5–15)
B PARAPERT DNA SPEC QL NAA+PROBE: NOT DETECTED
B PERT DNA SPEC QL NAA+PROBE: NOT DETECTED
BASOPHILS # BLD AUTO: 0.02 10*3/MM3 (ref 0–0.2)
BASOPHILS NFR BLD AUTO: 0.2 % (ref 0–1.5)
BUN SERPL-MCNC: 30 MG/DL (ref 8–23)
BUN/CREAT SERPL: 31.6 (ref 7–25)
C PNEUM DNA NPH QL NAA+NON-PROBE: NOT DETECTED
CALCIUM SPEC-SCNC: 8.5 MG/DL (ref 8.6–10.5)
CHLORIDE SERPL-SCNC: 102 MMOL/L (ref 98–107)
CO2 SERPL-SCNC: 24.5 MMOL/L (ref 22–29)
CREAT SERPL-MCNC: 0.95 MG/DL (ref 0.76–1.27)
DEPRECATED RDW RBC AUTO: 58.7 FL (ref 37–54)
EGFRCR SERPLBLD CKD-EPI 2021: 82.4 ML/MIN/1.73
EOSINOPHIL # BLD AUTO: 0.03 10*3/MM3 (ref 0–0.4)
EOSINOPHIL NFR BLD AUTO: 0.3 % (ref 0.3–6.2)
ERYTHROCYTE [DISTWIDTH] IN BLOOD BY AUTOMATED COUNT: 18.2 % (ref 12.3–15.4)
FLUAV SUBTYP SPEC NAA+PROBE: NOT DETECTED
FLUBV RNA ISLT QL NAA+PROBE: NOT DETECTED
GLUCOSE BLDC GLUCOMTR-MCNC: 227 MG/DL (ref 70–105)
GLUCOSE BLDC GLUCOMTR-MCNC: 247 MG/DL (ref 70–105)
GLUCOSE BLDC GLUCOMTR-MCNC: 291 MG/DL (ref 70–105)
GLUCOSE SERPL-MCNC: 274 MG/DL (ref 65–99)
HADV DNA SPEC NAA+PROBE: NOT DETECTED
HCOV 229E RNA SPEC QL NAA+PROBE: NOT DETECTED
HCOV HKU1 RNA SPEC QL NAA+PROBE: NOT DETECTED
HCOV NL63 RNA SPEC QL NAA+PROBE: NOT DETECTED
HCOV OC43 RNA SPEC QL NAA+PROBE: NOT DETECTED
HCT VFR BLD AUTO: 42.9 % (ref 37.5–51)
HGB BLD-MCNC: 13.6 G/DL (ref 13–17.7)
HMPV RNA NPH QL NAA+NON-PROBE: NOT DETECTED
HPIV1 RNA ISLT QL NAA+PROBE: NOT DETECTED
HPIV2 RNA SPEC QL NAA+PROBE: NOT DETECTED
HPIV3 RNA NPH QL NAA+PROBE: NOT DETECTED
HPIV4 P GENE NPH QL NAA+PROBE: NOT DETECTED
IMM GRANULOCYTES # BLD AUTO: 0.01 10*3/MM3 (ref 0–0.05)
IMM GRANULOCYTES NFR BLD AUTO: 0.1 % (ref 0–0.5)
LYMPHOCYTES # BLD AUTO: 1.24 10*3/MM3 (ref 0.7–3.1)
LYMPHOCYTES NFR BLD AUTO: 13.8 % (ref 19.6–45.3)
M PNEUMO IGG SER IA-ACNC: NOT DETECTED
MCH RBC QN AUTO: 28.1 PG (ref 26.6–33)
MCHC RBC AUTO-ENTMCNC: 31.7 G/DL (ref 31.5–35.7)
MCV RBC AUTO: 88.6 FL (ref 79–97)
MONOCYTES # BLD AUTO: 1.16 10*3/MM3 (ref 0.1–0.9)
MONOCYTES NFR BLD AUTO: 12.9 % (ref 5–12)
NEUTROPHILS NFR BLD AUTO: 6.55 10*3/MM3 (ref 1.7–7)
NEUTROPHILS NFR BLD AUTO: 72.7 % (ref 42.7–76)
NRBC BLD AUTO-RTO: 0 /100 WBC (ref 0–0.2)
PLATELET # BLD AUTO: 180 10*3/MM3 (ref 140–450)
PMV BLD AUTO: 10.7 FL (ref 6–12)
POTASSIUM SERPL-SCNC: 4.2 MMOL/L (ref 3.5–5.2)
RBC # BLD AUTO: 4.84 10*6/MM3 (ref 4.14–5.8)
RHINOVIRUS RNA SPEC NAA+PROBE: NOT DETECTED
RSV RNA NPH QL NAA+NON-PROBE: NOT DETECTED
SARS-COV-2 RNA RESP QL NAA+PROBE: NOT DETECTED
SODIUM SERPL-SCNC: 138 MMOL/L (ref 136–145)
VIT B12 BLD-MCNC: 363 PG/ML (ref 211–946)
WBC NRBC COR # BLD AUTO: 9.01 10*3/MM3 (ref 3.4–10.8)

## 2025-04-30 PROCEDURE — 94664 DEMO&/EVAL PT USE INHALER: CPT

## 2025-04-30 PROCEDURE — 0202U NFCT DS 22 TRGT SARS-COV-2: CPT | Performed by: FAMILY MEDICINE

## 2025-04-30 PROCEDURE — 97166 OT EVAL MOD COMPLEX 45 MIN: CPT

## 2025-04-30 PROCEDURE — 80048 BASIC METABOLIC PNL TOTAL CA: CPT | Performed by: NURSE PRACTITIONER

## 2025-04-30 PROCEDURE — 97116 GAIT TRAINING THERAPY: CPT

## 2025-04-30 PROCEDURE — 97530 THERAPEUTIC ACTIVITIES: CPT

## 2025-04-30 PROCEDURE — 25010000002 DOXYCYCLINE 100 MG RECONSTITUTED SOLUTION 1 EACH VIAL: Performed by: STUDENT IN AN ORGANIZED HEALTH CARE EDUCATION/TRAINING PROGRAM

## 2025-04-30 PROCEDURE — 63710000001 INSULIN LISPRO (HUMAN) PER 5 UNITS: Performed by: NURSE PRACTITIONER

## 2025-04-30 PROCEDURE — 25010000002 CEFTRIAXONE PER 250 MG: Performed by: INTERNAL MEDICINE

## 2025-04-30 PROCEDURE — 82948 REAGENT STRIP/BLOOD GLUCOSE: CPT | Performed by: STUDENT IN AN ORGANIZED HEALTH CARE EDUCATION/TRAINING PROGRAM

## 2025-04-30 PROCEDURE — 85025 COMPLETE CBC W/AUTO DIFF WBC: CPT | Performed by: NURSE PRACTITIONER

## 2025-04-30 PROCEDURE — 94799 UNLISTED PULMONARY SVC/PX: CPT

## 2025-04-30 PROCEDURE — 82948 REAGENT STRIP/BLOOD GLUCOSE: CPT

## 2025-04-30 RX ADMIN — DOXYCYCLINE 100 MG: 100 INJECTION, POWDER, LYOPHILIZED, FOR SOLUTION INTRAVENOUS at 02:09

## 2025-04-30 RX ADMIN — ATORVASTATIN CALCIUM 40 MG: 40 TABLET, FILM COATED ORAL at 08:17

## 2025-04-30 RX ADMIN — ASPIRIN 81 MG: 81 TABLET, COATED ORAL at 08:18

## 2025-04-30 RX ADMIN — MIDODRINE HYDROCHLORIDE 10 MG: 5 TABLET ORAL at 14:46

## 2025-04-30 RX ADMIN — IPRATROPIUM BROMIDE 0.5 MG: 0.5 SOLUTION RESPIRATORY (INHALATION) at 19:40

## 2025-04-30 RX ADMIN — CEFTRIAXONE SODIUM 2000 MG: 2 INJECTION, POWDER, FOR SOLUTION INTRAMUSCULAR; INTRAVENOUS at 21:48

## 2025-04-30 RX ADMIN — MIDODRINE HYDROCHLORIDE 10 MG: 5 TABLET ORAL at 08:18

## 2025-04-30 RX ADMIN — MIDODRINE HYDROCHLORIDE 10 MG: 5 TABLET ORAL at 22:31

## 2025-04-30 RX ADMIN — INSULIN LISPRO 3 UNITS: 100 INJECTION, SOLUTION INTRAVENOUS; SUBCUTANEOUS at 12:56

## 2025-04-30 RX ADMIN — BUDESONIDE 0.5 MG: 0.5 INHALANT RESPIRATORY (INHALATION) at 19:44

## 2025-04-30 RX ADMIN — INSULIN LISPRO 3 UNITS: 100 INJECTION, SOLUTION INTRAVENOUS; SUBCUTANEOUS at 21:48

## 2025-04-30 RX ADMIN — INSULIN LISPRO 4 UNITS: 100 INJECTION, SOLUTION INTRAVENOUS; SUBCUTANEOUS at 08:21

## 2025-04-30 RX ADMIN — IPRATROPIUM BROMIDE 0.5 MG: 0.5 SOLUTION RESPIRATORY (INHALATION) at 10:53

## 2025-04-30 NOTE — CASE MANAGEMENT/SOCIAL WORK
Continued Stay Note  KRISHAN Bernardo     Patient Name: Vlad Guerrero  MRN: [id number]  Today's Date: 4/30/2025    Admit Date: 4/28/2025    Plan: From [facility] [address]. SNF vs LTC. Precert required if SNF (check if patient can have radiation treatment if skilled) PASRR approved. PT/OT mathieu.   Discharge Plan       Row Name 04/30/25 4078       Plan    Plan Comments CM met with pt to discuss SNF choices. Pt is alert and oriented and choses [facility]- referral placed and liaison contacted (pending acceptance.) No preference on additions SNF choices. DC barriers: IV abx and rehab placement. Needs SNF acceptance to complete RFS form.               Ene Cabrera RN     Office phone: [phone]  Office fax: [phone]

## 2025-04-30 NOTE — PLAN OF CARE
Goal Outcome Evaluation:  Plan of Care Reviewed With: patient           Outcome Evaluation: 80 y/o male, assisted living resident, with increased confusion and rapid HR.  Pt seen in ED on 4/27 for confusion and low glucose.  Became progressively more confused after hospital d/c.  Family concerned regarding possibility of dementia.  CMH of colon CA with mets to the liver.  Chest X-ray: highly concerning for developing multifocal PNA.  MRI brain: (-) acute.  CT head: (-) acute. Pt with improved mental state this date, still just oriented to self. In chair on arrival, able to come to standing with CGA-Min A using RW. Min A provided for functional mobility, pt ambulating to restroom with Min A and verbal cues. Pt able to manage brief down over hips with min-mod A. He stood with Min A for balance support while voiding urine. Mod A required to don clean brief and to don clean socks once returned to seated in chair. Pt improving, but remains far from baseline functional level. OT will follow, recommending SNF at [place].    Anticipated Discharge Disposition (OT): skilled nursing facility

## 2025-04-30 NOTE — PROGRESS NOTES
Hematology/Oncology Inpatient Progress Note    PATIENT NAME: Vlad Guerrero  : [birth year]  MRN: [id number]    CHIEF COMPLAINT: Confusion and weakness.     HISTORY OF PRESENT ILLNESS:      2023: Mr. Guerrero dated the beginning of his present illness to sometime at the end of  when he started to feel fatigued.  He was seen at the [facility] and had laboratory exams that revealed microcytic anemia.  He had evidence of iron deficiency and received intravenous iron in the hospital.  He had upper and lower gastrointestinal endoscopies that demonstrated a cecal tumor that measured 4 to 5 cm and was fungating in appearance.  It was circumferential and was next to the ileocecal valve.  The upper gastrointestinal endoscopy revealed no abnormalities.  On this basis he was on December 15, 2022 he was taken to the hospital and underwent a right hemicolectomy without complications.  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  It corresponded to a grade 2 malignancy that invaded through the muscularis propria into the pericolonic tissues.  Macroscopic tumor perforation or lymphovascular space invasion were not present.  Perineural invasion was not present either.  All margins of excision were negative.  All of 36 lymph nodes submitted to were positive for involvement with malignancy.  The disease was Bollinger staged as [id number].  Loss of expression of mismatch repair enzymes was not documented.  Mr. Guerrero was discharged to continue treatment as outpatient.  At the time of this visit he was recovering well from the surgery.  His incision had healed completely and he had no drains or suture materials.  He had returned home and was eating well.  He had yet to return to work.  He had been afebrile and free of nausea.  For the most part his weight had been stable.  After reviewing the records a long conversation, of  approximately 1 hour, was had with the patient in regards to options of treatment.  The nature of his disease as well as the likelihood of recurrence were described.  The use of adjuvant chemotherapy to increase the rate of cure after surgery was explained.  Side effects were described in detail.  The need for a port was expressed as well.  A treatment plan was placed. A decision was made to treat him with three months of CAPOX given the characteristics of his disease.      2/6/2023: Received the first cycle of adjuvant chemotherapy without any side effects. On the day of this visit feeling well and without any new symptoms, except a very mild rash, especially on the forearms, but no oral pain. Had stools of diminished consistency for a few days but now resolved. The exam was rather unremarkable, except for a few very light erythematous macules on the forearms.      2/27/2023: Feeling well and without new symptoms.  As active as before.  Eating well and without unintended weight loss.  Stronger and more energetic since the institution of vitamin B12 and iron.  No chest pains and cough.  No abdominal pain or diarrhea.  On exam no changes.  A decision was made to continue with the same treatment.  Laboratory exams were reviewed.  He was to see me again in approximately 4 weeks from this date with new scans.     3/27/2023: Suffered an ischemic stroke.  MRI on March 10, 2023 reported a 7 mm focus of restricted diffusion in the left periventricular white matter of the posterior left frontal lobe.  This was felt to be suspicious for an acute/subacute infarct.  There was also evidence of previous lacunar strokes.  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  There was bilateral internal carotid artery stenosis with 60% stenosis estimated at the right and  not greater than 50% at the left.  Chemotherapy was held following discharge.  He was left without any sequela.  At the time of this visit he was entirely asymptomatic.  He was convinced a large part of his problem was that he had suffered from hyperglycemia, consistently for some time.  Indeed his glucose was between 152 mg/dL and 193 mg/dL in the preceding days.  However, he reported at home glucose readings of greater than 400 mg/dL..  On exam there were no changes.  The laboratory exams reported a blood count with normocytic anemia and mild thrombocytopenia.  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     4/14/2023: Completed 3 months of treatment with CAPOX.  On the day of this visit not feeling very well.  Weak and tired.  Not very good appetite although eating well.  Having some dysgeusia.  Afebrile.  No chest pains or cough and no abdominal pain.  Had maintain regular bowel activity.  No edema.  On exam no changes.  A decision was made to stop the chemotherapy.  He was to get intravenous fluids on the day of this visit.  To return to see me in 3 weeks.  Tentatively to have scans in early June 2023.     5/5/2023: Feeling progressively stronger. Eating better and slowly regaining weight. Afebrile. No more nausea. No diarrhea and now with regular bowel activity. On exam alert, conversant and well oriented. No pale or jaundice. No oral lesions and no palpable lymph nodes. Lungs clear and abdomen soft. Minimal edema of the right lower extremity. The laboratory exams revealed persistent anemia with a tendency to macrocytosis. Hemoglobin and platelets within normal ranges. A decision was made to continue to observe and I asked him to see me in approximately 3 months  with new scans.      8/7/2023: Feels as well as at the time of the last visit. Active and returned to work full time. Eating well and no nausea or vomiting. No chest pain or cough and no abdominal pain. On exam no changes, though he had lost a large amount of weight since the previous visit. The imaging studies suggested a new lesion in the liver, as well as several lesions in the spleen of unclear cause. Reviewed the images and the report of the scans. Discussed with him at length and explained the findings. Discussed with him the plans for a MRI. He will see me with results.      8/28/2023: Entirely asymptomatic.  Working without limitations.  Eating well.  Weight stable.  Afebrile.  No pain.  On exam no changes.  Laboratory exams were reviewed and discussed with him.  In spite of the fact things on the scans the Carcinoembryonic antigen has not increased.  However both the CT and the MRI suggest one isolated metastatic deposit in segment 8 of the liver.  Discussed with him the options at this time.  I believe a biopsy is essential and after that he would be treated with chemotherapy following which surgery, if no new lesions have appeared, could be considered.  He may still be curable even in the setting of metastatic disease.  Discussed with him at great length.  Explained the steps.  Sent a communication to Dr. Davis, the patient's surgeon.     9/11/2023: Feels about the same as before.  No new symptoms.  As active as before and working.  Eating well and with good appetite.  No unintended weight loss.  Afebrile.  On exam alert, conversant and in good spirits.  No distress.  No jaundice or pallor.  Lungs clear and heart regular.  Abdomen soft.  The liver is not palpable.  No edema.  Laboratory exams were reviewed.  The liver biopsy report confirms metastatic colorectal cancer.  This appears to be an isolated metastases.  On this basis treatment with chemotherapy followed by surgery is not ordered.  I have  asked him to have a PET scan and discussed the study with him.  Discussed the objectives of the treatment and its requirements.  Placed the treatment plan with 5 fluorouracil, irinotecan and panitumumab and discussed with him.  To begin as soon as possible.     9/25/2023: In the office before the next scheduled time.  He called to report that over the weekend he had had between 5 and 8 defecations of liquid stool.  He took loperamide irregularly and it did not seem to provide much relief.  He was able to eat and drink without any difficulties and was never nauseated.  He was seen in the emergency room on 9/24/2023 and he was not found to have any dehydration or other evidence of complications.  They discharged him.  At the time of this visit feeling better.  As of this time he had not had any liquid defecations.  He had continued to eat and drink fluids without any problems.  He had no chest pains and had not had any dyspnea.  He was also free of abdominal pain.  On exam alert, oriented and conversant.  Seemed well-hydrated and was not jaundiced or pale.  Lungs clear.  Heart regular.  Abdomen soft.  A decision was made to continue with the same plan.  I independently reviewed the images of the PET scan and discussed with him.  It reveals only an abnormal lesion in the liver as identified before.  No suggestion of distant metastatic disease.  Discussed with him the significance of this and explained the possibility of surgery and potentially cure.     11/15/2023: Completed 4 cycles of FOLFIRI/panitumumab.  Experienced the expected side effects, particularly skin rash.  However, the treatment proceeded without major complications.  Today he tells me he has been feeling reasonably well and has continued to work part-time.  He enjoys his job.  He has been eating about as much as he had been eating before but he has lost some weight without intention.  He did have persistent diarrhea that responded only to large doses of  "loperamide.  At this point he no longer has diarrhea.  He has been without chest pains or cough and denies as well abdominal pain.  On exam he still has some erythema on the nasolabial regions on both sides.  The lungs are clear.  The heart is regular and the abdomen soft.  Liver and spleen do not seem to be enlarged.  The laboratory exams were reviewed and discussed with him.  To have another PET scan and consider surgical excision.  He will need to go to [place] for this.     12/11/2023: Feeling reasonably well at this time without any new complaints.  His diarrhea is essentially resolved although he still has soft defecations intermittently.  He is eating well and has a good appetite.  He has had no chest pains and has been without cough.  He denies abdominal pain.  No melena, hematochezia or hematuria.  No peripheral edema.  On exam no changes.  The PET scan reveals complete response with no residual evidence of disease activity.  I have discussed with Dr. Praveen Whittaker in [place] and he requested that a MRI be done prior to deciding on surgery.  Discussed with Mr. Guerrero.  Explained the plans.  He is to have the MRI and will see me with the results.     12/27/2023: Without new symptoms.  Has not had the MRI because of scheduling problems.  He feels lightheaded at times and \"I am not as sure footed as I was before\".  He has had no falls and he continues to work full-time.  He has been eating well and has regained some of the weight that he had lost.  He has been afebrile.  No chest pains or cough.  No abdominal pain or diarrhea and no dysuria.  No skin rash.  On exam no changes.  The laboratory exams were reviewed and discussed with him.  To reschedule the MRI for the next couple of weeks.  Discussed with him.     3/18/2024: Feeling very well. His appetite is good and he has gained weight. He has been working without any difficulties. He denies chest pain or cough. No abdominal pain or diarrhea and no " dysuria. On exam alert and conversant. In good spirits and in no distress. No jaundice. No oral lesions and respirations not labored. The lungs are clear and the heart is regular. Abdomen is soft and not tender. The laboratory exams reveal a blood count in the normal ranges. He persists with hyperglycemia. The alkaline phosphatase has risen some in the recent past. He is to have the MRI of the liver. He will see me with the results.      6/26/2024: Back after an absence of a few weeks and a couple of missed appointments.  He feels about the same as he felt before.  Following the last admission to the hospital he was transferred to an assisted care living facility where he has been doing progressively better.  Persists with tremors.  Eats well.  Has not lost weight.  Denies abdominal pain.  Has not had diarrhea, melena or hematochezia.  On exam in no distress.  No jaundice.  Not pale.  The lungs are clear bilaterally and the heart regular.  The abdomen is soft and without hepatomegaly or splenomegaly.  No edema.  The laboratory exams were reviewed.  To obtain a Carcinoembryonic antigen today.  He will see me in a couple of weeks with new scans as it has been more than 3 months since the last 1.  Consider treatment with an irinotecan based regimen that seem to result in a very pronounced response in the recent past.     7/12/2024: Back to review the results of the recent scans.  He feels well generally and continues to be as active as before.  As well, his appetite appears to be the same.  On exam he does not seem ill and he is conversant and well-oriented.  He is neither pale nor jaundiced and he seems well-hydrated.  The lungs are clear and the heart regular.  The abdomen is soft.  There is no edema.  Laboratory exams reviewed.  Reviewed the images and the report of the scans.  He has 2 metastatic deposits in the liver and no other evidence of metastatic dissemination of his malignancy.  I believe it reasonable to  treat him again with chemotherapy and consider some form of local therapy in the future, given how localized the disease is.  Will resume chemotherapy with irinotecan, cetuximab and 5-fluorouracil.  No 5-FU bolus.  I will see him again in approximately 4 weeks.     9/9/2024: Tolerating the chemotherapy well.  So far he has had 4 cycles without any undue side effects and no complications.  He does have a rash that is mainly around the eyes, nose and mouth but very little on the chest and the back.  It is not uncomfortable and he has been receiving treatment with topical clindamycin and sun protection.  He continues to eat well and his weight has increased slightly.  He has no chest pain and no abdominal pain.  He has diarrhea only intermittently.  On exam indeed he has an erythematous rash with a few papules but no pustules at this time.  No oral lesions.  Respirations not labored.  Lungs clear bilaterally.  Heart regular.  Abdomen soft.  No edema.  Laboratory exams reviewed.  I reviewed the recent scans of the abdomen and pelvis.  No suggestion of metastatic disease in the chest or the pelvis.  In the liver there are 2 lesions previously identified have decreased in size some.  To continue with the same treatment and see me in approximately 4 weeks.     10/10/2024: Feels reasonably well.  Has continued to have a skin rash but there are no associated symptoms to it and it is not any more extensive than before.  Perhaps it is even less extensive than before.  He maintains a good appetite.  He continues to receive care at the assisted living facility and he has had no difficulties.  No chest pains or cough.  No abdominal pain or diarrhea.  On exam alert and conversant, in good spirits and well-oriented.  No jaundice.  Indeed there is diffuse erythema on the face and the conjunctivae are somewhat erythematous.  No discharge.  The lungs are clear and the heart regular.  The abdomen is soft nontender.  Liver and spleen  are not enlarged.  There is no edema.  Laboratory exams reviewed.  To continue with the same treatment.  Obtain scans after the next chemotherapy and see me with the results.  Consider radioembolization of the liver.  I have discussed with Dr. Vlad carmichael for coordination of care.     3/19/2025: Back after some time of absence.  He has not had treatment in some time.  He was recently admitted to the hospital with evidence of congestive heart failure and pulmonary edema.  He was discharged feeling better and today he feels much better.  He has had no pain.  He has lost some weight.  He is less active.  Denies chest pains, cough, abdominal pain, diarrhea or dysuria.  On exam alert and conversant.  Oriented and in no distress.  No jaundice.  Lungs are clear bilaterally.  Heart is regular.  Abdomen is soft.  No edema.  I reviewed all the recent imaging studies including those obtained while at the hospital.  There is clear progression of the 2 lesions.  They remain only 2 lesions and there is no suggestion of metastatic disease outside of the liver.  Because of his poor tolerance and dislike of the chemotherapy I have referred him for consideration of stereotactic radiosurgery.  I have discussed that radioembolization in the past with him but I think it would be too demanding for him.  I believe stereotactic radiosurgery may be easier to tolerate.  It may allow him to not receive any treatment for a period of time, without progression.     Patient was seen by radiation oncology with plans for MRI of liver for restaging, updated laboratory testing to assess liver function, bilateral thoracentesis given shortness of breath and increase in size of effusions as well as IR referral to discuss fiducial marker placement and/or ablation of the liver metastases, especially the left liver lobe lesion given the close proximity to the stomach.  It was recommended to proceed with fiducial marker placement and potential  embolization.  Following that, radiation oncology would likely complete hypofractionated course of radiation therapy.    4/29/2025: I've known Mr. Guerrero since early in 2023, when he presented to discuss adjuvant chemotherapy after surgery for a stage III colon cancer. Despite adjuvant treatment he had rapid progression of his disease with metastatic involvement of the liver. He was started on palliative chemotherapy which he tolerated with some difficulties but resulted in a dramatic response. I then lost him to follow up for some time. When he returned he had again progressive disease. There was a new good response with reasonable tolerance. Since the end of 2024 he has been off treatment. He has seemed to decline slowly and in fact has suffered at least one cerebrovascular event. He had to move to an assisted living facility. He was brought in the hospital with weakness and confusion. On exam he is alert and conversant. He is cooperative but is not well oriented. He has very prominent coarse tremors that are new from before. No jaundice. He seems well hydrated. No oral lesions and respirations not labored. The abdomen is soft. There are no palpable tumors and there is no edema. His laboratory exams are not particularly remarkable. His ammonia is within the normal range. His lactic acid was elevated at the time of the admission and he is now on antibiotics, despite which there has not been a rapid improvement. In early April of this year he underwent a MRI of the liver reported one isolated metastasis that had not progressed. He was in the process of investigating whether stereotactic radiosurgery to the liver was possible. At this point I'm not sure why he is so confused and tremulous. Thre is not a good reason to suspect liver failure and his ammonia is unremarkable. Sepsis? Recent microbiology studies reveal no findings. My strongest inclination at this time is to consider Mr. Guerrero's picture to correspond to drug  "toxicity. For some time he has been on gabapentin and this is the strongest candidate of the medications he had been taking. It is on hold at this time. After reviewing the record I don't believe that imaging to reassess the neoplastic process is justified at this time. Will re-check TSH and B12.     Subjective   4/30/2025: Today feeling somewhat better. Still weak and very tremulous. Was able to eat last evening.     ROS:  Review of Systems   Neurological:  Positive for weakness.        MEDICATIONS:    Scheduled Meds:  aspirin, 81 mg, Oral, Daily  atorvastatin, 40 mg, Oral, Daily  budesonide, 0.5 mg, Nebulization, BID - RT  cefTRIAXone, 2,000 mg, Intravenous, Nightly  [Held by provider] gabapentin, 100 mg, Oral, Q12H  insulin lispro, 2-7 Units, Subcutaneous, 4x Daily AC & at Bedtime  ipratropium, 0.5 mg, Nebulization, 4x Daily - RT  [Held by provider] losartan, 50 mg, Oral, Daily  midodrine, 10 mg, Oral, Q8H       Continuous Infusions:      PRN Meds:  •  dextrose  •  dextrose  •  glucagon (human recombinant)  •  ipratropium  •  ondansetron  •  [COMPLETED] Insert Peripheral IV **AND** sodium chloride     ALLERGIES:    Allergies   Allergen Reactions   • Penicillins Rash       Objective    VITALS:   /71 (BP Location: Left arm, Patient Position: Sitting)   Pulse 92   Temp 96.9 °F (36.1 °C) (Axillary)   Resp 22   Ht 185.4 cm (73\")   Wt 72.6 kg (160 lb 0.9 oz)   SpO2 97%   BMI 21.12 kg/m²     PHYSICAL EXAM: (performed by MD)  Physical Exam  Constitutional:       General: He is not in acute distress.     Appearance: He is ill-appearing. He is not toxic-appearing or diaphoretic.      Comments: Prostrated and ill. Pale but not jaundiced. Responsive and cooperative. No distress.    HENT:      Head: Normocephalic and atraumatic.      Right Ear: External ear normal.      Left Ear: External ear normal.      Nose: Nose normal.      Mouth/Throat:      Mouth: Mucous membranes are moist.      Pharynx: Oropharynx is " clear.   Eyes:      General: No scleral icterus.        Right eye: No discharge.         Left eye: No discharge.      Conjunctiva/sclera: Conjunctivae normal.      Pupils: Pupils are equal, round, and reactive to light.   Cardiovascular:      Rate and Rhythm: Normal rate and regular rhythm.      Pulses: Normal pulses.      Heart sounds: Normal heart sounds. No murmur heard.     No friction rub. No gallop.   Pulmonary:      Effort: No respiratory distress.      Breath sounds: No stridor. No wheezing, rhonchi or rales.   Chest:      Chest wall: No tenderness.   Abdominal:      General: Abdomen is flat. Bowel sounds are normal. There is no distension.      Palpations: Abdomen is soft. There is no mass.      Tenderness: There is no abdominal tenderness. There is no right CVA tenderness, left CVA tenderness, guarding or rebound.   Musculoskeletal:         General: No tenderness, deformity or signs of injury.      Cervical back: No rigidity.      Right lower leg: No edema.      Left lower leg: No edema.   Lymphadenopathy:      Cervical: No cervical adenopathy.   Skin:     General: Skin is warm and dry.      Coloration: Skin is pale. Skin is not jaundiced.      Findings: No bruising or rash.   Neurological:      General: No focal deficit present.      Mental Status: He is alert and oriented to person, place, and time.      Cranial Nerves: No cranial nerve deficit.   Psychiatric:         Behavior: Behavior normal.   KATIE Kramer MD performed the physical exam on 4/30/2025 as documented above.     RECENT LABS:  Lab Results (last 24 hours)       Procedure Component Value Units Date/Time    POC Glucose Once [[id number]]  (Abnormal) Collected: 04/30/25 1250    Specimen: Blood Updated: 04/30/25 1252     Glucose 247 mg/dL      Comment: Serial Number: [id number]:  [id number]       POC Glucose 4x Daily Before Meals & at Bedtime [[id number]]  (Abnormal) Collected: 04/30/25 0725    Specimen: Blood Updated: 04/30/25 0727      Glucose 291 mg/dL      Comment: Serial Number: [id number]:  [id number]       Respiratory Panel PCR w/COVID-19(SARS-CoV-2) JODY/TICO/KG/PAD/COR/OZ In-House, NP Swab in UTM/VTM, 2 HR TAT - Swab, Nasopharynx [[id number]]  (Normal) Collected: 04/30/25 0534    Specimen: Swab from Nasopharynx Updated: 04/30/25 0633     ADENOVIRUS, PCR Not Detected     Coronavirus 229E Not Detected     Coronavirus HKU1 Not Detected     Coronavirus NL63 Not Detected     Coronavirus OC43 Not Detected     COVID19 Not Detected     Human Metapneumovirus Not Detected     Human Rhinovirus/Enterovirus Not Detected     Influenza A PCR Not Detected     Influenza B PCR Not Detected     Parainfluenza Virus 1 Not Detected     Parainfluenza Virus 2 Not Detected     Parainfluenza Virus 3 Not Detected     Parainfluenza Virus 4 Not Detected     RSV, PCR Not Detected     Bordetella pertussis pcr Not Detected     Bordetella parapertussis PCR Not Detected     Chlamydophila pneumoniae PCR Not Detected     Mycoplasma pneumo by PCR Not Detected    Narrative:      In the setting of a positive respiratory panel with a viral infection PLUS a negative procalcitonin without other underlying concern for bacterial infection, consider observing off antibiotics or discontinuation of antibiotics and continue supportive care. If the respiratory panel is positive for atypical bacterial infection (Bordetella pertussis, Chlamydophila pneumoniae, or Mycoplasma pneumoniae), consider antibiotic de-escalation to target atypical bacterial infection.    Basic Metabolic Panel [[id number]]  (Abnormal) Collected: 04/30/25 0209    Specimen: Blood Updated: 04/30/25 0256     Glucose 274 mg/dL      BUN 30 mg/dL      Creatinine 0.95 mg/dL      Sodium 138 mmol/L      Potassium 4.2 mmol/L      Chloride 102 mmol/L      CO2 24.5 mmol/L      Calcium 8.5 mg/dL      BUN/Creatinine Ratio 31.6     Anion Gap 11.5 mmol/L      eGFR 82.4 mL/min/1.73     Narrative:      GFR Categories in Chronic  Kidney Disease (CKD)      GFR Category          GFR (mL/min/1.73)    Interpretation  G1                     90 or greater         Normal or high (1)  G2                      60-89                Mild decrease (1)  G3a                   45-59                Mild to moderate decrease  G3b                   30-44                Moderate to severe decrease  G4                    15-29                Severe decrease  G5                    14 or less           Kidney failure          (1)In the absence of evidence of kidney disease, neither GFR category G1 or G2 fulfill the criteria for CKD.    eGFR calculation 2021 CKD-EPI creatinine equation, which does not include race as a factor    CBC & Differential [[id number]]  (Abnormal) Collected: 04/30/25 0209    Specimen: Blood Updated: 04/30/25 0236    Narrative:      The following orders were created for panel order CBC & Differential.  Procedure                               Abnormality         Status                     ---------                               -----------         ------                     CBC Auto Differential[[id number]]        Abnormal            Final result                 Please view results for these tests on the individual orders.    CBC Auto Differential [[id number]]  (Abnormal) Collected: 04/30/25 0209    Specimen: Blood Updated: 04/30/25 0236     WBC 9.01 10*3/mm3      RBC 4.84 10*6/mm3      Hemoglobin 13.6 g/dL      Hematocrit 42.9 %      MCV 88.6 fL      MCH 28.1 pg      MCHC 31.7 g/dL      RDW 18.2 %      RDW-SD 58.7 fl      MPV 10.7 fL      Platelets 180 10*3/mm3      Neutrophil % 72.7 %      Lymphocyte % 13.8 %      Monocyte % 12.9 %      Eosinophil % 0.3 %      Basophil % 0.2 %      Immature Grans % 0.1 %      Neutrophils, Absolute 6.55 10*3/mm3      Lymphocytes, Absolute 1.24 10*3/mm3      Monocytes, Absolute 1.16 10*3/mm3      Eosinophils, Absolute 0.03 10*3/mm3      Basophils, Absolute 0.02 10*3/mm3      Immature Grans, Absolute 0.01  10*3/mm3      nRBC 0.0 /100 WBC     Vitamin B12 [[id number]]  (Normal) Collected: 04/28/25 1834    Specimen: Blood from Arm, Right Updated: 04/30/25 0125     Vitamin B-12 363 pg/mL     Narrative:      Results may be falsely increased if patient taking Biotin.      Blood Culture - Blood, Arm, Left [[id number]]  (Normal) Collected: 04/28/25 2339    Specimen: Blood from Arm, Left Updated: 04/30/25 0000     Blood Culture No growth at 24 hours    Narrative:      Less than seven (7) mL's of blood was collected.  Insufficient quantity may yield false negative results.    TSH Rfx On Abnormal To Free T4 [[id number]]  (Normal) Collected: 04/29/25 0601    Specimen: Blood from Arm, Left Updated: 04/29/25 2033     TSH 1.060 uIU/mL     POC Glucose Once [[id number]]  (Abnormal) Collected: 04/29/25 2030    Specimen: Blood Updated: 04/29/25 2032     Glucose 299 mg/dL      Comment: Serial Number: [id number]:  [id number]             IMAGING REVIEWED:  MRI Brain With & Without Contrast  Result Date: 4/29/2025  Impression: 1. No acute intracranial abnormality. No abnormal enhancement. 2. Age-related atrophy with moderate chronic microvascular ischemic changes. 3. 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 Electronically Signed: Ene Toth MD  4/29/2025 11:39 AM EDT  Workstation ID: [id number]    XR Chest 1 View  Result Date: 4/28/2025  Impression: Findings highly concerning for developing multifocal/atypical pneumonia. Superimposed pulmonary edema is not completely excluded Electronically Signed: Billy Ribeiro DO  4/28/2025 8:59 PM EDT  Workstation ID: [id number]    CT Head Without Contrast  Result Date: 4/28/2025  Impression: No acute intracranial pathology. Electronically Signed: Demarcus Tang MD  4/28/2025 6:06 PM EDT  Workstation ID: [id number]      Assessment & Plan   ASSESSMENT:  Mental status changes. No clear explanation. He does not have severe hypothyroidism or  severe B12 deficiency. The MRI of the brain does not show any major abnormalities. Does not seem to be in status epilepticus.   Metastatic colon cancer: Without major progression.   I had a telephone conversation with his son, Mr. Prashanth Guerrero. We discussed the recent developments and the importance of close follow up in order to make additional decisions. I don't believe he will be able to take additional treatment for the malignancy.     PLAN:  As above.     Don Kramer MD  on 4/30/2025 at 17:11

## 2025-04-30 NOTE — THERAPY EVALUATION
Patient Name: Vlad Guerrero  : 1945    MRN: [id number]                              Today's Date: 2025       Admit Date: 2025    Visit Dx:     ICD-10-CM ICD-9-CM   1. Altered mental status, unspecified altered mental status type  R41.82 780.97   2. Hypoglycemia  E16.2 251.2     Patient Active Problem List   Diagnosis    Microcytic anemia    Primary hypertension    Mixed hyperlipidemia    Malignant neoplasm of ascending colon    Acute CVA (cerebrovascular accident)    Benign essential tremor    Type 2 diabetes mellitus    Thrombocytopenia    Metastases to the liver    Cellulitis    Right hand pain    Ataxia    Port-A-Cath in place    Unsteady gait    Hyperglycemia    Chronic combined systolic and diastolic congestive heart failure    Autonomic orthostatic hypotension    Uncontrolled type 2 diabetes mellitus with hyperglycemia    Cardiomyopathy, dilated    LBBB (left bundle branch block)    Acute HFrEF (heart failure with reduced ejection fraction)    CHF (congestive heart failure)    Altered mental status, unspecified    Hyponatremia    Elevated troponin    General weakness     Past Medical History:   Diagnosis Date    Anemia     Colon cancer     colon    Diabetes mellitus     Hyperlipidemia     Hypertension     LBBB (left bundle branch block) 2024     Past Surgical History:   Procedure Laterality Date    APPENDECTOMY      CARDIAC CATHETERIZATION      CARDIAC CATHETERIZATION N/A 2024    Procedure: Left Heart Cath, possible pci;  Surgeon: Mg Rahman MD;  Location: [facility] CATH INVASIVE LOCATION;  Service: Cardiovascular;  Laterality: N/A;    COLON RESECTION N/A 12/15/2022    Procedure: COLON RESECTION RIGHT;  Surgeon: Percy Davis MD;  Location: [facility] MAIN OR;  Service: General;  Laterality: N/A;    COLONOSCOPY N/A 2022    Procedure: COLONOSCOPY WITH BIOPSY AND POLYPECTOMY;  Surgeon: Billy Julien MD;  Location: [facility] ENDOSCOPY;  Service:  Gastroenterology;  Laterality: N/A;  Impression:  1.  Large 4-5cm fulgurating circumferential ulcerated mass in the very proximal part of the ascending colon next to ileocecal valve multiple biopsies were performed.  This is highly concerning for colon malignancy.  2.  2 polyp rem    ENDOSCOPY N/A 11/11/2022    Procedure: ESOPHAGOGASTRODUODENOSCOPY with biopsy X1;  Surgeon: Billy Julien MD;  Location: [facility] ENDOSCOPY;  Service: Gastroenterology;  Laterality: N/A;  5.  Upper endoscopy lamination unremarkable.       PORTACATH PLACEMENT Right 1/12/2023    Procedure: INSERTION OF PORTACATH;  Surgeon: Percy Davis MD;  Location: [facility] MAIN OR;  Service: General;  Laterality: Right;    TONSILLECTOMY        General Information       Row Name 04/30/25 1709          OT Time and Intention    Document Type evaluation  -LS     Mode of Treatment occupational therapy  -       Row Name 04/30/25 1709          General Information    Patient Profile Reviewed yes  -LS     Prior Level of Function independent:;ADL's;all household mobility  Pt uses a rollator  -     Existing Precautions/Restrictions fall  -     Barriers to Rehab cognitive status  -LS       Row Name 04/30/25 1709          Living Environment    Current Living Arrangements assisted living facility  -LS     People in Home facility resident  -       Row Name 04/30/25 1709          Cognition    Orientation Status (Cognition) oriented to;person;disoriented to;place;situation;time  -LS       Row Name 04/30/25 1709          Safety Issues/Impairments Affecting Functional Mobility    Impairments Affecting Function (Mobility) balance;cognition;endurance/activity tolerance;strength  -               User Key  (r) = Recorded By, (t) = Taken By, (c) = Cosigned By      Initials Name Provider Type    LS Brandon Nicole OT Occupational Therapist                     Mobility/ADL's       Row Name 04/30/25 1711          Bed Mobility    Comment, (Bed Mobility) In chair  on arrival  -LS       Row Name 04/30/25 1711          Transfers    Transfers sit-stand transfer  -LS       Row Name 04/30/25 1711          Sit-Stand Transfer    Sit-Stand [place] (Transfers) contact guard  -     Assistive Device (Sit-Stand Transfers) walker, front-wheeled  -LS       Row Name 04/30/25 1711          Functional Mobility    Functional Mobility- Ind. Level minimum assist (75% patient effort)  -     Functional Mobility- Device walker, front-wheeled  -     Patient was able to Ambulate yes  -LS       Row Name 04/30/25 1711          Activities of Daily Living    BADL Assessment/Intervention lower body dressing;toileting  -LS       Row Name 04/30/25 1711          Lower Body Dressing Assessment/Training    [place] Level (Lower Body Dressing) lower body dressing skills;doff;don;pants/bottoms;socks;moderate assist (50% patient effort)  -LS       Row Name 04/30/25 1711          Toileting Assessment/Training    [place] Level (Toileting) toileting skills;moderate assist (50% patient effort);maximum assist (25% patient effort)  -     Position (Toileting) supported standing  -               User Key  (r) = Recorded By, (t) = Taken By, (c) = Cosigned By      Initials Name Provider Type     Brandon Nicole OT Occupational Therapist                   Obj/Interventions       [facility] Name 04/30/25 1713          Sensory Assessment (Somatosensory)    Sensory Assessment (Somatosensory) sensation intact  -LS       Row Name 04/30/25 1713          Range of Motion Comprehensive    General Range of Motion no range of motion deficits identified  -LS       Row Name 04/30/25 1713          Strength Comprehensive (MMT)    Comment, General Manual Muscle Testing (MMT) Assessment BUE grossly 3+/5  -LS       Row Name 04/30/25 1713          Balance    Balance Assessment sitting static balance;sitting dynamic balance;standing static balance;standing dynamic balance  -     Static Sitting Balance standby assist  -      Dynamic Sitting Balance contact guard  -LS     Position, Sitting Balance supported;sitting in chair  -LS     Static Standing Balance contact guard  -LS     Dynamic Standing Balance minimal assist;moderate assist  -LS               User Key  (r) = Recorded By, (t) = Taken By, (c) = Cosigned By      Initials Name Provider Type    Brandon Rain OT Occupational Therapist                   Goals/Plan       Row Name 04/30/25 1720          Bed Mobility Goal 1 (OT)    Activity/Assistive Device (Bed Mobility Goal 1, OT) bed mobility activities, all  -LS     [place] Level/Cues Needed (Bed Mobility Goal 1, OT) modified independence  -LS     Time Frame (Bed Mobility Goal 1, OT) long term goal (LTG);2 weeks  -       Row Name 04/30/25 1720          Transfer Goal 1 (OT)    Activity/Assistive Device (Transfer Goal 1, OT) transfers, all  -LS     [place] Level/Cues Needed (Transfer Goal 1, OT) modified independence  -LS     Time Frame (Transfer Goal 1, OT) long term goal (LTG);2 weeks  -       Row Name 04/30/25 1720          Dressing Goal 1 (OT)    Activity/Device (Dressing Goal 1, OT) dressing skills, all  -LS     [place]/Cues Needed (Dressing Goal 1, OT) modified independence  -LS     Time Frame (Dressing Goal 1, OT) long term goal (LTG);2 weeks  -       Row Name 04/30/25 1720          Toileting Goal 1 (OT)    Activity/Device (Toileting Goal 1, OT) toileting skills, all  -LS     [place] Level/Cues Needed (Toileting Goal 1, OT) modified independence  -LS     Time Frame (Toileting Goal 1, OT) long term goal (LTG);2 weeks  -       Row Name 04/30/25 1720          Therapy Assessment/Plan (OT)    Planned Therapy Interventions (OT) activity tolerance training;BADL retraining;functional balance retraining;IADL retraining;occupation/activity based interventions;ROM/therapeutic exercise;strengthening exercise;transfer/mobility retraining;patient/caregiver education/training  -LS               User Key  (r)  = Recorded By, (t) = Taken By, (c) = Cosigned By      Initials Name Provider Type    Brandon Rain, CELE Occupational Therapist                   Clinical Impression       Row Name 04/30/25 1714          Pain Assessment    Pretreatment Pain Rating 0/10 - no pain  -LS     Posttreatment Pain Rating 0/10 - no pain  -LS       Row Name 04/30/25 1714          Plan of Care Review    Plan of Care Reviewed With patient  -     Outcome Evaluation 78 y/o male, assisted living resident, with increased confusion and rapid HR.  Pt seen in ED on 4/27 for confusion and low glucose.  Became progressively more confused after hospital d/c.  Family concerned regarding possibility of dementia.  CMH of colon CA with mets to the liver.  Chest X-ray: highly concerning for developing multifocal PNA.  MRI brain: (-) acute.  CT head: (-) acute. Pt with improved mental state this date, still just oriented to self. In chair on arrival, able to come to standing with CGA-Min A using RW. Min A provided for functional mobility, pt ambulating to restroom with Min A and verbal cues. Pt able to manage brief down over hips with min-mod A. He stood with Min A for balance support while voiding urine. Mod A required to don clean brief and to don clean socks once returned to seated in chair. Pt improving, but remains far from baseline functional level. OT will follow, recommending SNF at [place].  -       Row Name 04/30/25 1714          Therapy Assessment/Plan (OT)    Rehab Potential (OT) good  -LS     Criteria for Skilled Therapeutic Interventions Met (OT) yes;skilled treatment is necessary  -LS     Therapy Frequency (OT) 5 times/wk  -LS     Predicted Duration of Therapy Intervention (OT) unitl dc  -       Row Name 04/30/25 1714          Therapy Plan Review/Discharge Plan (OT)    Anticipated Discharge Disposition (OT) skilled nursing facility  -       Row Name 04/30/25 1714          Vital Signs    Pre Systolic BP Rehab 97  -LS     Pre Treatment  Diastolic BP 64  -LS     O2 Delivery Pre Treatment room air  -LS     O2 Delivery Intra Treatment room air  -LS     O2 Delivery Post Treatment room air  -LS     Pre Patient Position Sitting  -LS     Intra Patient Position Standing  -LS     Post Patient Position Sitting  -LS       Row Name 04/30/25 1714          Positioning and Restraints    Post Treatment Position chair  -LS     In Chair notified nsg;reclined;call light within reach;encouraged to call for assist;exit alarm on  -LS               User Key  (r) = Recorded By, (t) = Taken By, (c) = Cosigned By      Initials Name Provider Type    Brandon Rain OT Occupational Therapist                   Outcome Measures       Row Name 04/30/25 1720          How much help from another is currently needed...    Putting on and taking off regular lower body clothing? 2  -LS     Bathing (including washing, rinsing, and drying) 2  -LS     Toileting (which includes using toilet bed pan or urinal) 2  -LS     Putting on and taking off regular upper body clothing 3  -LS     Taking care of personal grooming (such as brushing teeth) 3  -LS     Eating meals 4  -LS     AM-PAC 6 Clicks Score (OT) 16  -LS       Row Name 04/30/25 1720          Functional Assessment    Outcome Measure Options AM-PAC 6 Clicks Daily Activity (OT)  -LS               User Key  (r) = Recorded By, (t) = Taken By, (c) = Cosigned By      Initials Name Provider Type    Brandon Rain OT Occupational Therapist                    Occupational Therapy Education       Title: PT OT SLP Therapies (In Progress)       Topic: Occupational Therapy (Done)       Point: ADL training (Done)       Learning Progress Summary            Patient Acceptance, E,TB, VU by LS at 4/30/2025 1721                      Point: Precautions (Done)       Learning Progress Summary            Patient Acceptance, E,TB, VU by LS at 4/30/2025 1721                      Point: Body mechanics (Done)       Learning Progress Summary             Patient Acceptance, E,TB, VU by  at 4/30/2025 1721                                      User Key       Initials Effective Dates Name Provider Type Discipline     09/22/22 -  Brandon Nicole OT Occupational Therapist OT                  OT Recommendation and Plan  Planned Therapy Interventions (OT): activity tolerance training, BADL retraining, functional balance retraining, IADL retraining, occupation/activity based interventions, ROM/therapeutic exercise, strengthening exercise, transfer/mobility retraining, patient/caregiver education/training  Therapy Frequency (OT): 5 times/wk  Plan of Care Review  Plan of Care Reviewed With: patient  Outcome Evaluation: 78 y/o male, assisted living resident, with increased confusion and rapid HR.  Pt seen in ED on 4/27 for confusion and low glucose.  Became progressively more confused after hospital d/c.  Family concerned regarding possibility of dementia.  CMH of colon CA with mets to the liver.  Chest X-ray: highly concerning for developing multifocal PNA.  MRI brain: (-) acute.  CT head: (-) acute. Pt with improved mental state this date, still just oriented to self. In chair on arrival, able to come to standing with CGA-Min A using RW. Min A provided for functional mobility, pt ambulating to restroom with Min A and verbal cues. Pt able to manage brief down over hips with min-mod A. He stood with Min A for balance support while voiding urine. Mod A required to don clean brief and to don clean socks once returned to seated in chair. Pt improving, but remains far from baseline functional level. OT will follow, recommending SNF at [place].     Time Calculation:         Time Calculation- OT       Row Name 04/30/25 1721             Time Calculation- OT    OT Start Time 1139  -      OT Stop Time 1204  -      OT Time Calculation (min) 25 min  -      OT Received On 04/30/25  -      OT - Next Appointment 05/01/25  -      OT Goal Re-Cert Due Date 05/14/25  -          Untimed Charges    OT Eval/Re-eval Minutes 25  -LS         Total Minutes    Untimed Charges Total Minutes 25  -LS       Total Minutes 25  -LS                User Key  (r) = Recorded By, (t) = Taken By, (c) = Cosigned By      Initials Name Provider Type    Brandon Rain OT Occupational Therapist                  Therapy Charges for Today       Code Description Service Date Service Provider Modifiers Qty    [id number] HC OT EVAL MOD COMPLEXITY 4 4/30/2025 Brandon Nicole OT GO 1                 Brandon Nicole OT  4/30/2025

## 2025-04-30 NOTE — PROGRESS NOTES
[facility] MEDICINE SERVICE  DAILY PROGRESS NOTE    NAME: Vlad Guerrero  : 1945  MRN: [id number]      LOS: 2 days     PROVIDER OF SERVICE: Allan Fischer MD    Chief Complaint: Altered mental status, unspecified    Subjective:     Interval History: Patient is much more alert and oriented today.  He was sitting in the bedside chair eating his breakfast when communicating with me.  He feels he is back to his baseline mental status.        Review of Systems:   Review of Systems    Objective:     Vital Signs  Temp:  [96.9 °F (36.1 °C)-99.6 °F (37.6 °C)] 96.9 °F (36.1 °C)  Heart Rate:  [73-93] 93  Resp:  [12-25] 21  BP: ()/(61-75) 95/74   Body mass index is 21.12 kg/m².    Physical Exam  Physical Exam  General Appearance:  Alert, cooperative, no distress, appears stated age  Head:  Normocephalic, without obvious abnormality, atraumatic  Eyes:  PERRL, conjunctiva/corneas clear, EOM's intact, fundi benign, both eyes  Ears:  Normal TM's and external ear canals, both ears  Nose: Nares normal, septum midline, mucosa normal, no drainage or sinus tenderness  Throat: Lips, mucosa, and tongue normal; teeth and gums normal  Neck: Supple, symmetrical, trachea midline, no adenopathy, thyroid: not enlarged, symmetric, no tenderness/mass/nodules, no carotid bruit or JVD  Lungs:   Clear to auscultation bilaterally, respirations unlabored  Heart:  Regular rate and rhythm, S1, S2 normal, no murmur, rub or gallop  Abdomen:  Soft, non-tender, bowel sounds active all four quadrants,  no masses, no organomegaly  Extremities: Extremities normal, atraumatic, no cyanosis or edema  Pulses: 2+ and symmetric  Skin: Skin color, texture, turgor normal, no rashes or lesions  Neurologic: Moves all extremities spontaneously         Diagnostic Data    Results from last 7 days   Lab Units 25  0209 25  0601 25  1834   WBC 10*3/mm3 9.01   < >  --    HEMOGLOBIN g/dL 13.6   < >  --    HEMATOCRIT % 42.9   < >  --     PLATELETS 10*3/mm3 180   < >  --    GLUCOSE mg/dL 274*   < > 98   CREATININE mg/dL 0.95   < > 0.91   BUN mg/dL 30*   < > 29*   SODIUM mmol/L 138   < > 131*   POTASSIUM mmol/L 4.2   < > 4.7   AST (SGOT) U/L  --   --  47*   ALT (SGPT) U/L  --   --  21   ALK PHOS U/L  --   --  171*   BILIRUBIN mg/dL  --   --  1.0   ANION GAP mmol/L 11.5   < > 10.9    < > = values in this interval not displayed.       MRI Brain With & Without Contrast  Result Date: 4/29/2025  Impression: 1. No acute intracranial abnormality. No abnormal enhancement. 2. Age-related atrophy with moderate chronic microvascular ischemic changes. 3. 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 Electronically Signed: Ene Toth MD  4/29/2025 11:39 AM EDT  Workstation ID: [id number]    XR Chest 1 View  Result Date: 4/28/2025  Impression: Findings highly concerning for developing multifocal/atypical pneumonia. Superimposed pulmonary edema is not completely excluded Electronically Signed: Billy Ribeiro DO  4/28/2025 8:59 PM EDT  Workstation ID: [id number]    CT Head Without Contrast  Result Date: 4/28/2025  Impression: No acute intracranial pathology. Electronically Signed: Demarcus Tang MD  4/28/2025 6:06 PM EDT  Workstation ID: [id number]        I reviewed the patient's new clinical results.    Assessment/Plan:     Active and Resolved Problems  Active Hospital Problems    Diagnosis  POA    **Altered mental status, unspecified [R41.82]  Yes    Hyponatremia [E87.1]  Yes    Elevated troponin [R79.89]  Yes    General weakness [R53.1]  Yes    LBBB (left bundle branch block) [I44.7]  Yes    Chronic combined systolic and diastolic congestive heart failure [I50.42]  Yes    Metastases to the liver [C78.7]  Yes    Malignant neoplasm of ascending colon [C18.2]  Yes    Primary hypertension [I10]  Yes    Mixed hyperlipidemia [E78.2]  Yes    Type 2 diabetes mellitus [E11.9]  Yes      Resolved Hospital Problems    No resolved problems to display.       Sepsis due to probable community-acquired bacterial pneumonia, unspecified organism  - Imaging on admission with chest x-ray consistent with multifocal pneumonia  - Initiated on broad-spectrum IV antibiotics with ceftriaxone, doxycycline but I will discontinue doxycycline  - Patient was febrile with tachycardia on presentation  - Follow-up cultures  - Encourage pulmonary toileting with I-S, flutter valve if patient can perform this    Acute metabolic encephalopathy  - Likely secondary to underlying infectious process but patient was also hypoglycemic on admission  - Continue antibiotics as above for infectious process  - Holding long-term insulin and continue low-dose sliding scale for now until patient's p.o. intake and glucose levels are more stable  - MRI of the brain does not show any acute pathology  - Patient's mental status is now back to base    Metastatic colon cancer with liver metastasis  - Patient is apparently currently on treatment for this per the patient's family  - Will consult oncology for further assistance  - MRI of the brain does not show any metastatic disease    Chronic combined systolic and diastolic heart failure  - Appears euvolemic despite elevated BNP  - Continue home Lasix    Dyslipidemia  - Continue statin therapy    DM type II, with hypoglycemia  - As above, patient was having hypoglycemic episodes prior to admission  - Holding Lantus and continue low-dose sliding scale insulin    Hypertension with orthostatic hypotension  - Continue home midodrine dose but holding losartan as patient was borderline hypotensive on admission    VTE Prophylaxis:  Mechanical VTE prophylaxis orders are present.             Disposition Planning:     Barriers to Discharge: Improvement in mental status, treatment of pneumonia  Anticipated Date of Discharge: 5/2  Place of Discharge: SNF      Time: 45 minutes     Code Status and Medical Interventions: CPR (Attempt to  Resuscitate); Full Support   Ordered at: 04/28/25 2031     Code Status (Patient has no pulse and is not breathing):    CPR (Attempt to Resuscitate)     Medical Interventions (Patient has pulse or is breathing):    Full Support       Signature: Electronically signed by Allan Fischer MD, 04/30/25, 12:39 EDT.  [facility] Hospitalist Team

## 2025-04-30 NOTE — THERAPY TREATMENT NOTE
"Subjective: Pt agreeable to therapeutic plan of care. RN reports pt is pleasant and cooperative this morning, wants to get OOB.     Objective:   Pt supine in bed, oriented to name and , needs reorientation for date, place, situation.     Precautions - falls    Bed mobility - CGA  Transfers - Min-A and with rolling walker, standing balance with CGAx1 and support of RW  Ambulation - 25 feet CGA and with rolling walker  Flexed posture, needs cues to maneuver walker      Vitals: WNL    Pain: 0 [place] Location:   Intervention for pain: N/A    Education: Provided education on the importance of mobility in the acute care setting    Assessment: Vlad Guerrero presents with functional mobility impairments which indicate the need for skilled intervention. Pt is confused but easily reoriented and able to participate with standing activity and short distance walking today. Tolerating session today without incident. Will continue to follow and progress as tolerated.     Plan/Recommendations:   If medically appropriate, Moderate Intensity Therapy recommended post-acute care. This is recommended as therapy feels the patient would require 3-4 days per week and wouldn't tolerate \"3 hour daily\" rehab intensity. SNF would be the preferred choice. If the patient does not agree to SNF, arrange HH or OP depending on home bound status. If patient is medically complex, consider LTACH. Pt requires no DME at discharge.     Pt desires Skilled Rehab placement at discharge. Pt cooperative; agreeable to therapeutic recommendations and plan of care.         Basic Mobility 6-click:  Rollin = Total, A lot = 2, A little = 3; 4 = None  Supine>Sit:   1 = Total, A lot = 2, A little = 3; 4 = None   Sit>Stand with arms:  1 = Total, A lot = 2, A little = 3; 4 = None  Bed>Chair:   1 = Total, A lot = 2, A little = 3; 4 = None  Ambulate in room:  1 = Total, A lot = 2, A little = 3; 4 = None  3-5 Steps with railin = Total, A lot = 2, A " little = 3; 4 = None  Score: 17    Modified [place]: N/A = No pre-op stroke/TIA    Post-Tx Position: Up in Chair, Alarms activated, and Call light and personal items within reach  PPE: gloves    Therapy Charges for Today       Code Description Service Date Service Provider Modifiers Qty    10552177020  GAIT TRAINING EA 15 MIN 4/30/2025 Leisa Bates, PT  1    [id number]  PT THERAPEUTIC ACT EA 15 MIN 4/30/2025 Leisa Bates, PT GP 1           PT Charges       Row Name 04/30/25 1526             Time Calculation    Start Time 0854  -      Stop Time 0910  -      Time Calculation (min) 16 min  -      PT Received On 04/30/25  -      PT - Next Appointment 05/01/25  -         Time Calculation- PT    Total Timed Code Minutes- PT 16 minute(s)  -                User Key  (r) = Recorded By, (t) = Taken By, (c) = Cosigned By      Initials Name Provider Type     Leisa Bates, PT Physical Therapist

## 2025-04-30 NOTE — PLAN OF CARE
Goal Outcome Evaluation:         Vlad Guerrero presents with functional mobility impairments which indicate the need for skilled intervention. Pt is confused but easily reoriented and able to participate with standing activity and short distance walking today. Tolerating session today without incident. Will continue to follow and progress as tolerated.

## 2025-04-30 NOTE — DISCHARGE PLACEMENT REQUEST
"Vlad Guerrero \"Fredi\" (79 y.o. Male)       Date of Birth   1945    Social Security Number       [address] [place] [address]    Home Phone   [phone]    MRN   [id number]       [employer]   [employer]    Marital Status                               Admission Date   4/28/2025    Admission Type   Emergency    Admitting Provider   Octaviano Voss MD    Attending Provider   Allan Fischer MD    Department, Room/Bed   [facility] INPATIENT, 209/1       Discharge Date       Discharge Disposition       Discharge Destination                                 Attending Provider: Allan Fischer MD    Allergies: Penicillins    Isolation: None   Infection: None   Code Status: CPR    Ht: 185.4 cm (73\")   Wt: 72.6 kg (160 lb 0.9 oz)    Admission Cmt: None   Principal Problem: Altered mental status, unspecified [R41.82]                   Active Insurance as of 4/28/2025       Primary Coverage       Payor Plan Insurance Group Employer/Plan Group    [facility] DEPT [id number]        Payor Plan Address Payor Plan Phone Number Payor Plan Fax Number Effective Dates    [facility] COMMUNITY [facility] [phone]  1/1/2019 - None Entered    [address] [address]       [facility] [address]         Subscriber Name Subscriber Birth Date Member ID       VLAD GUERRERO 1945 [id number]               Secondary Coverage       Payor Plan Insurance Group Employer/Plan Group    [employer]HE[employer] MEDICARE REPLACEMENT [place] MEDICARE [place] [id number]       Payor Plan Address Payor Plan Phone Number Payor Plan Fax Number Effective Dates    [address] [phone]  1/1/2024 - None Entered    [facility] [address]         Subscriber Name Subscriber Birth Date Member ID       VLAD GUERRERO 1945 [id number]               Tertiary Coverage       Payor Plan Insurance Group Employer/Plan Group    [employer] [place]ID [place] [id number]       Payor Plan Address Payor Plan " Phone Number Payor Plan Fax Number Effective Dates    [address]   3/5/2025 - None Entered    [facility] [address]         Subscriber Name Subscriber Birth Date Member ID       LILLIAN SANCHEZ 1945 [id number]                     Emergency Contacts        (Rel.) Home Phone Work Phone Mobile Phone    CARLY SANCHEZ (Son) [phone] -- [phone]    J LUIS SANCHEZ (Son) -- -- [phone]    MAHESH SANCHEZ (Son) -- -- [phone]    JESSICA ORTEGA (Relative) [phone] -- [phone]

## 2025-04-30 NOTE — PLAN OF CARE
Goal Outcome Evaluation:  Plan of Care Reviewed With: patient        Progress: no change        Pt currently resting abed. Pt incont to BB while sleep, but can voice voiding needs while awake. Pt did refuse midodrine this morning. Will try again later as he is hypotensive. Pt had res panel  test again as pt having frequent dry cough throughout this shift. No distress noted. VSS cont plan of care.

## 2025-04-30 NOTE — CONSULTS
Nutrition Services  Patient Name: Vlad Guerrero  YOB: 1945  MRN: [id number]  Admission date: 4/28/2025    CLINICAL NUTRITION ASSESSMENT      Reason for Assessment 4/30: Consult for chronic poor intake     H&P      Past Medical History:   Diagnosis Date    Anemia     Colon cancer 2022    colon    Diabetes mellitus     Hyperlipidemia     Hypertension     LBBB (left bundle branch block) 11/14/2024       Past Surgical History:   Procedure Laterality Date    APPENDECTOMY      CARDIAC CATHETERIZATION      CARDIAC CATHETERIZATION N/A 11/14/2024    Procedure: Left Heart Cath, possible pci;  Surgeon: Mg Rahman MD;  Location: [facility] CATH INVASIVE LOCATION;  Service: Cardiovascular;  Laterality: N/A;    COLON RESECTION N/A 12/15/2022    Procedure: COLON RESECTION RIGHT;  Surgeon: Percy Davis MD;  Location: [facility] MAIN OR;  Service: General;  Laterality: N/A;    COLONOSCOPY N/A 11/11/2022    Procedure: COLONOSCOPY WITH BIOPSY AND POLYPECTOMY;  Surgeon: Billy Julien MD;  Location: [facility] ENDOSCOPY;  Service: Gastroenterology;  Laterality: N/A;  Impression:  1.  Large 4-5cm fulgurating circumferential ulcerated mass in the very proximal part of the ascending colon next to ileocecal valve multiple biopsies were performed.  This is highly concerning for colon malignancy.  2.  2 polyp rem    ENDOSCOPY N/A 11/11/2022    Procedure: ESOPHAGOGASTRODUODENOSCOPY with biopsy X1;  Surgeon: Billy Julien MD;  Location: [facility] ENDOSCOPY;  Service: Gastroenterology;  Laterality: N/A;  5.  Upper endoscopy lamination unremarkable.       PORTACATH PLACEMENT Right 1/12/2023    Procedure: INSERTION OF PORTACATH;  Surgeon: Percy Davis MD;  Location: [facility] MAIN OR;  Service: General;  Laterality: Right;    TONSILLECTOMY          Current Problems   Sepsis due to probable community-acquired bacterial pneumonia, unspecified organism  - antibiotics are in place      Acute metabolic  "encephalopathy  - Likely secondary to underlying infectious process but patient was also hypoglycemic on admission  - MRI of the brain does not show any acute pathology  - improving     Metastatic colon cancer with liver metastasis  - Oncology following   - MRI of the brain does not show any metastatic disease    Other chronic medical problems: CHF, dyslipidemia, T2DM, HTN with orthostatic hypotension        Encounter Information        Trending Narrative     4/30: Pt assessed due to concerns for chronic poor intake. Pt with diminished appetite/intake ongoing -- likely R/t ongoing Tx and progression of metastatic colon CA. Would benefit from ONS in addition to diet to help meet needs. Pt with AMS on admission, but this is improving. NFPE deferred today as pt quite somnolent at visit - will return to complete this piece of exam. Visually, pt does appear malnourished - need to complete NFPE to determine staging and severity.     Anthropometrics        Current Height, Weight Height: 185.4 cm (73\")  Weight: 72.6 kg (160 lb 0.9 oz) (04/28/25 1715)       Usual Body Weight (UBW) Historically 170-180 lb       Trending Weight Hx     This admission: 4/30: Scale weight 72.6 kg              PTA: 4/30: ~10 weight loss in six months - significant     Wt Readings from Last 30 Encounters:   04/28/25 1715 72.6 kg (160 lb 0.9 oz)   04/27/25 2018 72.6 kg (160 lb)   04/22/25 0800 72.1 kg (159 lb)   03/26/25 0831 73 kg (161 lb)   03/19/25 0956 75.8 kg (167 lb 3.2 oz)   02/14/25 0444 82.7 kg (182 lb 5.1 oz)   02/12/25 2046 82.7 kg (182 lb 5.1 oz)   01/25/25 2114 83.9 kg (185 lb)   01/25/25 1904 83.9 kg (185 lb)   01/25/25 1512 84.2 kg (185 lb 10 oz)   01/21/25 0837 83.5 kg (184 lb)   11/14/24 0452 80.6 kg (177 lb 11.1 oz)   11/13/24 0740 69.9 kg (154 lb)   11/13/24 0035 70.1 kg (154 lb 8.7 oz)   11/12/24 1324 68.8 kg (151 lb 9.6 oz)   11/11/24 2134 75.1 kg (165 lb 9.1 oz)   11/11/24 1431 66 kg (145 lb 9.6 oz)   10/22/24 0730 77 kg (169 " lb 11.2 oz)   10/10/24 1423 80.1 kg (176 lb 9.6 oz)   10/08/24 0746 78.5 kg (173 lb)   09/24/24 0754 80.5 kg (177 lb 6.4 oz)   09/24/24 1015 80.3 kg (177 lb)   09/10/24 0732 81.5 kg (179 lb 9.6 oz)   09/09/24 1527 79.4 kg (175 lb)   08/27/24 0735 81.4 kg (179 lb 6.4 oz)   08/13/24 0733 80.4 kg (177 lb 4.8 oz)   08/13/24 0811 80.3 kg (177 lb)   07/30/24 0728 81.6 kg (179 lb 14.4 oz)   07/16/24 0729 83.3 kg (183 lb 11.2 oz)   07/12/24 0813 81.6 kg (180 lb)   06/26/24 0833 82.5 kg (181 lb 12.8 oz)   04/26/24 1110 82.6 kg (182 lb)   04/25/24 2117 82.6 kg (182 lb 1.6 oz)   04/25/24 0906 77.1 kg (170 lb)   04/24/24 2207 80.6 kg (177 lb 11.1 oz)   04/24/24 1825 80.6 kg (177 lb 11.1 oz)   03/18/24 0753 84.9 kg (187 lb 3.2 oz)   01/08/24 0925 77.1 kg (170 lb)   12/27/23 0959 77.1 kg (170 lb)   12/11/23 0840 71.8 kg (158 lb 3.2 oz)   11/26/23 1114 72 kg (158 lb 11.7 oz)   11/15/23 0820 73.6 kg (162 lb 3.2 oz)      BMI kg/m2 Body mass index is 21.12 kg/m².       Labs        Pertinent Labs    Results from last 7 days   Lab Units 04/30/25  0209 04/29/25  0601 04/28/25  1834   SODIUM mmol/L 138 138 131*   POTASSIUM mmol/L 4.2 5.2 4.7   CHLORIDE mmol/L 102 103 101   CO2 mmol/L 24.5 21.9* 19.1*   BUN mg/dL 30* 28* 29*   CREATININE mg/dL 0.95 0.95 0.91   CALCIUM mg/dL 8.5* 9.0 9.2   BILIRUBIN mg/dL  --   --  1.0   ALK PHOS U/L  --   --  171*   ALT (SGPT) U/L  --   --  21   AST (SGOT) U/L  --   --  47*   GLUCOSE mg/dL 274* 118* 98     Results from last 7 days   Lab Units 04/30/25  0209 04/29/25  0601 04/28/25  1834   MAGNESIUM mg/dL  --   --  2.1   HEMOGLOBIN g/dL 13.6   < >  --    HEMATOCRIT % 42.9   < >  --     < > = values in this interval not displayed.     Lab Results   Component Value Date    HGBA1C 9.79 (H) 01/26/2025        Medications    Scheduled Medications aspirin, 81 mg, Oral, Daily  atorvastatin, 40 mg, Oral, Daily  budesonide, 0.5 mg, Nebulization, BID - RT  cefTRIAXone, 2,000 mg, Intravenous, Nightly  [Held by  provider] gabapentin, 100 mg, Oral, Q12H  insulin lispro, 2-7 Units, Subcutaneous, 4x Daily AC & at Bedtime  ipratropium, 0.5 mg, Nebulization, 4x Daily - RT  [Held by provider] losartan, 50 mg, Oral, Daily  midodrine, 10 mg, Oral, Q8H        Infusions      PRN Medications   dextrose    dextrose    glucagon (human recombinant)    ipratropium    ondansetron    [COMPLETED] Insert Peripheral IV **AND** sodium chloride     Physical Findings        Trending Physical   Appearance, NFPE 4/30: Appears malnourished but need to follow up for NFPE   --  Edema  None documented      Bowel Function BM 4/30     Tubes No feeding tube in place      Chewing/Swallowing No difficulty presently     Skin Intact      --  Current Nutrition Orders & Evaluation of Intake       Oral Nutrition     Food Allergies NKFA   Current PO Diet Diet: Cardiac, Diabetic; Healthy Heart (2-3 Na+); Consistent Carbohydrate; Fluid Consistency: Thin (IDDSI 0)   Supplement None ordered   PO Evaluation     Trending % PO Intake 4/30: Variable intake; averaging 50% or less    --  Nutritional Risk Screening        NRS-2002 Score          Nutrition Diagnosis         Nutrition Dx Problem 1 Unintentional weight loss R/t suspected chronic suboptimal intake in the setting of CA; as evidenced by weight loss ~10% in six months.      Nutrition Dx Problem 2        Intervention Goal         Intervention Goal(s) Intakes improving to greater than 50% consistently     Nutrition Intervention        RD Action Will add ONS  Liberalize diet     Nutrition Prescription          Diet Prescription Consistent Carbohydrate    Supplement Prescription Boost Glucose Control BID (Provides 380 kcals, 32 g protein if consumed)      --  Monitor/Evaluation        Monitor PO intake, Supplement intake, Weight, Skin status, GI status, POC/GOC     Electronically signed by:  Keli Wyatt RD  04/30/25 16:11 EDT

## 2025-05-01 LAB
ANION GAP SERPL CALCULATED.3IONS-SCNC: 7 MMOL/L (ref 5–15)
BASOPHILS # BLD AUTO: 0.02 10*3/MM3 (ref 0–0.2)
BASOPHILS NFR BLD AUTO: 0.3 % (ref 0–1.5)
BUN SERPL-MCNC: 26 MG/DL (ref 8–23)
BUN/CREAT SERPL: 35.6 (ref 7–25)
CALCIUM SPEC-SCNC: 8.2 MG/DL (ref 8.6–10.5)
CHLORIDE SERPL-SCNC: 103 MMOL/L (ref 98–107)
CO2 SERPL-SCNC: 27 MMOL/L (ref 22–29)
CREAT SERPL-MCNC: 0.73 MG/DL (ref 0.76–1.27)
DEPRECATED RDW RBC AUTO: 58.7 FL (ref 37–54)
EGFRCR SERPLBLD CKD-EPI 2021: 93.7 ML/MIN/1.73
EOSINOPHIL # BLD AUTO: 0.13 10*3/MM3 (ref 0–0.4)
EOSINOPHIL NFR BLD AUTO: 1.8 % (ref 0.3–6.2)
ERYTHROCYTE [DISTWIDTH] IN BLOOD BY AUTOMATED COUNT: 18.1 % (ref 12.3–15.4)
GLUCOSE BLDC GLUCOMTR-MCNC: 204 MG/DL (ref 70–105)
GLUCOSE BLDC GLUCOMTR-MCNC: 262 MG/DL (ref 70–105)
GLUCOSE BLDC GLUCOMTR-MCNC: 285 MG/DL (ref 70–105)
GLUCOSE BLDC GLUCOMTR-MCNC: 303 MG/DL (ref 70–105)
GLUCOSE SERPL-MCNC: 244 MG/DL (ref 65–99)
HCT VFR BLD AUTO: 37.8 % (ref 37.5–51)
HGB BLD-MCNC: 11.9 G/DL (ref 13–17.7)
IMM GRANULOCYTES # BLD AUTO: 0.03 10*3/MM3 (ref 0–0.05)
IMM GRANULOCYTES NFR BLD AUTO: 0.4 % (ref 0–0.5)
LYMPHOCYTES # BLD AUTO: 0.92 10*3/MM3 (ref 0.7–3.1)
LYMPHOCYTES NFR BLD AUTO: 12.5 % (ref 19.6–45.3)
MCH RBC QN AUTO: 28.1 PG (ref 26.6–33)
MCHC RBC AUTO-ENTMCNC: 31.5 G/DL (ref 31.5–35.7)
MCV RBC AUTO: 89.2 FL (ref 79–97)
MONOCYTES # BLD AUTO: 0.8 10*3/MM3 (ref 0.1–0.9)
MONOCYTES NFR BLD AUTO: 10.9 % (ref 5–12)
NEUTROPHILS NFR BLD AUTO: 5.44 10*3/MM3 (ref 1.7–7)
NEUTROPHILS NFR BLD AUTO: 74.1 % (ref 42.7–76)
NRBC BLD AUTO-RTO: 0 /100 WBC (ref 0–0.2)
PLATELET # BLD AUTO: 145 10*3/MM3 (ref 140–450)
PMV BLD AUTO: 10.1 FL (ref 6–12)
POTASSIUM SERPL-SCNC: 3.5 MMOL/L (ref 3.5–5.2)
RBC # BLD AUTO: 4.24 10*6/MM3 (ref 4.14–5.8)
SODIUM SERPL-SCNC: 137 MMOL/L (ref 136–145)
WBC NRBC COR # BLD AUTO: 7.34 10*3/MM3 (ref 3.4–10.8)

## 2025-05-01 PROCEDURE — 80048 BASIC METABOLIC PNL TOTAL CA: CPT | Performed by: NURSE PRACTITIONER

## 2025-05-01 PROCEDURE — 97116 GAIT TRAINING THERAPY: CPT

## 2025-05-01 PROCEDURE — 94761 N-INVAS EAR/PLS OXIMETRY MLT: CPT

## 2025-05-01 PROCEDURE — 94799 UNLISTED PULMONARY SVC/PX: CPT

## 2025-05-01 PROCEDURE — 63710000001 INSULIN LISPRO (HUMAN) PER 5 UNITS: Performed by: NURSE PRACTITIONER

## 2025-05-01 PROCEDURE — 97530 THERAPEUTIC ACTIVITIES: CPT

## 2025-05-01 PROCEDURE — 85025 COMPLETE CBC W/AUTO DIFF WBC: CPT | Performed by: NURSE PRACTITIONER

## 2025-05-01 PROCEDURE — 82948 REAGENT STRIP/BLOOD GLUCOSE: CPT

## 2025-05-01 PROCEDURE — 82948 REAGENT STRIP/BLOOD GLUCOSE: CPT | Performed by: STUDENT IN AN ORGANIZED HEALTH CARE EDUCATION/TRAINING PROGRAM

## 2025-05-01 PROCEDURE — 97110 THERAPEUTIC EXERCISES: CPT

## 2025-05-01 RX ORDER — CEFDINIR 300 MG/1
300 CAPSULE ORAL EVERY 12 HOURS SCHEDULED
Status: DISCONTINUED | OUTPATIENT
Start: 2025-05-01 | End: 2025-05-02 | Stop reason: HOSPADM

## 2025-05-01 RX ADMIN — IPRATROPIUM BROMIDE 0.5 MG: 0.5 SOLUTION RESPIRATORY (INHALATION) at 06:59

## 2025-05-01 RX ADMIN — ATORVASTATIN CALCIUM 40 MG: 40 TABLET, FILM COATED ORAL at 08:26

## 2025-05-01 RX ADMIN — IPRATROPIUM BROMIDE 0.5 MG: 0.5 SOLUTION RESPIRATORY (INHALATION) at 18:46

## 2025-05-01 RX ADMIN — IPRATROPIUM BROMIDE 0.5 MG: 0.5 SOLUTION RESPIRATORY (INHALATION) at 14:35

## 2025-05-01 RX ADMIN — INSULIN LISPRO 4 UNITS: 100 INJECTION, SOLUTION INTRAVENOUS; SUBCUTANEOUS at 08:32

## 2025-05-01 RX ADMIN — INSULIN LISPRO 5 UNITS: 100 INJECTION, SOLUTION INTRAVENOUS; SUBCUTANEOUS at 12:57

## 2025-05-01 RX ADMIN — MIDODRINE HYDROCHLORIDE 10 MG: 5 TABLET ORAL at 20:49

## 2025-05-01 RX ADMIN — MIDODRINE HYDROCHLORIDE 10 MG: 5 TABLET ORAL at 13:19

## 2025-05-01 RX ADMIN — BUDESONIDE 0.5 MG: 0.5 INHALANT RESPIRATORY (INHALATION) at 18:51

## 2025-05-01 RX ADMIN — CEFDINIR 300 MG: 300 CAPSULE ORAL at 12:57

## 2025-05-01 RX ADMIN — ASPIRIN 81 MG: 81 TABLET, COATED ORAL at 08:26

## 2025-05-01 RX ADMIN — IPRATROPIUM BROMIDE 0.5 MG: 0.5 SOLUTION RESPIRATORY (INHALATION) at 11:56

## 2025-05-01 RX ADMIN — MIDODRINE HYDROCHLORIDE 10 MG: 5 TABLET ORAL at 05:55

## 2025-05-01 RX ADMIN — BUDESONIDE 0.5 MG: 0.5 INHALANT RESPIRATORY (INHALATION) at 06:53

## 2025-05-01 RX ADMIN — INSULIN LISPRO 3 UNITS: 100 INJECTION, SOLUTION INTRAVENOUS; SUBCUTANEOUS at 20:48

## 2025-05-01 RX ADMIN — CEFDINIR 300 MG: 300 CAPSULE ORAL at 20:49

## 2025-05-01 RX ADMIN — INSULIN LISPRO 4 UNITS: 100 INJECTION, SOLUTION INTRAVENOUS; SUBCUTANEOUS at 17:42

## 2025-05-01 NOTE — THERAPY TREATMENT NOTE
"Subjective: Pt agreeable to therapeutic plan of care.  Cognition: oriented to Person, Place, and Time    Objective:       Bed Mobility: CGA   Functional Transfers: CGA     Balance: supported, static, and standing CGA  Functional Ambulation: CGA    Upper Body Dressing: Min-A  ADL Position: edge of bed sitting      Vitals: WNL    Pain: 0 VAS Location: n/a  Interventions for pain: N/A  Education: Provided education on the importance of mobility in the acute care setting    Assessment: Vlad Guerrero presents with ADL impairments affecting function including balance, endurance / activity tolerance, and strength. Demonstrated functioning below baseline abilities indicate the need for continued skilled intervention while inpatient. Tolerating session today without incident. Will continue to follow and progress as tolerated.     Plan/Recommendations:   Moderate Intensity Therapy recommended post-acute care. This is recommended as therapy feels the patient would require 3-4 days per week and wouldn't tolerate \"3 hour daily\" rehab intensity. SNF would be the preferred choice. If the patient does not agree to SNF, arrange HH or OP depending on home bound status. If patient is medically complex, consider LTACH.. Pt requires no DME at discharge.     Pt desires Skilled Rehab placement at discharge. Pt cooperative; agreeable to therapeutic recommendations and plan of care.     Modified [place]: N/A = No pre-op stroke/TIA    Post-Tx Position: In bathroom  PPE: gloves    Therapy Charges for Today       Code Description Service Date Service Provider Modifiers Qty    [id number]  OT THERAPEUTIC ACT EA 15 MIN 5/1/2025 Delvin Jain, CELE GO 1           Time Calculation- OT       Row Name 05/01/25 1706             Time Calculation- OT    OT Start Time 1205  -MP      OT Stop Time 1215  -MP      OT Time Calculation (min) 10 min  -MP      Total Timed Code Minutes- OT 10 minute(s)  -MP      OT Received On 05/01/25  -MP      OT - Next " Appointment 05/02/25  -DUGLAS                User Key  (r) = Recorded By, (t) = Taken By, (c) = Cosigned By      Initials Name Provider Type    Delvin Blackwell, OT Occupational Therapist

## 2025-05-01 NOTE — DISCHARGE PLACEMENT REQUEST
"Vlad Guerrero \"Fredi\" (79 y.o. Male)       Date of Birth   1945    Social Security Number       Address   [address] [place] [address]    Home Phone   [phone]    MRN   [id number]       Restoration   Anglican    Marital Status                               Admission Date   4/28/2025    Admission Type   Emergency    Admitting Provider   Octaviano oVss MD    Attending Provider   Allan Fischer MD    Department, Room/Bed   [facility] INPATIENT, 209/1       Discharge Date       Discharge Disposition       Discharge Destination                                 Attending Provider: Allan Fischer MD    Allergies: Penicillins    Isolation: None   Infection: None   Code Status: CPR    Ht: 185.4 cm (73\")   Wt: 72.6 kg (160 lb 0.9 oz)    Admission Cmt: None   Principal Problem: Altered mental status, unspecified [R41.82]                   Active Insurance as of 4/28/2025       Primary Coverage       Payor Plan Insurance Group Employer/Plan Group    ANTHEM MEDICARE REPLACEMENT ANTHEM MEDICARE ADVANTAGE HMO [place] [id number]       Payor Plan Address Payor Plan Phone Number Payor Plan Fax Number Effective Dates    [address] [phone]  1/1/2024 - None Entered    [facility] [address]         Subscriber Name Subscriber Birth Date Member ID       VLAD GUERRERO 1945 [id number]               Secondary Coverage       Payor Plan Insurance Group Employer/Plan Group    ANTHEM [place]ICAID [place] [id number]       Payor Plan Address Payor Plan Phone Number Payor Plan Fax Number Effective Dates    [address]   3/5/2025 - None Entered    [facility] [id number]         Subscriber Name Subscriber Birth Date Member ID       VLAD GUERRERO 1945 [id number]                     Emergency Contacts        (Rel.) Home Phone Work Phone Mobile Phone    CARLY GUERRERO (Son) [phone] -- [phone]    J LUIS GUERRERO (Son) -- -- [phone]    " MAHESH GUERRERO (Son) -- -- [phone]    JESSICA ORTEGA (Relative) [phone] -- [phone]                 History & Physical        Essing-Alysa Snyder APRN at 25       Attestation signed by Octaviano Voss MD at 25 0059      I have reviewed this documentation and agree.    HFrEF hx, will dc IV fluids, given CXR showing pulm edema/ Possible PNA, Presenting with Acute encephalopathy, likely due to infection plus metabolic    Increase ceftriaxone to 2g, start doxycycline for PNA, qtc prolong, pro jolanta elevated, Start Duonebs   Continue Midodrine, will give albumin needed for hypotension.  Monitor Strict I.Os, continue midodrine 10 for Soft BP, bladder scan, montior for urinary retention while on midodrine  If BP remain stable, will give him IV lasix  DC lantus, and premeal insulin, glucose is normla, SSI only for now given poor intake  Follow am labs                            [facility] Medicine Services  History & Physical    Patient Name: Vlad Guerrero  : 1945  MRN: [id number]  Primary Care Physician:  Karsten Ferrari MD  Date of admission: 2025  Date and Time of Service: 2025 at     Subjective      Chief Complaint: confusion    History of Present Illness: Vlad Guerrero is a 79 y.o. male  resident of [facility] with a CMH of colon cancer with metastasis to the liver, history of a stroke, diabetes mellitus type 2, hypertension, left bundle branch block, combined systolic and diastolic heart failure,  echo in 2025 showed ejection fraction of 21 to 25%, who presented to [facility] on 2025 with complaints  from facility staff of increased confusion and rapid heart rate. He was seen here yesterday in the emergency department was also confused but he had a low glucose at that time.  He was discharged from the emergency department.  Per ED provider report from the facility said he had been confused all day today, he had a low blood glucose  "level last night was given some orange juice.he is awake and oriented to self but uncooperative in answering questions. He denies any pain, shortness of air, headache, nausea. He reports he wants to eat. Information primarily obtained by son at bedside who states famly feels he is getting dementia but he has not been evaluated. [facility] not [facility] with him so he does not mention any problems. Son also concerned he does not eat and forgets to eat or drink his nutrition supplements and is getting progressively weaker, Son reports he believes he needs placement in a facility with a higher level of care.     Labs here show a troponin of 50 and then 54, sodium 131,cbc unremarkable, urinalysis negative for infection, CXR per radiology shows \"findings highly concerning for developing multifocal atypical pneumonia superimposed pulmonary edema is not completely excluded\". stable on room air, he is afebrile WBC not elevated.  Blood cultures were subsequently ordered and he was placed on 1 g IV ceftriaxone empirically.  EKG image below, appears to be a regular rhythm with previously seen left bundle branch block.  Troponins are mildly elevated at 5054 were 47 on previous admission likely secondary to ischemic demand from increased rate upon admission.  Troponin T delta percentage was 8.  Ammonia ordered and pending.  CT head without contrast per radiology shows no acute intracranial pathology.  He has no slurred speech, facial droop or focal deficits.  Given past medical history of colon cancer with metastasis to the liver MRI brain ordered and pending.  Last MRI brain 4/26/2024 was negative for metastatic disease.case management has been consulted as well as PT eval and treat. Dietary consult ordered. He will be admitted for further evaluation and treatment     Review of Systems   Unable to perform ROS: Mental status change   Constitutional:         Poor po intake    All other systems reviewed " and are negative.      Personal History     Past Medical History:   Diagnosis Date    Anemia     Colon cancer 2022    colon    Diabetes mellitus     Hyperlipidemia     Hypertension     LBBB (left bundle branch block) 11/14/2024       Past Surgical History:   Procedure Laterality Date    APPENDECTOMY      CARDIAC CATHETERIZATION      CARDIAC CATHETERIZATION N/A 11/14/2024    Procedure: Left Heart Cath, possible pci;  Surgeon: Mg Rahman MD;  Location: [facility] CATH INVASIVE LOCATION;  Service: Cardiovascular;  Laterality: N/A;    COLON RESECTION N/A 12/15/2022    Procedure: COLON RESECTION RIGHT;  Surgeon: Percy Davis MD;  Location: [facility] MAIN OR;  Service: General;  Laterality: N/A;    COLONOSCOPY N/A 11/11/2022    Procedure: COLONOSCOPY WITH BIOPSY AND POLYPECTOMY;  Surgeon: Billy Julien MD;  Location: [facility] ENDOSCOPY;  Service: Gastroenterology;  Laterality: N/A;  Impression:  1.  Large 4-5cm fulgurating circumferential ulcerated mass in the very proximal part of the ascending colon next to ileocecal valve multiple biopsies were performed.  This is highly concerning for colon malignancy.  2.  2 polyp rem    ENDOSCOPY N/A 11/11/2022    Procedure: ESOPHAGOGASTRODUODENOSCOPY with biopsy X1;  Surgeon: Billy Julien MD;  Location: [facility] ENDOSCOPY;  Service: Gastroenterology;  Laterality: N/A;  5.  Upper endoscopy lamination unremarkable.       PORTACATH PLACEMENT Right 1/12/2023    Procedure: INSERTION OF PORTACATH;  Surgeon: Percy Davis MD;  Location: [facility] MAIN OR;  Service: General;  Laterality: Right;    TONSILLECTOMY         Family History: family history includes Colon cancer (age of onset: 62) in his father; Heart disease in his sister; Pneumonia (age of onset: 94) in his mother. Otherwise pertinent FHx was reviewed and not pertinent to current issue.    Social History:  reports that he quit smoking about 59 years ago. His smoking use included cigarettes. He started  smoking about 61 years ago. He has a 2 pack-year smoking history. He has never used smokeless tobacco. He reports that he does not drink alcohol and does not use drugs.    Home Medications:  Prior to Admission Medications       Prescriptions Last Dose Informant Patient Reported? Taking?    ammonium lactate (AMLACTIN) 12 % cream   Yes Yes    Apply 1 g topically to the appropriate area as directed 3 times a day.    aspirin 81 MG EC tablet   Yes Yes    Take 1 tablet by mouth Daily.    atorvastatin (LIPITOR) 40 MG tablet   Yes Yes    Take 1 tablet by mouth Daily.    ferrous gluconate (FERGON) 324 MG tablet   Yes Yes    Take 1 tablet by mouth Daily With Breakfast.    furosemide (LASIX) 20 MG tablet   Yes Yes    Take 1 tablet by mouth Daily.    gabapentin (NEURONTIN) 100 MG capsule   Yes Yes    Take 1 capsule by mouth Every 12 (Twelve) Hours.    insulin aspart (novoLOG FLEXPEN) 100 UNIT/ML solution pen-injector sc pen   Yes Yes    Inject 8 Units under the skin into the appropriate area as directed 3 (Three) Times a Day With Meals.    insulin glargine (LANTUS, SEMGLEE) 100 UNIT/ML injection   Yes Yes    Inject 20 Units under the skin into the appropriate area as directed Every Night.    losartan (COZAAR) 50 MG tablet   Yes Yes    Take 1 tablet by mouth Daily.    Melatonin 3 MG tablet   Yes Yes    Take 1 tablet by mouth Every Night.    metFORMIN (GLUCOPHAGE) 1000 MG tablet   Yes Yes    Take 1 tablet by mouth 2 (Two) Times a Day With Meals.    midodrine (PROAMATINE) 10 MG tablet   No Yes    Take 1 tablet by mouth 3 (Three) Times a Day Before Meals.    ondansetron (ZOFRAN) 8 MG tablet   Yes Yes    Take 1 tablet by mouth Every 8 (Eight) Hours As Needed for Nausea or Vomiting.              Allergies:  Allergies   Allergen Reactions    Penicillins Rash       Objective      Vitals:   Temp:  [97.3 °F (36.3 °C)-98.9 °F (37.2 °C)] 98.9 °F (37.2 °C)  Heart Rate:  [] 109  Resp:  [15-19] 19  BP: (111-126)/(78-82) 117/82  Body  mass index is 21.12 kg/m².  Physical Exam  Vitals reviewed.   Constitutional:       Comments: Frail appearing    HENT:      Head: Normocephalic and atraumatic.      Right Ear: External ear normal.      Left Ear: External ear normal.      Nose: Nose normal.      Mouth/Throat:      Mouth: Mucous membranes are dry.   Eyes:      General: No scleral icterus.     Extraocular Movements: Extraocular movements intact.      Conjunctiva/sclera: Conjunctivae normal.   Cardiovascular:      Rate and Rhythm: Normal rate and regular rhythm.      Heart sounds: Normal heart sounds.   Pulmonary:      Effort: Pulmonary effort is normal.      Breath sounds: Normal breath sounds.   Abdominal:      General: Bowel sounds are normal.      Palpations: Abdomen is soft.   Genitourinary:     Comments: deferred  Musculoskeletal:         General: Normal range of motion.      Cervical back: Normal range of motion and neck supple.   Skin:     General: Skin is warm and dry.      Coloration: Skin is pale.   Neurological:      General: No focal deficit present.      Comments: Oriented to person    Psychiatric:      Comments: Confused, uncooperative with answering questions          Diagnostic Data:      Lab Results (last 24 hours)       Procedure Component Value Units Date/Time    POC Glucose 4x Daily Before Meals & at Bedtime [[id number]]  (Normal) Collected: 04/28/25 2232    Specimen: Blood Updated: 04/28/25 2233     Glucose 100 mg/dL      Comment: Serial Number: [id number]:  [id number]       Respiratory Panel PCR w/COVID-19(SARS-CoV-2) JODY/TICO/KG/PAD/COR/OZ In-House, NP Swab in UTM/VTM, 2 HR TAT - Swab, Nasopharynx [[id number]]  (Normal) Collected: 04/28/25 2057    Specimen: Swab from Nasopharynx Updated: 04/28/25 2149     ADENOVIRUS, PCR Not Detected     Coronavirus 229E Not Detected     Coronavirus HKU1 Not Detected     Coronavirus NL63 Not Detected     Coronavirus OC43 Not Detected     COVID19 Not Detected     Human Metapneumovirus Not  Detected     Human Rhinovirus/Enterovirus Not Detected     Influenza A PCR Not Detected     Influenza B PCR Not Detected     Parainfluenza Virus 1 Not Detected     Parainfluenza Virus 2 Not Detected     Parainfluenza Virus 3 Not Detected     Parainfluenza Virus 4 Not Detected     RSV, PCR Not Detected     Bordetella pertussis pcr Not Detected     Bordetella parapertussis PCR Not Detected     Chlamydophila pneumoniae PCR Not Detected     Mycoplasma pneumo by PCR Not Detected    Narrative:      In the setting of a positive respiratory panel with a viral infection PLUS a negative procalcitonin without other underlying concern for bacterial infection, consider observing off antibiotics or discontinuation of antibiotics and continue supportive care. If the respiratory panel is positive for atypical bacterial infection (Bordetella pertussis, Chlamydophila pneumoniae, or Mycoplasma pneumoniae), consider antibiotic de-escalation to target atypical bacterial infection.    Ammonia [[id number]] Updated: 04/28/25 2124    Specimen: Blood from Arm, Left     POC Glucose STAT [[id number]]  (Normal) Collected: 04/28/25 2101    Specimen: Blood Updated: 04/28/25 2102     Glucose 100 mg/dL      Comment: Serial Number: [id number]:  [id number]       High Sensitivity Troponin T 1Hr [[id number]]  (Abnormal) Collected: 04/28/25 2002    Specimen: Blood from Arm, Left Updated: 04/28/25 2035     HS Troponin T 54 ng/L      Troponin T Numeric Delta 4 ng/L      Troponin T % Delta 8    Narrative:      High Sensitive Troponin T Reference Range:  <14.0 ng/L- Negative Female for AMI  <22.0 ng/L- Negative Male for AMI  >=14 - Abnormal Female indicating possible myocardial injury.  >=22 - Abnormal Male indicating possible myocardial injury.   Clinicians would have to utilize clinical acumen, EKG, Troponin, and serial changes to determine if it is an Acute Myocardial Infarction or myocardial injury due to an underlying chronic condition.          Urinalysis, Microscopic Only - Straight Cath [[id number]]  (Abnormal) Collected: 04/28/25 1919    Specimen: Urine from Straight Cath Updated: 04/28/25 1949     RBC, UA 6-10 /HPF      WBC, UA 0-2 /HPF      Comment: Urine culture not indicated.        Bacteria, UA None Seen /HPF      Squamous Epithelial Cells, UA 0-2 /HPF      Hyaline Casts, UA 0-2 /LPF      Methodology Manual Light Microscopy    Urinalysis With Culture If Indicated - Straight Cath [[id number]]  (Abnormal) Collected: 04/28/25 1919    Specimen: Urine from Straight Cath Updated: 04/28/25 1935     Color, UA Yellow     Appearance, UA Clear     pH, UA <=5.0     Specific Gravity, UA 1.018     Glucose,  mg/dL (1+)     Ketones, UA Negative     Bilirubin, UA Negative     Blood, UA Trace     Protein,  mg/dL (2+)     Leuk Esterase, UA Trace     Nitrite, UA Negative     Urobilinogen, UA 1.0 E.U./dL    Narrative:      In absence of clinical symptoms, the presence of pyuria, bacteria, and/or nitrites on the urinalysis result does not correlate with infection.    Comprehensive Metabolic Panel [[id number]]  (Abnormal) Collected: 04/28/25 1834    Specimen: Blood from Arm, Right Updated: 04/28/25 1921     Glucose 98 mg/dL      BUN 29 mg/dL      Creatinine 0.91 mg/dL      Sodium 131 mmol/L      Potassium 4.7 mmol/L      Chloride 101 mmol/L      CO2 19.1 mmol/L      Calcium 9.2 mg/dL      Total Protein 7.8 g/dL      Albumin 3.7 g/dL      ALT (SGPT) 21 U/L      AST (SGOT) 47 U/L      Alkaline Phosphatase 171 U/L      Total Bilirubin 1.0 mg/dL      Globulin 4.1 gm/dL      A/G Ratio 0.9 g/dL      BUN/Creatinine Ratio 31.9     Anion Gap 10.9 mmol/L      eGFR 86.8 mL/min/1.73     Narrative:      GFR Categories in Chronic Kidney Disease (CKD)      GFR Category          GFR (mL/min/1.73)    Interpretation  G1                     90 or greater         Normal or high (1)  G2                      60-89                Mild decrease (1)  G3a                   45-59                 Mild to moderate decrease  G3b                   30-44                Moderate to severe decrease  G4                    15-29                Severe decrease  G5                    14 or less           Kidney failure          (1)In the absence of evidence of kidney disease, neither GFR category G1 or G2 fulfill the criteria for CKD.    eGFR calculation 2021 CKD-EPI creatinine equation, which does not include race as a factor    Magnesium [[id number]]  (Normal) Collected: 04/28/25 1834    Specimen: Blood from Arm, Right Updated: 04/28/25 1921     Magnesium 2.1 mg/dL     High Sensitivity Troponin T [[id number]]  (Abnormal) Collected: 04/28/25 1834    Specimen: Blood from Arm, Right Updated: 04/28/25 1919     HS Troponin T 50 ng/L     Narrative:      High Sensitive Troponin T Reference Range:  <14.0 ng/L- Negative Female for AMI  <22.0 ng/L- Negative Male for AMI  >=14 - Abnormal Female indicating possible myocardial injury.  >=22 - Abnormal Male indicating possible myocardial injury.   Clinicians would have to utilize clinical acumen, EKG, Troponin, and serial changes to determine if it is an Acute Myocardial Infarction or myocardial injury due to an underlying chronic condition.         Extra Tubes [[id number]] Collected: 04/28/25 1834    Specimen: Blood from Arm, Right Updated: 04/28/25 1845    Narrative:      The following orders were created for panel order Extra Tubes.  Procedure                               Abnormality         Status                     ---------                               -----------         ------                     Lavender Top[[id number]]                                     Final result               Gold Top - SST[[id number]]                                   Final result               Light Blue Top[[id number]]                                   Final result                 Please view results for these tests on the individual orders.    Lavender Top [[id number]] Collected: 04/28/25  1834    Specimen: Blood from Arm, Right Updated: 04/28/25 1845     Extra Tube hold for add-on     Comment: Auto resulted       Gold Top - SST [[id number]] Collected: 04/28/25 1834    Specimen: Blood from Arm, Right Updated: 04/28/25 1845     Extra Tube Hold for add-ons.     Comment: Auto resulted.       Light Blue Top [[id number]] Collected: 04/28/25 1834    Specimen: Blood from Arm, Right Updated: 04/28/25 1845     Extra Tube Hold for add-ons.     Comment: Auto resulted       CBC & Differential [[id number]]  (Abnormal) Collected: 04/28/25 1816    Specimen: Blood from Arm, Right Updated: 04/28/25 1822    Narrative:      The following orders were created for panel order CBC & Differential.  Procedure                               Abnormality         Status                     ---------                               -----------         ------                     CBC Auto Differential[[id number]]        Abnormal            Final result                 Please view results for these tests on the individual orders.    CBC Auto Differential [[id number]]  (Abnormal) Collected: 04/28/25 1816    Specimen: Blood from Arm, Right Updated: 04/28/25 1822     WBC 9.94 10*3/mm3      RBC 4.67 10*6/mm3      Hemoglobin 13.3 g/dL      Hematocrit 42.0 %      MCV 89.9 fL      MCH 28.5 pg      MCHC 31.7 g/dL      RDW 18.0 %      RDW-SD 59.2 fl      MPV 10.5 fL      Platelets 169 10*3/mm3      Neutrophil % 82.6 %      Lymphocyte % 8.9 %      Monocyte % 7.1 %      Eosinophil % 0.6 %      Basophil % 0.4 %      Immature Grans % 0.4 %      Neutrophils, Absolute 8.21 10*3/mm3      Lymphocytes, Absolute 0.88 10*3/mm3      Monocytes, Absolute 0.71 10*3/mm3      Eosinophils, Absolute 0.06 10*3/mm3      Basophils, Absolute 0.04 10*3/mm3      Immature Grans, Absolute 0.04 10*3/mm3      nRBC 0.0 /100 WBC              Imaging Results (Last 24 Hours)       Procedure Component Value Units Date/Time    XR Chest 1 View [[id number]] Collected: 04/28/25 2058      Updated: 04/28/25 2101    Narrative:      XR CHEST 1 VW    Date of Exam: 4/28/2025 8:35 PM EDT    Indication: cough ams    Comparison: 4/1/2025    Findings:  Lines: Right-sided port is unchanged in position.    Lungs: Patchy opacities scattered throughout the lungs, most significantly within the peripheral aspect of the upper lobes bilaterally, concerning for multifocal pneumonia.  Pleura: Small bilateral pleural effusions. No pneumothorax    Cardiomediastinum: The cardiomediastinal silhouette is normal    Soft Tissues: Unremarkable.    Bones: No acute osseous abnormality.      Impression:      Impression:  Findings highly concerning for developing multifocal/atypical pneumonia. Superimposed pulmonary edema is not completely excluded      Electronically Signed: Billy Ribeiro DO    4/28/2025 8:59 PM EDT    Workstation ID: [id number]    CT Head Without Contrast [[id number]] Collected: 04/28/25 1804     Updated: 04/28/25 1808    Narrative:      CT HEAD WO CONTRAST    Date of Exam: 4/28/2025 6:01 PM EDT    Indication: ams.    Comparison: 11/11/2024    Technique: Axial CT images were obtained of the head without contrast administration.  Coronal reconstructions were performed.  Automated exposure control and iterative reconstruction methods were used.    Findings: No intracranial hemorrhage. Gray-white matter differentiation is maintained without evidence of an acute infarction. Multiple foci of decreased attenuation are present within the subcortical, deep cerebral, and periventricular white matter   consistent with chronic small vessel/microangiopathic ischemic changes. No extra-axial mass or collection. The ventricles and sulci are prominent commensurate with involutional changes. The posterior fossa appears grossly normal. Sellar and suprasellar   structures are normal.    Orbital and periorbital soft tissues are normal. Mucoid retention cyst or polyp within the left maxillary sinus. The bony calvarium is intact.       Impression:      Impression: No acute intracranial pathology.          Electronically Signed: Demarcus Tang MD    4/28/2025 6:06 PM EDT    Workstation ID: [id number]              Assessment & Plan        This is a 79 y.o. male with:    Active and Resolved Problems  Active Hospital Problems    Diagnosis  POA    **Altered mental status, unspecified [R41.82]  Yes    Metastases to the liver [C78.7]  Yes     Priority: Medium    Hyponatremia [E87.1]  Yes    Elevated troponin [R79.89]  Yes    General weakness [R53.1]  Yes    LBBB (left bundle branch block) [I44.7]  Yes    Chronic combined systolic and diastolic congestive heart failure [I50.42]  Yes    Malignant neoplasm of ascending colon [C18.2]  Yes    Primary hypertension [I10]  Yes    Mixed hyperlipidemia [E78.2]  Yes    Type 2 diabetes mellitus [E11.9]  Yes      Resolved Hospital Problems   No resolved problems to display.       Altered mental status progressive increased confusion, CT head negative for acute, no focal deficits, urinalysis negative for infection, chest x-ray indicates possible atypical pneumonia, blood cultures ordered and pending, afebrile 1 g IV ceftriaxone empirically, stable on room air, MRI brain ordered and pending given history of colon cancer with metastasis to liver, consider neurology/psychiatry consult for dementia workup, ammonia ordered and pending    Malignant neoplasm of ascending colon with mets to liver, followed by oncology hematology not currently on chemotherapy or immunotherapy    Hyponatremia, sodium 131, normal saline at 75 cc/h repeat BMP in a.m.    Elevated troponin, 58 and 54 denies chest pain EKG shows known left bundle branch block no acute ST elevation or depression likely secondary to ischemic demand recent 2D echo showed ejection fraction 21 to 25%, continuous cardiac monitoring consider cardiology consult    Generalized weakness, chronic progressive likely multifactorial including neoplasm with metastasis poor p.o.  intake possible dementia, dietary services consulted for malnutrition survey, PT eval and treat, case management consulted for higher level of long-term care facility    Chronic combined systolic and diastolic heart failure, no bilateral lower extremity edema stable on room air, reordered home Lasix    Hyperlipidemia on statin    Type 2 diabetes mellitus, reordered home insulin add SSI as needed with Accu-Cheks ACHS consistent carb heart healthy diet    Hypertension/orthostatic hypotension, on home midodrine and losartan monitor BP    VTE Prophylaxis:  Mechanical VTE prophylaxis orders are present.        The patient desires to be as follows:    CODE STATUS:    Code Status (Patient has no pulse and is not breathing): CPR (Attempt to Resuscitate)  Medical Interventions (Patient has pulse or is breathing): Full Support            Admission Status:  I believe this patient meets inpatient  status.    Expected Length of Stay: pending clinical course     PDMP and Medication Dispenses via Sidebar reviewed and consistent with patient reported medications.    I discussed the patient's findings and my recommendations with patient and family.      Signature:     This document has been electronically signed by ANEUDY Clemens on 2025 23:11 EDT   [facility] Hospitalist Team    Electronically signed by Octaviano Voss MD at 25 0059       Operative/Procedure Notes (all)    No notes of this type exist for this encounter.          Physician Progress Notes (last 48 hours)        Allan Fischer MD at 25 1322              [facility] MEDICINE SERVICE  DAILY PROGRESS NOTE    NAME: Vlad Guerrero  : 1945  MRN: [id number]      LOS: 3 days     PROVIDER OF SERVICE: Allan Fischer MD    Chief Complaint: Altered mental status, unspecified    Subjective:     Interval History: Patient continues to do well today.  He was once again sitting up in the chair eating breakfast.  He states his  breathing has improved significantly.  He feels back to his baseline mental status.        Review of Systems:   Review of Systems    Objective:     Vital Signs  Temp:  [96.8 °F (36 °C)-98.8 °F (37.1 °C)] 97.9 °F (36.6 °C)  Heart Rate:  [] 88  Resp:  [14-27] 18  BP: ()/(60-78) 111/74   Body mass index is 21.12 kg/m².    Physical Exam  Physical Exam  General Appearance:  Alert, cooperative, no distress, appears stated age  Head:  Normocephalic, without obvious abnormality, atraumatic  Eyes:  PERRL, conjunctiva/corneas clear, EOM's intact, fundi benign, both eyes  Ears:  Normal TM's and external ear canals, both ears  Nose: Nares normal, septum midline, mucosa normal, no drainage or sinus tenderness  Throat: Lips, mucosa, and tongue normal; teeth and gums normal  Neck: Supple, symmetrical, trachea midline, no adenopathy, thyroid: not enlarged, symmetric, no tenderness/mass/nodules, no carotid bruit or JVD  Lungs:   Clear to auscultation bilaterally, respirations unlabored  Heart:  Regular rate and rhythm, S1, S2 normal, no murmur, rub or gallop  Abdomen:  Soft, non-tender, bowel sounds active all four quadrants,  no masses, no organomegaly  Extremities: Extremities normal, atraumatic, no cyanosis or edema  Pulses: 2+ and symmetric  Skin: Skin color, texture, turgor normal, no rashes or lesions  Neurologic: Moves all extremities spontaneously         Diagnostic Data    Results from last 7 days   Lab Units 05/01/25  0425 04/29/25  0601 04/28/25  1834   WBC 10*3/mm3 7.34   < >  --    HEMOGLOBIN g/dL 11.9*   < >  --    HEMATOCRIT % 37.8   < >  --    PLATELETS 10*3/mm3 145   < >  --    GLUCOSE mg/dL 244*   < > 98   CREATININE mg/dL 0.73*   < > 0.91   BUN mg/dL 26*   < > 29*   SODIUM mmol/L 137   < > 131*   POTASSIUM mmol/L 3.5   < > 4.7   AST (SGOT) U/L  --   --  47*   ALT (SGPT) U/L  --   --  21   ALK PHOS U/L  --   --  171*   BILIRUBIN mg/dL  --   --  1.0   ANION GAP mmol/L 7.0   < > 10.9    < > = values in  this interval not displayed.       No radiology results for the last day        I reviewed the patient's new clinical results.    Assessment/Plan:     Active and Resolved Problems  Active Hospital Problems    Diagnosis  POA    **Altered mental status, unspecified [R41.82]  Yes    Hyponatremia [E87.1]  Yes    Elevated troponin [R79.89]  Yes    General weakness [R53.1]  Yes    LBBB (left bundle branch block) [I44.7]  Yes    Chronic combined systolic and diastolic congestive heart failure [I50.42]  Yes    Metastases to the liver [C78.7]  Yes    Malignant neoplasm of ascending colon [C18.2]  Yes    Primary hypertension [I10]  Yes    Mixed hyperlipidemia [E78.2]  Yes    Type 2 diabetes mellitus [E11.9]  Yes      Resolved Hospital Problems   No resolved problems to display.       Sepsis due to probable community-acquired bacterial pneumonia, unspecified organism  - Imaging on admission with chest x-ray consistent with multifocal pneumonia  - Initiated on broad-spectrum IV antibiotics with ceftriaxone, doxycycline but doxycycline now discontinued  - Patient was febrile with tachycardia on presentation  - Follow-up cultures  - Encourage pulmonary toileting with I-S, flutter valve if patient can perform this    Acute metabolic encephalopathy  - Likely secondary to underlying infectious process but patient was also hypoglycemic on admission  - Continue antibiotics as above for infectious process  - Holding long-term insulin and continue low-dose sliding scale for now until patient's p.o. intake and glucose levels are more stable  - MRI of the brain does not show any acute pathology  - Patient's mental status is now back to baseline    Metastatic colon cancer with liver metastasis  - Patient is apparently currently on treatment for this per the patient's family  - Will consult oncology for further assistance  - MRI of the brain does not show any metastatic disease    Chronic combined systolic and diastolic heart failure  -  Appears euvolemic despite elevated BNP  - Continue home Lasix    Dyslipidemia  - Continue statin therapy    DM type II, with hypoglycemia  - As above, patient was having hypoglycemic episodes prior to admission  - Holding Lantus and continue low-dose sliding scale insulin    Hypertension with orthostatic hypotension  - Continue home midodrine dose but holding losartan as patient was borderline hypotensive on admission    VTE Prophylaxis:  Mechanical VTE prophylaxis orders are present.             Disposition Planning:     Barriers to Discharge: Improvement in mental status, treatment of pneumonia  Anticipated Date of Discharge:   Place of Discharge: SNF      Time: 45 minutes     Code Status and Medical Interventions: CPR (Attempt to Resuscitate); Full Support   Ordered at: 25     Code Status (Patient has no pulse and is not breathing):    CPR (Attempt to Resuscitate)     Medical Interventions (Patient has pulse or is breathing):    Full Support       Signature: Electronically signed by Allan Fischer MD, 25, 13:22 EDT.  [facility] Hospitalist Team    Electronically signed by Allan Fischer MD at 25 1323       Don Kramer MD at 25 1657                Hematology/Oncology Inpatient Progress Note    PATIENT NAME: Vlad Guerrero  : [birth year]  MRN: [id number]    CHIEF COMPLAINT: Confusion and weakness.     HISTORY OF PRESENT ILLNESS:      2023: Mr. Guerrero dated the beginning of his present illness to sometime at the end of  when he started to feel fatigued.  He was seen at the [facility] and had laboratory exams that revealed microcytic anemia.  He had evidence of iron deficiency and received intravenous iron in the hospital.  He had upper and lower gastrointestinal endoscopies that demonstrated a cecal tumor that measured 4 to 5 cm and was fungating in appearance.  It was circumferential and was next to the ileocecal valve.  The upper gastrointestinal endoscopy  revealed no abnormalities.  On this basis he was on December 15, 2022 he was taken to the hospital and underwent a right hemicolectomy without complications.  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  It corresponded to a grade 2 malignancy that invaded through the muscularis propria into the pericolonic tissues.  Macroscopic tumor perforation or lymphovascular space invasion were not present.  Perineural invasion was not present either.  All margins of excision were negative.  All of 36 lymph nodes submitted to were positive for involvement with malignancy.  The disease was Deuel staged as [id number].  Loss of expression of mismatch repair enzymes was not documented.  Mr. Guerrero was discharged to continue treatment as outpatient.  At the time of this visit he was recovering well from the surgery.  His incision had healed completely and he had no drains or suture materials.  He had returned home and was eating well.  He had yet to return to work.  He had been afebrile and free of nausea.  For the most part his weight had been stable.  After reviewing the records a long conversation, of approximately 1 hour, was had with the patient in regards to options of treatment.  The nature of his disease as well as the likelihood of recurrence were described.  The use of adjuvant chemotherapy to increase the rate of cure after surgery was explained.  Side effects were described in detail.  The need for a port was expressed as well.  A treatment plan was placed. A decision was made to treat him with three months of CAPOX given the characteristics of his disease.      2/6/2023: Received the first cycle of adjuvant chemotherapy without any side effects. On the day of this visit feeling well and without any new symptoms, except a very mild rash, especially on the forearms, but no oral pain. Had stools of diminished consistency for a few days  but now resolved. The exam was rather unremarkable, except for a few very light erythematous macules on the forearms.      2/27/2023: Feeling well and without new symptoms.  As active as before.  Eating well and without unintended weight loss.  Stronger and more energetic since the institution of vitamin B12 and iron.  No chest pains and cough.  No abdominal pain or diarrhea.  On exam no changes.  A decision was made to continue with the same treatment.  Laboratory exams were reviewed.  He was to see me again in approximately 4 weeks from this date with new scans.     3/27/2023: Suffered an ischemic stroke.  MRI on March 10, 2023 reported a 7 mm focus of restricted diffusion in the left periventricular white matter of the posterior left frontal lobe.  This was felt to be suspicious for an acute/subacute infarct.  There was also evidence of previous lacunar strokes.  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  There was bilateral internal carotid artery stenosis with 60% stenosis estimated at the right and not greater than 50% at the left.  Chemotherapy was held following discharge.  He was left without any sequela.  At the time of this visit he was entirely asymptomatic.  He was convinced a large part of his problem was that he had suffered from hyperglycemia, consistently for some time.  Indeed his glucose was between 152 mg/dL and 193 mg/dL in the preceding days.  However, he reported at home glucose readings of greater than 400 mg/dL..  On exam there were no changes.  The laboratory exams reported a blood count with normocytic anemia and mild thrombocytopenia.  In light of his history of T3N1, moderately differentiated colonic adenocarcinoma, without risk factors including lymphovascular space invasion, that had been excised with negative  margins, 3 months of chemotherapy were still felt to be appropriate and plans to give him the last cycle were made.     4/14/2023: Completed 3 months of treatment with CAPOX.  On the day of this visit not feeling very well.  Weak and tired.  Not very good appetite although eating well.  Having some dysgeusia.  Afebrile.  No chest pains or cough and no abdominal pain.  Had maintain regular bowel activity.  No edema.  On exam no changes.  A decision was made to stop the chemotherapy.  He was to get intravenous fluids on the day of this visit.  To return to see me in 3 weeks.  Tentatively to have scans in early June 2023.     5/5/2023: Feeling progressively stronger. Eating better and slowly regaining weight. Afebrile. No more nausea. No diarrhea and now with regular bowel activity. On exam alert, conversant and well oriented. No pale or jaundice. No oral lesions and no palpable lymph nodes. Lungs clear and abdomen soft. Minimal edema of the right lower extremity. The laboratory exams revealed persistent anemia with a tendency to macrocytosis. Hemoglobin and platelets within normal ranges. A decision was made to continue to observe and I asked him to see me in approximately 3 months with new scans.      8/7/2023: Feels as well as at the time of the last visit. Active and returned to work full time. Eating well and no nausea or vomiting. No chest pain or cough and no abdominal pain. On exam no changes, though he had lost a large amount of weight since the previous visit. The imaging studies suggested a new lesion in the liver, as well as several lesions in the spleen of unclear cause. Reviewed the images and the report of the scans. Discussed with him at length and explained the findings. Discussed with him the plans for a MRI. He will see me with results.      8/28/2023: Entirely asymptomatic.  Working without limitations.  Eating well.  Weight stable.  Afebrile.  No pain.  On exam no changes.  Laboratory exams were  reviewed and discussed with him.  In spite of the fact things on the scans the Carcinoembryonic antigen has not increased.  However both the CT and the MRI suggest one isolated metastatic deposit in segment 8 of the liver.  Discussed with him the options at this time.  I believe a biopsy is essential and after that he would be treated with chemotherapy following which surgery, if no new lesions have appeared, could be considered.  He may still be curable even in the setting of metastatic disease.  Discussed with him at great length.  Explained the steps.  Sent a communication to Dr. Davis, the patient's surgeon.     9/11/2023: Feels about the same as before.  No new symptoms.  As active as before and working.  Eating well and with good appetite.  No unintended weight loss.  Afebrile.  On exam alert, conversant and in good spirits.  No distress.  No jaundice or pallor.  Lungs clear and heart regular.  Abdomen soft.  The liver is not palpable.  No edema.  Laboratory exams were reviewed.  The liver biopsy report confirms metastatic colorectal cancer.  This appears to be an isolated metastases.  On this basis treatment with chemotherapy followed by surgery is not ordered.  I have asked him to have a PET scan and discussed the study with him.  Discussed the objectives of the treatment and its requirements.  Placed the treatment plan with 5 fluorouracil, irinotecan and panitumumab and discussed with him.  To begin as soon as possible.     9/25/2023: In the office before the next scheduled time.  He called to report that over the weekend he had had between 5 and 8 defecations of liquid stool.  He took loperamide irregularly and it did not seem to provide much relief.  He was able to eat and drink without any difficulties and was never nauseated.  He was seen in the emergency room on 9/24/2023 and he was not found to have any dehydration or other evidence of complications.  They discharged him.  At the time of this  visit feeling better.  As of this time he had not had any liquid defecations.  He had continued to eat and drink fluids without any problems.  He had no chest pains and had not had any dyspnea.  He was also free of abdominal pain.  On exam alert, oriented and conversant.  Seemed well-hydrated and was not jaundiced or pale.  Lungs clear.  Heart regular.  Abdomen soft.  A decision was made to continue with the same plan.  I independently reviewed the images of the PET scan and discussed with him.  It reveals only an abnormal lesion in the liver as identified before.  No suggestion of distant metastatic disease.  Discussed with him the significance of this and explained the possibility of surgery and potentially cure.     11/15/2023: Completed 4 cycles of FOLFIRI/panitumumab.  Experienced the expected side effects, particularly skin rash.  However, the treatment proceeded without major complications.  Today he tells me he has been feeling reasonably well and has continued to work part-time.  He enjoys his job.  He has been eating about as much as he had been eating before but he has lost some weight without intention.  He did have persistent diarrhea that responded only to large doses of loperamide.  At this point he no longer has diarrhea.  He has been without chest pains or cough and denies as well abdominal pain.  On exam he still has some erythema on the nasolabial regions on both sides.  The lungs are clear.  The heart is regular and the abdomen soft.  Liver and spleen do not seem to be enlarged.  The laboratory exams were reviewed and discussed with him.  To have another PET scan and consider surgical excision.  He will need to go to [place] for this.     12/11/2023: Feeling reasonably well at this time without any new complaints.  His diarrhea is essentially resolved although he still has soft defecations intermittently.  He is eating well and has a good appetite.  He has had no chest pains and has been  "without cough.  He denies abdominal pain.  No melena, hematochezia or hematuria.  No peripheral edema.  On exam no changes.  The PET scan reveals complete response with no residual evidence of disease activity.  I have discussed with Dr. Praveen Whittaker in [place] and he requested that a MRI be done prior to deciding on surgery.  Discussed with Mr. Guerrero.  Explained the plans.  He is to have the MRI and will see me with the results.     12/27/2023: Without new symptoms.  Has not had the MRI because of scheduling problems.  He feels lightheaded at times and \"I am not as sure footed as I was before\".  He has had no falls and he continues to work full-time.  He has been eating well and has regained some of the weight that he had lost.  He has been afebrile.  No chest pains or cough.  No abdominal pain or diarrhea and no dysuria.  No skin rash.  On exam no changes.  The laboratory exams were reviewed and discussed with him.  To reschedule the MRI for the next couple of weeks.  Discussed with him.     3/18/2024: Feeling very well. His appetite is good and he has gained weight. He has been working without any difficulties. He denies chest pain or cough. No abdominal pain or diarrhea and no dysuria. On exam alert and conversant. In good spirits and in no distress. No jaundice. No oral lesions and respirations not labored. The lungs are clear and the heart is regular. Abdomen is soft and not tender. The laboratory exams reveal a blood count in the normal ranges. He persists with hyperglycemia. The alkaline phosphatase has risen some in the recent past. He is to have the MRI of the liver. He will see me with the results.      6/26/2024: Back after an absence of a few weeks and a couple of missed appointments.  He feels about the same as he felt before.  Following the last admission to the hospital he was transferred to an assisted care living facility where he has been doing progressively better.  Persists with tremors.  " Eats well.  Has not lost weight.  Denies abdominal pain.  Has not had diarrhea, melena or hematochezia.  On exam in no distress.  No jaundice.  Not pale.  The lungs are clear bilaterally and the heart regular.  The abdomen is soft and without hepatomegaly or splenomegaly.  No edema.  The laboratory exams were reviewed.  To obtain a Carcinoembryonic antigen today.  He will see me in a couple of weeks with new scans as it has been more than 3 months since the last 1.  Consider treatment with an irinotecan based regimen that seem to result in a very pronounced response in the recent past.     7/12/2024: Back to review the results of the recent scans.  He feels well generally and continues to be as active as before.  As well, his appetite appears to be the same.  On exam he does not seem ill and he is conversant and well-oriented.  He is neither pale nor jaundiced and he seems well-hydrated.  The lungs are clear and the heart regular.  The abdomen is soft.  There is no edema.  Laboratory exams reviewed.  Reviewed the images and the report of the scans.  He has 2 metastatic deposits in the liver and no other evidence of metastatic dissemination of his malignancy.  I believe it reasonable to treat him again with chemotherapy and consider some form of local therapy in the future, given how localized the disease is.  Will resume chemotherapy with irinotecan, cetuximab and 5-fluorouracil.  No 5-FU bolus.  I will see him again in approximately 4 weeks.     9/9/2024: Tolerating the chemotherapy well.  So far he has had 4 cycles without any undue side effects and no complications.  He does have a rash that is mainly around the eyes, nose and mouth but very little on the chest and the back.  It is not uncomfortable and he has been receiving treatment with topical clindamycin and sun protection.  He continues to eat well and his weight has increased slightly.  He has no chest pain and no abdominal pain.  He has diarrhea only  intermittently.  On exam indeed he has an erythematous rash with a few papules but no pustules at this time.  No oral lesions.  Respirations not labored.  Lungs clear bilaterally.  Heart regular.  Abdomen soft.  No edema.  Laboratory exams reviewed.  I reviewed the recent scans of the abdomen and pelvis.  No suggestion of metastatic disease in the chest or the pelvis.  In the liver there are 2 lesions previously identified have decreased in size some.  To continue with the same treatment and see me in approximately 4 weeks.     10/10/2024: Feels reasonably well.  Has continued to have a skin rash but there are no associated symptoms to it and it is not any more extensive than before.  Perhaps it is even less extensive than before.  He maintains a good appetite.  He continues to receive care at the assisted living facility and he has had no difficulties.  No chest pains or cough.  No abdominal pain or diarrhea.  On exam alert and conversant, in good spirits and well-oriented.  No jaundice.  Indeed there is diffuse erythema on the face and the conjunctivae are somewhat erythematous.  No discharge.  The lungs are clear and the heart regular.  The abdomen is soft nontender.  Liver and spleen are not enlarged.  There is no edema.  Laboratory exams reviewed.  To continue with the same treatment.  Obtain scans after the next chemotherapy and see me with the results.  Consider radioembolization of the liver.  I have discussed with Dr. Vlad carmichael for coordination of care.     3/19/2025: Back after some time of absence.  He has not had treatment in some time.  He was recently admitted to the hospital with evidence of congestive heart failure and pulmonary edema.  He was discharged feeling better and today he feels much better.  He has had no pain.  He has lost some weight.  He is less active.  Denies chest pains, cough, abdominal pain, diarrhea or dysuria.  On exam alert and conversant.  Oriented and in no distress.  No  jaundice.  Lungs are clear bilaterally.  Heart is regular.  Abdomen is soft.  No edema.  I reviewed all the recent imaging studies including those obtained while at the hospital.  There is clear progression of the 2 lesions.  They remain only 2 lesions and there is no suggestion of metastatic disease outside of the liver.  Because of his poor tolerance and dislike of the chemotherapy I have referred him for consideration of stereotactic radiosurgery.  I have discussed that radioembolization in the past with him but I think it would be too demanding for him.  I believe stereotactic radiosurgery may be easier to tolerate.  It may allow him to not receive any treatment for a period of time, without progression.     Patient was seen by radiation oncology with plans for MRI of liver for restaging, updated laboratory testing to assess liver function, bilateral thoracentesis given shortness of breath and increase in size of effusions as well as IR referral to discuss fiducial marker placement and/or ablation of the liver metastases, especially the left liver lobe lesion given the close proximity to the stomach.  It was recommended to proceed with fiducial marker placement and potential embolization.  Following that, radiation oncology would likely complete hypofractionated course of radiation therapy.    4/29/2025: I've known Mr. Guerrero since early in 2023, when he presented to discuss adjuvant chemotherapy after surgery for a stage III colon cancer. Despite adjuvant treatment he had rapid progression of his disease with metastatic involvement of the liver. He was started on palliative chemotherapy which he tolerated with some difficulties but resulted in a dramatic response. I then lost him to follow up for some time. When he returned he had again progressive disease. There was a new good response with reasonable tolerance. Since the end of 2024 he has been off treatment. He has seemed to decline slowly and in fact has  suffered at least one cerebrovascular event. He had to move to an assisted living facility. He was brought in the hospital with weakness and confusion. On exam he is alert and conversant. He is cooperative but is not well oriented. He has very prominent coarse tremors that are new from before. No jaundice. He seems well hydrated. No oral lesions and respirations not labored. The abdomen is soft. There are no palpable tumors and there is no edema. His laboratory exams are not particularly remarkable. His ammonia is within the normal range. His lactic acid was elevated at the time of the admission and he is now on antibiotics, despite which there has not been a rapid improvement. In early April of this year he underwent a MRI of the liver reported one isolated metastasis that had not progressed. He was in the process of investigating whether stereotactic radiosurgery to the liver was possible. At this point I'm not sure why he is so confused and tremulous. Thre is not a good reason to suspect liver failure and his ammonia is unremarkable. Sepsis? Recent microbiology studies reveal no findings. My strongest inclination at this time is to consider Mr. Guerrero's picture to correspond to drug toxicity. For some time he has been on gabapentin and this is the strongest candidate of the medications he had been taking. It is on hold at this time. After reviewing the record I don't believe that imaging to reassess the neoplastic process is justified at this time. Will re-check TSH and B12.     Subjective  4/30/2025: Today feeling somewhat better. Still weak and very tremulous. Was able to eat last evening.     ROS:  Review of Systems   Neurological:  Positive for weakness.        MEDICATIONS:    Scheduled Meds:  aspirin, 81 mg, Oral, Daily  atorvastatin, 40 mg, Oral, Daily  budesonide, 0.5 mg, Nebulization, BID - RT  cefTRIAXone, 2,000 mg, Intravenous, Nightly  [Held by provider] gabapentin, 100 mg, Oral, Q12H  insulin lispro,  "2-7 Units, Subcutaneous, 4x Daily AC & at Bedtime  ipratropium, 0.5 mg, Nebulization, 4x Daily - RT  [Held by provider] losartan, 50 mg, Oral, Daily  midodrine, 10 mg, Oral, Q8H       Continuous Infusions:      PRN Meds:    dextrose    dextrose    glucagon (human recombinant)    ipratropium    ondansetron    [COMPLETED] Insert Peripheral IV **AND** sodium chloride     ALLERGIES:    Allergies   Allergen Reactions    Penicillins Rash       Objective   VITALS:   /71 (BP Location: Left arm, Patient Position: Sitting)   Pulse 92   Temp 96.9 °F (36.1 °C) (Axillary)   Resp 22   Ht 185.4 cm (73\")   Wt 72.6 kg (160 lb 0.9 oz)   SpO2 97%   BMI 21.12 kg/m²     PHYSICAL EXAM: (performed by MD)  Physical Exam  Constitutional:       General: He is not in acute distress.     Appearance: He is ill-appearing. He is not toxic-appearing or diaphoretic.      Comments: Prostrated and ill. Pale but not jaundiced. Responsive and cooperative. No distress.    HENT:      Head: Normocephalic and atraumatic.      Right Ear: External ear normal.      Left Ear: External ear normal.      Nose: Nose normal.      Mouth/Throat:      Mouth: Mucous membranes are moist.      Pharynx: Oropharynx is clear.   Eyes:      General: No scleral icterus.        Right eye: No discharge.         Left eye: No discharge.      Conjunctiva/sclera: Conjunctivae normal.      Pupils: Pupils are equal, round, and reactive to light.   Cardiovascular:      Rate and Rhythm: Normal rate and regular rhythm.      Pulses: Normal pulses.      Heart sounds: Normal heart sounds. No murmur heard.     No friction rub. No gallop.   Pulmonary:      Effort: No respiratory distress.      Breath sounds: No stridor. No wheezing, rhonchi or rales.   Chest:      Chest wall: No tenderness.   Abdominal:      General: Abdomen is flat. Bowel sounds are normal. There is no distension.      Palpations: Abdomen is soft. There is no mass.      Tenderness: There is no abdominal " tenderness. There is no right CVA tenderness, left CVA tenderness, guarding or rebound.   Musculoskeletal:         General: No tenderness, deformity or signs of injury.      Cervical back: No rigidity.      Right lower leg: No edema.      Left lower leg: No edema.   Lymphadenopathy:      Cervical: No cervical adenopathy.   Skin:     General: Skin is warm and dry.      Coloration: Skin is pale. Skin is not jaundiced.      Findings: No bruising or rash.   Neurological:      General: No focal deficit present.      Mental Status: He is alert and oriented to person, place, and time.      Cranial Nerves: No cranial nerve deficit.   Psychiatric:         Behavior: Behavior normal.   KATIE Kramer MD performed the physical exam on 4/30/2025 as documented above.     RECENT LABS:  Lab Results (last 24 hours)       Procedure Component Value Units Date/Time    POC Glucose Once [[id number]]  (Abnormal) Collected: 04/30/25 1250    Specimen: Blood Updated: 04/30/25 1252     Glucose 247 mg/dL      Comment: Serial Number: [id number]:  [id number]       POC Glucose 4x Daily Before Meals & at Bedtime [[id number]]  (Abnormal) Collected: 04/30/25 0725    Specimen: Blood Updated: 04/30/25 0727     Glucose 291 mg/dL      Comment: Serial Number: [id number]:  [id number]       Respiratory Panel PCR w/COVID-19(SARS-CoV-2) JODY/TCIO/KG/PAD/COR/OZ In-House, NP Swab in UTM/VTM, 2 HR TAT - Swab, Nasopharynx [[id number]]  (Normal) Collected: 04/30/25 0534    Specimen: Swab from Nasopharynx Updated: 04/30/25 0633     ADENOVIRUS, PCR Not Detected     Coronavirus 229E Not Detected     Coronavirus HKU1 Not Detected     Coronavirus NL63 Not Detected     Coronavirus OC43 Not Detected     COVID19 Not Detected     Human Metapneumovirus Not Detected     Human Rhinovirus/Enterovirus Not Detected     Influenza A PCR Not Detected     Influenza B PCR Not Detected     Parainfluenza Virus 1 Not Detected     Parainfluenza Virus 2 Not Detected      Parainfluenza Virus 3 Not Detected     Parainfluenza Virus 4 Not Detected     RSV, PCR Not Detected     Bordetella pertussis pcr Not Detected     Bordetella parapertussis PCR Not Detected     Chlamydophila pneumoniae PCR Not Detected     Mycoplasma pneumo by PCR Not Detected    Narrative:      In the setting of a positive respiratory panel with a viral infection PLUS a negative procalcitonin without other underlying concern for bacterial infection, consider observing off antibiotics or discontinuation of antibiotics and continue supportive care. If the respiratory panel is positive for atypical bacterial infection (Bordetella pertussis, Chlamydophila pneumoniae, or Mycoplasma pneumoniae), consider antibiotic de-escalation to target atypical bacterial infection.    Basic Metabolic Panel [[id number]]  (Abnormal) Collected: 04/30/25 0209    Specimen: Blood Updated: 04/30/25 0256     Glucose 274 mg/dL      BUN 30 mg/dL      Creatinine 0.95 mg/dL      Sodium 138 mmol/L      Potassium 4.2 mmol/L      Chloride 102 mmol/L      CO2 24.5 mmol/L      Calcium 8.5 mg/dL      BUN/Creatinine Ratio 31.6     Anion Gap 11.5 mmol/L      eGFR 82.4 mL/min/1.73     Narrative:      GFR Categories in Chronic Kidney Disease (CKD)      GFR Category          GFR (mL/min/1.73)    Interpretation  G1                     90 or greater         Normal or high (1)  G2                      60-89                Mild decrease (1)  G3a                   45-59                Mild to moderate decrease  G3b                   30-44                Moderate to severe decrease  G4                    15-29                Severe decrease  G5                    14 or less           Kidney failure          (1)In the absence of evidence of kidney disease, neither GFR category G1 or G2 fulfill the criteria for CKD.    eGFR calculation 2021 CKD-EPI creatinine equation, which does not include race as a factor    CBC & Differential [[id number]]  (Abnormal) Collected:  04/30/25 0209    Specimen: Blood Updated: 04/30/25 0236    Narrative:      The following orders were created for panel order CBC & Differential.  Procedure                               Abnormality         Status                     ---------                               -----------         ------                     CBC Auto Differential[[id number]]        Abnormal            Final result                 Please view results for these tests on the individual orders.    CBC Auto Differential [[id number]]  (Abnormal) Collected: 04/30/25 0209    Specimen: Blood Updated: 04/30/25 0236     WBC 9.01 10*3/mm3      RBC 4.84 10*6/mm3      Hemoglobin 13.6 g/dL      Hematocrit 42.9 %      MCV 88.6 fL      MCH 28.1 pg      MCHC 31.7 g/dL      RDW 18.2 %      RDW-SD 58.7 fl      MPV 10.7 fL      Platelets 180 10*3/mm3      Neutrophil % 72.7 %      Lymphocyte % 13.8 %      Monocyte % 12.9 %      Eosinophil % 0.3 %      Basophil % 0.2 %      Immature Grans % 0.1 %      Neutrophils, Absolute 6.55 10*3/mm3      Lymphocytes, Absolute 1.24 10*3/mm3      Monocytes, Absolute 1.16 10*3/mm3      Eosinophils, Absolute 0.03 10*3/mm3      Basophils, Absolute 0.02 10*3/mm3      Immature Grans, Absolute 0.01 10*3/mm3      nRBC 0.0 /100 WBC     Vitamin B12 [[id number]]  (Normal) Collected: 04/28/25 1834    Specimen: Blood from Arm, Right Updated: 04/30/25 0125     Vitamin B-12 363 pg/mL     Narrative:      Results may be falsely increased if patient taking Biotin.      Blood Culture - Blood, Arm, Left [[id number]]  (Normal) Collected: 04/28/25 2339    Specimen: Blood from Arm, Left Updated: 04/30/25 0000     Blood Culture No growth at 24 hours    Narrative:      Less than seven (7) mL's of blood was collected.  Insufficient quantity may yield false negative results.    TSH Rfx On Abnormal To Free T4 [[id number]]  (Normal) Collected: 04/29/25 0601    Specimen: Blood from Arm, Left Updated: 04/29/25 2033     TSH 1.060 uIU/mL     POC Glucose Once  [[id number]]  (Abnormal) Collected: 25    Specimen: Blood Updated: 25     Glucose 299 mg/dL      Comment: Serial Number: [id number]:  [id number]             IMAGING REVIEWED:  MRI Brain With & Without Contrast  Result Date: 2025  Impression: 1. No acute intracranial abnormality. No abnormal enhancement. 2. Age-related atrophy with moderate chronic microvascular ischemic changes. 3. 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 Electronically Signed: Ene Toth MD  2025 11:39 AM EDT  Workstation ID: [id number]    XR Chest 1 View  Result Date: 2025  Impression: Findings highly concerning for developing multifocal/atypical pneumonia. Superimposed pulmonary edema is not completely excluded Electronically Signed: Billy Ribeiro DO  2025 8:59 PM EDT  Workstation ID: [id number]    CT Head Without Contrast  Result Date: 2025  Impression: No acute intracranial pathology. Electronically Signed: Demarcus Tang MD  2025 6:06 PM EDT  Workstation ID: [id number]      Assessment & Plan  ASSESSMENT:  Mental status changes. No clear explanation. He does not have severe hypothyroidism or severe B12 deficiency. The MRI of the brain does not show any major abnormalities. Does not seem to be in status epilepticus.   Metastatic colon cancer: Without major progression.   I had a telephone conversation with his son, Mr. Prashanth Guerrero. We discussed the recent developments and the importance of close follow up in order to make additional decisions. I don't believe he will be able to take additional treatment for the malignancy.     PLAN:  As above.     Don Kramer MD  on 2025 at 17:11            Electronically signed by Don Kramer MD at 25 0731       Allan Fischer MD at 25 [facility]9              [facility] MEDICINE SERVICE  DAILY PROGRESS NOTE    NAME: Vlad Guerrero  : 1945  MRN: [id number]       LOS: 2 days     PROVIDER OF SERVICE: Allan Fischer MD    Chief Complaint: Altered mental status, unspecified    Subjective:     Interval History: Patient is much more alert and oriented today.  He was sitting in the bedside chair eating his breakfast when communicating with me.  He feels he is back to his baseline mental status.        Review of Systems:   Review of Systems    Objective:     Vital Signs  Temp:  [96.9 °F (36.1 °C)-99.6 °F (37.6 °C)] 96.9 °F (36.1 °C)  Heart Rate:  [73-93] 93  Resp:  [12-25] 21  BP: ()/(61-75) 95/74   Body mass index is 21.12 kg/m².    Physical Exam  Physical Exam  General Appearance:  Alert, cooperative, no distress, appears stated age  Head:  Normocephalic, without obvious abnormality, atraumatic  Eyes:  PERRL, conjunctiva/corneas clear, EOM's intact, fundi benign, both eyes  Ears:  Normal TM's and external ear canals, both ears  Nose: Nares normal, septum midline, mucosa normal, no drainage or sinus tenderness  Throat: Lips, mucosa, and tongue normal; teeth and gums normal  Neck: Supple, symmetrical, trachea midline, no adenopathy, thyroid: not enlarged, symmetric, no tenderness/mass/nodules, no carotid bruit or JVD  Lungs:   Clear to auscultation bilaterally, respirations unlabored  Heart:  Regular rate and rhythm, S1, S2 normal, no murmur, rub or gallop  Abdomen:  Soft, non-tender, bowel sounds active all four quadrants,  no masses, no organomegaly  Extremities: Extremities normal, atraumatic, no cyanosis or edema  Pulses: 2+ and symmetric  Skin: Skin color, texture, turgor normal, no rashes or lesions  Neurologic: Moves all extremities spontaneously         Diagnostic Data    Results from last 7 days   Lab Units 04/30/25  0209 04/29/25  0601 04/28/25  1834   WBC 10*3/mm3 9.01   < >  --    HEMOGLOBIN g/dL 13.6   < >  --    HEMATOCRIT % 42.9   < >  --    PLATELETS 10*3/mm3 180   < >  --    GLUCOSE mg/dL 274*   < > 98   CREATININE mg/dL 0.95   < > 0.91   BUN mg/dL 30*   < >  29*   SODIUM mmol/L 138   < > 131*   POTASSIUM mmol/L 4.2   < > 4.7   AST (SGOT) U/L  --   --  47*   ALT (SGPT) U/L  --   --  21   ALK PHOS U/L  --   --  171*   BILIRUBIN mg/dL  --   --  1.0   ANION GAP mmol/L 11.5   < > 10.9    < > = values in this interval not displayed.       MRI Brain With & Without Contrast  Result Date: 4/29/2025  Impression: 1. No acute intracranial abnormality. No abnormal enhancement. 2. Age-related atrophy with moderate chronic microvascular ischemic changes. 3. 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 Electronically Signed: Ene Toth MD  4/29/2025 11:39 AM EDT  Workstation ID: [id number]    XR Chest 1 View  Result Date: 4/28/2025  Impression: Findings highly concerning for developing multifocal/atypical pneumonia. Superimposed pulmonary edema is not completely excluded Electronically Signed: Billy Ribeiro DO  4/28/2025 8:59 PM EDT  Workstation ID: [id number]    CT Head Without Contrast  Result Date: 4/28/2025  Impression: No acute intracranial pathology. Electronically Signed: Demarcus Tang MD  4/28/2025 6:06 PM EDT  Workstation ID: [id number]        I reviewed the patient's new clinical results.    Assessment/Plan:     Active and Resolved Problems  Active Hospital Problems    Diagnosis  POA    **Altered mental status, unspecified [R41.82]  Yes    Hyponatremia [E87.1]  Yes    Elevated troponin [R79.89]  Yes    General weakness [R53.1]  Yes    LBBB (left bundle branch block) [I44.7]  Yes    Chronic combined systolic and diastolic congestive heart failure [I50.42]  Yes    Metastases to the liver [C78.7]  Yes    Malignant neoplasm of ascending colon [C18.2]  Yes    Primary hypertension [I10]  Yes    Mixed hyperlipidemia [E78.2]  Yes    Type 2 diabetes mellitus [E11.9]  Yes      Resolved Hospital Problems   No resolved problems to display.       Sepsis due to probable community-acquired bacterial pneumonia, unspecified  organism  - Imaging on admission with chest x-ray consistent with multifocal pneumonia  - Initiated on broad-spectrum IV antibiotics with ceftriaxone, doxycycline but I will discontinue doxycycline  - Patient was febrile with tachycardia on presentation  - Follow-up cultures  - Encourage pulmonary toileting with I-S, flutter valve if patient can perform this    Acute metabolic encephalopathy  - Likely secondary to underlying infectious process but patient was also hypoglycemic on admission  - Continue antibiotics as above for infectious process  - Holding long-term insulin and continue low-dose sliding scale for now until patient's p.o. intake and glucose levels are more stable  - MRI of the brain does not show any acute pathology  - Patient's mental status is now back to base    Metastatic colon cancer with liver metastasis  - Patient is apparently currently on treatment for this per the patient's family  - Will consult oncology for further assistance  - MRI of the brain does not show any metastatic disease    Chronic combined systolic and diastolic heart failure  - Appears euvolemic despite elevated BNP  - Continue home Lasix    Dyslipidemia  - Continue statin therapy    DM type II, with hypoglycemia  - As above, patient was having hypoglycemic episodes prior to admission  - Holding Lantus and continue low-dose sliding scale insulin    Hypertension with orthostatic hypotension  - Continue home midodrine dose but holding losartan as patient was borderline hypotensive on admission    VTE Prophylaxis:  Mechanical VTE prophylaxis orders are present.             Disposition Planning:     Barriers to Discharge: Improvement in mental status, treatment of pneumonia  Anticipated Date of Discharge: 5/2  Place of Discharge: SNF      Time: 45 minutes     Code Status and Medical Interventions: CPR (Attempt to Resuscitate); Full Support   Ordered at: 04/28/25 2031     Code Status (Patient has no pulse and is not breathing):     CPR (Attempt to Resuscitate)     Medical Interventions (Patient has pulse or is breathing):    Full Support       Signature: Electronically signed by Allan Fischer MD, 25, 12:39 EDT.  [facility]    Electronically signed by Allan Fischer MD at 25 1241       Consult Notes (last 48 hours)  Notes from 25 1539 through 25 1539   No notes of this type exist for this encounter.       Nutrition Notes (most recent note)    No notes exist for this encounter.       Speech Language Pathology Notes (most recent note)    No notes exist for this encounter.       Tone Thorpe, PT   Physical Therapist  Physical Therapy  Therapy Evaluation     Signed  Date of Service:  25 141  Creation Time:  25     Signed        Expand All Collapse All  Patient Name: Vlad Guerrero             : 1945                      MRN: [id number]                              Today's Date: 2025                                   Admit Date: 2025                        Visit Dx:   Visit Diagnosis       ICD-10-CM ICD-9-CM   1. Altered mental status, unspecified altered mental status type  R41.82 780.97   2. Hypoglycemia  E16.2 251.2         Problem List       Patient Active Problem List   Diagnosis    Microcytic anemia    Primary hypertension    Mixed hyperlipidemia    Malignant neoplasm of ascending colon    Acute CVA (cerebrovascular accident)    Benign essential tremor    Type 2 diabetes mellitus    Thrombocytopenia    Metastases to the liver    Cellulitis    Right hand pain    Ataxia    Port-A-Cath in place    Unsteady gait    Hyperglycemia    Chronic combined systolic and diastolic congestive heart failure    Autonomic orthostatic hypotension    Uncontrolled type 2 diabetes mellitus with hyperglycemia    Cardiomyopathy, dilated    LBBB (left bundle branch block)    Acute HFrEF (heart failure with reduced ejection fraction)    CHF (congestive heart failure)    Altered mental  status, unspecified    Hyponatremia    Elevated troponin    General weakness         Medical History        Past Medical History:   Diagnosis Date    Anemia      Colon cancer 2022     colon    Diabetes mellitus      Hyperlipidemia      Hypertension      LBBB (left bundle branch block) 11/14/2024         Surgical History         Past Surgical History:   Procedure Laterality Date    APPENDECTOMY        CARDIAC CATHETERIZATION        CARDIAC CATHETERIZATION N/A 11/14/2024     Procedure: Left Heart Cath, possible pci;  Surgeon: Mg Rahman MD;  Location: [facility] CATH INVASIVE LOCATION;  Service: Cardiovascular;  Laterality: N/A;    COLON RESECTION N/A 12/15/2022     Procedure: COLON RESECTION RIGHT;  Surgeon: Percy Davis MD;  Location: [facility] MAIN OR;  Service: General;  Laterality: N/A;    COLONOSCOPY N/A 11/11/2022     Procedure: COLONOSCOPY WITH BIOPSY AND POLYPECTOMY;  Surgeon: Billy Julien MD;  Location: [facility] ENDOSCOPY;  Service: Gastroenterology;  Laterality: N/A;  Impression:  1.  Large 4-5cm fulgurating circumferential ulcerated mass in the very proximal part of the ascending colon next to ileocecal valve multiple biopsies were performed.  This is highly concerning for colon malignancy.  2.  2 polyp rem    ENDOSCOPY N/A 11/11/2022     Procedure: ESOPHAGOGASTRODUODENOSCOPY with biopsy X1;  Surgeon: Billy Julien MD;  Location: [facility] ENDOSCOPY;  Service: Gastroenterology;  Laterality: N/A;  5.  Upper endoscopy lamination unremarkable.       PORTACATH PLACEMENT Right 1/12/2023     Procedure: INSERTION OF PORTACATH;  Surgeon: Percy Davis MD;  Location: [facility] MAIN OR;  Service: General;  Laterality: Right;    TONSILLECTOMY               General Information         Row Name 04/29/25 1353                 Physical Therapy Time and Intention     Document Type evaluation  -AM       Mode of Treatment physical therapy  -AM          Row Name 04/29/25 1353                 General  "Information     Patient Profile Reviewed yes  -AM       Prior Level of Function independent:;all household mobility;gait;transfer;bed mobility  uknown if pt uses an assistive device  -AM       Existing Precautions/Restrictions fall  -AM       Barriers to Rehab cognitive status  -AM          Row Name 04/29/25 1353                 Living Environment     Current Living Arrangements assisted living facility  -AM       People in Home facility resident  -AM          Row Name 04/29/25 1353                 Home Main Entrance     Number of Stairs, Main Entrance none  -AM          Row Name 04/29/25 1353                 Stairs Within Home, Primary     Number of Stairs, Within Home, Primary none  -AM          Row Name 04/29/25 1353                 Cognition     Orientation Status (Cognition) disoriented to;person;place;situation;time  pt stating his name was \"Jason Lee\"  -AM          Row Name 04/29/25 1353                 Safety Issues/Impairments Affecting Functional Mobility     Safety Issues Affecting Function (Mobility) awareness of need for assistance;insight into deficits/self-awareness;judgment;positioning of assistive device;problem-solving;safety precaution awareness;safety precautions follow-through/compliance;sequencing abilities  -AM       Impairments Affecting Function (Mobility) balance;cognition;endurance/activity tolerance;strength  -AM       Comment, Safety Issues/Impairments (Mobility) gait belt utilized  -AM                       User Key  (r) = Recorded By, (t) = Taken By, (c) = Cosigned By        Initials Name Provider Type     AM Tone Thorpe, PT Physical Therapist                            Mobility         Row Name 04/29/25 1356                 Bed Mobility     Bed Mobility sit-supine  -AM       Sit-Supine [place] (Bed Mobility) moderate assist (50% patient effort)  -AM       Comment, (Bed Mobility) tactile cues for sequencing  -AM          Row Name 04/29/25 1356                 Sit-Stand " Transfer     Sit-Stand [place] (Transfers) minimum assist (75% patient effort);2 person assist  -AM       Assistive Device (Sit-Stand Transfers) other (see comments)  B HHA  -AM       Comment, (Sit-Stand Transfer) cues for hand placement  -AM          Row Name 04/29/25 1356                 Gait/Stairs (Locomotion)     [place] Level (Gait) minimum assist (75% patient effort);moderate assist (50% patient effort);2 person assist  -AM       Assistive Device (Gait) other (see comments)  B HHA, unable to sequence movement of RW  -AM       Distance in Feet (Gait) 15  -AM       Comment, (Gait/Stairs) Decreaed safety awarenss, decreased balance, unable to sequence movements to transition to bed- mod/max tactile cues needed for navgation.  -AM                       User Key  (r) = Recorded By, (t) = Taken By, (c) = Cosigned By        Initials Name Provider Type     AM Tone Thorpe, PT Physical Therapist                            Obj/Interventions         [facility] Name 04/29/25 1358                 Range of Motion Comprehensive     General Range of Motion no range of motion deficits identified  -AM          [facility] 04/29/25 1352                 Strength Comprehensive (MMT)     Comment, General Manual Muscle Testing (MMT) Assessment unable to formally assess secondary to pt not following commands.  At least 3+/5 BLEs observed through functional movement observation  -AM          Row Name 04/29/25 1358                 Motor Skills     Motor Skills functional endurance  -AM       Functional Endurance poor  -AM          Row Name 04/29/25 1351                 Balance     Balance Assessment sitting static balance;sitting dynamic balance;standing static balance;standing dynamic balance  -AM       Static Sitting Balance supervision  -AM       Dynamic Sitting Balance contact guard  -AM       Position, Sitting Balance sitting in chair  -AM       Static Standing Balance minimal assist;moderate assist  -AM       Dynamic Standing  Balance moderate assist  -AM       Position/Device Used, Standing Balance supported;other (see comments)  B HHA  -AM          Row Name 04/29/25 1358                 Sensory Assessment (Somatosensory)     Sensory Assessment (Somatosensory) unable/difficult to assess  -AM                       User Key  (r) = Recorded By, (t) = Taken By, (c) = Cosigned By        Initials Name Provider Type     AM Tone Tohrpe, PT Physical Therapist                            Goals/Plan         Row Name 04/29/25 1409                 Bed Mobility Goal 1 (PT)     Activity/Assistive Device (Bed Mobility Goal 1, PT) bed mobility activities, all  -AM       [place] Level/Cues Needed (Bed Mobility Goal 1, PT) supervision required  -AM       Time Frame (Bed Mobility Goal 1, PT) long term goal (LTG)  -AM          Row Name 04/29/25 1409                 Transfer Goal 1 (PT)     Activity/Assistive Device (Transfer Goal 1, PT) transfers, all;walker, rolling  -AM       [place] Level/Cues Needed (Transfer Goal 1, PT) supervision required  -AM       Time Frame (Transfer Goal 1, PT) long term goal (LTG)  -AM          Row Name 04/29/25 1409                 Gait Training Goal 1 (PT)     Activity/Assistive Device (Gait Training Goal 1, PT) gait (walking locomotion);walker, rolling  -AM       [place] Level (Gait Training Goal 1, PT) contact guard required  -AM       Distance (Gait Training Goal 1, PT) 100'  -AM       Time Frame (Gait Training Goal 1, PT) long term goal (LTG)  -AM          Row Name 04/29/25 1409                 Therapy Assessment/Plan (PT)     Planned Therapy Interventions (PT) balance training;bed mobility training;gait training;strengthening;patient/family education;transfer training  -AM                    User Key  (r) = Recorded By, (t) = Taken By, (c) = Cosigned By        Initials Name Provider Type     AM Tone Thorpe, PT Physical Therapist                         Clinical Impression         Row Name 04/29/25 1400   "               Pain     Additional Documentation Pain Scale: FACES Pre/Post-Treatment (Group)  -AM          Row Name 04/29/25 1400                 Pain Scale: FACES Pre/Post-Treatment     Pain: FACES Scale, Pretreatment 0-->no hurt  -AM       Posttreatment Pain Rating 0-->no hurt  -AM          Row Name 04/29/25 1400                 Plan of Care Review     Plan of Care Reviewed With patient  -AM       Outcome Evaluation Pt is a 78 y/o male, assisted living resident, with increased confusion and rapid HR.  Pt seen in ED on 4/27 for confusion and low glucose.  Became progressively more confused after hospital d/c.  Family concerned regarding possibiity of dementia.  CMH of colon CA with mets to the liver.  Chest X-ray: highly concerning for developing multifocal PNA.  MRI brain: (-) acute.  CT head: (-) acute.  Pt not oriented to person, place or time.  Stating name was \"Jason Lee\".  Unknown if pt ambulated with an assistive device at assisted living facility.  Pt on roon air and telemetry sitting on b/s commode with RN.  Pt with decreased safety awarenss with all mobility tasks this date.  Required Min A x 2 for sit to stand from commode x 2 attempts.  Unable to sequence movement to ambulate to bed, required Min/Mod A x 2 with B HHA to ambulate 15'.  Mod/Max tactile cues to navigate path towards bed.  Mod A for sit to supine.  Pt is a very high fall risk and is not safe to return back to assisted living facility at this time.  Recommend SNF but if confusion and decreased safety awareness continue pt will most likely require long-term placement.  -AM          Row Name 04/29/25 1400                 Therapy Assessment/Plan (PT)     Patient/Family Therapy Goals Statement (PT) Did not answer when asked  -AM       Rehab Potential (PT) good  -AM       Criteria for Skilled Interventions Met (PT) yes  -AM       Therapy Frequency (PT) 5 times/wk  -AM       Predicted Duration of Therapy Intervention (PT) until d/c  -AM      "     Row Name 04/29/25 1400                 Vital Signs     O2 Delivery Pre Treatment room air  -AM       O2 Delivery Intra Treatment room air  -AM       O2 Delivery Post Treatment room air  -AM       Pre Patient Position Sitting  -AM       Intra Patient Position Standing  -AM       Post Patient Position Supine  -AM          Row Name 04/29/25 1400                 Positioning and Restraints     Pre-Treatment Position bedside commode  -AM       Post Treatment Position bed  -AM       In Bed supine;call light within reach;encouraged to call for assist;exit alarm on;side rails up x2  -AM                       User Key  (r) = Recorded By, (t) = Taken By, (c) = Cosigned By        Initials Name Provider Type     Tone Chun, PT Physical Therapist                            Outcome Measures         Row Name 04/29/25 1409                 How much help from another person do you currently need...     Turning from your back to your side while in flat bed without using bedrails? 3  -AM       Moving from lying on back to sitting on the side of a flat bed without bedrails? 2  -AM       Moving to and from a bed to a chair (including a wheelchair)? 3  -AM       Standing up from a chair using your arms (e.g., wheelchair, bedside chair)? 3  -AM       Climbing 3-5 steps with a railing? 1  -AM       To walk in hospital room? 2  -AM       AM-PAC 6 Clicks Score (PT) 14  -AM                       User Key  (r) = Recorded By, (t) = Taken By, (c) = Cosigned By        Initials Name Provider Type     Tone Chun, PT Physical Therapist                          Physical Therapy Education            Title: PT OT SLP Therapies (In Progress)         Topic: Physical Therapy (In Progress)         Point: Mobility training (In Progress)         Learning Progress Summary             Patient Acceptance, E,TB, NR,NL by AM at 4/29/2025 1410                            Point: Body mechanics (In Progress)         Learning Progress Summary            "  Patient Acceptance, E,TB, NR,NL by AM at 4/29/2025 1410                            Point: Precautions (In Progress)         Learning Progress Summary             Patient Acceptance, E,TB, NR,NL by AM at 4/29/2025 1410                                                User Key         Initials Effective Dates Name Provider Type Discipline     AM 05/10/21 -  Tone Thorpe PT Physical Therapist PT                          PT Recommendation and Plan  Planned Therapy Interventions (PT): balance training, bed mobility training, gait training, strengthening, patient/family education, transfer training  Outcome Evaluation: Pt is a 80 y/o male, assisted living resident, with increased confusion and rapid HR.  Pt seen in ED on 4/27 for confusion and low glucose.  Became progressively more confused after hospital d/c.  Family concerned regarding possibiity of dementia.  CMH of colon CA with mets to the liver.  Chest X-ray: highly concerning for developing multifocal PNA.  MRI brain: (-) acute.  CT head: (-) acute.  Pt not oriented to person, place or time.  Stating name was \"Jason Lee\".  Unknown if pt ambulated with an assistive device at assisted living facility.  Pt on roon air and telemetry sitting on b/s commode with RN.  Pt with decreased safety awarenss with all mobility tasks this date.  Required Min A x 2 for sit to stand from commode x 2 attempts.  Unable to sequence movement to ambulate to bed, required Min/Mod A x 2 with B HHA to ambulate 15'.  Mod/Max tactile cues to navigate path towards bed.  Mod A for sit to supine.  Pt is a very high fall risk and is not safe to return back to assisted living facility at this time.  Recommend SNF but if confusion and decreased safety awareness continue pt will most likely require long-term placement.      Time Calculation:        PT Charges         Row Name 04/29/25 1410                       Time Calculation     Start Time 0840  -AM         Stop Time 0900  -AM         Time " Calculation (min) 20 min  -AM         PT Received On 25  -AM         PT - Next Appointment 25  -AM         PT Goal Re-Cert Due Date 25  -AM                      User Key  (r) = Recorded By, (t) = Taken By, (c) = Cosigned By        Initials Name Provider Type     AM Tone Thorpe, PT Physical Therapist                       Therapy Charges for Today         Code Description Service Date Service Provider Modifiers Qty     01007630615 HC PT EVAL MOD COMPLEXITY 4 2025 Tone Thorpe, PT GP 1                PT G-Codes  AM-PAC 6 Clicks Score (PT): 14  PT Discharge Summary  Anticipated Discharge Disposition (PT): skilled nursing facility (with possible transition to ECF)     Tone Thorpe, PT                   2025                                    Brandon Nicole, CELE   Occupational Therapist  Specialty:  Occupational Therapy  Therapy Evaluation     Signed  Date of Service:  25  Creation Time:  25     Signed        Expand All Collapse All  Patient Name: Vlad Guerrero             : 1945                      MRN: [id number]                              Today's Date: 2025                                   Admit Date: 2025                        Visit Dx:   Visit Diagnosis       ICD-10-CM ICD-9-CM   1. Altered mental status, unspecified altered mental status type  R41.82 780.97   2. Hypoglycemia  E16.2 251.2         Problem List       Patient Active Problem List   Diagnosis    Microcytic anemia    Primary hypertension    Mixed hyperlipidemia    Malignant neoplasm of ascending colon    Acute CVA (cerebrovascular accident)    Benign essential tremor    Type 2 diabetes mellitus    Thrombocytopenia    Metastases to the liver    Cellulitis    Right hand pain    Ataxia    Port-A-Cath in place    Unsteady gait    Hyperglycemia    Chronic combined systolic and diastolic congestive heart failure    Autonomic orthostatic hypotension    Uncontrolled type 2 diabetes  mellitus with hyperglycemia    Cardiomyopathy, dilated    LBBB (left bundle branch block)    Acute HFrEF (heart failure with reduced ejection fraction)    CHF (congestive heart failure)    Altered mental status, unspecified    Hyponatremia    Elevated troponin    General weakness         Medical History        Past Medical History:   Diagnosis Date    Anemia      Colon cancer 2022     colon    Diabetes mellitus      Hyperlipidemia      Hypertension      LBBB (left bundle branch block) 11/14/2024         Surgical History         Past Surgical History:   Procedure Laterality Date    APPENDECTOMY        CARDIAC CATHETERIZATION        CARDIAC CATHETERIZATION N/A 11/14/2024     Procedure: Left Heart Cath, possible pci;  Surgeon: Mg Rahman MD;  Location: [facility] CATH INVASIVE LOCATION;  Service: Cardiovascular;  Laterality: N/A;    COLON RESECTION N/A 12/15/2022     Procedure: COLON RESECTION RIGHT;  Surgeon: Percy Davis MD;  Location: [facility] MAIN OR;  Service: General;  Laterality: N/A;    COLONOSCOPY N/A 11/11/2022     Procedure: COLONOSCOPY WITH BIOPSY AND POLYPECTOMY;  Surgeon: Billy Julien MD;  Location: [facility] ENDOSCOPY;  Service: Gastroenterology;  Laterality: N/A;  Impression:  1.  Large 4-5cm fulgurating circumferential ulcerated mass in the very proximal part of the ascending colon next to ileocecal valve multiple biopsies were performed.  This is highly concerning for colon malignancy.  2.  2 polyp rem    ENDOSCOPY N/A 11/11/2022     Procedure: ESOPHAGOGASTRODUODENOSCOPY with biopsy X1;  Surgeon: Billy Julien MD;  Location: [facility] ENDOSCOPY;  Service: Gastroenterology;  Laterality: N/A;  5.  Upper endoscopy lamination unremarkable.       PORTACATH PLACEMENT Right 1/12/2023     Procedure: INSERTION OF PORTACATH;  Surgeon: Percy Davis MD;  Location: [facility] MAIN OR;  Service: General;  Laterality: Right;    TONSILLECTOMY               General Information         Row  Name 04/30/25 1709                 OT Time and Intention     Document Type evaluation  -LS       Mode of Treatment occupational therapy  -          Row Name 04/30/25 1709                 General Information     Patient Profile Reviewed yes  -LS       Prior Level of Function independent:;ADL's;all household mobility  Pt uses a rollator  -       Existing Precautions/Restrictions fall  -       Barriers to Rehab cognitive status  -          Row Name 04/30/25 1709                 Living Environment     Current Living Arrangements assisted living facility  -       People in Home facility resident  -LS          Row Name 04/30/25 1709                 Cognition     Orientation Status (Cognition) oriented to;person;disoriented to;place;situation;time  -LS          Row Name 04/30/25 1709                 Safety Issues/Impairments Affecting Functional Mobility     Impairments Affecting Function (Mobility) balance;cognition;endurance/activity tolerance;strength  -                       User Key  (r) = Recorded By, (t) = Taken By, (c) = Cosigned By        Initials Name Provider Type      Brandon Nicole, CELE Occupational Therapist                                     Mobility/ADL's         Row Name 04/30/25 1711                 Bed Mobility     Comment, (Bed Mobility) In chair on arrival  -          Row Name 04/30/25 1711                 Transfers     Transfers sit-stand transfer  -          Row Name 04/30/25 1711                 Sit-Stand Transfer     Sit-Stand [place] (Transfers) contact guard  -       Assistive Device (Sit-Stand Transfers) walker, front-wheeled  -          Row Name 04/30/25 1711                 Functional Mobility     Functional Mobility- Ind. Level minimum assist (75% patient effort)  -       Functional Mobility- Device walker, front-wheeled  -LS       Patient was able to Ambulate yes  -          Row Name 04/30/25 1711                 Activities of Daily Living     BADL  Assessment/Intervention lower body dressing;toileting  -          Row Name 04/30/25 1711                 Lower Body Dressing Assessment/Training     [place] Level (Lower Body Dressing) lower body dressing skills;doff;don;pants/bottoms;socks;moderate assist (50% patient effort)  -          Row Name 04/30/25 1711                 Toileting Assessment/Training     [place] Level (Toileting) toileting skills;moderate assist (50% patient effort);maximum assist (25% patient effort)  -LS       Position (Toileting) supported standing  -                       User Key  (r) = Recorded By, (t) = Taken By, (c) = Cosigned By        Initials Name Provider Type      Brandon Nicole OT Occupational Therapist                            Obj/Interventions         [facility] Name 04/30/25 1713                 Sensory Assessment (Somatosensory)     Sensory Assessment (Somatosensory) sensation intact  -LS          Row Name 04/30/25 1713                 Range of Motion Comprehensive     General Range of Motion no range of motion deficits identified  -LS          Row Name 04/30/25 1713                 Strength Comprehensive (MMT)     Comment, General Manual Muscle Testing (MMT) Assessment BUE grossly 3+/5  -LS          Row Name 04/30/25 1713                 Balance     Balance Assessment sitting static balance;sitting dynamic balance;standing static balance;standing dynamic balance  -LS       Static Sitting Balance standby assist  -LS       Dynamic Sitting Balance contact guard  -LS       Position, Sitting Balance supported;sitting in chair  -LS       Static Standing Balance contact guard  -LS       Dynamic Standing Balance minimal assist;moderate assist  -LS                       User Key  (r) = Recorded By, (t) = Taken By, (c) = Cosigned By        Initials Name Provider Type      Brandon Nicole OT Occupational Therapist                            Goals/Plan         Row Name 04/30/25 1720                 Bed Mobility Goal 1 (OT)      Activity/Assistive Device (Bed Mobility Goal 1, OT) bed mobility activities, all  -LS       [place] Level/Cues Needed (Bed Mobility Goal 1, OT) modified independence  -LS       Time Frame (Bed Mobility Goal 1, OT) long term goal (LTG);2 weeks  -LS          Row Name 04/30/25 1720                 Transfer Goal 1 (OT)     Activity/Assistive Device (Transfer Goal 1, OT) transfers, all  -LS       [place] Level/Cues Needed (Transfer Goal 1, OT) modified independence  -LS       Time Frame (Transfer Goal 1, OT) long term goal (LTG);2 weeks  -LS          Row Name 04/30/25 1720                 Dressing Goal 1 (OT)     Activity/Device (Dressing Goal 1, OT) dressing skills, all  -LS       [place]/Cues Needed (Dressing Goal 1, OT) modified independence  -LS       Time Frame (Dressing Goal 1, OT) long term goal (LTG);2 weeks  -LS          Row Name 04/30/25 1720                 Toileting Goal 1 (OT)     Activity/Device (Toileting Goal 1, OT) toileting skills, all  -LS       [place] Level/Cues Needed (Toileting Goal 1, OT) modified independence  -LS       Time Frame (Toileting Goal 1, OT) long term goal (LTG);2 weeks  -LS          Row Name 04/30/25 1720                 Therapy Assessment/Plan (OT)     Planned Therapy Interventions (OT) activity tolerance training;BADL retraining;functional balance retraining;IADL retraining;occupation/activity based interventions;ROM/therapeutic exercise;strengthening exercise;transfer/mobility retraining;patient/caregiver education/training  -LS                    User Key  (r) = Recorded By, (t) = Taken By, (c) = Cosigned By        Initials Name Provider Type     Brandon Rain OT Occupational Therapist                         Clinical Impression         Row Name 04/30/25 9168                 Pain Assessment     Pretreatment Pain Rating 0/10 - no pain  -LS       Posttreatment Pain Rating 0/10 - no pain  -LS          Row Name 04/30/25 3144                 Plan of Care  Review     Plan of Care Reviewed With patient  -LS       Outcome Evaluation 78 y/o male, assisted living resident, with increased confusion and rapid HR.  Pt seen in ED on 4/27 for confusion and low glucose.  Became progressively more confused after hospital d/c.  Family concerned regarding possibility of dementia.  CMH of colon CA with mets to the liver.  Chest X-ray: highly concerning for developing multifocal PNA.  MRI brain: (-) acute.  CT head: (-) acute. Pt with improved mental state this date, still just oriented to self. In chair on arrival, able to come to standing with CGA-Min A using RW. Min A provided for functional mobility, pt ambulating to restroom with Min A and verbal cues. Pt able to manage brief down over hips with min-mod A. He stood with Min A for balance support while voiding urine. Mod A required to don clean brief and to don clean socks once returned to seated in chair. Pt improving, but remains far from baseline functional level. OT will follow, recommending SNF at [place].  -          Row Name 04/30/25 1087                 Therapy Assessment/Plan (OT)     Rehab Potential (OT) good  -       Criteria for Skilled Therapeutic Interventions Met (OT) yes;skilled treatment is necessary  -       Therapy Frequency (OT) 5 times/wk  -       Predicted Duration of Therapy Intervention (OT) unitl dc  -          Row Name 04/30/25 1035                 Therapy Plan Review/Discharge Plan (OT)     Anticipated Discharge Disposition (OT) skilled nursing facility  -          Row Name 04/30/25 5236                 Vital Signs     Pre Systolic BP Rehab 97  -LS       Pre Treatment Diastolic BP 64  -LS       O2 Delivery Pre Treatment room air  -LS       O2 Delivery Intra Treatment room air  -LS       O2 Delivery Post Treatment room air  -LS       Pre Patient Position Sitting  -LS       Intra Patient Position Standing  -LS       Post Patient Position Sitting  -LS          Row Name 04/30/25 5598                  Positioning and Restraints     Post Treatment Position chair  -LS       In Chair notified nsg;reclined;call light within reach;encouraged to call for assist;exit alarm on  -LS                       User Key  (r) = Recorded By, (t) = Taken By, (c) = Cosigned By        Initials Name Provider Type     Brandon Rain OT Occupational Therapist                            Outcome Measures         Row Name 04/30/25 1720                 How much help from another is currently needed...     Putting on and taking off regular lower body clothing? 2  -LS       Bathing (including washing, rinsing, and drying) 2  -LS       Toileting (which includes using toilet bed pan or urinal) 2  -LS       Putting on and taking off regular upper body clothing 3  -LS       Taking care of personal grooming (such as brushing teeth) 3  -LS       Eating meals 4  -LS       AM-PAC 6 Clicks Score (OT) 16  -          Row Name 04/30/25 1720                 Functional Assessment     Outcome Measure Options AM-PAC 6 Clicks Daily Activity (OT)  -LS                       User Key  (r) = Recorded By, (t) = Taken By, (c) = Cosigned By        Initials Name Provider Type     Brandon Rain OT Occupational Therapist                             Occupational Therapy Education            Title: PT OT SLP Therapies (In Progress)         Topic: Occupational Therapy (Done)         Point: ADL training (Done)         Learning Progress Summary             Patient Acceptance, E,TB, VU by  at 4/30/2025 1721                            Point: Precautions (Done)         Learning Progress Summary             Patient Acceptance, E,TB, VU by  at 4/30/2025 1721                            Point: Body mechanics (Done)         Learning Progress Summary             Patient Acceptance, E,TB, VU by  at 4/30/2025 1721                                                User Key         Initials Effective Dates Name Provider Type [facility] 09/22/22 -  Brandon Nicole, OT  Occupational Therapist OT                          OT Recommendation and Plan  Planned Therapy Interventions (OT): activity tolerance training, BADL retraining, functional balance retraining, IADL retraining, occupation/activity based interventions, ROM/therapeutic exercise, strengthening exercise, transfer/mobility retraining, patient/caregiver education/training  Therapy Frequency (OT): 5 times/wk  Plan of Care Review  Plan of Care Reviewed With: patient  Outcome Evaluation: 78 y/o male, assisted living resident, with increased confusion and rapid HR.  Pt seen in ED on 4/27 for confusion and low glucose.  Became progressively more confused after hospital d/c.  Family concerned regarding possibility of dementia.  CMH of colon CA with mets to the liver.  Chest X-ray: highly concerning for developing multifocal PNA.  MRI brain: (-) acute.  CT head: (-) acute. Pt with improved mental state this date, still just oriented to self. In chair on arrival, able to come to standing with CGA-Min A using RW. Min A provided for functional mobility, pt ambulating to restroom with Min A and verbal cues. Pt able to manage brief down over hips with min-mod A. He stood with Min A for balance support while voiding urine. Mod A required to don clean brief and to don clean socks once returned to seated in chair. Pt improving, but remains far from baseline functional level. OT will follow, recommending SNF at ID.      Time Calculation:        Time Calculation- OT         Row Name 04/30/25 1721                       Time Calculation- OT     OT Start Time 1139  -LS         OT Stop Time 1204  -LS         OT Time Calculation (min) 25 min  -LS         OT Received On 04/30/25  -         OT - Next Appointment 05/01/25  -         OT Goal Re-Cert Due Date 05/14/25  -LS                 Untimed Charges     OT Eval/Re-eval Minutes 25  -LS                 Total Minutes     Untimed Charges Total Minutes 25  -LS          Total Minutes 25  -LS      "                 User Key  (r) = Recorded By, (t) = Taken By, (c) = Cosigned By        Initials Name Provider Type     LS Brandon Nicole OT Occupational Therapist                       Therapy Charges for Today         Code Description Service Date Service Provider Modifiers Qty     53551072856 HC OT EVAL MOD COMPLEXITY 4 2025 Brandon Nicole OT GO 1                   Brandon Nicole OT                  2025                Leisa Bates, HÉCTOR   Physical Therapist  Physical Therapy  Therapy Treatment Note     Signed  Date of Service:  25  Creation Time:  25     Signed        Subjective: Pt agreeable to therapeutic plan of care. Asking to walk to the bathroom.      Objective:   Pt is more alert today, oriented to name,  and place.  Pleasant and cooperative.      Precautions - falls     Bed mobility - Min-A supine to sit, verbal cues  Transfers - CGA, Min-A, and with rolling walker, stand from EOB, on/off commode in bathroom.  Ambulation - 15 feet CGA and with rolling walker     Sit to stand from recliner x 10 reps  Standing balance activity with single arm reaches in all planes and focus on upright posture and trunk/neck extension.         Vitals: WNL     Pain: 0 VAS Location:   Intervention for pain: N/A     Education: Provided education on the importance of mobility in the acute care setting, Transfer Training, and Gait Training     Assessment: Vlad Guerrero presents with functional mobility impairments which indicate the need for skilled intervention. Pt is making daily progress. Tolerating session today without incident. Will continue to follow and progress as tolerated.      Plan/Recommendations:   If medically appropriate, Moderate Intensity Therapy recommended post-acute care. This is recommended as therapy feels the patient would require 3-4 days per week and wouldn't tolerate \"3 hour daily\" rehab intensity. SNF would be the preferred choice. If the patient does not agree to SNF, " arrange HH or OP depending on home bound status. If patient is medically complex, consider LTACH. Pt requires no DME at discharge.      Pt desires Skilled Rehab placement at discharge. Pt cooperative; agreeable to therapeutic recommendations and plan of care.            Basic Mobility 6-click:  Rollin = Total, A lot = 2, A little = 3; 4 = None  Supine>Sit:                      1 = Total, A lot = 2, A little = 3; 4 = None   Sit>Stand with arms:       1 = Total, A lot = 2, A little = 3; 4 = None  Bed>Chair:                      1 = Total, A lot = 2, A little = 3; 4 = None  Ambulate in room:           1 = Total, A lot = 2, A little = 3; 4 = None  3-5 Steps with railin = Total, A lot = 2, A little = 3; 4 = None  Score: 18     Modified [place]: N/A = No pre-op stroke/TIA     Post-Tx Position: Up in Chair, Staff Present, Alarms activated, and Call light and personal items within reach  PPE: gloves     Therapy Charges for Today         Code Description Service Date Service Provider Modifiers Qty     91459614822 HC GAIT TRAINING EA 15 MIN 2025 Leisa Bates, PT GP 1     48396082406 HC PT THERAPEUTIC ACT EA 15 MIN 2025 Leisa Bates, PT GP 1     93790154517 HC GAIT TRAINING EA 15 MIN 2025 Leisa Bates, PT GP 1     55263764850 HC PT THERAPEUTIC ACT EA 15 MIN 2025 Leisa Bates, PT GP 1               PT Charges         Row Name 25 1406                       Time Calculation     Start Time 910  -         Stop Time 927  -         Time Calculation (min) 17 min  -         PT Received On 25  -         PT - Next Appointment 25  -                 Time Calculation- PT     Total Timed Code Minutes- PT 17 minute(s)  -                      User Key  (r) = Recorded By, (t) = Taken By, (c) = Cosigned By        Initials Name Provider Type     Leisa Alfaro PT Physical Therapist

## 2025-05-01 NOTE — THERAPY TREATMENT NOTE
"Subjective: Pt agreeable to therapeutic plan of care. Asking to walk to the bathroom.     Objective:   Pt is more alert today, oriented to name,  and place.  Pleasant and cooperative.     Precautions - falls    Bed mobility - Min-A supine to sit, verbal cues  Transfers - CGA, Min-A, and with rolling walker, stand from EOB, on/off commode in bathroom.  Ambulation - 15 feet CGA and with rolling walker    Sit to stand from recliner x 10 reps  Standing balance activity with single arm reaches in all planes and focus on upright posture and trunk/neck extension.       Vitals: WNL    Pain: 0 VAS Location:   Intervention for pain: N/A    Education: Provided education on the importance of mobility in the acute care setting, Transfer Training, and Gait Training    Assessment: Vlad Guerrero presents with functional mobility impairments which indicate the need for skilled intervention. Pt is making daily progress. Tolerating session today without incident. Will continue to follow and progress as tolerated.     Plan/Recommendations:   If medically appropriate, Moderate Intensity Therapy recommended post-acute care. This is recommended as therapy feels the patient would require 3-4 days per week and wouldn't tolerate \"3 hour daily\" rehab intensity. SNF would be the preferred choice. If the patient does not agree to SNF, arrange HH or OP depending on home bound status. If patient is medically complex, consider LTACH. Pt requires no DME at discharge.     Pt desires Skilled Rehab placement at discharge. Pt cooperative; agreeable to therapeutic recommendations and plan of care.         Basic Mobility 6-click:  Rollin = Total, A lot = 2, A little = 3; 4 = None  Supine>Sit:   1 = Total, A lot = 2, A little = 3; 4 = None   Sit>Stand with arms:  1 = Total, A lot = 2, A little = 3; 4 = None  Bed>Chair:   1 = Total, A lot = 2, A little = 3; 4 = None  Ambulate in room:  1 = Total, A lot = 2, A little = 3; 4 = None  3-5 Steps " with railin = Total, A lot = 2, A little = 3; 4 = None  Score: 18    Modified [place]: N/A = No pre-op stroke/TIA    Post-Tx Position: Up in Chair, Staff Present, Alarms activated, and Call light and personal items within reach  PPE: gloves    Therapy Charges for Today       Code Description Service Date Service Provider Modifiers Qty    55424862335 HC GAIT TRAINING EA 15 MIN 2025 Leisa Bates, PT GP 1    38837985498 HC PT THERAPEUTIC ACT EA 15 MIN 2025 Leisa Bates, PT GP 1    50319005139 HC GAIT TRAINING EA 15 MIN 2025 Leisa Bates, PT GP 1    53327296761 HC PT THERAPEUTIC ACT EA 15 MIN 2025 Leisa Bates, PT GP 1           PT Charges       Row Name 25 1406             Time Calculation    Start Time 0910  -      Stop Time 0927  -      Time Calculation (min) 17 min  -      PT Received On 25  -      PT - Next Appointment 25  -         Time Calculation- PT    Total Timed Code Minutes- PT 17 minute(s)  -                User Key  (r) = Recorded By, (t) = Taken By, (c) = Cosigned By      Initials Name Provider Type    Leisa Alfaro, PT Physical Therapist

## 2025-05-01 NOTE — PROGRESS NOTES
[facility] RADIATION ONCOLOGY  FOLLOW-UP    Name: Vlad Guerrero  YOB: 1945  MRN #: [id number]  Date of Service: 5/1/2025    Chief Complaint:  ***    Diagnosis:   Encounter Diagnoses   Name Primary?    Malignant neoplasm of ascending colon Yes    Metastases to the liver             Interval History       Vlad Guerrero is a 79 y.o. male who presents with a stage IV colon cancer now with two growing liver metastases. His most recent laboratory testing was consistent with Pat Chaudhry A5.  He was seen in our office on 3/26/2025 for initial consultation with plans for MRI of liver for restaging, updated laboratory testing to assess liver function, bilateral thoracentesis given shortness of breath and increase in size of effusions as well as IR referral to discuss fiducial marker placement and/or ablation of the liver metastases, especially the left liver lobe lesion given the close proximity to the stomach. He returns today after further work up and further planning of radiation therapy.       Imaging   MRI Brain With & Without Contrast  Result Date: 4/29/2025  Impression: 1. No acute intracranial abnormality. No abnormal enhancement. 2. Age-related atrophy with moderate chronic microvascular ischemic changes. 3. 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     XR Chest 1 View  Result Date: 4/28/2025  Impression: Findings highly concerning for developing multifocal/atypical pneumonia. Superimposed pulmonary edema is not completely excluded Electronically Signed: Billy Ribeiro DO  4/28/2025 8:59 PM EDT  Workstation ID: [id number]    CT Head Without Contrast  Result Date: 4/28/2025  Impression: No acute intracranial pathology.       Pathology       No new pathology to review.      Labs       Hematology:  WBC   Date Value Ref Range Status   05/01/2025 7.34 3.40 - 10.80 10*3/mm3 Final   11/08/2022 5.6 3.7 - 10.3 x10(3)/mcL Final      RBC   Date Value Ref Range Status   05/01/2025 4.24 4.14 - 5.80 10*6/mm3 Final   11/08/2022 3.99 (L) 4.60 - 6.10 x10(6)/mcL Final     Hemoglobin   Date Value Ref Range Status   05/01/2025 11.9 (L) 13.0 - 17.7 g/dL Final   11/08/2022 6.8 (C) 13.7 - 17.5 g/dL Final     Hematocrit   Date Value Ref Range Status   05/01/2025 37.8 37.5 - 51.0 % Final   11/08/2022 26.6 (L) 40.0 - 51.0 % Final     Platelets   Date Value Ref Range Status   05/01/2025 145 140 - 450 10*3/mm3 Final   11/08/2022 217 155 - 369 x10(3)/mcL Final     Chemistry:  Lab Results   Component Value Date    GLUCOSE 244 (H) 05/01/2025    BUN 26 (H) 05/01/2025    CREATININE 0.73 (L) 05/01/2025    EGFRIFNONA 76 11/15/2021    EGFRIFAFRI 87 11/15/2021    BCR 35.6 (H) 05/01/2025    K 3.5 05/01/2025    CO2 27.0 05/01/2025    CALCIUM 8.2 (L) 05/01/2025    ALBUMIN 3.7 04/28/2025    AST 47 (H) 04/28/2025    ALT 21 04/28/2025         Problem List       Patient Active Problem List   Diagnosis    Microcytic anemia    Primary hypertension    Mixed hyperlipidemia    Malignant neoplasm of ascending colon    Acute CVA (cerebrovascular accident)    Benign essential tremor    Type 2 diabetes mellitus    Thrombocytopenia    Metastases to the liver    Cellulitis    Right hand pain    Ataxia    Port-A-Cath in place    Unsteady gait    Hyperglycemia    Chronic combined systolic and diastolic congestive heart failure    Autonomic orthostatic hypotension    Uncontrolled type 2 diabetes mellitus with hyperglycemia    Cardiomyopathy, dilated    LBBB (left bundle branch block)    Acute HFrEF (heart failure with reduced ejection fraction)    CHF (congestive heart failure)    Altered mental status, unspecified    Hyponatremia    Elevated troponin    General weakness          Current Medications       Current Outpatient Medications   Medication Instructions    ammonium lactate (AMLACTIN) 12 % cream 1 Application, 3 times daily    aspirin 81 mg, Daily    atorvastatin (LIPITOR) 40 mg,  Daily    ferrous gluconate (FERGON) 324 mg, Daily With Breakfast    furosemide (LASIX) 20 mg, Daily    gabapentin (NEURONTIN) 100 MG capsule 1 capsule, Every 12 Hours Scheduled    insulin aspart (NOVOLOG FLEXPEN) 8 Units, 3 Times Daily With Meals    insulin glargine (LANTUS, SEMGLEE) 20 Units, Nightly    losartan (COZAAR) 50 mg, Daily    Melatonin 3 mg, Nightly    metFORMIN (GLUCOPHAGE) 1,000 mg, 2 Times Daily With Meals    midodrine (PROAMATINE) 10 mg, Oral, 3 Times Daily Before Meals    ondansetron (ZOFRAN) 8 mg, Every 8 Hours PRN          Allergies       Allergies   Allergen Reactions    Penicillins Rash           Review of Systems       Review of Systems   There were no vitals filed for this visit.   Physical Exam     {The following data was reviewed by (Optional):32000}  {Data reviewed (Optional):67423:::1}     Nutritional Status:    ECOG:  {ECO}  PHQ-2:  ***        Assessment and Plan       Assessment:          Vlad Guerrero is a 79 y.o. male who presents with a stage IV colon cancer now with two growing liver metastases. His most recent laboratory testing was consistent with Pat Chaudhry A5.  He was seen in our office on 3/26/2025 for initial consultation with plans for MRI of liver for restaging, updated laboratory testing to assess liver function, bilateral thoracentesis given shortness of breath and increase in size of effusions as well as IR referral to discuss fiducial marker placement and/or ablation of the liver metastases, especially the left liver lobe lesion given the close proximity to the stomach. He returns today after further work up and further planning of radiation therapy.     Diagnoses and all orders for this visit:    1. Malignant neoplasm of ascending colon (Primary)    2. Metastases to the liver       Plan:  ***    {Time Spent (Optional):03871}        Follow-Up       No follow-ups on file.       CC: No ref. provider found

## 2025-05-01 NOTE — PLAN OF CARE
Goal Outcome Evaluation:               Vlad Guerrero presents with functional mobility impairments which indicate the need for skilled intervention. Pt is making daily progress. Tolerating session today without incident. Will continue to follow and progress as tolerated.

## 2025-05-01 NOTE — PROGRESS NOTES
[facility] MEDICINE SERVICE  DAILY PROGRESS NOTE    NAME: Vlad Guerrero  : 1945  MRN: [id number]      LOS: 3 days     PROVIDER OF SERVICE: Allan Fischer MD    Chief Complaint: Altered mental status, unspecified    Subjective:     Interval History: Patient continues to do well today.  He was once again sitting up in the chair eating breakfast.  He states his breathing has improved significantly.  He feels back to his baseline mental status.        Review of Systems:   Review of Systems    Objective:     Vital Signs  Temp:  [96.8 °F (36 °C)-98.8 °F (37.1 °C)] 97.9 °F (36.6 °C)  Heart Rate:  [] 88  Resp:  [14-27] 18  BP: ()/(60-78) 111/74   Body mass index is 21.12 kg/m².    Physical Exam  Physical Exam  General Appearance:  Alert, cooperative, no distress, appears stated age  Head:  Normocephalic, without obvious abnormality, atraumatic  Eyes:  PERRL, conjunctiva/corneas clear, EOM's intact, fundi benign, both eyes  Ears:  Normal TM's and external ear canals, both ears  Nose: Nares normal, septum midline, mucosa normal, no drainage or sinus tenderness  Throat: Lips, mucosa, and tongue normal; teeth and gums normal  Neck: Supple, symmetrical, trachea midline, no adenopathy, thyroid: not enlarged, symmetric, no tenderness/mass/nodules, no carotid bruit or JVD  Lungs:   Clear to auscultation bilaterally, respirations unlabored  Heart:  Regular rate and rhythm, S1, S2 normal, no murmur, rub or gallop  Abdomen:  Soft, non-tender, bowel sounds active all four quadrants,  no masses, no organomegaly  Extremities: Extremities normal, atraumatic, no cyanosis or edema  Pulses: 2+ and symmetric  Skin: Skin color, texture, turgor normal, no rashes or lesions  Neurologic: Moves all extremities spontaneously         Diagnostic Data    Results from last 7 days   Lab Units 25  0425 25  0601 25  1834   WBC 10*3/mm3 7.34   < >  --    HEMOGLOBIN g/dL 11.9*   < >  --    HEMATOCRIT % 37.8   <  >  --    PLATELETS 10*3/mm3 145   < >  --    GLUCOSE mg/dL 244*   < > 98   CREATININE mg/dL 0.73*   < > 0.91   BUN mg/dL 26*   < > 29*   SODIUM mmol/L 137   < > 131*   POTASSIUM mmol/L 3.5   < > 4.7   AST (SGOT) U/L  --   --  47*   ALT (SGPT) U/L  --   --  21   ALK PHOS U/L  --   --  171*   BILIRUBIN mg/dL  --   --  1.0   ANION GAP mmol/L 7.0   < > 10.9    < > = values in this interval not displayed.       No radiology results for the last day        I reviewed the patient's new clinical results.    Assessment/Plan:     Active and Resolved Problems  Active Hospital Problems    Diagnosis  POA    **Altered mental status, unspecified [R41.82]  Yes    Hyponatremia [E87.1]  Yes    Elevated troponin [R79.89]  Yes    General weakness [R53.1]  Yes    LBBB (left bundle branch block) [I44.7]  Yes    Chronic combined systolic and diastolic congestive heart failure [I50.42]  Yes    Metastases to the liver [C78.7]  Yes    Malignant neoplasm of ascending colon [C18.2]  Yes    Primary hypertension [I10]  Yes    Mixed hyperlipidemia [E78.2]  Yes    Type 2 diabetes mellitus [E11.9]  Yes      Resolved Hospital Problems   No resolved problems to display.       Sepsis due to probable community-acquired bacterial pneumonia, unspecified organism  - Imaging on admission with chest x-ray consistent with multifocal pneumonia  - Initiated on broad-spectrum IV antibiotics with ceftriaxone, doxycycline but doxycycline now discontinued  - Patient was febrile with tachycardia on presentation  - Follow-up cultures  - Encourage pulmonary toileting with I-S, flutter valve if patient can perform this    Acute metabolic encephalopathy  - Likely secondary to underlying infectious process but patient was also hypoglycemic on admission  - Continue antibiotics as above for infectious process  - Holding long-term insulin and continue low-dose sliding scale for now until patient's p.o. intake and glucose levels are more stable  - MRI of the brain does not  show any acute pathology  - Patient's mental status is now back to baseline    Metastatic colon cancer with liver metastasis  - Patient is apparently currently on treatment for this per the patient's family  - Will consult oncology for further assistance  - MRI of the brain does not show any metastatic disease    Chronic combined systolic and diastolic heart failure  - Appears euvolemic despite elevated BNP  - Continue home Lasix    Dyslipidemia  - Continue statin therapy    DM type II, with hypoglycemia  - As above, patient was having hypoglycemic episodes prior to admission  - Holding Lantus and continue low-dose sliding scale insulin    Hypertension with orthostatic hypotension  - Continue home midodrine dose but holding losartan as patient was borderline hypotensive on admission    VTE Prophylaxis:  Mechanical VTE prophylaxis orders are present.             Disposition Planning:     Barriers to Discharge: Improvement in mental status, treatment of pneumonia  Anticipated Date of Discharge: 5/2  Place of Discharge: SNF      Time: 45 minutes     Code Status and Medical Interventions: CPR (Attempt to Resuscitate); Full Support   Ordered at: 04/28/25 2031     Code Status (Patient has no pulse and is not breathing):    CPR (Attempt to Resuscitate)     Medical Interventions (Patient has pulse or is breathing):    Full Support       Signature: Electronically signed by Allan Fischer MD, 05/01/25, 13:22 EDT.  [facility] Hospitalist Team

## 2025-05-01 NOTE — PLAN OF CARE
Goal Outcome Evaluation:              Outcome Evaluation: pt resting in bed, able to make needs known, call light in reach. con't with blood glucose monitoring.

## 2025-05-01 NOTE — PLAN OF CARE
Problem: Adult Inpatient Plan of Care  Goal: Plan of Care Review  Outcome: Progressing  Flowsheets  Taken 5/1/2025 0614 by Georgette Mccann RN  Outcome Evaluation: Pt is resting in bed with call light in reach. Pt screened postivie for simple sepsis. MD notified, no knew orders. Bed alarm on.  Taken 4/30/2025 0415 by Lata Ch LPN  Progress: no change  Plan of Care Reviewed With: patient  Goal: Patient-Specific Goal (Individualized)  Outcome: Progressing  Goal: Absence of Hospital-Acquired Illness or Injury  Outcome: Progressing  Intervention: Identify and Manage Fall Risk  Recent Flowsheet Documentation  Taken 5/1/2025 0600 by Georgette Mccann RN  Safety Promotion/Fall Prevention: safety round/check completed  Taken 5/1/2025 0400 by Georgette Mccann RN  Safety Promotion/Fall Prevention: safety round/check completed  Taken 5/1/2025 0205 by Georgette Mccann RN  Safety Promotion/Fall Prevention: safety round/check completed  Taken 5/1/2025 0000 by Georgette Mccann RN  Safety Promotion/Fall Prevention: safety round/check completed  Taken 4/30/2025 2200 by Georgette Mccann RN  Safety Promotion/Fall Prevention: safety round/check completed  Taken 4/30/2025 2000 by Georgette Mccann RN  Safety Promotion/Fall Prevention: safety round/check completed  Intervention: Prevent Skin Injury  Recent Flowsheet Documentation  Taken 4/30/2025 2000 by Georgette Mccann RN  Body Position: position changed independently  Skin Protection:   incontinence pads utilized   protective footwear used  Intervention: Prevent and Manage VTE (Venous Thromboembolism) Risk  Recent Flowsheet Documentation  Taken 4/30/2025 2000 by Georgette Mccann RN  VTE Prevention/Management:   bilateral   SCDs (sequential compression devices) off  Intervention: Prevent Infection  Recent Flowsheet Documentation  Taken 4/30/2025 2000 by Georgette Mccann RN  Infection Prevention:   hand  hygiene promoted   rest/sleep promoted   single patient room provided  Goal: Optimal Comfort and Wellbeing  Outcome: Progressing  Intervention: Provide Person-Centered Care  Recent Flowsheet Documentation  Taken 4/30/2025 2000 by Georgette Mccann, RN  Trust Relationship/Rapport:   care explained   choices provided   questions answered  Goal: Readiness for Transition of Care  Outcome: Progressing   Goal Outcome Evaluation:              Outcome Evaluation: Pt is resting in bed with call light in reach. Pt screened postivie for simple sepsis. MD notified, no knew orders. Bed alarm on.

## 2025-05-01 NOTE — PLAN OF CARE
Goal Outcome Evaluation:      Pt. Demonstrates improved functional mobility this date w/ ambulatory transfer to and from bathroom w/ CGA For safety + rolling walker support. Pt .attempts to ambulates w/o rolling walker and provided min A due to lateral sway and LOB. Pt. Provided min A for UB dressing this date. Recommend SNF placement at d/c to address aforementioned deficits.

## 2025-05-02 ENCOUNTER — APPOINTMENT (OUTPATIENT)
Dept: RADIATION ONCOLOGY | Facility: HOSPITAL | Age: 80
End: 2025-05-02
Payer: MEDICARE

## 2025-05-02 VITALS
OXYGEN SATURATION: 97 % | BODY MASS INDEX: 21.21 KG/M2 | SYSTOLIC BLOOD PRESSURE: 116 MMHG | TEMPERATURE: 97.6 F | WEIGHT: 160.05 LBS | RESPIRATION RATE: 16 BRPM | DIASTOLIC BLOOD PRESSURE: 90 MMHG | HEIGHT: 73 IN | HEART RATE: 101 BPM

## 2025-05-02 DIAGNOSIS — C78.7 METASTASES TO THE LIVER: ICD-10-CM

## 2025-05-02 DIAGNOSIS — C18.2 MALIGNANT NEOPLASM OF ASCENDING COLON: Primary | ICD-10-CM

## 2025-05-02 LAB
BASOPHILS # BLD AUTO: 0.03 10*3/MM3 (ref 0–0.2)
BASOPHILS NFR BLD AUTO: 0.5 % (ref 0–1.5)
DEPRECATED RDW RBC AUTO: 58.9 FL (ref 37–54)
EOSINOPHIL # BLD AUTO: 0.18 10*3/MM3 (ref 0–0.4)
EOSINOPHIL NFR BLD AUTO: 2.8 % (ref 0.3–6.2)
ERYTHROCYTE [DISTWIDTH] IN BLOOD BY AUTOMATED COUNT: 18 % (ref 12.3–15.4)
GLUCOSE BLDC GLUCOMTR-MCNC: 159 MG/DL (ref 70–105)
GLUCOSE BLDC GLUCOMTR-MCNC: 185 MG/DL (ref 70–105)
GLUCOSE BLDC GLUCOMTR-MCNC: 341 MG/DL (ref 70–105)
HCT VFR BLD AUTO: 38.8 % (ref 37.5–51)
HGB BLD-MCNC: 12.2 G/DL (ref 13–17.7)
IMM GRANULOCYTES # BLD AUTO: 0.02 10*3/MM3 (ref 0–0.05)
IMM GRANULOCYTES NFR BLD AUTO: 0.3 % (ref 0–0.5)
LYMPHOCYTES # BLD AUTO: 1.08 10*3/MM3 (ref 0.7–3.1)
LYMPHOCYTES NFR BLD AUTO: 16.9 % (ref 19.6–45.3)
MCH RBC QN AUTO: 28.2 PG (ref 26.6–33)
MCHC RBC AUTO-ENTMCNC: 31.4 G/DL (ref 31.5–35.7)
MCV RBC AUTO: 89.8 FL (ref 79–97)
MONOCYTES # BLD AUTO: 0.66 10*3/MM3 (ref 0.1–0.9)
MONOCYTES NFR BLD AUTO: 10.3 % (ref 5–12)
NEUTROPHILS NFR BLD AUTO: 4.43 10*3/MM3 (ref 1.7–7)
NEUTROPHILS NFR BLD AUTO: 69.2 % (ref 42.7–76)
NRBC BLD AUTO-RTO: 0 /100 WBC (ref 0–0.2)
PLATELET # BLD AUTO: 146 10*3/MM3 (ref 140–450)
PMV BLD AUTO: 10.2 FL (ref 6–12)
RBC # BLD AUTO: 4.32 10*6/MM3 (ref 4.14–5.8)
WBC NRBC COR # BLD AUTO: 6.4 10*3/MM3 (ref 3.4–10.8)

## 2025-05-02 PROCEDURE — 82948 REAGENT STRIP/BLOOD GLUCOSE: CPT

## 2025-05-02 PROCEDURE — 94664 DEMO&/EVAL PT USE INHALER: CPT

## 2025-05-02 PROCEDURE — 82948 REAGENT STRIP/BLOOD GLUCOSE: CPT | Performed by: STUDENT IN AN ORGANIZED HEALTH CARE EDUCATION/TRAINING PROGRAM

## 2025-05-02 PROCEDURE — 94799 UNLISTED PULMONARY SVC/PX: CPT

## 2025-05-02 PROCEDURE — 94761 N-INVAS EAR/PLS OXIMETRY MLT: CPT

## 2025-05-02 PROCEDURE — 63710000001 INSULIN LISPRO (HUMAN) PER 5 UNITS: Performed by: NURSE PRACTITIONER

## 2025-05-02 PROCEDURE — 25010000002 HEPARIN LOCK FLUSH PER 10 UNITS: Performed by: INTERNAL MEDICINE

## 2025-05-02 PROCEDURE — 85025 COMPLETE CBC W/AUTO DIFF WBC: CPT | Performed by: NURSE PRACTITIONER

## 2025-05-02 PROCEDURE — 99232 SBSQ HOSP IP/OBS MODERATE 35: CPT | Performed by: INTERNAL MEDICINE

## 2025-05-02 RX ORDER — HEPARIN SODIUM (PORCINE) LOCK FLUSH IV SOLN 100 UNIT/ML 100 UNIT/ML
500 SOLUTION INTRAVENOUS ONCE
Status: COMPLETED | OUTPATIENT
Start: 2025-05-02 | End: 2025-05-02

## 2025-05-02 RX ORDER — CEFDINIR 300 MG/1
300 CAPSULE ORAL EVERY 12 HOURS SCHEDULED
Start: 2025-05-02 | End: 2025-05-04

## 2025-05-02 RX ADMIN — ATORVASTATIN CALCIUM 40 MG: 40 TABLET, FILM COATED ORAL at 08:37

## 2025-05-02 RX ADMIN — MIDODRINE HYDROCHLORIDE 10 MG: 5 TABLET ORAL at 13:39

## 2025-05-02 RX ADMIN — INSULIN LISPRO 2 UNITS: 100 INJECTION, SOLUTION INTRAVENOUS; SUBCUTANEOUS at 08:38

## 2025-05-02 RX ADMIN — HEPARIN 500 UNITS: 100 SYRINGE at 13:42

## 2025-05-02 RX ADMIN — ASPIRIN 81 MG: 81 TABLET, COATED ORAL at 08:38

## 2025-05-02 RX ADMIN — BUDESONIDE 0.5 MG: 0.5 INHALANT RESPIRATORY (INHALATION) at 08:36

## 2025-05-02 RX ADMIN — MIDODRINE HYDROCHLORIDE 10 MG: 5 TABLET ORAL at 05:04

## 2025-05-02 RX ADMIN — IPRATROPIUM BROMIDE 0.5 MG: 0.5 SOLUTION RESPIRATORY (INHALATION) at 11:30

## 2025-05-02 RX ADMIN — INSULIN LISPRO 5 UNITS: 100 INJECTION, SOLUTION INTRAVENOUS; SUBCUTANEOUS at 14:37

## 2025-05-02 RX ADMIN — IPRATROPIUM BROMIDE 0.5 MG: 0.5 SOLUTION RESPIRATORY (INHALATION) at 08:36

## 2025-05-02 RX ADMIN — CEFDINIR 300 MG: 300 CAPSULE ORAL at 08:37

## 2025-05-02 NOTE — PROGRESS NOTES
Nutrition Services  Patient Name: Vlad Guerrero  YOB: 1945  MRN: [id number]  Admission date: 4/28/2025    PROGRESS NOTE      Nutrition Intervention Updates: Continue current diet prescription and ONS. Encourage good PO and ONS intake.     Severe chronic disease related malnutrition r/t hypermetabolism in the setting of chronic diseases as evidenced by unintentional weight loss >7.5% for 3 months and moderate to severe (mostly severe)fat and muscle wasting noted per NFPE.        Encounter Information: Checking in on diet tolerance and PO intake. At visit, pt nurse removing port dressing. Pt discharging to [facility]. Reported eating well and tolerating diet. Pt receiving Boost with good acceptance.Recommended pt continue to drink Boost at rehab facility. NFPE completed, consistent with nutrition diagnosis of malnutrition using AND/ASPEN criteria. See MSA below.     Severe chronic disease related malnutrition r/t hypermetabolism in the setting of chronic diseases as evidenced by unintentional weight loss >7.5% for 3 months and moderate to severe (mostly severe)fat and muscle wasting noted per NFPE.        PO Diet: Diet: Diabetic; Consistent Carbohydrate; Fluid Consistency: Thin (IDDSI 0)   PO Supplements: Boost Glucose Control BID (Provides 380 kcals, 32 g protein if consumed)    PO Intake:  40% PO intake x last 8 documented meals       Current nutrition support: -   Nutrition support review: -       Labs (reviewed below): Reviewed. Managed per attending.   Hyperglycemia- c/w T2DM, insulin and consisteng CHO diet to manage       GI Function:  BM documented 5/1        Brief weight review 15# weight loss x 5 months noted per chart review. (14% loss)  Wt Readings from Last 10 Encounters:   04/28/25 1715 72.6 kg (160 lb 0.9 oz)   04/27/25 2018 72.6 kg (160 lb)   04/22/25 0800 72.1 kg (159 lb)   03/26/25 0831 73 kg (161 lb)   03/19/25 0956 75.8 kg (167 lb 3.2 oz)   02/14/25 0444 82.7 kg (182 lb  5.1 oz)   02/12/25 2046 82.7 kg (182 lb 5.1 oz)   01/25/25 2114 83.9 kg (185 lb)   01/25/25 1904 83.9 kg (185 lb)   01/25/25 1512 84.2 kg (185 lb 10 oz)   01/21/25 0837 83.5 kg (184 lb)   11/14/24 0452 80.6 kg (177 lb 11.1 oz)   11/13/24 0740 69.9 kg (154 lb)   11/13/24 0035 70.1 kg (154 lb 8.7 oz)   11/12/24 1324 68.8 kg (151 lb 9.6 oz)   11/11/24 2134 75.1 kg (165 lb 9.1 oz)   11/11/24 1431 66 kg (145 lb 9.6 oz)   10/22/24 0730 77 kg (169 lb 11.2 oz)        Results from last 7 days   Lab Units 05/01/25  0425 04/30/25  0209 04/29/25  0601 04/28/25  1834   SODIUM mmol/L 137 138 138 131*   POTASSIUM mmol/L 3.5 4.2 5.2 4.7   CHLORIDE mmol/L 103 102 103 101   CO2 mmol/L 27.0 24.5 21.9* 19.1*   BUN mg/dL 26* 30* 28* 29*   CREATININE mg/dL 0.73* 0.95 0.95 0.91   CALCIUM mg/dL 8.2* 8.5* 9.0 9.2   BILIRUBIN mg/dL  --   --   --  1.0   ALK PHOS U/L  --   --   --  171*   ALT (SGPT) U/L  --   --   --  21   AST (SGOT) U/L  --   --   --  47*   GLUCOSE mg/dL 244* 274* 118* 98     Results from last 7 days   Lab Units 05/02/25  0507 04/29/25  0601 04/28/25  1834   MAGNESIUM mg/dL  --   --  2.1   HEMOGLOBIN g/dL 12.2*   < >  --    HEMATOCRIT % 38.8   < >  --     < > = values in this interval not displayed.     Malnutrition Severity Assessment      Patient meets criteria for : (P) Severe Malnutrition  Malnutrition Type (Last 8 Hours)       Malnutrition Severity Assessment       Row Name 05/02/25 1359       Malnutrition Severity Assessment    Malnutrition Type Chronic Disease - Related Malnutrition (P)       Row Name 05/02/25 1359       Unintentional Weight Loss     Unintentional Weight Loss  Weight loss greater than 7.5% in three months (P)       Row Name 05/02/25 1359       Muscle Loss    Loss of Muscle Mass Findings Severe (P)     [place] Region Severe - deep hollowing/scooping, lack of muscle to touch, facial bones well defined (P)     Clavicle Bone Region Severe - protruding prominent bone (P)     Acromion Bone Region Severe -  squared shoulders, bones, and acromion process protrusion prominent (P)     Dorsal Hand Region Moderate - slight depression (P)     Patellar Region Severe - prominent bone, square looking, very little muscle definition (P)     Anterior Thigh Region Moderate - mild depression on inner thigh (P)     Posterior Calf Region Severe - thin with very little definition/firmness (P)       Row Name 05/02/25 4425       Fat Loss    Subcutaneous Fat Loss Findings Severe (P)     Orbital Region  Severe - pronounced hollowness/depression, dark circles, loose saggy skin (P)     Upper Arm Region Severe - mostly skin, very little space between folds, fingers touch (P)       Row Name 05/02/25 5864       Criteria Met (Must meet criteria for severity in at least 2 of these categories: M Wasting, Fat Loss, Fluid, Secondary Signs, Wt. Status, Intake)    Patient meets criteria for  Severe Malnutrition (P)                            RD to follow up per protocol.    Electronically signed by:  Samantha Reyes  05/02/25 13:53 EDT

## 2025-05-02 NOTE — SIGNIFICANT NOTE
05/02/25 1324   OTHER   Discipline physical therapist   Rehab Time/Intention   Session Not Performed other (see comments)  (Discharge order and transport to SNF pending.)   Therapy Assessment/Plan (PT)   Criteria for Skilled Interventions Met (PT) yes

## 2025-05-02 NOTE — PROGRESS NOTES
Hematology/Oncology Inpatient Progress Note    PATIENT NAME: Vlad Guerrero  : [birth year]  MRN: [id number]    CHIEF COMPLAINT: Confusion and weakness.     HISTORY OF PRESENT ILLNESS:      2023: Mr. Guerrero dated the beginning of his present illness to sometime at the end of  when he started to feel fatigued.  He was seen at the [facility] and had laboratory exams that revealed microcytic anemia.  He had evidence of iron deficiency and received intravenous iron in the hospital.  He had upper and lower gastrointestinal endoscopies that demonstrated a cecal tumor that measured 4 to 5 cm and was fungating in appearance.  It was circumferential and was next to the ileocecal valve.  The upper gastrointestinal endoscopy revealed no abnormalities.  On this basis he was on December 15, 2022 he was taken to the hospital and underwent a right hemicolectomy without complications.  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  It corresponded to a grade 2 malignancy that invaded through the muscularis propria into the pericolonic tissues.  Macroscopic tumor perforation or lymphovascular space invasion were not present.  Perineural invasion was not present either.  All margins of excision were negative.  All of 36 lymph nodes submitted to were positive for involvement with malignancy.  The disease was [place] staged as [id number].  Loss of expression of mismatch repair enzymes was not documented.  Mr. Guerrero was discharged to continue treatment as outpatient.  At the time of this visit he was recovering well from the surgery.  His incision had healed completely and he had no drains or suture materials.  He had returned home and was eating well.  He had yet to return to work.  He had been afebrile and free of nausea.  For the most part his weight had been stable.  After reviewing the records a long conversation, of  approximately 1 hour, was had with the patient in regards to options of treatment.  The nature of his disease as well as the likelihood of recurrence were described.  The use of adjuvant chemotherapy to increase the rate of cure after surgery was explained.  Side effects were described in detail.  The need for a port was expressed as well.  A treatment plan was placed. A decision was made to treat him with three months of CAPOX given the characteristics of his disease.      2/6/2023: Received the first cycle of adjuvant chemotherapy without any side effects. On the day of this visit feeling well and without any new symptoms, except a very mild rash, especially on the forearms, but no oral pain. Had stools of diminished consistency for a few days but now resolved. The exam was rather unremarkable, except for a few very light erythematous macules on the forearms.      2/27/2023: Feeling well and without new symptoms.  As active as before.  Eating well and without unintended weight loss.  Stronger and more energetic since the institution of vitamin B12 and iron.  No chest pains and cough.  No abdominal pain or diarrhea.  On exam no changes.  A decision was made to continue with the same treatment.  Laboratory exams were reviewed.  He was to see me again in approximately 4 weeks from this date with new scans.     3/27/2023: Suffered an ischemic stroke.  MRI on March 10, 2023 reported a 7 mm focus of restricted diffusion in the left periventricular white matter of the posterior left frontal lobe.  This was felt to be suspicious for an acute/subacute infarct.  There was also evidence of previous lacunar strokes.  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  There was bilateral internal carotid artery stenosis with 60% stenosis estimated at the right and  not greater than 50% at the left.  Chemotherapy was held following discharge.  He was left without any sequela.  At the time of this visit he was entirely asymptomatic.  He was convinced a large part of his problem was that he had suffered from hyperglycemia, consistently for some time.  Indeed his glucose was between 152 mg/dL and 193 mg/dL in the preceding days.  However, he reported at home glucose readings of greater than 400 mg/dL..  On exam there were no changes.  The laboratory exams reported a blood count with normocytic anemia and mild thrombocytopenia.  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     4/14/2023: Completed 3 months of treatment with CAPOX.  On the day of this visit not feeling very well.  Weak and tired.  Not very good appetite although eating well.  Having some dysgeusia.  Afebrile.  No chest pains or cough and no abdominal pain.  Had maintain regular bowel activity.  No edema.  On exam no changes.  A decision was made to stop the chemotherapy.  He was to get intravenous fluids on the day of this visit.  To return to see me in 3 weeks.  Tentatively to have scans in early June 2023.     5/5/2023: Feeling progressively stronger. Eating better and slowly regaining weight. Afebrile. No more nausea. No diarrhea and now with regular bowel activity. On exam alert, conversant and well oriented. No pale or jaundice. No oral lesions and no palpable lymph nodes. Lungs clear and abdomen soft. Minimal edema of the right lower extremity. The laboratory exams revealed persistent anemia with a tendency to macrocytosis. Hemoglobin and platelets within normal ranges. A decision was made to continue to observe and I asked him to see me in approximately 3 months  with new scans.      8/7/2023: Feels as well as at the time of the last visit. Active and returned to work full time. Eating well and no nausea or vomiting. No chest pain or cough and no abdominal pain. On exam no changes, though he had lost a large amount of weight since the previous visit. The imaging studies suggested a new lesion in the liver, as well as several lesions in the spleen of unclear cause. Reviewed the images and the report of the scans. Discussed with him at length and explained the findings. Discussed with him the plans for a MRI. He will see me with results.      8/28/2023: Entirely asymptomatic.  Working without limitations.  Eating well.  Weight stable.  Afebrile.  No pain.  On exam no changes.  Laboratory exams were reviewed and discussed with him.  In spite of the fact things on the scans the Carcinoembryonic antigen has not increased.  However both the CT and the MRI suggest one isolated metastatic deposit in segment 8 of the liver.  Discussed with him the options at this time.  I believe a biopsy is essential and after that he would be treated with chemotherapy following which surgery, if no new lesions have appeared, could be considered.  He may still be curable even in the setting of metastatic disease.  Discussed with him at great length.  Explained the steps.  Sent a communication to Dr. Davis, the patient's surgeon.     9/11/2023: Feels about the same as before.  No new symptoms.  As active as before and working.  Eating well and with good appetite.  No unintended weight loss.  Afebrile.  On exam alert, conversant and in good spirits.  No distress.  No jaundice or pallor.  Lungs clear and heart regular.  Abdomen soft.  The liver is not palpable.  No edema.  Laboratory exams were reviewed.  The liver biopsy report confirms metastatic colorectal cancer.  This appears to be an isolated metastases.  On this basis treatment with chemotherapy followed by surgery is not ordered.  I have  asked him to have a PET scan and discussed the study with him.  Discussed the objectives of the treatment and its requirements.  Placed the treatment plan with 5 fluorouracil, irinotecan and panitumumab and discussed with him.  To begin as soon as possible.     9/25/2023: In the office before the next scheduled time.  He called to report that over the weekend he had had between 5 and 8 defecations of liquid stool.  He took loperamide irregularly and it did not seem to provide much relief.  He was able to eat and drink without any difficulties and was never nauseated.  He was seen in the emergency room on 9/24/2023 and he was not found to have any dehydration or other evidence of complications.  They discharged him.  At the time of this visit feeling better.  As of this time he had not had any liquid defecations.  He had continued to eat and drink fluids without any problems.  He had no chest pains and had not had any dyspnea.  He was also free of abdominal pain.  On exam alert, oriented and conversant.  Seemed well-hydrated and was not jaundiced or pale.  Lungs clear.  Heart regular.  Abdomen soft.  A decision was made to continue with the same plan.  I independently reviewed the images of the PET scan and discussed with him.  It reveals only an abnormal lesion in the liver as identified before.  No suggestion of distant metastatic disease.  Discussed with him the significance of this and explained the possibility of surgery and potentially cure.     11/15/2023: Completed 4 cycles of FOLFIRI/panitumumab.  Experienced the expected side effects, particularly skin rash.  However, the treatment proceeded without major complications.  Today he tells me he has been feeling reasonably well and has continued to work part-time.  He enjoys his job.  He has been eating about as much as he had been eating before but he has lost some weight without intention.  He did have persistent diarrhea that responded only to large doses of  "loperamide.  At this point he no longer has diarrhea.  He has been without chest pains or cough and denies as well abdominal pain.  On exam he still has some erythema on the nasolabial regions on both sides.  The lungs are clear.  The heart is regular and the abdomen soft.  Liver and spleen do not seem to be enlarged.  The laboratory exams were reviewed and discussed with him.  To have another PET scan and consider surgical excision.  He will need to go to [place] for this.     12/11/2023: Feeling reasonably well at this time without any new complaints.  His diarrhea is essentially resolved although he still has soft defecations intermittently.  He is eating well and has a good appetite.  He has had no chest pains and has been without cough.  He denies abdominal pain.  No melena, hematochezia or hematuria.  No peripheral edema.  On exam no changes.  The PET scan reveals complete response with no residual evidence of disease activity.  I have discussed with Dr. Praveen Whittaker in [place] and he requested that a MRI be done prior to deciding on surgery.  Discussed with Mr. Guerrero.  Explained the plans.  He is to have the MRI and will see me with the results.     12/27/2023: Without new symptoms.  Has not had the MRI because of scheduling problems.  He feels lightheaded at times and \"I am not as sure footed as I was before\".  He has had no falls and he continues to work full-time.  He has been eating well and has regained some of the weight that he had lost.  He has been afebrile.  No chest pains or cough.  No abdominal pain or diarrhea and no dysuria.  No skin rash.  On exam no changes.  The laboratory exams were reviewed and discussed with him.  To reschedule the MRI for the next couple of weeks.  Discussed with him.     3/18/2024: Feeling very well. His appetite is good and he has gained weight. He has been working without any difficulties. He denies chest pain or cough. No abdominal pain or diarrhea and no " dysuria. On exam alert and conversant. In good spirits and in no distress. No jaundice. No oral lesions and respirations not labored. The lungs are clear and the heart is regular. Abdomen is soft and not tender. The laboratory exams reveal a blood count in the normal ranges. He persists with hyperglycemia. The alkaline phosphatase has risen some in the recent past. He is to have the MRI of the liver. He will see me with the results.      6/26/2024: Back after an absence of a few weeks and a couple of missed appointments.  He feels about the same as he felt before.  Following the last admission to the hospital he was transferred to an assisted care living facility where he has been doing progressively better.  Persists with tremors.  Eats well.  Has not lost weight.  Denies abdominal pain.  Has not had diarrhea, melena or hematochezia.  On exam in no distress.  No jaundice.  Not pale.  The lungs are clear bilaterally and the heart regular.  The abdomen is soft and without hepatomegaly or splenomegaly.  No edema.  The laboratory exams were reviewed.  To obtain a Carcinoembryonic antigen today.  He will see me in a couple of weeks with new scans as it has been more than 3 months since the last 1.  Consider treatment with an irinotecan based regimen that seem to result in a very pronounced response in the recent past.     7/12/2024: Back to review the results of the recent scans.  He feels well generally and continues to be as active as before.  As well, his appetite appears to be the same.  On exam he does not seem ill and he is conversant and well-oriented.  He is neither pale nor jaundiced and he seems well-hydrated.  The lungs are clear and the heart regular.  The abdomen is soft.  There is no edema.  Laboratory exams reviewed.  Reviewed the images and the report of the scans.  He has 2 metastatic deposits in the liver and no other evidence of metastatic dissemination of his malignancy.  I believe it reasonable to  treat him again with chemotherapy and consider some form of local therapy in the future, given how localized the disease is.  Will resume chemotherapy with irinotecan, cetuximab and 5-fluorouracil.  No 5-FU bolus.  I will see him again in approximately 4 weeks.     9/9/2024: Tolerating the chemotherapy well.  So far he has had 4 cycles without any undue side effects and no complications.  He does have a rash that is mainly around the eyes, nose and mouth but very little on the chest and the back.  It is not uncomfortable and he has been receiving treatment with topical clindamycin and sun protection.  He continues to eat well and his weight has increased slightly.  He has no chest pain and no abdominal pain.  He has diarrhea only intermittently.  On exam indeed he has an erythematous rash with a few papules but no pustules at this time.  No oral lesions.  Respirations not labored.  Lungs clear bilaterally.  Heart regular.  Abdomen soft.  No edema.  Laboratory exams reviewed.  I reviewed the recent scans of the abdomen and pelvis.  No suggestion of metastatic disease in the chest or the pelvis.  In the liver there are 2 lesions previously identified have decreased in size some.  To continue with the same treatment and see me in approximately 4 weeks.     10/10/2024: Feels reasonably well.  Has continued to have a skin rash but there are no associated symptoms to it and it is not any more extensive than before.  Perhaps it is even less extensive than before.  He maintains a good appetite.  He continues to receive care at the assisted living facility and he has had no difficulties.  No chest pains or cough.  No abdominal pain or diarrhea.  On exam alert and conversant, in good spirits and well-oriented.  No jaundice.  Indeed there is diffuse erythema on the face and the conjunctivae are somewhat erythematous.  No discharge.  The lungs are clear and the heart regular.  The abdomen is soft nontender.  Liver and spleen  are not enlarged.  There is no edema.  Laboratory exams reviewed.  To continue with the same treatment.  Obtain scans after the next chemotherapy and see me with the results.  Consider radioembolization of the liver.  I have discussed with Dr. Vlad carmichael for coordination of care.     3/19/2025: Back after some time of absence.  He has not had treatment in some time.  He was recently admitted to the hospital with evidence of congestive heart failure and pulmonary edema.  He was discharged feeling better and today he feels much better.  He has had no pain.  He has lost some weight.  He is less active.  Denies chest pains, cough, abdominal pain, diarrhea or dysuria.  On exam alert and conversant.  Oriented and in no distress.  No jaundice.  Lungs are clear bilaterally.  Heart is regular.  Abdomen is soft.  No edema.  I reviewed all the recent imaging studies including those obtained while at the hospital.  There is clear progression of the 2 lesions.  They remain only 2 lesions and there is no suggestion of metastatic disease outside of the liver.  Because of his poor tolerance and dislike of the chemotherapy I have referred him for consideration of stereotactic radiosurgery.  I have discussed that radioembolization in the past with him but I think it would be too demanding for him.  I believe stereotactic radiosurgery may be easier to tolerate.  It may allow him to not receive any treatment for a period of time, without progression.     Patient was seen by radiation oncology with plans for MRI of liver for restaging, updated laboratory testing to assess liver function, bilateral thoracentesis given shortness of breath and increase in size of effusions as well as IR referral to discuss fiducial marker placement and/or ablation of the liver metastases, especially the left liver lobe lesion given the close proximity to the stomach.  It was recommended to proceed with fiducial marker placement and potential  embolization.  Following that, radiation oncology would likely complete hypofractionated course of radiation therapy.    4/29/2025: I've known Mr. Guerrero since early in 2023, when he presented to discuss adjuvant chemotherapy after surgery for a stage III colon cancer. Despite adjuvant treatment he had rapid progression of his disease with metastatic involvement of the liver. He was started on palliative chemotherapy which he tolerated with some difficulties but resulted in a dramatic response. I then lost him to follow up for some time. When he returned he had again progressive disease. There was a new good response with reasonable tolerance. Since the end of 2024 he has been off treatment. He has seemed to decline slowly and in fact has suffered at least one cerebrovascular event. He had to move to an assisted living facility. He was brought in the hospital with weakness and confusion. On exam he is alert and conversant. He is cooperative but is not well oriented. He has very prominent coarse tremors that are new from before. No jaundice. He seems well hydrated. No oral lesions and respirations not labored. The abdomen is soft. There are no palpable tumors and there is no edema. His laboratory exams are not particularly remarkable. His ammonia is within the normal range. His lactic acid was elevated at the time of the admission and he is now on antibiotics, despite which there has not been a rapid improvement. In early April of this year he underwent a MRI of the liver reported one isolated metastasis that had not progressed. He was in the process of investigating whether stereotactic radiosurgery to the liver was possible. At this point I'm not sure why he is so confused and tremulous. Thre is not a good reason to suspect liver failure and his ammonia is unremarkable. Sepsis? Recent microbiology studies reveal no findings. My strongest inclination at this time is to consider Mr. Guerrero's picture to correspond to drug  toxicity. For some time he has been on gabapentin and this is the strongest candidate of the medications he had been taking. It is on hold at this time. After reviewing the record I don't believe that imaging to reassess the neoplastic process is justified at this time. Will re-check TSH and B12.     Subjective   5/2/2025: Feels somewhat better. He is stronger. The tremors have improved.     ROS:  Review of Systems   Constitutional:  Positive for activity change and fatigue. Negative for appetite change, chills, diaphoresis, fever and unexpected weight change.   HENT:  Negative for congestion, dental problem, drooling, ear discharge, ear pain, facial swelling, hearing loss, mouth sores, nosebleeds, postnasal drip, rhinorrhea, sinus pressure, sinus pain, sneezing, sore throat, tinnitus, trouble swallowing and voice change.    Eyes:  Negative for photophobia, pain, discharge, redness, itching and visual disturbance.   Respiratory:  Negative for apnea, cough, choking, chest tightness, shortness of breath, wheezing and stridor.    Cardiovascular:  Negative for chest pain, palpitations and leg swelling.   Gastrointestinal:  Negative for abdominal distention, abdominal pain, anal bleeding, blood in stool, constipation, diarrhea, nausea, rectal pain and vomiting.   Endocrine: Negative for cold intolerance, heat intolerance, polydipsia and polyuria.   Genitourinary:  Negative for decreased urine volume, difficulty urinating, dysuria, flank pain, frequency, genital sores, hematuria and urgency.   Musculoskeletal:  Negative for arthralgias, back pain, gait problem, joint swelling, myalgias, neck pain and neck stiffness.   Skin:  Negative for color change, pallor and rash.   Neurological:  Positive for weakness. Negative for dizziness, tremors, seizures, syncope, facial asymmetry, speech difficulty, light-headedness, numbness and headaches.   Hematological:  Negative for adenopathy. Does not bruise/bleed easily.  "  Psychiatric/Behavioral:  Negative for agitation, behavioral problems, confusion, decreased concentration, hallucinations, self-injury, sleep disturbance and suicidal ideas. The patient is not nervous/anxious.         MEDICATIONS:    Scheduled Meds:  aspirin, 81 mg, Oral, Daily  atorvastatin, 40 mg, Oral, Daily  budesonide, 0.5 mg, Nebulization, BID - RT  cefdinir, 300 mg, Oral, Q12H  [Held by provider] gabapentin, 100 mg, Oral, Q12H  insulin lispro, 2-7 Units, Subcutaneous, 4x Daily AC & at Bedtime  ipratropium, 0.5 mg, Nebulization, 4x Daily - RT  [Held by provider] losartan, 50 mg, Oral, Daily  midodrine, 10 mg, Oral, Q8H       Continuous Infusions:      PRN Meds:    dextrose    dextrose    glucagon (human recombinant)    ipratropium    ondansetron    [COMPLETED] Insert Peripheral IV **AND** sodium chloride     ALLERGIES:    Allergies   Allergen Reactions    Penicillins Rash       Objective    VITALS:   /90 (BP Location: Left arm, Patient Position: Lying)   Pulse 101   Temp 97.6 °F (36.4 °C) (Axillary)   Resp 16   Ht 185.4 cm (73\")   Wt 72.6 kg (160 lb 0.9 oz)   SpO2 97%   BMI 21.12 kg/m²     PHYSICAL EXAM: (performed by MD)  Physical Exam  Constitutional:       General: He is not in acute distress.     Appearance: He is ill-appearing. He is not toxic-appearing or diaphoretic.      Comments: Prostrated and ill. Pale but not jaundiced. Responsive and cooperative. No distress.    HENT:      Head: Normocephalic and atraumatic.      Right Ear: External ear normal.      Left Ear: External ear normal.      Nose: Nose normal.      Mouth/Throat:      Mouth: Mucous membranes are moist.      Pharynx: Oropharynx is clear.   Eyes:      General: No scleral icterus.        Right eye: No discharge.         Left eye: No discharge.      Conjunctiva/sclera: Conjunctivae normal.      Pupils: Pupils are equal, round, and reactive to light.   Cardiovascular:      Rate and Rhythm: Normal rate and regular rhythm.      " Pulses: Normal pulses.      Heart sounds: Normal heart sounds. No murmur heard.     No friction rub. No gallop.   Pulmonary:      Effort: No respiratory distress.      Breath sounds: No stridor. No wheezing, rhonchi or rales.   Chest:      Chest wall: No tenderness.   Abdominal:      General: Abdomen is flat. Bowel sounds are normal. There is no distension.      Palpations: Abdomen is soft. There is no mass.      Tenderness: There is no abdominal tenderness. There is no right CVA tenderness, left CVA tenderness, guarding or rebound.   Musculoskeletal:         General: No tenderness, deformity or signs of injury.      Cervical back: No rigidity.      Right lower leg: No edema.      Left lower leg: No edema.   Lymphadenopathy:      Cervical: No cervical adenopathy.   Skin:     General: Skin is warm and dry.      Coloration: Skin is pale. Skin is not jaundiced.      Findings: No bruising or rash.   Neurological:      General: No focal deficit present.      Mental Status: He is alert and oriented to person, place, and time.      Cranial Nerves: No cranial nerve deficit.   Psychiatric:         Behavior: Behavior normal.     KATIE Kramer MD performed the physical exam on 5/2/2025 as documented above.      RECENT LABS:  Lab Results (last 24 hours)       Procedure Component Value Units Date/Time    POC Glucose Once [[id number]]  (Abnormal) Collected: 05/02/25 1432    Specimen: Blood Updated: 05/02/25 1435     Glucose 341 mg/dL      Comment: Serial Number: [id number]:  [id number]       POC Glucose 4x Daily Before Meals & at Bedtime [[id number]]  (Abnormal) Collected: 05/02/25 1151    Specimen: Blood Updated: 05/02/25 1204     Glucose 159 mg/dL      Comment: Serial Number: [id number]:  [id number]       POC Glucose 4x Daily Before Meals & at Bedtime [[id number]]  (Abnormal) Collected: 05/02/25 0755    Specimen: Blood Updated: 05/02/25 0758     Glucose 185 mg/dL      Comment: Serial Number: [id number]:   [id number]       CBC & Differential [[id number]]  (Abnormal) Collected: 05/02/25 0507    Specimen: Blood Updated: 05/02/25 0536    Narrative:      The following orders were created for panel order CBC & Differential.  Procedure                               Abnormality         Status                     ---------                               -----------         ------                     CBC Auto Differential[[id number]]        Abnormal            Final result                 Please view results for these tests on the individual orders.    CBC Auto Differential [[id number]]  (Abnormal) Collected: 05/02/25 0507    Specimen: Blood Updated: 05/02/25 0536     WBC 6.40 10*3/mm3      RBC 4.32 10*6/mm3      Hemoglobin 12.2 g/dL      Hematocrit 38.8 %      MCV 89.8 fL      MCH 28.2 pg      MCHC 31.4 g/dL      RDW 18.0 %      RDW-SD 58.9 fl      MPV 10.2 fL      Platelets 146 10*3/mm3      Neutrophil % 69.2 %      Lymphocyte % 16.9 %      Monocyte % 10.3 %      Eosinophil % 2.8 %      Basophil % 0.5 %      Immature Grans % 0.3 %      Neutrophils, Absolute 4.43 10*3/mm3      Lymphocytes, Absolute 1.08 10*3/mm3      Monocytes, Absolute 0.66 10*3/mm3      Eosinophils, Absolute 0.18 10*3/mm3      Basophils, Absolute 0.03 10*3/mm3      Immature Grans, Absolute 0.02 10*3/mm3      nRBC 0.0 /100 WBC     Blood Culture - Blood, Arm, Left [[id number]]  (Normal) Collected: 04/28/25 2339    Specimen: Blood from Arm, Left Updated: 05/02/25 0000     Blood Culture No growth at 3 days    Narrative:      Less than seven (7) mL's of blood was collected.  Insufficient quantity may yield false negative results.    POC Glucose Once [[id number]]  (Abnormal) Collected: 05/01/25 2032    Specimen: Blood Updated: 05/01/25 2034     Glucose 204 mg/dL      Comment: Serial Number: [id number]:  [id number]       POC Glucose 4x Daily Before Meals & at Bedtime [[id number]]  (Abnormal) Collected: 05/01/25 1733    Specimen: Blood Updated: 05/01/25 1734     Glucose  285 mg/dL      Comment: Serial Number: [id number]:  [id number]             IMAGING REVIEWED:  No radiology results for the last day      Assessment & Plan   ASSESSMENT:  Mental status changes. Much improved. Stronger and with good appetite.   Metastatic colon cancer: Without major progression.   Continue observation. At this time to proceed with the planned radiation when discharged.     PLAN:  As above.     Don Kramer MD on 5/2/2025 at 16:11

## 2025-05-02 NOTE — SIGNIFICANT NOTE
Case Management/Social Work    Patient Name:  Vlad Guerrero  YOB: 1945  MRN: [id number]  Admit Date:  4/28/2025 05/02/25 0928   Post Acute Pre-Cert Documentation   Date Post Acute Pre-Cert Completed 05/02/25   All Clinicals Submitted? Yes   Response Received from Insurance? Approval   Post Acute Pre-Cert Initiated Comment CME verified SNF precert approval via [employer] portal. auth ID [id number] . Valid 5/1-5/5. Approval letter indexed to media. CM made aware.           Electronically signed by:  Sagar Dumont, HERIBERTO  05/02/25 09:29 EDT    Sagar Dumont  Case Management Extender  [address] [address]  P: [phone]  F: [phone]

## 2025-05-02 NOTE — DISCHARGE SUMMARY
[facility]  Discharge Summary    Date of Service: 2025  Patient Name: Vlad Guerrero  : 1945  MRN: [id number]    Date of Admission: 2025  Discharge Diagnosis:   Sepsis secondary to community-acquired bacterial pneumonia, unspecified organism  Acute metabolic encephalopathy secondary to sepsis  Metastatic colon cancer with liver metastasis  Chronic systolic and diastolic heart failure  Dyslipidemia  DM type II, with hypoglycemia  Hypertension with orthostatic hypotension    Date of Discharge: 2025  Primary Care Physician: Karsten Ferrari MD      Presenting Problem:   Hypoglycemia [E16.2]  Altered mental status, unspecified altered mental status type [R41.82]  Altered mental status, unspecified [R41.82]    Active and Resolved Hospital Problems:  Active Hospital Problems    Diagnosis POA    **Altered mental status, unspecified [R41.82] Yes    Hyponatremia [E87.1] Yes    Elevated troponin [R79.89] Yes    General weakness [R53.1] Yes    LBBB (left bundle branch block) [I44.7] Yes    Chronic combined systolic and diastolic congestive heart failure [I50.42] Yes    Metastases to the liver [C78.7] Yes    Malignant neoplasm of ascending colon [C18.2] Yes    Primary hypertension [I10] Yes    Mixed hyperlipidemia [E78.2] Yes    Type 2 diabetes mellitus [E11.9] Yes      Resolved Hospital Problems   No resolved problems to display.         Hospital Course     HPI:    Patient is a 79-year-old male who presented to the hospital with complaints of confusion.  Please see H&P for details.    Hospital Course:  Patient was admitted for his acute confusion with further workup revealing probable community-acquired multifocal bacterial pneumonia.  He was initiated on broad-spectrum IV antibiotics and IV fluids.  MRI of the brain was performed which did not show any acute pathology including no evidence of brain metastasis from his colon cancer.  Within the next 24 hours the patient  became much more alert and oriented back to his baseline mental status.  His respiratory symptoms were minimal but improved.  He was tolerating p.o. intake.  PT/OT recommended skilled rehab at discharge and he was agreeable to this.  He will be discharged with a short course of oral antibiotics to complete a course of treatment.        DISCHARGE Follow Up Recommendations for labs and diagnostics: Follow-up with your PCP in 1 to 2 weeks.  Follow-up with radiation oncologist as scheduled.        Day of Discharge     Vital Signs:  Temp:  [97.1 °F (36.2 °C)-98.2 °F (36.8 °C)] 97.1 °F (36.2 °C)  Heart Rate:  [] 91  Resp:  [14-28] 18  BP: (105-111)/(70-76) 111/70    Physical Exam:  Physical Exam   General Appearance:  Alert, cooperative, no distress, appears stated age  Head:  Normocephalic, without obvious abnormality, atraumatic  Eyes:  PERRL, conjunctiva/corneas clear, EOM's intact, fundi benign, both eyes  Ears:  Normal TM's and external ear canals, both ears  Nose: Nares normal, septum midline, mucosa normal, no drainage or sinus tenderness  Throat: Lips, mucosa, and tongue normal; teeth and gums normal  Neck: Supple, symmetrical, trachea midline, no adenopathy, thyroid: not enlarged, symmetric, no tenderness/mass/nodules, no carotid bruit or JVD  Lungs:   Clear to auscultation bilaterally, respirations unlabored  Heart:  Regular rate and rhythm, S1, S2 normal, no murmur, rub or gallop  Abdomen:  Soft, non-tender, bowel sounds active all four quadrants,  no masses, no organomegaly  Extremities: Extremities normal, atraumatic, no cyanosis or edema  Pulses: 2+ and symmetric  Skin: Skin color, texture, turgor normal, no rashes or lesions  Neurologic: Normal        Pertinent  and/or Most Recent Results     LAB RESULTS:      Lab 05/02/25  0507 05/01/25  0425 04/30/25  0209 04/29/25  0601 04/29/25  0410 04/28/25  2339 04/28/25 2002 04/28/25  1816   WBC 6.40 7.34 9.01 9.26  --   --   --  9.94   HEMOGLOBIN 12.2* 11.9*  13.6 13.0  --   --   --  13.3   HEMATOCRIT 38.8 37.8 42.9 41.7  --   --   --  42.0   PLATELETS 146 145 180 149  --   --   --  169   NEUTROS ABS 4.43 5.44 6.55 7.31*  --   --   --  8.21*   IMMATURE GRANS (ABS) 0.02 0.03 0.01 0.04  --   --   --  0.04   LYMPHS ABS 1.08 0.92 1.24 0.91  --   --   --  0.88   MONOS ABS 0.66 0.80 1.16* 0.97*  --   --   --  0.71   EOS ABS 0.18 0.13 0.03 0.00  --   --   --  0.06   MCV 89.8 89.2 88.6 90.3  --   --   --  89.9   PROCALCITONIN  --   --   --   --   --   --  0.37*  --    LACTATE  --   --   --   --  1.9 2.5*  --   --          Lab 05/01/25  0425 04/30/25  0209 04/29/25  0601 04/28/25 1834 04/27/25 2051   SODIUM 137 138 138 131*  --    POTASSIUM 3.5 4.2 5.2 4.7  --    CHLORIDE 103 102 103 101  --    CO2 27.0 24.5 21.9* 19.1*  --    POC ANION GAP ISTAT  --   --   --   --  14.0   ANION GAP 7.0 11.5 13.1 10.9  --    BUN 26* 30* 28* 29*  --    CREATININE 0.73* 0.95 0.95 0.91 0.60   EGFR 93.7 82.4 82.4 86.8 99.4   GLUCOSE 244* 274* 118* 98  --    CALCIUM 8.2* 8.5* 9.0 9.2  --    MAGNESIUM  --   --   --  2.1  --    TSH  --   --  1.060  --   --          Lab 04/28/25 1834   TOTAL PROTEIN 7.8   ALBUMIN 3.7   GLOBULIN 4.1   ALT (SGPT) 21   AST (SGOT) 47*   BILIRUBIN 1.0   ALK PHOS 171*         Lab 04/29/25  0601 04/28/25 2002 04/28/25 1834   PROBNP 12,470.0*  --   --    HSTROP T  --  54* 50*             Lab 04/28/25 1834   VITAMIN B 12 363         Brief Urine Lab Results  (Last result in the past 365 days)        Color   Clarity   Blood   Leuk Est   Nitrite   Protein   CREAT   Urine HCG        04/28/25 1919 Yellow   Clear   Trace   Trace   Negative   100 mg/dL (2+)                 Microbiology Results (last 10 days)       Procedure Component Value - Date/Time    Respiratory Panel PCR w/COVID-19(SARS-CoV-2) JODY/TICO/KG/PAD/COR/OZ In-House, NP Swab in UTM/VTM, 2 HR TAT - Swab, Nasopharynx [[id number]]  (Normal) Collected: 04/30/25 0534    Lab Status: Final result Specimen: Swab from  Nasopharynx Updated: 04/30/25 0633     ADENOVIRUS, PCR Not Detected     Coronavirus 229E Not Detected     Coronavirus HKU1 Not Detected     Coronavirus NL63 Not Detected     Coronavirus OC43 Not Detected     COVID19 Not Detected     Human Metapneumovirus Not Detected     Human Rhinovirus/Enterovirus Not Detected     Influenza A PCR Not Detected     Influenza B PCR Not Detected     Parainfluenza Virus 1 Not Detected     Parainfluenza Virus 2 Not Detected     Parainfluenza Virus 3 Not Detected     Parainfluenza Virus 4 Not Detected     RSV, PCR Not Detected     Bordetella pertussis pcr Not Detected     Bordetella parapertussis PCR Not Detected     Chlamydophila pneumoniae PCR Not Detected     Mycoplasma pneumo by PCR Not Detected    Narrative:      In the setting of a positive respiratory panel with a viral infection PLUS a negative procalcitonin without other underlying concern for bacterial infection, consider observing off antibiotics or discontinuation of antibiotics and continue supportive care. If the respiratory panel is positive for atypical bacterial infection (Bordetella pertussis, Chlamydophila pneumoniae, or Mycoplasma pneumoniae), consider antibiotic de-escalation to target atypical bacterial infection.    Blood Culture - Blood, Arm, Left [[id number]]  (Normal) Collected: 04/28/25 2339    Lab Status: Preliminary result Specimen: Blood from Arm, Left Updated: 05/02/25 0000     Blood Culture No growth at 3 days    Narrative:      Less than seven (7) mL's of blood was collected.  Insufficient quantity may yield false negative results.    Respiratory Panel PCR w/COVID-19(SARS-CoV-2) JODY/TICO/KG/PAD/COR/OZ In-House, NP Swab in UTM/VTM, 2 HR TAT - Swab, Nasopharynx [[id number]]  (Normal) Collected: 04/28/25 2057    Lab Status: Final result Specimen: Swab from Nasopharynx Updated: 04/28/25 2149     ADENOVIRUS, PCR Not Detected     Coronavirus 229E Not Detected     Coronavirus HKU1 Not Detected     Coronavirus  NL63 Not Detected     Coronavirus OC43 Not Detected     COVID19 Not Detected     Human Metapneumovirus Not Detected     Human Rhinovirus/Enterovirus Not Detected     Influenza A PCR Not Detected     Influenza B PCR Not Detected     Parainfluenza Virus 1 Not Detected     Parainfluenza Virus 2 Not Detected     Parainfluenza Virus 3 Not Detected     Parainfluenza Virus 4 Not Detected     RSV, PCR Not Detected     Bordetella pertussis pcr Not Detected     Bordetella parapertussis PCR Not Detected     Chlamydophila pneumoniae PCR Not Detected     Mycoplasma pneumo by PCR Not Detected    Narrative:      In the setting of a positive respiratory panel with a viral infection PLUS a negative procalcitonin without other underlying concern for bacterial infection, consider observing off antibiotics or discontinuation of antibiotics and continue supportive care. If the respiratory panel is positive for atypical bacterial infection (Bordetella pertussis, Chlamydophila pneumoniae, or Mycoplasma pneumoniae), consider antibiotic de-escalation to target atypical bacterial infection.            MRI Brain With & Without Contrast  Result Date: 4/29/2025  Impression: Impression: 1. No acute intracranial abnormality. No abnormal enhancement. 2. Age-related atrophy with moderate chronic microvascular ischemic changes. 3. 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 Electronically Signed: Ene Toth MD  4/29/2025 11:39 AM EDT  Workstation ID: [id number]    XR Chest 1 View  Result Date: 4/28/2025  Impression: Impression: Findings highly concerning for developing multifocal/atypical pneumonia. Superimposed pulmonary edema is not completely excluded Electronically Signed: Billy Ribeiro DO  4/28/2025 8:59 PM EDT  Workstation ID: [id number]    CT Head Without Contrast  Result Date: 4/28/2025  Impression: Impression: No acute intracranial pathology. Electronically Signed: Demarcus  MD Clyde  4/28/2025 6:06 PM EDT  Workstation ID: [id number]      Results for orders placed during the hospital encounter of 04/24/24    Duplex Venous Lower Extremity - Bilateral CAR    Interpretation Summary    Normal bilateral lower extremity venous duplex scan.      Results for orders placed during the hospital encounter of 04/24/24    Duplex Venous Lower Extremity - Bilateral CAR    Interpretation Summary    Normal bilateral lower extremity venous duplex scan.      Results for orders placed during the hospital encounter of 01/25/25    Adult Transthoracic Echo Complete W/ Cont if Necessary Per Protocol    Interpretation Summary    Left ventricular ejection fraction appears to be 21 - 25%.    Indication  Shortness of breath  Heart failure    Technically satisfactory study.  Mitral valve is structurally normal.  Tricuspid valve is structurally normal.  Aortic valve is thickened with adequate opening motion.  Pulmonic valve could not be well visualized.  Moderate mitral and mild aortic regurgitation is present.  Mild tricuspid regurgitation is present.  Left atrium is normal in size.  Right atrium is normal in size.  Left ventricle is significantly enlarged with severe and diffuse hypocontractility with ejection fraction of 20 to 25%.  Left ventricular peak systolic global longitudinal strain is abnormal with GLS of -5%.  Right ventricle is normal in size and contractility with a TAPSE of 1.8 cm..  Atrial septum is intact.  Aorta is normal.  IVC dilatation with decreased respiratory variation.  Small pericardial effusion is present.  No evidence for intracardiac thrombus is present.  Large left pleural effusion is present.    Impression  Moderate mitral regurgitation.  Time mild mitral and tricuspid regurgitation is present.  Left ventricle is significantly enlarged with severe and diffuse hypocontractility with ejection fraction of 20 to 25%.  Left ventricular peak systolic global longitudinal strain is abnormal with  GLS of -5%.  Right ventricle is normal in size and contractility with a TAPSE of 1.8 cm..  Large left pleural effusion is present.  Small pericardial effusion is present.      Labs Pending at Discharge:  Pending Results       None            Procedures Performed           Consults:   Consults       Date and Time Order Name Status Description    4/29/2025 11:41 AM Hematology & Oncology Inpatient Consult Completed     4/28/2025  7:54 PM Hospitalist (on-call MD unless specified)                Discharge Details        Discharge Medications        New Medications        Instructions Start Date   cefdinir 300 MG capsule  Commonly known as: OMNICEF   300 mg, Oral, Every 12 Hours Scheduled      empagliflozin 10 MG tablet tablet  Commonly known as: JARDIANCE   10 mg, Oral, Daily             Continue These Medications        Instructions Start Date   ammonium lactate 12 % cream  Commonly known as: AMLACTIN   1 Application, 3 times daily      aspirin 81 MG EC tablet   81 mg, Daily      atorvastatin 40 MG tablet  Commonly known as: LIPITOR   40 mg, Daily      ferrous gluconate 324 MG tablet  Commonly known as: FERGON   324 mg, Daily With Breakfast      furosemide 20 MG tablet  Commonly known as: LASIX   20 mg, Daily      gabapentin 100 MG capsule  Commonly known as: NEURONTIN   1 capsule, Every 12 Hours Scheduled      insulin aspart 100 UNIT/ML solution pen-injector sc pen  Commonly known as: novoLOG FLEXPEN   8 Units, 3 Times Daily With Meals      insulin glargine 100 UNIT/ML injection  Commonly known as: LANTUS, SEMGLEE   20 Units, Nightly      Melatonin 3 MG tablet   3 mg, Nightly      metFORMIN 1000 MG tablet  Commonly known as: GLUCOPHAGE   1,000 mg, 2 Times Daily With Meals      midodrine 10 MG tablet  Commonly known as: PROAMATINE   10 mg, Oral, 3 Times Daily Before Meals      ondansetron 8 MG tablet  Commonly known as: ZOFRAN   8 mg, Every 8 Hours PRN             Stop These Medications      losartan 50 MG  tablet  Commonly known as: COZAAR              Allergies   Allergen Reactions    Penicillins Rash         Discharge Disposition:     Skilled Nursing Facility (DC - External)    Diet:  Hospital:  Diet Order   Procedures    Diet: Diabetic; Consistent Carbohydrate; Fluid Consistency: Thin (IDDSI 0)         Discharge Activity:         CODE STATUS:  Code Status and Medical Interventions: CPR (Attempt to Resuscitate); Full Support   Ordered at: 04/28/25 2031     Code Status (Patient has no pulse and is not breathing):    CPR (Attempt to Resuscitate)     Medical Interventions (Patient has pulse or is breathing):    Full Support         Future Appointments   Date Time Provider Department Center   5/2/2025 11:45 AM [facility] PORT CHAIR [facility]  [facility] NA [facility]   5/2/2025 12:00 PM Don Kramer MD [facility] ONC NA [facility]       Additional Instructions for the Follow-ups that You Need to Schedule       Discharge Follow-up with PCP   As directed       Currently Documented PCP:    Karsten Ferrari MD    PCP Phone Number:    [phone]     Follow Up Details: Follow-up with your PCP in 2 weeks.        Discharge Follow-up with Specified Provider: Follow-up with your radiation oncologist as scheduled   As directed      To: Follow-up with your radiation oncologist as scheduled                Time spent on Discharge including face to face service: Greater than 30 minutes    Signature: Electronically signed by Allan Fischer MD, 05/02/25, 10:56 EDT.  [facility] [place] Hospitalist Team

## 2025-05-02 NOTE — PLAN OF CARE
Problem: Adult Inpatient Plan of Care  Goal: Plan of Care Review  Outcome: Progressing  Flowsheets (Taken 5/2/2025 0151)  Progress: no change  Outcome Evaluation: Pt resting in bed with call light within reach and bed alarm active and audible. No questions or concerns at this time. Commit to sit with pt regarding plan of care.  Plan of Care Reviewed With: patient  Goal: Patient-Specific Goal (Individualized)  Outcome: Progressing  Goal: Absence of Hospital-Acquired Illness or Injury  Outcome: Progressing  Intervention: Identify and Manage Fall Risk  Recent Flowsheet Documentation  Taken 5/2/2025 0002 by Rachel Perkins RN  Safety Promotion/Fall Prevention: safety round/check completed  Taken 5/1/2025 2243 by Rachel Perkins RN  Safety Promotion/Fall Prevention: safety round/check completed  Taken 5/1/2025 2049 by Rachel Perkins RN  Safety Promotion/Fall Prevention: safety round/check completed  Intervention: Prevent Skin Injury  Recent Flowsheet Documentation  Taken 5/1/2025 2049 by Rachel Perkins RN  Skin Protection: transparent dressing maintained  Intervention: Prevent and Manage VTE (Venous Thromboembolism) Risk  Recent Flowsheet Documentation  Taken 5/1/2025 2049 by Rachel Perkins RN  VTE Prevention/Management: SCDs (sequential compression devices) off  Goal: Optimal Comfort and Wellbeing  Outcome: Progressing  Intervention: Provide Person-Centered Care  Recent Flowsheet Documentation  Taken 5/1/2025 2049 by Rachel Perkins RN  Trust Relationship/Rapport:   care explained   choices provided  Goal: Readiness for Transition of Care  Outcome: Progressing     Problem: Fall Injury Risk  Goal: Absence of Fall and Fall-Related Injury  Outcome: Progressing  Intervention: Identify and Manage Contributors  Recent Flowsheet Documentation  Taken 5/2/2025 0002 by Rachel Perkins RN  Medication Review/Management: medications reviewed  Taken 5/1/2025 2243 by Rachel Perkins RN  Medication Review/Management: medications  reviewed  Taken 5/1/2025 2049 by Rachel Perkins RN  Medication Review/Management: medications reviewed  Intervention: Promote Injury-Free Environment  Recent Flowsheet Documentation  Taken 5/2/2025 0002 by Rachel Perkins RN  Safety Promotion/Fall Prevention: safety round/check completed  Taken 5/1/2025 2243 by Rachel Perkins RN  Safety Promotion/Fall Prevention: safety round/check completed  Taken 5/1/2025 2049 by Rachel Perkins RN  Safety Promotion/Fall Prevention: safety round/check completed     Problem: Violence Risk or Actual  Goal: Anger and Impulse Control  Outcome: Progressing  Intervention: Minimize Safety Risk  Recent Flowsheet Documentation  Taken 5/2/2025 0002 by Rachel Perkins RN  Enhanced Safety Measures: bed alarm set  Taken 5/1/2025 2243 by Rachel Perkins RN  Enhanced Safety Measures: bed alarm set  Taken 5/1/2025 2049 by Rachel Perkins RN  Enhanced Safety Measures: bed alarm set  Intervention: Promote Self-Control  Recent Flowsheet Documentation  Taken 5/1/2025 2049 by Rachel Perkins RN  Supportive Measures: active listening utilized     Problem: Skin Injury Risk Increased  Goal: Skin Health and Integrity  Outcome: Progressing  Intervention: Optimize Skin Protection  Recent Flowsheet Documentation  Taken 5/1/2025 2049 by Rachel Perkins RN  Pressure Reduction Techniques: frequent weight shift encouraged  Pressure Reduction Devices: pressure-redistributing mattress utilized  Skin Protection: transparent dressing maintained     Problem: Sepsis/Septic Shock  Goal: Optimal Coping  Outcome: Progressing  Intervention: Support Patient and Family Response  Recent Flowsheet Documentation  Taken 5/1/2025 2049 by Rachel Perkins RN  Supportive Measures: active listening utilized  Goal: Absence of Bleeding  Outcome: Progressing  Goal: Blood Glucose Level Within Target Range  Outcome: Progressing  Goal: Absence of Infection Signs and Symptoms  Outcome: Progressing  Goal: Optimal Nutrition  Delivery  Outcome: Progressing   Goal Outcome Evaluation:  Plan of Care Reviewed With: patient        Progress: no change  Outcome Evaluation: Pt resting in bed with call light within reach and bed alarm active and audible. No questions or concerns at this time. Commit to sit with pt regarding plan of care.

## 2025-05-02 NOTE — PLAN OF CARE
Problem: Adult Inpatient Plan of Care  Goal: Plan of Care Review  5/2/2025 1408 by Batool Barber RN  Outcome: Met  5/2/2025 1408 by Batool Barber RN  Outcome: Met  Goal: Patient-Specific Goal (Individualized)  5/2/2025 1408 by Batool Barber RN  Outcome: Met  5/2/2025 1408 by Batool Barber RN  Outcome: Met  Goal: Absence of Hospital-Acquired Illness or Injury  5/2/2025 1408 by Batool Barber RN  Outcome: Met  5/2/2025 1408 by Batool Barber RN  Outcome: Met  Intervention: Identify and Manage Fall Risk  Recent Flowsheet Documentation  Taken 5/2/2025 1333 by Batool Barber RN  Safety Promotion/Fall Prevention: safety round/check completed  Taken 5/2/2025 1236 by Batool Barber RN  Safety Promotion/Fall Prevention: safety round/check completed  Taken 5/2/2025 1049 by Batool Barber RN  Safety Promotion/Fall Prevention: safety round/check completed  Taken 5/2/2025 0838 by Batool Barber RN  Safety Promotion/Fall Prevention:   safety round/check completed   room organization consistent   nonskid shoes/slippers when out of bed   lighting adjusted   fall prevention program maintained   clutter free environment maintained   assistive device/personal items within reach  Intervention: Prevent and Manage VTE (Venous Thromboembolism) Risk  Recent Flowsheet Documentation  Taken 5/2/2025 0838 by Batool Barber RN  VTE Prevention/Management: (Aspirin) other (see comments)  Intervention: Prevent Infection  Recent Flowsheet Documentation  Taken 5/2/2025 0838 by Batool Barber RN  Infection Prevention:   hand hygiene promoted   rest/sleep promoted   single patient room provided  Goal: Optimal Comfort and Wellbeing  5/2/2025 1408 by Batool Barber RN  Outcome: Met  5/2/2025 1408 by Batool Barber RN  Outcome: Met  Goal: Readiness for Transition of Care  5/2/2025 1408 by Batolo Barber RN  Outcome: Met  5/2/2025 1408 by Batool Barber RN  Outcome: Met     Problem: Fall Injury Risk  Goal: Absence of  Fall and Fall-Related Injury  5/2/2025 1408 by Batool Barber RN  Outcome: Met  5/2/2025 1408 by Batool Barber RN  Outcome: Met  Intervention: Identify and Manage Contributors  Recent Flowsheet Documentation  Taken 5/2/2025 0838 by Batool Barber RN  Medication Review/Management: medications reviewed  Intervention: Promote Injury-Free Environment  Recent Flowsheet Documentation  Taken 5/2/2025 1333 by Batool Barber RN  Safety Promotion/Fall Prevention: safety round/check completed  Taken 5/2/2025 1236 by Batool Barber RN  Safety Promotion/Fall Prevention: safety round/check completed  Taken 5/2/2025 1049 by Batool Barber RN  Safety Promotion/Fall Prevention: safety round/check completed  Taken 5/2/2025 0838 by Batool Barber RN  Safety Promotion/Fall Prevention:   safety round/check completed   room organization consistent   nonskid shoes/slippers when out of bed   lighting adjusted   fall prevention program maintained   clutter free environment maintained   assistive device/personal items within reach     Problem: Violence Risk or Actual  Goal: Anger and Impulse Control  5/2/2025 1408 by Batool Barber RN  Outcome: Met  5/2/2025 1408 by Batool Barber RN  Outcome: Met  Intervention: Minimize Safety Risk  Recent Flowsheet Documentation  Taken 5/2/2025 0838 by Batool Barber RN  Enhanced Safety Measures: bed alarm set  Intervention: Promote Self-Control  Recent Flowsheet Documentation  Taken 5/2/2025 0838 by Batool Barber RN  Environmental Support: calm environment promoted     Problem: Skin Injury Risk Increased  Goal: Skin Health and Integrity  Outcome: Met  Intervention: Optimize Skin Protection  Recent Flowsheet Documentation  Taken 5/2/2025 1333 by Batool Barber RN  Activity Management:   ambulated to bathroom   up in chair  Taken 5/2/2025 0838 by Batool Barber RN  Pressure Reduction Techniques: weight shift assistance provided     Problem: Sepsis/Septic Shock  Goal: Optimal  Coping  Outcome: Met  Goal: Absence of Bleeding  Outcome: Met  Goal: Blood Glucose Level Within Target Range  Outcome: Met  Intervention: Optimize Glycemic Control  Recent Flowsheet Documentation  Taken 5/2/2025 0838 by Batool Barber RN  Hyperglycemia Management:   blood glucose monitored   correctional insulin given  Hypoglycemia Management: blood glucose monitored  Goal: Absence of Infection Signs and Symptoms  Outcome: Met  Intervention: Initiate Sepsis Management  Recent Flowsheet Documentation  Taken 5/2/2025 0838 by Batool Barber RN  Infection Prevention:   hand hygiene promoted   rest/sleep promoted   single patient room provided  Intervention: Promote Stabilization  Recent Flowsheet Documentation  Taken 5/2/2025 0838 by Batool Barber RN  Fluid/Electrolyte Management: fluids provided  Intervention: Promote Recovery  Recent Flowsheet Documentation  Taken 5/2/2025 1333 by Batool Barber, RN  Activity Management:   ambulated to bathroom   up in chair  Goal: Optimal Nutrition Delivery  Outcome: Met   Goal Outcome Evaluation:

## 2025-05-04 LAB — BACTERIA SPEC AEROBE CULT: NORMAL

## 2025-05-05 ENCOUNTER — TELEPHONE (OUTPATIENT)
Dept: ONCOLOGY | Facility: CLINIC | Age: 80
End: 2025-05-05

## 2025-05-05 NOTE — CASE MANAGEMENT/SOCIAL WORK
Case Management Discharge Note      Final Note: [facility]    Provided Post Acute Provider List?: Yes  Post Acute Provider List: Nursing Home  Provided Post Acute Provider Quality & Resource List?: Yes  Post Acute Provider Quality and Resource List: Nursing Home  Delivered To: Support Person  Support Person: Prashanth  Method of Delivery: Telephone    Selected Continued Care - Discharged on 5/2/2025 Admission date: 4/28/2025 - Discharge disposition: Skilled Nursing Facility (DC - External)      Destination Coordination complete.      Service Provider Services Address Phone Fax Patient Preferred    [facility] [address] [place] [address] [phone] --               Transportation Services  Private: Car    Final Discharge Disposition Code: 03 - skilled nursing facility (SNF)

## 2025-05-05 NOTE — TELEPHONE ENCOUNTER
"Caller: Vlad Guerrero \"AMBAR\"     Relationship to patient: Self    Best call back number: [phone]    Chief complaint: R/S     Type of visit: PORT FLUSH, F/U 1 - OR HOSP. F/U    Requested date: ASAP     If rescheduling, when is the original appointment: 5/2/25     Additional notes:ASK FOR HIS NURSE, PLEASE          "

## 2025-05-06 NOTE — PROGRESS NOTES
HEMATOLOGY ONCOLOGY OUTPATIENT FOLLOW-UP       Patient name: Vlad Guerrero  : 1945  MRN: [id number]  Primary Care Physician: Troy Angel MD  Referring Physician: No ref. provider found  Reason For Consult: Stage III colon cance    Chief Complaint   Patient presents with    Follow-up     Malignant neoplasm of ascending colon     HPI:   History of Present Illness:  Vlad Guerrero is 79 y.o. male who presented to our office on 23 for consultation regarding    2023: Mr. Guerrero dated the beginning of his present illness to sometime at the end of  when he started to feel fatigued.  He was seen at the [facility] and had laboratory exams that revealed microcytic anemia.  He had evidence of iron deficiency and received intravenous iron in the hospital.  He had upper and lower gastrointestinal endoscopies that demonstrated a cecal tumor that measured 4 to 5 cm and was fungating in appearance.  It was circumferential and was next to the ileocecal valve.  The upper gastrointestinal endoscopy revealed no abnormalities.  On this basis he was on December 15, 2022 he was taken to the hospital and underwent a right hemicolectomy without complications.  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  It corresponded to a grade 2 malignancy that invaded through the muscularis propria into the pericolonic tissues.  Macroscopic tumor perforation or lymphovascular space invasion were not present.  Perineural invasion was not present either.  All margins of excision were negative.  All of 36 lymph nodes submitted to were positive for involvement with malignancy.  The disease was Elzbieta staged as [id number].  Loss of expression of mismatch repair enzymes was not documented.  Mr. Guerrero was discharged to continue treatment as outpatient.  At the time of this visit he was recovering well from the  surgery.  His incision had healed completely and he had no drains or suture materials.  He had returned home and was eating well.  He had yet to return to work.  He had been afebrile and free of nausea.  For the most part his weight had been stable.  After reviewing the records a long conversation, of approximately 1 hour, was had with the patient in regards to options of treatment.  The nature of his disease as well as the likelihood of recurrence were described.  The use of adjuvant chemotherapy to increase the rate of cure after surgery was explained.  Side effects were described in detail.  The need for a port was expressed as well.  A treatment plan was placed. A decision was made to treat him with three months of CAPOX given the characteristics of his disease.     2/6/2023: Received the first cycle of adjuvant chemotherapy without any side effects. On the day of this visit feeling well and without any new symptoms, except a very mild rash, especially on the forearms, but no oral pain. Had stools of diminished consistency for a few days but now resolved. The exam was rather unremarkable, except for a few very light erythematous macules on the forearms.     2/27/2023: Feeling well and without new symptoms.  As active as before.  Eating well and without unintended weight loss.  Stronger and more energetic since the institution of vitamin B12 and iron.  No chest pains and cough.  No abdominal pain or diarrhea.  On exam no changes.  A decision was made to continue with the same treatment.  Laboratory exams were reviewed.  He was to see me again in approximately 4 weeks from this date with new scans.    3/27/2023: Suffered an ischemic stroke.  MRI on March 10, 2023 reported a 7 mm focus of restricted diffusion in the left periventricular white matter of the posterior left frontal lobe.  This was felt to be suspicious for an acute/subacute infarct.  There was also evidence of previous lacunar strokes.  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  There was bilateral internal carotid artery stenosis with 60% stenosis estimated at the right and not greater than 50% at the left.  Chemotherapy was held following discharge.  He was left without any sequela.  At the time of this visit he was entirely asymptomatic.  He was convinced a large part of his problem was that he had suffered from hyperglycemia, consistently for some time.  Indeed his glucose was between 152 mg/dL and 193 mg/dL in the preceding days.  However, he reported at home glucose readings of greater than 400 mg/dL..  On exam there were no changes.  The laboratory exams reported a blood count with normocytic anemia and mild thrombocytopenia.  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    4/14/2023: Completed 3 months of treatment with CAPOX.  On the day of this visit not feeling very well.  Weak and tired.  Not very good appetite although eating well.  Having some dysgeusia.  Afebrile.  No chest pains or cough and no abdominal pain.  Had maintain regular bowel activity.  No edema.  On exam no changes.  A decision was made to stop the chemotherapy.  He was to get intravenous fluids on the day of this visit.  To return to see me in 3 weeks.  Tentatively to have scans in early June 2023.    5/5/2023: Feeling progressively stronger. Eating better and slowly regaining weight. Afebrile. No more nausea. No diarrhea and now with regular bowel activity. On exam alert, conversant and well oriented. No pale or jaundice. No oral lesions and no palpable lymph  nodes. Lungs clear and abdomen soft. Minimal edema of the right lower extremity. The laboratory exams revealed persistent anemia with a tendency to macrocytosis. Hemoglobin and platelets within normal ranges. A decision was made to continue to observe and I asked him to see me in approximately 3 months with new scans.     8/7/2023: Feels as well as at the time of the last visit. Active and returned to work full time. Eating well and no nausea or vomiting. No chest pain or cough and no abdominal pain. On exam no changes, though he had lost a large amount of weight since the previous visit. The imaging studies suggested a new lesion in the liver, as well as several lesions in the spleen of unclear cause. Reviewed the images and the report of the scans. Discussed with him at length and explained the findings. Discussed with him the plans for a MRI. He will see me with results.     8/28/2023: Entirely asymptomatic.  Working without limitations.  Eating well.  Weight stable.  Afebrile.  No pain.  On exam no changes.  Laboratory exams were reviewed and discussed with him.  In spite of the fact things on the scans the Carcinoembryonic antigen has not increased.  However both the CT and the MRI suggest one isolated metastatic deposit in segment 8 of the liver.  Discussed with him the options at this time.  I believe a biopsy is essential and after that he would be treated with chemotherapy following which surgery, if no new lesions have appeared, could be considered.  He may still be curable even in the setting of metastatic disease.  Discussed with him at great length.  Explained the steps.  Sent a communication to Dr. Davis, the patient's surgeon.    9/11/2023: Feels about the same as before.  No new symptoms.  As active as before and working.  Eating well and with good appetite.  No unintended weight loss.  Afebrile.  On exam alert, conversant and in good spirits.  No distress.  No jaundice or pallor.  Lungs clear  and heart regular.  Abdomen soft.  The liver is not palpable.  No edema.  Laboratory exams were reviewed.  The liver biopsy report confirms metastatic colorectal cancer.  This appears to be an isolated metastases.  On this basis treatment with chemotherapy followed by surgery is not ordered.  I have asked him to have a PET scan and discussed the study with him.  Discussed the objectives of the treatment and its requirements.  Placed the treatment plan with 5 fluorouracil, irinotecan and panitumumab and discussed with him.  To begin as soon as possible.    9/25/2023: In the office before the next scheduled time.  He called to report that over the weekend he had had between 5 and 8 defecations of liquid stool.  He took loperamide irregularly and it did not seem to provide much relief.  He was able to eat and drink without any difficulties and was never nauseated.  He was seen in the emergency room on 9/24/2023 and he was not found to have any dehydration or other evidence of complications.  They discharged him.  At the time of this visit feeling better.  As of this time he had not had any liquid defecations.  He had continued to eat and drink fluids without any problems.  He had no chest pains and had not had any dyspnea.  He was also free of abdominal pain.  On exam alert, oriented and conversant.  Seemed well-hydrated and was not jaundiced or pale.  Lungs clear.  Heart regular.  Abdomen soft.  A decision was made to continue with the same plan.  I independently reviewed the images of the PET scan and discussed with him.  It reveals only an abnormal lesion in the liver as identified before.  No suggestion of distant metastatic disease.  Discussed with him the significance of this and explained the possibility of surgery and potentially cure.    11/15/2023: Completed 4 cycles of FOLFIRI/panitumumab.  Experienced the expected side effects, particularly skin rash.  However, the treatment proceeded without major  "complications.  Today he tells me he has been feeling reasonably well and has continued to work part-time.  He enjoys his job.  He has been eating about as much as he had been eating before but he has lost some weight without intention.  He did have persistent diarrhea that responded only to large doses of loperamide.  At this point he no longer has diarrhea.  He has been without chest pains or cough and denies as well abdominal pain.  On exam he still has some erythema on the nasolabial regions on both sides.  The lungs are clear.  The heart is regular and the abdomen soft.  Liver and spleen do not seem to be enlarged.  The laboratory exams were reviewed and discussed with him.  To have another PET scan and consider surgical excision.  He will need to go to [place] for this.    12/11/2023: Feeling reasonably well at this time without any new complaints.  His diarrhea is essentially resolved although he still has soft defecations intermittently.  He is eating well and has a good appetite.  He has had no chest pains and has been without cough.  He denies abdominal pain.  No melena, hematochezia or hematuria.  No peripheral edema.  On exam no changes.  The PET scan reveals complete response with no residual evidence of disease activity.  I have discussed with Dr. Praveen Whittaker in [place] and he requested that a MRI be done prior to deciding on surgery.  Discussed with Mr. Guerrero.  Explained the plans.  He is to have the MRI and will see me with the results.    12/27/2023: Without new symptoms.  Has not had the MRI because of scheduling problems.  He feels lightheaded at times and \"I am not as sure footed as I was before\".  He has had no falls and he continues to work full-time.  He has been eating well and has regained some of the weight that he had lost.  He has been afebrile.  No chest pains or cough.  No abdominal pain or diarrhea and no dysuria.  No skin rash.  On exam no changes.  The laboratory exams were " reviewed and discussed with him.  To reschedule the MRI for the next couple of weeks.  Discussed with him.    3/18/2024: Feeling very well. His appetite is good and he has gained weight. He has been working without any difficulties. He denies chest pain or cough. No abdominal pain or diarrhea and no dysuria. On exam alert and conversant. In good spirits and in no distress. No jaundice. No oral lesions and respirations not labored. The lungs are clear and the heart is regular. Abdomen is soft and not tender. The laboratory exams reveal a blood count in the normal ranges. He persists with hyperglycemia. The alkaline phosphatase has risen some in the recent past. He is to have the MRI of the liver. He will see me with the results.     6/26/2024: Back after an absence of a few weeks and a couple of missed appointments.  He feels about the same as he felt before.  Following the last admission to the hospital he was transferred to an assisted care living facility where he has been doing progressively better.  Persists with tremors.  Eats well.  Has not lost weight.  Denies abdominal pain.  Has not had diarrhea, melena or hematochezia.  On exam in no distress.  No jaundice.  Not pale.  The lungs are clear bilaterally and the heart regular.  The abdomen is soft and without hepatomegaly or splenomegaly.  No edema.  The laboratory exams were reviewed.  To obtain a Carcinoembryonic antigen today.  He will see me in a couple of weeks with new scans as it has been more than 3 months since the last 1.  Consider treatment with an irinotecan based regimen that seem to result in a very pronounced response in the recent past.    7/12/2024: Back to review the results of the recent scans.  He feels well generally and continues to be as active as before.  As well, his appetite appears to be the same.  On exam he does not seem ill and he is conversant and well-oriented.  He is neither pale nor jaundiced and he seems well-hydrated.  The  lungs are clear and the heart regular.  The abdomen is soft.  There is no edema.  Laboratory exams reviewed.  Reviewed the images and the report of the scans.  He has 2 metastatic deposits in the liver and no other evidence of metastatic dissemination of his malignancy.  I believe it reasonable to treat him again with chemotherapy and consider some form of local therapy in the future, given how localized the disease is.  Will resume chemotherapy with irinotecan, cetuximab and 5-fluorouracil.  No 5-FU bolus.  I will see him again in approximately 4 weeks.    9/9/2024: Tolerating the chemotherapy well.  So far he has had 4 cycles without any undue side effects and no complications.  He does have a rash that is mainly around the eyes, nose and mouth but very little on the chest and the back.  It is not uncomfortable and he has been receiving treatment with topical clindamycin and sun protection.  He continues to eat well and his weight has increased slightly.  He has no chest pain and no abdominal pain.  He has diarrhea only intermittently.  On exam indeed he has an erythematous rash with a few papules but no pustules at this time.  No oral lesions.  Respirations not labored.  Lungs clear bilaterally.  Heart regular.  Abdomen soft.  No edema.  Laboratory exams reviewed.  I reviewed the recent scans of the abdomen and pelvis.  No suggestion of metastatic disease in the chest or the pelvis.  In the liver there are 2 lesions previously identified have decreased in size some.  To continue with the same treatment and see me in approximately 4 weeks.    10/10/2024: Feels reasonably well.  Has continued to have a skin rash but there are no associated symptoms to it and it is not any more extensive than before.  Perhaps it is even less extensive than before.  He maintains a good appetite.  He continues to receive care at the assisted living facility and he has had no difficulties.  No chest pains or cough.  No abdominal pain  or diarrhea.  On exam alert and conversant, in good spirits and well-oriented.  No jaundice.  Indeed there is diffuse erythema on the face and the conjunctivae are somewhat erythematous.  No discharge.  The lungs are clear and the heart regular.  The abdomen is soft nontender.  Liver and spleen are not enlarged.  There is no edema.  Laboratory exams reviewed.  To continue with the same treatment.  Obtain scans after the next chemotherapy and see me with the results.  Consider radioembolization of the liver.  I have discussed with Dr. Vlad carmichael for coordination of care.    3/19/2025: Back after some time of absence.  He has not had treatment in some time.  He was recently admitted to the hospital with evidence of congestive heart failure and pulmonary edema.  He was discharged feeling better and today he feels much better.  He has had no pain.  He has lost some weight.  He is less active.  Denies chest pains, cough, abdominal pain, diarrhea or dysuria.  On exam alert and conversant.  Oriented and in no distress.  No jaundice.  Lungs are clear bilaterally.  Heart is regular.  Abdomen is soft.  No edema.  I reviewed all the recent imaging studies including those obtained while at the hospital.  There is clear progression of the 2 lesions.  They remain only 2 lesions and there is no suggestion of metastatic disease outside of the liver.  Because of his poor tolerance and dislike of the chemotherapy I have referred him for consideration of stereotactic radiosurgery.  I have discussed that radioembolization in the past with him but I think it would be too demanding for him.  I believe stereotactic radiosurgery may be easier to tolerate.  It may allow him to not receive any treatment for a period of time, without progression.    4/29/2025: I've known Mr. Guerrero since early in 2023, when he presented to discuss adjuvant chemotherapy after surgery for a stage III colon cancer. Despite adjuvant treatment he had rapid  progression of his disease with metastatic involvement of the liver. He was started on palliative chemotherapy which he tolerated with some difficulties but resulted in a dramatic response. I then lost him to follow up for some time. When he returned he had again progressive disease. There was a new good response with reasonable tolerance. Since the end of 2024 he has been off treatment. He has seemed to decline slowly and in fact has suffered at least one cerebrovascular event. He had to move to an assisted living facility. He was brought in the hospital with weakness and confusion. On exam he is alert and conversant. He is cooperative but is not well oriented. He has very prominent coarse tremors that are new from before. No jaundice. He seems well hydrated. No oral lesions and respirations not labored. The abdomen is soft. There are no palpable tumors and there is no edema. His laboratory exams are not particularly remarkable. His ammonia is within the normal range. His lactic acid was elevated at the time of the admission and he is now on antibiotics, despite which there has not been a rapid improvement. In early April of this year he underwent a MRI of the liver reported one isolated metastasis that had not progressed. He was in the process of investigating whether stereotactic radiosurgery to the liver was possible. At this point I'm not sure why he is so confused and tremulous. Thre is not a good reason to suspect liver failure and his ammonia is unremarkable. Sepsis? Recent microbiology studies reveal no findings. My strongest inclination at this time is to consider Mr. Guerrero's picture to correspond to drug toxicity. For some time he has been on gabapentin and this is the strongest candidate of the medications he had been taking. It is on hold at this time. After reviewing the record I don't believe that imaging to reassess the neoplastic process is justified at this time. Will re-check TSH and B12.      5/2/2025: Had a slow but steady recovery of his mentation.  Imaging of the brain did not reveal any significant abnormalities.  He decision was made to discharge him to a nursing home as he did not seem able to care for himself.  I had a telephone conversation with his son who was in agreement.    5/8/2025: In the office for follow-up and to make further decisions.  He is transported in a wheelchair and when he attempts to stand up he is obviously unsteady on his feet.  He indeed was transferred to a nursing home and he may not be able to get back to independent living.  He is weak and profoundly tremulous.  He has been eating better, he reports to me.  He has not had any nausea or vomiting.  He has been afebrile.  He denies chest pains or cough and has not had abdominal pain or diarrhea.  Exam chronically ill-appearing and in no distress.  No jaundice.  Lungs are diminished bilaterally in a symmetrical fashion.  Heart regular.  Abdomen flat and soft.  No edema.  Laboratory exams reviewed and discussed with him.  Discussed with Dr. Ag.  Still considering treatment with stereotactic radiosurgery.  I do not believe that he is a candidate to receive systemic treatment.    The following portions of the patient's history were reviewed and updated as appropriate: allergies, current medications, past family history, past medical history, past social history, past surgical history and problem list.    Past Medical History:   Diagnosis Date    Anemia     Colon cancer 2022    colon    Diabetes mellitus     Hyperlipidemia     Hypertension     LBBB (left bundle branch block) 11/14/2024     Past Surgical History:   Procedure Laterality Date    APPENDECTOMY      CARDIAC CATHETERIZATION      CARDIAC CATHETERIZATION N/A 11/14/2024    Procedure: Left Heart Cath, possible pci;  Surgeon: Mg Rahman MD;  Location: [facility] CATH INVASIVE LOC[facility];  Service: Cardiovascular;  Laterality: N/A;    COLON RESECTION N/A  12/15/2022    Procedure: COLON RESECTION RIGHT;  Surgeon: Percy Davis MD;  Location: [facility] MAIN OR;  Service: General;  Laterality: N/A;    COLONOSCOPY N/A 11/11/2022    Procedure: COLONOSCOPY WITH BIOPSY AND POLYPECTOMY;  Surgeon: Billy Julien MD;  Location: [facility] ENDOSCOPY;  Service: Gastroenterology;  Laterality: N/A;  Impression:  1.  Large 4-5cm fulgurating circumferential ulcerated mass in the very proximal part of the ascending colon next to ileocecal valve multiple biopsies were performed.  This is highly concerning for colon malignancy.  2.  2 polyp rem    ENDOSCOPY N/A 11/11/2022    Procedure: ESOPHAGOGASTRODUODENOSCOPY with biopsy X1;  Surgeon: Billy Julien MD;  Location: [facility] ENDOSCOPY;  Service: Gastroenterology;  Laterality: N/A;  5.  Upper endoscopy lamination unremarkable.       PORTACATH PLACEMENT Right 1/12/2023    Procedure: INSERTION OF PORTACATH;  Surgeon: Percy Davis MD;  Location: [facility] MAIN OR;  Service: General;  Laterality: Right;    TONSILLECTOMY         Current Outpatient Medications:     ammonium lactate (AMLACTIN) 12 % cream, Apply 1 g topically to the appropriate area as directed 3 times a day., Disp: , Rfl:     aspirin 81 MG EC tablet, Take 1 tablet by mouth Daily., Disp: , Rfl:     atorvastatin (LIPITOR) 40 MG tablet, Take 1 tablet by mouth Daily., Disp: , Rfl:     empagliflozin (JARDIANCE) 10 MG tablet tablet, Take 1 tablet by mouth Daily., Disp: 30 tablet, Rfl: 1    ferrous gluconate (FERGON) 324 MG tablet, Take 1 tablet by mouth Daily With Breakfast., Disp: , Rfl:     furosemide (LASIX) 20 MG tablet, Take 1 tablet by mouth Daily., Disp: , Rfl:     gabapentin (NEURONTIN) 100 MG capsule, Take 1 capsule by mouth Every 12 (Twelve) Hours., Disp: , Rfl:     insulin aspart (novoLOG FLEXPEN) 100 UNIT/ML solution pen-injector sc pen, Inject 8 Units under the skin into the appropriate area as directed 3 (Three) Times a Day With Meals., Disp: , Rfl:      metFORMIN (GLUCOPHAGE) 1000 MG tablet, Take 1 tablet by mouth 2 (Two) Times a Day With Meals., Disp: , Rfl:     midodrine (PROAMATINE) 10 MG tablet, Take 1 tablet by mouth 3 (Three) Times a Day Before Meals., Disp: 90 tablet, Rfl: 0    insulin glargine (LANTUS, SEMGLEE) 100 UNIT/ML injection, Inject 20 Units under the skin into the appropriate area as directed Every Night., Disp: , Rfl:     Melatonin 3 MG tablet, Take 1 tablet by mouth Every Night., Disp: , Rfl:     ondansetron (ZOFRAN) 8 MG tablet, Take 1 tablet by mouth Every 8 (Eight) Hours As Needed for Nausea or Vomiting., Disp: , Rfl:   No current facility-administered medications for this visit.    Facility-Administered Medications Ordered in Other Visits:     heparin injection 500 Units, 500 Units, Intravenous, PRN, Don Kramer MD, 500 Units at 25 1242    sodium chloride 0.9 % flush 20 mL, 20 mL, Intravenous, PRNNia Alfonso, MD, 20 mL at 25 1241    Allergies   Allergen Reactions    Penicillins Rash     Family History   Problem Relation Age of Onset    Pneumonia Mother 94        SARS-CoV2    Colon cancer Father 62    Heart disease Sister      Cancer-related family history includes Colon cancer (age of onset: 62) in his father.    Social History     Tobacco Use    Smoking status: Former     Current packs/day: 0.00     Average packs/day: 1 pack/day for 2.0 years (2.0 ttl pk-yrs)     Types: Cigarettes     Start date:      Quit date:      Years since quittin.3    Smokeless tobacco: Never   Vaping Use    Vaping status: Never Used   Substance Use Topics    Alcohol use: Never    Drug use: Never     Social History     Social History Narrative    Not on file      ROS:     Review of Systems   Constitutional:  Negative for activity change, appetite change, chills, diaphoresis, fatigue, fever and unexpected weight change.   HENT:  Negative for congestion, dental problem, drooling, ear discharge, ear pain, facial swelling, hearing  "loss, mouth sores, nosebleeds, postnasal drip, rhinorrhea, sinus pressure, sinus pain, sneezing, sore throat, tinnitus, trouble swallowing and voice change.    Eyes:  Negative for photophobia, pain, discharge, redness, itching and visual disturbance.   Respiratory:  Negative for apnea, cough, choking, chest tightness, shortness of breath, wheezing and stridor.    Cardiovascular:  Negative for chest pain, palpitations and leg swelling.   Gastrointestinal:  Negative for abdominal distention, abdominal pain, anal bleeding, blood in stool, constipation, diarrhea, nausea, rectal pain and vomiting.   Endocrine: Negative for cold intolerance, heat intolerance, polydipsia and polyuria.   Genitourinary:  Negative for decreased urine volume, difficulty urinating, dysuria, flank pain, frequency, genital sores, hematuria and urgency.   Musculoskeletal:  Negative for arthralgias, back pain, gait problem, joint swelling, myalgias, neck pain and neck stiffness.   Skin:  Negative for color change, pallor and rash.   Neurological:  Negative for dizziness, tremors, seizures, syncope, facial asymmetry, speech difficulty, weakness, light-headedness, numbness and headaches.   Hematological:  Negative for adenopathy. Does not bruise/bleed easily.   Psychiatric/Behavioral:  Negative for agitation, behavioral problems, confusion, decreased concentration, hallucinations, self-injury, sleep disturbance and suicidal ideas. The patient is not nervous/anxious.      Objective:    Vitals:    05/08/25 1204   BP: 118/81   Pulse: 81   SpO2: 97%   Weight: 69.6 kg (153 lb 6.4 oz)   Height: 185.4 cm (73\")   PainSc: 0-No pain     Body mass index is 20.24 kg/m².  ECOG  (0) Fully active, able to carry on all predisease performance without restriction    Physical Exam:     Physical Exam  Constitutional:       General: He is not in acute distress.     Appearance: Normal appearance. He is not ill-appearing, toxic-appearing or diaphoretic.   HENT:      Head: " Normocephalic and atraumatic.      Right Ear: External ear normal.      Left Ear: External ear normal.      Nose: Nose normal.      Mouth/Throat:      Mouth: Mucous membranes are moist.      Pharynx: Oropharynx is clear.   Eyes:      General: No scleral icterus.        Right eye: No discharge.         Left eye: No discharge.      Conjunctiva/sclera: Conjunctivae normal.      Pupils: Pupils are equal, round, and reactive to light.   Cardiovascular:      Rate and Rhythm: Normal rate and regular rhythm.      Pulses: Normal pulses.      Heart sounds: Normal heart sounds. No murmur heard.     No friction rub. No gallop.   Pulmonary:      Effort: No respiratory distress.      Breath sounds: No stridor. No wheezing, rhonchi or rales.   Chest:      Chest wall: No tenderness.   Abdominal:      General: Abdomen is flat. Bowel sounds are normal. There is no distension.      Palpations: Abdomen is soft. There is no mass.      Tenderness: There is no abdominal tenderness. There is no right CVA tenderness, left CVA tenderness, guarding or rebound.   Musculoskeletal:         General: No swelling, tenderness, deformity or signs of injury.      Cervical back: No rigidity.      Right lower leg: No edema.      Left lower leg: No edema.   Lymphadenopathy:      Cervical: No cervical adenopathy.   Skin:     General: Skin is warm and dry.      Coloration: Skin is not jaundiced.      Findings: No bruising or rash.   Neurological:      General: No focal deficit present.      Mental Status: He is alert and oriented to person, place, and time.      Cranial Nerves: No cranial nerve deficit.      Gait: Gait normal.   Psychiatric:         Mood and Affect: Mood normal.         Behavior: Behavior normal.         Thought Content: Thought content normal.         Judgment: Judgment normal.     I Don Kramer MD performed a physical exam on 5/8/2025 as documented above.    Lab Results - Last 18 Months   Lab Units 05/08/25  1233 05/02/25  8538  05/01/25 0425   WBC 10*3/mm3 5.51 6.40 7.34   HEMOGLOBIN g/dL 13.1 12.2* 11.9*   HEMATOCRIT % 41.0 38.8 37.8   PLATELETS 10*3/mm3 197 146 145   MCV fL 91.5 89.8 89.2     Lab Results - Last 18 Months   Lab Units 05/01/25  0425 04/30/25  0209 04/29/25  0601 04/28/25  1834 04/27/25 2051 04/22/25  0913 02/14/25  0530 02/12/25  2239   SODIUM mmol/L 137 138 138 131*  --  136   < > 140   POTASSIUM mmol/L 3.5 4.2 5.2 4.7  --  4.4   < > 4.7   CHLORIDE mmol/L 103 102 103 101  --  103   < > 102   CO2 mmol/L 27.0 24.5 21.9* 19.1*  --  24.1   < > 26.8   BUN mg/dL 26* 30* 28* 29*  --  19   < > 19   CREATININE mg/dL 0.73* 0.95 0.95 0.91   < > 0.77   < > 0.89   CALCIUM mg/dL 8.2* 8.5* 9.0 9.2  --  9.0   < > 9.2   BILIRUBIN mg/dL  --   --   --  1.0  --  0.8  --  1.4*   ALK PHOS U/L  --   --   --  171*  --  150*  --  179*   ALT (SGPT) U/L  --   --   --  21  --  19  --  37   AST (SGOT) U/L  --   --   --  47*  --  30  --  40   GLUCOSE mg/dL 244* 274* 118* 98  --  236*   < > 86    < > = values in this interval not displayed.     Lab Results   Component Value Date    GLUCOSE 244 (H) 05/01/2025    BUN 26 (H) 05/01/2025    CREATININE 0.73 (L) 05/01/2025    EGFRIFNONA 76 11/15/2021    EGFRIFAFRI 87 11/15/2021    BCR 35.6 (H) 05/01/2025    K 3.5 05/01/2025    CO2 27.0 05/01/2025    CALCIUM 8.2 (L) 05/01/2025    ALBUMIN 3.7 04/28/2025    AST 47 (H) 04/28/2025    ALT 21 04/28/2025     Lab Results   Component Value Date    IRON 15 (L) 01/06/2023    TIBC 548 (H) 01/06/2023    FERRITIN 23.51 (L) 01/06/2023     Lab Results   Component Value Date    CEA 23.80 04/22/2025     Assessment & Plan     Assessment:  Isolated metastatic colon cancer to the central portion of the liver with 2 distinct lesions.  Has had good responses to treatment but with poor tolerance.  Despite the lack of treatment in the recent past only 2 lesions remained.  They have increased slowly in size.  Awaiting decision regarding stereotactic radiosurgery.  He no longer is a  candidate to receive any systemic treatment given his very diminished performance status.  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  Completed Cape-Ox adjuvantly for 3 months in April 2023.  History of recurrent ischemic stroke.  No symptoms at this time.  Poor performance status.  Reviewed all the records and all laboratory exams from the hospital.  Discussed with him.  Return to see me in a few weeks.  He is to see Dr. Ag.  I communicated with Dr. Ag for coordination of care.    Don Kramer MD on 5/8/2025 at 1450.

## 2025-05-08 ENCOUNTER — HOSPITAL ENCOUNTER (OUTPATIENT)
Dept: ONCOLOGY | Facility: HOSPITAL | Age: 80
Discharge: HOME | End: 2025-05-08
Admitting: INTERNAL MEDICINE
Payer: MEDICARE

## 2025-05-08 ENCOUNTER — OFFICE VISIT (OUTPATIENT)
Dept: ONCOLOGY | Facility: CLINIC | Age: 80
End: 2025-05-08
Payer: OTHER GOVERNMENT

## 2025-05-08 VITALS
OXYGEN SATURATION: 97 % | BODY MASS INDEX: 20.33 KG/M2 | HEIGHT: 73 IN | HEART RATE: 81 BPM | DIASTOLIC BLOOD PRESSURE: 81 MMHG | WEIGHT: 153.4 LBS | SYSTOLIC BLOOD PRESSURE: 118 MMHG

## 2025-05-08 DIAGNOSIS — C18.2 MALIGNANT NEOPLASM OF ASCENDING COLON: Primary | ICD-10-CM

## 2025-05-08 DIAGNOSIS — Z95.828 PORT-A-CATH IN PLACE: ICD-10-CM

## 2025-05-08 DIAGNOSIS — C78.7 METASTASES TO THE LIVER: ICD-10-CM

## 2025-05-08 LAB
ALBUMIN SERPL-MCNC: 3.3 G/DL (ref 3.5–5.2)
ALBUMIN/GLOB SERPL: 1 G/DL
ALP SERPL-CCNC: 159 U/L (ref 39–117)
ALT SERPL W P-5'-P-CCNC: 24 U/L (ref 1–41)
ANION GAP SERPL CALCULATED.3IONS-SCNC: 7.1 MMOL/L (ref 5–15)
AST SERPL-CCNC: 28 U/L (ref 1–40)
BASOPHILS # BLD AUTO: 0.02 10*3/MM3 (ref 0–0.2)
BASOPHILS NFR BLD AUTO: 0.4 % (ref 0–1.5)
BILIRUB SERPL-MCNC: 1 MG/DL (ref 0–1.2)
BUN SERPL-MCNC: 20 MG/DL (ref 8–23)
BUN/CREAT SERPL: 26 (ref 7–25)
CALCIUM SPEC-SCNC: 8.7 MG/DL (ref 8.6–10.5)
CHLORIDE SERPL-SCNC: 100 MMOL/L (ref 98–107)
CO2 SERPL-SCNC: 28.9 MMOL/L (ref 22–29)
CREAT SERPL-MCNC: 0.77 MG/DL (ref 0.76–1.27)
DEPRECATED RDW RBC AUTO: 57.9 FL (ref 37–54)
EGFRCR SERPLBLD CKD-EPI 2021: 92.2 ML/MIN/1.73
EOSINOPHIL # BLD AUTO: 0.11 10*3/MM3 (ref 0–0.4)
EOSINOPHIL NFR BLD AUTO: 2 % (ref 0.3–6.2)
ERYTHROCYTE [DISTWIDTH] IN BLOOD BY AUTOMATED COUNT: 18 % (ref 12.3–15.4)
GLOBULIN UR ELPH-MCNC: 3.4 GM/DL
GLUCOSE SERPL-MCNC: 185 MG/DL (ref 65–99)
HCT VFR BLD AUTO: 41 % (ref 37.5–51)
HGB BLD-MCNC: 13.1 G/DL (ref 13–17.7)
LYMPHOCYTES # BLD AUTO: 0.75 10*3/MM3 (ref 0.7–3.1)
LYMPHOCYTES NFR BLD AUTO: 13.6 % (ref 19.6–45.3)
MCH RBC QN AUTO: 29.2 PG (ref 26.6–33)
MCHC RBC AUTO-ENTMCNC: 32 G/DL (ref 31.5–35.7)
MCV RBC AUTO: 91.5 FL (ref 79–97)
MONOCYTES # BLD AUTO: 0.61 10*3/MM3 (ref 0.1–0.9)
MONOCYTES NFR BLD AUTO: 11.1 % (ref 5–12)
NEUTROPHILS NFR BLD AUTO: 4.02 10*3/MM3 (ref 1.7–7)
NEUTROPHILS NFR BLD AUTO: 72.9 % (ref 42.7–76)
PLATELET # BLD AUTO: 197 10*3/MM3 (ref 140–450)
PMV BLD AUTO: 10.4 FL (ref 6–12)
POTASSIUM SERPL-SCNC: 4.4 MMOL/L (ref 3.5–5.2)
PROT SERPL-MCNC: 6.7 G/DL (ref 6–8.5)
RBC # BLD AUTO: 4.48 10*6/MM3 (ref 4.14–5.8)
SODIUM SERPL-SCNC: 136 MMOL/L (ref 136–145)
WBC NRBC COR # BLD AUTO: 5.51 10*3/MM3 (ref 3.4–10.8)

## 2025-05-08 PROCEDURE — 80053 COMPREHEN METABOLIC PANEL: CPT | Performed by: INTERNAL MEDICINE

## 2025-05-08 PROCEDURE — 85025 COMPLETE CBC W/AUTO DIFF WBC: CPT | Performed by: INTERNAL MEDICINE

## 2025-05-08 PROCEDURE — 36591 DRAW BLOOD OFF VENOUS DEVICE: CPT

## 2025-05-08 PROCEDURE — 25010000002 HEPARIN LOCK FLUSH PER 10 UNITS: Performed by: INTERNAL MEDICINE

## 2025-05-08 RX ORDER — HEPARIN SODIUM (PORCINE) LOCK FLUSH IV SOLN 100 UNIT/ML 100 UNIT/ML
500 SOLUTION INTRAVENOUS AS NEEDED
OUTPATIENT
Start: 2025-05-08

## 2025-05-08 RX ORDER — SODIUM CHLORIDE 0.9 % (FLUSH) 0.9 %
20 SYRINGE (ML) INJECTION AS NEEDED
Status: CANCELLED | OUTPATIENT
Start: 2025-05-08

## 2025-05-08 RX ORDER — HEPARIN SODIUM (PORCINE) LOCK FLUSH IV SOLN 100 UNIT/ML 100 UNIT/ML
500 SOLUTION INTRAVENOUS AS NEEDED
Status: DISCONTINUED | OUTPATIENT
Start: 2025-05-08 | End: 2025-05-09 | Stop reason: HOSPADM

## 2025-05-08 RX ORDER — SODIUM CHLORIDE 0.9 % (FLUSH) 0.9 %
20 SYRINGE (ML) INJECTION AS NEEDED
OUTPATIENT
Start: 2025-05-08

## 2025-05-08 RX ORDER — HEPARIN SODIUM (PORCINE) LOCK FLUSH IV SOLN 100 UNIT/ML 100 UNIT/ML
500 SOLUTION INTRAVENOUS AS NEEDED
Status: CANCELLED | OUTPATIENT
Start: 2025-05-08

## 2025-05-08 RX ORDER — SODIUM CHLORIDE 0.9 % (FLUSH) 0.9 %
20 SYRINGE (ML) INJECTION AS NEEDED
Status: DISCONTINUED | OUTPATIENT
Start: 2025-05-08 | End: 2025-05-09 | Stop reason: HOSPADM

## 2025-05-08 RX ADMIN — Medication 20 ML: at 12:41

## 2025-05-08 RX ADMIN — Medication 500 UNITS: at 12:42

## 2025-05-11 ENCOUNTER — HOSPITAL ENCOUNTER (EMERGENCY)
Facility: HOSPITAL | Age: 80
Discharge: NURSING, REHAB OR LONG-TERM CARE | End: 2025-05-11
Attending: EMERGENCY MEDICINE | Admitting: EMERGENCY MEDICINE
Payer: OTHER GOVERNMENT

## 2025-05-11 VITALS
SYSTOLIC BLOOD PRESSURE: 117 MMHG | DIASTOLIC BLOOD PRESSURE: 83 MMHG | RESPIRATION RATE: 16 BRPM | HEART RATE: 56 BPM | HEIGHT: 73 IN | OXYGEN SATURATION: 95 % | WEIGHT: 153.44 LBS | BODY MASS INDEX: 20.34 KG/M2

## 2025-05-11 DIAGNOSIS — E16.2 HYPOGLYCEMIA: Primary | ICD-10-CM

## 2025-05-11 LAB
GLUCOSE BLDC GLUCOMTR-MCNC: 67 MG/DL (ref 70–105)
GLUCOSE BLDC GLUCOMTR-MCNC: 80 MG/DL (ref 70–105)
GLUCOSE BLDC GLUCOMTR-MCNC: 88 MG/DL (ref 70–105)

## 2025-05-11 PROCEDURE — 82948 REAGENT STRIP/BLOOD GLUCOSE: CPT | Performed by: EMERGENCY MEDICINE

## 2025-05-11 PROCEDURE — 99283 EMERGENCY DEPT VISIT LOW MDM: CPT

## 2025-05-11 PROCEDURE — 82948 REAGENT STRIP/BLOOD GLUCOSE: CPT

## 2025-05-11 NOTE — ED PROVIDER NOTES
Subjective   History of Present Illness  79-year-old male presents from extended-care facility.  Nursing home contacted they reported he had glucose of 60 this morning was given oral glucose decreased to 40 and then was given glucagon.  His sugar normalized when EMS arrived.  Patient reports he is fine he has no complaints at this time.  He reports no fever cough shortness of breath vomiting diarrhea.  Review of Systems    Past Medical History:   Diagnosis Date    Anemia     Colon cancer 2022    colon    Diabetes mellitus     Hyperlipidemia     Hypertension     LBBB (left bundle branch block) 11/14/2024       Allergies   Allergen Reactions    Penicillins Rash       Past Surgical History:   Procedure Laterality Date    APPENDECTOMY      CARDIAC CATHETERIZATION      CARDIAC CATHETERIZATION N/A 11/14/2024    Procedure: Left Heart Cath, possible pci;  Surgeon: Mg Rahman MD;  [facility];  Service: Cardiovascular;  Laterality: N/A;    COLON RESECTION N/A 12/15/2022    Procedure: COLON RESECTION RIGHT;  Surgeon: Percy Davis MD;  [facility];  Service: General;  Laterality: N/A;    COLONOSCOPY N/A 11/11/2022    Procedure: COLONOSCOPY WITH BIOPSY AND POLYPECTOMY;  Surgeon: Billy Julien MD;  [facility];  Service: Gastroenterology;  Laterality: N/A;  Impression:  1.  Large 4-5cm fulgurating circumferential ulcerated mass in the very proximal part of the ascending colon next to ileocecal valve multiple biopsies were performed.  This is highly concerning for colon malignancy.  2.  2 polyp rem    ENDOSCOPY N/A 11/11/2022    Procedure: ESOPHAGOGASTRODUODENOSCOPY with biopsy X1;  Surgeon: Billy Julien MD;  [facility];  Service: Gastroenterology;  Laterality: N/A;  5.  Upper endoscopy lamination unremarkable.       PORTACATH PLACEMENT Right 1/12/2023    Procedure: INSERTION OF PORTACATH;  Surgeon: Percy Davis MD;   Location: [facility] MAIN OR;  Service: General;  Laterality: Right;    TONSILLECTOMY         Family History   Problem Relation Age of Onset    Pneumonia Mother 94        SARS-CoV2    Colon cancer Father 62    Heart disease Sister        Social History     Socioeconomic History    Marital status:    Tobacco Use    Smoking status: Former     Current packs/day: 0.00     Average packs/day: 1 pack/day for 2.0 years (2.0 ttl pk-yrs)     Types: Cigarettes     Start date:      Quit date:      Years since quittin.3    Smokeless tobacco: Never   Vaping Use    Vaping status: Never Used   Substance and Sexual Activity    Alcohol use: Never    Drug use: Never    Sexual activity: Defer     Prior to Admission medications    Medication Sig Start Date End Date Taking? Authorizing Provider   ammonium lactate (AMLACTIN) 12 % cream Apply 1 g topically to the appropriate area as directed 3 times a day.    Rolly Jason MD   aspirin 81 MG EC tablet Take 1 tablet by mouth Daily.    Rolly Jason MD   atorvastatin (LIPITOR) 40 MG tablet Take 1 tablet by mouth Daily.    Rolly Jason MD   empagliflozin (JARDIANCE) 10 MG tablet tablet Take 1 tablet by mouth Daily. 25   Allan Fischer MD   ferrous gluconate (FERGON) 324 MG tablet Take 1 tablet by mouth Daily With Breakfast.    Rolly Jason MD   furosemide (LASIX) 20 MG tablet Take 1 tablet by mouth Daily.    Rolly Jason MD   gabapentin (NEURONTIN) 100 MG capsule Take 1 capsule by mouth Every 12 (Twelve) Hours. 11/15/24   Rolly Jason MD   insulin aspart (novoLOG FLEXPEN) 100 UNIT/ML solution pen-injector sc pen Inject 8 Units under the skin into the appropriate area as directed 3 (Three) Times a Day With Meals.    Rolly Jason MD   insulin glargine (LANTUS, SEMGLEE) 100 UNIT/ML injection Inject 20 Units under the skin into the appropriate area as directed Every Night.    Rolly Jason MD   Melatonin 3  "MG tablet Take 1 tablet by mouth Every Night.    ProviderRolly MD   metFORMIN (GLUCOPHAGE) 1000 MG tablet Take 1 tablet by mouth 2 (Two) Times a Day With Meals.    ProviderRolly MD   midodrine (PROAMATINE) 10 MG tablet Take 1 tablet by mouth 3 (Three) Times a Day Before Meals. 2/14/25   Yesenia Arreola APRN   ondansetron (ZOFRAN) 8 MG tablet Take 1 tablet by mouth Every 8 (Eight) Hours As Needed for Nausea or Vomiting.    ProviderRolly MD   Current medications also include cefdinir last dose scheduled for tomorrow        Objective   Physical Exam  79-year-old male awake alert.  He is somewhat chronically ill in appearance.  Pupils equal round react light.  Oropharynx clear no facial asymmetry neck supple chest clear cardiovascular regular rhythm abdomen soft nontender Neurologic exam without focal findings noted motor or sensory grossly intact to extremities speech clear he is oriented.  Gait not tested  Procedures           ED Course        Results for orders placed or performed during the hospital encounter of 05/11/25   POC Glucose Once    Collection Time: 05/11/25  8:23 AM    Specimen: Blood   Result Value Ref Range    Glucose 67 (L) 70 - 105 mg/dL   POC Glucose Once    Collection Time: 05/11/25  8:52 AM    Specimen: Blood   Result Value Ref Range    Glucose 88 70 - 105 mg/dL   POC Glucose Once    Collection Time: 05/11/25 10:16 AM    Specimen: Blood   Result Value Ref Range    Glucose 80 70 - 105 mg/dL     No orders to display     Medications - No data to display  /83   Pulse 56   Resp 16   Ht 185.4 cm (73\")   Wt 69.6 kg (153 lb 7 oz)   SpO2 95%   BMI 20.24 kg/m²                                                  Medical Decision Making  Problems Addressed:  Hypoglycemia: complicated acute illness or injury    Amount and/or Complexity of Data Reviewed  Labs: ordered.    Chart review: Patient had oncology evaluation on the eighth.  Noted that he was unsteady on his feet " transfer to nursing home.  It was not felt that he was a candidate for systemic treatment they were considering stereotactic radiosurgery.  It was noted he had brain imaging on the second of this month that did not show any significant abnormality.  Patient had an discharged on the second of this month after admission for pneumonia metastatic colon cancer with liver metastasis.  Noted to have orthostatic hypotension.  He did have MRI of brain performed at that time.  He was discharged to skilled rehab.  Comorbidity: As per past history   Differential: Hypoglycemia,  My EKG interpretation: Not indicated  Lab: Patient's initial blood sugar was 67.  Repeat glucose 88.     My Radiology review and interpretation: Not indicated  Discussion/treatment: Patient was given diet and observed.  Patient was given breakfast.  He ate small amount.  Repeat glucose was findings were discussed with patient's son.  He is going to follow-up at the facility about adjusting patient's medications also.  Advised facility to contact primary provider about decreasing his long-acting insulin dose.  Patient was evaluated using appropriate PPE      Final diagnoses:   Hypoglycemia       ED Disposition  ED Disposition       ED Disposition   Discharge    Condition   Stable    Comment   --               Troy Angel MD  [address] [place] [address] [place] [id number] [phone]               Medication List      No changes were made to your prescriptions during this visit.            Ish Toledo MD  05/11/25 2629

## 2025-05-11 NOTE — DISCHARGE INSTRUCTIONS
Contact primary provider today about adjusting diabetic medication possibly  decreasing his long-acting insulin dose.  Return as needed

## 2025-05-15 ENCOUNTER — DOCUMENTATION (OUTPATIENT)
Dept: RADIATION ONCOLOGY | Facility: HOSPITAL | Age: 80
End: 2025-05-15
Payer: OTHER GOVERNMENT

## 2025-05-15 NOTE — PROGRESS NOTES
We have tried to get the patient in over the past month plus to coordinate appointments and facilitate starting radiation therapy.  The patient has missed some of his appointments due to hospitalization and other issues.  The patient has continued to decline over the past month.  When we reach out to schedule they are unsure about how they would like to proceed given the patient's weakness.  I called and spoke with the patient's son today.  They are going to think about their options and decide if they want to proceed with radiation therapy to try and improve his symptoms or if they would like to forego additional treatments at this time.  They will call us back on Monday to finalize their decision.

## 2025-05-20 ENCOUNTER — TELEPHONE (OUTPATIENT)
Dept: RADIATION ONCOLOGY | Facility: HOSPITAL | Age: 80
End: 2025-05-20
Payer: OTHER GOVERNMENT

## 2025-05-20 NOTE — TELEPHONE ENCOUNTER
Wanted to let Dr. Ag know they are going to hold off on Radiation right now. Patient is moving into a long term care facility and is wanting to build up energy and strength before beginning radiation. Son will call back in a week or two to provide another update.

## 2025-06-17 NOTE — PROGRESS NOTES
HEMATOLOGY ONCOLOGY OUTPATIENT FOLLOW-UP       Patient name: Vlad Guerrero  : 1945  MRN: [id number]  Primary Care Physician: Troy Angel MD  Referring Physician: Troy Angel MD  Reason For Consult: Stage III colon cance    Chief Complaint   Patient presents with    Follow-up     Malignant neoplasm of ascending colon         HPI:   History of Present Illness:  Vlad Guerrero is 79 y.o. male who presented to our office on 23 for consultation regarding    2023: Mr. Guerrero dated the beginning of his present illness to sometime at the end of  when he started to feel fatigued.  He was seen at the [facility] and had laboratory exams that revealed microcytic anemia.  He had evidence of iron deficiency and received intravenous iron in the hospital.  He had upper and lower gastrointestinal endoscopies that demonstrated a cecal tumor that measured 4 to 5 cm and was fungating in appearance.  It was circumferential and was next to the ileocecal valve.  The upper gastrointestinal endoscopy revealed no abnormalities.  On this basis he was on December 15, 2022 he was taken to the hospital and underwent a right hemicolectomy without complications.  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  It corresponded to a grade 2 malignancy that invaded through the muscularis propria into the pericolonic tissues.  Macroscopic tumor perforation or lymphovascular space invasion were not present.  Perineural invasion was not present either.  All margins of excision were negative.  All of 36 lymph nodes submitted to were positive for involvement with malignancy.  The disease was [place] staged as [id number].  Loss of expression of mismatch repair enzymes was not documented.  Mr. Guerrero was discharged to continue treatment as outpatient.  At the time of this visit he was recovering well from  Referring provider: Ender Bailey OD  Primary eye provider: Ender Bailey OD  Primary care provider:Puneet Garcia MD    Does Mr. Urena have  today: Yes.  Mr. Urena gave us verbal permission to discuss their medical condition in the presence of the following individual(s) in the room:  Kristan - wife     CHIEF COMPLAINT/HPI (technician):Patient presents to office for evaluation of punctal stenosis per Dr. Ender Bailey.  Patient states his eyes have been watering a lot over the past year.  He just got a new eye glass prescription.   He is light perception only in the left eye due to being poked by a wire in 1990 - patient not dilated today         HISTORY OF PRESENT ILLNESS (DOCTOR):  Mr. Urena is a 73 year old patient who was here for evaluation of punctal stenosis.  He has been complaining of watering in both eyes over the last year at least.  He notes that it does run down his cheeks often.  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    The ROS and PFSH were reviewed and I have updated these as appropriate. Pertinent findings were reviewed with the patient. Also see Histories section of the EMR for a full display of this information.      PAST OCULAR HISTORY:  Punctal stenosis, left   Punctal entropion, right   Acquired nasolacrimal duct obstruction      PAST OCULAR PROCEDURES:  RIGHT EYE:   None  LEFT EYE:   Surgery related to eye injury     CURRENT EYE MEDICATION:  Current eye drops - NONE      REVIEW OF SYSTEMS:  Do you have pain?  Yes left shoulder   Do you have fever, chills, sweats or weight change? no  Do you have headaches or seizures? no  Do you have ringing in your ear/s or hearing loss? Yes hearing aids both ears  Do you have chest pain, palpitations or heart murmur? no  Do you have shortness of breath or wheezing? no  Do you have abdominal pain, nausea, vomiting, diarrhea or constipation? no  Do you have pain, frequency or difficulty with urination?  no  Do you have joint or back pain? Back pain   Do you have weakness, numbness or tingling? Hands fall asleep   Do you have skin changes such as a rash? no  Do you experience easy bruising or bleeding? no  Are you depressed? no          Base Eye Exam     Visual Acuity (Snellen - Linear)       Right Left    Dist cc 20/25 LP    Correction: Glasses          Tonometry (Applanation, 8:40 AM)       Right Left    Pressure 20 16 -difficult          Pupils       Light Shape React APD    Right 3 Round Brisk Negative    Left unable             Visual Fields       Left Right     Full    Unable left eye            Extraocular Movement       Right Left     Full Full          Neuro/Psych     Oriented x3: Yes            Slit Lamp and Fundus Exam     External Exam       Right Left    External Normal Normal          Slit Lamp Exam       Right Left    Lids/Lashes Punctal entropion with positive dye disappearance test Punctal stenosis with positive dye disappearance test    Conjunctiva/Sclera Clear Clear    Cornea Few punctate epithelial erosions Few punctate epithelial erosions                  ASSESSMENT/PLAN:  1.  Bilateral epiphora.  He does have a stenotic puncta on the left and mild punctal entropion on the right.  Given the positive dye disappearance test he may also have an obstruction somewhere else in the nasolacrimal system, at least on the right side.  I discussed with him options for this which would be procedural nature.  He really did not want to pursue any surgical options, particularly with his good eye.  He feels that although it can be bothersome at times he can live with it.  We did leave it open and if he chooses to proceed, I would be happy to re-evaluate at any time.      NO FURTHER FOLLOW UP HAS BEEN SCHEDULED WITH ME AT THIS TIME and Patient has been instructed to call or return immediately if there symptoms do not resolve.      COPY OF REPORT SENT TO REQUESTING PHYSICIAN--Ender Baliey, OD                 the surgery.  His incision had healed completely and he had no drains or suture materials.  He had returned home and was eating well.  He had yet to return to work.  He had been afebrile and free of nausea.  For the most part his weight had been stable.  After reviewing the records a long conversation, of approximately 1 hour, was had with the patient in regards to options of treatment.  The nature of his disease as well as the likelihood of recurrence were described.  The use of adjuvant chemotherapy to increase the rate of cure after surgery was explained.  Side effects were described in detail.  The need for a port was expressed as well.  A treatment plan was placed. A decision was made to treat him with three months of CAPOX given the characteristics of his disease.     2/6/2023: Received the first cycle of adjuvant chemotherapy without any side effects. On the day of this visit feeling well and without any new symptoms, except a very mild rash, especially on the forearms, but no oral pain. Had stools of diminished consistency for a few days but now resolved. The exam was rather unremarkable, except for a few very light erythematous macules on the forearms.     2/27/2023: Feeling well and without new symptoms.  As active as before.  Eating well and without unintended weight loss.  Stronger and more energetic since the institution of vitamin B12 and iron.  No chest pains and cough.  No abdominal pain or diarrhea.  On exam no changes.  A decision was made to continue with the same treatment.  Laboratory exams were reviewed.  He was to see me again in approximately 4 weeks from this date with new scans.    3/27/2023: Suffered an ischemic stroke.  MRI on March 10, 2023 reported a 7 mm focus of restricted diffusion in the left periventricular white matter of the posterior left frontal lobe.  This was felt to be suspicious for an acute/subacute infarct.  There was also evidence of previous lacunar strokes.  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  There was bilateral internal carotid artery stenosis with 60% stenosis estimated at the right and not greater than 50% at the left.  Chemotherapy was held following discharge.  He was left without any sequela.  At the time of this visit he was entirely asymptomatic.  He was convinced a large part of his problem was that he had suffered from hyperglycemia, consistently for some time.  Indeed his glucose was between 152 mg/dL and 193 mg/dL in the preceding days.  However, he reported at home glucose readings of greater than 400 mg/dL..  On exam there were no changes.  The laboratory exams reported a blood count with normocytic anemia and mild thrombocytopenia.  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    4/14/2023: Completed 3 months of treatment with CAPOX.  On the day of this visit not feeling very well.  Weak and tired.  Not very good appetite although eating well.  Having some dysgeusia.  Afebrile.  No chest pains or cough and no abdominal pain.  Had maintain regular bowel activity.  No edema.  On exam no changes.  A decision was made to stop the chemotherapy.  He was to get intravenous fluids on the day of this visit.  To return to see me in 3 weeks.  Tentatively to have scans in early June 2023.    5/5/2023: Feeling progressively stronger. Eating better and slowly regaining weight. Afebrile. No more nausea. No diarrhea and now with regular bowel activity. On exam alert, conversant and well oriented. No pale or jaundice. No oral lesions and no palpable lymph  nodes. Lungs clear and abdomen soft. Minimal edema of the right lower extremity. The laboratory exams revealed persistent anemia with a tendency to macrocytosis. Hemoglobin and platelets within normal ranges. A decision was made to continue to observe and I asked him to see me in approximately 3 months with new scans.     8/7/2023: Feels as well as at the time of the last visit. Active and returned to work full time. Eating well and no nausea or vomiting. No chest pain or cough and no abdominal pain. On exam no changes, though he had lost a large amount of weight since the previous visit. The imaging studies suggested a new lesion in the liver, as well as several lesions in the spleen of unclear cause. Reviewed the images and the report of the scans. Discussed with him at length and explained the findings. Discussed with him the plans for a MRI. He will see me with results.     8/28/2023: Entirely asymptomatic.  Working without limitations.  Eating well.  Weight stable.  Afebrile.  No pain.  On exam no changes.  Laboratory exams were reviewed and discussed with him.  In spite of the fact things on the scans the Carcinoembryonic antigen has not increased.  However both the CT and the MRI suggest one isolated metastatic deposit in segment 8 of the liver.  Discussed with him the options at this time.  I believe a biopsy is essential and after that he would be treated with chemotherapy following which surgery, if no new lesions have appeared, could be considered.  He may still be curable even in the setting of metastatic disease.  Discussed with him at great length.  Explained the steps.  Sent a communication to Dr. Davis, the patient's surgeon.    9/11/2023: Feels about the same as before.  No new symptoms.  As active as before and working.  Eating well and with good appetite.  No unintended weight loss.  Afebrile.  On exam alert, conversant and in good spirits.  No distress.  No jaundice or pallor.  Lungs clear  and heart regular.  Abdomen soft.  The liver is not palpable.  No edema.  Laboratory exams were reviewed.  The liver biopsy report confirms metastatic colorectal cancer.  This appears to be an isolated metastases.  On this basis treatment with chemotherapy followed by surgery is not ordered.  I have asked him to have a PET scan and discussed the study with him.  Discussed the objectives of the treatment and its requirements.  Placed the treatment plan with 5 fluorouracil, irinotecan and panitumumab and discussed with him.  To begin as soon as possible.    9/25/2023: In the office before the next scheduled time.  He called to report that over the weekend he had had between 5 and 8 defecations of liquid stool.  He took loperamide irregularly and it did not seem to provide much relief.  He was able to eat and drink without any difficulties and was never nauseated.  He was seen in the emergency room on 9/24/2023 and he was not found to have any dehydration or other evidence of complications.  They discharged him.  At the time of this visit feeling better.  As of this time he had not had any liquid defecations.  He had continued to eat and drink fluids without any problems.  He had no chest pains and had not had any dyspnea.  He was also free of abdominal pain.  On exam alert, oriented and conversant.  Seemed well-hydrated and was not jaundiced or pale.  Lungs clear.  Heart regular.  Abdomen soft.  A decision was made to continue with the same plan.  I independently reviewed the images of the PET scan and discussed with him.  It reveals only an abnormal lesion in the liver as identified before.  No suggestion of distant metastatic disease.  Discussed with him the significance of this and explained the possibility of surgery and potentially cure.    11/15/2023: Completed 4 cycles of FOLFIRI/panitumumab.  Experienced the expected side effects, particularly skin rash.  However, the treatment proceeded without major  "complications.  Today he tells me he has been feeling reasonably well and has continued to work part-time.  He enjoys his job.  He has been eating about as much as he had been eating before but he has lost some weight without intention.  He did have persistent diarrhea that responded only to large doses of loperamide.  At this point he no longer has diarrhea.  He has been without chest pains or cough and denies as well abdominal pain.  On exam he still has some erythema on the nasolabial regions on both sides.  The lungs are clear.  The heart is regular and the abdomen soft.  Liver and spleen do not seem to be enlarged.  The laboratory exams were reviewed and discussed with him.  To have another PET scan and consider surgical excision.  He will need to go to [place] for this.    12/11/2023: Feeling reasonably well at this time without any new complaints.  His diarrhea is essentially resolved although he still has soft defecations intermittently.  He is eating well and has a good appetite.  He has had no chest pains and has been without cough.  He denies abdominal pain.  No melena, hematochezia or hematuria.  No peripheral edema.  On exam no changes.  The PET scan reveals complete response with no residual evidence of disease activity.  I have discussed with Dr. Praveen Whittaker in [place] and he requested that a MRI be done prior to deciding on surgery.  Discussed with Mr. Guerrero.  Explained the plans.  He is to have the MRI and will see me with the results.    12/27/2023: Without new symptoms.  Has not had the MRI because of scheduling problems.  He feels lightheaded at times and \"I am not as sure footed as I was before\".  He has had no falls and he continues to work full-time.  He has been eating well and has regained some of the weight that he had lost.  He has been afebrile.  No chest pains or cough.  No abdominal pain or diarrhea and no dysuria.  No skin rash.  On exam no changes.  The laboratory exams were " reviewed and discussed with him.  To reschedule the MRI for the next couple of weeks.  Discussed with him.    3/18/2024: Feeling very well. His appetite is good and he has gained weight. He has been working without any difficulties. He denies chest pain or cough. No abdominal pain or diarrhea and no dysuria. On exam alert and conversant. In good spirits and in no distress. No jaundice. No oral lesions and respirations not labored. The lungs are clear and the heart is regular. Abdomen is soft and not tender. The laboratory exams reveal a blood count in the normal ranges. He persists with hyperglycemia. The alkaline phosphatase has risen some in the recent past. He is to have the MRI of the liver. He will see me with the results.     6/26/2024: Back after an absence of a few weeks and a couple of missed appointments.  He feels about the same as he felt before.  Following the last admission to the hospital he was transferred to an assisted care living facility where he has been doing progressively better.  Persists with tremors.  Eats well.  Has not lost weight.  Denies abdominal pain.  Has not had diarrhea, melena or hematochezia.  On exam in no distress.  No jaundice.  Not pale.  The lungs are clear bilaterally and the heart regular.  The abdomen is soft and without hepatomegaly or splenomegaly.  No edema.  The laboratory exams were reviewed.  To obtain a Carcinoembryonic antigen today.  He will see me in a couple of weeks with new scans as it has been more than 3 months since the last 1.  Consider treatment with an irinotecan based regimen that seem to result in a very pronounced response in the recent past.    7/12/2024: Back to review the results of the recent scans.  He feels well generally and continues to be as active as before.  As well, his appetite appears to be the same.  On exam he does not seem ill and he is conversant and well-oriented.  He is neither pale nor jaundiced and he seems well-hydrated.  The  lungs are clear and the heart regular.  The abdomen is soft.  There is no edema.  Laboratory exams reviewed.  Reviewed the images and the report of the scans.  He has 2 metastatic deposits in the liver and no other evidence of metastatic dissemination of his malignancy.  I believe it reasonable to treat him again with chemotherapy and consider some form of local therapy in the future, given how localized the disease is.  Will resume chemotherapy with irinotecan, cetuximab and 5-fluorouracil.  No 5-FU bolus.  I will see him again in approximately 4 weeks.    9/9/2024: Tolerating the chemotherapy well.  So far he has had 4 cycles without any undue side effects and no complications.  He does have a rash that is mainly around the eyes, nose and mouth but very little on the chest and the back.  It is not uncomfortable and he has been receiving treatment with topical clindamycin and sun protection.  He continues to eat well and his weight has increased slightly.  He has no chest pain and no abdominal pain.  He has diarrhea only intermittently.  On exam indeed he has an erythematous rash with a few papules but no pustules at this time.  No oral lesions.  Respirations not labored.  Lungs clear bilaterally.  Heart regular.  Abdomen soft.  No edema.  Laboratory exams reviewed.  I reviewed the recent scans of the abdomen and pelvis.  No suggestion of metastatic disease in the chest or the pelvis.  In the liver there are 2 lesions previously identified have decreased in size some.  To continue with the same treatment and see me in approximately 4 weeks.    10/10/2024: Feels reasonably well.  Has continued to have a skin rash but there are no associated symptoms to it and it is not any more extensive than before.  Perhaps it is even less extensive than before.  He maintains a good appetite.  He continues to receive care at the assisted living facility and he has had no difficulties.  No chest pains or cough.  No abdominal pain  or diarrhea.  On exam alert and conversant, in good spirits and well-oriented.  No jaundice.  Indeed there is diffuse erythema on the face and the conjunctivae are somewhat erythematous.  No discharge.  The lungs are clear and the heart regular.  The abdomen is soft nontender.  Liver and spleen are not enlarged.  There is no edema.  Laboratory exams reviewed.  To continue with the same treatment.  Obtain scans after the next chemotherapy and see me with the results.  Consider radioembolization of the liver.  I have discussed with Dr. Vlad carmichael for coordination of care.    3/19/2025: Back after some time of absence.  He has not had treatment in some time.  He was recently admitted to the hospital with evidence of congestive heart failure and pulmonary edema.  He was discharged feeling better and today he feels much better.  He has had no pain.  He has lost some weight.  He is less active.  Denies chest pains, cough, abdominal pain, diarrhea or dysuria.  On exam alert and conversant.  Oriented and in no distress.  No jaundice.  Lungs are clear bilaterally.  Heart is regular.  Abdomen is soft.  No edema.  I reviewed all the recent imaging studies including those obtained while at the hospital.  There is clear progression of the 2 lesions.  They remain only 2 lesions and there is no suggestion of metastatic disease outside of the liver.  Because of his poor tolerance and dislike of the chemotherapy I have referred him for consideration of stereotactic radiosurgery.  I have discussed that radioembolization in the past with him but I think it would be too demanding for him.  I believe stereotactic radiosurgery may be easier to tolerate.  It may allow him to not receive any treatment for a period of time, without progression.    4/29/2025: I've known Mr. Guerrero since early in 2023, when he presented to discuss adjuvant chemotherapy after surgery for a stage III colon cancer. Despite adjuvant treatment he had rapid  progression of his disease with metastatic involvement of the liver. He was started on palliative chemotherapy which he tolerated with some difficulties but resulted in a dramatic response. I then lost him to follow up for some time. When he returned he had again progressive disease. There was a new good response with reasonable tolerance. Since the end of 2024 he has been off treatment. He has seemed to decline slowly and in fact has suffered at least one cerebrovascular event. He had to move to an assisted living facility. He was brought in the hospital with weakness and confusion. On exam he is alert and conversant. He is cooperative but is not well oriented. He has very prominent coarse tremors that are new from before. No jaundice. He seems well hydrated. No oral lesions and respirations not labored. The abdomen is soft. There are no palpable tumors and there is no edema. His laboratory exams are not particularly remarkable. His ammonia is within the normal range. His lactic acid was elevated at the time of the admission and he is now on antibiotics, despite which there has not been a rapid improvement. In early April of this year he underwent a MRI of the liver reported one isolated metastasis that had not progressed. He was in the process of investigating whether stereotactic radiosurgery to the liver was possible. At this point I'm not sure why he is so confused and tremulous. Thre is not a good reason to suspect liver failure and his ammonia is unremarkable. Sepsis? Recent microbiology studies reveal no findings. My strongest inclination at this time is to consider Mr. Guerrero's picture to correspond to drug toxicity. For some time he has been on gabapentin and this is the strongest candidate of the medications he had been taking. It is on hold at this time. After reviewing the record I don't believe that imaging to reassess the neoplastic process is justified at this time. Will re-check TSH and B12.      5/2/2025: Had a slow but steady recovery of his mentation.  Imaging of the brain did not reveal any significant abnormalities.  He decision was made to discharge him to a nursing home as he did not seem able to care for himself.  I had a telephone conversation with his son who was in agreement.    4/29/2025: I've known Mr. Guerrero since early in 2023, when he presented to discuss adjuvant chemotherapy after surgery for a stage III colon cancer. Despite adjuvant treatment he had rapid progression of his disease with metastatic involvement of the liver. He was started on palliative chemotherapy which he tolerated with some difficulties but resulted in a dramatic response. I then lost him to follow up for some time. When he returned he had again progressive disease. There was a new good response with reasonable tolerance. Since the end of 2024 he has been off treatment. He has seemed to decline slowly and in fact has suffered at least one cerebrovascular event. He had to move to an assisted living facility. He was brought in the hospital with weakness and confusion. On exam he is alert and conversant. He is cooperative but is not well oriented. He has very prominent coarse tremors that are new from before. No jaundice. He seems well hydrated. No oral lesions and respirations not labored. The abdomen is soft. There are no palpable tumors and there is no edema. His laboratory exams are not particularly remarkable. His ammonia is within the normal range. His lactic acid was elevated at the time of the admission and he is now on antibiotics, despite which there has not been a rapid improvement. In early April of this year he underwent a MRI of the liver reported one isolated metastasis that had not progressed. He was in the process of investigating whether stereotactic radiosurgery to the liver was possible. At this point I'm not sure why he is so confused and tremulous. Thre is not a good reason to suspect liver failure  and his ammonia is unremarkable. Sepsis? Recent microbiology studies reveal no findings. My strongest inclination at this time is to consider Mr. Guerrero's picture to correspond to drug toxicity. For some time he has been on gabapentin and this is the strongest candidate of the medications he had been taking. It is on hold at this time. After reviewing the record I don't believe that imaging to reassess the neoplastic process is justified at this time. Will re-check TSH and B12.  Both TSH and B12 were well within the normal ranges.    5/8/2025: In the office for follow-up and to make further decisions.  He is transported in a wheelchair and when he attempts to stand up he is obviously unsteady on his feet.  He indeed was transferred to a nursing home and he may not be able to get back to independent living.  He is weak and profoundly tremulous.  He has been eating better, he reports to me.  He has not had any nausea or vomiting.  He has been afebrile.  He denies chest pains or cough and has not had abdominal pain or diarrhea.  Exam chronically ill-appearing and in no distress.  No jaundice.  Lungs are diminished bilaterally in a symmetrical fashion.  Heart regular.  Abdomen flat and soft.  No edema.  Laboratory exams reviewed and discussed with him.  Discussed with Dr. Ag.  Still considering treatment with stereotactic radiosurgery.  I do not believe that he is a candidate to receive systemic treatment.    6/20/2025: Eventually discharged from the hospital to continue follow-up as outpatient.  He was discharged to a nursing home.  Feels better than at the hospital.  Stronger but has not been able to return home.  He is eating better and he thinks he has gained weight but he has not.  He has been afebrile.  He is worried about waiting too long for the procedure.  On exam he is ill-appearing.  No distress.  No jaundice.  There is a large ulceration on the left side of the face, in the preauricular region.  The  lungs are clear bilaterally.  The heart is regular.  Abdomen is soft.  There is no edema.  Laboratory exams reviewed.  To obtain new scans.  See me with the results.  Tumor marker today.    The following portions of the patient's history were reviewed and updated as appropriate: allergies, current medications, past family history, past medical history, past social history, past surgical history and problem list.    Past Medical History:   Diagnosis Date    Anemia     Colon cancer 2022    colon    Diabetes mellitus     Hyperlipidemia     Hypertension     LBBB (left bundle branch block) 11/14/2024     Past Surgical History:   Procedure Laterality Date    APPENDECTOMY      CARDIAC CATHETERIZATION      CARDIAC CATHETERIZATION N/A 11/14/2024    Procedure: Left Heart Cath, possible pci;  Surgeon: Mg Rahman MD;  Location: [facility] CATH INVASIVE LOCATION;  Service: Cardiovascular;  Laterality: N/A;    COLON RESECTION N/A 12/15/2022    Procedure: COLON RESECTION RIGHT;  Surgeon: Percy Davis MD;  Location: [facility] OR;  Service: General;  Laterality: N/A;    COLONOSCOPY N/A 11/11/2022    Procedure: COLONOSCOPY WITH BIOPSY AND POLYPECTOMY;  Surgeon: Billy Julien MD;  Location: [facility] ENDOSCOPY;  Service: Gastroenterology;  Laterality: N/A;  Impression:  1.  Large 4-5cm fulgurating circumferential ulcerated mass in the very proximal part of the ascending colon next to ileocecal valve multiple biopsies were performed.  This is highly concerning for colon malignancy.  2.  2 polyp rem    ENDOSCOPY N/A 11/11/2022    Procedure: ESOPHAGOGASTRODUODENOSCOPY with biopsy X1;  Surgeon: Billy Julien MD;  Location: [facility] ENDOSCOPY;  Service: Gastroenterology;  Laterality: N/A;  5.  Upper endoscopy lamination unremarkable.       PORTACATH PLACEMENT Right 1/12/2023    Procedure: INSERTION OF PORTACATH;  Surgeon: Percy Davis MD;  Location: [facility] MAIN OR;  Service: General;  Laterality: Right;     TONSILLECTOMY         Current Outpatient Medications:     ammonium lactate (AMLACTIN) 12 % cream, Apply 1 g topically to the appropriate area as directed 3 times a day., Disp: , Rfl:     aspirin 81 MG EC tablet, Take 1 tablet by mouth Daily., Disp: , Rfl:     atorvastatin (LIPITOR) 40 MG tablet, Take 1 tablet by mouth Daily., Disp: , Rfl:     cloNIDine (CATAPRES) 0.1 MG tablet, Take 1 tablet by mouth 2 (Two) Times a Day., Disp: , Rfl:     empagliflozin (JARDIANCE) 10 MG tablet tablet, Take 1 tablet by mouth Daily., Disp: 30 tablet, Rfl: 1    ferrous gluconate (FERGON) 324 MG tablet, Take 1 tablet by mouth Daily With Breakfast., Disp: , Rfl:     furosemide (LASIX) 20 MG tablet, Take 1 tablet by mouth Daily., Disp: , Rfl:     gabapentin (NEURONTIN) 100 MG capsule, Take 1 capsule by mouth Every 12 (Twelve) Hours., Disp: , Rfl:     insulin aspart (novoLOG FLEXPEN) 100 UNIT/ML solution pen-injector sc pen, Inject 8 Units under the skin into the appropriate area as directed 3 (Three) Times a Day With Meals., Disp: , Rfl:     insulin glargine (LANTUS, SEMGLEE) 100 UNIT/ML injection, Inject 20 Units under the skin into the appropriate area as directed Every Night., Disp: , Rfl:     Melatonin 3 MG tablet, Take 1 tablet by mouth Every Night., Disp: , Rfl:     metFORMIN (GLUCOPHAGE) 1000 MG tablet, Take 1 tablet by mouth 2 (Two) Times a Day With Meals., Disp: , Rfl:     midodrine (PROAMATINE) 10 MG tablet, Take 1 tablet by mouth 3 (Three) Times a Day Before Meals., Disp: 90 tablet, Rfl: 0    ondansetron (ZOFRAN) 8 MG tablet, Take 1 tablet by mouth Every 8 (Eight) Hours As Needed for Nausea or Vomiting., Disp: , Rfl:   No current facility-administered medications for this visit.    Allergies   Allergen Reactions    Penicillins Rash     Family History   Problem Relation Age of Onset    Pneumonia Mother 94        SARS-CoV2    Colon cancer Father 62    Heart disease Sister      Cancer-related family history includes Colon cancer  (age of onset: 62) in his father.    Social History     Tobacco Use    Smoking status: Former     Current packs/day: 0.00     Average packs/day: 1 pack/day for 2.0 years (2.0 ttl pk-yrs)     Types: Cigarettes     Start date:      Quit date:      Years since quittin.5    Smokeless tobacco: Never   Vaping Use    Vaping status: Never Used   Substance Use Topics    Alcohol use: Never    Drug use: Never     Social History     Social History Narrative    Not on file      ROS:     Review of Systems   Constitutional:  Negative for activity change, appetite change, chills, diaphoresis, fatigue, fever and unexpected weight change.   HENT:  Negative for congestion, dental problem, drooling, ear discharge, ear pain, facial swelling, hearing loss, mouth sores, nosebleeds, postnasal drip, rhinorrhea, sinus pressure, sinus pain, sneezing, sore throat, tinnitus, trouble swallowing and voice change.    Eyes:  Negative for photophobia, pain, discharge, redness, itching and visual disturbance.   Respiratory:  Negative for apnea, cough, choking, chest tightness, shortness of breath, wheezing and stridor.    Cardiovascular:  Negative for chest pain, palpitations and leg swelling.   Gastrointestinal:  Negative for abdominal distention, abdominal pain, anal bleeding, blood in stool, constipation, diarrhea, nausea, rectal pain and vomiting.   Endocrine: Negative for cold intolerance, heat intolerance, polydipsia and polyuria.   Genitourinary:  Negative for decreased urine volume, difficulty urinating, dysuria, flank pain, frequency, genital sores, hematuria and urgency.   Musculoskeletal:  Negative for arthralgias, back pain, gait problem, joint swelling, myalgias, neck pain and neck stiffness.   Skin:  Negative for color change, pallor and rash.   Neurological:  Negative for dizziness, tremors, seizures, syncope, facial asymmetry, speech difficulty, weakness, light-headedness, numbness and headaches.   Hematological:   "Negative for adenopathy. Does not bruise/bleed easily.   Psychiatric/Behavioral:  Negative for agitation, behavioral problems, confusion, decreased concentration, hallucinations, self-injury, sleep disturbance and suicidal ideas. The patient is not nervous/anxious.      Objective:    Vitals:    06/20/25 1220   BP: 121/66   Pulse: 51   Resp: 18   Temp: 97.3 °F (36.3 °C)   SpO2: 90%   Weight: 65.6 kg (144 lb 9.6 oz)   Height: 185.4 cm (73\")   PainSc: 0-No pain     Body mass index is 19.08 kg/m².  ECOG  (0) Fully active, able to carry on all predisease performance without restriction    Physical Exam:     Physical Exam  Constitutional:       General: He is not in acute distress.     Appearance: Normal appearance. He is not ill-appearing, toxic-appearing or diaphoretic.   HENT:      Head: Normocephalic and atraumatic.      Right Ear: External ear normal.      Left Ear: External ear normal.      Nose: Nose normal.      Mouth/Throat:      Mouth: Mucous membranes are moist.      Pharynx: Oropharynx is clear.   Eyes:      General: No scleral icterus.        Right eye: No discharge.         Left eye: No discharge.      Conjunctiva/sclera: Conjunctivae normal.      Pupils: Pupils are equal, round, and reactive to light.   Cardiovascular:      Rate and Rhythm: Normal rate and regular rhythm.      Pulses: Normal pulses.      Heart sounds: Normal heart sounds. No murmur heard.     No friction rub. No gallop.   Pulmonary:      Effort: No respiratory distress.      Breath sounds: No stridor. No wheezing, rhonchi or rales.   Chest:      Chest wall: No tenderness.   Abdominal:      General: Abdomen is flat. Bowel sounds are normal. There is no distension.      Palpations: Abdomen is soft. There is no mass.      Tenderness: There is no abdominal tenderness. There is no right CVA tenderness, left CVA tenderness, guarding or rebound.   Musculoskeletal:         General: No swelling, tenderness, deformity or signs of injury.      " Cervical back: No rigidity.      Right lower leg: No edema.      Left lower leg: No edema.   Lymphadenopathy:      Cervical: No cervical adenopathy.   Skin:     General: Skin is warm and dry.      Coloration: Skin is not jaundiced.      Findings: No bruising or rash.   Neurological:      General: No focal deficit present.      Mental Status: He is alert and oriented to person, place, and time.      Cranial Nerves: No cranial nerve deficit.      Gait: Gait normal.   Psychiatric:         Mood and Affect: Mood normal.         Behavior: Behavior normal.         Thought Content: Thought content normal.         Judgment: Judgment normal.     KATIE Kramer MD performed a physical exam on 6/20/2025 as documented above.    Lab Results - Last 18 Months   Lab Units 06/19/25  1139 05/08/25  1233 05/02/25  0507   WBC 10*3/mm3 7.63 5.51 6.40   HEMOGLOBIN g/dL 12.7* 13.1 12.2*   HEMATOCRIT % 40.1 41.0 38.8   PLATELETS 10*3/mm3 232 197 146   MCV fL 94.4 91.5 89.8     Lab Results - Last 18 Months   Lab Units 05/08/25  1233 05/01/25  0425 04/30/25  0209 04/29/25  0601 04/28/25  1834 04/27/25  2051 04/22/25  0913   SODIUM mmol/L 136 137 138   < > 131*  --  136   POTASSIUM mmol/L 4.4 3.5 4.2   < > 4.7  --  4.4   CHLORIDE mmol/L 100 103 102   < > 101  --  103   CO2 mmol/L 28.9 27.0 24.5   < > 19.1*  --  24.1   BUN mg/dL 20 26* 30*   < > 29*  --  19   CREATININE mg/dL 0.77 0.73* 0.95   < > 0.91   < > 0.77   CALCIUM mg/dL 8.7 8.2* 8.5*   < > 9.2  --  9.0   BILIRUBIN mg/dL 1.0  --   --   --  1.0  --  0.8   ALK PHOS U/L 159*  --   --   --  171*  --  150*   ALT (SGPT) U/L 24  --   --   --  21  --  19   AST (SGOT) U/L 28  --   --   --  47*  --  30   GLUCOSE mg/dL 185* 244* 274*   < > 98  --  236*    < > = values in this interval not displayed.     Lab Results   Component Value Date    GLUCOSE 185 (H) 05/08/2025    BUN 20 05/08/2025    CREATININE 0.77 05/08/2025    EGFRIFNONA 76 11/15/2021    EGFRIFAFRI 87 11/15/2021    BCR 26.0 (H)  05/08/2025    K 4.4 05/08/2025    CO2 28.9 05/08/2025    CALCIUM 8.7 05/08/2025    ALBUMIN 3.3 (L) 05/08/2025    AST 28 05/08/2025    ALT 24 05/08/2025     Lab Results   Component Value Date    IRON 15 (L) 01/06/2023    TIBC 548 (H) 01/06/2023    FERRITIN 23.51 (L) 01/06/2023     Lab Results   Component Value Date    CEA 23.80 04/22/2025     Assessment & Plan     Assessment:  Isolated metastatic colon cancer to the central portion of the liver with 2 distinct lesions.  Has had good responses to treatment but with poor tolerance.  Despite the lack of treatment in the recent past only 2 lesions remained.  They have increased slowly in size.  Stereotactic radiosurgery had to be postponed because of his admission to the hospital.  At this point I am not sure any longer he is a candidate to receive stereotactic radiosurgery but will obtain scans and will readdress with radiation oncology.  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  Completed Cape-Ox adjuvantly for 3 months in April 2023.  History of recurrent ischemic stroke.  No symptoms at this time though his general status has declined.  Poor performance status.  Reviewed the records from the hospital.  Reviewed all laboratory exams.  Discussed with him.  See me again in a few weeks with results of imaging studies and tumor markers.    Don Kramer MD on 6/20/2025 at 12:55 PM.

## 2025-06-19 ENCOUNTER — HOSPITAL ENCOUNTER (OUTPATIENT)
Dept: ONCOLOGY | Facility: HOSPITAL | Age: 80
Discharge: HOME | End: 2025-06-19
Admitting: INTERNAL MEDICINE
Payer: MEDICARE

## 2025-06-19 DIAGNOSIS — Z95.828 PORT-A-CATH IN PLACE: ICD-10-CM

## 2025-06-19 DIAGNOSIS — C18.2 MALIGNANT NEOPLASM OF ASCENDING COLON: Primary | ICD-10-CM

## 2025-06-19 LAB
BASOPHILS # BLD AUTO: 0.02 10*3/MM3 (ref 0–0.2)
BASOPHILS NFR BLD AUTO: 0.3 % (ref 0–1.5)
DEPRECATED RDW RBC AUTO: 55.5 FL (ref 37–54)
EOSINOPHIL # BLD AUTO: 0.13 10*3/MM3 (ref 0–0.4)
EOSINOPHIL NFR BLD AUTO: 1.7 % (ref 0.3–6.2)
ERYTHROCYTE [DISTWIDTH] IN BLOOD BY AUTOMATED COUNT: 16.6 % (ref 12.3–15.4)
HCT VFR BLD AUTO: 40.1 % (ref 37.5–51)
HGB BLD-MCNC: 12.7 G/DL (ref 13–17.7)
LYMPHOCYTES # BLD AUTO: 0.81 10*3/MM3 (ref 0.7–3.1)
LYMPHOCYTES NFR BLD AUTO: 10.6 % (ref 19.6–45.3)
MCH RBC QN AUTO: 29.9 PG (ref 26.6–33)
MCHC RBC AUTO-ENTMCNC: 31.7 G/DL (ref 31.5–35.7)
MCV RBC AUTO: 94.4 FL (ref 79–97)
MONOCYTES # BLD AUTO: 1.11 10*3/MM3 (ref 0.1–0.9)
MONOCYTES NFR BLD AUTO: 14.5 % (ref 5–12)
NEUTROPHILS NFR BLD AUTO: 5.56 10*3/MM3 (ref 1.7–7)
NEUTROPHILS NFR BLD AUTO: 72.9 % (ref 42.7–76)
PLATELET # BLD AUTO: 232 10*3/MM3 (ref 140–450)
PMV BLD AUTO: 10.1 FL (ref 6–12)
RBC # BLD AUTO: 4.25 10*6/MM3 (ref 4.14–5.8)
WBC NRBC COR # BLD AUTO: 7.63 10*3/MM3 (ref 3.4–10.8)

## 2025-06-19 PROCEDURE — 25010000002 HEPARIN LOCK FLUSH PER 10 UNITS: Performed by: INTERNAL MEDICINE

## 2025-06-19 PROCEDURE — 85025 COMPLETE CBC W/AUTO DIFF WBC: CPT | Performed by: INTERNAL MEDICINE

## 2025-06-19 PROCEDURE — 36591 DRAW BLOOD OFF VENOUS DEVICE: CPT

## 2025-06-19 RX ORDER — HEPARIN SODIUM (PORCINE) LOCK FLUSH IV SOLN 100 UNIT/ML 100 UNIT/ML
500 SOLUTION INTRAVENOUS AS NEEDED
Status: DISCONTINUED | OUTPATIENT
Start: 2025-06-19 | End: 2025-06-20 | Stop reason: HOSPADM

## 2025-06-19 RX ORDER — SODIUM CHLORIDE 0.9 % (FLUSH) 0.9 %
20 SYRINGE (ML) INJECTION AS NEEDED
OUTPATIENT
Start: 2025-06-19

## 2025-06-19 RX ORDER — SODIUM CHLORIDE 0.9 % (FLUSH) 0.9 %
20 SYRINGE (ML) INJECTION AS NEEDED
Status: DISCONTINUED | OUTPATIENT
Start: 2025-06-19 | End: 2025-06-20 | Stop reason: HOSPADM

## 2025-06-19 RX ORDER — HEPARIN SODIUM (PORCINE) LOCK FLUSH IV SOLN 100 UNIT/ML 100 UNIT/ML
500 SOLUTION INTRAVENOUS AS NEEDED
OUTPATIENT
Start: 2025-06-19

## 2025-06-19 RX ADMIN — Medication 20 ML: at 11:38

## 2025-06-19 RX ADMIN — Medication 500 UNITS: at 11:39

## 2025-06-20 ENCOUNTER — OFFICE VISIT (OUTPATIENT)
Dept: ONCOLOGY | Facility: CLINIC | Age: 80
End: 2025-06-20
Payer: MEDICARE

## 2025-06-20 VITALS
HEART RATE: 51 BPM | SYSTOLIC BLOOD PRESSURE: 121 MMHG | HEIGHT: 73 IN | RESPIRATION RATE: 18 BRPM | WEIGHT: 144.6 LBS | BODY MASS INDEX: 19.16 KG/M2 | DIASTOLIC BLOOD PRESSURE: 66 MMHG | OXYGEN SATURATION: 90 % | TEMPERATURE: 97.3 F

## 2025-06-20 DIAGNOSIS — C78.7 METASTASES TO THE LIVER: ICD-10-CM

## 2025-06-20 DIAGNOSIS — C18.2 MALIGNANT NEOPLASM OF ASCENDING COLON: Primary | ICD-10-CM

## 2025-06-20 PROCEDURE — 1160F RVW MEDS BY RX/DR IN RCRD: CPT | Performed by: INTERNAL MEDICINE

## 2025-06-20 PROCEDURE — 1159F MED LIST DOCD IN RCRD: CPT | Performed by: INTERNAL MEDICINE

## 2025-06-20 PROCEDURE — 3074F SYST BP LT 130 MM HG: CPT | Performed by: INTERNAL MEDICINE

## 2025-06-20 PROCEDURE — 3078F DIAST BP <80 MM HG: CPT | Performed by: INTERNAL MEDICINE

## 2025-06-20 PROCEDURE — 1126F AMNT PAIN NOTED NONE PRSNT: CPT | Performed by: INTERNAL MEDICINE

## 2025-06-20 PROCEDURE — 99213 OFFICE O/P EST LOW 20 MIN: CPT | Performed by: INTERNAL MEDICINE

## 2025-06-20 RX ORDER — CLONIDINE HYDROCHLORIDE 0.1 MG/1
0.1 TABLET ORAL 2 TIMES DAILY
COMMUNITY

## 2025-06-24 ENCOUNTER — TELEPHONE (OUTPATIENT)
Dept: ONCOLOGY | Facility: CLINIC | Age: 80
End: 2025-06-24

## 2025-06-24 NOTE — TELEPHONE ENCOUNTER
"  Caller: Vlad Guerrero \"AMBAR\"    Relationship: Self    Best call back number: [phone]    What is the best time to reach you: ANY    Who are you requesting to speak with (clinical staff, provider,  specific staff member): CLINICAL     Do you know the name of the person who called: DOES NOT KNOW    What was the call regarding: AMBAR MISSED A CALL FROM THE OFFICE       "

## 2025-06-30 ENCOUNTER — HOSPITAL ENCOUNTER (OUTPATIENT)
Dept: PET IMAGING | Facility: HOSPITAL | Age: 80
Discharge: HOME | End: 2025-06-30
Admitting: INTERNAL MEDICINE
Payer: MEDICARE

## 2025-06-30 DIAGNOSIS — C78.7 METASTASES TO THE LIVER: ICD-10-CM

## 2025-06-30 DIAGNOSIS — C18.2 MALIGNANT NEOPLASM OF ASCENDING COLON: ICD-10-CM

## 2025-06-30 LAB
CREAT BLDA-MCNC: 0.3 MG/DL (ref 0.6–1.3)
EGFRCR SERPLBLD CKD-EPI 2021: 122.6 ML/MIN/1.73

## 2025-06-30 PROCEDURE — 71260 CT THORAX DX C+: CPT

## 2025-06-30 PROCEDURE — 74177 CT ABD & PELVIS W/CONTRAST: CPT

## 2025-06-30 PROCEDURE — 82565 ASSAY OF CREATININE: CPT

## 2025-06-30 PROCEDURE — 25510000001 IOPAMIDOL PER 1 ML: Performed by: INTERNAL MEDICINE

## 2025-06-30 RX ORDER — IOPAMIDOL 755 MG/ML
100 INJECTION, SOLUTION INTRAVASCULAR
Status: COMPLETED | OUTPATIENT
Start: 2025-06-30 | End: 2025-06-30

## 2025-06-30 RX ADMIN — IOPAMIDOL 100 ML: 755 INJECTION, SOLUTION INTRAVENOUS at 11:50

## 2025-07-22 ENCOUNTER — TELEPHONE (OUTPATIENT)
Dept: ONCOLOGY | Facility: CLINIC | Age: 80
End: 2025-07-22
Payer: MEDICARE

## 2025-07-31 ENCOUNTER — TELEPHONE (OUTPATIENT)
Dept: ONCOLOGY | Facility: CLINIC | Age: 80
End: 2025-07-31

## 2025-08-02 ENCOUNTER — APPOINTMENT (OUTPATIENT)
Dept: CT IMAGING | Facility: HOSPITAL | Age: 80
End: 2025-08-02
Payer: OTHER GOVERNMENT

## 2025-08-02 ENCOUNTER — HOSPITAL ENCOUNTER (EMERGENCY)
Facility: HOSPITAL | Age: 80
Discharge: HOME | End: 2025-08-02
Attending: EMERGENCY MEDICINE
Payer: OTHER GOVERNMENT

## 2025-08-02 VITALS
HEART RATE: 91 BPM | WEIGHT: 144.62 LBS | HEIGHT: 73 IN | RESPIRATION RATE: 18 BRPM | DIASTOLIC BLOOD PRESSURE: 68 MMHG | OXYGEN SATURATION: 97 % | BODY MASS INDEX: 19.17 KG/M2 | TEMPERATURE: 99 F | SYSTOLIC BLOOD PRESSURE: 116 MMHG

## 2025-08-02 DIAGNOSIS — S01.01XA LACERATION OF SCALP, INITIAL ENCOUNTER: ICD-10-CM
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
## 2025-08-02 PROCEDURE — 70450 CT HEAD/BRAIN W/O DYE: CPT

## 2025-08-02 PROCEDURE — 99284 EMERGENCY DEPT VISIT MOD MDM: CPT

## 2025-08-02 NOTE — DISCHARGE INSTRUCTIONS
Rest, elevate today  Tylenol or ibuprofen for discomfort  Wash wound twice a day with soap and water  Observe for infection  And have staples removed in 7 to 8 days

## 2025-08-02 NOTE — ED PROVIDER NOTES
Subjective   History of Present Illness  79-year-old male fell while going to the bank today.  The patient denies syncope and states that he tripped on the curb.  He states that he was stunned and slow to think and respond.  The family stated the patient was ataxic briefly and has returned to mental baseline he reports no nausea.  He denies neck pain or stiffness.  He reports no focal neurologic findings or lateralizing neurologic signs he denies chest pain or shortness of breath      Review of Systems   Skin:  Positive for wound.   Neurological:  Positive for headaches.       Past Medical History:   Diagnosis Date    Anemia     Colon cancer 2022    colon    Diabetes mellitus     Hyperlipidemia     Hypertension     LBBB (left bundle branch block) 11/14/2024   Tetanus status identified by family as up to date    Allergies   Allergen Reactions    Penicillins Rash       Past Surgical History:   Procedure Laterality Date    APPENDECTOMY      CARDIAC CATHETERIZATION      CARDIAC CATHETERIZATION N/A 11/14/2024    Procedure: Left Heart Cath, possible pci;  Surgeon: Mg Rahman MD;  Location: [facility] CATH INVASIVE LOCATION;  Service: Cardiovascular;  Laterality: N/A;    COLON RESECTION N/A 12/15/2022    Procedure: COLON RESECTION RIGHT;  Surgeon: Percy Davis MD;  Location: [facility] MAIN OR;  Service: General;  Laterality: N/A;    COLONOSCOPY N/A 11/11/2022    Procedure: COLONOSCOPY WITH BIOPSY AND POLYPECTOMY;  Surgeon: Billy Julien MD;  Location: [facility] ENDOSCOPY;  Service: Gastroenterology;  Laterality: N/A;  Impression:  1.  Large 4-5cm fulgurating circumferential ulcerated mass in the very proximal part of the ascending colon next to ileocecal valve multiple biopsies were performed.  This is highly concerning for colon malignancy.  2.  2 polyp rem    ENDOSCOPY N/A 11/11/2022    Procedure: ESOPHAGOGASTRODUODENOSCOPY with biopsy X1;  Surgeon: Billy Julien MD;  Location: [facility] ENDOSCOPY;   Service: Gastroenterology;  Laterality: N/A;  5.  Upper endoscopy lamination unremarkable.       PORTACATH PLACEMENT Right 2023    Procedure: INSERTION OF PORTACATH;  Surgeon: Percy Davis MD;  Location: [facility] MAIN OR;  Service: General;  Laterality: Right;    TONSILLECTOMY         Family History   Problem Relation Age of Onset    Pneumonia Mother 94        SARS-CoV2    Colon cancer Father 62    Heart disease Sister        Social History     Socioeconomic History    Marital status:    Tobacco Use    Smoking status: Former     Current packs/day: 0.00     Average packs/day: 1 pack/day for 2.0 years (2.0 ttl pk-yrs)     Types: Cigarettes     Start date:      Quit date:      Years since quittin.6    Smokeless tobacco: Never   Vaping Use    Vaping status: Never Used   Substance and Sexual Activity    Alcohol use: Never    Drug use: Never    Sexual activity: Defer     Resident of [facility]      Objective   Physical Exam  Alert Armani Coma Scale 15   HEENT: Pupils equal and reactive to light. Conjunctivae are not injected. Normal tympanic membranes. Oropharynx and nares are normal.  3.5 cm laceration to the vertex no palpable fracture or step-off   Neck: Supple. Midline trachea. No JVD. No goiter.   Chest: Clear and equal breath sounds bilaterally, regular rate and rhythm without murmur or rub.   Abdomen: Positive bowel sounds, nontender, nondistended. No rebound or peritoneal signs. No CVA tenderness.   Extremities no clubbing. cyanosis or edema. Motor sensory exam is normal. The full range of motion is intact no hip pain   Skin: Warm and dry, no rashes or petechia.   Lymphatic: No regional lymphadenopathy. No calf pain, swelling or Homans sign    Procedures  After consent and timeout the area was anesthetized with Xylocaine after prep with chlorhexidine the wound was irrigated with saline and closed with skin staples the patient tolerated the procedure well         ED Course        Labs  Reviewed - No data to display  Medications - No data to display  CT Head Without Contrast  Result Date: 8/2/2025  Impression: Scalp laceration without depressed skull fracture or acute intracranial hemorrhage. Electronically Signed: Bola Duong MD  8/2/2025 1:21 PM EDT  Workstation ID: [id number]                                                   Medical Decision Making  The patient was advised to have the staples removed in 7 to 8 days.  They were advised to elevate the head today and use Tylenol or ibuprofen as needed for discomfort.  The patient was stable at discharge and vocalized understanding of discharge instructions, warnings    Amount and/or Complexity of Data Reviewed  Radiology: ordered and independent interpretation performed.    Risk  OTC drugs.        Final diagnoses:   Fall from standing, initial encounter   Laceration of scalp, initial encounter       ED Disposition  ED Disposition       ED Disposition   Discharge    Condition   Stable    Comment   --               Troy Angel MD  [facility] [place] [address] [phone]               Medication List      No changes were made to your prescriptions during this visit.            Preston Ch MD  08/02/25 [id number]

## 2025-08-02 NOTE — ED NOTES
Patient reports tripping and falling outside of bank. Laceration to head. Patient denies LOC, states he does take blood thinners. Bleeding controlled. Patient confused, but family states this is baseline mental status. Patient denies other injuries.

## 2025-08-14 ENCOUNTER — APPOINTMENT (OUTPATIENT)
Dept: LAB | Facility: HOSPITAL | Age: 80
End: 2025-08-14
Payer: MEDICARE

## 2025-08-14 ENCOUNTER — OFFICE VISIT (OUTPATIENT)
Dept: ONCOLOGY | Facility: CLINIC | Age: 80
End: 2025-08-14
Payer: MEDICARE

## 2025-08-14 VITALS
HEIGHT: 73 IN | HEART RATE: 104 BPM | BODY MASS INDEX: 18.4 KG/M2 | TEMPERATURE: 96.9 F | OXYGEN SATURATION: 95 % | WEIGHT: 138.8 LBS | DIASTOLIC BLOOD PRESSURE: 60 MMHG | SYSTOLIC BLOOD PRESSURE: 86 MMHG

## 2025-08-14 DIAGNOSIS — C78.7 METASTASES TO THE LIVER: ICD-10-CM

## 2025-08-14 DIAGNOSIS — C18.2 MALIGNANT NEOPLASM OF ASCENDING COLON: Primary | ICD-10-CM

## 2025-08-14 LAB
ALBUMIN SERPL-MCNC: 2.8 G/DL (ref 3.5–5.2)
ALBUMIN/GLOB SERPL: 0.7 G/DL
ALP SERPL-CCNC: 422 U/L (ref 39–117)
ALT SERPL W P-5'-P-CCNC: 39 U/L (ref 1–41)
ANION GAP SERPL CALCULATED.3IONS-SCNC: 12.1 MMOL/L (ref 5–15)
AST SERPL-CCNC: 108 U/L (ref 1–40)
BILIRUB SERPL-MCNC: 1 MG/DL (ref 0–1.2)
BUN SERPL-MCNC: 21.1 MG/DL (ref 8–23)
BUN/CREAT SERPL: 32 (ref 7–25)
CALCIUM SPEC-SCNC: 8.4 MG/DL (ref 8.6–10.5)
CEA SERPL-MCNC: 276 NG/ML
CHLORIDE SERPL-SCNC: 97 MMOL/L (ref 98–107)
CO2 SERPL-SCNC: 22.9 MMOL/L (ref 22–29)
CREAT SERPL-MCNC: 0.66 MG/DL (ref 0.76–1.27)
EGFRCR SERPLBLD CKD-EPI 2021: 96.6 ML/MIN/1.73
GLOBULIN UR ELPH-MCNC: 4.1 GM/DL
GLUCOSE SERPL-MCNC: 159 MG/DL (ref 65–99)
HOLD SPECIMEN: NORMAL
HOLD SPECIMEN: NORMAL
POTASSIUM SERPL-SCNC: 4.7 MMOL/L (ref 3.5–5.2)
PROT SERPL-MCNC: 6.9 G/DL (ref 6–8.5)
SODIUM SERPL-SCNC: 132 MMOL/L (ref 136–145)

## 2025-08-14 PROCEDURE — 80053 COMPREHEN METABOLIC PANEL: CPT | Performed by: INTERNAL MEDICINE

## 2025-08-14 PROCEDURE — 1160F RVW MEDS BY RX/DR IN RCRD: CPT | Performed by: INTERNAL MEDICINE

## 2025-08-14 PROCEDURE — 82378 CARCINOEMBRYONIC ANTIGEN: CPT | Performed by: INTERNAL MEDICINE

## 2025-08-14 PROCEDURE — 99214 OFFICE O/P EST MOD 30 MIN: CPT | Performed by: INTERNAL MEDICINE

## 2025-08-14 PROCEDURE — 1126F AMNT PAIN NOTED NONE PRSNT: CPT | Performed by: INTERNAL MEDICINE

## 2025-08-14 PROCEDURE — 3078F DIAST BP <80 MM HG: CPT | Performed by: INTERNAL MEDICINE

## 2025-08-14 PROCEDURE — 1159F MED LIST DOCD IN RCRD: CPT | Performed by: INTERNAL MEDICINE

## 2025-08-14 PROCEDURE — 3074F SYST BP LT 130 MM HG: CPT | Performed by: INTERNAL MEDICINE

## (undated) DEVICE — GLV SURG SIGNATURE ESSENTIAL PF LTX SZ8

## (undated) DEVICE — CATH DIAG IMPULSE PIG .056 6F 110CM

## (undated) DEVICE — PENCL HND ROCKRSWTCH HOLSTR EZ CLEAN TP CRD 10FT

## (undated) DEVICE — PROVE COVER: Brand: UNBRANDED

## (undated) DEVICE — SUT SILK 2/0 30IN A305H

## (undated) DEVICE — ENSEAL 20 CM SHAFT, LARGE JAW: Brand: ENSEAL X1

## (undated) DEVICE — COVER,MAYO STAND,STERILE: Brand: MEDLINE

## (undated) DEVICE — CATH DIAG IMPULSE FR4 6F 100CM

## (undated) DEVICE — 450 ML BOTTLE OF 0.05% CHLORHEXIDINE GLUCONATE IN 99.95% STERILE WATER FOR IRRIGATION, USP AND APPLICATOR.: Brand: IRRISEPT ANTIMICROBIAL WOUND LAVAGE

## (undated) DEVICE — TOWEL,OR,DSP,ST,WHITE,DLX,4/PK,20PK/CS: Brand: MEDLINE

## (undated) DEVICE — SNAR POLYP CAPTIVATOR OVL 13MM 240CM

## (undated) DEVICE — SLV SCD CALF HEMOFORCE DVT THERP REPROC MD

## (undated) DEVICE — CVR HNDL LT CAM LB54

## (undated) DEVICE — UNDERGLV SURG BIOGEL/PI PF SYNTH SURG SZ8.5 BLU 50/BX

## (undated) DEVICE — TBG PENCL TELESCP MEGADYNE SMOKE EVAC 15FT

## (undated) DEVICE — SYR LUERLOK 5CC

## (undated) DEVICE — TP SXN YANKR BULB STRL

## (undated) DEVICE — SUT SILK 3/0 SH CR8 18IN C013D

## (undated) DEVICE — PK TRY HEART CATH 50

## (undated) DEVICE — TUBING, SUCTION, 1/4" X 12', STRAIGHT: Brand: MEDLINE

## (undated) DEVICE — TRAP WIDEEYE POLYP

## (undated) DEVICE — SOLUTION,WATER,IRRIGATION,1000ML,STERILE: Brand: MEDLINE

## (undated) DEVICE — NDL PERC 1PRT THNWALL W/BASEPLT 18G 7CM

## (undated) DEVICE — GOWN,REINFRCE,POLY,SIRUS,BREATH SLV,XXLG: Brand: MEDLINE

## (undated) DEVICE — SHEET,DRAPE,53X77,STERILE: Brand: MEDLINE

## (undated) DEVICE — C-ARM: Brand: UNBRANDED

## (undated) DEVICE — CATH DIAG IMPULSE FL4 6F 100CM

## (undated) DEVICE — DRSNG WND BORDR/ADHS NONADHR/GZ LF 4X10IN STRL

## (undated) DEVICE — PINNACLE INTRODUCER SHEATH: Brand: PINNACLE

## (undated) DEVICE — SUT MONOCRYL 4/0 PS2 27IN Y426H ETY426H

## (undated) DEVICE — PK MINOR LAPAROTOMY 50

## (undated) DEVICE — CVR HNDL LT SURG ACCSSRY BLU STRL

## (undated) DEVICE — SOL IRRIG NACL 1000ML

## (undated) DEVICE — ANTIBACTERIAL UNDYED BRAIDED (POLYGLACTIN 910), SYNTHETIC ABSORBABLE SUTURE: Brand: COATED VICRYL

## (undated) DEVICE — KT SURG TURNOVER 050

## (undated) DEVICE — DEV CLS VASC MYNXCONTROL 6FTO7F

## (undated) DEVICE — BITEBLOCK ENDO W/STRAP 60F A/ LF DISP

## (undated) DEVICE — FRCP BX RADJAW4 NDL 2.8 240CM LG OG BX40

## (undated) DEVICE — CUFF SCD HEMOFORCE SEQ CALF STD MD

## (undated) DEVICE — ABDOMINAL BINDER: Brand: DEROYAL

## (undated) DEVICE — GOWN,REINFORCE,POLY,SIRUS,BREATH SLV,XLG: Brand: MEDLINE

## (undated) DEVICE — ADHS SKIN PREMIERPRO EXOFIN TOPICAL HI/VISC .5ML

## (undated) DEVICE — 9165 UNIVERSAL PATIENT PLATE: Brand: 3M™

## (undated) DEVICE — PK MAJ LAPAROTOMY 50

## (undated) DEVICE — SUT VIC 3/0 SH 27IN J416H

## (undated) DEVICE — SUT PDS LP 1 TP1 96IN VIO PDP880GA

## (undated) DEVICE — DGW .035 FC J3MM 150CM TEF HEP: Brand: EMERALD

## (undated) DEVICE — PK ENDO GI 50